# Patient Record
Sex: FEMALE | Race: WHITE | NOT HISPANIC OR LATINO | Employment: OTHER | URBAN - METROPOLITAN AREA
[De-identification: names, ages, dates, MRNs, and addresses within clinical notes are randomized per-mention and may not be internally consistent; named-entity substitution may affect disease eponyms.]

---

## 2019-04-01 ENCOUNTER — TRANSCRIBE ORDERS (OUTPATIENT)
Dept: ADMINISTRATIVE | Facility: HOSPITAL | Age: 64
End: 2019-04-01

## 2019-04-01 DIAGNOSIS — R79.89 ELEVATED LFTS: ICD-10-CM

## 2019-04-01 DIAGNOSIS — R94.4 ABNORMAL RESULTS OF KIDNEY FUNCTION STUDIES: Primary | ICD-10-CM

## 2019-04-14 ENCOUNTER — HOSPITAL ENCOUNTER (OUTPATIENT)
Dept: ULTRASOUND IMAGING | Facility: HOSPITAL | Age: 64
Discharge: HOME | End: 2019-04-14
Admitting: NURSE PRACTITIONER

## 2019-04-14 DIAGNOSIS — R79.89 ELEVATED LFTS: ICD-10-CM

## 2019-04-14 DIAGNOSIS — R94.4 ABNORMAL RESULTS OF KIDNEY FUNCTION STUDIES: ICD-10-CM

## 2019-04-14 PROCEDURE — 76700 US EXAM ABDOM COMPLETE: CPT

## 2019-05-07 ENCOUNTER — OFFICE VISIT (OUTPATIENT)
Dept: CARDIOLOGY | Facility: HOSPITAL | Age: 64
End: 2019-05-07

## 2019-05-07 ENCOUNTER — HOSPITAL ENCOUNTER (OUTPATIENT)
Dept: CARDIOLOGY | Facility: HOSPITAL | Age: 64
Discharge: HOME | End: 2019-05-07
Admitting: NURSE PRACTITIONER

## 2019-05-07 VITALS
SYSTOLIC BLOOD PRESSURE: 114 MMHG | WEIGHT: 116.44 LBS | TEMPERATURE: 97.7 F | BODY MASS INDEX: 19.4 KG/M2 | HEIGHT: 65 IN | RESPIRATION RATE: 18 BRPM | OXYGEN SATURATION: 100 % | HEART RATE: 84 BPM | DIASTOLIC BLOOD PRESSURE: 76 MMHG

## 2019-05-07 DIAGNOSIS — R06.09 DOE (DYSPNEA ON EXERTION): ICD-10-CM

## 2019-05-07 DIAGNOSIS — R07.89 CHEST TIGHTNESS OR PRESSURE: ICD-10-CM

## 2019-05-07 DIAGNOSIS — R55 NEAR SYNCOPE: ICD-10-CM

## 2019-05-07 DIAGNOSIS — I10 ESSENTIAL HYPERTENSION: ICD-10-CM

## 2019-05-07 DIAGNOSIS — R06.09 DOE (DYSPNEA ON EXERTION): Primary | ICD-10-CM

## 2019-05-07 PROCEDURE — 99214 OFFICE O/P EST MOD 30 MIN: CPT | Performed by: NURSE PRACTITIONER

## 2019-05-07 PROCEDURE — 93010 ELECTROCARDIOGRAM REPORT: CPT | Performed by: INTERNAL MEDICINE

## 2019-05-07 PROCEDURE — 93005 ELECTROCARDIOGRAM TRACING: CPT | Performed by: NURSE PRACTITIONER

## 2019-05-07 RX ORDER — METOPROLOL SUCCINATE 25 MG/1
25 TABLET, EXTENDED RELEASE ORAL NIGHTLY
COMMUNITY
End: 2022-01-27 | Stop reason: SDUPTHER

## 2019-05-07 NOTE — PROGRESS NOTES
[facility]  Heart and Valve Center      Encounter Date:2019     Alysia Sierra  [address] [place] [address]  [unfilled]    1955    Janet Leon APRN    Alysia Sierra is a 64 y.o. female.      Subjective:     Chief Complaint:  Shortness of Breath       HPI     64-year-old female referred to the heart valve center by primary care for evaluation of dyspnea on exertion.  Patient has a history of chronic fatigue, generalized weakness.  Patient history of iron deficiency anemia, unexplained weight loss, chronic kidney disease stage III, hypertension, hyperlipidemia, generalized anxiety disorder.  Greater than 6 weeks ago patient was seen at  on 3/29/2019 with complaints of dizziness, chest discomfort, dyspnea.    Pt reports s/s have worsened since the death of her mother, approx 3 months.  NIEVES has been progressive.  Worse with climbing steps and even during shopping (flat). Daily, wax and wane.  Typically no CP, except for recent  visit.  At that time chest pressure, weakness. No wheezing, but dry cough at night with allergies.  Worsening dizziness, near syncope.  No syncope.  Pt reports having a normal stress and echo 2017 at .  Typically no palpitations, but rapid with exertion improves with rest.  Increased stress.     Father had MI 50's.  Mother had CVA 50's.    Hx of HTN (BB), HTP (not treated r/t leg weakness)  Denies DM, CVA/TIA,     Past Medical History:   Diagnosis Date   • Chronic fatigue    • NIEVES (dyspnea on exertion)    • Generalized anxiety disorder    • Hyperlipidemia    • Hypertension    • Iron deficiency anemia    • Unintentional weight loss            Past Surgical History:   Procedure Laterality Date   •  SECTION     • DILATION AND CURETTAGE, DIAGNOSTIC / THERAPEUTIC     • ESSURE TUBAL LIGATION         Allergies   Allergen Reactions   • Atorvastatin Myalgia   • Lisinopril Angioedema         Current Outpatient Medications:   •  magnesium oxide  "(MAGOX) 400 (241.3 Mg) MG tablet tablet, Take 400 mg by mouth. 1 tablet oral daily, extra tablet daily 3 times weekly, Disp: , Rfl:   •  metoprolol succinate XL (TOPROL-XL) 25 MG 24 hr tablet, Take 25 mg by mouth Every Night., Disp: , Rfl:     The following portions of the patient's history were reviewed and updated today during office visit as appropriate: allergies, current medications, past family history, past medical history, past social history, past surgical history and problem list.    Review of Systems   Constitution: Positive for weakness, malaise/fatigue and weight loss.   Cardiovascular: Positive for dyspnea on exertion.   Respiratory: Positive for shortness of breath.    Neurological: Positive for dizziness.   All other systems reviewed and are negative.      Objective:     Vitals:    05/07/19 1244 05/07/19 1246 05/07/19 1247   BP: 123/72 129/86 114/76   BP Location: Right arm Left arm Left arm   Patient Position: Sitting Sitting Standing   Cuff Size: Adult Small Adult Small Adult   Pulse: 86 86 84   Resp: 18     Temp: 97.7 °F (36.5 °C)     TempSrc: Temporal     SpO2: 98% 99% 100%   Weight: 52.8 kg (116 lb 7 oz)     Height: 163.8 cm (64.5\")           Physical Exam   Constitutional: She is oriented to person, place, and time. She appears well-developed and well-nourished. No distress.   HENT:   Head: Normocephalic and atraumatic.   Mouth/Throat: Oropharynx is clear and moist.   Eyes: Conjunctivae are normal. Pupils are equal, round, and reactive to light. No scleral icterus.   Neck: No hepatojugular reflux and no JVD present. Carotid bruit is not present. No tracheal deviation present. No thyromegaly present.   Cardiovascular: Normal rate, regular rhythm, normal heart sounds and intact distal pulses. Exam reveals no friction rub.   No murmur heard.  Pulmonary/Chest: Effort normal and breath sounds normal.   Abdominal: Soft. Bowel sounds are normal. She exhibits no distension. There is no tenderness. "   Musculoskeletal: She exhibits no edema.   Lymphadenopathy:     She has no cervical adenopathy.   Neurological: She is alert and oriented to person, place, and time.   Skin: Skin is warm, dry and intact. No rash noted. No cyanosis or erythema. No pallor.   Psychiatric: She has a normal mood and affect. Her behavior is normal. Thought content normal.   Vitals reviewed.      Lab and Diagnostic Review:  5/2/2019 labs reviewed  CK 24,   CRP 0.10,   sed rate 10    Hep A IgM antibody nonreactive, hep B surface antigen nonreactive, hep B core IgM antibody nonreactive, hep C IgG G antibody nonreactive    4/24/2019:  WBC 4.4, hemoglobin 11.8, hematocrit 37.5, platelet 268    Glucose 99, sodium 138, potassium 4.4, chloride 98, carbon dioxide 25, creatinine 1.4, ALT 31, AST 65, estimated GFR 40.7    TSH 3.8, B12 459, folic acid 6.4, hemoglobin A1c 5.1    Cholesterol 237, triglycerides 159, HDL 92,     Magnesium 1.5    SEB negative    25 hydroxy vitamin D 47.43  Assessment and Plan:         1. NIEVES (dyspnea on exertion)    - ECG 12 Lead; SR 78 bpm    - Stress Test With Myocardial Perfusion (1 Day); Future  - Adult Transthoracic Echo Complete W/ Cont if Necessary Per Protocol; Future  - Ambulatory Referral to Cardiology    2. Chest tightness or pressure    - Stress Test With Myocardial Perfusion (1 Day); Future  - Ambulatory Referral to Cardiology    3. Near syncope    - Adult Transthoracic Echo Complete W/ Cont if Necessary Per Protocol; Future  - Ambulatory Referral to Cardiology    4. Essential hypertension  BP controlled on BB  Keep home BP and HR log    F/u with cardiology to be scheduled.         *Please note that portions of this note were completed with a voice recognition program. Efforts were made to edit the dictations, but occasionally words are mistranscribed.

## 2019-06-04 ENCOUNTER — HOSPITAL ENCOUNTER (OUTPATIENT)
Dept: CARDIOLOGY | Facility: HOSPITAL | Age: 64
Discharge: HOME | End: 2019-06-04

## 2019-06-04 VITALS — WEIGHT: 116 LBS | HEIGHT: 64 IN | BODY MASS INDEX: 19.81 KG/M2

## 2019-06-04 DIAGNOSIS — R06.09 DOE (DYSPNEA ON EXERTION): ICD-10-CM

## 2019-06-04 DIAGNOSIS — R55 NEAR SYNCOPE: ICD-10-CM

## 2019-06-04 DIAGNOSIS — R07.89 CHEST TIGHTNESS OR PRESSURE: ICD-10-CM

## 2019-06-04 LAB
BH CV ECHO MEAS - AO ROOT AREA (BSA CORRECTED): 2.1
BH CV ECHO MEAS - AO ROOT AREA: 8.2 CM^2
BH CV ECHO MEAS - AO ROOT DIAM: 3.2 CM
BH CV ECHO MEAS - BSA(HAYCOCK): 1.5 M^2
BH CV ECHO MEAS - BSA: 1.6 M^2
BH CV ECHO MEAS - BZI_BMI: 19.9 KILOGRAMS/M^2
BH CV ECHO MEAS - BZI_METRIC_HEIGHT: 162.6 CM
BH CV ECHO MEAS - BZI_METRIC_WEIGHT: 52.6 KG
BH CV ECHO MEAS - EDV(CUBED): 70.7 ML
BH CV ECHO MEAS - EDV(MOD-SP2): 25 ML
BH CV ECHO MEAS - EDV(MOD-SP4): 32 ML
BH CV ECHO MEAS - EDV(TEICH): 75.7 ML
BH CV ECHO MEAS - EF(CUBED): 65.1 %
BH CV ECHO MEAS - EF(MOD-BP): 55 %
BH CV ECHO MEAS - EF(MOD-SP2): 52 %
BH CV ECHO MEAS - EF(MOD-SP4): 62.5 %
BH CV ECHO MEAS - EF(TEICH): 57 %
BH CV ECHO MEAS - ESV(CUBED): 24.7 ML
BH CV ECHO MEAS - ESV(MOD-SP2): 12 ML
BH CV ECHO MEAS - ESV(MOD-SP4): 12 ML
BH CV ECHO MEAS - ESV(TEICH): 32.5 ML
BH CV ECHO MEAS - FS: 29.6 %
BH CV ECHO MEAS - IVS/LVPW: 1.2
BH CV ECHO MEAS - IVSD: 0.86 CM
BH CV ECHO MEAS - LA DIMENSION: 3 CM
BH CV ECHO MEAS - LA/AO: 0.92
BH CV ECHO MEAS - LAD MAJOR: 3.8 CM
BH CV ECHO MEAS - LAT PEAK E' VEL: 5 CM/SEC
BH CV ECHO MEAS - LATERAL E/E' RATIO: 10.6
BH CV ECHO MEAS - LV DIASTOLIC VOL/BSA (35-75): 20.6 ML/M^2
BH CV ECHO MEAS - LV MASS(C)D: 99.1 GRAMS
BH CV ECHO MEAS - LV MASS(C)DI: 63.9 GRAMS/M^2
BH CV ECHO MEAS - LV SYSTOLIC VOL/BSA (12-30): 7.7 ML/M^2
BH CV ECHO MEAS - LVIDD: 4.1 CM
BH CV ECHO MEAS - LVIDS: 2.9 CM
BH CV ECHO MEAS - LVLD AP2: 5.6 CM
BH CV ECHO MEAS - LVLD AP4: 5.1 CM
BH CV ECHO MEAS - LVLS AP2: 4.6 CM
BH CV ECHO MEAS - LVLS AP4: 4.1 CM
BH CV ECHO MEAS - LVPWD: 0.75 CM
BH CV ECHO MEAS - MED PEAK E' VEL: 7.8 CM/SEC
BH CV ECHO MEAS - MEDIAL E/E' RATIO: 6.9
BH CV ECHO MEAS - MV A MAX VEL: 73.5 CM/SEC
BH CV ECHO MEAS - MV E MAX VEL: 55.3 CM/SEC
BH CV ECHO MEAS - MV E/A: 0.75
BH CV ECHO MEAS - PA ACC SLOPE: 396.5 CM/SEC^2
BH CV ECHO MEAS - PA ACC TIME: 0.12 SEC
BH CV ECHO MEAS - PA PR(ACCEL): 23.5 MMHG
BH CV ECHO MEAS - RAP SYSTOLE: 3 MMHG
BH CV ECHO MEAS - RVSP: 22 MMHG
BH CV ECHO MEAS - SI(CUBED): 29.6 ML/M^2
BH CV ECHO MEAS - SI(MOD-SP2): 8.4 ML/M^2
BH CV ECHO MEAS - SI(MOD-SP4): 12.9 ML/M^2
BH CV ECHO MEAS - SI(TEICH): 27.8 ML/M^2
BH CV ECHO MEAS - SV(CUBED): 46 ML
BH CV ECHO MEAS - SV(MOD-SP2): 13 ML
BH CV ECHO MEAS - SV(MOD-SP4): 20 ML
BH CV ECHO MEAS - SV(TEICH): 43.2 ML
BH CV ECHO MEAS - TAPSE (>1.6): 1.1 CM2
BH CV ECHO MEAS - TR MAX PG: 19 MMHG
BH CV ECHO MEAS - TR MAX VEL: 214.6 CM/SEC
BH CV ECHO MEASUREMENTS AVERAGE E/E' RATIO: 8.64
BH CV STRESS BP STAGE 1: NORMAL
BH CV STRESS DURATION MIN STAGE 1: 4
BH CV STRESS DURATION SEC STAGE 1: 1
BH CV STRESS GRADE STAGE 1: 10
BH CV STRESS HR STAGE 1: 160
BH CV STRESS METS STAGE 1: 5
BH CV STRESS PROTOCOL 1: NORMAL
BH CV STRESS RECOVERY BP: NORMAL MMHG
BH CV STRESS RECOVERY HR: 90 BPM
BH CV STRESS SPEED STAGE 1: 1.7
BH CV STRESS STAGE 1: 1
BH CV VAS BP RIGHT ARM: NORMAL MMHG
BH CV XLRA - RV BASE: 2.9 CM
BH CV XLRA - RV LENGTH: 5.3 CM
BH CV XLRA - RV MID: 2.5 CM
BH CV XLRA - TDI S': 8.4 CM/SEC
LEFT ATRIUM VOLUME INDEX: 14.2 ML/M^2
LEFT ATRIUM VOLUME: 22 ML
LV EF NUC BP: 77 %
MAXIMAL PREDICTED HEART RATE: 157 BPM
MAXIMAL PREDICTED HEART RATE: 157 BPM
PERCENT MAX PREDICTED HR: 104.46 %
STRESS BASELINE BP: NORMAL MMHG
STRESS BASELINE HR: 88 BPM
STRESS PERCENT HR: 123 %
STRESS POST ESTIMATED WORKLOAD: 4.6 METS
STRESS POST EXERCISE DUR MIN: 4 MIN
STRESS POST EXERCISE DUR SEC: 1 SEC
STRESS POST PEAK BP: NORMAL MMHG
STRESS POST PEAK HR: 164 BPM
STRESS TARGET HR: 133 BPM
STRESS TARGET HR: 133 BPM

## 2019-06-04 PROCEDURE — A9500 TC99M SESTAMIBI: HCPCS | Performed by: NURSE PRACTITIONER

## 2019-06-04 PROCEDURE — 0 TECHNETIUM SESTAMIBI: Performed by: NURSE PRACTITIONER

## 2019-06-04 PROCEDURE — 93017 CV STRESS TEST TRACING ONLY: CPT

## 2019-06-04 PROCEDURE — 93018 CV STRESS TEST I&R ONLY: CPT | Performed by: INTERNAL MEDICINE

## 2019-06-04 PROCEDURE — 78452 HT MUSCLE IMAGE SPECT MULT: CPT

## 2019-06-04 PROCEDURE — 93306 TTE W/DOPPLER COMPLETE: CPT

## 2019-06-04 PROCEDURE — 78452 HT MUSCLE IMAGE SPECT MULT: CPT | Performed by: INTERNAL MEDICINE

## 2019-06-04 PROCEDURE — 93306 TTE W/DOPPLER COMPLETE: CPT | Performed by: INTERNAL MEDICINE

## 2019-06-04 RX ADMIN — TECHNETIUM TC 99M SESTAMIBI 1 DOSE: 1 INJECTION INTRAVENOUS at 08:55

## 2019-06-04 RX ADMIN — TECHNETIUM TC 99M SESTAMIBI 1 DOSE: 1 INJECTION INTRAVENOUS at 10:55

## 2019-06-05 ENCOUNTER — TELEPHONE (OUTPATIENT)
Dept: CARDIOLOGY | Facility: HOSPITAL | Age: 64
End: 2019-06-05

## 2019-06-05 PROBLEM — R07.89 CHEST TIGHTNESS OR PRESSURE: Status: ACTIVE | Noted: 2019-06-05

## 2019-06-05 NOTE — TELEPHONE ENCOUNTER
Attempted to call and discuss stress and echo results.  No answer.  Left message for pt to call.     F/u with [facility] is scheduled.

## 2019-06-08 PROBLEM — I10 HYPERTENSION: Status: ACTIVE | Noted: 2019-06-08

## 2019-06-08 PROBLEM — R06.09 DOE (DYSPNEA ON EXERTION): Status: ACTIVE | Noted: 2019-06-08

## 2019-06-08 NOTE — PROGRESS NOTES
OFFICE VISIT  NOTE  [facility] CARDIOLOGY      Name: Alysia Sierra    Date: 6/10/2019  MRN:  [id number]  :  1955      REFERRING/PRIMARY PROVIDER:  Eloise Jerome APRN    Chief Complaint   Patient presents with   • Chest Pain   • Dizziness   • Shortness of Breath       HPI: Alysia Sierra is a 64 y.o. female who presents today for new consultation for chest pain and dyspnea on exertion. Patient has a history of chronic fatigue, generalized weakness.  Patient history of iron deficiency anemia, unexplained weight loss, chronic kidney disease stage III, hypertension, hyperlipidemia, generalized anxiety disorder.  Greater than 6 weeks ago patient was seen at  on 3/29/2019 with complaints of dizziness, chest discomfort, dyspnea. Pt reports having a normal stress and echo 2017 at .  Echocardiogram on 2019 shows normal ejection fraction with grade 1 diastolic dysfunction, no sniffing valvular heart disease, exercise myocardial perfusion imaging study showed no evidence of infarct or ischemia, low functional capacity, exercised 4 minutes stopped due to leg fatigue.  She reports 13 to 15 pound unintentional weight loss in the past 3 to 4 months, after her mother passed away in February.  She lives with her mother, she moved out recently.  She relies she cannot walk upstairs without profound fatigue, muscle weakness.  She is found to have iron deficiency anemia, and was on iron supplementation but has not been on it recently for unknown reason.  Denies having previous colonoscopy.  Patient reports shortness of breath and weakness with any type of exertion.  She does have occasional chest pain, sharp, substernal, nonradiating.  Occasional palpitations, better on metoprolol, worse when she was on amlodipine and lisinopril.  Patient has history of angioedema with lisinopril.  She is essentially vegetarian.    Past Medical History:   Diagnosis Date   • Anemia    • Chicken pox    •  Chronic fatigue    • NIEVES (dyspnea on exertion)    • Generalized anxiety disorder    • Hyperlipidemia    • Hypertension    • Iron deficiency anemia    • Liver problem    • Measles    • Menopause    • Mumps    • Unintentional weight loss        Past Surgical History:   Procedure Laterality Date   •  SECTION     • DILATION AND CURETTAGE, DIAGNOSTIC / THERAPEUTIC     • ESSURE TUBAL LIGATION         Social History     Socioeconomic History   • Marital status:      Spouse name: Not on file   • Number of children: Not on file   • Years of education: Not on file   • Highest education level: Not on file   Tobacco Use   • Smoking status: Never Smoker   • Smokeless tobacco: Never Used   Substance and Sexual Activity   • Alcohol use: Yes     Frequency: Monthly or less     Drinks per session: 3 or 4   • Drug use: No   • Sexual activity: Defer   Social History Narrative    Caffeine: None       Family History   Problem Relation Age of Onset   • COPD Mother    • Stroke Mother    • Lung cancer Father    • Hypertension Father    • Heart attack Father    • Coronary artery disease Father    • Hyperlipidemia Sister    • Spondylolisthesis Sister    • Rheum arthritis Sister    • Malig Hypertension Sister    • Osteoarthritis Sister    • Other Sister         alcoholic   • Hypertension Brother         ROS:   Constitutional no fever,  no weight loss   Skin no rash, no subcutaneous nodules   Otolaryngeal no difficulty swallowing   Cardiovascular See HPI   Pulmonary no cough, no sputum production   Gastrointestinal no constipation, no diarrhea   Genitourinary no dysuria, no hematuria   Hematologic no easy bruisability, no abnormal bleeding   Musculoskeletal no muscle pain   Neurologic no dizziness, no falls         Allergies   Allergen Reactions   • Atorvastatin Myalgia   • Lisinopril Angioedema         Current Outpatient Medications:   •  magnesium oxide (MAGOX) 400 (241.3 Mg) MG tablet tablet, Take 400 mg by mouth. 1  "tablet oral daily, extra tablet daily 3 times weekly, Disp: , Rfl:   •  metoprolol succinate XL (TOPROL-XL) 25 MG 24 hr tablet, Take 25 mg by mouth Every Night., Disp: , Rfl:   •  tiotropium bromide monohydrate (SPIRIVA RESPIMAT) 2.5 MCG/ACT aerosol solution inhaler, Inhale 2 puffs Daily., Disp: , Rfl:     Vitals:    06/10/19 0949   BP: 124/80   BP Location: Right arm   Patient Position: Sitting   Pulse: 96   SpO2: 98%   Weight: 52.6 kg (116 lb)   Height: 162.6 cm (64\")     Body mass index is 19.91 kg/m².    PHYSICAL EXAM:    General Appearance:   · Thin, frail  HENT:   · oropharynx moist  · lips not cyanotic  Neck:  · thyroid not enlarged  · supple  Respiratory:  · no respiratory distress  · normal breath sounds  · no rales  Cardiovascular:  · no jugular venous distention  · regular rhythm  · apical impulse normal  · S1 normal, S2 normal  · no S3, no S4   · no murmur  · no rub, no thrill  · carotid pulses normal; no bruit  · pedal pulses normal  · lower extremity edema: none    Gastrointestinal:   · bowel sounds normal  · non-tender  · no hepatomegaly, no splenomegaly  Musculoskeletal:  · no clubbing of fingers.   · normocephalic, head atraumatic  Skin:   · warm, dry  Psychiatric:  · judgement and insight appropriate  · normal mood and affect    RESULTS:     ECG 12 Lead  Date/Time: 6/10/2019 11:04 AM  Performed by: Gray Bahena MD  Authorized by: Gray Bahena MD   Comparison: compared with previous ECG from 5/7/2019  Comparison to previous ECG: Similar to previous with exception of PVC and PACs.  Rhythm: sinus rhythm  Ectopy: unifocal PVCs and atrial premature contractions  Rate: normal  BPM: 91  QRS axis: normal    Clinical impression: abnormal EKG            Results for orders placed during the hospital encounter of 06/04/19   Adult Transthoracic Echo Complete W/ Cont if Necessary Per Protocol    Narrative · Calculated EF = 55.0%  · Left ventricular systolic function is normal.  · Left ventricular " diastolic dysfunction (grade I) consistent with   impaired relaxation.  · Mild tricuspid valve regurgitation is present.  · Mild pulmonic valve regurgitation is present.            Labs:  No results found for: CHOL, TRIG, HDL, LDL, AST, ALT  No results found for: HGBA1C  No components found for: CREATINININE  No results found for: EGFRIFNONA      ASSESSMENT:  Problem List Items Addressed This Visit        Cardiovascular and Mediastinum    Hypertension       Respiratory    NIEVES (dyspnea on exertion)       Nervous and Auditory    Chest tightness or pressure - Primary    Overview     · Stress test with myocardial perfusion 3/4/2019: No ischemia, EF greater than 70%.  No coronary calcium noted.  · Echocardiogram 6/4/2019: EF 55, mild TR, mild NE, diastolic dysfunction grade 1            Other    Unintentional weight loss          PLAN:    1.  Chest pain:  We reviewed her stress test in detail, no evidence of ischemia or infarct  Possibly GI related  Follow-up with PCP regarding GI evaluation    2.  Dyspnea on exertion:  May be related to anemia  Echocardiogram has ejection fraction no valvular heart disease, grade 1 diastolic dysfunction  Grade 1 diastolic dysfunction may contribute slightly to dyspnea but this alone would not cause the profound symptoms she is experiencing.    3.  Unintentional weight loss with iron deficiency anemia:  Strongly recommend colonoscopy  Patient follow-up with PCP regarding this    4.  Palpitations:  EKG in the office today shows PVC and PACs, concern for atrial fibrillation or flutter  14-day Holter monitor placed today  Further recognitions to follow  Continue current dose metoprolol    Return to clinic in 3 months    Thank you for the opportunity to share in the care of your patient; please do not hesitate to call me with any questions.     Gray Bahena MD, [facility]  Office: [phone]  [facility] [place] [address]

## 2019-06-10 ENCOUNTER — CONSULT (OUTPATIENT)
Dept: CARDIOLOGY | Facility: CLINIC | Age: 64
End: 2019-06-10

## 2019-06-10 VITALS
HEIGHT: 64 IN | SYSTOLIC BLOOD PRESSURE: 124 MMHG | BODY MASS INDEX: 19.81 KG/M2 | HEART RATE: 96 BPM | OXYGEN SATURATION: 98 % | WEIGHT: 116 LBS | DIASTOLIC BLOOD PRESSURE: 80 MMHG

## 2019-06-10 DIAGNOSIS — I10 ESSENTIAL HYPERTENSION: ICD-10-CM

## 2019-06-10 DIAGNOSIS — R06.09 DOE (DYSPNEA ON EXERTION): ICD-10-CM

## 2019-06-10 DIAGNOSIS — R07.89 CHEST TIGHTNESS OR PRESSURE: Primary | ICD-10-CM

## 2019-06-10 DIAGNOSIS — R00.2 PALPITATIONS: Primary | ICD-10-CM

## 2019-06-10 DIAGNOSIS — R63.4 UNINTENTIONAL WEIGHT LOSS: ICD-10-CM

## 2019-06-10 PROCEDURE — 93000 ELECTROCARDIOGRAM COMPLETE: CPT | Performed by: INTERNAL MEDICINE

## 2019-06-10 PROCEDURE — 99204 OFFICE O/P NEW MOD 45 MIN: CPT | Performed by: INTERNAL MEDICINE

## 2019-06-28 ENCOUNTER — TELEPHONE (OUTPATIENT)
Dept: CARDIOLOGY | Facility: CLINIC | Age: 64
End: 2019-06-28

## 2019-06-28 NOTE — TELEPHONE ENCOUNTER
----- Message from Gray Bahena MD sent at 6/28/2019  4:32 PM EDT -----  Please inform the patient of their test results.  She should continue metoprolol at current dose, if palpitation symptoms are worsening let me know we will evaluate further.  Thank you.

## 2019-06-28 NOTE — TELEPHONE ENCOUNTER
Spoke with patient about heart monitor results, and she says her palpitations are getting better. She expresses one day last week she had one of the best days she's had in months. She was very active and reports good breathing, no chest discomfort, and no palpitations with that level of activity.

## 2019-09-23 ENCOUNTER — OFFICE VISIT (OUTPATIENT)
Dept: CARDIOLOGY | Facility: CLINIC | Age: 64
End: 2019-09-23

## 2019-09-23 VITALS
WEIGHT: 120 LBS | HEIGHT: 65 IN | SYSTOLIC BLOOD PRESSURE: 138 MMHG | DIASTOLIC BLOOD PRESSURE: 82 MMHG | HEART RATE: 82 BPM | BODY MASS INDEX: 19.99 KG/M2

## 2019-09-23 DIAGNOSIS — I47.1 PAROXYSMAL SVT (SUPRAVENTRICULAR TACHYCARDIA) (HCC): Primary | ICD-10-CM

## 2019-09-23 DIAGNOSIS — R07.89 CHEST TIGHTNESS OR PRESSURE: ICD-10-CM

## 2019-09-23 DIAGNOSIS — R06.09 DOE (DYSPNEA ON EXERTION): ICD-10-CM

## 2019-09-23 DIAGNOSIS — I10 ESSENTIAL HYPERTENSION: ICD-10-CM

## 2019-09-23 PROBLEM — I47.10 PAROXYSMAL SVT (SUPRAVENTRICULAR TACHYCARDIA): Status: ACTIVE | Noted: 2019-09-23

## 2019-09-23 PROCEDURE — 99214 OFFICE O/P EST MOD 30 MIN: CPT | Performed by: INTERNAL MEDICINE

## 2019-09-23 NOTE — PROGRESS NOTES
OFFICE VISIT  NOTE  [facility] MEDICAL [facility] CARDIOLOGY      Name: Alysia Sierra    Date: 2019  MRN:  [id number]  :  1955      REFERRING/PRIMARY PROVIDER:  No ref. provider found    Chief Complaint   Patient presents with   • NIEVES (dyspnea on exertion)       HPI: Alysia Sierra is a 64 y.o. female who presents today for follow-up for chest pain, shortness of breath, paroxysmal SVT. Patient history of iron deficiency anemia, unexplained weight loss, chronic kidney disease stage III, hypertension, hyperlipidemia, generalized anxiety disorder.    Stress test and echo benign 2019 at [facility].  Symptoms all resolved in 2019.  She is not exercising, denies chest pain or shortness of breath with exertion.  Anemia is improved which may have helped.  She also denies palpitations, currently tolerating Toprol-XL 25 mg daily.  She cannot tolerate amlodipine due to lower extremity edema, she had angioedema with lisinopril, and did not tolerate HCTZ due to dizziness.    Past Medical History:   Diagnosis Date   • Anemia    • Chicken pox    • Chronic fatigue    • NIEVES (dyspnea on exertion)    • Generalized anxiety disorder    • Hyperlipidemia    • Hypertension    • Iron deficiency anemia    • Liver problem    • Measles    • Menopause    • Mumps    • Unintentional weight loss        Past Surgical History:   Procedure Laterality Date   •  SECTION     • DILATION AND CURETTAGE, DIAGNOSTIC / THERAPEUTIC     • ESSURE TUBAL LIGATION         Social History     Socioeconomic History   • Marital status:      Spouse name: Not on file   • Number of children: Not on file   • Years of education: Not on file   • Highest education level: Not on file   Tobacco Use   • Smoking status: Never Smoker   • Smokeless tobacco: Never Used   Substance and Sexual Activity   • Alcohol use: Yes     Frequency: Monthly or less     Drinks per session: 3 or 4   • Drug use: No   • Sexual activity: Defer  "  Social History Narrative    Caffeine: None       Family History   Problem Relation Age of Onset   • COPD Mother    • Stroke Mother    • Lung cancer Father    • Hypertension Father    • Heart attack Father    • Coronary artery disease Father    • Hyperlipidemia Sister    • Spondylolisthesis Sister    • Rheum arthritis Sister    • Malig Hypertension Sister    • Osteoarthritis Sister    • Other Sister         alcoholic   • Hypertension Brother         ROS:   Constitutional no fever,  no weight loss   Skin no rash, no subcutaneous nodules   Otolaryngeal no difficulty swallowing   Cardiovascular See HPI   Pulmonary no cough, no sputum production   Gastrointestinal no constipation, no diarrhea   Genitourinary no dysuria, no hematuria   Hematologic no easy bruisability, no abnormal bleeding   Musculoskeletal no muscle pain   Neurologic no dizziness, no falls         Allergies   Allergen Reactions   • Atorvastatin Myalgia   • Lisinopril Angioedema         Current Outpatient Medications:   •  magnesium oxide (MAGOX) 400 (241.3 Mg) MG tablet tablet, Take 400 mg by mouth. 1 tablet oral daily, extra tablet daily 3 times weekly, Disp: , Rfl:   •  metoprolol succinate XL (TOPROL-XL) 25 MG 24 hr tablet, Take 25 mg by mouth Every Night., Disp: , Rfl:     Vitals:    09/23/19 0921   BP: 138/82   BP Location: Left arm   Patient Position: Sitting   Pulse: 82   Weight: 54.4 kg (120 lb)   Height: 163.8 cm (64.5\")     Body mass index is 20.28 kg/m².    PHYSICAL EXAM:    General Appearance:   · Thin, frail  HENT:   · oropharynx moist  · lips not cyanotic  Neck:  · thyroid not enlarged  · supple  Respiratory:  · no respiratory distress  · normal breath sounds  · no rales  Cardiovascular:  · no jugular venous distention  · regular rhythm  · apical impulse normal  · S1 normal, S2 normal  · no S3, no S4   · no murmur  · no rub, no thrill  · carotid pulses normal; no bruit  · pedal pulses normal  · lower extremity edema: none  "   Gastrointestinal:   · bowel sounds normal  · non-tender  · no hepatomegaly, no splenomegaly  Musculoskeletal:  · no clubbing of fingers.   · normocephalic, head atraumatic  Skin:   · warm, dry  Psychiatric:  · judgement and insight appropriate  · normal mood and affect    RESULTS:   Procedures    Results for orders placed during the hospital encounter of 06/04/19   Adult Transthoracic Echo Complete W/ Cont if Necessary Per Protocol    Narrative · Calculated EF = 55.0%  · Left ventricular systolic function is normal.  · Left ventricular diastolic dysfunction (grade I) consistent with   impaired relaxation.  · Mild tricuspid valve regurgitation is present.  · Mild pulmonic valve regurgitation is present.            Labs:  No results found for: CHOL, TRIG, HDL, LDL, AST, ALT  No results found for: HGBA1C  No components found for: CREATINININE  No results found for: EGFRIFNONA      ASSESSMENT:  Problem List Items Addressed This Visit        Cardiovascular and Mediastinum    Hypertension    Paroxysmal SVT (supraventricular tachycardia) (CMS/HCC) - Primary       Respiratory    NIEVES (dyspnea on exertion)       Nervous and Auditory    Chest tightness or pressure    Overview     · Stress test with myocardial perfusion 3/4/2019: No ischemia, EF greater than 70%.  No coronary calcium noted.  · Echocardiogram 6/4/2019: EF 55, mild TR, mild IA, diastolic dysfunction grade 1               PLAN:    1.  Chest pain:  Symptoms resolved  She is doing well  Stress test and echo benign, 6/2019    2.  Dyspnea on exertion:  Symptoms much improved, she is back to exercising    3.  Hypertension:  Currently well controlled  Home blood pressure log were reviewed, range 96-1 30 systolic    4.  Paroxysmal SVT:  Continue Toprol 25 mg daily  She is currently asymptomatic  Continue decreasing caffeine and increase water    Return to clinic in 6 months    Thank you for the opportunity to share in the care of your patient; please do not hesitate to  call me with any questions.     Gray Bahena MD, FACC  Office: [phone] [address] [place] [address]

## 2020-03-05 NOTE — PROGRESS NOTES
OFFICE VISIT  NOTE  [facility] [place] [facility]ARDIOLOGY      Name: Alysia Sierra    Date: 3/9/2020  MRN:  [id number]  :  1955      REFERRING/PRIMARY PROVIDER:  No ref. provider found    Chief Complaint   Patient presents with   • Palpitations       HPI: Alysia Sierra is a 65 y.o. female who presents today for follow-up for chest pain, shortness of breath, paroxysmal SVT. Patient history of iron deficiency anemia, unexplained weight loss, chronic kidney disease stage III, hypertension, hyperlipidemia, generalized anxiety disorder. Stress test and echo benign 2019 at [facility].  Symptoms all resolved in 2019.  She is not exercising, denies chest pain or shortness of breath with exertion.  Anemia is improved which may have helped.  She also denies palpitations, currently tolerating Toprol-XL 25 mg daily.  She cannot tolerate amlodipine due to lower extremity edema, she had angioedema with lisinopril, and did not tolerate HCTZ due to dizziness.  Doing well since last visit, gets occasional palpitations but overall improved from last visit, denies shortness of breath or chest pain.  Stable CKD, most recent nephrology note reviewed.    Past Medical History:   Diagnosis Date   • Anemia    • Chicken pox    • Chronic fatigue    • NIEVES (dyspnea on exertion)    • Generalized anxiety disorder    • Hyperlipidemia    • Hypertension    • Iron deficiency anemia    • Liver problem    • Measles    • Menopause    • Mumps    • Unintentional weight loss        Past Surgical History:   Procedure Laterality Date   •  SECTION     • DILATION AND CURETTAGE, DIAGNOSTIC / THERAPEUTIC     • ESSURE TUBAL LIGATION         Social History     Socioeconomic History   • Marital status:      Spouse name: Not on file   • Number of children: Not on file   • Years of education: Not on file   • Highest education level: Not on file   Tobacco Use   • Smoking status: Never Smoker   • Smokeless  "tobacco: Never Used   Substance and Sexual Activity   • Alcohol use: Yes     Frequency: Monthly or less     Drinks per session: 3 or 4   • Drug use: No   • Sexual activity: Defer   Social History Narrative    Caffeine: None       Family History   Problem Relation Age of Onset   • COPD Mother    • Stroke Mother    • Lung cancer Father    • Hypertension Father    • Heart attack Father    • Coronary artery disease Father    • Hyperlipidemia Sister    • Spondylolisthesis Sister    • Rheum arthritis Sister    • Malig Hypertension Sister    • Osteoarthritis Sister    • Other Sister         alcoholic   • Hypertension Brother         ROS:   Constitutional no fever,  no weight loss   Skin no rash, no subcutaneous nodules   Otolaryngeal no difficulty swallowing   Cardiovascular See HPI   Pulmonary no cough, no sputum production   Gastrointestinal no constipation, no diarrhea   Genitourinary no dysuria, no hematuria   Hematologic no easy bruisability, no abnormal bleeding   Musculoskeletal no muscle pain   Neurologic no dizziness, no falls         Allergies   Allergen Reactions   • Lisinopril Angioedema   • Atorvastatin Myalgia         Current Outpatient Medications:   •  Cholecalciferol (VITAMIN D3) 25 MCG (1000 UT) capsule, 2 (Two) Times a Week., Disp: , Rfl:   •  famotidine (PEPCID) 10 MG tablet, Take 10 mg by mouth 2 (Two) Times a Day As Needed for Heartburn., Disp: , Rfl:   •  magnesium oxide (MAGOX) 400 (241.3 Mg) MG tablet tablet, Take 400 mg by mouth Daily., Disp: , Rfl:   •  metoprolol succinate XL (TOPROL-XL) 25 MG 24 hr tablet, Take 25 mg by mouth Every Night., Disp: , Rfl:   •  sodium bicarbonate 650 MG tablet, 2 (Two) Times a Week., Disp: , Rfl:     Vitals:    03/09/20 1056   BP: 140/84   BP Location: Right arm   Patient Position: Sitting   Pulse: 75   SpO2: 98%   Weight: 58.5 kg (129 lb)   Height: 162.6 cm (64\")     Body mass index is 22.14 kg/m².    PHYSICAL EXAM:    General Appearance:   · Thin, frail  HENT: "   · oropharynx moist  · lips not cyanotic  Neck:  · thyroid not enlarged  · supple  Respiratory:  · no respiratory distress  · normal breath sounds  · no rales  Cardiovascular:  · no jugular venous distention  · regular rhythm  · apical impulse normal  · S1 normal, S2 normal  · no S3, no S4   · no murmur  · no rub, no thrill  · carotid pulses normal; no bruit  · pedal pulses normal  · lower extremity edema: none    Gastrointestinal:   · bowel sounds normal  · non-tender  · no hepatomegaly, no splenomegaly  Musculoskeletal:  · no clubbing of fingers.   · normocephalic, head atraumatic  Skin:   · warm, dry  Psychiatric:  · judgement and insight appropriate  · normal mood and affect    RESULTS:     ECG 12 Lead  Date/Time: 3/9/2020 11:24 AM  Performed by: Gray Bahena MD  Authorized by: Gray Bahena MD   Comparison: compared with previous ECG from 6/10/2019  Similar to previous ECG  Rhythm: sinus rhythm  Rate: normal  QRS axis: normal    Clinical impression: non-specific ECG  Comments: Unusual P wave axis likely due to lead placement.            Results for orders placed during the hospital encounter of 06/04/19   Adult Transthoracic Echo Complete W/ Cont if Necessary Per Protocol    Narrative · Calculated EF = 55.0%  · Left ventricular systolic function is normal.  · Left ventricular diastolic dysfunction (grade I) consistent with   impaired relaxation.  · Mild tricuspid valve regurgitation is present.  · Mild pulmonic valve regurgitation is present.            Labs:  No results found for: CHOL, TRIG, HDL, LDL, AST, ALT  No results found for: HGBA1C  No components found for: CREATINININE  No results found for: EGFRIFNONA      ASSESSMENT:  Problem List Items Addressed This Visit        Cardiovascular and Mediastinum    Hypertension    Paroxysmal SVT (supraventricular tachycardia) (CMS/HCC) - Primary    Overview     06/2019 Holter  · An abnormal monitor study.  · 8 runs of SVT.         Relevant Orders    ECG 12  Lead       Respiratory    NIEVES (dyspnea on exertion)       Nervous and Auditory    Other chest pain    Overview     · Stress test with myocardial perfusion 3/4/2019: No ischemia, EF greater than 70%.  No coronary calcium noted.  · Echocardiogram 6/4/2019: EF 55, mild TR, mild OR, diastolic dysfunction grade 1               PLAN:    1.  Chest pain:  Symptoms resolved  She is doing well  Stress test and echo benign, 6/2019    2.  Dyspnea on exertion:  Symptoms much improved, she is back to exercising    3.  Hypertension:  Currently well controlled  Home blood pressure log were reviewed, 100-130 systolic range  Discussed importance of continued low-sodium diet and exercise    4.  Paroxysmal SVT:  Continue Toprol 25 mg daily  Symptoms are currently stable and do not affect daily living.  Continue decreasing caffeine and increase water    5.  CKD stage III:  Complicates all aspects of care  Recent nephrology note reviewed, stable creatinine, she follows a low potassium diet.    Return to clinic in 6 months, or sooner as needed.    Thank you for the opportunity to share in the care of your patient; please do not hesitate to call me with any questions.     Gray Bahena MD, [facility]  Office: [phone]  [facility] [place] [address]

## 2020-03-06 ENCOUNTER — HOSPITAL ENCOUNTER (OUTPATIENT)
Dept: GENERAL RADIOLOGY | Facility: HOSPITAL | Age: 65
Discharge: HOME | End: 2020-03-06
Admitting: NURSE PRACTITIONER

## 2020-03-06 ENCOUNTER — TRANSCRIBE ORDERS (OUTPATIENT)
Dept: ADMINISTRATIVE | Facility: HOSPITAL | Age: 65
End: 2020-03-06

## 2020-03-06 DIAGNOSIS — M41.9 SCOLIOSIS OF LUMBAR SPINE, UNSPECIFIED SCOLIOSIS TYPE: ICD-10-CM

## 2020-03-06 DIAGNOSIS — M54.50 LUMBAR PAIN: Primary | ICD-10-CM

## 2020-03-06 PROCEDURE — 72040 X-RAY EXAM NECK SPINE 2-3 VW: CPT

## 2020-03-06 PROCEDURE — 72100 X-RAY EXAM L-S SPINE 2/3 VWS: CPT

## 2020-03-06 PROCEDURE — 72070 X-RAY EXAM THORAC SPINE 2VWS: CPT

## 2020-03-09 ENCOUNTER — OFFICE VISIT (OUTPATIENT)
Dept: CARDIOLOGY | Facility: CLINIC | Age: 65
End: 2020-03-09

## 2020-03-09 VITALS
OXYGEN SATURATION: 98 % | SYSTOLIC BLOOD PRESSURE: 140 MMHG | HEART RATE: 75 BPM | HEIGHT: 64 IN | WEIGHT: 129 LBS | DIASTOLIC BLOOD PRESSURE: 84 MMHG | BODY MASS INDEX: 22.02 KG/M2

## 2020-03-09 DIAGNOSIS — R07.89 OTHER CHEST PAIN: ICD-10-CM

## 2020-03-09 DIAGNOSIS — R06.09 DOE (DYSPNEA ON EXERTION): ICD-10-CM

## 2020-03-09 DIAGNOSIS — I10 ESSENTIAL HYPERTENSION: ICD-10-CM

## 2020-03-09 DIAGNOSIS — I47.1 PAROXYSMAL SVT (SUPRAVENTRICULAR TACHYCARDIA) (HCC): Primary | ICD-10-CM

## 2020-03-09 PROCEDURE — 99214 OFFICE O/P EST MOD 30 MIN: CPT | Performed by: INTERNAL MEDICINE

## 2020-03-09 PROCEDURE — 93000 ELECTROCARDIOGRAM COMPLETE: CPT | Performed by: INTERNAL MEDICINE

## 2020-03-09 RX ORDER — SODIUM BICARBONATE 650 MG/1
TABLET ORAL 2 TIMES WEEKLY
COMMUNITY
Start: 2019-12-20 | End: 2020-10-08

## 2020-03-09 RX ORDER — FAMOTIDINE 10 MG
10 TABLET ORAL 2 TIMES DAILY PRN
COMMUNITY
End: 2020-10-08

## 2020-05-14 PROBLEM — R07.9 CHEST PAIN: Status: ACTIVE | Noted: 2019-06-05

## 2020-05-14 NOTE — PROGRESS NOTES
OFFICE VISIT  NOTE  [facility] [place] [facility]      Name: Alysia Sierra    Date: 2020  MRN:  [id number]  :  1955      REFERRING/PRIMARY PROVIDER:  No ref. provider found    No chief complaint on file.      HPI: Alysia Sierra is a 65 y.o. female who presents today for follow-up for chest pain, shortness of breath, paroxysmal SVT. Patient history of iron deficiency anemia, unexplained weight loss, chronic kidney disease stage III, hypertension, hyperlipidemia, generalized anxiety disorder. Stress test and echo benign 2019 at [facility].  Symptoms all resolved in 2019.    Reports labile blood pressure since last visit, increased stress, sister  2 months ago in 2020.  Blood pressure log reviewed from home, mainly 120s to 130 systolic but occasionally 140s to 150s with 90s to 100 diastolic.  She states, when diastolic is greater than 100 she has chest pressure rating to her back.  Stress test from 2019 normal.  She also reports bilateral leg weakness, difficulty rising from chair or laying down, she reports a syncopal episode 1 month ago.  Syncope occurred out of the blue, no preceding symptoms, woke up on the floor, crawled back to bed went to sleep, did not seek medical attention at that time.  Denies chest pain.    Past Medical History:   Diagnosis Date   • Anemia    • Chicken pox    • Chronic fatigue    • NIEVES (dyspnea on exertion)    • Generalized anxiety disorder    • Hyperlipidemia    • Hypertension    • Iron deficiency anemia    • Liver problem    • Measles    • Menopause    • Mumps    • Unintentional weight loss        Past Surgical History:   Procedure Laterality Date   •  SECTION     • DILATION AND CURETTAGE, DIAGNOSTIC / THERAPEUTIC     • ESSURE TUBAL LIGATION         Social History     Socioeconomic History   • Marital status:      Spouse name: Not on file   • Number of children: Not on file   • Years of education: Not on  file   • Highest education level: Not on file   Tobacco Use   • Smoking status: Never Smoker   • Smokeless tobacco: Never Used   Substance and Sexual Activity   • Alcohol use: Yes     Frequency: Monthly or less     Drinks per session: 3 or 4   • Drug use: No   • Sexual activity: Defer   Social History Narrative    Caffeine: None       Family History   Problem Relation Age of Onset   • COPD Mother    • Stroke Mother    • Lung cancer Father    • Hypertension Father    • Heart attack Father    • Coronary artery disease Father    • Hyperlipidemia Sister    • Spondylolisthesis Sister    • Rheum arthritis Sister    • Malig Hypertension Sister    • Osteoarthritis Sister    • Other Sister         alcoholic   • Hypertension Brother         ROS:   Constitutional no fever,  no weight loss   Skin no rash, no subcutaneous nodules   Otolaryngeal no difficulty swallowing   Cardiovascular See HPI   Pulmonary no cough, no sputum production   Gastrointestinal no constipation, no diarrhea   Genitourinary no dysuria, no hematuria   Hematologic no easy bruisability, no abnormal bleeding   Musculoskeletal no muscle pain   Neurologic no dizziness, no falls         Allergies   Allergen Reactions   • Lisinopril Angioedema   • Atorvastatin Myalgia         Current Outpatient Medications:   •  Cholecalciferol (VITAMIN D3) 25 MCG (1000 UT) capsule, 2 (Two) Times a Week., Disp: , Rfl:   •  famotidine (PEPCID) 10 MG tablet, Take 10 mg by mouth 2 (Two) Times a Day As Needed for Heartburn., Disp: , Rfl:   •  magnesium oxide (MAGOX) 400 (241.3 Mg) MG tablet tablet, Take 400 mg by mouth Daily., Disp: , Rfl:   •  metoprolol succinate XL (TOPROL-XL) 25 MG 24 hr tablet, Take 25 mg by mouth Every Night., Disp: , Rfl:   •  sodium bicarbonate 650 MG tablet, 2 (Two) Times a Week., Disp: , Rfl:     There were no vitals filed for this visit.  There is no height or weight on file to calculate BMI.    PHYSICAL EXAM:    General Appearance:   · Thin, frail  HENT:    · oropharynx moist  · lips not cyanotic  Neck:  · thyroid not enlarged  · supple  Respiratory:  · no respiratory distress  · normal breath sounds  · no rales  Cardiovascular:  · no jugular venous distention  · regular rhythm  · apical impulse normal  · S1 normal, S2 normal  · no S3, no S4   · no murmur  · no rub, no thrill  · carotid pulses normal; no bruit  · pedal pulses normal  · lower extremity edema: none    Gastrointestinal:   · bowel sounds normal  · non-tender  · no hepatomegaly, no splenomegaly  Musculoskeletal:  · no clubbing of fingers.   · normocephalic, head atraumatic  Skin:   · warm, dry  Psychiatric:  · judgement and insight appropriate  · normal mood and affect    RESULTS:   Procedures    Results for orders placed during the hospital encounter of 06/04/19   Adult Transthoracic Echo Complete W/ Cont if Necessary Per Protocol    Narrative · Calculated EF = 55.0%  · Left ventricular systolic function is normal.  · Left ventricular diastolic dysfunction (grade I) consistent with   impaired relaxation.  · Mild tricuspid valve regurgitation is present.  · Mild pulmonic valve regurgitation is present.            Labs:  No results found for: CHOL, TRIG, HDL, LDL, AST, ALT  No results found for: HGBA1C  No components found for: CREATINININE  No results found for: EGFRIFNONA      ASSESSMENT:  Problem List Items Addressed This Visit        Cardiovascular and Mediastinum    Hypertension - Primary    Paroxysmal SVT (supraventricular tachycardia) (CMS/HCC)    Overview     06/2019 Holter  · An abnormal monitor study.  · 8 runs of SVT.            Respiratory    NIEVES (dyspnea on exertion)    Overview     06/04/19 Echo  · LVEF = 55.0%  · Left ventricular diastolic dysfunction (grade I) consistent with impaired relaxation.            Nervous and Auditory    Chest pain    Overview     06/04/19 Stress test  · Myocardial perfusion imaging indicates a normal myocardial perfusion study with no evidence of  ischemia.  · LVEF > 70%  · No coronary calcium noted on  CT images.  · Impressions are consistent with a low risk study.               PLAN:    1.  Syncope:  Reviewed echo and Holter and stress test all within the last 12 months  All benign  No preceding symptoms.  Labile blood pressure recently with increased stress, sister  2020.  Advised increase fluid intake,, will monitor symptoms recur.    2.  Bilateral leg weakness:  Patient states she cannot rise from a chair without using her upper arms, legs are weak, does not exercise due to this.  Neurology referral    3.  Hypertension:  Labile blood pressure  Patient advised to take a half of her Toprol-XL 25 mg tablet if systolic greater than 160 or diastolic greater than 100.    4.  Paroxysmal SVT:  Continue Toprol 25 mg daily  Symptoms are currently stable and do not affect daily living.  Continue decreasing caffeine and increase water    5.  CKD stage III:  Complicates all aspects of care  Recent nephrology note reviewed, stable creatinine, she follows a low potassium diet.    Return to clinic in 6 months, or sooner as needed.    Thank you for the opportunity to share in the care of your patient; please do not hesitate to call me with any questions.     Gray Bahena MD, [facility]  Office: [phone] [address] [place] [address]

## 2020-05-18 ENCOUNTER — OFFICE VISIT (OUTPATIENT)
Dept: CARDIOLOGY | Facility: CLINIC | Age: 65
End: 2020-05-18

## 2020-05-18 VITALS
DIASTOLIC BLOOD PRESSURE: 109 MMHG | WEIGHT: 128.6 LBS | HEART RATE: 98 BPM | TEMPERATURE: 98.3 F | BODY MASS INDEX: 21.95 KG/M2 | SYSTOLIC BLOOD PRESSURE: 159 MMHG | HEIGHT: 64 IN

## 2020-05-18 DIAGNOSIS — R29.898 LEG WEAKNESS, BILATERAL: ICD-10-CM

## 2020-05-18 DIAGNOSIS — R06.09 DOE (DYSPNEA ON EXERTION): ICD-10-CM

## 2020-05-18 DIAGNOSIS — R07.9 CHEST PAIN, UNSPECIFIED TYPE: ICD-10-CM

## 2020-05-18 DIAGNOSIS — I10 ESSENTIAL HYPERTENSION: Primary | ICD-10-CM

## 2020-05-18 DIAGNOSIS — I47.1 PAROXYSMAL SVT (SUPRAVENTRICULAR TACHYCARDIA) (HCC): ICD-10-CM

## 2020-05-18 PROCEDURE — 99214 OFFICE O/P EST MOD 30 MIN: CPT | Performed by: INTERNAL MEDICINE

## 2020-05-18 RX ORDER — OMEPRAZOLE 20 MG/1
20 CAPSULE, DELAYED RELEASE ORAL AS NEEDED
COMMUNITY
End: 2020-10-08

## 2020-05-18 RX ORDER — NITROGLYCERIN 0.4 MG/1
TABLET SUBLINGUAL
Qty: 100 TABLET | Refills: 11 | Status: SHIPPED | OUTPATIENT
Start: 2020-05-18 | End: 2022-02-17 | Stop reason: SDUPTHER

## 2020-08-31 ENCOUNTER — OFFICE VISIT (OUTPATIENT)
Dept: NEUROLOGY | Facility: CLINIC | Age: 65
End: 2020-08-31

## 2020-08-31 ENCOUNTER — LAB (OUTPATIENT)
Dept: LAB | Facility: HOSPITAL | Age: 65
End: 2020-08-31

## 2020-08-31 VITALS
SYSTOLIC BLOOD PRESSURE: 138 MMHG | WEIGHT: 123.4 LBS | DIASTOLIC BLOOD PRESSURE: 86 MMHG | BODY MASS INDEX: 21.07 KG/M2 | TEMPERATURE: 97.5 F | HEIGHT: 64 IN | OXYGEN SATURATION: 98 % | HEART RATE: 113 BPM

## 2020-08-31 DIAGNOSIS — R29.898 WEAKNESS OF BOTH ARMS: Primary | ICD-10-CM

## 2020-08-31 DIAGNOSIS — R29.898 WEAKNESS OF BOTH LEGS: ICD-10-CM

## 2020-08-31 LAB — VIT B12 BLD-MCNC: 688 PG/ML (ref 211–946)

## 2020-08-31 PROCEDURE — 86255 FLUORESCENT ANTIBODY SCREEN: CPT

## 2020-08-31 PROCEDURE — 82607 VITAMIN B-12: CPT

## 2020-08-31 PROCEDURE — 36415 COLL VENOUS BLD VENIPUNCTURE: CPT

## 2020-08-31 PROCEDURE — 82525 ASSAY OF COPPER: CPT

## 2020-08-31 PROCEDURE — 84446 ASSAY OF VITAMIN E: CPT

## 2020-08-31 PROCEDURE — 83519 RIA NONANTIBODY: CPT

## 2020-08-31 PROCEDURE — 83921 ORGANIC ACID SINGLE QUANT: CPT

## 2020-08-31 PROCEDURE — 99204 OFFICE O/P NEW MOD 45 MIN: CPT | Performed by: PSYCHIATRY & NEUROLOGY

## 2020-08-31 RX ORDER — ONDANSETRON 4 MG/1
4 TABLET, FILM COATED ORAL AS NEEDED
COMMUNITY

## 2020-08-31 RX ORDER — ASPIRIN 325 MG
325 TABLET ORAL AS NEEDED
COMMUNITY
End: 2020-10-08

## 2020-08-31 RX ORDER — HYDRALAZINE HYDROCHLORIDE 25 MG/1
25 TABLET, FILM COATED ORAL 2 TIMES DAILY
COMMUNITY
Start: 2020-07-20

## 2020-08-31 NOTE — PROGRESS NOTES
Subjective:    CC: Alysia Sierra is seen today in consultation at the request of Gray Bahena MD for bilateral leg and arm weakness.    HPI:  Patient is a 65-year-old female with past medical history of hypertension, supraventricular tachycardia referred to the clinic for evaluation of bilateral leg and arm weakness.  She reports her symptoms started about 2 years ago and it has progressively become worse.  She reports that in last 6 months, she has had great difficulty getting up from chair if there is no side arms.  She reports that if she is on the floor, it is almost impossible for her to get up.  In addition she has noticed difficulty going up and down stairs.  She also reports tingling, numbness and extreme coldness in both her feet and sometimes it leads to pain.  This also started about 6 months ago and it has progressively become worse.    The following portions of the patient's history were reviewed today and updated as of 08/31/2020  : allergies, social history and problem list.  This document will be scanned to patient's chart.      Current Outpatient Medications:   •  aspirin 325 MG tablet, Take 325 mg by mouth As Needed for Mild Pain ., Disp: , Rfl:   •  Cholecalciferol (VITAMIN D3) 25 MCG (1000 UT) capsule, 2 (Two) Times a Week., Disp: , Rfl:   •  hydrALAZINE (APRESOLINE) 25 MG tablet, , Disp: , Rfl:   •  magnesium oxide (MAGOX) 400 (241.3 Mg) MG tablet tablet, Take 400 mg by mouth Daily., Disp: , Rfl:   •  metoprolol succinate XL (TOPROL-XL) 25 MG 24 hr tablet, Take 25 mg by mouth Every Night., Disp: , Rfl:   •  nitroglycerin (NITROSTAT) 0.4 MG SL tablet, 1 under the tongue as needed for angina, may repeat q5mins for up three doses, Disp: 100 tablet, Rfl: 11  •  omeprazole (priLOSEC) 20 MG capsule, Take 20 mg by mouth As Needed., Disp: , Rfl:   •  ondansetron (ZOFRAN) 4 MG tablet, Take 4 mg by mouth As Needed for Nausea or Vomiting., Disp: , Rfl:   •  sodium bicarbonate 650 MG tablet, 2 (Two)  "Times a Week., Disp: , Rfl:   •  famotidine (PEPCID) 10 MG tablet, Take 10 mg by mouth 2 (Two) Times a Day As Needed for Heartburn., Disp: , Rfl:    Past Medical History:   Diagnosis Date   • Anemia    • Chicken pox    • Chronic fatigue    • NIEVES (dyspnea on exertion)    • Generalized anxiety disorder    • Hyperlipidemia    • Hypertension    • Iron deficiency anemia    • Liver problem    • Measles    • Menopause    • Mumps    • Unintentional weight loss       Past Surgical History:   Procedure Laterality Date   •  SECTION     • DILATION AND CURETTAGE, DIAGNOSTIC / THERAPEUTIC     • ESSURE TUBAL LIGATION        Family History   Problem Relation Age of Onset   • COPD Mother    • Stroke Mother    • Lung cancer Father    • Hypertension Father    • Heart attack Father    • Coronary artery disease Father    • Hyperlipidemia Sister    • Spondylolisthesis Sister    • Rheum arthritis Sister    • Malig Hypertension Sister    • Osteoarthritis Sister    • Other Sister         alcoholic   • Hypertension Brother       Review of Systems    All other systems reviewed and are negative     Objective:    /86   Pulse 113   Temp 97.5 °F (36.4 °C)   Ht 162.6 cm (64.02\")   Wt 56 kg (123 lb 6.4 oz)   SpO2 98%   BMI 21.17 kg/m²     Neurology Exam:    General apperance: NAD.     Mental status: Alert, awake and oriented to time place and person.    Recent and Remote memory: Can recall 3/3 objects at 5 minutes. Can recall historical events.     Attention span and Concentration: Serial 7s: Normal.     Fund of knowledge:  Normal.     Language and Speech: No aphasia or dysarthria.    Naming , Repitition and Comprehension:  Can name objects, repeat a sentence and follow commands. Speech is clear and fluent with good repetition, comprehension, and naming.    Cranial Nerves:   CN II: Visual fields are full. Intact. Fundi - Normal, No papillederma, Pupils - AIDA  CN III, IV and VI: Extraocular movements are intact. Normal " saccades.   CN V: Facial sensation is intact.   CN VII: Muscles of facial expression reveal no asymmetry. Intact.   CN VIII: Hearing is intact. Whispered voice intact.   CN IX and X: Palate elevates symmetrically. Intact  CN XI: Shoulder shrug is intact.   CN XII: Tongue is midline without evidence of atrophy or fasciculation.     Motor:  Right UE muscle strength 5/5 except for the deltoid which is 4+/5.    Left UE muscle strength 5/5 except for the deltoid which is 4+/5.    Right LE muscle strength5/5 except for ILS which is 4/5.    Left LE muscle strength 5/5 except for ILS which is 4/5.    Sensory: Normal light touch, vibration and pinprick sensation bilaterally.    DTRs: 3+ bilaterally in upper and lower extremities.    Babinski: Negative bilaterally.    Co-ordination: Normal finger-to-nose, heel to shin B/L.    Rhomberg: Negative.    Gait: Wide-based gait.    Cardiovascular: Regular rate and rhythm without murmur, gallop or rub.    Ophthalmoscopic exam: Normal fundi, no papilledema.    Assessment and Plan:  1. Weakness of both arms  2. Weakness of both legs  -Patient with approximately 2-year history of slowly progressive bilateral leg weakness primarily involving proximal muscles.  In addition to this, she was noted to have a hyperreflexia on my examination making the possibility of spinal cord pathology highly likely.  I am going to order MRI of cervical spine, lumbar spine for further evaluation.  In addition considering her symptoms suggestive of neuropathy with brisk reflexes, other possibilities myeloneuropathy caused by nutritional deficiencies and hence I am going to order vitamin B12, copper, vitamin E for further evaluation.  She has not had any physical therapy for this, she will be referred for the same to improve strength while awaiting the tests,  -  - MRI Cervical Spine Without Contrast; Future  - Vitamin B12; Future  - Methylmalonic Acid, Serum; Future  - Copper, Serum; Future  - Vitamin E;  Future  - MRI Lumbar Spine Without Contrast; Future  - Myasthenia Gravis Full Panel; Future  - Musk Antibody; Future       Return in about 8 weeks (around 10/26/2020).     Caden Dietz MD

## 2020-08-31 NOTE — PROGRESS NOTES
"Subjective:    CC: Alysia Sierra is in clinic today for follow up for      HPI:  ***    The following portions of the patient's history were reviewed and updated as of 2020: {history reviewed:79337::\"allergies\",\"social history\",\"problem list\"}.       Current Outpatient Medications:   •  aspirin 325 MG tablet, Take 325 mg by mouth As Needed for Mild Pain ., Disp: , Rfl:   •  Cholecalciferol (VITAMIN D3) 25 MCG (1000 UT) capsule, 2 (Two) Times a Week., Disp: , Rfl:   •  hydrALAZINE (APRESOLINE) 25 MG tablet, , Disp: , Rfl:   •  magnesium oxide (MAGOX) 400 (241.3 Mg) MG tablet tablet, Take 400 mg by mouth Daily., Disp: , Rfl:   •  metoprolol succinate XL (TOPROL-XL) 25 MG 24 hr tablet, Take 25 mg by mouth Every Night., Disp: , Rfl:   •  nitroglycerin (NITROSTAT) 0.4 MG SL tablet, 1 under the tongue as needed for angina, may repeat q5mins for up three doses, Disp: 100 tablet, Rfl: 11  •  omeprazole (priLOSEC) 20 MG capsule, Take 20 mg by mouth As Needed., Disp: , Rfl:   •  ondansetron (ZOFRAN) 4 MG tablet, Take 4 mg by mouth As Needed for Nausea or Vomiting., Disp: , Rfl:   •  sodium bicarbonate 650 MG tablet, 2 (Two) Times a Week., Disp: , Rfl:   •  famotidine (PEPCID) 10 MG tablet, Take 10 mg by mouth 2 (Two) Times a Day As Needed for Heartburn., Disp: , Rfl:    Past Medical History:   Diagnosis Date   • Anemia    • Chicken pox    • Chronic fatigue    • NIEVES (dyspnea on exertion)    • Generalized anxiety disorder    • Hyperlipidemia    • Hypertension    • Iron deficiency anemia    • Liver problem    • Measles    • Menopause    • Mumps    • Unintentional weight loss       Past Surgical History:   Procedure Laterality Date   •  SECTION     • DILATION AND CURETTAGE, DIAGNOSTIC / THERAPEUTIC     • ESSURE TUBAL LIGATION        Family History   Problem Relation Age of Onset   • COPD Mother    • Stroke Mother    • Lung cancer Father    • Hypertension Father    • Heart attack Father    • Coronary artery " "disease Father    • Hyperlipidemia Sister    • Spondylolisthesis Sister    • Rheum arthritis Sister    • Malig Hypertension Sister    • Osteoarthritis Sister    • Other Sister         alcoholic   • Hypertension Brother         Review of Systems   Constitutional: Positive for activity change, appetite change, fatigue and unexpected weight loss.   HENT: Positive for nosebleeds.    Eyes: Positive for visual disturbance.   Respiratory: Positive for shortness of breath.    Genitourinary: Positive for vaginal discharge.   Musculoskeletal: Positive for back pain and gait problem.   Neurological: Positive for dizziness, syncope, weakness, light-headedness and numbness.   Psychiatric/Behavioral: Positive for depressed mood.     Objective:    /86   Pulse 113   Temp 97.5 °F (36.4 °C)   Ht 162.6 cm (64.02\")   Wt 56 kg (123 lb 6.4 oz)   SpO2 98%   BMI 21.17 kg/m²     Neurology Exam:  General apperance: NAD.     Mental status: Alert, awake and oriented to time place and person.    Recent and Remote memory: Can recall 3/3 objects at 5 minutes. Can recall historical events.     Attention span and Concentration: Serial 7s: Normal.     Fund of knowledge:  Normal.     Language and Speech: No aphasia or dysarthria.    Naming , Repitition and Comprehension:  Can name objects, repeat a sentence and follow commands. Speech is clear and fluent with good repetition, comprehension, and naming.    CN II to XII: Intact.    Opthalmoscopic Exam: No papilledema.    Motor:  Right UE muscle strength 5/5. Normal tone.     Left UE muscle strength 5/5. Normal tone.      Right LE muscle strength5/5. Normal tone.     Left LE muscle strength 5/5. Normal tone.      Sensory: Normal light touch, vibration and pinprick sensation bilaterally.    DTRs: 2+ bilaterally.    Babinski: Negative bilaterally.    Co-ordination: Normal finger-to-nose, heel to shin B/L.    Rhomberg: Negative.    Gait: Normal.    Cardiovascular: Regular rate and rhythm " without murmur, gallop or rub.    Assessment and Plan:  There are no diagnoses linked to this encounter.     I spent 25 minutes face to face with the patient and spent more than 50% of this time  in management, instructions and education regarding above mentioned diagnosis and also on counseling and discussing about taking medication regularly, possible side effects with medication use, importance of good sleep hygiene, good hydration and regular exercise.    No follow-ups on file.

## 2020-09-02 LAB
A-TOCOPHEROL VIT E SERPL-MCNC: 12 MG/L (ref 9–29)
COPPER SERPL-MCNC: 104 UG/DL (ref 72–166)
GAMMA TOCOPHEROL SERPL-MCNC: 1 MG/L (ref 0.5–4.9)
Lab: NORMAL
METHYLMALONATE SERPL-SCNC: 129 NMOL/L (ref 0–378)

## 2020-09-03 ENCOUNTER — TELEPHONE (OUTPATIENT)
Dept: NEUROLOGY | Facility: CLINIC | Age: 65
End: 2020-09-03

## 2020-09-03 DIAGNOSIS — R29.898 WEAKNESS OF BOTH LEGS: ICD-10-CM

## 2020-09-03 DIAGNOSIS — R29.898 WEAKNESS OF BOTH ARMS: Primary | ICD-10-CM

## 2020-09-03 NOTE — TELEPHONE ENCOUNTER
Called [facility] to verify in they received PT referral for this patient. Per Caroline there they did receive the order.      I also called patient and LVM to let her know they received the PT referral and they will be working on scheduling an appt.

## 2020-09-03 NOTE — TELEPHONE ENCOUNTER
Okay.  I have sent it through [employer].  It somehow was printed so we will have to fax the printed orders.

## 2020-09-03 NOTE — TELEPHONE ENCOUNTER
PT CALLING STATING THAT DR. DALLAS GOING TO ORDER PHYSCIAL THERAPY AND THE PT FOUND A PLACE IN [place] CALLED [facility] TO DO IT AND NEEDS THE PHYSICAL THERAPY ORDER FAXED [phone] AND NEEDS THESE THINGS ON THE ORDER  1. PT NAME  2. REFERRING MD NAME   3. DIAGNOSIS'S  4. PT'S PHONE #   5. TYPE OF INSURANCE COVERAGE    IF YOU NEED TO CALL THE PT YOU CAN REACH HER [phone]

## 2020-09-10 LAB
ACHR AB SER-SCNC: 0.07 NMOL/L (ref 0–0.24)
ACHR BLOCK AB/ACHR TOTAL SFR SER: 5 % (ref 0–25)
ACHR MOD AB/ACHR TOTAL SFR SER: <12 % (ref 0–20)
STRIA MUS AB TITR SER IF: NEGATIVE {TITER}

## 2020-09-18 ENCOUNTER — HOSPITAL ENCOUNTER (OUTPATIENT)
Dept: MRI IMAGING | Facility: HOSPITAL | Age: 65
Discharge: HOME | End: 2020-09-18

## 2020-09-18 DIAGNOSIS — R29.898 WEAKNESS OF BOTH ARMS: ICD-10-CM

## 2020-09-18 DIAGNOSIS — R29.898 WEAKNESS OF BOTH LEGS: ICD-10-CM

## 2020-09-18 PROCEDURE — 72148 MRI LUMBAR SPINE W/O DYE: CPT

## 2020-09-18 PROCEDURE — 72141 MRI NECK SPINE W/O DYE: CPT

## 2020-09-19 LAB — MUSK AB SER-SCNC: <1 U/ML

## 2020-09-21 ENCOUNTER — TELEPHONE (OUTPATIENT)
Dept: NEUROLOGY | Facility: CLINIC | Age: 65
End: 2020-09-21

## 2020-09-21 NOTE — TELEPHONE ENCOUNTER
PT CALLED RETURNING OBDULIA'S CALL. I DID NOT SEE A MESSAGE REGARDING CALL IN PT'S CHART. PER CHELSEY, OBDULIA WAS ROOMING A PT. PT NOTIFIED AND WILL AWAIT A CALL BACK      CALL BACK- [phone]

## 2020-09-23 ENCOUNTER — TELEPHONE (OUTPATIENT)
Dept: NEUROLOGY | Facility: CLINIC | Age: 65
End: 2020-09-23

## 2020-09-23 NOTE — TELEPHONE ENCOUNTER
Received call form patient who wanted to know if someone could contact her and go over her MRI results from 9/18.     Can someone please contact her and go over these results?      [phone]    Thank you

## 2020-09-28 ENCOUNTER — TELEPHONE (OUTPATIENT)
Dept: NEUROLOGY | Facility: CLINIC | Age: 65
End: 2020-09-28

## 2020-09-28 NOTE — TELEPHONE ENCOUNTER
PT CALLED IN REQUESTING A SOONER APPT THAN THE ONE SCHEDULED ON 10/26/20. SHE SAID HER SYMPTOMS AREN'T GETTING BETTER. SHE SAID SHE IS CURRENTLY SLEEPING ABOUT 16 HOURS A DAY AND THAT'S BEEN GOING ON FOR AT LEAST A COUPLE OF MONTHS NOW.  SHE SAID SHE IS LOSING WEIGHT AND SHE HAS NO ENERGY. SHE STATES THAT'S BEEN GOING ON FOR A YEAR AND A HALF AND IT'S GETTING WORSE. SHE SAID SHE CAN'T DO ANYTHING AND WHEN WASHING DISHES, SHE HAS TO SIT DOWN AFTER WASHING JUST ONE DISH. ACCORDING TO HER LAST OV NOTE, DR DALLAS WANTED HER TO BE SEEN AGAIN AROUND 10/26/20. PLEASE ADVISE IF ABLE TO GET PT IN SOONER      CALL BACK- [phone]

## 2020-10-08 ENCOUNTER — OFFICE VISIT (OUTPATIENT)
Dept: NEUROLOGY | Facility: CLINIC | Age: 65
End: 2020-10-08

## 2020-10-08 VITALS
SYSTOLIC BLOOD PRESSURE: 106 MMHG | TEMPERATURE: 97.7 F | BODY MASS INDEX: 20.32 KG/M2 | OXYGEN SATURATION: 98 % | WEIGHT: 119 LBS | DIASTOLIC BLOOD PRESSURE: 82 MMHG | HEIGHT: 64 IN | HEART RATE: 98 BPM

## 2020-10-08 DIAGNOSIS — R29.898 WEAKNESS OF BOTH ARMS: Primary | ICD-10-CM

## 2020-10-08 DIAGNOSIS — R53.83 OTHER FATIGUE: ICD-10-CM

## 2020-10-08 DIAGNOSIS — R29.898 WEAKNESS OF BOTH LEGS: ICD-10-CM

## 2020-10-08 DIAGNOSIS — F32.A DEPRESSION, UNSPECIFIED DEPRESSION TYPE: ICD-10-CM

## 2020-10-08 PROCEDURE — 99214 OFFICE O/P EST MOD 30 MIN: CPT | Performed by: PSYCHIATRY & NEUROLOGY

## 2020-10-08 NOTE — PROGRESS NOTES
Subjective:    CC: Alysia Sierra is in clinic today for follow up for complaint of bilateral arm and leg weakness.    HPI:  Initial visit: 8/31/2020: Patient is a 65-year-old female with past medical history of hypertension, supraventricular tachycardia referred to the clinic for evaluation of bilateral leg and arm weakness.  She reports her symptoms started about 2 years ago and it has progressively become worse.  She reports that in last 6 months, she has had great difficulty getting up from chair if there is no side arms.  She reports that if she is on the floor, it is almost impossible for her to get up.  In addition she has noticed difficulty going up and down stairs.  She also reports tingling, numbness and extreme coldness in both her feet and sometimes it leads to pain.  This also started about 6 months ago and it has progressively become worse.    10/8/2020: She is in clinic for regular follow-up.  Since her last visit, she reports that overall she feels worse.  She feels that she does not have any energy to do anything throughout the day.  She is involved in physical therapy but it is extremely difficult for her to go through the exercises.  She reports that overall she feels weaker than her last visit.  Since her last visit, she has now completed MRI of cervical spine, MRI lumbar spine blood work-up including myasthenia gravis antibody panel, vitamin D, copper and B12.  MRI of cervical and lumbosacral spine shows mild to moderate multilevel spondylitic changes without any evidence of myelopathy.  Vitamin B12, copper and vitamin levels for within normal limits as well.  Upon further questioning, she reports that she sleeps usually at 4 or 5 PM until 1 or 2 AM and then she is awake after that.  She has done this for quite some time now.  In addition, she also reports to heavy alcohol use from age 34 until about 2 years ago.  She still drinks alcohol but it is much less as compared to before.    The  following portions of the patient's history were reviewed and updated as of 10/08/2020: allergies, social history and problem list.       Current Outpatient Medications:   •  Cholecalciferol (VITAMIN D3) 25 MCG (1000 UT) capsule, 2 (Two) Times a Week., Disp: , Rfl:   •  esomeprazole (nexIUM) 20 MG capsule, Take 20 mg by mouth Every Morning Before Breakfast., Disp: , Rfl:   •  hydrALAZINE (APRESOLINE) 25 MG tablet, , Disp: , Rfl:   •  magnesium oxide (MAGOX) 400 (241.3 Mg) MG tablet tablet, Take 400 mg by mouth Daily., Disp: , Rfl:   •  metoprolol succinate XL (TOPROL-XL) 25 MG 24 hr tablet, Take 25 mg by mouth Every Night., Disp: , Rfl:   •  nitroglycerin (NITROSTAT) 0.4 MG SL tablet, 1 under the tongue as needed for angina, may repeat q5mins for up three doses, Disp: 100 tablet, Rfl: 11  •  ondansetron (ZOFRAN) 4 MG tablet, Take 4 mg by mouth As Needed for Nausea or Vomiting., Disp: , Rfl:    Past Medical History:   Diagnosis Date   • Anemia    • Chicken pox    • Chronic fatigue    • NIEVES (dyspnea on exertion)    • Generalized anxiety disorder    • Hyperlipidemia    • Hypertension    • Iron deficiency anemia    • Liver problem    • Measles    • Menopause    • Mumps    • Unintentional weight loss       Past Surgical History:   Procedure Laterality Date   •  SECTION     • DILATION AND CURETTAGE, DIAGNOSTIC / THERAPEUTIC     • ESSURE TUBAL LIGATION        Family History   Problem Relation Age of Onset   • COPD Mother    • Stroke Mother    • Lung cancer Father    • Hypertension Father    • Heart attack Father    • Coronary artery disease Father    • Hyperlipidemia Sister    • Spondylolisthesis Sister    • Rheum arthritis Sister    • Malig Hypertension Sister    • Osteoarthritis Sister    • Other Sister         alcoholic   • Hypertension Brother         Review of Systems   Constitutional: Positive for activity change, appetite change, fatigue and unexpected weight loss.   HENT: Negative.    Eyes: Negative.   "  Respiratory: Negative.    Cardiovascular: Negative.    Gastrointestinal: Negative.    Endocrine: Negative.    Genitourinary: Negative.    Musculoskeletal: Positive for back pain and gait problem.   Skin: Positive for rash.   Allergic/Immunologic: Negative.    Neurological: Positive for speech difficulty, weakness, light-headedness, numbness and confusion.   Psychiatric/Behavioral: Positive for depressed mood. The patient is nervous/anxious.      Objective:    /82   Pulse 98   Temp 97.7 °F (36.5 °C)   Ht 162.6 cm (64.02\")   Wt 54 kg (119 lb)   SpO2 98%   BMI 20.42 kg/m²     Neurology Exam:  General apperance: NAD.     Mental status: Alert, awake and oriented to time place and person.    Recent and Remote memory: Can recall 3/3 objects at 5 minutes. Can recall historical events.     Attention span and Concentration: Serial 7s: Normal.     Fund of knowledge:  Normal.     Language and Speech: No aphasia or dysarthria.    Naming , Repitition and Comprehension:  Can name objects, repeat a sentence and follow commands. Speech is clear and fluent with good repetition, comprehension, and naming.    CN II to XII: Intact.    Opthalmoscopic Exam: No papilledema.    Motor:  Right UE muscle strength 5/5 except deltoid 5-/5.Giveaway weakness noted.    Left UE muscle strength 5/5 except deltoid 5-/5.Giveaway weakness noted.     Right LE muscle strength5/5 except iliopsoas 5-/5.  Giveaway weakness noted.    Left LE muscle strength 5/5 iliopsoas 5-/5.  Giveaway weakness noted.    Sensory: Normal light touch, vibration and pinprick sensation bilaterally.    DTRs: 2+ bilaterally.    Babinski: Negative bilaterally.    Co-ordination: Normal finger-to-nose, heel to shin B/L.    Rhomberg: Negative.    Gait: Wide-based gait.    Cardiovascular: Regular rate and rhythm without murmur, gallop or rub.    Assessment and Plan:  1. Weakness of both arms  2. Weakness of both legs  3. Other fatigue  4. Depression, unspecified " depression type  -Based on the results of MRI cervical spine, lumbar spine and the blood work, I have reassured her that I do not have good neurological explanation of her ongoing symptoms.  I am going to obtain MRI brain for further evaluation.  She does report history of heavy alcohol use which can certainly cause cerebellar atrophy and difficulty with walking and balance.  She does report extremely irregular sleep habits, mild to moderate depression and some of her symptoms are highly suggestive of fibromyalgia.  I am going to refer her to rheumatology as well to rule out any underlying autoimmune condition causing her ongoing symptoms versus fibromyalgia as a cause.  I will plan to see her back in 6 to 8 weeks for follow-up.       I spent 25 minutes face to face with the patient and spent more than 50% of this time  in management, instructions and education regarding above mentioned diagnosis and also on counseling and discussing about taking medication regularly, possible side effects with medication use, importance of good sleep hygiene, good hydration and regular exercise.    Return in about 2 months (around 12/8/2020).

## 2020-10-20 ENCOUNTER — TELEPHONE (OUTPATIENT)
Dept: NEUROLOGY | Facility: CLINIC | Age: 65
End: 2020-10-20

## 2020-10-20 DIAGNOSIS — R29.898 WEAKNESS OF BOTH LEGS: ICD-10-CM

## 2020-10-20 DIAGNOSIS — R29.898 WEAKNESS OF BOTH ARMS: Primary | ICD-10-CM

## 2020-10-20 NOTE — TELEPHONE ENCOUNTER
Patient called and stated that in last appt on 10/8 Dr Dietz mentioned ordering a MRI of Brain but patient has not received any calls about it. I looked at her chart and I do not see where this was ordered.     Can someone please send these orders for this to begin the process?

## 2020-10-21 RX ORDER — DIAZEPAM 10 MG/1
TABLET ORAL
Qty: 1 TABLET | Refills: 0 | Status: SHIPPED | OUTPATIENT
Start: 2020-10-21 | End: 2020-11-09

## 2020-11-03 ENCOUNTER — APPOINTMENT (OUTPATIENT)
Dept: MRI IMAGING | Facility: HOSPITAL | Age: 65
End: 2020-11-03

## 2020-11-05 PROBLEM — R55 SYNCOPE: Status: ACTIVE | Noted: 2020-11-05

## 2020-11-05 PROBLEM — N18.30 CKD (CHRONIC KIDNEY DISEASE) STAGE 3, GFR 30-59 ML/MIN: Status: ACTIVE | Noted: 2020-11-05

## 2020-11-05 NOTE — PROGRESS NOTES
OFFICE VISIT  NOTE  [facility] MEDICAL [facility] CARDIOLOGY      Name: Alysia Sierra    Date: 2020  MRN:  [id number]  :  1955      REFERRING/PRIMARY PROVIDER:  Janet Leon APRN    Chief Complaint   Patient presents with   • PSVT       HPI: Alysia Sierra is a 65 y.o. female who presents today for follow-up for chest pain, shortness of breath, paroxysmal SVT. Patient history of iron deficiency anemia, unexplained weight loss, chronic kidney disease stage III, hypertension, hyperlipidemia, generalized anxiety disorder. Stress test and echo benign 2019 at [facility].    Blood pressure better controlled after starting hydralazine prescribed by nephrologist, taking it in the evening, occasional low blood pressures, today low normal in the office but asymptomatic.  Ongoing work-up for generalized weakness by neurology.  Occasional palpitations but not bothersome, rare.    Past Medical History:   Diagnosis Date   • Anemia    • Chicken pox    • Chronic fatigue    • NIEVES (dyspnea on exertion)    • Generalized anxiety disorder    • Hyperlipidemia    • Hypertension    • Iron deficiency anemia    • Liver problem    • Measles    • Menopause    • Mumps    • Unintentional weight loss    • UTI (urinary tract infection)        Past Surgical History:   Procedure Laterality Date   •  SECTION     • DILATION AND CURETTAGE, DIAGNOSTIC / THERAPEUTIC     • ESSURE TUBAL LIGATION         Social History     Socioeconomic History   • Marital status:      Spouse name: Not on file   • Number of children: Not on file   • Years of education: Not on file   • Highest education level: Not on file   Tobacco Use   • Smoking status: Never Smoker   • Smokeless tobacco: Never Used   Substance and Sexual Activity   • Alcohol use: Yes     Frequency: Monthly or less     Drinks per session: 3 or 4     Comment: social   • Drug use: No   • Sexual activity: Defer   Social History Narrative    Caffeine: None  "      Family History   Problem Relation Age of Onset   • COPD Mother    • Stroke Mother    • Lung cancer Father    • Hypertension Father    • Heart attack Father    • Coronary artery disease Father    • Hyperlipidemia Sister    • Spondylolisthesis Sister    • Rheum arthritis Sister    • Malig Hypertension Sister    • Osteoarthritis Sister    • Other Sister         alcoholic   • Hypertension Brother         ROS:   Constitutional no fever,  no weight loss   Skin no rash, no subcutaneous nodules   Otolaryngeal no difficulty swallowing   Cardiovascular See HPI   Pulmonary no cough, no sputum production   Gastrointestinal no constipation, no diarrhea   Genitourinary no dysuria, no hematuria   Hematologic no easy bruisability, no abnormal bleeding   Musculoskeletal no muscle pain   Neurologic no dizziness, no falls         Allergies   Allergen Reactions   • Lisinopril Angioedema   • Atorvastatin Myalgia         Current Outpatient Medications:   •  Cholecalciferol (VITAMIN D3) 25 MCG (1000 UT) capsule, 2 (Two) Times a Week., Disp: , Rfl:   •  esomeprazole (nexIUM) 20 MG capsule, Take 20 mg by mouth Every Morning Before Breakfast., Disp: , Rfl:   •  hydrALAZINE (APRESOLINE) 25 MG tablet, 25 mg Daily., Disp: , Rfl:   •  magnesium oxide (MAGOX) 400 (241.3 Mg) MG tablet tablet, Take 400 mg by mouth Daily., Disp: , Rfl:   •  metoprolol succinate XL (TOPROL-XL) 25 MG 24 hr tablet, Take 25 mg by mouth Every Night., Disp: , Rfl:   •  nitroglycerin (NITROSTAT) 0.4 MG SL tablet, 1 under the tongue as needed for angina, may repeat q5mins for up three doses, Disp: 100 tablet, Rfl: 11  •  ondansetron (ZOFRAN) 4 MG tablet, Take 4 mg by mouth As Needed for Nausea or Vomiting., Disp: , Rfl:     Vitals:    11/09/20 1109   BP: 98/60   BP Location: Left arm   Patient Position: Sitting   Pulse: 98   Temp: 97.8 °F (36.6 °C)   SpO2: 98%   Weight: 54 kg (119 lb)   Height: 163.8 cm (64.5\")     Body mass index is 20.11 kg/m².    PHYSICAL " EXAM:    General Appearance:   · Thin, frail  HENT:   · oropharynx moist  · lips not cyanotic  Neck:  · thyroid not enlarged  · supple  Respiratory:  · no respiratory distress  · normal breath sounds  · no rales  Cardiovascular:  · no jugular venous distention  · regular rhythm  · apical impulse normal  · S1 normal, S2 normal  · no S3, no S4   · no murmur  · no rub, no thrill  · carotid pulses normal; no bruit  · pedal pulses normal  · lower extremity edema: none    Gastrointestinal:   · bowel sounds normal  · non-tender  · no hepatomegaly, no splenomegaly  Musculoskeletal:  · no clubbing of fingers.   · normocephalic, head atraumatic  Skin:   · warm, dry  Psychiatric:  · judgement and insight appropriate  · normal mood and affect    RESULTS:   Procedures    Results for orders placed during the hospital encounter of 06/04/19   Adult Transthoracic Echo Complete W/ Cont if Necessary Per Protocol    Narrative · Calculated EF = 55.0%  · Left ventricular systolic function is normal.  · Left ventricular diastolic dysfunction (grade I) consistent with   impaired relaxation.  · Mild tricuspid valve regurgitation is present.  · Mild pulmonic valve regurgitation is present.            Labs:  No results found for: CHOL, TRIG, HDL, LDL, AST, ALT  No results found for: HGBA1C  No components found for: CREATINININE  No results found for: EGFRIFNONA      ASSESSMENT:  Problem List Items Addressed This Visit        Cardiovascular and Mediastinum    Hypertension    Paroxysmal SVT (supraventricular tachycardia) (CMS/HCC)    Overview     06/2019 Holter  · An abnormal monitor study.  · 8 runs of SVT.         Syncope       Nervous and Auditory    Chest pain - Primary    Overview     06/04/19 Stress test  · Myocardial perfusion imaging indicates a normal myocardial perfusion study with no evidence of ischemia.  · LVEF > 70%  · No coronary calcium noted on  CT images.  · Impressions are consistent with a low risk study.         Leg  weakness, bilateral          PLAN:    1.  Syncope:  Reviewed echo and Holter and stress test all within the last 12 months  All benign  No recurrence    2.  Bilateral leg weakness:  Ongoing work-up by neurology    3.  Hypertension:  Labile blood pressure  Patient advised to take a half of her Toprol-XL 25 mg tablet if systolic greater than 160 or diastolic greater than 100.  Hydralazine prescribed by nephrologist    4.  Paroxysmal SVT:  Continue Toprol 25 mg daily  Symptoms are currently stable and do not affect daily living.  Continue decreasing caffeine and increase water    5.  CKD stage III:  Complicates all aspects of care  Recent nephrology note reviewed, stable creatinine, she follows a low potassium diet.    Return to clinic in 9 months, or sooner as needed.    Thank you for the opportunity to share in the care of your patient; please do not hesitate to call me with any questions.     Gray Bahena MD, [facility]  Office: [phone] [address] [place] [address]

## 2020-11-06 ENCOUNTER — HOSPITAL ENCOUNTER (OUTPATIENT)
Dept: MRI IMAGING | Facility: HOSPITAL | Age: 65
Discharge: HOME | End: 2020-11-06
Admitting: PSYCHIATRY & NEUROLOGY

## 2020-11-06 DIAGNOSIS — R29.898 WEAKNESS OF BOTH LEGS: ICD-10-CM

## 2020-11-06 DIAGNOSIS — R29.898 WEAKNESS OF BOTH ARMS: ICD-10-CM

## 2020-11-06 PROCEDURE — 70551 MRI BRAIN STEM W/O DYE: CPT

## 2020-11-09 ENCOUNTER — OFFICE VISIT (OUTPATIENT)
Dept: CARDIOLOGY | Facility: CLINIC | Age: 65
End: 2020-11-09

## 2020-11-09 VITALS
SYSTOLIC BLOOD PRESSURE: 98 MMHG | HEART RATE: 98 BPM | DIASTOLIC BLOOD PRESSURE: 60 MMHG | BODY MASS INDEX: 19.83 KG/M2 | HEIGHT: 65 IN | WEIGHT: 119 LBS | TEMPERATURE: 97.8 F | OXYGEN SATURATION: 98 %

## 2020-11-09 DIAGNOSIS — I47.1 PAROXYSMAL SVT (SUPRAVENTRICULAR TACHYCARDIA) (HCC): ICD-10-CM

## 2020-11-09 DIAGNOSIS — R29.898 LEG WEAKNESS, BILATERAL: ICD-10-CM

## 2020-11-09 DIAGNOSIS — R55 SYNCOPE, UNSPECIFIED SYNCOPE TYPE: ICD-10-CM

## 2020-11-09 DIAGNOSIS — I10 ESSENTIAL HYPERTENSION: ICD-10-CM

## 2020-11-09 DIAGNOSIS — R07.9 CHEST PAIN, UNSPECIFIED TYPE: Primary | ICD-10-CM

## 2020-11-09 PROCEDURE — 99214 OFFICE O/P EST MOD 30 MIN: CPT | Performed by: INTERNAL MEDICINE

## 2020-11-13 ENCOUNTER — TELEPHONE (OUTPATIENT)
Dept: NEUROLOGY | Facility: CLINIC | Age: 65
End: 2020-11-13

## 2020-11-13 NOTE — TELEPHONE ENCOUNTER
PATIENT CALLED AND WANTS SOMEONE TO IF POSSIBLE GO OVER THE MRI RESULTS WITH HER THAT SHE HAD ON November 6TH, 2020.    PLEASE CONTACT PATIENT TO DISCUSS. THANK YOU.    PH: [phone]

## 2020-11-16 DIAGNOSIS — R29.898 WEAKNESS OF BOTH LEGS: Primary | ICD-10-CM

## 2020-11-16 DIAGNOSIS — R29.898 WEAKNESS OF BOTH ARMS: ICD-10-CM

## 2020-11-17 ENCOUNTER — TELEPHONE (OUTPATIENT)
Dept: NEUROLOGY | Facility: CLINIC | Age: 65
End: 2020-11-17

## 2020-11-17 NOTE — TELEPHONE ENCOUNTER
PT CALLED IN ASKING ABOUT THE LUMBAR PUNCTURE WANTING TO KNOW IF SHE NEEDS TO CALL AND SET THIS UP OR IF THEY WILL CALL HER TO SCHEDULE. I SEE IN THE CHART THE REFERRAL IS PENDING REVIEW DUE TO NOT ENOUGH INFORMATION.     PLEASE ADVISE   JAYDEN MAGANA  [phone]

## 2020-11-18 NOTE — TELEPHONE ENCOUNTER
LVM for patient letting her know someone from central scheduling should be notifying her soon about her Lumbar Puncture.

## 2020-11-30 ENCOUNTER — HOSPITAL ENCOUNTER (OUTPATIENT)
Dept: GENERAL RADIOLOGY | Facility: HOSPITAL | Age: 65
Discharge: HOME | End: 2020-11-30
Admitting: PSYCHIATRY & NEUROLOGY

## 2020-11-30 VITALS
WEIGHT: 115 LBS | BODY MASS INDEX: 19.43 KG/M2 | DIASTOLIC BLOOD PRESSURE: 77 MMHG | RESPIRATION RATE: 18 BRPM | HEART RATE: 94 BPM | SYSTOLIC BLOOD PRESSURE: 116 MMHG | TEMPERATURE: 97.7 F | OXYGEN SATURATION: 99 %

## 2020-11-30 DIAGNOSIS — R29.898 WEAKNESS OF BOTH LEGS: ICD-10-CM

## 2020-11-30 DIAGNOSIS — R29.898 WEAKNESS OF BOTH ARMS: ICD-10-CM

## 2020-11-30 LAB
APPEARANCE CSF: CLEAR
COLOR CSF: COLORLESS
COLOR SPUN CSF: COLORLESS
GLUCOSE CSF-MCNC: 57 MG/DL (ref 40–70)
PROT CSF-MCNC: 60.9 MG/DL (ref 15–45)
RBC # CSF MANUAL: 34 /MM3 (ref 0–5)
SPECIMEN VOL CSF: 7.5 ML
TUBE # CSF: 1
WBC # CSF MANUAL: 1 /MM3 (ref 0–5)

## 2020-11-30 PROCEDURE — 82784 ASSAY IGA/IGD/IGG/IGM EACH: CPT | Performed by: PSYCHIATRY & NEUROLOGY

## 2020-11-30 PROCEDURE — 82042 OTHER SOURCE ALBUMIN QUAN EA: CPT | Performed by: PSYCHIATRY & NEUROLOGY

## 2020-11-30 PROCEDURE — 83916 OLIGOCLONAL BANDS: CPT | Performed by: PSYCHIATRY & NEUROLOGY

## 2020-11-30 PROCEDURE — 89050 BODY FLUID CELL COUNT: CPT | Performed by: PSYCHIATRY & NEUROLOGY

## 2020-11-30 PROCEDURE — 84157 ASSAY OF PROTEIN OTHER: CPT | Performed by: PSYCHIATRY & NEUROLOGY

## 2020-11-30 PROCEDURE — 82945 GLUCOSE OTHER FLUID: CPT | Performed by: PSYCHIATRY & NEUROLOGY

## 2020-11-30 PROCEDURE — 82040 ASSAY OF SERUM ALBUMIN: CPT | Performed by: PSYCHIATRY & NEUROLOGY

## 2020-11-30 PROCEDURE — 83873 ASSAY OF CSF PROTEIN: CPT | Performed by: PSYCHIATRY & NEUROLOGY

## 2020-11-30 RX ORDER — LIDOCAINE HYDROCHLORIDE 10 MG/ML
5 INJECTION, SOLUTION EPIDURAL; INFILTRATION; INTRACAUDAL; PERINEURAL ONCE
Status: COMPLETED | OUTPATIENT
Start: 2020-11-30 | End: 2020-11-30

## 2020-11-30 RX ADMIN — LIDOCAINE HYDROCHLORIDE 5 ML: 10 INJECTION, SOLUTION EPIDURAL; INFILTRATION; INTRACAUDAL; PERINEURAL at 10:01

## 2020-12-01 ENCOUNTER — TELEPHONE (OUTPATIENT)
Dept: INFUSION THERAPY | Facility: HOSPITAL | Age: 65
End: 2020-12-01

## 2020-12-01 LAB
OLIGOCLONAL BANDS.IT SER+CSF QL: NORMAL
SPECIMEN STATUS: NORMAL

## 2020-12-02 LAB
ALB CSF/SERPL: 9 {RATIO} (ref 0–8)
ALBUMIN CSF-MCNC: 37 MG/DL (ref 11–48)
ALBUMIN SERPL-MCNC: 3.9 G/DL (ref 3.8–4.8)
ALBUMIN SERPL-MCNC: 3.9 G/DL (ref 3.8–4.8)
IGG CSF-MCNC: 4.4 MG/DL (ref 0–8.6)
IGG SERPL-MCNC: 1480 MG/DL (ref 586–1602)
IGG SERPL-MCNC: 1480 MG/DL (ref 586–1602)
IGG SYNTH RATE SER+CSF CALC-MRATE: -14.4 MG/DAY
IGG/ALB CLEAR SER+CSF-RTO: 0.3 (ref 0–0.7)
IGG/ALB CSF: 0.12 {RATIO} (ref 0–0.25)
MBP CSF-MCNC: 5.5 NG/ML (ref 0–4.7)

## 2020-12-11 ENCOUNTER — OFFICE VISIT (OUTPATIENT)
Dept: NEUROLOGY | Facility: CLINIC | Age: 65
End: 2020-12-11

## 2020-12-11 VITALS
OXYGEN SATURATION: 97 % | TEMPERATURE: 97.3 F | HEIGHT: 64 IN | WEIGHT: 116 LBS | BODY MASS INDEX: 19.81 KG/M2 | DIASTOLIC BLOOD PRESSURE: 68 MMHG | HEART RATE: 101 BPM | SYSTOLIC BLOOD PRESSURE: 104 MMHG

## 2020-12-11 DIAGNOSIS — R29.898 WEAKNESS OF BOTH ARMS: ICD-10-CM

## 2020-12-11 DIAGNOSIS — G47.00 INSOMNIA, UNSPECIFIED TYPE: ICD-10-CM

## 2020-12-11 DIAGNOSIS — R29.898 WEAKNESS OF BOTH LEGS: Primary | ICD-10-CM

## 2020-12-11 DIAGNOSIS — M79.7 FIBROMYALGIA: ICD-10-CM

## 2020-12-11 DIAGNOSIS — F32.A DEPRESSION, UNSPECIFIED DEPRESSION TYPE: ICD-10-CM

## 2020-12-11 DIAGNOSIS — R53.83 OTHER FATIGUE: ICD-10-CM

## 2020-12-11 PROCEDURE — 99214 OFFICE O/P EST MOD 30 MIN: CPT | Performed by: PSYCHIATRY & NEUROLOGY

## 2020-12-11 RX ORDER — PREGABALIN 25 MG/1
25 CAPSULE ORAL 3 TIMES DAILY
Qty: 90 CAPSULE | Refills: 2 | Status: SHIPPED | OUTPATIENT
Start: 2020-12-11 | End: 2021-01-11

## 2020-12-11 NOTE — PROGRESS NOTES
Subjective:    CC: Alysia Sierra is in clinic today for follow up for      HPI:  Initial visit: 8/31/2020: Patient is a 65-year-old female with past medical history of hypertension, supraventricular tachycardia referred to the clinic for evaluation of bilateral leg and arm weakness.  She reports her symptoms started about 2 years ago and it has progressively become worse.  She reports that in last 6 months, she has had great difficulty getting up from chair if there is no side arms.  She reports that if she is on the floor, it is almost impossible for her to get up.  In addition she has noticed difficulty going up and down stairs.  She also reports tingling, numbness and extreme coldness in both her feet and sometimes it leads to pain.  This also started about 6 months ago and it has progressively become worse.    10/8/2020: She is in clinic for regular follow-up.  Since her last visit, she reports that overall she feels worse.  She feels that she does not have any energy to do anything throughout the day.  She is involved in physical therapy but it is extremely difficult for her to go through the exercises.  She reports that overall she feels weaker than her last visit.  Since her last visit, she has now completed MRI of cervical spine, MRI lumbar spine blood work-up including myasthenia gravis antibody panel, vitamin D, copper and B12.  MRI of cervical and lumbosacral spine shows mild to moderate multilevel spondylitic changes without any evidence of myelopathy.  Vitamin B12, copper and vitamin levels for within normal limits as well.  Upon further questioning, she reports that she sleeps usually at 4 or 5 PM until 1 or 2 AM and then she is awake after that.  She has done this for quite some time now.  In addition, she also reports to heavy alcohol use from age 34 until about 2 years ago.  She still drinks alcohol but it is much less as compared to before.    12/11/2020: She is in clinic for regular follow-up.   Since her last visit, she underwent MRI brain without contrast which I reviewed personally.  It showed white matter changes suspicious for multiple sclerosis so following that, lumbar puncture was recommended.  Clinical picture was negative for MS profile.  She reports that since her last visit, she has now completed physical therapy and she continues to have good days and bad days.  She reports that she continues to struggle with poor sleep quality, does not have good appetite.  She is also reporting almost continuous tingling, numbness and coldness in both her feet.    The following portions of the patient's history were reviewed and updated as of 2020: allergies, social history and problem list.       Current Outpatient Medications:   •  Cholecalciferol (VITAMIN D3) 25 MCG (1000 UT) capsule, 2 (Two) Times a Week., Disp: , Rfl:   •  esomeprazole (nexIUM) 20 MG capsule, Take 20 mg by mouth Every Morning Before Breakfast., Disp: , Rfl:   •  hydrALAZINE (APRESOLINE) 25 MG tablet, 25 mg Daily., Disp: , Rfl:   •  magnesium oxide (MAGOX) 400 (241.3 Mg) MG tablet tablet, Take 400 mg by mouth Daily., Disp: , Rfl:   •  metoprolol succinate XL (TOPROL-XL) 25 MG 24 hr tablet, Take 25 mg by mouth Every Night., Disp: , Rfl:   •  nitroglycerin (NITROSTAT) 0.4 MG SL tablet, 1 under the tongue as needed for angina, may repeat q5mins for up three doses, Disp: 100 tablet, Rfl: 11  •  ondansetron (ZOFRAN) 4 MG tablet, Take 4 mg by mouth As Needed for Nausea or Vomiting., Disp: , Rfl:    Past Medical History:   Diagnosis Date   • Anemia    • Chicken pox    • Chronic fatigue    • NIEVES (dyspnea on exertion)    • Generalized anxiety disorder    • Hyperlipidemia    • Hypertension    • Iron deficiency anemia    • Liver disease     fatty   • Liver problem    • Measles    • Menopause    • Mumps    • Unintentional weight loss    • UTI (urinary tract infection)       Past Surgical History:   Procedure Laterality Date   •  SECTION  " 1981   • DILATION AND CURETTAGE, DIAGNOSTIC / THERAPEUTIC     • ESSURE TUBAL LIGATION        Family History   Problem Relation Age of Onset   • COPD Mother    • Stroke Mother    • Lung cancer Father    • Hypertension Father    • Heart attack Father    • Coronary artery disease Father    • Hyperlipidemia Sister    • Spondylolisthesis Sister    • Rheum arthritis Sister    • Malig Hypertension Sister    • Osteoarthritis Sister    • Other Sister         alcoholic   • Hypertension Brother         Review of Systems   Constitutional: Positive for appetite change, fatigue and unexpected weight loss.   HENT: Negative.    Eyes: Negative.    Respiratory: Negative.    Cardiovascular: Negative.    Gastrointestinal: Negative.    Endocrine: Negative.    Genitourinary: Positive for vaginal discharge.   Musculoskeletal: Positive for gait problem.   Skin: Positive for rash.   Neurological: Positive for dizziness, syncope, speech difficulty, weakness, light-headedness, numbness and confusion.   Psychiatric/Behavioral: Positive for depressed mood.     Objective:    /68   Pulse 101   Temp 97.3 °F (36.3 °C)   Ht 163.8 cm (64.49\")   Wt 52.6 kg (116 lb)   SpO2 97%   BMI 19.61 kg/m²     Neurology Exam:  General apperance: NAD.     Mental status: Alert, awake and oriented to time place and person.    Recent and Remote memory: Can recall 3/3 objects at 5 minutes. Can recall historical events.     Attention span and Concentration: Serial 7s: Normal.     Fund of knowledge:  Normal.     Language and Speech: No aphasia or dysarthria.    Naming , Repitition and Comprehension:  Can name objects, repeat a sentence and follow commands. Speech is clear and fluent with good repetition, comprehension, and naming.    CN II to XII: Intact.    Opthalmoscopic Exam: No papilledema.    Motor:  Right UE muscle strength 5-/5.  Giveaway weakness noted.    Left UE muscle strength 5-/5.  Giveaway weakness noted.    Right LE muscle strength 5-/5. " Normal tone.  Giveaway weakness noted.    Left LE muscle strength 5-/5. Normal tone.  Giveaway weakness noted.    Sensory: Reduced light touch, vibration and pinprick sensation bilaterally.    DTRs: 1+ bilaterally.    Babinski: Negative bilaterally.    Co-ordination: Normal finger-to-nose, heel to shin B/L.    Rhomberg: Negative.    Gait: Normal.    Cardiovascular: Regular rate and rhythm without murmur, gallop or rub.    Assessment and Plan:  1. Weakness of both legs  2. WeaKness of both arms  3. Other fatigue  4. Depression, unspecified depression type  5. Neuropathy   -She has now completed detailed neurology work-up including blood work, MRI brain, MRI cervical spine and lumbar puncture which were all essentially unremarkable.  I think most of her symptoms are caused by combination of poor nutrition, history of heavy alcohol use causing alcoholic neuropathy, poor nutrition and depression.  She has not yet heard anything from rheumatology to rule out possibility of fibromyalgia.  I will send another referral for the same.  I will also be sending referral to sleep medicine to help with her sleep.  Since she is reporting tingling, numbness in both her feet, I will start her on Lyrica 25 mg nightly slowly increasing to 25 mg 3 times daily dose for symptomatic relief.  I plan to see her back in 10 weeks for follow-up.    I spent 25 minutes face to face with the patient and spent more than 50% of this time  in management, instructions and education regarding above mentioned diagnosis and also on counseling and discussing about taking medication regularly, possible side effects with medication use, importance of good sleep hygiene, good hydration and regular exercise.    No follow-ups on file.

## 2020-12-17 ENCOUNTER — TELEPHONE (OUTPATIENT)
Dept: NEUROLOGY | Facility: CLINIC | Age: 65
End: 2020-12-17

## 2020-12-17 NOTE — TELEPHONE ENCOUNTER
Provider: DR. DALLAS  Caller: JAYDEN MAGANA  Relationship to Patient: SELF      Reason for Call: PT CALLING STATES THAT DR. DALLAS HAD SENT IN A PRESCRIPTION FOR LYRICA AND THE [facility] TOLD HER THAT THE INSURANCE COMPANY NEEDS MORE INFO BEFORE THE PHARMACY CAN FILL IT.       BEST CALL BACK NUMBER FOR HER IS: [phone]

## 2020-12-21 NOTE — TELEPHONE ENCOUNTER
Spoke to patient and informed patient that Lyrica is pending approval.  Spoke to [employer] and a determination will be made in 24 hours.

## 2020-12-22 NOTE — TELEPHONE ENCOUNTER
PT CALLED BACK NOT REMEMBERING WHETHER OR NOT SHE SPOKE WITH SOMEONE FROM THE OFFICE. INFORMED HER SHE HAD SPOKEN WITH THE MA WHO INFORMED HER SARA WAS PENDING APPROVAL. PT STATED UNDERSTANDING

## 2020-12-23 NOTE — TELEPHONE ENCOUNTER
Spoke to insurance earlier this morning and the PA needed more clinical information for approval.     Spoke to patient gave her a status update

## 2020-12-29 ENCOUNTER — TELEPHONE (OUTPATIENT)
Dept: NEUROLOGY | Facility: CLINIC | Age: 65
End: 2020-12-29

## 2020-12-29 NOTE — TELEPHONE ENCOUNTER
Caller: JAYDEN MAGANA    Relationship: SELF    Best call back number:[phone]    What medications are you currently taking:   Current Outpatient Medications on File Prior to Visit   Medication Sig Dispense Refill   • Cholecalciferol (VITAMIN D3) 25 MCG (1000 UT) capsule 2 (Two) Times a Week.     • esomeprazole (nexIUM) 20 MG capsule Take 20 mg by mouth Every Morning Before Breakfast.     • hydrALAZINE (APRESOLINE) 25 MG tablet 25 mg Daily.     • magnesium oxide (MAGOX) 400 (241.3 Mg) MG tablet tablet Take 400 mg by mouth Daily.     • metoprolol succinate XL (TOPROL-XL) 25 MG 24 hr tablet Take 25 mg by mouth Every Night.     • nitroglycerin (NITROSTAT) 0.4 MG SL tablet 1 under the tongue as needed for angina, may repeat q5mins for up three doses 100 tablet 11   • ondansetron (ZOFRAN) 4 MG tablet Take 4 mg by mouth As Needed for Nausea or Vomiting.     • pregabalin (Lyrica) 25 MG capsule Take 1 capsule by mouth 3 (Three) Times a Day. 90 capsule 2     No current facility-administered medications on file prior to visit.           Which medication are you concerned about: pregabalin (Lyrica) 25 MG capsule    Who prescribed you this medication: DR DALLAS     What are your concerns: PT STATES SHE SPOKE WITH PHARMACY CONCERNING   pregabalin (Lyrica) 25 MG capsule    SHE STATES SHE WAS TOLD THAT RX WOULD COST HER $700 OUT OF POCKET SHE STATES THAT IS MORE THAN HER RENT AND SHE CAN NOT PAY THIS FOR RX. SHE IS ASKING IF DR DALLAS CAN PRESCRIBE SOMETHING ELSE THAT IS APPROVED BY HER INSURANCE   PLEASE ADVISE

## 2020-12-29 NOTE — TELEPHONE ENCOUNTER
Provider: DR DALLAS   Caller: JAYDEN MAGANA  Relationship to Patient: SELF    Phone Number: [phone]  Reason for Call: PT CALLING STATES SHE WOKE UP LAST NIGHT WITH SEVERE PAIN IN FEET SHE STATES SHE HAS NUMBNESS IN FEET AND  CANT FEEL HEAT OR COLD IN FEET  STATES SHE WAS ABLE TO WALK TO BATHROOM WITH CANE BUT FEET WERE HURTING BAD.   When was the patient last seen: 12/11/2020  When did it start: LAST NIGHT 12/28/2020  Where is it located: BOTH FEET  HAVE PAIN NUMBNESS GOES UP INTO BOTH LEGS   Characteristics of symptom/severity: FEELS NUMB BUT ALSO LIKE BLISTERS ALL OVER FEET       PLEASE ADVISE

## 2020-12-31 RX ORDER — GABAPENTIN 300 MG/1
300 CAPSULE ORAL 2 TIMES DAILY
Qty: 60 CAPSULE | Refills: 2 | Status: SHIPPED | OUTPATIENT
Start: 2020-12-31 | End: 2021-05-10 | Stop reason: SDUPTHER

## 2020-12-31 NOTE — TELEPHONE ENCOUNTER
Spoke to patient and informed her insurance denied Lyrica, but we will start her on Gabapentin in hopes it helps.

## 2020-12-31 NOTE — TELEPHONE ENCOUNTER
LAVERNE ([employer])  [phone]    ACCORDING TO [employer] THE PA FOR THE LYRICA WAS DENIED BECAUSE THE PT'S DX IS NOT A COVERED DX. PLEASE GIVE THEM A CALL TO DISCUSS THIS.

## 2020-12-31 NOTE — TELEPHONE ENCOUNTER
Okay.  I have sent gabapentin 300 mg capsule.  She will start with 1 capsule at bedtime for 1 week then 1 capsule in the morning and 1 capsule at night after that.  Thanks

## 2021-01-11 ENCOUNTER — CONSULT (OUTPATIENT)
Dept: SLEEP MEDICINE | Facility: HOSPITAL | Age: 66
End: 2021-01-11

## 2021-01-11 VITALS
BODY MASS INDEX: 20.23 KG/M2 | WEIGHT: 121.4 LBS | HEART RATE: 84 BPM | DIASTOLIC BLOOD PRESSURE: 77 MMHG | HEIGHT: 65 IN | SYSTOLIC BLOOD PRESSURE: 134 MMHG | OXYGEN SATURATION: 98 %

## 2021-01-11 DIAGNOSIS — R29.898 LEG WEAKNESS, BILATERAL: Primary | ICD-10-CM

## 2021-01-11 DIAGNOSIS — G47.22 CIRCADIAN RHYTHM SLEEP DISORDER, ADVANCED SLEEP PHASE TYPE: ICD-10-CM

## 2021-01-11 PROCEDURE — 99243 OFF/OP CNSLTJ NEW/EST LOW 30: CPT | Performed by: INTERNAL MEDICINE

## 2021-01-11 NOTE — PROGRESS NOTES
Alysia Sierra is a 65 y.o. female.   Chief Complaint   Patient presents with   • Sleeping Problem       HPI     65 y.o. female seen in consultation at the request of Caden Dietz MD for evaluation of the above.       She was referred to him for bilateral leg and arm weakness for 2 years.  She has had problems getting up from a chair.  She also reports tingling numbness and coldness in both feet.  She had extensive work-up including MRI of the spine, myasthenia gravis panel, vitamin D, copper, and B12.  The MRI of the brain revealed some white matter changes raising suspicion of multiple sclerosis and lumbar puncture was performed and this diagnosis was not felt to be viable.  She reported to her neurologist that typically she would sleep from the early evening until the middle of the night and then get up.  She was referred for further evaluation.    She reports daytime sleepiness.  Her [place] Sleepiness Scale is 6.  She drinks small amounts of caffeine.  She was recently prescribed gabapentin by Dr. Dietz due to the numbness and tingling in her extremities.    The patient's relevant past medical, surgical, family, and social history reviewed and updated in Epic as appropriate.    I reviewed the above records and test results.    Answers for HPI/ROS submitted by the patient on 1/11/2021   Neurologic complaint  What is the primary reason for your visit?: Neurological Problem  altered mental status: No  clumsiness: Yes  focal sensory loss: Yes  focal weakness: Yes  loss of balance: Yes  memory loss: Yes  near-syncope: No  slurred speech: No  visual change: No  Chronicity: chronic  Onset: more than 1 year ago  Onset quality: gradually  Progression since onset: waxing and waning  Focality: lower extremity  auditory change: No  aura: No  bladder incontinence: No  bowel incontinence: No  vertigo: No  Treatments tried: medication  Improvement on treatment: no relief      Current medications are:   Current Outpatient  "Medications:   •  Cholecalciferol (VITAMIN D3) 25 MCG (1000 UT) capsule, 2 (Two) Times a Week., Disp: , Rfl:   •  esomeprazole (nexIUM) 20 MG capsule, Take 20 mg by mouth Every Morning Before Breakfast., Disp: , Rfl:   •  gabapentin (Neurontin) 300 MG capsule, Take 1 capsule by mouth 2 (Two) Times a Day., Disp: 60 capsule, Rfl: 2  •  hydrALAZINE (APRESOLINE) 25 MG tablet, 25 mg Daily., Disp: , Rfl:   •  magnesium oxide (MAGOX) 400 (241.3 Mg) MG tablet tablet, Take 400 mg by mouth Daily., Disp: , Rfl:   •  metoprolol succinate XL (TOPROL-XL) 25 MG 24 hr tablet, Take 25 mg by mouth Every Night., Disp: , Rfl:   •  nitroglycerin (NITROSTAT) 0.4 MG SL tablet, 1 under the tongue as needed for angina, may repeat q5mins for up three doses, Disp: 100 tablet, Rfl: 11  •  ondansetron (ZOFRAN) 4 MG tablet, Take 4 mg by mouth As Needed for Nausea or Vomiting., Disp: , Rfl: .    Review of Systems    Review of Systems  ROS documented in patient questionnaire ×14 systems.  Reviewed with patient.  Otherwise negative except as noted in HPI.    Physical Exam    Blood pressure 134/77, pulse 84, height 163.8 cm (64.5\"), weight 55.1 kg (121 lb 6.4 oz), SpO2 98 %, not currently breastfeeding. Body mass index is 20.52 kg/m².    Physical Exam  Vitals signs and nursing note reviewed.   Constitutional:       Appearance: Normal appearance. She is well-developed.   HENT:      Head: Normocephalic and atraumatic.      Comments: Class I airway     Nose: Nose normal.      Mouth/Throat:      Mouth: Mucous membranes are moist.      Pharynx: Oropharynx is clear. No oropharyngeal exudate.   Eyes:      General: No scleral icterus.     Conjunctiva/sclera: Conjunctivae normal.   Neck:      Thyroid: No thyromegaly.      Trachea: No tracheal deviation.   Cardiovascular:      Rate and Rhythm: Normal rate and regular rhythm.      Heart sounds: No murmur. No friction rub. No gallop.    Pulmonary:      Effort: Pulmonary effort is normal. No respiratory " distress.      Breath sounds: No wheezing or rales.   Musculoskeletal: Normal range of motion.         General: No deformity.   Skin:     General: Skin is warm and dry.      Findings: No rash.   Neurological:      Mental Status: She is alert and oriented to person, place, and time.   Psychiatric:         Behavior: Behavior normal.         Thought Content: Thought content normal.         ASSESSMENT:    Problem List Items Addressed This Visit     Circadian rhythm sleep disorder, advanced sleep phase type    Leg weakness, bilateral - Primary          65-year-old female with what sounds like an advanced sleep phase circadian rhythm disorder.  She typically will go to bed between 5 and 7 PM and wake up between 2 and 4 AM.  Therefore, she averages 8 hours in bed at night.  She says she awakens 3 or 4 times briefly but goes right back to sleep.  She feels refreshed with a normal [place] scale.  She does not see a problem with her sleep schedule.  She lives alone and this does not affect her lifestyle and she does not necessarily see the need to change it.    PLAN:    1. Discussed circadian rhythm issues with her.  As long as she is not sleepy during the day and her sleep schedule is not a problem for her lifestyle, there is no true benefit to changing it.  I did discussed the ways she could do this if she desired and that would be selecting a later bedtime and getting late afternoon sunlight or full-spectrum light.  2. A polysomnogram or home sleep apnea test will not help with the above issues.  She does have a low risk for obstructive sleep apnea based upon her presenting symptoms and body type.  She says she will occasionally snore on her back only but she has never been told she is a loud snorer.  3. As needed follow-up    I have reviewed the results of my evaluation and impression and discussed my recommendations in detail with the patient.    Level of Risk Low due to:  one stable chronic illnesss    Signed  by  Mumtaz Rodriguez MD    January 11, 2021      CC: Janet Leon, Caden Swartz MD

## 2021-02-03 ENCOUNTER — TELEPHONE (OUTPATIENT)
Dept: NEUROLOGY | Facility: CLINIC | Age: 66
End: 2021-02-03

## 2021-02-03 NOTE — TELEPHONE ENCOUNTER
Caller: PT    Relationship: SELF    Best call back number: [phone]    What medications are you currently taking:   Current Outpatient Medications on File Prior to Visit   Medication Sig Dispense Refill   • Cholecalciferol (VITAMIN D3) 25 MCG (1000 UT) capsule 2 (Two) Times a Week.     • esomeprazole (nexIUM) 20 MG capsule Take 20 mg by mouth Every Morning Before Breakfast.     • gabapentin (Neurontin) 300 MG capsule Take 1 capsule by mouth 2 (Two) Times a Day. 60 capsule 2   • hydrALAZINE (APRESOLINE) 25 MG tablet 25 mg Daily.     • magnesium oxide (MAGOX) 400 (241.3 Mg) MG tablet tablet Take 400 mg by mouth Daily.     • metoprolol succinate XL (TOPROL-XL) 25 MG 24 hr tablet Take 25 mg by mouth Every Night.     • nitroglycerin (NITROSTAT) 0.4 MG SL tablet 1 under the tongue as needed for angina, may repeat q5mins for up three doses 100 tablet 11   • ondansetron (ZOFRAN) 4 MG tablet Take 4 mg by mouth As Needed for Nausea or Vomiting.       No current facility-administered medications on file prior to visit.         When did you start taking these medications: ABOUT A MONTH AGO    Which medication are you concerned about: GABAPENTIN    Who prescribed you this medication: DR. DALLAS    What are your concerns: WEAKNESS IN KNEES IN LEGS. SHE IS WANTING TO KNOW IF SHE COULD DECREASE THIS MEDICATION, IF THAT WOULD HELP THOSE ISSUES TO SUBSIDE. PLEASE ADVISE.         How long have you had these concerns: LAST TWO DAYS

## 2021-02-03 NOTE — TELEPHONE ENCOUNTER
Informed patient and she stated she is going to take it at night and see how she does since it seems to help her sleep.

## 2021-03-01 ENCOUNTER — OFFICE VISIT (OUTPATIENT)
Dept: NEUROLOGY | Facility: CLINIC | Age: 66
End: 2021-03-01

## 2021-03-01 VITALS
HEIGHT: 64 IN | TEMPERATURE: 97.5 F | OXYGEN SATURATION: 99 % | HEART RATE: 86 BPM | SYSTOLIC BLOOD PRESSURE: 116 MMHG | DIASTOLIC BLOOD PRESSURE: 66 MMHG | BODY MASS INDEX: 20.52 KG/M2

## 2021-03-01 DIAGNOSIS — R29.898 WEAKNESS OF BOTH LEGS: Primary | ICD-10-CM

## 2021-03-01 DIAGNOSIS — R29.898 WEAKNESS OF BOTH ARMS: ICD-10-CM

## 2021-03-01 DIAGNOSIS — M79.7 FIBROMYALGIA: ICD-10-CM

## 2021-03-01 PROCEDURE — 99214 OFFICE O/P EST MOD 30 MIN: CPT | Performed by: PSYCHIATRY & NEUROLOGY

## 2021-03-01 RX ORDER — DOXYCYCLINE 100 MG/1
CAPSULE ORAL
COMMUNITY
Start: 2021-02-02 | End: 2021-08-09

## 2021-03-01 NOTE — PROGRESS NOTES
Subjective:    CC: Alysia Sierra is in clinic today for follow up for generalized weakness    HPI:  Initial visit: 8/31/2020: Patient is a 65-year-old female with past medical history of hypertension, supraventricular tachycardia referred to the clinic for evaluation of bilateral leg and arm weakness.  She reports her symptoms started about 2 years ago and it has progressively become worse.  She reports that in last 6 months, she has had great difficulty getting up from chair if there is no side arms.  She reports that if she is on the floor, it is almost impossible for her to get up.  In addition she has noticed difficulty going up and down stairs.  She also reports tingling, numbness and extreme coldness in both her feet and sometimes it leads to pain.  This also started about 6 months ago and it has progressively become worse.    10/8/2020: She is in clinic for regular follow-up.  Since her last visit, she reports that overall she feels worse.  She feels that she does not have any energy to do anything throughout the day.  She is involved in physical therapy but it is extremely difficult for her to go through the exercises.  She reports that overall she feels weaker than her last visit.  Since her last visit, she has now completed MRI of cervical spine, MRI lumbar spine blood work-up including myasthenia gravis antibody panel, vitamin D, copper and B12.  MRI of cervical and lumbosacral spine shows mild to moderate multilevel spondylitic changes without any evidence of myelopathy.  Vitamin B12, copper and vitamin levels for within normal limits as well.  Upon further questioning, she reports that she sleeps usually at 4 or 5 PM until 1 or 2 AM and then she is awake after that.  She has done this for quite some time now.  In addition, she also reports to heavy alcohol use from age 34 until about 2 years ago.  She still drinks alcohol but it is much less as compared to before.    12/11/2020: She is in clinic for  regular follow-up.  Since her last visit, she underwent MRI brain without contrast which I reviewed personally.  It showed white matter changes suspicious for multiple sclerosis so following that, lumbar puncture was recommended.  Lumbar puncture was negative for MS profile.  She reports that since her last visit, she has now completed physical therapy and she continues to have good days and bad days.  She reports that she continues to struggle with poor sleep quality, does not have good appetite.  She is also reporting almost continuous tingling, numbness and coldness in both her feet.    3/1/2021: She is in clinic for regular follow-up.  Since her last visit in December, she reports that there has not been much change in bilateral upper and lower extremity strength is concerned.  She continues to have episodes of difficulty with walking, subjective feeling of generalized body weakness.  She is planning to go back to the gym and start exercising and will try to pay more attention to nutrition to help with this.  Really she reports that there has been no worsening since her last visit.  She did try gabapentin 300 mg twice daily and reports that the morning dose caused her to have side effects that she is currently taking only the nighttime dose which seems to be helping significantly with sleep.    The following portions of the patient's history were reviewed and updated as of 03/01/2021: allergies, social history and problem list.       Current Outpatient Medications:   •  Cholecalciferol (VITAMIN D3) 25 MCG (1000 UT) capsule, 2 (Two) Times a Week., Disp: , Rfl:   •  doxycycline (MONODOX) 100 MG capsule, , Disp: , Rfl:   •  esomeprazole (nexIUM) 20 MG capsule, Take 20 mg by mouth Every Morning Before Breakfast., Disp: , Rfl:   •  gabapentin (Neurontin) 300 MG capsule, Take 1 capsule by mouth 2 (Two) Times a Day. (Patient taking differently: Take 300 mg by mouth 1 (One) Time.), Disp: 60 capsule, Rfl: 2  •   hydrALAZINE (APRESOLINE) 25 MG tablet, 25 mg Daily., Disp: , Rfl:   •  magnesium oxide (MAGOX) 400 (241.3 Mg) MG tablet tablet, Take 400 mg by mouth Daily., Disp: , Rfl:   •  metoprolol succinate XL (TOPROL-XL) 25 MG 24 hr tablet, Take 25 mg by mouth Every Night., Disp: , Rfl:   •  nitroglycerin (NITROSTAT) 0.4 MG SL tablet, 1 under the tongue as needed for angina, may repeat q5mins for up three doses, Disp: 100 tablet, Rfl: 11  •  ondansetron (ZOFRAN) 4 MG tablet, Take 4 mg by mouth As Needed for Nausea or Vomiting., Disp: , Rfl:    Past Medical History:   Diagnosis Date   • Anemia    • Arrhythmia    • Chicken pox    • Chronic fatigue    • NIEVES (dyspnea on exertion)    • Generalized anxiety disorder    • Hyperlipidemia    • Hypertension    • Iron deficiency anemia    • Liver disease     fatty   • Liver problem    • Measles    • Menopause    • Mumps    • Unintentional weight loss    • UTI (urinary tract infection)       Past Surgical History:   Procedure Laterality Date   •  SECTION     • DILATION AND CURETTAGE, DIAGNOSTIC / THERAPEUTIC     • ESSURE TUBAL LIGATION        Family History   Problem Relation Age of Onset   • COPD Mother    • Stroke Mother    • Hypertension Mother    • Lung cancer Father    • Hypertension Father    • Heart attack Father    • Coronary artery disease Father    • Heart disease Father    • Thyroid disease Father    • Hyperlipidemia Sister    • Spondylolisthesis Sister    • Rheum arthritis Sister    • Malig Hypertension Sister    • Osteoarthritis Sister    • Other Sister         alcoholic   • Hypertension Sister    • Hypertension Brother         Review of Systems   HENT: Negative.    Eyes: Negative.    Respiratory: Negative.    Cardiovascular: Negative.    Gastrointestinal: Negative.    Endocrine: Negative.    Genitourinary: Negative.    Musculoskeletal: Negative.    Skin: Negative.    Allergic/Immunologic: Negative.    Neurological: Positive for weakness and numbness.  "  Hematological: Negative.    Psychiatric/Behavioral: Negative.      Objective:    /66   Pulse 86   Temp 97.5 °F (36.4 °C)   Ht 163.8 cm (64.49\")   SpO2 99%   BMI 20.52 kg/m²     Neurology Exam:  General apperance: NAD.     Mental status: Alert, awake and oriented to time place and person.    Recent and Remote memory: Can recall 3/3 objects at 5 minutes. Can recall historical events.     Attention span and Concentration: Serial 7s: Normal.     Fund of knowledge:  Normal.     Language and Speech: No aphasia or dysarthria.    Naming , Repitition and Comprehension:  Can name objects, repeat a sentence and follow commands. Speech is clear and fluent with good repetition, comprehension, and naming.    CN II to XII: Intact.    Opthalmoscopic Exam: No papilledema.    Motor:  Right UE muscle strength 5/5. Normal tone.     Left UE muscle strength 5/5. Normal tone.      Right LE muscle strength5/5. Normal tone.     Left LE muscle strength 5/5. Normal tone.      Sensory: Normal light touch, vibration and pinprick sensation bilaterally.    DTRs: 2+ bilaterally.    Babinski: Negative bilaterally.    Co-ordination: Normal finger-to-nose, heel to shin B/L.    Rhomberg: Negative.    Gait: Normal.    Cardiovascular: Regular rate and rhythm without murmur, gallop or rub.    Assessment and Plan:  1. Weakness of both legs  2. Weakness of both arms  3. Fibromyalgia  -  Most likely caused by history of heavy alcohol use in the past causing alcohol induced neuropathy, poor nutrition and depression.  There has been no further deterioration in since the last visit.  She is planning to start working out at gym more regularly and will be paying more attention to her nutrition which I think will be the mainstay of her treatment moving forward.  Continue with current medications as per schedule.  Continue gabapentin 300 mg at bedtime and I will see her back in 6 months for follow-up.       I spent 25 minutes face to face with the " patient and spent more than 50% of this time  in management, instructions and education regarding above mentioned diagnosis and also on counseling and discussing about taking medication regularly, possible side effects with medication use, importance of good sleep hygiene, good hydration and regular exercise.    Return in about 6 months (around 9/1/2021).

## 2021-05-24 ENCOUNTER — TELEPHONE (OUTPATIENT)
Dept: NEUROLOGY | Facility: CLINIC | Age: 66
End: 2021-05-24

## 2021-05-24 RX ORDER — GABAPENTIN 300 MG/1
300 CAPSULE ORAL ONCE
Qty: 30 CAPSULE | Refills: 5 | Status: SHIPPED | OUTPATIENT
Start: 2021-05-24 | End: 2021-10-12 | Stop reason: SDUPTHER

## 2021-05-24 NOTE — TELEPHONE ENCOUNTER
Pharmacy Name:  [facility]    Pharmacy representative name: JOSIAS     Pharmacy representative phone number: [phone]    What medication are you calling in regards to: gabapentin (Neurontin) 300 MG capsule    What question does the pharmacy have: WAS UNSURE OF THE DOSING ON THE MED    Who is the provider that prescribed the medication: DR DALLAS     Additional notes: PLEASE ADVISE.

## 2021-08-09 ENCOUNTER — OFFICE VISIT (OUTPATIENT)
Dept: CARDIOLOGY | Facility: CLINIC | Age: 66
End: 2021-08-09

## 2021-08-09 VITALS
HEART RATE: 74 BPM | WEIGHT: 120 LBS | OXYGEN SATURATION: 98 % | DIASTOLIC BLOOD PRESSURE: 83 MMHG | SYSTOLIC BLOOD PRESSURE: 140 MMHG | HEIGHT: 65 IN | BODY MASS INDEX: 19.99 KG/M2

## 2021-08-09 DIAGNOSIS — I47.1 PAROXYSMAL SVT (SUPRAVENTRICULAR TACHYCARDIA) (HCC): ICD-10-CM

## 2021-08-09 DIAGNOSIS — R07.9 CHEST PAIN, UNSPECIFIED TYPE: Primary | ICD-10-CM

## 2021-08-09 DIAGNOSIS — R06.09 DOE (DYSPNEA ON EXERTION): ICD-10-CM

## 2021-08-09 DIAGNOSIS — I10 ESSENTIAL HYPERTENSION: ICD-10-CM

## 2021-08-09 PROCEDURE — 99214 OFFICE O/P EST MOD 30 MIN: CPT | Performed by: INTERNAL MEDICINE

## 2021-08-09 RX ORDER — SODIUM ZIRCONIUM CYCLOSILICATE 10 G/10G
10 POWDER, FOR SUSPENSION ORAL 2 TIMES WEEKLY
COMMUNITY
Start: 2021-06-16 | End: 2022-09-12

## 2021-08-09 RX ORDER — ASPIRIN 325 MG
325 TABLET ORAL AS NEEDED
COMMUNITY

## 2021-08-09 NOTE — PROGRESS NOTES
OFFICE VISIT  NOTE  [facility] MEDICAL [facility] CARDIOLOGY      Name: Alysia Sierra    Date: 2021  MRN:  [id number]  :  1955      REFERRING/PRIMARY PROVIDER:  Janet Leon APRN    Chief Complaint   Patient presents with   • Hypertension       HPI: Alysia Sierra is a 66 y.o. female who presents today for follow-up for chest pain, shortness of breath, paroxysmal SVT. Patient history of iron deficiency anemia, unexplained weight loss, chronic kidney disease stage III, hypertension, hyperlipidemia, generalized anxiety disorder. Stress test and echo benign 2019 at [facility].    Taking hydralazine once daily, twice daily lowered her blood pressure too much.  Home blood pressures running 110s to 120s systolic.  Denies palpitations chest pain dizziness syncope or shortness of breath.    Past Medical History:   Diagnosis Date   • Anemia    • Arrhythmia    • Chicken pox    • Chronic fatigue    • NIEVES (dyspnea on exertion)    • Generalized anxiety disorder    • Hyperlipidemia    • Hypertension    • Iron deficiency anemia    • Liver disease     fatty   • Liver problem    • Measles    • Menopause    • Mumps    • Unintentional weight loss    • UTI (urinary tract infection)        Past Surgical History:   Procedure Laterality Date   •  SECTION     • DILATION AND CURETTAGE, DIAGNOSTIC / THERAPEUTIC     • ESSURE TUBAL LIGATION     • ORAL LESION EXCISION/BIOPSY  2021       Social History     Socioeconomic History   • Marital status:      Spouse name: Not on file   • Number of children: Not on file   • Years of education: Not on file   • Highest education level: Not on file   Tobacco Use   • Smoking status: Never Smoker   • Smokeless tobacco: Never Used   Substance and Sexual Activity   • Alcohol use: Yes     Comment: social   • Drug use: No   • Sexual activity: Defer       Family History   Problem Relation Age of Onset   • COPD Mother    • Stroke Mother    • Hypertension Mother    •  Lung cancer Father    • Hypertension Father    • Heart attack Father    • Coronary artery disease Father    • Heart disease Father    • Thyroid disease Father    • Hyperlipidemia Sister    • Spondylolisthesis Sister    • Rheum arthritis Sister    • Malig Hypertension Sister    • Osteoarthritis Sister    • Other Sister         alcoholic   • Hypertension Sister    • Hypertension Brother         ROS:   Constitutional no fever,  no weight loss   Skin no rash, no subcutaneous nodules   Otolaryngeal no difficulty swallowing   Cardiovascular See HPI   Pulmonary no cough, no sputum production   Gastrointestinal no constipation, no diarrhea   Genitourinary no dysuria, no hematuria   Hematologic no easy bruisability, no abnormal bleeding   Musculoskeletal no muscle pain   Neurologic no dizziness, no falls         Allergies   Allergen Reactions   • Lisinopril Angioedema   • Milk-Related Compounds Other (See Comments)     LACTOSE INTOLERANT   • Atorvastatin Myalgia         Current Outpatient Medications:   •  aspirin 325 MG tablet, Take 325 mg by mouth As Needed for Mild Pain ., Disp: , Rfl:   •  Cholecalciferol (VITAMIN D3) 25 MCG (1000 UT) capsule, 2 (Two) Times a Week., Disp: , Rfl:   •  esomeprazole (nexIUM) 20 MG capsule, Take 20 mg by mouth 2 (Two) Times a Week., Disp: , Rfl:   •  gabapentin (Neurontin) 300 MG capsule, Take 1 capsule by mouth 1 (One) Time for 1 dose., Disp: 30 capsule, Rfl: 5  •  hydrALAZINE (APRESOLINE) 25 MG tablet, 25 mg Daily., Disp: , Rfl:   •  magnesium oxide (MAGOX) 400 (241.3 Mg) MG tablet tablet, Take 400 mg by mouth Daily., Disp: , Rfl:   •  metoprolol succinate XL (TOPROL-XL) 25 MG 24 hr tablet, Take 25 mg by mouth Every Night., Disp: , Rfl:   •  nitroglycerin (NITROSTAT) 0.4 MG SL tablet, 1 under the tongue as needed for angina, may repeat q5mins for up three doses, Disp: 100 tablet, Rfl: 11  •  ondansetron (ZOFRAN) 4 MG tablet, Take 4 mg by mouth As Needed for Nausea or Vomiting., Disp: ,  "Rfl:   •  sodium zirconium cyclosilicate (Lokelma) 10 g pack, Take 10 g by mouth 2 (Two) Times a Week., Disp: , Rfl:     Vitals:    08/09/21 1041   BP: 140/83   BP Location: Left arm   Patient Position: Sitting   Pulse: 74   SpO2: 98%   Weight: 54.4 kg (120 lb)   Height: 163.8 cm (64.5\")     Body mass index is 20.28 kg/m².    PHYSICAL EXAM:    General Appearance:   · Thin, frail  HENT:   · oropharynx moist  · lips not cyanotic  Neck:  · thyroid not enlarged  · supple  Respiratory:  · no respiratory distress  · normal breath sounds  · no rales  Cardiovascular:  · no jugular venous distention  · regular rhythm  · apical impulse normal  · S1 normal, S2 normal  · no S3, no S4   · no murmur  · no rub, no thrill  · carotid pulses normal; no bruit  · pedal pulses normal  · lower extremity edema: none    Gastrointestinal:   · bowel sounds normal  · non-tender  · no hepatomegaly, no splenomegaly  Musculoskeletal:  · no clubbing of fingers.   · normocephalic, head atraumatic  Skin:   · warm, dry  Psychiatric:  · judgement and insight appropriate  · normal mood and affect    RESULTS:   Procedures    Results for orders placed during the hospital encounter of 06/04/19    Adult Transthoracic Echo Complete W/ Cont if Necessary Per Protocol    Interpretation Summary  · Calculated EF = 55.0%  · Left ventricular systolic function is normal.  · Left ventricular diastolic dysfunction (grade I) consistent with impaired relaxation.  · Mild tricuspid valve regurgitation is present.  · Mild pulmonic valve regurgitation is present.        Labs:  No results found for: CHOL, TRIG, HDL, LDL, AST, ALT  No results found for: HGBA1C  No components found for: CREATINININE  No results found for: EGFRIFNONA      ASSESSMENT:  Problem List Items Addressed This Visit     None          PLAN:    1.  Syncope:  Reviewed echo and Holter and stress test all within the last 12 months  All benign  No recurrence    2.  Bilateral leg weakness:  Ongoing work-up " by neurology    3.  Hypertension:  Currently doing very well, goal blood pressure less than 130/80  Home blood pressure readings all consistent with goal  Continue hydralazine and metoprolol    4.  Paroxysmal SVT:  Continue Toprol 25 mg daily  Asymptomatic currently  Continue decreasing caffeine and increase water    5.  CKD stage III:  Complicates all aspects of care  She reports recent nephrology visit, with improving renal function and potassium    Return to clinic in 12 months, or sooner as needed.    Thank you for the opportunity to share in the care of your patient; please do not hesitate to call me with any questions.     Gray Bahena MD, [facility]  Office: [phone] [address] [place] [address]

## 2021-09-01 ENCOUNTER — OFFICE VISIT (OUTPATIENT)
Dept: NEUROLOGY | Facility: CLINIC | Age: 66
End: 2021-09-01

## 2021-09-01 VITALS
OXYGEN SATURATION: 97 % | HEART RATE: 94 BPM | SYSTOLIC BLOOD PRESSURE: 140 MMHG | HEIGHT: 64 IN | BODY MASS INDEX: 20.49 KG/M2 | WEIGHT: 120 LBS | DIASTOLIC BLOOD PRESSURE: 88 MMHG

## 2021-09-01 DIAGNOSIS — F32.A DEPRESSION, UNSPECIFIED DEPRESSION TYPE: ICD-10-CM

## 2021-09-01 DIAGNOSIS — M79.7 FIBROMYALGIA: ICD-10-CM

## 2021-09-01 DIAGNOSIS — R53.83 OTHER FATIGUE: ICD-10-CM

## 2021-09-01 DIAGNOSIS — R29.898 WEAKNESS OF BOTH LEGS: Primary | ICD-10-CM

## 2021-09-01 DIAGNOSIS — R29.898 WEAKNESS OF BOTH ARMS: ICD-10-CM

## 2021-09-01 PROCEDURE — 99214 OFFICE O/P EST MOD 30 MIN: CPT | Performed by: PSYCHIATRY & NEUROLOGY

## 2021-09-01 NOTE — PROGRESS NOTES
Subjective:    CC: Alysia Sierra is in clinic today for follow up for generalized weakness.    HPI:  Initial visit: 8/31/2020: Patient is a 65-year-old female with past medical history of hypertension, supraventricular tachycardia referred to the clinic for evaluation of bilateral leg and arm weakness.  She reports her symptoms started about 2 years ago and it has progressively become worse.  She reports that in last 6 months, she has had great difficulty getting up from chair if there is no side arms.  She reports that if she is on the floor, it is almost impossible for her to get up.  In addition she has noticed difficulty going up and down stairs.  She also reports tingling, numbness and extreme coldness in both her feet and sometimes it leads to pain.  This also started about 6 months ago and it has progressively become worse.    10/8/2020: She is in clinic for regular follow-up.  Since her last visit, she reports that overall she feels worse.  She feels that she does not have any energy to do anything throughout the day.  She is involved in physical therapy but it is extremely difficult for her to go through the exercises.  She reports that overall she feels weaker than her last visit.  Since her last visit, she has now completed MRI of cervical spine, MRI lumbar spine blood work-up including myasthenia gravis antibody panel, vitamin D, copper and B12.  MRI of cervical and lumbosacral spine shows mild to moderate multilevel spondylitic changes without any evidence of myelopathy.  Vitamin B12, copper and vitamin levels for within normal limits as well.  Upon further questioning, she reports that she sleeps usually at 4 or 5 PM until 1 or 2 AM and then she is awake after that.  She has done this for quite some time now.  In addition, she also reports to heavy alcohol use from age 34 until about 2 years ago.  She still drinks alcohol but it is much less as compared to before.    12/11/2020: She is in clinic for  regular follow-up.  Since her last visit, she underwent MRI brain without contrast which I reviewed personally.  It showed white matter changes suspicious for multiple sclerosis so following that, lumbar puncture was recommended.  Lumbar puncture was negative for MS profile.  She reports that since her last visit, she has now completed physical therapy and she continues to have good days and bad days.  She reports that she continues to struggle with poor sleep quality, does not have good appetite.  She is also reporting almost continuous tingling, numbness and coldness in both her feet.    3/1/2021: She is in clinic for regular follow-up.  Since her last visit in December, she reports that there has not been much change in bilateral upper and lower extremity strength is concerned.  She continues to have episodes of difficulty with walking, subjective feeling of generalized body weakness.  She is planning to go back to the gym and start exercising and will try to pay more attention to nutrition to help with this.  Really she reports that there has been no worsening since her last visit.  She did try gabapentin 300 mg twice daily and reports that the morning dose caused her to have side effects that she is currently taking only the nighttime dose which seems to be helping significantly with sleep.    9/1/2021: She is in clinic for regular follow-up.  Since her last visit in March, she reports that she is trying her best to exercise as much as possible at home to maintain strength in both upper and lower extremities.  She continues to use cane while walking.  She takes gabapentin 300 mg at bedtime which helps her with pain and also help her sleep better.  She continues to have some good days and bad days but overall there has been no worsening.    The following portions of the patient's history were reviewed and updated as of 09/01/2021: allergies, social history and problem list.       Current Outpatient Medications:    •  aspirin 325 MG tablet, Take 325 mg by mouth As Needed for Mild Pain ., Disp: , Rfl:   •  Cholecalciferol (VITAMIN D3) 25 MCG (1000 UT) capsule, 2 (Two) Times a Week., Disp: , Rfl:   •  esomeprazole (nexIUM) 20 MG capsule, Take 20 mg by mouth 2 (Two) Times a Week., Disp: , Rfl:   •  gabapentin (Neurontin) 300 MG capsule, Take 1 capsule by mouth 1 (One) Time for 1 dose., Disp: 30 capsule, Rfl: 5  •  hydrALAZINE (APRESOLINE) 25 MG tablet, 25 mg Daily., Disp: , Rfl:   •  magnesium oxide (MAGOX) 400 (241.3 Mg) MG tablet tablet, Take 400 mg by mouth Daily., Disp: , Rfl:   •  metoprolol succinate XL (TOPROL-XL) 25 MG 24 hr tablet, Take 25 mg by mouth Every Night., Disp: , Rfl:   •  nitroglycerin (NITROSTAT) 0.4 MG SL tablet, 1 under the tongue as needed for angina, may repeat q5mins for up three doses, Disp: 100 tablet, Rfl: 11  •  ondansetron (ZOFRAN) 4 MG tablet, Take 4 mg by mouth As Needed for Nausea or Vomiting., Disp: , Rfl:   •  sodium zirconium cyclosilicate (Lokelma) 10 g pack, Take 10 g by mouth 2 (Two) Times a Week., Disp: , Rfl:    Past Medical History:   Diagnosis Date   • Anemia    • Arrhythmia    • Chicken pox    • Chronic fatigue    • NIEVES (dyspnea on exertion)    • Generalized anxiety disorder    • Hyperlipidemia    • Hypertension    • Iron deficiency anemia    • Liver disease     fatty   • Liver problem    • Measles    • Menopause    • Mumps    • Unintentional weight loss    • UTI (urinary tract infection)       Past Surgical History:   Procedure Laterality Date   •  SECTION     • DILATION AND CURETTAGE, DIAGNOSTIC / THERAPEUTIC     • ESSURE TUBAL LIGATION     • ORAL LESION EXCISION/BIOPSY  2021      Family History   Problem Relation Age of Onset   • COPD Mother    • Stroke Mother    • Hypertension Mother    • Lung cancer Father    • Hypertension Father    • Heart attack Father    • Coronary artery disease Father    • Heart disease Father    • Thyroid disease Father    • Hyperlipidemia  "Sister    • Spondylolisthesis Sister    • Rheum arthritis Sister    • Malig Hypertension Sister    • Osteoarthritis Sister    • Other Sister         alcoholic   • Hypertension Sister    • Hypertension Brother         Review of Systems   Constitutional: Negative.    HENT: Negative.    Eyes: Negative.    Respiratory: Negative.    Cardiovascular: Negative.    Gastrointestinal: Negative.    Endocrine: Negative.    Genitourinary: Negative.    Musculoskeletal: Positive for gait problem.   Skin: Negative.    Allergic/Immunologic: Negative.    Neurological: Positive for headache.   Hematological: Negative.    Psychiatric/Behavioral: Negative.      Objective:    /88   Pulse 94   Ht 162.6 cm (64\")   Wt 54.4 kg (120 lb)   SpO2 97%   BMI 20.60 kg/m²     Neurology Exam:  General apperance: NAD.     Mental status: Alert, awake and oriented to time place and person.    Recent and Remote memory: Can recall 3/3 objects at 5 minutes. Can recall historical events.     Attention span and Concentration: Serial 7s: Normal.     Fund of knowledge:  Normal.     Language and Speech: No aphasia or dysarthria.    Naming , Repitition and Comprehension:  Can name objects, repeat a sentence and follow commands. Speech is clear and fluent with good repetition, comprehension, and naming.    CN II to XII: Intact.    Opthalmoscopic Exam: No papilledema.    Motor:  Right UE muscle strength 5/5. Normal tone.     Left UE muscle strength 5/5. Normal tone.      Right LE muscle strength5/5. Normal tone.     Left LE muscle strength 5/5. Normal tone.      Sensory: Normal light touch, vibration and pinprick sensation bilaterally.    DTRs: 2+ bilaterally.    Babinski: Negative bilaterally.    Co-ordination: Normal finger-to-nose, heel to shin B/L.    Rhomberg: Negative.    Gait: Normal.    Cardiovascular: Regular rate and rhythm without murmur, gallop or rub.    Assessment and Plan:  1. Weakness of both legs  2. Weakness of both arms  3. " Fibromyalgia  4. Other fatigue  5. Depression, unspecified depression type  -Overall with regular exercises at home, there has been no worsening in upper or lower extremity strength.  She continues to use cane for walking.  Continue gabapentin 300 mg at bedtime and I will see her back in 6 months for follow-up.       I spent 30 minutes face to face with the patient and spent more than 50% of this time  in management, instructions and education regarding above mentioned diagnosis and also on counseling and discussing about taking medication regularly, possible side effects with medication use, importance of good sleep hygiene, good hydration and regular exercise.    Return in about 6 months (around 3/1/2022).

## 2021-10-12 RX ORDER — GABAPENTIN 300 MG/1
300 CAPSULE ORAL ONCE
Qty: 30 CAPSULE | Refills: 5 | Status: SHIPPED | OUTPATIENT
Start: 2021-10-12 | End: 2022-04-22 | Stop reason: SDUPTHER

## 2021-10-12 NOTE — TELEPHONE ENCOUNTER
Caller: Alysia Sierra    Relationship: Self    Best call back number: [phone]    Medication requested (name and dosage):   gabapentin (Neurontin) 300 MG capsule  300 mg, Once     Pharmacy where request should be sent: [id number] - [place], [place] - [address] [facility] [phone] [facility] [phone] FX    Additional details provided by patient: PT CALLED STATING YESTERDAY, SHE WENT TO FILL HER NIGHTLY MEDICATION DIVIDERS WHEN SHE REALIZED SHE ONLY HAD 2 TABLETS LEFT. PT THEN CONTACTED HER PREFERRED PHARMACY TO REQUEST A REFILL. PT STATES TODAY, THE PHARMACY CALLED HER AND TOLD HER THE GABAPENTIN COULD NOT BE FILLED UNTIL 10/16/21. PT STATES SHE IS NOT SURE WHERE THE OTHER TABLETS HAVE GONE AND THINKS THAT HER CLEANING LADY MAY HAVE TAKEN THEM WITHOUT HER KNOWING. PT STATES ALL REFILLS GOING FORWARD, SHE WILL BE HIDING AWAY. PT STATES SHE WILL ALSO COUNT HER REFILLS AT THE PHARMACY GOING FORWARD AS WELL.    Does the patient have less than a 3 day supply:  [x] Yes  [] No    PLEASE REVIEW AND ADVISE.    Jessi Mckinney Rep   10/12/21 14:37 EDT

## 2021-11-15 ENCOUNTER — TELEPHONE (OUTPATIENT)
Dept: NEUROLOGY | Facility: OTHER | Age: 66
End: 2021-11-15

## 2021-11-15 RX ORDER — GABAPENTIN 300 MG/1
300 CAPSULE ORAL ONCE
Qty: 30 CAPSULE | Refills: 5 | OUTPATIENT
Start: 2021-11-15 | End: 2021-11-15

## 2021-11-15 NOTE — TELEPHONE ENCOUNTER
Caller: Alysia Sierra    Relationship: Self    Best call back number: [phone]  Requested Prescriptions:   Requested Prescriptions     Pending Prescriptions Disp Refills   • gabapentin (Neurontin) 300 MG capsule 30 capsule 5     Sig: Take 1 capsule by mouth 1 (One) Time for 1 dose.        Pharmacy where request should be sent:  [id number]    Additional details provided by patient: PT HAS ENOUGH MEDS FOR 3 DAYS    Does the patient have less than a 3 day supply:  [x] Yes  [] No    Jessi Mike Rep   11/15/21 13:43 EST

## 2021-11-15 NOTE — TELEPHONE ENCOUNTER
PT CALLING TO GET A REFILL ON HER GABAPENTIN.  ACCORDING TO HER CHART, SHE WAS SENT IN 5 REFILLS ON 10/12/21.  INFORMED PT SHE NEEDS TO CHECK WITH HER PHARMACY.

## 2021-11-29 ENCOUNTER — LAB (OUTPATIENT)
Dept: LAB | Facility: HOSPITAL | Age: 66
End: 2021-11-29

## 2021-11-29 ENCOUNTER — OFFICE VISIT (OUTPATIENT)
Dept: INTERNAL MEDICINE | Facility: CLINIC | Age: 66
End: 2021-11-29

## 2021-11-29 VITALS
HEART RATE: 88 BPM | BODY MASS INDEX: 21.31 KG/M2 | SYSTOLIC BLOOD PRESSURE: 162 MMHG | OXYGEN SATURATION: 98 % | TEMPERATURE: 98.2 F | DIASTOLIC BLOOD PRESSURE: 88 MMHG | WEIGHT: 124.8 LBS | HEIGHT: 64 IN

## 2021-11-29 DIAGNOSIS — G62.9 NEUROPATHY: ICD-10-CM

## 2021-11-29 DIAGNOSIS — I10 PRIMARY HYPERTENSION: Primary | ICD-10-CM

## 2021-11-29 DIAGNOSIS — N18.31 STAGE 3A CHRONIC KIDNEY DISEASE (HCC): ICD-10-CM

## 2021-11-29 DIAGNOSIS — I10 PRIMARY HYPERTENSION: ICD-10-CM

## 2021-11-29 DIAGNOSIS — D22.9 ATYPICAL MOLE: ICD-10-CM

## 2021-11-29 DIAGNOSIS — E78.2 MIXED HYPERLIPIDEMIA: ICD-10-CM

## 2021-11-29 DIAGNOSIS — I47.1 PAROXYSMAL SVT (SUPRAVENTRICULAR TACHYCARDIA) (HCC): ICD-10-CM

## 2021-11-29 PROBLEM — R06.09 DOE (DYSPNEA ON EXERTION): Status: RESOLVED | Noted: 2019-06-08 | Resolved: 2021-11-29

## 2021-11-29 PROBLEM — F33.40 RECURRENT MAJOR DEPRESSIVE DISORDER, IN REMISSION (HCC): Status: ACTIVE | Noted: 2021-11-29

## 2021-11-29 PROBLEM — F41.9 ANXIETY: Status: ACTIVE | Noted: 2021-11-29

## 2021-11-29 PROBLEM — F33.40 RECURRENT MAJOR DEPRESSIVE DISORDER, IN REMISSION: Status: RESOLVED | Noted: 2021-11-29 | Resolved: 2021-11-29

## 2021-11-29 PROBLEM — R55 SYNCOPE: Status: RESOLVED | Noted: 2020-11-05 | Resolved: 2021-11-29

## 2021-11-29 PROBLEM — F41.9 ANXIETY: Status: RESOLVED | Noted: 2021-11-29 | Resolved: 2021-11-29

## 2021-11-29 PROBLEM — R07.9 CHEST PAIN: Status: RESOLVED | Noted: 2019-06-05 | Resolved: 2021-11-29

## 2021-11-29 PROBLEM — R63.4 UNINTENTIONAL WEIGHT LOSS: Status: RESOLVED | Noted: 2019-06-10 | Resolved: 2021-11-29

## 2021-11-29 PROCEDURE — 36415 COLL VENOUS BLD VENIPUNCTURE: CPT

## 2021-11-29 PROCEDURE — 80061 LIPID PANEL: CPT

## 2021-11-29 PROCEDURE — 99204 OFFICE O/P NEW MOD 45 MIN: CPT | Performed by: STUDENT IN AN ORGANIZED HEALTH CARE EDUCATION/TRAINING PROGRAM

## 2021-11-29 NOTE — PROGRESS NOTES
New Patient Office Visit      Date: 2021      Patient Name: Alysia Sierra  : 1955   MRN: [id number]     Chief Complaint:    Chief Complaint   Patient presents with   • Establish Care   • Med Refill   • Med Management       History of Present Illness: Alysia Sierra is a 66 y.o. female who presents to clinic today to establish care.  She has 2 skin lesions that she would like looked at.  One is on her right upper extremity and is dark brown, well-circumscribed and has not changed in 20 years.  She also has one on the left side of her back that is difficult for her to see but she believes that the color and size may be changing.  She otherwise has no acute complaints or concerns.    She has high blood pressure treated with hydralazine 25 mg twice daily and metoprolol succinate 25 mg daily.  Medications that she has not tolerated: Calcium channel blocker caused lower extremity edema; ACE inhibitor caused angioedema; HCTZ had no effect on her blood pressure.    Subjective     Review of System: Review of Systems   Constitutional: Negative for fatigue and unexpected weight change.   HENT: Negative for hearing loss.    Eyes: Negative for visual disturbance.   Respiratory: Negative for cough and shortness of breath.    Cardiovascular: Negative for chest pain, palpitations and leg swelling.   Gastrointestinal: Negative for abdominal pain, blood in stool, constipation, diarrhea and nausea.   Endocrine: Negative for cold intolerance, heat intolerance, polydipsia and polyuria.   Genitourinary: Negative for difficulty urinating, dysuria, frequency, hematuria and urgency.   Skin: Positive for rash. Negative for wound.   Allergic/Immunologic: Negative for environmental allergies.   Neurological: Negative for weakness, numbness and headaches.   Hematological: Does not bruise/bleed easily.   Psychiatric/Behavioral: The patient is not nervous/anxious.       I have reviewed the ROS documented by my clinical staff,  updated appropriately and I agree. Marie John MD    Past Medical History:   Past Medical History:   Diagnosis Date   • Anemia    • Arrhythmia    • Chicken pox    • Chronic fatigue    • NIEVES (dyspnea on exertion)    • Generalized anxiety disorder    • Hyperlipidemia    • Hypertension    • Iron deficiency anemia    • Liver disease     fatty   • Liver problem    • Measles    • Menopause    • Mumps    • Unintentional weight loss    • UTI (urinary tract infection)        Past Surgical History:   Past Surgical History:   Procedure Laterality Date   •  SECTION     • DILATION AND CURETTAGE, DIAGNOSTIC / THERAPEUTIC     • ESSURE TUBAL LIGATION     • ORAL LESION EXCISION/BIOPSY  2021       Family History:   Family History   Problem Relation Age of Onset   • COPD Mother    • Stroke Mother    • Hypertension Mother    • Lung cancer Father    • Hypertension Father    • Heart attack Father    • Coronary artery disease Father    • Heart disease Father    • Thyroid disease Father    • Hyperlipidemia Sister    • Spondylolisthesis Sister    • Rheum arthritis Sister    • Malig Hypertension Sister    • Osteoarthritis Sister    • Other Sister         alcoholic   • Hypertension Sister    • Hypertension Brother        Social History:   Social History     Socioeconomic History   • Marital status:    Tobacco Use   • Smoking status: Never Smoker   • Smokeless tobacco: Never Used   Substance and Sexual Activity   • Alcohol use: Yes     Comment: social   • Drug use: No   • Sexual activity: Defer       Medications:     Current Outpatient Medications:   •  aspirin 325 MG tablet, Take 325 mg by mouth As Needed for Mild Pain ., Disp: , Rfl:   •  Cholecalciferol (VITAMIN D3) 25 MCG (1000 UT) capsule, 2 (Two) Times a Week., Disp: , Rfl:   •  esomeprazole (nexIUM) 20 MG capsule, Take 20 mg by mouth 2 (Two) Times a Week., Disp: , Rfl:   •  hydrALAZINE (APRESOLINE) 25 MG tablet, 25 mg Daily., Disp: , Rfl:   •  magnesium oxide  "(MAGOX) 400 (241.3 Mg) MG tablet tablet, Take 400 mg by mouth Daily., Disp: , Rfl:   •  metoprolol succinate XL (TOPROL-XL) 25 MG 24 hr tablet, Take 25 mg by mouth Every Night., Disp: , Rfl:   •  nitroglycerin (NITROSTAT) 0.4 MG SL tablet, 1 under the tongue as needed for angina, may repeat q5mins for up three doses, Disp: 100 tablet, Rfl: 11  •  ondansetron (ZOFRAN) 4 MG tablet, Take 4 mg by mouth As Needed for Nausea or Vomiting., Disp: , Rfl:   •  sodium zirconium cyclosilicate (Lokelma) 10 g pack, Take 10 g by mouth 2 (Two) Times a Week., Disp: , Rfl:   •  gabapentin (Neurontin) 300 MG capsule, Take 1 capsule by mouth 1 (One) Time for 1 dose., Disp: 30 capsule, Rfl: 5    Allergies:   Allergies   Allergen Reactions   • Lisinopril Angioedema   • Milk-Related Compounds Other (See Comments)     LACTOSE INTOLERANT   • Atorvastatin Myalgia       Objective     Physical Exam:   Vital Signs:   Vitals:    11/29/21 1007   BP: 162/88   Pulse: 88   Temp: 98.2 °F (36.8 °C)   SpO2: 98%   Weight: 56.6 kg (124 lb 12.8 oz)   Height: 162.6 cm (64\")   PainSc: 0-No pain     Body mass index is 21.42 kg/m².     Physical Exam  Vitals and nursing note reviewed.   Constitutional:       Appearance: Normal appearance.   Cardiovascular:      Rate and Rhythm: Normal rate and regular rhythm.      Heart sounds: Normal heart sounds.   Pulmonary:      Effort: Pulmonary effort is normal.      Breath sounds: Normal breath sounds. No wheezing, rhonchi or rales.   Abdominal:      General: Abdomen is flat.      Palpations: Abdomen is soft.   Musculoskeletal:      Right lower leg: No edema.      Left lower leg: No edema.   Skin:     General: Skin is warm and dry.   Neurological:      Mental Status: She is alert.   Psychiatric:         Mood and Affect: Mood normal.         Behavior: Behavior normal.             Assessment / Plan      Assessment/Plan:   Diagnoses and all orders for this visit:    1. Primary hypertension (Primary)  Not at goal in office " but blood pressure log reviewed and at goal at home with pressures usually 120s-130s/60s-80s. Has been unable to tolerate calcium channel blockers, ACE inhibitors, and thiazide diuretics in the past.  Continue hydralazine 25 mg twice daily.  Follows with nephrology and will be seen again by them in 01/2022.  Will obtain records from prior visits and have labs they perform sent to our office as well.    2. Stage 3a chronic kidney disease (HCC)  Unable to tolerate ACE inhibitor due to angioedema.  Follows with nephrology-[facility].  Will obtain records.  [employer] to be performed by nephrologist in 01/2022.    3. Paroxysmal SVT (supraventricular tachycardia) ([facility])  No recent episodes.  Follows with cardiology once yearly.  Continue metoprolol succinate 25 mg daily    4. Atypical mole  Referral placed to dermatology.  Patient notes that she needs a dermatologist that accepts [facility].     5. Neuropathy   Chronic, stable. Possibly due to prior EtOH use. Continue Gabapentin 300 mg once daily.     Follow Up:   Return in about 3 months (around 2/28/2022).    Time:   I spent approximately 40 minutes providing clinical care for this patient; including review of patient's chart and provider documentation, face to face time spent with patient in examination room (obtaining history, performing physical exam, discussing diagnosis and management options), placing orders, and completing patient documentation.    2 chronic, stable conditions assessed with prescription drug management completed consistent with moderate MDM.    Addendum (11/30/2021): Cholesterol panel reviewed.  LDL and total cholesterol elevated.  ASCVD calculated to be 11.1% based on blood pressure obtained in clinic yesterday.  When ASCVD risk is calculated with home blood pressures (typically in the 130s), her risk is ~7.3%.  She previously took a statin, possibly atorvastatin.  This medication was stopped when she was having some lower extremity pain  "and weakness though it seems that it was unrelated to the statin.  We discussed rosuvastatin/Crestor during her appointment since it has lower risk of causing side effects.  She will be called with the results of her cholesterol panel and the recommendation to consider starting low-dose rosuvastatin, 5 mg.  We can start this medication and she can follow-up in about 1 month to make sure that she is not having side effects and to possibly increase the dose or she can make a follow-up appointment within the next few weeks to discuss starting this medications if she has any further reservations.    - Starting low dose Crestor 5 mg after discussion with patient. To be sent to pharmacy.     Addendum (01/12/2022): Patient called concerned about possible side effect of Crestor 5 mg daily. She has noted \"foamy urine\" that has concerned her because she has CKD3. She believes this is due to Crestor. She has follow up with her nephrologist on 01/27/2022. I have advised her to hold this medication until this appointment to discuss with nephrologist. This is not a typical side effect of statins, especially at such a low dose several months after starting the medication.     Marie John MD  [facility] [place]   "

## 2021-11-30 LAB
CHOLEST SERPL-MCNC: 249 MG/DL (ref 0–200)
HDLC SERPL-MCNC: 83 MG/DL (ref 40–60)
LDLC SERPL CALC-MCNC: 130 MG/DL (ref 0–100)
LDLC/HDLC SERPL: 1.5 {RATIO}
TRIGL SERPL-MCNC: 209 MG/DL (ref 0–150)
VLDLC SERPL-MCNC: 36 MG/DL (ref 5–40)

## 2021-11-30 RX ORDER — ROSUVASTATIN CALCIUM 5 MG/1
5 TABLET, COATED ORAL DAILY
Qty: 30 TABLET | Refills: 2 | Status: SHIPPED | OUTPATIENT
Start: 2021-11-30 | End: 2022-03-28 | Stop reason: SDUPTHER

## 2021-12-02 ENCOUNTER — TELEPHONE (OUTPATIENT)
Dept: INTERNAL MEDICINE | Facility: CLINIC | Age: 66
End: 2021-12-02

## 2021-12-02 NOTE — TELEPHONE ENCOUNTER
Caller: Alysia Sierra    Relationship: Self    Best call back number: [phone]    What medications are you currently taking:   Current Outpatient Medications on File Prior to Visit   Medication Sig Dispense Refill   • aspirin 325 MG tablet Take 325 mg by mouth As Needed for Mild Pain .     • Cholecalciferol (VITAMIN D3) 25 MCG (1000 UT) capsule 2 (Two) Times a Week.     • esomeprazole (nexIUM) 20 MG capsule Take 20 mg by mouth 2 (Two) Times a Week.     • gabapentin (Neurontin) 300 MG capsule Take 1 capsule by mouth 1 (One) Time for 1 dose. 30 capsule 5   • hydrALAZINE (APRESOLINE) 25 MG tablet 25 mg Daily.     • magnesium oxide (MAGOX) 400 (241.3 Mg) MG tablet tablet Take 400 mg by mouth Daily.     • metoprolol succinate XL (TOPROL-XL) 25 MG 24 hr tablet Take 25 mg by mouth Every Night.     • nitroglycerin (NITROSTAT) 0.4 MG SL tablet 1 under the tongue as needed for angina, may repeat q5mins for up three doses 100 tablet 11   • ondansetron (ZOFRAN) 4 MG tablet Take 4 mg by mouth As Needed for Nausea or Vomiting.     • rosuvastatin (Crestor) 5 MG tablet Take 1 tablet by mouth Daily. 30 tablet 2   • sodium zirconium cyclosilicate (Lokelma) 10 g pack Take 10 g by mouth 2 (Two) Times a Week.       No current facility-administered medications on file prior to visit.     Which medication are you concerned about: rosuvastatin (Crestor) 5 MG tablet    Who prescribed you this medication: NICOLAS     What are your concerns: PATIENT STATED THAT DR. VALENZUELA STATED THAT SHE WANTED TO PUT HER ON CRESTOR BUT HER PHARMACY HAS NOT GOTTEN A PRESCRIPTION

## 2021-12-03 ENCOUNTER — PATIENT ROUNDING (BHMG ONLY) (OUTPATIENT)
Dept: INTERNAL MEDICINE | Facility: CLINIC | Age: 66
End: 2021-12-03

## 2021-12-03 NOTE — PROGRESS NOTES
December 3, 2021    Hello, may I speak with Alysia Sierra?    My name is Brittany the practice manger and I would like feedback from your visit with Dr. John    I am  with [facility]  [address] [place] [id number] [phone].          Tell me about your visit with us. What things went well?  Very well.        We're always looking for ways to make our patients' experiences even better. Do you have recommendations on ways we may improve?no       Overall were you satisfied with your first visit to our practice?yes               Thank you, and have a great day.

## 2022-01-11 ENCOUNTER — TELEPHONE (OUTPATIENT)
Dept: INTERNAL MEDICINE | Facility: CLINIC | Age: 67
End: 2022-01-11

## 2022-01-11 NOTE — TELEPHONE ENCOUNTER
Spoke w/ pt, states since starting rosuvastatin her urine has become foamy, has stage 3 kidney disease. Sees nephrology, next appt is 1/27/22. Last labs w/ them showed elevated calcium, nephrologist let pt know to try and cut back some on calcium containing foods/vitamins/meds that would contain. Pt is wanting to know if there is an alternative that could help that wouldn't have side effects that would affect kidneys.

## 2022-01-11 NOTE — TELEPHONE ENCOUNTER
PATIENT CALLED AND REQUESTED A CALL BACK TO DISCUSS CONCERNS SHE HAS WITH TAKING MEDICATION  rosuvastatin (Crestor) 5 MG tablet.  PATIENT STATES MEDICATION IS CAUSING HER TO HAVE FOAMY URINE AND SHE ALREADY HAS STAGE 3 KIDNEY FAILURE.    PATIENT IS REQUESTING A CALL BACK ASAP.    CALL BACK NUMBER [phone]

## 2022-01-27 ENCOUNTER — TELEPHONE (OUTPATIENT)
Dept: INTERNAL MEDICINE | Facility: CLINIC | Age: 67
End: 2022-01-27

## 2022-01-27 RX ORDER — METOPROLOL SUCCINATE 25 MG/1
25 TABLET, EXTENDED RELEASE ORAL NIGHTLY
Qty: 90 TABLET | Refills: 3 | Status: SHIPPED | OUTPATIENT
Start: 2022-01-27 | End: 2022-07-07 | Stop reason: SDUPTHER

## 2022-01-27 RX ORDER — METOPROLOL SUCCINATE 25 MG/1
25 TABLET, EXTENDED RELEASE ORAL NIGHTLY
Status: CANCELLED | OUTPATIENT
Start: 2022-01-27

## 2022-01-27 NOTE — TELEPHONE ENCOUNTER
Caller: Alysia Sierra    Relationship: Self    Best call back number: [phone]    Requested Prescriptions:   Requested Prescriptions     Pending Prescriptions Disp Refills   • metoprolol succinate XL (TOPROL-XL) 25 MG 24 hr tablet       Sig: Take 1 tablet by mouth Every Night.        Pharmacy where request should be sent: [facility] [place] [id number]    Additional details provided by patient: WILL BE OUT OVER WEEKEND     Does the patient have less than a 3 day supply:  [x] Yes  [] No    Jessi Hall Rep   01/27/22 14:12 EST

## 2022-01-27 NOTE — TELEPHONE ENCOUNTER
Last Office Visit: 11/29/21  Next Office Visit: 2/28/22    Labs completed in past 6 months? yes  Labs completed in past year? yes    Last Refill Date: [facility] Historical Provider  Quantity: [facility]  Refills: [facility]     Pharmacy: [facility] - [place], [place] - [address] [facility] [phone]  - [phone] FX

## 2022-02-17 ENCOUNTER — OFFICE VISIT (OUTPATIENT)
Dept: INTERNAL MEDICINE | Facility: CLINIC | Age: 67
End: 2022-02-17

## 2022-02-17 VITALS
OXYGEN SATURATION: 99 % | BODY MASS INDEX: 21.17 KG/M2 | HEART RATE: 88 BPM | DIASTOLIC BLOOD PRESSURE: 80 MMHG | SYSTOLIC BLOOD PRESSURE: 138 MMHG | HEIGHT: 64 IN | WEIGHT: 124 LBS

## 2022-02-17 DIAGNOSIS — I10 PRIMARY HYPERTENSION: Primary | ICD-10-CM

## 2022-02-17 DIAGNOSIS — N18.31 STAGE 3A CHRONIC KIDNEY DISEASE: ICD-10-CM

## 2022-02-17 DIAGNOSIS — I25.10 CORONARY ARTERY DISEASE INVOLVING NATIVE HEART WITHOUT ANGINA PECTORIS, UNSPECIFIED VESSEL OR LESION TYPE: ICD-10-CM

## 2022-02-17 PROBLEM — E78.2 MIXED HYPERLIPIDEMIA: Status: ACTIVE | Noted: 2022-02-17

## 2022-02-17 PROCEDURE — 99214 OFFICE O/P EST MOD 30 MIN: CPT | Performed by: STUDENT IN AN ORGANIZED HEALTH CARE EDUCATION/TRAINING PROGRAM

## 2022-02-17 RX ORDER — NITROGLYCERIN 0.4 MG/1
TABLET SUBLINGUAL
Qty: 100 TABLET | Refills: 11 | Status: SHIPPED | OUTPATIENT
Start: 2022-02-17

## 2022-02-17 NOTE — PROGRESS NOTES
Internal Medicine Established Office Visit      Date: 2022     Patient Name: Alysia Sierra  : 1955   MRN: [id number]     Chief Complaint:    Chief Complaint   Patient presents with   • Hypertension       History of Present Illness: Alysia Sierra is a 67 y.o. female who presents to clinic today for HTN. Her blood pressure has been very well controlled at home with systolics usually 110s-130s. She takes hydralazine 25 mg TID and metoprolol succinate 25 mg daily. She had angioedema with lisinopril in the past and HCTZ did not reduce her blood pressure, resulting in hospitalization for what sounds like hypertensive emergency. Neuropathy is stable. She takes gabapentin 300 mg at night and finds that she feels a little groggy in the mornings sometimes from it.     She has had her shingles vaccine, COVID vaccines, flu vaccine, and pneumonia vaccine.     Her sister passed away just before the pandemic with alcholism. She's had several neighbors pass away recently too. She will soon have some help cleaning around the house.     Subjective     I have reviewed and the following portions of the patient's history were updated as appropriate: past family history, past medical history, past social history, past surgical history and problem list.     Medications:     Current Outpatient Medications:   •  aspirin 325 MG tablet, Take 325 mg by mouth As Needed for Mild Pain ., Disp: , Rfl:   •  esomeprazole (nexIUM) 20 MG capsule, Take 20 mg by mouth 2 (Two) Times a Week., Disp: , Rfl:   •  hydrALAZINE (APRESOLINE) 25 MG tablet, 25 mg 2 (Two) Times a Day., Disp: , Rfl:   •  magnesium oxide (MAGOX) 400 (241.3 Mg) MG tablet tablet, Take 400 mg by mouth Daily., Disp: , Rfl:   •  metoprolol succinate XL (TOPROL-XL) 25 MG 24 hr tablet, Take 1 tablet by mouth Every Night., Disp: 90 tablet, Rfl: 3  •  nitroglycerin (NITROSTAT) 0.4 MG SL tablet, 1 under the tongue as needed for angina, may repeat q5mins for up three  "doses, Disp: 100 tablet, Rfl: 11  •  ondansetron (ZOFRAN) 4 MG tablet, Take 4 mg by mouth As Needed for Nausea or Vomiting., Disp: , Rfl:   •  rosuvastatin (Crestor) 5 MG tablet, Take 1 tablet by mouth Daily., Disp: 30 tablet, Rfl: 2  •  sodium zirconium cyclosilicate (Lokelma) 10 g pack, Take 10 g by mouth 2 (Two) Times a Week., Disp: , Rfl:   •  gabapentin (Neurontin) 300 MG capsule, Take 1 capsule by mouth 1 (One) Time for 1 dose., Disp: 30 capsule, Rfl: 5    Allergies:   Allergies   Allergen Reactions   • Lisinopril Angioedema   • Milk-Related Compounds Other (See Comments)     LACTOSE INTOLERANT   • Atorvastatin Myalgia       Objective     Physical Exam:   Vital Signs:   Vitals:    22 1022   BP: 138/80   Pulse: 88   SpO2: 99%   Weight: 56.2 kg (124 lb)   Height: 162.6 cm (64\")   PainSc: 0-No pain     Body mass index is 21.28 kg/m².     Physical Exam  Vitals and nursing note reviewed.   Constitutional:       Appearance: Normal appearance.   Cardiovascular:      Rate and Rhythm: Normal rate and regular rhythm.      Heart sounds: Normal heart sounds.   Pulmonary:      Effort: Pulmonary effort is normal.      Breath sounds: Normal breath sounds. No wheezing, rhonchi or rales.   Skin:     General: Skin is warm and dry.   Neurological:      Mental Status: She is alert.   Psychiatric:         Mood and Affect: Mood normal.         Behavior: Behavior normal.       Assessment / Plan      Assessment/Plan:   Diagnoses and all orders for this visit:    1. Primary hypertension (Primary)  Chronic, stable. Continue Hydralazine 25 mg TID and metoprolol succinate 25 mg daily.     2. Coronary artery disease involving native heart without angina pectoris, unspecified vessel or lesion type  Nitroglycerin refilled since the prescription she has at home is . Has not needed this medication.     3. Stage 3a chronic kidney disease (HCC)  Chronic, stable. Follows with nephrology. Intolerant to ACE-I due to angioedema.    "   Follow Up:   Return in about 6 months (around 8/17/2022) for annual, Annual.    Time:  I spent approximately 25 minutes providing clinical care for this patient; including review of patient's chart and provider documentation, face to face time spent with patient in examination room (obtaining history, performing physical exam, discussing diagnosis and management options), placing orders, and completing patient documentation.     Marie John MD  [facility] [place]

## 2022-03-08 ENCOUNTER — OFFICE VISIT (OUTPATIENT)
Dept: NEUROLOGY | Facility: CLINIC | Age: 67
End: 2022-03-08

## 2022-03-08 VITALS — OXYGEN SATURATION: 93 % | BODY MASS INDEX: 20.83 KG/M2 | HEIGHT: 64 IN | WEIGHT: 122 LBS | HEART RATE: 92 BPM

## 2022-03-08 DIAGNOSIS — R29.898 WEAKNESS OF BOTH ARMS: Primary | ICD-10-CM

## 2022-03-08 DIAGNOSIS — M79.7 FIBROMYALGIA: ICD-10-CM

## 2022-03-08 DIAGNOSIS — M54.12 CERVICAL RADICULOPATHY AT C6: ICD-10-CM

## 2022-03-08 PROCEDURE — 99214 OFFICE O/P EST MOD 30 MIN: CPT | Performed by: PSYCHIATRY & NEUROLOGY

## 2022-03-08 RX ORDER — METHYLPREDNISOLONE 4 MG/1
TABLET ORAL
Qty: 1 EACH | Refills: 0 | Status: SHIPPED | OUTPATIENT
Start: 2022-03-08 | End: 2022-07-07

## 2022-03-08 RX ORDER — GABAPENTIN 100 MG/1
100 CAPSULE ORAL EVERY MORNING
Qty: 30 CAPSULE | Refills: 5 | Status: SHIPPED | OUTPATIENT
Start: 2022-03-08 | End: 2022-09-07 | Stop reason: SDUPTHER

## 2022-03-08 NOTE — PROGRESS NOTES
Subjective:    CC: Alysia Sierra is in clinic today for follow up for history of fibromyalgia, bilateral upper and lower extremity weakness.    HPI:  Initial visit: 8/31/2020: Patient is a 65-year-old female with past medical history of hypertension, supraventricular tachycardia referred to the clinic for evaluation of bilateral leg and arm weakness.  She reports her symptoms started about 2 years ago and it has progressively become worse.  She reports that in last 6 months, she has had great difficulty getting up from chair if there is no side arms.  She reports that if she is on the floor, it is almost impossible for her to get up.  In addition she has noticed difficulty going up and down stairs.  She also reports tingling, numbness and extreme coldness in both her feet and sometimes it leads to pain.  This also started about 6 months ago and it has progressively become worse.    10/8/2020: She is in clinic for regular follow-up.  Since her last visit, she reports that overall she feels worse.  She feels that she does not have any energy to do anything throughout the day.  She is involved in physical therapy but it is extremely difficult for her to go through the exercises.  She reports that overall she feels weaker than her last visit.  Since her last visit, she has now completed MRI of cervical spine, MRI lumbar spine blood work-up including myasthenia gravis antibody panel, vitamin D, copper and B12.  MRI of cervical and lumbosacral spine shows mild to moderate multilevel spondylitic changes without any evidence of myelopathy.  Vitamin B12, copper and vitamin levels for within normal limits as well.  Upon further questioning, she reports that she sleeps usually at 4 or 5 PM until 1 or 2 AM and then she is awake after that.  She has done this for quite some time now.  In addition, she also reports to heavy alcohol use from age 34 until about 2 years ago.  She still drinks alcohol but it is much less as  compared to before.    12/11/2020: She is in clinic for regular follow-up.  Since her last visit, she underwent MRI brain without contrast which I reviewed personally.  It showed white matter changes suspicious for multiple sclerosis so following that, lumbar puncture was recommended.  Lumbar puncture was negative for MS profile.  She reports that since her last visit, she has now completed physical therapy and she continues to have good days and bad days.  She reports that she continues to struggle with poor sleep quality, does not have good appetite.  She is also reporting almost continuous tingling, numbness and coldness in both her feet.    3/1/2021: She is in clinic for regular follow-up.  Since her last visit in December, she reports that there has not been much change in bilateral upper and lower extremity strength is concerned.  She continues to have episodes of difficulty with walking, subjective feeling of generalized body weakness.  She is planning to go back to the gym and start exercising and will try to pay more attention to nutrition to help with this.  Really she reports that there has been no worsening since her last visit.  She did try gabapentin 300 mg twice daily and reports that the morning dose caused her to have side effects that she is currently taking only the nighttime dose which seems to be helping significantly with sleep.    9/1/2021: She is in clinic for regular follow-up.  Since her last visit in March, she reports that she is trying her best to exercise as much as possible at home to maintain strength in both upper and lower extremities.  She continues to use cane while walking.  She takes gabapentin 300 mg at bedtime which helps her with pain and also help her sleep better.  She continues to have some good days and bad days but overall there has been no worsening.    3/8/2022: She is in clinic for regular follow-up.  Since her last visit in September 2021, she reports that overall  both upper and lower extremity weakness remained stable without any worsening.  She continues to do exercises.  She has noticed that days that she is well hydrated, she tends to do better.  Lately in last 4 to 6 weeks, she is reporting right shoulder pain radiating down to right forearm and right hand.  She does have moderate spondylitic changes at C5-C6 and C6-C7 levels that were noted on MRI that was performed in August 2020.  She is currently taking gabapentin 300 mg at bedtime.    The following portions of the patient's history were reviewed and updated as of 03/08/2022: allergies, social history and problem list.       Current Outpatient Medications:   •  aspirin 325 MG tablet, Take 325 mg by mouth As Needed for Mild Pain ., Disp: , Rfl:   •  esomeprazole (nexIUM) 20 MG capsule, Take 20 mg by mouth 2 (Two) Times a Week., Disp: , Rfl:   •  gabapentin (Neurontin) 300 MG capsule, Take 1 capsule by mouth 1 (One) Time for 1 dose., Disp: 30 capsule, Rfl: 5  •  hydrALAZINE (APRESOLINE) 25 MG tablet, 25 mg 2 (Two) Times a Day., Disp: , Rfl:   •  magnesium oxide (MAGOX) 400 (241.3 Mg) MG tablet tablet, Take 400 mg by mouth Daily., Disp: , Rfl:   •  metoprolol succinate XL (TOPROL-XL) 25 MG 24 hr tablet, Take 1 tablet by mouth Every Night., Disp: 90 tablet, Rfl: 3  •  nitroglycerin (NITROSTAT) 0.4 MG SL tablet, 1 under the tongue as needed for angina, may repeat q5mins for up three doses, Disp: 100 tablet, Rfl: 11  •  ondansetron (ZOFRAN) 4 MG tablet, Take 4 mg by mouth As Needed for Nausea or Vomiting., Disp: , Rfl:   •  rosuvastatin (Crestor) 5 MG tablet, Take 1 tablet by mouth Daily., Disp: 30 tablet, Rfl: 2  •  sodium zirconium cyclosilicate (Lokelma) 10 g pack, Take 10 g by mouth 2 (Two) Times a Week., Disp: , Rfl:   •  gabapentin (Neurontin) 100 MG capsule, Take 1 capsule by mouth Every Morning., Disp: 30 capsule, Rfl: 5  •  methylPREDNISolone (MEDROL) 4 MG dose pack, Take as directed on package instructions.,  "Disp: 1 each, Rfl: 0   Past Medical History:   Diagnosis Date   • Anemia    • Arrhythmia    • Chicken pox    • Chronic fatigue    • NIEVES (dyspnea on exertion)    • Generalized anxiety disorder    • Hyperlipidemia    • Hypertension    • Iron deficiency anemia    • Liver disease     fatty   • Liver problem    • Measles    • Menopause    • Mumps    • Unintentional weight loss    • UTI (urinary tract infection)       Past Surgical History:   Procedure Laterality Date   •  SECTION     • DILATION AND CURETTAGE, DIAGNOSTIC / THERAPEUTIC     • ESSURE TUBAL LIGATION     • ORAL LESION EXCISION/BIOPSY  2021      Family History   Problem Relation Age of Onset   • COPD Mother    • Stroke Mother    • Hypertension Mother    • Lung cancer Father    • Hypertension Father    • Heart attack Father    • Coronary artery disease Father    • Heart disease Father    • Thyroid disease Father    • Hyperlipidemia Sister    • Spondylolisthesis Sister    • Rheum arthritis Sister    • Malig Hypertension Sister    • Osteoarthritis Sister    • Other Sister         alcoholic   • Hypertension Sister    • Hypertension Brother         Review of Systems   Constitutional: Negative.    HENT: Negative.    Eyes: Negative.    Respiratory: Negative.    Cardiovascular: Negative.    Gastrointestinal: Negative.    Endocrine: Negative.    Genitourinary: Negative.    Musculoskeletal: Positive for gait problem.   Skin: Negative.    Allergic/Immunologic: Negative.    Hematological: Negative.    Psychiatric/Behavioral: Negative.      Objective:    Pulse 92   Ht 162.6 cm (64\")   Wt 55.3 kg (122 lb)   SpO2 93%   BMI 20.94 kg/m²     Neurology Exam:  General apperance: NAD.     Mental status: Alert, awake and oriented to time place and person.    Recent and Remote memory: Can recall 3/3 objects at 5 minutes. Can recall historical events.     Attention span and Concentration: Serial 7s: Normal.     Fund of knowledge:  Normal.     Language and Speech: No " aphasia or dysarthria.    Naming , Repitition and Comprehension:  Can name objects, repeat a sentence and follow commands. Speech is clear and fluent with good repetition, comprehension, and naming.    CN II to XII: Intact.    Opthalmoscopic Exam: No papilledema.    Motor:  Right UE muscle strength 5/5. Normal tone.     Left UE muscle strength 5/5. Normal tone.      Right LE muscle strength5/5. Normal tone.     Left LE muscle strength 5/5. Normal tone.      Sensory: Normal light touch, vibration and pinprick sensation bilaterally.    DTRs: 2+ bilaterally.    Babinski: Negative bilaterally.    Co-ordination: Normal finger-to-nose, heel to shin B/L.    Rhomberg: Negative.    Gait: Normal.    Cardiovascular: Regular rate and rhythm without murmur, gallop or rub.    Assessment and Plan:  1. Weakness of both arms  2. Fibromyalgia  3. Cervical radiculopathy at C6  -Overall stable without worsening.  She is reporting right shoulder pain radiating down to right forearm and right hand-cervical radiculopathy at C5-C6.  I have will be giving her a trial of a Medrol Dosepak to help with the pain and also have told her to start gabapentin 100 mg in late morning along with gabapentin 300 mg at bedtime.  I will plan to see her back in clinic in 6 months for follow-up.  - gabapentin (Neurontin) 100 MG capsule; Take 1 capsule by mouth Every Morning.  Dispense: 30 capsule; Refill: 5       I spent 30 minutes in patient care: Reviewing records prior to the visit, entering orders and documentation and spent more than diggs 50% of this time face-to-face in management, instructions and education regarding above mentioned diagnosis and also on counseling and discussing about taking medication regularly, possible side effects with medication use, importance of good sleep hygiene, good hydration and regular exercise.    Return in about 6 months (around 9/8/2022).

## 2022-03-28 DIAGNOSIS — E78.2 MIXED HYPERLIPIDEMIA: ICD-10-CM

## 2022-03-28 RX ORDER — ROSUVASTATIN CALCIUM 5 MG/1
5 TABLET, COATED ORAL DAILY
Qty: 30 TABLET | Refills: 2 | Status: SHIPPED | OUTPATIENT
Start: 2022-03-28 | End: 2022-04-25 | Stop reason: SDUPTHER

## 2022-03-28 NOTE — TELEPHONE ENCOUNTER
Caller: Alysia Sierra    Relationship: Self    Best call back number:   [phone]    Requested Prescriptions:   Requested Prescriptions     Pending Prescriptions Disp Refills   • rosuvastatin (Crestor) 5 MG tablet 30 tablet 2     Sig: Take 1 tablet by mouth Daily.        [facility] be sent[facility] [place] [address] [facility] [phone] [facility] [phone] [facility]     Additional details provided by patient:   PATIENT HAS TOMORROW DOSE REMAINING     Does the patient have less than a 3 day supply:  [x] Yes  [] No    Jessi Graves Rep   03/28/22 10:55 EDT

## 2022-04-21 RX ORDER — GABAPENTIN 300 MG/1
CAPSULE ORAL
Qty: 30 CAPSULE | OUTPATIENT
Start: 2022-04-21

## 2022-04-21 NOTE — TELEPHONE ENCOUNTER
Caller: JAYDEN  Relationship: SELF    Best call back number: [phone]    Which medication are you concerned about: gabapentin (Neurontin) 300 MG capsule ()    What are your concerns: PT STATES PHARMACY ADVISED HER THE REFILL  ON 22, PLEASE REVIEW AS PT ONLY HAS FOUR DAYS WORTH OF MEDICATION AND WILL BE OUT ON 22

## 2022-04-22 NOTE — TELEPHONE ENCOUNTER
Caller: Alysia Sierra    Relationship: Self    Best call back number: [phone]    What was the call regarding: PT CALLED TO CHECK ON HER CONCERNS REGARDING DENIAL OF GABAPENTIN 300MG MEDICATION. PHARMACY STATES PHARMACY HAS 1 REFILL LEFT BUT EXPIRATION DATE ON REFILL WAS 4/11/22.    PT IS REQUESTING REFILLS BE SENT TO HER PREFERRED PHARMACY AS SOON AS POSSIBLE. PT STATES SHE HAS 3 TABLETS LEFT (3 DAYS).    Do you require a callback: IF NEEDED.    PLEASE REVIEW AND ADVISE.

## 2022-04-25 DIAGNOSIS — E78.2 MIXED HYPERLIPIDEMIA: ICD-10-CM

## 2022-04-25 RX ORDER — GABAPENTIN 300 MG/1
300 CAPSULE ORAL ONCE
Qty: 30 CAPSULE | Refills: 5 | Status: SHIPPED | OUTPATIENT
Start: 2022-04-25 | End: 2022-09-07 | Stop reason: SDUPTHER

## 2022-04-25 RX ORDER — ROSUVASTATIN CALCIUM 5 MG/1
5 TABLET, COATED ORAL DAILY
Qty: 90 TABLET | Refills: 0 | Status: SHIPPED | OUTPATIENT
Start: 2022-04-25 | End: 2022-07-07 | Stop reason: SDUPTHER

## 2022-04-25 NOTE — TELEPHONE ENCOUNTER
"YANA CALLING TO CHECK ON DOSING FOR GABAPENTIN.  CAME OVER AS \"TAKE 1 CAPSULE BY MOUTH 1 TIME FOR 1 DOSE.\"  INFORMED HER THAT IT IS TO BE ONCE DAILY.  "

## 2022-06-09 ENCOUNTER — TELEPHONE (OUTPATIENT)
Dept: INTERNAL MEDICINE | Facility: CLINIC | Age: 67
End: 2022-06-09

## 2022-06-09 NOTE — TELEPHONE ENCOUNTER
"\"HUB TO READ\"    LVM LETTING PT KNOW DR NICK IS CUTTING BACK ON HER PT INTAKE, AND SHE WILL NEED TO BE RESCHEDULE AT ANOTHER LOCATION OR WITH RONNI RAMACHANDRAN AT HER NEXT EST CARE  "

## 2022-07-07 ENCOUNTER — LAB (OUTPATIENT)
Dept: LAB | Facility: HOSPITAL | Age: 67
End: 2022-07-07

## 2022-07-07 ENCOUNTER — OFFICE VISIT (OUTPATIENT)
Dept: INTERNAL MEDICINE | Facility: CLINIC | Age: 67
End: 2022-07-07

## 2022-07-07 VITALS
BODY MASS INDEX: 22.02 KG/M2 | HEIGHT: 64 IN | TEMPERATURE: 97.4 F | SYSTOLIC BLOOD PRESSURE: 114 MMHG | OXYGEN SATURATION: 99 % | HEART RATE: 83 BPM | DIASTOLIC BLOOD PRESSURE: 80 MMHG | WEIGHT: 129 LBS | RESPIRATION RATE: 16 BRPM

## 2022-07-07 DIAGNOSIS — Z12.11 SCREENING FOR COLORECTAL CANCER: ICD-10-CM

## 2022-07-07 DIAGNOSIS — Z12.12 SCREENING FOR COLORECTAL CANCER: ICD-10-CM

## 2022-07-07 DIAGNOSIS — E78.2 MIXED HYPERLIPIDEMIA: ICD-10-CM

## 2022-07-07 DIAGNOSIS — D53.9 MACROCYTIC ANEMIA: ICD-10-CM

## 2022-07-07 DIAGNOSIS — F32.A DEPRESSION, UNSPECIFIED DEPRESSION TYPE: ICD-10-CM

## 2022-07-07 DIAGNOSIS — Z76.89 ENCOUNTER TO ESTABLISH CARE: Primary | ICD-10-CM

## 2022-07-07 DIAGNOSIS — I10 PRIMARY HYPERTENSION: ICD-10-CM

## 2022-07-07 LAB
BASOPHILS # BLD AUTO: 0.04 10*3/MM3 (ref 0–0.2)
BASOPHILS NFR BLD AUTO: 0.7 % (ref 0–1.5)
DEPRECATED RDW RBC AUTO: 45.3 FL (ref 37–54)
EOSINOPHIL # BLD AUTO: 0.23 10*3/MM3 (ref 0–0.4)
EOSINOPHIL NFR BLD AUTO: 3.9 % (ref 0.3–6.2)
ERYTHROCYTE [DISTWIDTH] IN BLOOD BY AUTOMATED COUNT: 12.7 % (ref 12.3–15.4)
HCT VFR BLD AUTO: 35.9 % (ref 34–46.6)
HGB BLD-MCNC: 12 G/DL (ref 12–15.9)
IMM GRANULOCYTES # BLD AUTO: 0.03 10*3/MM3 (ref 0–0.05)
IMM GRANULOCYTES NFR BLD AUTO: 0.5 % (ref 0–0.5)
LYMPHOCYTES # BLD AUTO: 1.86 10*3/MM3 (ref 0.7–3.1)
LYMPHOCYTES NFR BLD AUTO: 31.2 % (ref 19.6–45.3)
MCH RBC QN AUTO: 33.4 PG (ref 26.6–33)
MCHC RBC AUTO-ENTMCNC: 33.4 G/DL (ref 31.5–35.7)
MCV RBC AUTO: 100 FL (ref 79–97)
MONOCYTES # BLD AUTO: 0.65 10*3/MM3 (ref 0.1–0.9)
MONOCYTES NFR BLD AUTO: 10.9 % (ref 5–12)
NEUTROPHILS NFR BLD AUTO: 3.15 10*3/MM3 (ref 1.7–7)
NEUTROPHILS NFR BLD AUTO: 52.8 % (ref 42.7–76)
NRBC BLD AUTO-RTO: 0 /100 WBC (ref 0–0.2)
PLATELET # BLD AUTO: 319 10*3/MM3 (ref 140–450)
PMV BLD AUTO: 10.6 FL (ref 6–12)
RBC # BLD AUTO: 3.59 10*6/MM3 (ref 3.77–5.28)
RETICS # AUTO: 0.06 10*6/MM3 (ref 0.02–0.13)
RETICS/RBC NFR AUTO: 1.67 % (ref 0.7–1.9)
WBC NRBC COR # BLD: 5.96 10*3/MM3 (ref 3.4–10.8)

## 2022-07-07 PROCEDURE — 36415 COLL VENOUS BLD VENIPUNCTURE: CPT

## 2022-07-07 PROCEDURE — 82607 VITAMIN B-12: CPT

## 2022-07-07 PROCEDURE — 85025 COMPLETE CBC W/AUTO DIFF WBC: CPT

## 2022-07-07 PROCEDURE — 99214 OFFICE O/P EST MOD 30 MIN: CPT | Performed by: NURSE PRACTITIONER

## 2022-07-07 PROCEDURE — 82746 ASSAY OF FOLIC ACID SERUM: CPT

## 2022-07-07 PROCEDURE — 85045 AUTOMATED RETICULOCYTE COUNT: CPT

## 2022-07-07 PROCEDURE — 84443 ASSAY THYROID STIM HORMONE: CPT

## 2022-07-07 RX ORDER — ROSUVASTATIN CALCIUM 5 MG/1
5 TABLET, COATED ORAL DAILY
Qty: 90 TABLET | Refills: 1 | Status: SHIPPED | OUTPATIENT
Start: 2022-07-07

## 2022-07-07 RX ORDER — METOPROLOL SUCCINATE 25 MG/1
25 TABLET, EXTENDED RELEASE ORAL NIGHTLY
Qty: 90 TABLET | Refills: 1 | Status: SHIPPED | OUTPATIENT
Start: 2022-07-07 | End: 2022-11-22 | Stop reason: SDUPTHER

## 2022-07-07 NOTE — PROGRESS NOTES
New Patient Office Visit      Date: 2022   Patient Name: Alysia Sierra  : 1955   MRN: [id number]     Chief Complaint:    Chief Complaint   Patient presents with   • Establish Care     Off board from [facility]. Pt stated she has been experiencing some mild depression and is interested in being set up with a therapist to talk. Pt also has peripheral neuropathy.    • Hyperlipidemia   • Hypertension   • Chronic Kidney Disease       History of Present Illness: Alysia Sierra is a 67 y.o. female who is here today to establish care. She was seen in the ER at [facility] in [place] on 22 for syncopal episode. She reports that she was in her front yard on a very hot day and attributes the syncope to orthostatic hypotension because it occurred after standing up too quickly. She is feeling back to baseline today. She keeps a log of her blood pressure and pulse readings daily. For the majority SBP: 115-125 and DBP mid 70s. HR ~70s. She is followed by nephrology @[facility] for CKD and has an upcoming apt with them this month. She follows a renal diet, and consumes regular veggies. Although she is not a complete vegetarian, her diet scarcely includes meat. She avoids NSAIDs and other nephrotoxic medications, but occasionally (very infrequently) will take an aspirin for a headache. However, she has been using ASA to treat a sprained ankle she obtained during recent fall. She has a good support system with her sisters, but is also interested in establishing care with a provider who can provide counseling/ therapy for depression/anxiety symptoms. She completed a health screening earlier today which consisted of an aortic ultrasound, dexa scan, among other secondary preventative exams and plans to send us a copy of all results once available. She is a non-smoker and consumes ~6-8 glasses of wine per week. Prior alcohol abuse is noted by the pt. She does not like to partake in annual  MMGs, but is willing to do them every other year. She does perform regular breast exams. Today, she would like to discuss infrared light therapy slippers as her neuropathy symptoms have become more bothersome lately. She c/o burning pain in her feet bilaterally. 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       Subjective      Review of Systems:   Review of Systems   Constitutional: Positive for fatigue. Negative for chills and fever.   HENT: Negative for congestion, ear discharge, ear pain, postnasal drip, rhinorrhea, sinus pressure, sneezing and sore throat.    Eyes: Negative for blurred vision, double vision and visual disturbance.   Respiratory: Negative for cough, shortness of breath and wheezing.    Cardiovascular: Negative for chest pain, palpitations and leg swelling.   Gastrointestinal: Negative for abdominal distention, abdominal pain, blood in stool, constipation, diarrhea, nausea and vomiting.   Endocrine: Negative for cold intolerance and heat intolerance.   Genitourinary: Negative for difficulty urinating, dysuria and flank pain.   Musculoskeletal: Positive for arthralgias and myalgias.   Skin: Negative for rash and bruise.   Neurological: Positive for dizziness (sometimes in shower). Negative for weakness, light-headedness, headache and confusion.   Hematological: Negative for adenopathy. Does not bruise/bleed easily.   Psychiatric/Behavioral: Negative for suicidal ideas and depressed mood. The patient is not nervous/anxious.        Past Medical History:   Past Medical History:   Diagnosis Date   • Allergic lisinopril  2017   • Anemia    • Arrhythmia    • Cancer (HCC) Cervical Dysplasia   • Cataract mild 2020   • Chicken pox    • Chronic fatigue    • Depression mild 2019   • NIEVES (dyspnea on exertion)    • Generalized anxiety disorder    • GERD (gastroesophageal reflux disease) 2018   • Hyperlipidemia    • Hypertension    • Iron deficiency anemia    • Liver disease     fatty   • Liver problem    • Measles    •  Menopause    • Mumps    • Neuromuscular disorder (HCC) Peripheral Neuropathy   • Renal insufficiency    • Scoliosis    • Unintentional weight loss    • UTI (urinary tract infection)    • Visual impairment Nearsighted       Past Surgical History:   Past Surgical History:   Procedure Laterality Date   •  SECTION     • DILATION AND CURETTAGE, DIAGNOSTIC / THERAPEUTIC     • ESSURE TUBAL LIGATION     • ORAL LESION EXCISION/BIOPSY  2021   • TUBAL ABDOMINAL LIGATION         Family History:   Family History   Problem Relation Age of Onset   • COPD Mother    • Stroke Mother    • Hypertension Mother    • Lung cancer Father    • Hypertension Father    • Heart attack Father    • Coronary artery disease Father    • Heart disease Father    • Thyroid disease Father    • Cancer Father    • Hyperlipidemia Sister    • Spondylolisthesis Sister    • Rheum arthritis Sister    • Malig Hypertension Sister    • Osteoarthritis Sister    • Other Sister         alcoholic   • Hypertension Sister    • Hypertension Brother        Social History:   Social History     Socioeconomic History   • Marital status:    Tobacco Use   • Smoking status: Never Smoker   • Smokeless tobacco: Never Used   • Tobacco comment: Never   Substance and Sexual Activity   • Alcohol use: Yes     Alcohol/week: 6.0 standard drinks     Types: 6 Glasses of wine per week     Comment: social   • Drug use: No   • Sexual activity: Not Currently     Partners: Male     Birth control/protection: Post-menopausal, Surgical       Medications:     Current Outpatient Medications:   •  aspirin 325 MG tablet, Take 325 mg by mouth As Needed for Mild Pain ., Disp: , Rfl:   •  esomeprazole (nexIUM) 20 MG capsule, Take 20 mg by mouth 2 (Two) Times a Week., Disp: , Rfl:   •  gabapentin (Neurontin) 100 MG capsule, Take 1 capsule by mouth Every Morning., Disp: 30 capsule, Rfl: 5  •  hydrALAZINE (APRESOLINE) 25 MG tablet, 25 mg 2 (Two) Times a Day., Disp: , Rfl:   •  " magnesium oxide (MAGOX) 400 (241.3 Mg) MG tablet tablet, Take 400 mg by mouth Daily., Disp: , Rfl:   •  metoprolol succinate XL (TOPROL-XL) 25 MG 24 hr tablet, Take 1 tablet by mouth Every Night., Disp: 90 tablet, Rfl: 1  •  nitroglycerin (NITROSTAT) 0.4 MG SL tablet, 1 under the tongue as needed for angina, may repeat q5mins for up three doses, Disp: 100 tablet, Rfl: 11  •  ondansetron (ZOFRAN) 4 MG tablet, Take 4 mg by mouth As Needed for Nausea or Vomiting., Disp: , Rfl:   •  rosuvastatin (Crestor) 5 MG tablet, Take 1 tablet by mouth Daily., Disp: 90 tablet, Rfl: 1  •  sodium zirconium cyclosilicate (Lokelma) 10 g pack, Take 10 g by mouth 2 (Two) Times a Week., Disp: , Rfl:   •  gabapentin (Neurontin) 300 MG capsule, Take 1 capsule by mouth 1 (One) Time for 1 dose., Disp: 30 capsule, Rfl: 5    Allergies:   Allergies   Allergen Reactions   • Lisinopril Angioedema   • Milk-Related Compounds Other (See Comments)     LACTOSE INTOLERANT   • Atorvastatin Myalgia       Objective     Physical Exam:  Vital Signs:   Vitals:    07/07/22 1412   BP: 114/80   Pulse: 83   Resp: 16   Temp: 97.4 °F (36.3 °C)   SpO2: 99%   Weight: 58.5 kg (129 lb)   Height: 162.6 cm (64\")   PainSc: 0-No pain     Body mass index is 22.14 kg/m².  BMI is within normal parameters. No other follow-up for BMI required.       Physical Exam  Vitals and nursing note reviewed.   Constitutional:       General: She is awake. She is not in acute distress.     Appearance: Normal appearance. She is well-developed and normal weight. She is not ill-appearing or diaphoretic.   HENT:      Head: Normocephalic and atraumatic.      Mouth/Throat:      Mouth: Mucous membranes are moist.      Pharynx: Oropharynx is clear.   Eyes:      Extraocular Movements: Extraocular movements intact.      Conjunctiva/sclera: Conjunctivae normal.      Pupils: Pupils are equal, round, and reactive to light.   Neck:      Vascular: No JVD.   Cardiovascular:      Rate and Rhythm: Normal " rate and regular rhythm.      Pulses: Normal pulses.      Heart sounds: Normal heart sounds.     No S3 or S4 sounds.   Pulmonary:      Effort: Pulmonary effort is normal. No respiratory distress.      Breath sounds: Normal breath sounds and air entry.   Chest:   Breasts:      Right: No axillary adenopathy or supraclavicular adenopathy.      Left: No axillary adenopathy or supraclavicular adenopathy.       Abdominal:      General: Abdomen is flat. Bowel sounds are normal.      Palpations: Abdomen is soft. There is no hepatomegaly or splenomegaly.      Tenderness: There is no abdominal tenderness. There is no guarding.   Musculoskeletal:         General: No swelling.      Cervical back: Normal range of motion and neck supple.      Right ankle: Normal.      Left ankle: Swelling present. No deformity or ecchymosis.   Lymphadenopathy:      Cervical: No cervical adenopathy.      Upper Body:      Right upper body: No supraclavicular or axillary adenopathy.      Left upper body: No supraclavicular or axillary adenopathy.   Skin:     General: Skin is warm and dry.      Capillary Refill: Capillary refill takes less than 2 seconds.   Neurological:      General: No focal deficit present.      Mental Status: She is alert and oriented to person, place, and time. Mental status is at baseline.      Cranial Nerves: Cranial nerves are intact.   Psychiatric:         Attention and Perception: Attention and perception normal.         Mood and Affect: Mood and affect normal.         Speech: Speech normal.         Behavior: Behavior normal.         Thought Content: Thought content normal.         Judgment: Judgment normal.             Results:     Labs: reviewed recent labs from epic and recent ER visit    Imaging: reviewed imaging from ER visit     Assessment / Plan      Assessment/Plan:   Diagnoses and all orders for this visit:    1. Encounter to establish care (Primary)  -Discussed benefit of early identification of breast and colon  cancer by completing these screening tests at NCCN's recommended intervals.   -Cologuard ordered  -Patient will call to schedule MMG, if not complete by November physical I will discuss again  -will schedule annual physical in November with labs       2. Mixed hyperlipidemia  -     rosuvastatin (Crestor) 5 MG tablet; Take 1 tablet by mouth Daily.  Dispense: 90 tablet; Refill: 1    3. Macrocytic anemia  -   Upon review of lab results from recent ER visit at [facility] in [place], I note that she has a macrocytic anemia (hgb= 10, MCV= 105). I will perform screening evaluation including cbc, tsh, vitamin B12, folate, and retic count. Discussed that she has risk factors for vitamin B12 deficiency and this may be contributing to her worsening neuropathy. We will await lab results before discussing slippers, as prolonged b12 deficiency can potentially cause irreversible nerve damage. As she is currently not experiencing any overt bleeding- pursuing overdue colonoscopy is more pressing. She is agreeable to collecting stool specimen for cologuard testing.       4. Primary hypertension  -     metoprolol succinate XL (TOPROL-XL) 25 MG 24 hr tablet; Take 1 tablet by mouth Every Night.  Dispense: 90 tablet; Refill: 1    5. Depression, unspecified depression type  -     Ambulatory Referral to Behavioral Health for counseling/ CBT    6. Left ankle pain/ swelling   -Limit aspirin use and focus on supportive measures: rest, ice, compression, elevation. Will consider imaging if no improvement.          Follow Up:   Return in about 4 months (around 11/7/2022) for Annual, labs with next visit.    ANETTE Lamb  [facility] [place]

## 2022-07-08 LAB
FOLATE SERPL-MCNC: 6.23 NG/ML (ref 4.78–24.2)
TSH SERPL DL<=0.05 MIU/L-ACNC: 4.46 UIU/ML (ref 0.27–4.2)
VIT B12 BLD-MCNC: 200 PG/ML (ref 211–946)

## 2022-07-11 ENCOUNTER — TELEPHONE (OUTPATIENT)
Dept: INTERNAL MEDICINE | Facility: CLINIC | Age: 67
End: 2022-07-11

## 2022-07-11 DIAGNOSIS — E53.8 B12 DEFICIENCY: ICD-10-CM

## 2022-07-11 DIAGNOSIS — E53.8 DEFICIENCY OF OTHER SPECIFIED B GROUP VITAMINS: ICD-10-CM

## 2022-07-11 DIAGNOSIS — D53.9 MACROCYTIC ANEMIA: Primary | ICD-10-CM

## 2022-07-11 DIAGNOSIS — E03.9 HYPOTHYROIDISM, UNSPECIFIED TYPE: ICD-10-CM

## 2022-07-11 DIAGNOSIS — E78.5 HYPERLIPIDEMIA, UNSPECIFIED HYPERLIPIDEMIA TYPE: ICD-10-CM

## 2022-07-11 RX ORDER — LEVOTHYROXINE SODIUM 0.03 MG/1
25 TABLET ORAL DAILY
Qty: 30 TABLET | Refills: 2 | Status: SHIPPED | OUTPATIENT
Start: 2022-07-11 | End: 2022-10-19

## 2022-07-11 NOTE — TELEPHONE ENCOUNTER
Spoke with patient re: lab results from 7-7-22.    CBC demonstrated normal white blood cell count and platelet count.  Macrocytosis without anemia is noted.  MCV = 100.  However, hemoglobin is at lower end of normal range at 12.0.    Vitamin B12 level is low at 200.  Of note, her serum vitamin B12 level was normal 1 year ago at 688.  I recommend replacement via subcutaneous supplementation, as she may not adequately absorb oral B12.  Although she is not a complete vegetarian, she does consume some meat and dairy.  I counseled on the importance of replacement therapy as prolonged B12 deficiency can relate to irreversible peripheral nerve damage and other complications such as memory loss, poor balance, weakness, and glossitis- among others.     Patient is uncomfortable self-administering injections and does not have anyone reliable to give her injection. Let her know I will check with her preferred pharmacy to see if this is something they could administer.    -1,000mcg daily x1 week  -weekly x1 month  -then switch to maintenance monthly dosing    At follow-up I will check a homocystine and methylmalonic acid level to confirm dx of B12 deficiency.     Lastly, TSH was elevated at 4.460.  No prior history of hypothyroidism, but she is of the age where this usually occurs.  This may also be contributing to her macrocytosis and hyperlipidemia.  I will begin treatment with levothyroxine 25 mcg daily.  Instructed to take medication first thing in the morning on an empty stomach.  We will repeat TSH along with a full thyroid panel in 4 weeks and make medication adjustments accordingly.  Advised that she will require lifelong treatment.  May increase dietary fiber to prevent constipation.  Instructed to report signs/symptoms of toxicity, infection, or cardiac symptoms- examples given.  I will request records from [facility] for a copy of her most recent DEXA scans to serve as baseline, as treatment for hypothyroidism  increases the risk of osteopenia/ porosis.

## 2022-07-11 NOTE — TELEPHONE ENCOUNTER
If they are single dose vials with 1,000mcg/mL per individual vial- then dispense #11 vials.   Otherwise, dispense however many vials necessary for #11mL.       Also,  Please call the patient and let her know:    -I called [facility] pharmacy and unfortunately they cannot administer her B12 injections. Get her set up through [facility] apts to receive the injections as I wrote on her cyanocobalamin Rx. If she can get those here at our office that is fine with me, if not set up apts for OP infusion. Let me know if I need to place additional referral order or something else to get this started.     -I ordered cologuard testing at our last visit. Please let the patient know who is supposed to supply materials required for collection including hat, specimen cup, ect (our lab or [facility] lab).    Other items to be completed:    -please request a copy of most recent DEXA from [facility].    -Send a copy of my office note and my telephone note from today to her nephrology office, Dr. Sarabia. Per patient their office phone number is [phone] and fax no is [phone]

## 2022-07-11 NOTE — TELEPHONE ENCOUNTER
Pharmacy Name: [facility] [address] [facility] [place] [address] [facility] [phone] [facility] [phone]      Pharmacy representative name: ALICIA    Pharmacy representative phone number: [phone]    What medication are you calling in regards to:   Cyanocobalamin 1000 MCG/ML kit     What question does the pharmacy have: CLARIFY QUANTITY OF VIALS    Who is the provider that prescribed the medication: MADIE    Additional notes: NA

## 2022-07-12 NOTE — TELEPHONE ENCOUNTER
Pt would like to come to office to have B12 injections. Due to living in [place], will need to do once weekly for so many weeks then adjusted accordingly thereafter. Please advise on how many weekly injections to order

## 2022-07-14 DIAGNOSIS — E78.5 HYPERLIPIDEMIA, UNSPECIFIED HYPERLIPIDEMIA TYPE: Primary | ICD-10-CM

## 2022-07-14 DIAGNOSIS — E53.8 B12 DEFICIENCY: ICD-10-CM

## 2022-07-14 RX ORDER — CYANOCOBALAMIN 1000 UG/ML
1000 INJECTION, SOLUTION INTRAMUSCULAR; SUBCUTANEOUS WEEKLY
Status: SHIPPED | OUTPATIENT
Start: 2022-07-18 | End: 2022-08-29

## 2022-07-14 NOTE — TELEPHONE ENCOUNTER
WHERE DO WE STAND ON B12 INJECTIONS FOR JAYDEN? SHE WANTS TO HAVE THEM BUT CANNOT SELF-ADMINISTER.  SHE'S OK WITH COMING IN ONCE A WEEK FOR THEM.  PLEASE CALL HER BACK. [phone].  THANK YOU

## 2022-07-14 NOTE — TELEPHONE ENCOUNTER
Ordered b12 injections per Saray previous note, and lipid panel. Pt notified and stated will be in next week to start B12

## 2022-07-20 ENCOUNTER — TELEPHONE (OUTPATIENT)
Dept: INTERNAL MEDICINE | Facility: CLINIC | Age: 67
End: 2022-07-20

## 2022-07-20 NOTE — TELEPHONE ENCOUNTER
Hub staff attempted to follow warm transfer process and was unsuccessful     Caller: Alysia Sierra    Relationship to patient: Self    Best call back number: [phone]    Patient is needing: PATIENT WAS CALLING TO CHECK ON AN ISSUE WITH GETTING HER B12 FROM HER PHARMACY. IT APPEARS WE WERE WORKING ON THAT AS FAR BACK AS 7/11/2022 WITHOUT A RESOLUTION, BUT NOTHING HAD HUB TO READ ON IT. PLEASE CALL PATIENT BACK TO UPDATE HER ON THIS PROCESS.

## 2022-07-20 NOTE — TELEPHONE ENCOUNTER
LVM for pt to return call, office number given  Pt was informed previously will need to come into office for B12 injections.

## 2022-07-27 ENCOUNTER — TELEPHONE (OUTPATIENT)
Dept: INTERNAL MEDICINE | Facility: CLINIC | Age: 67
End: 2022-07-27

## 2022-07-27 NOTE — TELEPHONE ENCOUNTER
LVM for pt to return call, office number given    HUB to READ: please schedule w/ Liana So if pt is wanting an appt.

## 2022-07-27 NOTE — TELEPHONE ENCOUNTER
Caller: Alysia Sierra    Relationship: Self    Best call back number: [phone]    What is the best time to reach you: EARLY MORNINGS    Who are you requesting to speak with (clinical staff, provider,  specific staff member): CLINICAL STAFF    What was the call regarding: VAGINAL BLEEDING (SPOTTING), VERY LOW AMOUNT, NO SMELL, NO PAIN    Do you require a callback: YES

## 2022-07-27 NOTE — TELEPHONE ENCOUNTER
Pt states having vaginal spotting/bleeding. Pt is postmenopause, last cycle in 2009 and hasn't had intercourse since then as well. Scheduled appt for tomorrow

## 2022-07-28 ENCOUNTER — OFFICE VISIT (OUTPATIENT)
Dept: BEHAVIORAL HEALTH | Facility: CLINIC | Age: 67
End: 2022-07-28

## 2022-07-28 ENCOUNTER — OFFICE VISIT (OUTPATIENT)
Dept: INTERNAL MEDICINE | Facility: CLINIC | Age: 67
End: 2022-07-28

## 2022-07-28 ENCOUNTER — LAB (OUTPATIENT)
Dept: LAB | Facility: HOSPITAL | Age: 67
End: 2022-07-28

## 2022-07-28 VITALS
DIASTOLIC BLOOD PRESSURE: 70 MMHG | HEIGHT: 64 IN | WEIGHT: 129.4 LBS | BODY MASS INDEX: 22.09 KG/M2 | SYSTOLIC BLOOD PRESSURE: 132 MMHG

## 2022-07-28 VITALS
OXYGEN SATURATION: 97 % | WEIGHT: 128 LBS | DIASTOLIC BLOOD PRESSURE: 82 MMHG | RESPIRATION RATE: 16 BRPM | BODY MASS INDEX: 21.85 KG/M2 | TEMPERATURE: 97.4 F | SYSTOLIC BLOOD PRESSURE: 134 MMHG | HEART RATE: 86 BPM | HEIGHT: 64 IN

## 2022-07-28 DIAGNOSIS — E53.8 B12 DEFICIENCY: ICD-10-CM

## 2022-07-28 DIAGNOSIS — N95.0 POSTMENOPAUSAL VAGINAL BLEEDING: Primary | ICD-10-CM

## 2022-07-28 DIAGNOSIS — E78.5 HYPERLIPIDEMIA, UNSPECIFIED HYPERLIPIDEMIA TYPE: ICD-10-CM

## 2022-07-28 DIAGNOSIS — E53.8 DEFICIENCY OF OTHER SPECIFIED B GROUP VITAMINS: ICD-10-CM

## 2022-07-28 DIAGNOSIS — E53.8 LOW VITAMIN B12 LEVEL: ICD-10-CM

## 2022-07-28 DIAGNOSIS — F33.0 MILD EPISODE OF RECURRENT MAJOR DEPRESSIVE DISORDER: Primary | ICD-10-CM

## 2022-07-28 DIAGNOSIS — F10.20 ALCOHOL USE DISORDER, MODERATE, DEPENDENCE: ICD-10-CM

## 2022-07-28 DIAGNOSIS — D53.9 MACROCYTIC ANEMIA: ICD-10-CM

## 2022-07-28 LAB — HCYS SERPL-MCNC: 29.4 UMOL/L (ref 0–15)

## 2022-07-28 PROCEDURE — 99213 OFFICE O/P EST LOW 20 MIN: CPT | Performed by: NURSE PRACTITIONER

## 2022-07-28 PROCEDURE — 90792 PSYCH DIAG EVAL W/MED SRVCS: CPT

## 2022-07-28 PROCEDURE — 83090 ASSAY OF HOMOCYSTEINE: CPT

## 2022-07-28 PROCEDURE — 36415 COLL VENOUS BLD VENIPUNCTURE: CPT

## 2022-07-28 PROCEDURE — 83921 ORGANIC ACID SINGLE QUANT: CPT

## 2022-07-28 PROCEDURE — 96372 THER/PROPH/DIAG INJ SC/IM: CPT | Performed by: NURSE PRACTITIONER

## 2022-07-28 RX ADMIN — CYANOCOBALAMIN 1000 MCG: 1000 INJECTION, SOLUTION INTRAMUSCULAR; SUBCUTANEOUS at 12:36

## 2022-07-28 NOTE — PROGRESS NOTES
"     New Patient Office Visit      Patient Name: Alysia Sierra  : 1955   MRN: [id number]     Referring Provider: Liana So APRN    Chief Complaint:  Psychiatric evaluation related to:     ICD-10-CM ICD-9-CM   1. Mild episode of recurrent major depressive disorder (HCC)  F33.0 296.31   2. Alcohol use disorder, moderate, dependence (HCC)  F10.20 303.90        History of Present Illness:   Alysia Sierra is a 67 y.o. female who is here today for psychiatric evaluation on referral from primary care provider.  Patient presents today complaining of \"feeling mildly depressed\", she also would like to get set up with a new therapist.    Depression:  Patient reports she has felt depression \"on and off\" since about 3 years ago when her mother passed away.  She also reports around the same time she began having worsening peripheral neuropathy which has led to a decrease in mobility which is limited her ability to participate in activities she used to find enjoyable.  She reports depression is associated with low motivation, hypersomnia and a lack of interest.  She reports her mood seems happy overall but there are regular times of feeling sad/down.  She denies suicidal ideation or thoughts of self-harm.    Alcohol use disorder:  Patient reports she has been drinking alcohol heavily for 20 years.  Patient reports it has improved over the years.  Patient reports she used to drink much more heavily.  She is now drinking approximately 2 glasses of wine 4 nights per week.  She reports her drinking amount and frequency fluctuates with levels of stress.  She denies eye openers.  She denies financial or social consequences related to drinking at this time.    Current treatment(s): None current    Subjective      Review of Systems:   Review of Systems   Psychiatric/Behavioral: Positive for depressed mood. Negative for self-injury and suicidal ideas. The patient is not nervous/anxious.        PHQ-9 Depression " Screening Score: 7     Psychiatric History:     Previous Diagnoses: Major depressive disorder, alcohol use disorder  Outpatient treatment history: History of therapy approximately 2 years ago x1 visit, stopped related to COVID-19 pandemic  Previous medications: None  Inpatient admissions: None  History of suicide attempts: None      SUICIDE RISK ASSESSMENT    Does patient have thoughts of suicide?   No  Does patient have intent for suicide?  No  Does patient have a current plan for suicide?  No    Access to firearms or weapons: Unknown  History of suicide attempts: No  Family history of suicide or attempts: No  Protective factors: No suicidal ideation, endorses adequate support system    Risk Level: Low    SAFETY PLAN    Patient agrees to call 911/go to the nearest emergency department if he/she develops new or worsening SI, or develops intent or plan to act on these thoughts. Patient is agreeable to all elements of safety plan and verbalizes understanding.     Social History:    Living situation: Living by herself in for sales in an apartment.  .    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  Recreation: Cooking, patient reports she cannot do as much of that anymore related to physical health  Support system: Family, sister  Illicit substance use: Denies  Alcohol use: Yes, 2 glasses of wine 4X per week  Tobacco use: Denies    Past Medical History:   Past Medical History:   Diagnosis Date   • Allergic lisinopril  2017   • Anemia    • Arrhythmia    • Cancer (HCC) Cervical Dysplasia   • Cataract mild 2020   • Chicken pox    • Chronic fatigue    • Depression mild 2019   • NIEVES (dyspnea on exertion)    • Generalized anxiety disorder    • GERD (gastroesophageal reflux disease) 2018   • Hyperlipidemia    • Hypertension    • Iron deficiency anemia    • Liver disease     fatty   • Liver problem    • Measles    • Menopause    • Mumps    • Neuromuscular disorder (HCC) Peripheral Neuropathy    • Renal insufficiency    • Scoliosis    • Unintentional weight loss    • UTI (urinary tract infection)    • Visual impairment Nearsighted       Past Surgical History:   Past Surgical History:   Procedure Laterality Date   •  SECTION     • DILATION AND CURETTAGE, DIAGNOSTIC / THERAPEUTIC     • ESSURE TUBAL LIGATION     • ORAL LESION EXCISION/BIOPSY  2021   • TUBAL ABDOMINAL LIGATION         Family History:   Family History   Problem Relation Age of Onset   • COPD Mother    • Stroke Mother    • Hypertension Mother    • Lung cancer Father    • Hypertension Father    • Heart attack Father    • Coronary artery disease Father    • Heart disease Father    • Thyroid disease Father    • Cancer Father    • Hyperlipidemia Sister    • Spondylolisthesis Sister    • Rheum arthritis Sister    • Malig Hypertension Sister    • Osteoarthritis Sister    • Other Sister         alcoholic   • Hypertension Sister    • Hypertension Brother        Family Psychiatric History:  Sister: Alcohol use disorder ()    Medications:     Current Outpatient Medications:   •  Cyanocobalamin 1000 MCG/ML kit, Inject 1,000mcg SC daily x1 week, then weekly x4 weeks., Disp: 1 kit, Rfl: 0  •  esomeprazole (nexIUM) 20 MG capsule, Take 20 mg by mouth 2 (Two) Times a Week., Disp: , Rfl:   •  gabapentin (Neurontin) 100 MG capsule, Take 1 capsule by mouth Every Morning., Disp: 30 capsule, Rfl: 5  •  hydrALAZINE (APRESOLINE) 25 MG tablet, 25 mg 2 (Two) Times a Day., Disp: , Rfl:   •  levothyroxine (SYNTHROID, LEVOTHROID) 25 MCG tablet, Take 1 tablet by mouth Daily. Take in the morning on empty stomach., Disp: 30 tablet, Rfl: 2  •  magnesium oxide (MAGOX) 400 (241.3 Mg) MG tablet tablet, Take 400 mg by mouth Daily., Disp: , Rfl:   •  metoprolol succinate XL (TOPROL-XL) 25 MG 24 hr tablet, Take 1 tablet by mouth Every Night., Disp: 90 tablet, Rfl: 1  •  nitroglycerin (NITROSTAT) 0.4 MG SL tablet, 1 under the tongue as needed for  "angina, may repeat q5mins for up three doses, Disp: 100 tablet, Rfl: 11  •  ondansetron (ZOFRAN) 4 MG tablet, Take 4 mg by mouth As Needed for Nausea or Vomiting., Disp: , Rfl:   •  aspirin 325 MG tablet, Take 325 mg by mouth As Needed for Mild Pain ., Disp: , Rfl:   •  gabapentin (Neurontin) 300 MG capsule, Take 1 capsule by mouth 1 (One) Time for 1 dose., Disp: 30 capsule, Rfl: 5  •  rosuvastatin (Crestor) 5 MG tablet, Take 1 tablet by mouth Daily., Disp: 90 tablet, Rfl: 1  •  sodium zirconium cyclosilicate (Lokelma) 10 g pack, Take 10 g by mouth 2 (Two) Times a Week., Disp: , Rfl:     Current Facility-Administered Medications:   •  cyanocobalamin injection 1,000 mcg, 1,000 mcg, Intramuscular, Weekly, Liana So APRN, 1,000 mcg at 07/28/22 1236    Allergies:   Allergies   Allergen Reactions   • Lisinopril Angioedema   • Milk-Related Compounds Other (See Comments)     LACTOSE INTOLERANT   • Atorvastatin Myalgia         Objective     Physical Exam:  Vital Signs:   Vitals:    07/28/22 1114   BP: 132/70  Comment: 99/87   Weight: 58.7 kg (129 lb 6.4 oz)   Height: 162.6 cm (64.02\")     Body mass index is 22.2 kg/m².     Physical Exam    Mental Status Exam:   Appearance: This is an overweight older adult  female, appears stated age, dressed appropriately to situation and weather, using cane to ambulate, unsteady gait  Hygiene: Good  Cooperation: Within normal limits  Eye Contact: Within normal limits  Psychomotor Behavior: Within normal limits  Mood: Happy  Affect: Congruent, full range  Speech: Normal rate, tone and prosody  Thought Process: Linear, goal directed  Thought Content: Within normal limits  Suicidal: Denies  Homicidal: Denies  Hallucinations: Denied  Delusion: Denies  Memory: Intact  Orientation: X4  Reliability: Good  Insight: Good  Judgement: Concern for heavy alcohol consumption  Impulse Control: No current concerns    Assessment / Plan      Visit Diagnosis/Orders Placed This " Visit:  Diagnoses and all orders for this visit:    1. Mild episode of recurrent major depressive disorder (HCC) (Primary)    2. Alcohol use disorder, moderate, dependence (HCC)         Differential:   None current    Plan:   1. Referral to psychotherapy, external resources provided  2. Spoke to the patient about possibility of starting duloxetine, patient will check with nephrologist    Continue supportive psychotherapy efforts and medications as indicated. Treatment and medication options discussed during today's visit. Patient ackowledged and verbally consented to continue with current treatment plan and was educated on the importance of compliance with treatment and follow-up appointments. Patient seems reasonably able to adhere to treatment plan.      Medication Considerations:  Discussed medication options and treatment plan of prescribed medication(s) as well as the risks, benefits, and potential side effects. Patient is agreeable to call the office with any worsening of symptoms or onset of side effects. Patient is agreeable to call 911 or go to the nearest ER should he/she begin having SI/HI.    Treatment Goals:    Depression: Patient will experience improvement in symptoms of depression with psychotherapy and possibly duloxetine  Alcohol use disorder: We will continue to address at subsequent visits, patient will be able to decrease/discontinue alcohol use with psychotherapy    Quality Measures:     Tobacco Use/Cessation:   Never smoker    I advised Alysia Sierra of the risks of tobacco use.     Follow Up:   Return in about 3 months (around 10/28/2022) for Medication Mangement Follow Up.      ANETTE Sanderson, PMHNP-BC

## 2022-07-28 NOTE — PROGRESS NOTES
Immunization  Immunization performed in left deltoid IM by Grace Rosa MA. Patient tolerated the procedure well without complications.  07/28/22   Grace Rosa MA

## 2022-07-31 LAB — METHYLMALONATE SERPL-SCNC: 624 NMOL/L (ref 0–378)

## 2022-08-01 ENCOUNTER — TELEPHONE (OUTPATIENT)
Dept: INTERNAL MEDICINE | Facility: CLINIC | Age: 67
End: 2022-08-01

## 2022-08-01 NOTE — TELEPHONE ENCOUNTER
PATIENT IS CALLING IN REGARDS TO A CONVERSATION SHE HAD WITH JOHNATHON LUCAS ABOUT POSSIBLY STARTING ZYMBALTA.    SHE WANTED TO LET JOHNATHON KNOW THAT SHE TALKED IT OVER WITH HER NEPHROLOGIST.    THEY DID OK HER TO START THE LOWEST DOSAGE POSSIBLE TO SEE HOW IT GOES AT FIRST - CAN ADJUST AS NEEDED AFTER THE INITIAL TRIAL.    PLEASE ADVISE    JAYDEN MAGANA  [phone]

## 2022-08-02 DIAGNOSIS — F33.0 MILD EPISODE OF RECURRENT MAJOR DEPRESSIVE DISORDER: Primary | ICD-10-CM

## 2022-08-02 RX ORDER — DULOXETIN HYDROCHLORIDE 20 MG/1
20 CAPSULE, DELAYED RELEASE ORAL 2 TIMES DAILY
Qty: 60 CAPSULE | Refills: 2 | Status: SHIPPED | OUTPATIENT
Start: 2022-08-02 | End: 2022-10-25 | Stop reason: SDUPTHER

## 2022-08-02 NOTE — TELEPHONE ENCOUNTER
Last Office Visit: 7/28/22  Next Office Visit: 10/31/22    Pharmacy: [facility] [address] [facility] [place] [address] [facility] [phone] [facility] [phone] FX

## 2022-08-02 NOTE — TELEPHONE ENCOUNTER
Spoke to Pt and informed her of script being sent in. Pt stated that the list of providers you gave her does not accept [employer] insurance. Pt is going to call [facility] and see if they have a list of therapists that would be covered in her area.

## 2022-08-04 ENCOUNTER — CLINICAL SUPPORT (OUTPATIENT)
Dept: INTERNAL MEDICINE | Facility: CLINIC | Age: 67
End: 2022-08-04

## 2022-08-04 DIAGNOSIS — D53.9 MACROCYTIC ANEMIA: ICD-10-CM

## 2022-08-04 PROCEDURE — 96372 THER/PROPH/DIAG INJ SC/IM: CPT | Performed by: NURSE PRACTITIONER

## 2022-08-04 RX ADMIN — CYANOCOBALAMIN 1000 MCG: 1000 INJECTION, SOLUTION INTRAMUSCULAR; SUBCUTANEOUS at 14:00

## 2022-08-12 ENCOUNTER — CLINICAL SUPPORT (OUTPATIENT)
Dept: INTERNAL MEDICINE | Facility: CLINIC | Age: 67
End: 2022-08-12

## 2022-08-12 DIAGNOSIS — E78.2 MIXED HYPERLIPIDEMIA: ICD-10-CM

## 2022-08-12 PROCEDURE — 96372 THER/PROPH/DIAG INJ SC/IM: CPT | Performed by: NURSE PRACTITIONER

## 2022-08-12 RX ADMIN — CYANOCOBALAMIN 1000 MCG: 1000 INJECTION, SOLUTION INTRAMUSCULAR; SUBCUTANEOUS at 13:59

## 2022-08-12 NOTE — PROGRESS NOTES
Immunization  Immunization performed in left deltoid by Alba Lindsey MA. Patient tolerated the procedure well without complications.  08/12/22   Alba Lindsey MA

## 2022-08-19 ENCOUNTER — CLINICAL SUPPORT (OUTPATIENT)
Dept: INTERNAL MEDICINE | Facility: CLINIC | Age: 67
End: 2022-08-19

## 2022-08-19 DIAGNOSIS — R29.898 LEG WEAKNESS, BILATERAL: ICD-10-CM

## 2022-08-19 PROCEDURE — 96372 THER/PROPH/DIAG INJ SC/IM: CPT | Performed by: NURSE PRACTITIONER

## 2022-08-19 RX ADMIN — CYANOCOBALAMIN 1000 MCG: 1000 INJECTION, SOLUTION INTRAMUSCULAR; SUBCUTANEOUS at 11:32

## 2022-08-19 NOTE — PROGRESS NOTES
Immunization  Immunization performed in Right deltoid by Alba Lindsey MA. Patient tolerated the procedure well without complications.  08/19/22   Alba Lindsey MA

## 2022-08-22 ENCOUNTER — OFFICE VISIT (OUTPATIENT)
Dept: OBSTETRICS AND GYNECOLOGY | Facility: CLINIC | Age: 67
End: 2022-08-22

## 2022-08-22 VITALS
WEIGHT: 129.2 LBS | DIASTOLIC BLOOD PRESSURE: 80 MMHG | HEIGHT: 64 IN | SYSTOLIC BLOOD PRESSURE: 126 MMHG | BODY MASS INDEX: 22.06 KG/M2

## 2022-08-22 DIAGNOSIS — N95.0 PMB (POSTMENOPAUSAL BLEEDING): Primary | ICD-10-CM

## 2022-08-22 PROCEDURE — 99214 OFFICE O/P EST MOD 30 MIN: CPT | Performed by: NURSE PRACTITIONER

## 2022-08-22 NOTE — PROGRESS NOTES
"Subjective   Chief Complaint   Patient presents with   • Menopause     Pt is having post menopause bleeding that started at the first part of Aug     Alysia Sierra is a 67 y.o. year old No obstetric history on file..  No LMP recorded (lmp unknown). (Menstrual status: Tubal ligation).  She presents to be seen for evaluation of postmenopausal bleeding.  She reports the first week of August she had light pink spotting which then progressed to period type blood flow requiring the use of a pad which lasted for a week.  She stopped bleeding for a few days and then had another 2 days of spotting.  This was her first occurrence of postmenopausal bleeding.  She has been postmenopausal since 2009.  She reports her last Pap was 18 months ago.  She is unsure of pathology but thinks that her Pap was essentially normal.  She has a remote history of abnormal Paps with prior conization and LEEP.    The following portions of the patient's history were reviewed and updated as appropriate:current medications and allergies    Social History    Tobacco Use      Smoking status: Never Smoker      Smokeless tobacco: Never Used      Tobacco comment: Never         Objective   /80 (BP Location: Right arm, Patient Position: Sitting, Cuff Size: Adult)   Ht 162.6 cm (64\")   Wt 58.6 kg (129 lb 3.2 oz)   LMP  (LMP Unknown)   Breastfeeding No   BMI 22.18 kg/m²     Lab Review   No data reviewed    Imaging   Pelvic ultrasound report   Transvaginal ultrasound performed in our office today shows heterogeneous appearing myometrium endometrium poorly seen with endometrial thickness of 9.8 mm.  Ultrasound report reviewed with Dr. Farmer.     Assessment   1. Postmenopausal bleeding     Plan   1. Ultrasound report reviewed with patient.  Discussed possible endometrial biopsy versus saline infused sonogram.  She would like to proceed with saline infused sonogram.  We will schedule back next available.  2. The importance of keeping all planned " follow-up and taking all medications as prescribed was emphasized.  3. Return to care for saline infused sonogram or as needed  4. We will have patient sign records release today for her most recent Pap smear.    No orders of the defined types were placed in this encounter.         This note was electronically signed.    Rosa Maria Pathak, ANETTE  August 22, 2022

## 2022-08-25 ENCOUNTER — TELEPHONE (OUTPATIENT)
Dept: INTERNAL MEDICINE | Facility: CLINIC | Age: 67
End: 2022-08-25

## 2022-08-25 NOTE — TELEPHONE ENCOUNTER
Caller: Alysia Sierra     Best call back number: [phone]     What is your medical concern? PATIENT HAS BEEN GETTING B12 INJECTIONS FOR THE LAST 4 WEEKS. PATIENT HAS STANDING LABS ORDERED AND WANTED TO KNOW IF SHE NEEDED A FEW MORE WEEKS OF INJECTIONS BEFORE SHE GET THE LABS DONE.    How long has this issue been going on? 4 WEEKS    Is your provider already aware of this issue? YES    Have you been treated for this issue? YES

## 2022-08-25 NOTE — TELEPHONE ENCOUNTER
Let pt know to have labs done so can see how the B12 injections are doing, per Liana note. Pt verbalized understanding

## 2022-08-26 ENCOUNTER — LAB (OUTPATIENT)
Dept: LAB | Facility: HOSPITAL | Age: 67
End: 2022-08-26

## 2022-08-26 DIAGNOSIS — E03.9 HYPOTHYROIDISM, UNSPECIFIED TYPE: ICD-10-CM

## 2022-08-26 DIAGNOSIS — E53.8 B12 DEFICIENCY: ICD-10-CM

## 2022-08-26 DIAGNOSIS — E78.5 HYPERLIPIDEMIA, UNSPECIFIED HYPERLIPIDEMIA TYPE: ICD-10-CM

## 2022-08-26 DIAGNOSIS — D53.9 MACROCYTIC ANEMIA: ICD-10-CM

## 2022-08-26 LAB
BASOPHILS # BLD AUTO: 0.03 10*3/MM3 (ref 0–0.2)
BASOPHILS NFR BLD AUTO: 0.4 % (ref 0–1.5)
CHOLEST SERPL-MCNC: 221 MG/DL (ref 0–200)
DEPRECATED RDW RBC AUTO: 41.9 FL (ref 37–54)
EOSINOPHIL # BLD AUTO: 0.14 10*3/MM3 (ref 0–0.4)
EOSINOPHIL NFR BLD AUTO: 2 % (ref 0.3–6.2)
ERYTHROCYTE [DISTWIDTH] IN BLOOD BY AUTOMATED COUNT: 12.1 % (ref 12.3–15.4)
HCT VFR BLD AUTO: 37.1 % (ref 34–46.6)
HDLC SERPL-MCNC: 84 MG/DL (ref 40–60)
HGB BLD-MCNC: 12.6 G/DL (ref 12–15.9)
IMM GRANULOCYTES # BLD AUTO: 0.03 10*3/MM3 (ref 0–0.05)
IMM GRANULOCYTES NFR BLD AUTO: 0.4 % (ref 0–0.5)
LDLC SERPL CALC-MCNC: 99 MG/DL (ref 0–100)
LDLC/HDLC SERPL: 1.08 {RATIO}
LYMPHOCYTES # BLD AUTO: 1.68 10*3/MM3 (ref 0.7–3.1)
LYMPHOCYTES NFR BLD AUTO: 24.2 % (ref 19.6–45.3)
MCH RBC QN AUTO: 32.2 PG (ref 26.6–33)
MCHC RBC AUTO-ENTMCNC: 34 G/DL (ref 31.5–35.7)
MCV RBC AUTO: 94.9 FL (ref 79–97)
MONOCYTES # BLD AUTO: 0.84 10*3/MM3 (ref 0.1–0.9)
MONOCYTES NFR BLD AUTO: 12.1 % (ref 5–12)
NEUTROPHILS NFR BLD AUTO: 4.23 10*3/MM3 (ref 1.7–7)
NEUTROPHILS NFR BLD AUTO: 60.9 % (ref 42.7–76)
NRBC BLD AUTO-RTO: 0.1 /100 WBC (ref 0–0.2)
PLATELET # BLD AUTO: 229 10*3/MM3 (ref 140–450)
PMV BLD AUTO: 10.7 FL (ref 6–12)
RBC # BLD AUTO: 3.91 10*6/MM3 (ref 3.77–5.28)
T-UPTAKE NFR SERPL: 1.06 TBI (ref 0.8–1.3)
T4 SERPL-MCNC: 8.15 MCG/DL (ref 4.5–11.7)
TRIGL SERPL-MCNC: 231 MG/DL (ref 0–150)
TSH SERPL DL<=0.05 MIU/L-ACNC: 3.81 UIU/ML (ref 0.27–4.2)
VIT B12 BLD-MCNC: 1152 PG/ML (ref 211–946)
VLDLC SERPL-MCNC: 38 MG/DL (ref 5–40)
WBC NRBC COR # BLD: 6.95 10*3/MM3 (ref 3.4–10.8)

## 2022-08-26 PROCEDURE — 80061 LIPID PANEL: CPT

## 2022-08-26 PROCEDURE — 84479 ASSAY OF THYROID (T3 OR T4): CPT

## 2022-08-26 PROCEDURE — 85025 COMPLETE CBC W/AUTO DIFF WBC: CPT

## 2022-08-26 PROCEDURE — 82607 VITAMIN B-12: CPT

## 2022-08-26 PROCEDURE — 84443 ASSAY THYROID STIM HORMONE: CPT

## 2022-08-26 PROCEDURE — 36415 COLL VENOUS BLD VENIPUNCTURE: CPT

## 2022-08-26 PROCEDURE — 84436 ASSAY OF TOTAL THYROXINE: CPT

## 2022-09-07 ENCOUNTER — OFFICE VISIT (OUTPATIENT)
Dept: NEUROLOGY | Facility: CLINIC | Age: 67
End: 2022-09-07

## 2022-09-07 VITALS — OXYGEN SATURATION: 97 % | DIASTOLIC BLOOD PRESSURE: 60 MMHG | SYSTOLIC BLOOD PRESSURE: 118 MMHG | HEART RATE: 77 BPM

## 2022-09-07 DIAGNOSIS — M79.7 FIBROMYALGIA: Primary | ICD-10-CM

## 2022-09-07 DIAGNOSIS — M54.12 CERVICAL RADICULOPATHY AT C6: ICD-10-CM

## 2022-09-07 PROCEDURE — 99214 OFFICE O/P EST MOD 30 MIN: CPT | Performed by: PSYCHIATRY & NEUROLOGY

## 2022-09-07 RX ORDER — GABAPENTIN 300 MG/1
300 CAPSULE ORAL ONCE
Qty: 30 CAPSULE | Refills: 6 | Status: SHIPPED | OUTPATIENT
Start: 2022-09-07 | End: 2023-03-20 | Stop reason: SDUPTHER

## 2022-09-07 RX ORDER — GABAPENTIN 100 MG/1
100 CAPSULE ORAL EVERY MORNING
Qty: 30 CAPSULE | Refills: 6 | Status: SHIPPED | OUTPATIENT
Start: 2022-09-07 | End: 2023-03-20

## 2022-09-07 NOTE — PROGRESS NOTES
Subjective:    CC: Alysia Sierra is in clinic today for follow up for history of cervical radiculopathy and fibromyalgia.    HPI:  Initial visit: 8/31/2020: Patient is a 65-year-old female with past medical history of hypertension, supraventricular tachycardia referred to the clinic for evaluation of bilateral leg and arm weakness.  She reports her symptoms started about 2 years ago and it has progressively become worse.  She reports that in last 6 months, she has had great difficulty getting up from chair if there is no side arms.  She reports that if she is on the floor, it is almost impossible for her to get up.  In addition she has noticed difficulty going up and down stairs.  She also reports tingling, numbness and extreme coldness in both her feet and sometimes it leads to pain.  This also started about 6 months ago and it has progressively become worse.    10/8/2020: She is in clinic for regular follow-up.  Since her last visit, she reports that overall she feels worse.  She feels that she does not have any energy to do anything throughout the day.  She is involved in physical therapy but it is extremely difficult for her to go through the exercises.  She reports that overall she feels weaker than her last visit.  Since her last visit, she has now completed MRI of cervical spine, MRI lumbar spine blood work-up including myasthenia gravis antibody panel, vitamin D, copper and B12.  MRI of cervical and lumbosacral spine shows mild to moderate multilevel spondylitic changes without any evidence of myelopathy.  Vitamin B12, copper and vitamin levels for within normal limits as well.  Upon further questioning, she reports that she sleeps usually at 4 or 5 PM until 1 or 2 AM and then she is awake after that.  She has done this for quite some time now.  In addition, she also reports to heavy alcohol use from age 34 until about 2 years ago.  She still drinks alcohol but it is much less as compared to  before.    12/11/2020: She is in clinic for regular follow-up.  Since her last visit, she underwent MRI brain without contrast which I reviewed personally.  It showed white matter changes suspicious for multiple sclerosis so following that, lumbar puncture was recommended.  Lumbar puncture was negative for MS profile.  She reports that since her last visit, she has now completed physical therapy and she continues to have good days and bad days.  She reports that she continues to struggle with poor sleep quality, does not have good appetite.  She is also reporting almost continuous tingling, numbness and coldness in both her feet.    3/1/2021: She is in clinic for regular follow-up.  Since her last visit in December, she reports that there has not been much change in bilateral upper and lower extremity strength is concerned.  She continues to have episodes of difficulty with walking, subjective feeling of generalized body weakness.  She is planning to go back to the gym and start exercising and will try to pay more attention to nutrition to help with this.  Really she reports that there has been no worsening since her last visit.  She did try gabapentin 300 mg twice daily and reports that the morning dose caused her to have side effects that she is currently taking only the nighttime dose which seems to be helping significantly with sleep.    9/1/2021: She is in clinic for regular follow-up.  Since her last visit in March, she reports that she is trying her best to exercise as much as possible at home to maintain strength in both upper and lower extremities.  She continues to use cane while walking.  She takes gabapentin 300 mg at bedtime which helps her with pain and also help her sleep better.  She continues to have some good days and bad days but overall there has been no worsening.    3/8/2022: She is in clinic for regular follow-up.  Since her last visit in September 2021, she reports that overall both upper and  lower extremity weakness remained stable without any worsening.  She continues to do exercises.  She has noticed that days that she is well hydrated, she tends to do better.  Lately in last 4 to 6 weeks, she is reporting right shoulder pain radiating down to right forearm and right hand.  She does have moderate spondylitic changes at C5-C6 and C6-C7 levels that were noted on MRI that was performed in August 2020.  She is currently taking gabapentin 300 mg at bedtime.    9/7/2022: She is in clinic for regular follow-up.  Since her last visit in March 2022, she reports that overall she has done really well.  She had an episode of passing out lasting for few seconds couple of months ago.  Which she thinks could be related to dehydration leading to hypotension.  She is trying to keep herself well-hydrated.  She continues to use Campbell to help with walking and to prevent from falling.  She currently takes gabapentin 100 mg in the morning and 300 mg at night which does help with symptoms of cervical radiculopathy.  She was recently started on Cymbalta 20 mg twice daily which seems to be helping with symptoms of fibromyalgia as well.    The following portions of the patient's history were reviewed and updated as of 09/07/2022: allergies, social history and problem list.       Current Outpatient Medications:   •  aspirin 325 MG tablet, Take 325 mg by mouth As Needed for Mild Pain ., Disp: , Rfl:   •  Cyanocobalamin 1000 MCG/ML kit, Inject 1,000mcg SC daily x1 week, then weekly x4 weeks., Disp: 1 kit, Rfl: 0  •  DULoxetine (Cymbalta) 20 MG capsule, Take 1 capsule by mouth 2 (Two) Times a Day., Disp: 60 capsule, Rfl: 2  •  esomeprazole (nexIUM) 20 MG capsule, Take 20 mg by mouth 2 (Two) Times a Week., Disp: , Rfl:   •  gabapentin (Neurontin) 100 MG capsule, Take 1 capsule by mouth Every Morning., Disp: 30 capsule, Rfl: 6  •  gabapentin (Neurontin) 300 MG capsule, Take 1 capsule by mouth 1 (One) Time for 1 dose., Disp: 30  capsule, Rfl: 6  •  hydrALAZINE (APRESOLINE) 25 MG tablet, 25 mg 2 (Two) Times a Day., Disp: , Rfl:   •  levothyroxine (SYNTHROID, LEVOTHROID) 25 MCG tablet, Take 1 tablet by mouth Daily. Take in the morning on empty stomach., Disp: 30 tablet, Rfl: 2  •  magnesium oxide (MAGOX) 400 (241.3 Mg) MG tablet tablet, Take 400 mg by mouth Daily., Disp: , Rfl:   •  metoprolol succinate XL (TOPROL-XL) 25 MG 24 hr tablet, Take 1 tablet by mouth Every Night., Disp: 90 tablet, Rfl: 1  •  nitroglycerin (NITROSTAT) 0.4 MG SL tablet, 1 under the tongue as needed for angina, may repeat q5mins for up three doses, Disp: 100 tablet, Rfl: 11  •  ondansetron (ZOFRAN) 4 MG tablet, Take 4 mg by mouth As Needed for Nausea or Vomiting., Disp: , Rfl:   •  rosuvastatin (Crestor) 5 MG tablet, Take 1 tablet by mouth Daily., Disp: 90 tablet, Rfl: 1  •  sodium zirconium cyclosilicate (Lokelma) 10 g pack, Take 10 g by mouth 2 (Two) Times a Week., Disp: , Rfl:    Past Medical History:   Diagnosis Date   • Allergic lisinopril     • Anemia    • Arrhythmia    • Cancer (HCC) Cervical Dysplasia   • Cataract mild    • Chicken pox    • Chronic fatigue    • Depression mild    • Difficulty walking 2019   • NIEVES (dyspnea on exertion)    • Generalized anxiety disorder    • GERD (gastroesophageal reflux disease) 2018   • Hyperlipidemia    • Hypertension    • Iron deficiency anemia    • Liver disease     fatty   • Liver problem    • Measles    • Menopause    • Mumps    • Neuromuscular disorder (HCC) Peripheral Neuropathy   • Renal insufficiency 2018   • Scoliosis    • Unintentional weight loss    • UTI (urinary tract infection)    • Visual impairment Nearsighted      Past Surgical History:   Procedure Laterality Date   •  SECTION     • DILATION AND CURETTAGE, DIAGNOSTIC / THERAPEUTIC     • ESSURE TUBAL LIGATION     • ORAL LESION EXCISION/BIOPSY  2021   • TUBAL ABDOMINAL LIGATION        Family History   Problem Relation Age of Onset    • COPD Mother    • Stroke Mother    • Hypertension Mother    • Lung cancer Father    • Hypertension Father    • Heart attack Father    • Coronary artery disease Father    • Heart disease Father    • Thyroid disease Father    • Cancer Father    • Hyperlipidemia Sister    • Spondylolisthesis Sister    • Rheum arthritis Sister    • Malig Hypertension Sister    • Osteoarthritis Sister    • Other Sister         alcoholic   • Hypertension Sister    • Hypertension Brother         Review of Systems  Objective:    /60   Pulse 77   LMP  (LMP Unknown)   SpO2 97%     Neurology Exam:  General apperance: NAD.     Mental status: Alert, awake and oriented to time place and person.    Recent and Remote memory: Can recall 3/3 objects at 5 minutes. Can recall historical events.     Attention span and Concentration: Serial 7s: Normal.     Fund of knowledge:  Normal.     Language and Speech: No aphasia or dysarthria.    Naming , Repitition and Comprehension:  Can name objects, repeat a sentence and follow commands. Speech is clear and fluent with good repetition, comprehension, and naming.    CN II to XII: Intact.    Opthalmoscopic Exam: No papilledema.    Motor:  Right UE muscle strength 5/5. Normal tone.     Left UE muscle strength 5/5. Normal tone.      Right LE muscle strength5/5. Normal tone.     Left LE muscle strength 5/5. Normal tone.      Sensory: Normal light touch, vibration and pinprick sensation bilaterally.    DTRs: 2+ bilaterally.    Babinski: Negative bilaterally.    Co-ordination: Normal finger-to-nose, heel to shin B/L.    Rhomberg: Negative.    Gait: Normal.    Cardiovascular: Regular rate and rhythm without murmur, gallop or rub.    Assessment and Plan:  1. Cervical radiculopathy at C6  2. Fibromyalgia  -Overall, symptoms of cervical radiculopathy and fibromyalgia are under good control.  Continue with gabapentin 400 mg total daily dose and Cymbalta 20 mg twice daily.  Continue to maintain good hydration  and continue to use Campbell for balance and walking.  I will plan to see her back in clinic in 6 months for follow-up.  - gabapentin (Neurontin) 100 MG capsule; Take 1 capsule by mouth Every Morning.  Dispense: 30 capsule; Refill: 6  - gabapentin (Neurontin) 300 MG capsule; Take 1 capsule by mouth 1 (One) Time for 1 dose.  Dispense: 30 capsule; Refill: 6       I spent 30 minutes in patient care: Reviewing records prior to the visit, entering orders and documentation and spent more than diggs 50% of this time face-to-face in management, instructions and education regarding above mentioned diagnosis and also on counseling and discussing about taking medication regularly, possible side effects with medication use, importance of good sleep hygiene, good hydration and regular exercise.    Return in about 6 months (around 3/7/2023).

## 2022-09-08 PROBLEM — E78.5 HYPERLIPIDEMIA LDL GOAL <100: Status: ACTIVE | Noted: 2022-02-17

## 2022-09-08 NOTE — PROGRESS NOTES
OFFICE VISIT  NOTE  [facility] CARDIOLOGY [place]      Name: Alysia Sierra    Date: 2022  MRN:  [id number]  :  1955      REFERRING/PRIMARY PROVIDER:  Liana So APRN    Chief Complaint   Patient presents with   • PSVT       HPI: Alysia Sierra is a 67 y.o. female who presents today for follow-up for chest pain, shortness of breath, paroxysmal SVT. Patient history of iron deficiency anemia, unexplained weight loss, chronic kidney disease stage III, hypertension, hyperlipidemia, generalized anxiety disorder. Stress test and echo benign 2019 at [facility].    Still with labile blood pressure but better, not taking hydralazine some days due to dizziness lightheadedness, she has had a couple falls, mainly when she stands up from a seated position.    Past Medical History:   Diagnosis Date   • Allergic lisinopril     • Anemia    • Arrhythmia    • Cancer (HCC) Cervical Dysplasia   • Cataract mild    • Chicken pox    • Chronic fatigue    • Depression mild    • Difficulty walking    • NIEVES (dyspnea on exertion)    • Generalized anxiety disorder    • GERD (gastroesophageal reflux disease) 2018   • Hyperlipidemia    • Hypertension    • Iron deficiency anemia    • Liver disease     fatty   • Liver problem    • Measles    • Menopause    • Mumps    • Neuromuscular disorder (HCC) Peripheral Neuropathy   • Renal insufficiency    • Scoliosis    • Unintentional weight loss    • UTI (urinary tract infection)    • Visual impairment Nearsighted       Past Surgical History:   Procedure Laterality Date   •  SECTION     • DILATION AND CURETTAGE, DIAGNOSTIC / THERAPEUTIC     • ESSURE TUBAL LIGATION     • ORAL LESION EXCISION/BIOPSY  2021   • TUBAL ABDOMINAL LIGATION         Social History     Socioeconomic History   • Marital status:    • Number of children: 1   • Highest education level: Associate degree: academic program   Tobacco Use   •  Smoking status: Never Smoker   • Smokeless tobacco: Never Used   • Tobacco comment: Never   Vaping Use   • Vaping Use: Never used   Substance and Sexual Activity   • Alcohol use: Yes     Alcohol/week: 6.0 standard drinks     Types: 6 Glasses of wine per week     Comment: social   • Drug use: No   • Sexual activity: Not Currently     Partners: Male     Birth control/protection: Post-menopausal, Tubal ligation       Family History   Problem Relation Age of Onset   • COPD Mother    • Stroke Mother    • Hypertension Mother    • Lung cancer Father    • Hypertension Father    • Heart attack Father    • Coronary artery disease Father    • Heart disease Father    • Thyroid disease Father    • Cancer Father    • Hyperlipidemia Sister    • Spondylolisthesis Sister    • Rheum arthritis Sister    • Malig Hypertension Sister    • Osteoarthritis Sister    • Other Sister         alcoholic   • Hypertension Sister    • Hypertension Brother         ROS:   Constitutional no fever,  no weight loss   Skin no rash, no subcutaneous nodules   Otolaryngeal no difficulty swallowing   Cardiovascular See HPI   Pulmonary no cough, no sputum production   Gastrointestinal no constipation, no diarrhea   Genitourinary no dysuria, no hematuria   Hematologic no easy bruisability, no abnormal bleeding   Musculoskeletal no muscle pain   Neurologic no dizziness, no falls         Allergies   Allergen Reactions   • Lisinopril Angioedema   • Milk-Related Compounds Other (See Comments)     LACTOSE INTOLERANT   • Atorvastatin Myalgia         Current Outpatient Medications:   •  aspirin 325 MG tablet, Take 325 mg by mouth As Needed for Mild Pain ., Disp: , Rfl:   •  Cyanocobalamin 1000 MCG/ML kit, Inject 1,000mcg SC daily x1 week, then weekly x4 weeks. (Patient taking differently: monthly), Disp: 1 kit, Rfl: 0  •  DULoxetine (Cymbalta) 20 MG capsule, Take 1 capsule by mouth 2 (Two) Times a Day., Disp: 60 capsule, Rfl: 2  •  esomeprazole (nexIUM) 20 MG  "capsule, Take 20 mg by mouth 2 (Two) Times a Week., Disp: , Rfl:   •  gabapentin (Neurontin) 100 MG capsule, Take 1 capsule by mouth Every Morning., Disp: 30 capsule, Rfl: 6  •  gabapentin (Neurontin) 300 MG capsule, Take 1 capsule by mouth 1 (One) Time for 1 dose. (Patient taking differently: Take 300 mg by mouth Every Night.), Disp: 30 capsule, Rfl: 6  •  hydrALAZINE (APRESOLINE) 25 MG tablet, 25 mg 2 (Two) Times a Day., Disp: , Rfl:   •  levothyroxine (SYNTHROID, LEVOTHROID) 25 MCG tablet, Take 1 tablet by mouth Daily. Take in the morning on empty stomach., Disp: 30 tablet, Rfl: 2  •  magnesium oxide (MAGOX) 400 (241.3 Mg) MG tablet tablet, Take 400 mg by mouth Daily., Disp: , Rfl:   •  metoprolol succinate XL (TOPROL-XL) 25 MG 24 hr tablet, Take 1 tablet by mouth Every Night., Disp: 90 tablet, Rfl: 1  •  nitroglycerin (NITROSTAT) 0.4 MG SL tablet, 1 under the tongue as needed for angina, may repeat q5mins for up three doses, Disp: 100 tablet, Rfl: 11  •  ondansetron (ZOFRAN) 4 MG tablet, Take 4 mg by mouth As Needed for Nausea or Vomiting., Disp: , Rfl:   •  rosuvastatin (Crestor) 5 MG tablet, Take 1 tablet by mouth Daily., Disp: 90 tablet, Rfl: 1    Vitals:    09/12/22 1038   BP: 102/62   BP Location: Right arm   Patient Position: Sitting   Pulse: 99   SpO2: 99%   Weight: 59 kg (130 lb)   Height: 163.8 cm (64.5\")     Body mass index is 21.97 kg/m².    PHYSICAL EXAM:    General Appearance:   · Thin, frail  HENT:   · oropharynx moist  · lips not cyanotic  Neck:  · thyroid not enlarged  · supple  Respiratory:  · no respiratory distress  · normal breath sounds  · no rales  Cardiovascular:  · no jugular venous distention  · regular rhythm  · apical impulse normal  · S1 normal, S2 normal  · no S3, no S4   · no murmur  · no rub, no thrill  · carotid pulses normal; no bruit  · pedal pulses normal  · lower extremity edema: none    Gastrointestinal:   · bowel sounds normal  · non-tender  · no hepatomegaly, no " splenomegaly  Musculoskeletal:  · no clubbing of fingers.   · normocephalic, head atraumatic  Skin:   · warm, dry  Psychiatric:  · judgement and insight appropriate  · normal mood and affect    RESULTS:     ECG 12 Lead    Date/Time: 9/12/2022 11:07 AM  Performed by: Gray Bahena MD  Authorized by: Gray Bahena MD   Comparison: compared with previous ECG from 3/9/2020  Similar to previous ECG  Rhythm: sinus rhythm  Ectopy: unifocal PVCs  Rate: normal  QRS axis: normal    Clinical impression: non-specific ECG            Results for orders placed during the hospital encounter of 06/04/19    Adult Transthoracic Echo Complete W/ Cont if Necessary Per Protocol    Interpretation Summary  · Calculated EF = 55.0%  · Left ventricular systolic function is normal.  · Left ventricular diastolic dysfunction (grade I) consistent with impaired relaxation.  · Mild tricuspid valve regurgitation is present.  · Mild pulmonic valve regurgitation is present.        Labs:  Lab Results   Component Value Date    CHOL 221 (H) 08/26/2022    TRIG 231 (H) 08/26/2022    HDL 84 (H) 08/26/2022    LDL 99 08/26/2022    AST 40 (H) 07/13/2021    ALT 16 07/13/2021     No results found for: HGBA1C  No components found for: CREATINININE  eGFR Non  Am   Date Value Ref Range Status   07/13/2021 32 (L) >60 mL/min/1.73m*2 Final     Comment:     eGFR = estimated GFR; eGFR units = mL/min/1.73 sq meters Chronic Kidney Disease is considered if eGFR <60 mL/min/1.73 sq meters Kidney failure is considered if eGFR is <15 mL/min/1.73 sq meters. eGFR assumes steady state plasma creatinine concentration; not applicable if renal function is rapidly changing or patient is on dialysis.         ASSESSMENT:  Problem List Items Addressed This Visit        Cardiac and Vasculature    Hypertension    Paroxysmal SVT (supraventricular tachycardia) (HCC) - Primary    Overview     06/2019 Holter: 8 runs of SVT         Hyperlipidemia LDL goal <100        Genitourinary and Reproductive     CKD (chronic kidney disease) stage 3, GFR 30-59 ml/min ([facility])       Symptoms and Signs    Syncope    Overview     6/4/19 Echo: EF 55%, grade I impaired relaxation  6/4/19 Low risk stress test               PLAN:    1.  Orthostatic hypotension with syncope:  Reviewed echo and Holter and stress test all within the last 12 months  All benign  Gave handout on orthostatic hypotension and counter maneuvers  Compression stockings recommended  Increase hydration    2.  Bilateral leg weakness:  Ongoing work-up by neurology    3.  Hypertension:  Currently doing very well, goal blood pressure less than 130/80  Home blood pressure readings all consistent with goal  Continue hydralazine and metoprolol    4.  Paroxysmal SVT:  Continue Toprol 25 mg daily, unfortunately we cannot stop it as her blood pressure would likely go higher and she would have more symptomatic palpitations.      Asymptomatic currently  Continue decreasing caffeine and increase water    5.  CKD stage III:  Complicates all aspects of care  She reports recent nephrology visit, with improving renal function and potassium    Return to clinic in 12 months, or sooner as needed.    Thank you for the opportunity to share in the care of your patient; please do not hesitate to call me with any questions.     Gray Bahena MD, FACC  Office: [phone]  [facility] [place] [address]

## 2022-09-12 ENCOUNTER — OFFICE VISIT (OUTPATIENT)
Dept: CARDIOLOGY | Facility: CLINIC | Age: 67
End: 2022-09-12

## 2022-09-12 VITALS
WEIGHT: 130 LBS | OXYGEN SATURATION: 99 % | BODY MASS INDEX: 21.66 KG/M2 | SYSTOLIC BLOOD PRESSURE: 102 MMHG | HEIGHT: 65 IN | DIASTOLIC BLOOD PRESSURE: 62 MMHG | HEART RATE: 99 BPM

## 2022-09-12 DIAGNOSIS — E78.5 HYPERLIPIDEMIA LDL GOAL <100: ICD-10-CM

## 2022-09-12 DIAGNOSIS — I10 PRIMARY HYPERTENSION: ICD-10-CM

## 2022-09-12 DIAGNOSIS — I47.1 PAROXYSMAL SVT (SUPRAVENTRICULAR TACHYCARDIA): Primary | ICD-10-CM

## 2022-09-12 DIAGNOSIS — R55 SYNCOPE, UNSPECIFIED SYNCOPE TYPE: ICD-10-CM

## 2022-09-12 DIAGNOSIS — N18.31 STAGE 3A CHRONIC KIDNEY DISEASE: ICD-10-CM

## 2022-09-12 PROCEDURE — 99214 OFFICE O/P EST MOD 30 MIN: CPT | Performed by: INTERNAL MEDICINE

## 2022-09-12 PROCEDURE — 93000 ELECTROCARDIOGRAM COMPLETE: CPT | Performed by: INTERNAL MEDICINE

## 2022-09-14 ENCOUNTER — TELEPHONE (OUTPATIENT)
Dept: BEHAVIORAL HEALTH | Facility: CLINIC | Age: 67
End: 2022-09-14

## 2022-09-14 NOTE — TELEPHONE ENCOUNTER
JAYDEN CALLED 9-14-22 @12:31PM STATING THAT TWO REFERRALS SHE WAS GIVEN TO FROM [facility] WERE NOT GOING TO WORK OUT FOR HER AS ONE WAS TOO FAR AND ONE DIDN'T TAKE WELLCARE.  SHE WONDERED IF THERE WERE ANY OTHER OPTIONS OR PEOPLE SHE COULD BE REFERRED TO AND IS ALSO GOING TO CALL [facility] TO SEE WHO MIGHT BE UNDER THEIR UMBRELLA OF COVERAGE.  PLEASE CALL HER BACK WITH ANY INFO     [phone]

## 2022-09-15 NOTE — TELEPHONE ENCOUNTER
Called and informed patient to call [facility] for a list of approved providers.

## 2022-09-24 NOTE — PROGRESS NOTES
Saline Infusion Sonogram     Date of procedure:  September 29, 2022     Risks and benefits were discussed.  All questions were answered.  Consents given by the patient verbally.     Pre-op indication:  1. Post-menopausal bleeding  2. Inconclusive TV ultrasound  3. Rosa Maria Pathak patient     Procedure documentation:    After the patient was identified and informed consent given she was placed in dorsal lithotomy position in stirrups and draped. A sterile speculum was placed inside the vagina with good visualization of the cervix and the cervix was cleaned with Betadine swabs.  The cervix was grasped with a single-tooth tenaculum.  A balloon catheter was introduced through the cervix without complication and inflated. The speculum was removed. The uterine cavity was then evaluated with transvaginal ultrasonography while saline was being instilled.    The findings were as follows:  · Focal lesions were present.  There is an irregularly shaped prominent lesion from the posterior endometrial surface    The balloon was then released and the cavity was then drained of saline and the catheter was removed. Scant bleeding was noted from the cervical lip and the procedure was completed. The patient tolerated the procedure well and was given follow-up instructions.      Impression: 1. Abnormal posterior endometrial lesion highly concerning for endometrial neoplasia   Plan: 1.  Needs endometrial sampling     This note was electronically signed.    Markell Farmer M.D.  September 29, 2022

## 2022-09-29 ENCOUNTER — OFFICE VISIT (OUTPATIENT)
Dept: OBSTETRICS AND GYNECOLOGY | Facility: CLINIC | Age: 67
End: 2022-09-29

## 2022-09-29 DIAGNOSIS — N95.0 POSTMENOPAUSAL VAGINAL BLEEDING: Primary | ICD-10-CM

## 2022-09-29 PROCEDURE — 58340 CATHETER FOR HYSTEROGRAPHY: CPT | Performed by: OBSTETRICS & GYNECOLOGY

## 2022-09-29 RX ORDER — MISOPROSTOL 200 UG/1
TABLET ORAL
Qty: 6 TABLET | Refills: 0 | Status: SHIPPED | OUTPATIENT
Start: 2022-09-29 | End: 2022-10-13

## 2022-10-02 NOTE — PROGRESS NOTES
Endometrial Biopsy    Date of procedure:  10/2/2022    Procedure documentation:    The cervix was grasped anterior at the 12 o'clock position.  Dilation needed to be done to pass the pipelle.  The cavity sounded to 8 centimeters.  An endometrial biopsy specimen was obtained and multiple passes.  The tissue was sent for permanent histopathologic evaluation.  Tenaculum was removed from the cervix with scant bleeding.    Post procedure instructions: She was instructed to call us in 1 week's time if she has not heard from us otherwise.  If there is any significant fever or excessive bleeding or pain she is to call immediately.    Follow up pending biopsy.   If biopsy (-) needs HSC with Myosure to include false (-) EMBx     This note was electronically signed.    Markell Farmer M.D.  October 2, 2022

## 2022-10-06 ENCOUNTER — OFFICE VISIT (OUTPATIENT)
Dept: OBSTETRICS AND GYNECOLOGY | Facility: CLINIC | Age: 67
End: 2022-10-06

## 2022-10-06 VITALS — RESPIRATION RATE: 14 BRPM | WEIGHT: 128 LBS | BODY MASS INDEX: 21.63 KG/M2

## 2022-10-06 DIAGNOSIS — N95.0 POSTMENOPAUSAL VAGINAL BLEEDING: Primary | ICD-10-CM

## 2022-10-06 PROCEDURE — 58100 BIOPSY OF UTERUS LINING: CPT | Performed by: OBSTETRICS & GYNECOLOGY

## 2022-10-10 ENCOUNTER — TELEPHONE (OUTPATIENT)
Dept: OBSTETRICS AND GYNECOLOGY | Facility: CLINIC | Age: 67
End: 2022-10-10

## 2022-10-10 DIAGNOSIS — C54.1 ENDOMETRIAL ADENOCARCINOMA: Primary | ICD-10-CM

## 2022-10-10 LAB — REF LAB TEST METHOD: NORMAL

## 2022-10-10 NOTE — TELEPHONE ENCOUNTER
Was able to call and speak to patient and let her know about the endometrial biopsy results.  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  Explained that this grade of tumor is often prognostically a good thing.  Explained that staging would be done at the time of surgery.  Referral has been set up with Dr. Rich.  I told her she did not hear from their office within the week to give my office a call so we could confirm that the appointment has been made.

## 2022-10-10 NOTE — TELEPHONE ENCOUNTER
Call patient in an effort to try to give her pathology results.  Left message on identified voicemail that I will call her back later today.

## 2022-10-11 NOTE — PROGRESS NOTES
New Patient Office Visit      Patient Name: Alysia Sierra  : 1955   MRN: [id number]     Referring Physician: Markell Farmer MD    Chief Complaint:  Endometrial cancer    History of Present Illness: Alysia Sierra is a 67 y.o. female who is here today as a consultation with gynecologic oncology for a new diagnosis of endometrial cancer. She reported new onset postmenopausal bleeding.  She was seen by Rosa Maria Pathak and transvaginal ultrasounds performed that demonstrated thickened endometrium.  Dr. Farmer' saline infusion sonogram was then performed that demonstrated a irregular lesion suspicious for endometrial cancer.  Endometrial cancer was then confirmed on an endometrial biopsy.  The patient presents today to discuss next steps.    Oncologic History:  Oncology/Hematology History   Endometrial adenocarcinoma (HCC)   2022 Initial Diagnosis    2022: TVUS with uterus measuring 5.7 x 4.8 x 4.1 cm with an endometrial stripe thickness of 9.8 mm.  Right ovary not visualized.  Left ovary normal.  No free fluid.  2022: Saline infusion sonogram with an irregularly shaped prominent lesion on the posterior endometrial surface concerning for neoplasia.  10/6/2022: Endometrial biopsy with FIGO grade 1 endometrial cancer           Past Medical History:   Past Medical History:   Diagnosis Date   • Allergic lisinopril  2017   • Anemia    • Arrhythmia    • Cancer (HCC) Cervical Dysplasia   • Cataract mild    • Chicken pox    • Chronic fatigue    • Depression mild 2019   • Difficulty walking 2019   • NIEVES (dyspnea on exertion)    • Generalized anxiety disorder    • GERD (gastroesophageal reflux disease) 2018   • Hyperlipidemia    • Hypertension    • Iron deficiency anemia    • Liver disease     fatty   • Liver problem    • Measles    • Menopause    • Mumps    • Neuromuscular disorder (HCC) Peripheral Neuropathy   • Renal insufficiency 2018   • Scoliosis    • Unintentional weight loss    •  UTI (urinary tract infection)    • Visual impairment Nearsighted       Past Surgical History:   Past Surgical History:   Procedure Laterality Date   •  SECTION     • DILATION AND CURETTAGE, DIAGNOSTIC / THERAPEUTIC     • ESSURE TUBAL LIGATION     • ORAL LESION EXCISION/BIOPSY  2021   • TUBAL ABDOMINAL LIGATION         Family History:   Family History   Problem Relation Age of Onset   • Lung cancer Father    • Hypertension Father    • Heart attack Father    • Coronary artery disease Father    • Heart disease Father    • Cancer Father    • COPD Mother    • Stroke Mother    • Hypertension Mother    • Hypertension Brother    • Hyperlipidemia Sister    • Spondylolisthesis Sister    • Rheum arthritis Sister    • Malig Hypertension Sister    • Osteoarthritis Sister    • Other Sister         alcoholic   • Hypertension Sister    • Breast cancer Neg Hx    • Ovarian cancer Neg Hx    • Uterine cancer Neg Hx    • Colon cancer Neg Hx    • Melanoma Neg Hx    • Prostate cancer Neg Hx        Social History:   Social History     Socioeconomic History   • Marital status:    • Number of children: 1   • Highest education level: Associate degree: academic program   Tobacco Use   • Smoking status: Never   • Smokeless tobacco: Never   • Tobacco comments:     Never   Vaping Use   • Vaping Use: Never used   Substance and Sexual Activity   • Alcohol use: Yes     Alcohol/week: 6.0 standard drinks     Types: 6 Glasses of wine per week     Comment: social   • Drug use: No   • Sexual activity: Not Currently     Partners: Male     Birth control/protection: Post-menopausal, Tubal ligation       Past OB/GYN History:   OB History   No obstetric history on file.   She has a remote history of abnormal Paps with prior conization and LEEP.     Health Maintenance:   Mammogram: Date:  Results: normal per patient  Colonoscopy: never had; Cologard  - normal     Medications:     Current Outpatient Medications:   •  aspirin  325 MG tablet, Take 325 mg by mouth As Needed for Mild Pain ., Disp: , Rfl:   •  Cyanocobalamin 1000 MCG/ML kit, Inject 1,000mcg SC daily x1 week, then weekly x4 weeks. (Patient taking differently: Every 30 (Thirty) Days. monthly), Disp: 1 kit, Rfl: 0  •  DULoxetine (Cymbalta) 20 MG capsule, Take 1 capsule by mouth 2 (Two) Times a Day., Disp: 60 capsule, Rfl: 2  •  esomeprazole (nexIUM) 20 MG capsule, Take 20 mg by mouth 2 (Two) Times a Week., Disp: , Rfl:   •  gabapentin (Neurontin) 100 MG capsule, Take 1 capsule by mouth Every Morning., Disp: 30 capsule, Rfl: 6  •  gabapentin (Neurontin) 300 MG capsule, Take 1 capsule by mouth 1 (One) Time for 1 dose. (Patient taking differently: Take 1 capsule by mouth Every Night.), Disp: 30 capsule, Rfl: 6  •  hydrALAZINE (APRESOLINE) 25 MG tablet, 25 mg 2 (Two) Times a Day., Disp: , Rfl:   •  levothyroxine (SYNTHROID, LEVOTHROID) 25 MCG tablet, Take 1 tablet by mouth Daily. Take in the morning on empty stomach., Disp: 30 tablet, Rfl: 2  •  magnesium oxide (MAGOX) 400 (241.3 Mg) MG tablet tablet, Take 400 mg by mouth Daily., Disp: , Rfl:   •  metoprolol succinate XL (TOPROL-XL) 25 MG 24 hr tablet, Take 1 tablet by mouth Every Night., Disp: 90 tablet, Rfl: 1  •  nitroglycerin (NITROSTAT) 0.4 MG SL tablet, 1 under the tongue as needed for angina, may repeat q5mins for up three doses, Disp: 100 tablet, Rfl: 11  •  ondansetron (ZOFRAN) 4 MG tablet, Take 4 mg by mouth As Needed for Nausea or Vomiting., Disp: , Rfl:   •  rosuvastatin (Crestor) 5 MG tablet, Take 1 tablet by mouth Daily., Disp: 90 tablet, Rfl: 1    Allergies:   Allergies   Allergen Reactions   • Lisinopril Angioedema   • Milk-Related Compounds Other (See Comments)     LACTOSE INTOLERANT   • Atorvastatin Myalgia       Review of Systems:   As per HPI. She has weakness in her legs due to neuropathy.    Objective     Physical Exam:  Vital Signs:   Vitals:    10/13/22 0903   BP: 127/71   Pulse: 90   Resp: 15   Temp: 97.3 °F  "(36.3 °C)   SpO2: 96%   Weight: 57.6 kg (127 lb)   Height: 165.1 cm (65\")   PainSc: 0-No pain     BMI: Body mass index is 21.13 kg/m².   ECOG score: 0           PHQ-2 Depression Screening  Little interest or pleasure in doing things?     Feeling down, depressed, or hopeless?     PHQ-2 Total Score       Physical Examination:   General appearance - alert, well appearing, and in no distress and oriented to person, place, and time  Mental status - normal mood, behavior, speech, dress, motor activity, and thought processes  Eyes - sclera anicteric, left eye normal, right eye normal  Ears - right ear normal, left ear normal  Nose - mask  Mouth - mask  Lymphatics - no palpable lymphadenopathy, no hepatosplenomegaly  Lungs - normal respiratory effort, clear to auscultation  Heart - normal rate, regular rhythm, normal S1, S2, no murmurs, rubs, clicks or gallops  Abdomen - soft, nontender, nondistended, no masses or organomegaly  Pelvic - external genitalia normal, vagina without discharge, cervix normal and without lesions, uterus small and mobile, adnexa without masses  Neurological - alert, oriented, normal speech, no focal findings or movement disorder noted  Musculoskeletal - no joint tenderness, deformity or swelling  Extremities - peripheral pulses normal, no pedal edema, no clubbing or cyanosis  Skin - normal coloration and turgor, no rashes, no suspicious skin lesions noted     Labs:  Lab Results   Component Value Date    WBC 6.95 08/26/2022    HGB 12.6 08/26/2022    HCT 37.1 08/26/2022    MCV 94.9 08/26/2022     08/26/2022       Lab Results   Component Value Date    BUN 28 (H) 07/13/2021    CREATININE 1.63 (H) 07/13/2021    EGFRIFNONA 32 (L) 07/13/2021    EGFRIFAFRI 38 (L) 07/13/2021    BCR 17 07/13/2021    K 4.3 07/13/2021    CO2 25 07/13/2021    CALCIUM 10.5 (H) 07/13/2021    ALBUMIN 5.3 (H) 07/13/2021    AST 40 (H) 07/13/2021    ALT 16 07/13/2021     Pathology:  10/6/2022: EMB with FIGO grade 1 " endometrioid adenocarcinoma, MMR intact    Imaging: Images directly reviewed by me and I agree with impression  8/22/2022: TVUS with uterus measuring 5.7 x 4.8 x 4.1 cm with an endometrial stripe thickness of 9.8 mm.  Right ovary not visualized.  Left ovary normal.  No free fluid.  9/29/2022: Saline infusion sonogram with an irregularly shaped prominent lesion on the posterior endometrial surface concerning for neoplasia.    Assessment / Plan    Alysia Sierra is a 67 y.o. year old female who is recently diagnosed with FIGO grade 1 endometrial cancer. We discussed surgical management via robotic hysterectomy, bilateral salpingo-oophorectomy with sentinel lymph node evaluation +/- complete lymphadenectomy as indicated. The patient was also counseled extensively on the nature of endometrial cancer and the fact that a majority are found in the early stages. However, if more advanced disease is encountered, she may require chemotherapy, radiation or a combination of the two. We also discussed risks of the procedure including bleeding, infection, wound breakdown, blood clots, injury to surrounding organs including the bowel, bladder, blood vessels, nerves, and ureters requiring additional intervention or procedures. We also discussed the risk of development of lymphedema particularly if a complete lymphadenectomy is needed. and the need for a laparotomy if the surgery cannot be performed safely via the minimally invasive approach. We also discussed the anticipated hospital stay and recovery time. All of the patient's questions were answered satisfactorily and verbal consent was obtained.    Paroxysmal SVT, hypertension: Follows with cardiology. Okay to continue metoprolol 25 mg and hydralazine 25 mg on day of surgery.  Hypothyroidism: Continue levothyroxine 25 mcg daily.   CKD, stage 3: Plan to avoid nephrotoxic agents including NSAIDs postoperatively. Follows with nephrology.   Neuropathy: Pre-existing. Requires  rollator or cane due to balance issues. Continue home gabapentin. Plan to admit overnight after surgery due to mobility concerns.    Pain assessment was performed today as a part of patient’s care.  For patients with pain related to surgery, gynecologic malignancy or cancer treatment, the plan is as noted in the assessment/plan.  For patients with pain not related to these issues, they are to seek any further needed care from a more appropriate provider, such as PCP.      Diagnoses and all orders for this visit:    1. Paroxysmal SVT (supraventricular tachycardia) (HCC) (Primary)    2. Endometrial adenocarcinoma (HCC)    3. Primary hypertension    4. Neuropathy    5. Stage 3a chronic kidney disease (HCC)    6. Acquired hypothyroidism         Follow Up: Surgery    I spent 60 minutes caring for Alysia on this date of service. This time includes time spent by me in the following activities: preparing for the visit, reviewing tests, performing a medically appropriate examination and/or evaluation, counseling and educating the patient/family/caregiver, ordering medications, tests, or procedures, referring and communicating with other health care professionals, documenting information in the medical record and care coordination      ROXIE Rich MD  Gynecologic Oncology

## 2022-10-13 ENCOUNTER — PATIENT ROUNDING (BHMG ONLY) (OUTPATIENT)
Dept: GYNECOLOGIC ONCOLOGY | Facility: CLINIC | Age: 67
End: 2022-10-13

## 2022-10-13 ENCOUNTER — OFFICE VISIT (OUTPATIENT)
Dept: GYNECOLOGIC ONCOLOGY | Facility: CLINIC | Age: 67
End: 2022-10-13

## 2022-10-13 ENCOUNTER — TELEPHONE (OUTPATIENT)
Dept: GYNECOLOGIC ONCOLOGY | Facility: CLINIC | Age: 67
End: 2022-10-13

## 2022-10-13 VITALS
DIASTOLIC BLOOD PRESSURE: 71 MMHG | HEART RATE: 90 BPM | HEIGHT: 65 IN | SYSTOLIC BLOOD PRESSURE: 127 MMHG | OXYGEN SATURATION: 96 % | TEMPERATURE: 97.3 F | BODY MASS INDEX: 21.16 KG/M2 | RESPIRATION RATE: 15 BRPM | WEIGHT: 127 LBS

## 2022-10-13 DIAGNOSIS — G62.9 NEUROPATHY: ICD-10-CM

## 2022-10-13 DIAGNOSIS — I47.1 PAROXYSMAL SVT (SUPRAVENTRICULAR TACHYCARDIA): Primary | ICD-10-CM

## 2022-10-13 DIAGNOSIS — I10 PRIMARY HYPERTENSION: ICD-10-CM

## 2022-10-13 DIAGNOSIS — C54.1 ENDOMETRIAL ADENOCARCINOMA: ICD-10-CM

## 2022-10-13 DIAGNOSIS — N18.31 STAGE 3A CHRONIC KIDNEY DISEASE: ICD-10-CM

## 2022-10-13 DIAGNOSIS — E03.9 ACQUIRED HYPOTHYROIDISM: ICD-10-CM

## 2022-10-13 PROCEDURE — 99205 OFFICE O/P NEW HI 60 MIN: CPT | Performed by: OBSTETRICS & GYNECOLOGY

## 2022-10-13 RX ORDER — OXYCODONE HYDROCHLORIDE 5 MG/1
5 TABLET ORAL ONCE
Status: CANCELLED | OUTPATIENT
Start: 2022-10-13 | End: 2022-10-13

## 2022-10-13 RX ORDER — GABAPENTIN 100 MG/1
300 CAPSULE ORAL ONCE
Status: CANCELLED | OUTPATIENT
Start: 2022-10-13 | End: 2022-10-13

## 2022-10-13 RX ORDER — SODIUM CHLORIDE, SODIUM LACTATE, POTASSIUM CHLORIDE, CALCIUM CHLORIDE 600; 310; 30; 20 MG/100ML; MG/100ML; MG/100ML; MG/100ML
100 INJECTION, SOLUTION INTRAVENOUS CONTINUOUS
Status: CANCELLED | OUTPATIENT
Start: 2022-10-13

## 2022-10-13 RX ORDER — HEPARIN SODIUM 5000 [USP'U]/ML
5000 INJECTION, SOLUTION INTRAVENOUS; SUBCUTANEOUS ONCE
Status: CANCELLED | OUTPATIENT
Start: 2022-10-13 | End: 2022-10-13

## 2022-10-13 RX ORDER — SODIUM CHLORIDE 0.9 % (FLUSH) 0.9 %
10 SYRINGE (ML) INJECTION AS NEEDED
Status: CANCELLED | OUTPATIENT
Start: 2022-10-13

## 2022-10-13 RX ORDER — ACETAMINOPHEN 325 MG/1
1000 TABLET ORAL ONCE
Status: CANCELLED | OUTPATIENT
Start: 2022-10-13 | End: 2022-10-13

## 2022-10-13 RX ORDER — SODIUM CHLORIDE 0.9 % (FLUSH) 0.9 %
3 SYRINGE (ML) INJECTION EVERY 12 HOURS SCHEDULED
Status: CANCELLED | OUTPATIENT
Start: 2022-10-13

## 2022-10-13 NOTE — TELEPHONE ENCOUNTER
Caller: JAYDEN    Relationship to patient: SELF    Best call back number: [phone]    Patient is needing: TO SPEAK WITH SOMEONE ABOUT ORDERS ON AVS.

## 2022-10-13 NOTE — PATIENT INSTRUCTIONS
"                           Surgery Instructions            Alysia Sierra  [id number]  1955      SURGEON:  Jasmine Rich MD    Surgery Coordinator:      If you have any questions before or after surgery please call.  [phone]        Appointment      1. You have pre-admission testing (PAT) appointment on 10/24/2022  at 01:15 pm You will need to be at hospital registration 10 minutes before that time. The registration department is located in the long hallway between the [facility] and [address] [place] [address]. This is on the first floor of the [facility] hospital you can enter through the [address] [place] [address].      2.  Your surgery has been scheduled on 10/28/2022.  You will need to go to [facility] to check in at 06:00 am They will then send up to the [address] [place] [address] second floor surgery area.    The Day(s) Before Surgery      Nothing by mouth after midnight on 10/27/2022    Plan to have someone take you home from the hospital.    Do not use any products that contain nicotine or tobacco, such as cigarettes and e-cigarettes. These can delay healing after surgery. If you need help quitting, ask your health care provider.     Do not take vitamins or aspirin one week before surgery ( if applicable). Do not take Ibuprofen or NSAIDs 5 days prior to Surgery. Do not take ACE or ARB medications for blood pressure the morning of surgery. If you are taking insulin, take 1/2 dose insulin the night prior to surgery.   Bring them with you to the hospital (Diabetic patient should bring insulin if instructed by the managing physician). If you are taking a blood thinner ( Eliquis, Coumadin, Xarelto, Lovenox, Asprin, Heparin, etc.) please have the provider that manages this instruct you on when to stop taking prior to surgery.     Continue antidepressants, Beta Blockers \"olol\", anti-seizure medication, GERD medication (heartburn), Opioids and Parkinson's medication.       If you are feeling sick, have a fever or cough and have seen your PCP let " our office know 48 hours prior to surgery. It may be subject to rescheduling.         The Day of Surgery:    Do not chew gum or tobacco, smoke, or eat mints or hard candy. Shower and wash your hair. You may brush your teeth but  do not swallow water. Use any wipes that Pre-admission testing has given you.     Please arrive for surgery as instructed by the pre-op nurse, often one to two hours before your surgery.     Remove all jewelry, including rings and piercings. Do not bring valuables to the hospital.     Wear loose-fitting clothing.     Avoid wearing eye makeup or contact lenses     Please remember to bring:  ? Photo ID and medical insurance card  ? Advanced directives, living will or power of  (if applicable)  ? Current list of all medications, including over-the- counter and herbal supplements  ? List of allergies  ? CPAP device if you have sleep apnea  ? Any assistive devices or equipment needed after surgery  ? Books/magazines to pass the time     When you arrive, check in at the surgery registration desk on the second floor of the [address] [place] [address].      Once you are called to go to your pre-op room, no one will be allowed in the pre op room.     Please note no one under age 12 is permitted to stay in the waiting area without supervision.    We make every effort to begin surgery at your scheduled start time but delays do occur. We will keep you and your family  updated about any delays.    While You are In the Pre-Op Room:   The nurse will review your health history and will place an IV (into the vein) in your hand or arm for fluids and  medicines.   An anesthesia provider will talk with you about anesthesia and pain control during and after surgery.   A member of the surgical team can answer your questions.             What can I expect after the procedure?    After Surgery and/or Discharge:    After surgery you will be moved to the recovery area. Recovery and discharge times will vary depending on  the procedure.    The recovery room nurse will provide your family/visitors with updates. Visitors are not permitted in the immediate postoperative recovery area, but your visitors will be notified when they can come to see you after surgery.    If you will not be admitted to the hospital, your nurse will assess your readiness to go home. This includes your:     Ability to walk, use crutches, etc.   Ability to urinate adequate amounts   Nausea and pain control    Before discharge, you will receive written instructions about how to care for yourself at home along with any prescriptions  for medications.     For your safety, you will need a responsible adult age 18 or older to accompany you home.     Please have a ride arranged and available when you are ready to leave. You cannot leave alone and it is recommended someone stay with you for the first 24 hours after anesthesia.       After the procedure, it is common to have:    Pain.  Soreness and numbness in your incision areas.  Vaginal bleeding and discharge up to 6 weeks after surgery.  Constipation.  Temporary change in bladder function.  Feelings of sadness or other emotions.  Small amounts of clear drainage from incisions  If you are discharged with an abdominal binder, this is to be used as needed for incisional comfort. You may choose to discontinue use at any time.           Follow these instructions at home:  Medicines    Please take any medications that have been prescribed after your surgery, you may take over the counter Tylenol and Ibuprofen, unless told otherwise by your provider.   Please take your stool softener as prescribed. If you do not have a bowel movement within 24 hours following surgery try a laxative (milk of magnesia) or you may take Estephania-Lax.    Activity    Return to your normal activities as told by your health care provider.   You may be told to take short walks every day and go farther each time.  Do not lift anything that is heavier  than 20 lbs.      General instructions    Do not put anything in your vagina for 6 weeks after your surgery or as told by your health care provider. This includes tampons and douches.  Do not have sex until your health care provider says you can.  Do not take baths, swim, or use a hot tub until your health care provider approves.  Drink enough fluid to keep your urine clear or pale yellow.  Do not drive for 24 hours if you were given a sedative.  Do not drive while taking Narcotic Pain medication ( oxycodone, hydrocodone, etc.).   Keep all follow-up visits as told by your health care provider. This is important. You will have a post op appointment typically 3 weeks after surgery.  Make sure you are urinating on a scheduled basis, for example every 2 hours. This will help retrain your bladder after surgery and prevent urinary tract infections.     Contact a health care provider if:    Your pain medicine is not helping.  You have a fever over 101.0  You have redness, swelling, or pain at your incision site.  You continue to have difficulty urinating.  You have not had a bowel movement 2-3 days after surgery, experience nausea and vomiting, are unable to pass gas.   Large volumes of drainage or blood from incisions, requiring multiple guaze changes.     Get help right away if:    You have severe abdominal or back pain that is not controlled with medication.  You have heavy bleeding from your vagina that saturates a maxi pad within 1-2 hours. Note- vaginal bleeding and spotting is normal up to 6 weeks from a hysterectomy, Heavy Bleeding is not.     If you experience a medical emergency call 911 or have someone drive you to your nearest emergency department.         Parking Information:    Free parking is available for patients and guests. There is  parking at the [address] in front of the hospital. Parking is also available in the [facility] and [facility].         Financial Assistance:    If you have any  questions or need assistance, contact your [facility] financial counseling office from 8:30 a.m.-4:30  p.m. Monday through Friday. Closed weekends.     [place]: [phone] or, or visit at [address] [facility], [address], near the entrance.        Patient Payments and Correspondence    Customer service representatives are available to assist you from 8:00 a.m. to 6:00 p.m. Eastern Standard Time by calling [phone] Monday through Friday. You can also contact us through [employer].    [facility]  [address]  [place] [address] [phone]

## 2022-10-13 NOTE — PROGRESS NOTES
A My-Chart message has been sent to the patient for PATIENT ROUNDING with [facility].

## 2022-10-14 ENCOUNTER — CLINICAL SUPPORT (OUTPATIENT)
Dept: INTERNAL MEDICINE | Facility: CLINIC | Age: 67
End: 2022-10-14

## 2022-10-14 DIAGNOSIS — E53.8 LOW VITAMIN B12 LEVEL: Primary | ICD-10-CM

## 2022-10-14 PROCEDURE — 96372 THER/PROPH/DIAG INJ SC/IM: CPT | Performed by: NURSE PRACTITIONER

## 2022-10-14 RX ORDER — CYANOCOBALAMIN 1000 UG/ML
1000 INJECTION, SOLUTION INTRAMUSCULAR; SUBCUTANEOUS
Status: SHIPPED | OUTPATIENT
Start: 2022-10-14

## 2022-10-14 RX ADMIN — CYANOCOBALAMIN 1000 MCG: 1000 INJECTION, SOLUTION INTRAMUSCULAR; SUBCUTANEOUS at 13:08

## 2022-10-18 ENCOUNTER — TELEPHONE (OUTPATIENT)
Dept: GYNECOLOGIC ONCOLOGY | Facility: CLINIC | Age: 67
End: 2022-10-18

## 2022-10-18 NOTE — TELEPHONE ENCOUNTER
----- Message from Jamila Romero MA sent at 10/18/2022 10:15 AM EDT -----  Regarding: FW: Surgery    ----- Message -----  From: Gray Bahena MD  Sent: 10/14/2022   4:15 PM EDT  To: [facility] Clinical Pool  Subject: RE: Surgery                                      Low risk from cardiovascular standpoint.  ----- Message -----  From: Gina Gomes MA  Sent: 10/14/2022   2:57 PM EDT  To: Gray Bahena MD  Subject: Surgery                                          Hello Dr. Bahena.     This patient is having a robotic hysterectomy, bilateral salpingo-oophorectomy with sentinel lymph node evaluation +/- complete lymphadenectomy on 10/28/2022. Dr. Rich wanted to know since you have seen her recently what your perioperative risk would be for this procedure.       Have a great day.       Yudi

## 2022-10-19 DIAGNOSIS — E03.9 HYPOTHYROIDISM, UNSPECIFIED TYPE: ICD-10-CM

## 2022-10-19 RX ORDER — LEVOTHYROXINE SODIUM 0.03 MG/1
TABLET ORAL
Qty: 30 TABLET | Refills: 2 | Status: SHIPPED | OUTPATIENT
Start: 2022-10-19 | End: 2022-11-22 | Stop reason: SDUPTHER

## 2022-10-24 ENCOUNTER — HOSPITAL ENCOUNTER (OUTPATIENT)
Dept: GENERAL RADIOLOGY | Facility: HOSPITAL | Age: 67
Discharge: HOME | End: 2022-10-24

## 2022-10-24 ENCOUNTER — PRE-ADMISSION TESTING (OUTPATIENT)
Dept: PREADMISSION TESTING | Facility: HOSPITAL | Age: 67
End: 2022-10-24

## 2022-10-24 VITALS — WEIGHT: 128.31 LBS | BODY MASS INDEX: 21.38 KG/M2 | HEIGHT: 65 IN

## 2022-10-24 DIAGNOSIS — C54.1 ENDOMETRIAL ADENOCARCINOMA: ICD-10-CM

## 2022-10-24 LAB
ALBUMIN SERPL-MCNC: 4.3 G/DL (ref 3.5–5.2)
ALBUMIN/GLOB SERPL: 1.3 G/DL
ALP SERPL-CCNC: 92 U/L (ref 39–117)
ALT SERPL W P-5'-P-CCNC: 12 U/L (ref 1–33)
ANION GAP SERPL CALCULATED.3IONS-SCNC: 12 MMOL/L (ref 5–15)
AST SERPL-CCNC: 33 U/L (ref 1–32)
BASOPHILS # BLD AUTO: 0.03 10*3/MM3 (ref 0–0.2)
BASOPHILS NFR BLD AUTO: 0.4 % (ref 0–1.5)
BILIRUB SERPL-MCNC: 0.3 MG/DL (ref 0–1.2)
BUN SERPL-MCNC: 12 MG/DL (ref 8–23)
BUN/CREAT SERPL: 9.6 (ref 7–25)
CALCIUM SPEC-SCNC: 9.6 MG/DL (ref 8.6–10.5)
CHLORIDE SERPL-SCNC: 102 MMOL/L (ref 98–107)
CO2 SERPL-SCNC: 24 MMOL/L (ref 22–29)
CREAT SERPL-MCNC: 1.25 MG/DL (ref 0.57–1)
DEPRECATED RDW RBC AUTO: 49.1 FL (ref 37–54)
EGFRCR SERPLBLD CKD-EPI 2021: 47.6 ML/MIN/1.73
EOSINOPHIL # BLD AUTO: 0.15 10*3/MM3 (ref 0–0.4)
EOSINOPHIL NFR BLD AUTO: 2 % (ref 0.3–6.2)
ERYTHROCYTE [DISTWIDTH] IN BLOOD BY AUTOMATED COUNT: 13.2 % (ref 12.3–15.4)
GLOBULIN UR ELPH-MCNC: 3.2 GM/DL
GLUCOSE SERPL-MCNC: 89 MG/DL (ref 65–99)
HCT VFR BLD AUTO: 36.5 % (ref 34–46.6)
HGB BLD-MCNC: 11.4 G/DL (ref 12–15.9)
IMM GRANULOCYTES # BLD AUTO: 0.02 10*3/MM3 (ref 0–0.05)
IMM GRANULOCYTES NFR BLD AUTO: 0.3 % (ref 0–0.5)
LYMPHOCYTES # BLD AUTO: 1.93 10*3/MM3 (ref 0.7–3.1)
LYMPHOCYTES NFR BLD AUTO: 25.9 % (ref 19.6–45.3)
MCH RBC QN AUTO: 31.6 PG (ref 26.6–33)
MCHC RBC AUTO-ENTMCNC: 31.2 G/DL (ref 31.5–35.7)
MCV RBC AUTO: 101.1 FL (ref 79–97)
MONOCYTES # BLD AUTO: 0.85 10*3/MM3 (ref 0.1–0.9)
MONOCYTES NFR BLD AUTO: 11.4 % (ref 5–12)
NEUTROPHILS NFR BLD AUTO: 4.48 10*3/MM3 (ref 1.7–7)
NEUTROPHILS NFR BLD AUTO: 60 % (ref 42.7–76)
NRBC BLD AUTO-RTO: 0 /100 WBC (ref 0–0.2)
PLATELET # BLD AUTO: 265 10*3/MM3 (ref 140–450)
PMV BLD AUTO: 9.5 FL (ref 6–12)
POTASSIUM SERPL-SCNC: 4.5 MMOL/L (ref 3.5–5.2)
PROT SERPL-MCNC: 7.5 G/DL (ref 6–8.5)
RBC # BLD AUTO: 3.61 10*6/MM3 (ref 3.77–5.28)
SODIUM SERPL-SCNC: 138 MMOL/L (ref 136–145)
WBC NRBC COR # BLD: 7.46 10*3/MM3 (ref 3.4–10.8)

## 2022-10-24 PROCEDURE — 71045 X-RAY EXAM CHEST 1 VIEW: CPT

## 2022-10-24 PROCEDURE — 80053 COMPREHEN METABOLIC PANEL: CPT

## 2022-10-24 PROCEDURE — 85025 COMPLETE CBC W/AUTO DIFF WBC: CPT

## 2022-10-24 PROCEDURE — 36415 COLL VENOUS BLD VENIPUNCTURE: CPT

## 2022-10-25 ENCOUNTER — OFFICE VISIT (OUTPATIENT)
Dept: BEHAVIORAL HEALTH | Facility: CLINIC | Age: 67
End: 2022-10-25

## 2022-10-25 VITALS
SYSTOLIC BLOOD PRESSURE: 122 MMHG | HEART RATE: 94 BPM | WEIGHT: 127 LBS | BODY MASS INDEX: 21.68 KG/M2 | HEIGHT: 64 IN | DIASTOLIC BLOOD PRESSURE: 80 MMHG

## 2022-10-25 DIAGNOSIS — F33.0 MILD EPISODE OF RECURRENT MAJOR DEPRESSIVE DISORDER: Primary | ICD-10-CM

## 2022-10-25 DIAGNOSIS — F10.10 ALCOHOL USE DISORDER, MILD, ABUSE: ICD-10-CM

## 2022-10-25 PROCEDURE — 99214 OFFICE O/P EST MOD 30 MIN: CPT

## 2022-10-25 RX ORDER — DULOXETIN HYDROCHLORIDE 20 MG/1
20 CAPSULE, DELAYED RELEASE ORAL 2 TIMES DAILY
Qty: 60 CAPSULE | Refills: 2 | Status: SHIPPED | OUTPATIENT
Start: 2022-10-25 | End: 2023-04-03 | Stop reason: SDUPTHER

## 2022-10-25 NOTE — PROGRESS NOTES
Office  Follow Up Visit      Patient Name: Alsyia Sierra  : 1955   MRN: [id number]     Referring Provider: Liana So APRN    Chief Complaint:  Alysia Sierra is a 67 y.o. female who is here today for follow up related to:      ICD-10-CM ICD-9-CM   1. Mild episode of recurrent major depressive disorder (HCC)  F33.0 296.31   2. Alcohol use disorder, mild, abuse  F10.10 305.00         HPI/Interval History:   Ms. Sierra reports she recently found out she has endometrial cancer, has a total hysterectomy scheduled on Friday. She does report her doctors believe she has a good prognosis. Mood is euthymic and stable. No side effects. Alcohol use has decreased, now drinking 4-6 glasses of wine/week.       Subjective      Review of Systems:   Review of Systems   Psychiatric/Behavioral: Negative for self-injury, sleep disturbance, suicidal ideas and depressed mood.       PHQ-9 Depression Screening Score: 4     Patient History:   The following portions of the patient's history were reviewed and updated as appropriate: allergies, current medications, past family history, past medical history, past social history, past surgical history and problem list.     Social:  No significant updates    Medications:     Current Outpatient Medications:   •  aspirin 325 MG tablet, Take 325 mg by mouth As Needed for Mild Pain ., Disp: , Rfl:   •  Cyanocobalamin 1000 MCG/ML kit, Inject 1,000mcg SC daily x1 week, then weekly x4 weeks. (Patient taking differently: Every 30 (Thirty) Days. monthly), Disp: 1 kit, Rfl: 0  •  DULoxetine (Cymbalta) 20 MG capsule, Take 1 capsule by mouth 2 (Two) Times a Day., Disp: 60 capsule, Rfl: 2  •  esomeprazole (nexIUM) 20 MG capsule, Take 20 mg by mouth 2 (Two) Times a Week., Disp: , Rfl:   •  gabapentin (Neurontin) 100 MG capsule, Take 1 capsule by mouth Every Morning., Disp: 30 capsule, Rfl: 6  •  hydrALAZINE (APRESOLINE) 25 MG tablet, 25 mg 2 (Two) Times a Day., Disp: , Rfl:   •   "levothyroxine (SYNTHROID, LEVOTHROID) 25 MCG tablet, TAKE ONE TABLET BY MOUTH DAILY IN THE MORNING ON AN EMPTY STOMACH (Patient taking differently: 1 tablet Daily.), Disp: 30 tablet, Rfl: 2  •  magnesium oxide (MAGOX) 400 (241.3 Mg) MG tablet tablet, Take 400 mg by mouth Daily., Disp: , Rfl:   •  metoprolol succinate XL (TOPROL-XL) 25 MG 24 hr tablet, Take 1 tablet by mouth Every Night., Disp: 90 tablet, Rfl: 1  •  nitroglycerin (NITROSTAT) 0.4 MG SL tablet, 1 under the tongue as needed for angina, may repeat q5mins for up three doses, Disp: 100 tablet, Rfl: 11  •  ondansetron (ZOFRAN) 4 MG tablet, Take 4 mg by mouth As Needed for Nausea or Vomiting., Disp: , Rfl:   •  rosuvastatin (Crestor) 5 MG tablet, Take 1 tablet by mouth Daily., Disp: 90 tablet, Rfl: 1  •  gabapentin (Neurontin) 300 MG capsule, Take 1 capsule by mouth 1 (One) Time for 1 dose. (Patient taking differently: Take 1 capsule by mouth Every Night.), Disp: 30 capsule, Rfl: 6    Current Facility-Administered Medications:   •  cyanocobalamin injection 1,000 mcg, 1,000 mcg, Intramuscular, Q28 Days, Liana So APRN, 1,000 mcg at 10/14/22 1308    Objective     Physical Exam:  Vital Signs:   Vitals:    10/25/22 0912   BP: 122/80   Pulse: 94   Weight: 57.6 kg (127 lb)   Height: 163.8 cm (64.49\")     Body mass index is 21.47 kg/m².     Mental Status Exam:   Appearance: This is a pleasant, normal weight, older adult  female, appear stated age, casually dressed  Hygiene: Good, well kept  Cooperation: Calm, cooperative  Eye Contact: Within normal limits  Psychomotor Behavior: Within normal limits   Mood: \"Pretty good\"   Affect: Full, congruent  Speech: Normal rate, tone and rhythm   Thought Process: Linear, goal-directed  Thought Content: Ego syntonic, mood congruent  Suicidal: Denies   Homicidal: Denies  Hallucinations: No evidence of hallucination   Delusion: No evidence of delusion  Memory: Intact  Orientation: X4  Reliability: " Good  Insight: Good  Judgement: Good  Impulse Control: Good     Assessment / Plan      Visit Diagnosis/Orders Placed This Visit:  Diagnoses and all orders for this visit:    1. Mild episode of recurrent major depressive disorder (HCC) (Primary)    2. Alcohol use disorder, mild, abuse         Differential:   Rule out alcohol dependence    Plan:   1. Continue duloxetine (Cymbalta) 20 mg twice daily    Continue supportive psychotherapy efforts and medications as indicated. Treatment and medication options discussed during today's visit. Patient ackowledged and verbally consented to continue with current treatment plan and was educated on the importance of compliance with treatment and follow-up appointments. Patient seems reasonably able to adhere to treatment plan.      Medication Considerations:  JORGE reviewed and appropriate. Discussed medication options and treatment plan of prescribed medication(s) as well as the risks, benefits, and potential side effects. Patient is agreeable to call the office with any worsening of symptoms or onset of side effects. Patient is agreeable to call 911 or go to the nearest ER should he/she begin having SI/HI.    Quality Measures:     Tobacco Use/Cessation:  Never smoker    I advised Alysia Sierra of the risks of tobacco use.     Follow Up:   Return in about 3 months (around 1/25/2023) for Medication Mangement Follow Up.      ANETTE Sanderson, PMHNP-BC

## 2022-10-26 ENCOUNTER — TELEPHONE (OUTPATIENT)
Dept: GYNECOLOGIC ONCOLOGY | Facility: CLINIC | Age: 67
End: 2022-10-26

## 2022-10-26 NOTE — TELEPHONE ENCOUNTER
Caller: GARRISON    Relationship:     Best call back number: [phone]      Who are you requesting to speak with (clinical staff, provider,  specific staff member): CHRISTINE      What was the call regarding: GARRISON WANTED TO SEE IF CHRISTINE HAD A CHANCE TO HAVE THE CONSENT SIGNED AND SCANNED IN TO [facility], IT HAS TO BE DONE BY TOMORROW OR IT WONT BE APPROVED     Do you require a callback: YES

## 2022-10-27 ENCOUNTER — TELEPHONE (OUTPATIENT)
Dept: GYNECOLOGIC ONCOLOGY | Facility: CLINIC | Age: 67
End: 2022-10-27

## 2022-10-27 NOTE — TELEPHONE ENCOUNTER
PT CALLING BACK TO CONFIRM SURGERY FOR 10/28/22 WITH 6 AM ARRIVAL TIME. PT HAS NO FURTHER QUESTIONS.

## 2022-10-27 NOTE — TELEPHONE ENCOUNTER
GARRISON FROM  SURGERY PRE CERT CALLED NEEDS FOR CHRISTINE TO SCAN THE SIGNED PAPERWORK INTO [facility] SO SHE CAN CONTACT MEDICAID SO THIS CAN BE APPROVED FOR SURGERY ON 10/28/2022    GARRISON NUMBER [phone]    TRIED TO W/T NO ANSWER

## 2022-10-28 ENCOUNTER — ANESTHESIA (OUTPATIENT)
Dept: PERIOP | Facility: HOSPITAL | Age: 67
End: 2022-10-28

## 2022-10-28 ENCOUNTER — HOSPITAL ENCOUNTER (OUTPATIENT)
Facility: HOSPITAL | Age: 67
Discharge: HOME | End: 2022-10-29
Attending: OBSTETRICS & GYNECOLOGY | Admitting: OBSTETRICS & GYNECOLOGY

## 2022-10-28 ENCOUNTER — ANESTHESIA EVENT (OUTPATIENT)
Dept: PERIOP | Facility: HOSPITAL | Age: 67
End: 2022-10-28

## 2022-10-28 DIAGNOSIS — M54.12 CERVICAL RADICULOPATHY AT C6: ICD-10-CM

## 2022-10-28 DIAGNOSIS — C54.1 ENDOMETRIAL ADENOCARCINOMA: ICD-10-CM

## 2022-10-28 LAB
ABO GROUP BLD: NORMAL
ABO GROUP BLD: NORMAL
BLD GP AB SCN SERPL QL: NEGATIVE
POTASSIUM SERPL-SCNC: 4.6 MMOL/L (ref 3.5–5.2)
RH BLD: POSITIVE
RH BLD: POSITIVE
T&S EXPIRATION DATE: NORMAL

## 2022-10-28 PROCEDURE — 86900 BLOOD TYPING SEROLOGIC ABO: CPT

## 2022-10-28 PROCEDURE — 38900 IO MAP OF SENT LYMPH NODE: CPT | Performed by: OBSTETRICS & GYNECOLOGY

## 2022-10-28 PROCEDURE — 25010000002 DEXAMETHASONE PER 1 MG: Performed by: NURSE ANESTHETIST, CERTIFIED REGISTERED

## 2022-10-28 PROCEDURE — 25010000002 PROPOFOL 10 MG/ML EMULSION: Performed by: NURSE ANESTHETIST, CERTIFIED REGISTERED

## 2022-10-28 PROCEDURE — 88307 TISSUE EXAM BY PATHOLOGIST: CPT | Performed by: OBSTETRICS & GYNECOLOGY

## 2022-10-28 PROCEDURE — 88342 IMHCHEM/IMCYTCHM 1ST ANTB: CPT | Performed by: OBSTETRICS & GYNECOLOGY

## 2022-10-28 PROCEDURE — 88309 TISSUE EXAM BY PATHOLOGIST: CPT | Performed by: OBSTETRICS & GYNECOLOGY

## 2022-10-28 PROCEDURE — 25010000002 CEFAZOLIN PER 500 MG: Performed by: OBSTETRICS & GYNECOLOGY

## 2022-10-28 PROCEDURE — 86900 BLOOD TYPING SEROLOGIC ABO: CPT | Performed by: OBSTETRICS & GYNECOLOGY

## 2022-10-28 PROCEDURE — 25010000002 ONDANSETRON PER 1 MG: Performed by: NURSE ANESTHETIST, CERTIFIED REGISTERED

## 2022-10-28 PROCEDURE — 86901 BLOOD TYPING SEROLOGIC RH(D): CPT | Performed by: OBSTETRICS & GYNECOLOGY

## 2022-10-28 PROCEDURE — 38900 IO MAP OF SENT LYMPH NODE: CPT | Performed by: PHYSICIAN ASSISTANT

## 2022-10-28 PROCEDURE — S2900 ROBOTIC SURGICAL SYSTEM: HCPCS | Performed by: OBSTETRICS & GYNECOLOGY

## 2022-10-28 PROCEDURE — 86901 BLOOD TYPING SEROLOGIC RH(D): CPT

## 2022-10-28 PROCEDURE — 38571 LAPAROSCOPY LYMPHADENECTOMY: CPT | Performed by: PHYSICIAN ASSISTANT

## 2022-10-28 PROCEDURE — 84132 ASSAY OF SERUM POTASSIUM: CPT | Performed by: ANESTHESIOLOGY

## 2022-10-28 PROCEDURE — 38571 LAPAROSCOPY LYMPHADENECTOMY: CPT | Performed by: OBSTETRICS & GYNECOLOGY

## 2022-10-28 PROCEDURE — G0378 HOSPITAL OBSERVATION PER HR: HCPCS

## 2022-10-28 PROCEDURE — 25010000002 NEOSTIGMINE 10 MG/10ML SOLUTION: Performed by: NURSE ANESTHETIST, CERTIFIED REGISTERED

## 2022-10-28 PROCEDURE — 58571 TLH W/T/O 250 G OR LESS: CPT | Performed by: OBSTETRICS & GYNECOLOGY

## 2022-10-28 PROCEDURE — 25010000002 HEPARIN (PORCINE) PER 1000 UNITS: Performed by: OBSTETRICS & GYNECOLOGY

## 2022-10-28 PROCEDURE — 58571 TLH W/T/O 250 G OR LESS: CPT | Performed by: PHYSICIAN ASSISTANT

## 2022-10-28 PROCEDURE — 25010000002 DROPERIDOL PER 5 MG

## 2022-10-28 PROCEDURE — 86850 RBC ANTIBODY SCREEN: CPT | Performed by: OBSTETRICS & GYNECOLOGY

## 2022-10-28 PROCEDURE — 25010000002 HYDROMORPHONE 1 MG/ML SOLUTION

## 2022-10-28 RX ORDER — ONDANSETRON 4 MG/1
4 TABLET, FILM COATED ORAL EVERY 6 HOURS PRN
Status: DISCONTINUED | OUTPATIENT
Start: 2022-10-28 | End: 2022-10-29 | Stop reason: HOSPADM

## 2022-10-28 RX ORDER — FAMOTIDINE 20 MG/1
20 TABLET, FILM COATED ORAL ONCE
Status: COMPLETED | OUTPATIENT
Start: 2022-10-28 | End: 2022-10-28

## 2022-10-28 RX ORDER — GABAPENTIN 100 MG/1
100 CAPSULE ORAL EVERY MORNING
Status: DISCONTINUED | OUTPATIENT
Start: 2022-10-29 | End: 2022-10-29 | Stop reason: HOSPADM

## 2022-10-28 RX ORDER — HYDRALAZINE HYDROCHLORIDE 20 MG/ML
5 INJECTION INTRAMUSCULAR; INTRAVENOUS
Status: DISCONTINUED | OUTPATIENT
Start: 2022-10-28 | End: 2022-10-28 | Stop reason: HOSPADM

## 2022-10-28 RX ORDER — INDOCYANINE GREEN AND WATER 25 MG
KIT INJECTION AS NEEDED
Status: DISCONTINUED | OUTPATIENT
Start: 2022-10-28 | End: 2022-10-28 | Stop reason: HOSPADM

## 2022-10-28 RX ORDER — LEVOTHYROXINE SODIUM 0.03 MG/1
25 TABLET ORAL
Status: DISCONTINUED | OUTPATIENT
Start: 2022-10-29 | End: 2022-10-29 | Stop reason: HOSPADM

## 2022-10-28 RX ORDER — DROPERIDOL 2.5 MG/ML
0.62 INJECTION, SOLUTION INTRAMUSCULAR; INTRAVENOUS ONCE AS NEEDED
Status: COMPLETED | OUTPATIENT
Start: 2022-10-28 | End: 2022-10-28

## 2022-10-28 RX ORDER — GLYCOPYRROLATE 0.2 MG/ML
INJECTION INTRAMUSCULAR; INTRAVENOUS AS NEEDED
Status: DISCONTINUED | OUTPATIENT
Start: 2022-10-28 | End: 2022-10-28 | Stop reason: SURG

## 2022-10-28 RX ORDER — DEXAMETHASONE SODIUM PHOSPHATE 4 MG/ML
INJECTION, SOLUTION INTRA-ARTICULAR; INTRALESIONAL; INTRAMUSCULAR; INTRAVENOUS; SOFT TISSUE AS NEEDED
Status: DISCONTINUED | OUTPATIENT
Start: 2022-10-28 | End: 2022-10-28 | Stop reason: SURG

## 2022-10-28 RX ORDER — BUPIVACAINE HCL/0.9 % NACL/PF 0.1 %
2 PLASTIC BAG, INJECTION (ML) EPIDURAL ONCE
Status: COMPLETED | OUTPATIENT
Start: 2022-10-28 | End: 2022-10-28

## 2022-10-28 RX ORDER — PROPOFOL 10 MG/ML
VIAL (ML) INTRAVENOUS AS NEEDED
Status: DISCONTINUED | OUTPATIENT
Start: 2022-10-28 | End: 2022-10-28 | Stop reason: SURG

## 2022-10-28 RX ORDER — OXYCODONE HYDROCHLORIDE 5 MG/1
10 TABLET ORAL EVERY 4 HOURS PRN
Status: DISCONTINUED | OUTPATIENT
Start: 2022-10-28 | End: 2022-10-29 | Stop reason: HOSPADM

## 2022-10-28 RX ORDER — SODIUM CHLORIDE 0.9 % (FLUSH) 0.9 %
10 SYRINGE (ML) INJECTION AS NEEDED
Status: DISCONTINUED | OUTPATIENT
Start: 2022-10-28 | End: 2022-10-28 | Stop reason: HOSPADM

## 2022-10-28 RX ORDER — LIDOCAINE HYDROCHLORIDE 10 MG/ML
0.5 INJECTION, SOLUTION EPIDURAL; INFILTRATION; INTRACAUDAL; PERINEURAL ONCE AS NEEDED
Status: COMPLETED | OUTPATIENT
Start: 2022-10-28 | End: 2022-10-28

## 2022-10-28 RX ORDER — SODIUM CHLORIDE, SODIUM LACTATE, POTASSIUM CHLORIDE, CALCIUM CHLORIDE 600; 310; 30; 20 MG/100ML; MG/100ML; MG/100ML; MG/100ML
100 INJECTION, SOLUTION INTRAVENOUS CONTINUOUS
Status: DISCONTINUED | OUTPATIENT
Start: 2022-10-28 | End: 2022-10-29 | Stop reason: HOSPADM

## 2022-10-28 RX ORDER — HYDRALAZINE HYDROCHLORIDE 25 MG/1
25 TABLET, FILM COATED ORAL NIGHTLY
Status: DISCONTINUED | OUTPATIENT
Start: 2022-10-28 | End: 2022-10-29 | Stop reason: HOSPADM

## 2022-10-28 RX ORDER — PROMETHAZINE HYDROCHLORIDE 12.5 MG/1
12.5 SUPPOSITORY RECTAL EVERY 6 HOURS PRN
Status: DISCONTINUED | OUTPATIENT
Start: 2022-10-28 | End: 2022-10-29 | Stop reason: HOSPADM

## 2022-10-28 RX ORDER — HYDROMORPHONE HYDROCHLORIDE 1 MG/ML
0.5 INJECTION, SOLUTION INTRAMUSCULAR; INTRAVENOUS; SUBCUTANEOUS
Status: DISCONTINUED | OUTPATIENT
Start: 2022-10-28 | End: 2022-10-28 | Stop reason: HOSPADM

## 2022-10-28 RX ORDER — HYDROMORPHONE HYDROCHLORIDE 1 MG/ML
0.5 INJECTION, SOLUTION INTRAMUSCULAR; INTRAVENOUS; SUBCUTANEOUS
Status: DISCONTINUED | OUTPATIENT
Start: 2022-10-28 | End: 2022-10-29 | Stop reason: HOSPADM

## 2022-10-28 RX ORDER — SODIUM CHLORIDE 0.9 % (FLUSH) 0.9 %
3 SYRINGE (ML) INJECTION EVERY 12 HOURS SCHEDULED
Status: DISCONTINUED | OUTPATIENT
Start: 2022-10-28 | End: 2022-10-28 | Stop reason: HOSPADM

## 2022-10-28 RX ORDER — SODIUM CHLORIDE, SODIUM LACTATE, POTASSIUM CHLORIDE, CALCIUM CHLORIDE 600; 310; 30; 20 MG/100ML; MG/100ML; MG/100ML; MG/100ML
9 INJECTION, SOLUTION INTRAVENOUS CONTINUOUS
Status: DISCONTINUED | OUTPATIENT
Start: 2022-10-28 | End: 2022-10-28

## 2022-10-28 RX ORDER — LABETALOL HYDROCHLORIDE 5 MG/ML
5 INJECTION, SOLUTION INTRAVENOUS
Status: DISCONTINUED | OUTPATIENT
Start: 2022-10-28 | End: 2022-10-28 | Stop reason: HOSPADM

## 2022-10-28 RX ORDER — ALBUTEROL SULFATE 2.5 MG/3ML
2.5 SOLUTION RESPIRATORY (INHALATION) ONCE AS NEEDED
Status: DISCONTINUED | OUTPATIENT
Start: 2022-10-28 | End: 2022-10-28 | Stop reason: HOSPADM

## 2022-10-28 RX ORDER — ONDANSETRON 2 MG/ML
4 INJECTION INTRAMUSCULAR; INTRAVENOUS EVERY 6 HOURS PRN
Status: DISCONTINUED | OUTPATIENT
Start: 2022-10-28 | End: 2022-10-29 | Stop reason: HOSPADM

## 2022-10-28 RX ORDER — OXYCODONE HYDROCHLORIDE 5 MG/1
5 TABLET ORAL EVERY 4 HOURS PRN
Status: DISCONTINUED | OUTPATIENT
Start: 2022-10-28 | End: 2022-10-29 | Stop reason: HOSPADM

## 2022-10-28 RX ORDER — ROCURONIUM BROMIDE 10 MG/ML
INJECTION, SOLUTION INTRAVENOUS AS NEEDED
Status: DISCONTINUED | OUTPATIENT
Start: 2022-10-28 | End: 2022-10-28 | Stop reason: SURG

## 2022-10-28 RX ORDER — MIDAZOLAM HYDROCHLORIDE 1 MG/ML
0.5 INJECTION INTRAMUSCULAR; INTRAVENOUS
Status: DISCONTINUED | OUTPATIENT
Start: 2022-10-28 | End: 2022-10-28 | Stop reason: HOSPADM

## 2022-10-28 RX ORDER — METOPROLOL SUCCINATE 25 MG/1
25 TABLET, EXTENDED RELEASE ORAL NIGHTLY
Status: DISCONTINUED | OUTPATIENT
Start: 2022-10-28 | End: 2022-10-29 | Stop reason: HOSPADM

## 2022-10-28 RX ORDER — LIDOCAINE HYDROCHLORIDE 10 MG/ML
INJECTION, SOLUTION EPIDURAL; INFILTRATION; INTRACAUDAL; PERINEURAL AS NEEDED
Status: DISCONTINUED | OUTPATIENT
Start: 2022-10-28 | End: 2022-10-28 | Stop reason: SURG

## 2022-10-28 RX ORDER — SODIUM CHLORIDE 9 MG/ML
9 INJECTION, SOLUTION INTRAVENOUS CONTINUOUS
Status: DISCONTINUED | OUTPATIENT
Start: 2022-10-28 | End: 2022-10-28

## 2022-10-28 RX ORDER — NEOSTIGMINE METHYLSULFATE 1 MG/ML
INJECTION, SOLUTION INTRAVENOUS AS NEEDED
Status: DISCONTINUED | OUTPATIENT
Start: 2022-10-28 | End: 2022-10-28 | Stop reason: SURG

## 2022-10-28 RX ORDER — MAGNESIUM HYDROXIDE 1200 MG/15ML
LIQUID ORAL AS NEEDED
Status: DISCONTINUED | OUTPATIENT
Start: 2022-10-28 | End: 2022-10-28 | Stop reason: HOSPADM

## 2022-10-28 RX ORDER — BUPIVACAINE HYDROCHLORIDE AND EPINEPHRINE 5; 5 MG/ML; UG/ML
INJECTION, SOLUTION PERINEURAL AS NEEDED
Status: DISCONTINUED | OUTPATIENT
Start: 2022-10-28 | End: 2022-10-28 | Stop reason: HOSPADM

## 2022-10-28 RX ORDER — OXYCODONE HYDROCHLORIDE 5 MG/1
5 TABLET ORAL EVERY 4 HOURS PRN
Qty: 5 TABLET | Refills: 0 | Status: SHIPPED | OUTPATIENT
Start: 2022-10-28 | End: 2022-11-14

## 2022-10-28 RX ORDER — HEPARIN SODIUM 5000 [USP'U]/ML
5000 INJECTION, SOLUTION INTRAVENOUS; SUBCUTANEOUS ONCE
Status: COMPLETED | OUTPATIENT
Start: 2022-10-28 | End: 2022-10-28

## 2022-10-28 RX ORDER — GABAPENTIN 300 MG/1
300 CAPSULE ORAL ONCE
Status: COMPLETED | OUTPATIENT
Start: 2022-10-28 | End: 2022-10-28

## 2022-10-28 RX ORDER — SODIUM CHLORIDE 9 MG/ML
INJECTION, SOLUTION INTRAVENOUS AS NEEDED
Status: DISCONTINUED | OUTPATIENT
Start: 2022-10-28 | End: 2022-10-28 | Stop reason: HOSPADM

## 2022-10-28 RX ORDER — ONDANSETRON 2 MG/ML
4 INJECTION INTRAMUSCULAR; INTRAVENOUS ONCE AS NEEDED
Status: DISCONTINUED | OUTPATIENT
Start: 2022-10-28 | End: 2022-10-28 | Stop reason: HOSPADM

## 2022-10-28 RX ORDER — DULOXETIN HYDROCHLORIDE 20 MG/1
20 CAPSULE, DELAYED RELEASE ORAL 2 TIMES DAILY
Status: DISCONTINUED | OUTPATIENT
Start: 2022-10-28 | End: 2022-10-29 | Stop reason: HOSPADM

## 2022-10-28 RX ORDER — NALOXONE HCL 0.4 MG/ML
0.1 VIAL (ML) INJECTION
Status: DISCONTINUED | OUTPATIENT
Start: 2022-10-28 | End: 2022-10-29 | Stop reason: HOSPADM

## 2022-10-28 RX ORDER — FAMOTIDINE 10 MG/ML
20 INJECTION, SOLUTION INTRAVENOUS ONCE
Status: DISCONTINUED | OUTPATIENT
Start: 2022-10-28 | End: 2022-10-28 | Stop reason: HOSPADM

## 2022-10-28 RX ORDER — PROMETHAZINE HYDROCHLORIDE 12.5 MG/1
12.5 TABLET ORAL EVERY 6 HOURS PRN
Status: DISCONTINUED | OUTPATIENT
Start: 2022-10-28 | End: 2022-10-29 | Stop reason: HOSPADM

## 2022-10-28 RX ORDER — ONDANSETRON 2 MG/ML
INJECTION INTRAMUSCULAR; INTRAVENOUS AS NEEDED
Status: DISCONTINUED | OUTPATIENT
Start: 2022-10-28 | End: 2022-10-28 | Stop reason: SURG

## 2022-10-28 RX ORDER — GABAPENTIN 300 MG/1
300 CAPSULE ORAL NIGHTLY
Status: DISCONTINUED | OUTPATIENT
Start: 2022-10-28 | End: 2022-10-29 | Stop reason: HOSPADM

## 2022-10-28 RX ORDER — PANTOPRAZOLE SODIUM 40 MG/1
40 TABLET, DELAYED RELEASE ORAL EVERY MORNING
Status: DISCONTINUED | OUTPATIENT
Start: 2022-10-29 | End: 2022-10-29 | Stop reason: HOSPADM

## 2022-10-28 RX ORDER — FENTANYL CITRATE 50 UG/ML
50 INJECTION, SOLUTION INTRAMUSCULAR; INTRAVENOUS
Status: DISCONTINUED | OUTPATIENT
Start: 2022-10-28 | End: 2022-10-28 | Stop reason: HOSPADM

## 2022-10-28 RX ORDER — SODIUM CHLORIDE 0.9 % (FLUSH) 0.9 %
10 SYRINGE (ML) INJECTION EVERY 12 HOURS SCHEDULED
Status: DISCONTINUED | OUTPATIENT
Start: 2022-10-28 | End: 2022-10-28 | Stop reason: HOSPADM

## 2022-10-28 RX ORDER — DOCUSATE SODIUM 100 MG/1
100 CAPSULE, LIQUID FILLED ORAL 2 TIMES DAILY
Qty: 60 CAPSULE | Refills: 0 | Status: SHIPPED | OUTPATIENT
Start: 2022-10-28

## 2022-10-28 RX ORDER — OXYCODONE HYDROCHLORIDE 5 MG/1
5 TABLET ORAL ONCE
Status: COMPLETED | OUTPATIENT
Start: 2022-10-28 | End: 2022-10-28

## 2022-10-28 RX ORDER — HEPARIN SODIUM 5000 [USP'U]/ML
5000 INJECTION, SOLUTION INTRAVENOUS; SUBCUTANEOUS EVERY 8 HOURS SCHEDULED
Status: DISCONTINUED | OUTPATIENT
Start: 2022-10-29 | End: 2022-10-29 | Stop reason: HOSPADM

## 2022-10-28 RX ORDER — DROPERIDOL 2.5 MG/ML
INJECTION, SOLUTION INTRAMUSCULAR; INTRAVENOUS
Status: COMPLETED
Start: 2022-10-28 | End: 2022-10-28

## 2022-10-28 RX ADMIN — HYDROMORPHONE HYDROCHLORIDE 0.5 MG: 1 INJECTION, SOLUTION INTRAMUSCULAR; INTRAVENOUS; SUBCUTANEOUS at 11:33

## 2022-10-28 RX ADMIN — NEOSTIGMINE METHYLSULFATE 3 MG: 0.5 INJECTION INTRAVENOUS at 09:46

## 2022-10-28 RX ADMIN — GABAPENTIN 300 MG: 300 CAPSULE ORAL at 20:00

## 2022-10-28 RX ADMIN — Medication 2 G: at 08:12

## 2022-10-28 RX ADMIN — ROCURONIUM BROMIDE 50 MG: 10 INJECTION, SOLUTION INTRAVENOUS at 08:09

## 2022-10-28 RX ADMIN — LIDOCAINE HYDROCHLORIDE 50 MG: 10 INJECTION, SOLUTION EPIDURAL; INFILTRATION; INTRACAUDAL; PERINEURAL at 08:09

## 2022-10-28 RX ADMIN — GLYCOPYRROLATE 0.4 MG: 0.2 INJECTION INTRAMUSCULAR; INTRAVENOUS at 09:46

## 2022-10-28 RX ADMIN — SODIUM CHLORIDE, POTASSIUM CHLORIDE, SODIUM LACTATE AND CALCIUM CHLORIDE 100 ML/HR: 600; 310; 30; 20 INJECTION, SOLUTION INTRAVENOUS at 14:43

## 2022-10-28 RX ADMIN — DROPERIDOL 0.62 MG: 2.5 INJECTION, SOLUTION INTRAMUSCULAR; INTRAVENOUS at 11:23

## 2022-10-28 RX ADMIN — SODIUM CHLORIDE, POTASSIUM CHLORIDE, SODIUM LACTATE AND CALCIUM CHLORIDE 100 ML/HR: 600; 310; 30; 20 INJECTION, SOLUTION INTRAVENOUS at 14:42

## 2022-10-28 RX ADMIN — HEPARIN SODIUM 5000 UNITS: 5000 INJECTION, SOLUTION INTRAVENOUS; SUBCUTANEOUS at 07:15

## 2022-10-28 RX ADMIN — PROPOFOL 150 MG: 10 INJECTION, EMULSION INTRAVENOUS at 08:09

## 2022-10-28 RX ADMIN — LIDOCAINE HYDROCHLORIDE 0.5 ML: 10 INJECTION, SOLUTION EPIDURAL; INFILTRATION; INTRACAUDAL; PERINEURAL at 07:15

## 2022-10-28 RX ADMIN — GABAPENTIN 300 MG: 300 CAPSULE ORAL at 07:14

## 2022-10-28 RX ADMIN — OXYCODONE 5 MG: 5 TABLET ORAL at 19:45

## 2022-10-28 RX ADMIN — ONDANSETRON 4 MG: 2 INJECTION INTRAMUSCULAR; INTRAVENOUS at 09:42

## 2022-10-28 RX ADMIN — DEXAMETHASONE SODIUM PHOSPHATE 8 MG: 4 INJECTION, SOLUTION INTRA-ARTICULAR; INTRALESIONAL; INTRAMUSCULAR; INTRAVENOUS; SOFT TISSUE at 08:09

## 2022-10-28 RX ADMIN — DULOXETINE HYDROCHLORIDE 20 MG: 20 CAPSULE, DELAYED RELEASE ORAL at 20:00

## 2022-10-28 RX ADMIN — SUGAMMADEX 200 MG: 100 INJECTION, SOLUTION INTRAVENOUS at 10:42

## 2022-10-28 RX ADMIN — METOPROLOL SUCCINATE 25 MG: 25 TABLET, EXTENDED RELEASE ORAL at 20:00

## 2022-10-28 RX ADMIN — HYDRALAZINE HYDROCHLORIDE 25 MG: 25 TABLET, FILM COATED ORAL at 20:00

## 2022-10-28 RX ADMIN — SODIUM CHLORIDE 9 ML/HR: 9 INJECTION, SOLUTION INTRAVENOUS at 07:16

## 2022-10-28 RX ADMIN — OXYCODONE 5 MG: 5 TABLET ORAL at 07:14

## 2022-10-28 RX ADMIN — PROPOFOL 25 MCG/KG/MIN: 10 INJECTION, EMULSION INTRAVENOUS at 08:09

## 2022-10-28 RX ADMIN — SODIUM CHLORIDE: 9 INJECTION, SOLUTION INTRAVENOUS at 09:14

## 2022-10-28 RX ADMIN — FAMOTIDINE 20 MG: 20 TABLET ORAL at 07:14

## 2022-10-28 NOTE — ANESTHESIA PROCEDURE NOTES
Airway  Urgency: elective    Airway not difficult    General Information and Staff    Patient location during procedure: OR  CRNA/CAA: Baldev Bunch CRNA    Indications and Patient Condition  Indications for airway management: airway protection    Preoxygenated: yes  MILS not maintained throughout  Mask difficulty assessment: 1 - vent by mask    Final Airway Details  Final airway type: endotracheal airway      Successful airway: ETT  Cuffed: yes   Successful intubation technique: direct laryngoscopy  Facilitating devices/methods: intubating stylet  Endotracheal tube insertion site: oral  Blade: Vega  Blade size: 2  ETT size (mm): 7.0  Cormack-Lehane Classification: grade I - full view of glottis  Placement verified by: chest auscultation and capnometry   Cuff volume (mL): 5  Measured from: lips  ETT/EBT  to lips (cm): 21  Number of attempts at approach: 1  Assessment: lips, teeth, and gum same as pre-op and atraumatic intubation    Additional Comments  Negative epigastric sounds, Breath sound equal bilaterally with symmetric chest rise and fall

## 2022-10-28 NOTE — ANESTHESIA PREPROCEDURE EVALUATION
Anesthesia Evaluation     Patient summary reviewed and Nursing notes reviewed   no history of anesthetic complications:  NPO Solid Status: > 8 hours  NPO Liquid Status: > 8 hours           Airway   Mallampati: II  TM distance: >3 FB  Neck ROM: full  No difficulty expected  Dental      Pulmonary - normal exam   (+) shortness of breath,   Cardiovascular - normal exam    (+) hypertension, NEIVES, hyperlipidemia,       Neuro/Psych  (+) dizziness/light headedness, syncope, numbness, psychiatric history,    GI/Hepatic/Renal/Endo    (+)  GERD,  liver disease, renal disease,     Musculoskeletal     Abdominal    Substance History      OB/GYN          Other   arthritis,    history of cancer                    Anesthesia Plan    ASA 2     general     intravenous induction     Anesthetic plan, risks, benefits, and alternatives have been provided, discussed and informed consent has been obtained with: patient.    Plan discussed with CRNA.        CODE STATUS:

## 2022-10-28 NOTE — ANESTHESIA POSTPROCEDURE EVALUATION
Patient: Alysia Sierra    Procedure Summary     Date: 10/28/22 Room / Location:  [facility]  [facility]  [facility]    Anesthesia Start: 0800 Anesthesia Stop: 1006    Procedure: TOTAL LAPAROSCOPIC HYSTERECTOMY BILATERAL SALPINGO-OOPHORECTOMY, INJECTION FOR SENTINEL LYMPH NODE MAPPING, BILATERAL SENTINEL LYMPH NODE DISSECTION WITH DAVINCI ROBOT (Abdomen) Diagnosis:       Endometrial adenocarcinoma (HCC)      (Endometrial adenocarcinoma (HCC) [C54.1])    Surgeons: Jasmine Rich MD Provider: Austin Honeycutt MD    Anesthesia Type: general ASA Status: 2          Anesthesia Type: general    Vitals  No vitals data found for the desired time range.          Post Anesthesia Care and Evaluation    Patient location during evaluation: PACU  Patient participation: complete - patient participated  Level of consciousness: awake  Pain score: 0  Pain management: adequate    Airway patency: patent  Anesthetic complications: No anesthetic complications  PONV Status: none  Cardiovascular status: acceptable and stable  Respiratory status: nasal cannula, unassisted, acceptable and spontaneous ventilation  Hydration status: acceptable      
no

## 2022-10-29 ENCOUNTER — READMISSION MANAGEMENT (OUTPATIENT)
Dept: CALL CENTER | Facility: HOSPITAL | Age: 67
End: 2022-10-29

## 2022-10-29 VITALS
HEIGHT: 65 IN | DIASTOLIC BLOOD PRESSURE: 73 MMHG | HEART RATE: 86 BPM | SYSTOLIC BLOOD PRESSURE: 118 MMHG | RESPIRATION RATE: 18 BRPM | TEMPERATURE: 98.7 F | OXYGEN SATURATION: 94 % | WEIGHT: 128.31 LBS | BODY MASS INDEX: 21.38 KG/M2

## 2022-10-29 LAB
ANION GAP SERPL CALCULATED.3IONS-SCNC: 11 MMOL/L (ref 5–15)
BASOPHILS # BLD AUTO: 0.01 10*3/MM3 (ref 0–0.2)
BASOPHILS NFR BLD AUTO: 0.1 % (ref 0–1.5)
BUN SERPL-MCNC: 11 MG/DL (ref 8–23)
BUN/CREAT SERPL: 9.2 (ref 7–25)
CALCIUM SPEC-SCNC: 8.5 MG/DL (ref 8.6–10.5)
CHLORIDE SERPL-SCNC: 100 MMOL/L (ref 98–107)
CO2 SERPL-SCNC: 22 MMOL/L (ref 22–29)
CREAT SERPL-MCNC: 1.2 MG/DL (ref 0.57–1)
DEPRECATED RDW RBC AUTO: 50.3 FL (ref 37–54)
EGFRCR SERPLBLD CKD-EPI 2021: 50 ML/MIN/1.73
EOSINOPHIL # BLD AUTO: 0 10*3/MM3 (ref 0–0.4)
EOSINOPHIL NFR BLD AUTO: 0 % (ref 0.3–6.2)
ERYTHROCYTE [DISTWIDTH] IN BLOOD BY AUTOMATED COUNT: 13.3 % (ref 12.3–15.4)
GLUCOSE SERPL-MCNC: 107 MG/DL (ref 65–99)
HCT VFR BLD AUTO: 27.5 % (ref 34–46.6)
HGB BLD-MCNC: 8.8 G/DL (ref 12–15.9)
IMM GRANULOCYTES # BLD AUTO: 0.06 10*3/MM3 (ref 0–0.05)
IMM GRANULOCYTES NFR BLD AUTO: 0.5 % (ref 0–0.5)
LYMPHOCYTES # BLD AUTO: 1.04 10*3/MM3 (ref 0.7–3.1)
LYMPHOCYTES NFR BLD AUTO: 9.3 % (ref 19.6–45.3)
MCH RBC QN AUTO: 32.7 PG (ref 26.6–33)
MCHC RBC AUTO-ENTMCNC: 32 G/DL (ref 31.5–35.7)
MCV RBC AUTO: 102.2 FL (ref 79–97)
MONOCYTES # BLD AUTO: 1.29 10*3/MM3 (ref 0.1–0.9)
MONOCYTES NFR BLD AUTO: 11.6 % (ref 5–12)
NEUTROPHILS NFR BLD AUTO: 78.5 % (ref 42.7–76)
NEUTROPHILS NFR BLD AUTO: 8.75 10*3/MM3 (ref 1.7–7)
NRBC BLD AUTO-RTO: 0 /100 WBC (ref 0–0.2)
PLATELET # BLD AUTO: 218 10*3/MM3 (ref 140–450)
PMV BLD AUTO: 10.1 FL (ref 6–12)
POTASSIUM SERPL-SCNC: 5.1 MMOL/L (ref 3.5–5.2)
RBC # BLD AUTO: 2.69 10*6/MM3 (ref 3.77–5.28)
SODIUM SERPL-SCNC: 133 MMOL/L (ref 136–145)
WBC NRBC COR # BLD: 11.15 10*3/MM3 (ref 3.4–10.8)

## 2022-10-29 PROCEDURE — 80048 BASIC METABOLIC PNL TOTAL CA: CPT | Performed by: STUDENT IN AN ORGANIZED HEALTH CARE EDUCATION/TRAINING PROGRAM

## 2022-10-29 PROCEDURE — 85025 COMPLETE CBC W/AUTO DIFF WBC: CPT | Performed by: STUDENT IN AN ORGANIZED HEALTH CARE EDUCATION/TRAINING PROGRAM

## 2022-10-29 PROCEDURE — 25010000002 HEPARIN (PORCINE) PER 1000 UNITS: Performed by: STUDENT IN AN ORGANIZED HEALTH CARE EDUCATION/TRAINING PROGRAM

## 2022-10-29 PROCEDURE — G0378 HOSPITAL OBSERVATION PER HR: HCPCS

## 2022-10-29 RX ADMIN — OXYCODONE 10 MG: 5 TABLET ORAL at 10:30

## 2022-10-29 RX ADMIN — SODIUM CHLORIDE, POTASSIUM CHLORIDE, SODIUM LACTATE AND CALCIUM CHLORIDE 100 ML/HR: 600; 310; 30; 20 INJECTION, SOLUTION INTRAVENOUS at 00:26

## 2022-10-29 RX ADMIN — LEVOTHYROXINE SODIUM 25 MCG: 25 TABLET ORAL at 06:27

## 2022-10-29 RX ADMIN — HEPARIN SODIUM 5000 UNITS: 5000 INJECTION INTRAVENOUS; SUBCUTANEOUS at 06:27

## 2022-10-29 RX ADMIN — PANTOPRAZOLE SODIUM 40 MG: 40 TABLET, DELAYED RELEASE ORAL at 06:27

## 2022-10-29 RX ADMIN — GABAPENTIN 100 MG: 100 CAPSULE ORAL at 06:27

## 2022-10-29 NOTE — OUTREACH NOTE
Prep Survey    Flowsheet Row Responses   [facility] facility patient discharged from? [place]   Is LACE score < 7 ? Yes   Emergency Room discharge w/ pulse ox? No   Eligibility [facility]   Date of Admission 10/28/22   Date of Discharge 10/29/22   Discharge Disposition Home or Self Care   Discharge diagnosis Total lap hysterectomy, BSAO, sentinel lymph node mapping & dissection for endometrial CA   Does the patient have one of the following disease processes/diagnoses(primary or secondary)? General Surgery   Does the patient have Home health ordered? No   Is there a DME ordered? No   Prep survey completed? Yes          SUSANNE JAMIL - Registered Nurse

## 2022-10-31 ENCOUNTER — TRANSITIONAL CARE MANAGEMENT TELEPHONE ENCOUNTER (OUTPATIENT)
Dept: CALL CENTER | Facility: HOSPITAL | Age: 67
End: 2022-10-31

## 2022-10-31 ENCOUNTER — TELEPHONE (OUTPATIENT)
Dept: GYNECOLOGIC ONCOLOGY | Facility: CLINIC | Age: 67
End: 2022-10-31

## 2022-10-31 NOTE — TELEPHONE ENCOUNTER
I called the patient to check on her after surgery discharge.     She reports the following:      Pain level: 0-10: 0    Urination: Voiding: without difficulty    Bowel Movement/Passing gas: flatus/bowel movement: No bowel movement. Recommended milk of mag.     Nausea and Vomiting:Nausea/vomiting: no nausea and no vomiting    Ambulating: post-op home ambulation: Ambulating    Fever:fever and chills: no fever or chills    Eating and Drinking:Post Op eating and drinking: tolerating regular diet    Vaginal bleeding?( S/P Hysterectomy ):desc; vaginal bleeding  yes/no: Reports vaginal bleeding, describes as spotting    Incisions ( if indicated ) :     Additional Notes or Education:  NONE: Told patient if she hasn't had a bowel movement by tomorrow after getting the milk of mag to please give us a call.    Provider notified if patient had any of the preceding problems and patient was advised per provider on how to manage. Patient verbalized understanding and will call back if they have any other concerns before post operative appointment.

## 2022-10-31 NOTE — OUTREACH NOTE
Call Center TCM Note    Flowsheet Row Responses   [facility] patient discharged from? [place]   Does the patient have one of the following disease processes/diagnoses(primary or secondary)? General Surgery   TCM attempt successful? Yes   Call start time 1217   Call end time 1234   Discharge diagnosis Total lap hysterectomy, BSAO, sentinel lymph node mapping & dissection for endometrial CA   Meds reviewed with patient/caregiver? Yes   Is the patient having any side effects they believe may be caused by any medication additions or changes? No   Does the patient have all medications related to this admission filled (includes all antibiotics, pain medications, etc.) Yes   Is the patient taking all medications as directed (includes completed medication regime)? Yes   Comments Post-Op appt on 11/14/22 at 10:45 AM   Does the patient have an appointment with their PCP within 7 days of discharge? Yes  [11/8/22 at 9:30 AM]   Psychosocial issues? No   Did the patient receive a copy of their discharge instructions? Yes   Nursing interventions Reviewed instructions with patient   What is the patient's perception of their health status since discharge? Improving   Nursing interventions Nurse provided patient education   Is the patient /caregiver able to teach back basic post-op care? Take showers only when approved by MD-sponge bathe until then, No tub bath, swimming, or hot tub until instructed by MD, Keep incision areas clean,dry and protected, Lifting as instructed by MD in discharge instructions   Is the patient/caregiver able to teach back signs and symptoms of incisional infection? Increased redness, swelling or pain at the incisonal site, Increased drainage or bleeding, Incisional warmth, Pus or odor from incision, Fever   Is the patient/caregiver able to teach back steps to recovery at home? Rest and rebuild strength, gradually increase activity, Eat a well-balance diet   If the patient is a current smoker, are  they able to teach back resources for cessation? Not a smoker   Is the patient/caregiver able to teach back the hierarchy of who to call/visit for symptoms/problems? PCP, Specialist, Home health nurse, Urgent Care, ED, 911 Yes   TCM call completed? Yes   Wrap up additional comments Pt states she is doing better, and taking oxycodone as needed. Pt reports having constipation, and taking colace/milk of mag. recommended to take additionally by OBGYN. Pt advised to drink no sugar added apple juice to help with BMs. 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 Pt verified PCP hospital fu appt on 11/8/22, and post-op appt on 11/14/22. Questions/concerns addressed.   Call end time 1234   Would this patient benefit from a Referral to Amb Social Work? No   Is the patient interested in additional calls from an ambulatory ?  NOTE:  applies to high risk patients requiring additional follow-up. No          Mariana Edward RN    10/31/2022, 12:41 EDT

## 2022-11-01 ENCOUNTER — TELEPHONE (OUTPATIENT)
Dept: GYNECOLOGIC ONCOLOGY | Facility: CLINIC | Age: 67
End: 2022-11-01

## 2022-11-01 NOTE — TELEPHONE ENCOUNTER
"Patient called with complaints of feeling \"lousy\" and a lot of bruising. I ensured patient that that is all normal and the bruising is due to the blood thinners. Told patient by 6 weeks she will be 80%-90%. Patient admitted she did too much yesterday and will take it easy today.   "

## 2022-11-02 LAB
CYTO UR: NORMAL
LAB AP CASE REPORT: NORMAL
LAB AP CLINICAL INFORMATION: NORMAL
LAB AP SPECIAL STAINS: NORMAL
PATH REPORT.FINAL DX SPEC: NORMAL
PATH REPORT.GROSS SPEC: NORMAL

## 2022-11-04 ENCOUNTER — TELEPHONE (OUTPATIENT)
Dept: GYNECOLOGIC ONCOLOGY | Facility: CLINIC | Age: 67
End: 2022-11-04

## 2022-11-04 NOTE — TELEPHONE ENCOUNTER
----- Message from Jasmine Rich MD sent at 11/4/2022  7:32 AM EDT -----  Please let Alysia Sierra know that her pathology did show an endometrial cancer that had grown through the uterus and involved the surface of the lower uterine segment where we were worried about scar tissue. I tried to call her yesterday but there was no answer. This makes her a stage IIIA. This is typically treated with chemotherapy with or without radiation. I will see her at her postoperative appointment to discuss this in more detail.

## 2022-11-04 NOTE — TELEPHONE ENCOUNTER
Patient called back and we discussed the pathology. She is hesitant about chemotherapy and radiation. I told her she can think about it before her appointment with Dr. Rich.

## 2022-11-08 ENCOUNTER — OFFICE VISIT (OUTPATIENT)
Dept: INTERNAL MEDICINE | Facility: CLINIC | Age: 67
End: 2022-11-08

## 2022-11-08 ENCOUNTER — LAB (OUTPATIENT)
Dept: LAB | Facility: HOSPITAL | Age: 67
End: 2022-11-08

## 2022-11-08 VITALS
DIASTOLIC BLOOD PRESSURE: 70 MMHG | WEIGHT: 127 LBS | RESPIRATION RATE: 16 BRPM | HEART RATE: 114 BPM | BODY MASS INDEX: 21.16 KG/M2 | SYSTOLIC BLOOD PRESSURE: 114 MMHG | HEIGHT: 65 IN | TEMPERATURE: 97.4 F | OXYGEN SATURATION: 97 %

## 2022-11-08 DIAGNOSIS — Z11.59 NEED FOR HEPATITIS C SCREENING TEST: ICD-10-CM

## 2022-11-08 DIAGNOSIS — D64.9 ANEMIA, UNSPECIFIED TYPE: ICD-10-CM

## 2022-11-08 DIAGNOSIS — N18.31 STAGE 3A CHRONIC KIDNEY DISEASE: ICD-10-CM

## 2022-11-08 DIAGNOSIS — E53.8 LOW VITAMIN B12 LEVEL: ICD-10-CM

## 2022-11-08 DIAGNOSIS — Z12.31 ENCOUNTER FOR SCREENING MAMMOGRAM FOR MALIGNANT NEOPLASM OF BREAST: ICD-10-CM

## 2022-11-08 DIAGNOSIS — F32.A DEPRESSION, UNSPECIFIED DEPRESSION TYPE: ICD-10-CM

## 2022-11-08 DIAGNOSIS — Z23 NEED FOR INFLUENZA VACCINATION: Primary | ICD-10-CM

## 2022-11-08 PROCEDURE — 80053 COMPREHEN METABOLIC PANEL: CPT

## 2022-11-08 PROCEDURE — 85025 COMPLETE CBC W/AUTO DIFF WBC: CPT

## 2022-11-08 PROCEDURE — 99214 OFFICE O/P EST MOD 30 MIN: CPT | Performed by: NURSE PRACTITIONER

## 2022-11-08 PROCEDURE — 82746 ASSAY OF FOLIC ACID SERUM: CPT

## 2022-11-08 PROCEDURE — 84443 ASSAY THYROID STIM HORMONE: CPT

## 2022-11-08 PROCEDURE — 84466 ASSAY OF TRANSFERRIN: CPT

## 2022-11-08 PROCEDURE — 90662 IIV NO PRSV INCREASED AG IM: CPT | Performed by: NURSE PRACTITIONER

## 2022-11-08 PROCEDURE — 82728 ASSAY OF FERRITIN: CPT | Performed by: NURSE PRACTITIONER

## 2022-11-08 PROCEDURE — 36415 COLL VENOUS BLD VENIPUNCTURE: CPT

## 2022-11-08 PROCEDURE — G0008 ADMIN INFLUENZA VIRUS VAC: HCPCS | Performed by: NURSE PRACTITIONER

## 2022-11-08 PROCEDURE — 86803 HEPATITIS C AB TEST: CPT

## 2022-11-08 PROCEDURE — 83540 ASSAY OF IRON: CPT

## 2022-11-08 PROCEDURE — 82607 VITAMIN B-12: CPT

## 2022-11-08 NOTE — PROGRESS NOTES
Transitional Care Follow Up Visit  Subjective   Pt seen by me on 7/28/22 for postmenopausal vaginal bleeding and referred to GYN. She was seen by ANETTE Buckner and Dr. Farmer for workup. A saline infused sonogram demonstrated a concerning endometrial lesion. Proceeded with endometrial biopsy: well differentiated endometrial adenocarcinoma. Referred to gyn/onc. Consulted with Dr Rich and underwent robotic hysterectomy, BSO on 10/28. Pathology showed endometrial cancer. She has a follow up with Dr Rich on 11/4 to discuss next step in POC and whether or not additional treatment is indicated. Overall, she is doing well since surgery. Reports constipation postoperatively. Using colace. Milk of mag was effective, but she did have to digitally disimpact herself. Last BM was yesterday and stool was loose. Reports she has been moving around and using incinteve spirometer postoperatively.      HTN  -a couple occations in which she had elevated bp. At instruction of Her nephrologist- takes 1/2 hydralazine. Prev took one full hydralazine but that dropped bp too low.   -monitors bp and it is doing well      Nephrology  -Following with Dr Collins at [facility] Nephrologist         Alysiafawn Sierra is a 67 y.o. female who presents for a transitional care management visit.    Within 48 business hours after discharge our office contacted her via telephone to coordinate her care and needs.      I reviewed and discussed the details of that call along with the discharge summary, hospital problems, inpatient lab results, inpatient diagnostic studies, and consultation reports with Alysia.     Current outpatient and discharge medications have been reconciled for the patient.  Reviewed by: ANETTE Vasquez      Date of TCM Phone Call 10/29/2022   [facility]   Date of Admission 10/28/2022   Date of Discharge 10/29/2022   Discharge Disposition Home or Self Care     Risk for Readmission (LACE) Score: 5 (10/29/2022  6:00  AM)      History of Present Illness   Course During Hospital Stay:    The following portions of the patient's history were reviewed and updated as appropriate: allergies, current medications, past family history, past medical history, past social history, past surgical history and problem list.    Review of Systems   Constitutional: Negative for fatigue and unexpected weight change.   Respiratory: Negative for shortness of breath.    Cardiovascular: Negative for chest pain, palpitations and leg swelling.   Gastrointestinal: Negative for abdominal pain and nausea.   Neurological: Negative for weakness.       Objective   Physical Exam  Vitals and nursing note reviewed.   Constitutional:       General: She is not in acute distress.     Appearance: Normal appearance. She is normal weight. She is not ill-appearing.   HENT:      Head: Normocephalic and atraumatic.      Nose: Nose normal.      Mouth/Throat:      Mouth: Mucous membranes are moist.      Pharynx: Oropharynx is clear. No oropharyngeal exudate or posterior oropharyngeal erythema.   Eyes:      Conjunctiva/sclera: Conjunctivae normal.      Pupils: Pupils are equal, round, and reactive to light.   Cardiovascular:      Rate and Rhythm: Normal rate and regular rhythm.      Heart sounds: Normal heart sounds.   Pulmonary:      Effort: Pulmonary effort is normal. No respiratory distress.      Breath sounds: Normal breath sounds.   Abdominal:      General: Abdomen is flat. Bowel sounds are normal. There is no distension.      Palpations: Abdomen is soft.      Tenderness: There is no abdominal tenderness.   Skin:     General: Skin is warm and dry.      Capillary Refill: Capillary refill takes less than 2 seconds.   Neurological:      General: No focal deficit present.      Mental Status: She is alert and oriented to person, place, and time. Mental status is at baseline.   Psychiatric:         Mood and Affect: Mood normal.         Behavior: Behavior normal.          Thought Content: Thought content normal.         Judgment: Judgment normal.         Assessment & Plan   Problems Addressed this Visit        Genitourinary and Reproductive     CKD (chronic kidney disease) stage 3, GFR 30-59 ml/min (HCC)   Other Visit Diagnoses     Need for influenza vaccination    -  Primary    Need for hepatitis C screening test        Encounter for screening mammogram for malignant neoplasm of breast        Relevant Orders    Mammo Screening Digital Tomosynthesis Bilateral With CAD    Depression, unspecified depression type        Relevant Orders    Ambulatory Referral to Behavioral Health    Anemia, unspecified type        Low vitamin B12 level          Diagnoses       Codes Comments    Need for influenza vaccination    -  Primary ICD-10-CM: Z23  ICD-9-CM: V04.81     Need for hepatitis C screening test     ICD-10-CM: Z11.59  ICD-9-CM: V73.89     Encounter for screening mammogram for malignant neoplasm of breast     ICD-10-CM: Z12.31  ICD-9-CM: V76.12     Depression, unspecified depression type     ICD-10-CM: F32.A  ICD-9-CM: 311     Anemia, unspecified type     ICD-10-CM: D64.9  ICD-9-CM: 285.9     Low vitamin B12 level     ICD-10-CM: E53.8  ICD-9-CM: 266.2     Stage 3a chronic kidney disease (HCC)     ICD-10-CM: N18.31  ICD-9-CM: 585.3

## 2022-11-09 LAB
ALBUMIN SERPL-MCNC: 4.3 G/DL (ref 3.5–5.2)
ALBUMIN/GLOB SERPL: 1.7 G/DL
ALP SERPL-CCNC: 79 U/L (ref 39–117)
ALT SERPL W P-5'-P-CCNC: 7 U/L (ref 1–33)
ANION GAP SERPL CALCULATED.3IONS-SCNC: 12 MMOL/L (ref 5–15)
AST SERPL-CCNC: 29 U/L (ref 1–32)
BASOPHILS # BLD AUTO: 0.02 10*3/MM3 (ref 0–0.2)
BASOPHILS NFR BLD AUTO: 0.3 % (ref 0–1.5)
BILIRUB SERPL-MCNC: 0.4 MG/DL (ref 0–1.2)
BUN SERPL-MCNC: 8 MG/DL (ref 8–23)
BUN/CREAT SERPL: 5.4 (ref 7–25)
CALCIUM SPEC-SCNC: 9.1 MG/DL (ref 8.6–10.5)
CHLORIDE SERPL-SCNC: 100 MMOL/L (ref 98–107)
CO2 SERPL-SCNC: 26 MMOL/L (ref 22–29)
CREAT SERPL-MCNC: 1.48 MG/DL (ref 0.57–1)
DEPRECATED RDW RBC AUTO: 45.4 FL (ref 37–54)
EGFRCR SERPLBLD CKD-EPI 2021: 38.9 ML/MIN/1.73
EOSINOPHIL # BLD AUTO: 0.14 10*3/MM3 (ref 0–0.4)
EOSINOPHIL NFR BLD AUTO: 2.2 % (ref 0.3–6.2)
ERYTHROCYTE [DISTWIDTH] IN BLOOD BY AUTOMATED COUNT: 12.8 % (ref 12.3–15.4)
FERRITIN SERPL-MCNC: 209 NG/ML (ref 13–150)
FOLATE SERPL-MCNC: 6.94 NG/ML (ref 4.78–24.2)
GLOBULIN UR ELPH-MCNC: 2.6 GM/DL
GLUCOSE SERPL-MCNC: 104 MG/DL (ref 65–99)
HCT VFR BLD AUTO: 31.4 % (ref 34–46.6)
HCV AB SER DONR QL: NORMAL
HGB BLD-MCNC: 10.2 G/DL (ref 12–15.9)
IMM GRANULOCYTES # BLD AUTO: 0.04 10*3/MM3 (ref 0–0.05)
IMM GRANULOCYTES NFR BLD AUTO: 0.6 % (ref 0–0.5)
IRON 24H UR-MRATE: 39 MCG/DL (ref 37–145)
IRON SATN MFR SERPL: 13 % (ref 20–50)
LYMPHOCYTES # BLD AUTO: 1.39 10*3/MM3 (ref 0.7–3.1)
LYMPHOCYTES NFR BLD AUTO: 22.2 % (ref 19.6–45.3)
MCH RBC QN AUTO: 32 PG (ref 26.6–33)
MCHC RBC AUTO-ENTMCNC: 32.5 G/DL (ref 31.5–35.7)
MCV RBC AUTO: 98.4 FL (ref 79–97)
MONOCYTES # BLD AUTO: 0.71 10*3/MM3 (ref 0.1–0.9)
MONOCYTES NFR BLD AUTO: 11.3 % (ref 5–12)
NEUTROPHILS NFR BLD AUTO: 3.96 10*3/MM3 (ref 1.7–7)
NEUTROPHILS NFR BLD AUTO: 63.4 % (ref 42.7–76)
NRBC BLD AUTO-RTO: 0.2 /100 WBC (ref 0–0.2)
PLATELET # BLD AUTO: 293 10*3/MM3 (ref 140–450)
PMV BLD AUTO: 10.5 FL (ref 6–12)
POTASSIUM SERPL-SCNC: 4.3 MMOL/L (ref 3.5–5.2)
PROT SERPL-MCNC: 6.9 G/DL (ref 6–8.5)
RBC # BLD AUTO: 3.19 10*6/MM3 (ref 3.77–5.28)
SODIUM SERPL-SCNC: 138 MMOL/L (ref 136–145)
TIBC SERPL-MCNC: 310 MCG/DL (ref 298–536)
TRANSFERRIN SERPL-MCNC: 208 MG/DL (ref 200–360)
TSH SERPL DL<=0.05 MIU/L-ACNC: 2.99 UIU/ML (ref 0.27–4.2)
VIT B12 BLD-MCNC: >2000 PG/ML (ref 211–946)
WBC NRBC COR # BLD: 6.26 10*3/MM3 (ref 3.4–10.8)

## 2022-11-11 ENCOUNTER — LAB (OUTPATIENT)
Dept: LAB | Facility: HOSPITAL | Age: 67
End: 2022-11-11

## 2022-11-11 ENCOUNTER — TELEPHONE (OUTPATIENT)
Dept: GYNECOLOGIC ONCOLOGY | Facility: CLINIC | Age: 67
End: 2022-11-11

## 2022-11-11 DIAGNOSIS — R30.9 PAIN WITH URINATION: ICD-10-CM

## 2022-11-11 DIAGNOSIS — N18.31 STAGE 3A CHRONIC KIDNEY DISEASE: Primary | ICD-10-CM

## 2022-11-11 DIAGNOSIS — N18.31 STAGE 3A CHRONIC KIDNEY DISEASE: ICD-10-CM

## 2022-11-11 LAB
BACTERIA UR QL AUTO: ABNORMAL /HPF
BILIRUB UR QL STRIP: NEGATIVE
CLARITY UR: CLEAR
COLOR UR: YELLOW
GLUCOSE UR STRIP-MCNC: NEGATIVE MG/DL
HGB UR QL STRIP.AUTO: ABNORMAL
HYALINE CASTS UR QL AUTO: ABNORMAL /LPF
KETONES UR QL STRIP: ABNORMAL
LEUKOCYTE ESTERASE UR QL STRIP.AUTO: ABNORMAL
NITRITE UR QL STRIP: NEGATIVE
PH UR STRIP.AUTO: 6.5 [PH] (ref 5–8)
PROT UR QL STRIP: NEGATIVE
RBC # UR STRIP: ABNORMAL /HPF
REF LAB TEST METHOD: ABNORMAL
SP GR UR STRIP: 1.02 (ref 1–1.03)
SQUAMOUS #/AREA URNS HPF: ABNORMAL /HPF
UROBILINOGEN UR QL STRIP: ABNORMAL
WBC # UR STRIP: ABNORMAL /HPF

## 2022-11-11 PROCEDURE — 87077 CULTURE AEROBIC IDENTIFY: CPT

## 2022-11-11 PROCEDURE — 81001 URINALYSIS AUTO W/SCOPE: CPT

## 2022-11-11 PROCEDURE — 87086 URINE CULTURE/COLONY COUNT: CPT

## 2022-11-11 PROCEDURE — 87186 SC STD MICRODIL/AGAR DIL: CPT

## 2022-11-11 RX ORDER — PHENAZOPYRIDINE HYDROCHLORIDE 100 MG/1
100 TABLET, FILM COATED ORAL 2 TIMES DAILY PRN
Qty: 6 TABLET | Refills: 0 | Status: SHIPPED | OUTPATIENT
Start: 2022-11-11 | End: 2022-11-22

## 2022-11-11 NOTE — TELEPHONE ENCOUNTER
RN called pt and let her know that if she could come do a UA we would be able to see what she needed to treat the potential UTI.  Pt stated that she would bring a sample up to the lab as she is having trouble urinating and may not be able to give a sample when she got here.  RN stated that she could just bring it once she got it and drop it off at the cancer center lab.  Pt v/u.

## 2022-11-11 NOTE — TELEPHONE ENCOUNTER
There was no bacteria in UA, MD advised AZO, extra fluids, voiding q2-3 hours. Pt has appt to see MD Monday.

## 2022-11-11 NOTE — TELEPHONE ENCOUNTER
Patient phoned. Has an appointment on Monday but feels like she may be getting a UTI and doesn't want to wait till then. Please advise

## 2022-11-12 NOTE — PROGRESS NOTES
"     Post Operative Office Visit      Patient Name: Alysia Sierra  : 1955   MRN: [id number]     Chief Complaint:  Postoperative visit    History of Present Illness: Alysia Sierra is a 67 y.o. female who is status post Total Laparoscopic Hysterectomy Bilateral Salpingo-oophorectomy, Injection For Dazey Lymph Node Mapping, Bilateral Dazey Lymph Node Dissection With Davinci Robot on 10/28/2022 for endometrial cancer. Her post operative course has been unremarkable and continues to be recovering well. She is tolerating a normal diet. She reports normal return of bowel function. She states her pain is well controlled. Did have bladder pain on Friday. She was started on pyridium while culture was pending.    Medical, surgical, and family history updated as needed.  Subjective   ROS as per HPI    Objective     Physical Exam:  Vital Signs:   Vitals:    22 1037   BP: 152/88   Pulse: 72   Resp: 14   Temp: 98.1 °F (36.7 °C)   TempSrc: Temporal   SpO2: 99%   Weight: 57.6 kg (127 lb)   Height: 163.8 cm (64.49\")   PainSc: 0-No pain     BMI: Body mass index is 21.47 kg/m².     Physical Examination:   General appearance - alert, well appearing, and in no distress and oriented to person, place, and time  Mental status - normal mood, behavior, speech, dress, motor activity, and thought processes  Lungs - normal respiratory effort, clear to auscultation  Heart - normal rate, regular rhythm, normal S1, S2, no murmurs, rubs, clicks or gallops  Abdomen - soft, nontender, nondistended, no masses or organomegaly, incision C/D/I and without an evidence of infection  Pelvic - external genitalia normal, vagina without discharge, vaginal cuff intact and without lesions, cervix, uterus and adnexa surgically absent, no palpable masses  Neurological - alert, oriented, normal speech, no focal findings or movement disorder noted  Musculoskeletal - no joint tenderness, deformity or swelling  Extremities - peripheral pulses " normal, no pedal edema, no clubbing or cyanosis  Skin - normal coloration and turgor, no rashes, no suspicious skin lesions noted     Medications:     Current Outpatient Medications:   •  aspirin 325 MG tablet, Take 325 mg by mouth As Needed for Mild Pain ., Disp: , Rfl:   •  Cyanocobalamin 1000 MCG/ML kit, Inject 1,000mcg SC daily x1 week, then weekly x4 weeks. (Patient taking differently: Every 30 (Thirty) Days. monthly), Disp: 1 kit, Rfl: 0  •  docusate sodium (COLACE) 100 MG capsule, Take 1 capsule by mouth 2 (Two) Times a Day., Disp: 60 capsule, Rfl: 0  •  DULoxetine (Cymbalta) 20 MG capsule, Take 1 capsule by mouth 2 (Two) Times a Day., Disp: 60 capsule, Rfl: 2  •  esomeprazole (nexIUM) 20 MG capsule, Take 20 mg by mouth 2 (Two) Times a Week., Disp: , Rfl:   •  gabapentin (Neurontin) 100 MG capsule, Take 1 capsule by mouth Every Morning., Disp: 30 capsule, Rfl: 6  •  gabapentin (Neurontin) 300 MG capsule, Take 1 capsule by mouth 1 (One) Time for 1 dose. (Patient taking differently: Take 1 capsule by mouth Every Night.), Disp: 30 capsule, Rfl: 6  •  hydrALAZINE (APRESOLINE) 25 MG tablet, 25 mg 2 (Two) Times a Day., Disp: , Rfl:   •  levothyroxine (SYNTHROID, LEVOTHROID) 25 MCG tablet, TAKE ONE TABLET BY MOUTH DAILY IN THE MORNING ON AN EMPTY STOMACH (Patient taking differently: 1 tablet Daily.), Disp: 30 tablet, Rfl: 2  •  magnesium oxide (MAGOX) 400 (241.3 Mg) MG tablet tablet, Take 400 mg by mouth Daily., Disp: , Rfl:   •  metoprolol succinate XL (TOPROL-XL) 25 MG 24 hr tablet, Take 1 tablet by mouth Every Night., Disp: 90 tablet, Rfl: 1  •  nitroglycerin (NITROSTAT) 0.4 MG SL tablet, 1 under the tongue as needed for angina, may repeat q5mins for up three doses, Disp: 100 tablet, Rfl: 11  •  ondansetron (ZOFRAN) 4 MG tablet, Take 4 mg by mouth As Needed for Nausea or Vomiting., Disp: , Rfl:   •  phenazopyridine (Pyridium) 100 MG tablet, Take 1 tablet by mouth 2 (Two) Times a Day As Needed for Bladder Spasms.,  Disp: 6 tablet, Rfl: 0  •  rosuvastatin (Crestor) 5 MG tablet, Take 1 tablet by mouth Daily., Disp: 90 tablet, Rfl: 1  •  sulfamethoxazole-trimethoprim (BACTRIM DS,SEPTRA DS) 800-160 MG per tablet, Take 1 tablet by mouth 2 (Two) Times a Day for 3 days., Disp: 6 tablet, Rfl: 0    Current Facility-Administered Medications:   •  cyanocobalamin injection 1,000 mcg, 1,000 mcg, Intramuscular, Q28 Days, Liana So, APRN, 1,000 mcg at 10/14/22 1308  Current outpatient and discharge medications have been reconciled for the patient.  Reviewed by: Jasmine Rich MD      Allergies:   Allergies   Allergen Reactions   • Lisinopril Angioedema   • Metal Other (See Comments)     Possible nickel -   Gold is only metal that doesn't have issues     • Milk-Related Compounds Other (See Comments)     LACTOSE INTOLERANT   • Tylenol [Acetaminophen] Other (See Comments)     ckd   • Atorvastatin Myalgia       Pathology:   1.  RIGHT PELVIC SENTINEL LYMPH NODES:                 3 lymph nodes negative for metastatic carcinoma on routine and immunohistochemical stain    2.  LEFT PELVIC SENTINEL LYMPH NODE:                 Single lymph node negative for metastatic carcinoma on routine and immunohistochemical stain    3.  UTERUS WITH BILATERAL FALLOPIAN TUBES AND OVARIES:                 Invasive endometrioid adenocarcinoma, grade 2                Gross tumor size 5 x 3.5 x 1.6 cm                 Tumor invades 16 of 17 mm thickness (94%)                 Tumor involves endocervix                 Tumor involves uterine serosal adhesions                 Myometrium with subserosal leiomyoma                Parametrium, left adnexa, and right adnexa free of neoplasm    Operative findings again reviewed. Pathology report discussed.       Assessment / Plan    Ms Alysia Sierra underwent a robotic hysterectomy with bilateral salpingo-oophorectomy with sentinel pelvic lymph node dissection and is recovering well from surgery. We discussed  further recovery and avoiding lifting anything over 10 lbs for 6 weeks postoperatively.     Final pathology showed a grade 2 endometrioid adenocarcinoma with 94% myometrial invasion, no lymphovascular space invasion and negative lymph nodes. She did have serosal adhesions and endocervical involvement This makes her a FIGO stage IIIA grade 2 and high risk of recurrence.  We discussed the natural course of the disease, including prognosis, risks and symptoms of recurrent disease. She was counseled on the need for adjuvant chemotherapy consisting of a platinum and taxane based regimen +/- radiation with vaginal brachytherapy. She is not a candidate for any clinical trials at this time. Of note, the patient has long-standing neuropathy. I would therefore recommend proceeding with docetaxol 75 mg/m2 and carboplatin AUC 5 due to the decrease risk of neuropathy when compared to paclitaxel. I would also like her to meet with radiation oncology to discuss vaginal brachytherapy     Additionally, the patient was counseled on the potential side effects of chemotherapy including nausea/vomiting, myelosuppression, alopecia, neuropathy, fatigue, and additional toxicities. We will plan for chemotherapy education visit followed by chemotherapy within the next 2 weeks.    Urinary tract infection: Urine culture with citrobacter. Rx for Bactrim DS given.    I spent 45 minutes with the patient in face-to-face discussion and counseling regarding the significance of the pathology report, future treatment options, and surveillance for her newly diagnosed cancer. This is above and beyond her routine post-operative care and separate from the procedure performed.    Follow Up:   No follow-ups on file.    ROXIE Rich MD  Gynecologic Oncology   Answers for HPI/ROS submitted by the patient on 11/10/2022  Please describe your symptoms.: Post op exam  Have you had these symptoms before?: No  How long have you been having these symptoms?: 1-2  weeks  Please list any medications you are currently taking for this condition.: Julio Cesar  What is the primary reason for your visit?: Other

## 2022-11-13 LAB — BACTERIA SPEC AEROBE CULT: ABNORMAL

## 2022-11-14 ENCOUNTER — OFFICE VISIT (OUTPATIENT)
Dept: GYNECOLOGIC ONCOLOGY | Facility: CLINIC | Age: 67
End: 2022-11-14

## 2022-11-14 VITALS
SYSTOLIC BLOOD PRESSURE: 152 MMHG | DIASTOLIC BLOOD PRESSURE: 88 MMHG | OXYGEN SATURATION: 99 % | BODY MASS INDEX: 21.68 KG/M2 | WEIGHT: 127 LBS | HEART RATE: 72 BPM | TEMPERATURE: 98.1 F | HEIGHT: 64 IN | RESPIRATION RATE: 14 BRPM

## 2022-11-14 DIAGNOSIS — Z71.89 OTHER SPECIFIED COUNSELING: ICD-10-CM

## 2022-11-14 DIAGNOSIS — C54.1 ENDOMETRIAL ADENOCARCINOMA: ICD-10-CM

## 2022-11-14 DIAGNOSIS — Z09 POSTOP CHECK: Primary | ICD-10-CM

## 2022-11-14 PROCEDURE — 99024 POSTOP FOLLOW-UP VISIT: CPT | Performed by: OBSTETRICS & GYNECOLOGY

## 2022-11-14 RX ORDER — SULFAMETHOXAZOLE AND TRIMETHOPRIM 800; 160 MG/1; MG/1
1 TABLET ORAL 2 TIMES DAILY
Qty: 6 TABLET | Refills: 0 | Status: SHIPPED | OUTPATIENT
Start: 2022-11-14 | End: 2022-11-17

## 2022-11-17 DIAGNOSIS — C54.1 ENDOMETRIAL ADENOCARCINOMA: Primary | ICD-10-CM

## 2022-11-18 DIAGNOSIS — C54.1 ENDOMETRIAL ADENOCARCINOMA: ICD-10-CM

## 2022-11-18 DIAGNOSIS — L65.8 ALOPECIA DUE TO CYTOTOXIC DRUG: Primary | ICD-10-CM

## 2022-11-18 DIAGNOSIS — T45.1X5A ALOPECIA DUE TO CYTOTOXIC DRUG: Primary | ICD-10-CM

## 2022-11-21 ENCOUNTER — OFFICE VISIT (OUTPATIENT)
Dept: RADIATION ONCOLOGY | Facility: HOSPITAL | Age: 67
End: 2022-11-21

## 2022-11-21 ENCOUNTER — HOSPITAL ENCOUNTER (OUTPATIENT)
Dept: RADIATION ONCOLOGY | Facility: HOSPITAL | Age: 67
Setting detail: RADIATION/ONCOLOGY SERIES
Discharge: HOME | End: 2022-11-21

## 2022-11-21 VITALS
BODY MASS INDEX: 20.73 KG/M2 | SYSTOLIC BLOOD PRESSURE: 140 MMHG | HEART RATE: 94 BPM | DIASTOLIC BLOOD PRESSURE: 82 MMHG | OXYGEN SATURATION: 96 % | TEMPERATURE: 97.6 F | RESPIRATION RATE: 16 BRPM | WEIGHT: 124.4 LBS | HEIGHT: 65 IN

## 2022-11-21 DIAGNOSIS — C54.1 ENDOMETRIAL ADENOCARCINOMA: ICD-10-CM

## 2022-11-21 PROCEDURE — G0463 HOSPITAL OUTPT CLINIC VISIT: HCPCS | Performed by: RADIOLOGY

## 2022-11-21 NOTE — PROGRESS NOTES
CONSULTATION NOTE    NAME:      Alysia Sierra  :                                                          1955  DATE OF CONSULTATION:                       22  REQUESTING PHYSICIAN:                   Jasmine Rich MD  REASON FOR CONSULTATION:            Cancer Staging   Endometrial adenocarcinoma (HCC)  Staging form: Corpus Uteri - Carcinoma And Carcinosarcoma, AJCC 8th Edition  - Pathologic stage from 10/28/2022: FIGO Stage IIIA (pT3a, pN0(sn), cM0) - Signed by Jasmine Rich MD on 2022           BRIEF HISTORY:  Alysia Sierra  is a very pleasant 67 y.o. female  who underwent robotic hysterectomy with bilateral salpingo-oophorectomy with sentinel pelvic node dissection for grade 2 endometrioid adenocarcinoma with 94% myometrial invasion of 16 of 17 mm in thickness..  There was no lymphovascular space invasion and nodes were negative.  She did have tumor involvement of serosal adhesions and endocervical involvement.  Tumor size was 5.5 cm.  She is here to discuss postoperative radiation.  She is undergoing chemotherapy per Dr. Rich.    BMI:  Body mass index is 21.02 kg/m².      Social History     Substance and Sexual Activity   Alcohol Use Yes   • Alcohol/week: 6.0 standard drinks   • Types: 6 Glasses of wine per week    Comment: social       Allergies   Allergen Reactions   • Lisinopril Angioedema   • Metal Rash     Possible nickel -   Gold is only metal that doesn't have issues     • Milk-Related Compounds Other (See Comments)     LACTOSE INTOLERANT   • Tylenol [Acetaminophen] Other (See Comments)     ckd   • Atorvastatin Myalgia       Social History     Tobacco Use   • Smoking status: Never   • Smokeless tobacco: Never   • Tobacco comments:     Never   Vaping Use   • Vaping Use: Never used   Substance Use Topics   • Alcohol use: Yes     Alcohol/week: 6.0 standard drinks     Types: 6 Glasses of wine per week     Comment: social   • Drug use: No         Past Medical History:    Diagnosis Date   • Allergic lisinopril  2017   • Anemia    • Arrhythmia    • Arthritis    • Cataract mild     stil lpresent   • Chicken pox    • Chronic fatigue    • CKD (chronic kidney disease), stage III (HCC)     sees nephro   • Dental root implant present     lower left  x1 - possible dental implant   • Depression mild 2019   • Difficulty walking 2019   • Disease of thyroid gland    • Dizzy    • NIEVES (dyspnea on exertion)     2017   • Generalized anxiety disorder    • GERD (gastroesophageal reflux disease) 2018   • Hyperlipidemia    • Hypertension    • Iron deficiency anemia    • Liver disease     fatty   • Liver problem    • Measles    • Menopause    • Mumps    • Neuromuscular disorder (HCC) Peripheral Neuropathy   • Orthostatic hypotension    • 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   • Renal insufficiency    • Scoliosis    • Unintentional weight loss    • Uses contact lenses     bilat   • Uterine cancer (HCC)    • UTI (urinary tract infection)    • Visual impairment Nearsighted   • Wears glasses        family history includes COPD in her mother; Cancer in her father; Coronary artery disease in her father; Heart attack in her father; Heart disease in her father; Hyperlipidemia in her sister; Hypertension in her brother, father, mother, and sister; Lung cancer in her father; Lung disease in her father; Malig Hypertension in her sister; Osteoarthritis in her sister; Other in her sister; Rheum arthritis in her sister; Spondylolisthesis in her mother; Stroke in her mother.     Past Surgical History:   Procedure Laterality Date   •  SECTION     • DILATION AND CURETTAGE, DIAGNOSTIC / THERAPEUTIC     • OOPHORECTOMY     • ORAL LESION EXCISION/BIOPSY  2021   • TOTAL LAPAROSCOPIC HYSTERECTOMY SALPINGO OOPHORECTOMY N/A 10/28/2022    Procedure: TOTAL LAPAROSCOPIC HYSTERECTOMY BILATERAL SALPINGO-OOPHORECTOMY, INJECTION FOR SENTINEL LYMPH NODE MAPPING, BILATERAL SENTINEL LYMPH  "NODE DISSECTION WITH DAVINCI ROBOT;  Surgeon: Jasmine Rich MD;  Location: [facility];  Service: Robotics - DaVinci;  Laterality: N/A;   • TUBAL ABDOMINAL LIGATION  1983        Review of Systems   Constitutional: Positive for fatigue.   Musculoskeletal: Positive for gait problem (chronic neuropathy and uses cane when ambulating).   Neurological: Positive for gait problem (chronic neuropathy and uses cane when ambulating).   Psychiatric/Behavioral: Positive for sleep disturbance.   All other systems reviewed and are negative.          Objective   VITAL SIGNS:   Vitals:    11/21/22 1310   BP: 140/82   Pulse: 94   Resp: 16   Temp: 97.6 °F (36.4 °C)   TempSrc: Temporal   SpO2: 96%  Comment: RA   Weight: 56.4 kg (124 lb 6.4 oz)   Height: 163.8 cm (64.5\")   PainSc: 0-No pain        KPS       90%    Physical Exam  Vitals reviewed.   Constitutional:       Appearance: Normal appearance.   Cardiovascular:      Rate and Rhythm: Normal rate and regular rhythm.      Heart sounds: Normal heart sounds.   Pulmonary:      Effort: Pulmonary effort is normal.      Breath sounds: Normal breath sounds.   Neurological:      Mental Status: She is alert.     The pelvic exam reveals normal external genitalia.  The vaginal apex is healing well.  There are no suspicious masses or lesions.         The following portions of the patient's history were reviewed and updated as appropriate: allergies, current medications, past family history, past medical history, past social history, past surgical history and problem list.    Assessment      IMPRESSION:    Alysia Sierra  is a  67 y.o. female  who underwent robotic hysterectomy with bilateral salpingo-oophorectomy with sentinel pelvic node dissection for grade 2 endometrioid adenocarcinoma with 94% myometrial invasion of 16 of 17 mm in thickness..  There was no lymphovascular space invasion and nodes were negative.  She did have tumor involvement of serosal adhesions and endocervical " involvement.  Tumor size was 5.5 cm.  She is undergoing chemotherapy per Dr. Rich.        RECOMMENDATIONS: I discussed the pros and cons, risks and benefits of both external beam radiation and vaginal brachytherapy with Mrs. Sierra.  She understands that there is benefit for local control but no survival advantage.  I think she would benefit from external beam radiation due to the involvement of the serosal adhesions.  We will discuss this again when she returns for treatment planning.  At this time she is going to continue chemotherapy and we will see her for treatment between the third and fourth cycles.  We will see how she tolerates chemotherapy.  It was a pleasure to meet Ms. Sierra.               Roberta Aldana MD    Total time of patient care on day of service including time prior to, face to face with patient, and following visit spent in reviewing records, lab results, imaging studies, discussion with patient, and documentation/charting was > 45 minutes.    Errors in dictation may reflect use of voice recognition software and not all errors in transcription may have been detected prior to signing.

## 2022-11-22 ENCOUNTER — OFFICE VISIT (OUTPATIENT)
Dept: INTERNAL MEDICINE | Facility: CLINIC | Age: 67
End: 2022-11-22

## 2022-11-22 VITALS
HEART RATE: 104 BPM | TEMPERATURE: 97.2 F | BODY MASS INDEX: 20.83 KG/M2 | SYSTOLIC BLOOD PRESSURE: 104 MMHG | WEIGHT: 125 LBS | DIASTOLIC BLOOD PRESSURE: 72 MMHG | OXYGEN SATURATION: 99 % | RESPIRATION RATE: 16 BRPM | HEIGHT: 65 IN

## 2022-11-22 DIAGNOSIS — E53.8 B12 DEFICIENCY: ICD-10-CM

## 2022-11-22 DIAGNOSIS — I47.1 PAROXYSMAL SVT (SUPRAVENTRICULAR TACHYCARDIA): ICD-10-CM

## 2022-11-22 DIAGNOSIS — N18.31 STAGE 3A CHRONIC KIDNEY DISEASE: ICD-10-CM

## 2022-11-22 DIAGNOSIS — E78.2 MIXED HYPERLIPIDEMIA: ICD-10-CM

## 2022-11-22 DIAGNOSIS — I25.10 CORONARY ARTERY DISEASE INVOLVING NATIVE HEART WITHOUT ANGINA PECTORIS, UNSPECIFIED VESSEL OR LESION TYPE: ICD-10-CM

## 2022-11-22 DIAGNOSIS — Z00.00 HEALTH CARE MAINTENANCE: Primary | ICD-10-CM

## 2022-11-22 DIAGNOSIS — I10 PRIMARY HYPERTENSION: ICD-10-CM

## 2022-11-22 DIAGNOSIS — C54.1 ENDOMETRIAL ADENOCARCINOMA: ICD-10-CM

## 2022-11-22 DIAGNOSIS — E03.9 HYPOTHYROIDISM, UNSPECIFIED TYPE: ICD-10-CM

## 2022-11-22 DIAGNOSIS — D64.9 ANEMIA, UNSPECIFIED TYPE: ICD-10-CM

## 2022-11-22 PROCEDURE — G0439 PPPS, SUBSEQ VISIT: HCPCS | Performed by: NURSE PRACTITIONER

## 2022-11-22 PROCEDURE — 1159F MED LIST DOCD IN RCRD: CPT | Performed by: NURSE PRACTITIONER

## 2022-11-22 PROCEDURE — 1170F FXNL STATUS ASSESSED: CPT | Performed by: NURSE PRACTITIONER

## 2022-11-22 PROCEDURE — 1126F AMNT PAIN NOTED NONE PRSNT: CPT | Performed by: NURSE PRACTITIONER

## 2022-11-22 RX ORDER — LEVOTHYROXINE SODIUM 0.03 MG/1
25 TABLET ORAL
Qty: 90 TABLET | Refills: 1 | Status: SHIPPED | OUTPATIENT
Start: 2022-11-22

## 2022-11-22 RX ORDER — METOPROLOL SUCCINATE 25 MG/1
25 TABLET, EXTENDED RELEASE ORAL NIGHTLY
Qty: 90 TABLET | Refills: 1 | Status: SHIPPED | OUTPATIENT
Start: 2022-11-22

## 2022-11-22 NOTE — PROGRESS NOTES
The ABCs of the Annual Wellness Visit  Subsequent Medicare Wellness Visit    Chief Complaint   Patient presents with   • Medicare Wellness-subsequent      Subjective    History of Present Illness:  Alysia Sierra is a 67 y.o. female who presents for a Subsequent Medicare Wellness Visit.    -GYN ONC tomorrow, with plans to begin treatment soon  -Upcoming apt with nephrologist on 2-1-22, Dr Sarabia, [facility]  -Upcoming apt with neurology, Dr Dietz on 3-7-22  -Next apt with cardiology, Dr Bahena on 10-9-22  -Apt with DENZEL Villafuerte on 1-25-22     Recently started eating red meat again     The following portions of the patient's history were reviewed and   updated as appropriate: allergies, current medications, past family history, past medical history, past social history, past surgical history and problem list.    Compared to one year ago, the patient feels her physical   health is worse.    Compared to one year ago, the patient feels her mental   health is the same.    Recent Hospitalizations:  She was admitted within the past 365 days at [facility].   10/28-10/29 2022 total laparoscopic hysterectomy, BSO for endometrial cancer       Current Medical Providers:  Patient Care Team:  Liana So APRN as PCP - General (Family Medicine)  Jasmine Rich MD as Referring Physician (Gynecologic Oncology)  Russ Carrington MD as Consulting Physician (Nephrology)  Roberta Aldana MD as Consulting Physician (Radiation Oncology)  Gray Bahena MD as Consulting Physician (Cardiology)  Caden Dietz MD as Consulting Physician (Neurology)    Outpatient Medications Prior to Visit   Medication Sig Dispense Refill   • aspirin 325 MG tablet Take 325 mg by mouth As Needed for Mild Pain .     • Cyanocobalamin 1000 MCG/ML kit Inject 1,000mcg SC daily x1 week, then weekly x4 weeks. (Patient taking differently: Every 30 (Thirty) Days. monthly) 1 kit 0   • docusate sodium (COLACE) 100 MG capsule Take 1  capsule by mouth 2 (Two) Times a Day. 60 capsule 0   • DULoxetine (Cymbalta) 20 MG capsule Take 1 capsule by mouth 2 (Two) Times a Day. 60 capsule 2   • esomeprazole (nexIUM) 20 MG capsule Take 20 mg by mouth 2 (Two) Times a Week.     • gabapentin (Neurontin) 100 MG capsule Take 1 capsule by mouth Every Morning. 30 capsule 6   • hydrALAZINE (APRESOLINE) 25 MG tablet 25 mg 2 (Two) Times a Day.     • magnesium oxide (MAGOX) 400 (241.3 Mg) MG tablet tablet Take 400 mg by mouth Daily.     • nitroglycerin (NITROSTAT) 0.4 MG SL tablet 1 under the tongue as needed for angina, may repeat q5mins for up three doses 100 tablet 11   • ondansetron (ZOFRAN) 4 MG tablet Take 4 mg by mouth As Needed for Nausea or Vomiting.     • rosuvastatin (Crestor) 5 MG tablet Take 1 tablet by mouth Daily. 90 tablet 1   • levothyroxine (SYNTHROID, LEVOTHROID) 25 MCG tablet TAKE ONE TABLET BY MOUTH DAILY IN THE MORNING ON AN EMPTY STOMACH (Patient taking differently: 25 mcg Daily.) 30 tablet 2   • metoprolol succinate XL (TOPROL-XL) 25 MG 24 hr tablet Take 1 tablet by mouth Every Night. 90 tablet 1   • gabapentin (Neurontin) 300 MG capsule Take 1 capsule by mouth 1 (One) Time for 1 dose. (Patient taking differently: Take 1 capsule by mouth Every Night.) 30 capsule 6   • phenazopyridine (Pyridium) 100 MG tablet Take 1 tablet by mouth 2 (Two) Times a Day As Needed for Bladder Spasms. 6 tablet 0     Facility-Administered Medications Prior to Visit   Medication Dose Route Frequency Provider Last Rate Last Admin   • cyanocobalamin injection 1,000 mcg  1,000 mcg Intramuscular Q28 Days Liana So APRN   1,000 mcg at 10/14/22 1308       No opioid medication identified on active medication list. I have reviewed chart for other potential  high risk medication/s and harmful drug interactions in the elderly.          Aspirin is on active medication list. Aspirin use is not indicated based on review of current medical condition/s. Risk of harm  outweighs potential benefits. Patient instructed to discontinue this medication.  .      Patient Active Problem List   Diagnosis   • Hypertension   • Paroxysmal SVT (supraventricular tachycardia) (HCC)   • Leg weakness, bilateral   • Syncope   • CKD (chronic kidney disease) stage 3, GFR 30-59 ml/min (HCC)   • Circadian rhythm sleep disorder, advanced sleep phase type   • Neuropathy   • Hyperlipidemia LDL goal <100   • Endometrial adenocarcinoma (HCC)   • Mild episode of recurrent major depressive disorder (HCC)     Advance Care Planning  Advance Directive is not on file.  ACP discussion was held with the patient during this visit. Patient has an advance directive (not in EMR), copy requested.   She has a medical POA, no living will. She plans to bring a copy to next apt to make sure we have updated copy on file.    Review of Systems   Constitutional: Positive for fatigue. Negative for appetite change and chills.   HENT: Negative for congestion, postnasal drip, rhinorrhea and sore throat.    Eyes: Negative for visual disturbance.        Has scheduled annual eye exam before end of the year    Respiratory: Negative for cough and shortness of breath.    Cardiovascular: Negative for chest pain, palpitations and leg swelling.   Gastrointestinal: Negative for abdominal pain, blood in stool, diarrhea and nausea.   Endocrine: Negative for cold intolerance and heat intolerance.   Genitourinary: Negative for dysuria, flank pain, frequency, hematuria and urgency.   Musculoskeletal: Negative for arthralgias, back pain, myalgias and neck pain.   Skin: Negative.  Negative for rash.        Has apt in March for skin check    Neurological: Positive for light-headedness (with position change) and numbness (bilateral LE neuropathy ). Negative for dizziness and weakness.   Psychiatric/Behavioral: Positive for sleep disturbance (worried to try OTC agents due to kidney ds ). Negative for dysphoric mood. The patient is not nervous/anxious.  "        +depression         Objective    Vitals:    11/22/22 1405   BP: 104/72   Pulse: 104   Resp: 16   Temp: 97.2 °F (36.2 °C)   SpO2: 99%   Weight: 56.7 kg (125 lb)   Height: 163.8 cm (64.5\")   PainSc: 0-No pain     Estimated body mass index is 21.12 kg/m² as calculated from the following:    Height as of this encounter: 163.8 cm (64.5\").    Weight as of this encounter: 56.7 kg (125 lb).    BMI is within normal parameters. No other follow-up for BMI required.      Does the patient have evidence of cognitive impairment? No    Physical Exam  Vitals and nursing note reviewed.   Constitutional:       General: She is not in acute distress.     Appearance: Normal appearance. She is normal weight. She is not ill-appearing.   HENT:      Head: Normocephalic and atraumatic.      Right Ear: Tympanic membrane, ear canal and external ear normal.      Left Ear: Tympanic membrane, ear canal and external ear normal.      Nose: Nose normal.      Mouth/Throat:      Mouth: Mucous membranes are moist.      Pharynx: Oropharynx is clear. No oropharyngeal exudate or posterior oropharyngeal erythema.   Eyes:      Extraocular Movements: Extraocular movements intact.      Conjunctiva/sclera: Conjunctivae normal.      Pupils: Pupils are equal, round, and reactive to light.   Cardiovascular:      Rate and Rhythm: Normal rate and regular rhythm.      Heart sounds: Normal heart sounds.   Pulmonary:      Effort: Pulmonary effort is normal. No respiratory distress.      Breath sounds: Normal breath sounds. No wheezing, rhonchi or rales.   Abdominal:      General: Abdomen is flat. A surgical scar is present. Bowel sounds are normal. There is no distension.      Palpations: Abdomen is soft. There is no mass.      Tenderness: There is no abdominal tenderness. There is no guarding or rebound.      Comments: 5 well healing laparoscopic incisions   Musculoskeletal:      Right lower leg: No edema.      Left lower leg: No edema.   Skin:     General: " Skin is warm and dry.      Capillary Refill: Capillary refill takes less than 2 seconds.   Neurological:      General: No focal deficit present.      Mental Status: She is alert and oriented to person, place, and time. Mental status is at baseline.      Motor: Weakness present.      Comments: Ambulating with cane    Psychiatric:         Mood and Affect: Mood normal.         Behavior: Behavior normal.         Thought Content: Thought content normal.         Judgment: Judgment normal.       Lab Results   Component Value Date    TRIG 231 (H) 08/26/2022    HDL 84 (H) 08/26/2022    LDL 99 08/26/2022    VLDL 38 08/26/2022            HEALTH RISK ASSESSMENT    Smoking Status:  Social History     Tobacco Use   Smoking Status Never   Smokeless Tobacco Never   Tobacco Comments    Never     Alcohol Consumption:  Social History     Substance and Sexual Activity   Alcohol Use Yes   • Alcohol/week: 6.0 standard drinks   • Types: 6 Glasses of wine per week    Comment: social     Fall Risk Screen:    HERNANDEZADI Fall Risk Assessment was completed, and patient is at HIGH risk for falls. Assessment completed on:11/22/2022    Depression Screening:  PHQ-2/PHQ-9 Depression Screening 11/22/2022   Little Interest or Pleasure in Doing Things 3-->nearly every day   Feeling Down, Depressed or Hopeless 1-->several days   Trouble Falling or Staying Asleep, or Sleeping Too Much 1-->several days   Feeling Tired or Having Little Energy 3-->nearly every day   Poor Appetite or Overeating 2-->more than half the days   Feeling Bad about Yourself - or that You are a Failure or Have Let Yourself or Your Family Down 0-->not at all   Trouble Concentrating on Things, Such as Reading the Newspaper or Watching Television 0-->not at all   Moving or Speaking So Slowly that Other People Could Have Noticed? Or the Opposite - Being So Fidgety 0-->not at all   Thoughts that You Would be Better Off Dead or of Hurting Yourself in Some Way 0-->not at all   PHQ-9: Brief  Depression Severity Measure Score 10   If You Checked Off Any Problems, How Difficult Have These Problems Made It For You to Do Your Work, Take Care of Things at Home, or Get Along with Other People? somewhat difficult       Health Habits and Functional and Cognitive Screening:  Functional & Cognitive Status 11/22/2022   Do you have difficulty preparing food and eating? Yes   Do you have difficulty bathing yourself, getting dressed or grooming yourself? No   Do you have difficulty using the toilet? No   Do you have difficulty moving around from place to place? No   Do you have trouble with steps or getting out of a bed or a chair? Yes   Current Diet Limited Junk Food   Dental Exam Up to date   Eye Exam Up to date   Exercise (times per week) 0 times per week   Current Exercises Include No Regular Exercise   Do you need help using the phone?  No   Are you deaf or do you have serious difficulty hearing?  No   Do you need help with transportation? No   Do you need help shopping? No   Do you need help preparing meals?  No   Do you need help with housework?  Yes   Do you need help with laundry? No   Do you need help taking your medications? No   Do you need help managing money? No   Do you ever drive or ride in a car without wearing a seat belt? No   Have you felt unusual stress, anger or loneliness in the last month? Yes   Who do you live with? Alone   If you need help, do you have trouble finding someone available to you? Yes   Have you been bothered in the last four weeks by sexual problems? No   Do you have difficulty concentrating, remembering or making decisions? No       Age-appropriate Screening Schedule:  Refer to the list below for future screening recommendations based on patient's age, sex and/or medical conditions. Orders for these recommended tests are listed in the plan section. The patient has been provided with a written plan.    Health Maintenance   Topic Date Due   • MAMMOGRAM  Never done   • DXA SCAN   Never done   • PAP SMEAR  12/07/2023 (Originally 5/7/2019)   • LIPID PANEL  08/26/2023   • TDAP/TD VACCINES (2 - Td or Tdap) 07/24/2024   • INFLUENZA VACCINE  Completed   • ZOSTER VACCINE  Completed              Assessment & Plan   CMS Preventative Services Quick Reference  Risk Factors Identified During Encounter  Alcohol Misuse  Fall Risk-High or Moderate  Immunizations Discussed/Encouraged (specific Immunizations; Td, Tdap, Hepatitis A Vaccine/Series, Hepatitis B Vaccine/Series, Influenza, Pneumococcal 23, Prevnar 20 (Pneumococcal 20-valent conjugate), Vaxneuvance (Pneumococcal 15-valent conjugate), Shingrix and COVID19  The above risks/problems have been discussed with the patient.  Follow up actions/plans if indicated are seen below in the Assessment/Plan Section.  Pertinent information has been shared with the patient in the After Visit Summary.    Diagnoses and all orders for this visit:    1. Health care maintenance (Primary)    2. B12 deficiency  -     Vitamin B12 level in 3 months  -stop injections for now, will follow closely      3. Endometrial adenocarcinoma (HCC)    4. Stage 3a chronic kidney disease (HCC)    5. Hypothyroidism, unspecified type  -     levothyroxine (SYNTHROID, LEVOTHROID) 25 MCG tablet; Take 1 tablet by mouth Every Morning.  Dispense: 90 tablet; Refill: 1    6. Anemia, unspecified type    7. Mixed hyperlipidemia    8. Primary hypertension  -     metoprolol succinate XL (TOPROL-XL) 25 MG 24 hr tablet; Take 1 tablet by mouth Every Night.  Dispense: 90 tablet; Refill: 1    9. Coronary artery disease involving native heart without angina pectoris, unspecified vessel or lesion type    10. Paroxysmal SVT (supraventricular tachycardia) (HCC)        Follow Up:   Return in about 6 months (around 5/22/2023) for Recheck.     An After Visit Summary and PPPS were made available to the patient.          I spent 30 minutes caring for Alysia on this date of service. This time includes time spent by  me in the following activities:preparing for the visit, reviewing tests, obtaining and/or reviewing a separately obtained history, performing a medically appropriate examination and/or evaluation , counseling and educating the patient/family/caregiver, ordering medications, tests, or procedures, referring and communicating with other health care professionals , documenting information in the medical record, independently interpreting results and communicating that information with the patient/family/caregiver and care coordination

## 2022-11-22 NOTE — PATIENT INSTRUCTIONS
Fall Prevention in the Home, Adult  Falls can cause injuries and affect people of all ages. There are many simple things that you can do to make your home safe and to help prevent falls. Ask for help when making these changes, if needed.  What actions can I take to prevent falls?  General instructions  • Use good lighting in all rooms. Replace any light bulbs that burn out, turn on lights if it is dark, and use night-lights.  • Place frequently used items in easy-to-reach places. Lower the shelves around your home if necessary.  • Set up furniture so that there are clear paths around it. Avoid moving your furniture around.  • Remove throw rugs and other tripping hazards from the floor.  • Avoid walking on wet floors.  • Fix any uneven floor surfaces.  • Add color or contrast paint or tape to grab bars and handrails in your home. Place contrasting color strips on the first and last steps of staircases.  • When you use a stepladder, make sure that it is completely opened and that the sides and supports are firmly locked. Have someone hold the ladder while you are using it. Do not climb a closed stepladder.  • Know where your pets are when moving through your home.  What can I do in the bathroom?     • Keep the floor dry. Immediately clean up any water that is on the floor.  • Remove soap buildup in the tub or shower regularly.  • Use nonskid mats or decals on the floor of the tub or shower.  • Attach bath mats securely with double-sided, nonslip rug tape.  • If you need to sit down while you are in the shower, use a plastic, nonslip stool.  • Install grab bars by the toilet and in the tub and shower. Do not use towel bars as grab bars.  What can I do in the bedroom?  • Make sure that a bedside light is easy to reach.  • Do not use oversized bedding that reaches the floor.  • Have a firm chair that has side arms to use for getting dressed.  What can I do in the kitchen?  • Clean up any spills right away.  • If you  need to reach for something above you, use a sturdy step stool that has a grab bar.  • Keep electrical cables out of the way.  • Do not use floor polish or wax that makes floors slippery. If you must use wax, make sure that it is non-skid floor wax.  What can I do with my stairs?  • Do not leave any items on the stairs.  • Make sure that you have a light switch at the top and the bottom of the stairs. Have them installed if you do not have them.  • Make sure that there are handrails on both sides of the stairs. Fix handrails that are broken or loose. Make sure that handrails are as long as the staircases.  • Install non-slip stair treads on all stairs in your home.  • Avoid having throw rugs at the top or bottom of stairs, or secure the rugs with carpet tape to prevent them from moving.  • Choose a carpet design that does not hide the edge of steps on the stairs.  • Check any carpeting to make sure that it is firmly attached to the stairs. Fix any carpet that is loose or worn.  What can I do on the outside of my home?  • Use bright outdoor lighting.  • Regularly repair the edges of walkways and driveways and fix any cracks.  • Remove high doorway thresholds.  • Trim any shrubbery on the main path into your home.  • Regularly check that handrails are securely fastened and in good repair. Both sides of all steps should have handrails.  • Install guardrails along the edges of any raised decks or porches.  • Clear walkways of debris and clutter, including tools and rocks.  • Have leaves, snow, and ice cleared regularly.  • Use sand or salt on walkways during winter months.  • In the garage, clean up any spills right away, including grease or oil spills.  What other actions can I take?  • Wear closed-toe shoes that fit well and support your feet. Wear shoes that have rubber soles or low heels.  • Use mobility aids as needed, such as canes, walkers, scooters, and crutches.  • Review your medicines with your health care  provider. Some medicines can cause dizziness or changes in blood pressure, which increase your risk of falling.  Talk with your health care provider about other ways that you can decrease your risk of falls. This may include working with a physical therapist or  to improve your strength, balance, and endurance.  Where to find more information  • Centers for Disease Control and Prevention, [place]: www.cdc.gov  • National [place] on Aging: www.michael.nih.gov  Contact a health care provider if:  • You are afraid of falling at home.  • You feel weak, drowsy, or dizzy at home.  • You fall at home.  Summary  • There are many simple things that you can do to make your home safe and to help prevent falls.  • Ways to make your home safe include removing tripping hazards and installing grab bars in the bathroom.  • Ask for help when making these changes in your home.  This information is not intended to replace advice given to you by your health care provider. Make sure you discuss any questions you have with your health care provider.  Document Revised: 07/21/2021 Document Reviewed: 07/21/2021  Elsevier Patient Education © 2022 [employer]

## 2022-11-23 ENCOUNTER — TELEPHONE (OUTPATIENT)
Dept: GYNECOLOGIC ONCOLOGY | Facility: CLINIC | Age: 67
End: 2022-11-23

## 2022-11-23 ENCOUNTER — TELEPHONE (OUTPATIENT)
Dept: INTERNAL MEDICINE | Facility: CLINIC | Age: 67
End: 2022-11-23

## 2022-11-23 ENCOUNTER — HOSPITAL ENCOUNTER (OUTPATIENT)
Dept: ONCOLOGY | Facility: HOSPITAL | Age: 67
Setting detail: INFUSION SERIES
Discharge: HOME | End: 2022-11-23

## 2022-11-23 ENCOUNTER — APPOINTMENT (OUTPATIENT)
Dept: ONCOLOGY | Facility: HOSPITAL | Age: 67
End: 2022-11-23

## 2022-11-23 NOTE — TELEPHONE ENCOUNTER
Caller: Alysia Sierra    Relationship: Self    Best call back number: [phone]    Additional notes: PATIENT STATES SHE HAS DECIDED TO NOT UNDERGO RADIATION OR CHEMO FOR HER ENDOMETRIAL CANCER AND WILL MEET WITH HER DR. BATISTA IN 3 MONTHS TO BE REEVALUATED.

## 2022-11-23 NOTE — TELEPHONE ENCOUNTER
Called patient. She states that she has thought about things and realized that at this point in her life she doesn't want to go thru chemo and radiation. Call transferred to Dr Rich.

## 2022-11-23 NOTE — TELEPHONE ENCOUNTER
Caller: Jayden Sierra    Relationship: Self    Best call back number: [phone]    What is the best time to reach you: ASAP    Who are you requesting to speak with (clinical staff, provider,  specific staff member): CLINICAL    What was the call regarding: JAYDEN IS WANTING TO CANCEL HER APPTS FOR TODAY AND TALK TO VERONICA REGARDING TREATMENTS AND RADIATION    Do you require a callback: YES

## 2022-11-28 ENCOUNTER — HOSPITAL ENCOUNTER (OUTPATIENT)
Dept: ONCOLOGY | Facility: HOSPITAL | Age: 67
Setting detail: INFUSION SERIES
Discharge: HOME | End: 2022-11-28

## 2022-11-30 ENCOUNTER — PATIENT OUTREACH (OUTPATIENT)
Dept: CASE MANAGEMENT | Facility: OTHER | Age: 67
End: 2022-11-30

## 2022-11-30 NOTE — OUTREACH NOTE
AMBULATORY CASE MANAGEMENT NOTE    Name and Relationship of Patient/Support Person: Alysia Sierra P - Self    Patient Outreach    Role of ACM explained to patient, agreeable to service.  Patient requests to see /therapist. Currently participates in family counseling but feels she has things to discuss without family present.  Referral request sent to PCP. Patient lives alone, has support from several sisters, one son who lives in [place].  Patient has walker, Rolator, and shower chair.  States she has only fell once in 3 years.  Patient drives herself to appointments. Reports ongoing constipation,takes docusate sodium, one tablet BID.  Education provided on constipation management and increasing fluids and adding laxative PRN..  States she has trouble sleeping but denies feelings of anxiety are keeping her awake. Discussed what to expect on next clinic visits.  Reassurance provided.  Denies SDOH at this time     Education Documentation  Medication Management, taught by Amparo Cornejo, RN at 11/30/2022  4:28 PM.  Learner: Patient  Readiness: Acceptance  Method: Explanation  Response: Verbalizes Understanding    Coping Strategies, taught by Amparo Cornejo, CESAR at 11/30/2022  4:28 PM.  Learner: Patient  Readiness: Acceptance  Method: Explanation  Response: Verbalizes Understanding          AMPARO CROSS  Ambulatory Case Management    11/30/2022, 16:29 EST

## 2022-12-06 ENCOUNTER — HOSPITAL ENCOUNTER (OUTPATIENT)
Dept: ONCOLOGY | Facility: HOSPITAL | Age: 67
Setting detail: INFUSION SERIES
Discharge: HOME | End: 2022-12-06
Payer: MEDICARE

## 2022-12-06 ENCOUNTER — EDUCATION (OUTPATIENT)
Dept: ONCOLOGY | Facility: HOSPITAL | Age: 67
End: 2022-12-06

## 2022-12-06 ENCOUNTER — OFFICE VISIT (OUTPATIENT)
Dept: GYNECOLOGIC ONCOLOGY | Facility: CLINIC | Age: 67
End: 2022-12-06

## 2022-12-06 VITALS
HEART RATE: 88 BPM | SYSTOLIC BLOOD PRESSURE: 131 MMHG | TEMPERATURE: 98.3 F | RESPIRATION RATE: 16 BRPM | OXYGEN SATURATION: 100 % | DIASTOLIC BLOOD PRESSURE: 75 MMHG | BODY MASS INDEX: 20.66 KG/M2 | WEIGHT: 124 LBS | HEIGHT: 65 IN

## 2022-12-06 VITALS
SYSTOLIC BLOOD PRESSURE: 131 MMHG | BODY MASS INDEX: 20.66 KG/M2 | HEART RATE: 88 BPM | TEMPERATURE: 98.3 F | HEIGHT: 65 IN | RESPIRATION RATE: 16 BRPM | WEIGHT: 124 LBS | DIASTOLIC BLOOD PRESSURE: 75 MMHG

## 2022-12-06 DIAGNOSIS — T45.1X5A ALOPECIA DUE TO CYTOTOXIC DRUG: ICD-10-CM

## 2022-12-06 DIAGNOSIS — L65.8 ALOPECIA DUE TO CYTOTOXIC DRUG: ICD-10-CM

## 2022-12-06 DIAGNOSIS — C54.1 ENDOMETRIAL ADENOCARCINOMA: ICD-10-CM

## 2022-12-06 DIAGNOSIS — C54.1 ENDOMETRIAL ADENOCARCINOMA: Primary | ICD-10-CM

## 2022-12-06 LAB
ALBUMIN SERPL-MCNC: 4.6 G/DL (ref 3.5–5.2)
ALBUMIN/GLOB SERPL: 1.3 G/DL
ALP SERPL-CCNC: 106 U/L (ref 39–117)
ALT SERPL W P-5'-P-CCNC: 10 U/L (ref 1–33)
ANION GAP SERPL CALCULATED.3IONS-SCNC: 14 MMOL/L (ref 5–15)
AST SERPL-CCNC: 26 U/L (ref 1–32)
BASOPHILS # BLD AUTO: 0.02 10*3/MM3 (ref 0–0.2)
BASOPHILS NFR BLD AUTO: 0.5 % (ref 0–1.5)
BILIRUB SERPL-MCNC: 0.4 MG/DL (ref 0–1.2)
BUN SERPL-MCNC: 12 MG/DL (ref 8–23)
BUN/CREAT SERPL: 9 (ref 7–25)
CALCIUM SPEC-SCNC: 9.5 MG/DL (ref 8.6–10.5)
CANCER AG125 SERPL QL: 20.9 U/ML (ref 0–38.1)
CHLORIDE SERPL-SCNC: 101 MMOL/L (ref 98–107)
CO2 SERPL-SCNC: 24 MMOL/L (ref 22–29)
CREAT SERPL-MCNC: 1.33 MG/DL (ref 0.57–1)
DEPRECATED RDW RBC AUTO: 52.8 FL (ref 37–54)
EGFRCR SERPLBLD CKD-EPI 2021: 44.2 ML/MIN/1.73
EOSINOPHIL # BLD AUTO: 0.13 10*3/MM3 (ref 0–0.4)
EOSINOPHIL NFR BLD AUTO: 3 % (ref 0.3–6.2)
ERYTHROCYTE [DISTWIDTH] IN BLOOD BY AUTOMATED COUNT: 13.5 % (ref 12.3–15.4)
GLOBULIN UR ELPH-MCNC: 3.5 GM/DL
GLUCOSE SERPL-MCNC: 102 MG/DL (ref 65–99)
HCT VFR BLD AUTO: 37.5 % (ref 34–46.6)
HGB BLD-MCNC: 11.6 G/DL (ref 12–15.9)
IMM GRANULOCYTES # BLD AUTO: 0.01 10*3/MM3 (ref 0–0.05)
IMM GRANULOCYTES NFR BLD AUTO: 0.2 % (ref 0–0.5)
LYMPHOCYTES # BLD AUTO: 1.37 10*3/MM3 (ref 0.7–3.1)
LYMPHOCYTES NFR BLD AUTO: 31.6 % (ref 19.6–45.3)
MCH RBC QN AUTO: 32.7 PG (ref 26.6–33)
MCHC RBC AUTO-ENTMCNC: 30.9 G/DL (ref 31.5–35.7)
MCV RBC AUTO: 105.6 FL (ref 79–97)
MONOCYTES # BLD AUTO: 0.55 10*3/MM3 (ref 0.1–0.9)
MONOCYTES NFR BLD AUTO: 12.7 % (ref 5–12)
NEUTROPHILS NFR BLD AUTO: 2.25 10*3/MM3 (ref 1.7–7)
NEUTROPHILS NFR BLD AUTO: 52 % (ref 42.7–76)
PLATELET # BLD AUTO: 228 10*3/MM3 (ref 140–450)
PMV BLD AUTO: 10.2 FL (ref 6–12)
POTASSIUM SERPL-SCNC: 4.3 MMOL/L (ref 3.5–5.2)
PROT SERPL-MCNC: 8.1 G/DL (ref 6–8.5)
RBC # BLD AUTO: 3.55 10*6/MM3 (ref 3.77–5.28)
SODIUM SERPL-SCNC: 139 MMOL/L (ref 136–145)
WBC NRBC COR # BLD: 4.33 10*3/MM3 (ref 3.4–10.8)

## 2022-12-06 PROCEDURE — 99215 OFFICE O/P EST HI 40 MIN: CPT | Performed by: NURSE PRACTITIONER

## 2022-12-06 PROCEDURE — G0463 HOSPITAL OUTPT CLINIC VISIT: HCPCS

## 2022-12-06 PROCEDURE — 85025 COMPLETE CBC W/AUTO DIFF WBC: CPT | Performed by: OBSTETRICS & GYNECOLOGY

## 2022-12-06 PROCEDURE — 80053 COMPREHEN METABOLIC PANEL: CPT | Performed by: OBSTETRICS & GYNECOLOGY

## 2022-12-06 PROCEDURE — 36415 COLL VENOUS BLD VENIPUNCTURE: CPT

## 2022-12-06 PROCEDURE — 86304 IMMUNOASSAY TUMOR CA 125: CPT | Performed by: OBSTETRICS & GYNECOLOGY

## 2022-12-06 RX ORDER — ONDANSETRON HYDROCHLORIDE 8 MG/1
8 TABLET, FILM COATED ORAL 3 TIMES DAILY PRN
Qty: 30 TABLET | Refills: 5 | Status: SHIPPED | OUTPATIENT
Start: 2022-12-06 | End: 2022-12-06 | Stop reason: SDUPTHER

## 2022-12-06 RX ORDER — OLANZAPINE 5 MG/1
5 TABLET ORAL NIGHTLY
Qty: 3 TABLET | Refills: 5 | Status: SHIPPED | OUTPATIENT
Start: 2022-12-06 | End: 2022-12-06 | Stop reason: SDUPTHER

## 2022-12-06 RX ORDER — DEXAMETHASONE 4 MG/1
TABLET ORAL
Qty: 12 TABLET | Refills: 5 | Status: CANCELLED | OUTPATIENT
Start: 2022-12-06

## 2022-12-06 RX ORDER — ONDANSETRON HYDROCHLORIDE 8 MG/1
8 TABLET, FILM COATED ORAL 3 TIMES DAILY PRN
Qty: 30 TABLET | Refills: 5 | Status: CANCELLED | OUTPATIENT
Start: 2022-12-06

## 2022-12-06 RX ORDER — PROCHLORPERAZINE MALEATE 10 MG
10 TABLET ORAL EVERY 4 HOURS PRN
Qty: 30 TABLET | Refills: 5 | Status: SHIPPED | OUTPATIENT
Start: 2022-12-06

## 2022-12-06 RX ORDER — ONDANSETRON HYDROCHLORIDE 8 MG/1
8 TABLET, FILM COATED ORAL 3 TIMES DAILY PRN
Qty: 30 TABLET | Refills: 5 | Status: SHIPPED | OUTPATIENT
Start: 2022-12-06

## 2022-12-06 RX ORDER — OLANZAPINE 5 MG/1
5 TABLET ORAL NIGHTLY
Qty: 3 TABLET | Refills: 5 | Status: CANCELLED | OUTPATIENT
Start: 2022-12-06

## 2022-12-06 RX ORDER — PROCHLORPERAZINE MALEATE 10 MG
10 TABLET ORAL EVERY 4 HOURS PRN
Qty: 30 TABLET | Refills: 5 | Status: CANCELLED | OUTPATIENT
Start: 2022-12-06

## 2022-12-06 RX ORDER — DEXAMETHASONE 4 MG/1
TABLET ORAL
Qty: 12 TABLET | Refills: 5 | Status: SHIPPED | OUTPATIENT
Start: 2022-12-06 | End: 2022-12-06 | Stop reason: SDUPTHER

## 2022-12-06 RX ORDER — DEXAMETHASONE 4 MG/1
TABLET ORAL
Qty: 12 TABLET | Refills: 5 | Status: SHIPPED | OUTPATIENT
Start: 2022-12-06

## 2022-12-06 RX ORDER — PROCHLORPERAZINE MALEATE 10 MG
10 TABLET ORAL EVERY 4 HOURS PRN
Qty: 30 TABLET | Refills: 5 | Status: SHIPPED | OUTPATIENT
Start: 2022-12-06 | End: 2022-12-06 | Stop reason: SDUPTHER

## 2022-12-06 RX ORDER — OLANZAPINE 5 MG/1
5 TABLET ORAL NIGHTLY
Qty: 3 TABLET | Refills: 5 | Status: SHIPPED | OUTPATIENT
Start: 2022-12-06

## 2022-12-06 NOTE — PROGRESS NOTES
CHEMOTHERAPY PREPARATION    Alysia Sierra  [id number]  1955    Subjective   Chief Complaint: Treatment Preparation and Needs Assessment    History of present illness:  Alysia Sierra is a 67 y.o. year old female who is here today for chemotherapy preparation and needs assessment. The patient has been diagnosed with Stage IIIA endometrial cancer and is scheduled to begin treatment with carboplatin + docetaxel. She is accompanied today by her sister, Akila, who is also her POA. Patient has been seen by Dr. Aldana of Radiation Oncology for consultation on 11/21/2022. Recommendation made for external beam radiation due to serosal adhesions between cycles 3 and 4 of chemotherapy. Patient undecided on radiation at this time.       Oncology History:    Oncology/Hematology History   Endometrial adenocarcinoma (HCC)   7/28/2022 Initial Diagnosis    8/22/2022: TVUS with uterus measuring 5.7 x 4.8 x 4.1 cm with an endometrial stripe thickness of 9.8 mm.  Right ovary not visualized.  Left ovary normal.  No free fluid.  9/29/2022: Saline infusion sonogram with an irregularly shaped prominent lesion on the posterior endometrial surface concerning for neoplasia.  10/6/2022: Endometrial biopsy with FIGO grade 1 endometrial cancer      10/28/2022 Surgery    Robotic-assisted total laparoscopic hysterectomy with bilateral salpingo-oophorectomy with bilateral SLND    Pathology with FIGO grade 2 endometrioid adenocarcinoma with 94% MI. Involvement of endocervix and uterine serosal adhesions. Negative parametrium and bilateral sentinel lymph nodes. MMR intact.     [facility] by Jasmine Rich MD      Cancer Staged    Cancer Staging   Endometrial adenocarcinoma (HCC)  Staging form: Corpus Uteri - Carcinoma And Carcinosarcoma, AJCC 8th Edition  - Pathologic stage from 10/28/2022: FIGO Stage IIIA (pT3a, pN0(sn), cM0) - Signed by Jasmine Rich MD on 11/12/2022       10/28/2022 Cancer Staged    Staging form: Corpus  "Uteri - Carcinoma And Carcinosarcoma, AJCC 8th Edition  - Pathologic stage from 10/28/2022: FIGO Stage IIIA (pT3a, pN0(sn), cM0) - Signed by Jasmine Rich MD on 11/12/2022 11/28/2022 -  Chemotherapy    OP OVARIAN DOCEtaxel / CARBOplatin         The current medication list and allergy list were reviewed and reconciled.     Past Medical History, Past Surgical History, Social History, Family History have been reviewed and are without significant changes except as mentioned.    Review of Systems   Gastrointestinal: Negative.    Genitourinary: Negative.    Musculoskeletal: Positive for gait problem (uses cane/wheelchair ).   Neurological: Positive for weakness and numbness (baseline peripheral neuropathy, known).       Objective   Physical Exam  Vital Signs: /75   Pulse 88   Temp 98.3 °F (36.8 °C)   Resp 16   Ht 163.8 cm (64.5\")   Wt 56.2 kg (124 lb)   LMP  (LMP Unknown)   SpO2 100%   BMI 20.96 kg/m²   Vitals:    12/06/22 1111   PainSc: 0-No pain           General Appearance:  alert, cooperative, no apparent distress, appears stated age and normal weight   Neurologic/Psychiatric: A&O x 3, gait steady, appropriate affect   HEENT:  Normocephalic, without obvious abnormality, mucous membranes moist   Lungs:   Clear to auscultation bilaterally; respirations regular, even, and unlabored bilaterally   Heart:  Regular rate and rhythm, no murmurs appreciated   Extremities: Normal, atraumatic; no clubbing, cyanosis, or edema    Skin: No rashes, lesions, or abnormal coloration noted     ECOG Performance Status: 1 - Symptomatic but completely ambulatory            NEEDS ASSESSMENTS    Genetics  The patient's new diagnosis and family history have been reviewed for genetic counseling needs. A genetic referral is not recommended.     Molecular Testing  Further molecular testing on tumor is not indicated at this time, but may be considered later if recurrence.     Psychosocial  The patient has completed a PHQ-9 " Depression Screening and the Distress Thermometer (DT) today.   PHQ-9 Total Score: 11. PHQ-9 results show 10-14 (Moderate Depression). The patient scored their distress today as 2 on a scale of 0-10 with 0 being no distress and 10 being extreme distress.   Problems marked by the patient as being an issue for them within the last week include practical problems, family problems, emotional problems, spiritual/Scientology concerns and physical problems.   Results were reviewed along with psychosocial resources offered by our cancer center. Our oncology social worker will be flagged for a DT score of 4 or above, and a same day call will be made for a score of 9 or 10. A mental health referral is recommended at this time. The patient is accepting of a referral to [facility].   Copies of patient's questionnaires will be scanned into EMR for details and further reference.    Barriers to care  A barriers form was also completed by the patient today. We discussed services offered by our facility to help her have adequate access to care. The patient was given the name and card for our Oncology Social Worker. Based upon barriers assessment today, the patient will not require a follow-up call from the  to further discuss needs.   A copy of the barriers form will also be scanned into EMR for details and further reference.     VAD Assessment  The patient and I discussed planned intervenous chemotherapy as well as other IV treatments that are often needed throughout the course of treatment. These may include, but are not limited to blood transfusions, antibiotics, and IV hydration. The vasculature does appear to be adequate for multiple peripheral IVs throughout their treatment course. Discussed risks and benefits of VADs. The patient would not like to pursue Port-A-Cath insertion prior to initiation of treatment.     Advance Care Planning   ACP discussion was held with the patient during this visit. Patient does  "not have an advance directive, information provided. She does have Power of  documents which she has brought with her today.  The patient and I discussed advanced care planning, \"Conversations that Matter\".   This service was offered, free of charge, for development of advance directives with a certified ACP facilitator.  The patient does not have an up-to-date advanced directive. This document is not on file with our office. The patient is not interested in an appointment with one of our facilitators to create or update their advanced directives at this time.         Palliative Care  The patient and I discussed palliative care services. Palliative care is not the same as Hospice care. This is specialized medical care for people living with serious illness with the goal of improving quality of life for the patient and their family. Elena has partnered with [facility] to offer our patients outpatient palliative care early along with their treatment to assist in coordination of care, symptom management, pain management, and medical decision making.  Oncology criteria for palliative care referral is met at this time. The patient is not interested in a palliative care consultation.     Additional Referral needs  none      CHEMOTHERAPY EDUCATION    Booklets Given: Chemotherapy and You [x]  Eating Hints [x]    Sexuality/Fertility Books [x]      Chemotherapy/Biotherapy Education Sheets: (list all that apply)  nausea management, acid reflux management, diarrhea management, Cancer resourse contacts information, skin and mouth care, vaccination information, wig information and Treatment Calendar                                                                                                                                                                 Chemotherapy Regimen:   Treatment Plans     Name Type Plan Dates Plan Provider         Active    OP OVARIAN DOCEtaxel / CARBOplatin ONCOLOGY TREATMENT  " 11/27/2022 - Present Jasmine Rich MD                    DETAILED CHEMOTHERAPY TEACHING COMPLETED BY PHARMACY. CHEMOTHERAPY CONSENT COMPLETED BY PHARMACY. SEE PHARMACY EDUCATION FOR DOCUMENTATION.     Chemotherapy education comprehension reviewed. Questions answered and additional information discussed on topics including:  Nutrition and appetite changes, Nausea & vomiting, Alopecia, Nervous system changes, Pain, Organ toxicities, Survivorship and Home care        Assessment and Plan:    Diagnoses and all orders for this visit:    1. Endometrial adenocarcinoma (HCC) (Primary)  -     prochlorperazine (COMPAZINE) 10 MG tablet; Take 1 tablet by mouth Every 4 (Four) Hours As Needed for Nausea or Vomiting.  Dispense: 30 tablet; Refill: 5  -     dexamethasone (DECADRON) 4 MG tablet; Take 2 tablets by mouth twice a day for 3 consecutive days beginning the day before chemotherapy and continue for 6 doses.  Dispense: 12 tablet; Refill: 5  -     ondansetron (ZOFRAN) 8 MG tablet; Take 1 tablet by mouth 3 (Three) Times a Day As Needed for Nausea or Vomiting.  Dispense: 30 tablet; Refill: 5  -     OLANZapine (ZyPREXA) 5 MG tablet; Take 1 tablet by mouth Every Night. Take on days 2, 3 and 4 after chemotherapy.  Dispense: 3 tablet; Refill: 5  -     Ambulatory Referral to Psychotherapy    2. Alopecia due to cytotoxic drug  -     Prosthetic Cranial          I spent 45 minutes caring for Alysia on this date of service. This time includes time spent by me in the following activities: preparing for the visit, counseling and educating the patient/family/caregiver, ordering medications, tests, or procedures, documenting information in the medical record and care coordination.     The patient and I have reviewed their new cancer diagnosis and scheduled treatment plan. Needs assessment was completed including genetics, psychosocial needs, barriers to care, VAD evaluation, advanced care planning, and palliative care services. Referrals  have been ordered as appropriate based upon our evaluation and patient desires.     Chemotherapy teaching was also completed today as documented above. Adequate time was given to answer all questions to her satisfaction. Patient and family are aware of their care team members and contact information if they have questions or problems throughout the treatment course. Needs assessments and education has been completed. The patient is adequately prepared to begin treatment as scheduled.     Pain assessment was performed today as a part of patient’s care. For patients with pain related to surgery, gynecologic malignancy or cancer treatment, the plan is as noted in the assessment/plan.  For patients with pain not related to these issues, they are to seek any further needed care from a more appropriate provider, such as PCP.      Electronically signed by ANETTE Castellanos on 12/06/22 at 11:19 EST.

## 2022-12-06 NOTE — PLAN OF CARE
Outpatient Infusion • [address] • [address] • [place] [id number] • [phone]      CHEMOTHERAPY EDUCATION    NAME:  Alysia Sierra      : 1955           DATE: 22    Medication Education Sheets: (select all that apply)  Carboplatin and Docetaxel    Other Education Sheets: (select all that apply)  CINV, Diarrhea and Symptom Tracker Sheet and RONNIE Information    Chemotherapy Regimen:   OP OVARIAN DOCEtaxel / CARBOplatin  every 21 days      TOPICS EDUCATION PROVIDED COMMENTS   ANEMIA:  role of RBC, cause, s/s, ways to manage, role of transfusion [x] Reviewed the role of RBC and the use of transfusions if hemoglobin decreases too much.  Patient to notify us if they experience shortness of breath, dizziness, or palpitations.  Also let patient know they could feel more tired than usual and to try to stay active, but rest if they need to.    THROMBOCYTOPENIA:  role of platelet, cause, s/s, ways to prevent bleeding, things to avoid, when to seek help [x] Reviewed the role of platelets in blood clotting and when to call clinic (bloody nose that bleeds for 5 mins despite pressure, a cut that won't stop bleeding despite pressure, gums that bleed excessively with brushing or flossing). Recommended using an electric razor, soft bristle toothbrush, and blowing your nose gently.    NEUTROPENIA:  role of WBC, cause, infection precautions, s/s of infection, when to call MD [x] Reviewed the role of WBC, good infection prevention practices, and when to call the clinic (temperature 100.4F, sore throat, burning urination, etc)  COVID Vaccines: 2 doses plus 2 boosters  Flu Vaccine:  flu vaccine received   NUTRITION & APPETITE CHANGES:  importance of maintaining healthy diet & weight, ways to manage to improve intake, dietary consult, exercise regimen, electrolyte and/or blood glucose abnormalities [x] Informed patient of potential metallic taste and smell. Recommended flavoring water if it tastes  metallic, using plastic utensils instead of metal utensils, and avoiding drinking right out of a metal can or cup to help mitigate this adverse effect. and Electrolyte Abnormalities:  Explained the oncology therapy may lead to abnormalities in electrolytes, specifically: low sodium, magnesium, clacium and potassium   DIARRHEA:  causes, s/s of dehydration, ways to manage, dietary changes, when to call MD [x] Chemotherapy - Discussed risk of diarrhea. Instructed patient that they can use OTC loperamide at first presentation of diarrhea, but call MD if 4-6 episodes in 24 hours not relieved by OTC loperamide.   NAUSEA & VOMITING:  cause, use of antiemetics, dietary changes, when to call MD [x] • Emetic risk: High  • Premeds: Dexamethasone, Fosaprepitant, Olanzapine and Palonosetron  • Scheduled home meds: Olanzapine  • PRN home meds: Ondansetron Prochlorperazine  • Pharmacy home meds sent to: [facility]     Instructed the patient to take the scheduled anti-nausea medications at night on days 2-4 even if they do not feel nauseous.  I explained this is to prevent delayed nausea.    Instructed the patient to take a dose of the PRN medication at the first onset of nausea and if it's not working to call us for additional medications.  Also provided non-drug measures to mitigate nausea.   MOUTH SORES:  causes, oral care, ways to manage [x] Mouth sores can be prevented by making a mouth wash mixture of salt, baking soda, and water. The patient was instructed to swish and spit four times daily after meals and before bedtime.  Use of a soft bristle toothbrush was recommended.  The patient was instructed to avoid alcohol-containing OTC mouthwashes.    ALOPECIA:  cause, ways to manage, resources [x] Discussed the possibility of hair loss with the patient. Informed patient that they could request a prescription for a wig if desired and most of the cost is usually covered by insurance. Recommended covering the head with a hat  and/or protecting the skin on the head with SPF 30 or higher    NERVOUS SYSTEM CHANGES:  causes, s/s, neuropathies, cognitive changes, ways to manage [x] discussed the adverse effect of peripheral neuropathy and signs/symptoms associated with this adverse effect. Instructed patient to call if symptoms become more frequent or worsen. Patient has neuropathy in feet at baseline, on gabapentin. Patient educated to notify team if frequency or intensity increase.    PAIN:  causes, ways to manage [x] Chemo - Discussed muscle and joint aches/pains with chemotherapy, and recommended the use of OTC pain relief with ibuprofen or acetaminophen if needed. and Vesicant Pain: Instructed the patient to notify their nurse if they have any pain at the IV site during the infusion.   SKIN & NAIL CHANGES:  cause, s/s, ways to manage [x] Chemotherapy - Informed patient that they may see dark or white lines on finger and toenails during treatment, and that their nails may become brittle and even fall off in extreme cases. But that this would likely reverse after treatment concludes.    ORGAN TOXICITIES:  cause, s/s, need for diagnostic tests, labs, when to notify MD [x] Discussed potential effects on organ systems, monitoring, diagnostic tests, labs, and when to notify their MD. Discussed the signs/symptoms of the following: eye changes (  blurry vision, watery eyes, photophobia), hepatotoxicity, ototoxicity (loss of ability to hear high-pitched sounds) and skin changes   INFUSION RELATED REACTIONS or INJECTION-SITE REACTION:  Cause, s/s, anaphylaxis, monitoring, etc. [x] Premeds: Dexamethasone    Discussed the risk of an infusion reaction and symptoms such as: fever, chills, dizziness, itchiness or rash, flushing, trouble breathing, wheezing, sudden back pain, or feeling faint.  Instructed the patient to notify their nurse if they start feeling weird at any point during their infusion.    Reviewed how infusion reactions are managed.    MISCELLANEOUS:  drug interactions, administration, labs, etc. [x] Discussed chemotherapy schedule, lab draws, infusion times, and total expected visit time.   • DDIs: No significant DDIs  • Drug-food interactions: None  • Lab draws: On or before day 1 of each cycle, no sooner than 3 days early.  • Miscellaneous: Fluid Retention: Explained the signs/symptoms of fluid retention around ankles, feet, and possibly in the hands.  Reviewed strategies to minimize this and recommended to call the clinic if they gain 5 or more pounds in 1 week or have shortness of breath without exertion. Patient also given dexamethasone 8mg to be taken day before, day of and day after treatment.    INFERTILITY & SEXUALITY:    causes, fertility preservation options, sexuality changes, ways to manage, importance of birth control [x] IV Oncology Therapy: Reviewed safe sex practices and minimizing exposure to body fluids for 48 hours after each dose of IV oncology therapy. and The patient is not of childbearing potential.       HOME CARE:  storing of oral chemo, how to manage bodily fluids [x] IV - Counseled on management of soiled linens and proper flush technique.  Discussed how to manage all the side effects at home and advised when to contact the MD office     Medications:  Prior to Admission medications    Medication Sig Start Date End Date Taking? Authorizing Provider   aspirin 325 MG tablet Take 325 mg by mouth As Needed for Mild Pain .    Provider, MD Augustina   Cyanocobalamin 1000 MCG/ML kit Inject 1,000mcg SC daily x1 week, then weekly x4 weeks.  Patient taking differently: Every 30 (Thirty) Days. monthly 7/11/22   Liana So APRN   dexamethasone (DECADRON) 4 MG tablet Take 2 tablets by mouth twice a day for 3 consecutive days beginning the day before chemotherapy and continue for 6 doses. 12/6/22   Anjelica Dyer APRN   docusate sodium (COLACE) 100 MG capsule Take 1 capsule by mouth 2 (Two) Times a Day.  10/28/22   Jasmine Rich MD   DULoxetine (Cymbalta) 20 MG capsule Take 1 capsule by mouth 2 (Two) Times a Day. 10/25/22   Christo Andersen APRN   esomeprazole (nexIUM) 20 MG capsule Take 20 mg by mouth 2 (Two) Times a Week.    Augustina Fritz MD   gabapentin (Neurontin) 100 MG capsule Take 1 capsule by mouth Every Morning. 9/7/22 9/7/23  Caden Dietz MD   gabapentin (Neurontin) 300 MG capsule Take 1 capsule by mouth 1 (One) Time for 1 dose.  Patient taking differently: Take 1 capsule by mouth Every Night. 9/7/22 10/28/22  Caden Dietz MD   hydrALAZINE (APRESOLINE) 25 MG tablet 25 mg 2 (Two) Times a Day. 7/20/20   Augustina Fritz MD   levothyroxine (SYNTHROID, LEVOTHROID) 25 MCG tablet Take 1 tablet by mouth Every Morning. 11/22/22   Liana So APRN   magnesium oxide (MAGOX) 400 (241.3 Mg) MG tablet tablet Take 400 mg by mouth Daily.    Augustina Fritz MD   metoprolol succinate XL (TOPROL-XL) 25 MG 24 hr tablet Take 1 tablet by mouth Every Night. 11/22/22   Liana So APRN   nitroglycerin (NITROSTAT) 0.4 MG SL tablet 1 under the tongue as needed for angina, may repeat q5mins for up three doses 2/17/22   Marie John MD   OLANZapine (ZyPREXA) 5 MG tablet Take 1 tablet by mouth Every Night. Take on days 2, 3 and 4 after chemotherapy. 12/6/22   Anjelica Dyer APRN   ondansetron (ZOFRAN) 4 MG tablet Take 4 mg by mouth As Needed for Nausea or Vomiting.    Augustina Fritz MD   ondansetron (ZOFRAN) 8 MG tablet Take 1 tablet by mouth 3 (Three) Times a Day As Needed for Nausea or Vomiting. 12/6/22   Anjelica Dyer APRN   prochlorperazine (COMPAZINE) 10 MG tablet Take 1 tablet by mouth Every 4 (Four) Hours As Needed for Nausea or Vomiting. 12/6/22   Anjelica Dyer APRN   rosuvastatin (Crestor) 5 MG tablet Take 1 tablet by mouth Daily. 7/7/22   Liana So APRN   dexamethasone (DECADRON) 4 MG tablet Take 2 tablets oral twice  a day for 3 consecutive days beginning the day before chemotherapy and continue for 6 doses. 12/6/22 12/6/22  Jasmine Rich MD   OLANZapine (ZyPREXA) 5 MG tablet Take 1 tablet by mouth Every Night. Take on days 2, 3 and 4 after chemotherapy. 12/6/22 12/6/22  Jasmine Rich MD   ondansetron (ZOFRAN) 8 MG tablet Take 1 tablet by mouth 3 (Three) Times a Day As Needed for Nausea or Vomiting. 12/6/22 12/6/22  Jasmine Rich MD   prochlorperazine (COMPAZINE) 10 MG tablet Take 1 tablet by mouth Every 4 (Four) Hours As Needed for Nausea or Vomiting. 12/6/22 12/6/22  Anjelica Dyer, APRN         Notes: All questions and concerns were addressed. Provided a personalized treatment calendar to patient (includes treatment and lab schedule). Provided patient with contact information for the pharmacist and clinic while instructing them to call if any questions or concerns arise. Informed consent for treatment was obtained. Patient was receptive to information and expressed understanding.     Charlotte Capellan, PharmD Candidate 2022 12/6/2022  12:40 EST

## 2022-12-08 ENCOUNTER — HOSPITAL ENCOUNTER (OUTPATIENT)
Dept: ONCOLOGY | Facility: HOSPITAL | Age: 67
Setting detail: INFUSION SERIES
Discharge: HOME | End: 2022-12-08
Payer: MEDICARE

## 2022-12-08 ENCOUNTER — DOCUMENTATION (OUTPATIENT)
Dept: NUTRITION | Facility: HOSPITAL | Age: 67
End: 2022-12-08

## 2022-12-08 VITALS
HEART RATE: 96 BPM | SYSTOLIC BLOOD PRESSURE: 135 MMHG | BODY MASS INDEX: 20.66 KG/M2 | DIASTOLIC BLOOD PRESSURE: 75 MMHG | WEIGHT: 124 LBS | TEMPERATURE: 97.7 F | HEIGHT: 65 IN | RESPIRATION RATE: 16 BRPM

## 2022-12-08 DIAGNOSIS — C54.1 ENDOMETRIAL ADENOCARCINOMA: Primary | ICD-10-CM

## 2022-12-08 PROCEDURE — 25010000002 CARBOPLATIN PER 50 MG: Performed by: OBSTETRICS & GYNECOLOGY

## 2022-12-08 PROCEDURE — 96367 TX/PROPH/DG ADDL SEQ IV INF: CPT

## 2022-12-08 PROCEDURE — 25010000002 FOSAPREPITANT PER 1 MG: Performed by: OBSTETRICS & GYNECOLOGY

## 2022-12-08 PROCEDURE — 25010000002 PALONOSETRON 0.25 MG/5ML SOLUTION PREFILLED SYRINGE: Performed by: OBSTETRICS & GYNECOLOGY

## 2022-12-08 PROCEDURE — 96375 TX/PRO/DX INJ NEW DRUG ADDON: CPT

## 2022-12-08 PROCEDURE — 96413 CHEMO IV INFUSION 1 HR: CPT

## 2022-12-08 PROCEDURE — 96417 CHEMO IV INFUS EACH ADDL SEQ: CPT

## 2022-12-08 PROCEDURE — 25010000002 DOCETAXEL 20 MG/ML SOLUTION 8 ML VIAL: Performed by: OBSTETRICS & GYNECOLOGY

## 2022-12-08 RX ORDER — PALONOSETRON 0.05 MG/ML
0.25 INJECTION, SOLUTION INTRAVENOUS ONCE
Status: COMPLETED | OUTPATIENT
Start: 2022-12-08 | End: 2022-12-08

## 2022-12-08 RX ORDER — OLANZAPINE 5 MG/1
5 TABLET ORAL ONCE
Status: COMPLETED | OUTPATIENT
Start: 2022-12-08 | End: 2022-12-08

## 2022-12-08 RX ORDER — DIPHENHYDRAMINE HYDROCHLORIDE 50 MG/ML
50 INJECTION INTRAMUSCULAR; INTRAVENOUS AS NEEDED
Status: DISCONTINUED | OUTPATIENT
Start: 2022-12-08 | End: 2022-12-09 | Stop reason: HOSPADM

## 2022-12-08 RX ORDER — SODIUM CHLORIDE 9 MG/ML
250 INJECTION, SOLUTION INTRAVENOUS ONCE
Status: COMPLETED | OUTPATIENT
Start: 2022-12-08 | End: 2022-12-08

## 2022-12-08 RX ORDER — FAMOTIDINE 10 MG/ML
20 INJECTION, SOLUTION INTRAVENOUS AS NEEDED
Status: DISCONTINUED | OUTPATIENT
Start: 2022-12-08 | End: 2022-12-09 | Stop reason: HOSPADM

## 2022-12-08 RX ADMIN — CARBOPLATIN 310 MG: 10 INJECTION, SOLUTION INTRAVENOUS at 11:47

## 2022-12-08 RX ADMIN — PALONOSETRON 0.25 MG: 0.25 INJECTION, SOLUTION INTRAVENOUS at 09:30

## 2022-12-08 RX ADMIN — SODIUM CHLORIDE 250 ML: 9 INJECTION, SOLUTION INTRAVENOUS at 09:29

## 2022-12-08 RX ADMIN — DOCETAXEL 120 MG: 20 INJECTION, SOLUTION, CONCENTRATE INTRAVENOUS at 10:25

## 2022-12-08 RX ADMIN — OLANZAPINE 5 MG: 5 TABLET, FILM COATED ORAL at 09:28

## 2022-12-08 RX ADMIN — SODIUM CHLORIDE 150 MG: 9 INJECTION, SOLUTION INTRAVENOUS at 09:34

## 2022-12-08 NOTE — PROGRESS NOTES
"Outpatient Oncology Nutrition     Reason for Visit:Oncology Nutrition Screening and Patient Education / Met with patient during her initial chemotherapy infusion appointment.     Patient Name:  Alysia Sierra    :  1955    MRN:  [id number]    Date of Encounter: 2022    Nutrition Assessment     Diagnosis:  Stage IIIA endometrial cancer     Surgery: Robotic-assisted total laparoscopic hysterectomy with bilateral salpingo-oophorectomy with bilateral SLND (10/28/22)    Chemotherapy: OP OVARIAN DOCEtaxel / CARBOplatin - every 21 days     Radiation: Recommendation made for external beam radiation due to serosal adhesions between cycles 3 and 4 of chemotherapy    Patient Active Problem List:    Patient Active Problem List   Diagnosis   • Hypertension   • Paroxysmal SVT (supraventricular tachycardia) (HCC)   • Leg weakness, bilateral   • Syncope   • CKD (chronic kidney disease) stage 3, GFR 30-59 ml/min (HCC)   • Circadian rhythm sleep disorder, advanced sleep phase type   • Neuropathy   • Hyperlipidemia LDL goal <100   • Endometrial adenocarcinoma (HCC)   • Mild episode of recurrent major depressive disorder (HCC)       Food / Nutrition Related History   Patient reports her oral intake varies day to day which is normal for her.  She states she will drink Boost some days to aid with her oral intake.     Hydration Status   Discussed the importance of hydration, reviewed hydrating fluids, and recommended she continue sipping on fluids throughout the day.    Goal: 64 ounces     How many 8 ounces glasses of water do you consume per day? Patient reports drinking water, juice, Gatorade, and lemon lime soda.    Enteral Feeding       Anthropometric Measurements     Height:    Ht Readings from Last 1 Encounters:   22 163.8 cm (64.5\")       Weight:    Wt Readings from Last 1 Encounters:   22 56.2 kg (124 lb)       BMI:  21 - Normal   Usual Body Weight: 127-130#  Weight Change: no significant changes "     Review of Lab Data (Time Frame - 1 month / 2 month)   Labs reviewed - 12/6/22     Medication Review   MAR reviewed     Nutrition Focused Physical Findings       Nutrition Impact Symptoms   Constipation     Physical Activity   Not my normal self, but able to be up and about with fairly normal activities    Current Nutritional Intake     Oral diet:  Regular     Oral nutritional supplements: Boost High Protein     Intake: oral intake has been normal     Malnutrition Risk Assessment     Recent weight loss over the past 6 months:  Yes    How much weight loss:  1 = 2-13 lbs    Eating poorly because of a decreased appetite:  0 = No    Malnutrition Screening Score:     MST = 0 or 1 Patient not at risk for malnutrition    Nutrition Diagnosis     Problem    Etiology    Signs / Symptoms      Nutrition Intervention   Discussed the importance of good nutrition during her treatment course focusing on adequate calorie, protein, nutrient and fluid intake.  Advised her to be consuming smaller more frequent meals/snacks throughout the day to aid with potential nausea management.  Emphasized the importance of protein and its role in the diet; reviewed high protein foods; and recommended he have a protein source at each meal/snack.  Offered several high protein snack ideas she may find more appealing at this time.  Discussed ONS and their role in the diet.  Encouraged her to continue drinking Boost as needed to aid with calorie and protein intake.  Also discussed tips to aid with constipation management and encouraged her to continue with her bowel regimen.    Goal   To achieve adequate nutritional and hydration intake.  To aid with nutrition impact symptoms as needed.     Monitoring / Evaluation   Answered her questions and she voiced understanding of information discussed.  RD's contact information provided and encouraged to call with questions.  Will monitor as needed during her treatment course.    Monica Ochoa MS, RD, LD

## 2022-12-12 ENCOUNTER — TELEPHONE (OUTPATIENT)
Dept: GYNECOLOGIC ONCOLOGY | Facility: CLINIC | Age: 67
End: 2022-12-12

## 2022-12-12 NOTE — TELEPHONE ENCOUNTER
PT CALLED she is not feeling well from last week chemo .12-08. Pt had some question.   Could you call pt back. Thanks

## 2022-12-12 NOTE — TELEPHONE ENCOUNTER
Spoke with patient.  She states she has several questions.      Patient c/o muscle and joint aches.  Denies fever, cough, shortness of breath or any other s/s of infection.  She asked if it is ok for her to take aspirin for aches.  She states her nephrologist said it is ok for her to take occasionally.  She usually takes 325mg x3 tabs when needed.  She states she is unable to take tylenol due to history of ETOH use.      She also asks if it is ok for her to have her teeth cleaned next month.      She feels like the olanzapine is making her dizzy and wants to know if she will continue to take that with each cycle.    Patient currently having issues with constipation.  She reports she normally has a BM about 3-4x per day.  She hadn't had a BM for 2 days. She manually disimpacted herself this morning and wants to know what she should take to prevent this from happening again.  She is taking a stool softener and has milk of magnesia on hand.    Discussed with Dr. Rich.  Ok for patient to take ASA sparingly.  Can take 2 tabs instead of 3.  Ok for dental cleaning and to hold olanzapine.  Patient to continue stool softener twice daily and add miralax daily.  Patient notified and verbalized understanding.

## 2022-12-14 ENCOUNTER — OFFICE VISIT (OUTPATIENT)
Dept: PSYCHIATRY | Facility: CLINIC | Age: 67
End: 2022-12-14

## 2022-12-14 DIAGNOSIS — F33.0 MDD (MAJOR DEPRESSIVE DISORDER), RECURRENT EPISODE, MILD: Primary | ICD-10-CM

## 2022-12-14 PROCEDURE — 90792 PSYCH DIAG EVAL W/MED SRVCS: CPT | Performed by: NURSE PRACTITIONER

## 2022-12-14 NOTE — PROGRESS NOTES
"    Subjective   Alysia Sierra is a  67 y.o. female who is here today for initial appointment. Patient was referred by: Dr. Rich GYN/ONC for patient's depressive symptoms, anxiety over diagnosis of endometrial cancer. She is undergoing chemotherapy and may have irradiation treatments as well to pelvis. She lives alone in [place], [place] with support from her sisters.       Chief Complaint:  Depression and stressors    History of Present Illness The patient reports depressive symptoms including depressed mood, decreased appetite, anhedonia, feelings of helplessness, feelings of worthlessness, low energy and difficulty concentrating, sleep disturbance present on most days for the past 4 month(s)  and have caused impairment in important areas of functioning. Depression rated 6/10 with 10 being the worst. She denies SI/HI or AVH. She has some stressors she reports with diagnosis and concerns about radiation. She reports she has peripheral neuropathy since about 2019  And it has disabled her from standing and long walking. She has been treated by neurologist Dr. Dietz who placed her on gabapentin which she states has helped. She saw an APRN Christo Andersen for mental health she states diagnosed with mild depression and placed on duloxetine 20 mg Bid. She reports has helped even more for her neuropathy in feet but hasn't notice much for mood. She endorses boredom, not having work or much to do. When her mother  in 2019 she had to move out of her mom's 4 bedroom home into an income subsidized apartment. She used to be active but with neuropathy that has really \"slowed me down and having lack of purpose\". She reports her son lives in [facility] and they can only email. She has her sisters but \"we get along ok\". Patient admits to history of significant alcohol abuse/ dependence and went to AA but didn't like it \"felt like I needed a drink after I got out of there\". She states she has cut way down only drink 2-6 " "glasses of wine a week. She is concerned about her fatty liver and sees a nephrologist for her chronic kidney disease. She denies PTSD, OCD or tom ever. She denies Panic or worrying too much. Sleep is episodically difficult \"but then I take naps\".     The following portions of the patient's history were reviewed and updated as appropriate: allergies, current medications, past family history, past medical history, past social history, past surgical history and problem list.      Past Psych History: reports history of mild depression, denies in patient psychiatric admissions or detox unit admissions. Denies suicidal attempts. Has seen a therapist in past. Sees currently psychiatric nurse practitioner through her PCP. Christo Ganesh CHEEMA has placed her on duloxetine 20 mg po bid for depression     Substance Abuse: alcohol, history of significant abuse, has reduced to 2-6 glasses of wine a week, none she reports since beginning chemotherapy, denies withdrawal symptoms  Nicotine denies ever  Illicit drugs denies     ABUSE HX: denies   LEGAL HX: denies     JORGE REVIEWED: no red flags       Family Psychiatric History: alcohol dependence in father and sister  family history includes COPD in her mother; Cancer in her father; Coronary artery disease in her father; Heart attack in her father; Heart disease in her father; Hyperlipidemia in her sister; Hypertension in her brother, father, mother, and sister; Lung cancer in her father; Lung disease in her father; Malig Hypertension in her sister; Osteoarthritis in her sister; Other in her sister; Rheum arthritis in her sister; Spondylolisthesis in her mother; Stroke in her mother.      Social History: born and raised by her biological parents in [place] and [place]. She has three sisters living in [facility] and one in [place], one . She has one living brother either in [place] or [place], [place], she is not sure. One brother is . She reports being  " twice both ended in divorce. She has one  son who is 40yo and lives in [facility]. 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 She then worked various other jobs. 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 She loved cooking. However with neuropathy she hasn't been able to work and lives on social security. She believes in God and believes she has good outcome from cancer treatment.       Medical/Surgical History:  Past Medical History:   Diagnosis Date   • Allergic lisinopril     • Anemia    • Arrhythmia    • Arthritis    • Cataract mild     stil lpresent   • Chicken pox    • Chronic fatigue    • CKD (chronic kidney disease), stage III (HCC)     sees nephro   • Dental root implant present     lower left  x1 - possible dental implant   • Depression mild    • Difficulty walking    • Disease of thyroid gland    • Dizzy    • NIEVES (dyspnea on exertion)     2017   • Generalized anxiety disorder    • GERD (gastroesophageal reflux disease) 2018   • Hyperlipidemia    • Hypertension    • Iron deficiency anemia    • Liver disease     fatty   • Liver problem    • Measles    • Menopause    • Mumps    • Neuromuscular disorder (HCC) Peripheral Neuropathy   • Orthostatic hypotension    • 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   • Renal insufficiency 2018   • Scoliosis    • Unintentional weight loss    • Uses contact lenses     bilat   • Uterine cancer (HCC)    • UTI (urinary tract infection)    • Visual impairment Nearsighted   • Wears glasses      Past Surgical History:   Procedure Laterality Date   •  SECTION     • DILATION AND CURETTAGE, DIAGNOSTIC / THERAPEUTIC     • OOPHORECTOMY     • ORAL LESION EXCISION/BIOPSY  2021   • TOTAL LAPAROSCOPIC HYSTERECTOMY SALPINGO OOPHORECTOMY N/A 10/28/2022    Procedure: TOTAL LAPAROSCOPIC HYSTERECTOMY BILATERAL SALPINGO-OOPHORECTOMY, INJECTION FOR  SENTINEL LYMPH NODE MAPPING, BILATERAL SENTINEL LYMPH NODE DISSECTION WITH DAVINCI ROBOT;  Surgeon: Jasmine Rich MD;  Location: [facility];  Service: Robotics - DaVinci;  Laterality: N/A;   • TUBAL ABDOMINAL LIGATION  1983       Allergies   Allergen Reactions   • Lisinopril Angioedema   • Metal Rash     Possible nickel -   Gold is only metal that doesn't have issues     • Milk-Related Compounds Other (See Comments)     LACTOSE INTOLERANT   • Tylenol [Acetaminophen] Other (See Comments)     ckd   • Atorvastatin Myalgia       Current Medications:   Current Outpatient Medications   Medication Sig Dispense Refill   • aspirin 325 MG tablet Take 325 mg by mouth As Needed for Mild Pain .     • Cyanocobalamin 1000 MCG/ML kit Inject 1,000mcg SC daily x1 week, then weekly x4 weeks. (Patient taking differently: Every 30 (Thirty) Days. monthly) 1 kit 0   • dexamethasone (DECADRON) 4 MG tablet Take 2 tablets by mouth twice a day for 3 consecutive days beginning the day before chemotherapy and continue for 6 doses. 12 tablet 5   • docusate sodium (COLACE) 100 MG capsule Take 1 capsule by mouth 2 (Two) Times a Day. 60 capsule 0   • DULoxetine (Cymbalta) 20 MG capsule Take 1 capsule by mouth 2 (Two) Times a Day. 60 capsule 2   • esomeprazole (nexIUM) 20 MG capsule Take 20 mg by mouth 2 (Two) Times a Week.     • gabapentin (Neurontin) 100 MG capsule Take 1 capsule by mouth Every Morning. 30 capsule 6   • gabapentin (Neurontin) 300 MG capsule Take 1 capsule by mouth 1 (One) Time for 1 dose. (Patient taking differently: Take 1 capsule by mouth Every Night.) 30 capsule 6   • hydrALAZINE (APRESOLINE) 25 MG tablet 25 mg 2 (Two) Times a Day.     • levothyroxine (SYNTHROID, LEVOTHROID) 25 MCG tablet Take 1 tablet by mouth Every Morning. 90 tablet 1   • magnesium oxide (MAGOX) 400 (241.3 Mg) MG tablet tablet Take 400 mg by mouth Daily.     • metoprolol succinate XL (TOPROL-XL) 25 MG 24 hr tablet Take 1 tablet by mouth Every Night. 90  tablet 1   • nitroglycerin (NITROSTAT) 0.4 MG SL tablet 1 under the tongue as needed for angina, may repeat q5mins for up three doses 100 tablet 11   • OLANZapine (ZyPREXA) 5 MG tablet Take 1 tablet by mouth Every Night. Take on days 2, 3 and 4 after chemotherapy. 3 tablet 5   • ondansetron (ZOFRAN) 4 MG tablet Take 4 mg by mouth As Needed for Nausea or Vomiting.     • ondansetron (ZOFRAN) 8 MG tablet Take 1 tablet by mouth 3 (Three) Times a Day As Needed for Nausea or Vomiting. 30 tablet 5   • prochlorperazine (COMPAZINE) 10 MG tablet Take 1 tablet by mouth Every 4 (Four) Hours As Needed for Nausea or Vomiting. 30 tablet 5   • rosuvastatin (Crestor) 5 MG tablet Take 1 tablet by mouth Daily. 90 tablet 1     Current Facility-Administered Medications   Medication Dose Route Frequency Provider Last Rate Last Admin   • cyanocobalamin injection 1,000 mcg  1,000 mcg Intramuscular Q28 Days Liana So APRN   1,000 mcg at 10/14/22 1308       Lab Results:  Reviewed in EPIC      Review of Systems Constitutional: Negative for appetite change, chills, diaphoresis, fatigue, fever and unexpected weight change.   HENT: Negative for hearing loss, sore throat, trouble swallowing and voice change.    Eyes: Negative for photophobia and visual disturbance.   Respiratory: Negative for cough, chest tightness and shortness of breath.    Cardiovascular: Negative for chest pain and palpitations.   Gastrointestinal: Negative for abdominal pain, constipation, nausea and vomiting.   Endocrine: Negative for cold intolerance and heat intolerance.   Genitourinary: Negative for dysuria and frequency.   Musculoskeletal: Negative for arthralgia, back pain, joint swelling and neck stiffness.   Skin: Negative for color change and wound.   Allergic/Immunologic: Negative for environmental allergies and immunocompromised state.   Neurological: neuropathy in bilateral feet  Hematological: Negative for adenopathy. Does not bruise/bleed  easily.    Objective   Physical Exam  not currently breastfeeding.    ROYAL-7:    Over the last two weeks, how often have you been bothered by the following problems?  Feeling nervous, anxious or on edge: Several days  Not being able to stop or control worrying: Several days  Worrying too much about different things: Not at all  Trouble Relaxing: Not at all  Being so restless that it is hard to sit still: Not at all  Becoming easily annoyed or irritable: Not at all  Feeling afraid as if something awful might happen: Not at all  ROYAL 7 Total Score: 2  If you checked any problems, how difficult have these problems made it for you to do your work, take care of things at home, or get along with other people: Not difficult at all  0-4: Minimal anxiety  5-9: Mild anxiety  10-14: Moderate anxiety  15-21: Severe anxiety    PHQ-9:  PHQ-2/PHQ-9 Depression Screening 12/6/2022   Little Interest or Pleasure in Doing Things 2-->more than half the days   Feeling Down, Depressed or Hopeless 1-->several days   Trouble Falling or Staying Asleep, or Sleeping Too Much 2-->more than half the days   Feeling Tired or Having Little Energy 3-->nearly every day   Poor Appetite or Overeating 2-->more than half the days   Feeling Bad about Yourself - or that You are a Failure or Have Let Yourself or Your Family Down 0-->not at all   Trouble Concentrating on Things, Such as Reading the Newspaper or Watching Television 1-->several days   Moving or Speaking So Slowly that Other People Could Have Noticed? Or the Opposite - Being So Fidgety 0-->not at all   Thoughts that You Would be Better Off Dead or of Hurting Yourself in Some Way 0-->not at all   PHQ-9: Brief Depression Severity Measure Score 11   If You Checked Off Any Problems, How Difficult Have These Problems Made It For You to Do Your Work, Take Care of Things at Home, or Get Along with Other People? somewhat difficult      5-9: Minimal symptoms  10-14: Major depression mild  15-19: Major  depression moderate  Greater then 20: Major depression severe    Mental Status Exam:   Appearance: appropriate  Hygiene:   good  Cooperation:  Cooperative  Eye Contact:  Good  Psychomotor Behavior:  Appropriate  Mood:  Euthymic   Affect:  Appropriate  Hopelessness: Denies  Speech:  Normal  Thought Process:  Linear  Thought Content:  Normal  Suicidal:  None  Homicidal:  None  Hallucinations:  None  Delusion:  None  Memory:  Intact  Orientation:  Person, Place, Time and Situation  Reliability:  fair  Insight:  Fair  Judgement:  Good  Impulse Control:  Good  Physical/Medical Issues:  Yes endometrial cancer treatment      Short-term goals: Patient will be compliant with clinic appointments.  Patient will be engaged in therapy, medication compliant with minimal side effects. Patient  will report decrease of symptoms and frequency.    Long-term goals: Patient will have minimal symptoms of mental health disorder with continued treatment. Patient will be compliant with treatment and appointments.       Problem list: mild depressive disorder   Alcohol abuse history   Strengths: patient appears motivated for treatment          Assessment & Plan   Diagnoses and all orders for this visit:    1. MDD (major depressive disorder), recurrent episode, mild (HCC) (Primary)        A psychological evaluation was conducted in order to assess past and current level of functioning. Areas assessed included, but were not limited to: perception of social support, perception of ability to face and deal with challenges in life (positive functioning), anxiety symptoms, depressive symptoms, perspective on beliefs/belief system, coping skills for stress, intelligence level,  Therapeutic rapport was established. Interventions conducted today were geared towards incorporating medication management along with support for continued therapy. Education was also provided as to the med management with this provider and what to expect in subsequent  sessions.    Assisted patient in processing above session content; acknowledged and normalized patient’s thoughts, feelings, and concerns.  Applied  positive coping skills and behavior management in session.  Allowed patient to freely discuss issues without interruption or judgment. Provided safe, confidential environment to facilitate the development of positive therapeutic relationship and encourage open, honest communication. Assisted patient in identifying risk factors which would indicate the need for higher level of care including thoughts to harm self or others and/or self-harming behavior and encouraged patient to contact this office, call 911, or present to the nearest emergency room should any of these events occur. Discussed crisis intervention services and means to access.  Patient adamantly and convincingly denies current suicidal or homicidal ideation or perceptual disturbance.    Discussed diagnosis and recommendations for treatment:    PROVIDE: Cognitive Behavioral Therapy and Solution Focused Therapy to improve functioning, maintain stability, and avoid decompensation and the need for higher level of care. Will cont to process cancer diagnosis, treatment and working on designing her life in group home    MEDICATION MANAGEMENT RECOMMENDATIONS: cont duloxetine 20 mg bid prescribed by Sal CHEEMA, psychiatric nurse practitioner through her PCP office. Josemanuel.     To call for questions or concerns and return early if necessary. Crisis plan reviewed including going to the Emergency department.       Treatment Plan: stabilize mood,  patient will stay out of the hospital and be at optimal level of functioning, take all medication as prescribed. Patient verbalized  understanding and agreement to plan.      Return in about 27 days (around 1/10/2023).

## 2022-12-15 DIAGNOSIS — M54.12 CERVICAL RADICULOPATHY AT C6: ICD-10-CM

## 2022-12-15 RX ORDER — GABAPENTIN 300 MG/1
CAPSULE ORAL
Qty: 30 CAPSULE | Refills: 2 | OUTPATIENT
Start: 2022-12-15

## 2022-12-15 NOTE — TELEPHONE ENCOUNTER
Caller: JAYDEN  Relationship: SELF  Best call back number: [phone]    Which medication are you concerned about: GABAPENTIN 300MG    Who prescribed you this medication:     What are your concerns: SHE STATES THE PHARMACY ADVISED HER NO REFILLS ARE ON FILE AND SHE ONLY HAS 6 DAYS WORTH OF MEDICATION.

## 2022-12-15 NOTE — TELEPHONE ENCOUNTER
Rx Refill Note  Requested Prescriptions     Pending Prescriptions Disp Refills   • gabapentin (NEURONTIN) 300 MG capsule [Pharmacy Med Name: GABAPENTIN 300 MG CAPSULE] 30 capsule      Sig: TAKE ONE CAPSULE BY MOUTH DAILY      Last filled: 9/7/22 with 6 refills should still have additional refills on file until end of February/March.   Last office visit with prescribing clinician: 9/7/2022      Next office visit with prescribing clinician: 3/7/2023     Rosi Mercedes MA  12/15/22, 08:15 EST

## 2022-12-17 ENCOUNTER — TELEPHONE (OUTPATIENT)
Dept: GYNECOLOGIC ONCOLOGY | Facility: CLINIC | Age: 67
End: 2022-12-17

## 2022-12-17 ENCOUNTER — HOSPITAL ENCOUNTER (EMERGENCY)
Facility: HOSPITAL | Age: 67
Discharge: HOME | End: 2022-12-17
Attending: EMERGENCY MEDICINE | Admitting: EMERGENCY MEDICINE

## 2022-12-17 ENCOUNTER — APPOINTMENT (OUTPATIENT)
Dept: GENERAL RADIOLOGY | Facility: HOSPITAL | Age: 67
End: 2022-12-17

## 2022-12-17 ENCOUNTER — APPOINTMENT (OUTPATIENT)
Dept: CT IMAGING | Facility: HOSPITAL | Age: 67
End: 2022-12-17

## 2022-12-17 VITALS
OXYGEN SATURATION: 98 % | RESPIRATION RATE: 16 BRPM | DIASTOLIC BLOOD PRESSURE: 80 MMHG | HEART RATE: 78 BPM | WEIGHT: 126 LBS | TEMPERATURE: 98.1 F | BODY MASS INDEX: 21.51 KG/M2 | HEIGHT: 64 IN | SYSTOLIC BLOOD PRESSURE: 123 MMHG

## 2022-12-17 DIAGNOSIS — C54.1 ENDOMETRIAL ADENOCARCINOMA: Primary | ICD-10-CM

## 2022-12-17 DIAGNOSIS — R07.9 CHEST PAIN, UNSPECIFIED TYPE: Primary | ICD-10-CM

## 2022-12-17 LAB
ALBUMIN SERPL-MCNC: 3.9 G/DL (ref 3.5–5.2)
ALBUMIN/GLOB SERPL: 1.1 G/DL
ALP SERPL-CCNC: 75 U/L (ref 39–117)
ALT SERPL W P-5'-P-CCNC: 21 U/L (ref 1–33)
ANION GAP SERPL CALCULATED.3IONS-SCNC: 11 MMOL/L (ref 5–15)
AST SERPL-CCNC: 35 U/L (ref 1–32)
BASOPHILS # BLD MANUAL: 0 10*3/MM3 (ref 0–0.2)
BASOPHILS NFR BLD MANUAL: 0 % (ref 0–1.5)
BILIRUB SERPL-MCNC: 0.2 MG/DL (ref 0–1.2)
BUN SERPL-MCNC: 9 MG/DL (ref 8–23)
BUN/CREAT SERPL: 9.7 (ref 7–25)
CALCIUM SPEC-SCNC: 8.8 MG/DL (ref 8.6–10.5)
CHLORIDE SERPL-SCNC: 97 MMOL/L (ref 98–107)
CO2 SERPL-SCNC: 25 MMOL/L (ref 22–29)
CREAT SERPL-MCNC: 0.93 MG/DL (ref 0.57–1)
DEPRECATED RDW RBC AUTO: 46.6 FL (ref 37–54)
EGFRCR SERPLBLD CKD-EPI 2021: 67.9 ML/MIN/1.73
EOSINOPHIL # BLD MANUAL: 0.06 10*3/MM3 (ref 0–0.4)
EOSINOPHIL NFR BLD MANUAL: 3 % (ref 0.3–6.2)
ERYTHROCYTE [DISTWIDTH] IN BLOOD BY AUTOMATED COUNT: 12.9 % (ref 12.3–15.4)
GLOBULIN UR ELPH-MCNC: 3.4 GM/DL
GLUCOSE SERPL-MCNC: 112 MG/DL (ref 65–99)
HCT VFR BLD AUTO: 31.3 % (ref 34–46.6)
HGB BLD-MCNC: 10.2 G/DL (ref 12–15.9)
HOLD SPECIMEN: NORMAL
LIPASE SERPL-CCNC: 59 U/L (ref 13–60)
LYMPHOCYTES # BLD MANUAL: 1.29 10*3/MM3 (ref 0.7–3.1)
LYMPHOCYTES NFR BLD MANUAL: 24 % (ref 5–12)
MCH RBC QN AUTO: 32.3 PG (ref 26.6–33)
MCHC RBC AUTO-ENTMCNC: 32.6 G/DL (ref 31.5–35.7)
MCV RBC AUTO: 99.1 FL (ref 79–97)
MONOCYTES # BLD: 0.48 10*3/MM3 (ref 0.1–0.9)
NEUTROPHILS # BLD AUTO: 0.18 10*3/MM3 (ref 1.7–7)
NEUTROPHILS NFR BLD MANUAL: 9 % (ref 42.7–76)
NT-PROBNP SERPL-MCNC: 580.3 PG/ML (ref 0–900)
PLAT MORPH BLD: NORMAL
PLATELET # BLD AUTO: 227 10*3/MM3 (ref 140–450)
PMV BLD AUTO: 10.4 FL (ref 6–12)
POTASSIUM SERPL-SCNC: 3.7 MMOL/L (ref 3.5–5.2)
PROT SERPL-MCNC: 7.3 G/DL (ref 6–8.5)
RBC # BLD AUTO: 3.16 10*6/MM3 (ref 3.77–5.28)
RBC MORPH BLD: NORMAL
SODIUM SERPL-SCNC: 133 MMOL/L (ref 136–145)
TROPONIN T SERPL-MCNC: <0.01 NG/ML (ref 0–0.03)
TROPONIN T SERPL-MCNC: <0.01 NG/ML (ref 0–0.03)
VARIANT LYMPHS NFR BLD MANUAL: 58 % (ref 19.6–45.3)
VARIANT LYMPHS NFR BLD MANUAL: 6 % (ref 0–5)
WBC MORPH BLD: NORMAL
WBC NRBC COR # BLD: 2.01 10*3/MM3 (ref 3.4–10.8)
WHOLE BLOOD HOLD COAG: NORMAL
WHOLE BLOOD HOLD SPECIMEN: NORMAL

## 2022-12-17 PROCEDURE — 71275 CT ANGIOGRAPHY CHEST: CPT

## 2022-12-17 PROCEDURE — 93005 ELECTROCARDIOGRAM TRACING: CPT

## 2022-12-17 PROCEDURE — 36415 COLL VENOUS BLD VENIPUNCTURE: CPT

## 2022-12-17 PROCEDURE — 85025 COMPLETE CBC W/AUTO DIFF WBC: CPT

## 2022-12-17 PROCEDURE — 71045 X-RAY EXAM CHEST 1 VIEW: CPT

## 2022-12-17 PROCEDURE — 0 IOPAMIDOL PER 1 ML: Performed by: EMERGENCY MEDICINE

## 2022-12-17 PROCEDURE — 83690 ASSAY OF LIPASE: CPT

## 2022-12-17 PROCEDURE — 84484 ASSAY OF TROPONIN QUANT: CPT

## 2022-12-17 PROCEDURE — 99283 EMERGENCY DEPT VISIT LOW MDM: CPT

## 2022-12-17 PROCEDURE — 85007 BL SMEAR W/DIFF WBC COUNT: CPT

## 2022-12-17 PROCEDURE — 83880 ASSAY OF NATRIURETIC PEPTIDE: CPT

## 2022-12-17 PROCEDURE — 80053 COMPREHEN METABOLIC PANEL: CPT

## 2022-12-17 RX ORDER — NITROGLYCERIN 0.4 MG/1
0.4 TABLET SUBLINGUAL
Status: DISCONTINUED | OUTPATIENT
Start: 2022-12-17 | End: 2022-12-17 | Stop reason: HOSPADM

## 2022-12-17 RX ORDER — ASPIRIN 81 MG/1
324 TABLET, CHEWABLE ORAL ONCE
Status: COMPLETED | OUTPATIENT
Start: 2022-12-17 | End: 2022-12-17

## 2022-12-17 RX ORDER — DIPHENHYDRAMINE HYDROCHLORIDE 50 MG/ML
50 INJECTION INTRAMUSCULAR; INTRAVENOUS AS NEEDED
Status: CANCELLED | OUTPATIENT
Start: 2022-12-29

## 2022-12-17 RX ORDER — SODIUM CHLORIDE 0.9 % (FLUSH) 0.9 %
10 SYRINGE (ML) INJECTION AS NEEDED
Status: DISCONTINUED | OUTPATIENT
Start: 2022-12-17 | End: 2022-12-17 | Stop reason: HOSPADM

## 2022-12-17 RX ORDER — FAMOTIDINE 10 MG/ML
20 INJECTION, SOLUTION INTRAVENOUS AS NEEDED
Status: CANCELLED | OUTPATIENT
Start: 2022-12-29

## 2022-12-17 RX ORDER — SODIUM CHLORIDE 9 MG/ML
250 INJECTION, SOLUTION INTRAVENOUS ONCE
Status: CANCELLED | OUTPATIENT
Start: 2022-12-29

## 2022-12-17 RX ORDER — PALONOSETRON 0.05 MG/ML
0.25 INJECTION, SOLUTION INTRAVENOUS ONCE
Status: CANCELLED | OUTPATIENT
Start: 2022-12-29

## 2022-12-17 RX ORDER — OLANZAPINE 5 MG/1
5 TABLET ORAL ONCE
Status: CANCELLED | OUTPATIENT
Start: 2022-12-29 | End: 2022-12-29

## 2022-12-17 RX ADMIN — IOPAMIDOL 48 ML: 755 INJECTION, SOLUTION INTRAVENOUS at 11:52

## 2022-12-17 RX ADMIN — ASPIRIN 81 MG CHEWABLE TABLET 324 MG: 81 TABLET CHEWABLE at 11:37

## 2022-12-17 RX ADMIN — NITROGLYCERIN 0.4 MG: 0.4 TABLET SUBLINGUAL at 11:36

## 2022-12-17 NOTE — ED PROVIDER NOTES
Subjective   History of Present Illness    Pt presents with chest pain.  Onset this morning midsternal chest tightness.  With deep breath the pain worsens at the right sternal border but not on the left.  The pain is not exertional.  She denies dyspnea, dizziness, nausea.  She has had elevated HR for a few days.  She has endometrial cancer, recently diagnosed, and started chemo last week. She took Decadron prior to the chemo.  She feels she has tolerated it pretty well, hasn't been vomiting or otherwise feeling poorly until now.    Prior episode of similar pain back in 2017, had stress test and other workup that was all reportedly negative.  She was started on a beta blocker for uncontrolled HTN at that time and had no further pains.    PMH HTN, HL, CKD, endometrial cancer    History provided by:  Patient      Review of Systems   Constitutional: Negative for fever.   Respiratory: Negative for cough and shortness of breath.    Cardiovascular: Positive for chest pain and palpitations.   Gastrointestinal: Negative for abdominal pain, nausea and vomiting.   Neurological: Negative for dizziness and light-headedness.   All other systems reviewed and are negative.      Past Medical History:   Diagnosis Date   • Allergic lisinopril  2017   • Anemia    • Arrhythmia    • Arthritis    • Cataract mild 2020    stil lpresent   • Chicken pox    • Chronic fatigue    • CKD (chronic kidney disease), stage III (HCC)     sees nephro   • Dental root implant present     lower left  x1 - possible dental implant   • Depression mild 2019   • Difficulty walking 2019   • Disease of thyroid gland    • Dizzy    • NIEVES (dyspnea on exertion)     2017   • Generalized anxiety disorder    • GERD (gastroesophageal reflux disease) 2018   • Hyperlipidemia    • Hypertension    • Iron deficiency anemia    • Liver disease     fatty   • Liver problem    • Measles    • Menopause    • Mumps    • Neuromuscular disorder (HCC) Peripheral Neuropathy   •  Orthostatic hypotension    • 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   • Renal insufficiency 2018   • Scoliosis    • Unintentional weight loss    • Uses contact lenses     bilat   • Uterine cancer (HCC)    • UTI (urinary tract infection)    • Visual impairment Nearsighted   • Wears glasses        Allergies   Allergen Reactions   • Lisinopril Angioedema   • Metal Rash     Possible nickel -   Gold is only metal that doesn't have issues     • Milk-Related Compounds Other (See Comments)     LACTOSE INTOLERANT   • Tylenol [Acetaminophen] Other (See Comments)     ckd   • Atorvastatin Myalgia       Past Surgical History:   Procedure Laterality Date   •  SECTION     • DILATION AND CURETTAGE, DIAGNOSTIC / THERAPEUTIC     • OOPHORECTOMY     • ORAL LESION EXCISION/BIOPSY  2021   • TOTAL LAPAROSCOPIC HYSTERECTOMY SALPINGO OOPHORECTOMY N/A 10/28/2022    Procedure: TOTAL LAPAROSCOPIC HYSTERECTOMY BILATERAL SALPINGO-OOPHORECTOMY, INJECTION FOR SENTINEL LYMPH NODE MAPPING, BILATERAL SENTINEL LYMPH NODE DISSECTION WITH appMobi[employer] ROBOT;  Surgeon: Jasmine Rich MD;  Location: [facility];  Service: Robotics - DaVinci;  Laterality: N/A;   • TUBAL ABDOMINAL LIGATION         Family History   Problem Relation Age of Onset   • Spondylolisthesis Mother    • COPD Mother    • Stroke Mother    • Hypertension Mother    • Lung cancer Father    • Hypertension Father    • Heart attack Father    • Coronary artery disease Father    • Heart disease Father    • Cancer Father    • Lung disease Father    • Hyperlipidemia Sister    • Rheum arthritis Sister    • Malig Hypertension Sister    • Osteoarthritis Sister    • Other Sister         alcoholic   • Hypertension Sister    • Hypertension Brother    • Breast cancer Neg Hx    • Ovarian cancer Neg Hx    • Uterine cancer Neg Hx    • Colon cancer Neg Hx    • Melanoma Neg Hx    • Prostate cancer Neg Hx        Social History     Socioeconomic History   • Marital  status:    • Number of children: 1   • Highest education level: Associate degree: academic program   Tobacco Use   • Smoking status: Never   • Smokeless tobacco: Never   • Tobacco comments:     Never   Vaping Use   • Vaping Use: Never used   Substance and Sexual Activity   • Alcohol use: Yes     Alcohol/week: 6.0 standard drinks     Types: 6 Glasses of wine per week     Comment: social   • Drug use: No   • Sexual activity: Not Currently     Partners: Male     Birth control/protection: Surgical, Post-menopausal           Objective   Physical Exam  Vitals and nursing note reviewed.   Constitutional:       General: She is not in acute distress.     Appearance: Normal appearance. She is not ill-appearing.   HENT:      Head: Normocephalic and atraumatic.      Mouth/Throat:      Mouth: Mucous membranes are moist.   Eyes:      General: No scleral icterus.        Right eye: No discharge.         Left eye: No discharge.      Conjunctiva/sclera: Conjunctivae normal.   Cardiovascular:      Rate and Rhythm: Normal rate and regular rhythm.      Heart sounds: No murmur heard.  Pulmonary:      Effort: Pulmonary effort is normal. No respiratory distress.      Breath sounds: Normal breath sounds. No wheezing.   Abdominal:      General: Bowel sounds are normal. There is no distension.      Palpations: Abdomen is soft.      Tenderness: There is no abdominal tenderness. There is no guarding or rebound.   Musculoskeletal:         General: No swelling. Normal range of motion.      Cervical back: Normal range of motion and neck supple.   Skin:     General: Skin is warm and dry.      Findings: No rash.   Neurological:      General: No focal deficit present.      Mental Status: She is alert. Mental status is at baseline.   Psychiatric:         Mood and Affect: Mood normal.         Behavior: Behavior normal.         Thought Content: Thought content normal.         Procedures           ED Course         EKG NSR, nonspecific changes.   CXR NAD.  CTA negative.  NTG given without relief but later in visit her pain resolved.  Two negative cardiac sets.  Labs c/w recent chemo.  Suspect chemo is the cause but can't exclude other causes completely.    HEART = 4.  It's a weekend, no availability of stress testing.    Chest pain free on rechecks.  Workup including two cardiac sets is negative for acute serious pathology.  I discussed findings and concerns with patient in detail.  We discussed in particular that while acute MI has been excluded today, it is not possible to exclude underlying CAD based on ED workup alone.  We discussed the importance of immediate-term followup for provocative testing such as stress testing as well as methods of arranging that.  We discussed the importance of office followup subsequent to testing to discuss results and any further testing or treatment that may be indicated.  Patient is advised to return to the ED for any concerning symptoms, especially prior to the completion of further outpatient testing.                                    MDM  Number of Diagnoses or Management Options     Amount and/or Complexity of Data Reviewed  Clinical lab tests: reviewed and ordered  Tests in the radiology section of CPT®: reviewed and ordered  Independent visualization of images, tracings, or specimens: yes        Final diagnoses:   Chest pain, unspecified type       ED Disposition  ED Disposition     ED Disposition   Discharge    Condition   Stable    Comment   --             [facility]  [address]  [facility] [id number] [phone]  In 2 days      Liana So, APRN  [address] [facility] [address]  [phone]    Call in 2 days      [facility] CARDIOLOGY  [address]  [address]  [facility] [id number] [phone]             Medication List      Changed    Cyanocobalamin 1000 MCG/ML kit  Inject 1,000mcg SC daily x1 week, then weekly x4  weeks.  What changed:   · when to take this  · additional instructions     * gabapentin 100 MG capsule  Commonly known as: Neurontin  Take 1 capsule by mouth Every Morning.  What changed: Another medication with the same name was changed. Make sure you understand how and when to take each.     * gabapentin 300 MG capsule  Commonly known as: Neurontin  Take 1 capsule by mouth 1 (One) Time for 1 dose.  What changed: when to take this         * This list has 2 medication(s) that are the same as other medications prescribed for you. Read the directions carefully, and ask your doctor or other care provider to review them with you.                 Joe Bird MD  12/17/22 2042

## 2022-12-17 NOTE — TELEPHONE ENCOUNTER
Complains of increased HR up to 130.  Complains of chest discomfort. This has been going on 3-4 days.    Advised go to ER now.  patinet agreeable.    Electronically signed by Sarah Stern MD, 12/17/22, 9:10 AM EST.

## 2022-12-19 ENCOUNTER — TELEPHONE (OUTPATIENT)
Dept: CARDIOLOGY | Facility: CLINIC | Age: 67
End: 2022-12-19

## 2022-12-19 ENCOUNTER — APPOINTMENT (OUTPATIENT)
Dept: ONCOLOGY | Facility: HOSPITAL | Age: 67
End: 2022-12-19
Payer: MEDICARE

## 2022-12-19 DIAGNOSIS — R07.9 CHEST PAIN, UNSPECIFIED TYPE: ICD-10-CM

## 2022-12-19 DIAGNOSIS — I47.1 PAROXYSMAL SVT (SUPRAVENTRICULAR TACHYCARDIA): Primary | ICD-10-CM

## 2022-12-19 NOTE — TELEPHONE ENCOUNTER
Pt went to [facility] on 12/17 for new chest pressure starting around xyphoid process moves across chest. She denies additional cardiac associated symptoms. Chemo started 12/8/22 for endometrial cancer, took Decadron prior to the chemo. On 12/12 resting HR started elevating around 117-130, this morning HR was 98. ER note mentions possibly completing stress test outpatient. She says she feels well today, able to complete daily tasks around the house without chest pressure. Okay to order stress test?

## 2022-12-20 NOTE — TELEPHONE ENCOUNTER
Spoke with pt regarding testing. She is amendable to proceed but it a little apprehensive about having an IV. She said she is unable to run on the treadmill due to neuropathy in her legs and she has to use a cane. She said she has been chest pain free yesterday and today. She is going to ask Moris to schedule stress and echo towards the end of January.

## 2022-12-21 LAB
QT INTERVAL: 362 MS
QT INTERVAL: 406 MS
QTC INTERVAL: 447 MS
QTC INTERVAL: 483 MS

## 2022-12-22 ENCOUNTER — TELEPHONE (OUTPATIENT)
Dept: GYNECOLOGIC ONCOLOGY | Facility: CLINIC | Age: 67
End: 2022-12-22

## 2022-12-22 ENCOUNTER — PATIENT OUTREACH (OUTPATIENT)
Dept: CASE MANAGEMENT | Facility: OTHER | Age: 67
End: 2022-12-22

## 2022-12-22 NOTE — TELEPHONE ENCOUNTER
Caller: Alysia Sierra    Relationship: Self    Best call back number: [phone]    What is the best time to reach you: ANYTIME    Who are you requesting to speak with (clinical staff, provider,  specific staff member): DR BATISTA OR NURSE    What was the call regarding: PT WOULD LIKE TO GET HER LABS DRAWN 1 OR 2 DAYS PRIOR TO HER 12/29 APPT - PLEASE CALL TO ADVISE IF THIS IS OK. SHE WOULD ALSO LIKE TO GET THEM DONE AT THE [place] LOCATION.    Do you require a callback: YES

## 2022-12-22 NOTE — OUTREACH NOTE
"AMBULATORY CASE MANAGEMENT NOTE    Name and Relationship of Patient/Support Person: Alysia Sierra P - Self    Patient Outreach    [facility] ED visit 12/17/2022:Chest pain.  Negative workup. Patient reports she has felt really well the past 3 days, denies recurrence of chest pin.  States she is eating and hydrating well.  Denies problems with bowel or bladder.  States decreased pain with walking. Reports productive visit with Dr. Morin.  Patient plans to develop own \"care plan\" to increase daily activities, agreeable to work with ACM with setting goals.    AMPARO CROSS  Ambulatory Case Management    12/22/2022, 12:18 EST  "

## 2022-12-28 ENCOUNTER — LAB (OUTPATIENT)
Dept: LAB | Facility: HOSPITAL | Age: 67
End: 2022-12-28
Payer: MEDICARE

## 2022-12-28 DIAGNOSIS — C54.1 ENDOMETRIAL ADENOCARCINOMA: ICD-10-CM

## 2022-12-28 LAB
ALBUMIN SERPL-MCNC: 4.4 G/DL (ref 3.5–5.2)
ALBUMIN/GLOB SERPL: 1.5 G/DL
ALP SERPL-CCNC: 85 U/L (ref 39–117)
ALT SERPL W P-5'-P-CCNC: 16 U/L (ref 1–33)
ANION GAP SERPL CALCULATED.3IONS-SCNC: 13 MMOL/L (ref 5–15)
AST SERPL-CCNC: 28 U/L (ref 1–32)
BASOPHILS # BLD AUTO: 0.04 10*3/MM3 (ref 0–0.2)
BASOPHILS NFR BLD AUTO: 0.6 % (ref 0–1.5)
BILIRUB SERPL-MCNC: 0.2 MG/DL (ref 0–1.2)
BUN SERPL-MCNC: 11 MG/DL (ref 8–23)
BUN/CREAT SERPL: 8.9 (ref 7–25)
CALCIUM SPEC-SCNC: 9.6 MG/DL (ref 8.6–10.5)
CANCER AG125 SERPL QL: 21.1 U/ML (ref 0–38.1)
CHLORIDE SERPL-SCNC: 104 MMOL/L (ref 98–107)
CO2 SERPL-SCNC: 24 MMOL/L (ref 22–29)
CREAT SERPL-MCNC: 1.24 MG/DL (ref 0.57–1)
DEPRECATED RDW RBC AUTO: 50.7 FL (ref 37–54)
EGFRCR SERPLBLD CKD-EPI 2021: 48.1 ML/MIN/1.73
EOSINOPHIL # BLD AUTO: 0.18 10*3/MM3 (ref 0–0.4)
EOSINOPHIL NFR BLD AUTO: 2.7 % (ref 0.3–6.2)
ERYTHROCYTE [DISTWIDTH] IN BLOOD BY AUTOMATED COUNT: 13.9 % (ref 12.3–15.4)
GLOBULIN UR ELPH-MCNC: 2.9 GM/DL
GLUCOSE SERPL-MCNC: 101 MG/DL (ref 65–99)
HCT VFR BLD AUTO: 33.2 % (ref 34–46.6)
HGB BLD-MCNC: 10.4 G/DL (ref 12–15.9)
IMM GRANULOCYTES # BLD AUTO: 0.05 10*3/MM3 (ref 0–0.05)
IMM GRANULOCYTES NFR BLD AUTO: 0.8 % (ref 0–0.5)
LYMPHOCYTES # BLD AUTO: 2.34 10*3/MM3 (ref 0.7–3.1)
LYMPHOCYTES NFR BLD AUTO: 35.6 % (ref 19.6–45.3)
MCH RBC QN AUTO: 32 PG (ref 26.6–33)
MCHC RBC AUTO-ENTMCNC: 31.3 G/DL (ref 31.5–35.7)
MCV RBC AUTO: 102.2 FL (ref 79–97)
MONOCYTES # BLD AUTO: 1.08 10*3/MM3 (ref 0.1–0.9)
MONOCYTES NFR BLD AUTO: 16.4 % (ref 5–12)
NEUTROPHILS NFR BLD AUTO: 2.88 10*3/MM3 (ref 1.7–7)
NEUTROPHILS NFR BLD AUTO: 43.9 % (ref 42.7–76)
NRBC BLD AUTO-RTO: 0 /100 WBC (ref 0–0.2)
PLATELET # BLD AUTO: 388 10*3/MM3 (ref 140–450)
PMV BLD AUTO: 9.5 FL (ref 6–12)
POTASSIUM SERPL-SCNC: 4.6 MMOL/L (ref 3.5–5.2)
PROT SERPL-MCNC: 7.3 G/DL (ref 6–8.5)
RBC # BLD AUTO: 3.25 10*6/MM3 (ref 3.77–5.28)
SODIUM SERPL-SCNC: 141 MMOL/L (ref 136–145)
WBC NRBC COR # BLD: 6.57 10*3/MM3 (ref 3.4–10.8)

## 2022-12-28 PROCEDURE — 86304 IMMUNOASSAY TUMOR CA 125: CPT

## 2022-12-28 PROCEDURE — 85025 COMPLETE CBC W/AUTO DIFF WBC: CPT

## 2022-12-28 PROCEDURE — 80053 COMPREHEN METABOLIC PANEL: CPT

## 2022-12-28 PROCEDURE — 36415 COLL VENOUS BLD VENIPUNCTURE: CPT

## 2022-12-28 NOTE — PROGRESS NOTES
Chemotherapy Treatment Office Visit      Patient Name: Alysia Sierra  : 1955   MRN: [id number]     Chief Complaint:  Endometrial cancer, chemotherapy    History of Present Illness: Alysia Sierra is a 67 y.o. female who presents today for cycle 2 of carboplatin/docetaxel. Today, she is overall doing well. She denies vaginal bleeding and discharge. She does not have abdominal or pelvic pain. She is tolerating a regular diet and endorses a normal appetite. She denies nausea and vomiting. She denies early satiety and bloating. Denies any CP, SOB, lightheadedness or dizziness. She denies changes in her bowel/bladder. No dysuria, frequency, urgency, hematuria or flank pain. Reports normal energy levels. Her pre-existing peripheral neuropathy is stable. She is using her cane. Denies any falls.    Oncologic History:  Oncology/Hematology History   Endometrial adenocarcinoma (HCC)   2022 Initial Diagnosis    2022: TVUS with uterus measuring 5.7 x 4.8 x 4.1 cm with an endometrial stripe thickness of 9.8 mm.  Right ovary not visualized.  Left ovary normal.  No free fluid.  2022: Saline infusion sonogram with an irregularly shaped prominent lesion on the posterior endometrial surface concerning for neoplasia.  10/6/2022: Endometrial biopsy with FIGO grade 1 endometrial cancer      10/28/2022 Surgery    Robotic-assisted total laparoscopic hysterectomy with bilateral salpingo-oophorectomy with bilateral SLND    Pathology with FIGO grade 2 endometrioid adenocarcinoma with 94% MI. Involvement of endocervix and uterine serosal adhesions. Negative parametrium and bilateral sentinel lymph nodes. MMR intact.     Surgery at [facility] by Jasmine Rich MD      Cancer Staged    Cancer Staging   Endometrial adenocarcinoma (HCC)  Staging form: Corpus Uteri - Carcinoma And Carcinosarcoma, AJCC 8th Edition  - Pathologic stage from 10/28/2022: FIGO Stage IIIA (pT3a, pN0(sn), cM0) - Signed by Jasmine Rich  "MD on 11/12/2022       10/28/2022 Cancer Staged    Staging form: Corpus Uteri - Carcinoma And Carcinosarcoma, AJCC 8th Edition  - Pathologic stage from 10/28/2022: FIGO Stage IIIA (pT3a, pN0(sn), cM0) - Signed by Jasmine Rich MD on 11/12/2022 12/8/2022 -  Chemotherapy    OP OVARIAN DOCEtaxel / CARBOplatin          I have reviewed and the following portions of the patient's history were updated as appropriate: past family history, past medical history, past social history, past surgical history, past obstetrics/gynecologic history and problem list.    Medications: The current medication list was reviewed with the patient and updated in the EMR this date per the Medical Assistant. Medication dosages and frequencies were confirmed to be accurate.      Allergies:   Allergies   Allergen Reactions   • Lisinopril Angioedema   • Metal Rash     Possible nickel -   Gold is only metal that doesn't have issues     • Milk-Related Compounds Other (See Comments)     LACTOSE INTOLERANT   • Tylenol [Acetaminophen] Other (See Comments)     ckd   • Atorvastatin Myalgia       Subjective    Review of Systems:   As per HPI.     Objective     Physical Exam:  Vital Signs:   Vitals:    12/29/22 0902   BP: 149/81   Pulse: 105   Resp: 18   Temp: 98.8 °F (37.1 °C)   TempSrc: Temporal   SpO2: 100%   Weight: 57.6 kg (126 lb 14.4 oz)   Height: 162.6 cm (64.02\")   PainSc: 0-No pain     BMI: Body mass index is 21.77 kg/m².   ECOG PS: 1  ECOG score: 1          PHQ-2 Depression Screening  Little interest or pleasure in doing things?     Feeling down, depressed, or hopeless?     PHQ-2 Total Score       Physical Examination:   General appearance - alert, well appearing, and in no distress and oriented to person, place, and time  Mental status - normal mood, behavior, speech, dress, motor activity, and thought processes  Heart - normal rate, regular rhythm, normal S1, S2, no murmurs, rubs, clicks or gallops  Lungs - normal respiratory effort, " clear to auscultation bilaterally  Abdomen - soft, nontender, nondistended, no masses or organomegaly  Pelvic - 3 mm bluish compressible nodule at the superior portion of the right labium majorum consistent with blood vessel, external genitalia otherwise normal, vagina without discharge, vaginal cuff intact and without lesions, cervix, uterus and adnexa surgically absent, no palpable masses  Neurological - alert, oriented, normal speech, no focal findings or movement disorder noted.   Musculoskeletal - no joint tenderness, deformity or swelling  Extremities - peripheral pulses normal, no pedal edema, no clubbing or cyanosis  Skin - normal coloration and turgor, no rashes, no suspicious skin lesions noted     Labs:  Lab Results   Component Value Date    WBC 6.57 12/28/2022    HGB 10.4 (L) 12/28/2022    HCT 33.2 (L) 12/28/2022    .2 (H) 12/28/2022     12/28/2022       Lab Results   Component Value Date    GLUCOSE 101 (H) 12/28/2022    BUN 11 12/28/2022    CREATININE 1.24 (H) 12/28/2022    EGFRIFNONA 32 (L) 07/13/2021    EGFRIFAFRI 38 (L) 07/13/2021    BCR 8.9 12/28/2022    K 4.6 12/28/2022    CO2 24.0 12/28/2022    CALCIUM 9.6 12/28/2022    ALBUMIN 4.4 12/28/2022    AST 28 12/28/2022    ALT 16 12/28/2022         Assessment / Plan    Alysia Sierra is a 67 y.o. year old female with Stage IIIA endometrial cancer who presents for cycle 2 of carboplatin (AUC 5)/ docetaxel (75 mg/m2).  She is tolerating therapy well with manageable side effects. I have reviewed her labs and she is cleared for today's treatment.    CKD Stage III - elevated Cr 1.24 is in line with patient's baseline around 1.3. Continue nephrology follow up    Peripheral neuropathy - Grade 2 and stable. Baseline neuropathy controlled with cymbalta and gabapentin. Managed by neurology.    Major depressive disorder - Sees Cassie Morin. Continues duloxetine BID.    Diagnoses and all orders for this visit:    1. Examination prior to chemotherapy  (Primary)    2. Endometrial adenocarcinoma (HCC)    3. Stage 3a chronic kidney disease (HCC)    4. Neuropathy       Follow Up: 3 weeks for cycle 3    Jasmine Rich MD  Gynecologic Oncology     follows up PCP regularly

## 2022-12-29 ENCOUNTER — OFFICE VISIT (OUTPATIENT)
Dept: GYNECOLOGIC ONCOLOGY | Facility: CLINIC | Age: 67
End: 2022-12-29

## 2022-12-29 ENCOUNTER — HOSPITAL ENCOUNTER (OUTPATIENT)
Dept: ONCOLOGY | Facility: HOSPITAL | Age: 67
Setting detail: INFUSION SERIES
Discharge: HOME | End: 2022-12-29
Payer: MEDICARE

## 2022-12-29 VITALS
HEIGHT: 64 IN | HEART RATE: 105 BPM | WEIGHT: 126.9 LBS | TEMPERATURE: 98.8 F | DIASTOLIC BLOOD PRESSURE: 81 MMHG | OXYGEN SATURATION: 100 % | RESPIRATION RATE: 18 BRPM | BODY MASS INDEX: 21.66 KG/M2 | SYSTOLIC BLOOD PRESSURE: 149 MMHG

## 2022-12-29 DIAGNOSIS — C54.1 ENDOMETRIAL ADENOCARCINOMA: Primary | ICD-10-CM

## 2022-12-29 DIAGNOSIS — C54.1 ENDOMETRIAL ADENOCARCINOMA: ICD-10-CM

## 2022-12-29 DIAGNOSIS — N18.31 STAGE 3A CHRONIC KIDNEY DISEASE: ICD-10-CM

## 2022-12-29 DIAGNOSIS — G62.9 NEUROPATHY: ICD-10-CM

## 2022-12-29 DIAGNOSIS — Z01.818 EXAMINATION PRIOR TO CHEMOTHERAPY: Primary | ICD-10-CM

## 2022-12-29 PROCEDURE — 96375 TX/PRO/DX INJ NEW DRUG ADDON: CPT

## 2022-12-29 PROCEDURE — 25010000002 PALONOSETRON 0.25 MG/5ML SOLUTION PREFILLED SYRINGE: Performed by: OBSTETRICS & GYNECOLOGY

## 2022-12-29 PROCEDURE — 96417 CHEMO IV INFUS EACH ADDL SEQ: CPT

## 2022-12-29 PROCEDURE — 25010000002 DOCETAXEL 20 MG/ML SOLUTION 8 ML VIAL: Performed by: OBSTETRICS & GYNECOLOGY

## 2022-12-29 PROCEDURE — 96413 CHEMO IV INFUSION 1 HR: CPT

## 2022-12-29 PROCEDURE — 25010000002 CARBOPLATIN PER 50 MG: Performed by: OBSTETRICS & GYNECOLOGY

## 2022-12-29 PROCEDURE — 99214 OFFICE O/P EST MOD 30 MIN: CPT | Performed by: OBSTETRICS & GYNECOLOGY

## 2022-12-29 PROCEDURE — 96367 TX/PROPH/DG ADDL SEQ IV INF: CPT

## 2022-12-29 PROCEDURE — 25010000002 FOSAPREPITANT PER 1 MG: Performed by: OBSTETRICS & GYNECOLOGY

## 2022-12-29 RX ORDER — SODIUM CHLORIDE 9 MG/ML
250 INJECTION, SOLUTION INTRAVENOUS ONCE
Status: COMPLETED | OUTPATIENT
Start: 2022-12-29 | End: 2022-12-29

## 2022-12-29 RX ORDER — PALONOSETRON 0.05 MG/ML
0.25 INJECTION, SOLUTION INTRAVENOUS ONCE
Status: COMPLETED | OUTPATIENT
Start: 2022-12-29 | End: 2022-12-29

## 2022-12-29 RX ORDER — OLANZAPINE 5 MG/1
5 TABLET ORAL ONCE
Status: COMPLETED | OUTPATIENT
Start: 2022-12-29 | End: 2022-12-29

## 2022-12-29 RX ADMIN — PALONOSETRON 0.25 MG: 0.25 INJECTION, SOLUTION INTRAVENOUS at 10:26

## 2022-12-29 RX ADMIN — CARBOPLATIN 330 MG: 10 INJECTION, SOLUTION INTRAVENOUS at 12:15

## 2022-12-29 RX ADMIN — OLANZAPINE 5 MG: 5 TABLET, FILM COATED ORAL at 10:25

## 2022-12-29 RX ADMIN — SODIUM CHLORIDE 250 ML: 9 INJECTION, SOLUTION INTRAVENOUS at 10:23

## 2022-12-29 RX ADMIN — SODIUM CHLORIDE 150 MG: 9 INJECTION, SOLUTION INTRAVENOUS at 10:29

## 2022-12-29 RX ADMIN — DOCETAXEL 120 MG: 20 INJECTION, SOLUTION, CONCENTRATE INTRAVENOUS at 11:08

## 2022-12-29 NOTE — PROGRESS NOTES
Chemotherapy Treatment Office Visit      Patient Name: Alysia Sierra  : 1955   MRN: [id number]     Chief Complaint:  Endometrial cancer, chemotherapy    History of Present Illness: Alysia Sierra is a 67 y.o. female who presents today for cycle 2 of carboplatin/docetaxel. Today, she is overall doing well. She denies vaginal bleeding and discharge. She does not have abdominal or pelvic pain. She is tolerating a regular diet and endorses a normal appetite. She denies nausea and vomiting. She denies early satiety and bloating. Denies any CP, SOB, lightheadedness or dizziness. She denies changes in her bowel/bladder. No dysuria, frequency, urgency, hematuria or flank pain. Reports normal energy levels. Her pre-existing peripheral neuropathy is stable. She is using her cane. Denies any falls.    Oncologic History:  Oncology/Hematology History   Endometrial adenocarcinoma (HCC)   2022 Initial Diagnosis    2022: TVUS with uterus measuring 5.7 x 4.8 x 4.1 cm with an endometrial stripe thickness of 9.8 mm.  Right ovary not visualized.  Left ovary normal.  No free fluid.  2022: Saline infusion sonogram with an irregularly shaped prominent lesion on the posterior endometrial surface concerning for neoplasia.  10/6/2022: Endometrial biopsy with FIGO grade 1 endometrial cancer      10/28/2022 Surgery    Robotic-assisted total laparoscopic hysterectomy with bilateral salpingo-oophorectomy with bilateral SLND    Pathology with FIGO grade 2 endometrioid adenocarcinoma with 94% MI. Involvement of endocervix and uterine serosal adhesions. Negative parametrium and bilateral sentinel lymph nodes. MMR intact.     Surgery at [facility] by Jasmine Rich MD      Cancer Staged    Cancer Staging   Endometrial adenocarcinoma (HCC)  Staging form: Corpus Uteri - Carcinoma And Carcinosarcoma, AJCC 8th Edition  - Pathologic stage from 10/28/2022: FIGO Stage IIIA (pT3a, pN0(sn), cM0) - Signed by Jasmine Rich  "MD on 11/12/2022       10/28/2022 Cancer Staged    Staging form: Corpus Uteri - Carcinoma And Carcinosarcoma, AJCC 8th Edition  - Pathologic stage from 10/28/2022: FIGO Stage IIIA (pT3a, pN0(sn), cM0) - Signed by Jasmine Rich MD on 11/12/2022 12/8/2022 -  Chemotherapy    OP OVARIAN DOCEtaxel / CARBOplatin          I have reviewed and the following portions of the patient's history were updated as appropriate: past family history, past medical history, past social history, past surgical history, past obstetrics/gynecologic history and problem list.    Medications: The current medication list was reviewed with the patient and updated in the EMR this date per the Medical Assistant. Medication dosages and frequencies were confirmed to be accurate.      Allergies:   Allergies   Allergen Reactions   • Lisinopril Angioedema   • Metal Rash     Possible nickel -   Gold is only metal that doesn't have issues     • Milk-Related Compounds Other (See Comments)     LACTOSE INTOLERANT   • Tylenol [Acetaminophen] Other (See Comments)     ckd   • Atorvastatin Myalgia       Subjective    Review of Systems:   As per HPI.     Objective     Physical Exam:  Vital Signs:   Vitals:    12/29/22 0902   BP: 149/81   Pulse: 105   Resp: 18   Temp: 98.8 °F (37.1 °C)   TempSrc: Temporal   SpO2: 100%   Weight: 57.6 kg (126 lb 14.4 oz)   Height: 162.6 cm (64.02\")   PainSc: 0-No pain     BMI: Body mass index is 21.77 kg/m².   ECOG PS: 1  ECOG score: 1          PHQ-2 Depression Screening  Little interest or pleasure in doing things?     Feeling down, depressed, or hopeless?     PHQ-2 Total Score       Physical Examination:   General appearance - alert, well appearing, and in no distress and oriented to person, place, and time  Mental status - normal mood, behavior, speech, dress, motor activity, and thought processes  Heart - normal rate, regular rhythm, normal S1, S2, no murmurs, rubs, clicks or gallops  Lungs - normal respiratory effort, " clear to auscultation bilaterally  Abdomen - soft, nontender, nondistended, no masses or organomegaly  Pelvic - deferred  Neurological - alert, oriented, normal speech, no focal findings or movement disorder noted.   Musculoskeletal - no joint tenderness, deformity or swelling  Extremities - peripheral pulses normal, no pedal edema, no clubbing or cyanosis  Skin - normal coloration and turgor, no rashes, no suspicious skin lesions noted     Labs:  Lab Results   Component Value Date    WBC 6.57 12/28/2022    HGB 10.4 (L) 12/28/2022    HCT 33.2 (L) 12/28/2022    .2 (H) 12/28/2022     12/28/2022       Lab Results   Component Value Date    GLUCOSE 101 (H) 12/28/2022    BUN 11 12/28/2022    CREATININE 1.24 (H) 12/28/2022    EGFRIFNONA 32 (L) 07/13/2021    EGFRIFAFRI 38 (L) 07/13/2021    BCR 8.9 12/28/2022    K 4.6 12/28/2022    CO2 24.0 12/28/2022    CALCIUM 9.6 12/28/2022    ALBUMIN 4.4 12/28/2022    AST 28 12/28/2022    ALT 16 12/28/2022         Assessment / Plan    Alysia Sierra is a 67 y.o. year old female with Stage IIIA endometrial cancer who presents for cycle 2 of carboplatin (AUC 5)/ docetaxel (75 mg/m2).  She is tolerating therapy well with manageable side effects. I have reviewed her labs and she is cleared for today's treatment.    CKD Stage III - elevated Cr 1.24 is in line with patient's baseline around 1.3. Continue nephrology follow up    Peripheral neuropathy - Grade 2 and stable. Baseline neuropathy controlled with cymbalta and gabapentin. Managed by neurology.    Major depressive disorder - Sees Cassie Morin. Continues duloxetine BID.    Diagnoses and all orders for this visit:    1. Examination prior to chemotherapy (Primary)    2. Endometrial adenocarcinoma (HCC)    3. Stage 3a chronic kidney disease (HCC)    4. Neuropathy       Follow Up: 3 weeks for cycle 3    Jasmine Rich MD  Gynecologic Oncology

## 2022-12-30 ENCOUNTER — HOSPITAL ENCOUNTER (OUTPATIENT)
Dept: MAMMOGRAPHY | Facility: HOSPITAL | Age: 67
Discharge: HOME | End: 2022-12-30
Admitting: NURSE PRACTITIONER

## 2022-12-30 ENCOUNTER — APPOINTMENT (OUTPATIENT)
Dept: OTHER | Facility: HOSPITAL | Age: 67
End: 2022-12-30

## 2022-12-30 DIAGNOSIS — Z12.31 ENCOUNTER FOR SCREENING MAMMOGRAM FOR MALIGNANT NEOPLASM OF BREAST: ICD-10-CM

## 2022-12-30 PROCEDURE — 77063 BREAST TOMOSYNTHESIS BI: CPT | Performed by: RADIOLOGY

## 2022-12-30 PROCEDURE — 77067 SCR MAMMO BI INCL CAD: CPT | Performed by: RADIOLOGY

## 2022-12-30 PROCEDURE — 77063 BREAST TOMOSYNTHESIS BI: CPT

## 2022-12-30 PROCEDURE — 77067 SCR MAMMO BI INCL CAD: CPT

## 2023-01-05 ENCOUNTER — HOSPITAL ENCOUNTER (OUTPATIENT)
Dept: RADIATION ONCOLOGY | Facility: HOSPITAL | Age: 68
Setting detail: RADIATION/ONCOLOGY SERIES
Discharge: HOME | End: 2023-01-05
Payer: MEDICARE

## 2023-01-06 ENCOUNTER — APPOINTMENT (OUTPATIENT)
Dept: RADIATION ONCOLOGY | Facility: HOSPITAL | Age: 68
End: 2023-01-06
Payer: MEDICARE

## 2023-01-09 ENCOUNTER — HOSPITAL ENCOUNTER (OUTPATIENT)
Dept: RADIATION ONCOLOGY | Facility: HOSPITAL | Age: 68
Discharge: HOME | End: 2023-01-09

## 2023-01-09 ENCOUNTER — OFFICE VISIT (OUTPATIENT)
Dept: RADIATION ONCOLOGY | Facility: HOSPITAL | Age: 68
End: 2023-01-09
Payer: MEDICARE

## 2023-01-09 ENCOUNTER — TELEPHONE (OUTPATIENT)
Dept: INTERNAL MEDICINE | Facility: CLINIC | Age: 68
End: 2023-01-09
Payer: MEDICARE

## 2023-01-09 VITALS
RESPIRATION RATE: 16 BRPM | SYSTOLIC BLOOD PRESSURE: 167 MMHG | HEART RATE: 101 BPM | TEMPERATURE: 97.8 F | BODY MASS INDEX: 21.02 KG/M2 | HEIGHT: 65 IN | WEIGHT: 126.2 LBS | OXYGEN SATURATION: 93 % | DIASTOLIC BLOOD PRESSURE: 91 MMHG

## 2023-01-09 DIAGNOSIS — C54.1 ENDOMETRIAL ADENOCARCINOMA: Primary | ICD-10-CM

## 2023-01-09 PROCEDURE — 77770 HDR RDNCL NTRSTL/ICAV BRCHTX: CPT | Performed by: RADIOLOGY

## 2023-01-09 PROCEDURE — G0463 HOSPITAL OUTPT CLINIC VISIT: HCPCS | Performed by: RADIOLOGY

## 2023-01-09 PROCEDURE — 77295 3-D RADIOTHERAPY PLAN: CPT | Performed by: RADIOLOGY

## 2023-01-09 PROCEDURE — 57156 INS VAG BRACHYTX DEVICE: CPT | Performed by: RADIOLOGY

## 2023-01-09 PROCEDURE — 77290 THER RAD SIMULAJ FIELD CPLX: CPT | Performed by: RADIOLOGY

## 2023-01-09 PROCEDURE — C1717 BRACHYTX, NON-STR,HDR IR-192: HCPCS | Performed by: RADIOLOGY

## 2023-01-09 NOTE — PROGRESS NOTES
RE-EVALUATION    PATIENT:                                                      Alysia Sierra  :                                                          1955  DATE:                          2023   DIAGNOSIS:     Endometrial adenocarcinoma (HCC)  - FIGO Stage IIIA (pT3a, pN0(sn), cM0)         BRIEF HISTORY:   Alysia Sierra  is a 67 y.o. female  who underwent robotic hysterectomy with bilateral salpingo-oophorectomy with sentinel pelvic node dissection for grade 2 endometrioid adenocarcinoma with 94% myometrial invasion of 16 of 17 mm in thickness..  There was no lymphovascular space invasion and nodes were negative.  She did have tumor involvement of serosal adhesions and endocervical involvement.  Tumor size was 5.5 cm.  We have discussed external beam radiation but Ms. Sierra wants to undergo only brachytherapy.  She has also discussed this with Dr. Rich at length.  She is here to proceed with postoperative radiation planning.  She is undergoing chemotherapy per Dr. Rich.    Allergies   Allergen Reactions   • Lisinopril Angioedema   • Metal Rash     Possible nickel -   Gold is only metal that doesn't have issues     • Milk-Related Compounds Other (See Comments)     LACTOSE INTOLERANT   • Tylenol [Acetaminophen] Other (See Comments)     ckd   • Atorvastatin Myalgia       Review of Systems   Constitutional: Positive for fatigue.   HENT:   Positive for tinnitus.    Respiratory: Positive for chest tightness (In ED 2 weeks ago, has f/u with Dr. Bahena (card)).    Gastrointestinal: Positive for diarrhea (recent virus).   Musculoskeletal: Positive for gait problem (neuropathy ).   Neurological: Positive for gait problem (neuropathy ).   Psychiatric/Behavioral: The patient is nervous/anxious.    All other systems reviewed and are negative.          Objective   VITAL SIGNS:   Vitals:    23 0829   BP: 167/91   Pulse: 101   Resp: 16   Temp: 97.8 °F (36.6 °C)   TempSrc: Temporal   SpO2: 93%  Comment:  RA   Weight: 57.2 kg (126 lb 3.2 oz)   Height: 163.8 cm (64.5\")   PainSc: 0-No pain        Karnofsky score: 90   {KPS:  **    Physical Exam  Vitals reviewed.   Cardiovascular:      Rate and Rhythm: Tachycardia present.   Pulmonary:      Effort: Pulmonary effort is normal.   Neurological:      Mental Status: She is alert.              The following portions of the patient's history were reviewed and updated as appropriate: allergies, current medications, past family history, past medical history, past social history, past surgical history and problem list.    Diagnoses and all orders for this visit:    Endometrial adenocarcinoma (HCC)      IMPRESSION:  Alysia Sierra  is a  67 y.o. female  who underwent robotic hysterectomy with bilateral salpingo-oophorectomy with sentinel pelvic node dissection for grade 2 endometrioid adenocarcinoma with 94% myometrial invasion of 16 of 17 mm in thickness..  There was no lymphovascular space invasion and nodes were negative.  She did have tumor involvement of serosal adhesions and endocervical involvement.  Tumor size was 5.5 cm.  She is undergoing chemotherapy per Dr. Rich.     RECOMMENDATIONS: Ms. Sierra does not want to undergo external beam radiation of whole pelvis.  She will consent to vaginal brachytherapy.  The pros and cons, risks and benefits of treatment were discussed and informed consent obtained.  We will proceed with treatment planning today and due to her tight work schedule again for treatment today.  It is a pleasure to see Ms. Sierra.       Roberta Aldana MD    Total time of patient care on day of service including time prior to, face to face with patient, and following visit spent in reviewing records, lab results, imaging studies, discussion with patient, and documentation/charting was > 30 minutes.

## 2023-01-09 NOTE — PROGRESS NOTES
Procedures     01/09/2023  Alysia Sierra    HDR#   1/5    Patient presents for cylinder brachytherapy.  She has no complaints of urinary discomfort, diarrhea or vaginal irritation.  The patient was positioned in the supine position on the treatment table.  A simple simulation was performed today confirming the correct size, position, and insertion of the vaginal cylinder.  A 2 cm cylinder was then inserted to the pre-specified depth.  Our Medical Physicist performed pretreatment surveys, calibrated the treatment plan based on today's dose rate, and confirmed correct placement and connection of the applicator to the HDR afterloader.   Treatment of 6 Gy to the surface of the upper vaginal mucosa was delivered. The Medical Physicist and the Radiation Oncologist remained present at the console for the entire duration of brachytherapy administration.  There were no immediate events, and the patient tolerated the procedure well with no complications.  Physics survey was negative with no residual radioactivity.  The patient was then discharged to home in good condition.  She will return for next treatment.

## 2023-01-09 NOTE — TELEPHONE ENCOUNTER
Caller: Alysia Sierra    Relationship: Self    Best call back number: [phone]    Caller requesting test results: PATIENT CALL    What test was performed: MAMMOGRAM    When was the test performed: 12/30/22    Where was the test performed: [facility]     Additional notes:

## 2023-01-11 ENCOUNTER — OFFICE VISIT (OUTPATIENT)
Dept: PSYCHIATRY | Facility: CLINIC | Age: 68
End: 2023-01-11
Payer: MEDICARE

## 2023-01-11 ENCOUNTER — HOSPITAL ENCOUNTER (OUTPATIENT)
Dept: RADIATION ONCOLOGY | Facility: HOSPITAL | Age: 68
Discharge: HOME | End: 2023-01-11

## 2023-01-11 DIAGNOSIS — F33.0 MDD (MAJOR DEPRESSIVE DISORDER), RECURRENT EPISODE, MILD: Primary | ICD-10-CM

## 2023-01-11 PROCEDURE — C1717 BRACHYTX, NON-STR,HDR IR-192: HCPCS | Performed by: RADIOLOGY

## 2023-01-11 PROCEDURE — 57156 INS VAG BRACHYTX DEVICE: CPT | Performed by: RADIOLOGY

## 2023-01-11 PROCEDURE — 77770 HDR RDNCL NTRSTL/ICAV BRCHTX: CPT | Performed by: RADIOLOGY

## 2023-01-11 PROCEDURE — 90837 PSYTX W PT 60 MINUTES: CPT | Performed by: NURSE PRACTITIONER

## 2023-01-11 NOTE — PROGRESS NOTES
Subjective   Alysia Sierra is a 67 y.o. female who is here in person for therapy session with Covid precautions taken. Patient is in active treatment for endometrial cancer is s/p surgery, actively receiving chemotherapy and vaginal brachytherapy.     Chief Complaint: MDD recurrent mild       Therapy:  Start Time: 1300   Stop Time: 1355    (60 ) minutes was spent for psychotherapy. Assisted patient in processing patient's depression. Acknowledged and normalized patient's thoughts, feelings, and concerns. Utilized cognitive behavioral therapy to assist the patient in recognizing more appropriate coping mechanisms when she becomes agitated/sad which are proven effective in reducing the severity of frequency of symptoms.     CLINICAL MANUEVERING/INTERVENTION:   Patient talked about current stressors, primarily being in treatment for cancer. Venting of frustrations was conducted. Feelings were processed and validated, both negative and positive. Flushing out worries and concerns was conducted in order to diminish emotional tension. Processing treatment was conducted. Ways in which patient may take stress off herself in a purposeful manner was discussed. Patient was assisted in 'talking out' what she may do if health continues to be a challenge, keeping in mind the notion that there is typically a solution to any given problem. Utilized motivational interviewing techniques including complex reflections to attempt to assist the patient and focusing on the positive and to maintain and encourage calm outlook.  The patient expressed gratitude for today's session and said that counseling helps her feel better.      The following portions of the patient's history were reviewed and updated as appropriate: allergies, current medications, past family history, past medical history, past social history, past surgical history and problem list.    Review of Systems  A 14 point review of systems was performed and is negative  except as noted above.    Objective   Physical Exam  not currently breastfeeding.    Allergies   Allergen Reactions   • Lisinopril Angioedema   • Metal Rash     Possible nickel -   Gold is only metal that doesn't have issues     • Milk-Related Compounds Other (See Comments)     LACTOSE INTOLERANT   • Tylenol [Acetaminophen] Other (See Comments)     ckd   • Atorvastatin Myalgia       Current Medications:   Current Outpatient Medications   Medication Sig Dispense Refill   • aspirin 325 MG tablet Take 325 mg by mouth As Needed for Mild Pain .     • Cyanocobalamin 1000 MCG/ML kit Inject 1,000mcg SC daily x1 week, then weekly x4 weeks. (Patient taking differently: Every 30 (Thirty) Days. monthly) 1 kit 0   • dexamethasone (DECADRON) 4 MG tablet Take 2 tablets by mouth twice a day for 3 consecutive days beginning the day before chemotherapy and continue for 6 doses. 12 tablet 5   • docusate sodium (COLACE) 100 MG capsule Take 1 capsule by mouth 2 (Two) Times a Day. 60 capsule 0   • DULoxetine (Cymbalta) 20 MG capsule Take 1 capsule by mouth 2 (Two) Times a Day. 60 capsule 2   • esomeprazole (nexIUM) 20 MG capsule Take 20 mg by mouth 2 (Two) Times a Week.     • gabapentin (Neurontin) 100 MG capsule Take 1 capsule by mouth Every Morning. 30 capsule 6   • gabapentin (Neurontin) 300 MG capsule Take 1 capsule by mouth 1 (One) Time for 1 dose. (Patient taking differently: Take 1 capsule by mouth Every Night.) 30 capsule 6   • hydrALAZINE (APRESOLINE) 25 MG tablet 25 mg 2 (Two) Times a Day.     • levothyroxine (SYNTHROID, LEVOTHROID) 25 MCG tablet Take 1 tablet by mouth Every Morning. 90 tablet 1   • magnesium oxide (MAGOX) 400 (241.3 Mg) MG tablet tablet Take 400 mg by mouth Daily.     • metoprolol succinate XL (TOPROL-XL) 25 MG 24 hr tablet Take 1 tablet by mouth Every Night. 90 tablet 1   • nitroglycerin (NITROSTAT) 0.4 MG SL tablet 1 under the tongue as needed for angina, may repeat q5mins for up three doses 100 tablet 11    • OLANZapine (ZyPREXA) 5 MG tablet Take 1 tablet by mouth Every Night. Take on days 2, 3 and 4 after chemotherapy. 3 tablet 5   • ondansetron (ZOFRAN) 4 MG tablet Take 4 mg by mouth As Needed for Nausea or Vomiting.     • ondansetron (ZOFRAN) 8 MG tablet Take 1 tablet by mouth 3 (Three) Times a Day As Needed for Nausea or Vomiting. 30 tablet 5   • prochlorperazine (COMPAZINE) 10 MG tablet Take 1 tablet by mouth Every 4 (Four) Hours As Needed for Nausea or Vomiting. 30 tablet 5   • rosuvastatin (Crestor) 5 MG tablet Take 1 tablet by mouth Daily. 90 tablet 1     Current Facility-Administered Medications   Medication Dose Route Frequency Provider Last Rate Last Admin   • cyanocobalamin injection 1,000 mcg  1,000 mcg Intramuscular Q28 Days Liana So APRN   1,000 mcg at 10/14/22 1308       Lab Results:      ROYAL-7:       0-4: Minimal anxiety  5-9: Mild anxiety  10-14: Moderate anxiety  15-21: Severe anxiety  PHQ-9:  PHQ-2/PHQ-9 Depression Screening 1/9/2023   Little Interest or Pleasure in Doing Things 1-->several days   Feeling Down, Depressed or Hopeless 0-->not at all   Trouble Falling or Staying Asleep, or Sleeping Too Much -   Feeling Tired or Having Little Energy -   Poor Appetite or Overeating -   Feeling Bad about Yourself - or that You are a Failure or Have Let Yourself or Your Family Down -   Trouble Concentrating on Things, Such as Reading the Newspaper or Watching Television -   Moving or Speaking So Slowly that Other People Could Have Noticed? Or the Opposite - Being So Fidgety -   Thoughts that You Would be Better Off Dead or of Hurting Yourself in Some Way -   PHQ-9: Brief Depression Severity Measure Score 1   If You Checked Off Any Problems, How Difficult Have These Problems Made It For You to Do Your Work, Take Care of Things at Home, or Get Along with Other People? -      5-9: Minimal symptoms  10-14: Major depression mild  15-19: Major depression moderate  Greater then 20: Major  depression severe    Appearance: WNL, alopecia from chemotherapy  Hygiene:  good  Cooperation:  Cooperative  Eye Contact:  direct  Psychomotor Behavior:  denies psychomotor agitation/retardation, No EPS, No motor tics  Mood: more anxious than depressed today   Affect:  congruent  Hopelessness: Denies  Speech:  Normal  Thought Process:  Linear  Thought Content:  Normal  Concentration: Normal   Suicidal: denies  Homicidal:  None  Hallucinations:  None  Delusion:  None  Memory:  Intact  Orientation:  Person, Place, Time and Situation  Reliability:  good  Insight:  Fair  Judgement: good  Impulse Control: good  Estimated Intelligence: average range    JORGE REVIEWED NO RED FLAGS    Assessment & Plan   Diagnoses and all orders for this visit:    1. MDD (major depressive disorder), recurrent episode, mild (HCC) (Primary)        PLAN:   Provide Cognitive Behavioral Therapy and Solution Focused Therapy to improve functioning, maintain stability, and avoid decompensation and the need for higher level of care.    Counseled patient regarding multimodal approach with encouragement of healthy nutrition, healthy sleep, regular physical mobility, social involvement, counseling, and medication compliance.     Assisted patient in identifying risk factors which would indicate the need for higher level of care including thoughts to harm self or others and/or self-harming behavior and encouraged patient to contact this office, call 911, or present to the nearest emergency room should any of these events occur. Discussed crisis intervention services and means to access.  Patient adamantly and convincingly denies current suicidal or homicidal ideation or perceptual disturbance.    Treatment Plan: stabilize mood, patient will stay out of psychiatric hospital and be at optimal level of functioning with therapy and take all medication as prescribed. Patient verbalized  understanding and agreement to plan.    Instructed to call for questions  or concerns and return early if necessary.       Return in about 27 days (around 2/7/2023).

## 2023-01-11 NOTE — PROGRESS NOTES
01/11/2023  Alysia Sierra    HDR#   2/5    Patient presents for cylinder brachytherapy.  She has no complaints of urinary discomfort, diarrhea or vaginal irritation. She does have mild lower abdomen discomfort but no diarrhea. The skin of the lower abdomen and thighs are mottled after her heating pad use.  She said she uses the heating pad to stay warm.  I cautioned her about damage to her skin.  She should have layer of material between pad and skin.  The patient was positioned in the supine position on the treatment table.  A simple simulation was performed today confirming the correct size, position, and insertion of the vaginal cylinder.  A 2 cm cylinder was then inserted to the pre-specified depth.  Our Medical Physicist performed pretreatment surveys, calibrated the treatment plan based on today's dose rate, and confirmed correct placement and connection of the applicator to the HDR afterloader.   Treatment of 6 Gy to the surface of the upper vaginal mucosa was delivered. The Medical Physicist and the Radiation Oncologist remained present at the console for the entire duration of brachytherapy administration.  There were no immediate events, and the patient tolerated the procedure well with no complications.  Physics survey was negative with no residual radioactivity.  The patient was then discharged to home in good condition.  She will return for next treatment.

## 2023-01-12 NOTE — TELEPHONE ENCOUNTER
Called and left message for patient of negative results.  Advised to call back with any questions.

## 2023-01-13 ENCOUNTER — PROCEDURE VISIT (OUTPATIENT)
Dept: RADIATION ONCOLOGY | Facility: HOSPITAL | Age: 68
End: 2023-01-13
Payer: MEDICARE

## 2023-01-13 ENCOUNTER — HOSPITAL ENCOUNTER (OUTPATIENT)
Dept: RADIATION ONCOLOGY | Facility: HOSPITAL | Age: 68
Discharge: HOME | End: 2023-01-13

## 2023-01-13 DIAGNOSIS — C54.1 ENDOMETRIAL ADENOCARCINOMA: Primary | ICD-10-CM

## 2023-01-13 PROCEDURE — 77770 HDR RDNCL NTRSTL/ICAV BRCHTX: CPT | Performed by: RADIOLOGY

## 2023-01-13 PROCEDURE — 57156 INS VAG BRACHYTX DEVICE: CPT | Performed by: RADIOLOGY

## 2023-01-13 PROCEDURE — C1717 BRACHYTX, NON-STR,HDR IR-192: HCPCS | Performed by: RADIOLOGY

## 2023-01-13 NOTE — PROGRESS NOTES
01/13/2023  Alysia Sierra    HDR#   3 out of a planned total of 5 insertions.    Patient presents for cylinder brachytherapy.  She has no complaints of urinary discomfort, diarrhea or vaginal irritation.  The patient was positioned in the supine position on the treatment table.  A simple simulation was performed today confirming the correct size, position, and insertion of the vaginal cylinder.  A 2 cm cylinder was then inserted to the pre-specified depth.  Our Medical Physicist performed pretreatment surveys, calibrated the treatment plan based on today's dose rate, and confirmed correct placement and connection of the applicator to the HDR afterloader.   Treatment of 6 Gy to the surface of the upper vaginal mucosa was delivered. The Medical Physicist and the Radiation Oncologist remained present at the console for the entire duration of brachytherapy administration.  There were no immediate events, and the patient tolerated the procedure well with no complications.  Physics survey was negative with no residual radioactivity.  The patient was then discharged to home in good condition.  She will return for next treatment.

## 2023-01-16 ENCOUNTER — HOSPITAL ENCOUNTER (OUTPATIENT)
Dept: RADIATION ONCOLOGY | Facility: HOSPITAL | Age: 68
Discharge: HOME | End: 2023-01-16

## 2023-01-16 ENCOUNTER — PROCEDURE VISIT (OUTPATIENT)
Dept: RADIATION ONCOLOGY | Facility: HOSPITAL | Age: 68
End: 2023-01-16
Payer: MEDICARE

## 2023-01-16 DIAGNOSIS — C54.1 ENDOMETRIAL ADENOCARCINOMA: Primary | ICD-10-CM

## 2023-01-16 PROCEDURE — 57156 INS VAG BRACHYTX DEVICE: CPT | Performed by: RADIOLOGY

## 2023-01-16 PROCEDURE — C1717 BRACHYTX, NON-STR,HDR IR-192: HCPCS | Performed by: RADIOLOGY

## 2023-01-16 PROCEDURE — 77770 HDR RDNCL NTRSTL/ICAV BRCHTX: CPT | Performed by: RADIOLOGY

## 2023-01-16 NOTE — PROGRESS NOTES
01/16/2023  Alysia Sierra    HDR#   4/5    Patient presents for cylinder brachytherapy.  She has no complaints of urinary discomfort, diarrhea or vaginal irritation. She does complaint of gas pains. The patient was positioned in the supine position on the treatment table.  A simple simulation was performed today confirming the correct size, position, and insertion of the vaginal cylinder.  A 2 cm cylinder was then inserted to the pre-specified depth.  Our Medical Physicist performed pretreatment surveys, calibrated the treatment plan based on today's dose rate, and confirmed correct placement and connection of the applicator to the HDR afterloader.   Treatment of 6 Gy to the surface of the upper vaginal mucosa was delivered. The Medical Physicist and the Radiation Oncologist remained present at the console for the entire duration of brachytherapy administration.  There were no immediate events, and the patient tolerated the procedure well with no complications.  Physics survey was negative with no residual radioactivity.  The patient was then discharged to home in good condition.  She will return for next treatment.

## 2023-01-18 ENCOUNTER — HOSPITAL ENCOUNTER (OUTPATIENT)
Dept: RADIATION ONCOLOGY | Facility: HOSPITAL | Age: 68
Discharge: HOME | End: 2023-01-18

## 2023-01-18 ENCOUNTER — PROCEDURE VISIT (OUTPATIENT)
Dept: RADIATION ONCOLOGY | Facility: HOSPITAL | Age: 68
End: 2023-01-18
Payer: MEDICARE

## 2023-01-18 ENCOUNTER — HOSPITAL ENCOUNTER (OUTPATIENT)
Dept: ONCOLOGY | Facility: HOSPITAL | Age: 68
Discharge: HOME | End: 2023-01-18
Payer: MEDICARE

## 2023-01-18 VITALS
TEMPERATURE: 98 F | SYSTOLIC BLOOD PRESSURE: 180 MMHG | WEIGHT: 125.5 LBS | HEART RATE: 104 BPM | HEIGHT: 65 IN | BODY MASS INDEX: 20.91 KG/M2 | RESPIRATION RATE: 18 BRPM | DIASTOLIC BLOOD PRESSURE: 85 MMHG

## 2023-01-18 DIAGNOSIS — C54.1 ENDOMETRIAL ADENOCARCINOMA: Primary | ICD-10-CM

## 2023-01-18 DIAGNOSIS — C54.1 ENDOMETRIAL ADENOCARCINOMA: ICD-10-CM

## 2023-01-18 LAB
ALBUMIN SERPL-MCNC: 4.5 G/DL (ref 3.5–5.2)
ALBUMIN/GLOB SERPL: 1.4 G/DL
ALP SERPL-CCNC: 100 U/L (ref 39–117)
ALT SERPL W P-5'-P-CCNC: 9 U/L (ref 1–33)
ANION GAP SERPL CALCULATED.3IONS-SCNC: 17 MMOL/L (ref 5–15)
AST SERPL-CCNC: 27 U/L (ref 1–32)
BASOPHILS # BLD AUTO: 0 10*3/MM3 (ref 0–0.2)
BASOPHILS NFR BLD AUTO: 0 % (ref 0–1.5)
BILIRUB SERPL-MCNC: 0.2 MG/DL (ref 0–1.2)
BUN SERPL-MCNC: 11 MG/DL (ref 8–23)
BUN/CREAT SERPL: 8.9 (ref 7–25)
CALCIUM SPEC-SCNC: 9.5 MG/DL (ref 8.6–10.5)
CANCER AG125 SERPL QL: 27.5 U/ML (ref 0–38.1)
CHLORIDE SERPL-SCNC: 98 MMOL/L (ref 98–107)
CO2 SERPL-SCNC: 22 MMOL/L (ref 22–29)
CREAT SERPL-MCNC: 1.23 MG/DL (ref 0.57–1)
DEPRECATED RDW RBC AUTO: 53.6 FL (ref 37–54)
EGFRCR SERPLBLD CKD-EPI 2021: 48.6 ML/MIN/1.73
EOSINOPHIL # BLD AUTO: 0 10*3/MM3 (ref 0–0.4)
EOSINOPHIL NFR BLD AUTO: 0 % (ref 0.3–6.2)
ERYTHROCYTE [DISTWIDTH] IN BLOOD BY AUTOMATED COUNT: 14.8 % (ref 12.3–15.4)
GLOBULIN UR ELPH-MCNC: 3.2 GM/DL
GLUCOSE SERPL-MCNC: 145 MG/DL (ref 65–99)
HCT VFR BLD AUTO: 32 % (ref 34–46.6)
HGB BLD-MCNC: 10.3 G/DL (ref 12–15.9)
IMM GRANULOCYTES # BLD AUTO: 0.11 10*3/MM3 (ref 0–0.05)
IMM GRANULOCYTES NFR BLD AUTO: 2.7 % (ref 0–0.5)
LYMPHOCYTES # BLD AUTO: 0.62 10*3/MM3 (ref 0.7–3.1)
LYMPHOCYTES NFR BLD AUTO: 15.2 % (ref 19.6–45.3)
MCH RBC QN AUTO: 32.2 PG (ref 26.6–33)
MCHC RBC AUTO-ENTMCNC: 32.2 G/DL (ref 31.5–35.7)
MCV RBC AUTO: 100 FL (ref 79–97)
MONOCYTES # BLD AUTO: 0.11 10*3/MM3 (ref 0.1–0.9)
MONOCYTES NFR BLD AUTO: 2.7 % (ref 5–12)
NEUTROPHILS NFR BLD AUTO: 3.24 10*3/MM3 (ref 1.7–7)
NEUTROPHILS NFR BLD AUTO: 79.4 % (ref 42.7–76)
PLATELET # BLD AUTO: 315 10*3/MM3 (ref 140–450)
PMV BLD AUTO: 9.1 FL (ref 6–12)
POTASSIUM SERPL-SCNC: 4.2 MMOL/L (ref 3.5–5.2)
PROT SERPL-MCNC: 7.7 G/DL (ref 6–8.5)
RBC # BLD AUTO: 3.2 10*6/MM3 (ref 3.77–5.28)
SODIUM SERPL-SCNC: 137 MMOL/L (ref 136–145)
WBC NRBC COR # BLD: 4.08 10*3/MM3 (ref 3.4–10.8)

## 2023-01-18 PROCEDURE — 86304 IMMUNOASSAY TUMOR CA 125: CPT | Performed by: OBSTETRICS & GYNECOLOGY

## 2023-01-18 PROCEDURE — 57156 INS VAG BRACHYTX DEVICE: CPT | Performed by: RADIOLOGY

## 2023-01-18 PROCEDURE — 77770 HDR RDNCL NTRSTL/ICAV BRCHTX: CPT | Performed by: RADIOLOGY

## 2023-01-18 PROCEDURE — 77336 RADIATION PHYSICS CONSULT: CPT | Performed by: RADIOLOGY

## 2023-01-18 PROCEDURE — 36415 COLL VENOUS BLD VENIPUNCTURE: CPT

## 2023-01-18 PROCEDURE — 80053 COMPREHEN METABOLIC PANEL: CPT | Performed by: OBSTETRICS & GYNECOLOGY

## 2023-01-18 PROCEDURE — 85025 COMPLETE CBC W/AUTO DIFF WBC: CPT | Performed by: OBSTETRICS & GYNECOLOGY

## 2023-01-18 PROCEDURE — C1717 BRACHYTX, NON-STR,HDR IR-192: HCPCS | Performed by: RADIOLOGY

## 2023-01-18 NOTE — PROGRESS NOTES
01/18/2023  Alysia Sierra    HDR#   5/5    Patient presents for cylinder brachytherapy.  She has no complaints of urinary discomfort, diarrhea or vaginal irritation.  The patient was positioned in the supine position on the treatment table.  A simple simulation was performed today confirming the correct size, position, and insertion of the vaginal cylinder.  A 2 cm cylinder was then inserted to the pre-specified depth.  Our Medical Physicist performed pretreatment surveys, calibrated the treatment plan based on today's dose rate, and confirmed correct placement and connection of the applicator to the HDR afterloader.   Treatment of 6 Gy to the surface of the upper vaginal mucosa was delivered. The Medical Physicist and the Radiation Oncologist remained present at the console for the entire duration of brachytherapy administration.  There were no immediate events, and the patient tolerated the procedure well with no complications.  Physics survey was negative with no residual radioactivity.  The patient was then discharged to home in good condition.  She will return to Radiation Oncology in the next few weeks for follow up.  She was given a vaginal dilator and instructions.

## 2023-01-19 ENCOUNTER — OFFICE VISIT (OUTPATIENT)
Dept: GYNECOLOGIC ONCOLOGY | Facility: CLINIC | Age: 68
End: 2023-01-19
Payer: MEDICARE

## 2023-01-19 ENCOUNTER — HOSPITAL ENCOUNTER (OUTPATIENT)
Dept: ONCOLOGY | Facility: HOSPITAL | Age: 68
Setting detail: INFUSION SERIES
Discharge: HOME | End: 2023-01-19
Payer: MEDICARE

## 2023-01-19 VITALS
TEMPERATURE: 97.7 F | WEIGHT: 124.1 LBS | BODY MASS INDEX: 21.19 KG/M2 | HEART RATE: 91 BPM | RESPIRATION RATE: 18 BRPM | HEIGHT: 64 IN | OXYGEN SATURATION: 100 % | DIASTOLIC BLOOD PRESSURE: 78 MMHG | SYSTOLIC BLOOD PRESSURE: 161 MMHG

## 2023-01-19 DIAGNOSIS — G62.9 NEUROPATHY: ICD-10-CM

## 2023-01-19 DIAGNOSIS — C54.1 ENDOMETRIAL ADENOCARCINOMA: Primary | ICD-10-CM

## 2023-01-19 DIAGNOSIS — N18.31 STAGE 3A CHRONIC KIDNEY DISEASE: ICD-10-CM

## 2023-01-19 DIAGNOSIS — Z01.818 EXAMINATION PRIOR TO CHEMOTHERAPY: ICD-10-CM

## 2023-01-19 PROCEDURE — 96367 TX/PROPH/DG ADDL SEQ IV INF: CPT

## 2023-01-19 PROCEDURE — 25010000002 PALONOSETRON 0.25 MG/5ML SOLUTION PREFILLED SYRINGE: Performed by: NURSE PRACTITIONER

## 2023-01-19 PROCEDURE — 25010000002 DOCETAXEL 20 MG/ML SOLUTION 8 ML VIAL: Performed by: NURSE PRACTITIONER

## 2023-01-19 PROCEDURE — 25010000002 CARBOPLATIN PER 50 MG: Performed by: NURSE PRACTITIONER

## 2023-01-19 PROCEDURE — 96413 CHEMO IV INFUSION 1 HR: CPT

## 2023-01-19 PROCEDURE — 25010000002 FOSAPREPITANT PER 1 MG: Performed by: NURSE PRACTITIONER

## 2023-01-19 PROCEDURE — 99214 OFFICE O/P EST MOD 30 MIN: CPT | Performed by: NURSE PRACTITIONER

## 2023-01-19 PROCEDURE — 96417 CHEMO IV INFUS EACH ADDL SEQ: CPT

## 2023-01-19 PROCEDURE — 96375 TX/PRO/DX INJ NEW DRUG ADDON: CPT

## 2023-01-19 RX ORDER — DIPHENHYDRAMINE HYDROCHLORIDE 50 MG/ML
50 INJECTION INTRAMUSCULAR; INTRAVENOUS AS NEEDED
Status: CANCELLED | OUTPATIENT
Start: 2023-01-19

## 2023-01-19 RX ORDER — OLANZAPINE 5 MG/1
5 TABLET ORAL ONCE
Status: CANCELLED | OUTPATIENT
Start: 2023-01-19 | End: 2023-01-19

## 2023-01-19 RX ORDER — SODIUM CHLORIDE 9 MG/ML
250 INJECTION, SOLUTION INTRAVENOUS ONCE
Status: COMPLETED | OUTPATIENT
Start: 2023-01-19 | End: 2023-01-19

## 2023-01-19 RX ORDER — SODIUM CHLORIDE 9 MG/ML
250 INJECTION, SOLUTION INTRAVENOUS ONCE
Status: CANCELLED | OUTPATIENT
Start: 2023-01-19

## 2023-01-19 RX ORDER — OLANZAPINE 5 MG/1
5 TABLET ORAL ONCE
Status: COMPLETED | OUTPATIENT
Start: 2023-01-19 | End: 2023-01-19

## 2023-01-19 RX ORDER — FAMOTIDINE 10 MG/ML
20 INJECTION, SOLUTION INTRAVENOUS AS NEEDED
Status: CANCELLED | OUTPATIENT
Start: 2023-01-19

## 2023-01-19 RX ORDER — PALONOSETRON 0.05 MG/ML
0.25 INJECTION, SOLUTION INTRAVENOUS ONCE
Status: CANCELLED | OUTPATIENT
Start: 2023-01-19

## 2023-01-19 RX ORDER — PALONOSETRON 0.05 MG/ML
0.25 INJECTION, SOLUTION INTRAVENOUS ONCE
Status: COMPLETED | OUTPATIENT
Start: 2023-01-19 | End: 2023-01-19

## 2023-01-19 RX ADMIN — FOSAPREPITANT 150 MG: 150 INJECTION, POWDER, LYOPHILIZED, FOR SOLUTION INTRAVENOUS at 10:35

## 2023-01-19 RX ADMIN — SODIUM CHLORIDE 250 ML: 9 INJECTION, SOLUTION INTRAVENOUS at 10:27

## 2023-01-19 RX ADMIN — OLANZAPINE 5 MG: 5 TABLET, FILM COATED ORAL at 10:31

## 2023-01-19 RX ADMIN — CARBOPLATIN 330 MG: 10 INJECTION, SOLUTION INTRAVENOUS at 12:21

## 2023-01-19 RX ADMIN — PALONOSETRON 0.25 MG: 0.25 INJECTION, SOLUTION INTRAVENOUS at 10:32

## 2023-01-19 RX ADMIN — DOCETAXEL 120 MG: 20 INJECTION, SOLUTION, CONCENTRATE INTRAVENOUS at 11:15

## 2023-01-19 NOTE — PROGRESS NOTES
GYN ONCOLOGY FOLLOW-UP    Alysia Sierra  [id number]    1955    Subjective   Chief Complaint: Uterine Cancer and Chemotherapy        History of present illness:       Alysia Sierra is a 67 y.o. year old female who is here today for endometrial cancer, chemotherapy toxicity assessment, and consideration of ongoing treatment. She is scheduled for cycle #3 of carboplatin + docetaxel today, pending evaluation and labs. Since her last visit she has completed radiation therapy on 1/18/2023. She is scheduled for post-treatment follow-up with ANETTE Roberson on 2/22/2023. She feels she tolerated radiation well. She is also tolerating chemotherapy well. She denies vaginal bleeding, pain, discharge. Denies abdominal or pelvic pain. Appetite and energy level are good. She denies nausea, vomiting, or changes in bowel or bladder function. She notes her pre-existing peripheral neuropathy is stable, some days even feels improved from pre-treatment symptoms.       Oncology History:    Oncology/Hematology History   Endometrial adenocarcinoma (HCC)   7/28/2022 Initial Diagnosis    8/22/2022: TVUS with uterus measuring 5.7 x 4.8 x 4.1 cm with an endometrial stripe thickness of 9.8 mm.  Right ovary not visualized.  Left ovary normal.  No free fluid.  9/29/2022: Saline infusion sonogram with an irregularly shaped prominent lesion on the posterior endometrial surface concerning for neoplasia.  10/6/2022: Endometrial biopsy with FIGO grade 1 endometrial cancer      10/28/2022 Surgery    Robotic-assisted total laparoscopic hysterectomy with bilateral salpingo-oophorectomy with bilateral SLND    Pathology with FIGO grade 2 endometrioid adenocarcinoma with 94% MI. Involvement of endocervix and uterine serosal adhesions. Negative parametrium and bilateral sentinel lymph nodes. MMR intact.     Surgery at [facility] by Jasmine Rich MD      Cancer Staged    Cancer Staging   Endometrial adenocarcinoma (HCC)  Staging form: Corpus  "Uteri - Carcinoma And Carcinosarcoma, AJCC 8th Edition  - Pathologic stage from 10/28/2022: FIGO Stage IIIA (pT3a, pN0(sn), cM0) - Signed by Jasmine Rich MD on 11/12/2022       10/28/2022 Cancer Staged    Staging form: Corpus Uteri - Carcinoma And Carcinosarcoma, AJCC 8th Edition  - Pathologic stage from 10/28/2022: FIGO Stage IIIA (pT3a, pN0(sn), cM0) - Signed by Jasmine Rich MD on 11/12/2022 12/8/2022 -  Chemotherapy    OP OVARIAN DOCEtaxel / CARBOplatin         The current medication list and allergy list were reviewed and reconciled.     Past Medical History, Past Surgical History, Social History, Family History have been reviewed and are without significant changes except as mentioned.    Review of Systems   Constitutional: Negative.    Gastrointestinal: Negative.    Genitourinary: Negative.    Musculoskeletal: Positive for gait problem (cane for ambulatory aid).   Neurological: Positive for numbness (baseline peripheral neuropathy, stable).       Objective   Physical Exam  Vital Signs: /78   Pulse 91   Temp 97.7 °F (36.5 °C) (Temporal)   Resp 18   Ht 163.8 cm (64.49\")   Wt 56.3 kg (124 lb 1.6 oz)   LMP  (LMP Unknown)   SpO2 100%   BMI 20.98 kg/m²    Wt Readings from Last 3 Encounters:   01/19/23 56.3 kg (124 lb 1.6 oz)   01/18/23 56.9 kg (125 lb 8 oz)   01/09/23 57.2 kg (126 lb 3.2 oz)     Vitals:    01/19/23 0856   PainSc: 0-No pain           General Appearance:  alert, cooperative, no apparent distress, appears stated age and normal weight   Neurologic/Psychiatric: A&O x 3, gait steady with cane assistance, appropriate affect   Lungs:   Clear to auscultation bilaterally; respirations regular, even, and unlabored bilaterally   Heart:  Regular rate and rhythm, no murmurs appreciated   Skin: No lesions, rashes, or discoloration   Extremities: Normal, atraumatic; no clubbing, cyanosis, or edema    Pelvic: deferred     ECOG score: 1             Result Review :   The following data was " reviewed by: ANETTE Castellanos on 01/19/2023:  CMP    CMP 12/17/22 12/28/22 1/18/23   Glucose 112 (A) 101 (A) 145 (A)   BUN 9 11 11   Creatinine 0.93 1.24 (A) 1.23 (A)   eGFR 67.9 48.1 (A) 48.6 (A)   Sodium 133 (A) 141 137   Potassium 3.7 4.6 4.2   Chloride 97 (A) 104 98   Calcium 8.8 9.6 9.5   Total Protein 7.3 7.3 7.7   Albumin 3.90 4.4 4.5   Globulin 3.4 2.9 3.2   Total Bilirubin 0.2 0.2 0.2   Alkaline Phosphatase 75 85 100   AST (SGOT) 35 (A) 28 27   ALT (SGPT) 21 16 9   Albumin/Globulin Ratio 1.1 1.5 1.4   BUN/Creatinine Ratio 9.7 8.9 8.9   Anion Gap 11.0 13.0 17.0 (A)   (A) Abnormal value       Comments are available for some flowsheets but are not being displayed.           CBC w/diff    CBC w/Diff 12/17/22 12/28/22 1/18/23   WBC 2.01 (A) 6.57 4.08   RBC 3.16 (A) 3.25 (A) 3.20 (A)   Hemoglobin 10.2 (A) 10.4 (A) 10.3 (A)   Hematocrit 31.3 (A) 33.2 (A) 32.0 (A)   MCV 99.1 (A) 102.2 (A) 100.0 (A)   MCH 32.3 32.0 32.2   MCHC 32.6 31.3 (A) 32.2   RDW 12.9 13.9 14.8   Platelets 227 388 315   Neutrophil Rel %  43.9 79.4 (A)   Immature Granulocyte Rel %  0.8 (A) 2.7 (A)   Lymphocyte Rel %  35.6 15.2 (A)   Monocyte Rel %  16.4 (A) 2.7 (A)   Eosinophil Rel %  2.7 0.0 (A)   Basophil Rel %  0.6 0.0   (A) Abnormal value            Tumor marker:     Date Value Ref Range Status   01/18/2023 27.5 0.0 - 38.1 U/mL Final   12/28/2022 21.1 0.0 - 38.1 U/mL Final   12/06/2022 20.9 0.0 - 38.1 U/mL Final         Procedure Notes:              Assessment and Plan:    Diagnoses and all orders for this visit:    1. Endometrial adenocarcinoma (HCC) (Primary)    2. Examination prior to chemotherapy    3. Neuropathy    4. Stage 3a chronic kidney disease (HCC)        This is a 67 y.o. woman with Stage IIIA endometrial cancer, s/p initial surgery and now s/p vaginal brachytherapy, currently undergoing chemotherapy with carboplatin at AUC 5 + docetaxel 75 mg/m2. She presents for consideration of cycle #3 today. She is  tolerating therapy well with manageable side effects. Current labs reviewed and she is cleared for today's treatment. No dose reductions needed.     CKD Stage III - elevated Cr 1.23 is in line with patient's baseline around 1.3 and stable from last cycle. Continue nephrology follow up.     Peripheral neuropathy - Grade 2 and stable. Baseline neuropathy controlled with cymbalta and gabapentin. Managed by neurology.     Major depressive disorder - Sees Cassie Morin. Continues duloxetine BID.    Pain assessment was performed today as a part of patient’s care. For patients with pain related to surgery, gynecologic malignancy or cancer treatment, the plan is as noted in the assessment/plan.  For patients with pain not related to these issues, they are to seek any further needed care from a more appropriate provider, such as PCP.      Follow-up:    Return to clinic in 3 weeks for chemotherapy toxicity assessment and consideration of cycle #4.      Electronically signed by ANETTE Castellanos on 01/21/23 at 19:00 EST

## 2023-01-23 ENCOUNTER — PATIENT OUTREACH (OUTPATIENT)
Dept: CASE MANAGEMENT | Facility: OTHER | Age: 68
End: 2023-01-23
Payer: MEDICARE

## 2023-01-23 NOTE — OUTREACH NOTE
AMBULATORY CASE MANAGEMENT NOTE    Name and Relationship of Patient/Support Person: Alysia Sierra P - Self    Patient Outreach    Patient reports she is not feeling well today, experiencing joint and muscle pain and having difficulty ambulating.  Is taking aspirin sparingly with some relief as well as using heating pad.  Was experiencing constipation, clearing up today after taking  colace and milk of magnesium.  Reports she is eating and hydrating adequately.  Denies SDOH a this time.         AMPARO CROSS  Ambulatory Case Management    1/23/2023, 14:50 EST

## 2023-01-24 ENCOUNTER — HOSPITAL ENCOUNTER (OUTPATIENT)
Dept: CARDIOLOGY | Facility: HOSPITAL | Age: 68
Discharge: HOME | End: 2023-01-24
Payer: MEDICARE

## 2023-01-24 VITALS — WEIGHT: 124 LBS | HEIGHT: 64 IN | BODY MASS INDEX: 21.17 KG/M2

## 2023-01-24 DIAGNOSIS — I47.1 PAROXYSMAL SVT (SUPRAVENTRICULAR TACHYCARDIA): ICD-10-CM

## 2023-01-24 DIAGNOSIS — R07.9 CHEST PAIN, UNSPECIFIED TYPE: ICD-10-CM

## 2023-01-24 LAB
BH CV ECHO MEAS - AO MAX PG: 4.2 MMHG
BH CV ECHO MEAS - AO MEAN PG: 2 MMHG
BH CV ECHO MEAS - AO ROOT DIAM: 3 CM
BH CV ECHO MEAS - AO V2 MAX: 102 CM/SEC
BH CV ECHO MEAS - AO V2 VTI: 17.1 CM
BH CV ECHO MEAS - AVA(I,D): 2.13 CM2
BH CV ECHO MEAS - EDV(CUBED): 74.1 ML
BH CV ECHO MEAS - EDV(MOD-SP2): 46.1 ML
BH CV ECHO MEAS - EDV(MOD-SP4): 70.5 ML
BH CV ECHO MEAS - EF(MOD-BP): 45.3 %
BH CV ECHO MEAS - EF(MOD-SP2): 44.9 %
BH CV ECHO MEAS - EF(MOD-SP4): 45.7 %
BH CV ECHO MEAS - ESV(CUBED): 13.8 ML
BH CV ECHO MEAS - ESV(MOD-SP2): 25.4 ML
BH CV ECHO MEAS - ESV(MOD-SP4): 38.3 ML
BH CV ECHO MEAS - FS: 42.9 %
BH CV ECHO MEAS - IVS/LVPW: 1.25 CM
BH CV ECHO MEAS - IVSD: 1 CM
BH CV ECHO MEAS - LA DIMENSION: 2.4 CM
BH CV ECHO MEAS - LAT PEAK E' VEL: 7.1 CM/SEC
BH CV ECHO MEAS - LV MASS(C)D: 118.7 GRAMS
BH CV ECHO MEAS - LV MAX PG: 1.89 MMHG
BH CV ECHO MEAS - LV MEAN PG: 1 MMHG
BH CV ECHO MEAS - LV V1 MAX: 68.7 CM/SEC
BH CV ECHO MEAS - LV V1 VTI: 11.6 CM
BH CV ECHO MEAS - LVIDD: 4.2 CM
BH CV ECHO MEAS - LVIDS: 2.4 CM
BH CV ECHO MEAS - LVOT AREA: 3.1 CM2
BH CV ECHO MEAS - LVOT DIAM: 2 CM
BH CV ECHO MEAS - LVPWD: 0.8 CM
BH CV ECHO MEAS - MED PEAK E' VEL: 6.1 CM/SEC
BH CV ECHO MEAS - MV A MAX VEL: 101 CM/SEC
BH CV ECHO MEAS - MV DEC SLOPE: 424 CM/SEC2
BH CV ECHO MEAS - MV DEC TIME: 0.13 MSEC
BH CV ECHO MEAS - MV E MAX VEL: 45.6 CM/SEC
BH CV ECHO MEAS - MV E/A: 0.45
BH CV ECHO MEAS - MV MAX PG: 4.4 MMHG
BH CV ECHO MEAS - MV MEAN PG: 2.5 MMHG
BH CV ECHO MEAS - MV P1/2T: 39.8 MSEC
BH CV ECHO MEAS - MV V2 VTI: 12.7 CM
BH CV ECHO MEAS - MVA(P1/2T): 5.5 CM2
BH CV ECHO MEAS - MVA(VTI): 2.9 CM2
BH CV ECHO MEAS - PA ACC TIME: 0.11 SEC
BH CV ECHO MEAS - PA PR(ACCEL): 28.2 MMHG
BH CV ECHO MEAS - RAP SYSTOLE: 3 MMHG
BH CV ECHO MEAS - RVSP: 26 MMHG
BH CV ECHO MEAS - SV(LVOT): 36.4 ML
BH CV ECHO MEAS - SV(MOD-SP2): 20.7 ML
BH CV ECHO MEAS - SV(MOD-SP4): 32.2 ML
BH CV ECHO MEAS - TAPSE (>1.6): 1 CM
BH CV ECHO MEAS - TR MAX PG: 23.4 MMHG
BH CV ECHO MEAS - TR MAX VEL: 241.7 CM/SEC
BH CV ECHO MEASUREMENTS AVERAGE E/E' RATIO: 6.91
BH CV REST NUCLEAR ISOTOPE DOSE: 9.6 MCI
BH CV STRESS BP STAGE 2: NORMAL
BH CV STRESS BP STAGE 4: NORMAL
BH CV STRESS COMMENTS STAGE 1: NORMAL
BH CV STRESS DOSE REGADENOSON STAGE 1: 0.4
BH CV STRESS DURATION MIN STAGE 1: 1
BH CV STRESS DURATION MIN STAGE 2: 1
BH CV STRESS DURATION MIN STAGE 3: 1
BH CV STRESS DURATION MIN STAGE 4: 1
BH CV STRESS DURATION SEC STAGE 1: 0
BH CV STRESS DURATION SEC STAGE 2: 0
BH CV STRESS DURATION SEC STAGE 3: 0
BH CV STRESS DURATION SEC STAGE 4: 0
BH CV STRESS HR STAGE 1: 126
BH CV STRESS HR STAGE 2: 142
BH CV STRESS HR STAGE 3: 141
BH CV STRESS HR STAGE 4: 137
BH CV STRESS NUCLEAR ISOTOPE DOSE: 31.5 MCI
BH CV STRESS O2 STAGE 1: 100
BH CV STRESS O2 STAGE 2: 99
BH CV STRESS O2 STAGE 3: 100
BH CV STRESS O2 STAGE 4: 100
BH CV STRESS PROTOCOL 1: NORMAL
BH CV STRESS RECOVERY BP: NORMAL MMHG
BH CV STRESS RECOVERY HR: 123 BPM
BH CV STRESS RECOVERY O2: 100 %
BH CV STRESS STAGE 1: 1
BH CV STRESS STAGE 2: 2
BH CV STRESS STAGE 3: 3
BH CV STRESS STAGE 4: 4
BH CV VAS BP RIGHT ARM: NORMAL MMHG
BH CV XLRA - RV BASE: 3.6 CM
BH CV XLRA - RV LENGTH: 6.3 CM
BH CV XLRA - RV MID: 3.3 CM
BH CV XLRA - TDI S': 28.7 CM/SEC
LEFT ATRIUM VOLUME INDEX: 13.1 ML/M2
LV EF NUC BP: 74 %
MAXIMAL PREDICTED HEART RATE: 154 BPM
MAXIMAL PREDICTED HEART RATE: 154 BPM
PERCENT MAX PREDICTED HR: 93.51 %
STRESS BASELINE BP: NORMAL MMHG
STRESS BASELINE HR: 101 BPM
STRESS O2 SAT REST: 100 %
STRESS PERCENT HR: 110 %
STRESS POST ESTIMATED WORKLOAD: 1 METS
STRESS POST EXERCISE DUR MIN: 4 MIN
STRESS POST EXERCISE DUR SEC: 0 SEC
STRESS POST O2 SAT PEAK: 100 %
STRESS POST PEAK BP: NORMAL MMHG
STRESS POST PEAK HR: 144 BPM
STRESS TARGET HR: 131 BPM
STRESS TARGET HR: 131 BPM

## 2023-01-24 PROCEDURE — A9500 TC99M SESTAMIBI: HCPCS | Performed by: INTERNAL MEDICINE

## 2023-01-24 PROCEDURE — 93306 TTE W/DOPPLER COMPLETE: CPT

## 2023-01-24 PROCEDURE — 0 TECHNETIUM SESTAMIBI: Performed by: INTERNAL MEDICINE

## 2023-01-24 PROCEDURE — 78452 HT MUSCLE IMAGE SPECT MULT: CPT

## 2023-01-24 PROCEDURE — 25010000002 REGADENOSON 0.4 MG/5ML SOLUTION: Performed by: INTERNAL MEDICINE

## 2023-01-24 PROCEDURE — 93306 TTE W/DOPPLER COMPLETE: CPT | Performed by: INTERNAL MEDICINE

## 2023-01-24 PROCEDURE — 78452 HT MUSCLE IMAGE SPECT MULT: CPT | Performed by: INTERNAL MEDICINE

## 2023-01-24 PROCEDURE — 93017 CV STRESS TEST TRACING ONLY: CPT

## 2023-01-24 PROCEDURE — 93018 CV STRESS TEST I&R ONLY: CPT | Performed by: INTERNAL MEDICINE

## 2023-01-24 RX ADMIN — TECHNETIUM TC 99M SESTAMIBI 1 DOSE: 1 INJECTION INTRAVENOUS at 10:15

## 2023-01-24 RX ADMIN — REGADENOSON 0.4 MG: 0.08 INJECTION, SOLUTION INTRAVENOUS at 10:14

## 2023-01-24 RX ADMIN — TECHNETIUM TC 99M SESTAMIBI 1 DOSE: 1 INJECTION INTRAVENOUS at 08:20

## 2023-01-25 ENCOUNTER — TELEPHONE (OUTPATIENT)
Dept: CARDIOLOGY | Facility: CLINIC | Age: 68
End: 2023-01-25
Payer: MEDICARE

## 2023-01-25 DIAGNOSIS — R00.0 TACHYCARDIA: ICD-10-CM

## 2023-01-25 DIAGNOSIS — I47.1 PAROXYSMAL SVT (SUPRAVENTRICULAR TACHYCARDIA): Primary | ICD-10-CM

## 2023-01-25 NOTE — TELEPHONE ENCOUNTER
"Spoke with pt regarding echo and stress results. Pt is still inquiring what could be making her HR elevated. She reports she doesn't check HR all the time because she said she knows she \"would go crazy if she did\". She only checks her HR maybe twice a day so she isn't sure how long HR stays above 100. She has the chest pressure only when HR gets above 100 Yesterday BP was 108/75 HR 94 but today HR has been between 107-111 but then later today HR around 81-90. She is feeling well overall. Advised we will order a heart monitor. She is amendable and will call me when she is coming into the office so I can let Holter know. No further questions at this time.   "

## 2023-01-25 NOTE — TELEPHONE ENCOUNTER
----- Message from Gray Bahena MD sent at 1/24/2023  3:49 PM EST -----  Please inform the patient of their test results. Thank you.

## 2023-01-30 ENCOUNTER — TELEPHONE (OUTPATIENT)
Dept: GYNECOLOGIC ONCOLOGY | Facility: CLINIC | Age: 68
End: 2023-01-30
Payer: MEDICARE

## 2023-01-30 NOTE — RADIATION COMPLETION NOTES
COMPLETION NOTE    PATIENT:   Alysia Sierra  :    1955  COMPLETION DATE:   2023   DIAGNOSIS:      Endometrial adenocarcinoma (HCC)  - FIGO Stage IIIA (pT3a, pN0(sn), cM0)           Subjective      BRIEF HISTORY:   Alysia Sierra  is a 67 y.o. female  who underwent robotic hysterectomy with bilateral salpingo-oophorectomy with sentinel pelvic node dissection for grade 2 endometrioid adenocarcinoma with 94% myometrial invasion of 16 of 17 mm in thickness..  There was no lymphovascular space invasion and nodes were negative.  She did have tumor involvement of serosal adhesions and endocervical involvement.  Tumor size was 5.5 cm.  We have discussed external beam radiation but Ms. Sierra wants to undergo only brachytherapy.  She has also discussed this with Dr. Rich at length.She received radiotherapy in our department as follows:    TREATMENT COURSE:   -2023 the upper vagina received 30 Gy in 5 fractions with Ir-192 utilizing a vaginal cylinder      TOLERANCE: Ms.Ms. Sierra tolerated treatment well.  She had complaint of abdominal gas that was unrelated.  She had no vaginal irritation or discomfort.          DISPOSITION:  At the completion of therapy an appointment was made for her to return on 2023 at 1:00 pm.  She knows to call if she has any problems sooner.        Roberta Aldana MD    Dictated using dragon dictation

## 2023-01-30 NOTE — TELEPHONE ENCOUNTER
Spoke with patient.  C/o redness in left arm starting just above IV site form 1/19/23.  It extends from her forearm to midway up her left upper arm.  Denies burning, itching, pain, swelling, added warmth, or blistering.  No fever or other symptoms.  Patient states this also happened with cycle 1 and resolved without intervention.      Discussed with Dr. Rich.  Patient advised to use warm compresses and notify us if symptoms do not improve or worsen.   Patient notified and verbalized understanding.

## 2023-01-30 NOTE — TELEPHONE ENCOUNTER
Caller: Alysia Sierra    Relationship to patient: SELF    Best call back number: [phone]    Patient is needing: TO REQUEST CALL FROM NURSE. PT IS HAVING BIG RED SPLOTCH FROM LAST CHEMO TX AND IT GOES UP THE VEIN ON THE LEFT ARM AND STARTS AT ANTICUBITCAL SPACE AND CONTINUES UP.

## 2023-02-01 ENCOUNTER — TELEPHONE (OUTPATIENT)
Dept: GYNECOLOGIC ONCOLOGY | Facility: CLINIC | Age: 68
End: 2023-02-01
Payer: MEDICARE

## 2023-02-01 NOTE — TELEPHONE ENCOUNTER
Voicemail received on nurse line with c/o pain. Attempted to call back, no answer. Left voicemail for her to call us back at her convenience to discuss symptoms.

## 2023-02-09 ENCOUNTER — HOSPITAL ENCOUNTER (OUTPATIENT)
Dept: ONCOLOGY | Facility: HOSPITAL | Age: 68
Discharge: HOME | End: 2023-02-09
Payer: MEDICARE

## 2023-02-09 ENCOUNTER — OFFICE VISIT (OUTPATIENT)
Dept: GYNECOLOGIC ONCOLOGY | Facility: CLINIC | Age: 68
End: 2023-02-09
Payer: MEDICARE

## 2023-02-09 VITALS
RESPIRATION RATE: 18 BRPM | TEMPERATURE: 97.2 F | OXYGEN SATURATION: 97 % | WEIGHT: 122.8 LBS | HEART RATE: 97 BPM | BODY MASS INDEX: 20.96 KG/M2 | DIASTOLIC BLOOD PRESSURE: 84 MMHG | SYSTOLIC BLOOD PRESSURE: 131 MMHG | HEIGHT: 64 IN

## 2023-02-09 DIAGNOSIS — N18.31 STAGE 3A CHRONIC KIDNEY DISEASE: ICD-10-CM

## 2023-02-09 DIAGNOSIS — C54.1 ENDOMETRIAL ADENOCARCINOMA: Primary | ICD-10-CM

## 2023-02-09 DIAGNOSIS — G62.9 NEUROPATHY: ICD-10-CM

## 2023-02-09 DIAGNOSIS — D64.81 ANTINEOPLASTIC CHEMOTHERAPY INDUCED ANEMIA: ICD-10-CM

## 2023-02-09 DIAGNOSIS — T45.1X5A ANTINEOPLASTIC CHEMOTHERAPY INDUCED ANEMIA: ICD-10-CM

## 2023-02-09 DIAGNOSIS — Z01.818 EXAMINATION PRIOR TO CHEMOTHERAPY: Primary | ICD-10-CM

## 2023-02-09 DIAGNOSIS — C54.1 ENDOMETRIAL ADENOCARCINOMA: ICD-10-CM

## 2023-02-09 LAB
ALBUMIN SERPL-MCNC: 4.5 G/DL (ref 3.5–5.2)
ALBUMIN/GLOB SERPL: 1.5 G/DL
ALP SERPL-CCNC: 113 U/L (ref 39–117)
ALT SERPL W P-5'-P-CCNC: 10 U/L (ref 1–33)
ANION GAP SERPL CALCULATED.3IONS-SCNC: 15 MMOL/L (ref 5–15)
AST SERPL-CCNC: 25 U/L (ref 1–32)
BASOPHILS # BLD AUTO: 0.01 10*3/MM3 (ref 0–0.2)
BASOPHILS NFR BLD AUTO: 0.1 % (ref 0–1.5)
BILIRUB SERPL-MCNC: 0.3 MG/DL (ref 0–1.2)
BUN SERPL-MCNC: 19 MG/DL (ref 8–23)
BUN/CREAT SERPL: 12.8 (ref 7–25)
CALCIUM SPEC-SCNC: 9.6 MG/DL (ref 8.6–10.5)
CANCER AG125 SERPL QL: 27 U/ML (ref 0–38.1)
CHLORIDE SERPL-SCNC: 96 MMOL/L (ref 98–107)
CO2 SERPL-SCNC: 20 MMOL/L (ref 22–29)
CREAT SERPL-MCNC: 1.49 MG/DL (ref 0.57–1)
DEPRECATED RDW RBC AUTO: 58.9 FL (ref 37–54)
EGFRCR SERPLBLD CKD-EPI 2021: 38.3 ML/MIN/1.73
EOSINOPHIL # BLD AUTO: 0 10*3/MM3 (ref 0–0.4)
EOSINOPHIL NFR BLD AUTO: 0 % (ref 0.3–6.2)
ERYTHROCYTE [DISTWIDTH] IN BLOOD BY AUTOMATED COUNT: 16.5 % (ref 12.3–15.4)
GLOBULIN UR ELPH-MCNC: 3 GM/DL
GLUCOSE SERPL-MCNC: 154 MG/DL (ref 65–99)
HCT VFR BLD AUTO: 29.7 % (ref 34–46.6)
HGB BLD-MCNC: 9.4 G/DL (ref 12–15.9)
IMM GRANULOCYTES # BLD AUTO: 0.13 10*3/MM3 (ref 0–0.05)
IMM GRANULOCYTES NFR BLD AUTO: 1.8 % (ref 0–0.5)
LYMPHOCYTES # BLD AUTO: 0.76 10*3/MM3 (ref 0.7–3.1)
LYMPHOCYTES NFR BLD AUTO: 10.3 % (ref 19.6–45.3)
MCH RBC QN AUTO: 32.6 PG (ref 26.6–33)
MCHC RBC AUTO-ENTMCNC: 31.6 G/DL (ref 31.5–35.7)
MCV RBC AUTO: 103.1 FL (ref 79–97)
MONOCYTES # BLD AUTO: 0.19 10*3/MM3 (ref 0.1–0.9)
MONOCYTES NFR BLD AUTO: 2.6 % (ref 5–12)
NEUTROPHILS NFR BLD AUTO: 6.29 10*3/MM3 (ref 1.7–7)
NEUTROPHILS NFR BLD AUTO: 85.2 % (ref 42.7–76)
PLATELET # BLD AUTO: 385 10*3/MM3 (ref 140–450)
PMV BLD AUTO: 9.9 FL (ref 6–12)
POTASSIUM SERPL-SCNC: 4.4 MMOL/L (ref 3.5–5.2)
PROT SERPL-MCNC: 7.5 G/DL (ref 6–8.5)
RBC # BLD AUTO: 2.88 10*6/MM3 (ref 3.77–5.28)
SODIUM SERPL-SCNC: 131 MMOL/L (ref 136–145)
WBC NRBC COR # BLD: 7.38 10*3/MM3 (ref 3.4–10.8)

## 2023-02-09 PROCEDURE — 25010000002 PALONOSETRON 0.25 MG/5ML SOLUTION PREFILLED SYRINGE: Performed by: OBSTETRICS & GYNECOLOGY

## 2023-02-09 PROCEDURE — 25010000002 DOCETAXEL 20 MG/ML SOLUTION 8 ML VIAL: Performed by: OBSTETRICS & GYNECOLOGY

## 2023-02-09 PROCEDURE — 86304 IMMUNOASSAY TUMOR CA 125: CPT | Performed by: OBSTETRICS & GYNECOLOGY

## 2023-02-09 PROCEDURE — 96375 TX/PRO/DX INJ NEW DRUG ADDON: CPT

## 2023-02-09 PROCEDURE — 96417 CHEMO IV INFUS EACH ADDL SEQ: CPT

## 2023-02-09 PROCEDURE — 25010000002 CARBOPLATIN PER 50 MG: Performed by: OBSTETRICS & GYNECOLOGY

## 2023-02-09 PROCEDURE — 96367 TX/PROPH/DG ADDL SEQ IV INF: CPT

## 2023-02-09 PROCEDURE — 25010000002 FOSAPREPITANT PER 1 MG: Performed by: OBSTETRICS & GYNECOLOGY

## 2023-02-09 PROCEDURE — 96413 CHEMO IV INFUSION 1 HR: CPT

## 2023-02-09 PROCEDURE — 85025 COMPLETE CBC W/AUTO DIFF WBC: CPT | Performed by: OBSTETRICS & GYNECOLOGY

## 2023-02-09 PROCEDURE — 80053 COMPREHEN METABOLIC PANEL: CPT | Performed by: OBSTETRICS & GYNECOLOGY

## 2023-02-09 PROCEDURE — 99215 OFFICE O/P EST HI 40 MIN: CPT | Performed by: OBSTETRICS & GYNECOLOGY

## 2023-02-09 RX ORDER — PALONOSETRON 0.05 MG/ML
0.25 INJECTION, SOLUTION INTRAVENOUS ONCE
Status: COMPLETED | OUTPATIENT
Start: 2023-02-09 | End: 2023-02-09

## 2023-02-09 RX ORDER — SODIUM CHLORIDE 9 MG/ML
250 INJECTION, SOLUTION INTRAVENOUS ONCE
Status: COMPLETED | OUTPATIENT
Start: 2023-02-09 | End: 2023-02-09

## 2023-02-09 RX ORDER — OLANZAPINE 5 MG/1
5 TABLET ORAL ONCE
Status: CANCELLED | OUTPATIENT
Start: 2023-02-09 | End: 2023-02-09

## 2023-02-09 RX ORDER — SODIUM CHLORIDE 9 MG/ML
250 INJECTION, SOLUTION INTRAVENOUS ONCE
Status: CANCELLED | OUTPATIENT
Start: 2023-02-09

## 2023-02-09 RX ORDER — OLANZAPINE 5 MG/1
5 TABLET ORAL ONCE
Status: COMPLETED | OUTPATIENT
Start: 2023-02-09 | End: 2023-02-09

## 2023-02-09 RX ORDER — DIPHENHYDRAMINE HYDROCHLORIDE 50 MG/ML
50 INJECTION INTRAMUSCULAR; INTRAVENOUS AS NEEDED
Status: CANCELLED | OUTPATIENT
Start: 2023-02-09

## 2023-02-09 RX ORDER — FAMOTIDINE 10 MG/ML
20 INJECTION, SOLUTION INTRAVENOUS AS NEEDED
Status: CANCELLED | OUTPATIENT
Start: 2023-02-09

## 2023-02-09 RX ORDER — PALONOSETRON 0.05 MG/ML
0.25 INJECTION, SOLUTION INTRAVENOUS ONCE
Status: CANCELLED | OUTPATIENT
Start: 2023-02-09

## 2023-02-09 RX ADMIN — SODIUM CHLORIDE 150 MG: 9 INJECTION, SOLUTION INTRAVENOUS at 09:25

## 2023-02-09 RX ADMIN — DOCETAXEL 110 MG: 20 INJECTION, SOLUTION, CONCENTRATE INTRAVENOUS at 10:14

## 2023-02-09 RX ADMIN — OLANZAPINE 5 MG: 5 TABLET, FILM COATED ORAL at 09:22

## 2023-02-09 RX ADMIN — CARBOPLATIN 290 MG: 10 INJECTION, SOLUTION INTRAVENOUS at 11:19

## 2023-02-09 RX ADMIN — SODIUM CHLORIDE 250 ML: 9 INJECTION, SOLUTION INTRAVENOUS at 09:23

## 2023-02-09 RX ADMIN — PALONOSETRON 0.25 MG: 0.25 INJECTION, SOLUTION INTRAVENOUS at 09:23

## 2023-02-09 NOTE — PROGRESS NOTES
Chemotherapy Treatment Office Visit      Patient Name: Alysia Sierra  : 1955   MRN: [id number]     Chief Complaint:  Endometrial cancer, chemotherapy    History of Present Illness: Alysia Sierra is a 68 y.o. female who presents today for cycle 4 of carboplatin/docetaxel. Today, she is overall doing well. She denies vaginal bleeding and discharge. She does not have abdominal or pelvic pain. She is tolerating a regular diet and endorses a normal appetite. She denies nausea and vomiting. She denies early satiety and bloating. Denies any CP, SOB, lightheadedness or dizziness. She denies changes in her bowel/bladder. She does occasionally have constipation. She is taking colace. No dysuria, frequency, urgency, hematuria or flank pain. Her pre-existing peripheral neuropathy is overall stable but she does have periods of leg pain that are so significant that she cries. She took 2.5 mg of oxycodone with some relief. She also notes some fatigue.    Oncologic History:  Oncology/Hematology History   Endometrial adenocarcinoma (HCC)   2022 Initial Diagnosis    2022: TVUS with uterus measuring 5.7 x 4.8 x 4.1 cm with an endometrial stripe thickness of 9.8 mm.  Right ovary not visualized.  Left ovary normal.  No free fluid.  2022: Saline infusion sonogram with an irregularly shaped prominent lesion on the posterior endometrial surface concerning for neoplasia.  10/6/2022: Endometrial biopsy with FIGO grade 1 endometrial cancer      10/28/2022 Surgery    Robotic-assisted total laparoscopic hysterectomy with bilateral salpingo-oophorectomy with bilateral SLND    Pathology with FIGO grade 2 endometrioid adenocarcinoma with 94% MI. Involvement of endocervix and uterine serosal adhesions. Negative parametrium and bilateral sentinel lymph nodes. MMR intact.     Surgery at [facility] by Jasmine Rich MD      Cancer Staged    Cancer Staging   Endometrial adenocarcinoma (HCC)  Staging form: Corpus Uteri  "- Carcinoma And Carcinosarcoma, AJCC 8th Edition  - Pathologic stage from 10/28/2022: FIGO Stage IIIA (pT3a, pN0(sn), cM0) - Signed by Jasmine Rich MD on 11/12/2022       10/28/2022 Cancer Staged    Staging form: Corpus Uteri - Carcinoma And Carcinosarcoma, AJCC 8th Edition  - Pathologic stage from 10/28/2022: FIGO Stage IIIA (pT3a, pN0(sn), cM0) - Signed by Jasmine Rich MD on 11/12/2022 12/8/2022 -  Chemotherapy    OP OVARIAN DOCEtaxel / CARBOplatin          I have reviewed and the following portions of the patient's history were updated as appropriate: past family history, past medical history, past social history, past surgical history, past obstetrics/gynecologic history and problem list.    Medications: The current medication list was reviewed with the patient and updated in the EMR this date per the Medical Assistant. Medication dosages and frequencies were confirmed to be accurate.      Allergies:   Allergies   Allergen Reactions   • Lisinopril Angioedema   • Metal Rash     Possible nickel -   Gold is only metal that doesn't have issues     • Milk-Related Compounds Other (See Comments)     LACTOSE INTOLERANT   • Tylenol [Acetaminophen] Other (See Comments)     ckd   • Atorvastatin Myalgia     Objective     Physical Exam:  Vital Signs:   Vitals:    02/09/23 0832   BP: 131/84   Pulse: 97   Resp: 18   Temp: 97.2 °F (36.2 °C)   TempSrc: Temporal   SpO2: 97%   Weight: 55.7 kg (122 lb 12.8 oz)   Height: 162.6 cm (64\")   PainSc: 0-No pain     BMI: Body mass index is 21.08 kg/m².   ECOG PS: 1             PHQ-2 Depression Screening  Little interest or pleasure in doing things?     Feeling down, depressed, or hopeless?     PHQ-2 Total Score       Physical Examination:   General appearance - alert, well appearing, and in no distress and oriented to person, place, and time  Mental status - normal mood, behavior, speech, dress, motor activity, and thought processes  Heart - normal rate, regular rhythm, " normal S1, S2, no murmurs, rubs, clicks or gallops  Lungs - normal respiratory effort, clear to auscultation bilaterally  Abdomen - soft, nontender, nondistended, no masses or organomegaly  Pelvic - external genitalia normal, vagina without discharge, vaginal cuff intact and without lesions, cervix, uterus and adnexa surgically absent, no palpable masses  Neurological - alert, oriented, normal speech, no focal findings or movement disorder noted.   Musculoskeletal - no joint tenderness, deformity or swelling  Extremities - peripheral pulses normal, no pedal edema, no clubbing or cyanosis  Skin - normal coloration and turgor, no rashes, no suspicious skin lesions noted     Labs:  Lab Results   Component Value Date    WBC 7.38 02/09/2023    HGB 9.4 (L) 02/09/2023    HCT 29.7 (L) 02/09/2023    .1 (H) 02/09/2023     02/09/2023       Lab Results   Component Value Date    GLUCOSE 154 (H) 02/09/2023    BUN 19 02/09/2023    CREATININE 1.49 (H) 02/09/2023    EGFRIFNONA 32 (L) 07/13/2021    EGFRIFAFRI 38 (L) 07/13/2021    BCR 12.8 02/09/2023    K 4.4 02/09/2023    CO2 20.0 (L) 02/09/2023    CALCIUM 9.6 02/09/2023    ALBUMIN 4.5 02/09/2023    AST 25 02/09/2023    ALT 10 02/09/2023         Assessment / Plan    Alysia Sierra is a 68 y.o. year old female with Stage IIIA endometrial cancer who presents for cycle 4 of carboplatin (AUC 5)/ docetaxel (75 mg/m2).  She does report worsening leg pain affecting ADLs intermittently. We will dose-reduce her docetaxol by 10% today. I have reviewed her labs and she is cleared for today's treatment. She has completed 5/5 VBT treatments.    CKD Stage III - Elevated Cr 1.23 is in line with patient's baseline around 1.3. Continue nephrology follow up  Peripheral neuropathy - Grade 3 due to intermittent leg pain. Baseline neuropathy controlled with cymbalta and gabapentin. Managed by neurology. Okay to use oxycodone 2.5 mg as needed. Dose-reduction in docetaxol with cycle 4 to  address.  Anemia due to chemotherapy - Hgb 9.4 today. Asymptomatic. We will continue to monitor. Dose-reduction with docetaxol should help.  Major depressive disorder - Sees Cassie Morin. Continues duloxetine BID.    Diagnoses and all orders for this visit:    1. Examination prior to chemotherapy (Primary)    2. Endometrial adenocarcinoma (HCC)    3. Neuropathy    4. Stage 3a chronic kidney disease (HCC)    5. Antineoplastic chemotherapy induced anemia    Other orders  -     sodium chloride 0.9 % infusion 250 mL  -     OLANZapine (zyPREXA) tablet 5 mg  -     palonosetron (ALOXI) injection 0.25 mg  -     fosaprepitant (EMEND) 150 mg in sodium chloride 0.9 % 100 mL IVPB  -     DOCEtaxel 110 mg in sodium chloride 0.9 % 255.5 mL chemo IVPB  -     CARBOplatin (PARAPLATIN) 320 mg in sodium chloride 0.9 % 282 mL chemo IVPB  -     Hydrocortisone Sod Suc (PF) (Solu-CORTEF) injection 100 mg  -     diphenhydrAMINE (BENADRYL) injection 50 mg  -     famotidine (PEPCID) injection 20 mg         Follow Up: 3 weeks for infusion    Jasmine Rich MD  Gynecologic Oncology

## 2023-02-13 ENCOUNTER — TELEPHONE (OUTPATIENT)
Dept: GYNECOLOGIC ONCOLOGY | Facility: CLINIC | Age: 68
End: 2023-02-13
Payer: MEDICARE

## 2023-02-13 DIAGNOSIS — G62.9 NEUROPATHY: Primary | ICD-10-CM

## 2023-02-13 RX ORDER — OXYCODONE HYDROCHLORIDE 5 MG/1
2.5 TABLET ORAL EVERY 4 HOURS PRN
Qty: 15 TABLET | Refills: 0 | Status: SHIPPED | OUTPATIENT
Start: 2023-02-13

## 2023-02-13 NOTE — TELEPHONE ENCOUNTER
Caller: Jayden Sierra    Relationship: Self    Best call back number: [phone]    What is the best time to reach you: ANY    Who are you requesting to speak with (clinical staff, provider,  specific staff member): CLINICAL    What was the call regarding: JAYDEN STATES AT LAST APPT SHE DISCUSSED WITH DR BATISTA  CALLING IN oxycodone 2.5 mg.     JAYDEN  STATES THAT THE MEDICATION HAS NOT BEEN CALLED TO HER PHARMACY    INDIGO MCKENNA@ [phone]    Do you require a callback: YES

## 2023-02-17 DIAGNOSIS — I10 PRIMARY HYPERTENSION: ICD-10-CM

## 2023-02-17 RX ORDER — METOPROLOL SUCCINATE 25 MG/1
25 TABLET, EXTENDED RELEASE ORAL NIGHTLY
Qty: 90 TABLET | Refills: 1 | Status: CANCELLED | OUTPATIENT
Start: 2023-02-17

## 2023-02-17 NOTE — TELEPHONE ENCOUNTER
Caller: Alysia Sierra    Relationship: Self    Best call back number: [phone]    Requested Prescriptions:   Requested Prescriptions     Pending Prescriptions Disp Refills   • metoprolol succinate XL (TOPROL-XL) 25 MG 24 hr tablet 90 tablet 1     Sig: Take 1 tablet by mouth Every Night.        Pharmacy where request should be sent: [facility] [id number] [address] [phone] [facility] [phone] [facility]     Additional details provided by patient: PATIENT HAS 3 MORE DAYS, SHE NEEDS A NEW PRESCRIPTION SENT ASAP AS SHE WILL NEE BY 02/19/2023    Does the patient have less than a 3 day supply:  [x] Yes  [] No    Would you like a call back once the refill request has been completed: [x] Yes [] No    If the office needs to give you a call back, can they leave a voicemail: [x] Yes [] No    Jessi Blanco Rep   02/17/23 12:30 EST

## 2023-02-17 NOTE — TELEPHONE ENCOUNTER
HUB TO READ     Left message for Nika letting her know [facility] has refill on file for her they are getting it ready.

## 2023-02-22 ENCOUNTER — OFFICE VISIT (OUTPATIENT)
Dept: RADIATION ONCOLOGY | Facility: HOSPITAL | Age: 68
End: 2023-02-22
Payer: MEDICARE

## 2023-02-22 ENCOUNTER — HOSPITAL ENCOUNTER (OUTPATIENT)
Dept: RADIATION ONCOLOGY | Facility: HOSPITAL | Age: 68
Setting detail: RADIATION/ONCOLOGY SERIES
Discharge: HOME | End: 2023-02-22
Payer: MEDICARE

## 2023-02-22 VITALS
BODY MASS INDEX: 21.91 KG/M2 | HEART RATE: 110 BPM | SYSTOLIC BLOOD PRESSURE: 155 MMHG | HEIGHT: 64 IN | DIASTOLIC BLOOD PRESSURE: 83 MMHG | RESPIRATION RATE: 18 BRPM | WEIGHT: 128.3 LBS | TEMPERATURE: 97.3 F | OXYGEN SATURATION: 96 %

## 2023-02-22 DIAGNOSIS — C54.1 ENDOMETRIAL ADENOCARCINOMA: Primary | ICD-10-CM

## 2023-02-22 PROCEDURE — G0463 HOSPITAL OUTPT CLINIC VISIT: HCPCS

## 2023-02-22 NOTE — PROGRESS NOTES
FOLLOW UP NOTE    PATIENT:                                                      Alysia Sierra  MEDICAL RECORD #:                        [id number]  :                                                          1955  COMPLETION DATE:   2023  DIAGNOSIS:     Endometrial adenocarcinoma (HCC)  - FIGO Stage IIIA (pT3a, pN0(sn), cM0)      BRIEF HISTORY:  Alysia Sierra is a 68 y.o. female returning for initial follow-up visit.  She initially presented with postmenopausal bleeding and underwent endometrial biopsy on 10/6/2022.  Pathology revealed well-differentiated endometrioid adenocarcinoma.  She underwent robotic hysterectomy with bilateral salpingo-oophorectomy with sentinel pelvic node dissection on 10/28/2022 per Dr. Rich.  Final pathology showed a grade 2 endometrioid adenocarcinoma with 94% myometrial invasion of 16 of 17 mm in thickness.  There was no lymphovascular space invasion and lymph nodes were negative.  She did have tumor involvement of serosal adhesions and endocervical involvement.  Tumor size was 5.5 cm.  The patient was started on adjuvant carboplatin/docetaxel.  The patient declined recommendations for adjuvant external beam radiation therapy to the pelvis.  The patient did undergo adjuvant vaginal brachytherapy to a dose of 30 Gray in 5 fractions, which was delivered between the third and fourth cycles of chemotherapy.  She had her final cylinder brachytherapy treatment 2023.  She tolerated this well.  She continues chemotherapy.  From a symptom standpoint, she did develop some dysuria following brachytherapy which was alleviated with occasional Pyridium as needed.  She otherwise denies lower urinary tract symptoms or change in bladder habits.  No fever, chills, or flank pains.  She continues to endorse some vacillation between diarrhea and constipation, which she manages with diet and OTC medications.  She reports high-fiber diet.  She takes Colace daily except days when her  stool is loose.  She otherwise denies GI complaints.  No nausea or vomiting.  She denies vaginal bleeding, pain, or discharge.  She denies abdominopelvic pains.  She continues to report fatigue.  She remains independent with ADLs but does note fatigue and chronic peripheral neuropathy impact her functional status.  She feels well otherwise and denies additional acute concerns.            MEDICATIONS: Medication reconciliation for the patient was reviewed and confirmed in the electronic medical record.    Review of Systems   Constitutional: Positive for fatigue.   Gastrointestinal: Positive for constipation and diarrhea.   Musculoskeletal: Positive for gait problem (uses cane or rollator walker).   Neurological: Positive for gait problem (uses cane or rollator walker) and numbness (pre-existing peripheral neuropathy, stable).   Psychiatric/Behavioral: Positive for depression. The patient is nervous/anxious.            KPS 90%      Physical Exam  Vitals and nursing note reviewed.   Constitutional:       General: She is not in acute distress.     Appearance: She is well-developed.   HENT:      Head: Normocephalic and atraumatic.   Eyes:      Conjunctiva/sclera: Conjunctivae normal.      Pupils: Pupils are equal, round, and reactive to light.   Cardiovascular:      Rate and Rhythm: Regular rhythm. Tachycardia present.      Heart sounds: No murmur heard.  Pulmonary:      Effort: Pulmonary effort is normal.      Breath sounds: Normal breath sounds. No wheezing.   Genitourinary:     Comments: Pelvic exam deferred  Musculoskeletal:         General: Normal range of motion.      Comments: Ambulates with cane   Skin:     General: Skin is warm and dry.   Neurological:      Mental Status: She is alert and oriented to person, place, and time.   Psychiatric:         Behavior: Behavior normal.         Thought Content: Thought content normal.         Judgment: Judgment normal.         VITAL SIGNS:   Vitals:    02/22/23 1304   BP:  "155/83   Pulse: 110   Resp: 18   Temp: 97.3 °F (36.3 °C)   SpO2: 96%  Comment: RA   Weight: 58.2 kg (128 lb 4.8 oz)   Height: 162.6 cm (64\")   PainSc:   2   PainLoc: Comment: right leg                 The following portions of the patient's history were reviewed and updated as appropriate: allergies, current medications, past family history, past medical history, past social history, past surgical history and problem list.         Diagnoses and all orders for this visit:    1. Endometrial adenocarcinoma (HCC) (Primary)         IMPRESSION:  Alysia Sierra is a 68 y.o. female with recent diagnosis of a FIGO stage IIIA (pT3a, pN0(sn), cM0) endometrioid adenocarcinoma, grade 2.  Following hysterectomy with staging, the patient was started on adjuvant chemotherapy which she continues.  She previously refused external beam radiotherapy to the pelvis which was recommended due to the involvement of serosal adhesions and high risk of recurrence.  She was amenable to adjuvant vaginal cuff brachytherapy, which she completed 1 month ago.  She tolerated treatment well.  Pelvic exam deferred today.  Clinical exam performed 2/9/2023 in the GYN oncology clinic was reportedly without evidence of disease.  The patient and I reviewed follow-up intervals, which will alternate every 3 months between the GYN oncology clinic and our clinic for the first 2 years.  We also reviewed the role of serial clinical exams.  The patient and I have discussed potential sexual side effects following radiotherapy.  We discussed the use of a vaginal dilator, which was provided to the patient along with printed instructions for use.  The patient continues routine surveillance under the care of Dr. Rich in the survivorship clinic.  Signs and symptoms of recurrent disease, such as vaginal bleeding, persistent abdominopelvic pain, urinary or bowel changes, and shortness of breath were reviewed.  She was advised to follow up immediately if she develops any " of the above.  She is scheduled for cycle 5 of chemotherapy on 3/2/2023 with Dr. Rich.      RECOMMENDATIONS:  Alysia Sirera continues chemotherapy and routine GYN oncology surveillance under the care of Dr. Rich.  We will schedule the patient to return to our clinic in 6 months or sooner as needed.        Return in about 6 months (around 8/22/2023) for Office Visit.    ANETTE Samuel spent a total of 50 minutes on today's visit, with more than 25 minutes in direct face to face communication, and the remainder of the time spent in reviewing the relevant history, records, available imaging, and for documentation.

## 2023-03-01 NOTE — PROGRESS NOTES
Chemotherapy Treatment Office Visit      Patient Name: Alysia Sierra  : 1955   MRN: [id number]     Chief Complaint:  Endometrial cancer, chemotherapy    History of Present Illness: Alysia Sierra is a 68 y.o. female who presents today for cycle 5 of carboplatin/docetaxel. Today, she is overall doing okay.  She did report some dizziness today.  She reports that her neuropathy is relatively stable.  She did have a couple days where she had pretty significant bone pain but this was improved with the oxycodone 2.5 mg.  She would like to start back on her vitamin B12 if possible.  She otherwise denies any vaginal bleeding or discharge.  No changes in her bowels or bladder.  She is tolerating regular diet and denies any nausea or vomiting.  Does report taste changes.  Reports decreased but stable energy levels. She is still completing her ADLs.  She is using aids for mobility.    Oncologic History:  Oncology/Hematology History   Endometrial adenocarcinoma (HCC)   2022 Initial Diagnosis    2022: TVUS with uterus measuring 5.7 x 4.8 x 4.1 cm with an endometrial stripe thickness of 9.8 mm.  Right ovary not visualized.  Left ovary normal.  No free fluid.  2022: Saline infusion sonogram with an irregularly shaped prominent lesion on the posterior endometrial surface concerning for neoplasia.  10/6/2022: Endometrial biopsy with FIGO grade 1 endometrial cancer      10/28/2022 Surgery    Robotic-assisted total laparoscopic hysterectomy with bilateral salpingo-oophorectomy with bilateral SLND    Pathology with FIGO grade 2 endometrioid adenocarcinoma with 94% MI. Involvement of endocervix and uterine serosal adhesions. Negative parametrium and bilateral sentinel lymph nodes. MMR intact.     Surgery at [facility] by Jasmine Rich MD      Cancer Staged    Cancer Staging   Endometrial adenocarcinoma (HCC)  Staging form: Corpus Uteri - Carcinoma And Carcinosarcoma, AJCC 8th Edition  - Pathologic stage  "from 10/28/2022: FIGO Stage IIIA (pT3a, pN0(sn), cM0) - Signed by Jasmine Rich MD on 11/12/2022       10/28/2022 Cancer Staged    Staging form: Corpus Uteri - Carcinoma And Carcinosarcoma, AJCC 8th Edition  - Pathologic stage from 10/28/2022: FIGO Stage IIIA (pT3a, pN0(sn), cM0) - Signed by Jasmine Rich MD on 11/12/2022 12/8/2022 -  Chemotherapy    OP OVARIAN DOCEtaxel / CARBOplatin     1/9/2023 - 1/18/2023 Radiation    Radiation OncologyTreatment Course:  Alysia Sierra received 3000 cGy in 5 fractions to upper vagina via High Dose Radiation - HDR.          I have reviewed and the following portions of the patient's history were updated as appropriate: past family history, past medical history, past social history, past surgical history, past obstetrics/gynecologic history and problem list.    Medications: The current medication list was reviewed with the patient and updated in the EMR this date per the Medical Assistant. Medication dosages and frequencies were confirmed to be accurate.      Allergies:   Allergies   Allergen Reactions   • Lisinopril Angioedema   • Metal Rash     Possible nickel -   Gold is only metal that doesn't have issues     • Milk-Related Compounds Other (See Comments)     LACTOSE INTOLERANT   • Tylenol [Acetaminophen] Other (See Comments)     ckd   • Atorvastatin Myalgia     Objective     Physical Exam:  Vital Signs:   Vitals:    03/02/23 0842   BP: 171/87   Pulse: 101   Resp: 18   Temp: 97.1 °F (36.2 °C)   TempSrc: Temporal   SpO2: 99%   Weight: 57.3 kg (126 lb 6.4 oz)   Height: 163.8 cm (64.5\")   PainSc:   1   PainLoc: Foot  Comment: bilat-neuropathy     BMI: Body mass index is 21.36 kg/m².   ECOG PS: 1  ECOG score: 1          PHQ-2 Depression Screening  Little interest or pleasure in doing things?     Feeling down, depressed, or hopeless?     PHQ-2 Total Score       Physical Examination:   General appearance - alert, well appearing, and in no distress and oriented to " person, place, and time  Mental status - normal mood, behavior, speech, dress, motor activity, and thought processes  Heart - normal rate, regular rhythm, normal S1, S2, no murmurs, rubs, clicks or gallops  Lungs - normal respiratory effort, clear to auscultation bilaterally  Abdomen - soft, nontender, nondistended, no masses or organomegaly  Pelvic - deferred  Neurological - alert, oriented, normal speech, no focal findings or movement disorder noted, using cane due to prior neuropathy  Musculoskeletal - no joint tenderness, deformity or swelling  Extremities - peripheral pulses normal, no pedal edema, no clubbing or cyanosis  Skin - normal coloration and turgor, no rashes, no suspicious skin lesions noted     Labs:  Lab Results   Component Value Date    WBC 5.46 03/02/2023    HGB 8.5 (L) 03/02/2023    HCT 26.2 (L) 03/02/2023    .5 (H) 03/02/2023     03/02/2023       Lab Results   Component Value Date    GLUCOSE 123 (H) 03/02/2023    BUN 10 03/02/2023    CREATININE 1.13 (H) 03/02/2023    EGFRIFNONA 32 (L) 07/13/2021    EGFRIFAFRI 38 (L) 07/13/2021    BCR 8.8 03/02/2023    K 4.5 03/02/2023    CO2 23.0 03/02/2023    CALCIUM 8.7 03/02/2023    ALBUMIN 4.3 03/02/2023    AST 26 03/02/2023    ALT 9 03/02/2023     Lab Results   Component Value Date     27.0 02/09/2023       Assessment / Plan    Alysia Sierra is a 68 y.o. year old female with Stage IIIA endometrial cancer who presents for cycle 5 of carboplatin (AUC 5)/ docetaxel (67.5 mg/m2). Her docetaxol was reduced by 10% with cycle 5. She does report dizziness today. 500 cc bolus added to plan. Labs today significant for a grade 2 anemia. Carboplatin dose-reduced to 90% today. Docetaxel also dropped to 85% of original dose. She is otherwise cleared for treatment. Of note, she has completed 5/5 VBT treatments.    Grade 2 anemia - Carbo and docetaxol decreased today. Symptomatic with mild dizziness. We will continue to monitor.  CKD Stage III -  Elevated Cr 1.23 is in line with patient's baseline around 1.3. Continue nephrology follow up  Peripheral neuropathy - Improved with dose reduction of docetaxol with cycle 4. Baseline neuropathy controlled with cymbalta and gabapentin. Managed by neurology. Oxycodone 2.5 mg as needed.   Anemia due to chemotherapy - Hgb 9.4 today. Asymptomatic. We will continue to monitor.  Major depressive disorder - Sees Cassie Morin. Continues duloxetine BID.    Diagnoses and all orders for this visit:    1. Examination prior to chemotherapy (Primary)    2. Endometrial adenocarcinoma (HCC)    3. Antineoplastic chemotherapy induced anemia    4. Stage 3a chronic kidney disease (HCC)    5. Neuropathy    Other orders  -     sodium chloride 0.9 % infusion 250 mL  -     OLANZapine (zyPREXA) tablet 5 mg  -     palonosetron (ALOXI) injection 0.25 mg  -     fosaprepitant (EMEND) 150 mg in sodium chloride 0.9 % 100 mL IVPB  -     Cancel: DOCEtaxel 110 mg in sodium chloride 0.9 % 255.5 mL chemo IVPB  -     CARBOplatin (PARAPLATIN) 280 mg in sodium chloride 0.9 % 278 mL chemo IVPB  -     Hydrocortisone Sod Suc (PF) (Solu-CORTEF) injection 100 mg  -     diphenhydrAMINE (BENADRYL) injection 50 mg  -     famotidine (PEPCID) injection 20 mg  -     DOCEtaxel 110 mg in sodium chloride 0.9 % 255.5 mL chemo IVPB  -     lactated ringers bolus 500 mL         Follow Up: 3 weeks for infusion    Jasmine Rich MD  Gynecologic Oncology

## 2023-03-02 ENCOUNTER — HOSPITAL ENCOUNTER (OUTPATIENT)
Dept: ONCOLOGY | Facility: HOSPITAL | Age: 68
End: 2023-03-02
Payer: MEDICARE

## 2023-03-02 ENCOUNTER — OFFICE VISIT (OUTPATIENT)
Dept: GYNECOLOGIC ONCOLOGY | Facility: CLINIC | Age: 68
End: 2023-03-02
Payer: MEDICARE

## 2023-03-02 ENCOUNTER — HOSPITAL ENCOUNTER (OUTPATIENT)
Dept: ONCOLOGY | Facility: HOSPITAL | Age: 68
Discharge: HOME | End: 2023-03-02
Admitting: NURSE PRACTITIONER
Payer: MEDICARE

## 2023-03-02 VITALS
HEIGHT: 65 IN | OXYGEN SATURATION: 99 % | TEMPERATURE: 97.1 F | BODY MASS INDEX: 21.06 KG/M2 | RESPIRATION RATE: 18 BRPM | DIASTOLIC BLOOD PRESSURE: 87 MMHG | WEIGHT: 126.4 LBS | SYSTOLIC BLOOD PRESSURE: 171 MMHG | HEART RATE: 101 BPM

## 2023-03-02 DIAGNOSIS — N18.31 STAGE 3A CHRONIC KIDNEY DISEASE: ICD-10-CM

## 2023-03-02 DIAGNOSIS — D64.81 ANTINEOPLASTIC CHEMOTHERAPY INDUCED ANEMIA: ICD-10-CM

## 2023-03-02 DIAGNOSIS — G62.9 NEUROPATHY: ICD-10-CM

## 2023-03-02 DIAGNOSIS — E53.8 B12 DEFICIENCY: Primary | ICD-10-CM

## 2023-03-02 DIAGNOSIS — C54.1 ENDOMETRIAL ADENOCARCINOMA: ICD-10-CM

## 2023-03-02 DIAGNOSIS — Z01.818 EXAMINATION PRIOR TO CHEMOTHERAPY: Primary | ICD-10-CM

## 2023-03-02 DIAGNOSIS — T45.1X5A ANTINEOPLASTIC CHEMOTHERAPY INDUCED ANEMIA: ICD-10-CM

## 2023-03-02 DIAGNOSIS — C54.1 ENDOMETRIAL ADENOCARCINOMA: Primary | ICD-10-CM

## 2023-03-02 LAB
ALBUMIN SERPL-MCNC: 4.3 G/DL (ref 3.5–5.2)
ALBUMIN/GLOB SERPL: 1.5 G/DL
ALP SERPL-CCNC: 112 U/L (ref 39–117)
ALT SERPL W P-5'-P-CCNC: 9 U/L (ref 1–33)
ANION GAP SERPL CALCULATED.3IONS-SCNC: 14 MMOL/L (ref 5–15)
AST SERPL-CCNC: 26 U/L (ref 1–32)
BASOPHILS # BLD AUTO: 0 10*3/MM3 (ref 0–0.2)
BASOPHILS NFR BLD AUTO: 0 % (ref 0–1.5)
BILIRUB SERPL-MCNC: 0.4 MG/DL (ref 0–1.2)
BUN SERPL-MCNC: 10 MG/DL (ref 8–23)
BUN/CREAT SERPL: 8.8 (ref 7–25)
CALCIUM SPEC-SCNC: 8.7 MG/DL (ref 8.6–10.5)
CANCER AG125 SERPL QL: 21.3 U/ML (ref 0–38.1)
CHLORIDE SERPL-SCNC: 97 MMOL/L (ref 98–107)
CO2 SERPL-SCNC: 23 MMOL/L (ref 22–29)
CREAT SERPL-MCNC: 1.13 MG/DL (ref 0.57–1)
DEPRECATED RDW RBC AUTO: 73.4 FL (ref 37–54)
EGFRCR SERPLBLD CKD-EPI 2021: 53.4 ML/MIN/1.73
EOSINOPHIL # BLD AUTO: 0 10*3/MM3 (ref 0–0.4)
EOSINOPHIL NFR BLD AUTO: 0 % (ref 0.3–6.2)
ERYTHROCYTE [DISTWIDTH] IN BLOOD BY AUTOMATED COUNT: 19.1 % (ref 12.3–15.4)
GLOBULIN UR ELPH-MCNC: 2.8 GM/DL
GLUCOSE SERPL-MCNC: 123 MG/DL (ref 65–99)
HCT VFR BLD AUTO: 26.2 % (ref 34–46.6)
HGB BLD-MCNC: 8.5 G/DL (ref 12–15.9)
IMM GRANULOCYTES # BLD AUTO: 0.05 10*3/MM3 (ref 0–0.05)
IMM GRANULOCYTES NFR BLD AUTO: 0.9 % (ref 0–0.5)
LYMPHOCYTES # BLD AUTO: 0.65 10*3/MM3 (ref 0.7–3.1)
LYMPHOCYTES NFR BLD AUTO: 11.9 % (ref 19.6–45.3)
MCH RBC QN AUTO: 34.6 PG (ref 26.6–33)
MCHC RBC AUTO-ENTMCNC: 32.4 G/DL (ref 31.5–35.7)
MCV RBC AUTO: 106.5 FL (ref 79–97)
MONOCYTES # BLD AUTO: 0.47 10*3/MM3 (ref 0.1–0.9)
MONOCYTES NFR BLD AUTO: 8.6 % (ref 5–12)
NEUTROPHILS NFR BLD AUTO: 4.29 10*3/MM3 (ref 1.7–7)
NEUTROPHILS NFR BLD AUTO: 78.6 % (ref 42.7–76)
PLATELET # BLD AUTO: 226 10*3/MM3 (ref 140–450)
PMV BLD AUTO: 9.3 FL (ref 6–12)
POTASSIUM SERPL-SCNC: 4.5 MMOL/L (ref 3.5–5.2)
PROT SERPL-MCNC: 7.1 G/DL (ref 6–8.5)
RBC # BLD AUTO: 2.46 10*6/MM3 (ref 3.77–5.28)
SODIUM SERPL-SCNC: 134 MMOL/L (ref 136–145)
VIT B12 BLD-MCNC: 675 PG/ML (ref 211–946)
WBC NRBC COR # BLD: 5.46 10*3/MM3 (ref 3.4–10.8)

## 2023-03-02 PROCEDURE — 96366 THER/PROPH/DIAG IV INF ADDON: CPT

## 2023-03-02 PROCEDURE — 96360 HYDRATION IV INFUSION INIT: CPT

## 2023-03-02 PROCEDURE — 25010000002 CARBOPLATIN PER 50 MG: Performed by: OBSTETRICS & GYNECOLOGY

## 2023-03-02 PROCEDURE — 85025 COMPLETE CBC W/AUTO DIFF WBC: CPT | Performed by: OBSTETRICS & GYNECOLOGY

## 2023-03-02 PROCEDURE — 99215 OFFICE O/P EST HI 40 MIN: CPT | Performed by: OBSTETRICS & GYNECOLOGY

## 2023-03-02 PROCEDURE — 96413 CHEMO IV INFUSION 1 HR: CPT

## 2023-03-02 PROCEDURE — 96367 TX/PROPH/DG ADDL SEQ IV INF: CPT

## 2023-03-02 PROCEDURE — 96417 CHEMO IV INFUS EACH ADDL SEQ: CPT

## 2023-03-02 PROCEDURE — 86304 IMMUNOASSAY TUMOR CA 125: CPT | Performed by: OBSTETRICS & GYNECOLOGY

## 2023-03-02 PROCEDURE — 25010000002 PALONOSETRON 0.25 MG/5ML SOLUTION PREFILLED SYRINGE: Performed by: OBSTETRICS & GYNECOLOGY

## 2023-03-02 PROCEDURE — 96375 TX/PRO/DX INJ NEW DRUG ADDON: CPT

## 2023-03-02 PROCEDURE — 80053 COMPREHEN METABOLIC PANEL: CPT | Performed by: OBSTETRICS & GYNECOLOGY

## 2023-03-02 PROCEDURE — 25010000002 DOCETAXEL 20 MG/ML SOLUTION 8 ML VIAL: Performed by: OBSTETRICS & GYNECOLOGY

## 2023-03-02 PROCEDURE — 25010000002 FOSAPREPITANT PER 1 MG: Performed by: OBSTETRICS & GYNECOLOGY

## 2023-03-02 PROCEDURE — 82607 VITAMIN B-12: CPT | Performed by: NURSE PRACTITIONER

## 2023-03-02 RX ORDER — DIPHENHYDRAMINE HYDROCHLORIDE 50 MG/ML
50 INJECTION INTRAMUSCULAR; INTRAVENOUS AS NEEDED
Status: CANCELLED | OUTPATIENT
Start: 2023-03-02

## 2023-03-02 RX ORDER — SODIUM CHLORIDE 9 MG/ML
250 INJECTION, SOLUTION INTRAVENOUS ONCE
Status: COMPLETED | OUTPATIENT
Start: 2023-03-02 | End: 2023-03-02

## 2023-03-02 RX ORDER — PALONOSETRON 0.05 MG/ML
0.25 INJECTION, SOLUTION INTRAVENOUS ONCE
Status: CANCELLED | OUTPATIENT
Start: 2023-03-02

## 2023-03-02 RX ORDER — OLANZAPINE 5 MG/1
5 TABLET ORAL ONCE
Status: COMPLETED | OUTPATIENT
Start: 2023-03-02 | End: 2023-03-02

## 2023-03-02 RX ORDER — OLANZAPINE 5 MG/1
5 TABLET ORAL ONCE
Status: CANCELLED | OUTPATIENT
Start: 2023-03-02 | End: 2023-03-02

## 2023-03-02 RX ORDER — SODIUM CHLORIDE 9 MG/ML
250 INJECTION, SOLUTION INTRAVENOUS ONCE
Status: CANCELLED | OUTPATIENT
Start: 2023-03-02

## 2023-03-02 RX ORDER — FAMOTIDINE 10 MG/ML
20 INJECTION, SOLUTION INTRAVENOUS AS NEEDED
Status: CANCELLED | OUTPATIENT
Start: 2023-03-02

## 2023-03-02 RX ORDER — PALONOSETRON 0.05 MG/ML
0.25 INJECTION, SOLUTION INTRAVENOUS ONCE
Status: COMPLETED | OUTPATIENT
Start: 2023-03-02 | End: 2023-03-02

## 2023-03-02 RX ADMIN — DOCETAXEL 110 MG: 20 INJECTION, SOLUTION, CONCENTRATE INTRAVENOUS at 10:34

## 2023-03-02 RX ADMIN — OLANZAPINE 5 MG: 5 TABLET, FILM COATED ORAL at 09:48

## 2023-03-02 RX ADMIN — SODIUM CHLORIDE 150 MG: 9 INJECTION, SOLUTION INTRAVENOUS at 09:48

## 2023-03-02 RX ADMIN — CARBOPLATIN 280 MG: 10 INJECTION, SOLUTION INTRAVENOUS at 11:40

## 2023-03-02 RX ADMIN — SODIUM CHLORIDE 250 ML: 9 INJECTION, SOLUTION INTRAVENOUS at 10:34

## 2023-03-02 RX ADMIN — SODIUM CHLORIDE, POTASSIUM CHLORIDE, SODIUM LACTATE AND CALCIUM CHLORIDE 500 ML: 600; 310; 30; 20 INJECTION, SOLUTION INTRAVENOUS at 09:43

## 2023-03-02 RX ADMIN — PALONOSETRON 0.25 MG: 0.25 INJECTION, SOLUTION INTRAVENOUS at 09:50

## 2023-03-06 ENCOUNTER — TELEPHONE (OUTPATIENT)
Dept: GYNECOLOGIC ONCOLOGY | Facility: CLINIC | Age: 68
End: 2023-03-06
Payer: MEDICARE

## 2023-03-06 DIAGNOSIS — R42 DIZZINESS: Primary | ICD-10-CM

## 2023-03-06 NOTE — TELEPHONE ENCOUNTER
Called patient back and let her know that Dr. Rich also thinks her blood counts have probably dropped which is causing her dizziness, let her know that I put in some lab orders if she has somebody to drive her to get labs, but she should not drive if she is feeling that dizzy. Patient said her sister could probably drive her. Also told patient I would put in some fluids for her, and depending on her lab results, she might need a blood transfusion tomorrow. Patient verbalized understanding.

## 2023-03-06 NOTE — TELEPHONE ENCOUNTER
Patient called with complaints of dizziness to the point where she is too dizzy to stand up, states that she just got chemo on 3/2 and her counts were low. Told her that based on her labs on 3/2, her hemoglobin was low at 8.5 and a low hemoglobin could cause her to feel very dizzy, and there is a potential that it has dropped lower based on her symptoms. Told patient I would get with Dr. Rich and see what she wanted to do. Patient verbalized understanding.

## 2023-03-10 ENCOUNTER — TELEPHONE (OUTPATIENT)
Dept: GYNECOLOGIC ONCOLOGY | Facility: CLINIC | Age: 68
End: 2023-03-10
Payer: MEDICARE

## 2023-03-10 NOTE — TELEPHONE ENCOUNTER
"Patient called with complaints that \"everything hurts\" and feels like \"pins and needles\" after her chemo treatment on 3/2. Patient wanting some reassurance that this is a normal side effect after chemotherapy. Patient states she is taking gabapentin, cymbalta, and oxycodone for pain relief. Communicated to patient that unfortunately a common side effect of chemo is nerve pain, and to continue to take the oxycodone as needed and the gabapentin and cymbalta as scheduled. Told her to call us back if she needs a refill of oxycodone or her pain becomes unbearable. Patient verbalized understanding.   "

## 2023-03-13 ENCOUNTER — HOSPITAL ENCOUNTER (OUTPATIENT)
Dept: ONCOLOGY | Facility: HOSPITAL | Age: 68
Discharge: HOME | End: 2023-03-13
Payer: MEDICARE

## 2023-03-13 ENCOUNTER — LAB (OUTPATIENT)
Dept: LAB | Facility: HOSPITAL | Age: 68
End: 2023-03-13
Payer: MEDICARE

## 2023-03-13 ENCOUNTER — TELEPHONE (OUTPATIENT)
Dept: GYNECOLOGIC ONCOLOGY | Facility: CLINIC | Age: 68
End: 2023-03-13
Payer: MEDICARE

## 2023-03-13 VITALS
SYSTOLIC BLOOD PRESSURE: 133 MMHG | HEART RATE: 105 BPM | RESPIRATION RATE: 17 BRPM | WEIGHT: 126 LBS | DIASTOLIC BLOOD PRESSURE: 76 MMHG | TEMPERATURE: 97.9 F | BODY MASS INDEX: 21.51 KG/M2 | HEIGHT: 64 IN

## 2023-03-13 DIAGNOSIS — R42 DIZZINESS: Primary | ICD-10-CM

## 2023-03-13 DIAGNOSIS — R42 DIZZINESS: ICD-10-CM

## 2023-03-13 DIAGNOSIS — C54.1 ENDOMETRIAL ADENOCARCINOMA: ICD-10-CM

## 2023-03-13 LAB
ABO GROUP BLD: NORMAL
ALBUMIN SERPL-MCNC: 3.7 G/DL (ref 3.5–5.2)
ALBUMIN/GLOB SERPL: 1.4 G/DL
ALP SERPL-CCNC: 69 U/L (ref 39–117)
ALT SERPL W P-5'-P-CCNC: 9 U/L (ref 1–33)
ANION GAP SERPL CALCULATED.3IONS-SCNC: 16 MMOL/L (ref 5–15)
AST SERPL-CCNC: 19 U/L (ref 1–32)
BASOPHILS # BLD AUTO: 0.01 10*3/MM3 (ref 0–0.2)
BASOPHILS NFR BLD AUTO: 0.3 % (ref 0–1.5)
BILIRUB SERPL-MCNC: 0.5 MG/DL (ref 0–1.2)
BLD GP AB SCN SERPL QL: NEGATIVE
BUN SERPL-MCNC: 9 MG/DL (ref 8–23)
BUN/CREAT SERPL: 9.1 (ref 7–25)
CALCIUM SPEC-SCNC: 8.2 MG/DL (ref 8.6–10.5)
CHLORIDE SERPL-SCNC: 94 MMOL/L (ref 98–107)
CO2 SERPL-SCNC: 20 MMOL/L (ref 22–29)
CREAT SERPL-MCNC: 0.99 MG/DL (ref 0.57–1)
DEPRECATED RDW RBC AUTO: 70.4 FL (ref 37–54)
EGFRCR SERPLBLD CKD-EPI 2021: 62.6 ML/MIN/1.73
EOSINOPHIL # BLD AUTO: 0.02 10*3/MM3 (ref 0–0.4)
EOSINOPHIL NFR BLD AUTO: 0.6 % (ref 0.3–6.2)
ERYTHROCYTE [DISTWIDTH] IN BLOOD BY AUTOMATED COUNT: 17.5 % (ref 12.3–15.4)
GLOBULIN UR ELPH-MCNC: 2.7 GM/DL
GLUCOSE SERPL-MCNC: 129 MG/DL (ref 65–99)
HCT VFR BLD AUTO: 23.7 % (ref 34–46.6)
HGB BLD-MCNC: 7.5 G/DL (ref 12–15.9)
IMM GRANULOCYTES # BLD AUTO: 0.03 10*3/MM3 (ref 0–0.05)
IMM GRANULOCYTES NFR BLD AUTO: 0.8 % (ref 0–0.5)
LYMPHOCYTES # BLD AUTO: 1.4 10*3/MM3 (ref 0.7–3.1)
LYMPHOCYTES NFR BLD AUTO: 38.6 % (ref 19.6–45.3)
MCH RBC QN AUTO: 34.1 PG (ref 26.6–33)
MCHC RBC AUTO-ENTMCNC: 31.6 G/DL (ref 31.5–35.7)
MCV RBC AUTO: 107.7 FL (ref 79–97)
MONOCYTES # BLD AUTO: 1.26 10*3/MM3 (ref 0.1–0.9)
MONOCYTES NFR BLD AUTO: 34.7 % (ref 5–12)
NEUTROPHILS NFR BLD AUTO: 0.91 10*3/MM3 (ref 1.7–7)
NEUTROPHILS NFR BLD AUTO: 25 % (ref 42.7–76)
PLATELET # BLD AUTO: 187 10*3/MM3 (ref 140–450)
PMV BLD AUTO: 9.4 FL (ref 6–12)
POTASSIUM SERPL-SCNC: 3.5 MMOL/L (ref 3.5–5.2)
PROT SERPL-MCNC: 6.4 G/DL (ref 6–8.5)
RBC # BLD AUTO: 2.2 10*6/MM3 (ref 3.77–5.28)
RH BLD: POSITIVE
SODIUM SERPL-SCNC: 130 MMOL/L (ref 136–145)
T&S EXPIRATION DATE: NORMAL
WBC NRBC COR # BLD: 3.63 10*3/MM3 (ref 3.4–10.8)

## 2023-03-13 PROCEDURE — 86900 BLOOD TYPING SEROLOGIC ABO: CPT

## 2023-03-13 PROCEDURE — P9016 RBC LEUKOCYTES REDUCED: HCPCS

## 2023-03-13 PROCEDURE — 85025 COMPLETE CBC W/AUTO DIFF WBC: CPT

## 2023-03-13 PROCEDURE — 86900 BLOOD TYPING SEROLOGIC ABO: CPT | Performed by: OBSTETRICS & GYNECOLOGY

## 2023-03-13 PROCEDURE — 86850 RBC ANTIBODY SCREEN: CPT | Performed by: OBSTETRICS & GYNECOLOGY

## 2023-03-13 PROCEDURE — G0463 HOSPITAL OUTPT CLINIC VISIT: HCPCS

## 2023-03-13 PROCEDURE — 86901 BLOOD TYPING SEROLOGIC RH(D): CPT | Performed by: OBSTETRICS & GYNECOLOGY

## 2023-03-13 PROCEDURE — 86923 COMPATIBILITY TEST ELECTRIC: CPT

## 2023-03-13 PROCEDURE — 80053 COMPREHEN METABOLIC PANEL: CPT

## 2023-03-13 PROCEDURE — 36415 COLL VENOUS BLD VENIPUNCTURE: CPT

## 2023-03-13 PROCEDURE — 36430 TRANSFUSION BLD/BLD COMPNT: CPT

## 2023-03-13 RX ORDER — SODIUM CHLORIDE 9 MG/ML
250 INJECTION, SOLUTION INTRAVENOUS AS NEEDED
Status: CANCELLED | OUTPATIENT
Start: 2023-03-13

## 2023-03-13 RX ORDER — SODIUM CHLORIDE 9 MG/ML
250 INJECTION, SOLUTION INTRAVENOUS AS NEEDED
Status: DISCONTINUED | OUTPATIENT
Start: 2023-03-13 | End: 2023-03-14 | Stop reason: HOSPADM

## 2023-03-13 RX ADMIN — SODIUM CHLORIDE 250 ML: 9 INJECTION, SOLUTION INTRAVENOUS at 12:59

## 2023-03-13 NOTE — TELEPHONE ENCOUNTER
Patient called this morning with concerns that her BP was 80/60, , and she was dizzy to the point where she couldn't stand up. Advised patient that she needed to go to the emergency room. Patient stated that she did not like the ER. Told patient that her blood counts were probably low, and if she did not want to go to the ER, then she needed to come in and get her labs drawn because she most likely needs fluids or blood. Patient verbalized understanding and said she would come in for labs.

## 2023-03-13 NOTE — TELEPHONE ENCOUNTER
RN called patient after she got her labs drawn, let her know that her hemoglobin was 7.5 and Dr. Rich wanted her to get 1 unit of blood. Told patient that infusion couldn't get her in until 12:30-1:00. Advised patient to get type and screen drawn in lab beforehand to speed up the transfusion process. Patient said that she preferred to wait until they started the IV in infusion.

## 2023-03-14 LAB
BH BB BLOOD EXPIRATION DATE: NORMAL
BH BB BLOOD TYPE BARCODE: 9500
BH BB DISPENSE STATUS: NORMAL
BH BB PRODUCT CODE: NORMAL
BH BB UNIT NUMBER: NORMAL
CROSSMATCH INTERPRETATION: NORMAL
UNIT  ABO: NORMAL
UNIT  RH: NORMAL

## 2023-03-15 ENCOUNTER — OFFICE VISIT (OUTPATIENT)
Dept: PSYCHIATRY | Facility: CLINIC | Age: 68
End: 2023-03-15
Payer: MEDICARE

## 2023-03-15 DIAGNOSIS — F33.0 MDD (MAJOR DEPRESSIVE DISORDER), RECURRENT EPISODE, MILD: Primary | ICD-10-CM

## 2023-03-15 PROCEDURE — 99443 PR PHYS/QHP TELEPHONE EVALUATION 21-30 MIN: CPT | Performed by: NURSE PRACTITIONER

## 2023-03-15 PROCEDURE — 1160F RVW MEDS BY RX/DR IN RCRD: CPT | Performed by: NURSE PRACTITIONER

## 2023-03-15 PROCEDURE — 1159F MED LIST DOCD IN RCRD: CPT | Performed by: NURSE PRACTITIONER

## 2023-03-15 NOTE — PROGRESS NOTES
THERAPY PROGRESS NOTE  03/15/23    Subjective   Alysia Sierra is a 68 y.o. female who met with the undersigned for a scheduled individual outpatient therapy session You have chosen to receive care through a telephone visit. Do you consent to use a telephone visit for your medical care today? Yes        Endometrial adenocarcinoma (HCC) Staging form: Corpus Uteri - Carcinoma And Carcinosarcoma, AJCC 8th Edition  - Pathologic stage from 10/28/2022: FIGO Stage IIIA (pT3a, pN0(sn), cM0) - Signed by Jasmine Rich MD on 11/12/2022      Antineoplastic chemotherapy induced anemia      Stage 3a chronic kidney disease (HCC)      Neuropathy      Chief Complaint: MDD and ROYAL    Therapy:  Start Time: 1100a   Stop Time:  1130    (30 ) minutes was spent for psychotherapy. Assisted patient in processing patient's MDD /anxiety. Acknowledged and normalized patient's thoughts, feelings, and concerns. Utilized cognitive behavioral therapy to assist the patient in recognizing more appropriate coping mechanisms when she becomes agitated/anxious/depressed which are proven effective in reducing the severity of frequency of symptoms.     CLINICAL MANEUVERING/INTERVENTION:   Patient talked about current stressors, primarily having a difficult time dealing with health challenges . Venting of concerns was conducted. Feelings were processed and validated, both negative and positive. Flushing out worries and concerns was conducted related to cancer treatment, independence challenges in order to diminish emotional tension. Processing current treatment side effects was conducted. Ways in which patient may take stress off herself in a purposeful manner was discussed. Patient was assisted in 'talking out' what she may do if health continues to be a challenge and resources for her, keeping in mind the notion that there is typically a solution to any given problem. Utilized motivational interviewing techniques including complex reflections to  attempt to assist the patient and focusing on the positive and to maintain and encourage calming outlook. Venting of concerns continued regarding work. The patient expressed gratitude for today's session and said that counseling helps feel better.        Appearance: na  Hygiene: reports  good  Cooperation:  Cooperative  Eye Contact:  na  Psychomotor Behavior:  No psychomotor agitation/retardation, No EPS, No motor tics  Mood:  within normal limits  Affect:  na  Hopelessness: Denies  Speech:  Normal  Thought Process:  Linear  Thought Content:  Normal  Concentration: Normal   Suicidal:  None  Homicidal:  None  Hallucinations:  None  Delusion:  None  Memory:  Intact  Orientation:  Person, Place, Time and Situation  Reliability:  good  Insight:  Fair  Judgement: good  Impulse Control: good  Estimated Intelligence: average range    ROYAL-7:  3.15.2023  Over the last two weeks, how often have you been bothered by the following problems?  Feeling nervous, anxious or on edge: Several days  Not being able to stop or control worrying: Several days  Worrying too much about different things: Several days  Trouble Relaxing: Not at all  Being so restless that it is hard to sit still: Not at all  Becoming easily annoyed or irritable: More than half the days  Feeling afraid as if something awful might happen: Several days (related to chemo side effects)  ROYAL 7 Total Score: 6  If you checked any problems, how difficult have these problems made it for you to do your work, take care of things at home, or get along with other people: Not difficult at all  0-4: Minimal anxiety  5-9: Mild anxiety  10-14: Moderate anxiety  15-21: Severe anxiety    PHQ-9:  PHQ-2/PHQ-9 Depression Screening 3/15/2023   Little Interest or Pleasure in Doing Things 0-->not at all   Feeling Down, Depressed or Hopeless 1-->several days   Trouble Falling or Staying Asleep, or Sleeping Too Much -   Feeling Tired or Having Little Energy -   Poor Appetite or Overeating  -   Feeling Bad about Yourself - or that You are a Failure or Have Let Yourself or Your Family Down -   Trouble Concentrating on Things, Such as Reading the Newspaper or Watching Television -   Moving or Speaking So Slowly that Other People Could Have Noticed? Or the Opposite - Being So Fidgety -   Thoughts that You Would be Better Off Dead or of Hurting Yourself in Some Way -   PHQ-9: Brief Depression Severity Measure Score 1   If You Checked Off Any Problems, How Difficult Have These Problems Made It For You to Do Your Work, Take Care of Things at Home, or Get Along with Other People? -      5-9: Minimal symptoms  10-14: Mild depression  15-19: Moderate depression  Greater than 20: Major depression sever    ASSESSMENT: MDD, ROAYL    PATIENTS SUPPORT NETWORK INCLUDES:  FUNCTIONAL STATUS: NO IMPAIRMENT  PROGNOSIS: FAIR WITH ONGOING TREATMENT    PLAN: cont med management through her PMHNP at [facility] in [place]     Patient will continue therapy with this provider . Provide Cognitive Behavioral Therapy and Solution Focused Therapy to improve functioning, maintain stability, and avoid decompensation and the need for higher level of care. Instructed to call for questions or concerns and return early if necessary.      Return in about 4 weeks (around 4/12/2023). telephone therapy

## 2023-03-19 DIAGNOSIS — M54.12 CERVICAL RADICULOPATHY AT C6: ICD-10-CM

## 2023-03-20 ENCOUNTER — CLINICAL SUPPORT (OUTPATIENT)
Dept: INTERNAL MEDICINE | Facility: CLINIC | Age: 68
End: 2023-03-20
Payer: MEDICARE

## 2023-03-20 DIAGNOSIS — M54.12 CERVICAL RADICULOPATHY AT C6: ICD-10-CM

## 2023-03-20 DIAGNOSIS — E53.8 B12 DEFICIENCY: ICD-10-CM

## 2023-03-20 PROCEDURE — 96372 THER/PROPH/DIAG INJ SC/IM: CPT | Performed by: NURSE PRACTITIONER

## 2023-03-20 RX ORDER — GABAPENTIN 300 MG/1
300 CAPSULE ORAL NIGHTLY
Qty: 30 CAPSULE | Refills: 6 | Status: SHIPPED | OUTPATIENT
Start: 2023-03-20

## 2023-03-20 RX ORDER — GABAPENTIN 100 MG/1
CAPSULE ORAL
Qty: 30 CAPSULE | Refills: 6 | Status: SHIPPED | OUTPATIENT
Start: 2023-03-20

## 2023-03-20 RX ORDER — GABAPENTIN 100 MG/1
100 CAPSULE ORAL EVERY MORNING
Qty: 30 CAPSULE | Refills: 6 | Status: CANCELLED | OUTPATIENT
Start: 2023-03-20 | End: 2024-03-19

## 2023-03-20 RX ADMIN — CYANOCOBALAMIN 1000 MCG: 1000 INJECTION, SOLUTION INTRAMUSCULAR; SUBCUTANEOUS at 12:13

## 2023-03-20 NOTE — TELEPHONE ENCOUNTER
Rx Refill Note  Requested Prescriptions     Pending Prescriptions Disp Refills   • gabapentin (Neurontin) 300 MG capsule 30 capsule 6     Sig: Take 1 capsule by mouth 1 (One) Time for 1 dose.      Last filled:09/07/2022 with 6 refills. 100mg refill sent in separate encounter. Pending to provider.  Last office visit with prescribing clinician: 9/7/2022      Next office visit with prescribing clinician: 6/26/2023     Karlos Luna MA  03/20/23, 08:47 EDT

## 2023-03-20 NOTE — TELEPHONE ENCOUNTER
Caller: Alysia Sierra    Relationship: Self    Best call back number: [phone]    Requested Prescriptions:   Requested Prescriptions     Pending Prescriptions Disp Refills   • gabapentin (Neurontin) 100 MG capsule 30 capsule 6     Sig: Take 1 capsule by mouth Every Morning.   • gabapentin (Neurontin) 300 MG capsule 30 capsule 6     Sig: Take 1 capsule by mouth 1 (One) Time for 1 dose.        Pharmacy where request should be sent: [facility] [id number] [address] [phone] [facility] [phone] FX     Additional details provided by patient: PATIENT SAID ON  MG SHE HAS 3 PILLS REMAINING. ON  MG SHE HAS ABOUT 7 DAYS REMAINING.  Does the patient have less than a 3 day supply:  [x] Yes  [x] No    Would you like a call back once the refill request has been completed: [] Yes [x] No    If the office needs to give you a call back, can they leave a voicemail: [x] Yes [] No    Jessi Myers Rep   03/20/23 08:39 EDT

## 2023-03-20 NOTE — TELEPHONE ENCOUNTER
Rx Refill Note  Requested Prescriptions     Pending Prescriptions Disp Refills   • gabapentin (NEURONTIN) 100 MG capsule [Pharmacy Med Name: GABAPENTIN 100 MG CAPSULE] 30 capsule      Sig: TAKE ONE CAPSULE BY MOUTH EVERY MORNING      Last filled:09/07/22 with 6 refills pending to provider.  Last office visit with prescribing clinician: 9/7/2022      Next office visit with prescribing clinician: 6/26/2023     Karlos Luna MA  03/20/23, 08:33 EDT

## 2023-03-23 ENCOUNTER — OFFICE VISIT (OUTPATIENT)
Dept: GYNECOLOGIC ONCOLOGY | Facility: CLINIC | Age: 68
End: 2023-03-23
Payer: MEDICARE

## 2023-03-23 ENCOUNTER — HOSPITAL ENCOUNTER (OUTPATIENT)
Dept: ONCOLOGY | Facility: HOSPITAL | Age: 68
Discharge: HOME | End: 2023-03-23
Admitting: OBSTETRICS & GYNECOLOGY
Payer: MEDICARE

## 2023-03-23 ENCOUNTER — APPOINTMENT (OUTPATIENT)
Dept: ONCOLOGY | Facility: HOSPITAL | Age: 68
End: 2023-03-23
Payer: MEDICARE

## 2023-03-23 VITALS
OXYGEN SATURATION: 99 % | BODY MASS INDEX: 21.92 KG/M2 | RESPIRATION RATE: 17 BRPM | SYSTOLIC BLOOD PRESSURE: 162 MMHG | TEMPERATURE: 97.3 F | DIASTOLIC BLOOD PRESSURE: 88 MMHG | HEIGHT: 64 IN | HEART RATE: 99 BPM | WEIGHT: 128.4 LBS

## 2023-03-23 DIAGNOSIS — G62.9 NEUROPATHY: ICD-10-CM

## 2023-03-23 DIAGNOSIS — C54.1 ENDOMETRIAL ADENOCARCINOMA: Primary | ICD-10-CM

## 2023-03-23 DIAGNOSIS — T45.1X5A ANTINEOPLASTIC CHEMOTHERAPY INDUCED ANEMIA: ICD-10-CM

## 2023-03-23 DIAGNOSIS — C54.1 ENDOMETRIAL ADENOCARCINOMA: ICD-10-CM

## 2023-03-23 DIAGNOSIS — N18.31 STAGE 3A CHRONIC KIDNEY DISEASE: ICD-10-CM

## 2023-03-23 DIAGNOSIS — Z01.818 EXAMINATION PRIOR TO CHEMOTHERAPY: Primary | ICD-10-CM

## 2023-03-23 DIAGNOSIS — D64.81 ANTINEOPLASTIC CHEMOTHERAPY INDUCED ANEMIA: ICD-10-CM

## 2023-03-23 LAB
ALBUMIN SERPL-MCNC: 4.3 G/DL (ref 3.5–5.2)
ALBUMIN/GLOB SERPL: 1.6 G/DL
ALP SERPL-CCNC: 75 U/L (ref 39–117)
ALT SERPL W P-5'-P-CCNC: 7 U/L (ref 1–33)
ANION GAP SERPL CALCULATED.3IONS-SCNC: 12 MMOL/L (ref 5–15)
AST SERPL-CCNC: 23 U/L (ref 1–32)
BASOPHILS # BLD AUTO: 0.01 10*3/MM3 (ref 0–0.2)
BASOPHILS NFR BLD AUTO: 0.2 % (ref 0–1.5)
BILIRUB SERPL-MCNC: 0.2 MG/DL (ref 0–1.2)
BUN SERPL-MCNC: 12 MG/DL (ref 8–23)
BUN/CREAT SERPL: 13.2 (ref 7–25)
CALCIUM SPEC-SCNC: 10 MG/DL (ref 8.6–10.5)
CANCER AG125 SERPL QL: 27.9 U/ML (ref 0–38.1)
CHLORIDE SERPL-SCNC: 102 MMOL/L (ref 98–107)
CO2 SERPL-SCNC: 26 MMOL/L (ref 22–29)
CREAT SERPL-MCNC: 0.91 MG/DL (ref 0.57–1)
DEPRECATED RDW RBC AUTO: 82.8 FL (ref 37–54)
EGFRCR SERPLBLD CKD-EPI 2021: 69.3 ML/MIN/1.73
EOSINOPHIL # BLD AUTO: 0 10*3/MM3 (ref 0–0.4)
EOSINOPHIL NFR BLD AUTO: 0 % (ref 0.3–6.2)
ERYTHROCYTE [DISTWIDTH] IN BLOOD BY AUTOMATED COUNT: 20.6 % (ref 12.3–15.4)
GLOBULIN UR ELPH-MCNC: 2.7 GM/DL
GLUCOSE SERPL-MCNC: 123 MG/DL (ref 65–99)
HCT VFR BLD AUTO: 27.9 % (ref 34–46.6)
HGB BLD-MCNC: 8.5 G/DL (ref 12–15.9)
IMM GRANULOCYTES # BLD AUTO: 0.03 10*3/MM3 (ref 0–0.05)
IMM GRANULOCYTES NFR BLD AUTO: 0.5 % (ref 0–0.5)
LYMPHOCYTES # BLD AUTO: 0.72 10*3/MM3 (ref 0.7–3.1)
LYMPHOCYTES NFR BLD AUTO: 13 % (ref 19.6–45.3)
MCH RBC QN AUTO: 33.5 PG (ref 26.6–33)
MCHC RBC AUTO-ENTMCNC: 30.5 G/DL (ref 31.5–35.7)
MCV RBC AUTO: 109.8 FL (ref 79–97)
MONOCYTES # BLD AUTO: 0.2 10*3/MM3 (ref 0.1–0.9)
MONOCYTES NFR BLD AUTO: 3.6 % (ref 5–12)
NEUTROPHILS NFR BLD AUTO: 4.56 10*3/MM3 (ref 1.7–7)
NEUTROPHILS NFR BLD AUTO: 82.7 % (ref 42.7–76)
PLATELET # BLD AUTO: 216 10*3/MM3 (ref 140–450)
PMV BLD AUTO: 10.4 FL (ref 6–12)
POTASSIUM SERPL-SCNC: 4.6 MMOL/L (ref 3.5–5.2)
PROT SERPL-MCNC: 7 G/DL (ref 6–8.5)
RBC # BLD AUTO: 2.54 10*6/MM3 (ref 3.77–5.28)
SODIUM SERPL-SCNC: 140 MMOL/L (ref 136–145)
WBC NRBC COR # BLD: 5.52 10*3/MM3 (ref 3.4–10.8)

## 2023-03-23 PROCEDURE — 25010000002 CARBOPLATIN PER 50 MG: Performed by: OBSTETRICS & GYNECOLOGY

## 2023-03-23 PROCEDURE — 1160F RVW MEDS BY RX/DR IN RCRD: CPT | Performed by: OBSTETRICS & GYNECOLOGY

## 2023-03-23 PROCEDURE — 85025 COMPLETE CBC W/AUTO DIFF WBC: CPT | Performed by: OBSTETRICS & GYNECOLOGY

## 2023-03-23 PROCEDURE — 96413 CHEMO IV INFUSION 1 HR: CPT

## 2023-03-23 PROCEDURE — 3077F SYST BP >= 140 MM HG: CPT | Performed by: OBSTETRICS & GYNECOLOGY

## 2023-03-23 PROCEDURE — 25010000002 PALONOSETRON PER 25 MCG: Performed by: OBSTETRICS & GYNECOLOGY

## 2023-03-23 PROCEDURE — 96367 TX/PROPH/DG ADDL SEQ IV INF: CPT

## 2023-03-23 PROCEDURE — 1159F MED LIST DOCD IN RCRD: CPT | Performed by: OBSTETRICS & GYNECOLOGY

## 2023-03-23 PROCEDURE — 86304 IMMUNOASSAY TUMOR CA 125: CPT | Performed by: OBSTETRICS & GYNECOLOGY

## 2023-03-23 PROCEDURE — 25010000002 FOSAPREPITANT PER 1 MG: Performed by: OBSTETRICS & GYNECOLOGY

## 2023-03-23 PROCEDURE — 80053 COMPREHEN METABOLIC PANEL: CPT | Performed by: OBSTETRICS & GYNECOLOGY

## 2023-03-23 PROCEDURE — 96417 CHEMO IV INFUS EACH ADDL SEQ: CPT

## 2023-03-23 PROCEDURE — 96375 TX/PRO/DX INJ NEW DRUG ADDON: CPT

## 2023-03-23 PROCEDURE — 3079F DIAST BP 80-89 MM HG: CPT | Performed by: OBSTETRICS & GYNECOLOGY

## 2023-03-23 PROCEDURE — 99215 OFFICE O/P EST HI 40 MIN: CPT | Performed by: OBSTETRICS & GYNECOLOGY

## 2023-03-23 PROCEDURE — 1126F AMNT PAIN NOTED NONE PRSNT: CPT | Performed by: OBSTETRICS & GYNECOLOGY

## 2023-03-23 PROCEDURE — 25010000002 DOCETAXEL 20 MG/ML SOLUTION 8 ML VIAL: Performed by: OBSTETRICS & GYNECOLOGY

## 2023-03-23 RX ORDER — SODIUM CHLORIDE 9 MG/ML
250 INJECTION, SOLUTION INTRAVENOUS ONCE
Status: CANCELLED | OUTPATIENT
Start: 2023-03-23

## 2023-03-23 RX ORDER — OLANZAPINE 5 MG/1
5 TABLET ORAL ONCE
Status: COMPLETED | OUTPATIENT
Start: 2023-03-23 | End: 2023-03-23

## 2023-03-23 RX ORDER — PALONOSETRON 0.05 MG/ML
0.25 INJECTION, SOLUTION INTRAVENOUS ONCE
Status: COMPLETED | OUTPATIENT
Start: 2023-03-23 | End: 2023-03-23

## 2023-03-23 RX ORDER — PALONOSETRON 0.05 MG/ML
0.25 INJECTION, SOLUTION INTRAVENOUS ONCE
Status: CANCELLED | OUTPATIENT
Start: 2023-03-23

## 2023-03-23 RX ORDER — DIPHENHYDRAMINE HYDROCHLORIDE 50 MG/ML
50 INJECTION INTRAMUSCULAR; INTRAVENOUS AS NEEDED
Status: CANCELLED | OUTPATIENT
Start: 2023-03-23

## 2023-03-23 RX ORDER — SODIUM CHLORIDE 9 MG/ML
250 INJECTION, SOLUTION INTRAVENOUS ONCE
Status: COMPLETED | OUTPATIENT
Start: 2023-03-23 | End: 2023-03-23

## 2023-03-23 RX ORDER — FAMOTIDINE 10 MG/ML
20 INJECTION, SOLUTION INTRAVENOUS AS NEEDED
Status: CANCELLED | OUTPATIENT
Start: 2023-03-23

## 2023-03-23 RX ORDER — OLANZAPINE 5 MG/1
5 TABLET ORAL ONCE
Status: CANCELLED | OUTPATIENT
Start: 2023-03-23 | End: 2023-03-23

## 2023-03-23 RX ADMIN — SODIUM CHLORIDE 250 ML: 9 INJECTION, SOLUTION INTRAVENOUS at 11:01

## 2023-03-23 RX ADMIN — OLANZAPINE 5 MG: 5 TABLET, FILM COATED ORAL at 10:57

## 2023-03-23 RX ADMIN — SODIUM CHLORIDE 150 MG: 9 INJECTION, SOLUTION INTRAVENOUS at 11:11

## 2023-03-23 RX ADMIN — CARBOPLATIN 250 MG: 10 INJECTION, SOLUTION INTRAVENOUS at 12:54

## 2023-03-23 RX ADMIN — PALONOSETRON HYDROCHLORIDE 0.25 MG: 0.25 INJECTION, SOLUTION INTRAVENOUS at 11:04

## 2023-03-23 RX ADMIN — DOCETAXEL 95 MG: 20 INJECTION, SOLUTION, CONCENTRATE INTRAVENOUS at 11:47

## 2023-03-23 NOTE — PROGRESS NOTES
Chemotherapy Treatment Office Visit      Patient Name: Alysia Sierra  : 1955   MRN: [id number]     Chief Complaint:  Endometrial cancer, chemotherapy    History of Present Illness: Alysia Sierra is a 68 y.o. female who presents today for cycle 6 of carboplatin/docetaxel. Today, she is overall doing okay.  She is worried about her labs and whether she will require another transfusion. She reports tremendous improvement after receiving her last transfusion of 1 unit pRBC when her Hgb dropped to 7.5. Dizziness and neuropathy have been stable.  Occasional bone pain is manageable with oxycodone 2.5 mg, which she uses sparingly. She otherwise denies any vaginal bleeding or discharge.  No changes in her bowels or bladder.  She is tolerating regular diet and denies any nausea or vomiting.  Reports decreased but stable energy levels. She is still completing her ADLs.  She is using aids for mobility.    Oncologic History:  Oncology/Hematology History   Endometrial adenocarcinoma (HCC)   2022 Initial Diagnosis    2022: TVUS with uterus measuring 5.7 x 4.8 x 4.1 cm with an endometrial stripe thickness of 9.8 mm.  Right ovary not visualized.  Left ovary normal.  No free fluid.  2022: Saline infusion sonogram with an irregularly shaped prominent lesion on the posterior endometrial surface concerning for neoplasia.  10/6/2022: Endometrial biopsy with FIGO grade 1 endometrial cancer      10/28/2022 Surgery    Robotic-assisted total laparoscopic hysterectomy with bilateral salpingo-oophorectomy with bilateral SLND    Pathology with FIGO grade 2 endometrioid adenocarcinoma with 94% MI. Involvement of endocervix and uterine serosal adhesions. Negative parametrium and bilateral sentinel lymph nodes. MMR intact.     Surgery at [facility] by Jasmine Rich MD      Cancer Staged    Cancer Staging   Endometrial adenocarcinoma (HCC)  Staging form: Corpus Uteri - Carcinoma And Carcinosarcoma, AJCC 8th  "Edition  - Pathologic stage from 10/28/2022: FIGO Stage IIIA (pT3a, pN0(sn), cM0) - Signed by Jasmine Rich MD on 11/12/2022       10/28/2022 Cancer Staged    Staging form: Corpus Uteri - Carcinoma And Carcinosarcoma, AJCC 8th Edition  - Pathologic stage from 10/28/2022: FIGO Stage IIIA (pT3a, pN0(sn), cM0) - Signed by Jasmine Rich MD on 11/12/2022 12/8/2022 -  Chemotherapy    OP OVARIAN DOCEtaxel / CARBOplatin     1/9/2023 - 1/18/2023 Radiation    Radiation OncologyTreatment Course:  Alysia Sierra received 3000 cGy in 5 fractions to upper vagina via High Dose Radiation - HDR.     3/6/2023 -  Chemotherapy    OP SUPPORTIVE HYDRATION + ANTIEMETICS          I have reviewed and the following portions of the patient's history were updated as appropriate: past family history, past medical history, past social history, past surgical history, past obstetrics/gynecologic history and problem list.    Medications: The current medication list was reviewed with the patient and updated in the EMR this date per the Medical Assistant. Medication dosages and frequencies were confirmed to be accurate.      Allergies:   Allergies   Allergen Reactions   • Lisinopril Angioedema   • Metal Rash     Possible nickel -   Gold is only metal that doesn't have issues     • Milk-Related Compounds Other (See Comments)     LACTOSE INTOLERANT   • Tylenol [Acetaminophen] Other (See Comments)     ckd   • Atorvastatin Myalgia     Objective     Physical Exam:  Vital Signs:   Vitals:    03/23/23 0937   BP: 162/88   Pulse: 99   Resp: 17   Temp: 97.3 °F (36.3 °C)   TempSrc: Temporal   SpO2: 99%   Weight: 58.2 kg (128 lb 6.4 oz)   Height: 162.6 cm (64.02\")   PainSc: 0-No pain     BMI: Body mass index is 22.03 kg/m².   ECOG score: 0          PHQ-2 Depression Screening  Little interest or pleasure in doing things?     Feeling down, depressed, or hopeless?     PHQ-2 Total Score       Physical Examination:   General appearance - alert, well " appearing, and in no distress and oriented to person, place, and time  Mental status - normal mood, behavior, speech, dress, motor activity, and thought processes  Heart - normal rate, regular rhythm, normal S1, S2, no murmurs  Lungs - normal respiratory effort, clear to auscultation bilaterally  Abdomen - soft, nontender, nondistended, no masses or organomegaly  Pelvic - deferred  Neurological - alert, oriented, normal speech, no focal findings or movement disorder noted, using cane PRN given neuropathy  Musculoskeletal - no joint tenderness, deformity or swelling  Extremities - no pedal edema, no clubbing or cyanosis  Skin - normal coloration and turgor, no rashes, no suspicious skin lesions noted     Labs:  Lab Results   Component Value Date    WBC 5.52 03/23/2023    HGB 8.5 (L) 03/23/2023    HCT 27.9 (L) 03/23/2023    .8 (H) 03/23/2023     03/23/2023       Lab Results   Component Value Date    GLUCOSE 123 (H) 03/23/2023    BUN 12 03/23/2023    CREATININE 0.91 03/23/2023    EGFRIFNONA 32 (L) 07/13/2021    EGFRIFAFRI 38 (L) 07/13/2021    BCR 13.2 03/23/2023    K 4.6 03/23/2023    CO2 26.0 03/23/2023    CALCIUM 10.0 03/23/2023    ALBUMIN 4.3 03/23/2023    AST 23 03/23/2023    ALT 7 03/23/2023     Lab Results   Component Value Date     27.9 03/23/2023       Assessment / Plan    Alysia Sierra is a 68 y.o. year old female with Stage IIIA endometrial cancer who presents for cycle 6 of carboplatin (AUC 5)/ docetaxel (67.5 mg/m2). She has also completed VBT. Her last cycle was complicated by grade 3 symptomatic anemia requiring transfusion. We will therefore dose-reduce both the docetaxel and carboplatin for this cycle. We will also plan for labs on C6D7 to assess for anemia and plan for transfusion of 1 unit if needed. She is cleared for treatment today. CT C/A/P ordered. I will see her back in 3-4 weeks to discuss results.    Chemotherapy-induced anemia: Grade 3. See above for plan.    CKD Stage  III - Creatinine normal today. Continue nephrology follow up    Peripheral neuropathy - Improved with dose reduction of docetaxol with cycle 4. Baseline neuropathy controlled with cymbalta and gabapentin. Managed by neurology. Oxycodone 2.5 mg as needed.     Major depressive disorder - Sees Cassie Morin. Continues duloxetine BID.    Diagnoses and all orders for this visit:    1. Examination prior to chemotherapy (Primary)    2. Endometrial adenocarcinoma (HCC)  -     CBC and Differential; Future  -     Comprehensive metabolic panel; Future  -     ; Future  -     Lab Appointment Request; Future  -     Clinic Appointment Request; Future  -     Infusion Appointment Request 3; Future  -     CT Abdomen Pelvis With Contrast; Future  -     CT Chest With Contrast; Future  -     CBC & Differential; Future  -     Type & Screen; Future  -     Cancel: sodium chloride 0.9 % infusion 250 mL  -     Cancel: OLANZapine (zyPREXA) tablet 5 mg  -     Cancel: palonosetron (ALOXI) injection 0.25 mg  -     Cancel: fosaprepitant (EMEND) 150 mg in sodium chloride 0.9 % 100 mL IVPB  -     Cancel: DOCEtaxel 95 mg in sodium chloride 0.9 % 254.8 mL chemo IVPB  -     Cancel: CARBOplatin (PARAPLATIN) 250 mg in sodium chloride 0.9 % 275 mL chemo IVPB    3. Antineoplastic chemotherapy induced anemia    4. Stage 3a chronic kidney disease (HCC)    5. Neuropathy    Other orders  -     Hydrocortisone Sod Suc (PF) (Solu-CORTEF) injection 100 mg  -     diphenhydrAMINE (BENADRYL) injection 50 mg  -     famotidine (PEPCID) injection 20 mg       Follow Up: 3 weeks. Plan for CT scans given completion of 6 cycles chemotherapy.     Sam Gayle MD   Resident Physician, PGY 3  Gynecologic Oncology     Patient was seen and examined with Dr. Gayle,  resident, who performed portions of the examination and documentation for this patient's care under my direct supervision.  I agree with the above documentation and plan.    Jasmine Rich,  MD  Gynecologic Oncology

## 2023-03-24 ENCOUNTER — PATIENT OUTREACH (OUTPATIENT)
Dept: CASE MANAGEMENT | Facility: OTHER | Age: 68
End: 2023-03-24
Payer: MEDICARE

## 2023-03-24 ENCOUNTER — TELEPHONE (OUTPATIENT)
Dept: GYNECOLOGIC ONCOLOGY | Facility: CLINIC | Age: 68
End: 2023-03-24
Payer: MEDICARE

## 2023-03-24 NOTE — TELEPHONE ENCOUNTER
----- Message from Jasmine Rich MD sent at 3/23/2023  4:48 PM EDT -----  Please let Alysia ALLIE Sierra know that her CA-125 is low and normal.

## 2023-03-24 NOTE — TELEPHONE ENCOUNTER
Called to give PT  results. Left a VM asking PT to call office to get results. Will try calling again later today

## 2023-03-24 NOTE — OUTREACH NOTE
AMBULATORY CASE MANAGEMENT NOTE    Name and Relationship of Patient/Support Person: Alysia Sierra P - Self    Patient Outreach    Patient reports she is feeling well after blood transfusion.  Completed final chemotherapy treatment yesterday.  States she is eating and hydrating well, denies problems with bowel or bladder.  Pain controlled.  Excited to start exercise program, discussed starting with mild to moderate exercise to work on increasing strength and tolerance.  Reports she went shopping this week, was able to visit 3 stores before tiring out. Discussed moving to Case management monitoring phase, patient agreeable. Denies SDGABBY CROSS  Ambulatory Case Management    3/24/2023, 11:44 EDT

## 2023-03-30 ENCOUNTER — HOSPITAL ENCOUNTER (OUTPATIENT)
Dept: ONCOLOGY | Facility: HOSPITAL | Age: 68
Discharge: HOME | End: 2023-03-30
Payer: MEDICARE

## 2023-03-30 VITALS
SYSTOLIC BLOOD PRESSURE: 110 MMHG | WEIGHT: 129 LBS | BODY MASS INDEX: 22.02 KG/M2 | TEMPERATURE: 97 F | HEART RATE: 102 BPM | RESPIRATION RATE: 18 BRPM | DIASTOLIC BLOOD PRESSURE: 56 MMHG | HEIGHT: 64 IN

## 2023-03-30 DIAGNOSIS — C54.1 ENDOMETRIAL ADENOCARCINOMA: ICD-10-CM

## 2023-03-30 LAB
BASOPHILS # BLD AUTO: 0 10*3/MM3 (ref 0–0.2)
BASOPHILS NFR BLD AUTO: 0 % (ref 0–1.5)
DEPRECATED RDW RBC AUTO: 72.6 FL (ref 37–54)
EOSINOPHIL # BLD AUTO: 0.03 10*3/MM3 (ref 0–0.4)
EOSINOPHIL NFR BLD AUTO: 1.5 % (ref 0.3–6.2)
ERYTHROCYTE [DISTWIDTH] IN BLOOD BY AUTOMATED COUNT: 18.7 % (ref 12.3–15.4)
HCT VFR BLD AUTO: 27.1 % (ref 34–46.6)
HGB BLD-MCNC: 8.7 G/DL (ref 12–15.9)
IMM GRANULOCYTES # BLD AUTO: 0.02 10*3/MM3 (ref 0–0.05)
IMM GRANULOCYTES NFR BLD AUTO: 1 % (ref 0–0.5)
LYMPHOCYTES # BLD AUTO: 1.69 10*3/MM3 (ref 0.7–3.1)
LYMPHOCYTES NFR BLD AUTO: 84.9 % (ref 19.6–45.3)
MCH RBC QN AUTO: 33.6 PG (ref 26.6–33)
MCHC RBC AUTO-ENTMCNC: 32.1 G/DL (ref 31.5–35.7)
MCV RBC AUTO: 104.6 FL (ref 79–97)
MONOCYTES # BLD AUTO: 0.07 10*3/MM3 (ref 0.1–0.9)
MONOCYTES NFR BLD AUTO: 3.5 % (ref 5–12)
NEUTROPHILS NFR BLD AUTO: 0.18 10*3/MM3 (ref 1.7–7)
NEUTROPHILS NFR BLD AUTO: 9.1 % (ref 42.7–76)
PLATELET # BLD AUTO: 238 10*3/MM3 (ref 140–450)
PMV BLD AUTO: 9.7 FL (ref 6–12)
RBC # BLD AUTO: 2.59 10*6/MM3 (ref 3.77–5.28)
WBC NRBC COR # BLD: 1.99 10*3/MM3 (ref 3.4–10.8)

## 2023-03-30 PROCEDURE — 85025 COMPLETE CBC W/AUTO DIFF WBC: CPT | Performed by: OBSTETRICS & GYNECOLOGY

## 2023-03-30 PROCEDURE — 96360 HYDRATION IV INFUSION INIT: CPT

## 2023-03-30 PROCEDURE — 96361 HYDRATE IV INFUSION ADD-ON: CPT

## 2023-03-30 RX ORDER — SODIUM CHLORIDE 9 MG/ML
1000 INJECTION, SOLUTION INTRAVENOUS CONTINUOUS
Status: DISCONTINUED | OUTPATIENT
Start: 2023-03-30 | End: 2023-03-31 | Stop reason: HOSPADM

## 2023-03-30 RX ADMIN — SODIUM CHLORIDE 1000 ML: 9 INJECTION, SOLUTION INTRAVENOUS at 09:10

## 2023-04-03 ENCOUNTER — TELEPHONE (OUTPATIENT)
Dept: INTERNAL MEDICINE | Facility: CLINIC | Age: 68
End: 2023-04-03
Payer: MEDICARE

## 2023-04-03 DIAGNOSIS — F33.0 MILD EPISODE OF RECURRENT MAJOR DEPRESSIVE DISORDER: ICD-10-CM

## 2023-04-03 RX ORDER — DULOXETIN HYDROCHLORIDE 20 MG/1
20 CAPSULE, DELAYED RELEASE ORAL 2 TIMES DAILY
Qty: 60 CAPSULE | Refills: 0 | Status: SHIPPED | OUTPATIENT
Start: 2023-04-03 | End: 2023-04-18 | Stop reason: SDUPTHER

## 2023-04-03 NOTE — TELEPHONE ENCOUNTER
Called and informed pt that because she is established with Cassie Morin for Psychiatry, she will need to get this medication from her. Pt verbalized her understanding.

## 2023-04-03 NOTE — TELEPHONE ENCOUNTER
PATIENT CALLED AND STATED THAT SHE WAS SEEING A THERAPIST AT [facility] BUT THEY HAVE SINCE QUIT AND SHE'S NOW WITHOUT A REFILL ON THE CYMBALTA SCRIPT. PATIENT STATED THAT SHE DISCUSSED HAVING THIS FILLED WITH [facility] BEFORE BUT DID NOT BECAUSE IT WAS ALREADY BEING FILLED BY ANOTHER PROVIDER. PATIENT IS NOW WITHOUT A PROVIDER TO REFILL THIS AND WANTED TO KNOW IF ALEX BETANCOURT WOULD REFILL. PLEASE ADVISE.

## 2023-04-03 NOTE — TELEPHONE ENCOUNTER
Caller: Alysia Sierra    Relationship: Self    Best call back number: [phone]    What was the call regarding: DULoxetine (Cymbalta) 20 MG capsule    Do you require a callback: PATIENT NEEDS A TEMPORARY REFILL ON THIS UNTIL APPOINTMENT 4/11.       [facility] [id number] [address] - [phone] [facility] [phone]   [phone]

## 2023-04-04 ENCOUNTER — HOSPITAL ENCOUNTER (OUTPATIENT)
Dept: ONCOLOGY | Facility: HOSPITAL | Age: 68
Discharge: HOME | End: 2023-04-04
Admitting: OBSTETRICS & GYNECOLOGY
Payer: MEDICARE

## 2023-04-04 ENCOUNTER — TELEPHONE (OUTPATIENT)
Dept: GYNECOLOGIC ONCOLOGY | Facility: CLINIC | Age: 68
End: 2023-04-04
Payer: MEDICARE

## 2023-04-04 VITALS
HEART RATE: 99 BPM | DIASTOLIC BLOOD PRESSURE: 59 MMHG | HEIGHT: 64 IN | BODY MASS INDEX: 22.02 KG/M2 | SYSTOLIC BLOOD PRESSURE: 109 MMHG | TEMPERATURE: 96.8 F | WEIGHT: 129 LBS | RESPIRATION RATE: 16 BRPM

## 2023-04-04 DIAGNOSIS — R42 DIZZINESS: Primary | ICD-10-CM

## 2023-04-04 DIAGNOSIS — C54.1 ENDOMETRIAL ADENOCARCINOMA: ICD-10-CM

## 2023-04-04 DIAGNOSIS — C54.1 ENDOMETRIAL ADENOCARCINOMA: Primary | ICD-10-CM

## 2023-04-04 LAB
ALBUMIN SERPL-MCNC: 3.8 G/DL (ref 3.5–5.2)
ALBUMIN/GLOB SERPL: 1.7 G/DL
ALP SERPL-CCNC: 93 U/L (ref 39–117)
ALT SERPL W P-5'-P-CCNC: 10 U/L (ref 1–33)
ANION GAP SERPL CALCULATED.3IONS-SCNC: 13 MMOL/L (ref 5–15)
AST SERPL-CCNC: 24 U/L (ref 1–32)
BASOPHILS # BLD AUTO: 0.01 10*3/MM3 (ref 0–0.2)
BASOPHILS NFR BLD AUTO: 0.4 % (ref 0–1.5)
BILIRUB SERPL-MCNC: 0.3 MG/DL (ref 0–1.2)
BUN SERPL-MCNC: 13 MG/DL (ref 8–23)
BUN/CREAT SERPL: 11.8 (ref 7–25)
CALCIUM SPEC-SCNC: 8.3 MG/DL (ref 8.6–10.5)
CHLORIDE SERPL-SCNC: 100 MMOL/L (ref 98–107)
CO2 SERPL-SCNC: 21 MMOL/L (ref 22–29)
CREAT SERPL-MCNC: 1.1 MG/DL (ref 0.57–1)
DEPRECATED RDW RBC AUTO: 73.1 FL (ref 37–54)
EGFRCR SERPLBLD CKD-EPI 2021: 55.2 ML/MIN/1.73
EOSINOPHIL # BLD AUTO: 0.01 10*3/MM3 (ref 0–0.4)
EOSINOPHIL NFR BLD AUTO: 0.4 % (ref 0.3–6.2)
ERYTHROCYTE [DISTWIDTH] IN BLOOD BY AUTOMATED COUNT: 19.6 % (ref 12.3–15.4)
GLOBULIN UR ELPH-MCNC: 2.3 GM/DL
GLUCOSE SERPL-MCNC: 112 MG/DL (ref 65–99)
HCT VFR BLD AUTO: 25.4 % (ref 34–46.6)
HGB BLD-MCNC: 8.3 G/DL (ref 12–15.9)
IMM GRANULOCYTES # BLD AUTO: 0.02 10*3/MM3 (ref 0–0.05)
IMM GRANULOCYTES NFR BLD AUTO: 0.7 % (ref 0–0.5)
LYMPHOCYTES # BLD AUTO: 1.51 10*3/MM3 (ref 0.7–3.1)
LYMPHOCYTES NFR BLD AUTO: 55.1 % (ref 19.6–45.3)
MCH RBC QN AUTO: 34.3 PG (ref 26.6–33)
MCHC RBC AUTO-ENTMCNC: 32.7 G/DL (ref 31.5–35.7)
MCV RBC AUTO: 105 FL (ref 79–97)
MONOCYTES # BLD AUTO: 0.9 10*3/MM3 (ref 0.1–0.9)
MONOCYTES NFR BLD AUTO: 32.8 % (ref 5–12)
NEUTROPHILS NFR BLD AUTO: 0.29 10*3/MM3 (ref 1.7–7)
NEUTROPHILS NFR BLD AUTO: 10.6 % (ref 42.7–76)
PLATELET # BLD AUTO: 157 10*3/MM3 (ref 140–450)
PMV BLD AUTO: 9.4 FL (ref 6–12)
POTASSIUM SERPL-SCNC: 4.1 MMOL/L (ref 3.5–5.2)
PROT SERPL-MCNC: 6.1 G/DL (ref 6–8.5)
RBC # BLD AUTO: 2.42 10*6/MM3 (ref 3.77–5.28)
SODIUM SERPL-SCNC: 134 MMOL/L (ref 136–145)
WBC NRBC COR # BLD: 2.74 10*3/MM3 (ref 3.4–10.8)

## 2023-04-04 PROCEDURE — 80053 COMPREHEN METABOLIC PANEL: CPT | Performed by: OBSTETRICS & GYNECOLOGY

## 2023-04-04 PROCEDURE — 85025 COMPLETE CBC W/AUTO DIFF WBC: CPT | Performed by: OBSTETRICS & GYNECOLOGY

## 2023-04-04 PROCEDURE — 96360 HYDRATION IV INFUSION INIT: CPT

## 2023-04-04 RX ADMIN — SODIUM CHLORIDE 1000 ML: 9 INJECTION, SOLUTION INTRAVENOUS at 15:35

## 2023-04-04 NOTE — TELEPHONE ENCOUNTER
Patient phoned. She had her last chemo about a week ago. She is feeling very dizzy, nauseated and shaky. She said she had her blood drawn last week and she was a little anemic but not enough for transfusion. She looked back to her blood after her 5th chemo and this is about the time that she got really sick and she wants to try to be proactive so she doesn't fel like that again. Please advise.

## 2023-04-04 NOTE — TELEPHONE ENCOUNTER
RN called patient and reviewed her concerns. RN offered fluid treatment and labs per MD suggestion. Pt agreed to this plan of care. Will schedule for fluids today if possible.

## 2023-04-12 ENCOUNTER — OFFICE VISIT (OUTPATIENT)
Dept: PSYCHIATRY | Facility: CLINIC | Age: 68
End: 2023-04-12
Payer: MEDICARE

## 2023-04-12 DIAGNOSIS — F33.0 MDD (MAJOR DEPRESSIVE DISORDER), RECURRENT EPISODE, MILD: Primary | ICD-10-CM

## 2023-04-12 PROCEDURE — 1160F RVW MEDS BY RX/DR IN RCRD: CPT | Performed by: NURSE PRACTITIONER

## 2023-04-12 PROCEDURE — 1159F MED LIST DOCD IN RCRD: CPT | Performed by: NURSE PRACTITIONER

## 2023-04-12 PROCEDURE — 90834 PSYTX W PT 45 MINUTES: CPT | Performed by: NURSE PRACTITIONER

## 2023-04-12 NOTE — PROGRESS NOTES
THERAPY PROGRESS NOTE  04/12/23    Subjective   Alysia Sierra is a single retired 68 y.o. female who met with the undersigned for a scheduled individual outpatient therapy session,You have chosen to receive care through a telephone visit. Do you consent to use a telephone visit for your medical care today? Yes   Pt is s/p surgery, radiation and chemotherapy.      Endometrial adenocarcinoma (HCC) Staging form: Corpus Uteri - Carcinoma And Carcinosarcoma, AJCC 8th Edition  - Pathologic stage from 10/28/2022: FIGO Stage IIIA (pT3a, pN0(sn), cM0) - Signed by Jasmine Rich MD on 11/12/2022      Antineoplastic chemotherapy induced anemia      Stage 3a chronic kidney disease (HCC)      Neuropathy    Chief Complaint: MDD    Therapy:  Start Time: 1110   Stop Time: 1155  ( 45) minutes was spent for psychotherapy. Assisted patient in processing patient's . Acknowledged and normalized patient's thoughts, feelings, and concerns. Utilized cognitive behavioral therapy to assist the patient in recognizing more appropriate coping mechanisms when she becomes depressed which are proven effective in reducing the severity of frequency of symptoms.     CLINICAL MANEUVERING/INTERVENTION:   Patient talked about current stressors, primarily recovering from chemotherapy and relationships, financial strain. Venting of frustrations was conducted. Feelings were processed and validated, both negative and positive. Flushing out worries and concerns was conducted in order to diminish emotional tension. Processing current treatment was conducted. Ways in which patient may take stress off  herself in a purposeful manner was discussed. Utilized motivational interviewing techniques including complex reflections to attempt to assist the patient and focusing on the positive and to maintain and encourage calming outlook.The patient expressed gratitude for today's session and said that counseling helps feel better.        Appearance: na  Hygiene:  reports  good  Cooperation:  Cooperative  Eye Contact:  na  Psychomotor Behavior:  No psychomotor agitation/retardation, No EPS, No motor tics  Mood:  Somewhat dysphoric  Affect:  Appropriate  Hopelessness: Denies  Speech:  Normal  Thought Process:  Linear  Thought Content:  Normal  Concentration: Normal   Suicidal:  None  Homicidal:  None  Hallucinations:  None  Delusion:  None  Memory:  Intact  Orientation:  Person, Place, Time and Situation  Reliability:  good  Insight:  Fair  Judgement: good  Impulse Control: good  Estimated Intelligence: average range        ASSESSMENT: MDD    PATIENTS SUPPORT NETWORK INCLUDES: sister  FUNCTIONAL STATUS: NO IMPAIRMENT  PROGNOSIS: FAIR WITH ONGOING TREATMENT    PLAN:    Patient will continue therapy and  adhere to medication regimen as prescribed. Provide Cognitive Behavioral Therapy and Solution Focused Therapy to improve functioning, maintain stability, and avoid decompensation and the need for higher level of care. Instructed to call for questions or concerns and return early if necessary.      Return in about 4 weeks (around 5/10/2023).

## 2023-04-13 ENCOUNTER — TELEPHONE (OUTPATIENT)
Dept: GYNECOLOGIC ONCOLOGY | Facility: CLINIC | Age: 68
End: 2023-04-13
Payer: MEDICARE

## 2023-04-13 ENCOUNTER — HOSPITAL ENCOUNTER (OUTPATIENT)
Dept: CT IMAGING | Facility: HOSPITAL | Age: 68
Discharge: HOME | End: 2023-04-13
Admitting: OBSTETRICS & GYNECOLOGY
Payer: MEDICARE

## 2023-04-13 DIAGNOSIS — C54.1 ENDOMETRIAL ADENOCARCINOMA: ICD-10-CM

## 2023-04-13 PROCEDURE — 25510000001 IOPAMIDOL 61 % SOLUTION: Performed by: OBSTETRICS & GYNECOLOGY

## 2023-04-13 PROCEDURE — 71260 CT THORAX DX C+: CPT

## 2023-04-13 PROCEDURE — 74177 CT ABD & PELVIS W/CONTRAST: CPT

## 2023-04-13 RX ADMIN — IOPAMIDOL 75 ML: 612 INJECTION, SOLUTION INTRAVENOUS at 10:25

## 2023-04-13 NOTE — TELEPHONE ENCOUNTER
"Patient called with concerns of neuropathy. States that she had a CT scan today and has an appointment with Dr. Rich next week on 4/19 and her neurologist on 4/18.Her chief complaint today was that she is having a \"really bad time\" with her neuropathy, and felt very shaky/weak today and was wondering if this was normal, since she hasn't gotten chemo in about 3 weeks. States that she has had to get fluid infusions over the past month, but they do not seem like they help.     RN assured patient that neuropathy is very normal after chemotherapy, and it can take a while for her body to adjust to feeling \"normal\" again after receiving 6 cycles of chemotherapy. RN advised patient to let her neurologist know that she feels like her neuropathy is getting worse, so that he can adjust her gabapentin prescription to help treat this. Patient verbalized understanding and said she would talk to her neurologist on Tuesday. Patient will see Dr. Rich on Wednesday and address these concerns with her at that time as well.   "

## 2023-04-13 NOTE — TELEPHONE ENCOUNTER
Caller: Alysia Sierra    Relationship: Self    Best call back number: [phone]    What was the call regarding: PATIENT CALLED WANTED TO SPEAK TO THE NURSE REGARDING HER NEUROPATHY       Do you require a callback: YES

## 2023-04-17 ENCOUNTER — TELEPHONE (OUTPATIENT)
Dept: GYNECOLOGIC ONCOLOGY | Facility: CLINIC | Age: 68
End: 2023-04-17
Payer: MEDICARE

## 2023-04-17 NOTE — TELEPHONE ENCOUNTER
RN called patient and communicated results. Patient verbalized understanding, said she would have a couple questions for Dr. Rich at her appointment on Wednesday.

## 2023-04-17 NOTE — TELEPHONE ENCOUNTER
----- Message from Jasmine Rihc MD sent at 4/15/2023  7:30 AM EDT -----  Regarding: FW:   Please let Alysia know that her CT scans do not show any evidence of disease! I'll see her at her visit as scheduled.  ----- Message -----  From: Kenia, Rad Results [place] In  Sent: 4/14/2023   9:41 PM EDT  To: Jasmine Rich MD

## 2023-04-18 ENCOUNTER — OFFICE VISIT (OUTPATIENT)
Dept: NEUROLOGY | Facility: CLINIC | Age: 68
End: 2023-04-18
Payer: MEDICARE

## 2023-04-18 VITALS
DIASTOLIC BLOOD PRESSURE: 62 MMHG | BODY MASS INDEX: 22.02 KG/M2 | HEART RATE: 101 BPM | WEIGHT: 129 LBS | HEIGHT: 64 IN | OXYGEN SATURATION: 99 % | SYSTOLIC BLOOD PRESSURE: 118 MMHG

## 2023-04-18 DIAGNOSIS — G62.9 NEUROPATHY: ICD-10-CM

## 2023-04-18 DIAGNOSIS — F33.0 MILD EPISODE OF RECURRENT MAJOR DEPRESSIVE DISORDER: ICD-10-CM

## 2023-04-18 DIAGNOSIS — M54.12 CERVICAL RADICULOPATHY AT C6: Primary | ICD-10-CM

## 2023-04-18 DIAGNOSIS — M79.7 FIBROMYALGIA: ICD-10-CM

## 2023-04-18 PROCEDURE — 1160F RVW MEDS BY RX/DR IN RCRD: CPT | Performed by: PSYCHIATRY & NEUROLOGY

## 2023-04-18 PROCEDURE — 3074F SYST BP LT 130 MM HG: CPT | Performed by: PSYCHIATRY & NEUROLOGY

## 2023-04-18 PROCEDURE — 99214 OFFICE O/P EST MOD 30 MIN: CPT | Performed by: PSYCHIATRY & NEUROLOGY

## 2023-04-18 PROCEDURE — 3078F DIAST BP <80 MM HG: CPT | Performed by: PSYCHIATRY & NEUROLOGY

## 2023-04-18 PROCEDURE — 1159F MED LIST DOCD IN RCRD: CPT | Performed by: PSYCHIATRY & NEUROLOGY

## 2023-04-18 RX ORDER — DULOXETIN HYDROCHLORIDE 20 MG/1
20 CAPSULE, DELAYED RELEASE ORAL 2 TIMES DAILY
Qty: 60 CAPSULE | Refills: 6 | Status: SHIPPED | OUTPATIENT
Start: 2023-04-18

## 2023-04-18 RX ORDER — GABAPENTIN 300 MG/1
300 CAPSULE ORAL 2 TIMES DAILY
Qty: 60 CAPSULE | Refills: 6 | Status: SHIPPED | OUTPATIENT
Start: 2023-04-18 | End: 2023-05-18

## 2023-04-18 NOTE — PROGRESS NOTES
Subjective:    CC: Alysia Sierra is in clinic today for follow up for history of cervical radiculopathy and neuropathy.    HPI:  Initial visit: 8/31/2020: Patient is a 65-year-old female with past medical history of hypertension, supraventricular tachycardia referred to the clinic for evaluation of bilateral leg and arm weakness.  She reports her symptoms started about 2 years ago and it has progressively become worse.  She reports that in last 6 months, she has had great difficulty getting up from chair if there is no side arms.  She reports that if she is on the floor, it is almost impossible for her to get up.  In addition she has noticed difficulty going up and down stairs.  She also reports tingling, numbness and extreme coldness in both her feet and sometimes it leads to pain.  This also started about 6 months ago and it has progressively become worse.    10/8/2020: She is in clinic for regular follow-up.  Since her last visit, she reports that overall she feels worse.  She feels that she does not have any energy to do anything throughout the day.  She is involved in physical therapy but it is extremely difficult for her to go through the exercises.  She reports that overall she feels weaker than her last visit.  Since her last visit, she has now completed MRI of cervical spine, MRI lumbar spine blood work-up including myasthenia gravis antibody panel, vitamin D, copper and B12.  MRI of cervical and lumbosacral spine shows mild to moderate multilevel spondylitic changes without any evidence of myelopathy.  Vitamin B12, copper and vitamin levels for within normal limits as well.  Upon further questioning, she reports that she sleeps usually at 4 or 5 PM until 1 or 2 AM and then she is awake after that.  She has done this for quite some time now.  In addition, she also reports to heavy alcohol use from age 34 until about 2 years ago.  She still drinks alcohol but it is much less as compared to  before.    12/11/2020: She is in clinic for regular follow-up.  Since her last visit, she underwent MRI brain without contrast which I reviewed personally.  It showed white matter changes suspicious for multiple sclerosis so following that, lumbar puncture was recommended.  Lumbar puncture was negative for MS profile.  She reports that since her last visit, she has now completed physical therapy and she continues to have good days and bad days.  She reports that she continues to struggle with poor sleep quality, does not have good appetite.  She is also reporting almost continuous tingling, numbness and coldness in both her feet.    3/1/2021: She is in clinic for regular follow-up.  Since her last visit in December, she reports that there has not been much change in bilateral upper and lower extremity strength is concerned.  She continues to have episodes of difficulty with walking, subjective feeling of generalized body weakness.  She is planning to go back to the gym and start exercising and will try to pay more attention to nutrition to help with this.  Really she reports that there has been no worsening since her last visit.  She did try gabapentin 300 mg twice daily and reports that the morning dose caused her to have side effects that she is currently taking only the nighttime dose which seems to be helping significantly with sleep.    9/1/2021: She is in clinic for regular follow-up.  Since her last visit in March, she reports that she is trying her best to exercise as much as possible at home to maintain strength in both upper and lower extremities.  She continues to use cane while walking.  She takes gabapentin 300 mg at bedtime which helps her with pain and also help her sleep better.  She continues to have some good days and bad days but overall there has been no worsening.    3/8/2022: She is in clinic for regular follow-up.  Since her last visit in September 2021, she reports that overall both upper and  lower extremity weakness remained stable without any worsening.  She continues to do exercises.  She has noticed that days that she is well hydrated, she tends to do better.  Lately in last 4 to 6 weeks, she is reporting right shoulder pain radiating down to right forearm and right hand.  She does have moderate spondylitic changes at C5-C6 and C6-C7 levels that were noted on MRI that was performed in August 2020.  She is currently taking gabapentin 300 mg at bedtime.    9/7/2022: She is in clinic for regular follow-up.  Since her last visit in March 2022, she reports that overall she has done really well.  She had an episode of passing out lasting for few seconds couple of months ago.  Which she thinks could be related to dehydration leading to hypotension.  She is trying to keep herself well-hydrated.  She continues to use [place] to help with walking and to prevent from falling.  She currently takes gabapentin 100 mg in the morning and 300 mg at night which does help with symptoms of cervical radiculopathy.  She was recently started on Cymbalta 20 mg twice daily which seems to be helping with symptoms of fibromyalgia as well.    4/18/2023: She is in clinic for regular follow-up.  Since her last visit in September 2022, she was diagnosed to have endometrial uterine cancer and has completed chemotherapy and radiation therapy.  She is currently in remission now however reports worsening in neuropathy symptoms.  She reports that she is currently taking gabapentin 100 mg in the morning and 300 mg at night which does help some with neuropathy symptoms.  She denies any side effects with gabapentin use.  She continues to take Cymbalta 20 mg twice a day which helps with symptoms of neuropathy and fibromyalgia.    The following portions of the patient's history were reviewed and updated as of 04/18/2023: allergies, social history and problem list.       Current Outpatient Medications:   •  aspirin 325 MG tablet, Take 1 tablet  by mouth As Needed for Mild Pain., Disp: , Rfl:   •  Cyanocobalamin 1000 MCG/ML kit, Inject 1,000mcg SC daily x1 week, then weekly x4 weeks. (Patient taking differently: Every 30 (Thirty) Days. monthly), Disp: 1 kit, Rfl: 0  •  dexamethasone (DECADRON) 4 MG tablet, Take 2 tablets by mouth twice a day for 3 consecutive days beginning the day before chemotherapy and continue for 6 doses., Disp: 12 tablet, Rfl: 5  •  docusate sodium (COLACE) 100 MG capsule, Take 1 capsule by mouth 2 (Two) Times a Day., Disp: 60 capsule, Rfl: 0  •  DULoxetine (Cymbalta) 20 MG capsule, Take 1 capsule by mouth 2 (Two) Times a Day., Disp: 60 capsule, Rfl: 6  •  esomeprazole (nexIUM) 20 MG capsule, Take 1 capsule by mouth 2 (Two) Times a Week., Disp: , Rfl:   •  gabapentin (Neurontin) 300 MG capsule, Take 1 capsule by mouth 2 (Two) Times a Day for 30 days., Disp: 60 capsule, Rfl: 6  •  hydrALAZINE (APRESOLINE) 25 MG tablet, 1 tablet 2 (Two) Times a Day., Disp: , Rfl:   •  levothyroxine (SYNTHROID, LEVOTHROID) 25 MCG tablet, Take 1 tablet by mouth Every Morning., Disp: 90 tablet, Rfl: 1  •  magnesium oxide (MAGOX) 400 (241.3 Mg) MG tablet tablet, Take 1 tablet by mouth Daily., Disp: , Rfl:   •  metoprolol succinate XL (TOPROL-XL) 25 MG 24 hr tablet, Take 1 tablet by mouth Every Night., Disp: 90 tablet, Rfl: 1  •  OLANZapine (ZyPREXA) 5 MG tablet, Take 1 tablet by mouth Every Night. Take on days 2, 3 and 4 after chemotherapy., Disp: 3 tablet, Rfl: 5  •  ondansetron (ZOFRAN) 4 MG tablet, Take 1 tablet by mouth As Needed for Nausea or Vomiting., Disp: , Rfl:   •  ondansetron (ZOFRAN) 8 MG tablet, Take 1 tablet by mouth 3 (Three) Times a Day As Needed for Nausea or Vomiting., Disp: 30 tablet, Rfl: 5  •  oxyCODONE (Roxicodone) 5 MG immediate release tablet, Take 0.5 tablets by mouth Every 4 (Four) Hours As Needed for Moderate Pain., Disp: 15 tablet, Rfl: 0  •  prochlorperazine (COMPAZINE) 10 MG tablet, Take 1 tablet by mouth Every 4 (Four) Hours  As Needed for Nausea or Vomiting., Disp: 30 tablet, Rfl: 5  •  rosuvastatin (Crestor) 5 MG tablet, Take 1 tablet by mouth Daily. (Patient taking differently: Take 1 tablet by mouth Daily. Has been having similar symptoms as when she was taking lipitor. Will check lab work with her PCP and re-address the situation. Patient had not taken medication since 1/9/23.), Disp: 90 tablet, Rfl: 1  •  nitroglycerin (NITROSTAT) 0.4 MG SL tablet, 1 under the tongue as needed for angina, may repeat q5mins for up three doses (Patient not taking: Reported on 4/18/2023), Disp: 100 tablet, Rfl: 11    Current Facility-Administered Medications:   •  cyanocobalamin injection 1,000 mcg, 1,000 mcg, Intramuscular, Q28 Days, Liana So APRN, 1,000 mcg at 03/20/23 1213   Past Medical History:   Diagnosis Date   • Allergic lisinopril  2017   • Anemia    • Arrhythmia    • Arthritis    • Cataract mild 2020    stil lpresent   • Chicken pox    • Chronic fatigue    • CKD (chronic kidney disease), stage III     sees nephro   • Dental root implant present     lower left  x1 - possible dental implant   • Depression mild 2019   • Difficulty walking 2019   • Disease of thyroid gland    • Dizzy    • NIEVES (dyspnea on exertion)     2017   • Generalized anxiety disorder    • GERD (gastroesophageal reflux disease) 2018   • History of brachytherapy 01/18/2023    vaginal brachytherapy   • Hyperlipidemia    • Hypertension    • Iron deficiency anemia    • Liver disease     fatty   • Liver problem    • Measles    • Menopause    • Mumps    • Neuromuscular disorder Peripheral Neuropathy   • Orthostatic hypotension    • 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   • Renal insufficiency 2018   • Scoliosis    • Unintentional weight loss    • Uses contact lenses     bilat   • Uterine cancer    • Uterine cancer 08/01/2022   • UTI (urinary tract infection)    • Visual impairment Nearsighted   • Wears glasses       Past Surgical History:  "  Procedure Laterality Date   •  SECTION     • DILATION AND CURETTAGE, DIAGNOSTIC / THERAPEUTIC     • OOPHORECTOMY     • ORAL LESION EXCISION/BIOPSY  2021   • TOTAL LAPAROSCOPIC HYSTERECTOMY SALPINGO OOPHORECTOMY N/A 10/28/2022    Procedure: TOTAL LAPAROSCOPIC HYSTERECTOMY BILATERAL SALPINGO-OOPHORECTOMY, INJECTION FOR SENTINEL LYMPH NODE MAPPING, BILATERAL SENTINEL LYMPH NODE DISSECTION WITH DAVINCI ROBOT;  Surgeon: Jasmine Rich MD;  Location: [facility];  Service: Robotics - DaVinci;  Laterality: N/A;   • TUBAL ABDOMINAL LIGATION        Family History   Problem Relation Age of Onset   • Spondylolisthesis Mother    • COPD Mother    • Stroke Mother    • Hypertension Mother    • Lung cancer Father    • Hypertension Father    • Heart attack Father    • Coronary artery disease Father    • Heart disease Father    • Cancer Father    • Lung disease Father    • Hyperlipidemia Sister    • Rheum arthritis Sister    • Malig Hypertension Sister    • Osteoarthritis Sister    • Other Sister         alcoholic   • Hypertension Sister    • Hypertension Brother    • Breast cancer Neg Hx    • Ovarian cancer Neg Hx    • Uterine cancer Neg Hx    • Colon cancer Neg Hx    • Melanoma Neg Hx    • Prostate cancer Neg Hx         Review of Systems  Objective:    /62   Pulse 101   Ht 162.6 cm (64\")   Wt 58.5 kg (129 lb)   LMP  (LMP Unknown)   SpO2 99%   BMI 22.14 kg/m²     Neurology Exam:  General apperance: NAD.     Mental status: Alert, awake and oriented to time place and person.    Recent and Remote memory: Can recall 3/3 objects at 5 minutes. Can recall historical events.     Attention span and Concentration: Serial 7s: Normal.     Fund of knowledge:  Normal.     Language and Speech: No aphasia or dysarthria.    Naming , Repitition and Comprehension:  Can name objects, repeat a sentence and follow commands. Speech is clear and fluent with good repetition, comprehension, and naming.    CN II to XII: " Intact.    Opthalmoscopic Exam: No papilledema.    Motor:  Right UE muscle strength 5/5. Normal tone.     Left UE muscle strength 5/5. Normal tone.      Right LE muscle strength5/5. Normal tone.     Left LE muscle strength 5/5. Normal tone.      Sensory: Normal light touch, vibration and pinprick sensation bilaterally.    DTRs: 2+ bilaterally.    Babinski: Negative bilaterally.    Co-ordination: Normal finger-to-nose, heel to shin B/L.    Rhomberg: Negative.    Gait: Normal.    Cardiovascular: Regular rate and rhythm without murmur, gallop or rub.    Assessment and Plan:  1. Cervical radiculopathy at C6  2. Fibromyalgia  3.  Neuropathy  She is reporting worsening in neuropathy symptoms since completing chemotherapy 3 weeks ago.  She was diagnosed to have endometrial cancer and has completed both chemotherapy and radiotherapy.  Since he denies any side effects with gabapentin use, I will be increasing the dose of gabapentin to 300 mg twice daily.  Continue with the Cymbalta 20 mg twice daily and I will see her back in clinic in 6 months for follow-up.    I spent 30 minutes in patient care: Reviewing records prior to the visit, entering orders and documentation and spent more than diggs 50% of this time face-to-face in management, instructions and education regarding above mentioned diagnosis and also on counseling and discussing about taking medication regularly, possible side effects with medication use, importance of good sleep hygiene, good hydration and regular exercise.    Return in about 3 months (around 7/18/2023).       Note to patient: The 21st Century Cures Act makes medical notes like these available to patients in the interest of transparency. However, be advised this is a medical document. It is intended as peer to peer communication. It is written in medical language and may contain abbreviations or verbiage that are unfamiliar. It may appear blunt or direct. Medical documents are intended to carry  relevant information, facts as evident, and the clinical opinion of the physician.

## 2023-04-19 ENCOUNTER — OFFICE VISIT (OUTPATIENT)
Dept: GYNECOLOGIC ONCOLOGY | Facility: CLINIC | Age: 68
End: 2023-04-19
Payer: MEDICARE

## 2023-04-19 VITALS
HEIGHT: 65 IN | WEIGHT: 125.7 LBS | RESPIRATION RATE: 18 BRPM | SYSTOLIC BLOOD PRESSURE: 116 MMHG | TEMPERATURE: 98.4 F | BODY MASS INDEX: 20.94 KG/M2 | DIASTOLIC BLOOD PRESSURE: 83 MMHG | HEART RATE: 75 BPM | OXYGEN SATURATION: 97 %

## 2023-04-19 DIAGNOSIS — N18.31 STAGE 3A CHRONIC KIDNEY DISEASE: ICD-10-CM

## 2023-04-19 DIAGNOSIS — T45.1X5A ANTINEOPLASTIC CHEMOTHERAPY INDUCED ANEMIA: ICD-10-CM

## 2023-04-19 DIAGNOSIS — D64.81 ANTINEOPLASTIC CHEMOTHERAPY INDUCED ANEMIA: ICD-10-CM

## 2023-04-19 DIAGNOSIS — G62.9 NEUROPATHY: ICD-10-CM

## 2023-04-19 DIAGNOSIS — C54.1 ENDOMETRIAL ADENOCARCINOMA: Primary | ICD-10-CM

## 2023-04-20 NOTE — PROGRESS NOTES
Chemotherapy Treatment Office Visit      Patient Name: Alysia Sierra  : 1955   MRN: [id number]     Chief Complaint:  Endometrial cancer    History of Present Illness: Alysia Sierra is a 68 y.o. female who presents today for follow-up after completion of her adjuvant chemotherapy. Today, she is overall doing okay.  Her neuropathy has somewhat worsened. She has followed up with neurology who increased her gabapentin. She continues her Cymbalta as well. CT CAP performed after completion of cycle 6 showed no evidence of disease. Dizziness and neuropathy have been stable.  Occasional bone pain is manageable with oxycodone 2.5 mg, which she uses sparingly. She otherwise denies any vaginal bleeding or discharge.  No changes in her bowels or bladder.  She is tolerating regular diet and denies any nausea or vomiting.  Reports decreased but stable energy levels. She is still completing her ADLs.  She is using aids for mobility.    Oncologic History:  Oncology/Hematology History   Endometrial adenocarcinoma   2022 Initial Diagnosis    2022: TVUS with uterus measuring 5.7 x 4.8 x 4.1 cm with an endometrial stripe thickness of 9.8 mm.  Right ovary not visualized.  Left ovary normal.  No free fluid.  2022: Saline infusion sonogram with an irregularly shaped prominent lesion on the posterior endometrial surface concerning for neoplasia.  10/6/2022: Endometrial biopsy with FIGO grade 1 endometrial cancer      10/28/2022 Surgery    Robotic-assisted total laparoscopic hysterectomy with bilateral salpingo-oophorectomy with bilateral SLND    Pathology with FIGO grade 2 endometrioid adenocarcinoma with 94% MI. Involvement of endocervix and uterine serosal adhesions. Negative parametrium and bilateral sentinel lymph nodes. MMR intact.     Surgery at [facility] by Jasmine Rich MD      Cancer Staged    Cancer Staging   Endometrial adenocarcinoma (HCC)  Staging form: Corpus Uteri - Carcinoma And  "Carcinosarcoma, AJCC 8th Edition  - Pathologic stage from 10/28/2022: FIGO Stage IIIA (pT3a, pN0(sn), cM0) - Signed by Jasmine Rich MD on 11/12/2022       10/28/2022 Cancer Staged    Staging form: Corpus Uteri - Carcinoma And Carcinosarcoma, AJCC 8th Edition  - Pathologic stage from 10/28/2022: FIGO Stage IIIA (pT3a, pN0(sn), cM0) - Signed by Jasmine Rich MD on 11/12/2022 12/8/2022 -  Chemotherapy    OP OVARIAN DOCEtaxel / CARBOplatin     1/9/2023 - 1/18/2023 Radiation    Radiation OncologyTreatment Course:  Alysia Sierra received 3000 cGy in 5 fractions to upper vagina via High Dose Radiation - HDR.     4/4/2023 -  Chemotherapy    OP SUPPORTIVE HYDRATION + ANTIEMETICS          I have reviewed and the following portions of the patient's history were updated as appropriate: past family history, past medical history, past social history, past surgical history, past obstetrics/gynecologic history and problem list.    Medications: The current medication list was reviewed with the patient and updated in the EMR this date per the Medical Assistant. Medication dosages and frequencies were confirmed to be accurate.      Allergies:   Allergies   Allergen Reactions   • Lisinopril Angioedema   • Metal Rash     Possible nickel -   Gold is only metal that doesn't have issues     • Milk-Related Compounds Other (See Comments)     LACTOSE INTOLERANT   • Tylenol [Acetaminophen] Other (See Comments)     ckd   • Atorvastatin Myalgia     Objective     Physical Exam:  Vital Signs:   Vitals:    04/19/23 1132   BP: 116/83   Pulse: 75   Resp: 18   Temp: 98.4 °F (36.9 °C)   TempSrc: Infrared   SpO2: 97%   Weight: 57 kg (125 lb 11.2 oz)   Height: 163.8 cm (64.5\")   PainSc:   5   PainLoc: Foot  Comment: bilateral     BMI: Body mass index is 21.24 kg/m².   ECOG score: 1          PHQ-2 Depression Screening  Little interest or pleasure in doing things?     Feeling down, depressed, or hopeless?     PHQ-2 Total Score   "     Physical Examination:   General appearance - alert, well appearing, and in no distress and oriented to person, place, and time  Mental status - normal mood, behavior, speech, dress, motor activity, and thought processes  Heart - normal rate, regular rhythm, normal S1, S2, no murmurs  Lungs - normal respiratory effort, clear to auscultation bilaterally  Abdomen - soft, nontender, nondistended, no masses or organomegaly  Pelvic - external genitalia normal, vagina without discharge, vaginal cuff intact and without lesions, cervix, uterus and adnexa surgically absent, no palpable masses  Neurological - alert, oriented, normal speech, no focal findings or movement disorder noted, using cane PRN given neuropathy  Musculoskeletal - no joint tenderness, deformity or swelling  Extremities - no pedal edema, no clubbing or cyanosis  Skin - normal coloration and turgor, no rashes, no suspicious skin lesions noted     Labs:  Lab Results   Component Value Date    WBC 2.74 (L) 04/04/2023    HGB 8.3 (L) 04/04/2023    HCT 25.4 (L) 04/04/2023    .0 (H) 04/04/2023     04/04/2023       Lab Results   Component Value Date    GLUCOSE 112 (H) 04/04/2023    BUN 13 04/04/2023    CREATININE 1.10 (H) 04/04/2023    EGFRIFNONA 32 (L) 07/13/2021    EGFRIFAFRI 38 (L) 07/13/2021    BCR 11.8 04/04/2023    K 4.1 04/04/2023    CO2 21.0 (L) 04/04/2023    CALCIUM 8.3 (L) 04/04/2023    ALBUMIN 3.8 04/04/2023    AST 24 04/04/2023    ALT 10 04/04/2023     Lab Results   Component Value Date     27.9 03/23/2023     Imaging: Images reviewed directly by me and I agree with the impression.  CT Chest With Contrast Diagnostic    Result Date: 4/14/2023  1. No evidence of metastatic disease in the chest, abdomen, or pelvis. 2. Hysterectomy. 3. Additional findings include: Healing left rib fractures, small esophageal hiatal hernia, thoracolumbar levoscoliosis, bilateral renal cortical scarring and small bilateral renal cysts, sparse sigmoid  colonic diverticulosis. Electronically Signed: Cristal Pineda  4/14/2023 9:38 PM EDT  Workstation ID: [id number]    CT Abdomen Pelvis With Contrast    Result Date: 4/14/2023  1. No evidence of metastatic disease in the chest, abdomen, or pelvis. 2. Hysterectomy. 3. Additional findings include: Healing left rib fractures, small esophageal hiatal hernia, thoracolumbar levoscoliosis, bilateral renal cortical scarring and small bilateral renal cysts, sparse sigmoid colonic diverticulosis. Electronically Signed: Cristal Pineda  4/14/2023 9:38 PM EDT  Workstation ID: [id number]    Mammo Screening Digital Tomosynthesis Bilateral With CAD    Result Date: 1/9/2023  No mammographic findings suspicious for malignancy.  RECOMMENDATION:  Continue annual screening mammography.   BI-RADS CATEGORY 1, NEGATIVE.   CAD was utilized.  The standard false-negative rate of mammography is between 10% and 25%. Complex patterns or increased breast density will markedly elevate the false-negative rate of mammography.    A letter, in lay terminology, with the results of this exam will be mailed to the patient.   This report was finalized on 1/9/2023 5:16 PM by Dr. Saloni Lott MD.        Assessment / Plan    Alysia Sierra is a 68 y.o. year old female with Stage IIIA endometrial cancer who completed 6 cycles of carboplatin/docetaxol and VBT (treatment completed 4/2023). Cycle 5 was complicated by grade 3 symptomatic anemia requiring transfusion. CT C/A/P completed on 4/13/23 showing IBIS. A referral was placed for survivorship visit in 3 months. I will see her for surveillance.    Chemotherapy-induced anemia: Grade 3. Asymptomatic. I expect her counts will recover now that chemotherapy is completed.    CKD Stage III: Creatinine normal last visit. Continue nephrology follow up. Avoid nephrotoxic agents.    Peripheral neuropathy: Baseline neuropathy controlled with cymbalta and gabapentin. Managed by neurology. Gabepentin recently increased due to  worsened neuropathy. Oxycodone 2.5 mg as needed.     Major depressive disorder: Sees Cassie Morin. Continues duloxetine BID.    Diagnoses and all orders for this visit:    1. Endometrial adenocarcinoma (Primary)  -     Ambulatory Referral to Survivorship    2. Antineoplastic chemotherapy induced anemia    3. Stage 3a chronic kidney disease    4. Neuropathy       Follow Up: In 3 months for Survivorship and in 6 months with me    Jasmine Rich MD  Gynecologic Oncology

## 2023-04-28 DIAGNOSIS — G62.9 NEUROPATHY: ICD-10-CM

## 2023-04-28 RX ORDER — OXYCODONE HYDROCHLORIDE 5 MG/1
2.5 TABLET ORAL EVERY 4 HOURS PRN
Qty: 15 TABLET | Refills: 0 | Status: SHIPPED | OUTPATIENT
Start: 2023-04-28

## 2023-04-28 NOTE — TELEPHONE ENCOUNTER
Caller: Alysia Sierra    Relationship: Self    Best call back number: [phone]    Requested Prescriptions:   Requested Prescriptions     Pending Prescriptions Disp Refills   • oxyCODONE (Roxicodone) 5 MG immediate release tablet 15 tablet 0     Sig: Take 0.5 tablets by mouth Every 4 (Four) Hours As Needed for Moderate Pain.        Pharmacy where request should be sent: [facility] [id number] [address] [phone] [facility] [phone] FX     Last office visit with prescribing clinician: 4/19/2023   Last telemedicine visit with prescribing clinician: 6/19/2023   Next office visit with prescribing clinician: Visit date not found     Additional details provided by patient: PT CHECKED [facility], NO REFILL WAS SENT     Does the patient have less than a 3 day supply:  [x] Yes  [] No    Would you like a call back once the refill request has been completed:  [x] Yes [] No    If the office needs to give you a call back, can they leave a voicemail:  [x] Yes [] No

## 2023-05-02 ENCOUNTER — APPOINTMENT (OUTPATIENT)
Dept: GENERAL RADIOLOGY | Facility: HOSPITAL | Age: 68
DRG: 481 | End: 2023-05-02
Payer: MEDICARE

## 2023-05-02 ENCOUNTER — ANESTHESIA EVENT CONVERTED (OUTPATIENT)
Dept: ANESTHESIOLOGY | Facility: HOSPITAL | Age: 68
DRG: 481 | End: 2023-05-02
Payer: MEDICARE

## 2023-05-02 ENCOUNTER — APPOINTMENT (OUTPATIENT)
Dept: CT IMAGING | Facility: HOSPITAL | Age: 68
DRG: 481 | End: 2023-05-02
Payer: MEDICARE

## 2023-05-02 ENCOUNTER — ANESTHESIA EVENT (OUTPATIENT)
Dept: PERIOP | Facility: HOSPITAL | Age: 68
DRG: 481 | End: 2023-05-02
Payer: MEDICARE

## 2023-05-02 ENCOUNTER — ANESTHESIA (OUTPATIENT)
Dept: PERIOP | Facility: HOSPITAL | Age: 68
DRG: 481 | End: 2023-05-02
Payer: MEDICARE

## 2023-05-02 ENCOUNTER — HOSPITAL ENCOUNTER (INPATIENT)
Facility: HOSPITAL | Age: 68
LOS: 4 days | Discharge: NURSING, REHAB OR LONG-TERM CARE | DRG: 481 | End: 2023-05-06
Attending: EMERGENCY MEDICINE | Admitting: INTERNAL MEDICINE
Payer: MEDICARE

## 2023-05-02 DIAGNOSIS — S72.002A CLOSED FRACTURE OF LEFT HIP, INITIAL ENCOUNTER: Primary | ICD-10-CM

## 2023-05-02 DIAGNOSIS — G62.9 NEUROPATHY: ICD-10-CM

## 2023-05-02 DIAGNOSIS — D64.9 ANEMIA, UNSPECIFIED TYPE: ICD-10-CM

## 2023-05-02 DIAGNOSIS — C54.1 ENDOMETRIAL ADENOCARCINOMA: ICD-10-CM

## 2023-05-02 DIAGNOSIS — Z85.42 HISTORY OF ENDOMETRIAL CANCER: ICD-10-CM

## 2023-05-02 DIAGNOSIS — M54.12 CERVICAL RADICULOPATHY AT C6: ICD-10-CM

## 2023-05-02 PROBLEM — E83.42 HYPOMAGNESEMIA: Status: ACTIVE | Noted: 2023-05-02

## 2023-05-02 PROBLEM — S72.009A HIP FRACTURE: Status: ACTIVE | Noted: 2023-05-02

## 2023-05-02 LAB
ABO GROUP BLD: NORMAL
ALBUMIN SERPL-MCNC: 3.9 G/DL (ref 3.5–5.2)
ALBUMIN/GLOB SERPL: 2.3 G/DL
ALP SERPL-CCNC: 88 U/L (ref 39–117)
ALT SERPL W P-5'-P-CCNC: 11 U/L (ref 1–33)
ANION GAP SERPL CALCULATED.3IONS-SCNC: 11 MMOL/L (ref 5–15)
ANISOCYTOSIS BLD QL: NORMAL
AST SERPL-CCNC: 22 U/L (ref 1–32)
BASOPHILS # BLD AUTO: 0.02 10*3/MM3 (ref 0–0.2)
BASOPHILS NFR BLD AUTO: 0.2 % (ref 0–1.5)
BILIRUB SERPL-MCNC: 0.2 MG/DL (ref 0–1.2)
BLD GP AB SCN SERPL QL: NEGATIVE
BUN SERPL-MCNC: 10 MG/DL (ref 8–23)
BUN/CREAT SERPL: 11 (ref 7–25)
CALCIUM SPEC-SCNC: 8.1 MG/DL (ref 8.6–10.5)
CHLORIDE SERPL-SCNC: 104 MMOL/L (ref 98–107)
CK SERPL-CCNC: 53 U/L (ref 20–180)
CO2 SERPL-SCNC: 23 MMOL/L (ref 22–29)
CREAT SERPL-MCNC: 0.91 MG/DL (ref 0.57–1)
DEPRECATED RDW RBC AUTO: 73.3 FL (ref 37–54)
EGFRCR SERPLBLD CKD-EPI 2021: 69.3 ML/MIN/1.73
EOSINOPHIL # BLD AUTO: 0.09 10*3/MM3 (ref 0–0.4)
EOSINOPHIL NFR BLD AUTO: 1.1 % (ref 0.3–6.2)
ERYTHROCYTE [DISTWIDTH] IN BLOOD BY AUTOMATED COUNT: 17.7 % (ref 12.3–15.4)
GLOBULIN UR ELPH-MCNC: 1.7 GM/DL
GLUCOSE SERPL-MCNC: 93 MG/DL (ref 65–99)
HCT VFR BLD AUTO: 28.6 % (ref 34–46.6)
HGB BLD-MCNC: 9.1 G/DL (ref 12–15.9)
IMM GRANULOCYTES # BLD AUTO: 0.08 10*3/MM3 (ref 0–0.05)
IMM GRANULOCYTES NFR BLD AUTO: 1 % (ref 0–0.5)
INR PPP: 1 (ref 0.89–1.12)
LYMPHOCYTES # BLD AUTO: 1.48 10*3/MM3 (ref 0.7–3.1)
LYMPHOCYTES NFR BLD AUTO: 18.1 % (ref 19.6–45.3)
MACROCYTES BLD QL SMEAR: NORMAL
MAGNESIUM SERPL-MCNC: 1.5 MG/DL (ref 1.6–2.4)
MCH RBC QN AUTO: 35.8 PG (ref 26.6–33)
MCHC RBC AUTO-ENTMCNC: 31.8 G/DL (ref 31.5–35.7)
MCV RBC AUTO: 112.6 FL (ref 79–97)
MONOCYTES # BLD AUTO: 0.73 10*3/MM3 (ref 0.1–0.9)
MONOCYTES NFR BLD AUTO: 8.9 % (ref 5–12)
NEUTROPHILS NFR BLD AUTO: 5.78 10*3/MM3 (ref 1.7–7)
NEUTROPHILS NFR BLD AUTO: 70.7 % (ref 42.7–76)
NRBC BLD AUTO-RTO: 0 /100 WBC (ref 0–0.2)
PLAT MORPH BLD: NORMAL
PLATELET # BLD AUTO: 162 10*3/MM3 (ref 140–450)
PMV BLD AUTO: 9.5 FL (ref 6–12)
POTASSIUM SERPL-SCNC: 4.7 MMOL/L (ref 3.5–5.2)
PROT SERPL-MCNC: 5.6 G/DL (ref 6–8.5)
PROTHROMBIN TIME: 13.3 SECONDS (ref 12.2–14.5)
QT INTERVAL: 424 MS
QTC INTERVAL: 483 MS
RBC # BLD AUTO: 2.54 10*6/MM3 (ref 3.77–5.28)
RH BLD: POSITIVE
SODIUM SERPL-SCNC: 138 MMOL/L (ref 136–145)
T&S EXPIRATION DATE: NORMAL
T4 FREE SERPL-MCNC: 1.14 NG/DL (ref 0.93–1.7)
TSH SERPL DL<=0.05 MIU/L-ACNC: 2.35 UIU/ML (ref 0.27–4.2)
WBC MORPH BLD: NORMAL
WBC NRBC COR # BLD: 8.18 10*3/MM3 (ref 3.4–10.8)

## 2023-05-02 PROCEDURE — 27236 TREAT THIGH FRACTURE: CPT | Performed by: ORTHOPAEDIC SURGERY

## 2023-05-02 PROCEDURE — 25010000002 PROPOFOL 10 MG/ML EMULSION: Performed by: ANESTHESIOLOGY

## 2023-05-02 PROCEDURE — C1713 ANCHOR/SCREW BN/BN,TIS/BN: HCPCS | Performed by: ORTHOPAEDIC SURGERY

## 2023-05-02 PROCEDURE — 76000 FLUOROSCOPY <1 HR PHYS/QHP: CPT

## 2023-05-02 PROCEDURE — 84439 ASSAY OF FREE THYROXINE: CPT | Performed by: EMERGENCY MEDICINE

## 2023-05-02 PROCEDURE — 25010000002 ONDANSETRON PER 1 MG: Performed by: EMERGENCY MEDICINE

## 2023-05-02 PROCEDURE — 70450 CT HEAD/BRAIN W/O DYE: CPT

## 2023-05-02 PROCEDURE — C1769 GUIDE WIRE: HCPCS | Performed by: ORTHOPAEDIC SURGERY

## 2023-05-02 PROCEDURE — 0QS734Z REPOSITION LEFT UPPER FEMUR WITH INTERNAL FIXATION DEVICE, PERCUTANEOUS APPROACH: ICD-10-PCS | Performed by: ORTHOPAEDIC SURGERY

## 2023-05-02 PROCEDURE — 85025 COMPLETE CBC W/AUTO DIFF WBC: CPT | Performed by: EMERGENCY MEDICINE

## 2023-05-02 PROCEDURE — 25010000002 ROPIVACAINE PER 1 MG: Performed by: ANESTHESIOLOGY

## 2023-05-02 PROCEDURE — 86850 RBC ANTIBODY SCREEN: CPT | Performed by: ORTHOPAEDIC SURGERY

## 2023-05-02 PROCEDURE — 82550 ASSAY OF CK (CPK): CPT | Performed by: EMERGENCY MEDICINE

## 2023-05-02 PROCEDURE — 99285 EMERGENCY DEPT VISIT HI MDM: CPT

## 2023-05-02 PROCEDURE — 85610 PROTHROMBIN TIME: CPT | Performed by: EMERGENCY MEDICINE

## 2023-05-02 PROCEDURE — 83735 ASSAY OF MAGNESIUM: CPT | Performed by: EMERGENCY MEDICINE

## 2023-05-02 PROCEDURE — 80053 COMPREHEN METABOLIC PANEL: CPT | Performed by: EMERGENCY MEDICINE

## 2023-05-02 PROCEDURE — 73552 X-RAY EXAM OF FEMUR 2/>: CPT

## 2023-05-02 PROCEDURE — 25010000002 HYDROMORPHONE PER 4 MG: Performed by: EMERGENCY MEDICINE

## 2023-05-02 PROCEDURE — 86923 COMPATIBILITY TEST ELECTRIC: CPT

## 2023-05-02 PROCEDURE — 86901 BLOOD TYPING SEROLOGIC RH(D): CPT | Performed by: ORTHOPAEDIC SURGERY

## 2023-05-02 PROCEDURE — 86900 BLOOD TYPING SEROLOGIC ABO: CPT | Performed by: ORTHOPAEDIC SURGERY

## 2023-05-02 PROCEDURE — 25010000002 DEXAMETHASONE PER 1 MG: Performed by: ANESTHESIOLOGY

## 2023-05-02 PROCEDURE — 25010000002 ONDANSETRON PER 1 MG: Performed by: ANESTHESIOLOGY

## 2023-05-02 PROCEDURE — 73502 X-RAY EXAM HIP UNI 2-3 VIEWS: CPT

## 2023-05-02 PROCEDURE — 72170 X-RAY EXAM OF PELVIS: CPT

## 2023-05-02 PROCEDURE — 84443 ASSAY THYROID STIM HORMONE: CPT | Performed by: EMERGENCY MEDICINE

## 2023-05-02 PROCEDURE — 71045 X-RAY EXAM CHEST 1 VIEW: CPT

## 2023-05-02 PROCEDURE — 25010000002 FENTANYL CITRATE (PF) 50 MCG/ML SOLUTION: Performed by: ANESTHESIOLOGY

## 2023-05-02 PROCEDURE — 25010000002 CEFAZOLIN IN DEXTROSE 2-4 GM/100ML-% SOLUTION: Performed by: ANESTHESIOLOGY

## 2023-05-02 PROCEDURE — 99232 SBSQ HOSP IP/OBS MODERATE 35: CPT | Performed by: ORTHOPAEDIC SURGERY

## 2023-05-02 PROCEDURE — 85007 BL SMEAR W/DIFF WBC COUNT: CPT | Performed by: EMERGENCY MEDICINE

## 2023-05-02 PROCEDURE — 93005 ELECTROCARDIOGRAM TRACING: CPT | Performed by: EMERGENCY MEDICINE

## 2023-05-02 DEVICE — BOLT FEM NK SYS TI ALLOY 80MM STRL: Type: IMPLANTABLE DEVICE | Site: HIP | Status: FUNCTIONAL

## 2023-05-02 DEVICE — DEV CONTRL TISS STRATAFIX SPIRAL MNCRYL UD 3/0 PLS 60CM: Type: IMPLANTABLE DEVICE | Site: HIP | Status: FUNCTIONAL

## 2023-05-02 DEVICE — SCRW FEM NK SYS TI ALLOY 80MM STRL: Type: IMPLANTABLE DEVICE | Site: HIP | Status: FUNCTIONAL

## 2023-05-02 DEVICE — SCRW LK S/TAP T25 STRDRV TI 5X38MM STRL: Type: IMPLANTABLE DEVICE | Site: HIP | Status: FUNCTIONAL

## 2023-05-02 DEVICE — PLT FEM NK SYS TI ALLOY 1H STRL: Type: IMPLANTABLE DEVICE | Site: HIP | Status: FUNCTIONAL

## 2023-05-02 RX ORDER — SODIUM CHLORIDE 9 MG/ML
40 INJECTION, SOLUTION INTRAVENOUS AS NEEDED
Status: DISCONTINUED | OUTPATIENT
Start: 2023-05-02 | End: 2023-05-06 | Stop reason: HOSPADM

## 2023-05-02 RX ORDER — ONDANSETRON 2 MG/ML
4 INJECTION INTRAMUSCULAR; INTRAVENOUS ONCE AS NEEDED
Status: DISCONTINUED | OUTPATIENT
Start: 2023-05-02 | End: 2023-05-02 | Stop reason: HOSPADM

## 2023-05-02 RX ORDER — MAGNESIUM HYDROXIDE 1200 MG/15ML
LIQUID ORAL AS NEEDED
Status: DISCONTINUED | OUTPATIENT
Start: 2023-05-02 | End: 2023-05-02 | Stop reason: HOSPADM

## 2023-05-02 RX ORDER — SODIUM CHLORIDE, SODIUM LACTATE, POTASSIUM CHLORIDE, CALCIUM CHLORIDE 600; 310; 30; 20 MG/100ML; MG/100ML; MG/100ML; MG/100ML
9 INJECTION, SOLUTION INTRAVENOUS CONTINUOUS PRN
Status: DISCONTINUED | OUTPATIENT
Start: 2023-05-02 | End: 2023-05-02

## 2023-05-02 RX ORDER — SODIUM CHLORIDE 0.9 % (FLUSH) 0.9 %
10 SYRINGE (ML) INJECTION AS NEEDED
Status: DISCONTINUED | OUTPATIENT
Start: 2023-05-02 | End: 2023-05-02 | Stop reason: HOSPADM

## 2023-05-02 RX ORDER — LIDOCAINE HYDROCHLORIDE 10 MG/ML
INJECTION, SOLUTION EPIDURAL; INFILTRATION; INTRACAUDAL; PERINEURAL AS NEEDED
Status: DISCONTINUED | OUTPATIENT
Start: 2023-05-02 | End: 2023-05-02 | Stop reason: SURG

## 2023-05-02 RX ORDER — FAMOTIDINE 10 MG/ML
20 INJECTION, SOLUTION INTRAVENOUS
Status: DISCONTINUED | OUTPATIENT
Start: 2023-05-02 | End: 2023-05-02 | Stop reason: HOSPADM

## 2023-05-02 RX ORDER — LIDOCAINE HYDROCHLORIDE 10 MG/ML
10 INJECTION, SOLUTION EPIDURAL; INFILTRATION; INTRACAUDAL; PERINEURAL ONCE
Status: DISCONTINUED | OUTPATIENT
Start: 2023-05-02 | End: 2023-05-02

## 2023-05-02 RX ORDER — SODIUM CHLORIDE 0.9 % (FLUSH) 0.9 %
10 SYRINGE (ML) INJECTION EVERY 12 HOURS SCHEDULED
Status: DISCONTINUED | OUTPATIENT
Start: 2023-05-02 | End: 2023-05-03 | Stop reason: SDUPTHER

## 2023-05-02 RX ORDER — BISACODYL 10 MG
10 SUPPOSITORY, RECTAL RECTAL DAILY PRN
Status: DISCONTINUED | OUTPATIENT
Start: 2023-05-02 | End: 2023-05-06 | Stop reason: HOSPADM

## 2023-05-02 RX ORDER — SODIUM CHLORIDE 0.9 % (FLUSH) 0.9 %
10 SYRINGE (ML) INJECTION EVERY 12 HOURS SCHEDULED
Status: DISCONTINUED | OUTPATIENT
Start: 2023-05-02 | End: 2023-05-02 | Stop reason: HOSPADM

## 2023-05-02 RX ORDER — HYDROMORPHONE HYDROCHLORIDE 1 MG/ML
0.5 INJECTION, SOLUTION INTRAMUSCULAR; INTRAVENOUS; SUBCUTANEOUS ONCE
Status: COMPLETED | OUTPATIENT
Start: 2023-05-02 | End: 2023-05-02

## 2023-05-02 RX ORDER — SODIUM CHLORIDE 9 MG/ML
40 INJECTION, SOLUTION INTRAVENOUS AS NEEDED
Status: DISCONTINUED | OUTPATIENT
Start: 2023-05-02 | End: 2023-05-02 | Stop reason: HOSPADM

## 2023-05-02 RX ORDER — PANTOPRAZOLE SODIUM 40 MG/1
40 TABLET, DELAYED RELEASE ORAL
Status: DISCONTINUED | OUTPATIENT
Start: 2023-05-03 | End: 2023-05-06 | Stop reason: HOSPADM

## 2023-05-02 RX ORDER — MIDAZOLAM HYDROCHLORIDE 1 MG/ML
0.5 INJECTION INTRAMUSCULAR; INTRAVENOUS
Status: DISCONTINUED | OUTPATIENT
Start: 2023-05-02 | End: 2023-05-02 | Stop reason: HOSPADM

## 2023-05-02 RX ORDER — GABAPENTIN 300 MG/1
300 CAPSULE ORAL 2 TIMES DAILY
Status: DISCONTINUED | OUTPATIENT
Start: 2023-05-02 | End: 2023-05-06 | Stop reason: HOSPADM

## 2023-05-02 RX ORDER — OXYCODONE HYDROCHLORIDE 15 MG/1
7.5 TABLET ORAL EVERY 4 HOURS PRN
Status: DISCONTINUED | OUTPATIENT
Start: 2023-05-02 | End: 2023-05-03

## 2023-05-02 RX ORDER — MAGNESIUM SULFATE 1 G/100ML
1 INJECTION INTRAVENOUS ONCE
Status: DISCONTINUED | OUTPATIENT
Start: 2023-05-02 | End: 2023-05-03

## 2023-05-02 RX ORDER — LEVOTHYROXINE SODIUM 0.03 MG/1
25 TABLET ORAL
Status: DISCONTINUED | OUTPATIENT
Start: 2023-05-03 | End: 2023-05-06 | Stop reason: HOSPADM

## 2023-05-02 RX ORDER — SODIUM CHLORIDE 0.9 % (FLUSH) 0.9 %
10 SYRINGE (ML) INJECTION EVERY 12 HOURS SCHEDULED
Status: DISCONTINUED | OUTPATIENT
Start: 2023-05-02 | End: 2023-05-06 | Stop reason: HOSPADM

## 2023-05-02 RX ORDER — ONDANSETRON 4 MG/1
4 TABLET, ORALLY DISINTEGRATING ORAL AS NEEDED
COMMUNITY
End: 2023-05-06 | Stop reason: HOSPADM

## 2023-05-02 RX ORDER — NITROGLYCERIN 0.4 MG/1
0.4 TABLET SUBLINGUAL
COMMUNITY

## 2023-05-02 RX ORDER — DULOXETIN HYDROCHLORIDE 20 MG/1
20 CAPSULE, DELAYED RELEASE ORAL 2 TIMES DAILY
Status: DISCONTINUED | OUTPATIENT
Start: 2023-05-02 | End: 2023-05-06 | Stop reason: HOSPADM

## 2023-05-02 RX ORDER — FAMOTIDINE 20 MG/1
20 TABLET, FILM COATED ORAL
Status: DISCONTINUED | OUTPATIENT
Start: 2023-05-02 | End: 2023-05-02 | Stop reason: HOSPADM

## 2023-05-02 RX ORDER — TRANEXAMIC ACID 10 MG/ML
1000 INJECTION, SOLUTION INTRAVENOUS ONCE
Status: COMPLETED | OUTPATIENT
Start: 2023-05-02 | End: 2023-05-02

## 2023-05-02 RX ORDER — SODIUM CHLORIDE 9 MG/ML
40 INJECTION, SOLUTION INTRAVENOUS AS NEEDED
Status: DISCONTINUED | OUTPATIENT
Start: 2023-05-02 | End: 2023-05-02 | Stop reason: SDUPTHER

## 2023-05-02 RX ORDER — BUPIVACAINE HYDROCHLORIDE 2.5 MG/ML
INJECTION, SOLUTION EPIDURAL; INFILTRATION; INTRACAUDAL
Status: COMPLETED | OUTPATIENT
Start: 2023-05-02 | End: 2023-05-02

## 2023-05-02 RX ORDER — POLYETHYLENE GLYCOL 3350 17 G/17G
17 POWDER, FOR SOLUTION ORAL DAILY PRN
Status: DISCONTINUED | OUTPATIENT
Start: 2023-05-02 | End: 2023-05-06 | Stop reason: HOSPADM

## 2023-05-02 RX ORDER — SODIUM CHLORIDE 0.9 % (FLUSH) 0.9 %
10 SYRINGE (ML) INJECTION AS NEEDED
Status: DISCONTINUED | OUTPATIENT
Start: 2023-05-02 | End: 2023-05-06 | Stop reason: HOSPADM

## 2023-05-02 RX ORDER — OLANZAPINE 5 MG/1
5 TABLET ORAL NIGHTLY PRN
Status: DISCONTINUED | OUTPATIENT
Start: 2023-05-02 | End: 2023-05-06 | Stop reason: HOSPADM

## 2023-05-02 RX ORDER — AMOXICILLIN 250 MG
2 CAPSULE ORAL 2 TIMES DAILY
Status: DISCONTINUED | OUTPATIENT
Start: 2023-05-02 | End: 2023-05-06 | Stop reason: HOSPADM

## 2023-05-02 RX ORDER — SODIUM CHLORIDE 9 MG/ML
120 INJECTION, SOLUTION INTRAVENOUS CONTINUOUS
Status: DISCONTINUED | OUTPATIENT
Start: 2023-05-02 | End: 2023-05-02 | Stop reason: SDUPTHER

## 2023-05-02 RX ORDER — BUPIVACAINE HYDROCHLORIDE 2.5 MG/ML
30 INJECTION, SOLUTION EPIDURAL; INFILTRATION; INTRACAUDAL ONCE
Status: DISCONTINUED | OUTPATIENT
Start: 2023-05-02 | End: 2023-05-02

## 2023-05-02 RX ORDER — CEFAZOLIN SODIUM 2 G/100ML
2 INJECTION, SOLUTION INTRAVENOUS EVERY 8 HOURS
Status: COMPLETED | OUTPATIENT
Start: 2023-05-03 | End: 2023-05-03

## 2023-05-02 RX ORDER — TRANEXAMIC ACID 10 MG/ML
1000 INJECTION, SOLUTION INTRAVENOUS ONCE
Status: DISCONTINUED | OUTPATIENT
Start: 2023-05-02 | End: 2023-05-02 | Stop reason: HOSPADM

## 2023-05-02 RX ORDER — LIDOCAINE HYDROCHLORIDE 10 MG/ML
0.5 INJECTION, SOLUTION EPIDURAL; INFILTRATION; INTRACAUDAL; PERINEURAL ONCE AS NEEDED
Status: DISCONTINUED | OUTPATIENT
Start: 2023-05-02 | End: 2023-05-02 | Stop reason: HOSPADM

## 2023-05-02 RX ORDER — ASPIRIN 81 MG/1
81 TABLET ORAL EVERY 12 HOURS SCHEDULED
Status: DISCONTINUED | OUTPATIENT
Start: 2023-05-03 | End: 2023-05-06 | Stop reason: HOSPADM

## 2023-05-02 RX ORDER — SODIUM CHLORIDE 9 MG/ML
75 INJECTION, SOLUTION INTRAVENOUS CONTINUOUS
Status: DISCONTINUED | OUTPATIENT
Start: 2023-05-02 | End: 2023-05-06 | Stop reason: HOSPADM

## 2023-05-02 RX ORDER — ONDANSETRON 2 MG/ML
4 INJECTION INTRAMUSCULAR; INTRAVENOUS ONCE
Status: COMPLETED | OUTPATIENT
Start: 2023-05-02 | End: 2023-05-02

## 2023-05-02 RX ORDER — DEXAMETHASONE SODIUM PHOSPHATE 4 MG/ML
INJECTION, SOLUTION INTRA-ARTICULAR; INTRALESIONAL; INTRAMUSCULAR; INTRAVENOUS; SOFT TISSUE AS NEEDED
Status: DISCONTINUED | OUTPATIENT
Start: 2023-05-02 | End: 2023-05-02 | Stop reason: SURG

## 2023-05-02 RX ORDER — FENTANYL CITRATE 50 UG/ML
50 INJECTION, SOLUTION INTRAMUSCULAR; INTRAVENOUS ONCE
Status: COMPLETED | OUTPATIENT
Start: 2023-05-02 | End: 2023-05-02

## 2023-05-02 RX ORDER — METOPROLOL SUCCINATE 25 MG/1
25 TABLET, EXTENDED RELEASE ORAL NIGHTLY
Status: DISCONTINUED | OUTPATIENT
Start: 2023-05-02 | End: 2023-05-06 | Stop reason: HOSPADM

## 2023-05-02 RX ORDER — FENTANYL CITRATE 50 UG/ML
50 INJECTION, SOLUTION INTRAMUSCULAR; INTRAVENOUS
Status: DISCONTINUED | OUTPATIENT
Start: 2023-05-02 | End: 2023-05-02 | Stop reason: HOSPADM

## 2023-05-02 RX ORDER — ONDANSETRON 2 MG/ML
INJECTION INTRAMUSCULAR; INTRAVENOUS AS NEEDED
Status: DISCONTINUED | OUTPATIENT
Start: 2023-05-02 | End: 2023-05-02 | Stop reason: SURG

## 2023-05-02 RX ORDER — ROPIVACAINE HYDROCHLORIDE 2 MG/ML
INJECTION, SOLUTION EPIDURAL; INFILTRATION; PERINEURAL CONTINUOUS
Status: DISCONTINUED | OUTPATIENT
Start: 2023-05-02 | End: 2023-05-06 | Stop reason: HOSPADM

## 2023-05-02 RX ORDER — DROPERIDOL 2.5 MG/ML
0.62 INJECTION, SOLUTION INTRAMUSCULAR; INTRAVENOUS
Status: DISCONTINUED | OUTPATIENT
Start: 2023-05-02 | End: 2023-05-02 | Stop reason: HOSPADM

## 2023-05-02 RX ORDER — PROPOFOL 10 MG/ML
VIAL (ML) INTRAVENOUS AS NEEDED
Status: DISCONTINUED | OUTPATIENT
Start: 2023-05-02 | End: 2023-05-02 | Stop reason: SURG

## 2023-05-02 RX ORDER — ONDANSETRON 2 MG/ML
4 INJECTION INTRAMUSCULAR; INTRAVENOUS EVERY 6 HOURS PRN
Status: DISCONTINUED | OUTPATIENT
Start: 2023-05-02 | End: 2023-05-06 | Stop reason: HOSPADM

## 2023-05-02 RX ORDER — ONDANSETRON 4 MG/1
4 TABLET, FILM COATED ORAL EVERY 6 HOURS PRN
Status: DISCONTINUED | OUTPATIENT
Start: 2023-05-02 | End: 2023-05-06 | Stop reason: HOSPADM

## 2023-05-02 RX ORDER — BISACODYL 5 MG/1
5 TABLET, DELAYED RELEASE ORAL DAILY PRN
Status: DISCONTINUED | OUTPATIENT
Start: 2023-05-02 | End: 2023-05-06 | Stop reason: HOSPADM

## 2023-05-02 RX ORDER — SODIUM CHLORIDE, SODIUM LACTATE, POTASSIUM CHLORIDE, CALCIUM CHLORIDE 600; 310; 30; 20 MG/100ML; MG/100ML; MG/100ML; MG/100ML
INJECTION, SOLUTION INTRAVENOUS CONTINUOUS PRN
Status: DISCONTINUED | OUTPATIENT
Start: 2023-05-02 | End: 2023-05-02 | Stop reason: SURG

## 2023-05-02 RX ORDER — SODIUM CHLORIDE, SODIUM LACTATE, POTASSIUM CHLORIDE, CALCIUM CHLORIDE 600; 310; 30; 20 MG/100ML; MG/100ML; MG/100ML; MG/100ML
9 INJECTION, SOLUTION INTRAVENOUS CONTINUOUS PRN
Status: DISCONTINUED | OUTPATIENT
Start: 2023-05-02 | End: 2023-05-02 | Stop reason: SDUPTHER

## 2023-05-02 RX ORDER — SODIUM CHLORIDE 0.9 % (FLUSH) 0.9 %
1-10 SYRINGE (ML) INJECTION AS NEEDED
Status: DISCONTINUED | OUTPATIENT
Start: 2023-05-02 | End: 2023-05-06 | Stop reason: HOSPADM

## 2023-05-02 RX ORDER — CEFAZOLIN SODIUM 2 G/100ML
2 INJECTION, SOLUTION INTRAVENOUS ONCE
Status: COMPLETED | OUTPATIENT
Start: 2023-05-02 | End: 2023-05-02

## 2023-05-02 RX ORDER — CHOLECALCIFEROL (VITAMIN D3) 125 MCG
5 CAPSULE ORAL NIGHTLY PRN
Status: DISCONTINUED | OUTPATIENT
Start: 2023-05-02 | End: 2023-05-06 | Stop reason: HOSPADM

## 2023-05-02 RX ORDER — HYDROMORPHONE HYDROCHLORIDE 1 MG/ML
0.5 INJECTION, SOLUTION INTRAMUSCULAR; INTRAVENOUS; SUBCUTANEOUS
Status: DISCONTINUED | OUTPATIENT
Start: 2023-05-02 | End: 2023-05-06 | Stop reason: HOSPADM

## 2023-05-02 RX ADMIN — ONDANSETRON 4 MG: 2 INJECTION INTRAMUSCULAR; INTRAVENOUS at 17:12

## 2023-05-02 RX ADMIN — Medication 10 ML: at 20:23

## 2023-05-02 RX ADMIN — CEFAZOLIN SODIUM 2 G: 2 INJECTION, SOLUTION INTRAVENOUS at 17:20

## 2023-05-02 RX ADMIN — SODIUM CHLORIDE, POTASSIUM CHLORIDE, SODIUM LACTATE AND CALCIUM CHLORIDE: 600; 310; 30; 20 INJECTION, SOLUTION INTRAVENOUS at 16:20

## 2023-05-02 RX ADMIN — HYDROMORPHONE HYDROCHLORIDE 0.5 MG: 1 INJECTION, SOLUTION INTRAMUSCULAR; INTRAVENOUS; SUBCUTANEOUS at 12:13

## 2023-05-02 RX ADMIN — DULOXETINE HYDROCHLORIDE 20 MG: 20 CAPSULE, DELAYED RELEASE ORAL at 20:23

## 2023-05-02 RX ADMIN — BUPIVACAINE HYDROCHLORIDE 50 ML: 2.5 INJECTION, SOLUTION EPIDURAL; INFILTRATION; INTRACAUDAL at 15:54

## 2023-05-02 RX ADMIN — BUPIVACAINE HYDROCHLORIDE 25 ML: 2.5 INJECTION, SOLUTION EPIDURAL; INFILTRATION; INTRACAUDAL; PERINEURAL at 17:18

## 2023-05-02 RX ADMIN — ONDANSETRON 4 MG: 2 INJECTION INTRAMUSCULAR; INTRAVENOUS at 12:12

## 2023-05-02 RX ADMIN — Medication 1000 MG: at 18:49

## 2023-05-02 RX ADMIN — Medication 10 ML: at 20:24

## 2023-05-02 RX ADMIN — SODIUM CHLORIDE 75 ML/HR: 9 INJECTION, SOLUTION INTRAVENOUS at 20:22

## 2023-05-02 RX ADMIN — PROPOFOL 150 MG: 10 INJECTION, EMULSION INTRAVENOUS at 17:12

## 2023-05-02 RX ADMIN — TRANEXAMIC ACID 1000 MG: 10 INJECTION, SOLUTION INTRAVENOUS at 18:06

## 2023-05-02 RX ADMIN — LIDOCAINE HYDROCHLORIDE 50 MG: 10 INJECTION, SOLUTION EPIDURAL; INFILTRATION; INTRACAUDAL; PERINEURAL at 17:12

## 2023-05-02 RX ADMIN — FENTANYL CITRATE 50 MCG: 50 INJECTION, SOLUTION INTRAMUSCULAR; INTRAVENOUS at 17:03

## 2023-05-02 RX ADMIN — DEXAMETHASONE SODIUM PHOSPHATE 4 MG: 4 INJECTION, SOLUTION INTRAMUSCULAR; INTRAVENOUS at 17:12

## 2023-05-02 RX ADMIN — TRANEXAMIC ACID 1000 MG: 10 INJECTION, SOLUTION INTRAVENOUS at 17:36

## 2023-05-02 RX ADMIN — GABAPENTIN 300 MG: 300 CAPSULE ORAL at 20:23

## 2023-05-02 RX ADMIN — OXYCODONE HYDROCHLORIDE 7.5 MG: 15 TABLET ORAL at 23:44

## 2023-05-02 RX ADMIN — METOPROLOL SUCCINATE 25 MG: 25 TABLET, EXTENDED RELEASE ORAL at 20:23

## 2023-05-02 NOTE — H&P
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 Patient ambulated to restroom with walker and upon returning experienced severe hip pain. Took oxycodone at that time but did not improve pain. She thinks she fell overnight but is not totally sure which apparently not unusual for her. She takes sedating meds at night and says she still has brain fog from her chemo.     Review of Systems   Gen- No fevers, chills  CV- No chest pain, palpitations  Resp- No cough, dyspnea  GI- No N/V/D, abd pain    Personal History     Past Medical History:   Diagnosis Date   • Allergic lisinopril     • Anemia    • Arrhythmia    • Arthritis    • Cataract mild     stil lpresent   • Chicken pox    • Chronic fatigue    • CKD (chronic kidney disease), stage III     sees nephro   • Dental root implant present     lower left  x1 - possible dental implant   • Depression mild    • Difficulty walking    • Disease of thyroid gland    • Dizzy    • NIEVES (dyspnea on exertion)        • Generalized anxiety disorder    • GERD (gastroesophageal reflux disease) 2018   • History of brachytherapy 2023    vaginal brachytherapy   • Hyperlipidemia    • Hypertension    • Iron deficiency anemia    • Liver disease     fatty   • Liver problem    • Measles    • Menopause    • Mumps    • Neuromuscular disorder Peripheral Neuropathy   • Orthostatic hypotension    • 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   • Renal insufficiency 2018   • Scoliosis    • Unintentional weight loss    • Uses contact lenses     bilat   • Uterine cancer    • Uterine cancer 2022   •  UTI (urinary tract infection)    • Visual impairment Nearsighted   • Wears glasses          Oncology Problem List:  Endometrial adenocarcinoma (2022; Status: Active)    Oncology/Hematology History   Endometrial adenocarcinoma   2022 Initial Diagnosis    2022: TVUS with uterus measuring 5.7 x 4.8 x 4.1 cm with an endometrial stripe thickness of 9.8 mm.  Right ovary not visualized.  Left ovary normal.  No free fluid.  2022: Saline infusion sonogram with an irregularly shaped prominent lesion on the posterior endometrial surface concerning for neoplasia.  10/6/2022: Endometrial biopsy with FIGO grade 1 endometrial cancer      10/28/2022 Surgery    Robotic-assisted total laparoscopic hysterectomy with bilateral salpingo-oophorectomy with bilateral SLND    Pathology with FIGO grade 2 endometrioid adenocarcinoma with 94% MI. Involvement of endocervix and uterine serosal adhesions. Negative parametrium and bilateral sentinel lymph nodes. MMR intact.     [facility]      Cancer Staged    Cancer Staging   Endometrial adenocarcinoma (HCC)  Staging form: Corpus Uteri - Carcinoma And Carcinosarcoma, AJCC 8th Edition  - Pathologic stage from 10/28/2022: FIGO Stage IIIA (pT3a, pN0(sn), cM0) - Signed by Jamsine Rich MD on 2022       10/28/2022 Cancer Staged    Staging form: Corpus Uteri - Carcinoma And Carcinosarcoma, AJCC 8th Edition  - Pathologic stage from 10/28/2022: FIGO Stage IIIA (pT3a, pN0(sn), cM0) - Signed by Jasmine Rich MD on 2022 -  Chemotherapy    OP OVARIAN DOCEtaxel / CARBOplatin     2023 - 2023 Radiation    Radiation OncologyTreatment Course:  Alysia Sierra received 3000 cGy in 5 fractions to upper vagina via High Dose Radiation - HDR.     2023 -  Chemotherapy    OP SUPPORTIVE HYDRATION + ANTIEMETICS         Past Surgical History:   Procedure Laterality Date   •  SECTION     • DILATION AND  CURETTAGE, DIAGNOSTIC / THERAPEUTIC     • OOPHORECTOMY     • ORAL LESION EXCISION/BIOPSY  08/2021   • TOTAL LAPAROSCOPIC HYSTERECTOMY SALPINGO OOPHORECTOMY N/A 10/28/2022    Procedure: TOTAL LAPAROSCOPIC HYSTERECTOMY BILATERAL SALPINGO-OOPHORECTOMY, INJECTION FOR SENTINEL LYMPH NODE MAPPING, BILATERAL SENTINEL LYMPH NODE DISSECTION WITH DAVINCI ROBOT;  Surgeon: Jasmine Rich MD;  Location: [facility];  Service: Robotics - DaVinci;  Laterality: N/A;   • TUBAL ABDOMINAL LIGATION  1983       Family History: family history includes COPD in her mother; Cancer in her father; Coronary artery disease in her father; Heart attack in her father; Heart disease in her father; Hyperlipidemia in her sister; Hypertension in her brother, father, mother, and sister; Lung cancer in her father; Lung disease in her father; Malig Hypertension in her sister; Osteoarthritis in her sister; Other in her sister; Rheum arthritis in her sister; Spondylolisthesis in her mother; Stroke in her mother.     Social History:  reports that she has never smoked. She has never used smokeless tobacco. She reports current alcohol use of about 6.0 standard drinks per week. She reports that she does not use drugs.  Social History     Social History Narrative    Caffeine: None       Medications:  Available home medication information reviewed.  (Not in a hospital admission)      Allergies   Allergen Reactions   • Lisinopril Angioedema   • Metal Rash     Possible nickel -   Gold is only metal that doesn't have issues     • Milk-Related Compounds Other (See Comments)     LACTOSE INTOLERANT   • Tylenol [Acetaminophen] Other (See Comments)     ckd   • Atorvastatin Myalgia       Objective   Objective     Vital Signs:   Temp:  [98 °F (36.7 °C)] 98 °F (36.7 °C)  Heart Rate:  [84] 84  Resp:  [16] 16  BP: (117)/(76) 117/76       Physical Exam   Constitutional: Awake, alert, appears uncomfortable  Eyes: PERRLA, sclerae anicteric, no conjunctival injection   HENT:  NCAT, mucous membranes moist  Neck: Supple, no thyromegaly, no lymphadenopathy, trachea midline  Respiratory: Clear to auscultation bilaterally, nonlabored respirations   Cardiovascular: RRR, no murmurs, rubs, or gallops, palpable pedal pulses bilaterally  Gastrointestinal: Positive bowel sounds, soft, nontender, nondistended  Musculoskeletal: No bilateral ankle edema, no clubbing or cyanosis to extremities  Psychiatric: Appropriate affect, cooperative  Neurologic: Oriented x 3, strength symmetric in all extremities, Cranial Nerves grossly intact to confrontation, speech clear  Skin: No rashes    Result Review:  I have personally reviewed the results from the time of this admission to 5/2/2023 14:17 EDT and agree with these findings:  []  Laboratory list / accordion  []  Microbiology  []  Radiology  []  EKG/Telemetry   []  Cardiology/Vascular   []  Pathology  []  Old records  []  Other:  Most notable findings include:         LAB RESULTS:      Lab 05/02/23  1232   WBC 8.18   HEMOGLOBIN 9.1*   HEMATOCRIT 28.6*   PLATELETS 162   NEUTROS ABS 5.78   IMMATURE GRANS (ABS) 0.08*   LYMPHS ABS 1.48   MONOS ABS 0.73   EOS ABS 0.09   .6*   PROTIME 13.3   INR 1.00         Lab 05/02/23  1232   SODIUM 138   POTASSIUM 4.7   CHLORIDE 104   CO2 23.0   ANION GAP 11.0   BUN 10   CREATININE 0.91   EGFR 69.3   GLUCOSE 93   CALCIUM 8.1*   MAGNESIUM 1.5*   TSH 2.350         Lab 05/02/23  1232   TOTAL PROTEIN 5.6*   ALBUMIN 3.9   GLOBULIN 1.7   ALT (SGPT) 11   AST (SGOT) 22   BILIRUBIN 0.2   ALK PHOS 88                     UA        11/11/2022    13:56   Urinalysis   Squamous Epithelial Cells, UA 0-2     Specific Gravity, UA 1.017     Ketones, UA Trace     Blood, UA Trace     Leukocytes, UA Moderate (2+)     Nitrite, UA Negative     RBC, UA 7-12     WBC, UA Too Numerous to Count     Bacteria, UA None Seen         Microbiology Results (last 10 days)     ** No results found for the last 240 hours. **          XR Femur 2 View  Left    Result Date: 5/2/2023  XR FEMUR 2 VW LEFT Date of Exam: 5/2/2023 12:08 PM EDT Indication: fall/pain Comparison: CT abdomen and pelvis with contrast 4/13/2023 Findings: There is a transverse fracture of the left femoral neck with impaction and valgus deformity. The left femoral head is maintained in alignment. No distal femoral fracture. The knee alignment is maintained. Complete assessment of the distal femur and knee limited due to technique. Included portions of the pelvis are intact.     Impression: Impression: Transverse left femoral neck fracture with impaction. Electronically Signed: [employer]  5/2/2023 12:54 PM EDT  Workstation ID: [id number]    XR Chest 1 View    Result Date: 5/2/2023  XR CHEST 1 VW Date of Exam: 5/2/2023 12:08 PM EDT Indication: fall Comparison: None available. Findings: Heart size top normal, stable. The lungs are without consolidation. No pneumothorax or pleural effusion. The osseous structures are grossly intact.     Impression: Impression: No acute process. Electronically Signed: [employer]  5/2/2023 12:44 PM EDT  Workstation ID: [id number]      Results for orders placed during the hospital encounter of 01/24/23    Adult Transthoracic Echo Complete W/ Cont if Necessary Per Protocol    Interpretation Summary  •  Left ventricular systolic function is normal. Left ventricular ejection fraction appears to be 51 - 55%.  •  Left ventricular diastolic function is consistent with (grade I) impaired relaxation.  •  Estimated right ventricular systolic pressure from tricuspid regurgitation is normal (<35 mmHg).      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  --ED physician to perform block. Will consult acute pain service as well.  --IV/PO pain control.  --Post op PT/OT.     Stage IIIa endometrial cancer  Chemotherapy induced anemia  --Completed chemotherapy 4/2023. Recently seen by Dr. Rich.     Hypomagnesemia  --Replace.     CKD IIIa  --At baseline. Monitor.    Peripheral neuropathy  --Followed by neurology. Continue her cymbalta, gabapentin. Also takes monthly IM B12.    DVT prophylaxis:  SCDs    CODE STATUS:    Code Status and Medical Interventions:   Ordered at: 05/02/23 1416     Code Status (Patient has no pulse and is not breathing):    CPR (Attempt to Resuscitate)     Medical Interventions (Patient has pulse or is breathing):    Full Support       Expected Discharge 5/6  Expected discharge date/ time has not been documented.     Berkley Ortez II, DO  05/02/23

## 2023-05-02 NOTE — ANESTHESIA PROCEDURE NOTES
Peripheral Block      Patient reassessed immediately prior to procedure    Start time: 5/2/2023 3:54 PM  Stop time: 5/2/2023 4:00 PM  Reason for block: at surgeon's request and post-op pain management  Performed by  Anesthesiologist: Eduardo Ryan MD  CRNA/CAA: Maco Stephenson CRNA  Preanesthetic Checklist  Completed: patient identified, IV checked, site marked, risks and benefits discussed, surgical consent, monitors and equipment checked, pre-op evaluation and timeout performed  Prep:  Pt Position: supine  Sterile barriers:cap, gloves, mask and washed/disinfected hands  Prep: ChloraPrep  Patient monitoring: blood pressure monitoring, continuous pulse oximetry and EKG  Procedure  Performed under: local infiltration  Guidance:ultrasound guided    ULTRASOUND INTERPRETATION.  Using ultrasound guidance a 20 G gauge needle was placed in close proximity to the nerve, at which point, under ultrasound guidance anesthetic was injected in the area of the nerve and spread of the anesthesia was seen on ultrasound in close proximity thereto.  There were no abnormalities seen on ultrasound; a digital image was taken; and the patient tolerated the procedure with no complications. Images:still images obtained, printed/placed on chart    Block Type:fascia iliaca compartment  Injection Technique:catheter  Needle Type:echogenic and Tuohy  Needle Gauge:18 G  Resistance on Injection: none  Catheter Size:20 G (20g)    Medications Used: bupivacaine PF (MARCAINE) 0.25 % injection - Injection   50 mL - 5/2/2023 3:54:00 PM      Post Assessment  Injection Assessment: negative aspiration for heme, no paresthesia on injection and incremental injection  Patient Tolerance:comfortable throughout block  Complications:no  Additional Notes  CKAFASCIAILIACA: CATHETER  A high-frequency linear transducer, with sterile cover, was placed in parasagittal plane on top of the Anterior Superior Iliac Spine (ASIS) and moved medially to identify the  "Internal Oblique muscle, Sartorius muscle, Iliacus Muscle, Fascia Iliaca (FI) and Fascia Latae. The insertion site was prepped and draped in sterile fashion. Skin and cutaneous tissue was infiltrated with 2-5 ml of 1% Lidocaine. Using ultrasound-guidance, an 18-gauge Contiplex Ultra 360 Touhy needle was advanced in plane from caudad to cephalad. Preservative-free normal saline was utilized for hydro-dissection of tissue, advancement of Touhy, and to confirm final needle placement below FI. Local anesthetic in incremental 3-5 ml injections. Aspiration every 5 ml to prevent intravascular injection. Injection was completed with negative aspiration of blood and negative intravascular injection. Injection pressures were normal with minimal resistance. A 20-gauge Contiplex Echo catheter was placed through the needle and advance out the tip of the Touhy 3-5 cm. The Touhy needle was then removed, and final catheter position verified below the FI. The catheter was secured in the usual fashion with skin glue, benzoin, steri-strips, CHG tegaderm and Label noting \"Nerve Block Catheter\". Jerk tape applied at yellow connector and catheter connection.           "

## 2023-05-02 NOTE — CONSULTS
[facility] [place]    Subjective     Patient Care Team:  Patient Care Team:  Liana So APRN as PCP - General (Family Medicine)  Jasmine Rich MD as Referring Physician (Gynecologic Oncology)  Russ Carrington MD as Consulting Physician (Nephrology)  Roberta Aldana MD as Consulting Physician (Radiation Oncology)  Gray Bahena MD as Consulting Physician (Cardiology)  Caden Dietz MD as Consulting Physician (Neurology)  Charity Cornejo RN as Ambulatory  (Oncology) (Population Health)    Referring Provider: Dr. Juanpablo Oliver  Reason for Consultation: Left hip pain    Chief Complaint   Patient presents with   • Hip Pain        HPI    Alysia Sierra is a 68 y.o. female who fell last night at home, and noticed pain this morning when she was walking.  She presented to the emergency room today, where radiographs were obtained which showed an impacted femoral neck fracture.  She denies any antecedent hip pain.  She walks with a rollator and cane at baseline.  Recently completed chemotherapy for endometrial adenocarcinoma.      Patient Active Problem List   Diagnosis   • Hypertension   • Paroxysmal SVT (supraventricular tachycardia) (HCC)   • Leg weakness, bilateral   • Syncope   • CKD (chronic kidney disease) stage 3, GFR 30-59 ml/min   • Circadian rhythm sleep disorder, advanced sleep phase type   • Neuropathy   • Hyperlipidemia LDL goal <100   • Endometrial adenocarcinoma   • Mild episode of recurrent major depressive disorder   • Dizziness   • Hip fracture   • Anemia   • Hypomagnesemia     Past Medical History:   Diagnosis Date   • Allergic lisinopril  2017   • Anemia    • Arrhythmia    • Arthritis    • Cataract mild 2020    stil lpresent   • Chicken pox    • Chronic fatigue    • CKD (chronic kidney disease), stage III     sees nephro   • Dental root implant present     lower left  x1 - possible dental implant   • Depression mild 2019   • Difficulty walking 2019    • Disease of thyroid gland    • Dizzy    • NIEVES (dyspnea on exertion)     2017   • Generalized anxiety disorder    • GERD (gastroesophageal reflux disease) 2018   • History of brachytherapy 2023    vaginal brachytherapy   • Hyperlipidemia    • Hypertension    • Iron deficiency anemia    • Liver disease     fatty   • Liver problem    • Measles    • Menopause    • Mumps    • Neuromuscular disorder Peripheral Neuropathy   • Orthostatic hypotension    • 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   • Renal insufficiency 2018   • Scoliosis    • Unintentional weight loss    • Uses contact lenses     bilat   • Uterine cancer    • Uterine cancer 2022   • UTI (urinary tract infection)    • Visual impairment Nearsighted   • Wears glasses       Past Surgical History:   Procedure Laterality Date   •  SECTION     • DILATION AND CURETTAGE, DIAGNOSTIC / THERAPEUTIC     • OOPHORECTOMY     • ORAL LESION EXCISION/BIOPSY  2021   • TOTAL LAPAROSCOPIC HYSTERECTOMY SALPINGO OOPHORECTOMY N/A 10/28/2022    Procedure: TOTAL LAPAROSCOPIC HYSTERECTOMY BILATERAL SALPINGO-OOPHORECTOMY, INJECTION FOR SENTINEL LYMPH NODE MAPPING, BILATERAL SENTINEL LYMPH NODE DISSECTION WITH Matthew Kenney [employer] ROBOT;  Surgeon: Jasmine Rich MD;  Location: [facility];  Service: Robotics - DaVinci;  Laterality: N/A;   • TUBAL ABDOMINAL LIGATION        Family History   Problem Relation Age of Onset   • Spondylolisthesis Mother    • COPD Mother    • Stroke Mother    • Hypertension Mother    • Lung cancer Father    • Hypertension Father    • Heart attack Father    • Coronary artery disease Father    • Heart disease Father    • Cancer Father    • Lung disease Father    • Hyperlipidemia Sister    • Rheum arthritis Sister    • Malig Hypertension Sister    • Osteoarthritis Sister    • Other Sister         alcoholic   • Hypertension Sister    • Hypertension Brother    • Breast cancer Neg Hx    • Ovarian cancer Neg Hx    • Uterine  "cancer Neg Hx    • Colon cancer Neg Hx    • Melanoma Neg Hx    • Prostate cancer Neg Hx      Social History     Socioeconomic History   • Marital status:    • Number of children: 1   • Highest education level: Associate degree: academic program   Tobacco Use   • Smoking status: Never   • Smokeless tobacco: Never   • Tobacco comments:     Never   Vaping Use   • Vaping Use: Never used   Substance and Sexual Activity   • Alcohol use: Yes     Alcohol/week: 6.0 standard drinks     Types: 6 Glasses of wine per week     Comment: social   • Drug use: No   • Sexual activity: Not Currently     Partners: Male     Birth control/protection: Surgical, Post-menopausal      (Not in a hospital admission)    Allergies   Allergen Reactions   • Lisinopril Angioedema   • Metal Rash     Possible nickel -   Gold is only metal that doesn't have issues     • Milk-Related Compounds Other (See Comments)     LACTOSE INTOLERANT   • Tylenol [Acetaminophen] Other (See Comments)     ckd   • Atorvastatin Myalgia        Review of Systems   Constitutional: Negative.    HENT: Negative.    Eyes: Negative.    Respiratory: Negative.    Cardiovascular: Negative.    Gastrointestinal: Negative.    Endocrine: Negative.    Genitourinary: Negative.    Musculoskeletal: Positive for arthralgias and gait problem.   Skin: Negative.    Allergic/Immunologic: Negative.    Hematological: Negative.    Psychiatric/Behavioral: Negative.         Objective      Physical Exam  /76   Pulse 84   Temp 98 °F (36.7 °C) (Oral)   Resp 16   Ht 162.6 cm (64\")   Wt 57.2 kg (126 lb)   LMP  (LMP Unknown)   SpO2 97%   BMI 21.63 kg/m²     Body mass index is 21.63 kg/m².    General:   Mental Status:  Alert   Appearance: Cooperative, in no acute distress   Build and Nutrition: Deconditioned female   Orientation: Alert and oriented to person, place and time   Posture: Laying in a hospital bed    Integument:   Left hip: No skin lesions, no rash, no " ecchymosis    Neurologic:   Sensation:    Left foot: Decreased to light touch on the dorsal and plantar aspect, secondary to neuropathy   Motor:  Left lower extremity: Intact ankle dorsiflexors and ankle plantar flexors  Vascular:   Left lower extremity: 2+ dorsalis pedis pulse, prompt capillary refill    Lower Extremity:   Left Hip:    Tenderness:  Left hip laterally    Swelling:  None    Crepitus:  None    Atrophy:  None    Range of motion:  External Rotation: 30°       Internal Rotation: 30°, with pain       Flexion:  70°, with pain       Extension:  0°   Instability:  None  Deformities:  None    No leg length discrepancy      Imaging/Studies  Imaging Results (Last 24 Hours)     Procedure Component Value Units Date/Time    CT Head Without Contrast [[id number]] Resulted: 05/02/23 1456     Updated: 05/02/23 1456    XR Pelvis 1 or 2 View [[id number]] Collected: 05/02/23 1446     Updated: 05/02/23 1450    Narrative:      XR PELVIS 1 OR 2 VW    Date of Exam: 5/2/2023 2:29 PM EDT    Indication: pre-op left hip fracture    Comparison: None available.    Findings:  CT abdomen and pelvis 4/13/2023.      Impression:      Impression:  Impacted left femoral neck fracture is seen, without hip dislocation. The pelvic ring appears intact. Osteopenic changes are present. Mild lower lumbar curvature toward the left with small marginal lumbar osteophytes. No sacroiliac joint or pubic   symphysis diastases.    IMPRESSION:  1. Impacted nondisplaced left femoral neck fracture. No hip dislocation.      Electronically Signed: Cristal Pineda    5/2/2023 2:47 PM EDT    Workstation ID: [id number]    XR Femur 2 View Left [[id number]] Collected: 05/02/23 1244     Updated: 05/02/23 1257    Narrative:      XR FEMUR 2 VW LEFT    Date of Exam: 5/2/2023 12:08 PM EDT    Indication: fall/pain     Comparison: CT abdomen and pelvis with contrast 4/13/2023    Findings:  There is a transverse fracture of the left femoral neck with impaction and valgus  deformity. The left femoral head is maintained in alignment. No distal femoral fracture. The knee alignment is maintained. Complete assessment of the distal femur and knee   limited due to technique. Included portions of the pelvis are intact.      Impression:      Impression:  Transverse left femoral neck fracture with impaction.      Electronically Signed: Bill Rader    5/2/2023 12:54 PM EDT    Workstation ID: [id number]    XR Chest 1 View [[id number]] Collected: 05/02/23 1243     Updated: 05/02/23 1247    Narrative:      XR CHEST 1 VW    Date of Exam: 5/2/2023 12:08 PM EDT    Indication: fall    Comparison: None available.    Findings:  Heart size top normal, stable. The lungs are without consolidation. No pneumothorax or pleural effusion. The osseous structures are grossly intact.      Impression:      Impression:  No acute process.      Electronically Signed: Bill Rader    5/2/2023 12:44 PM EDT    Workstation ID: [id number]          Results from last 7 days   Lab Units 05/02/23  1232   WBC 10*3/mm3 8.18   HEMOGLOBIN g/dL 9.1*   HEMATOCRIT % 28.6*   MCV fL 112.6*   PLATELETS 10*3/mm3 162         Results Review:   I reviewed the patient's new clinical lab test results., I reviewed the patient's new imaging test results. and I reviewed the patient's other test results.    Assessment and Plan     Assessment:    Valgus impacted left femoral neck fracture    Hip fracture    Hip fracture    Hypertension    CKD (chronic kidney disease) stage 3, GFR 30-59 ml/min    Neuropathy    Endometrial adenocarcinoma    Anemia    Hypomagnesemia      Plan:    I reviewed my findings with the patient.  She has a valgus impacted left femoral neck fracture, we discussed options.  She would prefer a more conservative treatment approach, with bone preserving if possible.  Therefore I think she is a good candidate for a femoral neck system device for stabilization.  She understands that if this does not heal, this can be converted to a total  hip arthroplasty.  Risks, benefits, and alternatives to the procedure been discussed.  Please see my counseling note for details.  Typical rehabilitative course was also discussed.  The need for protected ambulation was also discussed.    Surgical Counseling     After examining the patient and reviewing the diagnostic studies, I discussed the operative and non-operative approaches regarding the hip fracture with the patient. The informed decision has been made to proceed with operative intervention knowing the risks, benefits, and alternatives to the surgical procedure. Risks discussed included, but are not limited to: bleeding, infection, damage to blood vessels and nerves, need for further surgery, malunion, nonunion, avascular necrosis, hip arthritis, deep venous thrombosis, pulmonary embolus, death, and anesthetic complications. The patient understands, consents, and questions were answered. Plans have been made for femoral neck system device as the primary option for surgical stabilization of the fracture    I discussed the patients findings and my recommendations with patient, nursing staff and consulting provider    Medical Decision Making  Management Options : major surgery with risk factors  Data/Risk: independent visualization of imaging, lab tests, or EMG/NCV      Juanpablo Lee MD  05/02/23  15:00 EDT

## 2023-05-02 NOTE — CASE MANAGEMENT/SOCIAL WORK
Discharge Planning Assessment  [facility]     Patient Name: Alysia Sierra  MRN: [id number]  Today's Date: 5/2/2023    Admit Date: 5/2/2023    Plan: IDP   Discharge Needs Assessment     Row Name 05/02/23 1338       Living Environment    People in Home alone    Current Living Arrangements apartment    Primary Care Provided by self    Provides Primary Care For no one    Family Caregiver if Needed sibling(s)    Family Caregiver Names Sister Akila is POA    Quality of Family Relationships helpful;involved    Able to Return to Prior Arrangements yes       Transition Planning    Transportation Anticipated family or friend will provide       Discharge Needs Assessment    Readmission Within the Last 30 Days no previous admission in last 30 days    Equipment Currently Used at Home rollator;cane, straight    Concerns to be Addressed denies needs/concerns at this time               Discharge Plan     Row Name 05/02/23 1338       Plan    Plan IDP    Plan Comments MSW met with Pt at bedside to complete IDP. Pt lives alone in [facility]. Pt confirmed demographics and reports PCP is Liana So. [email or web address] [employer] [place] Medicare insurance coverage. Pt independent with ADL’s; Pt has rollator and cane for mobility. Pt has no HH or home O2. Pt’s preferred pharmacy is [facility] in [place]. Pt’s Sister Akila is POA and can transport when medically ready. Pt reports she has been in process of finding cleaning service for apartment; MSW provided list of cleaning agencies in [facility], along with local companies partnered with [employer]. MSW also emailed [employer] to make contact and inquire about referral process. Pt denied needs or concerns. CM will continue to follow and assist with discharge planning.    Final Discharge Disposition Code 30 - still a patient              Continued Care and Services - Admitted Since 5/2/2023    Coordination has not been started for this encounter.     Selected Continued  Care - Episodes Includes continued care and service providers with selected services from the active episodes listed below    High Risk Care Management Episode start date: 11/15/2022   There are no active outsourced providers for this episode.                  Demographic Summary     Row Name 05/02/23 1336       General Information    Arrived From home    Referral Source admission list;emergency department    Reason for Consult discharge planning    Preferred Language English               Functional Status     Row Name 05/02/23 1330       Functional Status    Usual Activity Tolerance good    Current Activity Tolerance good       Functional Status, IADL    Medications independent    Meal Preparation independent    Housekeeping independent    Laundry independent    Shopping independent                         FAN Bryan

## 2023-05-02 NOTE — OP NOTE
Orthopaedics Operative Report    PREOPERATIVE DIAGNOSIS: Valgus impacted left femoral neck fracture    POSTOPERATIVE DIAGNOSIS: Valgus impacted left femoral neck fracture    PROCEDURE PERFORMED: Closed reduction and internal fixation with femoral neck system device, Synthes    IMPLANTS:   Implant Name Type Inv. Item Serial No.  Lot No. LRB No. Used Action   DEV CONTRL TISS STRATAFIX SPIRAL MNCRYL UD 3/0 PLS 60CM - [id number] Implant DEV CONTRL TISS STRATAFIX SPIRAL MNCRYL UD 3/0 PLS 60CM  [employer]  DIV [employer] J  Left 1 Implanted   SCRW FEM NK SYS TI ALLOY 80MM STRL - [id number] Implant SCRW FEM NK SYS TI ALLOY 80MM STRL  DEPUY SYNTHES [id number] Left 1 Implanted   BOLT FEM NK SYS TI ALLOY 80MM STRL - [id number] Implant BOLT FEM NK SYS TI ALLOY 80MM STRL  DEPUY SYNTHES [id number] Left 1 Implanted   PLT FEM NK SYS TI ALLOY 1H STRL - [id number] Implant PLT FEM NK SYS TI ALLOY 1H STRL  DEPUY SYNTHES [id number] Left 1 Implanted   SCRW LK S/TAP T25 STRDRV TI 5X38MM STRL - [id number] Implant SCRW LK S/TAP T25 STRDRV TI 5X38MM STRL  DEPUY SYNTHES  Left 1 Implanted        SURGEON: Juanpablo Lee MD    ASSISTANT: None    ANESTHESIA:  General with Block    STAFF:  Circulator: Nick Bhatia RN  Radiology Technologist: Carey Younger R.T.(R)  Scrub Person: Chase Ozuna  Vendor Representative: Crow Mckee  Nursing Assistant: Jeffrey South PCT    ESTIMATED BLOOD LOSS: 100ml     COMPLICATIONS: None    PREOPERATIVE ANTIBIOTICS: Ancef 2 g    REFERRING PHYSICIAN: Dr. Juanpablo Oliver    INDICATIONS: The patient is a 68 y.o. female who sustained a left hip valgus impacted femoral neck fracture secondary to a fall.  Risks, benefits, and alternatives to the procedure were discussed at length with the patient. She understood the option of excepting the valgus impacted alignment, which appears to be stable in her situation as she was able to walk on it initially, versus proceeding with either a  hemiarthroplasty or total hip arthroplasty.  She preferred the option of internal fixation with a backup option of arthroplasty surgery if required in the future.  Risks discussed included, but are not limited to: Bleeding, infection, damage to blood vessels and nerves, need for further surgery, deep venous thrombosis, pulmonary embolus, death, malunion, nonunion, and anesthetic complications.  Consent was obtained.  Plan for femoral neck system fixation as the primary option.     DESCRIPTION OF PROCEDURE: Patient was positively identified in the preoperative holding area, brought to the operative suite, and placed in supine position.  After adequate general anesthetic had been achieved, the patient was placed on the fracture table with all the bony prominences being well-padded.  Traction was applied through the lower extremity with the foot holding device, and intraoperative fluoroscopy demonstrated appropriate alignment of the fracture, which remained valgus impacted, with no displacement along the medial calcar region, nor angulated on the lateral view.  Sterile prep and drape of the hip and lower extremity was then performed, and guidepin was inserted percutaneously, and the incision based off of this.  Guidepin was carefully placed into the femoral head neck region in a center center location, then this was then over-reamed to accommodate the femoral neck system device bolt and single hole plate laterally.  Plate came to rest naturally on the lateral cortex of the femur, and a locking screw placed without difficulty.  The derotation screw was then also placed following this without difficulty, with no loss of fracture alignment compared to the starting point with appropriate hardware placement.   The device was compressed.   The wound was copiously irrigated, and final fluoroscopic imaging obtained.  Deep layers were reapproximated with 0 Vicryl followed by closure of the subcutaneous layer with 2-0 Vicryl,  followed by closure of the subcuticular layer with 3-0 STRATAFIX in a running fashion.  Glue tape wound closure dressing was then applied, followed by sterile dressing with 4 x 4's secured with Micropore tape.  The patient tolerated the procedure well, and was brought to the recovery room in good condition.    POSTOPERATIVE PLAN:  1. Weightbearing status: Weightbearing as tolerated with a walker  2.  Peripheral nerve block for pain control  3. 24-hours of IV Ancef for antibiotic prophylaxis.  4.  Aspirin for DVT prophylaxis (81 mg aspirin twice a day for 1 month)   5. The patient will be under the medical care of hospitalist team  6.  PT/OT in the am.  Patient will likely need a rehab facility at the time of discharge.  Follow-up with me in 1 month in the office.    Juanpablo Lee MD  05/02/23  18:35 EDT

## 2023-05-02 NOTE — ANESTHESIA PREPROCEDURE EVALUATION
Anesthesia Evaluation     Patient summary reviewed and Nursing notes reviewed   no history of anesthetic complications:  NPO Solid Status: > 8 hours  NPO Liquid Status: > 2 hours           Airway   Mallampati: II  TM distance: >3 FB  Neck ROM: full  Possible difficult intubation  Dental          Pulmonary    (+) shortness of breath, decreased breath sounds,   Cardiovascular   Exercise tolerance: good (4-7 METS)    ECG reviewed  Rhythm: regular  Rate: normal    (+) hypertension well controlled less than 2 medications, dysrhythmias Tachycardia, NIEVES, hyperlipidemia,     ROS comment: Interpretation Summary       •  Left ventricular systolic function is normal. Left ventricular ejection fraction appears to be 51 - 55%.  •  Left ventricular diastolic function is consistent with (grade I) impaired relaxation.  •  Estimated right ventricular systolic pressure from tricuspid regurgitation is normal (<35 mmHg).         Neuro/Psych  (+) dizziness/light headedness, syncope, numbness, psychiatric history Depression,    GI/Hepatic/Renal/Endo    (+)  GERD well controlled,  liver disease fatty liver disease, renal disease CRI, thyroid problem hypothyroidism    Musculoskeletal     Abdominal   (+) obese,     Abdomen: soft.   Substance History      OB/GYN          Other   arthritis, blood dyscrasia anemia,   history of cancer remission                    Anesthesia Plan    ASA 3     general with block     intravenous induction     Anesthetic plan, risks, benefits, and alternatives have been provided, discussed and informed consent has been obtained with: patient.    Plan discussed with CRNA.        CODE STATUS:    Code Status (Patient has no pulse and is not breathing): CPR (Attempt to Resuscitate)  Medical Interventions (Patient has pulse or is breathing): Full Support

## 2023-05-02 NOTE — ANESTHESIA POSTPROCEDURE EVALUATION
Patient: Alysia Sierra    Procedure Summary     Date: 05/02/23 Room / Location:  [facility]  [facility]    Anesthesia Start: 1710 Anesthesia Stop:     Procedure: FEMORAL NECK OPEN REDUCTION INTERNAL FIXATION (Left: Hip) Diagnosis:     Surgeons: Juanpablo Lee MD Provider: Eduardo Ryan MD    Anesthesia Type: general with block ASA Status: 3          Anesthesia Type: general with block    Vitals  Vitals Value Taken Time   BP     Temp     Pulse 82 05/02/23 1838   Resp     SpO2 91 % 05/02/23 1838   Vitals shown include unvalidated device data.        Post Anesthesia Care and Evaluation    Patient location during evaluation: PACU  Patient participation: complete - patient participated  Level of consciousness: awake and alert  Pain management: adequate    Airway patency: patent  Anesthetic complications: No anesthetic complications  PONV Status: none  Cardiovascular status: hemodynamically stable and acceptable  Respiratory status: nonlabored ventilation, acceptable and nasal cannula  Hydration status: acceptable

## 2023-05-02 NOTE — ED PROVIDER NOTES
Subjective   History of Present Illness  68-year-old female presents for evaluation of left hip pain.  Of note, the patient has an extensive past medical history and multiple comorbidities, most notably endometrial cancer.  She recently finished chemotherapy treatment and is followed by Dr. Rich.  Late last night, the patient states that she had an unwitnessed fall versus syncopal episode.  She is unsure as to how she ended up on the floor.  However, this morning she noted significant pain in her left hip that made weightbearing quite difficult.  As a result, she called EMS and was subsequently brought to our facility to be evaluated.  She is complaining of isolated pain to her left hip that is worse with attempted range of motion.  She currently rates the pain at 8 out of 10 in severity.  She denies any paresthesias.  She does not recall the events of her fall.  She is not anticoagulated.  She denies any chest pain, shortness of breath, or palpitations.  No neck pain.        Review of Systems   Musculoskeletal:        Left hip pain   Psychiatric/Behavioral: Positive for confusion.   All other systems reviewed and are negative.      Past Medical History:   Diagnosis Date   • Allergic lisinopril  2017   • Anemia    • Arrhythmia    • Arthritis    • Cataract mild 2020    stil lpresent   • Chicken pox    • Chronic fatigue    • CKD (chronic kidney disease), stage III     sees nephro   • Dental root implant present     lower left  x1 - possible dental implant   • Depression mild 2019   • Difficulty walking 2019   • Disease of thyroid gland    • Dizzy    • NIEVES (dyspnea on exertion)     2017   • Generalized anxiety disorder    • GERD (gastroesophageal reflux disease) 2018   • History of brachytherapy 01/18/2023    vaginal brachytherapy   • Hyperlipidemia    • Hypertension    • Iron deficiency anemia    • Liver disease     fatty   • Liver problem    • Measles    • Menopause    • Mumps    • Neuromuscular disorder Peripheral  Neuropathy   • Orthostatic hypotension    • 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   • Renal insufficiency    • Scoliosis    • Unintentional weight loss    • Uses contact lenses     bilat   • Uterine cancer    • Uterine cancer 2022   • UTI (urinary tract infection)    • Visual impairment Nearsighted   • Wears glasses        Allergies   Allergen Reactions   • Lisinopril Angioedema   • Metal Rash     Possible nickel -   Gold is only metal that doesn't have issues     • Milk-Related Compounds Other (See Comments)     LACTOSE INTOLERANT   • Tylenol [Acetaminophen] Other (See Comments)     ckd   • Atorvastatin Myalgia       Past Surgical History:   Procedure Laterality Date   •  SECTION     • DILATION AND CURETTAGE, DIAGNOSTIC / THERAPEUTIC     • OOPHORECTOMY     • ORAL LESION EXCISION/BIOPSY  2021   • TOTAL LAPAROSCOPIC HYSTERECTOMY SALPINGO OOPHORECTOMY N/A 10/28/2022    Procedure: TOTAL LAPAROSCOPIC HYSTERECTOMY BILATERAL SALPINGO-OOPHORECTOMY, INJECTION FOR SENTINEL LYMPH NODE MAPPING, BILATERAL SENTINEL LYMPH NODE DISSECTION WITH [employer] ROBOT;  Surgeon: Jasmine Rich MD;  Location: [facility];  Service: Robotics - DaVinci;  Laterality: N/A;   • TUBAL ABDOMINAL LIGATION         Family History   Problem Relation Age of Onset   • Spondylolisthesis Mother    • COPD Mother    • Stroke Mother    • Hypertension Mother    • Lung cancer Father    • Hypertension Father    • Heart attack Father    • Coronary artery disease Father    • Heart disease Father    • Cancer Father    • Lung disease Father    • Hyperlipidemia Sister    • Rheum arthritis Sister    • Malig Hypertension Sister    • Osteoarthritis Sister    • Other Sister         alcoholic   • Hypertension Sister    • Hypertension Brother    • Breast cancer Neg Hx    • Ovarian cancer Neg Hx    • Uterine cancer Neg Hx    • Colon cancer Neg Hx    • Melanoma Neg Hx    • Prostate cancer Neg Hx        Social History      Socioeconomic History   • Marital status:    • Number of children: 1   • Highest education level: Associate degree: academic program   Tobacco Use   • Smoking status: Never   • Smokeless tobacco: Never   • Tobacco comments:     Never   Vaping Use   • Vaping Use: Never used   Substance and Sexual Activity   • Alcohol use: Yes     Alcohol/week: 6.0 standard drinks     Types: 6 Glasses of wine per week     Comment: social   • Drug use: No   • Sexual activity: Not Currently     Partners: Male     Birth control/protection: Surgical, Post-menopausal           Objective   Physical Exam  Vitals and nursing note reviewed.   Constitutional:       General: She is not in acute distress.     Appearance: She is well-developed. She is not diaphoretic.      Comments: Nontoxic-appearing female   HENT:      Head: Normocephalic and atraumatic.   Eyes:      Pupils: Pupils are equal, round, and reactive to light.   Neck:      Comments: No midline cervical spine tenderness noted, no step-off or deformity present  Cardiovascular:      Rate and Rhythm: Normal rate and regular rhythm.      Heart sounds: Normal heart sounds. No murmur heard.    No friction rub. No gallop.   Pulmonary:      Effort: Pulmonary effort is normal. No respiratory distress.      Breath sounds: Normal breath sounds. No wheezing or rales.   Abdominal:      General: Bowel sounds are normal. There is no distension.      Palpations: Abdomen is soft. There is no mass.      Tenderness: There is no abdominal tenderness. There is no guarding or rebound.      Comments: No focal abdominal tenderness, no peritoneal signs, no pain out of proportion to exam   Musculoskeletal:      Comments: Range of motion of left hip is limited secondary to pain, no pelvic instability noted, no shortening or external rotation of left lower leg when compared to the right on visual inspection   Skin:     General: Skin is warm and dry.      Findings: No erythema or rash.   Neurological:       Mental Status: She is alert and oriented to person, place, and time.      Comments: Awake, alert, and oriented x3, clear and fluent speech, no dysmetria, neurovascularly intact distally in all fours with bounding distal pulses and normal sensation, no cranial nerve deficits noted with cranial nerves II through XII grossly intact   Psychiatric:         Mood and Affect: Mood normal.         Thought Content: Thought content normal.         Judgment: Judgment normal.         Nerve Block    Date/Time: 5/2/2023 8:21 PM  Performed by: Eduardo Oliver MD  Authorized by: Berkley Ortez II,      Consent:     Consent obtained:  Verbal and written    Consent given by:  Patient    Risks, benefits, and alternatives were discussed: yes      Risks discussed:  Allergic reaction, bleeding, intravenous injection, infection, pain, nerve damage, swelling and unsuccessful block    Alternatives discussed:  Alternative treatment  Universal protocol:     Procedure explained and questions answered to patient or proxy's satisfaction: yes      Relevant documents present and verified: yes      Test results available: yes      Imaging studies available: yes      Required blood products, implants, devices, and special equipment available: yes      Site/side marked: yes      Immediately prior to procedure, a time out was called: yes      Patient identity confirmed:  Verbally with patient and arm band  Indications:     Indications:  Pain relief  Location:     Body area:  Lower extremity    Lower extremity nerve:  Femoral    Laterality:  Left  Pre-procedure details:     Skin preparation:  Chlorhexidine    Preparation: Patient was prepped and draped in usual sterile fashion    Skin anesthesia:     Skin anesthesia method:  Local infiltration    Local anesthetic:  Lidocaine 1% w/o epi  Procedure details:     Guidance: ultrasound      Anesthetic injected:  Bupivacaine 0.25% w/o epi    Steroid injected:  None    Additive injected:  None     Injection procedure:  Anatomic landmarks identified, anatomic landmarks palpated, introduced needle, incremental injection and negative aspiration for blood    Paresthesia:  None  Post-procedure details:     Dressing:  Sterile dressing    Outcome:  Pain improved    Procedure completion:  Tolerated well, no immediate complications                   ED Course  ED Course as of 05/02/23 2018   Tue May 02, 2023   1429 68-year-old female presents for evaluation of left hip pain.  Of note, the patient has an extensive past medical history and multiple comorbidities, most notably endometrial cancer.  She recently finished chemotherapy treatment.  Last night, she had an unwitnessed fall versus syncopal episode.  This morning, she noted significant pain in her left hip that made weightbearing quite difficult.  As a result, she called EMS and was subsequently brought to our facility to be evaluated.  On arrival, the patient is nontoxic-appearing.  She has limited and painful range of motion of her left hip.  Left lower extremity is neurovascularly intact distally with bounding distal pulses and normal sensation.  No shortening or rotation noted on visual inspection. [DD]   1430 Labs remarkable for anemia and hypomagnesemia.  Magnesium replacement initiated. [DD]   1431 EKG is unrevealing. [DD]   1431 I personally and independently viewed the patient's x-ray images myself, and I am in agreement with the radiologist's reading for final interpretation.   [DD]   1431 After reviewing the patient's x-ray findings, I discussed the patient's case with Dr. Lee of orthopedics.  He will see the patient in consult and take her to the OR for definitive surgical management later this afternoon.  I discussed the patient's case with Dr. Jackman, and the patient will be admitted under her care following her trip to the OR. [DD]   1431 We will obtain informed consent and proceed with hip block prior to the patient going upstairs. [DD]   1455  After obtaining informed consent, a hip block was done under ultrasound guidance under sterile conditions without complications.  The patient tolerated the procedure well.  Dr. Lee will take her to the OR later this afternoon.  We will keep her n.p.o. in the interim. [DD]      ED Course User Index  [DD] Eduardo Oliver MD                                          Recent Results (from the past 24 hour(s))   ECG 12 Lead Other; fall    Collection Time: 05/02/23 12:11 PM   Result Value Ref Range    QT Interval 424 ms    QTC Interval 483 ms   CK    Collection Time: 05/02/23 12:32 PM    Specimen: Blood   Result Value Ref Range    Creatine Kinase 53 20 - 180 U/L   Magnesium    Collection Time: 05/02/23 12:32 PM    Specimen: Blood   Result Value Ref Range    Magnesium 1.5 (L) 1.6 - 2.4 mg/dL   TSH    Collection Time: 05/02/23 12:32 PM    Specimen: Blood   Result Value Ref Range    TSH 2.350 0.270 - 4.200 uIU/mL   T4, Free    Collection Time: 05/02/23 12:32 PM    Specimen: Blood   Result Value Ref Range    Free T4 1.14 0.93 - 1.70 ng/dL   Comprehensive Metabolic Panel    Collection Time: 05/02/23 12:32 PM    Specimen: Blood   Result Value Ref Range    Glucose 93 65 - 99 mg/dL    BUN 10 8 - 23 mg/dL    Creatinine 0.91 0.57 - 1.00 mg/dL    Sodium 138 136 - 145 mmol/L    Potassium 4.7 3.5 - 5.2 mmol/L    Chloride 104 98 - 107 mmol/L    CO2 23.0 22.0 - 29.0 mmol/L    Calcium 8.1 (L) 8.6 - 10.5 mg/dL    Total Protein 5.6 (L) 6.0 - 8.5 g/dL    Albumin 3.9 3.5 - 5.2 g/dL    ALT (SGPT) 11 1 - 33 U/L    AST (SGOT) 22 1 - 32 U/L    Alkaline Phosphatase 88 39 - 117 U/L    Total Bilirubin 0.2 0.0 - 1.2 mg/dL    Globulin 1.7 gm/dL    A/G Ratio 2.3 g/dL    BUN/Creatinine Ratio 11.0 7.0 - 25.0    Anion Gap 11.0 5.0 - 15.0 mmol/L    eGFR 69.3 >60.0 mL/min/1.73   Protime-INR    Collection Time: 05/02/23 12:32 PM    Specimen: Blood   Result Value Ref Range    Protime 13.3 12.2 - 14.5 Seconds    INR 1.00 0.89 - 1.12   CBC Auto  Differential    Collection Time: 05/02/23 12:32 PM    Specimen: Blood   Result Value Ref Range    WBC 8.18 3.40 - 10.80 10*3/mm3    RBC 2.54 (L) 3.77 - 5.28 10*6/mm3    Hemoglobin 9.1 (L) 12.0 - 15.9 g/dL    Hematocrit 28.6 (L) 34.0 - 46.6 %    .6 (H) 79.0 - 97.0 fL    MCH 35.8 (H) 26.6 - 33.0 pg    MCHC 31.8 31.5 - 35.7 g/dL    RDW 17.7 (H) 12.3 - 15.4 %    RDW-SD 73.3 (H) 37.0 - 54.0 fl    MPV 9.5 6.0 - 12.0 fL    Platelets 162 140 - 450 10*3/mm3    Neutrophil % 70.7 42.7 - 76.0 %    Lymphocyte % 18.1 (L) 19.6 - 45.3 %    Monocyte % 8.9 5.0 - 12.0 %    Eosinophil % 1.1 0.3 - 6.2 %    Basophil % 0.2 0.0 - 1.5 %    Immature Grans % 1.0 (H) 0.0 - 0.5 %    Neutrophils, Absolute 5.78 1.70 - 7.00 10*3/mm3    Lymphocytes, Absolute 1.48 0.70 - 3.10 10*3/mm3    Monocytes, Absolute 0.73 0.10 - 0.90 10*3/mm3    Eosinophils, Absolute 0.09 0.00 - 0.40 10*3/mm3    Basophils, Absolute 0.02 0.00 - 0.20 10*3/mm3    Immature Grans, Absolute 0.08 (H) 0.00 - 0.05 10*3/mm3    nRBC 0.0 0.0 - 0.2 /100 WBC   Scan Slide    Collection Time: 05/02/23 12:32 PM    Specimen: Blood   Result Value Ref Range    Anisocytosis Slight/1+ None Seen    Macrocytes Slight/1+ None Seen    WBC Morphology Normal Normal    Platelet Morphology Normal Normal   Type & Screen    Collection Time: 05/02/23  4:14 PM    Specimen: Blood   Result Value Ref Range    ABO Type A     RH type Positive     Antibody Screen Negative     T&S Expiration Date 5/5/2023 11:59:59 PM      Note: In addition to lab results from this visit, the labs listed above may include labs taken at another facility or during a different encounter within the last 24 hours. Please correlate lab times with ED admission and discharge times for further clarification of the services performed during this visit.    XR Hip With or Without Pelvis 2 - 3 View Left   Final Result   Impression:   Status post open reduction and internal fixation of a subcapital fracture of the left femoral neck as  described.         Electronically Signed: Gilbert Abdi     5/2/2023 7:24 PM EDT     Workstation ID: [id number]      FL C Arm During Surgery   Final Result   Impression:   Fluoroscopy in OR without radiologist present.         Electronically Signed: Elizabeth Gallagher     5/2/2023 7:18 PM EDT     Workstation ID: [id number]      CT Head Without Contrast   Final Result   Impression:      1. Volume loss secondary to cerebral atrophy.   2. Chronic white matter microvascular ischemia.   3. No acute findings.            Electronically Signed: Gilbert Abid     5/2/2023 3:08 PM EDT     Workstation ID: [id number]      XR Pelvis 1 or 2 View   Final Result   Impression:   Impacted left femoral neck fracture is seen, without hip dislocation. The pelvic ring appears intact. Osteopenic changes are present. Mild lower lumbar curvature toward the left with small marginal lumbar osteophytes. No sacroiliac joint or pubic    symphysis diastases.      IMPRESSION:   1. Impacted nondisplaced left femoral neck fracture. No hip dislocation.         Electronically Signed: Cristal Pineda     5/2/2023 2:47 PM EDT     Workstation ID: [id number]      XR Chest 1 View   Final Result   Impression:   No acute process.         Electronically Signed: Bill Rader     5/2/2023 12:44 PM EDT     Workstation ID: [id number]      XR Femur 2 View Left   Final Result   Impression:   Transverse left femoral neck fracture with impaction.         Electronically Signed: Bill 2Peer (Qlipso)     5/2/2023 12:54 PM EDT     Workstation ID: [id number]        Vitals:    05/02/23 1915 05/02/23 1930 05/02/23 1944 05/02/23 2008   BP: 138/83 129/84  154/92   BP Location:    Left arm   Patient Position:    Lying   Pulse: 82 81  96   Resp: 16 16  16   Temp: 98 °F (36.7 °C) 98.8 °F (37.1 °C)  98.4 °F (36.9 °C)   TempSrc:    Oral   SpO2: 97% 99%  99%   Weight:   61.3 kg (135 lb 1.6 oz)    Height:         Medications   sodium chloride 0.9 % bolus 500 mL ( Intravenous MAR Unhold 5/2/23 1944)   magnesium sulfate  in D5W 1g/100mL (PREMIX) ( Intravenous MAR Unhold 5/2/23 1944)   oxyCODONE (ROXICODONE) immediate release tablet 7.5 mg ( Oral MAR Unhold 5/2/23 1944)   HYDROmorphone (DILAUDID) injection 0.5 mg ( Intravenous MAR Unhold 5/2/23 1944)   fat emulsion (INTRALIPID,LIPOSYN) 20 % infusion  - ADS Override Pull (has no administration in time range)   DULoxetine (CYMBALTA) DR capsule 20 mg (has no administration in time range)   pantoprazole (PROTONIX) EC tablet 40 mg (has no administration in time range)   gabapentin (NEURONTIN) capsule 300 mg (has no administration in time range)   levothyroxine (SYNTHROID, LEVOTHROID) tablet 25 mcg (has no administration in time range)   metoprolol succinate XL (TOPROL-XL) 24 hr tablet 25 mg (has no administration in time range)   OLANZapine (zyPREXA) tablet 5 mg (has no administration in time range)   sodium chloride 0.9 % flush 10 mL (has no administration in time range)   sodium chloride 0.9 % flush 10 mL (has no administration in time range)   sodium chloride 0.9 % infusion 40 mL (has no administration in time range)   sodium chloride 0.9 % infusion (75 mL/hr Intravenous New Bag 5/2/23 2022)   Potassium Replacement - Follow Nurse / BPA Driven Protocol (has no administration in time range)   Magnesium Standard Dose Replacement - Follow Nurse / BPA Driven Protocol (has no administration in time range)   Phosphorus Replacement - Follow Nurse / BPA Driven Protocol (has no administration in time range)   Calcium Replacement - Follow Nurse / BPA Driven Protocol (has no administration in time range)   melatonin tablet 5 mg (has no administration in time range)   sennosides-docusate (PERICOLACE) 8.6-50 MG per tablet 2 tablet (has no administration in time range)     And   polyethylene glycol (MIRALAX) packet 17 g (has no administration in time range)     And   bisacodyl (DULCOLAX) EC tablet 5 mg (has no administration in time range)     And   bisacodyl (DULCOLAX) suppository 10 mg (has no  administration in time range)   ondansetron (ZOFRAN) tablet 4 mg (has no administration in time range)     Or   ondansetron (ZOFRAN) injection 4 mg (has no administration in time range)   ropivacaine (NAROPIN) 0.2 % infusion (INFUSYSTEM) (1,000 mg Peripheral Nerve New Bag 5/2/23 1849)   sodium chloride 0.9 % flush 10 mL (has no administration in time range)   sodium chloride 0.9 % flush 1-10 mL (has no administration in time range)   ceFAZolin in dextrose (ANCEF) IVPB solution 2 g (has no administration in time range)   aspirin EC tablet 81 mg (has no administration in time range)   ondansetron (ZOFRAN) injection 4 mg (4 mg Intravenous Given 5/2/23 1212)   HYDROmorphone (DILAUDID) injection 0.5 mg (0.5 mg Intravenous Given 5/2/23 1213)   ceFAZolin in dextrose (ANCEF) IVPB solution 2 g (2 g Intravenous Given 5/2/23 1720)   tranexamic acid 1000 mg in 100 mL 0.7% NaCl infusion (premix) (1,000 mg Intravenous Given 5/2/23 1806)   fentaNYL citrate (PF) (SUBLIMAZE) injection 50 mcg (50 mcg Intravenous Given 5/2/23 1703)     ECG/EMG Results (last 24 hours)     Procedure Component Value Units Date/Time    ECG 12 Lead Other; fall [[id number]] Collected: 05/02/23 1211     Updated: 05/02/23 2004     QT Interval 424 ms      QTC Interval 483 ms     Narrative:      Test Reason : Other~  Blood Pressure :   */*   mmHG  Vent. Rate :  78 BPM     Atrial Rate :  77 BPM     P-R Int :   * ms          QRS Dur :  70 ms      QT Int : 424 ms       P-R-T Axes :   *  14  36 degrees     QTc Int : 483 ms    poor data quality  Normal sinus rhythm  Low voltage QRS  Nonspecific T wave abnormality  Abnormal ECG  Confirmed by MD Benito, Juanpablo ([id number]) on 5/2/2023 8:03:32 PM    Referred By: EDMD           Confirmed By: Juanpablo Oliver MD        ECG 12 Lead Other; fall   Final Result   Test Reason : Other~   Blood Pressure :   */*   mmHG   Vent. Rate :  78 BPM     Atrial Rate :  77 BPM      P-R Int :   * ms          QRS Dur :  70 ms       QT Int : 424 ms        P-R-T Axes :   *  14  36 degrees      QTc Int : 483 ms      poor data quality   Normal sinus rhythm   Low voltage QRS   Nonspecific T wave abnormality   Abnormal ECG   Confirmed by MD Oliver Michael ([id number]) on 5/2/2023 8:03:32 PM      Referred By: JOCELYN           Confirmed By: Juanpablo Oliver MD              [facility]    Final diagnoses:   Closed fracture of left hip, initial encounter   Anemia, unspecified type   History of endometrial cancer       ED Disposition  ED Disposition     ED Disposition   Decision to Admit    Condition   --    Comment   Level of Care: Telemetry [5]   Diagnosis: Hip fracture [348779]   Admitting Physician: KIESHA RICE II [[id number]]   Certification: I Certify That Inpatient Hospital Services Are Medically Necessary For Greater Than 2 Midnights               No follow-up provider specified.       Medication List      No changes were made to your prescriptions during this visit.          Eduardo Oliver MD  05/02/23 2023

## 2023-05-02 NOTE — ANESTHESIA PROCEDURE NOTES
SS Fascia Iliaca      Patient reassessed immediately prior to procedure    Reason for block: at surgeon's request and post-op pain management  Performed by  CRNA/CAA: Maco Stephenson CRNA  Assisted by: Wilbert Haynes CRNA  Preanesthetic Checklist  Completed: patient identified, IV checked, site marked, risks and benefits discussed, surgical consent, monitors and equipment checked, pre-op evaluation and timeout performed  Prep:  Pt Position: supine  Sterile barriers:cap, gloves, mask and washed/disinfected hands  Prep: ChloraPrep  Patient monitoring: blood pressure monitoring, continuous pulse oximetry and EKG  Procedure  Performed under: general  Guidance:ultrasound guided    ULTRASOUND INTERPRETATION. Using ultrasound guidance a 20 G gauge needle was placed in close proximity to the nerve, at which point, under ultrasound guidance anesthetic was injected in the area of the nerve and spread of the anesthesia was seen on ultrasound in close proximity thereto.  There were no abnormalities seen on ultrasound; a digital image was taken; and the patient tolerated the procedure with no complications. Images:still images obtained, printed/placed on chart    Laterality:left  Block Type:fascia iliaca compartment  Injection Technique:single-shot  Needle Type:echogenic and short-bevel  Needle Gauge:18 G  Resistance on Injection: none  Catheter Size:20 G (20g)    Medications Used: bupivacaine PF (MARCAINE) 0.25 % injection - Injection 25 mL - 5/2/2023 5:18:00 PM      Post Assessment  Injection Assessment: negative aspiration for heme, no paresthesia on injection and incremental injection  Patient Tolerance:comfortable throughout block  Complications:no  Additional Notes  CKAFASCIAILIACA: SINGLE shot   A high-frequency linear transducer, with sterile cover, was placed in parasagittal plane on top of the Anterior Superior Iliac Spine (ASIS) and moved medially to identify the Internal Oblique muscle, Sartorius muscle, Iliacus  "Muscle, Fascia Iliaca (FI) and Fascia Latae. The insertion site was prepped and draped in sterile fashion. Skin and cutaneous tissue was infiltrated with 2-5 ml of 1% Lidocaine. Using ultrasound-guidance, a 20-gauge B-Beach 4\" Ultraplex 360 non-stimulating echogenic needle was advanced in plane from caudad to cephalad. Preservative-free normal saline was utilized for hydro-dissection of tissue, advancement of needle, and to confirm final needle placement below FI. Local anesthetic in incremental 3-5 ml injections. Aspiration every 5 ml to prevent intravascular injection. Injection was completed with negative aspiration of blood and negative intravascular injection. Injection pressures were normal with minimal resistance.             "

## 2023-05-03 LAB
ANION GAP SERPL CALCULATED.3IONS-SCNC: 22 MMOL/L (ref 5–15)
BUN SERPL-MCNC: 12 MG/DL (ref 8–23)
BUN/CREAT SERPL: 11.9 (ref 7–25)
CALCIUM SPEC-SCNC: 8.6 MG/DL (ref 8.6–10.5)
CHLORIDE SERPL-SCNC: 100 MMOL/L (ref 98–107)
CO2 SERPL-SCNC: 14 MMOL/L (ref 22–29)
CREAT SERPL-MCNC: 1.01 MG/DL (ref 0.57–1)
DEPRECATED RDW RBC AUTO: 76.5 FL (ref 37–54)
EGFRCR SERPLBLD CKD-EPI 2021: 61.1 ML/MIN/1.73
ERYTHROCYTE [DISTWIDTH] IN BLOOD BY AUTOMATED COUNT: 17.2 % (ref 12.3–15.4)
GLUCOSE SERPL-MCNC: 104 MG/DL (ref 65–99)
HCT VFR BLD AUTO: 29.8 % (ref 34–46.6)
HGB BLD-MCNC: 9 G/DL (ref 12–15.9)
MAGNESIUM SERPL-MCNC: 1.4 MG/DL (ref 1.6–2.4)
MCH RBC QN AUTO: 36.1 PG (ref 26.6–33)
MCHC RBC AUTO-ENTMCNC: 30.2 G/DL (ref 31.5–35.7)
MCV RBC AUTO: 119.7 FL (ref 79–97)
PLATELET # BLD AUTO: 132 10*3/MM3 (ref 140–450)
PMV BLD AUTO: 9.4 FL (ref 6–12)
POTASSIUM SERPL-SCNC: 5.1 MMOL/L (ref 3.5–5.2)
RBC # BLD AUTO: 2.49 10*6/MM3 (ref 3.77–5.28)
SODIUM SERPL-SCNC: 136 MMOL/L (ref 136–145)
WBC NRBC COR # BLD: 6.25 10*3/MM3 (ref 3.4–10.8)

## 2023-05-03 PROCEDURE — 97162 PT EVAL MOD COMPLEX 30 MIN: CPT

## 2023-05-03 PROCEDURE — 25010000002 CEFAZOLIN IN DEXTROSE 2-4 GM/100ML-% SOLUTION: Performed by: ORTHOPAEDIC SURGERY

## 2023-05-03 PROCEDURE — 25010000002 MAGNESIUM SULFATE 2 GM/50ML SOLUTION: Performed by: HOSPITALIST

## 2023-05-03 PROCEDURE — 97535 SELF CARE MNGMENT TRAINING: CPT | Performed by: OCCUPATIONAL THERAPIST

## 2023-05-03 PROCEDURE — 85027 COMPLETE CBC AUTOMATED: CPT | Performed by: ORTHOPAEDIC SURGERY

## 2023-05-03 PROCEDURE — 80048 BASIC METABOLIC PNL TOTAL CA: CPT | Performed by: INTERNAL MEDICINE

## 2023-05-03 PROCEDURE — 25010000002 ONDANSETRON PER 1 MG: Performed by: INTERNAL MEDICINE

## 2023-05-03 PROCEDURE — 97110 THERAPEUTIC EXERCISES: CPT

## 2023-05-03 PROCEDURE — 99024 POSTOP FOLLOW-UP VISIT: CPT | Performed by: ORTHOPAEDIC SURGERY

## 2023-05-03 PROCEDURE — 97165 OT EVAL LOW COMPLEX 30 MIN: CPT | Performed by: OCCUPATIONAL THERAPIST

## 2023-05-03 PROCEDURE — 83735 ASSAY OF MAGNESIUM: CPT

## 2023-05-03 RX ORDER — MAGNESIUM SULFATE HEPTAHYDRATE 40 MG/ML
2 INJECTION, SOLUTION INTRAVENOUS
Status: COMPLETED | OUTPATIENT
Start: 2023-05-03 | End: 2023-05-03

## 2023-05-03 RX ORDER — OXYCODONE HYDROCHLORIDE 5 MG/1
5 TABLET ORAL EVERY 4 HOURS PRN
Status: DISCONTINUED | OUTPATIENT
Start: 2023-05-03 | End: 2023-05-06 | Stop reason: HOSPADM

## 2023-05-03 RX ORDER — HYDRALAZINE HYDROCHLORIDE 25 MG/1
25 TABLET, FILM COATED ORAL 2 TIMES DAILY
Status: DISCONTINUED | OUTPATIENT
Start: 2023-05-03 | End: 2023-05-06 | Stop reason: HOSPADM

## 2023-05-03 RX ORDER — ROSUVASTATIN CALCIUM 10 MG/1
5 TABLET, COATED ORAL NIGHTLY
Status: DISCONTINUED | OUTPATIENT
Start: 2023-05-03 | End: 2023-05-06 | Stop reason: HOSPADM

## 2023-05-03 RX ADMIN — METOPROLOL SUCCINATE 25 MG: 25 TABLET, EXTENDED RELEASE ORAL at 20:23

## 2023-05-03 RX ADMIN — HYDRALAZINE HYDROCHLORIDE 25 MG: 25 TABLET, FILM COATED ORAL at 20:23

## 2023-05-03 RX ADMIN — SENNOSIDES AND DOCUSATE SODIUM 2 TABLET: 8.6; 5 TABLET ORAL at 08:33

## 2023-05-03 RX ADMIN — MAGNESIUM SULFATE HEPTAHYDRATE 2 G: 2 INJECTION, SOLUTION INTRAVENOUS at 15:23

## 2023-05-03 RX ADMIN — Medication 10 ML: at 20:26

## 2023-05-03 RX ADMIN — SENNOSIDES AND DOCUSATE SODIUM 2 TABLET: 8.6; 5 TABLET ORAL at 20:24

## 2023-05-03 RX ADMIN — OXYCODONE HYDROCHLORIDE 7.5 MG: 15 TABLET ORAL at 08:33

## 2023-05-03 RX ADMIN — PANTOPRAZOLE SODIUM 40 MG: 40 TABLET, DELAYED RELEASE ORAL at 05:00

## 2023-05-03 RX ADMIN — GABAPENTIN 300 MG: 300 CAPSULE ORAL at 08:33

## 2023-05-03 RX ADMIN — DULOXETINE HYDROCHLORIDE 20 MG: 20 CAPSULE, DELAYED RELEASE ORAL at 20:23

## 2023-05-03 RX ADMIN — LEVOTHYROXINE SODIUM 25 MCG: 25 TABLET ORAL at 05:00

## 2023-05-03 RX ADMIN — Medication 2 G: at 08:34

## 2023-05-03 RX ADMIN — DULOXETINE HYDROCHLORIDE 20 MG: 20 CAPSULE, DELAYED RELEASE ORAL at 08:33

## 2023-05-03 RX ADMIN — ASPIRIN 81 MG: 81 TABLET, COATED ORAL at 08:33

## 2023-05-03 RX ADMIN — ASPIRIN 81 MG: 81 TABLET, COATED ORAL at 20:24

## 2023-05-03 RX ADMIN — MAGNESIUM SULFATE HEPTAHYDRATE 2 G: 2 INJECTION, SOLUTION INTRAVENOUS at 17:42

## 2023-05-03 RX ADMIN — GABAPENTIN 300 MG: 300 CAPSULE ORAL at 20:23

## 2023-05-03 RX ADMIN — Medication 2 G: at 00:00

## 2023-05-03 RX ADMIN — MAGNESIUM SULFATE HEPTAHYDRATE 2 G: 2 INJECTION, SOLUTION INTRAVENOUS at 13:26

## 2023-05-03 RX ADMIN — ONDANSETRON 4 MG: 2 INJECTION INTRAMUSCULAR; INTRAVENOUS at 10:16

## 2023-05-03 RX ADMIN — ROSUVASTATIN 5 MG: 10 TABLET, FILM COATED ORAL at 20:23

## 2023-05-03 NOTE — PLAN OF CARE
Goal Outcome Evaluation:  Plan of Care Reviewed With: patient           Outcome Evaluation: Patient presents s/p ORIF of L hip with increased pain/nausea and deficits in strength, balance, endurance that are currently limiting her mobility below her baseline. She performed STS and weightshifting activity today with Roxana and FWW with further activity limited by nausea. IPPT is indicated to address current deficits. Recommend she D/C to IPR.

## 2023-05-03 NOTE — PLAN OF CARE
Goal Outcome Evaluation:  Plan of Care Reviewed With: patient           Outcome Evaluation: Pt completed bed mobility with mod assist x2, transfer to BSC and chair with mod assist x2, UB dressing with min assist and LB dressing with mod assist using AE. Pt limited with pain, decreased balance, decreased AROM and preforms below baseline status. Recommend IPR.

## 2023-05-03 NOTE — PLAN OF CARE
Goal Outcome Evaluation:  Plan of Care Reviewed With: patient        Progress: improving  Outcome Evaluation: admit from PACU; a/o x 4; VSS, 2L n.c prn; waffle applied to bed; SCD on; Ice to L hip; ropivicaine pump line intact; PRN given once for breakthrough pain; able to stand with gait belt and walker once to bedside commode; no BM overnight; purewick utilized; NS @ 75; pleasant; no changes to report; will continue POC      Problem: Adult Inpatient Plan of Care  Goal: Absence of Hospital-Acquired Illness or Injury  Intervention: Identify and Manage Fall Risk  Recent Flowsheet Documentation  Taken 5/3/2023 0400 by Yair Meyer, RN  Safety Promotion/Fall Prevention:   assistive device/personal items within reach   clutter free environment maintained   activity supervised   fall prevention program maintained   nonskid shoes/slippers when out of bed   room organization consistent   safety round/check completed  Taken 5/3/2023 0200 by Yair Meyer, RN  Safety Promotion/Fall Prevention:   assistive device/personal items within reach   activity supervised   clutter free environment maintained   fall prevention program maintained   nonskid shoes/slippers when out of bed   room organization consistent   safety round/check completed  Taken 5/3/2023 0000 by Yair Meyer, RN  Safety Promotion/Fall Prevention:   assistive device/personal items within reach   clutter free environment maintained   activity supervised   fall prevention program maintained   nonskid shoes/slippers when out of bed   room organization consistent   safety round/check completed  Taken 5/2/2023 2200 by Yair Meyer, RN  Safety Promotion/Fall Prevention:   assistive device/personal items within reach   clutter free environment maintained   activity supervised   fall prevention program maintained   nonskid shoes/slippers when out of bed   room organization consistent   safety round/check completed  Taken 5/2/2023 2100 by  Reinscheld, Yair, RN  Safety Promotion/Fall Prevention:   clutter free environment maintained   assistive device/personal items within reach   activity supervised   fall prevention program maintained   nonskid shoes/slippers when out of bed   room organization consistent   safety round/check completed  Taken 5/2/2023 2033 by Yair Meyer RN  Safety Promotion/Fall Prevention:   activity supervised   assistive device/personal items within reach   clutter free environment maintained   fall prevention program maintained   nonskid shoes/slippers when out of bed   room organization consistent   safety round/check completed     Problem: Adult Inpatient Plan of Care  Goal: Absence of Hospital-Acquired Illness or Injury  Intervention: Prevent Skin Injury  Recent Flowsheet Documentation  Taken 5/3/2023 0400 by Yair Meyer RN  Body Position: position changed independently  Skin Protection: (L elbow dressing applied)   incontinence pads utilized   adhesive use limited   tubing/devices free from skin contact   transparent dressing maintained   skin-to-device areas padded   silicone foam dressing in place  Taken 5/3/2023 0200 by Yair Meyer RN  Body Position: position changed independently  Skin Protection:   incontinence pads utilized   adhesive use limited   transparent dressing maintained   tubing/devices free from skin contact   skin-to-device areas padded  Taken 5/3/2023 0000 by Yair Meyer RN  Body Position: position changed independently  Skin Protection:   adhesive use limited   incontinence pads utilized   tubing/devices free from skin contact   transparent dressing maintained   skin-to-device areas padded  Taken 5/2/2023 2200 by Yair Meyer RN  Body Position: position changed independently  Skin Protection:   adhesive use limited   incontinence pads utilized   transparent dressing maintained   tubing/devices free from skin contact   skin-to-device areas padded  Taken 5/2/2023  2033 by Yair Meyer, RN  Body Position: position changed independently  Skin Protection:   adhesive use limited   incontinence pads utilized   transparent dressing maintained   tubing/devices free from skin contact   skin-to-device areas padded   pulse oximeter probe site changed

## 2023-05-03 NOTE — PROGRESS NOTES
[facility]    Acute pain service Inpatient Progress Note    Patient Name: Alysia Sierra  :  1955  MRN:  [id number]        Acute Pain  Service Inpatient Progress Note:    Analgesia:Good  Pain Score:4/10  LOC: alert and awake  Resp Status: room air  Cardiac: VS stable  Side Effects:None  Catheter Site:clean, dressing intact and dry  Cath type: peripheral nerve cath with ON Q  Volume: 1mL,8ml, 8ml InfuSystem Pump.  Dosing/Volume: ropivacaine 0.2%  Catheter Plan:Catheter to remain Insitu and Continue catheter infusion rate unchanged  Comments:

## 2023-05-03 NOTE — PLAN OF CARE
Problem: Adult Inpatient Plan of Care  Goal: Plan of Care Review  Outcome: Ongoing, Progressing  Flowsheets (Taken 5/3/2023 1853)  Progress: improving  Plan of Care Reviewed With: patient  Outcome Evaluation: Patient VSS, NSR on tele, RA, A&Ox4. Minimal c/o pain, PO pain meds given once. Infublock in place. Up w/ 2 assist. Purewick in place. Dressing to hip CDI. Scuds on. Up in chair for a few hours. Zofran given once for n/v. Continue to monitor.  Goal: Patient-Specific Goal (Individualized)  Outcome: Ongoing, Progressing  Goal: Absence of Hospital-Acquired Illness or Injury  Outcome: Ongoing, Progressing  Intervention: Identify and Manage Fall Risk  Recent Flowsheet Documentation  Taken 5/3/2023 1800 by Marilou Kothari, RN  Safety Promotion/Fall Prevention:   activity supervised   clutter free environment maintained   fall prevention program maintained   assistive device/personal items within reach   gait belt   toileting scheduled   safety round/check completed   room organization consistent   nonskid shoes/slippers when out of bed  Taken 5/3/2023 1600 by Marilou Kothari, RN  Safety Promotion/Fall Prevention:   assistive device/personal items within reach   activity supervised   clutter free environment maintained   fall prevention program maintained   gait belt   toileting scheduled   safety round/check completed   room organization consistent   nonskid shoes/slippers when out of bed  Taken 5/3/2023 1430 by Marilou Kothari, RN  Safety Promotion/Fall Prevention:   activity supervised   assistive device/personal items within reach   clutter free environment maintained   fall prevention program maintained   gait belt   toileting scheduled   safety round/check completed   room organization consistent   nonskid shoes/slippers when out of bed  Taken 5/3/2023 1200 by Marilou Kothari, RN  Safety Promotion/Fall Prevention:   activity supervised   assistive device/personal items within reach   clutter free  environment maintained   fall prevention program maintained   gait belt   toileting scheduled   safety round/check completed   room organization consistent   nonskid shoes/slippers when out of bed  Taken 5/3/2023 1015 by Marilou Kothari RN  Safety Promotion/Fall Prevention:   clutter free environment maintained   fall prevention program maintained   assistive device/personal items within reach   activity supervised   toileting scheduled   safety round/check completed   room organization consistent   nonskid shoes/slippers when out of bed   gait belt  Taken 5/3/2023 0800 by Marilou Kothari RN  Safety Promotion/Fall Prevention:   activity supervised   assistive device/personal items within reach   clutter free environment maintained   fall prevention program maintained   gait belt   toileting scheduled   safety round/check completed   room organization consistent   nonskid shoes/slippers when out of bed  Intervention: Prevent Skin Injury  Recent Flowsheet Documentation  Taken 5/3/2023 1800 by Marilou Kothari RN  Body Position: sitting up in bed  Skin Protection: adhesive use limited  Taken 5/3/2023 1600 by Marilou Kothari RN  Body Position: sitting up in bed  Skin Protection: adhesive use limited  Taken 5/3/2023 1430 by Marilou Kothari RN  Body Position: supine  Skin Protection: adhesive use limited  Taken 5/3/2023 1200 by Marilou Kothari RN  Body Position: (up in chair) other (see comments)  Skin Protection: adhesive use limited  Taken 5/3/2023 1015 by Marilou Kothari RN  Body Position: (up in chair) other (see comments)  Skin Protection: adhesive use limited  Taken 5/3/2023 0800 by Marilou Kothari RN  Body Position: sitting up in bed  Skin Protection:   adhesive use limited   transparent dressing maintained   tubing/devices free from skin contact  Intervention: Prevent and Manage VTE (Venous Thromboembolism) Risk  Recent Flowsheet Documentation  Taken 5/3/2023 1800 by Marilou Kothari  RN  VTE Prevention/Management:   bilateral   sequential compression devices on  Taken 5/3/2023 1600 by Marilou Kothari RN  VTE Prevention/Management:   bilateral   sequential compression devices on  Taken 5/3/2023 1430 by Marilou Kothari RN  VTE Prevention/Management:   bilateral   sequential compression devices on  Taken 5/3/2023 1200 by Marilou Kothari RN  VTE Prevention/Management:   bilateral   sequential compression devices on  Taken 5/3/2023 1015 by Marilou Kothari RN  VTE Prevention/Management:   bilateral   sequential compression devices on  Taken 5/3/2023 0800 by Marilou Kothari RN  VTE Prevention/Management:   bilateral   sequential compression devices on  Intervention: Prevent Infection  Recent Flowsheet Documentation  Taken 5/3/2023 1800 by Marilou Kothari RN  Infection Prevention: environmental surveillance performed  Taken 5/3/2023 1600 by Marilou Kothari RN  Infection Prevention: environmental surveillance performed  Taken 5/3/2023 1430 by Marilou Kothari RN  Infection Prevention: environmental surveillance performed  Taken 5/3/2023 1200 by Marilou Kothari RN  Infection Prevention: environmental surveillance performed  Taken 5/3/2023 1015 by Marilou Kothari RN  Infection Prevention: environmental surveillance performed  Taken 5/3/2023 0800 by Marilou Kothari RN  Infection Prevention: environmental surveillance performed  Goal: Optimal Comfort and Wellbeing  Outcome: Ongoing, Progressing  Intervention: Provide Person-Centered Care  Recent Flowsheet Documentation  Taken 5/3/2023 1800 by Marilou Kothari RN  Trust Relationship/Rapport: care explained  Taken 5/3/2023 1600 by Marilou Kothari RN  Trust Relationship/Rapport: care explained  Taken 5/3/2023 1430 by Marilou Kothari RN  Trust Relationship/Rapport: care explained  Taken 5/3/2023 1200 by Marilou Kothari RN  Trust Relationship/Rapport: care explained  Taken 5/3/2023 1015 by Marilou Kothari, CESAR  Trust  Relationship/Rapport:   care explained   choices provided  Taken 5/3/2023 0800 by Marilou Kothari, RN  Trust Relationship/Rapport:   choices provided   care explained   questions encouraged   questions answered   reassurance provided   thoughts/feelings acknowledged  Goal: Readiness for Transition of Care  Outcome: Ongoing, Progressing     Problem: Fall Injury Risk  Goal: Absence of Fall and Fall-Related Injury  Outcome: Ongoing, Progressing  Intervention: Identify and Manage Contributors  Recent Flowsheet Documentation  Taken 5/3/2023 0800 by Marilou Kothari, RN  Medication Review/Management: medications reviewed  Intervention: Promote Injury-Free Environment  Recent Flowsheet Documentation  Taken 5/3/2023 1800 by Marilou Kothari, RN  Safety Promotion/Fall Prevention:   activity supervised   clutter free environment maintained   fall prevention program maintained   assistive device/personal items within reach   gait belt   toileting scheduled   safety round/check completed   room organization consistent   nonskid shoes/slippers when out of bed  Taken 5/3/2023 1600 by Marilou Kothari, RN  Safety Promotion/Fall Prevention:   assistive device/personal items within reach   activity supervised   clutter free environment maintained   fall prevention program maintained   gait belt   toileting scheduled   safety round/check completed   room organization consistent   nonskid shoes/slippers when out of bed  Taken 5/3/2023 1430 by Marilou Kothari, RN  Safety Promotion/Fall Prevention:   activity supervised   assistive device/personal items within reach   clutter free environment maintained   fall prevention program maintained   gait belt   toileting scheduled   safety round/check completed   room organization consistent   nonskid shoes/slippers when out of bed  Taken 5/3/2023 1200 by Marilou Kothari, RN  Safety Promotion/Fall Prevention:   activity supervised   assistive device/personal items within reach    clutter free environment maintained   fall prevention program maintained   gait belt   toileting scheduled   safety round/check completed   room organization consistent   nonskid shoes/slippers when out of bed  Taken 5/3/2023 1015 by Marilou Kothari RN  Safety Promotion/Fall Prevention:   clutter free environment maintained   fall prevention program maintained   assistive device/personal items within reach   activity supervised   toileting scheduled   safety round/check completed   room organization consistent   nonskid shoes/slippers when out of bed   gait belt  Taken 5/3/2023 0800 by Marilou Kothari RN  Safety Promotion/Fall Prevention:   activity supervised   assistive device/personal items within reach   clutter free environment maintained   fall prevention program maintained   gait belt   toileting scheduled   safety round/check completed   room organization consistent   nonskid shoes/slippers when out of bed     Problem: Skin Injury Risk Increased  Goal: Skin Health and Integrity  Outcome: Ongoing, Progressing  Intervention: Optimize Skin Protection  Recent Flowsheet Documentation  Taken 5/3/2023 1800 by Marilou Kothari RN  Pressure Reduction Techniques: frequent weight shift encouraged  Head of Bed (HOB) Positioning: HOB at 60 degrees  Pressure Reduction Devices: pressure-redistributing mattress utilized  Skin Protection: adhesive use limited  Taken 5/3/2023 1600 by Marilou Kothari RN  Pressure Reduction Techniques: frequent weight shift encouraged  Head of Bed (HOB) Positioning: HOB at 60 degrees  Pressure Reduction Devices: pressure-redistributing mattress utilized  Skin Protection: adhesive use limited  Taken 5/3/2023 1430 by Marilou Kothari RN  Pressure Reduction Techniques: frequent weight shift encouraged  Head of Bed (HOB) Positioning: HOB at 60 degrees  Pressure Reduction Devices: chair cushion utilized  Skin Protection: adhesive use limited  Taken 5/3/2023 1200 by Marilou Kothari  RN  Pressure Reduction Techniques: frequent weight shift encouraged  Head of Bed (HOB) Positioning: HOB at 60 degrees  Pressure Reduction Devices: chair cushion utilized  Skin Protection: adhesive use limited  Taken 5/3/2023 1015 by Marilou Kothari RN  Pressure Reduction Techniques: frequent weight shift encouraged  Head of Bed (HOB) Positioning: HOB at 60 degrees  Pressure Reduction Devices: pressure-redistributing mattress utilized  Skin Protection: adhesive use limited  Taken 5/3/2023 0800 by Marilou Kothari RN  Head of Bed (HOB) Positioning: HOB at 60 degrees  Skin Protection:   adhesive use limited   transparent dressing maintained   tubing/devices free from skin contact     Problem: Adjustment to Injury (Hip Fracture Medical Management)  Goal: Optimal Coping with Change in Health Status  Outcome: Ongoing, Progressing  Intervention: Support Psychosocial Response to Injury  Recent Flowsheet Documentation  Taken 5/3/2023 1800 by Marilou Kothari, RN  Family/Support System Care: support provided  Taken 5/3/2023 1600 by Marilou Kothari, RN  Family/Support System Care: support provided  Taken 5/3/2023 1430 by Marilou Kothari, RN  Family/Support System Care: support provided  Taken 5/3/2023 1200 by Marilou Kothari, RN  Family/Support System Care: support provided  Taken 5/3/2023 1015 by Marilou Kothari, RN  Family/Support System Care: support provided  Taken 5/3/2023 0800 by Marilou Kothari, RN  Family/Support System Care:   self-care encouraged   support provided     Problem: Bleeding (Hip Fracture Medical Management)  Goal: Absence of Bleeding  Outcome: Ongoing, Progressing     Problem: Bowel Elimination Impaired (Hip Fracture Medical Management)  Goal: Effective Bowel Elimination  Outcome: Ongoing, Progressing     Problem: Cognitive Decline Risk (Hip Fracture Medical Management)  Goal: Baseline Cognitive Function Maintained  Outcome: Ongoing, Progressing     Problem: Embolism (Hip Fracture Medical  Management)  Goal: Absence of Embolism  Outcome: Ongoing, Progressing  Intervention: Prevent or Manage Embolism Risk  Recent Flowsheet Documentation  Taken 5/3/2023 1800 by Marilou Kothari RN  VTE Prevention/Management:   bilateral   sequential compression devices on  Taken 5/3/2023 1600 by Marilou Kothari RN  VTE Prevention/Management:   bilateral   sequential compression devices on  Taken 5/3/2023 1430 by Marilou Kothari RN  VTE Prevention/Management:   bilateral   sequential compression devices on  Taken 5/3/2023 1200 by Marilou Kothari RN  VTE Prevention/Management:   bilateral   sequential compression devices on  Taken 5/3/2023 1015 by Marilou Kothari RN  VTE Prevention/Management:   bilateral   sequential compression devices on  Taken 5/3/2023 0800 by Marilou Kothari RN  VTE Prevention/Management:   bilateral   sequential compression devices on     Problem: Fracture Stabilization and Management (Hip Fracture Medical Management)  Goal: Fracture Stability  Outcome: Ongoing, Progressing  Intervention: Promote Fracture Stability and Healing  Recent Flowsheet Documentation  Taken 5/3/2023 0800 by Marilou Kothari RN  Equipment:   On:   ice   leg, left     Problem: Functional Ability Impaired (Hip Fracture Medical Management)  Goal: Optimal Functional Performance  Outcome: Ongoing, Progressing     Problem: Pain (Hip Fracture Medical Management)  Goal: Acceptable Pain Level  Outcome: Ongoing, Progressing     Problem: Urinary Elimination Impaired (Hip Fracture Medical Management)  Goal: Effective Urinary Elimination  Outcome: Ongoing, Progressing   Goal Outcome Evaluation:  Plan of Care Reviewed With: patient        Progress: improving  Outcome Evaluation: Patient VSS, NSR on tele, RA, A&Ox4. Minimal c/o pain, PO pain meds given once. Infublock in place. Up w/ 2 assist. Purewick in place. Dressing to hip CDI. Scuds on. Up in chair for a few hours. Zofran given once for n/v. Continue to monitor.

## 2023-05-03 NOTE — CASE MANAGEMENT/SOCIAL WORK
Continued Stay Note   [facility]     Patient Name: Alysia Sierra  MRN: [id number]  Today's Date: 5/3/2023    Admit Date: 5/2/2023    Plan: Rehab   Discharge Plan     Row Name 05/03/23 1522       Plan    Plan Rehab    Patient/Family in Agreement with Plan yes    Plan Comments Therapy is recommending rehab for Ms. Sierra and she is agreeable. She has no preference for rehab facilities other than they are in network with her insurance. I will begin the referrals with [facility]. I have given this referral to April, admissions liaison with [facility], and will await her decision. Case management will continue to follow.    Final Discharge Disposition Code 03 - skilled nursing facility (SNF)               Discharge Codes    No documentation.               Expected Discharge Date and Time     Expected Discharge Date Expected Discharge Time    May 10, 2023             Tod Vargas RN

## 2023-05-03 NOTE — PROGRESS NOTES
"      [facility] [place]    Subjective      LOS: 1 day   Patient Care Team:  Liana So APRN as PCP - General (Family Medicine)  Jasmine Rich MD as Referring Physician (Gynecologic Oncology)  Russ Carrington MD as Consulting Physician (Nephrology)  Roberta Aldana MD as Consulting Physician (Radiation Oncology)  Gray Bahena MD as Consulting Physician (Cardiology)  Caden Dietz MD as Consulting Physician (Neurology)  Charity Cornejo RN as Ambulatory  (Oncology) ([facility])    CC: Left hip pain    Interval History:   Patient resting comfortably in bed.  Pain controlled.    Objective      Vital Signs  Temp (24hrs), Av.1 °F (36.7 °C), Min:97.4 °F (36.3 °C), Max:98.8 °F (37.1 °C)      /91 (BP Location: Left arm, Patient Position: Lying)   Pulse 83   Temp 98.1 °F (36.7 °C) (Oral)   Resp 20   Ht 162.6 cm (64.02\")   Wt 61.3 kg (135 lb 1.6 oz)   LMP  (LMP Unknown)   SpO2 98%   BMI 23.18 kg/m²     Examination:   Examination of the left hip: The dressing is clean, dry, and intact.  Knee flexion, knee extension, ankle dorsiflexion, ankle plantar flexion, and EHL are intact.  Sensation intact in the foot to light touch.   Thigh is soft and nontender.      Labs:  Results from last 7 days   Lab Units 23  1232   WBC 10*3/mm3 8.18   HEMOGLOBIN g/dL 9.1*   HEMATOCRIT % 28.6*   MCV fL 112.6*   PLATELETS 10*3/mm3 162     CBC pending this morning    Radiology:  Imaging Results (Last 24 Hours)     Procedure Component Value Units Date/Time    XR Hip With or Without Pelvis 2 - 3 View Left [[id number]] Collected: 23     Updated: 23    Narrative:      XR HIP W OR WO PELVIS 2-3 VIEW LEFT    Date of Exam: 2023 6:50 PM EDT    Indication: Left hip fracture, status post open reduction and internal fixation    Comparison: Pelvis x-ray performed on 2023 at 1432 hours.    Findings:  There has been interval surgical fixation of the " subcapital fracture of the left femoral neck. The hardware is intact. There is soft tissue gas and swelling consistent with expected postsurgical changes. There are mild arthritic changes involving both   hip joints, the pubic symphysis, and the sacroiliac joints. There are degenerative changes in the lower lumbar spine. The pelvic bony structures are demineralized.        Impression:      Impression:  Status post open reduction and internal fixation of a subcapital fracture of the left femoral neck as described.      Electronically Signed: Gilbert Abdi    5/2/2023 7:24 PM EDT    Workstation ID: [id number]    FL C Arm During Surgery [[id number]] Collected: 05/02/23 1918     Updated: 05/02/23 1922    Narrative:      FL C ARM DURING SURGERY    Date of Exam: 5/2/2023 5:09 PM EDT    Indication: FEMORAL NECK OPEN REDUCTION INTERNAL FIXATION.    Comparison: None available.    Technique: Fluoroscopy in OR without radiologist present.    Fluoroscopic Time: 1 minute 36 seconds    Findings:  2 images are submitted. Imaging demonstrates dynamic screw in the proximal femur. Please see the operating physician report.      Impression:      Impression:  Fluoroscopy in OR without radiologist present.      Electronically Signed: Elizabeth Gallagher    5/2/2023 7:18 PM EDT    Workstation ID: [id number]    CT Head Without Contrast [[id number]] Collected: 05/02/23 1505     Updated: 05/02/23 1511    Narrative:      CT HEAD WO CONTRAST    Date of Exam: 5/2/2023 2:56 PM EDT    Indication: Fall, head contusion.    Comparison: None available.    Technique: Axial CT images were obtained of the head without contrast administration.  Reconstructed coronal and sagittal images were also obtained. Automated exposure control and iterative construction methods were used.      Findings:  There is mild volume loss as there is prominence of the sulci, fissures, ventricles, and basal cisterns. There are areas of decreased density in the periventricular and subcortical  white matter felt to be due to chronic microvascular ischemia. There is   no acute hemorrhage, midline shift, or suspicious extra-axial fluid collections. The orbital contents are normal. The visualized paranasal and mastoid sinuses are clear. The calvarium is unremarkable.      Impression:      Impression:    1. Volume loss secondary to cerebral atrophy.  2. Chronic white matter microvascular ischemia.  3. No acute findings.        Electronically Signed: Gilbert Abdi    5/2/2023 3:08 PM EDT    Workstation ID: [id number]    XR Pelvis 1 or 2 View [[id number]] Collected: 05/02/23 1446     Updated: 05/02/23 1450    Narrative:      XR PELVIS 1 OR 2 VW    Date of Exam: 5/2/2023 2:29 PM EDT    Indication: pre-op left hip fracture    Comparison: None available.    Findings:  CT abdomen and pelvis 4/13/2023.      Impression:      Impression:  Impacted left femoral neck fracture is seen, without hip dislocation. The pelvic ring appears intact. Osteopenic changes are present. Mild lower lumbar curvature toward the left with small marginal lumbar osteophytes. No sacroiliac joint or pubic   symphysis diastases.    IMPRESSION:  1. Impacted nondisplaced left femoral neck fracture. No hip dislocation.      Electronically Signed: Cristal Pineda    5/2/2023 2:47 PM EDT    Workstation ID: [id number]    XR Femur 2 View Left [[id number]] Collected: 05/02/23 1244     Updated: 05/02/23 1257    Narrative:      XR FEMUR 2 VW LEFT    Date of Exam: 5/2/2023 12:08 PM EDT    Indication: fall/pain     Comparison: CT abdomen and pelvis with contrast 4/13/2023    Findings:  There is a transverse fracture of the left femoral neck with impaction and valgus deformity. The left femoral head is maintained in alignment. No distal femoral fracture. The knee alignment is maintained. Complete assessment of the distal femur and knee   limited due to technique. Included portions of the pelvis are intact.      Impression:      Impression:  Transverse left femoral  neck fracture with impaction.      Electronically Signed: Bill Rader    5/2/2023 12:54 PM EDT    Workstation ID: [id number]    XR Chest 1 View [[id number]] Collected: 05/02/23 1243     Updated: 05/02/23 1247    Narrative:      XR CHEST 1 VW    Date of Exam: 5/2/2023 12:08 PM EDT    Indication: fall    Comparison: None available.    Findings:  Heart size top normal, stable. The lungs are without consolidation. No pneumothorax or pleural effusion. The osseous structures are grossly intact.      Impression:      Impression:  No acute process.      Electronically Signed: Bill Rader    5/2/2023 12:44 PM EDT    Workstation ID: [id number]          PT:  Physical Therapy - Plan of Care Review - Outcome Summary:   ]       Results Review:     I reviewed the patient's new clinical results.    Assessment and Plan     Assessment:   Status post closed reduction and internal fixation, valgus impacted left femoral neck fracture (femoral neck system)    Patient may be weightbearing as tolerated with a walker      Hip fracture    Hypertension    CKD (chronic kidney disease) stage 3, GFR 30-59 ml/min    Neuropathy    Endometrial adenocarcinoma    Anemia    Hypomagnesemia      Plan for disposition: Plan for rehab facility at the time of discharge most likely.  Follow-up with me in 1 month in the office.      Future Appointments   Date Time Provider Department Center   5/10/2023 11:00 AM Cassie Morin APRN [facility]   5/23/2023 10:45 AM Liana So APRN [facility]   6/19/2023  1:00 PM Anjelica Dyer APRN [facility]   8/10/2023  2:15 PM Caden Dietz MD [facility]   8/24/2023 10:30 AM Franklin Hamm APRN [facility] None   10/9/2023 10:15 AM Gray Bahena MD [facility]           Juanpablo Lee MD  05/03/23  07:42 EDT

## 2023-05-03 NOTE — PAYOR COMM NOTE
"Utilization review [phone]  Jayden Sierra (68 y.o. Female)     Date of Birth   1955    Social Security Number       Address   [address] [place] [address]    Home Phone   [phone]    MRN   [id number]       Hindu   Alevism    Marital Status                               Admission Date   23    Admission Type   Emergency    Admitting Provider   Domitila Leon DO    Attending Provider   Domitila Leon DO    Department, Room/Bed   [facility] 3G, S356/1       Discharge Date       Discharge Disposition       Discharge Destination                               Attending Provider: Domitila Leon DO    Allergies: Lisinopril, Metal, Milk-related Compounds, Tylenol [Acetaminophen], Atorvastatin    Isolation: None   Infection: None   Code Status: CPR    Ht: 162.6 cm (64.02\")   Wt: 61.3 kg (135 lb 1.6 oz)    Admission Cmt: None   Principal Problem: Hip fracture [S72.009A]                 Active Insurance as of 2023     Primary Coverage     Payor Plan Insurance Group Employer/Plan Group    [facility] MEDICARE REPLACEMENT [employer]LL[employer] MEDICARE REPLACEMENT      Payor Plan Address Payor Plan Phone Number Payor Plan Fax Number Effective Dates    [address] [address] [phone]  2021 - None Entered    [facility] [id number]       Subscriber Name Subscriber Birth Date Member ID       JAYDEN SIERRA 1955 [id number]                 Emergency Contacts      (Rel.) Home Phone Work Phone Mobile Phone    Akila Guidry (Sister) [phone] -- [phone]               History & Physical      Berkley Ortez II, DO at 23 [address] [place] [address] Medicine Services  HISTORY AND PHYSICAL    Patient Name: Jayden Sierra  : 1955  MRN: [id number]  Primary Care Physician: Liana So, APRN  Date of admission: 2023      Subjective    Subjective     Chief Complaint: left hip " pain    HPI:  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 Patient ambulated to restroom with walker and upon returning experienced severe hip pain. Took oxycodone at that time but did not improve pain. She thinks she fell overnight but is not totally sure which apparently not unusual for her. She takes sedating meds at night and says she still has brain fog from her chemo.     Review of Systems   Gen- No fevers, chills  CV- No chest pain, palpitations  Resp- No cough, dyspnea  GI- No N/V/D, abd pain    Personal History     Past Medical History:   Diagnosis Date   • Allergic lisinopril  2017   • Anemia    • Arrhythmia    • Arthritis    • Cataract mild 2020    stil lpresent   • Chicken pox    • Chronic fatigue    • CKD (chronic kidney disease), stage III     sees nephro   • Dental root implant present     lower left  x1 - possible dental implant   • Depression mild 2019   • Difficulty walking 2019   • Disease of thyroid gland    • Dizzy    • NIEVES (dyspnea on exertion)     2017   • Generalized anxiety disorder    • GERD (gastroesophageal reflux disease) 2018   • History of brachytherapy 01/18/2023    vaginal brachytherapy   • Hyperlipidemia    • Hypertension    • Iron deficiency anemia    • Liver disease     fatty   • Liver problem    • Measles    • Menopause    • Mumps    • Neuromuscular disorder Peripheral Neuropathy   • Orthostatic hypotension    • 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   • Renal insufficiency 2018   • Scoliosis    • Unintentional weight loss    • Uses contact lenses     bilat   • Uterine cancer    • Uterine cancer 08/01/2022   • UTI (urinary tract infection)    • Visual impairment Nearsighted   • Wears glasses          Oncology Problem List:  Endometrial adenocarcinoma (07/28/2022; Status: Active)    Oncology/Hematology History   Endometrial adenocarcinoma   7/28/2022 Initial Diagnosis    8/22/2022: TVUS with  uterus measuring 5.7 x 4.8 x 4.1 cm with an endometrial stripe thickness of 9.8 mm.  Right ovary not visualized.  Left ovary normal.  No free fluid.  2022: Saline infusion sonogram with an irregularly shaped prominent lesion on the posterior endometrial surface concerning for neoplasia.  10/6/2022: Endometrial biopsy with FIGO grade 1 endometrial cancer      10/28/2022 Surgery    Robotic-assisted total laparoscopic hysterectomy with bilateral salpingo-oophorectomy with bilateral SLND    Pathology with FIGO grade 2 endometrioid adenocarcinoma with 94% MI. Involvement of endocervix and uterine serosal adhesions. Negative parametrium and bilateral sentinel lymph nodes. MMR intact.     [facility]      Cancer Staged    Cancer Staging   Endometrial adenocarcinoma (HCC)  Staging form: Corpus Uteri - Carcinoma And Carcinosarcoma, AJCC 8th Edition  - Pathologic stage from 10/28/2022: FIGO Stage IIIA (pT3a, pN0(sn), cM0) - Signed by Jasmine Rich MD on 2022       10/28/2022 Cancer Staged    Staging form: Corpus Uteri - Carcinoma And Carcinosarcoma, AJCC 8th Edition  - Pathologic stage from 10/28/2022: FIGO Stage IIIA (pT3a, pN0(sn), cM0) - Signed by Jasmine Rich MD on 2022 -  Chemotherapy    OP OVARIAN DOCEtaxel / CARBOplatin     2023 - 2023 Radiation    Radiation OncologyTreatment Course:  Alysia Sierra received 3000 cGy in 5 fractions to upper vagina via High Dose Radiation - HDR.     2023 -  Chemotherapy    OP SUPPORTIVE HYDRATION + ANTIEMETICS         Past Surgical History:   Procedure Laterality Date   •  SECTION     • DILATION AND CURETTAGE, DIAGNOSTIC / THERAPEUTIC     • OOPHORECTOMY     • ORAL LESION EXCISION/BIOPSY  2021   • TOTAL LAPAROSCOPIC HYSTERECTOMY SALPINGO OOPHORECTOMY N/A 10/28/2022    Procedure: TOTAL LAPAROSCOPIC HYSTERECTOMY BILATERAL SALPINGO-OOPHORECTOMY, INJECTION FOR SENTINEL LYMPH NODE MAPPING,  BILATERAL SENTINEL LYMPH NODE DISSECTION WITH DAVINCI ROBOT;  Surgeon: Jasmine Rich MD;  Location: [facility];  Service: Robotics - DaVinci;  Laterality: N/A;   • TUBAL ABDOMINAL LIGATION  1983       Family History: family history includes COPD in her mother; Cancer in her father; Coronary artery disease in her father; Heart attack in her father; Heart disease in her father; Hyperlipidemia in her sister; Hypertension in her brother, father, mother, and sister; Lung cancer in her father; Lung disease in her father; Malig Hypertension in her sister; Osteoarthritis in her sister; Other in her sister; Rheum arthritis in her sister; Spondylolisthesis in her mother; Stroke in her mother.     Social History:  reports that she has never smoked. She has never used smokeless tobacco. She reports current alcohol use of about 6.0 standard drinks per week. She reports that she does not use drugs.  Social History     Social History Narrative    Caffeine: None       Medications:  Available home medication information reviewed.  (Not in a hospital admission)      Allergies   Allergen Reactions   • Lisinopril Angioedema   • Metal Rash     Possible nickel -   Gold is only metal that doesn't have issues     • Milk-Related Compounds Other (See Comments)     LACTOSE INTOLERANT   • Tylenol [Acetaminophen] Other (See Comments)     ckd   • Atorvastatin Myalgia       Objective    Objective     Vital Signs:   Temp:  [98 °F (36.7 °C)] 98 °F (36.7 °C)  Heart Rate:  [84] 84  Resp:  [16] 16  BP: (117)/(76) 117/76       Physical Exam   Constitutional: Awake, alert, appears uncomfortable  Eyes: PERRLA, sclerae anicteric, no conjunctival injection   HENT: NCAT, mucous membranes moist  Neck: Supple, no thyromegaly, no lymphadenopathy, trachea midline  Respiratory: Clear to auscultation bilaterally, nonlabored respirations   Cardiovascular: RRR, no murmurs, rubs, or gallops, palpable pedal pulses bilaterally  Gastrointestinal: Positive bowel  sounds, soft, nontender, nondistended  Musculoskeletal: No bilateral ankle edema, no clubbing or cyanosis to extremities  Psychiatric: Appropriate affect, cooperative  Neurologic: Oriented x 3, strength symmetric in all extremities, Cranial Nerves grossly intact to confrontation, speech clear  Skin: No rashes    Result Review:  I have personally reviewed the results from the time of this admission to 5/2/2023 14:17 EDT and agree with these findings:  []  Laboratory list / accordion  []  Microbiology  []  Radiology  []  EKG/Telemetry   []  Cardiology/Vascular   []  Pathology  []  Old records  []  Other:  Most notable findings include:         LAB RESULTS:      Lab 05/02/23  1232   WBC 8.18   HEMOGLOBIN 9.1*   HEMATOCRIT 28.6*   PLATELETS 162   NEUTROS ABS 5.78   IMMATURE GRANS (ABS) 0.08*   LYMPHS ABS 1.48   MONOS ABS 0.73   EOS ABS 0.09   .6*   PROTIME 13.3   INR 1.00         Lab 05/02/23  1232   SODIUM 138   POTASSIUM 4.7   CHLORIDE 104   CO2 23.0   ANION GAP 11.0   BUN 10   CREATININE 0.91   EGFR 69.3   GLUCOSE 93   CALCIUM 8.1*   MAGNESIUM 1.5*   TSH 2.350         Lab 05/02/23  1232   TOTAL PROTEIN 5.6*   ALBUMIN 3.9   GLOBULIN 1.7   ALT (SGPT) 11   AST (SGOT) 22   BILIRUBIN 0.2   ALK PHOS 88                     UA        11/11/2022    13:56   Urinalysis   Squamous Epithelial Cells, UA 0-2     Specific Gravity, UA 1.017     Ketones, UA Trace     Blood, UA Trace     Leukocytes, UA Moderate (2+)     Nitrite, UA Negative     RBC, UA 7-12     WBC, UA Too Numerous to Count     Bacteria, UA None Seen         Microbiology Results (last 10 days)     ** No results found for the last 240 hours. **          XR Femur 2 View Left    Result Date: 5/2/2023  XR FEMUR 2 VW LEFT Date of Exam: 5/2/2023 12:08 PM EDT Indication: fall/pain Comparison: CT abdomen and pelvis with contrast 4/13/2023 Findings: There is a transverse fracture of the left femoral neck with impaction and valgus deformity. The left femoral head is  maintained in alignment. No distal femoral fracture. The knee alignment is maintained. Complete assessment of the distal femur and knee limited due to technique. Included portions of the pelvis are intact.     Impression: Impression: Transverse left femoral neck fracture with impaction. Electronically Signed: Bill Rader  5/2/2023 12:54 PM EDT  Workstation ID: [id number]    XR Chest 1 View    Result Date: 5/2/2023  XR CHEST 1 VW Date of Exam: 5/2/2023 12:08 PM EDT Indication: fall Comparison: None available. Findings: Heart size top normal, stable. The lungs are without consolidation. No pneumothorax or pleural effusion. The osseous structures are grossly intact.     Impression: Impression: No acute process. Electronically Signed: Bill Rader  5/2/2023 12:44 PM EDT  Workstation ID: [id number]      Results for orders placed during the hospital encounter of 01/24/23    Adult Transthoracic Echo Complete W/ Cont if Necessary Per Protocol    Interpretation Summary  •  Left ventricular systolic function is normal. Left ventricular ejection fraction appears to be 51 - 55%.  •  Left ventricular diastolic function is consistent with (grade I) impaired relaxation.  •  Estimated right ventricular systolic pressure from tricuspid regurgitation is normal (<35 mmHg).      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  --ED physician to perform block. Will consult acute pain service as well.  --IV/PO pain control.  --Post op PT/OT.     Stage IIIa  endometrial cancer  Chemotherapy induced anemia  --Completed chemotherapy 4/2023. Recently seen by Dr. Rich.     Hypomagnesemia  --Replace.     CKD IIIa  --At baseline. Monitor.    Peripheral neuropathy  --Followed by neurology. Continue her cymbalta, gabapentin. Also takes monthly IM B12.    DVT prophylaxis:  SCDs    CODE STATUS:    Code Status and Medical Interventions:   Ordered at: 05/02/23 1416     Code Status (Patient has no pulse and is not breathing):    CPR (Attempt to Resuscitate)     Medical Interventions (Patient has pulse or is breathing):    Full Support       Expected Discharge 5/6  Expected discharge date/ time has not been documented.     Berkley Ortez II, DO  05/02/23    Electronically signed by Berkley Ortez II, DO at 05/02/23 1426          Operative/Procedure Notes (all)      Juanpablo Lee MD at 05/02/23 7942  Version 1 of 1         Orthopaedics Operative Report    PREOPERATIVE DIAGNOSIS: Valgus impacted left femoral neck fracture    POSTOPERATIVE DIAGNOSIS: Valgus impacted left femoral neck fracture    PROCEDURE PERFORMED: Closed reduction and internal fixation with femoral neck system device, Synthes    IMPLANTS:   Implant Name Type Inv. Item Serial No.  Lot No. LRB No. Used Action   DEV CONTRL TISS STRATAFIX SPIRAL MNCRYL UD 3/0 PLS 60CM - [id number] Implant DEV CONTRL TISS STRATAFIX SPIRAL MNCRYL UD 3/0 PLS 60CM  [employer]  DIV [employer] J [employer] J  Left 1 Implanted   SCRW FEM NK SYS TI ALLOY 80MM STRL - [id number] Implant SCRW FEM NK SYS TI ALLOY 80MM STRL  [employer] [id number] Left 1 Implanted   BOLT FEM NK SYS TI ALLOY 80MM STRL - [id number] Implant BOLT FEM NK SYS TI ALLOY 80MM STRL  [employer] SYN[employer]ES [id number] Left 1 Implanted   PLT FEM NK SYS TI ALLOY 1H STRL - [id number] Implant PLT FEM NK SYS TI ALLOY 1H STRL  [employer] SYNTHES [id number] Left 1 Implanted   SCRW LK S/TAP T25 STRDRV TI 5X38MM STRL - [id number] Implant SCRW LK S/TAP T25 STRDRV TI 5X38MM STRL  [employer]   Left 1 Implanted        SURGEON: Juanpablo Lee MD    ASSISTANT: None    ANESTHESIA:  General with Block    STAFF:  Circulator: Nick Bhatia RN  Radiology Technologist: Carey Younger R.T.(SHIRA)  Scrub Person: Chase Ozuna  Vendor Representative: Crow Mckee  Nursing Assistant: Jeffrey South PCT    ESTIMATED BLOOD LOSS: 100ml     COMPLICATIONS: None    PREOPERATIVE ANTIBIOTICS: Ancef 2 g    REFERRING PHYSICIAN: Dr. Juanpablo Oliver    INDICATIONS: The patient is a 68 y.o. female who sustained a left hip valgus impacted femoral neck fracture secondary to a fall.  Risks, benefits, and alternatives to the procedure were discussed at length with the patient. She understood the option of excepting the valgus impacted alignment, which appears to be stable in her situation as she was able to walk on it initially, versus proceeding with either a hemiarthroplasty or total hip arthroplasty.  She preferred the option of internal fixation with a backup option of arthroplasty surgery if required in the future.  Risks discussed included, but are not limited to: Bleeding, infection, damage to blood vessels and nerves, need for further surgery, deep venous thrombosis, pulmonary embolus, death, malunion, nonunion, and anesthetic complications.  Consent was obtained.  Plan for femoral neck system fixation as the primary option.     DESCRIPTION OF PROCEDURE: Patient was positively identified in the preoperative holding area, brought to the operative suite, and placed in supine position.  After adequate general anesthetic had been achieved, the patient was placed on the fracture table with all the bony prominences being well-padded.  Traction was applied through the lower extremity with the foot holding device, and intraoperative fluoroscopy demonstrated appropriate alignment of the fracture, which remained valgus impacted, with no displacement along the medial calcar region, nor angulated on the lateral view.  Sterile  prep and drape of the hip and lower extremity was then performed, and guidepin was inserted percutaneously, and the incision based off of this.  Guidepin was carefully placed into the femoral head neck region in a center center location, then this was then over-reamed to accommodate the femoral neck system device bolt and single hole plate laterally.  Plate came to rest naturally on the lateral cortex of the femur, and a locking screw placed without difficulty.  The derotation screw was then also placed following this without difficulty, with no loss of fracture alignment compared to the starting point with appropriate hardware placement.   The device was compressed.   The wound was copiously irrigated, and final fluoroscopic imaging obtained.  Deep layers were reapproximated with 0 Vicryl followed by closure of the subcutaneous layer with 2-0 Vicryl, followed by closure of the subcuticular layer with 3-0 STRATAFIX in a running fashion.  Glue tape wound closure dressing was then applied, followed by sterile dressing with 4 x 4's secured with Micropore tape.  The patient tolerated the procedure well, and was brought to the recovery room in good condition.    POSTOPERATIVE PLAN:  1. Weightbearing status: Weightbearing as tolerated with a walker  2.  Peripheral nerve block for pain control  3. 24-hours of IV Ancef for antibiotic prophylaxis.  4.  Aspirin for DVT prophylaxis (81 mg aspirin twice a day for 1 month)   5. The patient will be under the medical care of hospitalist team  6.  PT/OT in the am.  Patient will likely need a rehab facility at the time of discharge.  Follow-up with me in 1 month in the office.    Juanpablo Lee MD  23  18:35 EDT    Electronically signed by Juanpablo Lee MD at 23 1841          Physician Progress Notes (all)      Domitila Leon DO at 23 1042              [facility] Services  PROGRESS NOTE    Patient Name: Alysia Sierra  :  1955  MRN: [id number]    Date of Admission: 5/2/2023  Primary Care Physician: Liana So APRN    Subjective   Subjective     CC:  left hip pain    HPI:  Patient resting in bed, states she is feeling ok and pain is relatively controlled. She states pain increases with movement. Patient requests her home meds be restarted.    ROS:  Gen- No fevers, chills  CV- No chest pain, palpitations  Resp- No cough, dyspnea  GI- No N/V/D, abd pain  +left hip pain with movement    Objective   Objective     Vital Signs:   Temp:  [97.4 °F (36.3 °C)-98.8 °F (37.1 °C)] 98.1 °F (36.7 °C)  Heart Rate:  [] 83  Resp:  [16-20] 20  BP: (117-157)/() 138/91  Flow (L/min):  [2-4] 2     Physical Exam:  Constitutional: No acute distress, awake, alert  HENT: NCAT, mucous membranes moist  Respiratory: Clear to auscultation bilaterally, respiratory effort normal   Cardiovascular: RRR, no murmurs, rubs, or gallops  Gastrointestinal: Positive bowel sounds, soft, nontender, nondistended  Musculoskeletal: No bilateral ankle edema, left hip with dressing in place  Psychiatric: Appropriate affect, cooperative  Neurologic: Oriented x 3, strength symmetric in all extremities, Cranial Nerves grossly intact to confrontation, speech clear  Skin: No rashes    Results Reviewed:  LAB RESULTS:      Lab 05/03/23  0817 05/02/23  1232   WBC 6.25 8.18   HEMOGLOBIN 9.0* 9.1*   HEMATOCRIT 29.8* 28.6*   PLATELETS 132* 162   NEUTROS ABS  --  5.78   IMMATURE GRANS (ABS)  --  0.08*   LYMPHS ABS  --  1.48   MONOS ABS  --  0.73   EOS ABS  --  0.09   .7* 112.6*   PROTIME  --  13.3         Lab 05/02/23  1232   SODIUM 138   POTASSIUM 4.7   CHLORIDE 104   CO2 23.0   ANION GAP 11.0   BUN 10   CREATININE 0.91   EGFR 69.3   GLUCOSE 93   CALCIUM 8.1*   MAGNESIUM 1.5*   TSH 2.350         Lab 05/02/23  1232   TOTAL PROTEIN 5.6*   ALBUMIN 3.9   GLOBULIN 1.7   ALT (SGPT) 11   AST (SGOT) 22   BILIRUBIN 0.2   ALK PHOS 88         Lab 05/02/23  1235    PROTIME 13.3   INR 1.00             Lab 05/02/23  1614   ABO TYPING A   RH TYPING Positive   ANTIBODY SCREEN Negative         Brief Urine Lab Results  (Last result in the past 365 days)      Color   Clarity   Blood   Leuk Est   Nitrite   Protein   CREAT   Urine HCG        11/11/22 1356 Yellow   Clear   Trace   Moderate (2+)   Negative   Negative                 Microbiology Results Abnormal     None          XR Femur 2 View Left    Result Date: 5/2/2023  XR FEMUR 2 VW LEFT Date of Exam: 5/2/2023 12:08 PM EDT Indication: fall/pain Comparison: CT abdomen and pelvis with contrast 4/13/2023 Findings: There is a transverse fracture of the left femoral neck with impaction and valgus deformity. The left femoral head is maintained in alignment. No distal femoral fracture. The knee alignment is maintained. Complete assessment of the distal femur and knee limited due to technique. Included portions of the pelvis are intact.     Impression: Impression: Transverse left femoral neck fracture with impaction. Electronically Signed: Bill Hallr  5/2/2023 12:54 PM EDT  Workstation ID: [id number]    CT Head Without Contrast    Result Date: 5/2/2023  CT HEAD WO CONTRAST Date of Exam: 5/2/2023 2:56 PM EDT Indication: Fall, head contusion. Comparison: None available. Technique: Axial CT images were obtained of the head without contrast administration.  Reconstructed coronal and sagittal images were also obtained. Automated exposure control and iterative construction methods were used. Findings: There is mild volume loss as there is prominence of the sulci, fissures, ventricles, and basal cisterns. There are areas of decreased density in the periventricular and subcortical white matter felt to be due to chronic microvascular ischemia. There is no acute hemorrhage, midline shift, or suspicious extra-axial fluid collections. The orbital contents are normal. The visualized paranasal and mastoid sinuses are clear. The calvarium is  unremarkable.     Impression: Impression: 1. Volume loss secondary to cerebral atrophy. 2. Chronic white matter microvascular ischemia. 3. No acute findings. Electronically Signed: Gilbert Abdi  5/2/2023 3:08 PM EDT  Workstation ID: [id number]    XR Chest 1 View    Result Date: 5/2/2023  XR CHEST 1 VW Date of Exam: 5/2/2023 12:08 PM EDT Indication: fall Comparison: None available. Findings: Heart size top normal, stable. The lungs are without consolidation. No pneumothorax or pleural effusion. The osseous structures are grossly intact.     Impression: Impression: No acute process. Electronically Signed: Bill Rader  5/2/2023 12:44 PM EDT  Workstation ID: [id number]    XR Pelvis 1 or 2 View    Result Date: 5/2/2023  XR PELVIS 1 OR 2 VW Date of Exam: 5/2/2023 2:29 PM EDT Indication: pre-op left hip fracture Comparison: None available. Findings: CT abdomen and pelvis 4/13/2023.     Impression: Impression: Impacted left femoral neck fracture is seen, without hip dislocation. The pelvic ring appears intact. Osteopenic changes are present. Mild lower lumbar curvature toward the left with small marginal lumbar osteophytes. No sacroiliac joint or pubic symphysis diastases. IMPRESSION: 1. Impacted nondisplaced left femoral neck fracture. No hip dislocation. Electronically Signed: Cristal Pineda  5/2/2023 2:47 PM EDT  Workstation ID: [id number]    SS Fascia Iliaca    Result Date: 5/2/2023  Wilbert Haynes CRNA     5/3/2023  9:59 AM SS Fascia Iliaca Patient reassessed immediately prior to procedure Reason for block: at surgeon's request and post-op pain management Performed by CRNA/CAA: Maco Stephenson CRNA Assisted by: Wilbert Haynes CRNA Preanesthetic Checklist Completed: patient identified, IV checked, site marked, risks and benefits discussed, surgical consent, monitors and equipment checked, pre-op evaluation and timeout performed Prep: Pt Position: supine Sterile barriers:cap, gloves, mask and washed/disinfected hands Prep:  "ChloraPrep Patient monitoring: blood pressure monitoring, continuous pulse oximetry and EKG Procedure Performed under: general Guidance:ultrasound guided ULTRASOUND INTERPRETATION. Using ultrasound guidance a 20 G gauge needle was placed in close proximity to the nerve, at which point, under ultrasound guidance anesthetic was injected in the area of the nerve and spread of the anesthesia was seen on ultrasound in close proximity thereto.  There were no abnormalities seen on ultrasound; a digital image was taken; and the patient tolerated the procedure with no complications. Images:still images obtained, printed/placed on chart Laterality:left Block Type:fascia iliaca compartment Injection Technique:single-shot Needle Type:echogenic and short-bevel Needle Gauge:18 G Resistance on Injection: none Catheter Size:20 G (20g) Medications Used: bupivacaine PF (MARCAINE) 0.25 % injection - Injection 25 mL - 5/2/2023 5:18:00 PM Post Assessment Injection Assessment: negative aspiration for heme, no paresthesia on injection and incremental injection Patient Tolerance:comfortable throughout block Complications:no Additional Notes CKAFASCIAILIACA: SINGLE shot A high-frequency linear transducer, with sterile cover, was placed in parasagittal plane on top of the Anterior Superior Iliac Spine (ASIS) and moved medially to identify the Internal Oblique muscle, Sartorius muscle, Iliacus Muscle, Fascia Iliaca (FI) and Fascia Latae. The insertion site was prepped and draped in sterile fashion. Skin and cutaneous tissue was infiltrated with 2-5 ml of 1% Lidocaine. Using ultrasound-guidance, a 20-gauge B-Beach 4\" Ultraplex 360 non-stimulating echogenic needle was advanced in plane from caudad to cephalad. Preservative-free normal saline was utilized for hydro-dissection of tissue, advancement of needle, and to confirm final needle placement below FI. Local anesthetic in incremental 3-5 ml injections. Aspiration every 5 ml to prevent " intravascular injection. Injection was completed with negative aspiration of blood and negative intravascular injection. Injection pressures were normal with minimal resistance.     Peripheral Block    Result Date: 5/2/2023  Eduardo Ryan MD     5/2/2023  5:39 PM Peripheral Block Patient reassessed immediately prior to procedure Start time: 5/2/2023 3:54 PM Stop time: 5/2/2023 4:00 PM Reason for block: at surgeon's request and post-op pain management Performed by Anesthesiologist: Eduardo Ryan MD CRNA/CAA: Maco Stephenson CRNA Preanesthetic Checklist Completed: patient identified, IV checked, site marked, risks and benefits discussed, surgical consent, monitors and equipment checked, pre-op evaluation and timeout performed Prep: Pt Position: supine Sterile barriers:cap, gloves, mask and washed/disinfected hands Prep: ChloraPrep Patient monitoring: blood pressure monitoring, continuous pulse oximetry and EKG Procedure Performed under: local infiltration Guidance:ultrasound guided ULTRASOUND INTERPRETATION.  Using ultrasound guidance a 20 G gauge needle was placed in close proximity to the nerve, at which point, under ultrasound guidance anesthetic was injected in the area of the nerve and spread of the anesthesia was seen on ultrasound in close proximity thereto.  There were no abnormalities seen on ultrasound; a digital image was taken; and the patient tolerated the procedure with no complications. Images:still images obtained, printed/placed on chart Block Type:fascia iliaca compartment Injection Technique:catheter Needle Type:echogenic and Tuohy Needle Gauge:18 G Resistance on Injection: none Catheter Size:20 G (20g) Medications Used: bupivacaine PF (MARCAINE) 0.25 % injection - Injection  50 mL - 5/2/2023 3:54:00 PM Post Assessment Injection Assessment: negative aspiration for heme, no paresthesia on injection and incremental injection Patient Tolerance:comfortable throughout block Complications:no  "Additional Notes CKAFASCIAILIACA: CATHETER A high-frequency linear transducer, with sterile cover, was placed in parasagittal plane on top of the Anterior Superior Iliac Spine (ASIS) and moved medially to identify the Internal Oblique muscle, Sartorius muscle, Iliacus Muscle, Fascia Iliaca (FI) and Fascia Latae. The insertion site was prepped and draped in sterile fashion. Skin and cutaneous tissue was infiltrated with 2-5 ml of 1% Lidocaine. Using ultrasound-guidance, an 18-gauge Contiplex Ultra 360 Touhy needle was advanced in plane from caudad to cephalad. Preservative-free normal saline was utilized for hydro-dissection of tissue, advancement of Touhy, and to confirm final needle placement below FI. Local anesthetic in incremental 3-5 ml injections. Aspiration every 5 ml to prevent intravascular injection. Injection was completed with negative aspiration of blood and negative intravascular injection. Injection pressures were normal with minimal resistance. A 20-gauge Contiplex Echo catheter was placed through the needle and advance out the tip of the Touhy 3-5 cm. The Touhy needle was then removed, and final catheter position verified below the FI. The catheter was secured in the usual fashion with skin glue, benzoin, steri-strips, CHG tegaderm and Label noting \"Nerve Block Catheter\". Jerk tape applied at yellow connector and catheter connection.     FL C Arm During Surgery    Result Date: 5/2/2023  FL C ARM DURING SURGERY Date of Exam: 5/2/2023 5:09 PM EDT Indication: FEMORAL NECK OPEN REDUCTION INTERNAL FIXATION. Comparison: None available. Technique: Fluoroscopy in OR without radiologist present. Fluoroscopic Time: 1 minute 36 seconds Findings: 2 images are submitted. Imaging demonstrates dynamic screw in the proximal femur. Please see the operating physician report.     Impression: Impression: Fluoroscopy in OR without radiologist present. Electronically Signed: Elizabeth Gallagher  5/2/2023 7:18 PM EDT  Workstation " ID: [id number]    XR Hip With or Without Pelvis 2 - 3 View Left    Result Date: 5/2/2023  XR HIP W OR WO PELVIS 2-3 VIEW LEFT Date of Exam: 5/2/2023 6:50 PM EDT Indication: Left hip fracture, status post open reduction and internal fixation Comparison: Pelvis x-ray performed on 5/2/2023 at 1432 hours. Findings: There has been interval surgical fixation of the subcapital fracture of the left femoral neck. The hardware is intact. There is soft tissue gas and swelling consistent with expected postsurgical changes. There are mild arthritic changes involving both hip joints, the pubic symphysis, and the sacroiliac joints. There are degenerative changes in the lower lumbar spine. The pelvic bony structures are demineralized.     Impression: Impression: Status post open reduction and internal fixation of a subcapital fracture of the left femoral neck as described. Electronically Signed: Gilbert Abdi  5/2/2023 7:24 PM EDT  Workstation ID: [id number]      Results for orders placed during the hospital encounter of 01/24/23    Adult Transthoracic Echo Complete W/ Cont if Necessary Per Protocol    Interpretation Summary  •  Left ventricular systolic function is normal. Left ventricular ejection fraction appears to be 51 - 55%.  •  Left ventricular diastolic function is consistent with (grade I) impaired relaxation.  •  Estimated right ventricular systolic pressure from tricuspid regurgitation is normal (<35 mmHg).      Current medications:  Scheduled Meds:aspirin, 81 mg, Oral, Q12H  DULoxetine, 20 mg, Oral, BID  gabapentin, 300 mg, Oral, BID  levothyroxine, 25 mcg, Oral, Q AM  magnesium sulfate, 1 g, Intravenous, Once  metoprolol succinate XL, 25 mg, Oral, Nightly  pantoprazole, 40 mg, Oral, Q AM  senna-docusate sodium, 2 tablet, Oral, BID  sodium chloride, 500 mL, Intravenous, Once  sodium chloride, 10 mL, Intravenous, Q12H      Continuous Infusions:ropivacaine,   sodium chloride, 75 mL/hr, Last Rate: 75 mL/hr (05/02/23  2022)      PRN Meds:.•  senna-docusate sodium **AND** polyethylene glycol **AND** bisacodyl **AND** bisacodyl  •  Calcium Replacement - Follow Nurse / BPA Driven Protocol  •  HYDROmorphone  •  Magnesium Standard Dose Replacement - Follow Nurse / BPA Driven Protocol  •  melatonin  •  OLANZapine  •  ondansetron **OR** ondansetron  •  oxyCODONE  •  Phosphorus Replacement - Follow Nurse / BPA Driven Protocol  •  Potassium Replacement - Follow Nurse / BPA Driven Protocol  •  sodium chloride  •  sodium chloride  •  sodium chloride    Assessment & Plan   Assessment & Plan     Active Hospital Problems    Diagnosis  POA   • **Hip fracture [S72.009A]  Yes   • Anemia [D64.9]  Yes   • Hypomagnesemia [E83.42]  Yes   • Endometrial adenocarcinoma [C54.1]  Yes   • Neuropathy [G62.9]  Yes   • CKD (chronic kidney disease) stage 3, GFR 30-59 ml/min [N18.30]  Yes   • Hypertension [I10]  Yes      Resolved Hospital Problems   No resolved problems to display.        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    This patient's problems and plans were partially entered by my partner and updated as appropriate by me 05/03/23.    All problems are new to me today.     Left femoral neck fracture  Fall  --s/p closed reduction and internal fixation, valgus impacted left femoral neck fracture on 5/2/23 by Dr. Lee  --IV/PO pain control.  --PT/OT     Stage IIIa endometrial cancer  Chemotherapy induced anemia  --Completed chemotherapy 4/2023. Recently seen by Dr. Rich.      Hypomagnesemia  --Replace.      CKD IIIa  --At baseline. Monitor.    HLD  - restart home Rosuvastatin    HTN  -restart home Hydralazine     Peripheral neuropathy  --Followed by neurology. Continue her cymbalta, gabapentin. Also takes monthly IM B12.    Expected Discharge Location and Transportation: rehab  Expected Discharge   Expected Discharge Date: 5/10/2023; Expected  "Discharge Time:      DVT prophylaxis:  Mechanical DVT prophylaxis orders are present.     AM-PAC 6 Clicks Score (PT): 11 (23 0800)    CODE STATUS:   Code Status and Medical Interventions:   Ordered at: 23 194     Code Status (Patient has no pulse and is not breathing):    CPR (Attempt to Resuscitate)     Medical Interventions (Patient has pulse or is breathing):    Full Support     Release to patient:    Routine Release       Domitila Leon DO  23        Electronically signed by Domitila Leon DO at 23 1055     Juanpablo Lee MD at 23 0779                [facility] [facility] [place]    Subjective      LOS: 1 day   Patient Care Team:  Liana So APRN as PCP - General (Family Medicine)  Jasmine Rich MD as Referring Physician (Gynecologic Oncology)  Russ Carrington MD as Consulting Physician (Nephrology)  Roberta Aldana MD as Consulting Physician (Radiation Oncology)  Gray Bahena MD as Consulting Physician (Cardiology)  Caden Dietz MD as Consulting Physician (Neurology)  Charity Cornejo RN as Ambulatory  (Oncology) ([facility])    CC: Left hip pain    Interval History:   Patient resting comfortably in bed.  Pain controlled.    Objective      Vital Signs  Temp (24hrs), Av.1 °F (36.7 °C), Min:97.4 °F (36.3 °C), Max:98.8 °F (37.1 °C)      /91 (BP Location: Left arm, Patient Position: Lying)   Pulse 83   Temp 98.1 °F (36.7 °C) (Oral)   Resp 20   Ht 162.6 cm (64.02\")   Wt 61.3 kg (135 lb 1.6 oz)   LMP  (LMP Unknown)   SpO2 98%   BMI 23.18 kg/m²     Examination:   Examination of the left hip: The dressing is clean, dry, and intact.  Knee flexion, knee extension, ankle dorsiflexion, ankle plantar flexion, and EHL are intact.  Sensation intact in the foot to light touch.   Thigh is soft and nontender.      Labs:  Results from last 7 days   Lab Units 23  1232   WBC 10*3/mm3 8.18   HEMOGLOBIN g/dL 9.1*   HEMATOCRIT % " 28.6*   MCV fL 112.6*   PLATELETS 10*3/mm3 162     CBC pending this morning    Radiology:  Imaging Results (Last 24 Hours)     Procedure Component Value Units Date/Time    XR Hip With or Without Pelvis 2 - 3 View Left [[id number]] Collected: 05/02/23 1922     Updated: 05/02/23 1927    Narrative:      XR HIP W OR WO PELVIS 2-3 VIEW LEFT    Date of Exam: 5/2/2023 6:50 PM EDT    Indication: Left hip fracture, status post open reduction and internal fixation    Comparison: Pelvis x-ray performed on 5/2/2023 at 1432 hours.    Findings:  There has been interval surgical fixation of the subcapital fracture of the left femoral neck. The hardware is intact. There is soft tissue gas and swelling consistent with expected postsurgical changes. There are mild arthritic changes involving both   hip joints, the pubic symphysis, and the sacroiliac joints. There are degenerative changes in the lower lumbar spine. The pelvic bony structures are demineralized.        Impression:      Impression:  Status post open reduction and internal fixation of a subcapital fracture of the left femoral neck as described.      Electronically Signed: Gilbert Abdi    5/2/2023 7:24 PM EDT    Workstation ID: [id number]    FL C Arm During Surgery [[id number]] Collected: 05/02/23 1918     Updated: 05/02/23 1922    Narrative:      FL C ARM DURING SURGERY    Date of Exam: 5/2/2023 5:09 PM EDT    Indication: FEMORAL NECK OPEN REDUCTION INTERNAL FIXATION.    Comparison: None available.    Technique: Fluoroscopy in OR without radiologist present.    Fluoroscopic Time: 1 minute 36 seconds    Findings:  2 images are submitted. Imaging demonstrates dynamic screw in the proximal femur. Please see the operating physician report.      Impression:      Impression:  Fluoroscopy in OR without radiologist present.      Electronically Signed: Elizabeth Gallagher    5/2/2023 7:18 PM EDT    Workstation ID: [id number]    CT Head Without Contrast [[id number]] Collected: 05/02/23 150      Updated: 05/02/23 1511    Narrative:      CT HEAD WO CONTRAST    Date of Exam: 5/2/2023 2:56 PM EDT    Indication: Fall, head contusion.    Comparison: None available.    Technique: Axial CT images were obtained of the head without contrast administration.  Reconstructed coronal and sagittal images were also obtained. Automated exposure control and iterative construction methods were used.      Findings:  There is mild volume loss as there is prominence of the sulci, fissures, ventricles, and basal cisterns. There are areas of decreased density in the periventricular and subcortical white matter felt to be due to chronic microvascular ischemia. There is   no acute hemorrhage, midline shift, or suspicious extra-axial fluid collections. The orbital contents are normal. The visualized paranasal and mastoid sinuses are clear. The calvarium is unremarkable.      Impression:      Impression:    1. Volume loss secondary to cerebral atrophy.  2. Chronic white matter microvascular ischemia.  3. No acute findings.        Electronically Signed: Gilbert Abdi    5/2/2023 3:08 PM EDT    Workstation ID: [id number]    XR Pelvis 1 or 2 View [[id number]] Collected: 05/02/23 1446     Updated: 05/02/23 1450    Narrative:      XR PELVIS 1 OR 2 VW    Date of Exam: 5/2/2023 2:29 PM EDT    Indication: pre-op left hip fracture    Comparison: None available.    Findings:  CT abdomen and pelvis 4/13/2023.      Impression:      Impression:  Impacted left femoral neck fracture is seen, without hip dislocation. The pelvic ring appears intact. Osteopenic changes are present. Mild lower lumbar curvature toward the left with small marginal lumbar osteophytes. No sacroiliac joint or pubic   symphysis diastases.    IMPRESSION:  1. Impacted nondisplaced left femoral neck fracture. No hip dislocation.      Electronically Signed: Cristal Pineda    5/2/2023 2:47 PM EDT    Workstation ID: [id number]    XR Femur 2 View Left [[id number]] Collected: 05/02/23 1244      Updated: 05/02/23 1257    Narrative:      XR FEMUR 2 VW LEFT    Date of Exam: 5/2/2023 12:08 PM EDT    Indication: fall/pain     Comparison: CT abdomen and pelvis with contrast 4/13/2023    Findings:  There is a transverse fracture of the left femoral neck with impaction and valgus deformity. The left femoral head is maintained in alignment. No distal femoral fracture. The knee alignment is maintained. Complete assessment of the distal femur and knee   limited due to technique. Included portions of the pelvis are intact.      Impression:      Impression:  Transverse left femoral neck fracture with impaction.      Electronically Signed: [employer]    5/2/2023 12:54 PM EDT    Workstation ID: [id number]    XR Chest 1 View [[id number]] Collected: 05/02/23 1243     Updated: 05/02/23 1247    Narrative:      XR CHEST 1 VW    Date of Exam: 5/2/2023 12:08 PM EDT    Indication: fall    Comparison: None available.    Findings:  Heart size top normal, stable. The lungs are without consolidation. No pneumothorax or pleural effusion. The osseous structures are grossly intact.      Impression:      Impression:  No acute process.      Electronically Signed: [employer]    5/2/2023 12:44 PM EDT    Workstation ID: [id number]          PT:  Physical Therapy - Plan of Care Review - Outcome Summary:   ]       Results Review:     I reviewed the patient's new clinical results.    Assessment and Plan     Assessment:   Status post closed reduction and internal fixation, valgus impacted left femoral neck fracture (femoral neck system)    Patient may be weightbearing as tolerated with a walker      Hip fracture    Hypertension    CKD (chronic kidney disease) stage 3, GFR 30-59 ml/min    Neuropathy    Endometrial adenocarcinoma    Anemia    Hypomagnesemia      Plan for disposition: Plan for rehab facility at the time of discharge most likely.  Follow-up with me in 1 month in the office.      Future Appointments   Date Time Provider Department  Center   5/10/2023 11:00 AM Cassie Morinadriane, APRN [facility]   5/23/2023 10:45 AM Liana So APRN [facility]   6/19/2023  1:00 PM Severoordequan Anjelica CalderonANETTE nina [facility]   8/10/2023  2:15 PM Caden Dietz MD [facility]   8/24/2023 10:30 AM Franklin Hamm APRN [facility] None   10/9/2023 10:15 AM Gray Bahena MD [facility]           Juanpablo Lee MD  05/03/23  07:42 EDT      Electronically signed by Juanpablo Lee MD at 05/03/23 0742          Consult Notes (all)      Juanpablo Lee MD at 05/02/23 1500              [facility] [place] [facility] Consultation Note    Subjective     Patient Care Team:  Patient Care Team:  Liana So APRN as PCP - General (Family Medicine)  Jasmine Rich MD as Referring Physician (Gynecologic Oncology)  Russ Carrington MD as Consulting Physician (Nephrology)  Roberta Aldana MD as Consulting Physician (Radiation Oncology)  Gray Bahena MD as Consulting Physician (Cardiology)  Caden Ditez MD as Consulting Physician (Neurology)  Charity Cornejo, CESAR as Ambulatory  (Oncology) (Population [facility])    Referring Provider: Dr. Juanpablo Oliver  Reason for Consultation: Left hip pain    Chief Complaint   Patient presents with   • Hip Pain        HPI    Alysia Sierra is a 68 y.o. female who fell last night at home, and noticed pain this morning when she was walking.  She presented to the emergency room today, where radiographs were obtained which showed an impacted femoral neck fracture.  She denies any antecedent hip pain.  She walks with a rollator and cane at baseline.  Recently completed chemotherapy for endometrial adenocarcinoma.      Patient Active Problem List   Diagnosis   • Hypertension   • Paroxysmal SVT (supraventricular tachycardia) (HCC)   • Leg weakness, bilateral   • Syncope   • CKD (chronic kidney disease) stage 3, GFR 30-59 ml/min   • Circadian rhythm sleep disorder, advanced sleep phase  type   • Neuropathy   • Hyperlipidemia LDL goal <100   • Endometrial adenocarcinoma   • Mild episode of recurrent major depressive disorder   • Dizziness   • Hip fracture   • Anemia   • Hypomagnesemia     Past Medical History:   Diagnosis Date   • Allergic lisinopril  2017   • Anemia    • Arrhythmia    • Arthritis    • Cataract mild     stil lpresent   • Chicken pox    • Chronic fatigue    • CKD (chronic kidney disease), stage III     sees nephro   • Dental root implant present     lower left  x1 - possible dental implant   • Depression mild    • Difficulty walking    • Disease of thyroid gland    • Dizzy    • NIEVES (dyspnea on exertion)        • Generalized anxiety disorder    • GERD (gastroesophageal reflux disease) 2018   • History of brachytherapy 2023    vaginal brachytherapy   • Hyperlipidemia    • Hypertension    • Iron deficiency anemia    • Liver disease     fatty   • Liver problem    • Measles    • Menopause    • Mumps    • Neuromuscular disorder Peripheral Neuropathy   • Orthostatic hypotension    • 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   • Renal insufficiency    • Scoliosis    • Unintentional weight loss    • Uses contact lenses     bilat   • Uterine cancer    • Uterine cancer 2022   • UTI (urinary tract infection)    • Visual impairment Nearsighted   • Wears glasses       Past Surgical History:   Procedure Laterality Date   •  SECTION     • DILATION AND CURETTAGE, DIAGNOSTIC / THERAPEUTIC     • OOPHORECTOMY     • ORAL LESION EXCISION/BIOPSY  2021   • TOTAL LAPAROSCOPIC HYSTERECTOMY SALPINGO OOPHORECTOMY N/A 10/28/2022    Procedure: TOTAL LAPAROSCOPIC HYSTERECTOMY BILATERAL SALPINGO-OOPHORECTOMY, INJECTION FOR SENTINEL LYMPH NODE MAPPING, BILATERAL SENTINEL LYMPH NODE DISSECTION WITH [employer] ROB[employer];  Surgeon: Jasmine Rich MD;  [facility];  Service: Robotics - DaVinci;  Laterality: N/A;   • TUBAL ABDOMINAL LIGATION         Family History   Problem Relation Age of Onset   • Spondylolisthesis Mother    • COPD Mother    • Stroke Mother    • Hypertension Mother    • Lung cancer Father    • Hypertension Father    • Heart attack Father    • Coronary artery disease Father    • Heart disease Father    • Cancer Father    • Lung disease Father    • Hyperlipidemia Sister    • Rheum arthritis Sister    • Malig Hypertension Sister    • Osteoarthritis Sister    • Other Sister         alcoholic   • Hypertension Sister    • Hypertension Brother    • Breast cancer Neg Hx    • Ovarian cancer Neg Hx    • Uterine cancer Neg Hx    • Colon cancer Neg Hx    • Melanoma Neg Hx    • Prostate cancer Neg Hx      Social History     Socioeconomic History   • Marital status:    • Number of children: 1   • Highest education level: Associate degree: academic program   Tobacco Use   • Smoking status: Never   • Smokeless tobacco: Never   • Tobacco comments:     Never   Vaping Use   • Vaping Use: Never used   Substance and Sexual Activity   • Alcohol use: Yes     Alcohol/week: 6.0 standard drinks     Types: 6 Glasses of wine per week     Comment: social   • Drug use: No   • Sexual activity: Not Currently     Partners: Male     Birth control/protection: Surgical, Post-menopausal      (Not in a hospital admission)    Allergies   Allergen Reactions   • Lisinopril Angioedema   • Metal Rash     Possible nickel -   Gold is only metal that doesn't have issues     • Milk-Related Compounds Other (See Comments)     LACTOSE INTOLERANT   • Tylenol [Acetaminophen] Other (See Comments)     ckd   • Atorvastatin Myalgia        Review of Systems   Constitutional: Negative.    HENT: Negative.    Eyes: Negative.    Respiratory: Negative.    Cardiovascular: Negative.    Gastrointestinal: Negative.    Endocrine: Negative.    Genitourinary: Negative.    Musculoskeletal: Positive for arthralgias and gait problem.   Skin: Negative.    Allergic/Immunologic: Negative.    Hematological:  "Negative.    Psychiatric/Behavioral: Negative.         Objective      Physical Exam  /76   Pulse 84   Temp 98 °F (36.7 °C) (Oral)   Resp 16   Ht 162.6 cm (64\")   Wt 57.2 kg (126 lb)   LMP  (LMP Unknown)   SpO2 97%   BMI 21.63 kg/m²     Body mass index is 21.63 kg/m².    General:   Mental Status:  Alert   Appearance: Cooperative, in no acute distress   Build and Nutrition: Deconditioned female   Orientation: Alert and oriented to person, place and time   Posture: Laying in a hospital bed    Integument:   Left hip: No skin lesions, no rash, no ecchymosis    Neurologic:   Sensation:    Left foot: Decreased to light touch on the dorsal and plantar aspect, secondary to neuropathy   Motor:  Left lower extremity: Intact ankle dorsiflexors and ankle plantar flexors  Vascular:   Left lower extremity: 2+ dorsalis pedis pulse, prompt capillary refill    Lower Extremity:   Left Hip:    Tenderness:  Left hip laterally    Swelling:  None    Crepitus:  None    Atrophy:  None    Range of motion:  External Rotation: 30°       Internal Rotation: 30°, with pain       Flexion:  70°, with pain       Extension:  0°   Instability:  None  Deformities:  None    No leg length discrepancy      Imaging/Studies  Imaging Results (Last 24 Hours)     Procedure Component Value Units Date/Time    CT Head Without Contrast [[id number]] Resulted: 05/02/23 1456     Updated: 05/02/23 1456    XR Pelvis 1 or 2 View [[id number]] Collected: 05/02/23 1446     Updated: 05/02/23 1450    Narrative:      XR PELVIS 1 OR 2 VW    Date of Exam: 5/2/2023 2:29 PM EDT    Indication: pre-op left hip fracture    Comparison: None available.    Findings:  CT abdomen and pelvis 4/13/2023.      Impression:      Impression:  Impacted left femoral neck fracture is seen, without hip dislocation. The pelvic ring appears intact. Osteopenic changes are present. Mild lower lumbar curvature toward the left with small marginal lumbar osteophytes. No sacroiliac joint or " pubic   symphysis diastases.    IMPRESSION:  1. Impacted nondisplaced left femoral neck fracture. No hip dislocation.      Electronically Signed: Cristal Pineda    5/2/2023 2:47 PM EDT    Workstation ID: [id number]    XR Femur 2 View Left [[id number]] Collected: 05/02/23 1244     Updated: 05/02/23 1257    Narrative:      XR FEMUR 2 VW LEFT    Date of Exam: 5/2/2023 12:08 PM EDT    Indication: fall/pain     Comparison: CT abdomen and pelvis with contrast 4/13/2023    Findings:  There is a transverse fracture of the left femoral neck with impaction and valgus deformity. The left femoral head is maintained in alignment. No distal femoral fracture. The knee alignment is maintained. Complete assessment of the distal femur and knee   limited due to technique. Included portions of the pelvis are intact.      Impression:      Impression:  Transverse left femoral neck fracture with impaction.      Electronically Signed: Bill Rader    5/2/2023 12:54 PM EDT    Workstation ID: [id number]    XR Chest 1 View [[id number]] Collected: 05/02/23 1243     Updated: 05/02/23 1247    Narrative:      XR CHEST 1 VW    Date of Exam: 5/2/2023 12:08 PM EDT    Indication: fall    Comparison: None available.    Findings:  Heart size top normal, stable. The lungs are without consolidation. No pneumothorax or pleural effusion. The osseous structures are grossly intact.      Impression:      Impression:  No acute process.      Electronically Signed: Bill Rader    5/2/2023 12:44 PM EDT    Workstation ID: [id number]          Results from last 7 days   Lab Units 05/02/23  1232   WBC 10*3/mm3 8.18   HEMOGLOBIN g/dL 9.1*   HEMATOCRIT % 28.6*   MCV fL 112.6*   PLATELETS 10*3/mm3 162         Results Review:   I reviewed the patient's new clinical lab test results., I reviewed the patient's new imaging test results. and I reviewed the patient's other test results.    Assessment and Plan     Assessment:    Valgus impacted left femoral neck fracture    Hip  fracture    Hip fracture    Hypertension    CKD (chronic kidney disease) stage 3, GFR 30-59 ml/min    Neuropathy    Endometrial adenocarcinoma    Anemia    Hypomagnesemia      Plan:    I reviewed my findings with the patient.  She has a valgus impacted left femoral neck fracture, we discussed options.  She would prefer a more conservative treatment approach, with bone preserving if possible.  Therefore I think she is a good candidate for a femoral neck system device for stabilization.  She understands that if this does not heal, this can be converted to a total hip arthroplasty.  Risks, benefits, and alternatives to the procedure been discussed.  Please see my counseling note for details.  Typical rehabilitative course was also discussed.  The need for protected ambulation was also discussed.    Surgical Counseling     After examining the patient and reviewing the diagnostic studies, I discussed the operative and non-operative approaches regarding the hip fracture with the patient. The informed decision has been made to proceed with operative intervention knowing the risks, benefits, and alternatives to the surgical procedure. Risks discussed included, but are not limited to: bleeding, infection, damage to blood vessels and nerves, need for further surgery, malunion, nonunion, avascular necrosis, hip arthritis, deep venous thrombosis, pulmonary embolus, death, and anesthetic complications. The patient understands, consents, and questions were answered. Plans have been made for femoral neck system device as the primary option for surgical stabilization of the fracture    I discussed the patients findings and my recommendations with patient, nursing staff and consulting provider    Medical Decision Making  Management Options : major surgery with risk factors  Data/Risk: independent visualization of imaging, lab tests, or EMG/NCV      Juanpablo Lee MD  05/02/23  15:00 EDT      Electronically signed by Juanpablo Lee  MD AMANDA at 05/02/23 1509

## 2023-05-03 NOTE — PROGRESS NOTES
[facility] Medicine Services  PROGRESS NOTE    Patient Name: Alysia Sierra  : 1955  MRN: [id number]    Date of Admission: 2023  Primary Care Physician: Liana So APRN    Subjective   Subjective     CC:  left hip pain    HPI:  Patient resting in bed, states she is feeling ok and pain is relatively controlled. She states pain increases with movement. Patient requests her home meds be restarted.    ROS:  Gen- No fevers, chills  CV- No chest pain, palpitations  Resp- No cough, dyspnea  GI- No N/V/D, abd pain  +left hip pain with movement    Objective   Objective     Vital Signs:   Temp:  [97.4 °F (36.3 °C)-98.8 °F (37.1 °C)] 98.1 °F (36.7 °C)  Heart Rate:  [] 83  Resp:  [16-20] 20  BP: (117-157)/() 138/91  Flow (L/min):  [2-4] 2     Physical Exam:  Constitutional: No acute distress, awake, alert  HENT: NCAT, mucous membranes moist  Respiratory: Clear to auscultation bilaterally, respiratory effort normal   Cardiovascular: RRR, no murmurs, rubs, or gallops  Gastrointestinal: Positive bowel sounds, soft, nontender, nondistended  Musculoskeletal: No bilateral ankle edema, left hip with dressing in place  Psychiatric: Appropriate affect, cooperative  Neurologic: Oriented x 3, strength symmetric in all extremities, Cranial Nerves grossly intact to confrontation, speech clear  Skin: No rashes    Results Reviewed:  LAB RESULTS:      Lab 23  0817 23  1232   WBC 6.25 8.18   HEMOGLOBIN 9.0* 9.1*   HEMATOCRIT 29.8* 28.6*   PLATELETS 132* 162   NEUTROS ABS  --  5.78   IMMATURE GRANS (ABS)  --  0.08*   LYMPHS ABS  --  1.48   MONOS ABS  --  0.73   EOS ABS  --  0.09   .7* 112.6*   PROTIME  --  13.3         Lab 23  1232   SODIUM 138   POTASSIUM 4.7   CHLORIDE 104   CO2 23.0   ANION GAP 11.0   BUN 10   CREATININE 0.91   EGFR 69.3   GLUCOSE 93   CALCIUM 8.1*   MAGNESIUM 1.5*   TSH 2.350         Lab 23  1232   TOTAL PROTEIN 5.6*   ALBUMIN 3.9    GLOBULIN 1.7   ALT (SGPT) 11   AST (SGOT) 22   BILIRUBIN 0.2   ALK PHOS 88         Lab 05/02/23  1232   PROTIME 13.3   INR 1.00             Lab 05/02/23  1614   ABO TYPING A   RH TYPING Positive   ANTIBODY SCREEN Negative         Brief Urine Lab Results  (Last result in the past 365 days)      Color   Clarity   Blood   Leuk Est   Nitrite   Protein   CREAT   Urine HCG        11/11/22 1356 Yellow   Clear   Trace   Moderate (2+)   Negative   Negative                 Microbiology Results Abnormal     None          XR Femur 2 View Left    Result Date: 5/2/2023  XR FEMUR 2 VW LEFT Date of Exam: 5/2/2023 12:08 PM EDT Indication: fall/pain Comparison: CT abdomen and pelvis with contrast 4/13/2023 Findings: There is a transverse fracture of the left femoral neck with impaction and valgus deformity. The left femoral head is maintained in alignment. No distal femoral fracture. The knee alignment is maintained. Complete assessment of the distal femur and knee limited due to technique. Included portions of the pelvis are intact.     Impression: Impression: Transverse left femoral neck fracture with impaction. Electronically Signed: Bill Rader  5/2/2023 12:54 PM EDT  Workstation ID: [id number]    CT Head Without Contrast    Result Date: 5/2/2023  CT HEAD WO CONTRAST Date of Exam: 5/2/2023 2:56 PM EDT Indication: Fall, head contusion. Comparison: None available. Technique: Axial CT images were obtained of the head without contrast administration.  Reconstructed coronal and sagittal images were also obtained. Automated exposure control and iterative construction methods were used. Findings: There is mild volume loss as there is prominence of the sulci, fissures, ventricles, and basal cisterns. There are areas of decreased density in the periventricular and subcortical white matter felt to be due to chronic microvascular ischemia. There is no acute hemorrhage, midline shift, or suspicious extra-axial fluid collections. The orbital  contents are normal. The visualized paranasal and mastoid sinuses are clear. The calvarium is unremarkable.     Impression: Impression: 1. Volume loss secondary to cerebral atrophy. 2. Chronic white matter microvascular ischemia. 3. No acute findings. Electronically Signed: Gilbert Abdi  5/2/2023 3:08 PM EDT  Workstation ID: [id number]    XR Chest 1 View    Result Date: 5/2/2023  XR CHEST 1 VW Date of Exam: 5/2/2023 12:08 PM EDT Indication: fall Comparison: None available. Findings: Heart size top normal, stable. The lungs are without consolidation. No pneumothorax or pleural effusion. The osseous structures are grossly intact.     Impression: Impression: No acute process. Electronically Signed: Bill Rader  5/2/2023 12:44 PM EDT  Workstation ID: [id number]    XR Pelvis 1 or 2 View    Result Date: 5/2/2023  XR PELVIS 1 OR 2 VW Date of Exam: 5/2/2023 2:29 PM EDT Indication: pre-op left hip fracture Comparison: None available. Findings: CT abdomen and pelvis 4/13/2023.     Impression: Impression: Impacted left femoral neck fracture is seen, without hip dislocation. The pelvic ring appears intact. Osteopenic changes are present. Mild lower lumbar curvature toward the left with small marginal lumbar osteophytes. No sacroiliac joint or pubic symphysis diastases. IMPRESSION: 1. Impacted nondisplaced left femoral neck fracture. No hip dislocation. Electronically Signed: Cristal Pineda  5/2/2023 2:47 PM EDT  Workstation ID: [id number]    SS Fascia Iliaca    Result Date: 5/2/2023  Wilbert Haynes CRNA     5/3/2023  9:59 AM SS Fascia Iliaca Patient reassessed immediately prior to procedure Reason for block: at surgeon's request and post-op pain management Performed by CRNA/CAA: Maco Stephenson CRNA Assisted by: Wilbert Haynes CRNA Preanesthetic Checklist Completed: patient identified, IV checked, site marked, risks and benefits discussed, surgical consent, monitors and equipment checked, pre-op evaluation and timeout performed  "Prep: Pt Position: supine Sterile barriers:cap, gloves, mask and washed/disinfected hands Prep: ChloraPrep Patient monitoring: blood pressure monitoring, continuous pulse oximetry and EKG Procedure Performed under: general Guidance:ultrasound guided ULTRASOUND INTERPRETATION. Using ultrasound guidance a 20 G gauge needle was placed in close proximity to the nerve, at which point, under ultrasound guidance anesthetic was injected in the area of the nerve and spread of the anesthesia was seen on ultrasound in close proximity thereto.  There were no abnormalities seen on ultrasound; a digital image was taken; and the patient tolerated the procedure with no complications. Images:still images obtained, printed/placed on chart Laterality:left Block Type:fascia iliaca compartment Injection Technique:single-shot Needle Type:echogenic and short-bevel Needle Gauge:18 G Resistance on Injection: none Catheter Size:20 G (20g) Medications Used: bupivacaine PF (MARCAINE) 0.25 % injection - Injection 25 mL - 5/2/2023 5:18:00 PM Post Assessment Injection Assessment: negative aspiration for heme, no paresthesia on injection and incremental injection Patient Tolerance:comfortable throughout block Complications:no Additional Notes CKAFASCIAILIACA: SINGLE shot A high-frequency linear transducer, with sterile cover, was placed in parasagittal plane on top of the Anterior Superior Iliac Spine (ASIS) and moved medially to identify the Internal Oblique muscle, Sartorius muscle, Iliacus Muscle, Fascia Iliaca (FI) and Fascia Latae. The insertion site was prepped and draped in sterile fashion. Skin and cutaneous tissue was infiltrated with 2-5 ml of 1% Lidocaine. Using ultrasound-guidance, a 20-gauge B-Beach 4\" Ultraplex 360 non-stimulating echogenic needle was advanced in plane from caudad to cephalad. Preservative-free normal saline was utilized for hydro-dissection of tissue, advancement of needle, and to confirm final needle placement " below FI. Local anesthetic in incremental 3-5 ml injections. Aspiration every 5 ml to prevent intravascular injection. Injection was completed with negative aspiration of blood and negative intravascular injection. Injection pressures were normal with minimal resistance.     Peripheral Block    Result Date: 5/2/2023  Eduardo Ryan MD     5/2/2023  5:39 PM Peripheral Block Patient reassessed immediately prior to procedure Start time: 5/2/2023 3:54 PM Stop time: 5/2/2023 4:00 PM Reason for block: at surgeon's request and post-op pain management Performed by Anesthesiologist: Eduardo Ryan MD CRNA/CAA: Maco Stephenson CRNA Preanesthetic Checklist Completed: patient identified, IV checked, site marked, risks and benefits discussed, surgical consent, monitors and equipment checked, pre-op evaluation and timeout performed Prep: Pt Position: supine Sterile barriers:cap, gloves, mask and washed/disinfected hands Prep: ChloraPrep Patient monitoring: blood pressure monitoring, continuous pulse oximetry and EKG Procedure Performed under: local infiltration Guidance:ultrasound guided ULTRASOUND INTERPRETATION.  Using ultrasound guidance a 20 G gauge needle was placed in close proximity to the nerve, at which point, under ultrasound guidance anesthetic was injected in the area of the nerve and spread of the anesthesia was seen on ultrasound in close proximity thereto.  There were no abnormalities seen on ultrasound; a digital image was taken; and the patient tolerated the procedure with no complications. Images:still images obtained, printed/placed on chart Block Type:fascia iliaca compartment Injection Technique:catheter Needle Type:echogenic and Tuohy Needle Gauge:18 G Resistance on Injection: none Catheter Size:20 G (20g) Medications Used: bupivacaine PF (MARCAINE) 0.25 % injection - Injection  50 mL - 5/2/2023 3:54:00 PM Post Assessment Injection Assessment: negative aspiration for heme, no paresthesia on injection  "and incremental injection Patient Tolerance:comfortable throughout block Complications:no Additional Notes CKAFASCIAILIACA: CATHETER A high-frequency linear transducer, with sterile cover, was placed in parasagittal plane on top of the Anterior Superior Iliac Spine (ASIS) and moved medially to identify the Internal Oblique muscle, Sartorius muscle, Iliacus Muscle, Fascia Iliaca (FI) and Fascia Latae. The insertion site was prepped and draped in sterile fashion. Skin and cutaneous tissue was infiltrated with 2-5 ml of 1% Lidocaine. Using ultrasound-guidance, an 18-gauge Contiplex Ultra 360 Touhy needle was advanced in plane from caudad to cephalad. Preservative-free normal saline was utilized for hydro-dissection of tissue, advancement of Touhy, and to confirm final needle placement below FI. Local anesthetic in incremental 3-5 ml injections. Aspiration every 5 ml to prevent intravascular injection. Injection was completed with negative aspiration of blood and negative intravascular injection. Injection pressures were normal with minimal resistance. A 20-gauge Contiplex Echo catheter was placed through the needle and advance out the tip of the Touhy 3-5 cm. The Touhy needle was then removed, and final catheter position verified below the FI. The catheter was secured in the usual fashion with skin glue, benzoin, steri-strips, CHG tegaderm and Label noting \"Nerve Block Catheter\". Jerk tape applied at yellow connector and catheter connection.     FL C Arm During Surgery    Result Date: 5/2/2023  FL C ARM DURING SURGERY Date of Exam: 5/2/2023 5:09 PM EDT Indication: FEMORAL NECK OPEN REDUCTION INTERNAL FIXATION. Comparison: None available. Technique: Fluoroscopy in OR without radiologist present. Fluoroscopic Time: 1 minute 36 seconds Findings: 2 images are submitted. Imaging demonstrates dynamic screw in the proximal femur. Please see the operating physician report.     Impression: Impression: Fluoroscopy in OR without " radiologist present. Electronically Signed: Elizabeth Gallagher  5/2/2023 7:18 PM EDT  Workstation ID: [id number]    XR Hip With or Without Pelvis 2 - 3 View Left    Result Date: 5/2/2023  XR HIP W OR WO PELVIS 2-3 VIEW LEFT Date of Exam: 5/2/2023 6:50 PM EDT Indication: Left hip fracture, status post open reduction and internal fixation Comparison: Pelvis x-ray performed on 5/2/2023 at 1432 hours. Findings: There has been interval surgical fixation of the subcapital fracture of the left femoral neck. The hardware is intact. There is soft tissue gas and swelling consistent with expected postsurgical changes. There are mild arthritic changes involving both hip joints, the pubic symphysis, and the sacroiliac joints. There are degenerative changes in the lower lumbar spine. The pelvic bony structures are demineralized.     Impression: Impression: Status post open reduction and internal fixation of a subcapital fracture of the left femoral neck as described. Electronically Signed: Gilbert Trinidad  5/2/2023 7:24 PM EDT  Workstation ID: [id number]      Results for orders placed during the hospital encounter of 01/24/23    Adult Transthoracic Echo Complete W/ Cont if Necessary Per Protocol    Interpretation Summary  •  Left ventricular systolic function is normal. Left ventricular ejection fraction appears to be 51 - 55%.  •  Left ventricular diastolic function is consistent with (grade I) impaired relaxation.  •  Estimated right ventricular systolic pressure from tricuspid regurgitation is normal (<35 mmHg).      Current medications:  Scheduled Meds:aspirin, 81 mg, Oral, Q12H  DULoxetine, 20 mg, Oral, BID  gabapentin, 300 mg, Oral, BID  levothyroxine, 25 mcg, Oral, Q AM  magnesium sulfate, 1 g, Intravenous, Once  metoprolol succinate XL, 25 mg, Oral, Nightly  pantoprazole, 40 mg, Oral, Q AM  senna-docusate sodium, 2 tablet, Oral, BID  sodium chloride, 500 mL, Intravenous, Once  sodium chloride, 10 mL, Intravenous, Q12H      Continuous  Infusions:ropivacaine,   sodium chloride, 75 mL/hr, Last Rate: 75 mL/hr (05/02/23 2022)      PRN Meds:.•  senna-docusate sodium **AND** polyethylene glycol **AND** bisacodyl **AND** bisacodyl  •  Calcium Replacement - Follow Nurse / BPA Driven Protocol  •  HYDROmorphone  •  Magnesium Standard Dose Replacement - Follow Nurse / BPA Driven Protocol  •  melatonin  •  OLANZapine  •  ondansetron **OR** ondansetron  •  oxyCODONE  •  Phosphorus Replacement - Follow Nurse / BPA Driven Protocol  •  Potassium Replacement - Follow Nurse / BPA Driven Protocol  •  sodium chloride  •  sodium chloride  •  sodium chloride    Assessment & Plan   Assessment & Plan     Active Hospital Problems    Diagnosis  POA   • **Hip fracture [S72.009A]  Yes   • Anemia [D64.9]  Yes   • Hypomagnesemia [E83.42]  Yes   • Endometrial adenocarcinoma [C54.1]  Yes   • Neuropathy [G62.9]  Yes   • CKD (chronic kidney disease) stage 3, GFR 30-59 ml/min [N18.30]  Yes   • Hypertension [I10]  Yes      Resolved Hospital Problems   No resolved problems to display.        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    This patient's problems and plans were partially entered by my partner and updated as appropriate by me 05/03/23.    All problems are new to me today.     Left femoral neck fracture  Fall  --s/p closed reduction and internal fixation, valgus impacted left femoral neck fracture on 5/2/23 by Dr. Lee  --IV/PO pain control.  --PT/OT     Stage IIIa endometrial cancer  Chemotherapy induced anemia  --Completed chemotherapy 4/2023. Recently seen by Dr. Rich.      Hypomagnesemia  --Replace.      CKD IIIa  --At baseline. Monitor.    HLD  - restart home Rosuvastatin    HTN  -restart home Hydralazine     Peripheral neuropathy  --Followed by neurology. Continue her cymbalta, gabapentin. Also takes monthly IM B12.    Expected Discharge Location and  Transportation: rehab  Expected Discharge   Expected Discharge Date: 5/10/2023; Expected Discharge Time:      DVT prophylaxis:  Mechanical DVT prophylaxis orders are present.     AM-PAC 6 Clicks Score (PT): 11 (05/03/23 0800)    CODE STATUS:   Code Status and Medical Interventions:   Ordered at: 05/02/23 1944     Code Status (Patient has no pulse and is not breathing):    CPR (Attempt to Resuscitate)     Medical Interventions (Patient has pulse or is breathing):    Full Support     Release to patient:    Routine Release       Domitila Leon DO  05/03/23

## 2023-05-03 NOTE — THERAPY EVALUATION
Patient Name: Alysia Sierra  : 1955    MRN: [id number]                              Today's Date: 5/3/2023       Admit Date: 2023    Visit Dx:     ICD-10-CM ICD-9-CM   1. Closed fracture of left hip, initial encounter  S72.002A 820.8   2. Anemia, unspecified type  D64.9 285.9   3. History of endometrial cancer  Z85.42 V10.42     Patient Active Problem List   Diagnosis   • Hypertension   • Paroxysmal SVT (supraventricular tachycardia) (HCC)   • Leg weakness, bilateral   • Syncope   • CKD (chronic kidney disease) stage 3, GFR 30-59 ml/min   • Circadian rhythm sleep disorder, advanced sleep phase type   • Neuropathy   • Hyperlipidemia LDL goal <100   • Endometrial adenocarcinoma   • Mild episode of recurrent major depressive disorder   • Dizziness   • Hip fracture   • Anemia   • Hypomagnesemia     Past Medical History:   Diagnosis Date   • Allergic lisinopril  2017   • Anemia    • Arrhythmia    • Arthritis    • Cataract mild     stil lpresent   • Chicken pox    • Chronic fatigue    • CKD (chronic kidney disease), stage III     sees nephro   • Dental root implant present     lower left  x1 - possible dental implant   • Depression mild    • Difficulty walking 2019   • Disease of thyroid gland    • Dizzy    • NIEVES (dyspnea on exertion)     2017   • Generalized anxiety disorder    • GERD (gastroesophageal reflux disease) 2018   • History of brachytherapy 2023    vaginal brachytherapy   • Hyperlipidemia    • Hypertension    • Iron deficiency anemia    • Liver disease     fatty   • Liver problem    • Measles    • Menopause    • Mumps    • Neuromuscular disorder Peripheral Neuropathy   • Orthostatic hypotension    • 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   • Renal insufficiency    • Scoliosis    • Unintentional weight loss    • Uses contact lenses     bilat   • Uterine cancer    • Uterine cancer 2022   • UTI (urinary tract infection)    • Visual impairment  Nearsighted   • Wears glasses      Past Surgical History:   Procedure Laterality Date   •  SECTION     • DILATION AND CURETTAGE, DIAGNOSTIC / THERAPEUTIC     • HIP OPEN REDUCTION Left 2023    Procedure: FEMORAL NECK OPEN REDUCTION INTERNAL FIXATION LEFT;  Surgeon: Juanpablo Lee MD;  Location: [facility] OR;  Service: Orthopedics;  Laterality: Left;   • OOPHORECTOMY     • ORAL LESION EXCISION/BIOPSY  2021   • TOTAL LAPAROSCOPIC HYSTERECTOMY SALPINGO OOPHORECTOMY N/A 10/28/2022    Procedure: TOTAL LAPAROSCOPIC HYSTERECTOMY BILATERAL SALPINGO-OOPHORECTOMY, INJECTION FOR SENTINEL LYMPH NODE MAPPING, BILATERAL SENTINEL LYMPH NODE DISSECTION WITH DAVINCI ROBOT;  Surgeon: Jasmine Rich MD;  Location:  [facility] OR;  Service: Robotics - DaVinci;  Laterality: N/A;   • TUBAL ABDOMINAL LIGATION        General Information     Row Name 23          OT Time and Intention    Document Type evaluation  -AR     Mode of Treatment individual therapy;occupational therapy  -AR     Row Name 23          General Information    Patient Profile Reviewed yes  -AR     Prior Level of Function independent:;all household mobility;community mobility;gait;transfer;ADL's  PRN use of cane/rollator  -AR     Existing Precautions/Restrictions fall  left fascia iliaca, WBAT LLE, LLE weakness at baseline, h/o orthostatic hypotension  -AR     Barriers to Rehab medically complex;previous functional deficit  -AR     Row Name 23          Living Environment    People in Home alone  -AR     Row Name 23 131          Home Main Entrance    Number of Stairs, Main Entrance one;other (see comments)  ramp access  -AR     Stair Railings, Main Entrance none  -AR     Row Name 23          Stairs Within Home, Primary    Number of Stairs, Within Home, Primary none  -AR     Row Name 23          Cognition    Orientation Status (Cognition) oriented x 4  -AR     Row Name 23           Safety Issues, Functional Mobility    Safety Issues Affecting Function (Mobility) safety precaution awareness;safety precautions follow-through/compliance;sequencing abilities  -AR     Impairments Affecting Function (Mobility) balance;endurance/activity tolerance;strength;sensation/sensory awareness;pain;range of motion (ROM)  -AR           User Key  (r) = Recorded By, (t) = Taken By, (c) = Cosigned By    Initials Name Provider Type    AR Bernie Henriquez OT Occupational Therapist                 Mobility/ADL's     Row Name 05/03/23 1320          Bed Mobility    Bed Mobility scooting/bridging;supine-sit  -AR     Scooting/Bridging [place] (Bed Mobility) contact guard;verbal cues  -AR     Supine-Sit [place] (Bed Mobility) verbal cues;moderate assist (50% patient effort)  -AR     Bed Mobility, Safety Issues decreased use of legs for bridging/pushing  -AR     Assistive Device (Bed Mobility) bed rails;draw sheet;head of bed elevated;leg   -AR     Comment, (Bed Mobility) Issued leg  and educated on use  -AR     Row Name 05/03/23 1320          Transfers    Transfers sit-stand transfer;stand-sit transfer;bed-chair transfer  -AR     Comment, (Transfers) Pt assisted to BSC, then to chair. No buckling noted. Deferred KI d/t abiilty to perform SLR and LAQ. Pt needed cues for hand placement and to advance LLE during stand-to-sit transition.  -AR     Row Name 05/03/23 1320          Bed-Chair Transfer    Bed-Chair [place] (Transfers) moderate assist (50% patient effort);2 person assist;verbal cues  -AR     Assistive Device (Bed-Chair Transfers) other (see comments)  BUE HHA  -AR     Row Name 05/03/23 1320          Sit-Stand Transfer    Sit-Stand [place] (Transfers) moderate assist (50% patient effort);2 person assist;verbal cues  -AR     Assistive Device (Sit-Stand Transfers) walker, front-wheeled  -AR     Row Name 05/03/23 1320          Stand-Sit Transfer    Stand-Sit [place] (Transfers)  moderate assist (50% patient effort);2 person assist;verbal cues  -AR     Assistive Device (Stand-Sit Transfers) walker, front-wheeled  -AR     Row Name 05/03/23 1320          Activities of Daily Living    BADL Assessment/Intervention bathing;upper body dressing;lower body dressing;toileting  -AR     Row Name 05/03/23 1320          Mobility    Extremity Weight-bearing Status left lower extremity  -AR     Left Lower Extremity (Weight-bearing Status) weight-bearing as tolerated (WBAT);other (see comments)  -AR     Row Name 05/03/23 1320          Bathing Assessment/Intervention    Comment, (Bathing) OT issued LH sponge to assist with LBB  -AR     Row Name 05/03/23 1320          Upper Body Dressing Assessment/Training    [place] Level (Upper Body Dressing) don;pajama/robe;minimum assist (75% patient effort)  -AR     Position (Upper Body Dressing) edge of bed sitting  -AR     Row Name 05/03/23 1320          Lower Body Dressing Assessment/Training    [place] Level (Lower Body Dressing) doff;socks;moderate assist (50% patient effort);don  -AR     Assistive Devices (Lower Body Dressing) reacher;sock-aid  -AR     Position (Lower Body Dressing) supported sitting  -AR     Comment, (Lower Body Dressing) Educated pt on ADL retraining including AE use and incorporation of rigo-dressing. Issued self-care kit and educated on use.  -AR     Row Name 05/03/23 1320          Toileting Assessment/Training    [place] Level (Toileting) toileting skills;dependent (less than 25% patient effort)  -AR     Assistive Devices (Toileting) commode, bedside without drop arms  -AR     Position (Toileting) supported sitting;supported standing  -AR           User Key  (r) = Recorded By, (t) = Taken By, (c) = Cosigned By    Initials Name Provider Type    Bernie Jean Baptiste OT Occupational Therapist               Obj/Interventions     Row Name 05/03/23 1325          Sensory Assessment (Somatosensory)    Sensory Assessment  (Somatosensory) UE sensation intact  -AR     Row Name 05/03/23 1325          Vision Assessment/Intervention    Visual Impairment/Limitations WNL  -AR     Row Name 05/03/23 1325          Range of Motion Comprehensive    General Range of Motion bilateral upper extremity ROM WNL  -AR     [facility] Name 05/03/23 1325          Strength Comprehensive (MMT)    General Manual Muscle Testing (MMT) Assessment upper extremity strength deficits identified  -AR     Comment, General Manual Muscle Testing (MMT) Assessment BUE 4/5  -AR     [facility] Name 05/03/23 1325          Balance    Balance Assessment sitting static balance;sitting dynamic balance;standing static balance;standing dynamic balance  -AR     Static Sitting Balance supervision  -AR     Dynamic Sitting Balance contact guard  -AR     Position, Sitting Balance unsupported;sitting edge of bed  -AR     Static Standing Balance minimal assist;2-person assist  -AR     Dynamic Standing Balance moderate assist;2-person assist  -AR     Position/Device Used, Standing Balance supported;walker, rolling  -AR           User Key  (r) = Recorded By, (t) = Taken By, (c) = Cosigned By    Initials Name Provider Type    Bernie Jean Baptiste, CALVIN Occupational Therapist               Goals/Plan     Row Name 05/03/23 1334          Transfer Goal 1 (OT)    Activity/Assistive Device (Transfer Goal 1, OT) sit-to-stand/stand-to-sit;commode, bedside with drop arms;walker, rolling  -AR     [place] Level/Cues Needed (Transfer Goal 1, OT) minimum assist (75% or more patient effort);verbal cues required  -AR     Time Frame (Transfer Goal 1, OT) long term goal (LTG);by discharge  -AR     Progress/Outcome (Transfer Goal 1, OT) goal ongoing  -AR     Row Name 05/03/23 1334          Dressing Goal 1 (OT)    Activity/Device (Dressing Goal 1, OT) lower body dressing;reacher;sock-aid  -AR     [place]/Cues Needed (Dressing Goal 1, OT) minimum assist (75% or more patient effort);verbal cues required  -AR      Time Frame (Dressing Goal 1, OT) long term goal (LTG);by discharge  -AR     Progress/Outcome (Dressing Goal 1, OT) goal ongoing  -AR     Row Name 05/03/23 1859          Toileting Goal 1 (OT)    Activity/Device (Toileting Goal 1, OT) toileting skills, all;commode, bedside without drop arms  -AR     [place] Level/Cues Needed (Toileting Goal 1, OT) verbal cues required;moderate assist (50-74% patient effort)  -AR     Time Frame (Toileting Goal 1, OT) long term goal (LTG);by discharge  -AR     Progress/Outcome (Toileting Goal 1, OT) goal ongoing  -AR     Row Name 05/03/23 9546          Therapy Assessment/Plan (OT)    Planned Therapy Interventions (OT) activity tolerance training;adaptive equipment training;BADL retraining;IADL retraining;functional balance retraining;occupation/activity based interventions;patient/caregiver education/training;transfer/mobility retraining;ROM/therapeutic exercise;strengthening exercise  -AR           User Key  (r) = Recorded By, (t) = Taken By, (c) = Cosigned By    Initials Name Provider Type    AR Bernie Henriquez, OT Occupational Therapist               Clinical Impression     Row Name 05/03/23 1932          Pain Assessment    Pretreatment Pain Rating 5/10  -AR     Posttreatment Pain Rating 6/10  -AR     Pain Location - Side/Orientation Left  -AR     Pain Location generalized  -AR     Pain Location - hip  -AR     Pain Intervention(s) Medication (See MAR);Cold applied;Repositioned;Ambulation/increased activity  -AR     Row Name 05/03/23 2148          Plan of Care Review    Plan of Care Reviewed With patient  -AR     Outcome Evaluation Pt completed bed mobility with mod assist x2, transfer to BSC and chair with mod assist x2, UB dressing with min assist and LB dressing with mod assist using AE. Pt limited with pain, decreased balance, decreased AROM and preforms below baseline status. Recommend IPR.  -AR     Row Name 05/03/23 6942          Therapy Assessment/Plan (OT)    Rehab  Potential (OT) good, to achieve stated therapy goals  -AR     Criteria for Skilled Therapeutic Interventions Met (OT) yes  -AR     Therapy Frequency (OT) daily  -AR     Row Name 05/03/23 1326          Therapy Plan Review/Discharge Plan (OT)    Anticipated Discharge Disposition (OT) inpatient rehabilitation facility  -AR     Row Name 05/03/23 1326          Vital Signs    Pre Systolic BP Rehab 138  -AR     Pre Treatment Diastolic BP 91  -AR     Intra Systolic BP Rehab 160  -AR     Intra Treatment Diastolic BP 94  RN aware and cleared pt for OOB activity  -AR     Post Systolic BP Rehab 174  -AR     Post Treatment Diastolic   -AR     Pre SpO2 (%) 94  -AR     O2 Delivery Pre Treatment room air  -AR     Post SpO2 (%) 96  -AR     O2 Delivery Post Treatment room air  -AR     Pre Patient Position Supine  -AR     Intra Patient Position Sitting  -AR     Post Patient Position Sitting  -AR     Row Name 05/03/23 1326          Positioning and Restraints    Pre-Treatment Position in bed  -AR     Post Treatment Position chair  -AR     In Chair notified nsg;reclined;call light within reach;encouraged to call for assist;exit alarm on;compression device;waffle cushion;on mechanical lift sling;legs elevated;with nsg  cold pack applied  -AR           User Key  (r) = Recorded By, (t) = Taken By, (c) = Cosigned By    Initials Name Provider Type    AR Bernie Henriquez, OT Occupational Therapist               Outcome Measures     Row Name 05/03/23 1335          How much help from another is currently needed...    Putting on and taking off regular lower body clothing? 2  -AR     Bathing (including washing, rinsing, and drying) 2  -AR     Toileting (which includes using toilet bed pan or urinal) 1  -AR     Putting on and taking off regular upper body clothing 3  -AR     Taking care of personal grooming (such as brushing teeth) 3  -AR     Eating meals 3  -AR     AM-PAC 6 Clicks Score (OT) 14  -AR     Row Name 05/03/23 1131 05/03/23  0800       How much help from another person do you currently need...    Turning from your back to your side while in flat bed without using bedrails? 3  -CM 2  -TS    Moving from lying on back to sitting on the side of a flat bed without bedrails? 2  -CM 2  -TS    Moving to and from a bed to a chair (including a wheelchair)? 3  -CM 2  -TS    Standing up from a chair using your arms (e.g., wheelchair, bedside chair)? 3  -CM 2  -TS    Climbing 3-5 steps with a railing? 1  -CM 1  -TS    To walk in hospital room? 3  -CM 2  -TS    AM-PAC 6 Clicks Score (PT) 15  -CM 11  -TS    Highest level of mobility 4 --> Transferred to chair/commode  -CM 4 --> Transferred to chair/commode  -TS    Row Name 05/03/23 1335 05/03/23 1131       Functional Assessment    Outcome Measure Options AM-PAC 6 Clicks Daily Activity (OT)  -AR AM-PAC 6 Clicks Basic Mobility (PT)  -CM          User Key  (r) = Recorded By, (t) = Taken By, (c) = Cosigned By    Initials Name Provider Type    Bernie Jean Baptiste OT Occupational Therapist    TS Marilou Kothari, RN Registered Nurse    CM Karen Mcgovern, PT Physical Therapist                Occupational Therapy Education     Title: PT OT SLP Therapies (Done)     Topic: Occupational Therapy (Done)     Point: ADL training (Done)     Description:   Instruct learner(s) on proper safety adaptation and remediation techniques during self care or transfers.   Instruct in proper use of assistive devices.              Learning Progress Summary           Patient AMANDA Castorena,CAROLINE,LUDIN, VU,NR by AR at 5/3/2023 1335                   Point: Home exercise program (Done)     Description:   Instruct learner(s) on appropriate technique for monitoring, assisting and/or progressing therapeutic exercises/activities.              Learning Progress Summary           Patient AMANDA Castorena,CAROLINE,D, VU,NR by AR at 5/3/2023 1335                   Point: Precautions (Done)     Description:   Instruct learner(s) on prescribed precautions  during self-care and functional transfers.              Learning Progress Summary           Patient Kell, E,TB,D, VU,NR by AR at 5/3/2023 1335                   Point: Body mechanics (Done)     Description:   Instruct learner(s) on proper positioning and spine alignment during self-care, functional mobility activities and/or exercises.              Learning Progress Summary           Patient Kell, E,TB,D, VU,NR by AR at 5/3/2023 1335                               User Key     Initials Effective Dates Name Provider Type [facility] 02/03/23 -  Bernie Henriquez OT Occupational Therapist OT              OT Recommendation and Plan  Planned Therapy Interventions (OT): activity tolerance training, adaptive equipment training, BADL retraining, IADL retraining, functional balance retraining, occupation/activity based interventions, patient/caregiver education/training, transfer/mobility retraining, ROM/therapeutic exercise, strengthening exercise  Therapy Frequency (OT): daily  Plan of Care Review  Plan of Care Reviewed With: patient  Outcome Evaluation: Pt completed bed mobility with mod assist x2, transfer to BSC and chair with mod assist x2, UB dressing with min assist and LB dressing with mod assist using AE. Pt limited with pain, decreased balance, decreased AROM and preforms below baseline status. Recommend IPR.     Time Calculation:    Time Calculation- OT     Row Name 05/03/23 1336             Time Calculation- OT    OT Start Time 0815  -AR      OT Received On 05/03/23  -AR      OT Goal Re-Cert Due Date 05/13/23  -AR         Timed Charges    16117 - OT Self Care/Mgmt Minutes 12  -AR         Untimed Charges    OT Eval/Re-eval Minutes 59  -AR         Total Minutes    Timed Charges Total Minutes 12  -AR      Untimed Charges Total Minutes 59  -AR       Total Minutes 71  -AR            User Key  (r) = Recorded By, (t) = Taken By, (c) = Cosigned By    Initials Name Provider Type    AR Bernie Henriquez OT  Occupational Therapist              Therapy Charges for Today     Code Description Service Date Service Provider Modifiers Qty    44327032031 HC OT SELF CARE/MGMT/TRAIN EA 15 MIN 5/3/2023 Bernie Henriquez, OT GO 1    10638997085 HC OT EVAL LOW COMPLEXITY 4 5/3/2023 Bernie Henriquez OT GO 1    29178475717 HC OT THER SUPP EA 15 MIN 5/3/2023 Bernie Henriquez OT GO 4               Bernie Henriquez OT  5/3/2023

## 2023-05-03 NOTE — THERAPY EVALUATION
Patient Name: Alysia Sierra  : 1955    MRN: [id number]                              Today's Date: 5/3/2023       Admit Date: 2023    Visit Dx:     ICD-10-CM ICD-9-CM   1. Closed fracture of left hip, initial encounter  S72.002A 820.8   2. Anemia, unspecified type  D64.9 285.9   3. History of endometrial cancer  Z85.42 V10.42     Patient Active Problem List   Diagnosis   • Hypertension   • Paroxysmal SVT (supraventricular tachycardia) (HCC)   • Leg weakness, bilateral   • Syncope   • CKD (chronic kidney disease) stage 3, GFR 30-59 ml/min   • Circadian rhythm sleep disorder, advanced sleep phase type   • Neuropathy   • Hyperlipidemia LDL goal <100   • Endometrial adenocarcinoma   • Mild episode of recurrent major depressive disorder   • Dizziness   • Hip fracture   • Anemia   • Hypomagnesemia     Past Medical History:   Diagnosis Date   • Allergic lisinopril  2017   • Anemia    • Arrhythmia    • Arthritis    • Cataract mild     stil lpresent   • Chicken pox    • Chronic fatigue    • CKD (chronic kidney disease), stage III     sees nephro   • Dental root implant present     lower left  x1 - possible dental implant   • Depression mild    • Difficulty walking 2019   • Disease of thyroid gland    • Dizzy    • NIEVES (dyspnea on exertion)     2017   • Generalized anxiety disorder    • GERD (gastroesophageal reflux disease) 2018   • History of brachytherapy 2023    vaginal brachytherapy   • Hyperlipidemia    • Hypertension    • Iron deficiency anemia    • Liver disease     fatty   • Liver problem    • Measles    • Menopause    • Mumps    • Neuromuscular disorder Peripheral Neuropathy   • Orthostatic hypotension    • 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   • Renal insufficiency    • Scoliosis    • Unintentional weight loss    • Uses contact lenses     bilat   • Uterine cancer    • Uterine cancer 2022   • UTI (urinary tract infection)    • Visual impairment  Nearsighted   • Wears glasses      Past Surgical History:   Procedure Laterality Date   •  SECTION     • DILATION AND CURETTAGE, DIAGNOSTIC / THERAPEUTIC     • HIP OPEN REDUCTION Left 2023    Procedure: FEMORAL NECK OPEN REDUCTION INTERNAL FIXATION LEFT;  Surgeon: Juanpablo Lee MD;  Location: [facility] OR;  Service: Orthopedics;  Laterality: Left;   • OOPHORECTOMY     • ORAL LESION EXCISION/BIOPSY  2021   • TOTAL LAPAROSCOPIC HYSTERECTOMY SALPINGO OOPHORECTOMY N/A 10/28/2022    Procedure: TOTAL LAPAROSCOPIC HYSTERECTOMY BILATERAL SALPINGO-OOPHORECTOMY, INJECTION FOR SENTINEL LYMPH NODE MAPPING, BILATERAL SENTINEL LYMPH NODE DISSECTION WITH DAVINCI ROBOT;  Surgeon: Jasmine Rich MD;  Location:  [facility];  Service: Robotics - DaVinci;  Laterality: N/A;   • TUBAL ABDOMINAL LIGATION        General Information     Row Name 23 111          Physical Therapy Time and Intention    Document Type evaluation  -CM     Mode of Treatment physical therapy;individual therapy  -CM     Row Name 23 111          General Information    Patient Profile Reviewed yes  -CM     Prior Level of Function independent:;all household mobility;ADL's;driving  ind with SPC/rollator  -CM     Existing Precautions/Restrictions fall;other (see comments)  L fascia iliaca nerve cath, LLE WBAT with KI  -CM     Barriers to Rehab medically complex;previous functional deficit  -CM     Row Name 23 111          Living Environment    People in Home alone  -CM     Row Name 23 111          Home Main Entrance    Number of Stairs, Main Entrance one  -CM     Stair Railings, Main Entrance other (see comments)  1 step no HR and then a ramp to enter  -CM     Row Name 23 1116          Stairs Within Home, Primary    Stairs, Within Home, Primary multistory apartment, stays on main floor  -CM     Number of Stairs, Within Home, Primary twelve  -CM     Row Name 23 111          Cognition    Orientation Status  (Cognition) oriented x 4  -CM     Row Name 05/03/23 1116          Safety Issues, Functional Mobility    Safety Issues Affecting Function (Mobility) awareness of need for assistance;insight into deficits/self-awareness;safety precaution awareness;safety precautions follow-through/compliance;sequencing abilities  -CM     Impairments Affecting Function (Mobility) balance;endurance/activity tolerance;strength;sensation/sensory awareness;pain  -CM           User Key  (r) = Recorded By, (t) = Taken By, (c) = Cosigned By    Initials Name Provider Type    Karen Armstrong PT Physical Therapist               Mobility     Row Name 05/03/23 1119          Bed Mobility    Comment, (Bed Mobility) [facility] pre/post eval  -CM     Row Name 05/03/23 1119          Sit-Stand Transfer    Sit-Stand [place] (Transfers) moderate assist (50% patient effort);1 person assist;verbal cues  -CM     Assistive Device (Sit-Stand Transfers) walker, front-wheeled  -CM     Comment, (Sit-Stand Transfer) Left KI donned in chair, cues for safe hand and foot placement provided, assist provided to clear hips from chair and shift weight onto RLE, patient is able to pull LLE under hips and stand on it upon achieving upright posture. Increased nausea upon standing requiring patient to sit back down  -CM     Row Name 05/03/23 1119          Gait/Stairs (Locomotion)    [place] Level (Gait) unable to assess  -CM     Comment, (Gait/Stairs) deferred today due to increased nausea upon standing  -CM     Row Name 05/03/23 1119          Mobility    Extremity Weight-bearing Status left lower extremity  -CM     Left Lower Extremity (Weight-bearing Status) weight-bearing as tolerated (WBAT);other (see comments)  with KI  -CM           User Key  (r) = Recorded By, (t) = Taken By, (c) = Cosigned By    Initials Name Provider Type    Karen Armstrong PT Physical Therapist               Obj/Interventions     Row Name 05/03/23 1121          Range of Motion  Comprehensive    General Range of Motion bilateral lower extremity ROM WFL  -CM     Row Name 05/03/23 1121          Strength Comprehensive (MMT)    General Manual Muscle Testing (MMT) Assessment lower extremity strength deficits identified  -     Comment, General Manual Muscle Testing (MMT) Assessment RLE grossly 4-/5, LLE grossly 2-/5 she is able to perform weak quad set, unable to perform SLR without assist  -CM     Row Name 05/03/23 1121          Motor Skills    Therapeutic Exercise hip;knee;ankle;other (see comments)  encouaraged these exercises throughout the day  -     Row Name 05/03/23 1121          Hip (Therapeutic Exercise)    Hip (Therapeutic Exercise) isometric exercises  -CM     Hip Isometrics (Therapeutic Exercise) bilateral;gluteal sets;10 repetitions;3 second hold  -CM     Row Name 05/03/23 1121          Knee (Therapeutic Exercise)    Knee (Therapeutic Exercise) isometric exercises  -CM     Knee Isometrics (Therapeutic Exercise) bilateral;quad sets;10 repetitions;3 second hold  -CM     Row Name 05/03/23 1121          Ankle (Therapeutic Exercise)    Ankle (Therapeutic Exercise) AROM (active range of motion)  -CM     Ankle AROM (Therapeutic Exercise) bilateral;dorsiflexion;plantarflexion;10 repetitions  -CM     Row Name 05/03/23 1121          Balance    Balance Assessment sitting static balance;standing static balance;standing dynamic balance  -CM     Static Sitting Balance standby assist  -CM     Position, Sitting Balance unsupported;sitting in chair  -CM     Static Standing Balance minimal assist  -CM     Dynamic Standing Balance minimal assist  -CM     Position/Device Used, Standing Balance supported;walker, front-wheeled  -     Comment, Balance patient performed weightshifting onto LLE while standing in walker with L KI in place. She is able to accept weight on LLE and clear her RLE from the ground, but only with increased weight through BUEs on walker. Increased nausea limits further activity   -CM     Row Name 05/03/23 1121          Sensory Assessment (Somatosensory)    Sensory Assessment (Somatosensory) bilateral LE;left LE  -CM     Left LE Sensory Assessment impaired;other (see comments)  absent at thigh due to nerve cath  -CM     Bilateral LE Sensory Assessment impaired;other (see comments)  neuropathy at baseline in BLE from knees distally, she does locate light touch inconsistently  -CM           User Key  (r) = Recorded By, (t) = Taken By, (c) = Cosigned By    Initials Name Provider Type    CM Karen Mcgovern, PT Physical Therapist               Goals/Plan     Row Name 05/03/23 1130          Bed Mobility Goal 1 (PT)    Activity/Assistive Device (Bed Mobility Goal 1, PT) sit to supine/supine to sit  -CM     Lares Level/Cues Needed (Bed Mobility Goal 1, PT) supervision required  -CM     Time Frame (Bed Mobility Goal 1, PT) long term goal (LTG);10 days  -CM     Progress/Outcomes (Bed Mobility Goal 1, PT) new goal  -CM     Row Name 05/03/23 1130          Transfer Goal 1 (PT)    Activity/Assistive Device (Transfer Goal 1, PT) sit-to-stand/stand-to-sit;bed-to-chair/chair-to-bed  -CM     Lares Level/Cues Needed (Transfer Goal 1, PT) contact guard required  -CM     Time Frame (Transfer Goal 1, PT) long term goal (LTG);10 days  -CM     Progress/Outcome (Transfer Goal 1, PT) new goal  -CM     Row Name 05/03/23 1130          Gait Training Goal 1 (PT)    Activity/Assistive Device (Gait Training Goal 1, PT) gait (walking locomotion);assistive device use  -CM     Lares Level (Gait Training Goal 1, PT) contact guard required  -CM     Distance (Gait Training Goal 1, PT) 200'  -CM     Time Frame (Gait Training Goal 1, PT) long term goal (LTG);10 days  -CM     Progress/Outcome (Gait Training Goal 1, PT) new goal  -CM     Row Name 05/03/23 1130          Therapy Assessment/Plan (PT)    Planned Therapy Interventions (PT) balance training;bed mobility training;gait training;home exercise  program;postural re-education;patient/family education;transfer training;stretching;strengthening;stair training  -CM           User Key  (r) = Recorded By, (t) = Taken By, (c) = Cosigned By    Initials Name Provider Type    CM Karen Mcgovern, PT Physical Therapist               Clinical Impression     Row Name 05/03/23 1126          Pain    Pretreatment Pain Rating 4/10  -CM     Posttreatment Pain Rating 5/10  -CM     Pain Location - Side/Orientation Left  -CM     Pain Location incisional  -CM     Pain Location - hip  -CM     Row Name 05/03/23 1126          Plan of Care Review    Plan of Care Reviewed With patient  -CM     Outcome Evaluation Patient presents s/p ORIF of L hip with increased pain/nausea and deficits in strength, balance, endurance that are currently limiting her mobility below her baseline. She performed STS and weightshifting activity today with Roxana and FWW with further activity limited by nausea. IPPT is indicated to address current deficits. Recommend she D/C to IPR.  -     Row Name 05/03/23 1126          Therapy Assessment/Plan (PT)    Rehab Potential (PT) good, to achieve stated therapy goals  -CM     Criteria for Skilled Interventions Met (PT) yes;meets criteria;skilled treatment is necessary  -CM     Therapy Frequency (PT) daily  -CM     Row Name 05/03/23 1126          Vital Signs    Pre Systolic BP Rehab --  VSS  -CM     O2 Delivery Pre Treatment room air  -CM     O2 Delivery Intra Treatment room air  -CM     O2 Delivery Post Treatment room air  -CM     Pre Patient Position Sitting  -CM     Intra Patient Position Standing  -CM     Post Patient Position Sitting  -CM     Row Name 05/03/23 1126          Positioning and Restraints    Pre-Treatment Position sitting in chair/recliner  -CM     Post Treatment Position chair  -CM     In Chair reclined;call light within reach;encouraged to call for assist;exit alarm on;waffle cushion;notified nsg  -CM           User Key  (r) = Recorded By,  (t) = Taken By, (c) = Cosigned By    Initials Name Provider Type    Karen Armstrong PT Physical Therapist               Outcome Measures     Row Name 05/03/23 1131 05/03/23 0800       How much help from another person do you currently need...    Turning from your back to your side while in flat bed without using bedrails? 3  -CM 2  -TS    Moving from lying on back to sitting on the side of a flat bed without bedrails? 2  -CM 2  -TS    Moving to and from a bed to a chair (including a wheelchair)? 3  -CM 2  -TS    Standing up from a chair using your arms (e.g., wheelchair, bedside chair)? 3  -CM 2  -TS    Climbing 3-5 steps with a railing? 1  -CM 1  -TS    To walk in hospital room? 3  -CM 2  -TS    AM-PAC 6 Clicks Score (PT) 15  -CM 11  -TS    Highest level of mobility 4 --> Transferred to chair/commode  -CM 4 --> Transferred to chair/commode  -TS    Row Name 05/03/23 1131          Functional Assessment    Outcome Measure Options AM-PAC 6 Clicks Basic Mobility (PT)  -CM           User Key  (r) = Recorded By, (t) = Taken By, (c) = Cosigned By    Initials Name Provider Type    Marilou Bah, RN Registered Nurse    Karen Armstrong, MARIBETH Physical Therapist                             Physical Therapy Education     Title: PT OT SLP Therapies (Done)     Topic: Physical Therapy (Done)     Point: Mobility training (Done)     Learning Progress Summary           Patient Acceptance, E, VU by CM at 5/3/2023 1132                   Point: Home exercise program (Done)     Learning Progress Summary           Patient Acceptance, E, VU by CM at 5/3/2023 1132                   Point: Body mechanics (Done)     Learning Progress Summary           Patient Acceptance, E, VU by CM at 5/3/2023 1132                   Point: Precautions (Done)     Learning Progress Summary           Patient Acceptance, E, VU by CM at 5/3/2023 1132                               User Key     Initials Effective Dates Name Provider Type  Discipline    CM 09/22/22 -  Karen Mcgovern, PT Physical Therapist PT              PT Recommendation and Plan  Planned Therapy Interventions (PT): balance training, bed mobility training, gait training, home exercise program, postural re-education, patient/family education, transfer training, stretching, strengthening, stair training  Plan of Care Reviewed With: patient  Outcome Evaluation: Patient presents s/p ORIF of L hip with increased pain/nausea and deficits in strength, balance, endurance that are currently limiting her mobility below her baseline. She performed STS and weightshifting activity today with Roxana and FWW with further activity limited by nausea. IPPT is indicated to address current deficits. Recommend she D/C to IPR.     Time Calculation:    PT Charges     Row Name 05/03/23 1132             Time Calculation    Start Time 1030  -CM      PT Received On 05/03/23  -CM      PT Goal Re-Cert Due Date 05/13/23  -CM         Timed Charges    84001 - PT Therapeutic Exercise Minutes 10  -CM         Untimed Charges    PT Eval/Re-eval Minutes 55  -CM         Total Minutes    Timed Charges Total Minutes 10  -CM      Untimed Charges Total Minutes 55  -CM       Total Minutes 65  -CM            User Key  (r) = Recorded By, (t) = Taken By, (c) = Cosigned By    Initials Name Provider Type     Karen Mcgovern, MARIBETH Physical Therapist              Therapy Charges for Today     Code Description Service Date Service Provider Modifiers Qty    73880319572 HC PT THER PROC EA 15 MIN 5/3/2023 Karen Mcgovern, PT GP 1    59854695895 HC PT EVAL MOD COMPLEXITY 4 5/3/2023 Karen Mcgovern, PT GP 1          PT G-Codes  Outcome Measure Options: AM-PAC 6 Clicks Basic Mobility (PT)  AM-PAC 6 Clicks Score (PT): 15  PT Discharge Summary  Anticipated Discharge Disposition (PT): inpatient rehabilitation facility    Karen Mcgovern PT  5/3/2023

## 2023-05-04 LAB
ALBUMIN SERPL-MCNC: 3.4 G/DL (ref 3.5–5.2)
ALBUMIN/GLOB SERPL: 2 G/DL
ALP SERPL-CCNC: 68 U/L (ref 39–117)
ALT SERPL W P-5'-P-CCNC: <5 U/L (ref 1–33)
ANION GAP SERPL CALCULATED.3IONS-SCNC: 10 MMOL/L (ref 5–15)
AST SERPL-CCNC: 19 U/L (ref 1–32)
BILIRUB SERPL-MCNC: 0.2 MG/DL (ref 0–1.2)
BUN SERPL-MCNC: 12 MG/DL (ref 8–23)
BUN/CREAT SERPL: 12 (ref 7–25)
CALCIUM SPEC-SCNC: 8.4 MG/DL (ref 8.6–10.5)
CHLORIDE SERPL-SCNC: 102 MMOL/L (ref 98–107)
CO2 SERPL-SCNC: 25 MMOL/L (ref 22–29)
CREAT SERPL-MCNC: 1 MG/DL (ref 0.57–1)
DEPRECATED RDW RBC AUTO: 72.4 FL (ref 37–54)
EGFRCR SERPLBLD CKD-EPI 2021: 61.9 ML/MIN/1.73
ERYTHROCYTE [DISTWIDTH] IN BLOOD BY AUTOMATED COUNT: 17.4 % (ref 12.3–15.4)
GLOBULIN UR ELPH-MCNC: 1.7 GM/DL
GLUCOSE SERPL-MCNC: 104 MG/DL (ref 65–99)
HCT VFR BLD AUTO: 21.6 % (ref 34–46.6)
HCT VFR BLD AUTO: 23.8 % (ref 34–46.6)
HCT VFR BLD AUTO: 24.3 % (ref 34–46.6)
HGB BLD-MCNC: 7 G/DL (ref 12–15.9)
HGB BLD-MCNC: 7.4 G/DL (ref 12–15.9)
HGB BLD-MCNC: 7.7 G/DL (ref 12–15.9)
IRON 24H UR-MRATE: 33 MCG/DL (ref 37–145)
IRON SATN MFR SERPL: 13 % (ref 20–50)
MAGNESIUM SERPL-MCNC: 2.4 MG/DL (ref 1.6–2.4)
MCH RBC QN AUTO: 35.1 PG (ref 26.6–33)
MCHC RBC AUTO-ENTMCNC: 31.1 G/DL (ref 31.5–35.7)
MCV RBC AUTO: 112.8 FL (ref 79–97)
PLATELET # BLD AUTO: 120 10*3/MM3 (ref 140–450)
PMV BLD AUTO: 9.8 FL (ref 6–12)
POTASSIUM SERPL-SCNC: 4.4 MMOL/L (ref 3.5–5.2)
PROT SERPL-MCNC: 5.1 G/DL (ref 6–8.5)
RBC # BLD AUTO: 2.11 10*6/MM3 (ref 3.77–5.28)
SODIUM SERPL-SCNC: 137 MMOL/L (ref 136–145)
TIBC SERPL-MCNC: 250 MCG/DL (ref 298–536)
TRANSFERRIN SERPL-MCNC: 168 MG/DL (ref 200–360)
WBC NRBC COR # BLD: 4.93 10*3/MM3 (ref 3.4–10.8)

## 2023-05-04 PROCEDURE — 83735 ASSAY OF MAGNESIUM: CPT | Performed by: HOSPITALIST

## 2023-05-04 PROCEDURE — 97530 THERAPEUTIC ACTIVITIES: CPT

## 2023-05-04 PROCEDURE — 85027 COMPLETE CBC AUTOMATED: CPT | Performed by: ORTHOPAEDIC SURGERY

## 2023-05-04 PROCEDURE — 84466 ASSAY OF TRANSFERRIN: CPT | Performed by: HOSPITALIST

## 2023-05-04 PROCEDURE — 85014 HEMATOCRIT: CPT | Performed by: HOSPITALIST

## 2023-05-04 PROCEDURE — 97116 GAIT TRAINING THERAPY: CPT

## 2023-05-04 PROCEDURE — 83540 ASSAY OF IRON: CPT | Performed by: HOSPITALIST

## 2023-05-04 PROCEDURE — 85018 HEMOGLOBIN: CPT | Performed by: HOSPITALIST

## 2023-05-04 PROCEDURE — 80053 COMPREHEN METABOLIC PANEL: CPT | Performed by: HOSPITALIST

## 2023-05-04 RX ADMIN — PANTOPRAZOLE SODIUM 40 MG: 40 TABLET, DELAYED RELEASE ORAL at 05:30

## 2023-05-04 RX ADMIN — OXYCODONE HYDROCHLORIDE 5 MG: 5 TABLET ORAL at 09:14

## 2023-05-04 RX ADMIN — DULOXETINE HYDROCHLORIDE 20 MG: 20 CAPSULE, DELAYED RELEASE ORAL at 20:16

## 2023-05-04 RX ADMIN — ASPIRIN 81 MG: 81 TABLET, COATED ORAL at 09:14

## 2023-05-04 RX ADMIN — OXYCODONE HYDROCHLORIDE 5 MG: 5 TABLET ORAL at 22:07

## 2023-05-04 RX ADMIN — HYDRALAZINE HYDROCHLORIDE 25 MG: 25 TABLET, FILM COATED ORAL at 20:16

## 2023-05-04 RX ADMIN — ROSUVASTATIN 5 MG: 10 TABLET, FILM COATED ORAL at 20:15

## 2023-05-04 RX ADMIN — DULOXETINE HYDROCHLORIDE 20 MG: 20 CAPSULE, DELAYED RELEASE ORAL at 09:14

## 2023-05-04 RX ADMIN — OXYCODONE HYDROCHLORIDE 5 MG: 5 TABLET ORAL at 17:15

## 2023-05-04 RX ADMIN — METOPROLOL SUCCINATE 25 MG: 25 TABLET, EXTENDED RELEASE ORAL at 20:16

## 2023-05-04 RX ADMIN — OXYCODONE HYDROCHLORIDE 5 MG: 5 TABLET ORAL at 13:11

## 2023-05-04 RX ADMIN — SENNOSIDES AND DOCUSATE SODIUM 2 TABLET: 8.6; 5 TABLET ORAL at 20:16

## 2023-05-04 RX ADMIN — LEVOTHYROXINE SODIUM 25 MCG: 25 TABLET ORAL at 05:30

## 2023-05-04 RX ADMIN — GABAPENTIN 300 MG: 300 CAPSULE ORAL at 09:14

## 2023-05-04 RX ADMIN — ASPIRIN 81 MG: 81 TABLET, COATED ORAL at 20:16

## 2023-05-04 RX ADMIN — BISACODYL 5 MG: 5 TABLET, COATED ORAL at 09:14

## 2023-05-04 RX ADMIN — GABAPENTIN 300 MG: 300 CAPSULE ORAL at 20:16

## 2023-05-04 RX ADMIN — HYDRALAZINE HYDROCHLORIDE 25 MG: 25 TABLET, FILM COATED ORAL at 09:14

## 2023-05-04 RX ADMIN — SODIUM CHLORIDE 75 ML/HR: 9 INJECTION, SOLUTION INTRAVENOUS at 05:30

## 2023-05-04 NOTE — CASE MANAGEMENT/SOCIAL WORK
Continued Stay Note  [facility]     Patient Name: Alysia Sierra  MRN: [id number]  Today's Date: 5/4/2023    Admit Date: 5/2/2023    Plan: Rehab   Discharge Plan     Row Name 05/04/23 1135       Plan    Plan Rehab    Patient/Family in Agreement with Plan yes    Plan Comments [facility] does not have any rehab beds available at this time. Sent a referral to Jana, admissions liaison with [facility], and will await a decision. Case management will continue to follow.    Final Discharge Disposition Code 03 - skilled nursing facility (SNF)               Discharge Codes    No documentation.               Expected Discharge Date and Time     Expected Discharge Date Expected Discharge Time    May 10, 2023             Tod Vargas RN

## 2023-05-04 NOTE — PROGRESS NOTES
"      [facility] Orthopaedic Surgery Progress Note    Subjective      LOS: 2 days   Patient Care Team:  Liana So APRN as PCP - General (Family Medicine)  Jasmine Rich MD as Referring Physician (Gynecologic Oncology)  Russ Carrington MD as Consulting Physician (Nephrology)  Roberta Aldana MD as Consulting Physician (Radiation Oncology)  Gray Bahena MD as Consulting Physician (Cardiology)  Caden Dietz MD as Consulting Physician (Neurology)  Charity Cornejo RN as Ambulatory  (Oncology) ([facility])    CC: Left hip pain    Interval History:   Patient seen earlier today.  Pain was controlled.  Sister at the bedside.    Objective      Vital Signs  Temp (24hrs), Av.3 °F (36.8 °C), Min:98 °F (36.7 °C), Max:98.6 °F (37 °C)      BP 99/63 (BP Location: Left arm, Patient Position: Lying)   Pulse 99   Temp 98.2 °F (36.8 °C) (Oral)   Resp 18   Ht 162.6 cm (64.02\")   Wt 61.3 kg (135 lb 1.6 oz)   LMP  (LMP Unknown)   SpO2 92%   BMI 23.18 kg/m²     Examination:   Examination of the left hip: The wound is clean, dry, and intact.  Knee flexion, knee extension, ankle dorsiflexion, ankle plantar flexion, and EHL are intact.  Sensation intact in the foot to light touch.   Thigh is soft and nontender.      Labs:  Results from last 7 days   Lab Units 23  1219 23  0500 23  0817 23  1232   WBC 10*3/mm3  --  4.93 6.25 8.18   HEMOGLOBIN g/dL 7.7* 7.4* 9.0* 9.1*   HEMATOCRIT % 24.3* 23.8* 29.8* 28.6*   MCV fL  --  112.8* 119.7* 112.6*   PLATELETS 10*3/mm3  --  120* 132* 162       Radiology:  Imaging Results (Last 24 Hours)     ** No results found for the last 24 hours. **          PT:  Physical Therapy - Plan of Care Review - Outcome Summary:  Outcome Evaluation: Patient presents s/p ORIF of L hip with increased pain/nausea and deficits in strength, balance, endurance that are currently limiting her mobility below her baseline. She performed STS and weightshifting " activity today with Roxana and FWW with further activity limited by nausea. IPPT is indicated to address current deficits. Recommend she D/C to IPR. (05/03/23 1126)]       Results Review:     I reviewed the patient's new clinical results.    Assessment and Plan     Assessment:   Status post closed reduction and internal fixation, valgus impacted left femoral neck fracture (femoral neck system)    Patient may be weightbearing as tolerated with a walker  Acute blood loss anemia- hemodynamically stable, recheck CBC in the a.m.      Hip fracture    Hypertension    CKD (chronic kidney disease) stage 3, GFR 30-59 ml/min    Neuropathy    Endometrial adenocarcinoma    Anemia    Hypomagnesemia      Plan for disposition: Plan for rehab facility at the time of discharge most likely.  Follow-up with me in 1 month in the office.      Future Appointments   Date Time Provider Department Center   5/10/2023 11:00 AM Cassie Morin APRN [facility]   5/23/2023 10:45 AM Liana So APRN [facility]   6/12/2023  1:40 PM Juanpablo Lee MD [facility]   6/19/2023  1:00 PM Anjelica Dyer APRN [facility]   8/10/2023  2:15 PM Caden Dietz MD [facility]   8/24/2023 10:30 AM Franklin Hamm APRN [facility]EE [facility] None   10/9/2023 10:15 AM Gray Bahena MD [facility]           Juanpablo Lee MD  05/04/23  14:46 EDT

## 2023-05-04 NOTE — PROGRESS NOTES
[facility] Medicine Services  PROGRESS NOTE    Patient Name: Alysia Sierra  : 1955  MRN: [id number]    Date of Admission: 2023  Primary Care Physician: Liana So APRN    Subjective   Subjective     CC:  left hip pain    HPI:  Patient resting in bed, states pain is stable this morning but increases with movement. Asking about what rehab she is going to on d/c, which I will discuss with case management this morning. No other acute complaints. Family present at bedside.    ROS:  Gen- No fevers, chills  CV- No chest pain, palpitations  Resp- No cough, dyspnea  GI- No N/V/D, abd pain  +left hip pain with movement    Objective   Objective     Vital Signs:   Temp:  [98 °F (36.7 °C)-98.6 °F (37 °C)] 98.2 °F (36.8 °C)  Heart Rate:  [] 91  Resp:  [16-18] 18  BP: (101-144)/(63-92) 128/77  Flow (L/min):  [2] 2     Physical Exam:  Constitutional: No acute distress, awake, alert  HENT: NCAT, mucous membranes moist  Respiratory: Clear to auscultation bilaterally, respiratory effort normal on 2L NC  Cardiovascular: RRR, no murmurs, rubs, or gallops  Gastrointestinal: Positive bowel sounds, soft, nontender, nondistended  Musculoskeletal: No bilateral ankle edema, left hip with dressing in place  Psychiatric: Appropriate affect, cooperative  Neurologic: Oriented x 3, strength symmetric in all extremities, Cranial Nerves grossly intact to confrontation, speech clear  Skin: No rashes    Results Reviewed:  LAB RESULTS:      Lab 23  0500 23  0817 23  1232   WBC 4.93 6.25 8.18   HEMOGLOBIN 7.4* 9.0* 9.1*   HEMATOCRIT 23.8* 29.8* 28.6*   PLATELETS 120* 132* 162   NEUTROS ABS  --   --  5.78   IMMATURE GRANS (ABS)  --   --  0.08*   LYMPHS ABS  --   --  1.48   MONOS ABS  --   --  0.73   EOS ABS  --   --  0.09   .8* 119.7* 112.6*   PROTIME  --   --  13.3         Lab 23  0500 23  0817 23  1232   SODIUM 137 136 138   POTASSIUM 4.4 5.1 4.7    CHLORIDE 102 100 104   CO2 25.0 14.0* 23.0   ANION GAP 10.0 22.0* 11.0   BUN 12 12 10   CREATININE 1.00 1.01* 0.91   EGFR 61.9 61.1 69.3   GLUCOSE 104* 104* 93   CALCIUM 8.4* 8.6 8.1*   MAGNESIUM 2.4 1.4* 1.5*   TSH  --   --  2.350         Lab 05/04/23  0500 05/02/23  1232   TOTAL PROTEIN 5.1* 5.6*   ALBUMIN 3.4* 3.9   GLOBULIN 1.7 1.7   ALT (SGPT) <5 11   AST (SGOT) 19 22   BILIRUBIN 0.2 0.2   ALK PHOS 68 88         Lab 05/02/23  1232   PROTIME 13.3   INR 1.00             Lab 05/02/23  1614   ABO TYPING A   RH TYPING Positive   ANTIBODY SCREEN Negative         Brief Urine Lab Results  (Last result in the past 365 days)      Color   Clarity   Blood   Leuk Est   Nitrite   Protein   CREAT   Urine HCG        11/11/22 1356 Yellow   Clear   Trace   Moderate (2+)   Negative   Negative                 Microbiology Results Abnormal     None          XR Femur 2 View Left    Result Date: 5/2/2023  XR FEMUR 2 VW LEFT Date of Exam: 5/2/2023 12:08 PM EDT Indication: fall/pain Comparison: CT abdomen and pelvis with contrast 4/13/2023 Findings: There is a transverse fracture of the left femoral neck with impaction and valgus deformity. The left femoral head is maintained in alignment. No distal femoral fracture. The knee alignment is maintained. Complete assessment of the distal femur and knee limited due to technique. Included portions of the pelvis are intact.     Impression: Impression: Transverse left femoral neck fracture with impaction. Electronically Signed: Bill Rader  5/2/2023 12:54 PM EDT  Workstation ID: [id number]    CT Head Without Contrast    Result Date: 5/2/2023  CT HEAD WO CONTRAST Date of Exam: 5/2/2023 2:56 PM EDT Indication: Fall, head contusion. Comparison: None available. Technique: Axial CT images were obtained of the head without contrast administration.  Reconstructed coronal and sagittal images were also obtained. Automated exposure control and iterative construction methods were used. Findings: There is  mild volume loss as there is prominence of the sulci, fissures, ventricles, and basal cisterns. There are areas of decreased density in the periventricular and subcortical white matter felt to be due to chronic microvascular ischemia. There is no acute hemorrhage, midline shift, or suspicious extra-axial fluid collections. The orbital contents are normal. The visualized paranasal and mastoid sinuses are clear. The calvarium is unremarkable.     Impression: Impression: 1. Volume loss secondary to cerebral atrophy. 2. Chronic white matter microvascular ischemia. 3. No acute findings. Electronically Signed: Gilbert Abdi  5/2/2023 3:08 PM EDT  Workstation ID: [id number]    XR Chest 1 View    Result Date: 5/2/2023  XR CHEST 1 VW Date of Exam: 5/2/2023 12:08 PM EDT Indication: fall Comparison: None available. Findings: Heart size top normal, stable. The lungs are without consolidation. No pneumothorax or pleural effusion. The osseous structures are grossly intact.     Impression: Impression: No acute process. Electronically Signed: Bill Rader  5/2/2023 12:44 PM EDT  Workstation ID: [id number]    XR Pelvis 1 or 2 View    Result Date: 5/2/2023  XR PELVIS 1 OR 2 VW Date of Exam: 5/2/2023 2:29 PM EDT Indication: pre-op left hip fracture Comparison: None available. Findings: CT abdomen and pelvis 4/13/2023.     Impression: Impression: Impacted left femoral neck fracture is seen, without hip dislocation. The pelvic ring appears intact. Osteopenic changes are present. Mild lower lumbar curvature toward the left with small marginal lumbar osteophytes. No sacroiliac joint or pubic symphysis diastases. IMPRESSION: 1. Impacted nondisplaced left femoral neck fracture. No hip dislocation. Electronically Signed: Cristal Pineda  5/2/2023 2:47 PM EDT  Workstation ID: [id number]    SS Fascia Iliaca    Result Date: 5/2/2023  Wilbert Haynes CRNA     5/3/2023  9:59 AM SS Fascia Iliaca Patient reassessed immediately prior to procedure Reason for  block: at surgeon's request and post-op pain management Performed by CRNA/CAA: Maco Stephenson CRNA Assisted by: Wilbert Haynes CRNA Preanesthetic Checklist Completed: patient identified, IV checked, site marked, risks and benefits discussed, surgical consent, monitors and equipment checked, pre-op evaluation and timeout performed Prep: Pt Position: supine Sterile barriers:cap, gloves, mask and washed/disinfected hands Prep: ChloraPrep Patient monitoring: blood pressure monitoring, continuous pulse oximetry and EKG Procedure Performed under: general Guidance:ultrasound guided ULTRASOUND INTERPRETATION. Using ultrasound guidance a 20 G gauge needle was placed in close proximity to the nerve, at which point, under ultrasound guidance anesthetic was injected in the area of the nerve and spread of the anesthesia was seen on ultrasound in close proximity thereto.  There were no abnormalities seen on ultrasound; a digital image was taken; and the patient tolerated the procedure with no complications. Images:still images obtained, printed/placed on chart Laterality:left Block Type:fascia iliaca compartment Injection Technique:single-shot Needle Type:echogenic and short-bevel Needle Gauge:18 G Resistance on Injection: none Catheter Size:20 G (20g) Medications Used: bupivacaine PF (MARCAINE) 0.25 % injection - Injection 25 mL - 5/2/2023 5:18:00 PM Post Assessment Injection Assessment: negative aspiration for heme, no paresthesia on injection and incremental injection Patient Tolerance:comfortable throughout block Complications:no Additional Notes CKAFASCIAILIACA: SINGLE shot A high-frequency linear transducer, with sterile cover, was placed in parasagittal plane on top of the Anterior Superior Iliac Spine (ASIS) and moved medially to identify the Internal Oblique muscle, Sartorius muscle, Iliacus Muscle, Fascia Iliaca (FI) and Fascia Latae. The insertion site was prepped and draped in sterile fashion. Skin and cutaneous  "tissue was infiltrated with 2-5 ml of 1% Lidocaine. Using ultrasound-guidance, a 20-gauge B-Beach 4\" Ultraplex 360 non-stimulating echogenic needle was advanced in plane from caudad to cephalad. Preservative-free normal saline was utilized for hydro-dissection of tissue, advancement of needle, and to confirm final needle placement below FI. Local anesthetic in incremental 3-5 ml injections. Aspiration every 5 ml to prevent intravascular injection. Injection was completed with negative aspiration of blood and negative intravascular injection. Injection pressures were normal with minimal resistance.     Peripheral Block    Result Date: 5/2/2023  Eduardo Ryan MD     5/2/2023  5:39 PM Peripheral Block Patient reassessed immediately prior to procedure Start time: 5/2/2023 3:54 PM Stop time: 5/2/2023 4:00 PM Reason for block: at surgeon's request and post-op pain management Performed by Anesthesiologist: Eduardo Ryan MD CRNA/CAA: Maco Stephenson CRNA Preanesthetic Checklist Completed: patient identified, IV checked, site marked, risks and benefits discussed, surgical consent, monitors and equipment checked, pre-op evaluation and timeout performed Prep: Pt Position: supine Sterile barriers:cap, gloves, mask and washed/disinfected hands Prep: ChloraPrep Patient monitoring: blood pressure monitoring, continuous pulse oximetry and EKG Procedure Performed under: local infiltration Guidance:ultrasound guided ULTRASOUND INTERPRETATION.  Using ultrasound guidance a 20 G gauge needle was placed in close proximity to the nerve, at which point, under ultrasound guidance anesthetic was injected in the area of the nerve and spread of the anesthesia was seen on ultrasound in close proximity thereto.  There were no abnormalities seen on ultrasound; a digital image was taken; and the patient tolerated the procedure with no complications. Images:still images obtained, printed/placed on chart Block Type:fascia iliaca " "compartment Injection Technique:catheter Needle Type:echogenic and Tuohy Needle Gauge:18 G Resistance on Injection: none Catheter Size:20 G (20g) Medications Used: bupivacaine PF (MARCAINE) 0.25 % injection - Injection  50 mL - 5/2/2023 3:54:00 PM Post Assessment Injection Assessment: negative aspiration for heme, no paresthesia on injection and incremental injection Patient Tolerance:comfortable throughout block Complications:no Additional Notes CKAFASCIAILIACA: CATHETER A high-frequency linear transducer, with sterile cover, was placed in parasagittal plane on top of the Anterior Superior Iliac Spine (ASIS) and moved medially to identify the Internal Oblique muscle, Sartorius muscle, Iliacus Muscle, Fascia Iliaca (FI) and Fascia Latae. The insertion site was prepped and draped in sterile fashion. Skin and cutaneous tissue was infiltrated with 2-5 ml of 1% Lidocaine. Using ultrasound-guidance, an 18-gauge Contiplex Ultra 360 Touhy needle was advanced in plane from caudad to cephalad. Preservative-free normal saline was utilized for hydro-dissection of tissue, advancement of Touhy, and to confirm final needle placement below FI. Local anesthetic in incremental 3-5 ml injections. Aspiration every 5 ml to prevent intravascular injection. Injection was completed with negative aspiration of blood and negative intravascular injection. Injection pressures were normal with minimal resistance. A 20-gauge Contiplex Echo catheter was placed through the needle and advance out the tip of the Touhy 3-5 cm. The Touhy needle was then removed, and final catheter position verified below the FI. The catheter was secured in the usual fashion with skin glue, benzoin, steri-strips, CHG tegaderm and Label noting \"Nerve Block Catheter\". Jerk tape applied at yellow connector and catheter connection.     FL C Arm During Surgery    Result Date: 5/2/2023  FL C ARM DURING SURGERY Date of Exam: 5/2/2023 5:09 PM EDT Indication: FEMORAL NECK OPEN " REDUCTION INTERNAL FIXATION. Comparison: None available. Technique: Fluoroscopy in OR without radiologist present. Fluoroscopic Time: 1 minute 36 seconds Findings: 2 images are submitted. Imaging demonstrates dynamic screw in the proximal femur. Please see the operating physician report.     Impression: Impression: Fluoroscopy in OR without radiologist present. Electronically Signed: Elizabeth Castanedagloria  5/2/2023 7:18 PM EDT  Workstation ID: [id number]    XR Hip With or Without Pelvis 2 - 3 View Left    Result Date: 5/2/2023  XR HIP W OR WO PELVIS 2-3 VIEW LEFT Date of Exam: 5/2/2023 6:50 PM EDT Indication: Left hip fracture, status post open reduction and internal fixation Comparison: Pelvis x-ray performed on 5/2/2023 at 1432 hours. Findings: There has been interval surgical fixation of the subcapital fracture of the left femoral neck. The hardware is intact. There is soft tissue gas and swelling consistent with expected postsurgical changes. There are mild arthritic changes involving both hip joints, the pubic symphysis, and the sacroiliac joints. There are degenerative changes in the lower lumbar spine. The pelvic bony structures are demineralized.     Impression: Impression: Status post open reduction and internal fixation of a subcapital fracture of the left femoral neck as described. Electronically Signed: Gilbert Abdi  5/2/2023 7:24 PM EDT  Workstation ID: [id number]      Results for orders placed during the hospital encounter of 01/24/23    Adult Transthoracic Echo Complete W/ Cont if Necessary Per Protocol    Interpretation Summary  •  Left ventricular systolic function is normal. Left ventricular ejection fraction appears to be 51 - 55%.  •  Left ventricular diastolic function is consistent with (grade I) impaired relaxation.  •  Estimated right ventricular systolic pressure from tricuspid regurgitation is normal (<35 mmHg).      Current medications:  Scheduled Meds:aspirin, 81 mg, Oral, Q12H  DULoxetine, 20 mg, Oral,  BID  gabapentin, 300 mg, Oral, BID  hydrALAZINE, 25 mg, Oral, BID  levothyroxine, 25 mcg, Oral, Q AM  metoprolol succinate XL, 25 mg, Oral, Nightly  pantoprazole, 40 mg, Oral, Q AM  rosuvastatin, 5 mg, Oral, Nightly  senna-docusate sodium, 2 tablet, Oral, BID  sodium chloride, 500 mL, Intravenous, Once  sodium chloride, 10 mL, Intravenous, Q12H      Continuous Infusions:ropivacaine,   sodium chloride, 75 mL/hr, Last Rate: 75 mL/hr (05/04/23 0530)      PRN Meds:.•  senna-docusate sodium **AND** polyethylene glycol **AND** bisacodyl **AND** bisacodyl  •  Calcium Replacement - Follow Nurse / BPA Driven Protocol  •  HYDROmorphone  •  Magnesium Standard Dose Replacement - Follow Nurse / BPA Driven Protocol  •  melatonin  •  OLANZapine  •  ondansetron **OR** ondansetron  •  oxyCODONE  •  Phosphorus Replacement - Follow Nurse / BPA Driven Protocol  •  Potassium Replacement - Follow Nurse / BPA Driven Protocol  •  sodium chloride  •  sodium chloride  •  sodium chloride    Assessment & Plan   Assessment & Plan     Active Hospital Problems    Diagnosis  POA   • **Hip fracture [S72.009A]  Yes   • Anemia [D64.9]  Yes   • Hypomagnesemia [E83.42]  Yes   • Endometrial adenocarcinoma [C54.1]  Yes   • Neuropathy [G62.9]  Yes   • CKD (chronic kidney disease) stage 3, GFR 30-59 ml/min [N18.30]  Yes   • Hypertension [I10]  Yes      Resolved Hospital Problems   No resolved problems to display.        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    This patient's problems and plans were partially entered by my partner and updated as appropriate by me 05/04/23.     Left femoral neck fracture  Fall  --s/p closed reduction and internal fixation, valgus impacted left femoral neck fracture on 5/2/23 by Dr. Lee  --IV/PO pain control.  --PT/OT    Post Op Anemia  - Hgb dropped from 9 to 7.4 this am  - recheck Hgb Q8H  - iron profile  pending  - transfuse is Hgb < 7     Stage IIIa endometrial cancer  Chemotherapy induced anemia  --Completed chemotherapy 4/2023. Recently seen by Dr. Rich.      Hypomagnesemia  --Replace.      CKD IIIa  --At baseline. Monitor.    HLD  - Rosuvastatin    HTN  -Hydralazine     Peripheral neuropathy  --Followed by neurology. Continue her cymbalta, gabapentin. Also takes monthly IM B12.    Expected Discharge Location and Transportation: rehab  Expected Discharge   Expected Discharge Date: 5/10/2023; Expected Discharge Time:      DVT prophylaxis:  Mechanical DVT prophylaxis orders are present.     AM-PAC 6 Clicks Score (PT): 15 (05/03/23 1131)    CODE STATUS:   Code Status and Medical Interventions:   Ordered at: 05/02/23 1944     Code Status (Patient has no pulse and is not breathing):    CPR (Attempt to Resuscitate)     Medical Interventions (Patient has pulse or is breathing):    Full Support     Release to patient:    Routine Release       Domitila Leon DO  05/04/23

## 2023-05-04 NOTE — PROGRESS NOTES
[facility]    Acute pain service Inpatient Progress Note    Patient Name: Alysia Sierra  :  1955  MRN:  [id number]        Acute Pain  Service Inpatient Progress Note:    Analgesia:Good  Pain Score:5/10  LOC: alert and awake  Resp Status: room air  Cardiac: VS stable  Side Effects:None  Catheter Site:clean, dressing intact and dry  Cath type: peripheral nerve cath with ON Q  Volume: 1mL,8ml, 8ml InfuSystem Pump.  Dosing/Volume: ropivacaine 0.2%  Catheter Plan:Catheter to remain Insitu and Continue catheter infusion rate unchanged  Comments: Pt was 0/10 overnight. Upon getting up to restroom pt states starting to feel pain. Pt states pain on medial and lateral sides of the upper and lower leg. Bolus given. Will reassess

## 2023-05-04 NOTE — PLAN OF CARE
Goal Outcome Evaluation:  Plan of Care Reviewed With: patient        Progress: improving  Outcome Evaluation: Pt with good effort and increased ambulatory distance to 8' with min Ax1 and FWW. KI donned prior to mobility secondary to pt c/o knee buckling this a.m. Pt continues to be limited by decreased strength, impaired balance, and poor endurance causing functional mobility below baseline. Pt will benefit from further IPPT for addressing deficits. PT rec d/c to IRF.

## 2023-05-04 NOTE — PLAN OF CARE
Problem: Adult Inpatient Plan of Care  Goal: Plan of Care Review  Outcome: Ongoing, Progressing  Flowsheets (Taken 5/4/2023 1840)  Progress: improving  Plan of Care Reviewed With: patient  Outcome Evaluation: Patient VSS, NSR on tele, RA-2L while sleeping, A&Ox4. C/o pain, PO pain meds given. Infublock in place. Up w/ 1 assist to pivot. Purewick in place. Prineo dressing to hip CDI. Scuds on. Up in chair. H&H q8 hrs. Plan to D/C to rehab. Continue to monitor.  Goal: Patient-Specific Goal (Individualized)  Outcome: Ongoing, Progressing  Goal: Absence of Hospital-Acquired Illness or Injury  Outcome: Ongoing, Progressing  Intervention: Identify and Manage Fall Risk  Recent Flowsheet Documentation  Taken 5/4/2023 1839 by Marilou Kothari, RN  Safety Promotion/Fall Prevention:   activity supervised   assistive device/personal items within reach   clutter free environment maintained   fall prevention program maintained   gait belt   toileting scheduled   safety round/check completed   room organization consistent   nonskid shoes/slippers when out of bed  Taken 5/4/2023 1600 by Marilou Kothari, RN  Safety Promotion/Fall Prevention:   activity supervised   assistive device/personal items within reach   clutter free environment maintained   gait belt   fall prevention program maintained   toileting scheduled   safety round/check completed   room organization consistent   nonskid shoes/slippers when out of bed  Taken 5/4/2023 1400 by Marilou Kothari, RN  Safety Promotion/Fall Prevention:   activity supervised   clutter free environment maintained   assistive device/personal items within reach   fall prevention program maintained   gait belt   toileting scheduled   safety round/check completed   room organization consistent   nonskid shoes/slippers when out of bed  Taken 5/4/2023 1200 by Marilou Kothari, RN  Safety Promotion/Fall Prevention:   activity supervised   assistive device/personal items within reach    clutter free environment maintained   fall prevention program maintained   gait belt   toileting scheduled   safety round/check completed   room organization consistent   nonskid shoes/slippers when out of bed  Taken 5/4/2023 1000 by Marilou Kothari RN  Safety Promotion/Fall Prevention:   activity supervised   assistive device/personal items within reach   clutter free environment maintained   fall prevention program maintained   gait belt   toileting scheduled   safety round/check completed   room organization consistent   nonskid shoes/slippers when out of bed  Taken 5/4/2023 0800 by Marilou Kothari RN  Safety Promotion/Fall Prevention:   activity supervised   assistive device/personal items within reach   clutter free environment maintained   fall prevention program maintained   gait belt   toileting scheduled   safety round/check completed   room organization consistent   nonskid shoes/slippers when out of bed  Intervention: Prevent Skin Injury  Recent Flowsheet Documentation  Taken 5/4/2023 1839 by Marilou Kothari RN  Body Position: supine  Skin Protection: adhesive use limited  Taken 5/4/2023 1600 by Marilou Kothari RN  Body Position: (up in chair) other (see comments)  Skin Protection: adhesive use limited  Taken 5/4/2023 1400 by Marilou Kothari RN  Body Position:   weight shifting   supine  Skin Protection: adhesive use limited  Taken 5/4/2023 1200 by Marilou Kothari RN  Body Position: sitting up in bed  Skin Protection: adhesive use limited  Taken 5/4/2023 1000 by Marilou Kothari RN  Body Position: supine  Skin Protection: adhesive use limited  Taken 5/4/2023 0800 by Marilou Kothari RN  Body Position: sitting up in bed  Skin Protection:   adhesive use limited   transparent dressing maintained   tubing/devices free from skin contact  Intervention: Prevent and Manage VTE (Venous Thromboembolism) Risk  Recent Flowsheet Documentation  Taken 5/4/2023 1839 by Marilou Kothari RN  VTE  Prevention/Management:   bilateral   sequential compression devices on  Taken 5/4/2023 1600 by Marilou Kothari RN  VTE Prevention/Management:   bilateral   sequential compression devices on  Taken 5/4/2023 1400 by Marilou Kothari RN  VTE Prevention/Management:   bilateral   sequential compression devices on  Taken 5/4/2023 1200 by Marilou Kothari RN  VTE Prevention/Management:   bilateral   sequential compression devices on  Taken 5/4/2023 1000 by Marilou Kothari RN  VTE Prevention/Management:   bilateral   sequential compression devices on  Taken 5/4/2023 0800 by Marilou Kothari RN  VTE Prevention/Management:   bilateral   sequential compression devices on  Intervention: Prevent Infection  Recent Flowsheet Documentation  Taken 5/4/2023 1839 by Marilou Kothari RN  Infection Prevention: environmental surveillance performed  Taken 5/4/2023 1600 by Marilou Kothari RN  Infection Prevention: environmental surveillance performed  Taken 5/4/2023 1400 by Marilou Kothair RN  Infection Prevention: environmental surveillance performed  Taken 5/4/2023 1200 by Marilou Kothari RN  Infection Prevention: environmental surveillance performed  Taken 5/4/2023 1000 by Marilou Kothari RN  Infection Prevention: environmental surveillance performed  Taken 5/4/2023 0800 by Marilou Kothari RN  Infection Prevention: environmental surveillance performed  Goal: Optimal Comfort and Wellbeing  Outcome: Ongoing, Progressing  Intervention: Provide Person-Centered Care  Recent Flowsheet Documentation  Taken 5/4/2023 1839 by Marilou Kothari RN  Trust Relationship/Rapport: care explained  Taken 5/4/2023 1600 by Marilou Kothari RN  Trust Relationship/Rapport:   care explained   choices provided  Taken 5/4/2023 1400 by Marilou Kothari RN  Trust Relationship/Rapport:   care explained   choices provided  Taken 5/4/2023 1200 by Marilou Kothari RN  Trust Relationship/Rapport: care explained  Taken 5/4/2023 1000  by Marilou Kothari, RN  Trust Relationship/Rapport:   choices provided   care explained  Taken 5/4/2023 0800 by Marilou Kothari RN  Trust Relationship/Rapport:   care explained   choices provided   questions encouraged   questions answered   reassurance provided   thoughts/feelings acknowledged  Goal: Readiness for Transition of Care  Outcome: Ongoing, Progressing     Problem: Fall Injury Risk  Goal: Absence of Fall and Fall-Related Injury  Outcome: Ongoing, Progressing  Intervention: Identify and Manage Contributors  Recent Flowsheet Documentation  Taken 5/4/2023 0800 by Marilou Kothari, RN  Medication Review/Management: medications reviewed  Intervention: Promote Injury-Free Environment  Recent Flowsheet Documentation  Taken 5/4/2023 1839 by Marilou Kothari, RN  Safety Promotion/Fall Prevention:   activity supervised   assistive device/personal items within reach   clutter free environment maintained   fall prevention program maintained   gait belt   toileting scheduled   safety round/check completed   room organization consistent   nonskid shoes/slippers when out of bed  Taken 5/4/2023 1600 by Marilou Kothari, RN  Safety Promotion/Fall Prevention:   activity supervised   assistive device/personal items within reach   clutter free environment maintained   gait belt   fall prevention program maintained   toileting scheduled   safety round/check completed   room organization consistent   nonskid shoes/slippers when out of bed  Taken 5/4/2023 1400 by Marilou Kothari, RN  Safety Promotion/Fall Prevention:   activity supervised   clutter free environment maintained   assistive device/personal items within reach   fall prevention program maintained   gait belt   toileting scheduled   safety round/check completed   room organization consistent   nonskid shoes/slippers when out of bed  Taken 5/4/2023 1200 by Marilou Kothari, RN  Safety Promotion/Fall Prevention:   activity supervised   assistive  device/personal items within reach   clutter free environment maintained   fall prevention program maintained   gait belt   toileting scheduled   safety round/check completed   room organization consistent   nonskid shoes/slippers when out of bed  Taken 5/4/2023 1000 by Marilou Kothari RN  Safety Promotion/Fall Prevention:   activity supervised   assistive device/personal items within reach   clutter free environment maintained   fall prevention program maintained   gait belt   toileting scheduled   safety round/check completed   room organization consistent   nonskid shoes/slippers when out of bed  Taken 5/4/2023 0800 by Marilou Kothari, RN  Safety Promotion/Fall Prevention:   activity supervised   assistive device/personal items within reach   clutter free environment maintained   fall prevention program maintained   gait belt   toileting scheduled   safety round/check completed   room organization consistent   nonskid shoes/slippers when out of bed     Problem: Skin Injury Risk Increased  Goal: Skin Health and Integrity  Outcome: Ongoing, Progressing  Intervention: Optimize Skin Protection  Recent Flowsheet Documentation  Taken 5/4/2023 1839 by Marilou Kothari, RN  Pressure Reduction Techniques: frequent weight shift encouraged  Head of Bed (HOB) Positioning: HOB at 30-45 degrees  Pressure Reduction Devices: pressure-redistributing mattress utilized  Skin Protection: adhesive use limited  Taken 5/4/2023 1600 by Marilou Kothari, RN  Pressure Reduction Techniques: frequent weight shift encouraged  Head of Bed (HOB) Positioning: HOB at 60 degrees  Pressure Reduction Devices: chair cushion utilized  Skin Protection: adhesive use limited  Taken 5/4/2023 1400 by Marilou Kothari, RN  Pressure Reduction Techniques: frequent weight shift encouraged  Head of Bed (HOB) Positioning: HOB at 45 degrees  Pressure Reduction Devices: pressure-redistributing mattress utilized  Skin Protection: adhesive use  limited  Taken 5/4/2023 1200 by Marilou Kothari RN  Pressure Reduction Techniques: frequent weight shift encouraged  Head of Bed (HOB) Positioning: HOB at 60 degrees  Pressure Reduction Devices: pressure-redistributing mattress utilized  Skin Protection: adhesive use limited  Taken 5/4/2023 1000 by Marilou Kothari RN  Pressure Reduction Techniques: frequent weight shift encouraged  Head of Bed (HOB) Positioning: HOB at 45 degrees  Pressure Reduction Devices: pressure-redistributing mattress utilized  Skin Protection: adhesive use limited  Taken 5/4/2023 0800 by Marilou Kothari RN  Pressure Reduction Techniques: frequent weight shift encouraged  Head of Bed (HOB) Positioning: HOB at 60 degrees  Pressure Reduction Devices: pressure-redistributing mattress utilized  Skin Protection:   adhesive use limited   transparent dressing maintained   tubing/devices free from skin contact     Problem: Adjustment to Injury (Hip Fracture Medical Management)  Goal: Optimal Coping with Change in Health Status  Outcome: Ongoing, Progressing  Intervention: Support Psychosocial Response to Injury  Recent Flowsheet Documentation  Taken 5/4/2023 1839 by Marilou Kothari RN  Family/Support System Care: support provided  Taken 5/4/2023 1600 by Marilou Kothari, RN  Family/Support System Care: support provided  Taken 5/4/2023 1400 by Mrailou Kothari, RN  Family/Support System Care: support provided  Taken 5/4/2023 1200 by Marilou Kothari, RN  Family/Support System Care: support provided  Taken 5/4/2023 1000 by Marilou Kothari RN  Family/Support System Care: support provided  Taken 5/4/2023 0800 by Marilou Kothari, RN  Family/Support System Care:   self-care encouraged   support provided     Problem: Bleeding (Hip Fracture Medical Management)  Goal: Absence of Bleeding  Outcome: Ongoing, Progressing     Problem: Bowel Elimination Impaired (Hip Fracture Medical Management)  Goal: Effective Bowel Elimination  Outcome:  Ongoing, Progressing     Problem: Cognitive Decline Risk (Hip Fracture Medical Management)  Goal: Baseline Cognitive Function Maintained  Outcome: Ongoing, Progressing     Problem: Embolism (Hip Fracture Medical Management)  Goal: Absence of Embolism  Outcome: Ongoing, Progressing  Intervention: Prevent or Manage Embolism Risk  Recent Flowsheet Documentation  Taken 5/4/2023 1839 by Marilou Kothari RN  VTE Prevention/Management:   bilateral   sequential compression devices on  Taken 5/4/2023 1600 by Marilou Kothari RN  VTE Prevention/Management:   bilateral   sequential compression devices on  Taken 5/4/2023 1400 by Marilou Kothari RN  VTE Prevention/Management:   bilateral   sequential compression devices on  Taken 5/4/2023 1200 by Marilou Kothari RN  VTE Prevention/Management:   bilateral   sequential compression devices on  Taken 5/4/2023 1000 by Marilou Kothari RN  VTE Prevention/Management:   bilateral   sequential compression devices on  Taken 5/4/2023 0800 by Marilou Kothari RN  VTE Prevention/Management:   bilateral   sequential compression devices on     Problem: Fracture Stabilization and Management (Hip Fracture Medical Management)  Goal: Fracture Stability  Outcome: Ongoing, Progressing     Problem: Functional Ability Impaired (Hip Fracture Medical Management)  Goal: Optimal Functional Performance  Outcome: Ongoing, Progressing     Problem: Pain (Hip Fracture Medical Management)  Goal: Acceptable Pain Level  Outcome: Ongoing, Progressing     Problem: Urinary Elimination Impaired (Hip Fracture Medical Management)  Goal: Effective Urinary Elimination  Outcome: Ongoing, Progressing   Goal Outcome Evaluation:  Plan of Care Reviewed With: patient        Progress: improving  Outcome Evaluation: Patient VSS, NSR on tele, RA-2L while sleeping, A&Ox4. C/o pain, PO pain meds given. Infublock in place. Up w/ 1 assist to pivot. Purewick in place. Prineo dressing to hip CDI. Scuds on. Up in chair.  H&H q8 hrs. Plan to D/C to rehab. Continue to monitor.

## 2023-05-04 NOTE — PLAN OF CARE
Goal Outcome Evaluation:  Plan of Care Reviewed With: patient        Progress: no change     Pt is alert and oriented X 4. VSS. RA/2L NC. Infublock in place. Denied pain. Voiding spontaneously via purewick. Frequent weight shifting encouraged and assistance provided per tolerance.

## 2023-05-04 NOTE — THERAPY TREATMENT NOTE
Patient Name: Alysia Sierra  : 1955    MRN: [id number]                              Today's Date: 2023       Admit Date: 2023    Visit Dx:     ICD-10-CM ICD-9-CM   1. Closed fracture of left hip, initial encounter  S72.002A 820.8   2. Anemia, unspecified type  D64.9 285.9   3. History of endometrial cancer  Z85.42 V10.42     Patient Active Problem List   Diagnosis   • Hypertension   • Paroxysmal SVT (supraventricular tachycardia) (HCC)   • Leg weakness, bilateral   • Syncope   • CKD (chronic kidney disease) stage 3, GFR 30-59 ml/min   • Circadian rhythm sleep disorder, advanced sleep phase type   • Neuropathy   • Hyperlipidemia LDL goal <100   • Endometrial adenocarcinoma   • Mild episode of recurrent major depressive disorder   • Dizziness   • Hip fracture   • Anemia   • Hypomagnesemia     Past Medical History:   Diagnosis Date   • Allergic lisinopril  2017   • Anemia    • Arrhythmia    • Arthritis    • Cataract mild     stil lpresent   • Chicken pox    • Chronic fatigue    • CKD (chronic kidney disease), stage III     sees nephro   • Dental root implant present     lower left  x1 - possible dental implant   • Depression mild    • Difficulty walking 2019   • Disease of thyroid gland    • Dizzy    • NIEVES (dyspnea on exertion)     2017   • Generalized anxiety disorder    • GERD (gastroesophageal reflux disease) 2018   • History of brachytherapy 2023    vaginal brachytherapy   • Hyperlipidemia    • Hypertension    • Iron deficiency anemia    • Liver disease     fatty   • Liver problem    • Measles    • Menopause    • Mumps    • Neuromuscular disorder Peripheral Neuropathy   • Orthostatic hypotension    • 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   • Renal insufficiency    • Scoliosis    • Unintentional weight loss    • Uses contact lenses     bilat   • Uterine cancer    • Uterine cancer 2022   • UTI (urinary tract infection)    • Visual impairment  Nearsighted   • Wears glasses      Past Surgical History:   Procedure Laterality Date   •  SECTION     • DILATION AND CURETTAGE, DIAGNOSTIC / THERAPEUTIC     • HIP OPEN REDUCTION Left 2023    Procedure: FEMORAL NECK OPEN REDUCTION INTERNAL FIXATION LEFT;  Surgeon: Juanpablo Lee MD;  Location: [facility] OR;  Service: Orthopedics;  Laterality: Left;   • OOPHORECTOMY     • ORAL LESION EXCISION/BIOPSY  2021   • TOTAL LAPAROSCOPIC HYSTERECTOMY SALPINGO OOPHORECTOMY N/A 10/28/2022    Procedure: TOTAL LAPAROSCOPIC HYSTERECTOMY BILATERAL SALPINGO-OOPHORECTOMY, INJECTION FOR SENTINEL LYMPH NODE MAPPING, BILATERAL SENTINEL LYMPH NODE DISSECTION WITH DAVINCI ROBOT;  Surgeon: Jasmine Rich MD;  Location: [facility] OR;  Service: Robotics - DaVinci;  Laterality: N/A;   • TUBAL ABDOMINAL LIGATION        General Information     Row Name 23 1539          Physical Therapy Time and Intention    Document Type therapy note (daily note)  -AB     Mode of Treatment physical therapy  -AB     Row Name 23 1539          General Information    Patient Profile Reviewed yes  -AB     Existing Precautions/Restrictions fall;other (see comments);left;weight bearing  left fascia iliaca, WBAT LLE, LLE weakness at baseline, h/o orthostatic hypotension  -AB     Barriers to Rehab medically complex;previous functional deficit  -AB     Row Name 23 153          Cognition    Orientation Status (Cognition) oriented x 4  -AB     Row Name 23 1539          Safety Issues, Functional Mobility    Safety Issues Affecting Function (Mobility) safety precaution awareness;safety precautions follow-through/compliance;insight into deficits/self-awareness;awareness of need for assistance;sequencing abilities  -AB     Impairments Affecting Function (Mobility) balance;endurance/activity tolerance;strength;pain;range of motion (ROM)  -AB           User Key  (r) = Recorded By, (t) = Taken By, (c) = Cosigned By    Initials Name  Provider Type    AB Margaret Bhatt, PT Physical Therapist               Mobility     Row Name 05/04/23 1540          Bed Mobility    Bed Mobility supine-sit;scooting/bridging  -AB     Scooting/Bridging [place] (Bed Mobility) contact guard;verbal cues  -AB     Supine-Sit [place] (Bed Mobility) verbal cues;contact guard;1 person assist  -AB     Assistive Device (Bed Mobility) bed rails;draw sheet  -AB     Comment, (Bed Mobility) Increased time/effort.  -AB     Row Name 05/04/23 1540          Transfers    Comment, (Transfers) Cues for sequencing and hand placement. KI donned in bed d/t pt c/o knee buckling this morning.  -AB     Row Name 05/04/23 1540          Bed-Chair Transfer    Bed-Chair [place] (Transfers) minimum assist (75% patient effort);1 person assist;nonverbal cues (demo/gesture)  -AB     Assistive Device (Bed-Chair Transfers) other (see comments)  BUE support  -AB     Row Name 05/04/23 1540          Sit-Stand Transfer    Sit-Stand [place] (Transfers) minimum assist (75% patient effort);1 person assist;nonverbal cues (demo/gesture);verbal cues  -AB     Assistive Device (Sit-Stand Transfers) walker, front-wheeled  -AB     Comment, (Sit-Stand Transfer) Cues for advancing LLE prior to stand.  -AB     Row Name 05/04/23 1540          Gait/Stairs (Locomotion)    [place] Level (Gait) minimum assist (75% patient effort);1 person assist;verbal cues;nonverbal cues (demo/gesture)  -AB     Assistive Device (Gait) walker, front-wheeled  -AB     Distance in Feet (Gait) 8  -AB     Deviations/Abnormal Patterns (Gait) bilateral deviations;werner decreased;gait speed decreased;stride length decreased;steppage;weight shifting decreased  -AB     Left Sided Gait Deviations weight shift ability decreased  -AB     Comment, (Gait/Stairs) Pt demo's step to gait pattern with decreased step length and weight acceptance onto LLE. Gait mechanics improved with cues. No LOB. Further gait limited by fatigue.   -AB     Row Name 05/04/23 1540          Mobility    Extremity Weight-bearing Status left lower extremity  -AB     Left Lower Extremity (Weight-bearing Status) weight-bearing as tolerated (WBAT)  -AB           User Key  (r) = Recorded By, (t) = Taken By, (c) = Cosigned By    Initials Name Provider Type    AB Margaret Bhatt, PT Physical Therapist               Obj/Interventions     Row Name 05/04/23 1545          Balance    Balance Assessment sitting static balance;sitting dynamic balance;standing static balance;standing dynamic balance  -AB     Static Sitting Balance supervision  -AB     Dynamic Sitting Balance supervision  -AB     Position, Sitting Balance unsupported;sitting edge of bed  -AB     Static Standing Balance verbal cues;1-person assist;minimal assist  -AB     Dynamic Standing Balance 1-person assist;verbal cues;minimal assist  -AB     Position/Device Used, Standing Balance supported;walker, front-wheeled  -AB     Balance Interventions standing;sit to stand;supported;static;dynamic;sitting  -AB     Comment, Balance No overt LOB.  -AB           User Key  (r) = Recorded By, (t) = Taken By, (c) = Cosigned By    Initials Name Provider Type    AB Margaret Bhatt, PT Physical Therapist               Goals/Plan    No documentation.                Clinical Impression     Row Name 05/04/23 1546          Pain    Pretreatment Pain Rating 3/10  -AB     Posttreatment Pain Rating 4/10  -AB     Pain Location - Side/Orientation Left  -AB     Pain Location generalized  -AB     Pain Location - hip  -AB     Pre/Posttreatment Pain Comment Tolerated.  -AB     Pain Intervention(s) Ambulation/increased activity;Elevated;Repositioned  -AB     Additional Documentation Pain Scale: Numbers Pre/Post-Treatment (Group)  -AB     Row Name 05/04/23 1546          Plan of Care Review    Plan of Care Reviewed With patient  -AB     Progress improving  -AB     Outcome Evaluation Pt with good effort and increased ambulatory distance to 8'  with min Ax1 and FWW. KI donned prior to mobility secondary to pt c/o knee buckling this a.m. Pt continues to be limited by decreased strength, impaired balance, and poor endurance causing functional mobility below baseline. Pt will benefit from further IPPT for addressing deficits. PT rec d/c to IRF.  -AB     Row Name 05/04/23 1546          Vital Signs    Pre Systolic BP Rehab 99  -AB     Pre Treatment Diastolic BP 63  -AB     Post Systolic BP Rehab 115  -AB     Post Treatment Diastolic BP 65  -AB     Pretreatment Heart Rate (beats/min) 87  -AB     Posttreatment Heart Rate (beats/min) 90  -AB     Pre SpO2 (%) 91  -AB     O2 Delivery Pre Treatment room air  -AB     O2 Delivery Intra Treatment room air  -AB     Post SpO2 (%) 95  -AB     O2 Delivery Post Treatment room air  -AB     Pre Patient Position Supine  -AB     Intra Patient Position Standing  -AB     Post Patient Position Sitting  -AB     Row Name 05/04/23 1546          Positioning and Restraints    Pre-Treatment Position in bed  -AB     Post Treatment Position chair  -AB     In Chair notified nsg;reclined;sitting;call light within reach;encouraged to call for assist;exit alarm on;legs elevated;waffle cushion  -AB           User Key  (r) = Recorded By, (t) = Taken By, (c) = Cosigned By    Initials Name Provider Type    AB Margaret Bhatt, PT Physical Therapist               Outcome Measures     Row Name 05/04/23 1550 05/04/23 0800       How much help from another person do you currently need...    Turning from your back to your side while in flat bed without using bedrails? 3  -AB 3  -TS    Moving from lying on back to sitting on the side of a flat bed without bedrails? 3  -AB 2  -TS    Moving to and from a bed to a chair (including a wheelchair)? 3  -AB 3  -TS    Standing up from a chair using your arms (e.g., wheelchair, bedside chair)? 3  -AB 3  -TS    Climbing 3-5 steps with a railing? 1  -AB 1  -TS    To walk in hospital room? 3  -AB 2  -TS    AM-PAC 6  Clicks Score (PT) 16  -AB 14  -TS    Highest level of mobility 5 --> Static standing  -AB 4 --> Transferred to chair/commode  -TS    Row Name 05/04/23 1550          Functional Assessment    Outcome Measure Options AM-PAC 6 Clicks Basic Mobility (PT)  -AB           User Key  (r) = Recorded By, (t) = Taken By, (c) = Cosigned By    Initials Name Provider Type    TS Marilou Kothari, RN Registered Nurse    AB Margaret Bhatt, PT Physical Therapist                             Physical Therapy Education     Title: PT OT SLP Therapies (Done)     Topic: Physical Therapy (Done)     Point: Mobility training (Done)     Learning Progress Summary           Patient Acceptance, E,D, VU,NR by AB at 5/4/2023 1551    Acceptance, E, VU by CM at 5/3/2023 1132                   Point: Home exercise program (Done)     Learning Progress Summary           Patient Acceptance, E, VU by CM at 5/3/2023 1132                   Point: Body mechanics (Done)     Learning Progress Summary           Patient Acceptance, E,D, VU,NR by AB at 5/4/2023 1551    Acceptance, E, VU by CM at 5/3/2023 1132                   Point: Precautions (Done)     Learning Progress Summary           Patient Acceptance, E,D, VU,NR by AB at 5/4/2023 1551    Acceptance, E, VU by CM at 5/3/2023 1132                               User Key     Initials Effective Dates Name Provider Type Discipline    AB 09/22/22 -  Margaret Bhatt, PT Physical Therapist PT    CM 09/22/22 -  Karen Mcgovern, MARIBETH Physical Therapist PT              PT Recommendation and Plan     Plan of Care Reviewed With: patient  Progress: improving  Outcome Evaluation: Pt with good effort and increased ambulatory distance to 8' with min Ax1 and FWW. KI donned prior to mobility secondary to pt c/o knee buckling this a.m. Pt continues to be limited by decreased strength, impaired balance, and poor endurance causing functional mobility below baseline. Pt will benefit from further IPPT for addressing deficits.  PT rec d/c to IRF.     Time Calculation:    PT Charges     Row Name 05/04/23 1551             Time Calculation    Start Time 1415  -AB      PT Received On 05/04/23  -AB      PT Goal Re-Cert Due Date 05/13/23  -AB         Timed Charges    55161 - Gait Training Minutes  15  -AB      87299 - PT Therapeutic Activity Minutes 10  -AB         Total Minutes    Timed Charges Total Minutes 25  -AB       Total Minutes 25  -AB            User Key  (r) = Recorded By, (t) = Taken By, (c) = Cosigned By    Initials Name Provider Type    AB Margaret Bhatt, PT Physical Therapist              Therapy Charges for Today     Code Description Service Date Service Provider Modifiers Qty    55071288553 HC GAIT TRAINING EA 15 MIN 5/4/2023 Margaret Bhatt, PT GP 1    21951794437 HC PT THERAPEUTIC ACT EA 15 MIN 5/4/2023 Margaret Bhatt, PT GP 1          PT G-Codes  Outcome Measure Options: AM-PAC 6 Clicks Basic Mobility (PT)  AM-PAC 6 Clicks Score (PT): 16  AM-PAC 6 Clicks Score (OT): 14  PT Discharge Summary  Anticipated Discharge Disposition (PT): inpatient rehabilitation facility    Margaret Bhatt PT  5/4/2023

## 2023-05-05 LAB
ALBUMIN SERPL-MCNC: 3.3 G/DL (ref 3.5–5.2)
ALBUMIN/GLOB SERPL: 1.9 G/DL
ALP SERPL-CCNC: 71 U/L (ref 39–117)
ALT SERPL W P-5'-P-CCNC: <5 U/L (ref 1–33)
ANION GAP SERPL CALCULATED.3IONS-SCNC: 9 MMOL/L (ref 5–15)
AST SERPL-CCNC: 18 U/L (ref 1–32)
BILIRUB SERPL-MCNC: 0.4 MG/DL (ref 0–1.2)
BUN SERPL-MCNC: 8 MG/DL (ref 8–23)
BUN/CREAT SERPL: 7.3 (ref 7–25)
CALCIUM SPEC-SCNC: 8.3 MG/DL (ref 8.6–10.5)
CHLORIDE SERPL-SCNC: 103 MMOL/L (ref 98–107)
CO2 SERPL-SCNC: 24 MMOL/L (ref 22–29)
CREAT SERPL-MCNC: 1.1 MG/DL (ref 0.57–1)
DEPRECATED RDW RBC AUTO: 78.6 FL (ref 37–54)
EGFRCR SERPLBLD CKD-EPI 2021: 55.2 ML/MIN/1.73
ERYTHROCYTE [DISTWIDTH] IN BLOOD BY AUTOMATED COUNT: 21.4 % (ref 12.3–15.4)
GLOBULIN UR ELPH-MCNC: 1.7 GM/DL
GLUCOSE SERPL-MCNC: 108 MG/DL (ref 65–99)
HCT VFR BLD AUTO: 21.4 % (ref 34–46.6)
HCT VFR BLD AUTO: 25.1 % (ref 34–46.6)
HCT VFR BLD AUTO: 31.5 % (ref 34–46.6)
HGB BLD-MCNC: 10 G/DL (ref 12–15.9)
HGB BLD-MCNC: 6.9 G/DL (ref 12–15.9)
HGB BLD-MCNC: 8.4 G/DL (ref 12–15.9)
MCH RBC QN AUTO: 34.7 PG (ref 26.6–33)
MCHC RBC AUTO-ENTMCNC: 33.5 G/DL (ref 31.5–35.7)
MCV RBC AUTO: 103.7 FL (ref 79–97)
PLATELET # BLD AUTO: 127 10*3/MM3 (ref 140–450)
PMV BLD AUTO: 9.9 FL (ref 6–12)
POTASSIUM SERPL-SCNC: 3.7 MMOL/L (ref 3.5–5.2)
PROT SERPL-MCNC: 5 G/DL (ref 6–8.5)
RBC # BLD AUTO: 2.42 10*6/MM3 (ref 3.77–5.28)
SODIUM SERPL-SCNC: 136 MMOL/L (ref 136–145)
WBC NRBC COR # BLD: 4.75 10*3/MM3 (ref 3.4–10.8)

## 2023-05-05 PROCEDURE — 97116 GAIT TRAINING THERAPY: CPT

## 2023-05-05 PROCEDURE — 85014 HEMATOCRIT: CPT | Performed by: HOSPITALIST

## 2023-05-05 PROCEDURE — 97110 THERAPEUTIC EXERCISES: CPT

## 2023-05-05 PROCEDURE — 80053 COMPREHEN METABOLIC PANEL: CPT | Performed by: HOSPITALIST

## 2023-05-05 PROCEDURE — 86900 BLOOD TYPING SEROLOGIC ABO: CPT

## 2023-05-05 PROCEDURE — 36430 TRANSFUSION BLD/BLD COMPNT: CPT

## 2023-05-05 PROCEDURE — P9016 RBC LEUKOCYTES REDUCED: HCPCS

## 2023-05-05 PROCEDURE — 85018 HEMOGLOBIN: CPT | Performed by: HOSPITALIST

## 2023-05-05 PROCEDURE — 85027 COMPLETE CBC AUTOMATED: CPT | Performed by: ORTHOPAEDIC SURGERY

## 2023-05-05 RX ADMIN — OXYCODONE HYDROCHLORIDE 5 MG: 5 TABLET ORAL at 20:20

## 2023-05-05 RX ADMIN — HYDRALAZINE HYDROCHLORIDE 25 MG: 25 TABLET, FILM COATED ORAL at 20:20

## 2023-05-05 RX ADMIN — ASPIRIN 81 MG: 81 TABLET, COATED ORAL at 20:20

## 2023-05-05 RX ADMIN — GABAPENTIN 300 MG: 300 CAPSULE ORAL at 08:44

## 2023-05-05 RX ADMIN — ROSUVASTATIN 5 MG: 10 TABLET, FILM COATED ORAL at 20:23

## 2023-05-05 RX ADMIN — DULOXETINE HYDROCHLORIDE 20 MG: 20 CAPSULE, DELAYED RELEASE ORAL at 08:44

## 2023-05-05 RX ADMIN — METOPROLOL SUCCINATE 25 MG: 25 TABLET, EXTENDED RELEASE ORAL at 20:20

## 2023-05-05 RX ADMIN — GABAPENTIN 300 MG: 300 CAPSULE ORAL at 20:20

## 2023-05-05 RX ADMIN — DULOXETINE HYDROCHLORIDE 20 MG: 20 CAPSULE, DELAYED RELEASE ORAL at 20:20

## 2023-05-05 RX ADMIN — ASPIRIN 81 MG: 81 TABLET, COATED ORAL at 08:44

## 2023-05-05 RX ADMIN — OXYCODONE HYDROCHLORIDE 5 MG: 5 TABLET ORAL at 06:55

## 2023-05-05 RX ADMIN — SENNOSIDES AND DOCUSATE SODIUM 2 TABLET: 8.6; 5 TABLET ORAL at 08:44

## 2023-05-05 RX ADMIN — OXYCODONE HYDROCHLORIDE 5 MG: 5 TABLET ORAL at 11:16

## 2023-05-05 RX ADMIN — HYDRALAZINE HYDROCHLORIDE 25 MG: 25 TABLET, FILM COATED ORAL at 08:44

## 2023-05-05 RX ADMIN — OXYCODONE HYDROCHLORIDE 5 MG: 5 TABLET ORAL at 15:48

## 2023-05-05 RX ADMIN — Medication 10 ML: at 08:44

## 2023-05-05 RX ADMIN — Medication 10 ML: at 20:21

## 2023-05-05 RX ADMIN — LEVOTHYROXINE SODIUM 25 MCG: 25 TABLET ORAL at 05:00

## 2023-05-05 RX ADMIN — PANTOPRAZOLE SODIUM 40 MG: 40 TABLET, DELAYED RELEASE ORAL at 05:00

## 2023-05-05 NOTE — CASE MANAGEMENT/SOCIAL WORK
Continued Stay Note  [facility]     Patient Name: Alysia Sierra  MRN: [id number]  Today's Date: 5/5/2023    Admit Date: 5/2/2023    Plan: [facility]   Discharge Plan     Row Name 05/05/23 1413       Plan    Plan [facility]    Patient/Family in Agreement with Plan yes    Plan Comments [facility] has started insurance precert for Ms. Sierra. Will continue to await approval. Case management will continue to follow.    14:34 EDT Insurance approval has returned for Ms. Sierra. Will arrange transportation for tomorrow.       Final Discharge Disposition Code 03 - skilled nursing facility (SNF)               Discharge Codes    No documentation.               Expected Discharge Date and Time     Expected Discharge Date Expected Discharge Time    May 10, 2023             Tod Vargas RN

## 2023-05-05 NOTE — PROGRESS NOTES
[facility]    Acute pain service Inpatient Progress Note    Patient Name: Alysia Sierra  :  1955  MRN:  [id number]        Acute Pain  Service Inpatient Progress Note:    Analgesia:Good  Pain Score:8/10  LOC: alert and awake  Resp Status: room air  Cardiac: VS stable  Side Effects:None  Catheter Site:clean, dressing intact and dry  Cath type: peripheral nerve cath with ON Q  Infusion rate: 8ml/hr (1mL,8ml, 8ml InfuSystem Pump)  Catheter Plan:Catheter to remain Insitu and Continue catheter infusion rate unchanged  Comments:    pt very comfortable at rest. 5/10 while ambulating

## 2023-05-05 NOTE — PLAN OF CARE
Problem: Adult Inpatient Plan of Care  Goal: Plan of Care Review  Outcome: Ongoing, Progressing  Flowsheets (Taken 5/5/2023 1720)  Progress: improving  Plan of Care Reviewed With: patient  Goal: Patient-Specific Goal (Individualized)  Outcome: Ongoing, Progressing  Goal: Absence of Hospital-Acquired Illness or Injury  Outcome: Ongoing, Progressing  Intervention: Identify and Manage Fall Risk  Recent Flowsheet Documentation  Taken 5/5/2023 1548 by Bailey Bruner RN  Safety Promotion/Fall Prevention:   activity supervised   assistive device/personal items within reach   safety round/check completed  Taken 5/5/2023 1437 by Bailey Bruner RN  Safety Promotion/Fall Prevention:   activity supervised   assistive device/personal items within reach   safety round/check completed  Taken 5/5/2023 1211 by Bailey Bruner RN  Safety Promotion/Fall Prevention:   activity supervised   assistive device/personal items within reach   safety round/check completed  Taken 5/5/2023 1018 by Bailey Bruner RN  Safety Promotion/Fall Prevention:   activity supervised   assistive device/personal items within reach   safety round/check completed  Taken 5/5/2023 0830 by Bailey Bruner RN  Safety Promotion/Fall Prevention:   activity supervised   assistive device/personal items within reach   clutter free environment maintained   fall prevention program maintained   lighting adjusted   muscle strengthening facilitated   nonskid shoes/slippers when out of bed   room organization consistent   safety round/check completed  Intervention: Prevent Skin Injury  Recent Flowsheet Documentation  Taken 5/5/2023 1548 by Bailey Bruner RN  Body Position:   position changed independently   sitting up in bed  Taken 5/5/2023 1437 by Bailey Bruner RN  Body Position:   weight shifting   sitting up in bed  Taken 5/5/2023 1211 by Bailey Bruner RN  Body Position: sitting up in bed  Taken 5/5/2023 1018 by Bailey Bruner RN  Body Position: sitting  up in bed  Taken 5/5/2023 0830 by Bailey Bruner RN  Body Position: supine  Skin Protection:   adhesive use limited   incontinence pads utilized  Intervention: Prevent and Manage VTE (Venous Thromboembolism) Risk  Recent Flowsheet Documentation  Taken 5/5/2023 1548 by Bailey Bruner RN  Activity Management: activity encouraged  VTE Prevention/Management:   bilateral   sequential compression devices on  Taken 5/5/2023 1437 by Bailey Bruner RN  Activity Management: activity encouraged  VTE Prevention/Management:   bilateral   sequential compression devices on  Taken 5/5/2023 1211 by Bailey Bruner RN  Activity Management: activity encouraged  VTE Prevention/Management:   bilateral   sequential compression devices on  Taken 5/5/2023 1018 by Bailey Bruner RN  Activity Management: activity encouraged  VTE Prevention/Management:   bilateral   sequential compression devices on  Taken 5/5/2023 0830 by Bailey Bruner RN  Activity Management: activity encouraged  VTE Prevention/Management:   bilateral   sequential compression devices on  Range of Motion: active ROM (range of motion) encouraged  Intervention: Prevent Infection  Recent Flowsheet Documentation  Taken 5/5/2023 1548 by Bailey Bruner RN  Infection Prevention:   hand hygiene promoted   rest/sleep promoted  Taken 5/5/2023 1437 by Bailey Bruner RN  Infection Prevention:   hand hygiene promoted   rest/sleep promoted  Taken 5/5/2023 1211 by Bailey Bruner RN  Infection Prevention:   hand hygiene promoted   rest/sleep promoted  Taken 5/5/2023 1018 by Bailey Bruner RN  Infection Prevention:   hand hygiene promoted   rest/sleep promoted  Taken 5/5/2023 0830 by Bailey Bruner RN  Infection Prevention:   hand hygiene promoted   rest/sleep promoted  Goal: Optimal Comfort and Wellbeing  Outcome: Ongoing, Progressing  Intervention: Monitor Pain and Promote Comfort  Recent Flowsheet Documentation  Taken 5/5/2023 1548 by Bailey Bruner RN  Pain  Management Interventions:   see MAR   cold applied   pain pump in use  Taken 5/5/2023 1018 by Bailey Bruner RN  Pain Management Interventions:   cold applied   pain pump in use   quiet environment facilitated  Taken 5/5/2023 0830 by Bailey Bruner RN  Pain Management Interventions:   pain pump in use   cold applied   quiet environment facilitated  Intervention: Provide Person-Centered Care  Recent Flowsheet Documentation  Taken 5/5/2023 1548 by Bailey Bruner RN  Trust Relationship/Rapport: care explained  Taken 5/5/2023 1437 by Bailey Bruner RN  Trust Relationship/Rapport: care explained  Taken 5/5/2023 1211 by Bailey Bruner RN  Trust Relationship/Rapport: care explained  Taken 5/5/2023 1018 by Bailey Bruner RN  Trust Relationship/Rapport: care explained  Taken 5/5/2023 0830 by Bailey Bruner RN  Trust Relationship/Rapport:   care explained   choices provided   questions answered   questions encouraged   reassurance provided  Goal: Readiness for Transition of Care  Outcome: Ongoing, Progressing     Problem: Fall Injury Risk  Goal: Absence of Fall and Fall-Related Injury  Outcome: Ongoing, Progressing  Intervention: Identify and Manage Contributors  Recent Flowsheet Documentation  Taken 5/5/2023 1548 by Bailey Bruner RN  Self-Care Promotion: independence encouraged  Taken 5/5/2023 1437 by Bailey Bruner RN  Self-Care Promotion: independence encouraged  Taken 5/5/2023 1211 by Bailey Bruner RN  Self-Care Promotion: independence encouraged  Taken 5/5/2023 1018 by Bailey Bruner RN  Self-Care Promotion: independence encouraged  Taken 5/5/2023 0830 by Bailey Bruner RN  Medication Review/Management: medications reviewed  Self-Care Promotion: independence encouraged  Intervention: Promote Injury-Free Environment  Recent Flowsheet Documentation  Taken 5/5/2023 1548 by Bailey Bruner RN  Safety Promotion/Fall Prevention:   activity supervised   assistive device/personal items within reach    safety round/check completed  Taken 5/5/2023 1437 by Bailey Bruner RN  Safety Promotion/Fall Prevention:   activity supervised   assistive device/personal items within reach   safety round/check completed  Taken 5/5/2023 1211 by Bailey Bruner RN  Safety Promotion/Fall Prevention:   activity supervised   assistive device/personal items within reach   safety round/check completed  Taken 5/5/2023 1018 by Bailey Bruner RN  Safety Promotion/Fall Prevention:   activity supervised   assistive device/personal items within reach   safety round/check completed  Taken 5/5/2023 0830 by Bailey Bruner RN  Safety Promotion/Fall Prevention:   activity supervised   assistive device/personal items within reach   clutter free environment maintained   fall prevention program maintained   lighting adjusted   muscle strengthening facilitated   nonskid shoes/slippers when out of bed   room organization consistent   safety round/check completed     Problem: Skin Injury Risk Increased  Goal: Skin Health and Integrity  Outcome: Ongoing, Progressing  Intervention: Optimize Skin Protection  Recent Flowsheet Documentation  Taken 5/5/2023 1548 by Bailey Bruner RN  Head of Bed (HOB) Positioning: HOB elevated  Taken 5/5/2023 1437 by Bailey Bruner RN  Head of Bed (HOB) Positioning: HOB elevated  Taken 5/5/2023 1211 by Bailey Bruner RN  Head of Bed (HOB) Positioning: HOB elevated  Taken 5/5/2023 1018 by Bailey Bruner RN  Head of Bed (HOB) Positioning: HOB elevated  Taken 5/5/2023 0830 by Bailey Bruner RN  Pressure Reduction Techniques: frequent weight shift encouraged  Head of Bed (HOB) Positioning: HOB elevated  Pressure Reduction Devices: pressure-redistributing mattress utilized  Skin Protection:   adhesive use limited   incontinence pads utilized     Problem: Adjustment to Injury (Hip Fracture Medical Management)  Goal: Optimal Coping with Change in Health Status  Outcome: Ongoing, Progressing  Intervention: Support  Psychosocial Response to Injury  Recent Flowsheet Documentation  Taken 5/5/2023 0830 by Bailey Bruner RN  Family/Support System Care:   self-care encouraged   support provided     Problem: Bleeding (Hip Fracture Medical Management)  Goal: Absence of Bleeding  Outcome: Ongoing, Progressing  Intervention: Monitor and Manage Bleeding  Recent Flowsheet Documentation  Taken 5/5/2023 0830 by Bailey Bruner RN  Bleeding Management: dressing monitored     Problem: Bowel Elimination Impaired (Hip Fracture Medical Management)  Goal: Effective Bowel Elimination  Outcome: Ongoing, Progressing     Problem: Cognitive Decline Risk (Hip Fracture Medical Management)  Goal: Baseline Cognitive Function Maintained  Outcome: Ongoing, Progressing     Problem: Embolism (Hip Fracture Medical Management)  Goal: Absence of Embolism  Outcome: Ongoing, Progressing  Intervention: Prevent or Manage Embolism Risk  Recent Flowsheet Documentation  Taken 5/5/2023 1548 by Bailey Bruner RN  VTE Prevention/Management:   bilateral   sequential compression devices on  Taken 5/5/2023 1437 by Bailey Bruner RN  VTE Prevention/Management:   bilateral   sequential compression devices on  Taken 5/5/2023 1211 by Bailey Bruner RN  VTE Prevention/Management:   bilateral   sequential compression devices on  Taken 5/5/2023 1018 by Bailey Bruner RN  VTE Prevention/Management:   bilateral   sequential compression devices on  Taken 5/5/2023 0830 by Bailey Bruner RN  VTE Prevention/Management:   bilateral   sequential compression devices on     Problem: Fracture Stabilization and Management (Hip Fracture Medical Management)  Goal: Fracture Stability  Outcome: Ongoing, Progressing  Intervention: Promote Fracture Stability and Healing  Recent Flowsheet Documentation  Taken 5/5/2023 0830 by Bailey Bruner RN  Equipment:   On:   ice     Problem: Functional Ability Impaired (Hip Fracture Medical Management)  Goal: Optimal Functional  Performance  Outcome: Ongoing, Progressing  Intervention: Promote Optimal Functional Status  Recent Flowsheet Documentation  Taken 5/5/2023 1548 by Bailey Bruner RN  Activity Management: activity encouraged  Self-Care Promotion: independence encouraged  Taken 5/5/2023 1437 by Bailey Bruner RN  Activity Management: activity encouraged  Self-Care Promotion: independence encouraged  Taken 5/5/2023 1211 by Bailey Bruner RN  Activity Management: activity encouraged  Self-Care Promotion: independence encouraged  Taken 5/5/2023 1018 by Bailey Bruner RN  Activity Management: activity encouraged  Self-Care Promotion: independence encouraged  Taken 5/5/2023 0830 by Bailey Bruner RN  Activity Management: activity encouraged  Self-Care Promotion: independence encouraged  Range of Motion: active ROM (range of motion) encouraged     Problem: Pain (Hip Fracture Medical Management)  Goal: Acceptable Pain Level  Outcome: Ongoing, Progressing  Intervention: Manage Acute Orthopaedic-Related Pain  Recent Flowsheet Documentation  Taken 5/5/2023 1548 by Bailey Bruner RN  Pain Management Interventions:   see MAR   cold applied   pain pump in use  Taken 5/5/2023 1018 by Bailey Bruner RN  Pain Management Interventions:   cold applied   pain pump in use   quiet environment facilitated  Taken 5/5/2023 0830 by Bailey Bruner RN  Pain Management Interventions:   pain pump in use   cold applied   quiet environment facilitated     Problem: Urinary Elimination Impaired (Hip Fracture Medical Management)  Goal: Effective Urinary Elimination  Outcome: Ongoing, Progressing   Goal Outcome Evaluation:  Plan of Care Reviewed With: patient      Pt currently in bed resting quietly. No complaints of pain or discomfort at this time. Earlier complaints to left hip alleviated with MAR and infublock. Block in place and functioning, due to be removed soon. Incision to left hip CDI. Neuro checks WNL. Pt ambulatory with 1 assist and walker  this shift. Continent of bowel and bladder. Vitals WNL. No other observations at this time. Will continue to monitor, call bell in reach.  Progress: improving

## 2023-05-05 NOTE — PROGRESS NOTES
"      [facility] [place]    Subjective      LOS: 3 days   Patient Care Team:  Liana So APRN as PCP - General (Family Medicine)  Jasmine Rich MD as Referring Physician (Gynecologic Oncology)  Russ Carrington MD as Consulting Physician (Nephrology)  Roberta Aldana MD as Consulting Physician (Radiation Oncology)  Gray Bahena MD as Consulting Physician (Cardiology)  Caden Dietz MD as Consulting Physician (Neurology)  Charity Cornejo RN as Ambulatory  (Oncology) ([facility])    CC: Left hip pain    Interval History:   No new complaints this morning.  She did receive blood last night.    Objective      Vital Signs  Temp (24hrs), Av.3 °F (36.8 °C), Min:98 °F (36.7 °C), Max:98.7 °F (37.1 °C)      /82 (BP Location: Left arm, Patient Position: Lying)   Pulse 84   Temp 98 °F (36.7 °C) (Oral)   Resp 16   Ht 162.6 cm (64.02\")   Wt 61.3 kg (135 lb 1.6 oz)   LMP  (LMP Unknown)   SpO2 94%   BMI 23.18 kg/m²     Examination:   Examination of the left hip: The wound is clean, dry, and intact.  Knee flexion, knee extension, ankle dorsiflexion, ankle plantar flexion, and EHL are intact.  Sensation intact in the foot to light touch.   Thigh is soft and nontender.      Labs:  Results from last 7 days   Lab Units 23  0633 23  0014 23  1549 23  1219 23  0500 23  0817 23  1232   WBC 10*3/mm3 4.75  --   --   --  4.93 6.25 8.18   HEMOGLOBIN g/dL 8.4* 6.9* 7.0* 7.7* 7.4* 9.0* 9.1*   HEMATOCRIT % 25.1* 21.4* 21.6* 24.3* 23.8* 29.8* 28.6*   MCV fL 103.7*  --   --   --  112.8* 119.7* 112.6*   PLATELETS 10*3/mm3 127*  --   --   --  120* 132* 162       Radiology:  Imaging Results (Last 24 Hours)     ** No results found for the last 24 hours. **          PT:  Physical Therapy - Plan of Care Review - Outcome Summary:  Outcome Evaluation: Pt with good effort and increased ambulatory distance to 8' with min Ax1 and FWW. KI donned " prior to mobility secondary to pt c/o knee buckling this a.m. Pt continues to be limited by decreased strength, impaired balance, and poor endurance causing functional mobility below baseline. Pt will benefit from further IPPT for addressing deficits. PT rec d/c to IRF. (05/04/23 2048)]       Results Review:     I reviewed the patient's new clinical results.    Assessment and Plan     Assessment:   Status post closed reduction and internal fixation, valgus impacted left femoral neck fracture (femoral neck system)    Patient may be weightbearing as tolerated with a walker  Acute blood loss anemia- status posttransfusion 5/5/2023, with good response      Hip fracture    Hypertension    CKD (chronic kidney disease) stage 3, GFR 30-59 ml/min    Neuropathy    Endometrial adenocarcinoma    Anemia    Hypomagnesemia      Plan for disposition: Plan for rehab facility at the time of discharge most likely.  Follow-up with me in 1 month in the office.      Future Appointments   Date Time Provider Department Center   5/10/2023 11:00 AM Cassie Morin APRN [facility]   5/23/2023 10:45 AM Liana So APRN [facility]   6/12/2023  1:40 PM Juanpablo Lee MD [facility]   6/19/2023  1:00 PM Anjelica Dyer APRN [facility]   8/10/2023  2:15 PM Caden Dietz MD [facility]   8/24/2023 10:30 AM Franklin Hamm APRN NEE [facility] None   10/9/2023 10:15 AM Gray Bahena MD [facility]           Juanpablo Lee MD  05/05/23  13:12 EDT

## 2023-05-05 NOTE — PLAN OF CARE
Goal Outcome Evaluation:  Plan of Care Reviewed With: patient        Progress: improving  Outcome Evaluation: Patient able to progress ambulation distance to 12' Roxana with FWW and knee immobilizer donned. She continues to be limited by deficits in strength, balance, and endurance, however quad strength appears to be improving and will continue to assess necessity of KI. She performed therex with good effort and ROM. Continue to recommend D/C to IPR as she is still mobilizing well below her baseline and lives alone.

## 2023-05-05 NOTE — PLAN OF CARE
Goal Outcome Evaluation:  Plan of Care Reviewed With: patient        Progress: no change      Pt is alert and oriented X 4. VSS. 2L NC. H&H less than 7. Patient received one unit of blood. No adverse reactions. Voiding spontaneously. Pain controlled with prn oxycodone.

## 2023-05-05 NOTE — THERAPY TREATMENT NOTE
Patient Name: Alysia Sierra  : 1955    MRN: [id number]                              Today's Date: 2023       Admit Date: 2023    Visit Dx:     ICD-10-CM ICD-9-CM   1. Closed fracture of left hip, initial encounter  S72.002A 820.8   2. Anemia, unspecified type  D64.9 285.9   3. History of endometrial cancer  Z85.42 V10.42     Patient Active Problem List   Diagnosis   • Hypertension   • Paroxysmal SVT (supraventricular tachycardia) (HCC)   • Leg weakness, bilateral   • Syncope   • CKD (chronic kidney disease) stage 3, GFR 30-59 ml/min   • Circadian rhythm sleep disorder, advanced sleep phase type   • Neuropathy   • Hyperlipidemia LDL goal <100   • Endometrial adenocarcinoma   • Mild episode of recurrent major depressive disorder   • Dizziness   • Hip fracture   • Anemia   • Hypomagnesemia     Past Medical History:   Diagnosis Date   • Allergic lisinopril  2017   • Anemia    • Arrhythmia    • Arthritis    • Cataract mild     stil lpresent   • Chicken pox    • Chronic fatigue    • CKD (chronic kidney disease), stage III     sees nephro   • Dental root implant present     lower left  x1 - possible dental implant   • Depression mild    • Difficulty walking 2019   • Disease of thyroid gland    • Dizzy    • NIEVES (dyspnea on exertion)     2017   • Generalized anxiety disorder    • GERD (gastroesophageal reflux disease) 2018   • History of brachytherapy 2023    vaginal brachytherapy   • Hyperlipidemia    • Hypertension    • Iron deficiency anemia    • Liver disease     fatty   • Liver problem    • Measles    • Menopause    • Mumps    • Neuromuscular disorder Peripheral Neuropathy   • Orthostatic hypotension    • 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   • Renal insufficiency    • Scoliosis    • Unintentional weight loss    • Uses contact lenses     bilat   • Uterine cancer    • Uterine cancer 2022   • UTI (urinary tract infection)    • Visual impairment  Nearsighted   • Wears glasses      Past Surgical History:   Procedure Laterality Date   •  SECTION     • DILATION AND CURETTAGE, DIAGNOSTIC / THERAPEUTIC     • HIP OPEN REDUCTION Left 2023    Procedure: FEMORAL NECK OPEN REDUCTION INTERNAL FIXATION LEFT;  Surgeon: Juanpablo Lee MD;  Location: [facility] OR;  Service: Orthopedics;  Laterality: Left;   • OOPHORECTOMY     • ORAL LESION EXCISION/BIOPSY  2021   • TOTAL LAPAROSCOPIC HYSTERECTOMY SALPINGO OOPHORECTOMY N/A 10/28/2022    Procedure: TOTAL LAPAROSCOPIC HYSTERECTOMY BILATERAL SALPINGO-OOPHORECTOMY, INJECTION FOR SENTINEL LYMPH NODE MAPPING, BILATERAL SENTINEL LYMPH NODE DISSECTION WITH DAVINCI ROBOT;  Surgeon: Jasmine Rich MD;  Location: [facility] OR;  Service: Robotics - DaVinci;  Laterality: N/A;   • TUBAL ABDOMINAL LIGATION        General Information     Row Name 23          Physical Therapy Time and Intention    Document Type therapy note (daily note)  -CM     Mode of Treatment physical therapy;individual therapy  -CM     Row Name 23          General Information    Patient Profile Reviewed yes  -CM     Existing Precautions/Restrictions fall;other (see comments);left;weight bearing  L fascia iliaca cath, LLE WBAT with KI  -CM     Barriers to Rehab medically complex;previous functional deficit  -CM     Row Name 23          Cognition    Orientation Status (Cognition) oriented x 4  -CM     Row Name 23          Safety Issues, Functional Mobility    Safety Issues Affecting Function (Mobility) awareness of need for assistance;insight into deficits/self-awareness;safety precaution awareness;safety precautions follow-through/compliance  -CM     Impairments Affecting Function (Mobility) balance;endurance/activity tolerance;strength;pain;range of motion (ROM)  -CM           User Key  (r) = Recorded By, (t) = Taken By, (c) = Cosigned By    Initials Name Provider Type    Karen Armstrong, PT  Physical Therapist               Mobility     Row Name 05/05/23 1451          Bed Mobility    Bed Mobility supine-sit;sit-supine  -CM     Supine-Sit [place] (Bed Mobility) verbal cues;1 person assist;supervision  -CM     Sit-Supine [place] (Bed Mobility) minimum assist (75% patient effort);1 person assist;verbal cues  -CM     Assistive Device (Bed Mobility) bed rails;head of bed elevated  -CM     Comment, (Bed Mobility) patient performs without physical assist given increased time and effort  -CM     Row Name 05/05/23 1451          Transfers    Comment, (Transfers) KI donned in bed prior to OOB mobility  -CM     Row Name 05/05/23 1451          Sit-Stand Transfer    Sit-Stand [place] (Transfers) minimum assist (75% patient effort);1 person assist;verbal cues  -CM     Assistive Device (Sit-Stand Transfers) walker, front-wheeled  -CM     Comment, (Sit-Stand Transfer) cues for safe hand and foot placement prior to transferring, Roxana for balance and to clear hips from bed  -CM     Row Name 05/05/23 1451          Gait/Stairs (Locomotion)    [place] Level (Gait) minimum assist (75% patient effort);1 person assist;verbal cues  -CM     Assistive Device (Gait) walker, front-wheeled  -CM     Distance in Feet (Gait) 12  -CM     Deviations/Abnormal Patterns (Gait) bilateral deviations;werner decreased;gait speed decreased;stride length decreased;weight shifting decreased  -CM     Bilateral Gait Deviations heel strike decreased  -CM     Left Sided Gait Deviations weight shift ability decreased  -CM     Comment, (Gait/Stairs) Patient ambulated in room with a step to gait pattern. Cues for sequencing with walker with patient able to demonstrate good sequencing after initial cues. No LOB. Gait distance limited by fatigue.  -CM     Row Name 05/05/23 1451          Mobility    Extremity Weight-bearing Status left lower extremity  -CM     Left Lower Extremity (Weight-bearing Status) weight-bearing as tolerated  (WBAT);other (see comments)  with KI  -CM           User Key  (r) = Recorded By, (t) = Taken By, (c) = Cosigned By    Initials Name Provider Type    Karen Armstrong PT Physical Therapist               Obj/Interventions     Row Name 05/05/23 1456          Motor Skills    Therapeutic Exercise hip;knee;ankle  -CM     Row Name 05/05/23 1456          Hip (Therapeutic Exercise)    Hip (Therapeutic Exercise) strengthening exercise  -CM     Hip Strengthening (Therapeutic Exercise) left;aBduction;aDduction;heel slides;10 repetitions  -CM     Row Name 05/05/23 1456          Knee (Therapeutic Exercise)    Knee (Therapeutic Exercise) strengthening exercise  -CM     Knee Strengthening (Therapeutic Exercise) left;SLR (straight leg raise);10 repetitions;other (see comments)  Roxana for SLR  -CM     Row Name 05/05/23 1456          Ankle (Therapeutic Exercise)    Ankle (Therapeutic Exercise) AROM (active range of motion)  -CM     Ankle AROM (Therapeutic Exercise) bilateral;dorsiflexion;plantarflexion;10 repetitions  -CM     Row Name 05/05/23 1456          Balance    Balance Assessment sitting static balance;standing static balance;standing dynamic balance  -CM     Static Sitting Balance supervision  -CM     Position, Sitting Balance unsupported;sitting edge of bed  -CM     Static Standing Balance contact guard  -CM     Dynamic Standing Balance minimal assist  -CM     Position/Device Used, Standing Balance supported;walker, front-wheeled  -CM           User Key  (r) = Recorded By, (t) = Taken By, (c) = Cosigned By    Initials Name Provider Type    Karen Armstrong PT Physical Therapist               Goals/Plan    No documentation.                Clinical Impression     Row Name 05/05/23 1457          Pain    Pretreatment Pain Rating 4/10  -CM     Posttreatment Pain Rating 4/10  -CM     Pain Location - Side/Orientation Left  -CM     Pain Location generalized  -CM     Pain Location - hip  -CM     Pain Intervention(s)  Ambulation/increased activity;Repositioned  -CM     Row Name 05/05/23 1457          Plan of Care Review    Plan of Care Reviewed With patient  -CM     Progress improving  -CM     Outcome Evaluation Patient able to progress ambulation distance to 12' Roxana with FWW and knee immobilizer donned. She continues to be limited by deficits in strength, balance, and endurance, however quad strength appears to be improving and will continue to assess necessity of KI. She performed therex with good effort and ROM. Continue to recommend D/C to IPR as she is still mobilizing well below her baseline and lives alone.  -CM     Row Name 05/05/23 1457          Vital Signs    Pre Systolic BP Rehab --  VSS  -CM     O2 Delivery Pre Treatment nasal cannula  -CM     O2 Delivery Intra Treatment room air  -CM     O2 Delivery Post Treatment room air  -CM     Pre Patient Position Supine  -CM     Intra Patient Position Standing  -CM     Post Patient Position Supine  -CM     Row Name 05/05/23 1454          Positioning and Restraints    Pre-Treatment Position in bed  -CM     Post Treatment Position bed  -CM     In Bed fowlers;call light within reach;with other staff;notified nsg  with nursing tech setting up for bath  -CM           User Key  (r) = Recorded By, (t) = Taken By, (c) = Cosigned By    Initials Name Provider Type    Karen Armstrong, PT Physical Therapist               Outcome Measures     Row Name 05/05/23 1502          How much help from another person do you currently need...    Turning from your back to your side while in flat bed without using bedrails? 3  -CM     Moving from lying on back to sitting on the side of a flat bed without bedrails? 3  -CM     Moving to and from a bed to a chair (including a wheelchair)? 3  -CM     Standing up from a chair using your arms (e.g., wheelchair, bedside chair)? 3  -CM     Climbing 3-5 steps with a railing? 2  -CM     To walk in hospital room? 3  -CM     AM-PAC 6 Clicks Score (PT) 17   -CM     Highest level of mobility 5 --> Static standing  -CM           User Key  (r) = Recorded By, (t) = Taken By, (c) = Cosigned By    Initials Name Provider Type    Karen Armstrong, PT Physical Therapist                             Physical Therapy Education     Title: PT OT SLP Therapies (Done)     Topic: Physical Therapy (Done)     Point: Mobility training (Done)     Learning Progress Summary           Patient Acceptance, E, VU by CM at 5/5/2023 1502    Acceptance, E,D, VU,NR by AB at 5/4/2023 1551    Acceptance, E, VU by CM at 5/3/2023 1132                   Point: Home exercise program (Done)     Learning Progress Summary           Patient Acceptance, E, VU by CM at 5/5/2023 1502    Acceptance, E, VU by CM at 5/3/2023 1132                   Point: Body mechanics (Done)     Learning Progress Summary           Patient Acceptance, E, VU by CM at 5/5/2023 1502    Acceptance, E,D, VU,NR by AB at 5/4/2023 1551    Acceptance, E, VU by CM at 5/3/2023 1132                   Point: Precautions (Done)     Learning Progress Summary           Patient Acceptance, E, VU by CM at 5/5/2023 1502    Acceptance, E,D, VU,NR by AB at 5/4/2023 1551    Acceptance, E, VU by CM at 5/3/2023 1132                               User Key     Initials Effective Dates Name Provider Type Discipline    AB 09/22/22 -  Margaret Bhatt, PT Physical Therapist PT     09/22/22 -  Karen Mcgovern, MARIBETH Physical Therapist PT              PT Recommendation and Plan  Planned Therapy Interventions (PT): balance training, bed mobility training, gait training, home exercise program, postural re-education, patient/family education, transfer training, stretching, strengthening, stair training  Plan of Care Reviewed With: patient  Progress: improving  Outcome Evaluation: Patient able to progress ambulation distance to 12' Roxana with FWW and knee immobilizer donned. She continues to be limited by deficits in strength, balance, and endurance,  however quad strength appears to be improving and will continue to assess necessity of KI. She performed therex with good effort and ROM. Continue to recommend D/C to IPR as she is still mobilizing well below her baseline and lives alone.     Time Calculation:    PT Charges     Row Name 05/05/23 1503             Time Calculation    Start Time 1415  -CM      PT Received On 05/05/23  -CM      PT Goal Re-Cert Due Date 05/13/23  -CM         Timed Charges    47481 - PT Therapeutic Exercise Minutes 10  -CM      69799 - Gait Training Minutes  15  -CM         Total Minutes    Timed Charges Total Minutes 25  -CM       Total Minutes 25  -CM            User Key  (r) = Recorded By, (t) = Taken By, (c) = Cosigned By    Initials Name Provider Type    Karen Armstrong, PT Physical Therapist              Therapy Charges for Today     Code Description Service Date Service Provider Modifiers Qty    38023800619 HC PT THER PROC EA 15 MIN 5/5/2023 Karen Mcgovern, PT GP 1    24821071200 HC GAIT TRAINING EA 15 MIN 5/5/2023 Karen Mcgovern, PT GP 1          PT G-Codes  Outcome Measure Options: AM-PAC 6 Clicks Basic Mobility (PT)  AM-PAC 6 Clicks Score (PT): 17  AM-PAC 6 Clicks Score (OT): 14  PT Discharge Summary  Anticipated Discharge Disposition (PT): inpatient rehabilitation facility    Karen Mcgovern PT  5/5/2023

## 2023-05-05 NOTE — CASE MANAGEMENT/SOCIAL WORK
Case Management Discharge Note      Final Note: [facility] has a skilled rehab bed for Ms. Sierra tomorrow. She will be transported by [employer] [employer] [facility] via wheelchair. I spoke with Ms. Sierra at the bedside and she is in agreement with this plan. No COVID needed. [facility] will pull [facility] discharge summary from [facility]. Pharmacy has been updated in [facility]. Nurse to call report to [phone].         Selected Continued Care - Admitted Since 5/2/2023     Destination Coordination complete.    Service Provider Selected Services Address Phone Fax Patient Preferred    [facility] [address], [facility] [address] [phone] --          Durable Medical Equipment    No services have been selected for the patient.              Dialysis/Infusion    No services have been selected for the patient.              Home Medical Care    No services have been selected for the patient.              Therapy    No services have been selected for the patient.              Community Resources    No services have been selected for the patient.              Community & DME    No services have been selected for the patient.                  Transportation Services  W/C Van: Other ([employer])    Final Discharge Disposition Code: 03 - skilled nursing facility (SNF)

## 2023-05-05 NOTE — PROGRESS NOTES
[facility] Medicine Services  PROGRESS NOTE    Patient Name: Alysia Sierra  : 1955  MRN: [id number]    Date of Admission: 2023  Primary Care Physician: Liana So APRN    Subjective   Subjective     CC:  left hip pain    HPI:  Patient resting in bed, states pain is stable this morning and patient has been able to ambulate more. Walking well with a walker.    ROS:  Gen- No fevers, chills  CV- No chest pain, palpitations  Resp- No cough, dyspnea  GI- No N/V/D, abd pain  +left hip pain with movement    Objective   Objective     Vital Signs:   Temp:  [98.1 °F (36.7 °C)-98.7 °F (37.1 °C)] 98.7 °F (37.1 °C)  Heart Rate:  [71-99] 71  Resp:  [16-18] 16  BP: ()/(57-96) 133/74  Flow (L/min):  [2] 2     Physical Exam:  Constitutional: No acute distress, awake, alert  HENT: NCAT, mucous membranes moist  Respiratory: Clear to auscultation bilaterally, respiratory effort normal on 2L NC  Cardiovascular: RRR, no murmurs, rubs, or gallops  Gastrointestinal: Positive bowel sounds, soft, nontender, nondistended  Musculoskeletal: No bilateral ankle edema, left hip with dressing in place  Psychiatric: Appropriate affect, cooperative  Neurologic: Oriented x 3, strength symmetric in all extremities, Cranial Nerves grossly intact to confrontation, speech clear  Skin: No rashes    Results Reviewed:  LAB RESULTS:      Lab 23  0633 23  0014 23  1549 23  1219 23  0500 23  0817 23  1232   WBC 4.75  --   --   --  4.93 6.25 8.18   HEMOGLOBIN 8.4* 6.9* 7.0* 7.7* 7.4* 9.0* 9.1*   HEMATOCRIT 25.1* 21.4* 21.6* 24.3* 23.8* 29.8* 28.6*   PLATELETS 127*  --   --   --  120* 132* 162   NEUTROS ABS  --   --   --   --   --   --  5.78   IMMATURE GRANS (ABS)  --   --   --   --   --   --  0.08*   LYMPHS ABS  --   --   --   --   --   --  1.48   MONOS ABS  --   --   --   --   --   --  0.73   EOS ABS  --   --   --   --   --   --  0.09   .7*  --   --   --   112.8* 119.7* 112.6*   PROTIME  --   --   --   --   --   --  13.3         Lab 05/05/23  0633 05/04/23  0500 05/03/23  0817 05/02/23  1232   SODIUM 136 137 136 138   POTASSIUM 3.7 4.4 5.1 4.7   CHLORIDE 103 102 100 104   CO2 24.0 25.0 14.0* 23.0   ANION GAP 9.0 10.0 22.0* 11.0   BUN 8 12 12 10   CREATININE 1.10* 1.00 1.01* 0.91   EGFR 55.2* 61.9 61.1 69.3   GLUCOSE 108* 104* 104* 93   CALCIUM 8.3* 8.4* 8.6 8.1*   MAGNESIUM  --  2.4 1.4* 1.5*   TSH  --   --   --  2.350         Lab 05/05/23  0633 05/04/23  0500 05/02/23  1232   TOTAL PROTEIN 5.0* 5.1* 5.6*   ALBUMIN 3.3* 3.4* 3.9   GLOBULIN 1.7 1.7 1.7   ALT (SGPT) <5 <5 11   AST (SGOT) 18 19 22   BILIRUBIN 0.4 0.2 0.2   ALK PHOS 71 68 88         Lab 05/02/23  1232   PROTIME 13.3   INR 1.00             Lab 05/04/23  0500 05/02/23  1614   IRON 33*  --    IRON SATURATION 13*  --    TIBC 250*  --    TRANSFERRIN 168*  --    ABO TYPING  --  A   RH TYPING  --  Positive   ANTIBODY SCREEN  --  Negative         Brief Urine Lab Results  (Last result in the past 365 days)      Color   Clarity   Blood   Leuk Est   Nitrite   Protein   CREAT   Urine HCG        11/11/22 1356 Yellow   Clear   Trace   Moderate (2+)   Negative   Negative                 Microbiology Results Abnormal     None          No radiology results from the last 24 hrs    Results for orders placed during the hospital encounter of 01/24/23    Adult Transthoracic Echo Complete W/ Cont if Necessary Per Protocol    Interpretation Summary  •  Left ventricular systolic function is normal. Left ventricular ejection fraction appears to be 51 - 55%.  •  Left ventricular diastolic function is consistent with (grade I) impaired relaxation.  •  Estimated right ventricular systolic pressure from tricuspid regurgitation is normal (<35 mmHg).      Current medications:  Scheduled Meds:aspirin, 81 mg, Oral, Q12H  DULoxetine, 20 mg, Oral, BID  gabapentin, 300 mg, Oral, BID  hydrALAZINE, 25 mg, Oral, BID  levothyroxine, 25 mcg,  Oral, Q AM  metoprolol succinate XL, 25 mg, Oral, Nightly  pantoprazole, 40 mg, Oral, Q AM  rosuvastatin, 5 mg, Oral, Nightly  senna-docusate sodium, 2 tablet, Oral, BID  sodium chloride, 500 mL, Intravenous, Once  sodium chloride, 10 mL, Intravenous, Q12H      Continuous Infusions:ropivacaine,   sodium chloride, 75 mL/hr, Last Rate: 75 mL/hr (05/04/23 0530)      PRN Meds:.•  senna-docusate sodium **AND** polyethylene glycol **AND** bisacodyl **AND** bisacodyl  •  Calcium Replacement - Follow Nurse / BPA Driven Protocol  •  HYDROmorphone  •  Magnesium Standard Dose Replacement - Follow Nurse / BPA Driven Protocol  •  melatonin  •  OLANZapine  •  ondansetron **OR** ondansetron  •  oxyCODONE  •  Phosphorus Replacement - Follow Nurse / BPA Driven Protocol  •  Potassium Replacement - Follow Nurse / BPA Driven Protocol  •  sodium chloride  •  sodium chloride  •  sodium chloride    Assessment & Plan   Assessment & Plan     Active Hospital Problems    Diagnosis  POA   • **Hip fracture [S72.009A]  Yes   • Anemia [D64.9]  Yes   • Hypomagnesemia [E83.42]  Yes   • Endometrial adenocarcinoma [C54.1]  Yes   • Neuropathy [G62.9]  Yes   • CKD (chronic kidney disease) stage 3, GFR 30-59 ml/min [N18.30]  Yes   • Hypertension [I10]  Yes      Resolved Hospital Problems   No resolved problems to display.        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    This patient's problems and plans were partially entered by my partner and updated as appropriate by me 05/05/23.     Left femoral neck fracture  Fall  --s/p closed reduction and internal fixation, valgus impacted left femoral neck fracture on 5/2/23 by Dr. Lee  --IV/PO pain control.  --PT/OT    Post Op Anemia  - Hgb dropped to 6.9 overnight and patient was transfused with 1 unit of PRBC  - recheck this am is improved  - iron level low at 33- transfuse IV     Stage IIIa  endometrial cancer  Chemotherapy induced anemia  --Completed chemotherapy 4/2023. Recently seen by Dr. Rich.      Hypomagnesemia  --Replace.      CKD IIIa  --At baseline. Monitor.    HLD  - Rosuvastatin    HTN  -Hydralazine     Peripheral neuropathy  --Followed by neurology. Continue her cymbalta, gabapentin. Also takes monthly IM B12.    Expected Discharge Location and Transportation: rehab  Expected Discharge   Expected Discharge Date: 5/10/2023; Expected Discharge Time:      DVT prophylaxis:  Mechanical DVT prophylaxis orders are present.     AM-PAC 6 Clicks Score (PT): 16 (05/04/23 7070)    CODE STATUS:   Code Status and Medical Interventions:   Ordered at: 05/02/23 1944     Code Status (Patient has no pulse and is not breathing):    CPR (Attempt to Resuscitate)     Medical Interventions (Patient has pulse or is breathing):    Full Support     Release to patient:    Routine Release       Domitila Leon DO  05/05/23

## 2023-05-06 VITALS
WEIGHT: 135.1 LBS | OXYGEN SATURATION: 95 % | TEMPERATURE: 98.5 F | RESPIRATION RATE: 16 BRPM | BODY MASS INDEX: 23.06 KG/M2 | HEIGHT: 64 IN | SYSTOLIC BLOOD PRESSURE: 136 MMHG | HEART RATE: 86 BPM | DIASTOLIC BLOOD PRESSURE: 77 MMHG

## 2023-05-06 LAB
ALBUMIN SERPL-MCNC: 3.4 G/DL (ref 3.5–5.2)
ALBUMIN/GLOB SERPL: 2.1 G/DL
ALP SERPL-CCNC: 70 U/L (ref 39–117)
ALT SERPL W P-5'-P-CCNC: <5 U/L (ref 1–33)
ANION GAP SERPL CALCULATED.3IONS-SCNC: 11 MMOL/L (ref 5–15)
AST SERPL-CCNC: 20 U/L (ref 1–32)
BH BB BLOOD EXPIRATION DATE: NORMAL
BH BB BLOOD TYPE BARCODE: 6200
BH BB DISPENSE STATUS: NORMAL
BH BB PRODUCT CODE: NORMAL
BH BB UNIT NUMBER: NORMAL
BILIRUB SERPL-MCNC: 0.4 MG/DL (ref 0–1.2)
BUN SERPL-MCNC: 7 MG/DL (ref 8–23)
BUN/CREAT SERPL: 6.7 (ref 7–25)
CALCIUM SPEC-SCNC: 8.4 MG/DL (ref 8.6–10.5)
CHLORIDE SERPL-SCNC: 102 MMOL/L (ref 98–107)
CO2 SERPL-SCNC: 24 MMOL/L (ref 22–29)
CREAT SERPL-MCNC: 1.04 MG/DL (ref 0.57–1)
CROSSMATCH INTERPRETATION: NORMAL
DEPRECATED RDW RBC AUTO: 86 FL (ref 37–54)
EGFRCR SERPLBLD CKD-EPI 2021: 59 ML/MIN/1.73
ERYTHROCYTE [DISTWIDTH] IN BLOOD BY AUTOMATED COUNT: 21.5 % (ref 12.3–15.4)
GLOBULIN UR ELPH-MCNC: 1.6 GM/DL
GLUCOSE SERPL-MCNC: 92 MG/DL (ref 65–99)
HCT VFR BLD AUTO: 28.7 % (ref 34–46.6)
HGB BLD-MCNC: 8.9 G/DL (ref 12–15.9)
MCH RBC QN AUTO: 34.4 PG (ref 26.6–33)
MCHC RBC AUTO-ENTMCNC: 31 G/DL (ref 31.5–35.7)
MCV RBC AUTO: 110.8 FL (ref 79–97)
PLATELET # BLD AUTO: 150 10*3/MM3 (ref 140–450)
PMV BLD AUTO: 9.6 FL (ref 6–12)
POTASSIUM SERPL-SCNC: 3.9 MMOL/L (ref 3.5–5.2)
PROT SERPL-MCNC: 5 G/DL (ref 6–8.5)
RBC # BLD AUTO: 2.59 10*6/MM3 (ref 3.77–5.28)
SODIUM SERPL-SCNC: 137 MMOL/L (ref 136–145)
UNIT  ABO: NORMAL
UNIT  RH: NORMAL
WBC NRBC COR # BLD: 4.14 10*3/MM3 (ref 3.4–10.8)

## 2023-05-06 PROCEDURE — 80053 COMPREHEN METABOLIC PANEL: CPT | Performed by: HOSPITALIST

## 2023-05-06 PROCEDURE — 85027 COMPLETE CBC AUTOMATED: CPT | Performed by: HOSPITALIST

## 2023-05-06 RX ORDER — ASPIRIN 81 MG/1
81 TABLET ORAL EVERY 12 HOURS SCHEDULED
Start: 2023-05-06

## 2023-05-06 RX ORDER — OLANZAPINE 5 MG/1
5 TABLET ORAL NIGHTLY
Qty: 3 TABLET | Refills: 5 | Status: SHIPPED | OUTPATIENT
Start: 2023-05-06

## 2023-05-06 RX ORDER — NALOXONE HYDROCHLORIDE 4 MG/.1ML
SPRAY NASAL
Qty: 2 EACH | Refills: 0 | Status: SHIPPED | OUTPATIENT
Start: 2023-05-06

## 2023-05-06 RX ORDER — GABAPENTIN 300 MG/1
300 CAPSULE ORAL 2 TIMES DAILY
Qty: 6 CAPSULE | Refills: 0 | Status: SHIPPED | OUTPATIENT
Start: 2023-05-06 | End: 2023-06-05

## 2023-05-06 RX ORDER — OXYCODONE HYDROCHLORIDE 5 MG/1
5 TABLET ORAL EVERY 4 HOURS PRN
Qty: 12 TABLET | Refills: 0 | Status: SHIPPED | OUTPATIENT
Start: 2023-05-06 | End: 2023-05-09

## 2023-05-06 RX ADMIN — LEVOTHYROXINE SODIUM 25 MCG: 25 TABLET ORAL at 04:39

## 2023-05-06 RX ADMIN — HYDRALAZINE HYDROCHLORIDE 25 MG: 25 TABLET, FILM COATED ORAL at 08:00

## 2023-05-06 RX ADMIN — GABAPENTIN 300 MG: 300 CAPSULE ORAL at 08:00

## 2023-05-06 RX ADMIN — Medication 10 ML: at 08:01

## 2023-05-06 RX ADMIN — DULOXETINE HYDROCHLORIDE 20 MG: 20 CAPSULE, DELAYED RELEASE ORAL at 08:00

## 2023-05-06 RX ADMIN — ASPIRIN 81 MG: 81 TABLET, COATED ORAL at 08:00

## 2023-05-06 RX ADMIN — OXYCODONE HYDROCHLORIDE 5 MG: 5 TABLET ORAL at 11:38

## 2023-05-06 RX ADMIN — OXYCODONE HYDROCHLORIDE 5 MG: 5 TABLET ORAL at 08:01

## 2023-05-06 RX ADMIN — PANTOPRAZOLE SODIUM 40 MG: 40 TABLET, DELAYED RELEASE ORAL at 04:39

## 2023-05-06 RX ADMIN — SENNOSIDES AND DOCUSATE SODIUM 2 TABLET: 8.6; 5 TABLET ORAL at 08:00

## 2023-05-06 RX ADMIN — OXYCODONE HYDROCHLORIDE 5 MG: 5 TABLET ORAL at 03:06

## 2023-05-06 NOTE — PLAN OF CARE
Problem: Adult Inpatient Plan of Care  Goal: Plan of Care Review  Outcome: Ongoing, Progressing  Flowsheets (Taken 5/6/2023 1212)  Progress: improving  Plan of Care Reviewed With: patient  Goal: Patient-Specific Goal (Individualized)  Outcome: Ongoing, Progressing  Goal: Absence of Hospital-Acquired Illness or Injury  Outcome: Ongoing, Progressing  Intervention: Identify and Manage Fall Risk  Recent Flowsheet Documentation  Taken 5/6/2023 1025 by Bailey Bruner RN  Safety Promotion/Fall Prevention:   activity supervised   assistive device/personal items within reach   safety round/check completed  Taken 5/6/2023 0800 by Bailey Bruner RN  Safety Promotion/Fall Prevention:   activity supervised   assistive device/personal items within reach   clutter free environment maintained   fall prevention program maintained   lighting adjusted   muscle strengthening facilitated   nonskid shoes/slippers when out of bed   room organization consistent   safety round/check completed  Intervention: Prevent Skin Injury  Recent Flowsheet Documentation  Taken 5/6/2023 1025 by Bailey Bruner RN  Body Position: sitting up in bed  Taken 5/6/2023 0800 by Bailey Bruner RN  Body Position: sitting up in bed  Skin Protection:   adhesive use limited   incontinence pads utilized  Intervention: Prevent and Manage VTE (Venous Thromboembolism) Risk  Recent Flowsheet Documentation  Taken 5/6/2023 1025 by Bailey Bruner RN  Activity Management: activity encouraged  VTE Prevention/Management:   bilateral   sequential compression devices off  Taken 5/6/2023 0800 by Bailey Bruner RN  Activity Management: activity encouraged  VTE Prevention/Management:   bilateral   sequential compression devices on  Range of Motion: active ROM (range of motion) encouraged  Intervention: Prevent Infection  Recent Flowsheet Documentation  Taken 5/6/2023 1025 by Bailey Bruner RN  Infection Prevention:   hand hygiene promoted   rest/sleep promoted  Taken  5/6/2023 0800 by Bailey Bruner RN  Infection Prevention:   hand hygiene promoted   rest/sleep promoted  Goal: Optimal Comfort and Wellbeing  Outcome: Ongoing, Progressing  Intervention: Monitor Pain and Promote Comfort  Recent Flowsheet Documentation  Taken 5/6/2023 0800 by Bailey Bruner RN  Pain Management Interventions:   cold applied   see MAR  Intervention: Provide Person-Centered Care  Recent Flowsheet Documentation  Taken 5/6/2023 1025 by Bailey Bruner RN  Trust Relationship/Rapport: care explained  Taken 5/6/2023 0800 by Bailey Bruner RN  Trust Relationship/Rapport:   care explained   choices provided   questions answered   questions encouraged   reassurance provided  Goal: Readiness for Transition of Care  Outcome: Ongoing, Progressing     Problem: Fall Injury Risk  Goal: Absence of Fall and Fall-Related Injury  Outcome: Ongoing, Progressing  Intervention: Identify and Manage Contributors  Recent Flowsheet Documentation  Taken 5/6/2023 1025 by Bailey Bruner RN  Self-Care Promotion: independence encouraged  Taken 5/6/2023 0800 by Bailey Bruner RN  Medication Review/Management: medications reviewed  Self-Care Promotion: independence encouraged  Intervention: Promote Injury-Free Environment  Recent Flowsheet Documentation  Taken 5/6/2023 1025 by Bailey Bruner RN  Safety Promotion/Fall Prevention:   activity supervised   assistive device/personal items within reach   safety round/check completed  Taken 5/6/2023 0800 by Bailey Bruner RN  Safety Promotion/Fall Prevention:   activity supervised   assistive device/personal items within reach   clutter free environment maintained   fall prevention program maintained   lighting adjusted   muscle strengthening facilitated   nonskid shoes/slippers when out of bed   room organization consistent   safety round/check completed     Problem: Skin Injury Risk Increased  Goal: Skin Health and Integrity  Outcome: Ongoing, Progressing  Intervention:  Optimize Skin Protection  Recent Flowsheet Documentation  Taken 5/6/2023 1025 by Bailey Bruner RN  Head of Bed (HOB) Positioning: HOB elevated  Taken 5/6/2023 0800 by Bailey Bruner RN  Pressure Reduction Techniques: frequent weight shift encouraged  Head of Bed (HOB) Positioning: HOB elevated  Pressure Reduction Devices: pressure-redistributing mattress utilized  Skin Protection:   adhesive use limited   incontinence pads utilized     Problem: Adjustment to Injury (Hip Fracture Medical Management)  Goal: Optimal Coping with Change in Health Status  Outcome: Ongoing, Progressing  Intervention: Support Psychosocial Response to Injury  Recent Flowsheet Documentation  Taken 5/6/2023 0800 by Bailey Bruner RN  Family/Support System Care:   self-care encouraged   support provided     Problem: Bleeding (Hip Fracture Medical Management)  Goal: Absence of Bleeding  Outcome: Ongoing, Progressing  Intervention: Monitor and Manage Bleeding  Recent Flowsheet Documentation  Taken 5/6/2023 0800 by Bailey Bruner RN  Bleeding Management: dressing monitored     Problem: Bowel Elimination Impaired (Hip Fracture Medical Management)  Goal: Effective Bowel Elimination  Outcome: Ongoing, Progressing     Problem: Cognitive Decline Risk (Hip Fracture Medical Management)  Goal: Baseline Cognitive Function Maintained  Outcome: Ongoing, Progressing     Problem: Embolism (Hip Fracture Medical Management)  Goal: Absence of Embolism  Outcome: Ongoing, Progressing  Intervention: Prevent or Manage Embolism Risk  Recent Flowsheet Documentation  Taken 5/6/2023 1025 by Bailey Bruner RN  VTE Prevention/Management:   bilateral   sequential compression devices off  Taken 5/6/2023 0800 by Bailey Bruner RN  VTE Prevention/Management:   bilateral   sequential compression devices on     Problem: Fracture Stabilization and Management (Hip Fracture Medical Management)  Goal: Fracture Stability  Outcome: Ongoing, Progressing  Intervention:  Promote Fracture Stability and Healing  Recent Flowsheet Documentation  Taken 5/6/2023 0800 by Bailey Bruner RN  Fracture Immobilization: immobilization device maintained     Problem: Functional Ability Impaired (Hip Fracture Medical Management)  Goal: Optimal Functional Performance  Outcome: Ongoing, Progressing  Intervention: Promote Optimal Functional Status  Recent Flowsheet Documentation  Taken 5/6/2023 1025 by Bailey Bruner RN  Activity Management: activity encouraged  Self-Care Promotion: independence encouraged  Taken 5/6/2023 0800 by Bailey Bruner RN  Activity Management: activity encouraged  Self-Care Promotion: independence encouraged  Range of Motion: active ROM (range of motion) encouraged     Problem: Pain (Hip Fracture Medical Management)  Goal: Acceptable Pain Level  Outcome: Ongoing, Progressing  Intervention: Manage Acute Orthopaedic-Related Pain  Recent Flowsheet Documentation  Taken 5/6/2023 0800 by Bailey Bruner RN  Pain Management Interventions:   cold applied   see MAR     Problem: Urinary Elimination Impaired (Hip Fracture Medical Management)  Goal: Effective Urinary Elimination  Outcome: Ongoing, Progressing   Goal Outcome Evaluation:  Plan of Care Reviewed With: patient      Pt currently in chair resting quietly. No complaints of pain or discomfort at this time. Pain medication administered for transport. Pt transportation via [facility] to [facility]. Incision to left hip CDI. Neuro checks WNL. Vitals WNL. Pt ambulating with 1 assist and walker. No other observations at this time. Will continue to monitor, call bell in reach.  Progress: improving

## 2023-05-06 NOTE — PLAN OF CARE
Transparent dressing to the left hip CDI. Incision free of redness/drainage or swelling. VSS on RA. SR on cardiac mx. UOP ADQ via external catheter. Pain adequately controlled with PO medications. Will cont to mx. Call light in reach.

## 2023-05-06 NOTE — DISCHARGE SUMMARY
[facility] Medicine Services  DISCHARGE SUMMARY    Patient Name: Alysia Sierra  : 1955  MRN: [id number]    Date of Admission: 2023 12:01 PM  Date of Discharge:  2023  Primary Care Physician: Liana So APRN    Consults     Date and Time Order Name Status Description    2023  7:44 PM Inpatient Orthopedic Surgery Consult            Hospital Course     Presenting Problem:   Hip fracture [S72.009A]    Active Hospital Problems    Diagnosis  POA   • **Hip fracture [S72.009A]  Yes   • Anemia [D64.9]  Yes   • Hypomagnesemia [E83.42]  Yes   • Endometrial adenocarcinoma [C54.1]  Yes   • Neuropathy [G62.9]  Yes   • CKD (chronic kidney disease) stage 3, GFR 30-59 ml/min [N18.30]  Yes   • Hypertension [I10]  Yes      Resolved Hospital Problems   No resolved problems to display.          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     This patient's problems and plans were partially entered by my partner and updated as appropriate by me 23.     Left femoral neck fracture  Fall  --s/p closed reduction and internal fixation, valgus impacted left femoral neck fracture on 23 by Dr. Lee  --PT/OT  --weightbearing as tolerated with a walker per Dr. Lee     Post Op Anemia  - Hgb dropped to 6.9 and patient was transfused with 1 unit of PRBC  - Hb stable at 8.9 at discharge  - iron level low at 33- s/p  IV iron     Stage IIIa endometrial cancer  Chemotherapy induced anemia  --Completed chemotherapy 2023. Recently seen by Dr. Rich.      Hypomagnesemia  --Replaced     CKD IIIa  --At baseline. Monitor.     HLD  - Rosuvastatin     HTN  -Hydralazine     Peripheral neuropathy  --Followed by neurology. Continue her cymbalta, gabapentin. Also takes monthly IM B12.      Discharge Follow Up Recommendations for outpatient labs/diagnostics:   f/u with Dr. Lee in 1 mos  F/u with PCP 1  week after discharged from rehab    Day of Discharge     HPI:   Feels ok.  Ready to go to rehab.     Review of Systems  Gen- No fevers, chills  CV- No chest pain, palpitations  Resp- No cough, dyspnea  GI- No N/V/D, abd pain        Vital Signs:   Temp:  [98 °F (36.7 °C)-98.8 °F (37.1 °C)] 98.8 °F (37.1 °C)  Heart Rate:  [78-94] 78  Resp:  [16] 16  BP: (126-144)/(69-88) 144/88  Flow (L/min):  [2] 2      Physical Exam:  Constitutional: No acute distress, awake, alert, chronically ill appearing  HENT: NCAT, mucous membranes moist  Respiratory: Clear to auscultation bilaterally, respiratory effort normal   Cardiovascular: RRR, no murmurs, rubs, or gallops  Gastrointestinal: Positive bowel sounds, soft, nontender, nondistended  Musculoskeletal: No bilateral ankle edema  Psychiatric: Appropriate affect, cooperative  Neurologic: Oriented x 3, strength symmetric in all extremities, Cranial Nerves grossly intact to confrontation, speech clear  Skin: No rashes      Pertinent  and/or Most Recent Results     LAB RESULTS:      Lab 05/06/23  0614 05/05/23  1548 05/05/23  0633 05/05/23  0014 05/04/23  1549 05/04/23  1219 05/04/23  0500 05/03/23  0817 05/02/23  1232   WBC 4.14  --  4.75  --   --   --  4.93 6.25 8.18   HEMOGLOBIN 8.9* 10.0* 8.4* 6.9* 7.0*   < > 7.4* 9.0* 9.1*   HEMATOCRIT 28.7* 31.5* 25.1* 21.4* 21.6*   < > 23.8* 29.8* 28.6*   PLATELETS 150  --  127*  --   --   --  120* 132* 162   NEUTROS ABS  --   --   --   --   --   --   --   --  5.78   IMMATURE GRANS (ABS)  --   --   --   --   --   --   --   --  0.08*   LYMPHS ABS  --   --   --   --   --   --   --   --  1.48   MONOS ABS  --   --   --   --   --   --   --   --  0.73   EOS ABS  --   --   --   --   --   --   --   --  0.09   .8*  --  103.7*  --   --   --  112.8* 119.7* 112.6*   PROTIME  --   --   --   --   --   --   --   --  13.3    < > = values in this interval not displayed.         Lab 05/05/23  0633 05/04/23  0500 05/03/23  0817 05/02/23  [facility]   SODIUM [facility]  137 136 138   POTASSIUM 3.7 4.4 5.1 4.7   CHLORIDE 103 102 100 104   CO2 24.0 25.0 14.0* 23.0   ANION GAP 9.0 10.0 22.0* 11.0   BUN 8 12 12 10   CREATININE 1.10* 1.00 1.01* 0.91   EGFR 55.2* 61.9 61.1 69.3   GLUCOSE 108* 104* 104* 93   CALCIUM 8.3* 8.4* 8.6 8.1*   MAGNESIUM  --  2.4 1.4* 1.5*   TSH  --   --   --  2.350         Lab 05/05/23  0633 05/04/23  0500 05/02/23  1232   TOTAL PROTEIN 5.0* 5.1* 5.6*   ALBUMIN 3.3* 3.4* 3.9   GLOBULIN 1.7 1.7 1.7   ALT (SGPT) <5 <5 11   AST (SGOT) 18 19 22   BILIRUBIN 0.4 0.2 0.2   ALK PHOS 71 68 88         Lab 05/02/23  1232   PROTIME 13.3   INR 1.00             Lab 05/04/23  0500 05/02/23  1614   IRON 33*  --    IRON SATURATION 13*  --    TIBC 250*  --    TRANSFERRIN 168*  --    ABO TYPING  --  A   RH TYPING  --  Positive   ANTIBODY SCREEN  --  Negative         Brief Urine Lab Results  (Last result in the past 365 days)      Color   Clarity   Blood   Leuk Est   Nitrite   Protein   CREAT   Urine HCG        11/11/22 1356 Yellow   Clear   Trace   Moderate (2+)   Negative   Negative               Microbiology Results (last 10 days)     ** No results found for the last 240 hours. **          XR Femur 2 View Left    Result Date: 5/2/2023  XR FEMUR 2 VW LEFT Date of Exam: 5/2/2023 12:08 PM EDT Indication: fall/pain Comparison: CT abdomen and pelvis with contrast 4/13/2023 Findings: There is a transverse fracture of the left femoral neck with impaction and valgus deformity. The left femoral head is maintained in alignment. No distal femoral fracture. The knee alignment is maintained. Complete assessment of the distal femur and knee limited due to technique. Included portions of the pelvis are intact.     Impression: Transverse left femoral neck fracture with impaction. Electronically Signed: Bill Rader  5/2/2023 12:54 PM EDT  Workstation ID: [id number]    CT Head Without Contrast    Result Date: 5/2/2023  CT HEAD WO CONTRAST Date of Exam: 5/2/2023 2:56 PM EDT Indication: Fall, head  contusion. Comparison: None available. Technique: Axial CT images were obtained of the head without contrast administration.  Reconstructed coronal and sagittal images were also obtained. Automated exposure control and iterative construction methods were used. Findings: There is mild volume loss as there is prominence of the sulci, fissures, ventricles, and basal cisterns. There are areas of decreased density in the periventricular and subcortical white matter felt to be due to chronic microvascular ischemia. There is no acute hemorrhage, midline shift, or suspicious extra-axial fluid collections. The orbital contents are normal. The visualized paranasal and mastoid sinuses are clear. The calvarium is unremarkable.     Impression: 1. Volume loss secondary to cerebral atrophy. 2. Chronic white matter microvascular ischemia. 3. No acute findings. Electronically Signed: Gilbert Abdi  5/2/2023 3:08 PM EDT  Workstation ID: [id number]    XR Chest 1 View    Result Date: 5/2/2023  XR CHEST 1 VW Date of Exam: 5/2/2023 12:08 PM EDT Indication: fall Comparison: None available. Findings: Heart size top normal, stable. The lungs are without consolidation. No pneumothorax or pleural effusion. The osseous structures are grossly intact.     Impression: No acute process. Electronically Signed: Bill Prasanth  5/2/2023 12:44 PM EDT  Workstation ID: [id number]    XR Pelvis 1 or 2 View    Result Date: 5/2/2023  XR PELVIS 1 OR 2 VW Date of Exam: 5/2/2023 2:29 PM EDT Indication: pre-op left hip fracture Comparison: None available. Findings: CT abdomen and pelvis 4/13/2023.     Impression: Impacted left femoral neck fracture is seen, without hip dislocation. The pelvic ring appears intact. Osteopenic changes are present. Mild lower lumbar curvature toward the left with small marginal lumbar osteophytes. No sacroiliac joint or pubic symphysis diastases. IMPRESSION: 1. Impacted nondisplaced left femoral neck fracture. No hip dislocation.  Electronically Signed: Cristal Ruiziris  5/2/2023 2:47 PM EDT  Workstation ID: [id number]    SS Fascia Iliaca    Result Date: 5/2/2023  Wilbert Haynes CRNA     5/3/2023  9:59 AM SS Fascia Iliaca Patient reassessed immediately prior to procedure Reason for block: at surgeon's request and post-op pain management Performed by CRNA/CAA: Maco Stephenson CRNA Assisted by: Wilbert Haynes CRNA Preanesthetic Checklist Completed: patient identified, IV checked, site marked, risks and benefits discussed, surgical consent, monitors and equipment checked, pre-op evaluation and timeout performed Prep: Pt Position: supine Sterile barriers:cap, gloves, mask and washed/disinfected hands Prep: ChloraPrep Patient monitoring: blood pressure monitoring, continuous pulse oximetry and EKG Procedure Performed under: general Guidance:ultrasound guided ULTRASOUND INTERPRETATION. Using ultrasound guidance a 20 G gauge needle was placed in close proximity to the nerve, at which point, under ultrasound guidance anesthetic was injected in the area of the nerve and spread of the anesthesia was seen on ultrasound in close proximity thereto.  There were no abnormalities seen on ultrasound; a digital image was taken; and the patient tolerated the procedure with no complications. Images:still images obtained, printed/placed on chart Laterality:left Block Type:fascia iliaca compartment Injection Technique:single-shot Needle Type:echogenic and short-bevel Needle Gauge:18 G Resistance on Injection: none Catheter Size:20 G (20g) Medications Used: bupivacaine PF (MARCAINE) 0.25 % injection - Injection 25 mL - 5/2/2023 5:18:00 PM Post Assessment Injection Assessment: negative aspiration for heme, no paresthesia on injection and incremental injection Patient Tolerance:comfortable throughout block Complications:no Additional Notes CKAFASCIAILIACA: SINGLE shot A high-frequency linear transducer, with sterile cover, was placed in parasagittal plane on top of  "the Anterior Superior Iliac Spine (ASIS) and moved medially to identify the Internal Oblique muscle, Sartorius muscle, Iliacus Muscle, Fascia Iliaca (FI) and Fascia Latae. The insertion site was prepped and draped in sterile fashion. Skin and cutaneous tissue was infiltrated with 2-5 ml of 1% Lidocaine. Using ultrasound-guidance, a 20-gauge B-[place]ach 4\" Ultraplex 360 non-stimulating echogenic needle was advanced in plane from caudad to cephalad. Preservative-free normal saline was utilized for hydro-dissection of tissue, advancement of needle, and to confirm final needle placement below FI. Local anesthetic in incremental 3-5 ml injections. Aspiration every 5 ml to prevent intravascular injection. Injection was completed with negative aspiration of blood and negative intravascular injection. Injection pressures were normal with minimal resistance.     Peripheral Block    Result Date: 5/2/2023  Eduardo Ryan MD     5/2/2023  5:39 PM Peripheral Block Patient reassessed immediately prior to procedure Start time: 5/2/2023 3:54 PM Stop time: 5/2/2023 4:00 PM Reason for block: at surgeon's request and post-op pain management Performed by Anesthesiologist: Eduardo Ryan MD CRNA/CAA: Maco Stephenson CRNA Preanesthetic Checklist Completed: patient identified, IV checked, site marked, risks and benefits discussed, surgical consent, monitors and equipment checked, pre-op evaluation and timeout performed Prep: Pt Position: supine Sterile barriers:cap, gloves, mask and washed/disinfected hands Prep: ChloraPrep Patient monitoring: blood pressure monitoring, continuous pulse oximetry and EKG Procedure Performed under: local infiltration Guidance:ultrasound guided ULTRASOUND INTERPRETATION.  Using ultrasound guidance a 20 G gauge needle was placed in close proximity to the nerve, at which point, under ultrasound guidance anesthetic was injected in the area of the nerve and spread of the anesthesia was seen on ultrasound in " close proximity thereto.  There were no abnormalities seen on ultrasound; a digital image was taken; and the patient tolerated the procedure with no complications. Images:still images obtained, printed/placed on chart Block Type:fascia iliaca compartment Injection Technique:catheter Needle Type:echogenic and Tuohy Needle Gauge:18 G Resistance on Injection: none Catheter Size:20 G (20g) Medications Used: bupivacaine PF (MARCAINE) 0.25 % injection - Injection  50 mL - 5/2/2023 3:54:00 PM Post Assessment Injection Assessment: negative aspiration for heme, no paresthesia on injection and incremental injection Patient Tolerance:comfortable throughout block Complications:no Additional Notes CKAFASCIAILIACA: CATHETER A high-frequency linear transducer, with sterile cover, was placed in parasagittal plane on top of the Anterior Superior Iliac Spine (ASIS) and moved medially to identify the Internal Oblique muscle, Sartorius muscle, Iliacus Muscle, Fascia Iliaca (FI) and Fascia Latae. The insertion site was prepped and draped in sterile fashion. Skin and cutaneous tissue was infiltrated with 2-5 ml of 1% Lidocaine. Using ultrasound-guidance, an 18-gauge Contiplex Ultra 360 Touhy needle was advanced in plane from caudad to cephalad. Preservative-free normal saline was utilized for hydro-dissection of tissue, advancement of Touhy, and to confirm final needle placement below FI. Local anesthetic in incremental 3-5 ml injections. Aspiration every 5 ml to prevent intravascular injection. Injection was completed with negative aspiration of blood and negative intravascular injection. Injection pressures were normal with minimal resistance. A 20-gauge Contiplex Echo catheter was placed through the needle and advance out the tip of the Touhy 3-5 cm. The Touhy needle was then removed, and final catheter position verified below the FI. The catheter was secured in the usual fashion with skin glue, benzoin, steri-strips, CHG tegaderm and  "Label noting \"Nerve Block Catheter\". Jerk tape applied at yellow connector and catheter connection.     FL C Arm During Surgery    Result Date: 5/2/2023  FL C ARM DURING SURGERY Date of Exam: 5/2/2023 5:09 PM EDT Indication: FEMORAL NECK OPEN REDUCTION INTERNAL FIXATION. Comparison: None available. Technique: Fluoroscopy in OR without radiologist present. Fluoroscopic Time: 1 minute 36 seconds Findings: 2 images are submitted. Imaging demonstrates dynamic screw in the proximal femur. Please see the operating physician report.     Impression: Fluoroscopy in OR without radiologist present. Electronically Signed: Elizabeth Gallagher  5/2/2023 7:18 PM EDT  Workstation ID: [id number]    XR Hip With or Without Pelvis 2 - 3 View Left    Result Date: 5/2/2023  XR HIP W OR WO PELVIS 2-3 VIEW LEFT Date of Exam: 5/2/2023 6:50 PM EDT Indication: Left hip fracture, status post open reduction and internal fixation Comparison: Pelvis x-ray performed on 5/2/2023 at 1432 hours. Findings: There has been interval surgical fixation of the subcapital fracture of the left femoral neck. The hardware is intact. There is soft tissue gas and swelling consistent with expected postsurgical changes. There are mild arthritic changes involving both hip joints, the pubic symphysis, and the sacroiliac joints. There are degenerative changes in the lower lumbar spine. The pelvic bony structures are demineralized.     Impression: Status post open reduction and internal fixation of a subcapital fracture of the left femoral neck as described. Electronically Signed: Gilbert Abdi  5/2/2023 7:24 PM EDT  Workstation ID: [id number]              Results for orders placed during the hospital encounter of 01/24/23    Adult Transthoracic Echo Complete W/ Cont if Necessary Per Protocol    Interpretation Summary  •  Left ventricular systolic function is normal. Left ventricular ejection fraction appears to be 51 - 55%.  •  Left ventricular diastolic function is consistent with " (grade I) impaired relaxation.  •  Estimated right ventricular systolic pressure from tricuspid regurgitation is normal (<35 mmHg).      Plan for Follow-up of Pending Labs/Results:     Discharge Details        Discharge Medications      New Medications      Instructions Start Date   aspirin 81 MG EC tablet  Replaces: aspirin 325 MG tablet   81 mg, Oral, Every 12 Hours Scheduled      naloxone 4 MG/0.1ML nasal spray  Commonly known as: NARCAN   Call 911. Don't prime. [place] in 1 nostril for overdose. Repeat in 2-3 minutes in other nostril if no or minimal breathing/responsiveness.         Changes to Medications      Instructions Start Date   Cyanocobalamin 1000 MCG/ML kit  What changed:   · how much to take  · how to take this  · when to take this  · additional instructions   Inject 1,000mcg SC daily x1 week, then weekly x4 weeks.      docusate sodium 100 MG capsule  Commonly known as: COLACE  What changed:   · when to take this  · additional instructions   100 mg, Oral, 2 Times Daily      oxyCODONE 5 MG immediate release tablet  Commonly known as: ROXICODONE  What changed: how much to take   5 mg, Oral, Every 4 Hours PRN         Continue These Medications      Instructions Start Date   DULoxetine 20 MG capsule  Commonly known as: Cymbalta   20 mg, Oral, 2 Times Daily      esomeprazole 20 MG capsule  Commonly known as: nexIUM   20 mg, Oral, 2 Times Weekly, OTC      gabapentin 300 MG capsule  Commonly known as: Neurontin   300 mg, Oral, 2 Times Daily      hydrALAZINE 25 MG tablet  Commonly known as: APRESOLINE   25 mg, 2 Times Daily      levothyroxine 25 MCG tablet  Commonly known as: SYNTHROID, LEVOTHROID   25 mcg, Oral, Every Early Morning      magnesium oxide 400 (241.3 Mg) MG tablet tablet  Commonly known as: MAGOX   400 mg, Oral, Daily, OTC      metoprolol succinate XL 25 MG 24 hr tablet  Commonly known as: TOPROL-XL   25 mg, Oral, Nightly      nitroglycerin 0.4 MG SL tablet  Commonly known as: NITROSTAT   0.4 mg,  Sublingual, Every 5 Minutes PRN, Take no more than 3 doses in 15 minutes.      OLANZapine 5 MG tablet  Commonly known as: ZyPREXA   5 mg, Oral, Nightly, Take on days 2, 3 and 4 after chemotherapy.      ondansetron 8 MG tablet  Commonly known as: ZOFRAN   8 mg, Oral, 3 Times Daily PRN      prochlorperazine 10 MG tablet  Commonly known as: COMPAZINE   10 mg, Oral, Every 4 Hours PRN      rosuvastatin 5 MG tablet  Commonly known as: Crestor   5 mg, Oral, Daily         Stop These Medications    aspirin 325 MG tablet  Replaced by: aspirin 81 MG EC tablet     ondansetron ODT 4 MG disintegrating tablet  Commonly known as: ZOFRAN-ODT            Allergies   Allergen Reactions   • Lisinopril Angioedema   • Metal Rash     Possible nickel -   Gold is only metal that doesn't have issues     • Milk-Related Compounds Other (See Comments)     LACTOSE INTOLERANT   • Tylenol [Acetaminophen] Other (See Comments)     ckd   • Atorvastatin Myalgia         Discharge Disposition:  Skilled Nursing Facility (DC - External)    Diet:  Hospital:  Diet Order   Procedures   • Diet: Regular/House Diet; Texture: Regular Texture (IDDSI 7); Fluid Consistency: Thin (IDDSI 0)       Activity:      Restrictions or Other Recommendations:         CODE STATUS:    Code Status and Medical Interventions:   Ordered at: 05/02/23 1944     Code Status (Patient has no pulse and is not breathing):    CPR (Attempt to Resuscitate)     Medical Interventions (Patient has pulse or is breathing):    Full Support     Release to patient:    Routine Release       Future Appointments   Date Time Provider Department Center   5/10/2023 11:00 AM Cassie Morin APRN [facility]   5/23/2023 10:45 AM Liana So APRN [facility]   6/12/2023  1:40 PM Juanpablo Lee MD [facility]   6/19/2023  1:00 PM Anjelica Dyer APRN [facility]   8/10/2023  2:15 PM Caden Dietz MD [facility] N CT [facility]   8/24/2023 10:30 AM Franklin Hamm APRN N[facility]  [facility] None   10/9/2023 10:15 AM Gray Bahena MD [facility]       Additional Instructions for the Follow-ups that You Need to Schedule     Discharge Follow-up with PCP   As directed       Currently Documented PCP:    Liana So APRN    PCP Phone Number:    [phone]     Follow Up Details: with PCP 1 week after discharged from rehab         Discharge Follow-up with Specified Provider: 1 Month   As directed      Follow Up: 1 Month    Follow Up Details: Call [phone] for appointment.                     Juan Manuel Vanegas MD  05/06/23      Time Spent on Discharge:  I spent  39 minutes on this discharge activity which included: face-to-face encounter with the patient, reviewing the data in the system, coordination of the care with the nursing staff as well as consultants, documentation, and entering orders.

## 2023-05-06 NOTE — PROGRESS NOTES
"      [facility] Orthopaedic Surgery Progress Note    Subjective      LOS: 4 days   Patient Care Team:  Liana So APRN as PCP - General (Family Medicine)  Jasmine Rich MD as Referring Physician (Gynecologic Oncology)  Russ Carrington MD as Consulting Physician (Nephrology)  Roberta Aldana MD as Consulting Physician (Radiation Oncology)  Gray Bahena MD as Consulting Physician (Cardiology)  Caden Dietz MD as Consulting Physician (Neurology)  Charity Cornejo RN as Ambulatory  (Oncology) ([facility])    CC: Left hip pain    Interval History:   Resting comfortably in bed.  No new complaints.  Going to [facility] today.    Objective      Vital Signs  Temp (24hrs), Av.3 °F (36.8 °C), Min:98 °F (36.7 °C), Max:98.8 °F (37.1 °C)      /88 (BP Location: Left arm, Patient Position: Lying)   Pulse 78   Temp 98.8 °F (37.1 °C) (Oral)   Resp 16   Ht 162.6 cm (64.02\")   Wt 61.3 kg (135 lb 1.6 oz)   LMP  (LMP Unknown)   SpO2 94%   BMI 23.18 kg/m²     Examination:   Examination of the left hip: The wound is clean, dry, and intact.  Knee flexion, knee extension, ankle dorsiflexion, ankle plantar flexion, and EHL are intact.  Sensation intact in the foot to light touch.   Thigh is soft and nontender.      Labs:  Results from last 7 days   Lab Units 23  0614 23  1548 23  0633 23  0014 23  1549 23  1219 23  0500 23  0817 23  1232   WBC 10*3/mm3 4.14  --  4.75  --   --   --  4.93 6.25 8.18   HEMOGLOBIN g/dL 8.9* 10.0* 8.4* 6.9* 7.0*   < > 7.4* 9.0* 9.1*   HEMATOCRIT % 28.7* 31.5* 25.1* 21.4* 21.6*   < > 23.8* 29.8* 28.6*   MCV fL 110.8*  --  103.7*  --   --   --  112.8* 119.7* 112.6*   PLATELETS 10*3/mm3 150  --  127*  --   --   --  120* 132* 162    < > = values in this interval not displayed.       Radiology:  Imaging Results (Last 24 Hours)     ** No results found for the last 24 hours. **          PT:  Physical Therapy - Plan " of Care Review - Outcome Summary:  Outcome Evaluation: Patient able to progress ambulation distance to 12' Roxana with FWW and knee immobilizer donned. She continues to be limited by deficits in strength, balance, and endurance, however quad strength appears to be improving and will continue to assess necessity of KI. She performed therex with good effort and ROM. Continue to recommend D/C to IPR as she is still mobilizing well below her baseline and lives alone. (05/05/23 0882)]       Results Review:     I reviewed the patient's new clinical results.    Assessment and Plan     Assessment:   Status post closed reduction and internal fixation, valgus impacted left femoral neck fracture (femoral neck system)    Patient may be weightbearing as tolerated with a walker  Acute blood loss anemia- status posttransfusion 5/5/2023, with good response      Hip fracture    Hypertension    CKD (chronic kidney disease) stage 3, GFR 30-59 ml/min    Neuropathy    Endometrial adenocarcinoma    Anemia    Hypomagnesemia      [facility].  Follow-up with me in 1 month in the office.      Future Appointments   Date Time Provider Department Center   5/10/2023 11:00 AM Cassie Morin APRN [facility]   5/23/2023 10:45 AM Liana So APRN [facility]   6/12/2023  1:40 PM Juanpablo Lee MD [facility]   6/19/2023  1:00 PM Anjelica Dyer APRN [facility]   8/10/2023  2:15 PM Caden Dietz MD [facility]AM[facility]DA N CT [facility]   8/24/2023 10:30 AM Franklin Hamm APRN NEE RAON [facility] None   10/9/2023 10:15 AM Gray Bahena MD [facility]           Juanpablo Lee MD  05/06/23  10:23 EDT

## 2023-05-06 NOTE — DISCHARGE PLACEMENT REQUEST
"Jayden Sierra (68 y.o. Female)     Date of Birth   1955    Social Security Number       Address   [address] [place] [address] [address] [place] [address]    Home Phone   [phone]    MRN   [id number]       Holiness   Oriental orthodox    Marital Status                               Admission Date   23    Admission Type   Emergency    Admitting Provider   Juan Manuel Vanegas MD    Attending Provider   Juan Manuel Vanegas MD    Department, Room/Bed   [facility] 3G, S356/1       Discharge Date       Discharge Disposition   Skilled Nursing Facility (DC - External)    Discharge Destination                               Attending Provider: Juan Manuel Vanegas MD    Allergies: Lisinopril, Metal, Milk-related Compounds, Tylenol [Acetaminophen], Atorvastatin    Isolation: None   Infection: None   Code Status: CPR    Ht: 162.6 cm (64.02\")   Wt: 61.3 kg (135 lb 1.6 oz)    Admission Cmt: None   Principal Problem: Hip fracture [S72.009A]                 Active Insurance as of 2023     Primary Coverage     Payor Plan Insurance Group Employer/Plan Group    [facility]      Payor Plan Address Payor Plan Phone Number Payor Plan Fax Number Effective Dates    [address] [address] [phone]  2021 - None Entered    [facility] [address]       Subscriber Name Subscriber Birth Date Member ID       JAYDEN SIERRA 1955 [id number]                 Emergency Contacts      (Rel.) Home Phone Work Phone Mobile Phone    Akila Guidry (Sister) [phone] -- [phone]               Discharge Summary      Juan Manuel Vanegas MD at 23 0813              [facility] Medicine Services  DISCHARGE SUMMARY    Patient Name: Jayden Sierra  : 1955  MRN: [id number]    Date of Admission: 2023 12:01 PM  Date of Discharge:  2023  Primary Care Physician: Liana So APRN    Consults     Date and Time Order " Name Status Description    5/2/2023  7:44 PM Inpatient Orthopedic Surgery Consult            Hospital Course     Presenting Problem:   Hip fracture [S72.009A]    Active Hospital Problems    Diagnosis  POA   • **Hip fracture [S72.009A]  Yes   • Anemia [D64.9]  Yes   • Hypomagnesemia [E83.42]  Yes   • Endometrial adenocarcinoma [C54.1]  Yes   • Neuropathy [G62.9]  Yes   • CKD (chronic kidney disease) stage 3, GFR 30-59 ml/min [N18.30]  Yes   • Hypertension [I10]  Yes      Resolved Hospital Problems   No resolved problems to display.          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     This patient's problems and plans were partially entered by my partner and updated as appropriate by me 05/05/23.     Left femoral neck fracture  Fall  --s/p closed reduction and internal fixation, valgus impacted left femoral neck fracture on 5/2/23 by Dr. Lee  --PT/OT  --weightbearing as tolerated with a walker per Dr. Lee     Post Op Anemia  - Hgb dropped to 6.9 and patient was transfused with 1 unit of PRBC  - Hb stable at 8.9 at discharge  - iron level low at 33- s/p  IV iron     Stage IIIa endometrial cancer  Chemotherapy induced anemia  --Completed chemotherapy 4/2023. Recently seen by Dr. Rich.      Hypomagnesemia  --Replaced     CKD IIIa  --At baseline. Monitor.     HLD  - Rosuvastatin     HTN  -Hydralazine     Peripheral neuropathy  --Followed by neurology. Continue her cymbalta, gabapentin. Also takes monthly IM B12.      Discharge Follow Up Recommendations for outpatient labs/diagnostics:   f/u with Dr. Lee in 1 mos  F/u with PCP 1 week after discharged from rehab    Day of Discharge     HPI:   Feels ok.  Ready to go to rehab.     Review of Systems  Gen- No fevers, chills  CV- No chest pain, palpitations  Resp- No cough, dyspnea  GI- No N/V/D, abd pain        Vital Signs:   Temp:  [98 °F (36.7 °C)-98.8 °F (37.1 °C)] 98.8 °F  (37.1 °C)  Heart Rate:  [78-94] 78  Resp:  [16] 16  BP: (126-144)/(69-88) 144/88  Flow (L/min):  [2] 2      Physical Exam:  Constitutional: No acute distress, awake, alert, chronically ill appearing  HENT: NCAT, mucous membranes moist  Respiratory: Clear to auscultation bilaterally, respiratory effort normal   Cardiovascular: RRR, no murmurs, rubs, or gallops  Gastrointestinal: Positive bowel sounds, soft, nontender, nondistended  Musculoskeletal: No bilateral ankle edema  Psychiatric: Appropriate affect, cooperative  Neurologic: Oriented x 3, strength symmetric in all extremities, Cranial Nerves grossly intact to confrontation, speech clear  Skin: No rashes      Pertinent  and/or Most Recent Results     LAB RESULTS:      Lab 05/06/23  0614 05/05/23  1548 05/05/23  0633 05/05/23  0014 05/04/23  1549 05/04/23  1219 05/04/23  0500 05/03/23  0817 05/02/23  1232   WBC 4.14  --  4.75  --   --   --  4.93 6.25 8.18   HEMOGLOBIN 8.9* 10.0* 8.4* 6.9* 7.0*   < > 7.4* 9.0* 9.1*   HEMATOCRIT 28.7* 31.5* 25.1* 21.4* 21.6*   < > 23.8* 29.8* 28.6*   PLATELETS 150  --  127*  --   --   --  120* 132* 162   NEUTROS ABS  --   --   --   --   --   --   --   --  5.78   IMMATURE GRANS (ABS)  --   --   --   --   --   --   --   --  0.08*   LYMPHS ABS  --   --   --   --   --   --   --   --  1.48   MONOS ABS  --   --   --   --   --   --   --   --  0.73   EOS ABS  --   --   --   --   --   --   --   --  0.09   .8*  --  103.7*  --   --   --  112.8* 119.7* 112.6*   PROTIME  --   --   --   --   --   --   --   --  13.3    < > = values in this interval not displayed.         Lab 05/05/23  0633 05/04/23  0500 05/03/23  0817 05/02/23  1232   SODIUM 136 137 136 138   POTASSIUM 3.7 4.4 5.1 4.7   CHLORIDE 103 102 100 104   CO2 24.0 25.0 14.0* 23.0   ANION GAP 9.0 10.0 22.0* 11.0   BUN 8 12 12 10   CREATININE 1.10* 1.00 1.01* 0.91   EGFR 55.2* 61.9 61.1 69.3   GLUCOSE 108* 104* 104* 93   CALCIUM 8.3* 8.4* 8.6 8.1*   MAGNESIUM  --  2.4 1.4* 1.5*    TSH  --   --   --  2.350         Lab 05/05/23  0633 05/04/23  0500 05/02/23  1232   TOTAL PROTEIN 5.0* 5.1* 5.6*   ALBUMIN 3.3* 3.4* 3.9   GLOBULIN 1.7 1.7 1.7   ALT (SGPT) <5 <5 11   AST (SGOT) 18 19 22   BILIRUBIN 0.4 0.2 0.2   ALK PHOS 71 68 88         Lab 05/02/23  1232   PROTIME 13.3   INR 1.00             Lab 05/04/23  0500 05/02/23  1614   IRON 33*  --    IRON SATURATION 13*  --    TIBC 250*  --    TRANSFERRIN 168*  --    ABO TYPING  --  A   RH TYPING  --  Positive   ANTIBODY SCREEN  --  Negative         Brief Urine Lab Results  (Last result in the past 365 days)      Color   Clarity   Blood   Leuk Est   Nitrite   Protein   CREAT   Urine HCG        11/11/22 1356 Yellow   Clear   Trace   Moderate (2+)   Negative   Negative               Microbiology Results (last 10 days)     ** No results found for the last 240 hours. **          XR Femur 2 View Left    Result Date: 5/2/2023  XR FEMUR 2 VW LEFT Date of Exam: 5/2/2023 12:08 PM EDT Indication: fall/pain Comparison: CT abdomen and pelvis with contrast 4/13/2023 Findings: There is a transverse fracture of the left femoral neck with impaction and valgus deformity. The left femoral head is maintained in alignment. No distal femoral fracture. The knee alignment is maintained. Complete assessment of the distal femur and knee limited due to technique. Included portions of the pelvis are intact.     Impression: Transverse left femoral neck fracture with impaction. Electronically Signed: Bill Rader  5/2/2023 12:54 PM EDT  Workstation ID: [id number]    CT Head Without Contrast    Result Date: 5/2/2023  CT HEAD WO CONTRAST Date of Exam: 5/2/2023 2:56 PM EDT Indication: Fall, head contusion. Comparison: None available. Technique: Axial CT images were obtained of the head without contrast administration.  Reconstructed coronal and sagittal images were also obtained. Automated exposure control and iterative construction methods were used. Findings: There is mild volume loss  as there is prominence of the sulci, fissures, ventricles, and basal cisterns. There are areas of decreased density in the periventricular and subcortical white matter felt to be due to chronic microvascular ischemia. There is no acute hemorrhage, midline shift, or suspicious extra-axial fluid collections. The orbital contents are normal. The visualized paranasal and mastoid sinuses are clear. The calvarium is unremarkable.     Impression: 1. Volume loss secondary to cerebral atrophy. 2. Chronic white matter microvascular ischemia. 3. No acute findings. Electronically Signed: Gilbert Abdi  5/2/2023 3:08 PM EDT  Workstation ID: [id number]    XR Chest 1 View    Result Date: 5/2/2023  XR CHEST 1 VW Date of Exam: 5/2/2023 12:08 PM EDT Indication: fall Comparison: None available. Findings: Heart size top normal, stable. The lungs are without consolidation. No pneumothorax or pleural effusion. The osseous structures are grossly intact.     Impression: No acute process. Electronically Signed: Bill Hallr  5/2/2023 12:44 PM EDT  Workstation ID: [id number]    XR Pelvis 1 or 2 View    Result Date: 5/2/2023  XR PELVIS 1 OR 2 VW Date of Exam: 5/2/2023 2:29 PM EDT Indication: pre-op left hip fracture Comparison: None available. Findings: CT abdomen and pelvis 4/13/2023.     Impression: Impacted left femoral neck fracture is seen, without hip dislocation. The pelvic ring appears intact. Osteopenic changes are present. Mild lower lumbar curvature toward the left with small marginal lumbar osteophytes. No sacroiliac joint or pubic symphysis diastases. IMPRESSION: 1. Impacted nondisplaced left femoral neck fracture. No hip dislocation. Electronically Signed: Cristal Pineda  5/2/2023 2:47 PM EDT  Workstation ID: [id number]    SS Fascia Iliaca    Result Date: 5/2/2023  Wilbert Haynes CRNA     5/3/2023  9:59 AM SS Fascia Iliaca Patient reassessed immediately prior to procedure Reason for block: at surgeon's request and post-op pain management  Performed by CRNA/CAA: Maco Stephenson CRNA Assisted by: Wilbert Haynes CRNA Preanesthetic Checklist Completed: patient identified, IV checked, site marked, risks and benefits discussed, surgical consent, monitors and equipment checked, pre-op evaluation and timeout performed Prep: Pt Position: supine Sterile barriers:cap, gloves, mask and washed/disinfected hands Prep: ChloraPrep Patient monitoring: blood pressure monitoring, continuous pulse oximetry and EKG Procedure Performed under: general Guidance:ultrasound guided ULTRASOUND INTERPRETATION. Using ultrasound guidance a 20 G gauge needle was placed in close proximity to the nerve, at which point, under ultrasound guidance anesthetic was injected in the area of the nerve and spread of the anesthesia was seen on ultrasound in close proximity thereto.  There were no abnormalities seen on ultrasound; a digital image was taken; and the patient tolerated the procedure with no complications. Images:still images obtained, printed/placed on chart Laterality:left Block Type:fascia iliaca compartment Injection Technique:single-shot Needle Type:echogenic and short-bevel Needle Gauge:18 G Resistance on Injection: none Catheter Size:20 G (20g) Medications Used: bupivacaine PF (MARCAINE) 0.25 % injection - Injection 25 mL - 5/2/2023 5:18:00 PM Post Assessment Injection Assessment: negative aspiration for heme, no paresthesia on injection and incremental injection Patient Tolerance:comfortable throughout block Complications:no Additional Notes CKAFASCIAILIACA: SINGLE shot A high-frequency linear transducer, with sterile cover, was placed in parasagittal plane on top of the Anterior Superior Iliac Spine (ASIS) and moved medially to identify the Internal Oblique muscle, Sartorius muscle, Iliacus Muscle, Fascia Iliaca (FI) and Fascia Latae. The insertion site was prepped and draped in sterile fashion. Skin and cutaneous tissue was infiltrated with 2-5 ml of 1% Lidocaine.  "Using ultrasound-guidance, a 20-gauge B-Beach 4\" Ultraplex 360 non-stimulating echogenic needle was advanced in plane from caudad to cephalad. Preservative-free normal saline was utilized for hydro-dissection of tissue, advancement of needle, and to confirm final needle placement below FI. Local anesthetic in incremental 3-5 ml injections. Aspiration every 5 ml to prevent intravascular injection. Injection was completed with negative aspiration of blood and negative intravascular injection. Injection pressures were normal with minimal resistance.     Peripheral Block    Result Date: 5/2/2023  Eduardo Ryan MD     5/2/2023  5:39 PM Peripheral Block Patient reassessed immediately prior to procedure Start time: 5/2/2023 3:54 PM Stop time: 5/2/2023 4:00 PM Reason for block: at surgeon's request and post-op pain management Performed by Anesthesiologist: Eduardo Ryan MD CRNA/CAA: Maco Stephenson CRNA Preanesthetic Checklist Completed: patient identified, IV checked, site marked, risks and benefits discussed, surgical consent, monitors and equipment checked, pre-op evaluation and timeout performed Prep: Pt Position: supine Sterile barriers:cap, gloves, mask and washed/disinfected hands Prep: ChloraPrep Patient monitoring: blood pressure monitoring, continuous pulse oximetry and EKG Procedure Performed under: local infiltration Guidance:ultrasound guided ULTRASOUND INTERPRETATION.  Using ultrasound guidance a 20 G gauge needle was placed in close proximity to the nerve, at which point, under ultrasound guidance anesthetic was injected in the area of the nerve and spread of the anesthesia was seen on ultrasound in close proximity thereto.  There were no abnormalities seen on ultrasound; a digital image was taken; and the patient tolerated the procedure with no complications. Images:still images obtained, printed/placed on chart Block Type:fascia iliaca compartment Injection Technique:catheter Needle Type:echogenic " "and Tuohy Needle Gauge:18 G Resistance on Injection: none Catheter Size:20 G (20g) Medications Used: bupivacaine PF (MARCAINE) 0.25 % injection - Injection  50 mL - 5/2/2023 3:54:00 PM Post Assessment Injection Assessment: negative aspiration for heme, no paresthesia on injection and incremental injection Patient Tolerance:comfortable throughout block Complications:no Additional Notes CKAFASCIAILIACA: CATHETER A high-frequency linear transducer, with sterile cover, was placed in parasagittal plane on top of the Anterior Superior Iliac Spine (ASIS) and moved medially to identify the Internal Oblique muscle, Sartorius muscle, Iliacus Muscle, Fascia Iliaca (FI) and Fascia Latae. The insertion site was prepped and draped in sterile fashion. Skin and cutaneous tissue was infiltrated with 2-5 ml of 1% Lidocaine. Using ultrasound-guidance, an 18-gauge Contiplex Ultra 360 Touhy needle was advanced in plane from caudad to cephalad. Preservative-free normal saline was utilized for hydro-dissection of tissue, advancement of Touhy, and to confirm final needle placement below FI. Local anesthetic in incremental 3-5 ml injections. Aspiration every 5 ml to prevent intravascular injection. Injection was completed with negative aspiration of blood and negative intravascular injection. Injection pressures were normal with minimal resistance. A 20-gauge Contiplex Echo catheter was placed through the needle and advance out the tip of the Touhy 3-5 cm. The Touhy needle was then removed, and final catheter position verified below the FI. The catheter was secured in the usual fashion with skin glue, benzoin, steri-strips, CHG tegaderm and Label noting \"Nerve Block Catheter\". Jerk tape applied at yellow connector and catheter connection.     FL C Arm During Surgery    Result Date: 5/2/2023  FL C ARM DURING SURGERY Date of Exam: 5/2/2023 5:09 PM EDT Indication: FEMORAL NECK OPEN REDUCTION INTERNAL FIXATION. Comparison: None available. " Technique: Fluoroscopy in OR without radiologist present. Fluoroscopic Time: 1 minute 36 seconds Findings: 2 images are submitted. Imaging demonstrates dynamic screw in the proximal femur. Please see the operating physician report.     Impression: Fluoroscopy in OR without radiologist present. Electronically Signed: Elizabeth Gallagher  5/2/2023 7:18 PM EDT  Workstation ID: [id number]    XR Hip With or Without Pelvis 2 - 3 View Left    Result Date: 5/2/2023  XR HIP W OR WO PELVIS 2-3 VIEW LEFT Date of Exam: 5/2/2023 6:50 PM EDT Indication: Left hip fracture, status post open reduction and internal fixation Comparison: Pelvis x-ray performed on 5/2/2023 at 1432 hours. Findings: There has been interval surgical fixation of the subcapital fracture of the left femoral neck. The hardware is intact. There is soft tissue gas and swelling consistent with expected postsurgical changes. There are mild arthritic changes involving both hip joints, the pubic symphysis, and the sacroiliac joints. There are degenerative changes in the lower lumbar spine. The pelvic bony structures are demineralized.     Impression: Status post open reduction and internal fixation of a subcapital fracture of the left femoral neck as described. Electronically Signed: Gilbertdeb Abdi  5/2/2023 7:24 PM EDT  Workstation ID: [id number]              Results for orders placed during the hospital encounter of 01/24/23    Adult Transthoracic Echo Complete W/ Cont if Necessary Per Protocol    Interpretation Summary  •  Left ventricular systolic function is normal. Left ventricular ejection fraction appears to be 51 - 55%.  •  Left ventricular diastolic function is consistent with (grade I) impaired relaxation.  •  Estimated right ventricular systolic pressure from tricuspid regurgitation is normal (<35 mmHg).      Plan for Follow-up of Pending Labs/Results:     Discharge Details        Discharge Medications      New Medications      Instructions Start Date   aspirin 81 MG EC  tablet  Replaces: aspirin 325 MG tablet   81 mg, Oral, Every 12 Hours Scheduled      naloxone 4 MG/0.1ML nasal spray  Commonly known as: NARCAN   Call 911. Don't prime. [place] in 1 nostril for overdose. Repeat in 2-3 minutes in other nostril if no or minimal breathing/responsiveness.         Changes to Medications      Instructions Start Date   Cyanocobalamin 1000 MCG/ML kit  What changed:   · how much to take  · how to take this  · when to take this  · additional instructions   Inject 1,000mcg SC daily x1 week, then weekly x4 weeks.      docusate sodium 100 MG capsule  Commonly known as: COLACE  What changed:   · when to take this  · additional instructions   100 mg, Oral, 2 Times Daily      oxyCODONE 5 MG immediate release tablet  Commonly known as: ROXICODONE  What changed: how much to take   5 mg, Oral, Every 4 Hours PRN         Continue These Medications      Instructions Start Date   DULoxetine 20 MG capsule  Commonly known as: Cymbalta   20 mg, Oral, 2 Times Daily      esomeprazole 20 MG capsule  Commonly known as: nexIUM   20 mg, Oral, 2 Times Weekly, OTC      gabapentin 300 MG capsule  Commonly known as: Neurontin   300 mg, Oral, 2 Times Daily      hydrALAZINE 25 MG tablet  Commonly known as: APRESOLINE   25 mg, 2 Times Daily      levothyroxine 25 MCG tablet  Commonly known as: SYNTHROID, LEVOTHROID   25 mcg, Oral, Every Early Morning      magnesium oxide 400 (241.3 Mg) MG tablet tablet  Commonly known as: MAGOX   400 mg, Oral, Daily, OTC      metoprolol succinate XL 25 MG 24 hr tablet  Commonly known as: TOPROL-XL   25 mg, Oral, Nightly      nitroglycerin 0.4 MG SL tablet  Commonly known as: NITROSTAT   0.4 mg, Sublingual, Every 5 Minutes PRN, Take no more than 3 doses in 15 minutes.      OLANZapine 5 MG tablet  Commonly known as: ZyPREXA   5 mg, Oral, Nightly, Take on days 2, 3 and 4 after chemotherapy.      ondansetron 8 MG tablet  Commonly known as: ZOFRAN   8 mg, Oral, 3 Times Daily PRN       prochlorperazine 10 MG tablet  Commonly known as: COMPAZINE   10 mg, Oral, Every 4 Hours PRN      rosuvastatin 5 MG tablet  Commonly known as: Crestor   5 mg, Oral, Daily         Stop These Medications    aspirin 325 MG tablet  Replaced by: aspirin 81 MG EC tablet     ondansetron ODT 4 MG disintegrating tablet  Commonly known as: ZOFRAN-ODT            Allergies   Allergen Reactions   • Lisinopril Angioedema   • Metal Rash     Possible nickel -   Gold is only metal that doesn't have issues     • Milk-Related Compounds Other (See Comments)     LACTOSE INTOLERANT   • Tylenol [Acetaminophen] Other (See Comments)     ckd   • Atorvastatin Myalgia         Discharge Disposition:  Skilled Nursing Facility (DC - External)    Diet:  Hospital:  Diet Order   Procedures   • Diet: Regular/House Diet; Texture: Regular Texture (IDDSI 7); Fluid Consistency: Thin (IDDSI 0)       Activity:      Restrictions or Other Recommendations:         CODE STATUS:    Code Status and Medical Interventions:   Ordered at: 05/02/23 1944     Code Status (Patient has no pulse and is not breathing):    CPR (Attempt to Resuscitate)     Medical Interventions (Patient has pulse or is breathing):    Full Support     Release to patient:    Routine Release       Future Appointments   Date Time Provider Department Center   5/10/2023 11:00 AM Cassie Morin APRN [facility]   5/23/2023 10:45 AM Liana So APRN [facility]   6/12/2023  1:40 PM Juanpablo Lee MD [facility]   6/19/2023  1:00 PM Anjelica Dyer APRN [facility]   8/10/2023  2:15 PM Caden Dietz MD [facility] N [facility]   8/24/2023 10:30 AM Franklin Hamm APRN [facility] None   10/9/2023 10:15 AM Gray Bahena MD [facility]       Additional Instructions for the Follow-ups that You Need to Schedule     Discharge Follow-up with PCP   As directed       Currently Documented PCP:    Liana So APRN    PCP Phone Number:     [phone]     Follow Up Details: with PCP 1 week after discharged from rehab         Discharge Follow-up with Specified Provider: 1 Month   As directed      Follow Up: 1 Month    Follow Up Details: Call [phone] for appointment.                     Juan Manuel Vanegas MD  05/06/23      Time Spent on Discharge:  I spent  39 minutes on this discharge activity which included: face-to-face encounter with the patient, reviewing the data in the system, coordination of the care with the nursing staff as well as consultants, documentation, and entering orders.            Electronically signed by Juan Manuel Vanegas MD at 05/06/23 1080

## 2023-05-10 ENCOUNTER — OFFICE VISIT (OUTPATIENT)
Dept: PSYCHIATRY | Facility: CLINIC | Age: 68
End: 2023-05-10
Payer: MEDICARE

## 2023-05-10 DIAGNOSIS — F33.0 MILD EPISODE OF RECURRENT MAJOR DEPRESSIVE DISORDER: Primary | ICD-10-CM

## 2023-05-10 PROCEDURE — 99443 PR PHYS/QHP TELEPHONE EVALUATION 21-30 MIN: CPT | Performed by: NURSE PRACTITIONER

## 2023-05-10 PROCEDURE — 1159F MED LIST DOCD IN RCRD: CPT | Performed by: NURSE PRACTITIONER

## 2023-05-10 PROCEDURE — 1160F RVW MEDS BY RX/DR IN RCRD: CPT | Performed by: NURSE PRACTITIONER

## 2023-05-10 NOTE — PROGRESS NOTES
THERAPY PROGRESS NOTE  05/10/23    Laura Sierra is a single retired 68 y.o. female who met with the undersigned for a scheduled individual outpatient therapy session,You have chosen to receive care through a telephone visit. Do you consent to use a telephone visit for your medical care today? Yes  She is in [facility] for rehab s/p hip fracture and surgical repair 5/2/2023.          Endometrial adenocarcinoma (HCC) Staging form: Corpus Uteri - Carcinoma And Carcinosarcoma, AJCC 8th Edition  - Pathologic stage from 10/28/2022: FIGO Stage IIIA (pT3a, pN0(sn), cM0) - Signed by Jasmine Rich MD on 11/12/2022      Antineoplastic chemotherapy induced anemia      Stage 3a chronic kidney disease (HCC)      Neuropathy    Left femoral neck fracture Fall  --s/p closed reduction and internal fixation, valgus impacted left femoral neck fracture on 5/2/23 by Dr. Lee  --PT/OT in rehab center at [facility] CURRENTLY  --weightbearing as tolerated with a walker per Dr. Lee       Chief Complaint: MDD, stress    Therapy:  Start Time:1058    Stop Time:1127     (30 ) minutes was spent for psychotherapy. Assisted patient in processing patient's MDD. Acknowledged and normalized patient's thoughts, feelings, and concerns. Utilized cognitive behavioral therapy to assist the patient in recognizing more appropriate coping mechanisms when she becomes depressed, sad, anxious which are proven effective in reducing the severity of frequency of symptoms.     CLINICAL MANEUVERING/INTERVENTION:   Patient talked about current stressors, primarily having a difficult time dealing with fractured hip and internal fixation, now in rehab. Venting of frustrations was conducted. Feelings were processed and validated, both negative and positive. Flushing out worries and concerns was conducted in order to diminish emotional tension. Processing current treatment , physical discomfort was conducted. Ways in which patient may take  stress off  herself in a purposeful manner was discussed. Patient was assisted in 'talking out' what she may do if health continues to be a challenge, keeping in mind the notion that there is typically a solution to any given problem. Utilized motivational interviewing techniques including complex reflections to attempt to assist the patient and focusing on the positive and to maintain and encourage calming outlook. Sisters supportive.  The patient expressed gratitude for today's session and said that counseling helps feel better.        Appearance: appropriate  Hygiene:   good  Cooperation:  Cooperative  Eye Contact:  Good  Psychomotor Behavior:  No psychomotor agitation/retardation, No EPS, No motor tics  Mood:  within normal limits  Affect:  Appropriate  Hopelessness: Denies  Speech:  Normal  Thought Process:  Linear  Thought Content:  Normal  Concentration: Normal   Suicidal:  None  Homicidal:  None  Hallucinations:  None  Delusion:  None  Memory:  Intact  Orientation:  Person, Place, Time and Situation  Reliability:  good  Insight:  Fair  Judgement: good  Impulse Control: good  Estimated Intelligence: average range    ROYAL-7:    Over the last two weeks, how often have you been bothered by the following problems?  Feeling nervous, anxious or on edge: Several days  Not being able to stop or control worrying: Not at all  Worrying too much about different things: Not at all  Trouble Relaxing: Not at all  Being so restless that it is hard to sit still: Not at all  Becoming easily annoyed or irritable: Several days  Feeling afraid as if something awful might happen: Not at all  ROYAL 7 Total Score: 2  If you checked any problems, how difficult have these problems made it for you to do your work, take care of things at home, or get along with other people: Not difficult at all  0-4: Minimal anxiety  5-9: Mild anxiety  10-14: Moderate anxiety  15-21: Severe anxiety  PHQ-9:      5/10/2023    11:00 AM   PHQ-2/PHQ-9  Depression Screening   Little Interest or Pleasure in Doing Things 0-->not at all   Feeling Down, Depressed or Hopeless 1-->several days   PHQ-9: Brief Depression Severity Measure Score 1      5-9: Minimal symptoms  10-14: Mild depression  15-19: Moderate depression  Greater than 20: Major depression sever    ASSESSMENT: MDD, stressed    PATIENTS SUPPORT NETWORK INCLUDES: sisters  FUNCTIONAL STATUS: recovering from left hip fracture repair, has walker  PROGNOSIS: FAIR WITH ONGOING TREATMENT    PLAN:    Patient will continue therapy and  adhere to medication regimen as prescribed. Provide Cognitive Behavioral Therapy and Solution Focused Therapy to improve functioning, maintain stability, and avoid decompensation and the need for higher level of care. Instructed to call for questions or concerns and return early if necessary.      Return in about 4 weeks (around 6/7/2023).

## 2023-05-23 ENCOUNTER — TELEPHONE (OUTPATIENT)
Dept: INTERNAL MEDICINE | Facility: CLINIC | Age: 68
End: 2023-05-23

## 2023-05-23 NOTE — TELEPHONE ENCOUNTER
VERONICA FROM [facility] VERBAL ORDERS FOR     PT 1 WEEK 7   OT 1 WEEK 3   MEDICAL SOCIAL WORKER EVALUATION.    PHONE: [phone]

## 2023-05-23 NOTE — TELEPHONE ENCOUNTER
Caller: FÉLIX MORTON    Relationship: Home Health    Best call back number: [phone]    What orders are you requesting (i.e. lab or imaging): PHYSICAL THERAPY AND OCCUPATIONAL THERAPY    In what timeframe would the patient need to come in: ASAP    Where will you receive your lab/imaging services: AT HOME    Additional notes: FÉLIX FROM [facility] CALLED NEEDING VERBAL ORDERS FOR PHYSICAL AND OCCUPATIONAL THERAPY. PATIENT DOES NOT NEED NURSING ORDERS.

## 2023-05-31 ENCOUNTER — OFFICE VISIT (OUTPATIENT)
Dept: INTERNAL MEDICINE | Facility: CLINIC | Age: 68
End: 2023-05-31

## 2023-05-31 VITALS
DIASTOLIC BLOOD PRESSURE: 68 MMHG | HEART RATE: 100 BPM | BODY MASS INDEX: 21.45 KG/M2 | WEIGHT: 125 LBS | RESPIRATION RATE: 16 BRPM | SYSTOLIC BLOOD PRESSURE: 118 MMHG | TEMPERATURE: 97.5 F | OXYGEN SATURATION: 98 %

## 2023-05-31 DIAGNOSIS — I10 PRIMARY HYPERTENSION: ICD-10-CM

## 2023-05-31 DIAGNOSIS — Z78.0 POST-MENOPAUSAL: ICD-10-CM

## 2023-05-31 DIAGNOSIS — R80.9 PROTEINURIA, UNSPECIFIED TYPE: Primary | ICD-10-CM

## 2023-05-31 DIAGNOSIS — E03.9 HYPOTHYROIDISM, UNSPECIFIED TYPE: ICD-10-CM

## 2023-05-31 DIAGNOSIS — M85.80 OSTEOPENIA, UNSPECIFIED LOCATION: ICD-10-CM

## 2023-05-31 PROCEDURE — 3078F DIAST BP <80 MM HG: CPT | Performed by: NURSE PRACTITIONER

## 2023-05-31 PROCEDURE — 1160F RVW MEDS BY RX/DR IN RCRD: CPT | Performed by: NURSE PRACTITIONER

## 2023-05-31 PROCEDURE — 3074F SYST BP LT 130 MM HG: CPT | Performed by: NURSE PRACTITIONER

## 2023-05-31 PROCEDURE — 1159F MED LIST DOCD IN RCRD: CPT | Performed by: NURSE PRACTITIONER

## 2023-05-31 NOTE — ASSESSMENT & PLAN NOTE
- order placed for DEXA bone density scan  - recommended vitamin D 800 units daily, calcium 1200mg/ day, patient is going to discuss with nephrologist about these

## 2023-05-31 NOTE — PROGRESS NOTES
Answers for HPI/ROS submitted by the patient on 2023  Please describe your symptoms.: Wellness check 6 months  Have you had these symptoms before?: No  How long have you been having these symptoms?: 1-4 days  What is the primary reason for your visit?: Other         Follow Up Office Visit      Date: 2023   Patient Name: Alysia Sierra  : 1955   MRN: [id number]     Chief Complaint:    Chief Complaint   Patient presents with   • Hypothyroidism, HTN, recent fall with femoral neck fracture, stage III endometrial cancer      Follow up           History of Present Illness: Alysia Sierra is a 68 y.o. female who is here today to follow up with hypertension.     Proteinuria  Patient is followed by nephrology at [place] and would like to transfer to [facility]  She was originally referred to nephrology for proteinuria    Endometrial adenocarcinoma  Patient had total hysterectomy is 10/2022  patient did 6 rounds of chemotherapy  blood transfusion required  cancer in remission    Broken hip  patient fell and had a closed fracture of the left hip on 2023  Dr. Lee performed surgery on 2023  patient was discharged from [facility] on 2023  patient received inpatient rehabilitation at [facility]  patient is now receiving physical therapy at home with [facility]     Rash  patient has 4 pinpoint red spots on top of her head  patient has a dried, partially healed rash on her left flank area  rash resembles Shingles  patient has had Zoster vaccination  patient has been treating pinpoint spots with Neosporin  Subjective      Review of Systems:   Review of Systems   Constitutional: Negative.    Respiratory: Negative.    Cardiovascular: Negative.    Neurological: Negative for dizziness, weakness and light-headedness.       I have reviewed the patients family history, social history, past medical history, past surgical history and have updated it as appropriate.     Medications:      Current Outpatient Medications:   •  aspirin 81 MG EC tablet, Take 1 tablet by mouth Every 12 (Twelve) Hours. Indications: VTE Prophylaxis, Disp: , Rfl:   •  Cyanocobalamin 1000 MCG/ML kit, Inject 1,000mcg SC daily x1 week, then weekly x4 weeks. (Patient taking differently: Inject 1,000 mcg under the skin into the appropriate area as directed Every 30 (Thirty) Days.), Disp: 1 kit, Rfl: 0  •  docusate sodium (COLACE) 100 MG capsule, Take 1 capsule by mouth 2 (Two) Times a Day., Disp: 60 capsule, Rfl: 0  •  DULoxetine (Cymbalta) 20 MG capsule, Take 1 capsule by mouth 2 (Two) Times a Day., Disp: 60 capsule, Rfl: 6  •  esomeprazole (nexIUM) 20 MG capsule, Take 1 capsule by mouth 2 (Two) Times a Week. OTC, Disp: , Rfl:   •  gabapentin (Neurontin) 300 MG capsule, Take 1 capsule by mouth 2 (Two) Times a Day for 30 days., Disp: 6 capsule, Rfl: 0  •  hydrALAZINE (APRESOLINE) 25 MG tablet, 1 tablet 2 (Two) Times a Day., Disp: , Rfl:   •  levothyroxine (SYNTHROID, LEVOTHROID) 25 MCG tablet, Take 1 tablet by mouth Every Morning., Disp: 90 tablet, Rfl: 1  •  magnesium oxide (MAGOX) 400 (241.3 Mg) MG tablet tablet, Take 1 tablet by mouth Daily. OTC, Disp: , Rfl:   •  metoprolol succinate XL (TOPROL-XL) 25 MG 24 hr tablet, Take 1 tablet by mouth Every Night., Disp: 90 tablet, Rfl: 1  •  naloxone (NARCAN) 4 MG/0.1ML nasal spray, Call 911. Don't prime. [place] in 1 nostril for overdose. Repeat in 2-3 minutes in other nostril if no or minimal breathing/responsiveness., Disp: 2 each, Rfl: 0  •  nitroglycerin (NITROSTAT) 0.4 MG SL tablet, Place 1 tablet under the tongue Every 5 (Five) Minutes As Needed for Chest Pain. Take no more than 3 doses in 15 minutes., Disp: , Rfl:   •  ondansetron (ZOFRAN) 8 MG tablet, Take 1 tablet by mouth 3 (Three) Times a Day As Needed for Nausea or Vomiting., Disp: 30 tablet, Rfl: 5  •  prochlorperazine (COMPAZINE) 10 MG tablet, Take 1 tablet by mouth Every 4 (Four) Hours As Needed for Nausea or  Vomiting., Disp: 30 tablet, Rfl: 5  •  rosuvastatin (Crestor) 5 MG tablet, Take 1 tablet by mouth Daily., Disp: 90 tablet, Rfl: 1    Allergies:   Allergies   Allergen Reactions   • Lisinopril Angioedema   • Metal Rash     Possible nickel -   Gold is only metal that doesn't have issues     • Milk-Related Compounds Other (See Comments)     LACTOSE INTOLERANT   • Tylenol [Acetaminophen] Other (See Comments)     ckd   • Atorvastatin Myalgia       Objective     Physical Exam: Please see above  Vital Signs:   Vitals:    05/31/23 1339   BP: 118/68   Pulse: 100   Resp: 16   Temp: 97.5 °F (36.4 °C)   SpO2: 98%   Weight: 56.7 kg (125 lb)   PainSc: 0-No pain     Body mass index is 21.45 kg/m².    Physical Exam  Vitals and nursing note reviewed.   Constitutional:       General: She is not in acute distress.     Appearance: Normal appearance. She is normal weight. She is not ill-appearing.   HENT:      Mouth/Throat:      Mouth: Mucous membranes are moist.      Pharynx: Oropharynx is clear.   Neck:      Thyroid: No thyromegaly.   Cardiovascular:      Rate and Rhythm: Normal rate and regular rhythm.      Heart sounds: Normal heart sounds.   Pulmonary:      Effort: Pulmonary effort is normal. No respiratory distress.      Breath sounds: Normal breath sounds.   Musculoskeletal:      Cervical back: Neck supple.      Right lower leg: No edema.      Left lower leg: No edema.   Skin:     Findings: Rash (left flank area-- consistent with crusted over shingles ) present.          Neurological:      Mental Status: She is alert and oriented to person, place, and time.      Motor: No weakness.           Results:   Imaging:     Labs:       Assessment / Plan      Assessment/Plan:   Diagnoses and all orders for this visit:    1. Proteinuria, unspecified type   -     Ambulatory Referral to Nephrology, pt wants to transfer care to [facility]     2. Primary hypertension  -controlled, cont current regimen      3. Hypothyroidism, unspecified  type  -continue synthroid 25mcg  -TSH WNL earlier this month    4. Post-menopausal  5. Osteopenia     -     DEXA Bone Density Axial  -recommended vitamin D 800 units daily, calcium 1200mg/ day-- she is going to touch base with nephrologist about these          Follow Up:   Return in about 5 months (around 10/31/2023).    ANETTE Lamb  [facility] [place]   Transcribed from ambient dictation for ANETTE Cline by Nicki Arriaga.  05/31/23   15:56 EDT    Patient or patient representative verbalized consent to the visit recording.  I have personally performed the services described in this document as transcribed by the above individual, and it is both accurate and complete.

## 2023-06-06 ENCOUNTER — TELEPHONE (OUTPATIENT)
Dept: INTERNAL MEDICINE | Facility: CLINIC | Age: 68
End: 2023-06-06
Payer: MEDICARE

## 2023-06-06 NOTE — TELEPHONE ENCOUNTER
[facility]AND[facility] CALLING FOR VERBAL ORDERS TO DISCONTINUE NURSING HOME HEALTH.    THEY WILL CONTINUE TO SEE THE PATIENT FOR THERAPY.     PLEASE CALL HER BACK -[phone]

## 2023-06-12 ENCOUNTER — OFFICE VISIT (OUTPATIENT)
Dept: ORTHOPEDIC SURGERY | Facility: CLINIC | Age: 68
End: 2023-06-12
Payer: MEDICARE

## 2023-06-12 VITALS — TEMPERATURE: 97.8 F

## 2023-06-12 DIAGNOSIS — S72.042D CLOSED FRACTURE OF BASE OF FEMORAL NECK WITH ROUTINE HEALING, LEFT: ICD-10-CM

## 2023-06-12 DIAGNOSIS — Z09 SURGERY FOLLOW-UP: Primary | ICD-10-CM

## 2023-06-12 PROCEDURE — 99024 POSTOP FOLLOW-UP VISIT: CPT | Performed by: ORTHOPAEDIC SURGERY

## 2023-06-12 NOTE — PROGRESS NOTES
[facility] Note    Subjective     Chief Complaint   Patient presents with   • Post-op Follow-up     1 month S/P Closed reduction and internal fixation with femoral neck (5/3/23)        HPI    It has been 6  week(s) since Ms. Sierra's last visit. She returns to clinic today for postoperative follow-up of left hip fracture fixation. The issue has been ongoing for 6 week(s). She rates her pain a 2/10 on the pain scale. Previous/current treatments: cane/walker and physical therapy. Current symptoms: pain. The pain is worse with lying on affected side; heat and elevating the extremity improve the pain. Overall, she is doing better.  Ambulating with the aid of a walker.    I have reviewed the following portions of the patient's history and agree with: History of Present Illness and Review of Systems    Patient Active Problem List   Diagnosis   • Hypertension   • Paroxysmal SVT (supraventricular tachycardia)   • Leg weakness, bilateral   • Syncope   • CKD (chronic kidney disease) stage 3, GFR 30-59 ml/min   • Circadian rhythm sleep disorder, advanced sleep phase type   • Neuropathy   • Hyperlipidemia LDL goal <100   • Endometrial adenocarcinoma   • Mild episode of recurrent major depressive disorder   • Dizziness   • Hip fracture   • Anemia   • Hypomagnesemia   • Proteinuria   • Hypothyroidism   • Post-menopausal     Past Medical History:   Diagnosis Date   • Allergic lisinopril  2017   • Anemia    • Arrhythmia    • Arthritis    • Cataract mild 2020    stil lpresent   • Chicken pox    • Chronic fatigue    • CKD (chronic kidney disease), stage III     sees nephro   • Dental root implant present     lower left  x1 - possible dental implant   • Depression mild 2019   • Difficulty walking 2019   • Disease of thyroid gland    • Dizzy    • NIEVES (dyspnea on exertion)     2017   • Fracture of hip 2023   • Generalized anxiety disorder    • GERD (gastroesophageal reflux disease) 2018   • History of  brachytherapy 2023    vaginal brachytherapy   • Hyperlipidemia    • Hypertension    • Iron deficiency anemia    • Liver disease     fatty   • Liver problem    • Measles    • Menopause    • Mumps    • Neuromuscular disorder Peripheral Neuropathy   • Orthostatic hypotension    • 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   • Renal insufficiency    • Scoliosis    • Unintentional weight loss    • Uses contact lenses     bilat   • Uterine cancer    • Uterine cancer 2022   • UTI (urinary tract infection)    • Visual impairment Nearsighted   • Wears glasses       Past Surgical History:   Procedure Laterality Date   •  SECTION     • DILATION AND CURETTAGE, DIAGNOSTIC / THERAPEUTIC     • HIP OPEN REDUCTION Left 2023    Procedure: FEMORAL NECK OPEN REDUCTION INTERNAL FIXATION LEFT;  Surgeon: Juanpablo Lee MD;  Location:  [facility];  Service: Orthopedics;  Laterality: Left;   • HIP SURGERY     • OOPHORECTOMY     • ORAL LESION EXCISION/BIOPSY  2021   • TOTAL LAPAROSCOPIC HYSTERECTOMY SALPINGO OOPHORECTOMY N/A 10/28/2022    Procedure: TOTAL LAPAROSCOPIC HYSTERECTOMY BILATERAL SALPINGO-OOPHORECTOMY, INJECTION FOR SENTINEL LYMPH NODE MAPPING, BILATERAL SENTINEL LYMPH NODE DISSECTION WITH DAVINCI ROBOT;  Surgeon: Jasmine Rich MD;  Location:  [facility];  Service: Robotics - DaVinci;  Laterality: N/A;   • TUBAL ABDOMINAL LIGATION        Family History   Problem Relation Age of Onset   • Spondylolisthesis Mother    • COPD Mother    • Stroke Mother    • Hypertension Mother    • Lung cancer Father    • Hypertension Father    • Heart attack Father    • Coronary artery disease Father    • Heart disease Father    • Cancer Father    • Lung disease Father    • Hyperlipidemia Sister    • Rheum arthritis Sister    • Malig Hypertension Sister    • Osteoarthritis Sister    • Other Sister         alcoholic   • Hypertension Sister    • Scoliosis Sister    • Hypertension Brother     • Depression Sister    • Early death Sister    • Breast cancer Neg Hx    • Ovarian cancer Neg Hx    • Uterine cancer Neg Hx    • Colon cancer Neg Hx    • Melanoma Neg Hx    • Prostate cancer Neg Hx      Social History     Socioeconomic History   • Marital status:    • Number of children: 1   • Highest education level: Associate degree: academic program   Tobacco Use   • Smoking status: Never   • Smokeless tobacco: Never   • Tobacco comments:     Never   Vaping Use   • Vaping Use: Never used   Substance and Sexual Activity   • Alcohol use: Yes     Alcohol/week: 6.0 standard drinks     Types: 6 Glasses of wine per week     Comment: social   • Drug use: No   • Sexual activity: Not Currently     Partners: Male     Birth control/protection: Surgical, Post-menopausal      Current Outpatient Medications on File Prior to Visit   Medication Sig Dispense Refill   • aspirin 81 MG EC tablet Take 1 tablet by mouth Every 12 (Twelve) Hours. Indications: VTE Prophylaxis     • docusate sodium (COLACE) 100 MG capsule Take 1 capsule by mouth 2 (Two) Times a Day. 60 capsule 0   • DULoxetine (Cymbalta) 20 MG capsule Take 1 capsule by mouth 2 (Two) Times a Day. 60 capsule 6   • esomeprazole (nexIUM) 20 MG capsule Take 1 capsule by mouth 2 (Two) Times a Week. OTC     • hydrALAZINE (APRESOLINE) 25 MG tablet 1 tablet 2 (Two) Times a Day.     • levothyroxine (SYNTHROID, LEVOTHROID) 25 MCG tablet Take 1 tablet by mouth Every Morning. 90 tablet 1   • magnesium oxide (MAGOX) 400 (241.3 Mg) MG tablet tablet Take 1 tablet by mouth Daily. OTC     • metoprolol succinate XL (TOPROL-XL) 25 MG 24 hr tablet Take 1 tablet by mouth Every Night. 90 tablet 1   • naloxone (NARCAN) 4 MG/0.1ML nasal spray Call 911. Don't prime. [place] in 1 nostril for overdose. Repeat in 2-3 minutes in other nostril if no or minimal breathing/responsiveness. 2 each 0   • nitroglycerin (NITROSTAT) 0.4 MG SL tablet Place 1 tablet under the tongue Every 5 (Five) Minutes  As Needed for Chest Pain. Take no more than 3 doses in 15 minutes.     • ondansetron (ZOFRAN) 8 MG tablet Take 1 tablet by mouth 3 (Three) Times a Day As Needed for Nausea or Vomiting. 30 tablet 5   • prochlorperazine (COMPAZINE) 10 MG tablet Take 1 tablet by mouth Every 4 (Four) Hours As Needed for Nausea or Vomiting. 30 tablet 5   • rosuvastatin (Crestor) 5 MG tablet Take 1 tablet by mouth Daily. 90 tablet 1   • gabapentin (Neurontin) 300 MG capsule Take 1 capsule by mouth 2 (Two) Times a Day for 30 days. 6 capsule 0   • [DISCONTINUED] Cyanocobalamin 1000 MCG/ML kit Inject 1,000mcg SC daily x1 week, then weekly x4 weeks. (Patient taking differently: Inject 1,000 mcg under the skin into the appropriate area as directed Every 30 (Thirty) Days.) 1 kit 0     No current facility-administered medications on file prior to visit.      Allergies   Allergen Reactions   • Lisinopril Angioedema   • Metal Rash     Possible nickel -   Gold is only metal that doesn't have issues     • Milk-Related Compounds Other (See Comments)     LACTOSE INTOLERANT   • Tylenol [Acetaminophen] Other (See Comments)     ckd   • Atorvastatin Myalgia        Review of Systems   Constitutional:  Negative for activity change, appetite change, chills, diaphoresis, fatigue, fever and unexpected weight change.   HENT:  Negative for congestion, dental problem, drooling, ear discharge, ear pain, facial swelling, hearing loss, mouth sores, nosebleeds, postnasal drip, rhinorrhea, sinus pressure, sneezing, sore throat, tinnitus, trouble swallowing and voice change.    Eyes:  Negative for photophobia, pain, discharge, redness, itching and visual disturbance.   Respiratory:  Negative for apnea, cough, choking, chest tightness, shortness of breath, wheezing and stridor.    Cardiovascular:  Negative for chest pain, palpitations and leg swelling.   Gastrointestinal:  Negative for abdominal distention, abdominal pain, anal bleeding, blood in stool, constipation,  diarrhea, nausea, rectal pain and vomiting.   Endocrine: Negative for cold intolerance, heat intolerance, polydipsia, polyphagia and polyuria.   Genitourinary:  Negative for decreased urine volume, difficulty urinating, dysuria, enuresis, flank pain, frequency, genital sores, hematuria and urgency.   Musculoskeletal:  Positive for arthralgias. Negative for back pain, gait problem, joint swelling, myalgias, neck pain and neck stiffness.   Skin:  Negative for color change, pallor, rash and wound.   Allergic/Immunologic: Negative for environmental allergies, food allergies and immunocompromised state.   Neurological:  Negative for dizziness, tremors, seizures, syncope, facial asymmetry, speech difficulty, weakness, light-headedness, numbness and headaches.   Hematological:  Negative for adenopathy. Does not bruise/bleed easily.   Psychiatric/Behavioral:  Negative for agitation, behavioral problems, confusion, decreased concentration, dysphoric mood, hallucinations, self-injury, sleep disturbance and suicidal ideas. The patient is not nervous/anxious and is not hyperactive.       Objective      Physical Exam  Temp 97.8 °F (36.6 °C)   LMP  (LMP Unknown)     There is no height or weight on file to calculate BMI.    General:   Mental Status:  Alert   Appearance: Cooperative, in no acute distress   Build and Nutrition: Thin female   Orientation: Alert and oriented to person, place and time   Posture: Normal   Gait: Nonantalgic a walker    Integument:   Left hip: Wound is well-healed with no signs of infection    Lower Extremity:   Left Hip:    Tenderness:  None    Swelling:  None    Crepitus:  None    Range of motion:  External Rotation: 30°       Internal Rotation: 30°       Flexion:  100°       Extension:  0°    Deformities:  None  Functional testing: Negative [facility]    Slightly short on the left compared to the right        Imaging/Studies  Imaging Results (Last 24 Hours)     Procedure Component Value Units  Date/Time    XR Hip With or Without Pelvis 2 - 3 View Left [[id number]] Resulted: 06/12/23 1418     Updated: 06/12/23 1419    Narrative:      Left Hip Radiographs  Indication: Follow-up closed reduction and internal fixation left impacted   femoral neck fracture  Views: low AP pelvis and lateral of the left hip    Comparison: 5/2/2023    Findings:   Hardware intact, with mild collapse/impaction of the fracture compared to   the previous imaging, with no other unusual bony features.            Assessment and Plan     Diagnoses and all orders for this visit:    1. Surgery follow-up (Primary)  -     XR Hip With or Without Pelvis 2 - 3 View Left    2. Closed fracture of base of femoral neck with routine healing, left        1. Surgery follow-up    2. Closed fracture of base of femoral neck with routine healing, left        I reviewed my findings with the patient.  She has had slight collapse at the fracture site, which is not unusual.  I would like to see her back in 6 weeks with a repeat x-ray, but I will be happy to see her back sooner for any problems.    Return in about 6 weeks (around 7/24/2023) for Recheck with X-Rays.      Juanpablo Lee MD  06/12/23  14:38 EDT

## 2023-07-21 ENCOUNTER — OFFICE VISIT (OUTPATIENT)
Dept: INTERNAL MEDICINE | Facility: CLINIC | Age: 68
End: 2023-07-21
Payer: MEDICARE

## 2023-07-21 VITALS
TEMPERATURE: 97.5 F | DIASTOLIC BLOOD PRESSURE: 60 MMHG | RESPIRATION RATE: 16 BRPM | HEART RATE: 98 BPM | HEIGHT: 64 IN | BODY MASS INDEX: 21.17 KG/M2 | SYSTOLIC BLOOD PRESSURE: 94 MMHG | WEIGHT: 124 LBS | OXYGEN SATURATION: 97 %

## 2023-07-21 DIAGNOSIS — K62.5 RECTAL BLEEDING: ICD-10-CM

## 2023-07-21 DIAGNOSIS — K64.9 HEMORRHOIDS, UNSPECIFIED HEMORRHOID TYPE: Primary | ICD-10-CM

## 2023-07-21 NOTE — PROGRESS NOTES
Answers submitted by the patient for this visit:  Other (Submitted on 2023)  Please describe your symptoms.: Rectal bleeding follow up  Have you had these symptoms before?: Yes  How long have you been having these symptoms?: 1-4 days  Primary Reason for Visit (Submitted on 2023)  What is the primary reason for your visit?: Other       Follow Up Office Visit      Date: 2023   Patient Name: Alysia Sierra  : 1955   MRN: [id number]     Chief Complaint:    Chief Complaint   Patient presents with    Hemorrhoids     Follow up       History of Present Illness: Alysia Sierra is a 68 y.o. female who is here today to follow up.    The patient reports she was able to obtain the hemorrhoid medication. She states the bleeding has ceased. She notes her bowel movements are regular. She reports having varicose veins, as did her mother.    The patient reports she almost passed out yesterday when attempting to stand up. She states she was sitting out in the sun and the neighbor helped her to stand. She notes she thinks it was due to low blood pressure. She reports several days ago not feeling well with a headache and her blood pressure was 154/114. She states she took her hydralazine and 2 aspirin, and her blood pressure slowly decreased to 136/90.      Subjective          I have reviewed the patients family history, social history, past medical history, past surgical history and have updated it as appropriate.     Medications:     Current Outpatient Medications:     aspirin 81 MG EC tablet, Take 1 tablet by mouth Every 12 (Twelve) Hours. Indications: VTE Prophylaxis, Disp: , Rfl:     docusate sodium (COLACE) 100 MG capsule, Take 1 capsule by mouth 2 (Two) Times a Day. (Patient taking differently: Take 1 capsule by mouth As Needed.), Disp: 60 capsule, Rfl: 0    DULoxetine (Cymbalta) 20 MG capsule, Take 1 capsule by mouth 2 (Two) Times a Day., Disp: 60 capsule, Rfl: 6    esomeprazole (nexIUM) 20 MG capsule,  "Take 1 capsule by mouth 2 (Two) Times a Week. OTC, Disp: , Rfl:     hydrALAZINE (APRESOLINE) 25 MG tablet, 1 tablet 2 (Two) Times a Day., Disp: , Rfl:     Hydrocortisone Ace-Pramoxine 1-1 % rectal cream, Insert  into the rectum 2 (Two) Times a Day., Disp: 30 g, Rfl: 1    levothyroxine (SYNTHROID, LEVOTHROID) 25 MCG tablet, Take 1 tablet by mouth Every Morning., Disp: 90 tablet, Rfl: 1    magnesium oxide (MAGOX) 400 (241.3 Mg) MG tablet tablet, Take 1 tablet by mouth Daily. OTC, Disp: , Rfl:     metoprolol succinate XL (TOPROL-XL) 25 MG 24 hr tablet, Take 1 tablet by mouth Every Night., Disp: 90 tablet, Rfl: 1    naloxone (NARCAN) 4 MG/0.1ML nasal spray, Call 911. Don't prime. [place] in 1 nostril for overdose. Repeat in 2-3 minutes in other nostril if no or minimal breathing/responsiveness., Disp: 2 each, Rfl: 0    nitroglycerin (NITROSTAT) 0.4 MG SL tablet, Place 1 tablet under the tongue Every 5 (Five) Minutes As Needed for Chest Pain. Take no more than 3 doses in 15 minutes., Disp: , Rfl:     rosuvastatin (Crestor) 5 MG tablet, Take 1 tablet by mouth Daily., Disp: 90 tablet, Rfl: 1    Alpha-Lipoic Acid 600 MG capsule, Take  by mouth., Disp: , Rfl:     gabapentin (Neurontin) 300 MG capsule, Take 1 capsule in the morning and 2 capsules at night., Disp: 90 capsule, Rfl: 6    Allergies:   Allergies   Allergen Reactions    Lisinopril Angioedema    Metal Rash     Possible nickel -   Gold is only metal that doesn't have issues      Milk-Related Compounds Other (See Comments)     LACTOSE INTOLERANT    Tylenol [Acetaminophen] Other (See Comments)     ckd    Atorvastatin Myalgia       Objective     Physical Exam: Please see above  Vital Signs:   Vitals:    07/21/23 1046   BP: 94/60   Pulse: 98   Resp: 16   Temp: 97.5 øF (36.4 øC)   SpO2: 97%   Weight: 56.2 kg (124 lb)   Height: 162.6 cm (64.02\")   PainSc: 0-No pain     Body mass index is 21.27 kg/mý.    Physical Exam  Vitals and nursing note reviewed.   Constitutional:       " General: She is not in acute distress.     Appearance: Normal appearance. She is normal weight. She is not ill-appearing.   HENT:      Head: Normocephalic.      Mouth/Throat:      Mouth: Mucous membranes are moist.      Pharynx: Oropharynx is clear.   Cardiovascular:      Rate and Rhythm: Normal rate and regular rhythm.      Heart sounds: Normal heart sounds.   Pulmonary:      Effort: Pulmonary effort is normal. No respiratory distress.      Breath sounds: Normal breath sounds.   Abdominal:      General: Abdomen is flat. Bowel sounds are normal. There is no distension.      Palpations: Abdomen is soft.      Tenderness: There is no abdominal tenderness. There is no guarding or rebound.   Skin:     General: Skin is warm and dry.      Capillary Refill: Capillary refill takes less than 2 seconds.   Neurological:      General: No focal deficit present.      Mental Status: She is alert and oriented to person, place, and time. Mental status is at baseline.      Motor: No weakness.   Psychiatric:         Mood and Affect: Mood normal.         Behavior: Behavior normal.         Thought Content: Thought content normal.         Judgment: Judgment normal.           Results:   Imaging:     Labs:       Latest Reference Range & Units 07/12/23 10:53   WBC 3.40 - 10.80 10*3/mm3 7.15   RBC 3.77 - 5.28 10*6/mm3 3.76 (L)   Hemoglobin 12.0 - 15.9 g/dL 12.4   Hematocrit 34.0 - 46.6 % 36.3   Platelets 140 - 450 10*3/mm3 197   RDW 12.3 - 15.4 % 15.0   MCV 79.0 - 97.0 fL 96.5   MCH 26.6 - 33.0 pg 33.0   MCHC 31.5 - 35.7 g/dL 34.2   MPV 6.0 - 12.0 fL 9.7   RDW-SD 37.0 - 54.0 fl 52.0   (L): Data is abnormally low    Assessment / Plan      Assessment/Plan:   Diagnoses and all orders for this visit:    1. Hemorrhoids, unspecified hemorrhoid type (Primary)  2. Rectal bleeding  -symptoms notably improved. Continue interventions and recommendations discussed on 7/12.   -continue hydrocortisone ace- pramoxine 1-1% rectal cream prn        Follow Up:    Return if symptoms worsen or fail to improve, for Next scheduled follow up.    ANETTE Lamb  [facility] [place]     Transcribed from ambient dictation for ANETTE Cline by Radha Celaya.  07/21/23   12:06 EDT    Patient or patient representative verbalized consent to the visit recording.  I have personally performed the services described in this document as transcribed by the above individual, and it is both accurate and complete.

## 2023-07-31 ENCOUNTER — OFFICE VISIT (OUTPATIENT)
Dept: ORTHOPEDIC SURGERY | Facility: CLINIC | Age: 68
End: 2023-07-31
Payer: MEDICARE

## 2023-07-31 VITALS — BODY MASS INDEX: 21.17 KG/M2 | WEIGHT: 124 LBS | HEIGHT: 64 IN

## 2023-07-31 DIAGNOSIS — S72.042D CLOSED FRACTURE OF BASE OF FEMORAL NECK WITH ROUTINE HEALING, LEFT: ICD-10-CM

## 2023-07-31 DIAGNOSIS — Z09 SURGERY FOLLOW-UP: Primary | ICD-10-CM

## 2023-07-31 PROCEDURE — 99024 POSTOP FOLLOW-UP VISIT: CPT | Performed by: ORTHOPAEDIC SURGERY

## 2023-08-10 ENCOUNTER — OFFICE VISIT (OUTPATIENT)
Dept: NEUROLOGY | Facility: CLINIC | Age: 68
End: 2023-08-10
Payer: MEDICARE

## 2023-08-10 VITALS
OXYGEN SATURATION: 98 % | BODY MASS INDEX: 21.17 KG/M2 | WEIGHT: 124 LBS | HEIGHT: 64 IN | DIASTOLIC BLOOD PRESSURE: 60 MMHG | HEART RATE: 76 BPM | SYSTOLIC BLOOD PRESSURE: 112 MMHG

## 2023-08-10 DIAGNOSIS — M79.7 FIBROMYALGIA: Primary | ICD-10-CM

## 2023-08-10 DIAGNOSIS — G62.9 NEUROPATHY: ICD-10-CM

## 2023-08-10 DIAGNOSIS — M54.12 CERVICAL RADICULOPATHY AT C6: ICD-10-CM

## 2023-08-10 PROCEDURE — 1160F RVW MEDS BY RX/DR IN RCRD: CPT | Performed by: PSYCHIATRY & NEUROLOGY

## 2023-08-10 PROCEDURE — 3074F SYST BP LT 130 MM HG: CPT | Performed by: PSYCHIATRY & NEUROLOGY

## 2023-08-10 PROCEDURE — 3078F DIAST BP <80 MM HG: CPT | Performed by: PSYCHIATRY & NEUROLOGY

## 2023-08-10 PROCEDURE — 1159F MED LIST DOCD IN RCRD: CPT | Performed by: PSYCHIATRY & NEUROLOGY

## 2023-08-10 PROCEDURE — 99214 OFFICE O/P EST MOD 30 MIN: CPT | Performed by: PSYCHIATRY & NEUROLOGY

## 2023-08-10 RX ORDER — GABAPENTIN 300 MG/1
CAPSULE ORAL
Qty: 90 CAPSULE | Refills: 6 | Status: SHIPPED | OUTPATIENT
Start: 2023-08-10

## 2023-08-10 RX ORDER — PERPHENAZINE 16 MG
TABLET ORAL
COMMUNITY

## 2023-08-10 NOTE — PROGRESS NOTES
Subjective:    CC: Alysia Sierra is in clinic today for follow up for history of neuropathy.    HPI:  Initial visit: 8/31/2020: Patient is a 65-year-old female with past medical history of hypertension, supraventricular tachycardia referred to the clinic for evaluation of bilateral leg and arm weakness.  She reports her symptoms started about 2 years ago and it has progressively become worse.  She reports that in last 6 months, she has had great difficulty getting up from chair if there is no side arms.  She reports that if she is on the floor, it is almost impossible for her to get up.  In addition she has noticed difficulty going up and down stairs.  She also reports tingling, numbness and extreme coldness in both her feet and sometimes it leads to pain.  This also started about 6 months ago and it has progressively become worse.    10/8/2020: She is in clinic for regular follow-up.  Since her last visit, she reports that overall she feels worse.  She feels that she does not have any energy to do anything throughout the day.  She is involved in physical therapy but it is extremely difficult for her to go through the exercises.  She reports that overall she feels weaker than her last visit.  Since her last visit, she has now completed MRI of cervical spine, MRI lumbar spine blood work-up including myasthenia gravis antibody panel, vitamin D, copper and B12.  MRI of cervical and lumbosacral spine shows mild to moderate multilevel spondylitic changes without any evidence of myelopathy.  Vitamin B12, copper and vitamin levels for within normal limits as well.  Upon further questioning, she reports that she sleeps usually at 4 or 5 PM until 1 or 2 AM and then she is awake after that.  She has done this for quite some time now.  In addition, she also reports to heavy alcohol use from age 34 until about 2 years ago.  She still drinks alcohol but it is much less as compared to before.    12/11/2020: She is in clinic  for regular follow-up.  Since her last visit, she underwent MRI brain without contrast which I reviewed personally.  It showed white matter changes suspicious for multiple sclerosis so following that, lumbar puncture was recommended.  Lumbar puncture was negative for MS profile.  She reports that since her last visit, she has now completed physical therapy and she continues to have good days and bad days.  She reports that she continues to struggle with poor sleep quality, does not have good appetite.  She is also reporting almost continuous tingling, numbness and coldness in both her feet.    3/1/2021: She is in clinic for regular follow-up.  Since her last visit in December, she reports that there has not been much change in bilateral upper and lower extremity strength is concerned.  She continues to have episodes of difficulty with walking, subjective feeling of generalized body weakness.  She is planning to go back to the gym and start exercising and will try to pay more attention to nutrition to help with this.  Really she reports that there has been no worsening since her last visit.  She did try gabapentin 300 mg twice daily and reports that the morning dose caused her to have side effects that she is currently taking only the nighttime dose which seems to be helping significantly with sleep.    9/1/2021: She is in clinic for regular follow-up.  Since her last visit in March, she reports that she is trying her best to exercise as much as possible at home to maintain strength in both upper and lower extremities.  She continues to use cane while walking.  She takes gabapentin 300 mg at bedtime which helps her with pain and also help her sleep better.  She continues to have some good days and bad days but overall there has been no worsening.    3/8/2022: She is in clinic for regular follow-up.  Since her last visit in September 2021, she reports that overall both upper and lower extremity weakness remained  stable without any worsening.  She continues to do exercises.  She has noticed that days that she is well hydrated, she tends to do better.  Lately in last 4 to 6 weeks, she is reporting right shoulder pain radiating down to right forearm and right hand.  She does have moderate spondylitic changes at C5-C6 and C6-C7 levels that were noted on MRI that was performed in August 2020.  She is currently taking gabapentin 300 mg at bedtime.    9/7/2022: She is in clinic for regular follow-up.  Since her last visit in March 2022, she reports that overall she has done really well.  She had an episode of passing out lasting for few seconds couple of months ago.  Which she thinks could be related to dehydration leading to hypotension.  She is trying to keep herself well-hydrated.  She continues to use [place] to help with walking and to prevent from falling.  She currently takes gabapentin 100 mg in the morning and 300 mg at night which does help with symptoms of cervical radiculopathy.  She was recently started on Cymbalta 20 mg twice daily which seems to be helping with symptoms of fibromyalgia as well.    4/18/2023: She is in clinic for regular follow-up.  Since her last visit in September 2022, she was diagnosed to have endometrial uterine cancer and has completed chemotherapy and radiation therapy.  She is currently in remission now however reports worsening in neuropathy symptoms.  She reports that she is currently taking gabapentin 100 mg in the morning and 300 mg at night which does help some with neuropathy symptoms.  She denies any side effects with gabapentin use.  She continues to take Cymbalta 20 mg twice a day which helps with symptoms of neuropathy and fibromyalgia.    8/10/2023: She is in clinic for regular follow-up.  Since her last visit in April 2023, she has now completed chemotherapy and radiation treatment and she tells me that she is in remission and doing well.  Unfortunately she fell and broke her left  hip in May 2023 requiring surgery but she has recovered well from surgery as well.  She is currently taking gabapentin 300 mg twice daily but does report that nighttime symptoms are still worse.  She is not able to sleep well at night.    The following portions of the patient's history were reviewed and updated as of 08/10/2023: allergies, social history, and problem list.       Current Outpatient Medications:     Alpha-Lipoic Acid 600 MG capsule, Take  by mouth., Disp: , Rfl:     aspirin 81 MG EC tablet, Take 1 tablet by mouth Every 12 (Twelve) Hours. Indications: VTE Prophylaxis, Disp: , Rfl:     docusate sodium (COLACE) 100 MG capsule, Take 1 capsule by mouth 2 (Two) Times a Day. (Patient taking differently: Take 1 capsule by mouth As Needed.), Disp: 60 capsule, Rfl: 0    DULoxetine (Cymbalta) 20 MG capsule, Take 1 capsule by mouth 2 (Two) Times a Day., Disp: 60 capsule, Rfl: 6    esomeprazole (nexIUM) 20 MG capsule, Take 1 capsule by mouth 2 (Two) Times a Week. OTC, Disp: , Rfl:     hydrALAZINE (APRESOLINE) 25 MG tablet, 1 tablet 2 (Two) Times a Day., Disp: , Rfl:     Hydrocortisone Ace-Pramoxine 1-1 % rectal cream, Insert  into the rectum 2 (Two) Times a Day., Disp: 30 g, Rfl: 1    levothyroxine (SYNTHROID, LEVOTHROID) 25 MCG tablet, Take 1 tablet by mouth Every Morning., Disp: 90 tablet, Rfl: 1    magnesium oxide (MAGOX) 400 (241.3 Mg) MG tablet tablet, Take 1 tablet by mouth Daily. OTC, Disp: , Rfl:     metoprolol succinate XL (TOPROL-XL) 25 MG 24 hr tablet, Take 1 tablet by mouth Every Night., Disp: 90 tablet, Rfl: 1    naloxone (NARCAN) 4 MG/0.1ML nasal spray, Call 911. Don't prime. [place] in 1 nostril for overdose. Repeat in 2-3 minutes in other nostril if no or minimal breathing/responsiveness., Disp: 2 each, Rfl: 0    nitroglycerin (NITROSTAT) 0.4 MG SL tablet, Place 1 tablet under the tongue Every 5 (Five) Minutes As Needed for Chest Pain. Take no more than 3 doses in 15 minutes., Disp: , Rfl:      rosuvastatin (Crestor) 5 MG tablet, Take 1 tablet by mouth Daily., Disp: 90 tablet, Rfl: 1    gabapentin (Neurontin) 300 MG capsule, Take 1 capsule by mouth 2 (Two) Times a Day for 30 days., Disp: 6 capsule, Rfl: 0   Past Medical History:   Diagnosis Date    Allergic lisinopril  2017    Anemia     Arrhythmia     Arthritis     Cataract mild     stil lpresent    Chicken pox     Chronic fatigue     CKD (chronic kidney disease), stage III     sees nephro    Clotting disorder     Dental root implant present     lower left  x1 - possible dental implant    Depression mild 2019    Difficulty walking 2019    Disease of thyroid gland     Dizzy     NIEVES (dyspnea on exertion)     2017    Fracture of hip     Generalized anxiety disorder     GERD (gastroesophageal reflux disease) 2018    History of brachytherapy 2023    vaginal brachytherapy    Hyperlipidemia     Hypertension     Iron deficiency anemia     Liver disease     fatty    Liver problem     Measles     Menopause     Mumps     Neuromuscular disorder Peripheral Neuropathy    Orthostatic hypotension     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    Renal insufficiency 2018    Scoliosis     Unintentional weight loss     Uses contact lenses     bilat    Uterine cancer     Uterine cancer 2022    UTI (urinary tract infection)     Visual impairment Nearsighted    Wears glasses       Past Surgical History:   Procedure Laterality Date     SECTION      DILATION AND CURETTAGE, DIAGNOSTIC / THERAPEUTIC      HIP OPEN REDUCTION Left 2023    Procedure: FEMORAL NECK OPEN REDUCTION INTERNAL FIXATION LEFT;  Surgeon: Juanpablo Lee MD;  Location: [facility];  Service: Orthopedics;  Laterality: Left;    HIP SURGERY      OOPHORECTOMY      ORAL LESION EXCISION/BIOPSY  2021    TOTAL LAPAROSCOPIC HYSTERECTOMY SALPINGO OOPHORECTOMY N/A 10/28/2022    Procedure: TOTAL LAPAROSCOPIC HYSTERECTOMY BILATERAL SALPINGO-OOPHORECTOMY,  "INJECTION FOR SENTINEL LYMPH NODE MAPPING, BILATERAL SENTINEL LYMPH NODE DISSECTION WITH DAVINCI ROBOT;  Surgeon: Jasmine Rich MD;  Location: [facility];  Service: Robotics - DaVinci;  Laterality: N/A;    TUBAL ABDOMINAL LIGATION  1983      Family History   Problem Relation Age of Onset    Spondylolisthesis Mother     COPD Mother     Stroke Mother     Hypertension Mother     Lung cancer Father     Hypertension Father     Heart attack Father     Coronary artery disease Father     Heart disease Father     Cancer Father     Lung disease Father     Hyperlipidemia Sister     Rheum arthritis Sister     Malig Hypertension Sister     Osteoarthritis Sister     Other Sister         alcoholic    Hypertension Sister     Scoliosis Sister     Hypertension Brother     Depression Sister     Early death Sister     Breast cancer Neg Hx     Ovarian cancer Neg Hx     Uterine cancer Neg Hx     Colon cancer Neg Hx     Melanoma Neg Hx     Prostate cancer Neg Hx         Review of Systems  Objective:    /60   Pulse 76   Ht 162.6 cm (64.02\")   Wt 56.2 kg (124 lb)   LMP  (LMP Unknown)   SpO2 98%   BMI 21.27 kg/mý     Neurology Exam:  General apperance: NAD.     Mental status: Alert, awake and oriented to time place and person.    Language and Speech: No aphasia or dysarthria.    CN II to XII: Intact.    Opthalmoscopic Exam: No papilledema.    Motor:  Right UE muscle strength 5/5. Normal tone.     Left UE muscle strength 5/5. Normal tone.      Right LE muscle strength 5/5. Normal tone.     Left LE muscle strength 5/5. Normal tone.      Sensory: Normal light touch, vibration and pinprick sensation bilaterally.    DTRs: 1+ bilaterally.    Babinski: Negative bilaterally.    Co-ordination: Normal finger-to-nose, heel to shin B/L.    Rhomberg: Negative.    Gait: Walks with the help of a walker.    Cardiovascular: Regular rate and rhythm without murmur, gallop or rub.    Assessment and Plan:  1. Cervical radiculopathy at C6  2. " Fibromyalgia  3. Neuropathy  -She is currently in remission and has completed chemotherapy and and radiation treatment for her endometrial cancer.  She is in good spirits.  She has been taking gabapentin 300 mg twice daily but reports that some nights, symptoms of neuropathy are worse than the others and is not able to sleep well at night.  I will be increasing the nighttime dose of gabapentin to 600 and will continue 300 in the morning and I will see her back in clinic in 6 months for follow-up.       I spent 30 minutes in patient care: Reviewing records prior to the visit, entering orders and documentation and spent more than diggs 50% of this time face-to-face in management, instructions and education regarding above mentioned diagnosis and also on counseling and discussing about taking medication regularly, possible side effects with medication use, importance of good sleep hygiene, good hydration and regular exercise.    Return in about 6 months (around 2/10/2024).       Note to patient: The 21st Century Cures Act makes medical notes like these available to patients in the interest of transparency. However, be advised this is a medical document. It is intended as peer to peer communication. It is written in medical language and may contain abbreviations or verbiage that are unfamiliar. It may appear blunt or direct. Medical documents are intended to carry relevant information, facts as evident, and the clinical opinion of the physician.

## 2023-08-22 ENCOUNTER — APPOINTMENT (OUTPATIENT)
Dept: CT IMAGING | Facility: HOSPITAL | Age: 68
End: 2023-08-22
Payer: MEDICARE

## 2023-08-22 ENCOUNTER — HOSPITAL ENCOUNTER (EMERGENCY)
Facility: HOSPITAL | Age: 68
Discharge: HOME | End: 2023-08-23
Attending: EMERGENCY MEDICINE
Payer: MEDICARE

## 2023-08-22 DIAGNOSIS — S09.90XA CLOSED HEAD INJURY, INITIAL ENCOUNTER: Primary | ICD-10-CM

## 2023-08-22 DIAGNOSIS — S00.03XA HEMATOMA OF SCALP, INITIAL ENCOUNTER: ICD-10-CM

## 2023-08-22 PROCEDURE — 70450 CT HEAD/BRAIN W/O DYE: CPT

## 2023-08-22 PROCEDURE — 72125 CT NECK SPINE W/O DYE: CPT

## 2023-08-22 PROCEDURE — 99284 EMERGENCY DEPT VISIT MOD MDM: CPT

## 2023-08-23 VITALS
BODY MASS INDEX: 21.34 KG/M2 | TEMPERATURE: 98.2 F | WEIGHT: 125 LBS | OXYGEN SATURATION: 100 % | RESPIRATION RATE: 17 BRPM | HEART RATE: 80 BPM | HEIGHT: 64 IN | SYSTOLIC BLOOD PRESSURE: 120 MMHG | DIASTOLIC BLOOD PRESSURE: 88 MMHG

## 2023-08-23 NOTE — ED PROVIDER NOTES
Subjective   History of Present Illness  This is a 68-year-old female past medical history of anemia and CKD presented to the emergency department after accidental fall.  Patient states that she tripped and fell forward.  She hit her head on the floor.  She is small laceration to the right frontal area.  Some actively bleeding.  Patient is complaining of some headache in the area at this time.  Is dull in nature.  Nonradiating.  She not have any associated change in vision or focal weakness.  Denies any neck pain.  She does not have any chest pain or shortness of breath.  No abdominal pain or vomiting.  No other musculoskeletal injuries.  Patient is up-to-date on immunizations for tetanus.    History provided by:  Patient   used: No      Review of Systems   Constitutional:  Negative for chills and fever.   HENT:  Negative for congestion, ear pain and sore throat.    Eyes:  Negative for visual disturbance.   Respiratory:  Negative for shortness of breath.    Cardiovascular:  Negative for chest pain.   Gastrointestinal:  Negative for abdominal pain.   Genitourinary:  Negative for difficulty urinating.   Musculoskeletal:  Negative for arthralgias.   Skin:  Negative for rash.   Neurological:  Positive for headaches. Negative for dizziness, weakness and numbness.   Psychiatric/Behavioral:  Negative for agitation.      Past Medical History:   Diagnosis Date    Allergic lisinopril  2017    Anemia     Arrhythmia     Arthritis     Cataract mild 2020    stil lpresent    Chicken pox     Chronic fatigue     CKD (chronic kidney disease), stage III     sees nephro    Clotting disorder     Dental root implant present     lower left  x1 - possible dental implant    Depression mild 2019    Difficulty walking 2019    Disease of thyroid gland     Dizzy     NIEVES (dyspnea on exertion)     2017    Fracture of hip 2023    Generalized anxiety disorder     GERD (gastroesophageal reflux disease) 2018    History of  brachytherapy 2023    vaginal brachytherapy    Hyperlipidemia     Hypertension     Iron deficiency anemia     Liver disease     fatty    Liver problem     Measles     Menopause     Mumps     Neuromuscular disorder Peripheral Neuropathy    Orthostatic hypotension     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    Renal insufficiency     Scoliosis     Unintentional weight loss     Uses contact lenses     bilat    Uterine cancer     Uterine cancer 2022    UTI (urinary tract infection)     Visual impairment Nearsighted    Wears glasses        Allergies   Allergen Reactions    Lisinopril Angioedema    Metal Rash     Possible nickel -   Gold is only metal that doesn't have issues      Milk-Related Compounds Other (See Comments)     LACTOSE INTOLERANT    Tylenol [Acetaminophen] Other (See Comments)     ckd    Atorvastatin Myalgia       Past Surgical History:   Procedure Laterality Date     SECTION      DILATION AND CURETTAGE, DIAGNOSTIC / THERAPEUTIC      HIP OPEN REDUCTION Left 2023    Procedure: FEMORAL NECK OPEN REDUCTION INTERNAL FIXATION LEFT;  Surgeon: Juanpablo Lee MD;  Location:  [facility];  Service: Orthopedics;  Laterality: Left;    HIP SURGERY      OOPHORECTOMY      ORAL LESION EXCISION/BIOPSY  2021    TOTAL LAPAROSCOPIC HYSTERECTOMY SALPINGO OOPHORECTOMY N/A 10/28/2022    Procedure: TOTAL LAPAROSCOPIC HYSTERECTOMY BILATERAL SALPINGO-OOPHORECTOMY, INJECTION FOR SENTINEL LYMPH NODE MAPPING, BILATERAL SENTINEL LYMPH NODE DISSECTION WITH DAVINCI ROBOT;  Surgeon: Jasmine Rich MD;  Location:  [facility];  Service: Robotics - DaVinci;  Laterality: N/A;    TUBAL ABDOMINAL LIGATION         Family History   Problem Relation Age of Onset    Spondylolisthesis Mother     COPD Mother     Stroke Mother     Hypertension Mother     Lung cancer Father     Hypertension Father     Heart attack Father     Coronary artery disease Father     Heart disease Father      Cancer Father     Lung disease Father     Hyperlipidemia Sister     Rheum arthritis Sister     Malig Hypertension Sister     Osteoarthritis Sister     Other Sister         alcoholic    Hypertension Sister     Scoliosis Sister     Hypertension Brother     Depression Sister     Early death Sister     Breast cancer Neg Hx     Ovarian cancer Neg Hx     Uterine cancer Neg Hx     Colon cancer Neg Hx     Melanoma Neg Hx     Prostate cancer Neg Hx        Social History     Socioeconomic History    Marital status:     Number of children: 1    Highest education level: Associate degree: academic program   Tobacco Use    Smoking status: Never     Passive exposure: Never    Smokeless tobacco: Never    Tobacco comments:     Never   Vaping Use    Vaping Use: Never used   Substance and Sexual Activity    Alcohol use: Yes     Alcohol/week: 6.0 standard drinks     Types: 6 Glasses of wine per week     Comment: 6-8 glasses a week    Drug use: No    Sexual activity: Not Currently     Partners: Male     Birth control/protection: Surgical, Post-menopausal           Objective   Physical Exam  Vitals and nursing note reviewed.   Constitutional:       General: She is not in acute distress.     Appearance: She is not ill-appearing or toxic-appearing.   HENT:      Head:      Comments: Patient has small abrasion to the right frontal area.  There is no palpable crepitus.  No evidence of baird sign or skull fracture.     Mouth/Throat:      Pharynx: No posterior oropharyngeal erythema.   Eyes:      Conjunctiva/sclera: Conjunctivae normal.      Pupils: Pupils are equal, round, and reactive to light.   Cardiovascular:      Rate and Rhythm: Normal rate and regular rhythm.   Pulmonary:      Effort: Pulmonary effort is normal. No respiratory distress.   Abdominal:      General: Abdomen is flat. There is no distension.      Palpations: There is no mass.      Tenderness: There is no abdominal tenderness. There is no guarding or rebound.    Musculoskeletal:         General: No deformity. Normal range of motion.   Skin:     General: Skin is warm.      Findings: No rash.   Neurological:      General: No focal deficit present.      Mental Status: She is alert and oriented to person, place, and time.      Motor: No weakness.       Laceration Repair    Date/Time: 8/22/2023 11:12 PM  Performed by: Collin Serna MD  Authorized by: Collin Serna MD     Consent:     Consent obtained:  Verbal    Consent given by:  Patient    Risks, benefits, and alternatives were discussed: yes      Risks discussed:  Pain, infection and poor cosmetic result    Alternatives discussed:  Delayed treatment and no treatment  Universal protocol:     Procedure explained and questions answered to patient or proxy's satisfaction: yes      Imaging studies available: yes      Immediately prior to procedure, a time out was called: yes      Patient identity confirmed:  Verbally with patient and arm band  Anesthesia:     Anesthesia method:  None  Laceration details:     Location:  Scalp    Scalp location:  Frontal    Length (cm):  1    Depth (mm):  1  Pre-procedure details:     Preparation:  Patient was prepped and draped in usual sterile fashion and imaging obtained to evaluate for foreign bodies  Exploration:     Limited defect created (wound extended): no      Hemostasis achieved with:  Direct pressure    Imaging obtained comment:  CT    Imaging outcome: foreign body not noted      Wound exploration: entire depth of wound visualized      Contaminated: no    Treatment:     Area cleansed with:  Chlorhexidine and soap and water    Amount of cleaning:  Standard    Irrigation solution:  Tap water    Irrigation method:  Syringe    Visualized foreign bodies/material removed: no      Debridement:  None    Undermining:  None    Scar revision: no    Skin repair:     Repair method:  Tissue adhesive  Approximation:     Approximation:  Close  Repair type:     Repair type:   Simple  Post-procedure details:     Dressing:  Open (no dressing)    Procedure completion:  Tolerated well, no immediate complications           ED Course  ED Course as of 08/22/23 2314   Tue Aug 22, 2023   2116 BP: 104/76 [JK]   2116 Temp: 98.2 øF (36.8 øC) [JK]   2116 Temp src: Oral [JK]   2116 Heart Rate: 86 [JK]   2116 Resp: 17 [JK]   2116 SpO2: 95 %  Patient's repeat vitals, telemetry tracing, and pulse oximetry tracing were directly viewed and interpreted by myself.  Patient hemodynamically stable [JK]   2311 CT Cervical Spine Without Contrast [JK]   2311 CT Head Without Contrast  Imaging was directly visualized by myself, per my interpretations, CT of the cervical spine was unremarkable, CT of the head shows a frontal scalp hematoma, however chronic changes.. [JK]   2313 On reevaluation, patient is feeling better.  Pain is improved.  Repeat neuro exam is normal.  Patient tolerating tissue adhesive closure of the laceration of the forehead.  Patient will follow-up with PCP in 48 hours.  We did have discussion regarding close head injury.  Verbalized understanding. [JK]   2313 I had a discussion with the patient/family regarding diagnosis, diagnostic results, treatment plan, and medications. The patient/family indicated understanding of these instructions. I spent adequate time at the bedside prior to discharge necessary to discuss the aftercare instructions, giving patient education, providing explanations of the results of our evaluations/findings, and my decision making to assure that the patient/family understand the plan of care. Time was allotted to answer questions at that time and throughout the ED course. Patient is required to maintain timely follow up, as discussed. I also discussed the potential for the development of an acute emergent condition requiring further evaluation, return to the ER, admission, or even surgical intervention.  I encouraged the patient to return to the emergency department  immediately for any concerns, worsening symptoms, new complaints, or if symptoms persist and they are unable to seek follow-up in a timely fashion. The patient/family expressed understanding and agreement with this plan    Shared decision making:   After full review of the patient's clinical presentation, review of any work-up including but not limited to laboratory studies and radiology obtained, I had a discussion with the patient.  Treatment options were discussed as well as the risks, benefits and consequences.  I discussed all findings with the patient and family members if available.  During the discussion, treatment goals were understood by all as well as any misconceptions which were addressed with the patient.  Ample time was given for any questions they may have had.  They are in agreement with the treatment plan as well as final disposition. [JK]      ED Course User Index  [JK] Collin Serna MD                                           Medical Decision Making  This is a 68-year-old female presented to the emergency department after an accidental fall.  Patient had a trip and fall and she hit her head.  She states that she did not lose consciousness.  However she does have small abrasion to the right frontal area.  Findings are consistent with closed head injury, however she does meet imaging criteria for the head and cervical spine based on Nexus guidelines.  Patient's neurologic exam appears normal at this time.  Tetanus is up-to-date.  Imaging will be obtained.      Differential diagnosis: Closed head injury, contusion, skull fracture, intracranial abnormality      Amount and/or Complexity of Data Reviewed  External Data Reviewed: labs, radiology and notes.     Details: External laboratories, imaging as well as notes were reviewed personally by myself.  All relevant studies were used to guide decision making.     Date of previous record: 8/10/2023    Source of note: Neurology    Summary: Patient  was seen by her primary neurologist for fibromyalgia as well as cervical radiculopathy.  I did review her routine laboratory studies as well as imaging studies of the cervical spine.  Records reviewed.  Radiology: ordered and independent interpretation performed. Decision-making details documented in ED Course.        Final diagnoses:   Closed head injury, initial encounter   Hematoma of scalp, initial encounter       ED Disposition  ED Disposition       ED Disposition   Discharge    Condition   Stable    Comment   --               Liana So, APRN  [address] [address]  [phone]    Call in 1 day           Medication List        Changed      docusate sodium 100 MG capsule  Commonly known as: COLACE  Take 1 capsule by mouth 2 (Two) Times a Day.  What changed:   when to take this  reasons to take this                 Collin Serna MD  08/22/23 1571

## 2023-08-24 ENCOUNTER — HOSPITAL ENCOUNTER (OUTPATIENT)
Dept: RADIATION ONCOLOGY | Facility: HOSPITAL | Age: 68
Setting detail: RADIATION/ONCOLOGY SERIES
Discharge: HOME | End: 2023-08-24
Payer: MEDICARE

## 2023-09-05 ENCOUNTER — HOSPITAL ENCOUNTER (OUTPATIENT)
Dept: RADIATION ONCOLOGY | Facility: HOSPITAL | Age: 68
Setting detail: RADIATION/ONCOLOGY SERIES
Discharge: HOME | End: 2023-09-05
Payer: MEDICARE

## 2023-09-14 ENCOUNTER — APPOINTMENT (OUTPATIENT)
Dept: RADIATION ONCOLOGY | Facility: HOSPITAL | Age: 68
End: 2023-09-14
Payer: MEDICARE

## 2023-09-18 ENCOUNTER — HOSPITAL ENCOUNTER (OUTPATIENT)
Dept: BONE DENSITY | Facility: HOSPITAL | Age: 68
Discharge: HOME | End: 2023-09-18
Admitting: NURSE PRACTITIONER
Payer: MEDICARE

## 2023-09-18 PROCEDURE — 77080 DXA BONE DENSITY AXIAL: CPT

## 2023-09-22 ENCOUNTER — OFFICE VISIT (OUTPATIENT)
Dept: RADIATION ONCOLOGY | Facility: HOSPITAL | Age: 68
End: 2023-09-22
Payer: MEDICARE

## 2023-09-22 VITALS
WEIGHT: 126.9 LBS | DIASTOLIC BLOOD PRESSURE: 68 MMHG | HEART RATE: 72 BPM | RESPIRATION RATE: 16 BRPM | TEMPERATURE: 97.4 F | BODY MASS INDEX: 21.78 KG/M2 | OXYGEN SATURATION: 97 % | SYSTOLIC BLOOD PRESSURE: 99 MMHG

## 2023-09-22 DIAGNOSIS — C54.1 ENDOMETRIAL ADENOCARCINOMA: Primary | ICD-10-CM

## 2023-09-22 PROCEDURE — G0463 HOSPITAL OUTPT CLINIC VISIT: HCPCS

## 2023-09-22 NOTE — PROGRESS NOTES
FOLLOW UP NOTE    PATIENT:                                                      Alysia Sierra  MEDICAL RECORD #:                        [id number]  :                                                          1955  COMPLETION DATE:   2023  DIAGNOSIS:     Endometrial adenocarcinoma  - FIGO Stage IIIA (pT3a, pN0(sn), cM0)      BRIEF HISTORY:  Alysia Sierra is a 68 y.o. female returning for routine follow-up visit.  She initially presented with postmenopausal bleeding and underwent endometrial biopsy on 10/6/2022.  Pathology revealed well-differentiated endometrioid adenocarcinoma.  She underwent robotic hysterectomy with bilateral salpingo-oophorectomy with sentinel pelvic node dissection on 10/28/2022 per Dr. Rich.  Final pathology showed a grade 2 endometrioid adenocarcinoma with 94% myometrial invasion of 16 of 17 mm in thickness.  There was no lymphovascular space invasion and lymph nodes were negative.  She did have tumor involvement of serosal adhesions and endocervical involvement.  Tumor size was 5.5 cm.  The patient was started on adjuvant carboplatin/docetaxel.  The patient declined recommendations for external beam radiation therapy to the pelvis.  The patient did undergo adjuvant vaginal brachytherapy to a dose of 30 Gray in 5 fractions, which was delivered between the third and fourth cycles of chemotherapy.  She had her final cylinder brachytherapy treatment 2023.  She tolerated this well and has since completed her sixth and final cycle of chemotherapy.  Repeat CT scans performed 2023 showed no evidence of disease.    Since we last saw the patient, she suffered a left hip fracture due to a fall, requiring surgery on 5/3/2023.  She remains independent with ADLs but continues to use a rollator walker or cane for assistance.  She reports peripheral neuropathy is improving on increased dose of gabapentin 300 mg q am and 600 mg q pm.  She reports she has good days and bad days but  energy level continues to improve.  She denies vaginal bleeding, pain, or discharge.  She admits to infrequent use of her vaginal dilator.  She describes some increased urinary urgency without incontinence or additional genitourinary complaints.  She denies change in bowel function and occasionally uses Colace to combat constipation.  She denies abdominopelvic pain, weight loss, bloating, nausea, vomiting.  The patient denies additional acute concerns today.        MEDICATIONS: Medication reconciliation for the patient was reviewed and confirmed in the electronic medical record.    Review of Systems   Gastrointestinal:  Positive for constipation (occasional).   Musculoskeletal:  Positive for gait problem (uses walker/cane).   Neurological:  Positive for gait problem (uses walker/cane) and numbness (bilateral feet 2/2 chemotherapy).   All other systems reviewed and are negative.        KPS 80%      Physical Exam  Vitals and nursing note reviewed.   Constitutional:       General: She is not in acute distress.     Appearance: She is well-developed.   HENT:      Head: Normocephalic and atraumatic.   Eyes:      Conjunctiva/sclera: Conjunctivae normal.      Pupils: Pupils are equal, round, and reactive to light.   Cardiovascular:      Rate and Rhythm: Normal rate and regular rhythm.   Pulmonary:      Effort: Pulmonary effort is normal.      Breath sounds: Normal breath sounds.   Abdominal:      General: Bowel sounds are normal. There is no distension.      Palpations: Abdomen is soft. There is no mass.      Tenderness: There is no abdominal tenderness.   Genitourinary:     Rectum: External hemorrhoid (nonthrombosed) present.      Comments: External genitalia appear free from lesion.  Speculum exam reveals no masses or suspicious lesions noted in the vagina.  No adhesions or stricture present.  Vaginal cuff appears smooth, intact, without visible lesions, with changes consistent with previous radiation.  Bimanual  examination reveals the uterus and bilateral ovaries are surgically absent.  Exam was well-tolerated.  Musculoskeletal:         General: Normal range of motion.   Lymphadenopathy:      Lower Body: No right inguinal adenopathy. No left inguinal adenopathy.   Skin:     General: Skin is warm and dry.   Neurological:      Mental Status: She is alert and oriented to person, place, and time.   Psychiatric:         Behavior: Behavior normal.         Thought Content: Thought content normal.         Judgment: Judgment normal.       VITAL SIGNS:   Vitals:    09/22/23 1145   BP: 99/68   Pulse: 72   Resp: 16   Temp: 97.4 °F (36.3 °C)   TempSrc: Temporal   SpO2: 97%   Weight: 57.6 kg (126 lb 14.4 oz)   PainSc: 0-No pain               The following portions of the patient's history were reviewed and updated as appropriate: allergies, current medications, past family history, past medical history, past social history, past surgical history and problem list.         Diagnoses and all orders for this visit:    1. Endometrial adenocarcinoma (Primary)         IMPRESSION:  Alysia Sierra is a 68 y.o. female with known history of a FIGO stage IIIA (pT3a, pN0(sn), cM0) grade 2 endometrioid adenocarcinoma.  Following hysterectomy with staging, the patient was started on adjuvant chemotherapy but declined external beam radiotherapy to the pelvis which was recommended due to the involvement of serosal adhesions and high risk of recurrence.  She was amenable to adjuvant vaginal cuff brachytherapy, which she completed 8 months ago interdigitated between cycles of chemotherapy.  The patient has since completed chemotherapy, with repeat imaging showing no evidence of recurrent or metastatic disease.  Repeat clinical exam today is negative for evidence of recurrent disease.  We again discussed recommendations for use of a vaginal dilator which was previously provided.  She does have some mildly increased urinary urgency, for which we discussed  Kegel exercises and timed voiding.  She is not interested in pelvic floor rehab at this point.  Clinically, the patient appears to be recovering well from treatment, though she reportedly admits her left hip fracture set her back quite a bit.   The patient and I reviewed follow-up intervals and the role of serial clinical exams.  Signs and symptoms of recurrent disease, such as vaginal bleeding, persistent abdominopelvic pain, urinary or bowel changes, and shortness of breath were reviewed.  She was advised to follow up immediately if she develops any of the above.        RECOMMENDATIONS:  Alysia Sierra continues routine surveillance under the care of Dr. Rich in the survivorship clinic, with follow up 11/30/2023.  We will continue to alternate visits every 3 months between the GYN oncology clinic and our clinic for the first 2 years.        Return in about 5 months (around 2/29/2024) for Office Visit.    ANETTE Samuel      I spent a total of 50 minutes on todays visit, with more than 25 minutes in direct face to face communication, and the remainder of the time spent in reviewing the relevant history, records, available imaging, and for documentation.

## 2023-09-26 ENCOUNTER — TELEPHONE (OUTPATIENT)
Dept: INTERNAL MEDICINE | Facility: CLINIC | Age: 68
End: 2023-09-26
Payer: MEDICARE

## 2023-09-26 NOTE — TELEPHONE ENCOUNTER
----- Message from ANETTE Vasquez sent at 9/25/2023  9:18 PM EDT -----  DEXA shows osteoporosis of upper right thigh. Osteopenia (precursor to osteoporosis) was also noted in total right hip. Needs apt to discuss treatment. Tell Alysia if she prefers, we can plan to discuss this at her currently scheduled apt on 11/1 if unable to come in before that time. Also very important that she begin supplementation with calcium & vitamin D3 if not already doing so-- vitamin D: ~1,000 IU daily, calcium: ~1200mg daily. We will plan to repeat your DEXA scan in 2 yrs (9/2025).

## 2023-09-27 DIAGNOSIS — E78.2 MIXED HYPERLIPIDEMIA: ICD-10-CM

## 2023-09-27 RX ORDER — ROSUVASTATIN CALCIUM 5 MG/1
5 TABLET, COATED ORAL DAILY
Qty: 90 TABLET | Refills: 1 | Status: SHIPPED | OUTPATIENT
Start: 2023-09-27

## 2023-09-27 NOTE — TELEPHONE ENCOUNTER
PT CALLED BACK AND I TOLD HER THAT HER RESULTS ARE IN HER MYCHART AND SHE CONFIRMED SHE SAW THEM BUT WANTS TO TALK TO MARTHA MORE IN DEPTH ABOUT THEM AND ALSO HAS SOME OTHER QUESTIONS. PLEASE CALL HER BACK WHEN AVAILABLE.

## 2023-10-07 DIAGNOSIS — E03.9 HYPOTHYROIDISM, UNSPECIFIED TYPE: ICD-10-CM

## 2023-10-09 RX ORDER — LEVOTHYROXINE SODIUM 0.03 MG/1
TABLET ORAL
Qty: 30 TABLET | Refills: 0 | Status: SHIPPED | OUTPATIENT
Start: 2023-10-09

## 2023-10-16 DIAGNOSIS — M54.12 CERVICAL RADICULOPATHY AT C6: ICD-10-CM

## 2023-10-16 DIAGNOSIS — I10 PRIMARY HYPERTENSION: ICD-10-CM

## 2023-10-16 RX ORDER — GABAPENTIN 300 MG/1
CAPSULE ORAL
Qty: 90 CAPSULE | OUTPATIENT
Start: 2023-10-16

## 2023-10-16 RX ORDER — METOPROLOL SUCCINATE 25 MG/1
25 TABLET, EXTENDED RELEASE ORAL NIGHTLY
Qty: 90 TABLET | Refills: 1 | Status: SHIPPED | OUTPATIENT
Start: 2023-10-16

## 2023-10-16 NOTE — TELEPHONE ENCOUNTER
Outgoing call to Alysia    Advised we sent in 7 months of Gabapentin to [facility] in August. 30 days with 6 refills. Her bottle is showing no refills remain. Let her know we have electronic confirmation [facility] received and to give them a call.     Otherwise I asked she call us back if there are any issues.      Demarcus ARCOS

## 2023-10-30 ENCOUNTER — OFFICE VISIT (OUTPATIENT)
Dept: CARDIOLOGY | Facility: CLINIC | Age: 68
End: 2023-10-30
Payer: MEDICARE

## 2023-10-30 VITALS
HEART RATE: 70 BPM | WEIGHT: 130.4 LBS | HEIGHT: 64 IN | SYSTOLIC BLOOD PRESSURE: 104 MMHG | BODY MASS INDEX: 22.26 KG/M2 | OXYGEN SATURATION: 98 % | DIASTOLIC BLOOD PRESSURE: 68 MMHG

## 2023-10-30 DIAGNOSIS — I47.10 PAROXYSMAL SVT (SUPRAVENTRICULAR TACHYCARDIA): Primary | ICD-10-CM

## 2023-10-30 DIAGNOSIS — I10 PRIMARY HYPERTENSION: ICD-10-CM

## 2023-10-30 DIAGNOSIS — E78.5 HYPERLIPIDEMIA LDL GOAL <100: ICD-10-CM

## 2023-10-30 DIAGNOSIS — C54.1 ENDOMETRIAL ADENOCARCINOMA: ICD-10-CM

## 2023-10-30 DIAGNOSIS — N18.31 STAGE 3A CHRONIC KIDNEY DISEASE: ICD-10-CM

## 2023-10-30 PROCEDURE — 3078F DIAST BP <80 MM HG: CPT | Performed by: INTERNAL MEDICINE

## 2023-10-30 PROCEDURE — 3074F SYST BP LT 130 MM HG: CPT | Performed by: INTERNAL MEDICINE

## 2023-10-30 PROCEDURE — 99214 OFFICE O/P EST MOD 30 MIN: CPT | Performed by: INTERNAL MEDICINE

## 2023-10-30 RX ORDER — LANOLIN ALCOHOL/MO/W.PET/CERES
1000 CREAM (GRAM) TOPICAL DAILY
COMMUNITY

## 2023-10-30 RX ORDER — MELATONIN
1000 DAILY
COMMUNITY

## 2023-10-30 NOTE — PROGRESS NOTES
OFFICE VISIT  NOTE  [facility]      Name: Alysia Sierra    Date: 10/30/2023  MRN:  [id number]  :  1955      REFERRING/PRIMARY PROVIDER:  Liana So APRN    Chief Complaint   Patient presents with    Paroxysmal SVT (supraventricular tachycardia       HPI: Alsyia Sierra is a 68 y.o. female who presents today for follow-up for chest pain, shortness of breath, paroxysmal SVT. Patient history of iron deficiency anemia, unexplained weight loss, chronic kidney disease stage III, hypertension, hyperlipidemia, generalized anxiety disorder. Stress test and echo benign 23 at [facility].  Echo showed grade I diastolic dysfunction, ef 50-55%.  Diagnosis stage III adenocarcinoma of the uterus 10/20/2022 status post hysterectomy chemotherapy and radiation, doing remarkably well at this time, getting her strength back.  Unfortunately she also fractured her hip, recuperating well from that.  Denies significant palpitations, gets occasional chest pain but had low risk stress test and echo 2023      Past Medical History:   Diagnosis Date    Allergic lisinopril  2017    Anemia     Arrhythmia     Arthritis     Cancer     uterine    Cataract mild 2020    stil lpresent    Chicken pox     Chronic fatigue     CKD (chronic kidney disease), stage III     sees nephro    Clotting disorder     Dental root implant present     lower left  x1 - possible dental implant    Depression mild 2019    Difficulty walking 2019    Disease of thyroid gland     Dizzy     NIEVES (dyspnea on exertion)     2017    Fracture of hip     Generalized anxiety disorder     GERD (gastroesophageal reflux disease) 2018    History of brachytherapy 2023    vaginal brachytherapy    Hyperlipidemia     Hypertension     Iron deficiency anemia     Liver disease     fatty    Liver problem     Measles     Menopause     Mumps     Neuromuscular disorder Peripheral Neuropathy    Orthostatic hypotension      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    Renal insufficiency 2018    Scoliosis     Unintentional weight loss     Uses contact lenses     bilat    Uterine cancer     Uterine cancer 2022    UTI (urinary tract infection)     Visual impairment Nearsighted    Wears glasses        Past Surgical History:   Procedure Laterality Date     SECTION      DILATION AND CURETTAGE, DIAGNOSTIC / THERAPEUTIC      HIP OPEN REDUCTION Left 2023    Procedure: FEMORAL NECK OPEN REDUCTION INTERNAL FIXATION LEFT;  Surgeon: Juanpablo Lee MD;  Location:  [facility];  Service: Orthopedics;  Laterality: Left;    HIP SURGERY      OOPHORECTOMY      ORAL LESION EXCISION/BIOPSY  2021    TOTAL LAPAROSCOPIC HYSTERECTOMY SALPINGO OOPHORECTOMY N/A 10/28/2022    Procedure: TOTAL LAPAROSCOPIC HYSTERECTOMY BILATERAL SALPINGO-OOPHORECTOMY, INJECTION FOR SENTINEL LYMPH NODE MAPPING, BILATERAL SENTINEL LYMPH NODE DISSECTION WITH DAVINCI ROBOT;  Surgeon: Jasmine Rich MD;  Location:  [facility];  Service: Robotics - DaVinci;  Laterality: N/A;    TUBAL ABDOMINAL LIGATION         Social History     Socioeconomic History    Marital status:     Number of children: 1    Highest education level: Associate degree: academic program   Tobacco Use    Smoking status: Never     Passive exposure: Never    Smokeless tobacco: Never    Tobacco comments:     Never   Vaping Use    Vaping Use: Never used   Substance and Sexual Activity    Alcohol use: Yes     Alcohol/week: 6.0 standard drinks of alcohol     Types: 6 Glasses of wine per week     Comment: 6-8 glasses a week    Drug use: No    Sexual activity: Not Currently     Partners: Male     Birth control/protection: Surgical, Post-menopausal       Family History   Problem Relation Age of Onset    Spondylolisthesis Mother     COPD Mother     Stroke Mother     Hypertension Mother     Lung cancer Father     Hypertension Father     Heart attack Father      Coronary artery disease Father     Heart disease Father     Cancer Father     Lung disease Father     Hyperlipidemia Sister     Rheum arthritis Sister     Malig Hypertension Sister     Osteoarthritis Sister     Other Sister         alcoholic    Hypertension Sister     Scoliosis Sister     Hypertension Brother     Depression Sister     Early death Sister     Breast cancer Neg Hx     Ovarian cancer Neg Hx     Uterine cancer Neg Hx     Colon cancer Neg Hx     Melanoma Neg Hx     Prostate cancer Neg Hx         ROS:   Constitutional no fever,  no weight loss   Skin no rash, no subcutaneous nodules   Otolaryngeal no difficulty swallowing   Cardiovascular See HPI   Pulmonary no cough, no sputum production   Gastrointestinal no constipation, no diarrhea   Genitourinary no dysuria, no hematuria   Hematologic no easy bruisability, no abnormal bleeding   Musculoskeletal no muscle pain   Neurologic no dizziness, no falls         Allergies   Allergen Reactions    Lisinopril Angioedema    Metal Rash     Possible nickel -   Gold is only metal that doesn't have issues      Milk-Related Compounds Other (See Comments)     LACTOSE INTOLERANT    Tylenol [Acetaminophen] Other (See Comments)     ckd    Atorvastatin Myalgia         Current Outpatient Medications:     Alpha-Lipoic Acid 600 MG capsule, Take  by mouth., Disp: , Rfl:     aspirin 81 MG EC tablet, Take 1 tablet by mouth Every 12 (Twelve) Hours. Indications: VTE Prophylaxis (Patient taking differently: Take 1 tablet by mouth As Needed. Indications: VTE Prophylaxis), Disp: , Rfl:     Cholecalciferol 25 MCG (1000 UT) tablet, Take 1 tablet by mouth Daily., Disp: , Rfl:     docusate sodium (COLACE) 100 MG capsule, Take 1 capsule by mouth 2 (Two) Times a Day. (Patient taking differently: Take 1 capsule by mouth As Needed.), Disp: 60 capsule, Rfl: 0    DULoxetine (Cymbalta) 20 MG capsule, Take 1 capsule by mouth 2 (Two) Times a Day., Disp: 60 capsule, Rfl: 6    esomeprazole (nexIUM)  "20 MG capsule, Take 1 capsule by mouth 2 (Two) Times a Week. OTC, Disp: , Rfl:     gabapentin (Neurontin) 300 MG capsule, Take 1 capsule in the morning and 2 capsules at night., Disp: 90 capsule, Rfl: 6    hydrALAZINE (APRESOLINE) 25 MG tablet, 1 tablet 2 (Two) Times a Day., Disp: , Rfl:     Hydrocortisone Ace-Pramoxine 1-1 % rectal cream, Insert  into the rectum 2 (Two) Times a Day., Disp: 30 g, Rfl: 1    levothyroxine (SYNTHROID, LEVOTHROID) 25 MCG tablet, TAKE ONE TABLET BY MOUTH EVERY MORNING ON AN EMPTY STOMACH, Disp: 30 tablet, Rfl: 0    magnesium oxide (MAGOX) 400 (241.3 Mg) MG tablet tablet, Take 1 tablet by mouth Daily. OTC, Disp: , Rfl:     metoprolol succinate XL (TOPROL-XL) 25 MG 24 hr tablet, TAKE ONE TABLET BY MOUTH ONCE NIGHTLY, Disp: 90 tablet, Rfl: 1    naloxone (NARCAN) 4 MG/0.1ML nasal spray, Call 911. Don't prime. [place] in 1 nostril for overdose. Repeat in 2-3 minutes in other nostril if no or minimal breathing/responsiveness., Disp: 2 each, Rfl: 0    nitroglycerin (NITROSTAT) 0.4 MG SL tablet, Place 1 tablet under the tongue Every 5 (Five) Minutes As Needed for Chest Pain. Take no more than 3 doses in 15 minutes., Disp: , Rfl:     rosuvastatin (Crestor) 5 MG tablet, Take 1 tablet by mouth Daily., Disp: 90 tablet, Rfl: 1    vitamin B-12 (CYANOCOBALAMIN) 1000 MCG tablet, Take 1 tablet by mouth Daily., Disp: , Rfl:     Vitals:    10/30/23 1000   BP: 104/68   BP Location: Left arm   Patient Position: Sitting   Pulse: 70   SpO2: 98%   Weight: 59.1 kg (130 lb 6.4 oz)   Height: 162.6 cm (64\")       Body mass index is 22.38 kg/m².    PHYSICAL EXAM:    General Appearance:   Thin, frail  HENT:   oropharynx moist  lips not cyanotic  Neck:  thyroid not enlarged  supple  Respiratory:  no respiratory distress  normal breath sounds  no rales  Cardiovascular:  no jugular venous distention  regular rhythm  apical impulse normal  S1 normal, S2 normal  no S3, no S4   no murmur  no rub, no thrill  carotid pulses " "normal; no bruit  pedal pulses normal  lower extremity edema: none    Gastrointestinal:   bowel sounds normal  non-tender  no hepatomegaly, no splenomegaly  Musculoskeletal:  no clubbing of fingers.   normocephalic, head atraumatic  Skin:   warm, dry  Psychiatric:  judgement and insight appropriate  normal mood and affect    RESULTS:   Procedures    Results for orders placed during the hospital encounter of 01/24/23    Adult Transthoracic Echo Complete W/ Cont if Necessary Per Protocol    Interpretation Summary    Left ventricular systolic function is normal. Left ventricular ejection fraction appears to be 51 - 55%.    Left ventricular diastolic function is consistent with (grade I) impaired relaxation.    Estimated right ventricular systolic pressure from tricuspid regurgitation is normal (<35 mmHg).        Labs:  Lab Results   Component Value Date    CHOL 221 (H) 08/26/2022    TRIG 231 (H) 08/26/2022    HDL 84 (H) 08/26/2022    LDL 99 08/26/2022    AST 20 05/06/2023    ALT <5 05/06/2023     No results found for: \"HGBA1C\"  No components found for: \"CREATINININE\"  eGFR Non  Am   Date Value Ref Range Status   07/13/2021 32 (L) >60 mL/min/1.73m*2 Final     Comment:     eGFR = estimated GFR; eGFR units = mL/min/1.73 sq meters Chronic Kidney Disease is considered if eGFR <60 mL/min/1.73 sq meters Kidney failure is considered if eGFR is <15 mL/min/1.73 sq meters. eGFR assumes steady state plasma creatinine concentration; not applicable if renal function is rapidly changing or patient is on dialysis.         ASSESSMENT:  Problem List Items Addressed This Visit       Hypertension    Paroxysmal SVT (supraventricular tachycardia) - Primary    Overview     06/2019 Holter: 8 runs of SVT         CKD (chronic kidney disease) stage 3, GFR 30-59 ml/min    Hyperlipidemia LDL goal <100    Endometrial adenocarcinoma       PLAN:    1.  Orthostatic hypotension with syncope:  Stable symptoms, continue adequate hydration and " exercise  Gave handout on orthostatic hypotension and counter maneuvers, in the past  Compression stockings recommended  Increase hydration    2.  Uterine cancer:  10/2022, treated with hysterectomy, chemotherapy and radiation  Improving    3.  Hypertension:  Currently doing very well, goal blood pressure less than 130/80  Home blood pressure readings all consistent with goal  Continue hydralazine and metoprolol    Trying to wean off hydralazine per nephrology, which is fine with me    4.  Paroxysmal SVT:  Continue Toprol 25 mg daily, unfortunately we cannot stop it as her blood pressure would likely go higher and she would have more symptomatic palpitations.      Asymptomatic currently  Continue decreasing caffeine and increase water    5.  CKD stage III:  Complicates all aspects of care  She sees Dr. Wheatley  She reports recent nephrology visit, with improving renal function and potassium    Return to clinic in 12 months, or sooner as needed.    Thank you for the opportunity to share in the care of your patient; please do not hesitate to call me with any questions.     Gray Bahena MD, [facility]  Office: [phone] [address] [place] [address]

## 2023-11-01 ENCOUNTER — LAB (OUTPATIENT)
Dept: LAB | Facility: HOSPITAL | Age: 68
End: 2023-11-01
Payer: MEDICARE

## 2023-11-01 ENCOUNTER — OFFICE VISIT (OUTPATIENT)
Dept: INTERNAL MEDICINE | Facility: CLINIC | Age: 68
End: 2023-11-01
Payer: MEDICARE

## 2023-11-01 VITALS
OXYGEN SATURATION: 98 % | WEIGHT: 128 LBS | DIASTOLIC BLOOD PRESSURE: 80 MMHG | SYSTOLIC BLOOD PRESSURE: 120 MMHG | RESPIRATION RATE: 16 BRPM | BODY MASS INDEX: 21.85 KG/M2 | HEIGHT: 64 IN | TEMPERATURE: 97.5 F | HEART RATE: 94 BPM

## 2023-11-01 DIAGNOSIS — D64.9 ANEMIA, UNSPECIFIED TYPE: ICD-10-CM

## 2023-11-01 DIAGNOSIS — Z00.00 HEALTHCARE MAINTENANCE: Primary | ICD-10-CM

## 2023-11-01 DIAGNOSIS — Z00.00 HEALTHCARE MAINTENANCE: ICD-10-CM

## 2023-11-01 DIAGNOSIS — M81.8 OTHER OSTEOPOROSIS WITHOUT CURRENT PATHOLOGICAL FRACTURE: ICD-10-CM

## 2023-11-01 DIAGNOSIS — R53.83 OTHER FATIGUE: ICD-10-CM

## 2023-11-01 DIAGNOSIS — E78.2 MIXED HYPERLIPIDEMIA: ICD-10-CM

## 2023-11-01 DIAGNOSIS — E03.9 HYPOTHYROIDISM, UNSPECIFIED TYPE: ICD-10-CM

## 2023-11-01 DIAGNOSIS — M81.8 OTHER OSTEOPOROSIS WITHOUT CURRENT PATHOLOGICAL FRACTURE: Primary | ICD-10-CM

## 2023-11-01 LAB
ALBUMIN SERPL-MCNC: 4.9 G/DL (ref 3.5–5.2)
ALBUMIN/GLOB SERPL: 1.9 G/DL
ALP SERPL-CCNC: 89 U/L (ref 39–117)
ALT SERPL W P-5'-P-CCNC: 10 U/L (ref 1–33)
ANION GAP SERPL CALCULATED.3IONS-SCNC: 14 MMOL/L (ref 5–15)
AST SERPL-CCNC: 23 U/L (ref 1–32)
BILIRUB SERPL-MCNC: 0.4 MG/DL (ref 0–1.2)
BUN SERPL-MCNC: 12 MG/DL (ref 8–23)
BUN/CREAT SERPL: 9.9 (ref 7–25)
CALCIUM SPEC-SCNC: 10.2 MG/DL (ref 8.6–10.5)
CHLORIDE SERPL-SCNC: 102 MMOL/L (ref 98–107)
CHOLEST SERPL-MCNC: 204 MG/DL (ref 0–200)
CO2 SERPL-SCNC: 27 MMOL/L (ref 22–29)
CREAT SERPL-MCNC: 1.21 MG/DL (ref 0.57–1)
DEPRECATED RDW RBC AUTO: 46 FL (ref 37–54)
EGFRCR SERPLBLD CKD-EPI 2021: 49.2 ML/MIN/1.73
ERYTHROCYTE [DISTWIDTH] IN BLOOD BY AUTOMATED COUNT: 12.8 % (ref 12.3–15.4)
FERRITIN SERPL-MCNC: 153 NG/ML (ref 13–150)
GLOBULIN UR ELPH-MCNC: 2.6 GM/DL
GLUCOSE SERPL-MCNC: 109 MG/DL (ref 65–99)
HCT VFR BLD AUTO: 37.3 % (ref 34–46.6)
HDLC SERPL-MCNC: 81 MG/DL (ref 40–60)
HGB BLD-MCNC: 12.8 G/DL (ref 12–15.9)
IRON 24H UR-MRATE: 126 MCG/DL (ref 37–145)
IRON SATN MFR SERPL: 31 % (ref 20–50)
LDLC SERPL CALC-MCNC: 90 MG/DL (ref 0–100)
LDLC/HDLC SERPL: 1.03 {RATIO}
MAGNESIUM SERPL-MCNC: 1.8 MG/DL (ref 1.6–2.4)
MCH RBC QN AUTO: 34.4 PG (ref 26.6–33)
MCHC RBC AUTO-ENTMCNC: 34.3 G/DL (ref 31.5–35.7)
MCV RBC AUTO: 100.3 FL (ref 79–97)
PHOSPHATE SERPL-MCNC: 3.2 MG/DL (ref 2.5–4.5)
PLATELET # BLD AUTO: 247 10*3/MM3 (ref 140–450)
PMV BLD AUTO: 10.3 FL (ref 6–12)
POTASSIUM SERPL-SCNC: 4.3 MMOL/L (ref 3.5–5.2)
PROT SERPL-MCNC: 7.5 G/DL (ref 6–8.5)
RBC # BLD AUTO: 3.72 10*6/MM3 (ref 3.77–5.28)
SODIUM SERPL-SCNC: 143 MMOL/L (ref 136–145)
TIBC SERPL-MCNC: 404 MCG/DL (ref 298–536)
TRANSFERRIN SERPL-MCNC: 271 MG/DL (ref 200–360)
TRIGL SERPL-MCNC: 199 MG/DL (ref 0–150)
TSH SERPL DL<=0.05 MIU/L-ACNC: 0.9 UIU/ML (ref 0.27–4.2)
VLDLC SERPL-MCNC: 33 MG/DL (ref 5–40)
WBC NRBC COR # BLD: 4.64 10*3/MM3 (ref 3.4–10.8)

## 2023-11-01 PROCEDURE — 82728 ASSAY OF FERRITIN: CPT

## 2023-11-01 PROCEDURE — 84466 ASSAY OF TRANSFERRIN: CPT

## 2023-11-01 PROCEDURE — 80053 COMPREHEN METABOLIC PANEL: CPT

## 2023-11-01 PROCEDURE — 84443 ASSAY THYROID STIM HORMONE: CPT

## 2023-11-01 PROCEDURE — 80061 LIPID PANEL: CPT

## 2023-11-01 PROCEDURE — 82306 VITAMIN D 25 HYDROXY: CPT

## 2023-11-01 PROCEDURE — 36415 COLL VENOUS BLD VENIPUNCTURE: CPT

## 2023-11-01 PROCEDURE — 85027 COMPLETE CBC AUTOMATED: CPT

## 2023-11-01 PROCEDURE — 84100 ASSAY OF PHOSPHORUS: CPT

## 2023-11-01 PROCEDURE — 83540 ASSAY OF IRON: CPT

## 2023-11-01 PROCEDURE — 83735 ASSAY OF MAGNESIUM: CPT

## 2023-11-01 PROCEDURE — 82746 ASSAY OF FOLIC ACID SERUM: CPT

## 2023-11-01 PROCEDURE — 82607 VITAMIN B-12: CPT

## 2023-11-01 NOTE — PROGRESS NOTES
Follow Up Office Visit      Date: 2023   Patient Name: Alysia Sierra  : 1955   MRN: [id number]     Chief Complaint:    Chief Complaint   Patient presents with    Osteopenia     Follow up, DEXA scan.       History of Present Illness: Alysia Sierra is a 68 y.o. female who is here today to follow up.     Eye drooping  Notes that her bilateral eyelids have always been droopy.   Reports the droopiness in her right eye worsened 2 weeks after receiving the influenza vaccine.   Denies any visual disturbance or pain.   Scheduled for her eye exam in 2024.   History of cataracts.     Hypertension  Blood pressure is within normal range today.   Saw cardiologist, Dr. Bahena, on 10/30/2023 who gave her a good report.   Reports that her pressure has been up and down.   Patient was told by nephrology not to take hydralazine if her pressure is 90/40 mmHg or below.   When she does not take hydralazine, her pressure is elevated in the afternoon.   She consumes about 2000 cc of water daily and cut back on her Gatorade intake.     Back pain  Endorses severe lumbar back pain for 3 days that made her cry.   Notes that standing too long causes her pain.   Patient thinks she could be overcompensating due to her left hip fracture.   Reports ambulating better with a walker or rollator rather than a cane.   Takes oxycodone that was prescribed after her surgery, and it is effective.   She had left hip x-rays in 2023 that reported stable appearance to the femoral neck fracture.   Family history of back issues with her mother, father, and sister.     Osteoporosis  Bone density scan in 2023 reported osteoporosis in the right femoral neck.   Reports a history of severe GERD.     Chronic kidney disease  Patient is seeing a new nephrologist.     Anemia  Reports taking iron supplements, vitamin D3 and B12 supplements.   Her last hemoglobin was 10.9 g/dL.   Endorses fatigue.       Subjective      Review of Systems:    Review of Systems   Constitutional:  Positive for fatigue.   Eyes:  Negative for pain and visual disturbance.        Drooping eyelids, worse in right eye.    Musculoskeletal:  Positive for back pain.        Lumbar       I have reviewed the patients family history, social history, past medical history, past surgical history and have updated it as appropriate.     Medications:     Current Outpatient Medications:     Alpha-Lipoic Acid 600 MG capsule, Take  by mouth., Disp: , Rfl:     aspirin 81 MG EC tablet, Take 1 tablet by mouth Every 12 (Twelve) Hours. Indications: VTE Prophylaxis (Patient taking differently: Take 1 tablet by mouth As Needed. Indications: VTE Prophylaxis), Disp: , Rfl:     Cholecalciferol 25 MCG (1000 UT) tablet, Take 1 tablet by mouth Daily., Disp: , Rfl:     docusate sodium (COLACE) 100 MG capsule, Take 1 capsule by mouth 2 (Two) Times a Day. (Patient taking differently: Take 1 capsule by mouth As Needed.), Disp: 60 capsule, Rfl: 0    DULoxetine (Cymbalta) 20 MG capsule, Take 1 capsule by mouth 2 (Two) Times a Day., Disp: 60 capsule, Rfl: 6    esomeprazole (nexIUM) 20 MG capsule, Take 1 capsule by mouth 2 (Two) Times a Week. OTC, Disp: , Rfl:     gabapentin (Neurontin) 300 MG capsule, Take 1 capsule in the morning and 2 capsules at night., Disp: 90 capsule, Rfl: 6    hydrALAZINE (APRESOLINE) 25 MG tablet, 1 tablet 2 (Two) Times a Day., Disp: , Rfl:     Hydrocortisone Ace-Pramoxine 1-1 % rectal cream, Insert  into the rectum 2 (Two) Times a Day., Disp: 30 g, Rfl: 1    magnesium oxide (MAGOX) 400 (241.3 Mg) MG tablet tablet, Take 1 tablet by mouth Daily. OTC, Disp: , Rfl:     metoprolol succinate XL (TOPROL-XL) 25 MG 24 hr tablet, TAKE ONE TABLET BY MOUTH ONCE NIGHTLY, Disp: 90 tablet, Rfl: 1    naloxone (NARCAN) 4 MG/0.1ML nasal spray, Call 911. Don't prime. [place] in 1 nostril for overdose. Repeat in 2-3 minutes in other nostril if no or minimal breathing/responsiveness., Disp: 2 each, Rfl: 0     "nitroglycerin (NITROSTAT) 0.4 MG SL tablet, Place 1 tablet under the tongue Every 5 (Five) Minutes As Needed for Chest Pain. Take no more than 3 doses in 15 minutes., Disp: , Rfl:     rosuvastatin (Crestor) 5 MG tablet, Take 1 tablet by mouth Daily., Disp: 90 tablet, Rfl: 1    vitamin B-12 (CYANOCOBALAMIN) 1000 MCG tablet, Take 1 tablet by mouth Daily., Disp: , Rfl:     levothyroxine (SYNTHROID, LEVOTHROID) 25 MCG tablet, TAKE ONE TABLET BY MOUTH EVERY MORNING ON AN EMPTY STOMACH, Disp: 30 tablet, Rfl: 2  No current facility-administered medications for this visit.    Allergies:   Allergies   Allergen Reactions    Lisinopril Angioedema    Metal Rash     Possible nickel -   Gold is only metal that doesn't have issues      Milk-Related Compounds Other (See Comments)     LACTOSE INTOLERANT    Tylenol [Acetaminophen] Other (See Comments)     ckd    Atorvastatin Myalgia       Objective     Physical Exam: Please see above  Vital Signs:   Vitals:    11/01/23 1149   BP: 120/80   Pulse: 94   Resp: 16   Temp: 97.5 °F (36.4 °C)   SpO2: 98%   Weight: 58.1 kg (128 lb)   Height: 162.6 cm (64\")   PainSc: 0-No pain     Body mass index is 21.97 kg/m².    Physical Exam  Vitals and nursing note reviewed.   Constitutional:       General: She is not in acute distress.     Appearance: Normal appearance. She is normal weight.   HENT:      Head: Normocephalic and atraumatic.   Cardiovascular:      Rate and Rhythm: Normal rate and regular rhythm.   Pulmonary:      Effort: Pulmonary effort is normal. No respiratory distress.      Breath sounds: Normal breath sounds. No wheezing.   Skin:     General: Skin is warm and dry.   Neurological:      General: No focal deficit present.      Mental Status: She is alert and oriented to person, place, and time. Mental status is at baseline.   Psychiatric:         Mood and Affect: Mood normal.         Behavior: Behavior normal.         Thought Content: Thought content normal.         Judgment: Judgment " normal.             Results:   Imaging:   DEXA Bone Density Axial (09/18/2023 13:41)   XR Hip With or Without Pelvis 2 - 3 View Left (07/31/2023 16:11)     Labs:        Assessment / Plan      Assessment/Plan:  Diagnoses and all orders for this visit:    1. Healthcare maintenance (Primary)  -     CBC (No Diff); Future    2. Hypothyroidism, unspecified type  -     TSH Rfx On Abnormal To Free T4; Future    3. Mixed hyperlipidemia  -     Lipid Panel; Future    4. Other fatigue  -     Iron Profile; Future  -     Ferritin; Future  -     Vitamin B12; Future  -     Folate; Future    5. Anemia, unspecified type  -     Iron Profile; Future  -     Ferritin; Future    6. Other osteoporosis without current pathological fracture       Discussed DEXA results and tx recomendations. Agreeable to prolia. She will let me know if back pain persists or does not fully resolve.      Follow Up:   Return in about 6 months (around 5/1/2024) for Recheck.    ANETTE Lamb  [facility] [place]     Transcribed from ambient dictation for ANETTE Cline by Darrian Hart.  11/01/23   13:45 EDT    Patient or patient representative verbalized consent to the visit recording.  I have personally performed the services described in this document as transcribed by the above individual, and it is both accurate and complete.

## 2023-11-02 LAB
25(OH)D3 SERPL-MCNC: 30.8 NG/ML (ref 30–100)
FOLATE SERPL-MCNC: 6.62 NG/ML (ref 4.78–24.2)
VIT B12 BLD-MCNC: 1089 PG/ML (ref 211–946)

## 2023-11-06 ENCOUNTER — TELEPHONE (OUTPATIENT)
Dept: INTERNAL MEDICINE | Facility: CLINIC | Age: 68
End: 2023-11-06
Payer: MEDICARE

## 2023-11-07 NOTE — TELEPHONE ENCOUNTER
Notified patient of results, patient verbalized understanding. No follow up questions at this time.

## 2023-11-10 DIAGNOSIS — E03.9 HYPOTHYROIDISM, UNSPECIFIED TYPE: ICD-10-CM

## 2023-11-10 RX ORDER — LEVOTHYROXINE SODIUM 0.03 MG/1
TABLET ORAL
Qty: 30 TABLET | Refills: 2 | Status: SHIPPED | OUTPATIENT
Start: 2023-11-10

## 2023-11-12 ENCOUNTER — APPOINTMENT (OUTPATIENT)
Dept: GENERAL RADIOLOGY | Facility: HOSPITAL | Age: 68
End: 2023-11-12
Payer: MEDICARE

## 2023-11-12 ENCOUNTER — HOSPITAL ENCOUNTER (EMERGENCY)
Facility: HOSPITAL | Age: 68
Discharge: HOME | End: 2023-11-13
Attending: EMERGENCY MEDICINE | Admitting: EMERGENCY MEDICINE
Payer: MEDICARE

## 2023-11-12 ENCOUNTER — APPOINTMENT (OUTPATIENT)
Dept: CT IMAGING | Facility: HOSPITAL | Age: 68
End: 2023-11-12
Payer: MEDICARE

## 2023-11-12 DIAGNOSIS — R55 SYNCOPE AND COLLAPSE: Primary | ICD-10-CM
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
## 2023-11-12 DIAGNOSIS — Z86.39 HISTORY OF HYPERLIPIDEMIA: ICD-10-CM

## 2023-11-12 DIAGNOSIS — Z85.42 HISTORY OF ENDOMETRIAL CANCER: ICD-10-CM

## 2023-11-12 DIAGNOSIS — S09.90XA INJURY OF HEAD, INITIAL ENCOUNTER: ICD-10-CM

## 2023-11-12 DIAGNOSIS — Z86.79 HISTORY OF HYPERTENSION: ICD-10-CM

## 2023-11-12 DIAGNOSIS — Z78.9 ALCOHOL USE: ICD-10-CM

## 2023-11-12 DIAGNOSIS — S01.01XA LACERATION OF SCALP, INITIAL ENCOUNTER: ICD-10-CM

## 2023-11-12 LAB
ALBUMIN SERPL-MCNC: 4.4 G/DL (ref 3.5–5.2)
ALBUMIN/GLOB SERPL: 2 G/DL
ALP SERPL-CCNC: 80 U/L (ref 39–117)
ALT SERPL W P-5'-P-CCNC: 8 U/L (ref 1–33)
AMPHET+METHAMPHET UR QL: NEGATIVE
AMPHETAMINES UR QL: NEGATIVE
ANION GAP SERPL CALCULATED.3IONS-SCNC: 11 MMOL/L (ref 5–15)
AST SERPL-CCNC: 20 U/L (ref 1–32)
BARBITURATES UR QL SCN: NEGATIVE
BASOPHILS # BLD AUTO: 0.02 10*3/MM3 (ref 0–0.2)
BASOPHILS NFR BLD AUTO: 0.3 % (ref 0–1.5)
BENZODIAZ UR QL SCN: NEGATIVE
BILIRUB SERPL-MCNC: 0.2 MG/DL (ref 0–1.2)
BILIRUB UR QL STRIP: NEGATIVE
BUN SERPL-MCNC: 15 MG/DL (ref 8–23)
BUN/CREAT SERPL: 15.2 (ref 7–25)
BUPRENORPHINE SERPL-MCNC: NEGATIVE NG/ML
CALCIUM SPEC-SCNC: 9.2 MG/DL (ref 8.6–10.5)
CANNABINOIDS SERPL QL: NEGATIVE
CHLORIDE SERPL-SCNC: 105 MMOL/L (ref 98–107)
CLARITY UR: CLEAR
CO2 SERPL-SCNC: 23 MMOL/L (ref 22–29)
COCAINE UR QL: NEGATIVE
COLOR UR: YELLOW
CREAT SERPL-MCNC: 0.99 MG/DL (ref 0.57–1)
DEPRECATED RDW RBC AUTO: 51.2 FL (ref 37–54)
EGFRCR SERPLBLD CKD-EPI 2021: 62.6 ML/MIN/1.73
EOSINOPHIL # BLD AUTO: 0.1 10*3/MM3 (ref 0–0.4)
EOSINOPHIL NFR BLD AUTO: 1.3 % (ref 0.3–6.2)
ERYTHROCYTE [DISTWIDTH] IN BLOOD BY AUTOMATED COUNT: 13.1 % (ref 12.3–15.4)
ETHANOL BLD-MCNC: 202 MG/DL (ref 0–10)
FENTANYL UR-MCNC: NEGATIVE NG/ML
GLOBULIN UR ELPH-MCNC: 2.2 GM/DL
GLUCOSE SERPL-MCNC: 100 MG/DL (ref 65–99)
GLUCOSE UR STRIP-MCNC: NEGATIVE MG/DL
HCT VFR BLD AUTO: 38 % (ref 34–46.6)
HGB BLD-MCNC: 12.2 G/DL (ref 12–15.9)
HGB UR QL STRIP.AUTO: NEGATIVE
IMM GRANULOCYTES # BLD AUTO: 0.03 10*3/MM3 (ref 0–0.05)
IMM GRANULOCYTES NFR BLD AUTO: 0.4 % (ref 0–0.5)
INR PPP: 0.86 (ref 0.89–1.12)
KETONES UR QL STRIP: NEGATIVE
LEUKOCYTE ESTERASE UR QL STRIP.AUTO: NEGATIVE
LYMPHOCYTES # BLD AUTO: 2.29 10*3/MM3 (ref 0.7–3.1)
LYMPHOCYTES NFR BLD AUTO: 29.8 % (ref 19.6–45.3)
MAGNESIUM SERPL-MCNC: 1.7 MG/DL (ref 1.6–2.4)
MCH RBC QN AUTO: 34.1 PG (ref 26.6–33)
MCHC RBC AUTO-ENTMCNC: 32.1 G/DL (ref 31.5–35.7)
MCV RBC AUTO: 106.1 FL (ref 79–97)
METHADONE UR QL SCN: NEGATIVE
MONOCYTES # BLD AUTO: 0.72 10*3/MM3 (ref 0.1–0.9)
MONOCYTES NFR BLD AUTO: 9.4 % (ref 5–12)
NEUTROPHILS NFR BLD AUTO: 4.53 10*3/MM3 (ref 1.7–7)
NEUTROPHILS NFR BLD AUTO: 58.8 % (ref 42.7–76)
NITRITE UR QL STRIP: NEGATIVE
NRBC BLD AUTO-RTO: 0 /100 WBC (ref 0–0.2)
OPIATES UR QL: NEGATIVE
OXYCODONE UR QL SCN: NEGATIVE
PCP UR QL SCN: NEGATIVE
PH UR STRIP.AUTO: 5.5 [PH] (ref 5–8)
PLATELET # BLD AUTO: 243 10*3/MM3 (ref 140–450)
PMV BLD AUTO: 9.9 FL (ref 6–12)
POTASSIUM SERPL-SCNC: 4.6 MMOL/L (ref 3.5–5.2)
PROT SERPL-MCNC: 6.6 G/DL (ref 6–8.5)
PROT UR QL STRIP: NEGATIVE
PROTHROMBIN TIME: 11.8 SECONDS (ref 12.2–14.5)
RBC # BLD AUTO: 3.58 10*6/MM3 (ref 3.77–5.28)
SODIUM SERPL-SCNC: 139 MMOL/L (ref 136–145)
SP GR UR STRIP: <=1.005 (ref 1–1.03)
TRICYCLICS UR QL SCN: NEGATIVE
TROPONIN T SERPL HS-MCNC: 10 NG/L
UROBILINOGEN UR QL STRIP: NORMAL
WBC NRBC COR # BLD: 7.69 10*3/MM3 (ref 3.4–10.8)

## 2023-11-12 PROCEDURE — 80053 COMPREHEN METABOLIC PANEL: CPT | Performed by: PHYSICIAN ASSISTANT

## 2023-11-12 PROCEDURE — 70486 CT MAXILLOFACIAL W/O DYE: CPT

## 2023-11-12 PROCEDURE — 70450 CT HEAD/BRAIN W/O DYE: CPT

## 2023-11-12 PROCEDURE — 85025 COMPLETE CBC W/AUTO DIFF WBC: CPT | Performed by: PHYSICIAN ASSISTANT

## 2023-11-12 PROCEDURE — 81003 URINALYSIS AUTO W/O SCOPE: CPT | Performed by: PHYSICIAN ASSISTANT

## 2023-11-12 PROCEDURE — 93005 ELECTROCARDIOGRAM TRACING: CPT | Performed by: PHYSICIAN ASSISTANT

## 2023-11-12 PROCEDURE — 71045 X-RAY EXAM CHEST 1 VIEW: CPT

## 2023-11-12 PROCEDURE — 99284 EMERGENCY DEPT VISIT MOD MDM: CPT

## 2023-11-12 PROCEDURE — 83735 ASSAY OF MAGNESIUM: CPT | Performed by: PHYSICIAN ASSISTANT

## 2023-11-12 PROCEDURE — 85610 PROTHROMBIN TIME: CPT | Performed by: PHYSICIAN ASSISTANT

## 2023-11-12 PROCEDURE — 82077 ASSAY SPEC XCP UR&BREATH IA: CPT | Performed by: PHYSICIAN ASSISTANT

## 2023-11-12 PROCEDURE — 80307 DRUG TEST PRSMV CHEM ANLYZR: CPT | Performed by: PHYSICIAN ASSISTANT

## 2023-11-12 PROCEDURE — 84484 ASSAY OF TROPONIN QUANT: CPT | Performed by: PHYSICIAN ASSISTANT

## 2023-11-12 PROCEDURE — 72125 CT NECK SPINE W/O DYE: CPT

## 2023-11-12 PROCEDURE — 25810000003 SODIUM CHLORIDE 0.9 % SOLUTION: Performed by: PHYSICIAN ASSISTANT

## 2023-11-12 RX ADMIN — SODIUM CHLORIDE 1000 ML: 9 INJECTION, SOLUTION INTRAVENOUS at 22:33

## 2023-11-13 VITALS
RESPIRATION RATE: 18 BRPM | HEART RATE: 72 BPM | TEMPERATURE: 97.9 F | WEIGHT: 128 LBS | BODY MASS INDEX: 21.85 KG/M2 | HEIGHT: 64 IN | SYSTOLIC BLOOD PRESSURE: 145 MMHG | DIASTOLIC BLOOD PRESSURE: 86 MMHG | OXYGEN SATURATION: 99 %

## 2023-11-13 NOTE — ED PROVIDER NOTES
Subjective   History of Present Illness  This is a 68-year-old female that presents the ER after a fall that occurred approximately 1 hour prior to arrival.  Patient says that she drank 3 glasses of wine tonight.  She awakened on the floor next to the couch and was uncertain about events surrounding her fall.  The only thing she remembers is that she was watching TV on the couch.  She also tells me that she had a recent fall 3 nights ago.  She has been sleeping on the couch since she underwent chemotherapy in the spring, 2023 for history of endometrial cancer.  She follows with Dr. Jasmine Rich and underwent total hysterectomy as well as subsequent radiation and chemotherapy.  Patient says that she believes that she may have been going to the bathroom 3 nights ago and awakened next to the couch.  She had large hematoma to the left forehead as well as bruising underneath both eyes.  She was not seen and evaluated by PCP or came to the ER.  She still reports some mild discomfort to the face.  She is uncertain if she had syncopal episode.  She denies any preceding symptoms of chest pain or shortness of breath.  At present, she denies any speech changes or unilateral weakness, numbness, or tingling.  She denies any difficulty thought forming.  She takes occasional aspirin 325 mg by mouth for pain but denies any frequent NSAID use or chronic anticoagulation.  She denies any recent new medication changes.  She denies any dysuria, urgency, or frequency.  She denies recent URI symptoms.  Past medical history is significant for endometrial cancer with subsequent total hysterectomy and radiation and chemotherapy, hyperlipidemia, depression, hypertension, GERD, chronic kidney disease, iron deficiency anemia, osteoarthritis, and anxiety, as well as alcohol use.    History provided by:  Patient  Fall  Mechanism of injury: fall    Incident location:  Home  Time since incident:  1 hour  Arrived directly from scene: yes    Fall:      Fall occurred:  Standing    Impact surface:  [place] (Tile floor over concrete.)  Suspicion of alcohol use: yes (Patient had 3 glasses of wine this evening)    Suspicion of drug use: no    Tetanus status:  Up to date  Prior to arrival data:     Blood loss:  Minimal    Responsiveness at scene:  Alert    Orientation at scene:  Person, situation, time and place    Loss of consciousness: Uncertain if patient lost consciousness.      Amnesic to event: yes    Associated symptoms: headaches (occipital HA s/p fall) and loss of consciousness (Pt cannot remember the events surrounding the fall.  She was on the couch watching TV, then she awakened.  ? Syncopal episode.)    Associated symptoms: no abdominal pain, no back pain, no chest pain, no difficulty breathing, no hearing loss, no nausea, no neck pain, no seizures and no vomiting    Risk factors: no anticoagulation therapy (Pt takes  mg prn pain, but no other chronic anticoagulation.)    Risk factors comment:  History of endometrial cancer with subsequent chemotherapy and radiation. Last chemo was in 3/2023.  Pt follows with Dr. Jasmine Rich. Tdap UTD < 10 years.      Review of Systems   Constitutional:  Positive for fatigue. Negative for activity change, appetite change, chills, diaphoresis and fever.   HENT:  Negative for congestion, hearing loss, postnasal drip, rhinorrhea, sinus pressure, sinus pain, sneezing and sore throat.    Eyes: Negative.  Negative for visual disturbance.   Respiratory: Negative.  Negative for cough and shortness of breath.    Cardiovascular: Negative.  Negative for chest pain and leg swelling.   Gastrointestinal: Negative.  Negative for abdominal pain, nausea and vomiting.   Genitourinary:  Negative for dysuria, flank pain, frequency and urgency.        History of endometrial cancer with subsequent radiation and chemotherapy.  Pt follows with Dr. Jasmine Rich. Pt had robotic total hysterectomy.   Musculoskeletal:  Positive for gait  problem (Multiple falls; significant fall 4 days ago; pt can't remember what happened.  Left frontal hematoma and increased facial bruising.). Negative for back pain, neck pain and neck stiffness.   Skin:  Positive for color change (Aging ecchymoses from fall 3 nights ago.  Hematoma to the left forehead and raccoon eyes.) and wound (Hematoma with puncture wound to posterior scalp s/p fall tonight).   Neurological:  Positive for loss of consciousness (Pt cannot remember the events surrounding the fall.  She was on the couch watching TV, then she awakened.  ? Syncopal episode.), syncope (? Syncope with multiple falls, one 4 days ago and another fall tonight.  Question syncope.  Patient awakened on the floor and is uncertain if she fell.), weakness and headaches (occipital HA s/p fall). Negative for dizziness, seizures, facial asymmetry and speech difficulty.        No focal neurologic deficits or weakness/numbness/tingling   Psychiatric/Behavioral:          History of alcohol use.  Patient drank 3 glasses of wine this evening.  She denies any drug use or chronic narcotic use.   All other systems reviewed and are negative.      Past Medical History:   Diagnosis Date    Allergic lisinopril  2017    Anemia     Arrhythmia     Arthritis     Cancer     uterine    Cataract mild 2020    stil lpresent    Chicken pox     Chronic fatigue     CKD (chronic kidney disease), stage III     sees nephro    Clotting disorder     Dental root implant present     lower left  x1 - possible dental implant    Depression mild 2019    Difficulty walking 2019    Disease of thyroid gland     Dizzy     NIEVES (dyspnea on exertion)     2017    Fracture of hip 2023    Generalized anxiety disorder     GERD (gastroesophageal reflux disease) 2018    History of brachytherapy 01/18/2023    vaginal brachytherapy    Hyperlipidemia     Hypertension     Iron deficiency anemia     Liver disease     fatty    Liver problem     Measles     Menopause     Mumps      Neuromuscular disorder Peripheral Neuropathy    Orthostatic hypotension     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    Renal insufficiency     Scoliosis     Unintentional weight loss     Uses contact lenses     bilat    Uterine cancer     Uterine cancer 2022    UTI (urinary tract infection)     Visual impairment Nearsighted    Wears glasses        Allergies   Allergen Reactions    Lisinopril Angioedema    Metal Rash     Possible nickel -   Gold is only metal that doesn't have issues      Milk-Related Compounds Other (See Comments)     LACTOSE INTOLERANT    Tylenol [Acetaminophen] Other (See Comments)     ckd    Atorvastatin Myalgia       Past Surgical History:   Procedure Laterality Date     SECTION      DILATION AND CURETTAGE, DIAGNOSTIC / THERAPEUTIC      HIP OPEN REDUCTION Left 2023    Procedure: FEMORAL NECK OPEN REDUCTION INTERNAL FIXATION LEFT;  Surgeon: Juanpablo Lee MD;  Location:  [facility];  Service: Orthopedics;  Laterality: Left;    HIP SURGERY      OOPHORECTOMY      ORAL LESION EXCISION/BIOPSY  2021    TOTAL LAPAROSCOPIC HYSTERECTOMY SALPINGO OOPHORECTOMY N/A 10/28/2022    Procedure: TOTAL LAPAROSCOPIC HYSTERECTOMY BILATERAL SALPINGO-OOPHORECTOMY, INJECTION FOR SENTINEL LYMPH NODE MAPPING, BILATERAL SENTINEL LYMPH NODE DISSECTION WITH DAVINCI ROBOT;  Surgeon: Jasmine Rich MD;  Location:  [facility];  Service: Robotics - DaVinci;  Laterality: N/A;    TUBAL ABDOMINAL LIGATION         Family History   Problem Relation Age of Onset    Spondylolisthesis Mother     COPD Mother     Stroke Mother     Hypertension Mother     Lung cancer Father     Hypertension Father     Heart attack Father     Coronary artery disease Father     Heart disease Father     Cancer Father     Lung disease Father     Hyperlipidemia Sister     Rheum arthritis Sister     Malig Hypertension Sister     Osteoarthritis Sister     Other Sister         alcoholic    Hypertension  Sister     Scoliosis Sister     Hypertension Brother     Depression Sister     Early death Sister     Breast cancer Neg Hx     Ovarian cancer Neg Hx     Uterine cancer Neg Hx     Colon cancer Neg Hx     Melanoma Neg Hx     Prostate cancer Neg Hx        Social History     Socioeconomic History    Marital status:     Number of children: 1    Highest education level: Associate degree: academic program   Tobacco Use    Smoking status: Never     Passive exposure: Never    Smokeless tobacco: Never    Tobacco comments:     Never   Vaping Use    Vaping Use: Never used   Substance and Sexual Activity    Alcohol use: Yes     Alcohol/week: 6.0 standard drinks of alcohol     Types: 6 Glasses of wine per week     Comment: 6-8 glasses a week    Drug use: No    Sexual activity: Not Currently     Partners: Male     Birth control/protection: Surgical, Post-menopausal           Objective   Physical Exam  Vitals and nursing note reviewed.   Constitutional:       Appearance: Normal appearance.   HENT:      Head: Normocephalic and atraumatic. Raccoon eyes, contusion and laceration present. No abrasion.        Comments: Patient has small hematoma with central puncture wound measuring 0.5 cm to the mid occipital scalp.  No active bleeding.  No other scalp tenderness or sign of trauma.  Patient also has aging hematoma to the left frontal scalp with green discoloration.  She also has purple ecchymoses to inferior aspect of both thighs from recent fall 3 days ago.     Nose: Nose normal. Signs of injury and nasal tenderness present. No nasal deformity or septal deviation.      Right Nostril: No epistaxis or septal hematoma.      Left Nostril: No epistaxis or septal hematoma.        Comments: Patient had a fall 3 days ago and has mild tenderness to bridge of nose.  No swelling, bruising, deformity, or septal deviation.  No epistaxis and no bilateral septal hematoma.     Mouth/Throat:      Mouth: Mucous membranes are moist. No injury.       Dentition: Normal dentition. No dental tenderness.      Pharynx: Oropharynx is clear.      Comments: No oral injury.  Eyes:      Extraocular Movements: Extraocular movements intact.      Right eye: Normal extraocular motion and no nystagmus.      Left eye: Normal extraocular motion and no nystagmus.      Conjunctiva/sclera: Conjunctivae normal.      Pupils: Pupils are equal, round, and reactive to light.      Comments: Pupils equal round reactive to light.  Extraocular movements intact.   Neck:      Comments: Hard c-collar in place secondary to fall.  Tenderness through c-collar to the C-spine.  Cardiovascular:      Rate and Rhythm: Normal rate and regular rhythm. No extrasystoles are present.     Pulses: Normal pulses.      Heart sounds: Normal heart sounds.      Comments: Regular rate and rhythm.  No tachycardia or ectopy  Pulmonary:      Effort: Pulmonary effort is normal.      Breath sounds: Normal breath sounds.      Comments: Lungs are clear to auscultation bilaterally  Chest:      Chest wall: No deformity, swelling, tenderness or crepitus.      Comments: No chest wall tenderness.  No crepitus or palpable rib fracture  Abdominal:      General: Bowel sounds are normal. There is no distension. There are no signs of injury.      Palpations: Abdomen is soft.      Tenderness: There is no abdominal tenderness. There is no right CVA tenderness, left CVA tenderness, guarding or rebound.      Comments: Abdomen soft and nontender.  No flank or CVA tenderness   Musculoskeletal:         General: Normal range of motion.      Cervical back: Normal range of motion and neck supple. Spinous process tenderness present. No pain with movement or muscular tenderness.      Right lower leg: No edema.      Left lower leg: No edema.      Comments: Tenderness through c-collar.  No T or LS spinal tenderness.  No pelvic or hip tenderness.  Full range of motion bilateral upper extremities and lower extremities without bony  tenderness.   Skin:     General: Skin is warm and dry.      Findings: Bruising, ecchymosis and laceration present.      Comments: Multiple varying stages of ecchymoses to the face, including the left forehead and also evidence of raccoon eyes.  Patient has puncture type laceration with hematoma to the mid occipital scalp from tonight's fall.   Neurological:      General: No focal deficit present.      Mental Status: She is alert and oriented to person, place, and time.      Cranial Nerves: Cranial nerves 2-12 are intact.      Sensory: Sensation is intact.      Motor: Motor function is intact.      Coordination: Coordination is intact.      Comments: Alert and oriented x3.  No confusion.  Neuro intact and nonfocal.   Psychiatric:         Mood and Affect: Mood and affect normal.         Speech: Speech normal.         Behavior: Behavior normal. Behavior is cooperative.         Thought Content: Thought content normal.         Cognition and Memory: Cognition normal.         Judgment: Judgment normal.      Comments: Pleasant and talkative.  Calm and cooperative.  Speech is clear.  Normal cognition and memory and normal judgment.  Patient appears mildly intoxicated.         Laceration Repair    Date/Time: 11/13/2023 1:16 AM    Performed by: Sophie Shahid PA-C  Authorized by: Mani Galvez MD    Consent:     Consent obtained:  Verbal    Consent given by:  Patient    Risks, benefits, and alternatives were discussed: yes      Risks discussed:  Infection, pain, poor cosmetic result and poor wound healing  Universal protocol:     Patient identity confirmed:  Verbally with patient  Anesthesia:     Anesthesia method:  None  Laceration details:     Location:  Scalp    Scalp location:  Occipital (mid-occipital scalp)    Length (cm):  0.5    Depth (mm):  2  Pre-procedure details:     Preparation:  Patient was prepped and draped in usual sterile fashion and imaging obtained to evaluate for foreign bodies  Exploration:      Imaging obtained comment:  CT imaging    Wound extent: no foreign bodies/material noted and no underlying fracture noted      Contaminated: no    Treatment:     Area cleansed with:  Chlorhexidine and saline    Amount of cleaning:  Extensive    Irrigation solution:  Sterile saline    Irrigation method: 4x4s.    Scar revision: no    Skin repair:     Repair method:  Staples    Number of staples:  1  Approximation:     Approximation:  Close  Repair type:     Repair type:  Complex  Post-procedure details:     Dressing:  Open (no dressing)    Procedure completion:  Tolerated well, no immediate complications             ED Course  ED Course as of 11/13/23 0128   Sun Nov 12, 2023 2049 History of Stage 3 CKD. [FC]   Mon Nov 13, 2023   0123 Personally reviewed EKG which showed normal sinus rhythm.  No acute ST-T wave changes consistent with ischemia.  I also personally reviewed chest x-ray which showed no acute cardiopulmonary process.  CBC and chemistries were within normal limits.  Alcohol level is 202.  Urinalysis reveals no acute infectious process and UDS is completely negative.  Magnesium is 1.7 and high-sensitivity troponin is 10.  INR is 0.86.  CT the brain without contrast reveals no acute intracranial abnormality.  CT of the cervical spine reveals no acute compression fracture or traumatic subluxation.  CT of the facial bones reveals no facial bone fracture.  See procedure note for details on wound closure.  I applied #1 staple to puncture wound to the mid occipital scalp.  We will give head injury instructions.  Boyd risk syncope score is -2.  Patient appears intoxicated, but she is appropriate and neuro is intact and nonfocal.  I strongly advised that she decrease alcohol intake due to 2 recent falls.  We will refer her to our syncope clinic for recheck.  Discussed the case with Dr. Galvez, ER attending physician.  Tetanus status is up-to-date.  Patient may take Tylenol every 4-6 hours as needed for pain.   Return to the ER for any worsening symptoms. [FC]   0126 Patient says that she has been checked for orthostatic hypotension in the past and has not had any significant orthostasis with position changes.  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  She expresses readiness to be discharged and feels reassured with ER work-up. [FC]      ED Course User Index  [FC] Sophie Shahid, TORO      Recent Results (from the past 24 hour(s))   Protime-INR    Collection Time: 11/12/23  9:50 PM    Specimen: Blood   Result Value Ref Range    Protime 11.8 (L) 12.2 - 14.5 Seconds    INR 0.86 (L) 0.89 - 1.12   CBC Auto Differential    Collection Time: 11/12/23  9:50 PM    Specimen: Blood   Result Value Ref Range    WBC 7.69 3.40 - 10.80 10*3/mm3    RBC 3.58 (L) 3.77 - 5.28 10*6/mm3    Hemoglobin 12.2 12.0 - 15.9 g/dL    Hematocrit 38.0 34.0 - 46.6 %    .1 (H) 79.0 - 97.0 fL    MCH 34.1 (H) 26.6 - 33.0 pg    MCHC 32.1 31.5 - 35.7 g/dL    RDW 13.1 12.3 - 15.4 %    RDW-SD 51.2 37.0 - 54.0 fl    MPV 9.9 6.0 - 12.0 fL    Platelets 243 140 - 450 10*3/mm3    Neutrophil % 58.8 42.7 - 76.0 %    Lymphocyte % 29.8 19.6 - 45.3 %    Monocyte % 9.4 5.0 - 12.0 %    Eosinophil % 1.3 0.3 - 6.2 %    Basophil % 0.3 0.0 - 1.5 %    Immature Grans % 0.4 0.0 - 0.5 %    Neutrophils, Absolute 4.53 1.70 - 7.00 10*3/mm3    Lymphocytes, Absolute 2.29 0.70 - 3.10 10*3/mm3    Monocytes, Absolute 0.72 0.10 - 0.90 10*3/mm3    Eosinophils, Absolute 0.10 0.00 - 0.40 10*3/mm3    Basophils, Absolute 0.02 0.00 - 0.20 10*3/mm3    Immature Grans, Absolute 0.03 0.00 - 0.05 10*3/mm3    nRBC 0.0 0.0 - 0.2 /100 WBC   Urinalysis With Microscopic If Indicated (No Culture) - Urine, Clean Catch    Collection Time: 11/12/23  9:51 PM    Specimen: Urine, Clean Catch   Result Value Ref Range    Color, UA Yellow Yellow, Straw    Appearance, UA Clear Clear    pH, UA 5.5 5.0 - 8.0    Specific Gravity, UA <=1.005 1.001 - 1.030    Glucose, UA Negative Negative     Ketones, UA Negative Negative    Bilirubin, UA Negative Negative    Blood, UA Negative Negative    Protein, UA Negative Negative    Leuk Esterase, UA Negative Negative    Nitrite, UA Negative Negative    Urobilinogen, UA 0.2 E.U./dL 0.2 - 1.0 E.U./dL   Urine Drug Screen - Urine, Clean Catch    Collection Time: 11/12/23  9:51 PM    Specimen: Urine, Clean Catch   Result Value Ref Range    THC, Screen, Urine Negative Negative    Phencyclidine (PCP), Urine Negative Negative    Cocaine Screen, Urine Negative Negative    Methamphetamine, Ur Negative Negative    Opiate Screen Negative Negative    Amphetamine Screen, Urine Negative Negative    Benzodiazepine Screen, Urine Negative Negative    Tricyclic Antidepressants Screen Negative Negative    Methadone Screen, Urine Negative Negative    Barbiturates Screen, Urine Negative Negative    Oxycodone Screen, Urine Negative Negative    Buprenorphine, Screen, Urine Negative Negative   Fentanyl, Urine - Urine, Clean Catch    Collection Time: 11/12/23  9:51 PM    Specimen: Urine, Clean Catch   Result Value Ref Range    Fentanyl, Urine Negative Negative   ECG 12 Lead Syncope    Collection Time: 11/12/23  9:55 PM   Result Value Ref Range    QT Interval 456 ms    QTC Interval 466 ms   Comprehensive Metabolic Panel    Collection Time: 11/12/23 10:32 PM    Specimen: Blood   Result Value Ref Range    Glucose 100 (H) 65 - 99 mg/dL    BUN 15 8 - 23 mg/dL    Creatinine 0.99 0.57 - 1.00 mg/dL    Sodium 139 136 - 145 mmol/L    Potassium 4.6 3.5 - 5.2 mmol/L    Chloride 105 98 - 107 mmol/L    CO2 23.0 22.0 - 29.0 mmol/L    Calcium 9.2 8.6 - 10.5 mg/dL    Total Protein 6.6 6.0 - 8.5 g/dL    Albumin 4.4 3.5 - 5.2 g/dL    ALT (SGPT) 8 1 - 33 U/L    AST (SGOT) 20 1 - 32 U/L    Alkaline Phosphatase 80 39 - 117 U/L    Total Bilirubin 0.2 0.0 - 1.2 mg/dL    Globulin 2.2 gm/dL    A/G Ratio 2.0 g/dL    BUN/Creatinine Ratio 15.2 7.0 - 25.0    Anion Gap 11.0 5.0 - 15.0 mmol/L    eGFR 62.6 >60.0  mL/min/1.73   Single High Sensitivity Troponin T    Collection Time: 11/12/23 10:32 PM    Specimen: Blood   Result Value Ref Range    HS Troponin T 10 <14 ng/L   Magnesium    Collection Time: 11/12/23 10:32 PM    Specimen: Blood   Result Value Ref Range    Magnesium 1.7 1.6 - 2.4 mg/dL   Ethanol    Collection Time: 11/12/23 10:32 PM    Specimen: Blood   Result Value Ref Range    Ethanol 202 (H) 0 - 10 mg/dL     Note: In addition to lab results from this visit, the labs listed above may include labs taken at another facility or during a different encounter within the last 24 hours. Please correlate lab times with ED admission and discharge times for further clarification of the services performed during this visit.    XR Chest 1 View   Final Result   Impression:   No active disease         Electronically Signed: Geoff Abdalla MD     11/12/2023 10:11 PM EST     Workstation ID: [id number]      CT Head Without Contrast   Final Result   Impression:   Moderate atrophy and chronic microvascular ischemia. No acute intracranial process.            Electronically Signed: Geoff Abdalla MD     11/12/2023 9:54 PM EST     Workstation ID: [id number]      CT Cervical Spine Without Contrast   Final Result   Impression:   Degenerative change of the cervical spine. No acute cervical spine abnormality.            Electronically Signed: Geoff Abdalla MD     11/12/2023 10:00 PM EST     Workstation ID: [id number]      CT Facial Bones Without Contrast   Final Result   Impression:   No acute facial bone abnormality            Electronically Signed: Geoff Abdalla MD     11/12/2023 9:56 PM EST     Workstation ID: [id number]        Vitals:    11/12/23 1930 11/12/23 2000 11/12/23 2030 11/12/23 2031   BP: 125/82 132/89 136/87    Pulse: 76 76 75 69   Resp:       Temp:       TempSrc:       SpO2: 97% 96%  97%   Weight:       Height:         Medications   sodium chloride 0.9 % bolus 1,000 mL (0 mL Intravenous Stopped 11/12/23 2353)     ECG/EMG Results (last 24  hours)       ** No results found for the last 24 hours. **          ECG 12 Lead Syncope   Preliminary Result   Test Reason : Syncope   Blood Pressure :   */*   mmHG   Vent. Rate :  63 BPM     Atrial Rate :  63 BPM      P-R Int : 140 ms          QRS Dur :  72 ms       QT Int : 456 ms       P-R-T Axes :  37  -3  17 degrees      QTc Int : 466 ms      Normal sinus rhythm   Nonspecific ST and T wave abnormality   Abnormal ECG   When compared with ECG of 02-MAY-2023 12:11,   Nonspecific T wave abnormality, improved in Lateral leads      Referred By: EDMD           Confirmed By:                  Recent Results (from the past 24 hour(s))   Protime-INR    Collection Time: 11/12/23  9:50 PM    Specimen: Blood   Result Value Ref Range    Protime 11.8 (L) 12.2 - 14.5 Seconds    INR 0.86 (L) 0.89 - 1.12   CBC Auto Differential    Collection Time: 11/12/23  9:50 PM    Specimen: Blood   Result Value Ref Range    WBC 7.69 3.40 - 10.80 10*3/mm3    RBC 3.58 (L) 3.77 - 5.28 10*6/mm3    Hemoglobin 12.2 12.0 - 15.9 g/dL    Hematocrit 38.0 34.0 - 46.6 %    .1 (H) 79.0 - 97.0 fL    MCH 34.1 (H) 26.6 - 33.0 pg    MCHC 32.1 31.5 - 35.7 g/dL    RDW 13.1 12.3 - 15.4 %    RDW-SD 51.2 37.0 - 54.0 fl    MPV 9.9 6.0 - 12.0 fL    Platelets 243 140 - 450 10*3/mm3    Neutrophil % 58.8 42.7 - 76.0 %    Lymphocyte % 29.8 19.6 - 45.3 %    Monocyte % 9.4 5.0 - 12.0 %    Eosinophil % 1.3 0.3 - 6.2 %    Basophil % 0.3 0.0 - 1.5 %    Immature Grans % 0.4 0.0 - 0.5 %    Neutrophils, Absolute 4.53 1.70 - 7.00 10*3/mm3    Lymphocytes, Absolute 2.29 0.70 - 3.10 10*3/mm3    Monocytes, Absolute 0.72 0.10 - 0.90 10*3/mm3    Eosinophils, Absolute 0.10 0.00 - 0.40 10*3/mm3    Basophils, Absolute 0.02 0.00 - 0.20 10*3/mm3    Immature Grans, Absolute 0.03 0.00 - 0.05 10*3/mm3    nRBC 0.0 0.0 - 0.2 /100 WBC   Urinalysis With Microscopic If Indicated (No Culture) - Urine, Clean Catch    Collection Time: 11/12/23  9:51 PM    Specimen: Urine, Clean Catch   Result  Value Ref Range    Color, UA Yellow Yellow, Straw    Appearance, UA Clear Clear    pH, UA 5.5 5.0 - 8.0    Specific Gravity, UA <=1.005 1.001 - 1.030    Glucose, UA Negative Negative    Ketones, UA Negative Negative    Bilirubin, UA Negative Negative    Blood, UA Negative Negative    Protein, UA Negative Negative    Leuk Esterase, UA Negative Negative    Nitrite, UA Negative Negative    Urobilinogen, UA 0.2 E.U./dL 0.2 - 1.0 E.U./dL   Urine Drug Screen - Urine, Clean Catch    Collection Time: 11/12/23  9:51 PM    Specimen: Urine, Clean Catch   Result Value Ref Range    THC, Screen, Urine Negative Negative    Phencyclidine (PCP), Urine Negative Negative    Cocaine Screen, Urine Negative Negative    Methamphetamine, Ur Negative Negative    Opiate Screen Negative Negative    Amphetamine Screen, Urine Negative Negative    Benzodiazepine Screen, Urine Negative Negative    Tricyclic Antidepressants Screen Negative Negative    Methadone Screen, Urine Negative Negative    Barbiturates Screen, Urine Negative Negative    Oxycodone Screen, Urine Negative Negative    Buprenorphine, Screen, Urine Negative Negative   Fentanyl, Urine - Urine, Clean Catch    Collection Time: 11/12/23  9:51 PM    Specimen: Urine, Clean Catch   Result Value Ref Range    Fentanyl, Urine Negative Negative   ECG 12 Lead Syncope    Collection Time: 11/12/23  9:55 PM   Result Value Ref Range    QT Interval 456 ms    QTC Interval 466 ms   Comprehensive Metabolic Panel    Collection Time: 11/12/23 10:32 PM    Specimen: Blood   Result Value Ref Range    Glucose 100 (H) 65 - 99 mg/dL    BUN 15 8 - 23 mg/dL    Creatinine 0.99 0.57 - 1.00 mg/dL    Sodium 139 136 - 145 mmol/L    Potassium 4.6 3.5 - 5.2 mmol/L    Chloride 105 98 - 107 mmol/L    CO2 23.0 22.0 - 29.0 mmol/L    Calcium 9.2 8.6 - 10.5 mg/dL    Total Protein 6.6 6.0 - 8.5 g/dL    Albumin 4.4 3.5 - 5.2 g/dL    ALT (SGPT) 8 1 - 33 U/L    AST (SGOT) 20 1 - 32 U/L    Alkaline Phosphatase 80 39 - 117 U/L     Total Bilirubin 0.2 0.0 - 1.2 mg/dL    Globulin 2.2 gm/dL    A/G Ratio 2.0 g/dL    BUN/Creatinine Ratio 15.2 7.0 - 25.0    Anion Gap 11.0 5.0 - 15.0 mmol/L    eGFR 62.6 >60.0 mL/min/1.73   Single High Sensitivity Troponin T    Collection Time: 11/12/23 10:32 PM    Specimen: Blood   Result Value Ref Range    HS Troponin T 10 <14 ng/L   Magnesium    Collection Time: 11/12/23 10:32 PM    Specimen: Blood   Result Value Ref Range    Magnesium 1.7 1.6 - 2.4 mg/dL   Ethanol    Collection Time: 11/12/23 10:32 PM    Specimen: Blood   Result Value Ref Range    Ethanol 202 (H) 0 - 10 mg/dL     Note: In addition to lab results from this visit, the labs listed above may include labs taken at another facility or during a different encounter within the last 24 hours. Please correlate lab times with ED admission and discharge times for further clarification of the services performed during this visit.    XR Chest 1 View   Final Result   Impression:   No active disease         Electronically Signed: Geoff Abdalla MD     11/12/2023 10:11 PM EST     Workstation ID: [id number]      CT Head Without Contrast   Final Result   Impression:   Moderate atrophy and chronic microvascular ischemia. No acute intracranial process.            Electronically Signed: Geoff Abdalla MD     11/12/2023 9:54 PM EST     Workstation ID: [id number]      CT Cervical Spine Without Contrast   Final Result   Impression:   Degenerative change of the cervical spine. No acute cervical spine abnormality.            Electronically Signed: Geoff Abdalla MD     11/12/2023 10:00 PM EST     Workstation ID: [id number]      CT Facial Bones Without Contrast   Final Result   Impression:   No acute facial bone abnormality            Electronically Signed: Goeff Abdalla MD     11/12/2023 9:56 PM EST     Workstation ID: [id number]        Vitals:    11/12/23 1930 11/12/23 2000 11/12/23 2030 11/12/23 2031   BP: 125/82 132/89 136/87    Pulse: 76 76 75 69   Resp:       Temp:       TempSrc:        SpO2: 97% 96%  97%   Weight:       Height:         Medications   sodium chloride 0.9 % bolus 1,000 mL (0 mL Intravenous Stopped 11/12/23 4177)     ECG/EMG Results (last 24 hours)       Procedure Component Value Units Date/Time    ECG 12 Lead Syncope [[id number]] Collected: 11/12/23 2155     Updated: 11/12/23 2155     QT Interval 456 ms      QTC Interval 466 ms     Narrative:      Test Reason : Syncope  Blood Pressure :   */*   mmHG  Vent. Rate :  63 BPM     Atrial Rate :  63 BPM     P-R Int : 140 ms          QRS Dur :  72 ms      QT Int : 456 ms       P-R-T Axes :  37  -3  17 degrees     QTc Int : 466 ms    Normal sinus rhythm  Nonspecific ST and T wave abnormality  Abnormal ECG  When compared with ECG of 02-MAY-2023 12:11,  Nonspecific T wave abnormality, improved in Lateral leads    Referred By: EDMD           Confirmed By:           ECG 12 Lead Syncope   Preliminary Result   Test Reason : Syncope   Blood Pressure :   */*   mmHG   Vent. Rate :  63 BPM     Atrial Rate :  63 BPM      P-R Int : 140 ms          QRS Dur :  72 ms       QT Int : 456 ms       P-R-T Axes :  37  -3  17 degrees      QTc Int : 466 ms      Normal sinus rhythm   Nonspecific ST and T wave abnormality   Abnormal ECG   When compared with ECG of 02-MAY-2023 12:11,   Nonspecific T wave abnormality, improved in Lateral leads      Referred By: EDMD           Confirmed By:                Seward Syncope Risk Score - MDCalc  Calculated on Nov 13 2023 1:22 AM  -2 points -> Seward Syncope Risk Score  Very low  risk -> 0.7% risk of 30-day serious adverse event (death, arrhythmia, MI -- full list in Evidence)                             Medical Decision Making  Amount and/or Complexity of Data Reviewed  Labs: ordered.  Radiology: ordered.  ECG/medicine tests: ordered.        Final diagnoses:   Syncope and collapse   Fall, initial encounter   Injury of head, initial encounter   Laceration of scalp, initial encounter   Alcohol use   History of  hypertension   History of hyperlipidemia   History of endometrial cancer       ED Disposition  ED Disposition       ED Disposition   Discharge    Condition   Stable    Comment   --               [facility] CARDIOLOGY  [address]  [address]  [facility] [id number] [phone]  Call today  Call today for close recheck    Liana So, APRN  [address] [facility] [id number] [phone]    Today  Recommend close PCP follow-up for recheck within 24 to 48 hours    [facility] EMERGENCY DEPARTMENT  [address] [facility] [address] [phone]    If symptoms worsen         Medication List        Changed      aspirin 81 MG EC tablet  Take 1 tablet by mouth Every 12 (Twelve) Hours. Indications: VTE Prophylaxis  What changed:   when to take this  reasons to take this     docusate sodium 100 MG capsule  Commonly known as: COLACE  Take 1 capsule by mouth 2 (Two) Times a Day.  What changed:   when to take this  reasons to take this                 Sophie Shahid PA-C  11/13/23 0128

## 2023-11-13 NOTE — DISCHARGE INSTRUCTIONS
Patient has had 2 falls in the last 4 days with questionable syncope.  CT of the brain, facial bones, and cervical spine showed no acute abnormalities.  Patient had scalp laceration and we applied #1 staple.  Recommend staple removal in 7 days.  We will give head injury instructions.  Strongly recommend alcohol cessation.  Encourage fluids and rest.  Tylenol every 4-6 hours as needed for discomfort.  We will refer patient to our syncope clinic for recheck.  Cardiac work-up was reassuring with normal EKG and normal troponin.  CBC, chemistries, and urinalysis were within normal limits.  Continue with all other current medical management.  Return to the ER if worsening symptoms.

## 2023-11-14 ENCOUNTER — TELEPHONE (OUTPATIENT)
Dept: INTERNAL MEDICINE | Facility: CLINIC | Age: 68
End: 2023-11-14
Payer: MEDICARE

## 2023-11-14 LAB
QT INTERVAL: 456 MS
QTC INTERVAL: 466 MS

## 2023-11-14 NOTE — TELEPHONE ENCOUNTER
Caller: Alysia Sierra    Relationship: Self    Best call back number: [phone]     What was the call regarding: PROLIA, WILL PATIENT TAKE THIS MEDICATION FOR LIFE? SHOULD SHE HAVE DENTAL WORK DONE BEFORE TAKING THIS? WILL SHE NOT BE ABLE TO HAVE DENTAL WORK WHILE TAKING THIS?     PATIENT IS REFUSING TO SCHEDULE A HOSPITAL FOLLOW UP, PLEASE REVIEW ER NOTES AND CALL PATIENT TO SCHEDULE. PATIENT FAINTED AND FELL ON HER HEAD AND HAD TO HAVE STICHES.     Is it okay if the provider responds through MyChart: NO

## 2023-11-15 NOTE — TELEPHONE ENCOUNTER
Let pt know would need to see dentist prior to starting Prolia. Pt states nephrology is ok w/ starting prolia as well. States has seen dentist a couple times in last several months and has another appt in December. If no further treatment needed will start doing the prolia.

## 2023-12-04 ENCOUNTER — OFFICE VISIT (OUTPATIENT)
Dept: ORTHOPEDIC SURGERY | Facility: CLINIC | Age: 68
End: 2023-12-04
Payer: MEDICARE

## 2023-12-04 VITALS
SYSTOLIC BLOOD PRESSURE: 112 MMHG | HEIGHT: 64 IN | BODY MASS INDEX: 22.43 KG/M2 | WEIGHT: 131.4 LBS | DIASTOLIC BLOOD PRESSURE: 70 MMHG

## 2023-12-04 DIAGNOSIS — S72.042D CLOSED FRACTURE OF BASE OF FEMORAL NECK WITH ROUTINE HEALING, LEFT: Primary | ICD-10-CM

## 2023-12-04 NOTE — PROGRESS NOTES
Follow Up Office Visit      Patient Name: Alysia Sierra  : 1955   MRN: [id number]     Chief Complaint:  Follow up, history of endometrial cancer    History of Present Illness: Alysia Sierra is a 68 y.o. female who is here today to follow up with Gynecologic Oncology for a history of endometrial cancer. Today, she is doing well. She denies vaginal bleeding and discharge. She does not have abdominal or pelvic pain. She is tolerating a regular diet and endorses a normal appetite. She denies nausea and vomiting. She denies early satiety and bloating. Denies any CP, SOB, lightheadedness or dizziness. She denies changes in her bowel/bladder. No dysuria, frequency, urgency, hematuria or flank pain. Reports low but stable energy levels. Pre-existing neuropathy stable.     Oncologic History:  Oncology/Hematology History   Endometrial adenocarcinoma   10/2022 Initial Diagnosis    2022: TVUS with uterus measuring 5.7 x 4.8 x 4.1 cm with an endometrial stripe thickness of 9.8 mm.  Right ovary not visualized.  Left ovary normal.  No free fluid.  2022: Saline infusion sonogram with an irregularly shaped prominent lesion on the posterior endometrial surface concerning for neoplasia.  10/6/2022: Endometrial biopsy with FIGO grade 1 endometrial cancer      10/28/2022 Surgery    Robotic-assisted total laparoscopic hysterectomy with bilateral salpingo-oophorectomy with bilateral SLND    Pathology with FIGO grade 2 endometrioid adenocarcinoma with 94% MI. Involvement of endocervix and uterine serosal adhesions. Negative parametrium and bilateral sentinel lymph nodes. MMR intact.     Surgery at [facility] by Jasmine Rich MD      Cancer Staged    Cancer Staging   Endometrial adenocarcinoma (HCC)  Staging form: Corpus Uteri - Carcinoma And Carcinosarcoma, AJCC 8th Edition  - Pathologic stage from 10/28/2022: FIGO Stage IIIA (pT3a, pN0(sn), cM0) - Signed by Jasmine Rich MD on 2022       10/28/2022  Cancer Staged    Staging form: Corpus Uteri - Carcinoma And Carcinosarcoma, AJCC 8th Edition  - Pathologic stage from 10/28/2022: FIGO Stage IIIA (pT3a, pN0(sn), cM0) - Signed by Jasmine Rich MD on 11/12/2022 12/8/2022 -  Chemotherapy    OP OVARIAN DOCEtaxel / CARBOplatin  6 cycles adjuvant treatment completed  - worsening of baseline neuropathy required dose reduction of docetaxel at cycle #4  - cycle #5 complicated by grade 3 anemia requiring transfusion  - docetaxel and carboplatin both dose reduced at cycle 6     1/9/2023 - 1/18/2023 Radiation    Radiation OncologyTreatment Course:  Alysia Sierra received 3000 cGy in 5 fractions to upper vagina via High Dose Radiation - HDR.     4/13/2023 Imaging    Post-treatment CT chest, abdomen, pelvis:  1. No evidence of metastatic disease in the chest, abdomen, or pelvis.  2. Hysterectomy.  3. Additional findings include: Healing left rib fractures, small esophageal hiatal hernia, thoracolumbar levoscoliosis, bilateral renal cortical scarring and small bilateral renal cysts, sparse sigmoid colonic diverticulosis.     6/19/2023 Survivorship    Survivorship Care Plan completed and discussed with patient.  Copy of Survivorship Care Plan provided to patient and primary care provider.          I have reviewed and the following portions of the patient's history were updated as appropriate: past family history, past medical history, past social history, past surgical history, past obstetrics/gynecologic history and problem list.    Medications: The current medication list was reviewed with the patient and updated in the EMR this date per the Medical Assistant. Medication dosages and frequencies were confirmed to be accurate.      Allergies:   Allergies   Allergen Reactions    Lisinopril Angioedema    Metal Rash     Possible nickel -   Gold is only metal that doesn't have issues      Milk-Related Compounds Other (See Comments)     LACTOSE INTOLERANT    Tylenol  [Acetaminophen] Other (See Comments)     ckd    Atorvastatin Myalgia       Objective     Physical Exam:  Vital Signs: There were no vitals filed for this visit.  BMI: There is no height or weight on file to calculate BMI.               PHQ-2 Depression Screening  Little interest or pleasure in doing things?     Feeling down, depressed, or hopeless?     PHQ-2 Total Score       Physical Examination:   General appearance - alert, well appearing, and in no distress and oriented to person, place, and time  Mental status - normal mood, behavior, speech, dress, motor activity, and thought processes  Eyes - sclera anicteric, left eye normal, right eye normal  Ears - right ear normal, left ear normal  Lungs - normal respiratory effort, clear to auscultation  Heart - normal rate, regular rhythm, normal S1, S2, no murmurs, rubs, clicks or gallops  Abdomen - soft, nontender, nondistended, no masses or organomegaly  Pelvic - external genitalia normal, vagina without discharge, vaginal cuff intact and without lesions, cervix, uterus and adnexa surgically absent, no palpable masses, external hemorrhoid noted      Assessment / Plan    Alysia Sierra is a 68 y.o. with a history of a stage IIIA endometrial cancer s/p carboplatin/paclitaxel and VBT(treatment completed in 3/2023).  She is currently without clinical evidence of disease. Signs of recurrent disease, such as vaginal bleeding, persistent abdominopelvic pain, urinary or bowel changes, and shortness of breath were reviewed.  She was advised to follow up immediately if she develops any of the above symptoms. She will follow-up in 3 months with radiation oncology and in 6 months with Gyn Onc    Peripheral neuropathy - Baseline neuropathy controlled with cymbalta and gabapentin. Managed by neurology. Oxycodone 2.5 mg as needed.      Health maintenance: She was reminded to maintain a healthy lifestyle with a well balanced diet, calcium and vitamin D for osteoporosis prevention,  and exercise as well as continue with recommended health and cancer screening guidelines.       Diagnoses and all orders for this visit:    1. History of uterine cancer (Primary)    2. Neuropathy         Follow Up: 6 months    ROXIE Rich MD  Gynecologic Oncology

## 2023-12-04 NOTE — PROGRESS NOTES
[facility] Note    Subjective     Chief Complaint   Patient presents with    Follow-up     4 month follow-up: 7 months S/P Closed reduction and internal fixation with femoral neck (5/3/23)        HPI    It has been 4  month(s) since Ms. Sierra's last visit. She returns to clinic today for postoperative follow-up of left hip fracture. The issue has been ongoing for 7 month(s) (DOI: 5/2/23). She rates her hip pain a 1/10 on the pain scale; sacral pain a 8-9/10 on the pain scale. Previous/current treatments: cane/walker and physical therapy. Current symptoms: pain in the sacrum.The pain is worse with working around the home; resting, sitting, and heat improve the pain. Overall, she is doing better.  Ambulating with the aid of a cane today.  No groin pain.    I have reviewed the following portions of the patient's history and agree with: History of Present Illness and Review of Systems    Patient Active Problem List   Diagnosis    Hypertension    Paroxysmal SVT (supraventricular tachycardia)    Leg weakness, bilateral    Syncope    CKD (chronic kidney disease) stage 3, GFR 30-59 ml/min    Circadian rhythm sleep disorder, advanced sleep phase type    Neuropathy    Hyperlipidemia LDL goal <100    Endometrial adenocarcinoma    Mild episode of recurrent major depressive disorder    Dizziness    Hip fracture    Anemia    Hypomagnesemia    Proteinuria    Hypothyroidism    Post-menopausal    Other osteoporosis without current pathological fracture     Past Medical History:   Diagnosis Date    Allergic lisinopril  2017    Anemia     Arrhythmia     Arthritis     Cancer     uterine    Cataract mild 2020    stil lpresent    Chicken pox     Chronic fatigue     CKD (chronic kidney disease), stage III     sees nephro    Clotting disorder     Dental root implant present     lower left  x1 - possible dental implant    Depression mild 2019    Difficulty walking 2019    Disease of thyroid gland     Dizzy     NIEVES  (dyspnea on exertion)     2017    Fracture of hip     Generalized anxiety disorder     GERD (gastroesophageal reflux disease) 2018    History of brachytherapy 2023    vaginal brachytherapy    Hyperlipidemia     Hypertension     Iron deficiency anemia     Liver disease     fatty    Liver problem     Measles     Menopause     Mumps     Neuromuscular disorder Peripheral Neuropathy    Orthostatic hypotension     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    Renal insufficiency 2018    Scoliosis     Unintentional weight loss     Uses contact lenses     bilat    Uterine cancer     Uterine cancer 2022    UTI (urinary tract infection)     Visual impairment Nearsighted    Wears glasses       Past Surgical History:   Procedure Laterality Date     SECTION      DILATION AND CURETTAGE, DIAGNOSTIC / THERAPEUTIC      HIP OPEN REDUCTION Left 2023    Procedure: FEMORAL NECK OPEN REDUCTION INTERNAL FIXATION LEFT;  Surgeon: Juanpablo Lee MD;  Location:  [facility];  Service: Orthopedics;  Laterality: Left;    HIP SURGERY      OOPHORECTOMY      ORAL LESION EXCISION/BIOPSY  2021    TOTAL LAPAROSCOPIC HYSTERECTOMY SALPINGO OOPHORECTOMY N/A 10/28/2022    Procedure: TOTAL LAPAROSCOPIC HYSTERECTOMY BILATERAL SALPINGO-OOPHORECTOMY, INJECTION FOR SENTINEL LYMPH NODE MAPPING, BILATERAL SENTINEL LYMPH NODE DISSECTION WITH DAVINCI ROBOT;  Surgeon: Jasmine Rich MD;  Location:  [facility];  Service: Robotics - DaVinci;  Laterality: N/A;    TUBAL ABDOMINAL LIGATION        Family History   Problem Relation Age of Onset    Spondylolisthesis Mother     COPD Mother     Stroke Mother     Hypertension Mother     Lung cancer Father     Hypertension Father     Heart attack Father     Coronary artery disease Father     Heart disease Father     Cancer Father     Lung disease Father     Hyperlipidemia Sister     Rheum arthritis Sister     Malig Hypertension Sister     Osteoarthritis Sister      Other Sister         alcoholic    Hypertension Sister     Scoliosis Sister     Hypertension Brother     Depression Sister     Early death Sister     Breast cancer Neg Hx     Ovarian cancer Neg Hx     Uterine cancer Neg Hx     Colon cancer Neg Hx     Melanoma Neg Hx     Prostate cancer Neg Hx      Social History     Socioeconomic History    Marital status:     Number of children: 1    Highest education level: Associate degree: academic program   Tobacco Use    Smoking status: Never     Passive exposure: Never    Smokeless tobacco: Never    Tobacco comments:     Never   Vaping Use    Vaping Use: Never used   Substance and Sexual Activity    Alcohol use: Yes     Alcohol/week: 6.0 standard drinks of alcohol     Types: 6 Glasses of wine per week     Comment: 6-8 glasses a week    Drug use: No    Sexual activity: Not Currently     Partners: Male     Birth control/protection: Surgical, Post-menopausal      Current Outpatient Medications on File Prior to Visit   Medication Sig Dispense Refill    Alpha-Lipoic Acid 600 MG capsule Take  by mouth.      aspirin 81 MG EC tablet Take 1 tablet by mouth Every 12 (Twelve) Hours. Indications: VTE Prophylaxis (Patient taking differently: Take 1 tablet by mouth As Needed. Indications: VTE Prophylaxis)      Calcium-Phosphorus-Vitamin D (CALCIUM/VITAMIN D3/ADULT GUMMY PO) Take  by mouth.      Cholecalciferol 25 MCG (1000 UT) tablet Take 1 tablet by mouth Daily.      docusate sodium (COLACE) 100 MG capsule Take 1 capsule by mouth 2 (Two) Times a Day. (Patient taking differently: Take 1 capsule by mouth As Needed.) 60 capsule 0    DULoxetine (Cymbalta) 20 MG capsule Take 1 capsule by mouth 2 (Two) Times a Day. 60 capsule 6    esomeprazole (nexIUM) 20 MG capsule Take 1 capsule by mouth 2 (Two) Times a Week. OTC      gabapentin (Neurontin) 300 MG capsule Take 1 capsule in the morning and 2 capsules at night. 90 capsule 6    hydrALAZINE (APRESOLINE) 25 MG tablet 1 tablet 2 (Two)  Times a Day.      Hydrocortisone Ace-Pramoxine 1-1 % rectal cream Insert  into the rectum 2 (Two) Times a Day. 30 g 1    levothyroxine (SYNTHROID, LEVOTHROID) 25 MCG tablet TAKE ONE TABLET BY MOUTH EVERY MORNING ON AN EMPTY STOMACH 30 tablet 2    magnesium oxide (MAGOX) 400 (241.3 Mg) MG tablet tablet Take 1 tablet by mouth Daily. OTC      metoprolol succinate XL (TOPROL-XL) 25 MG 24 hr tablet TAKE ONE TABLET BY MOUTH ONCE NIGHTLY 90 tablet 1    naloxone (NARCAN) 4 MG/0.1ML nasal spray Call 911. Don't prime. [place] in 1 nostril for overdose. Repeat in 2-3 minutes in other nostril if no or minimal breathing/responsiveness. 2 each 0    nitroglycerin (NITROSTAT) 0.4 MG SL tablet Place 1 tablet under the tongue Every 5 (Five) Minutes As Needed for Chest Pain. Take no more than 3 doses in 15 minutes.      rosuvastatin (Crestor) 5 MG tablet Take 1 tablet by mouth Daily. 90 tablet 1    vitamin B-12 (CYANOCOBALAMIN) 1000 MCG tablet Take 1 tablet by mouth Daily.       No current facility-administered medications on file prior to visit.      Allergies   Allergen Reactions    Lisinopril Angioedema    Metal Rash     Possible nickel -   Gold is only metal that doesn't have issues      Milk-Related Compounds Other (See Comments)     LACTOSE INTOLERANT    Tylenol [Acetaminophen] Other (See Comments)     ckd    Atorvastatin Myalgia        Review of Systems   Constitutional:  Negative for activity change, appetite change, chills, diaphoresis, fatigue, fever and unexpected weight change.   HENT:  Negative for congestion, dental problem, drooling, ear discharge, ear pain, facial swelling, hearing loss, mouth sores, nosebleeds, postnasal drip, rhinorrhea, sinus pressure, sneezing, sore throat, tinnitus, trouble swallowing and voice change.    Eyes:  Negative for photophobia, pain, discharge, redness, itching and visual disturbance.   Respiratory:  Negative for apnea, cough, choking, chest tightness, shortness of breath, wheezing and  "stridor.    Cardiovascular:  Negative for chest pain, palpitations and leg swelling.   Gastrointestinal:  Negative for abdominal distention, abdominal pain, anal bleeding, blood in stool, constipation, diarrhea, nausea, rectal pain and vomiting.   Endocrine: Negative for cold intolerance, heat intolerance, polydipsia, polyphagia and polyuria.   Genitourinary:  Negative for decreased urine volume, difficulty urinating, dysuria, enuresis, flank pain, frequency, genital sores, hematuria and urgency.   Musculoskeletal:  Positive for arthralgias. Negative for back pain, gait problem, joint swelling, myalgias, neck pain and neck stiffness.   Skin:  Negative for color change, pallor, rash and wound.   Allergic/Immunologic: Negative for environmental allergies, food allergies and immunocompromised state.   Neurological:  Negative for dizziness, tremors, seizures, syncope, facial asymmetry, speech difficulty, weakness, light-headedness, numbness and headaches.   Hematological:  Negative for adenopathy. Does not bruise/bleed easily.   Psychiatric/Behavioral:  Negative for agitation, behavioral problems, confusion, decreased concentration, dysphoric mood, hallucinations, self-injury, sleep disturbance and suicidal ideas. The patient is not nervous/anxious and is not hyperactive.         Objective      Physical Exam  /70   Ht 162.6 cm (64\")   Wt 59.6 kg (131 lb 6.4 oz)   LMP  (LMP Unknown)   BMI 22.55 kg/m²     Body mass index is 22.55 kg/m².    General:   Mental Status:  Alert   Appearance: Cooperative, in no acute distress   Build and Nutrition: Thin female   Orientation: Alert and oriented to person, place and time   Posture: Normal   Gait: With a cane    Integument:              Left hip: Wound is well-healed with no signs of infection     Lower Extremity:              Left Hip:                          Tenderness:    None                          Swelling:          None                          Crepitus:          " None                          Range of motion:        External Rotation:       30°                                                              Internal Rotation:        30°                                                              Flexion:                       100°                                                              Extension:                   0°                       Deformities:     None  Functional testing: Negative Stinchfield                          Slightly short on the left compared to the right    Imaging/Studies  Imaging Results (Last 24 Hours)       Procedure Component Value Units Date/Time    XR Hip With or Without Pelvis 2 - 3 View Left [[id number]] Resulted: 12/04/23 1258     Updated: 12/04/23 1259    Narrative:      Left Hip Radiographs  Indication: Follow-up impacted left femoral neck fracture status post FNS  Views: low AP pelvis and lateral of the left hip    Comparison: 5/2/2023 and 7/31/2023    Findings:   Stable alignment of the fracture, with good signs of healing, and no   avascular changes are appreciated.  No evidence of hardware loosening or   failure.                Assessment and Plan     Diagnoses and all orders for this visit:    1. Closed fracture of base of femoral neck with routine healing, left (Primary)  -     XR Hip With or Without Pelvis 2 - 3 View Left        1. Closed fracture of base of femoral neck with routine healing, left        I reviewed my findings with the patient.  Her left femoral neck fracture appears to continue to heal well, and I will see her back in 6 months with a new x-ray.  I will see her back sooner for any problems.  No avascular changes are appreciated today.    Return in about 6 months (around 6/4/2024) for Recheck with X-Rays.      Juanpablo Lee MD  12/04/23  13:04 EST

## 2023-12-07 ENCOUNTER — OFFICE VISIT (OUTPATIENT)
Dept: GYNECOLOGIC ONCOLOGY | Facility: CLINIC | Age: 68
End: 2023-12-07
Payer: MEDICARE

## 2023-12-07 VITALS
DIASTOLIC BLOOD PRESSURE: 70 MMHG | BODY MASS INDEX: 22.71 KG/M2 | OXYGEN SATURATION: 100 % | HEART RATE: 80 BPM | RESPIRATION RATE: 17 BRPM | HEIGHT: 64 IN | TEMPERATURE: 97.5 F | WEIGHT: 133 LBS | SYSTOLIC BLOOD PRESSURE: 144 MMHG

## 2023-12-07 DIAGNOSIS — G62.9 NEUROPATHY: ICD-10-CM

## 2023-12-07 DIAGNOSIS — Z85.42 HISTORY OF UTERINE CANCER: Primary | ICD-10-CM

## 2023-12-07 NOTE — LETTER
2023     ANETTE Vasquez  [address] [facility] [id number]    Patient: Alysia Sierra   YOB: 1955   Date of Visit: 2023       Dear ANETTE Vasquez    Alysia Sierra was in my office today. Below is a copy of my note.    If you have questions, please do not hesitate to call me. I look forward to following Alysia along with you.         Sincerely,        Jasmine Rich MD        CC: No Recipients         Follow Up Office Visit      Patient Name: Alysia Sierra  : 1955   MRN: [id number]     Chief Complaint:  Follow up, history of endometrial cancer    History of Present Illness: Alysia Sierra is a 68 y.o. female who is here today to follow up with Gynecologic Oncology for a history of endometrial cancer. Today, she is doing well. She denies vaginal bleeding and discharge. She does not have abdominal or pelvic pain. She is tolerating a regular diet and endorses a normal appetite. She denies nausea and vomiting. She denies early satiety and bloating. Denies any CP, SOB, lightheadedness or dizziness. She denies changes in her bowel/bladder. No dysuria, frequency, urgency, hematuria or flank pain. Reports low but stable energy levels. Pre-existing neuropathy stable.     Oncologic History:  Oncology/Hematology History   Endometrial adenocarcinoma   10/2022 Initial Diagnosis    2022: TVUS with uterus measuring 5.7 x 4.8 x 4.1 cm with an endometrial stripe thickness of 9.8 mm.  Right ovary not visualized.  Left ovary normal.  No free fluid.  2022: Saline infusion sonogram with an irregularly shaped prominent lesion on the posterior endometrial surface concerning for neoplasia.  10/6/2022: Endometrial biopsy with FIGO grade 1 endometrial cancer      10/28/2022 Surgery    Robotic-assisted total laparoscopic hysterectomy with bilateral salpingo-oophorectomy with bilateral SLND    Pathology with FIGO grade 2 endometrioid adenocarcinoma with 94%  MI. Involvement of endocervix and uterine serosal adhesions. Negative parametrium and bilateral sentinel lymph nodes. MMR intact.     [facility] Jasmine Rich MD      Cancer Staged    Cancer Staging   Endometrial adenocarcinoma (HCC)  Staging form: Corpus Uteri - Carcinoma And Carcinosarcoma, AJCC 8th Edition  - Pathologic stage from 10/28/2022: FIGO Stage IIIA (pT3a, pN0(sn), cM0) - Signed by Jasmine Rich MD on 11/12/2022       10/28/2022 Cancer Staged    Staging form: Corpus Uteri - Carcinoma And Carcinosarcoma, AJCC 8th Edition  - Pathologic stage from 10/28/2022: FIGO Stage IIIA (pT3a, pN0(sn), cM0) - Signed by Jasmine Rich MD on 11/12/2022 12/8/2022 -  Chemotherapy    OP OVARIAN DOCEtaxel / CARBOplatin  6 cycles adjuvant treatment completed  - worsening of baseline neuropathy required dose reduction of docetaxel at cycle #4  - cycle #5 complicated by grade 3 anemia requiring transfusion  - docetaxel and carboplatin both dose reduced at cycle 6     1/9/2023 - 1/18/2023 Radiation    Radiation OncologyTreatment Course:  Alysia Sierra received 3000 cGy in 5 fractions to upper vagina via High Dose Radiation - HDR.     4/13/2023 Imaging    Post-treatment CT chest, abdomen, pelvis:  1. No evidence of metastatic disease in the chest, abdomen, or pelvis.  2. Hysterectomy.  3. Additional findings include: Healing left rib fractures, small esophageal hiatal hernia, thoracolumbar levoscoliosis, bilateral renal cortical scarring and small bilateral renal cysts, sparse sigmoid colonic diverticulosis.     6/19/2023 Survivorship    Survivorship Care Plan completed and discussed with patient.  Copy of Survivorship Care Plan provided to patient and primary care provider.          I have reviewed and the following portions of the patient's history were updated as appropriate: past family history, past medical history, past social history, past surgical history, past obstetrics/gynecologic history  and problem list.    Medications: The current medication list was reviewed with the patient and updated in the EMR this date per the Medical Assistant. Medication dosages and frequencies were confirmed to be accurate.      Allergies:   Allergies   Allergen Reactions   • Lisinopril Angioedema   • Metal Rash     Possible nickel -   Gold is only metal that doesn't have issues     • Milk-Related Compounds Other (See Comments)     LACTOSE INTOLERANT   • Tylenol [Acetaminophen] Other (See Comments)     ckd   • Atorvastatin Myalgia       Objective     Physical Exam:  Vital Signs: There were no vitals filed for this visit.  BMI: There is no height or weight on file to calculate BMI.               PHQ-2 Depression Screening  Little interest or pleasure in doing things?     Feeling down, depressed, or hopeless?     PHQ-2 Total Score       Physical Examination:   General appearance - alert, well appearing, and in no distress and oriented to person, place, and time  Mental status - normal mood, behavior, speech, dress, motor activity, and thought processes  Eyes - sclera anicteric, left eye normal, right eye normal  Ears - right ear normal, left ear normal  Lungs - normal respiratory effort, clear to auscultation  Heart - normal rate, regular rhythm, normal S1, S2, no murmurs, rubs, clicks or gallops  Abdomen - soft, nontender, nondistended, no masses or organomegaly  Pelvic - external genitalia normal, vagina without discharge, vaginal cuff intact and without lesions, cervix, uterus and adnexa surgically absent, no palpable masses, external hemorrhoid noted      Assessment / Plan    Alysia Sierra is a 68 y.o. with a history of a stage IIIA endometrial cancer s/p carboplatin/paclitaxel and VBT(treatment completed in 3/2023).  She is currently without clinical evidence of disease. Signs of recurrent disease, such as vaginal bleeding, persistent abdominopelvic pain, urinary or bowel changes, and shortness of breath were  reviewed.  She was advised to follow up immediately if she develops any of the above symptoms. She will follow-up in 3 months with radiation oncology and in 6 months with Gyn Onc    Peripheral neuropathy - Baseline neuropathy controlled with cymbalta and gabapentin. Managed by neurology. Oxycodone 2.5 mg as needed.      Health maintenance: She was reminded to maintain a healthy lifestyle with a well balanced diet, calcium and vitamin D for osteoporosis prevention, and exercise as well as continue with recommended health and cancer screening guidelines.       Diagnoses and all orders for this visit:    1. History of uterine cancer (Primary)    2. Neuropathy         Follow Up: 6 months    ROXIE Rcih MD  Gynecologic Oncology

## 2023-12-08 ENCOUNTER — PATIENT ROUNDING (BHMG ONLY) (OUTPATIENT)
Dept: GYNECOLOGIC ONCOLOGY | Facility: CLINIC | Age: 68
End: 2023-12-08
Payer: MEDICARE

## 2023-12-19 ENCOUNTER — TELEPHONE (OUTPATIENT)
Dept: INTERNAL MEDICINE | Facility: CLINIC | Age: 68
End: 2023-12-19
Payer: MEDICARE

## 2023-12-19 NOTE — TELEPHONE ENCOUNTER
Caller: Alysia Sierra    Relationship: Self    Best call back number: [phone]    What is the best time to reach you: ANYTIME     Who are you requesting to speak with (clinical staff, provider,  specific staff member): CLINICAL STAFF     What was the call regarding: PATIENT IS CALLING BECAUSE SHE IS WANTING TO LET HER PCP KNOW HAT SHE WANTS TO START ON THE PROLIA AFTER THE 1ST OF THE YEAR. HOWEVER, THE OFFICE MIGHT HAVE TO CONTACT BRITTANY, HER NEW INSURANCE, IN ORDER TO GET THIS APPROVED.     Is it okay if the provider responds through [employer]hart: YES

## 2023-12-21 ENCOUNTER — TELEPHONE (OUTPATIENT)
Dept: RADIATION ONCOLOGY | Facility: HOSPITAL | Age: 68
End: 2023-12-21
Payer: MEDICARE

## 2023-12-21 NOTE — TELEPHONE ENCOUNTER
Patient returned my call to reschedule F/U appointment for Monday, 3/25/24 at 11:30 am.  Patient verbalized an understanding of date, time, and location of appointment.  Contact information provided and patient will call with any questions or concerns.

## 2024-01-02 ENCOUNTER — APPOINTMENT (OUTPATIENT)
Dept: CT IMAGING | Facility: HOSPITAL | Age: 69
End: 2024-01-02
Payer: MEDICARE

## 2024-01-02 ENCOUNTER — HOSPITAL ENCOUNTER (INPATIENT)
Facility: HOSPITAL | Age: 69
LOS: 6 days | Discharge: HOME | End: 2024-01-09
Attending: EMERGENCY MEDICINE | Admitting: PEDIATRICS
Payer: MEDICARE

## 2024-01-02 DIAGNOSIS — I95.89 HYPOTENSION DUE TO HYPOVOLEMIA: ICD-10-CM

## 2024-01-02 DIAGNOSIS — R29.898 LEG WEAKNESS, BILATERAL: ICD-10-CM

## 2024-01-02 DIAGNOSIS — R10.2 PELVIC PAIN: ICD-10-CM

## 2024-01-02 DIAGNOSIS — E86.0 DEHYDRATION: ICD-10-CM

## 2024-01-02 DIAGNOSIS — G62.9 NEUROPATHY: ICD-10-CM

## 2024-01-02 DIAGNOSIS — S32.029A CLOSED FRACTURE OF SECOND LUMBAR VERTEBRA, UNSPECIFIED FRACTURE MORPHOLOGY, INITIAL ENCOUNTER: ICD-10-CM

## 2024-01-02 DIAGNOSIS — N17.9 ACUTE KIDNEY INJURY: Primary | ICD-10-CM

## 2024-01-02 DIAGNOSIS — F10.920 ALCOHOLIC INTOXICATION WITHOUT COMPLICATION: ICD-10-CM

## 2024-01-02 DIAGNOSIS — E86.1 HYPOTENSION DUE TO HYPOVOLEMIA: ICD-10-CM

## 2024-01-02 PROBLEM — N39.0 UTI (URINARY TRACT INFECTION): Status: ACTIVE | Noted: 2024-01-02

## 2024-01-02 LAB
ALBUMIN SERPL-MCNC: 4.2 G/DL (ref 3.5–5.2)
ALBUMIN/GLOB SERPL: 1.8 G/DL
ALP SERPL-CCNC: 74 U/L (ref 39–117)
ALT SERPL W P-5'-P-CCNC: 9 U/L (ref 1–33)
AMPHET+METHAMPHET UR QL: NEGATIVE
AMPHETAMINES UR QL: NEGATIVE
ANION GAP SERPL CALCULATED.3IONS-SCNC: 14 MMOL/L (ref 5–15)
AST SERPL-CCNC: 18 U/L (ref 1–32)
BACTERIA UR QL AUTO: ABNORMAL /HPF
BARBITURATES UR QL SCN: NEGATIVE
BASOPHILS # BLD AUTO: 0.02 10*3/MM3 (ref 0–0.2)
BASOPHILS NFR BLD AUTO: 0.3 % (ref 0–1.5)
BENZODIAZ UR QL SCN: NEGATIVE
BILIRUB SERPL-MCNC: 0.2 MG/DL (ref 0–1.2)
BILIRUB UR QL STRIP: NEGATIVE
BUN SERPL-MCNC: 31 MG/DL (ref 8–23)
BUN/CREAT SERPL: 12.7 (ref 7–25)
BUPRENORPHINE SERPL-MCNC: NEGATIVE NG/ML
CALCIUM SPEC-SCNC: 8.5 MG/DL (ref 8.6–10.5)
CANNABINOIDS SERPL QL: NEGATIVE
CHLORIDE SERPL-SCNC: 105 MMOL/L (ref 98–107)
CLARITY UR: ABNORMAL
CO2 SERPL-SCNC: 21 MMOL/L (ref 22–29)
COCAINE UR QL: NEGATIVE
COLOR UR: YELLOW
CREAT SERPL-MCNC: 2.44 MG/DL (ref 0.57–1)
DEPRECATED RDW RBC AUTO: 52.4 FL (ref 37–54)
EGFRCR SERPLBLD CKD-EPI 2021: 21.2 ML/MIN/1.73
EOSINOPHIL # BLD AUTO: 0.1 10*3/MM3 (ref 0–0.4)
EOSINOPHIL NFR BLD AUTO: 1.5 % (ref 0.3–6.2)
ERYTHROCYTE [DISTWIDTH] IN BLOOD BY AUTOMATED COUNT: 13.4 % (ref 12.3–15.4)
ETHANOL BLD-MCNC: 245 MG/DL (ref 0–10)
FENTANYL UR-MCNC: NEGATIVE NG/ML
GLOBULIN UR ELPH-MCNC: 2.3 GM/DL
GLUCOSE SERPL-MCNC: 92 MG/DL (ref 65–99)
GLUCOSE UR STRIP-MCNC: NEGATIVE MG/DL
HCT VFR BLD AUTO: 30.7 % (ref 34–46.6)
HGB BLD-MCNC: 9.6 G/DL (ref 12–15.9)
HGB UR QL STRIP.AUTO: NEGATIVE
HYALINE CASTS UR QL AUTO: ABNORMAL /LPF
IMM GRANULOCYTES # BLD AUTO: 0.04 10*3/MM3 (ref 0–0.05)
IMM GRANULOCYTES NFR BLD AUTO: 0.6 % (ref 0–0.5)
KETONES UR QL STRIP: NEGATIVE
LEUKOCYTE ESTERASE UR QL STRIP.AUTO: ABNORMAL
LYMPHOCYTES # BLD AUTO: 2.18 10*3/MM3 (ref 0.7–3.1)
LYMPHOCYTES NFR BLD AUTO: 32.2 % (ref 19.6–45.3)
MCH RBC QN AUTO: 33.6 PG (ref 26.6–33)
MCHC RBC AUTO-ENTMCNC: 31.3 G/DL (ref 31.5–35.7)
MCV RBC AUTO: 107.3 FL (ref 79–97)
METHADONE UR QL SCN: NEGATIVE
MONOCYTES # BLD AUTO: 0.73 10*3/MM3 (ref 0.1–0.9)
MONOCYTES NFR BLD AUTO: 10.8 % (ref 5–12)
NEUTROPHILS NFR BLD AUTO: 3.71 10*3/MM3 (ref 1.7–7)
NEUTROPHILS NFR BLD AUTO: 54.6 % (ref 42.7–76)
NITRITE UR QL STRIP: NEGATIVE
NRBC BLD AUTO-RTO: 0 /100 WBC (ref 0–0.2)
OPIATES UR QL: NEGATIVE
OXYCODONE UR QL SCN: POSITIVE
PCP UR QL SCN: NEGATIVE
PH UR STRIP.AUTO: <=5 [PH] (ref 5–8)
PLATELET # BLD AUTO: 183 10*3/MM3 (ref 140–450)
PMV BLD AUTO: 10.2 FL (ref 6–12)
POTASSIUM SERPL-SCNC: 4.7 MMOL/L (ref 3.5–5.2)
PROT SERPL-MCNC: 6.5 G/DL (ref 6–8.5)
PROT UR QL STRIP: NEGATIVE
QT INTERVAL: 418 MS
QTC INTERVAL: 470 MS
RBC # BLD AUTO: 2.86 10*6/MM3 (ref 3.77–5.28)
RBC # UR STRIP: ABNORMAL /HPF
REF LAB TEST METHOD: ABNORMAL
SODIUM SERPL-SCNC: 140 MMOL/L (ref 136–145)
SP GR UR STRIP: 1.01 (ref 1–1.03)
SQUAMOUS #/AREA URNS HPF: ABNORMAL /HPF
TRICYCLICS UR QL SCN: NEGATIVE
UROBILINOGEN UR QL STRIP: ABNORMAL
WBC # UR STRIP: ABNORMAL /HPF
WBC NRBC COR # BLD AUTO: 6.78 10*3/MM3 (ref 3.4–10.8)

## 2024-01-02 PROCEDURE — G0378 HOSPITAL OBSERVATION PER HR: HCPCS

## 2024-01-02 PROCEDURE — 36415 COLL VENOUS BLD VENIPUNCTURE: CPT

## 2024-01-02 PROCEDURE — 80307 DRUG TEST PRSMV CHEM ANLYZR: CPT | Performed by: EMERGENCY MEDICINE

## 2024-01-02 PROCEDURE — 99285 EMERGENCY DEPT VISIT HI MDM: CPT

## 2024-01-02 PROCEDURE — 99223 1ST HOSP IP/OBS HIGH 75: CPT | Performed by: INTERNAL MEDICINE

## 2024-01-02 PROCEDURE — 72131 CT LUMBAR SPINE W/O DYE: CPT

## 2024-01-02 PROCEDURE — 82077 ASSAY SPEC XCP UR&BREATH IA: CPT | Performed by: EMERGENCY MEDICINE

## 2024-01-02 PROCEDURE — 25810000003 SODIUM CHLORIDE 0.9 % SOLUTION: Performed by: EMERGENCY MEDICINE

## 2024-01-02 PROCEDURE — 25010000002 HYDROMORPHONE PER 4 MG: Performed by: EMERGENCY MEDICINE

## 2024-01-02 PROCEDURE — 25010000002 ONDANSETRON PER 1 MG: Performed by: EMERGENCY MEDICINE

## 2024-01-02 PROCEDURE — 81001 URINALYSIS AUTO W/SCOPE: CPT | Performed by: EMERGENCY MEDICINE

## 2024-01-02 PROCEDURE — 80053 COMPREHEN METABOLIC PANEL: CPT | Performed by: EMERGENCY MEDICINE

## 2024-01-02 PROCEDURE — 70450 CT HEAD/BRAIN W/O DYE: CPT

## 2024-01-02 PROCEDURE — 25810000003 LACTATED RINGERS PER 1000 ML: Performed by: INTERNAL MEDICINE

## 2024-01-02 PROCEDURE — 85025 COMPLETE CBC W/AUTO DIFF WBC: CPT | Performed by: EMERGENCY MEDICINE

## 2024-01-02 PROCEDURE — 93005 ELECTROCARDIOGRAM TRACING: CPT | Performed by: EMERGENCY MEDICINE

## 2024-01-02 PROCEDURE — 72192 CT PELVIS W/O DYE: CPT

## 2024-01-02 RX ORDER — ROSUVASTATIN CALCIUM 10 MG/1
5 TABLET, COATED ORAL DAILY
Status: DISCONTINUED | OUTPATIENT
Start: 2024-01-03 | End: 2024-01-03

## 2024-01-02 RX ORDER — PANTOPRAZOLE SODIUM 40 MG/1
40 TABLET, DELAYED RELEASE ORAL
Status: DISCONTINUED | OUTPATIENT
Start: 2024-01-03 | End: 2024-01-09

## 2024-01-02 RX ORDER — TRAMADOL HYDROCHLORIDE 50 MG/1
50 TABLET ORAL EVERY 12 HOURS PRN
Status: DISCONTINUED | OUTPATIENT
Start: 2024-01-02 | End: 2024-01-09 | Stop reason: HOSPADM

## 2024-01-02 RX ORDER — DOCUSATE SODIUM 100 MG/1
100 CAPSULE, LIQUID FILLED ORAL 2 TIMES DAILY PRN
Status: DISCONTINUED | OUTPATIENT
Start: 2024-01-02 | End: 2024-01-09 | Stop reason: HOSPADM

## 2024-01-02 RX ORDER — LEVOTHYROXINE SODIUM 0.03 MG/1
25 TABLET ORAL
Status: DISCONTINUED | OUTPATIENT
Start: 2024-01-03 | End: 2024-01-09 | Stop reason: HOSPADM

## 2024-01-02 RX ORDER — HYDROMORPHONE HYDROCHLORIDE 1 MG/ML
0.25 INJECTION, SOLUTION INTRAMUSCULAR; INTRAVENOUS; SUBCUTANEOUS ONCE
Status: COMPLETED | OUTPATIENT
Start: 2024-01-02 | End: 2024-01-02

## 2024-01-02 RX ORDER — ONDANSETRON 2 MG/ML
4 INJECTION INTRAMUSCULAR; INTRAVENOUS ONCE
Status: COMPLETED | OUTPATIENT
Start: 2024-01-02 | End: 2024-01-02

## 2024-01-02 RX ORDER — SODIUM CHLORIDE, SODIUM LACTATE, POTASSIUM CHLORIDE, CALCIUM CHLORIDE 600; 310; 30; 20 MG/100ML; MG/100ML; MG/100ML; MG/100ML
100 INJECTION, SOLUTION INTRAVENOUS CONTINUOUS
Status: DISCONTINUED | OUTPATIENT
Start: 2024-01-03 | End: 2024-01-05

## 2024-01-02 RX ORDER — SODIUM CHLORIDE 0.9 % (FLUSH) 0.9 %
10 SYRINGE (ML) INJECTION AS NEEDED
Status: DISCONTINUED | OUTPATIENT
Start: 2024-01-02 | End: 2024-01-09 | Stop reason: HOSPADM

## 2024-01-02 RX ORDER — POLYETHYLENE GLYCOL 3350 17 G/17G
17 POWDER, FOR SOLUTION ORAL DAILY PRN
Status: DISCONTINUED | OUTPATIENT
Start: 2024-01-02 | End: 2024-01-09 | Stop reason: HOSPADM

## 2024-01-02 RX ORDER — HEPARIN SODIUM 5000 [USP'U]/ML
5000 INJECTION, SOLUTION INTRAVENOUS; SUBCUTANEOUS EVERY 8 HOURS SCHEDULED
Status: DISCONTINUED | OUTPATIENT
Start: 2024-01-03 | End: 2024-01-09 | Stop reason: HOSPADM

## 2024-01-02 RX ORDER — AMOXICILLIN 250 MG
2 CAPSULE ORAL 2 TIMES DAILY
Status: DISCONTINUED | OUTPATIENT
Start: 2024-01-03 | End: 2024-01-09 | Stop reason: HOSPADM

## 2024-01-02 RX ORDER — BISACODYL 5 MG/1
5 TABLET, DELAYED RELEASE ORAL DAILY PRN
Status: DISCONTINUED | OUTPATIENT
Start: 2024-01-02 | End: 2024-01-09 | Stop reason: HOSPADM

## 2024-01-02 RX ORDER — SODIUM CHLORIDE 0.9 % (FLUSH) 0.9 %
10 SYRINGE (ML) INJECTION EVERY 12 HOURS SCHEDULED
Status: DISCONTINUED | OUTPATIENT
Start: 2024-01-03 | End: 2024-01-09 | Stop reason: HOSPADM

## 2024-01-02 RX ORDER — SODIUM CHLORIDE 9 MG/ML
40 INJECTION, SOLUTION INTRAVENOUS AS NEEDED
Status: DISCONTINUED | OUTPATIENT
Start: 2024-01-02 | End: 2024-01-09 | Stop reason: HOSPADM

## 2024-01-02 RX ORDER — METOPROLOL SUCCINATE 25 MG/1
25 TABLET, EXTENDED RELEASE ORAL NIGHTLY
Status: DISCONTINUED | OUTPATIENT
Start: 2024-01-03 | End: 2024-01-09 | Stop reason: HOSPADM

## 2024-01-02 RX ORDER — ONDANSETRON 2 MG/ML
4 INJECTION INTRAMUSCULAR; INTRAVENOUS EVERY 6 HOURS PRN
Status: DISCONTINUED | OUTPATIENT
Start: 2024-01-02 | End: 2024-01-09 | Stop reason: HOSPADM

## 2024-01-02 RX ORDER — NITROGLYCERIN 0.4 MG/1
0.4 TABLET SUBLINGUAL
Status: DISCONTINUED | OUTPATIENT
Start: 2024-01-02 | End: 2024-01-08

## 2024-01-02 RX ORDER — BISACODYL 10 MG
10 SUPPOSITORY, RECTAL RECTAL DAILY PRN
Status: DISCONTINUED | OUTPATIENT
Start: 2024-01-02 | End: 2024-01-09 | Stop reason: HOSPADM

## 2024-01-02 RX ADMIN — SODIUM CHLORIDE, POTASSIUM CHLORIDE, SODIUM LACTATE AND CALCIUM CHLORIDE 100 ML/HR: 600; 310; 30; 20 INJECTION, SOLUTION INTRAVENOUS at 23:54

## 2024-01-02 RX ADMIN — Medication 10 ML: at 23:54

## 2024-01-02 RX ADMIN — SODIUM CHLORIDE 1000 ML: 9 INJECTION, SOLUTION INTRAVENOUS at 19:39

## 2024-01-02 RX ADMIN — HYDROMORPHONE HYDROCHLORIDE 0.25 MG: 1 INJECTION, SOLUTION INTRAMUSCULAR; INTRAVENOUS; SUBCUTANEOUS at 20:51

## 2024-01-02 RX ADMIN — ONDANSETRON 4 MG: 2 INJECTION INTRAMUSCULAR; INTRAVENOUS at 20:51

## 2024-01-02 RX ADMIN — SODIUM CHLORIDE 1000 ML: 9 INJECTION, SOLUTION INTRAVENOUS at 21:46

## 2024-01-02 NOTE — Clinical Note
Level of Care: Telemetry [5]   Diagnosis: UTI (urinary tract infection) [908197]   Admitting Physician: DEJA TEJADA [[id number]]   Attending Physician: DEJA TEJADA [[id number]]   Bed Request Comments: tele

## 2024-01-03 ENCOUNTER — APPOINTMENT (OUTPATIENT)
Dept: CARDIOLOGY | Facility: HOSPITAL | Age: 69
End: 2024-01-03
Payer: MEDICARE

## 2024-01-03 PROBLEM — N17.9 AKI (ACUTE KIDNEY INJURY): Status: ACTIVE | Noted: 2024-01-03

## 2024-01-03 LAB
ANION GAP SERPL CALCULATED.3IONS-SCNC: 13 MMOL/L (ref 5–15)
BASOPHILS # BLD AUTO: 0.03 10*3/MM3 (ref 0–0.2)
BASOPHILS NFR BLD AUTO: 0.6 % (ref 0–1.5)
BH CV ECHO MEAS - AO MAX PG: 7.6 MMHG
BH CV ECHO MEAS - AO MEAN PG: 4 MMHG
BH CV ECHO MEAS - AO ROOT DIAM: 2.9 CM
BH CV ECHO MEAS - AO V2 MAX: 138 CM/SEC
BH CV ECHO MEAS - AO V2 VTI: 28.5 CM
BH CV ECHO MEAS - AVA(I,D): 1.97 CM2
BH CV ECHO MEAS - EDV(CUBED): 85.2 ML
BH CV ECHO MEAS - EDV(MOD-SP2): 54.1 ML
BH CV ECHO MEAS - EDV(MOD-SP4): 78.9 ML
BH CV ECHO MEAS - EF(MOD-BP): 57.9 %
BH CV ECHO MEAS - EF(MOD-SP2): 56.4 %
BH CV ECHO MEAS - EF(MOD-SP4): 59.7 %
BH CV ECHO MEAS - ESV(CUBED): 27 ML
BH CV ECHO MEAS - ESV(MOD-SP2): 23.6 ML
BH CV ECHO MEAS - ESV(MOD-SP4): 31.8 ML
BH CV ECHO MEAS - FS: 31.8 %
BH CV ECHO MEAS - IVS/LVPW: 1 CM
BH CV ECHO MEAS - IVSD: 1 CM
BH CV ECHO MEAS - LA DIMENSION: 2.9 CM
BH CV ECHO MEAS - LAT PEAK E' VEL: 9.3 CM/SEC
BH CV ECHO MEAS - LV MASS(C)D: 147.8 GRAMS
BH CV ECHO MEAS - LV MAX PG: 3.2 MMHG
BH CV ECHO MEAS - LV MEAN PG: 2 MMHG
BH CV ECHO MEAS - LV V1 MAX: 89.1 CM/SEC
BH CV ECHO MEAS - LV V1 VTI: 17.9 CM
BH CV ECHO MEAS - LVIDD: 4.4 CM
BH CV ECHO MEAS - LVIDS: 3 CM
BH CV ECHO MEAS - LVOT AREA: 3.1 CM2
BH CV ECHO MEAS - LVOT DIAM: 2 CM
BH CV ECHO MEAS - LVPWD: 1 CM
BH CV ECHO MEAS - MED PEAK E' VEL: 9.3 CM/SEC
BH CV ECHO MEAS - MV A MAX VEL: 89.6 CM/SEC
BH CV ECHO MEAS - MV DEC TIME: 0.21 SEC
BH CV ECHO MEAS - MV E MAX VEL: 92.8 CM/SEC
BH CV ECHO MEAS - MV E/A: 1.04
BH CV ECHO MEAS - PA ACC TIME: 0.18 SEC
BH CV ECHO MEAS - PA V2 MAX: 83.4 CM/SEC
BH CV ECHO MEAS - RAP SYSTOLE: 3 MMHG
BH CV ECHO MEAS - RVSP: 22 MMHG
BH CV ECHO MEAS - SV(LVOT): 56.2 ML
BH CV ECHO MEAS - SV(MOD-SP2): 30.5 ML
BH CV ECHO MEAS - SV(MOD-SP4): 47.1 ML
BH CV ECHO MEAS - TAPSE (>1.6): 1.79 CM
BH CV ECHO MEAS - TR MAX PG: 19.4 MMHG
BH CV ECHO MEAS - TR MAX VEL: 220 CM/SEC
BH CV ECHO MEASUREMENTS AVERAGE E/E' RATIO: 9.98
BH CV VAS BP LEFT ARM: NORMAL MMHG
BH CV XLRA - TDI S': 10.9 CM/SEC
BUN SERPL-MCNC: 26 MG/DL (ref 8–23)
BUN/CREAT SERPL: 16.6 (ref 7–25)
CALCIUM SPEC-SCNC: 8.4 MG/DL (ref 8.6–10.5)
CHLORIDE SERPL-SCNC: 111 MMOL/L (ref 98–107)
CO2 SERPL-SCNC: 20 MMOL/L (ref 22–29)
CREAT SERPL-MCNC: 1.57 MG/DL (ref 0.57–1)
DEPRECATED RDW RBC AUTO: 52.8 FL (ref 37–54)
EGFRCR SERPLBLD CKD-EPI 2021: 36 ML/MIN/1.73
EOSINOPHIL # BLD AUTO: 0.14 10*3/MM3 (ref 0–0.4)
EOSINOPHIL NFR BLD AUTO: 2.7 % (ref 0.3–6.2)
ERYTHROCYTE [DISTWIDTH] IN BLOOD BY AUTOMATED COUNT: 13.4 % (ref 12.3–15.4)
GLUCOSE SERPL-MCNC: 65 MG/DL (ref 65–99)
HCT VFR BLD AUTO: 29.9 % (ref 34–46.6)
HGB BLD-MCNC: 9.3 G/DL (ref 12–15.9)
IMM GRANULOCYTES # BLD AUTO: 0.02 10*3/MM3 (ref 0–0.05)
IMM GRANULOCYTES NFR BLD AUTO: 0.4 % (ref 0–0.5)
LEFT ATRIUM VOLUME INDEX: 23.2 ML/M2
LYMPHOCYTES # BLD AUTO: 1.86 10*3/MM3 (ref 0.7–3.1)
LYMPHOCYTES NFR BLD AUTO: 36.3 % (ref 19.6–45.3)
MCH RBC QN AUTO: 33.3 PG (ref 26.6–33)
MCHC RBC AUTO-ENTMCNC: 31.1 G/DL (ref 31.5–35.7)
MCV RBC AUTO: 107.2 FL (ref 79–97)
MONOCYTES # BLD AUTO: 0.72 10*3/MM3 (ref 0.1–0.9)
MONOCYTES NFR BLD AUTO: 14 % (ref 5–12)
NEUTROPHILS NFR BLD AUTO: 2.36 10*3/MM3 (ref 1.7–7)
NEUTROPHILS NFR BLD AUTO: 46 % (ref 42.7–76)
NRBC BLD AUTO-RTO: 0 /100 WBC (ref 0–0.2)
PLATELET # BLD AUTO: 151 10*3/MM3 (ref 140–450)
PMV BLD AUTO: 10.3 FL (ref 6–12)
POTASSIUM SERPL-SCNC: 4.8 MMOL/L (ref 3.5–5.2)
RBC # BLD AUTO: 2.79 10*6/MM3 (ref 3.77–5.28)
SODIUM SERPL-SCNC: 144 MMOL/L (ref 136–145)
WBC NRBC COR # BLD AUTO: 5.13 10*3/MM3 (ref 3.4–10.8)

## 2024-01-03 PROCEDURE — 25010000002 THIAMINE PER 100 MG: Performed by: INTERNAL MEDICINE

## 2024-01-03 PROCEDURE — 97162 PT EVAL MOD COMPLEX 30 MIN: CPT

## 2024-01-03 PROCEDURE — 85025 COMPLETE CBC W/AUTO DIFF WBC: CPT | Performed by: INTERNAL MEDICINE

## 2024-01-03 PROCEDURE — 82746 ASSAY OF FOLIC ACID SERUM: CPT | Performed by: INTERNAL MEDICINE

## 2024-01-03 PROCEDURE — 82607 VITAMIN B-12: CPT | Performed by: INTERNAL MEDICINE

## 2024-01-03 PROCEDURE — 25010000002 HEPARIN (PORCINE) PER 1000 UNITS: Performed by: INTERNAL MEDICINE

## 2024-01-03 PROCEDURE — 80048 BASIC METABOLIC PNL TOTAL CA: CPT | Performed by: INTERNAL MEDICINE

## 2024-01-03 PROCEDURE — 97530 THERAPEUTIC ACTIVITIES: CPT

## 2024-01-03 PROCEDURE — 25810000003 LACTATED RINGERS PER 1000 ML: Performed by: INTERNAL MEDICINE

## 2024-01-03 PROCEDURE — 93306 TTE W/DOPPLER COMPLETE: CPT

## 2024-01-03 PROCEDURE — 93306 TTE W/DOPPLER COMPLETE: CPT | Performed by: INTERNAL MEDICINE

## 2024-01-03 RX ORDER — ROSUVASTATIN CALCIUM 10 MG/1
5 TABLET, COATED ORAL NIGHTLY
Status: DISCONTINUED | OUTPATIENT
Start: 2024-01-03 | End: 2024-01-09 | Stop reason: HOSPADM

## 2024-01-03 RX ORDER — HYDROCODONE BITARTRATE AND ACETAMINOPHEN 5; 325 MG/1; MG/1
1 TABLET ORAL EVERY 6 HOURS PRN
Status: DISCONTINUED | OUTPATIENT
Start: 2024-01-03 | End: 2024-01-05

## 2024-01-03 RX ORDER — LORAZEPAM 1 MG/1
2 TABLET ORAL EVERY 6 HOURS
Qty: 8 TABLET | Refills: 0 | Status: COMPLETED | OUTPATIENT
Start: 2024-01-03 | End: 2024-01-04

## 2024-01-03 RX ORDER — FOLIC ACID 1 MG/1
1 TABLET ORAL DAILY
Status: DISCONTINUED | OUTPATIENT
Start: 2024-01-03 | End: 2024-01-09 | Stop reason: HOSPADM

## 2024-01-03 RX ORDER — LORAZEPAM 1 MG/1
1 TABLET ORAL EVERY 6 HOURS
Qty: 4 TABLET | Refills: 0 | Status: COMPLETED | OUTPATIENT
Start: 2024-01-04 | End: 2024-01-05

## 2024-01-03 RX ORDER — THIAMINE HYDROCHLORIDE 100 MG/ML
200 INJECTION, SOLUTION INTRAMUSCULAR; INTRAVENOUS EVERY 8 HOURS SCHEDULED
Qty: 30 ML | Refills: 0 | Status: DISPENSED | OUTPATIENT
Start: 2024-01-03 | End: 2024-01-08

## 2024-01-03 RX ADMIN — LEVOTHYROXINE SODIUM 25 MCG: 25 TABLET ORAL at 05:40

## 2024-01-03 RX ADMIN — SENNOSIDES AND DOCUSATE SODIUM 2 TABLET: 8.6; 5 TABLET ORAL at 20:23

## 2024-01-03 RX ADMIN — Medication 10 ML: at 20:25

## 2024-01-03 RX ADMIN — HYDROCODONE BITARTRATE AND ACETAMINOPHEN 1 TABLET: 5; 325 TABLET ORAL at 10:45

## 2024-01-03 RX ADMIN — HEPARIN SODIUM 5000 UNITS: 5000 INJECTION INTRAVENOUS; SUBCUTANEOUS at 21:12

## 2024-01-03 RX ADMIN — METOPROLOL SUCCINATE 25 MG: 25 TABLET, EXTENDED RELEASE ORAL at 21:11

## 2024-01-03 RX ADMIN — SODIUM CHLORIDE, POTASSIUM CHLORIDE, SODIUM LACTATE AND CALCIUM CHLORIDE 100 ML/HR: 600; 310; 30; 20 INJECTION, SOLUTION INTRAVENOUS at 15:56

## 2024-01-03 RX ADMIN — SENNOSIDES AND DOCUSATE SODIUM 2 TABLET: 8.6; 5 TABLET ORAL at 08:29

## 2024-01-03 RX ADMIN — FOLIC ACID 1 MG: 1 TABLET ORAL at 10:37

## 2024-01-03 RX ADMIN — SODIUM CHLORIDE, POTASSIUM CHLORIDE, SODIUM LACTATE AND CALCIUM CHLORIDE 100 ML/HR: 600; 310; 30; 20 INJECTION, SOLUTION INTRAVENOUS at 08:28

## 2024-01-03 RX ADMIN — Medication 10 ML: at 08:29

## 2024-01-03 RX ADMIN — HYDROCODONE BITARTRATE AND ACETAMINOPHEN 1 TABLET: 5; 325 TABLET ORAL at 20:20

## 2024-01-03 RX ADMIN — PANTOPRAZOLE SODIUM 40 MG: 40 TABLET, DELAYED RELEASE ORAL at 05:40

## 2024-01-03 RX ADMIN — LORAZEPAM 2 MG: 1 TABLET ORAL at 10:36

## 2024-01-03 RX ADMIN — THIAMINE HYDROCHLORIDE 200 MG: 100 INJECTION, SOLUTION INTRAMUSCULAR; INTRAVENOUS at 21:12

## 2024-01-03 RX ADMIN — THIAMINE HYDROCHLORIDE 200 MG: 100 INJECTION, SOLUTION INTRAMUSCULAR; INTRAVENOUS at 10:37

## 2024-01-03 RX ADMIN — LORAZEPAM 2 MG: 1 TABLET ORAL at 15:56

## 2024-01-03 RX ADMIN — HEPARIN SODIUM 5000 UNITS: 5000 INJECTION INTRAVENOUS; SUBCUTANEOUS at 13:24

## 2024-01-03 RX ADMIN — LORAZEPAM 2 MG: 1 TABLET ORAL at 21:12

## 2024-01-03 RX ADMIN — ROSUVASTATIN 5 MG: 10 TABLET, FILM COATED ORAL at 20:24

## 2024-01-03 RX ADMIN — HEPARIN SODIUM 5000 UNITS: 5000 INJECTION INTRAVENOUS; SUBCUTANEOUS at 05:40

## 2024-01-03 RX ADMIN — TRAMADOL HYDROCHLORIDE 50 MG: 50 TABLET, COATED ORAL at 05:43

## 2024-01-03 NOTE — THERAPY EVALUATION
Patient Name: Alysia Sierra  : 1955    MRN: [id number]                              Today's Date: 1/3/2024       Admit Date: 2024    Visit Dx:     ICD-10-CM ICD-9-CM   1. Acute kidney injury  N17.9 584.9   2. Dehydration  E86.0 276.51   3. Hypotension due to hypovolemia  I95.89 458.8    E86.1 276.52   4. Alcoholic intoxication without complication  F10.920 305.00   5. Pelvic pain  R10.2 [id number]     Patient Active Problem List   Diagnosis    Hypertension    Paroxysmal SVT (supraventricular tachycardia)    Leg weakness, bilateral    Syncope    CKD (chronic kidney disease) stage 3, GFR 30-59 ml/min    Circadian rhythm sleep disorder, advanced sleep phase type    Neuropathy    Hyperlipidemia LDL goal <100    Endometrial adenocarcinoma    Mild episode of recurrent major depressive disorder    Dizziness    Hip fracture    Anemia    Hypomagnesemia    Proteinuria    Hypothyroidism    Post-menopausal    Other osteoporosis without current pathological fracture    UTI (urinary tract infection)    MARY (acute kidney injury)     Past Medical History:   Diagnosis Date    Allergic lisinopril  2017    Anemia     Arrhythmia     Arthritis     Cancer     uterine    Cataract mild 2020    stil lpresent    Chicken pox     Chronic fatigue     CKD (chronic kidney disease), stage III     sees nephro    Clotting disorder     Dental root implant present     lower left  x1 - possible dental implant    Depression mild 2019    Difficulty walking 2019    Disease of thyroid gland     Dizzy     NIEVES (dyspnea on exertion)     2017    Fracture of hip     Generalized anxiety disorder     GERD (gastroesophageal reflux disease) 2018    History of brachytherapy 2023    vaginal brachytherapy    Hyperlipidemia     Hypertension     Iron deficiency anemia     Liver disease     fatty    Liver problem     Measles     Menopause     Mumps     Neuromuscular disorder Peripheral Neuropathy    Orthostatic hypotension     Pupil diameter unequal      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    Renal insufficiency 2018    Scoliosis     Unintentional weight loss     Uses contact lenses     bilat    Uterine cancer     Uterine cancer 2022    UTI (urinary tract infection)     Visual impairment Nearsighted    Wears glasses      Past Surgical History:   Procedure Laterality Date     SECTION      DILATION AND CURETTAGE, DIAGNOSTIC / THERAPEUTIC      HIP OPEN REDUCTION Left 2023    Procedure: FEMORAL NECK OPEN REDUCTION INTERNAL FIXATION LEFT;  Surgeon: Juanpablo Lee MD;  Location: [facility] OR;  Service: Orthopedics;  Laterality: Left;    HIP SURGERY      OOPHORECTOMY      ORAL LESION EXCISION/BIOPSY  2021    TOTAL LAPAROSCOPIC HYSTERECTOMY SALPINGO OOPHORECTOMY N/A 10/28/2022    Procedure: TOTAL LAPAROSCOPIC HYSTERECTOMY BILATERAL SALPINGO-OOPHORECTOMY, INJECTION FOR SENTINEL LYMPH NODE MAPPING, BILATERAL SENTINEL LYMPH NODE DISSECTION WITH DAVINCI ROBOT;  Surgeon: aJsmine Rich MD;  Location: [facility] OR;  Service: Robotics - DaVinci;  Laterality: N/A;    TUBAL ABDOMINAL LIGATION        General Information       Row Name 24 1634          Physical Therapy Time and Intention    Document Type evaluation  -BA     Mode of Treatment physical therapy  -BA       Row Name 24 1634          General Information    Patient Profile Reviewed yes  -BA     Prior Level of Function independent:;all household mobility;community mobility;gait;transfer;bed mobility;ADL's;using stairs;driving  Reported she uses a cane for short distance ambulation out to her car and a FWW for longer, community distance mobility.  Hx multiple recent falls; reported unable to recall some of falls.  -BA     Existing Precautions/Restrictions fall;seizures;spinal;other (see comments)  severe back and L hip pain s/p fall; mild displaced left L1, L2, and L3 transverse process fx's; spinal precautions for comfort  -BA     Barriers to Rehab medically complex;previous  functional deficit  -       Row Name 01/03/24 1634          Living Environment    People in Home alone  -       Row Name 01/03/24 1634          Home Main Entrance    Number of Stairs, Main Entrance one;other (see comments)  1 from parking lot and then has ramp  -BA     Stair Railings, Main Entrance none  -       Row Name 01/03/24 1634          Stairs Within Home, Primary    Number of Stairs, Within Home, Primary none  -       Row Name 01/03/24 1634          Cognition    Orientation Status (Cognition) oriented x 3;other (see comments)  Appeared to exhibit some slight situational confusion at times.  -       Row Name 01/03/24 1634          Safety Issues, Functional Mobility    Safety Issues Affecting Function (Mobility) awareness of need for assistance;insight into deficits/self-awareness;judgment;safety precaution awareness;safety precautions follow-through/compliance;sequencing abilities  -     Impairments Affecting Function (Mobility) balance;coordination;endurance/activity tolerance;pain;postural/trunk control;strength;cognition  -     Cognitive Impairments, Mobility Safety/Performance awareness, need for assistance;insight into deficits/self-awareness;judgment;safety precaution awareness;safety precaution follow-through  -               User Key  (r) = Recorded By, (t) = Taken By, (c) = Cosigned By      Initials Name Provider Type     Chiquis Rowland, PT Physical Therapist                   Mobility       Row Name 01/03/24 1646          Bed Mobility    Comment, (Bed Mobility) Received [facility] upon arrival and returned to chair.  [facility] pt on log rolling for bed mobility.  -       Row Name 01/03/24 1646          Sit-Stand Transfer    Sit-Stand [place] (Transfers) minimum assist (75% patient effort);verbal cues;nonverbal cues (demo/gesture)  -     Assistive Device (Sit-Stand Transfers) walker, front-wheeled  -     Comment, (Sit-Stand Transfer) STS x 2 attempts from chair.  On 1st attempt,  "had to sit back down d/t increased low back pain and L hip/\"iliac crest\" pain.  Able to achieve full upright position on 2nd attempt.  VCs/TCs for optimal pre-positioning, sequencing, hand placement, and upright posture.  -       Row Name 01/03/24 1646          Gait/Stairs (Locomotion)    Slope Level (Gait) minimum assist (75% patient effort);verbal cues  -     Assistive Device (Gait) walker, front-wheeled  -     Distance in Feet (Gait) 40  -     Deviations/Abnormal Patterns (Gait) bilateral deviations;base of support, narrow;festinating/shuffling;werner decreased;gait speed decreased;stride length decreased  -     Bilateral Gait Deviations heel strike decreased;forward flexed posture  -     Comment, (Gait/Stairs) Demo'd fluctuating step-to to step through gait pattern with decreased werner and significantly short, shuffled steps.  VCs/TCs for walker management, improved stride length, improved step height with focus on heel strike upon IC, and upright posture.  Gait distance limited by fatigue.  -               User Key  (r) = Recorded By, (t) = Taken By, (c) = Cosigned By      Initials Name Provider Type     Chiquis Rowland, PT Physical Therapist                   Obj/Interventions       Row Name 01/03/24 1656          Range of Motion Comprehensive    General Range of Motion bilateral lower extremity ROM WFL  -       Row Name 01/03/24 1656          Strength Comprehensive (MMT)    General Manual Muscle Testing (MMT) Assessment lower extremity strength deficits identified  -     Comment, General Manual Muscle Testing (MMT) Assessment B hip grossly 3+/5 with noted pain, B knee and ankle grossly 4+/5.  -       Row Name 01/03/24 1656          Motor Skills    Motor Skills coordination;functional endurance  -     Coordination gross motor deficit;bilateral;lower extremity;minimal impairment  -     Functional Endurance Decreased functional endurance.  Easily fatigues with activity.  " -       Row Name 01/03/24 1656          Balance    Balance Assessment sitting static balance;sitting dynamic balance;sit to stand dynamic balance;standing static balance;standing dynamic balance  -     Static Sitting Balance standby assist  -     Dynamic Sitting Balance contact guard  -     Position, Sitting Balance unsupported;sitting in chair  -     Sit to Stand Dynamic Balance minimal assist;verbal cues;non-verbal cues (demo/gesture)  -     Static Standing Balance contact guard;verbal cues  -     Dynamic Standing Balance minimal assist;verbal cues  -     Position/Device Used, Standing Balance supported;walker, front-wheeled  -     Comment, Balance Mild instability with standing and ambulation activity with FWW; no overt LOB noted.  -       Row Name 01/03/24 1656          Sensory Assessment (Somatosensory)    Sensory Assessment (Somatosensory) LE sensation intact  -               User Key  (r) = Recorded By, (t) = Taken By, (c) = Cosigned By      Initials Name Provider Type     Chiquis Rowland, PT Physical Therapist                   Goals/Plan       Row Name 01/03/24 1702          Bed Mobility Goal 1 (PT)    Activity/Assistive Device (Bed Mobility Goal 1, PT) rolling to left;rolling to right;sidelying to sit/sit to sidelying  -     [place] Level/Cues Needed (Bed Mobility Goal 1, PT) standby assist  -BA     Time Frame (Bed Mobility Goal 1, PT) long term goal (LTG);10 days  -       Row Name 01/03/24 1702          Transfer Goal 1 (PT)    Activity/Assistive Device (Transfer Goal 1, PT) sit-to-stand/stand-to-sit;bed-to-chair/chair-to-bed;walker, rolling  -     [place] Level/Cues Needed (Transfer Goal 1, PT) standby assist  -BA     Time Frame (Transfer Goal 1, PT) long term goal (LTG);10 days  -       Row Name 01/03/24 1702          Gait Training Goal 1 (PT)    Activity/Assistive Device (Gait Training Goal 1, PT) gait (walking locomotion);assistive device use;walker, rolling  " -BA     [place] Level (Gait Training Goal 1, PT) contact guard required  -BA     Distance (Gait Training Goal 1, PT) 200  -BA     Time Frame (Gait Training Goal 1, PT) long term goal (LTG);10 days  -BA       Row Name 01/03/24 1702          Stairs Goal 1 (PT)    Activity/Assistive Device (Stairs Goal 1, PT) ascending stairs;descending stairs;walker, rolling  -BA     [place] Level/Cues Needed (Stairs Goal 1, PT) minimum assist (75% or more patient effort)  -BA     Number of Stairs (Stairs Goal 1, PT) 1  -BA     Time Frame (Stairs Goal 1, PT) long term goal (LTG);10 days  -BA       Row Name 01/03/24 1702          Therapy Assessment/Plan (PT)    Planned Therapy Interventions (PT) balance training;bed mobility training;gait training;home exercise program;motor coordination training;neuromuscular re-education;patient/family education;postural re-education;stair training;strengthening;transfer training  -               User Key  (r) = Recorded By, (t) = Taken By, (c) = Cosigned By      Initials Name Provider Type    Chiquis Alas, PT Physical Therapist                   Clinical Impression       Row Name 01/03/24 5907          Pain    Pretreatment Pain Rating 8/10  -BA     Posttreatment Pain Rating 8/10  -BA     Pain Location - Side/Orientation Left  -BA     Pain Location lower  -BA     Pain Location - back;hip  -BA     Pre/Posttreatment Pain Comment Reported c/o back pain and L hip pain at \"iliac crest\".  Fair toleration to activity; RN aware and managing.  -BA     Pain Intervention(s) Ambulation/increased activity;Repositioned  -       Row Name 01/03/24 2352          Plan of Care Review    Plan of Care Reviewed With patient  -BA     Outcome Evaluation PT initial Eval completed.  Pt presents with generalized weakness, L lower back and hip pain, balance deficits, decreased functional endurance, and decreased indep and safety with functional mobility compared to baseline level of function.  Ambulated " 40ft with Roxana and FWW.  Pt will benefit from skilled IP PT to improve indep and safety with mobility and promote return to PLOF.  Rec IPR upon d/c.  -       Row Name 01/03/24 1657          Therapy Assessment/Plan (PT)    Rehab Potential (PT) good, to achieve stated therapy goals  -BA     Criteria for Skilled Interventions Met (PT) yes;meets criteria;skilled treatment is necessary  -BA     Therapy Frequency (PT) daily  -BA       Row Name 01/03/24 1657          Vital Signs    Pre Systolic BP Rehab 137  -BA     Pre Treatment Diastolic BP 78  -BA     Post Systolic BP Rehab 126  -BA     Post Treatment Diastolic BP 73  -BA     Pretreatment Heart Rate (beats/min) 83  -BA     Posttreatment Heart Rate (beats/min) 85  -BA     Pre SpO2 (%) 98  -BA     O2 Delivery Pre Treatment room air  -BA     O2 Delivery Intra Treatment room air  -BA     Post SpO2 (%) 96  -BA     O2 Delivery Post Treatment room air  -BA     Pre Patient Position Sitting  -BA     Intra Patient Position Standing  -BA     Post Patient Position Sitting  -BA       Row Name 01/03/24 1657          Positioning and Restraints    Pre-Treatment Position sitting in chair/recliner  -BA     Post Treatment Position chair  -BA     In Chair notified nsg;reclined;sitting;call light within reach;encouraged to call for assist;exit alarm on;waffle cushion;legs elevated;heels elevated  -               User Key  (r) = Recorded By, (t) = Taken By, (c) = Cosigned By      Initials Name Provider Type    Chiquis Alas, PT Physical Therapist                   Outcome Measures       Row Name 01/03/24 1703 01/03/24 0830       How much help from another person do you currently need...    Turning from your back to your side while in flat bed without using bedrails? 3  -BA 4  -DF    Moving from lying on back to sitting on the side of a flat bed without bedrails? 3  -BA 3  -DF    Moving to and from a bed to a chair (including a wheelchair)? 3  -BA 3  -DF    Standing up from a  chair using your arms (e.g., wheelchair, bedside chair)? 3  -BA 3  -DF    Climbing 3-5 steps with a railing? 2  -BA 3  -DF    To walk in hospital room? 3  -BA 3  -DF    AM-PAC 6 Clicks Score (PT) 17  -BA 19  -DF    Highest Level of Mobility Goal 5 --> Static standing  -BA 6 --> Walk 10 steps or more  -DF      Row Name 01/03/24 1703          Functional Assessment    Outcome Measure Options AM-PAC 6 Clicks Basic Mobility (PT)  -               User Key  (r) = Recorded By, (t) = Taken By, (c) = Cosigned By      Initials Name Provider Type    DF Adilia Roca RN Registered Nurse    BA Chiquis Rowland, MARIBETH Physical Therapist                                 Physical Therapy Education       Title: PT OT SLP Therapies (In Progress)       Topic: Physical Therapy (In Progress)       Point: Mobility training (Done)       Learning Progress Summary             Patient Acceptance, E, VU,NR by BA at 1/3/2024 1704                         Point: Home exercise program (Not Started)       Learner Progress:  Not documented in this visit.              Point: Body mechanics (Done)       Learning Progress Summary             Patient Acceptance, E, VU,NR by BA at 1/3/2024 1704                         Point: Precautions (Done)       Learning Progress Summary             Patient Acceptance, E, VU,NR by BA at 1/3/2024 1704                                         User Key       Initials Effective Dates Name Provider Type Discipline     09/21/21 -  Chiquis Rowland PT Physical Therapist PT                  PT Recommendation and Plan  Planned Therapy Interventions (PT): balance training, bed mobility training, gait training, home exercise program, motor coordination training, neuromuscular re-education, patient/family education, postural re-education, stair training, strengthening, transfer training  Plan of Care Reviewed With: patient  Outcome Evaluation: PT initial Eval completed.  Pt presents with generalized weakness, L lower back  and hip pain, balance deficits, decreased functional endurance, and decreased indep and safety with functional mobility compared to baseline level of function.  Ambulated 40ft with Roxana and FWW.  Pt will benefit from skilled IP PT to improve indep and safety with mobility and promote return to PLOF.  Rec IPR upon d/c.     Time Calculation:   PT Evaluation Complexity  History, PT Evaluation Complexity: 3 or more personal factors and/or comorbidities  Examination of Body Systems (PT Eval Complexity): total of 3 or more elements  Clinical Presentation (PT Evaluation Complexity): evolving  Clinical Decision Making (PT Evaluation Complexity): moderate complexity  Overall Complexity (PT Evaluation Complexity): moderate complexity     PT Charges       Row Name 01/03/24 1705             Time Calculation    Start Time 1555  -BA      PT Received On 01/03/24  -BA      PT Goal Re-Cert Due Date 01/13/24  -BA         Time Calculation- PT    Total Timed Code Minutes- PT 10 minute(s)  -BA         Timed Charges    52433 - PT Therapeutic Activity Minutes 10  -BA         Untimed Charges    PT Eval/Re-eval Minutes 56  -BA         Total Minutes    Timed Charges Total Minutes 10  -BA      Untimed Charges Total Minutes 56  -BA       Total Minutes 66  -BA                User Key  (r) = Recorded By, (t) = Taken By, (c) = Cosigned By      Initials Name Provider Type    BA Chiquis Rowland, PT Physical Therapist                  Therapy Charges for Today       Code Description Service Date Service Provider Modifiers Qty    14540187625 HC PT THERAPEUTIC ACT EA 15 MIN 1/3/2024 Chiquis Rowland, PT GP 1    89027540675 HC PT EVAL MOD COMPLEXITY 4 1/3/2024 Chiquis Rowland, PT GP 1            PT G-Codes  Outcome Measure Options: AM-PAC 6 Clicks Basic Mobility (PT)  AM-PAC 6 Clicks Score (PT): 17  PT Discharge Summary  Anticipated Discharge Disposition (PT): inpatient rehabilitation facility    Chiquis Rowland PT  1/3/2024

## 2024-01-03 NOTE — PLAN OF CARE
Goal Outcome Evaluation:  Plan of Care Reviewed With: patient           Outcome Evaluation: PT initial Eval completed.  Pt presents with generalized weakness, L lower back and hip pain, balance deficits, decreased functional endurance, and decreased indep and safety with functional mobility compared to baseline level of function.  Ambulated 40ft with Roxana and FWW.  Pt will benefit from skilled IP PT to improve indep and safety with mobility and promote return to PLOF.  Rec IPR upon d/c.      Anticipated Discharge Disposition (PT): inpatient rehabilitation facility

## 2024-01-03 NOTE — CASE MANAGEMENT/SOCIAL WORK
Discharge Planning Assessment  [facility]     Patient Name: Alysia Sierra  MRN: [id number]  Today's Date: 1/3/2024    Admit Date: 1/2/2024    Plan: HOME +/- HH   Discharge Needs Assessment       Row Name 01/03/24 1432       Living Environment    People in Home alone    Current Living Arrangements home    Primary Care Provided by self    Provides Primary Care For no one    Quality of Family Relationships supportive    Able to Return to Prior Arrangements yes       Transition Planning    Patient/Family Anticipates Transition to home    Patient/Family Anticipated Services at Transition     Transportation Anticipated family or friend will provide       Discharge Needs Assessment    Readmission Within the Last 30 Days no previous admission in last 30 days    Equipment Currently Used at Home rollator;cane, straight;shower chair;grab bar  ETS    Concerns to be Addressed discharge planning    Anticipated Changes Related to Illness none    Equipment Needed After Discharge none    Outpatient/Agency/Support Group Needs homecare agency    Discharge Facility/Level of Care Needs home with home health    Current Discharge Risk chronically ill;lives alone                   Discharge Plan       Row Name 01/03/24 1432       Plan    Plan HOME +/- HH    Patient/Family in Agreement with Plan yes    Plan Comments Met with pt at bedside for DCP.  She resides alone in [facility].  Pt is independent with ADLs.  She uses a cane indoors and a rollator for outdoors/long-distances.  No current HH services.  Confirmed she has [place] Medicare and [place] Medicaid with Rx coverage.  Goal is to return home (+/- HH) upon DC. CM will cont to follow.    Final Discharge Disposition Code 30 - still a patient                  Continued Care and Services - Admitted Since 1/2/2024    Coordination has not been started for this encounter.          Demographic Summary       Row Name 01/03/24 1432       General Information    Admission Type  inpatient    Referral Source admission list    Reason for Consult discharge planning    Preferred Language English       Contact Information    Permission Granted to Share Info With     Contact Information Obtained for                    Functional Status       Row Name 01/03/24 1432       Functional Status    Usual Activity Tolerance moderate    Current Activity Tolerance moderate       Functional Status, IADL    Medications independent    Meal Preparation independent    Housekeeping independent    Laundry independent    Shopping independent                   Psychosocial    No documentation.                  Abuse/Neglect    No documentation.                  Legal    No documentation.                  Substance Abuse    No documentation.                  Patient Forms    No documentation.                     Rosi Hoffmann RN

## 2024-01-03 NOTE — ED PROVIDER NOTES
EMERGENCY DEPARTMENT ENCOUNTER    Pt Name: Alysia Sierra  MRN: [id number]  Pt :   1955  Room Number:  POOJA/POOJA  Date of encounter:  2024  PCP: Liana So APRN  ED Provider: Greg Rangel MD    Historian: Patient and paramedics      HPI:  Chief Complaint: Left hip/pelvis pain status post fall        Context: Alysia Sierra is a 68 y.o. female who presents to the ED c/o fall leading to pain in her left hip/pelvis mostly posteriorly.  The patient has undergone left hip replacement in the past.  She notes that the pain seems somewhat higher than where her prosthesis is located.  The patient notes that she fell but is uncertain as to the underlying reason.  She was drinking tequila before the fall.  She does not believe she had palpitations or chest pain.  She does not believe she struck her head or had loss of consciousness.  She denies head neck and back pain.  The paramedics who transported the patient note that her blood pressure has been low.  Initially 85/45 and then with repeat 89/53.  The patient feels a little lightheaded but otherwise well.    PAST MEDICAL HISTORY  Past Medical History:   Diagnosis Date    Allergic lisinopril  2017    Anemia     Arrhythmia     Arthritis     Cancer     uterine    Cataract mild 2020    stil lpresent    Chicken pox     Chronic fatigue     CKD (chronic kidney disease), stage III     sees nephro    Clotting disorder     Dental root implant present     lower left  x1 - possible dental implant    Depression mild 2019    Difficulty walking 2019    Disease of thyroid gland     Dizzy     NIEVES (dyspnea on exertion)     2017    Fracture of hip     Generalized anxiety disorder     GERD (gastroesophageal reflux disease) 2018    History of brachytherapy 2023    vaginal brachytherapy    Hyperlipidemia     Hypertension     Iron deficiency anemia     Liver disease     fatty    Liver problem     Measles     Menopause     Mumps     Neuromuscular disorder  Peripheral Neuropathy    Orthostatic hypotension     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    Renal insufficiency     Scoliosis     Unintentional weight loss     Uses contact lenses     bilat    Uterine cancer     Uterine cancer 2022    UTI (urinary tract infection)     Visual impairment Nearsighted    Wears glasses          PAST SURGICAL HISTORY  Past Surgical History:   Procedure Laterality Date     SECTION      DILATION AND CURETTAGE, DIAGNOSTIC / THERAPEUTIC      HIP OPEN REDUCTION Left 2023    Procedure: FEMORAL NECK OPEN REDUCTION INTERNAL FIXATION LEFT;  Surgeon: Juanpablo Lee MD;  Location:  [facility];  Service: Orthopedics;  Laterality: Left;    HIP SURGERY      OOPHORECTOMY      ORAL LESION EXCISION/BIOPSY  2021    TOTAL LAPAROSCOPIC HYSTERECTOMY SALPINGO OOPHORECTOMY N/A 10/28/2022    Procedure: TOTAL LAPAROSCOPIC HYSTERECTOMY BILATERAL SALPINGO-OOPHORECTOMY, INJECTION FOR SENTINEL LYMPH NODE MAPPING, BILATERAL SENTINEL LYMPH NODE DISSECTION WITH DAVINCI ROBOT;  Surgeon: Jasmine Rich MD;  Location:  [facility];  Service: Robotics - DaVinci;  Laterality: N/A;    TUBAL ABDOMINAL LIGATION           FAMILY HISTORY  Family History   Problem Relation Age of Onset    Spondylolisthesis Mother     COPD Mother     Stroke Mother     Hypertension Mother     Lung cancer Father     Hypertension Father     Heart attack Father     Coronary artery disease Father     Heart disease Father     Cancer Father     Lung disease Father     Hyperlipidemia Sister     Rheum arthritis Sister     Malig Hypertension Sister     Osteoarthritis Sister     Other Sister         alcoholic    Hypertension Sister     Scoliosis Sister     Hypertension Brother     Depression Sister     Early death Sister     Breast cancer Neg Hx     Ovarian cancer Neg Hx     Uterine cancer Neg Hx     Colon cancer Neg Hx     Melanoma Neg Hx     Prostate cancer Neg Hx          SOCIAL  HISTORY  Social History     Socioeconomic History    Marital status:     Number of children: 1    Highest education level: Associate degree: academic program   Tobacco Use    Smoking status: Never     Passive exposure: Never    Smokeless tobacco: Never    Tobacco comments:     Never   Vaping Use    Vaping Use: Never used   Substance and Sexual Activity    Alcohol use: Yes     Alcohol/week: 6.0 standard drinks of alcohol     Types: 6 Glasses of wine per week     Comment: 6-8 glasses a week    Drug use: No    Sexual activity: Not Currently     Partners: Male     Birth control/protection: Surgical, Post-menopausal         ALLERGIES  Lisinopril, Metal, Milk-related compounds, Tylenol [acetaminophen], and Atorvastatin        REVIEW OF SYSTEMS  Review of Systems       All systems reviewed and negative except for those discussed in HPI.       PHYSICAL EXAM    I have reviewed the triage vital signs and nursing notes.    ED Triage Vitals   Temp Pulse Resp BP SpO2   -- -- -- -- --      Temp src Heart Rate Source Patient Position BP Location FiO2 (%)   -- -- -- -- --       Physical Exam  GENERAL:   Appears in no acute distress.  She is extremely pleasant and interactive.  She is an OB/GYN nurse here at our facility.  HENT: Nares patent.  EYES: No scleral icterus.  CV: Regular rhythm, regular rate.  2+ dorsalis pedis pulses  RESPIRATORY: Normal effort.  No audible wheezes, rales or rhonchi.  ABDOMEN: Soft, nontender to deep palpation  MUSCULOSKELETAL: No deformities.  No shortening or malrotation of her lower extremities.  Tenderness to palpation left posterior pelvis with overlying contusion, small.  No skin breaks.  Contusions as noted above.   NEURO: Alert, moves all extremities, follows commands.  Fine touch and motor intact in bilateral lower extremities distally.  SKIN: Warm, dry, no rash visualized.  Alert and oriented x 3.      LAB RESULTS  Recent Results (from the past 24 hour(s))   ECG 12 Lead Syncope     Collection Time: 01/02/24  7:39 PM   Result Value Ref Range    QT Interval 418 ms    QTC Interval 470 ms   Comprehensive Metabolic Panel    Collection Time: 01/02/24  7:58 PM    Specimen: Blood   Result Value Ref Range    Glucose 92 65 - 99 mg/dL    BUN 31 (H) 8 - 23 mg/dL    Creatinine 2.44 (H) 0.57 - 1.00 mg/dL    Sodium 140 136 - 145 mmol/L    Potassium 4.7 3.5 - 5.2 mmol/L    Chloride 105 98 - 107 mmol/L    CO2 21.0 (L) 22.0 - 29.0 mmol/L    Calcium 8.5 (L) 8.6 - 10.5 mg/dL    Total Protein 6.5 6.0 - 8.5 g/dL    Albumin 4.2 3.5 - 5.2 g/dL    ALT (SGPT) 9 1 - 33 U/L    AST (SGOT) 18 1 - 32 U/L    Alkaline Phosphatase 74 39 - 117 U/L    Total Bilirubin 0.2 0.0 - 1.2 mg/dL    Globulin 2.3 gm/dL    A/G Ratio 1.8 g/dL    BUN/Creatinine Ratio 12.7 7.0 - 25.0    Anion Gap 14.0 5.0 - 15.0 mmol/L    eGFR 21.2 (L) >60.0 mL/min/1.73   CBC Auto Differential    Collection Time: 01/02/24  7:58 PM    Specimen: Blood   Result Value Ref Range    WBC 6.78 3.40 - 10.80 10*3/mm3    RBC 2.86 (L) 3.77 - 5.28 10*6/mm3    Hemoglobin 9.6 (L) 12.0 - 15.9 g/dL    Hematocrit 30.7 (L) 34.0 - 46.6 %    .3 (H) 79.0 - 97.0 fL    MCH 33.6 (H) 26.6 - 33.0 pg    MCHC 31.3 (L) 31.5 - 35.7 g/dL    RDW 13.4 12.3 - 15.4 %    RDW-SD 52.4 37.0 - 54.0 fl    MPV 10.2 6.0 - 12.0 fL    Platelets 183 140 - 450 10*3/mm3    Neutrophil % 54.6 42.7 - 76.0 %    Lymphocyte % 32.2 19.6 - 45.3 %    Monocyte % 10.8 5.0 - 12.0 %    Eosinophil % 1.5 0.3 - 6.2 %    Basophil % 0.3 0.0 - 1.5 %    Immature Grans % 0.6 (H) 0.0 - 0.5 %    Neutrophils, Absolute 3.71 1.70 - 7.00 10*3/mm3    Lymphocytes, Absolute 2.18 0.70 - 3.10 10*3/mm3    Monocytes, Absolute 0.73 0.10 - 0.90 10*3/mm3    Eosinophils, Absolute 0.10 0.00 - 0.40 10*3/mm3    Basophils, Absolute 0.02 0.00 - 0.20 10*3/mm3    Immature Grans, Absolute 0.04 0.00 - 0.05 10*3/mm3    nRBC 0.0 0.0 - 0.2 /100 WBC   Ethanol    Collection Time: 01/02/24  7:58 PM    Specimen: Blood   Result Value Ref Range    Ethanol  245 (H) 0 - 10 mg/dL   Urinalysis With Microscopic If Indicated (No Culture) - Urine, Clean Catch    Collection Time: 01/02/24  9:15 PM    Specimen: Urine, Clean Catch   Result Value Ref Range    Color, UA Yellow Yellow, Straw    Appearance, UA Cloudy (A) Clear    pH, UA <=5.0 5.0 - 8.0    Specific Gravity, UA 1.012 1.001 - 1.030    Glucose, UA Negative Negative    Ketones, UA Negative Negative    Bilirubin, UA Negative Negative    Blood, UA Negative Negative    Protein, UA Negative Negative    Leuk Esterase, UA Small (1+) (A) Negative    Nitrite, UA Negative Negative    Urobilinogen, UA 0.2 E.U./dL 0.2 - 1.0 E.U./dL   Urinalysis, Microscopic Only - Urine, Clean Catch    Collection Time: 01/02/24  9:15 PM    Specimen: Urine, Clean Catch   Result Value Ref Range    RBC, UA 0-2 None Seen, 0-2 /HPF    WBC, UA 6-10 (A) None Seen, 0-2 /HPF    Bacteria, UA 4+ (A) None Seen, Trace /HPF    Squamous Epithelial Cells, UA 7-12 (A) None Seen, 0-2 /HPF    Hyaline Casts, UA 0-6 0 - 6 /LPF    Methodology Automated Microscopy    Urine Drug Screen - Urine, Clean Catch    Collection Time: 01/02/24  9:15 PM    Specimen: Urine, Clean Catch   Result Value Ref Range    THC, Screen, Urine Negative Negative    Phencyclidine (PCP), Urine Negative Negative    Cocaine Screen, Urine Negative Negative    Methamphetamine, Ur Negative Negative    Opiate Screen Negative Negative    Amphetamine Screen, Urine Negative Negative    Benzodiazepine Screen, Urine Negative Negative    Tricyclic Antidepressants Screen Negative Negative    Methadone Screen, Urine Negative Negative    Barbiturates Screen, Urine Negative Negative    Oxycodone Screen, Urine Positive (A) Negative    Buprenorphine, Screen, Urine Negative Negative   Fentanyl, Urine - Urine, Clean Catch    Collection Time: 01/02/24  9:15 PM    Specimen: Urine, Clean Catch   Result Value Ref Range    Fentanyl, Urine Negative Negative       If labs were ordered, I independently reviewed the results  and considered them in treating the patient.        RADIOLOGY  CT Pelvis Without Contrast    Result Date: 1/2/2024  CT PELVIS WO CONTRAST Date of Exam: 1/2/2024 9:28 PM EST Indication: trauma with left-sided pelvis pain. Comparison: CT abdomen and pelvis 4/13/2023 Technique: Axial CT images were obtained of the pelvis without contrast administration.  Reconstructed coronal and sagittal images were also obtained. Automated exposure control and iterative construction methods were used. Findings: Visualized lower abdomen: Unremarkable. Gastrointestinal: Diverticulosis of the sigmoid colon without evidence of acute diverticulitis. Otherwise the visualized GI tract is unremarkable. Bladder: The bladder is normal. Pelvis: Patient is status post hysterectomy. No suspicious mass. Peritoneum/Mesentery: No fluid collection, ascities, or free air.   Lymph Nodes: No lymphadenopathy. Vasculature: Unremarkable Pelvic Wall: Unremarkable. No suspicious hematoma. Bony Structures: Acute appearing mildly displaced fracture through the left L3 transverse process. No other definite acute osseous abnormalities identified. Patient is status post internal fixation of the left femoral neck. No evidence of hardware failure or loosening.     Impression: Acute appearing mildly displaced fracture through the left transverse process of L3. Please consider dedicated lumbar spine MRI to rule out additional fractures of the lumbar spine. No other acute traumatic injury to the pelvic structures. Patient is status post internal fixation of the left femoral neck. The hardware appears intact without evidence of loosening. Electronically Signed: Tim Castro DO  1/2/2024 10:10 PM EST  Workstation ID: [id number]    CT Head Without Contrast    Result Date: 1/2/2024  CT HEAD WO CONTRAST Date of Exam: 1/2/2024 9:28 PM EST Indication: Syncope.  Possible head injury.  Alcohol intoxication.. Comparison: 11/12/2023 Technique: Axial CT images were obtained  of the head without contrast administration.  Automated exposure control and iterative construction methods were used. Findings: No large territory infarct. There is no evidence of hemorrhage. No mass effect, edema or midline shift Mild periventricular and subcortical white matter hypodensities, nonspecific but most likely represents chronic small vessel ischemic changes. No extra-axial fluid collection. Prominent ventricular system secondary to chronic parenchymal volume loss. The visualized orbits are unremarkable. The visualized paranasal sinuses and mastoid air cells are clear. Subtle left frontal scalp hematoma/contusion. Otherwise unremarkable. No acute osseous abnormality.     Impression: No acute intracranial abnormality. Subtle left frontal scalp hematoma/soft tissue contusion. Electronically Signed: Tim Castro DO  1/2/2024 10:04 PM EST  Workstation ID: [id number]     I ordered and independently reviewed the above noted radiographic studies.      I viewed images of CT scan of the head as well as CT scan of the pelvis which showed no intracranial hemorrhage or skull fracture and no evidence of pelvic fractures per my independent interpretation.    See radiologist's dictation for official interpretation.        PROCEDURES    Procedures    ECG 12 Lead Syncope   Final Result   Test Reason : Syncope   Blood Pressure :   */*   mmHG   Vent. Rate :  76 BPM     Atrial Rate :  76 BPM      P-R Int : 162 ms          QRS Dur :  68 ms       QT Int : 418 ms       P-R-T Axes :  79  12  43 degrees      QTc Int : 470 ms      Normal sinus rhythm   Low voltage QRS   Borderline ECG   Confirmed by KAMRON PINEDA MD ([id number]) on 1/2/2024 8:08:29 PM      Referred By: EDMD           Confirmed By: KAMRON PINEDA MD          MEDICATIONS GIVEN IN ER    Medications   sodium chloride 0.9 % bolus 1,000 mL (0 mL Intravenous Stopped 1/2/24 2009)   HYDROmorphone (DILAUDID) injection 0.25 mg (0.25 mg Intravenous Given 1/2/24 2051)    ondansetron (ZOFRAN) injection 4 mg (4 mg Intravenous Given 1/2/24 2051)   sodium chloride 0.9 % bolus 1,000 mL (0 mL Intravenous Stopped 1/2/24 2246)         MEDICAL DECISION MAKING, PROGRESS, and CONSULTS    All labs, if obtained, have been independently reviewed by me.  All radiology studies, if obtained, have been reviewed by me and the radiologist dictating the report.  All EKG's, if obtained, have been independently viewed and interpreted by me/my attending physician.      Discussion below represents my analysis of pertinent findings related to patient's condition, differential diagnosis, treatment plan and final disposition.                         Differential diagnosis:    Fall with left hip and pelvis pain.  Consider fractures versus soft tissue injuries.  As for the reason for the fall this may be secondary to alcohol ingestion, cardiac syncope, etc.      Additional sources:    - Discussed/ obtained information from independent historians: Spoke with paramedics at bedside.  They gave me report about findings to include hypotension.    - External (non-ED) record review: Reviewed multiple records on this patient to include her discharge summary dated 5/6/2023 when she had been hospitalized status post hip fracture.    I reviewed facial bone CTs which showed no acute facial bone abnormalities dated 11/12/2023 when the patient was seen after a fall probably secondary to intoxication per records.    Reviewed prior lab studies to include blood alcohol level on 11/12/2023 when the patient's blood alcohol was 202.    - Chronic or social conditions impacting care: Alcohol abuse history    - Shared decision making: Patient is in full agreement with current plans for evaluation treatment and admission to the hospitalist group.      Orders placed during this visit:  Orders Placed This Encounter   Procedures    CT Pelvis Without Contrast    CT Head Without Contrast    CT Lumbar Spine Without Contrast     Comprehensive Metabolic Panel    CBC Auto Differential    Ethanol    Urinalysis With Microscopic If Indicated (No Culture) - Urine, Clean Catch    Urinalysis, Microscopic Only - Urine, Clean Catch    Urine Drug Screen - Urine, Clean Catch    Fentanyl, Urine - Urine, Clean Catch    Orthostatic Vitals    Orthostatic Vitals    Code Status and Medical Interventions:    ECG 12 Lead Syncope    Initiate Observation Status    ED Bed Request    CBC & Differential         Additional orders considered but not ordered:  CT cervical and thoracic spine.    ED Course:    Consultants:      ED Course as of 01/02/24 2331 Tue Jan 02, 2024 1945 Given prehospital hypotension I have ordered a liter of normal saline.  Given her hip pain I will give her a very small dose of Dilaudid.  She reports she cannot take Tylenol because of liver damage from alcohol use. [MS]   2236 Per radiologist,Acute appearing mildly displaced fracture through the left transverse process of L3. Please consider dedicated lumbar spine MRI to rule out additional fractures of the lumbar spine.  I have now ordered a CT scan dedicated to the lumbar spine and I have notified the hospitalists caring for this patient.  [MS]      ED Course User Index  [MS] Greg Rangel MD              Shared Decision Making:  After my consideration of clinical presentation and any laboratory/radiology studies obtained, I discussed the findings with the patient/patient representative who is in agreement with the treatment plan and the final disposition.   Risks and benefits of discharge and/or observation/admission were discussed.       AS OF 23:31 EST VITALS:    BP - 100/79  HR - 83  TEMP - 97.5 °F (36.4 °C) (Oral)  O2 SATS - 97%                  DIAGNOSIS  Final diagnoses:   Acute kidney injury   Dehydration   Hypotension due to hypovolemia   Alcoholic intoxication without complication   Pelvic pain         DISPOSITION  Admission      Please note that portions of this document  were completed with voice recognition software.        Greg Rangel MD  01/02/24 4637

## 2024-01-03 NOTE — PAYOR COMM NOTE
"Jayden Sierra (68 y.o. Female)     [id number]     Beverly Arriaga RN  Utilization Review  [phone]  [phone]        Date of Birth   1955    Social Security Number       Address   [address] [facility] [address]    Home Phone   [phone]    MRN   [id number]       Zoroastrian   Pentecostalism    Marital Status                               Admission Date   1/2/24    Admission Type   Emergency    Admitting Provider   Alexander Yu MD    Attending Provider   Alexander Yu MD    Department, Room/Bed   [address], S[address]/1       Discharge Date       Discharge Disposition       Discharge Destination                                 Attending Provider: Alexander Yu MD    Allergies: Lisinopril, Metal, Milk-related Compounds, Tylenol [Acetaminophen], Atorvastatin    Isolation: None   Infection: None   Code Status: CPR    Ht: 163.8 cm (64.5\")   Wt: 58.1 kg (128 lb)    Admission Cmt: None   Principal Problem: UTI (urinary tract infection) [N39.0]                   Active Insurance as of 1/2/2024       Primary Coverage       Payor Plan Insurance Group Employer/Plan Group    ANTHEM MEDICARE REPLACEMENT ANTHEM MEDICARE ADVANTAGE [id number]       Payor Plan Address Payor Plan Phone Number Payor Plan Fax Number Effective Dates    [address] [phone]  1/1/2024 - None Entered    [facility] [id number]         Subscriber Name Subscriber Birth Date Member ID       JAYDEN SIERRA P 1955 [id number]               Secondary Coverage       Payor Plan Insurance Group Employer/Plan Group    [place]        Payor Plan Address Payor Plan Phone Number Payor Plan Fax Number Effective Dates    [address] [phone]  1/2/2024 - None Entered    [place] [address]         Subscriber Name Subscriber Birth Date Member ID       JAYDEN SIERRA P 1955 [id number]                     Emergency Contacts        (Rel.) Home " Phone Work Phone Mobile Phone    ALYSHA HOLLY (Sister) [phone] -- [phone]                 History & Physical        ToureCollin cox Jr., MD at 248           [facility]  [facility]   Patient Name: Alysia Sierra  : 1955  MRN: [id number]  Primary Care Physician:  Liana So, ANETTE  Date of admission: 2024    Subjective  Subjective     Chief Complaint: Fall    HPI:    Alysia Sierra is a 68 y.o. female past medical history of uterine cancer, hypertension, CKD that presents to the emergency department after a fall.  Patient states she does not remember falling and woke up on the floor of her bathroom.  States this is happening previously.  She denies any known preceding symptoms of any shortness of breath, dizziness or chest pain.  Currently states she feels well other than left hip pain.  This is above her left iliac crest, sharp, worse with movement, intermittent.  She denies any other fevers, chills, sweats, nausea, vomiting, chest pain shortness of breath, palpitations, abdominal pain diarrhea constipation dysuria, weakness, rash.  Workup in the emergency department clued CT head with no acute pathology and CT pelvis which showed L3 transverse fracture.  CT L-spine has been ordered and is pending at this time.  She was also noted to be in MARY with creatinine 2.4.  Baseline within normal limits.  Patient will be admitted for ongoing monitoring and management.      Personal History     Past Medical History:  Past Medical History:   Diagnosis Date    Allergic lisinopril  2017    Anemia     Arrhythmia     Arthritis     Cancer     uterine    Cataract mild 2020    stil lpresent    Chicken pox     Chronic fatigue     CKD (chronic kidney disease), stage III     sees nephro    Clotting disorder     Dental root implant present     lower left  x1 - possible dental implant    Depression mild 2019    Difficulty walking 2019    Disease of  thyroid gland     Dizzy     NIEVES (dyspnea on exertion)     2017    Fracture of hip     Generalized anxiety disorder     GERD (gastroesophageal reflux disease) 2018    History of brachytherapy 2023    vaginal brachytherapy    Hyperlipidemia     Hypertension     Iron deficiency anemia     Liver disease     fatty    Liver problem     Measles     Menopause     Mumps     Neuromuscular disorder Peripheral Neuropathy    Orthostatic hypotension     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    Renal insufficiency 2018    Scoliosis     Unintentional weight loss     Uses contact lenses     bilat    Uterine cancer     Uterine cancer 2022    UTI (urinary tract infection)     Visual impairment Nearsighted    Wears glasses          Past Surgical History:  Past Surgical History:   Procedure Laterality Date     SECTION      DILATION AND CURETTAGE, DIAGNOSTIC / THERAPEUTIC      HIP OPEN REDUCTION Left 2023    Procedure: FEMORAL NECK OPEN REDUCTION INTERNAL FIXATION LEFT;  Surgeon: Juanpablo Lee MD;  Location:  [facility];  Service: Orthopedics;  Laterality: Left;    HIP SURGERY      OOPHORECTOMY      ORAL LESION EXCISION/BIOPSY  2021    TOTAL LAPAROSCOPIC HYSTERECTOMY SALPINGO OOPHORECTOMY N/A 10/28/2022    Procedure: TOTAL LAPAROSCOPIC HYSTERECTOMY BILATERAL SALPINGO-OOPHORECTOMY, INJECTION FOR SENTINEL LYMPH NODE MAPPING, BILATERAL SENTINEL LYMPH NODE DISSECTION WITH DAVINCI ROBOT;  Surgeon: Jasmine Rich MD;  Location:  [facility];  Service: Robotics - DaVinci;  Laterality: N/A;    TUBAL ABDOMINAL LIGATION           Family History:   Family History   Problem Relation Age of Onset    Spondylolisthesis Mother     COPD Mother     Stroke Mother     Hypertension Mother     Lung cancer Father     Hypertension Father     Heart attack Father     Coronary artery disease Father     Heart disease Father     Cancer Father     Lung disease Father     Hyperlipidemia Sister      Rheum arthritis Sister     Malig Hypertension Sister     Osteoarthritis Sister     Other Sister         alcoholic    Hypertension Sister     Scoliosis Sister     Hypertension Brother     Depression Sister     Early death Sister     Breast cancer Neg Hx     Ovarian cancer Neg Hx     Uterine cancer Neg Hx     Colon cancer Neg Hx     Melanoma Neg Hx     Prostate cancer Neg Hx          Social History:   Social History     Tobacco Use    Smoking status: Never     Passive exposure: Never    Smokeless tobacco: Never    Tobacco comments:     Never   Vaping Use    Vaping Use: Never used   Substance Use Topics    Alcohol use: Yes     Alcohol/week: 6.0 standard drinks of alcohol     Types: 6 Glasses of wine per week     Comment: 6-8 glasses a week    Drug use: No         Home Medications:  Alpha-Lipoic Acid, Calcium-Phosphorus-Vitamin D, DULoxetine, Hydrocortisone Ace-Pramoxine, aspirin, cholecalciferol, docusate sodium, esomeprazole, gabapentin, hydrALAZINE, levothyroxine, magnesium oxide, metoprolol succinate XL, naloxone, nitroglycerin, rosuvastatin, and vitamin B-12    Allergies:  Allergies   Allergen Reactions    Lisinopril Angioedema    Metal Rash     Possible nickel -   Gold is only metal that doesn't have issues      Milk-Related Compounds Other (See Comments)     LACTOSE INTOLERANT    Tylenol [Acetaminophen] Other (See Comments)     ckd    Atorvastatin Myalgia       Review of Systems   All systems were reviewed and negative except for: syncope, L hip pain    Objective  Objective     Vitals:   Temp:  [97.5 °F (36.4 °C)] 97.5 °F (36.4 °C)  Heart Rate:  [80-90] 84  Resp:  [17] 17  BP: ()/(59-83) 107/59    Physical Exam    Constitutional: Awake, alert, no acute distress   Eyes: Pupils equal, sclerae anicteric, no conjunctival injection   HENT: NCAT, mucous membranes moist   Neck: Supple, no thyromegaly, no lymphadenopathy, trachea midline   Respiratory: Clear to auscultation bilaterally, nonlabored respirations     Cardiovascular: RRR, no murmurs, rubs, or gallops, palpable pedal pulses bilaterally   Gastrointestinal: Positive bowel sounds, soft, nontender, nondistended   Musculoskeletal: No bilateral ankle edema, no clubbing or cyanosis to extremities   Psychiatric: Appropriate affect, cooperative   Neurologic: Oriented x 3, strength symmetric in all extremities, Cranial Nerves grossly intact to confrontation, speech clear   Skin: No rashes     Result Review   Result Review:  I have personally reviewed the results from the time of this admission to 1/2/2024 22:08 EST and agree with these findings:  [x]  Laboratory  []  Microbiology  [x]  Radiology  []  EKG/Telemetry   []  Cardiology/Vascular   []  Pathology  []  Old records  []  Other:      Assessment & Plan  Assessment / Plan     Assessment/Plan:   Acute kidney injury: Continue IV hydration.  Monitor intake and output and serial labs.  Avoid nephrotoxins and renally dose medications.  Will likely expand workup if not improved with IV hydration.  Questionable syncope: Will continue to monitor on telemetry.  Check orthostatic vital signs although patient has already received IV fluid.  Macrocytic anemia: Decreased from November but no active signs of bleeding.  Will continue to monitor.  Hypertension: Add back home medications  History of uterine cancer: Not currently on chemotherapy.  Outpatient follow-up.  L3 transverse fracture: Dedicated CT L-spine ordered, will follow.  Supportive care and pain control.      DVT prophylaxis:  Heparin subq    CODE STATUS:    Code Status (Patient has no pulse and is not breathing): CPR (Attempt to Resuscitate)  Medical Interventions (Patient has pulse or is breathing): Full Support      Admission Status:  I believe this patient meets observation status.    Electronically signed by Collin Toure Jr, MD, 01/02/24, 10:08 PM EST.             Electronically signed by Collin Toure Jr., MD at 01/02/24 5306          Emergency Department  Notes        Kya Wray, RN at 24 2220           Alysia Sierra    Nursing Report ED to Floor:  Mental status: GCS 15; ETOH  Ambulatory status: normally independent, fall risk due to etoh  Oxygen Therapy:  2L  Cardiac Rhythm: sinus   Admitted from: ed  Safety Concerns:  fall risk  Social Issues: none  ED Room #:  6    ED Nurse Phone Extension - [phone] or may call [phone].      HPI:   Chief Complaint   Patient presents with    Fall       Past Medical History:  Past Medical History:   Diagnosis Date    Allergic lisinopril  2017    Anemia     Arrhythmia     Arthritis     Cancer     uterine    Cataract mild 2020    stil lpresent    Chicken pox     Chronic fatigue     CKD (chronic kidney disease), stage III     sees nephro    Clotting disorder     Dental root implant present     lower left  x1 - possible dental implant    Depression mild 2019    Difficulty walking 2019    Disease of thyroid gland     Dizzy     NIEVES (dyspnea on exertion)     2017    Fracture of hip     Generalized anxiety disorder     GERD (gastroesophageal reflux disease) 2018    History of brachytherapy 2023    vaginal brachytherapy    Hyperlipidemia     Hypertension     Iron deficiency anemia     Liver disease     fatty    Liver problem     Measles     Menopause     Mumps     Neuromuscular disorder Peripheral Neuropathy    Orthostatic hypotension     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    Renal insufficiency 2018    Scoliosis     Unintentional weight loss     Uses contact lenses     bilat    Uterine cancer     Uterine cancer 2022    UTI (urinary tract infection)     Visual impairment Nearsighted    Wears glasses         Past Surgical History:  Past Surgical History:   Procedure Laterality Date     SECTION  1981    DILATION AND CURETTAGE, DIAGNOSTIC / THERAPEUTIC      HIP OPEN REDUCTION Left 2023    Procedure: FEMORAL NECK OPEN REDUCTION INTERNAL FIXATION LEFT;  Surgeon: Juanpablo Lee MD;   Location:  [facility];  Service: Orthopedics;  Laterality: Left;    HIP SURGERY  2023    OOPHORECTOMY      ORAL LESION EXCISION/BIOPSY  08/2021    TOTAL LAPAROSCOPIC HYSTERECTOMY SALPINGO OOPHORECTOMY N/A 10/28/2022    Procedure: TOTAL LAPAROSCOPIC HYSTERECTOMY BILATERAL SALPINGO-OOPHORECTOMY, INJECTION FOR SENTINEL LYMPH NODE MAPPING, BILATERAL SENTINEL LYMPH NODE DISSECTION WITH DAVINCI ROBOT;  Surgeon: Jasmine Rich MD;  Location:  [facility];  Service: Robotics - DaVinci;  Laterality: N/A;    TUBAL ABDOMINAL LIGATION  1983        Admitting Doctor:   Collin Toure Jr., MD    Consulting Provider(s):  Consults       No orders found from 12/4/2023 to 1/3/2024.             Admitting Diagnosis:   The primary encounter diagnosis was Acute kidney injury. Diagnoses of Dehydration, Hypotension due to hypovolemia, Alcoholic intoxication without complication, and Pelvic pain were also pertinent to this visit.    Most Recent Vitals:   Vitals:    01/02/24 2116 01/02/24 2147 01/02/24 2149 01/02/24 2216   BP: 90/67 107/59  104/69   BP Location:       Patient Position:       Pulse: 87  84 84   Resp:       Temp:       TempSrc:       SpO2: 91%  97%    Weight:       Height:           Active LDAs/IV Access:   Lines, Drains & Airways       Active LDAs       Name Placement date Placement time Site Days    Peripheral IV 01/02/24 1912 Distal;Posterior;Right Forearm 01/02/24 1912  Forearm  less than 1                    Labs (abnormal labs have a star):   Labs Reviewed   COMPREHENSIVE METABOLIC PANEL - Abnormal; Notable for the following components:       Result Value    BUN 31 (*)     Creatinine 2.44 (*)     CO2 21.0 (*)     Calcium 8.5 (*)     eGFR 21.2 (*)     All other components within normal limits    Narrative:     GFR Normal >60  Chronic Kidney Disease <60  Kidney Failure <15     CBC WITH AUTO DIFFERENTIAL - Abnormal; Notable for the following components:    RBC 2.86 (*)     Hemoglobin 9.6 (*)     Hematocrit 30.7 (*)     MCV  107.3 (*)     MCH 33.6 (*)     MCHC 31.3 (*)     Immature Grans % 0.6 (*)     All other components within normal limits   ETHANOL - Abnormal; Notable for the following components:    Ethanol 245 (*)     All other components within normal limits    Narrative:     Elevated lactic acid concentration and lactate dehydrogenase(LD) activity may falsely elevate enzymatically determined ethanol levels. Not for legal purposes.    URINALYSIS W/ MICROSCOPIC IF INDICATED (NO CULTURE) - Abnormal; Notable for the following components:    Appearance, UA Cloudy (*)     Leuk Esterase, UA Small (1+) (*)     All other components within normal limits   URINALYSIS, MICROSCOPIC ONLY - Abnormal; Notable for the following components:    WBC, UA 6-10 (*)     Bacteria, UA 4+ (*)     Squamous Epithelial Cells, UA 7-12 (*)     All other components within normal limits   CBC AND DIFFERENTIAL    Narrative:     The following orders were created for panel order CBC & Differential.  Procedure                               Abnormality         Status                     ---------                               -----------         ------                     CBC Auto Differential[[id number]]        Abnormal            Final result               Scan Slide[[id number]]                                                                    Please view results for these tests on the individual orders.       Meds Given in ED:   Medications   sodium chloride 0.9 % bolus 1,000 mL (1,000 mL Intravenous New Bag 24)   sodium chloride 0.9 % bolus 1,000 mL (0 mL Intravenous Stopped 24)   HYDROmorphone (DILAUDID) injection 0.25 mg (0.25 mg Intravenous Given 24)   ondansetron (ZOFRAN) injection 4 mg (4 mg Intravenous Given 24)              Electronically signed by Kya Wray RN at 24       Greg Rangel MD at 24 1914           EMERGENCY DEPARTMENT ENCOUNTER    Pt Name: Alysia Sierra  MRN: [id number]  Pt :    1955  Room Number:  POOJA/POOJA  Date of encounter:  1/2/2024  PCP: Liana So APRN  ED Provider: Greg Rangel MD    Historian: Patient and paramedics      HPI:  Chief Complaint: Left hip/pelvis pain status post fall        Context: Alysia Sierra is a 68 y.o. female who presents to the ED c/o fall leading to pain in her left hip/pelvis mostly posteriorly.  The patient has undergone left hip replacement in the past.  She notes that the pain seems somewhat higher than where her prosthesis is located.  The patient notes that she fell but is uncertain as to the underlying reason.  She was drinking tequila before the fall.  She does not believe she had palpitations or chest pain.  She does not believe she struck her head or had loss of consciousness.  She denies head neck and back pain.  The paramedics who transported the patient note that her blood pressure has been low.  Initially 85/45 and then with repeat 89/53.  The patient feels a little lightheaded but otherwise well.    PAST MEDICAL HISTORY  Past Medical History:   Diagnosis Date    Allergic lisinopril  2017    Anemia     Arrhythmia     Arthritis     Cancer     uterine    Cataract mild 2020    stil lpresent    Chicken pox     Chronic fatigue     CKD (chronic kidney disease), stage III     sees nephro    Clotting disorder     Dental root implant present     lower left  x1 - possible dental implant    Depression mild 2019    Difficulty walking 2019    Disease of thyroid gland     Dizzy     NIEVES (dyspnea on exertion)     2017    Fracture of hip 2023    Generalized anxiety disorder     GERD (gastroesophageal reflux disease) 2018    History of brachytherapy 01/18/2023    vaginal brachytherapy    Hyperlipidemia     Hypertension     Iron deficiency anemia     Liver disease     fatty    Liver problem     Measles     Menopause     Mumps     Neuromuscular disorder Peripheral Neuropathy    Orthostatic hypotension     Pupil diameter unequal     anesthesia  be aware- genetic issue    Renal insufficiency 2018    Scoliosis     Unintentional weight loss     Uses contact lenses     bilat    Uterine cancer     Uterine cancer 2022    UTI (urinary tract infection)     Visual impairment Nearsighted    Wears glasses          PAST SURGICAL HISTORY  Past Surgical History:   Procedure Laterality Date     SECTION      DILATION AND CURETTAGE, DIAGNOSTIC / THERAPEUTIC      HIP OPEN REDUCTION Left 2023    Procedure: FEMORAL NECK OPEN REDUCTION INTERNAL FIXATION LEFT;  Surgeon: Juanpablo Lee MD;  Location:  [facility];  Service: Orthopedics;  Laterality: Left;    HIP SURGERY      OOPHORECTOMY      ORAL LESION EXCISION/BIOPSY  2021    TOTAL LAPAROSCOPIC HYSTERECTOMY SALPINGO OOPHORECTOMY N/A 10/28/2022    Procedure: TOTAL LAPAROSCOPIC HYSTERECTOMY BILATERAL SALPINGO-OOPHORECTOMY, INJECTION FOR SENTINEL LYMPH NODE MAPPING, BILATERAL SENTINEL LYMPH NODE DISSECTION WITH DAVINCI ROBOT;  Surgeon: Jasmine Rich MD;  Location:  [facility];  Service: Robotics - DaVinci;  Laterality: N/A;    TUBAL ABDOMINAL LIGATION           FAMILY HISTORY  Family History   Problem Relation Age of Onset    Spondylolisthesis Mother     COPD Mother     Stroke Mother     Hypertension Mother     Lung cancer Father     Hypertension Father     Heart attack Father     Coronary artery disease Father     Heart disease Father     Cancer Father     Lung disease Father     Hyperlipidemia Sister     Rheum arthritis Sister     Malig Hypertension Sister     Osteoarthritis Sister     Other Sister         alcoholic    Hypertension Sister     Scoliosis Sister     Hypertension Brother     Depression Sister     Early death Sister     Breast cancer Neg Hx     Ovarian cancer Neg Hx     Uterine cancer Neg Hx     Colon cancer Neg Hx     Melanoma Neg Hx     Prostate cancer Neg Hx          SOCIAL HISTORY  Social History     Socioeconomic History    Marital status:     Number of children: 1     Highest education level: Associate degree: academic program   Tobacco Use    Smoking status: Never     Passive exposure: Never    Smokeless tobacco: Never    Tobacco comments:     Never   Vaping Use    Vaping Use: Never used   Substance and Sexual Activity    Alcohol use: Yes     Alcohol/week: 6.0 standard drinks of alcohol     Types: 6 Glasses of wine per week     Comment: 6-8 glasses a week    Drug use: No    Sexual activity: Not Currently     Partners: Male     Birth control/protection: Surgical, Post-menopausal         ALLERGIES  Lisinopril, Metal, Milk-related compounds, Tylenol [acetaminophen], and Atorvastatin        REVIEW OF SYSTEMS  Review of Systems       All systems reviewed and negative except for those discussed in HPI.       PHYSICAL EXAM    I have reviewed the triage vital signs and nursing notes.    ED Triage Vitals   Temp Pulse Resp BP SpO2   -- -- -- -- --      Temp src Heart Rate Source Patient Position BP Location FiO2 (%)   -- -- -- -- --       Physical Exam  GENERAL:   Appears in no acute distress.  She is extremely pleasant and interactive.  She is an OB/GYN nurse here at our facility.  HENT: Nares patent.  EYES: No scleral icterus.  CV: Regular rhythm, regular rate.  2+ dorsalis pedis pulses  RESPIRATORY: Normal effort.  No audible wheezes, rales or rhonchi.  ABDOMEN: Soft, nontender to deep palpation  MUSCULOSKELETAL: No deformities.  No shortening or malrotation of her lower extremities.  Tenderness to palpation left posterior pelvis with overlying contusion, small.  No skin breaks.  Contusions as noted above.   NEURO: Alert, moves all extremities, follows commands.  Fine touch and motor intact in bilateral lower extremities distally.  SKIN: Warm, dry, no rash visualized.  Alert and oriented x 3.      LAB RESULTS  Recent Results (from the past 24 hour(s))   ECG 12 Lead Syncope    Collection Time: 01/02/24  7:39 PM   Result Value Ref Range    QT Interval 418 ms    QTC Interval 470 ms    Comprehensive Metabolic Panel    Collection Time: 01/02/24  7:58 PM    Specimen: Blood   Result Value Ref Range    Glucose 92 65 - 99 mg/dL    BUN 31 (H) 8 - 23 mg/dL    Creatinine 2.44 (H) 0.57 - 1.00 mg/dL    Sodium 140 136 - 145 mmol/L    Potassium 4.7 3.5 - 5.2 mmol/L    Chloride 105 98 - 107 mmol/L    CO2 21.0 (L) 22.0 - 29.0 mmol/L    Calcium 8.5 (L) 8.6 - 10.5 mg/dL    Total Protein 6.5 6.0 - 8.5 g/dL    Albumin 4.2 3.5 - 5.2 g/dL    ALT (SGPT) 9 1 - 33 U/L    AST (SGOT) 18 1 - 32 U/L    Alkaline Phosphatase 74 39 - 117 U/L    Total Bilirubin 0.2 0.0 - 1.2 mg/dL    Globulin 2.3 gm/dL    A/G Ratio 1.8 g/dL    BUN/Creatinine Ratio 12.7 7.0 - 25.0    Anion Gap 14.0 5.0 - 15.0 mmol/L    eGFR 21.2 (L) >60.0 mL/min/1.73   CBC Auto Differential    Collection Time: 01/02/24  7:58 PM    Specimen: Blood   Result Value Ref Range    WBC 6.78 3.40 - 10.80 10*3/mm3    RBC 2.86 (L) 3.77 - 5.28 10*6/mm3    Hemoglobin 9.6 (L) 12.0 - 15.9 g/dL    Hematocrit 30.7 (L) 34.0 - 46.6 %    .3 (H) 79.0 - 97.0 fL    MCH 33.6 (H) 26.6 - 33.0 pg    MCHC 31.3 (L) 31.5 - 35.7 g/dL    RDW 13.4 12.3 - 15.4 %    RDW-SD 52.4 37.0 - 54.0 fl    MPV 10.2 6.0 - 12.0 fL    Platelets 183 140 - 450 10*3/mm3    Neutrophil % 54.6 42.7 - 76.0 %    Lymphocyte % 32.2 19.6 - 45.3 %    Monocyte % 10.8 5.0 - 12.0 %    Eosinophil % 1.5 0.3 - 6.2 %    Basophil % 0.3 0.0 - 1.5 %    Immature Grans % 0.6 (H) 0.0 - 0.5 %    Neutrophils, Absolute 3.71 1.70 - 7.00 10*3/mm3    Lymphocytes, Absolute 2.18 0.70 - 3.10 10*3/mm3    Monocytes, Absolute 0.73 0.10 - 0.90 10*3/mm3    Eosinophils, Absolute 0.10 0.00 - 0.40 10*3/mm3    Basophils, Absolute 0.02 0.00 - 0.20 10*3/mm3    Immature Grans, Absolute 0.04 0.00 - 0.05 10*3/mm3    nRBC 0.0 0.0 - 0.2 /100 WBC   Ethanol    Collection Time: 01/02/24  7:58 PM    Specimen: Blood   Result Value Ref Range    Ethanol 245 (H) 0 - 10 mg/dL   Urinalysis With Microscopic If Indicated (No Culture) - Urine, Clean Catch     Collection Time: 01/02/24  9:15 PM    Specimen: Urine, Clean Catch   Result Value Ref Range    Color, UA Yellow Yellow, Straw    Appearance, UA Cloudy (A) Clear    pH, UA <=5.0 5.0 - 8.0    Specific Gravity, UA 1.012 1.001 - 1.030    Glucose, UA Negative Negative    Ketones, UA Negative Negative    Bilirubin, UA Negative Negative    Blood, UA Negative Negative    Protein, UA Negative Negative    Leuk Esterase, UA Small (1+) (A) Negative    Nitrite, UA Negative Negative    Urobilinogen, UA 0.2 E.U./dL 0.2 - 1.0 E.U./dL   Urinalysis, Microscopic Only - Urine, Clean Catch    Collection Time: 01/02/24  9:15 PM    Specimen: Urine, Clean Catch   Result Value Ref Range    RBC, UA 0-2 None Seen, 0-2 /HPF    WBC, UA 6-10 (A) None Seen, 0-2 /HPF    Bacteria, UA 4+ (A) None Seen, Trace /HPF    Squamous Epithelial Cells, UA 7-12 (A) None Seen, 0-2 /HPF    Hyaline Casts, UA 0-6 0 - 6 /LPF    Methodology Automated Microscopy    Urine Drug Screen - Urine, Clean Catch    Collection Time: 01/02/24  9:15 PM    Specimen: Urine, Clean Catch   Result Value Ref Range    THC, Screen, Urine Negative Negative    Phencyclidine (PCP), Urine Negative Negative    Cocaine Screen, Urine Negative Negative    Methamphetamine, Ur Negative Negative    Opiate Screen Negative Negative    Amphetamine Screen, Urine Negative Negative    Benzodiazepine Screen, Urine Negative Negative    Tricyclic Antidepressants Screen Negative Negative    Methadone Screen, Urine Negative Negative    Barbiturates Screen, Urine Negative Negative    Oxycodone Screen, Urine Positive (A) Negative    Buprenorphine, Screen, Urine Negative Negative   Fentanyl, Urine - Urine, Clean Catch    Collection Time: 01/02/24  9:15 PM    Specimen: Urine, Clean Catch   Result Value Ref Range    Fentanyl, Urine Negative Negative       If labs were ordered, I independently reviewed the results and considered them in treating the patient.        RADIOLOGY  CT Pelvis Without Contrast    Result  Date: 1/2/2024  CT PELVIS WO CONTRAST Date of Exam: 1/2/2024 9:28 PM EST Indication: trauma with left-sided pelvis pain. Comparison: CT abdomen and pelvis 4/13/2023 Technique: Axial CT images were obtained of the pelvis without contrast administration.  Reconstructed coronal and sagittal images were also obtained. Automated exposure control and iterative construction methods were used. Findings: Visualized lower abdomen: Unremarkable. Gastrointestinal: Diverticulosis of the sigmoid colon without evidence of acute diverticulitis. Otherwise the visualized GI tract is unremarkable. Bladder: The bladder is normal. Pelvis: Patient is status post hysterectomy. No suspicious mass. Peritoneum/Mesentery: No fluid collection, ascities, or free air.   Lymph Nodes: No lymphadenopathy. Vasculature: Unremarkable Pelvic Wall: Unremarkable. No suspicious hematoma. Bony Structures: Acute appearing mildly displaced fracture through the left L3 transverse process. No other definite acute osseous abnormalities identified. Patient is status post internal fixation of the left femoral neck. No evidence of hardware failure or loosening.     Impression: Acute appearing mildly displaced fracture through the left transverse process of L3. Please consider dedicated lumbar spine MRI to rule out additional fractures of the lumbar spine. No other acute traumatic injury to the pelvic structures. Patient is status post internal fixation of the left femoral neck. The hardware appears intact without evidence of loosening. Electronically Signed: Tim Castro DO  1/2/2024 10:10 PM EST  Workstation ID: [id number]    CT Head Without Contrast    Result Date: 1/2/2024  CT HEAD WO CONTRAST Date of Exam: 1/2/2024 9:28 PM EST Indication: Syncope.  Possible head injury.  Alcohol intoxication.. Comparison: 11/12/2023 Technique: Axial CT images were obtained of the head without contrast administration.  Automated exposure control and iterative construction  methods were used. Findings: No large territory infarct. There is no evidence of hemorrhage. No mass effect, edema or midline shift Mild periventricular and subcortical white matter hypodensities, nonspecific but most likely represents chronic small vessel ischemic changes. No extra-axial fluid collection. Prominent ventricular system secondary to chronic parenchymal volume loss. The visualized orbits are unremarkable. The visualized paranasal sinuses and mastoid air cells are clear. Subtle left frontal scalp hematoma/contusion. Otherwise unremarkable. No acute osseous abnormality.     Impression: No acute intracranial abnormality. Subtle left frontal scalp hematoma/soft tissue contusion. Electronically Signed: Tim Castro DO  1/2/2024 10:04 PM EST  Workstation ID: [id number]     I ordered and independently reviewed the above noted radiographic studies.      I viewed images of CT scan of the head as well as CT scan of the pelvis which showed no intracranial hemorrhage or skull fracture and no evidence of pelvic fractures per my independent interpretation.    See radiologist's dictation for official interpretation.        PROCEDURES    Procedures    ECG 12 Lead Syncope   Final Result   Test Reason : Syncope   Blood Pressure :   */*   mmHG   Vent. Rate :  76 BPM     Atrial Rate :  76 BPM      P-R Int : 162 ms          QRS Dur :  68 ms       QT Int : 418 ms       P-R-T Axes :  79  12  43 degrees      QTc Int : 470 ms      Normal sinus rhythm   Low voltage QRS   Borderline ECG   Confirmed by KAMRON PINEDA MD ([id number]) on 1/2/2024 8:08:29 PM      Referred By: EDMD           Confirmed By: KAMRON PINEDA MD          MEDICATIONS GIVEN IN ER    Medications   sodium chloride 0.9 % bolus 1,000 mL (0 mL Intravenous Stopped 1/2/24 2009)   HYDROmorphone (DILAUDID) injection 0.25 mg (0.25 mg Intravenous Given 1/2/24 2051)   ondansetron (ZOFRAN) injection 4 mg (4 mg Intravenous Given 1/2/24 2051)   sodium chloride 0.9 % bolus 1,000  mL (0 mL Intravenous Stopped 1/2/24 4688)         MEDICAL DECISION MAKING, PROGRESS, and CONSULTS    All labs, if obtained, have been independently reviewed by me.  All radiology studies, if obtained, have been reviewed by me and the radiologist dictating the report.  All EKG's, if obtained, have been independently viewed and interpreted by me/my attending physician.      Discussion below represents my analysis of pertinent findings related to patient's condition, differential diagnosis, treatment plan and final disposition.                         Differential diagnosis:    Fall with left hip and pelvis pain.  Consider fractures versus soft tissue injuries.  As for the reason for the fall this may be secondary to alcohol ingestion, cardiac syncope, etc.      Additional sources:    - Discussed/ obtained information from independent historians: Spoke with paramedics at bedside.  They gave me report about findings to include hypotension.    - External (non-ED) record review: Reviewed multiple records on this patient to include her discharge summary dated 5/6/2023 when she had been hospitalized status post hip fracture.    I reviewed facial bone CTs which showed no acute facial bone abnormalities dated 11/12/2023 when the patient was seen after a fall probably secondary to intoxication per records.    Reviewed prior lab studies to include blood alcohol level on 11/12/2023 when the patient's blood alcohol was 202.    - Chronic or social conditions impacting care: Alcohol abuse history    - Shared decision making: Patient is in full agreement with current plans for evaluation treatment and admission to the hospitalist group.      Orders placed during this visit:  Orders Placed This Encounter   Procedures    CT Pelvis Without Contrast    CT Head Without Contrast    CT Lumbar Spine Without Contrast    Comprehensive Metabolic Panel    CBC Auto Differential    Ethanol    Urinalysis With Microscopic If Indicated (No Culture)  - Urine, Clean Catch    Urinalysis, Microscopic Only - Urine, Clean Catch    Urine Drug Screen - Urine, Clean Catch    Fentanyl, Urine - Urine, Clean Catch    Orthostatic Vitals    Orthostatic Vitals    Code Status and Medical Interventions:    ECG 12 Lead Syncope    Initiate Observation Status    ED Bed Request    CBC & Differential         Additional orders considered but not ordered:  CT cervical and thoracic spine.    ED Course:    Consultants:      ED Course as of 01/02/24 2331   e Jan 02, 2024 1945 Given prehospital hypotension I have ordered a liter of normal saline.  Given her hip pain I will give her a very small dose of Dilaudid.  She reports she cannot take Tylenol because of liver damage from alcohol use. [MS]   2236 Per radiologist,Acute appearing mildly displaced fracture through the left transverse process of L3. Please consider dedicated lumbar spine MRI to rule out additional fractures of the lumbar spine.  I have now ordered a CT scan dedicated to the lumbar spine and I have notified the hospitalists caring for this patient.  [MS]      ED Course User Index  [MS] Greg Rangel MD              Shared Decision Making:  After my consideration of clinical presentation and any laboratory/radiology studies obtained, I discussed the findings with the patient/patient representative who is in agreement with the treatment plan and the final disposition.   Risks and benefits of discharge and/or observation/admission were discussed.       AS OF 23:31 EST VITALS:    BP - 100/79  HR - 83  TEMP - 97.5 °F (36.4 °C) (Oral)  O2 SATS - 97%                  DIAGNOSIS  Final diagnoses:   Acute kidney injury   Dehydration   Hypotension due to hypovolemia   Alcoholic intoxication without complication   Pelvic pain         DISPOSITION  Admission      Please note that portions of this document were completed with voice recognition software.        Greg Rangel MD  01/02/24 2331      Electronically signed by  Greg Rangel MD at 01/02/24 2331       Vital Signs (last day)       Date/Time Temp Temp src Pulse Resp BP Patient Position SpO2    01/03/24 0722 98.2 (36.8) Oral 96 18 130/75 Lying 94    01/03/24 0538 98.9 (37.2) Oral 95 18 137/80 Lying 95    01/03/24 0500 -- -- 87 -- -- -- 93    01/02/24 2342 97.8 (36.6) Oral 84 18 127/86 Lying 95    01/02/24 2300 -- -- 83 -- 100/79 -- 97    01/02/24 2231 -- -- 84 -- 111/74 -- 98    01/02/24 2216 -- -- 84 -- 104/69 -- --    01/02/24 2200 -- -- 84 -- 115/77 -- 96    01/02/24 2149 -- -- 84 -- -- -- 97    01/02/24 2147 -- -- -- -- 107/59 -- --    01/02/24 2116 -- -- 87 -- 90/67 -- 91    01/02/24 2037 -- -- 90 -- 99/66 Standing --    01/02/24 2033 -- -- 86 -- 102/80 -- 100    01/02/24 2032 -- -- 80 -- 102/80 Sitting --    01/02/24 2029 -- -- 86 -- 101/68 Lying --    01/02/24 2028 -- -- 85 -- 101/68 -- 90    01/02/24 2018 -- -- 82 -- 107/76 -- 93    01/02/24 2003 -- -- 85 -- 120/83 -- 94    01/02/24 1948 -- -- 80 -- 109/77 -- 95    01/02/24 1918 -- -- -- -- 101/67 -- 98    01/02/24 1917 97.5 (36.4) Oral 81 17 101/67 Lying 94          Oxygen Therapy (last day)       Date/Time SpO2 Device (Oxygen Therapy) Flow (L/min) Oxygen Concentration (%) ETCO2 (mmHg)    01/03/24 0830 -- room air -- -- --    01/03/24 0722 94 -- -- -- --    01/03/24 0605 -- nasal cannula 1 -- --    01/03/24 0538 95 -- -- -- --    01/03/24 0500 93 -- -- -- --    01/03/24 0405 -- nasal cannula 1 -- --    01/03/24 0205 -- nasal cannula 1 -- --    01/03/24 0022 -- nasal cannula 1 -- --    01/02/24 2342 95 -- -- -- --    01/02/24 2300 97 -- -- -- --    01/02/24 2231 98 -- -- -- --    01/02/24 2200 96 -- -- -- --    01/02/24 2149 97 -- -- -- --    01/02/24 2116 91 -- -- -- --    01/02/24 2033 100 -- -- -- --    01/02/24 2028 90 -- -- -- --    01/02/24 2018 93 -- -- -- --    01/02/24 2003 94 -- -- -- --    01/02/24 1948 95 -- -- -- --    01/02/24 1918 98 -- -- -- --    01/02/24 1917 94 nasal cannula -- -- --           Lines, Drains & Airways       Active LDAs       Name Placement date Placement time Site Days    Peripheral IV 01/02/24 1912 Distal;Posterior;Right Forearm 01/02/24 1912  Forearm  less than 1                  Current Facility-Administered Medications   Medication Dose Route Frequency Provider Last Rate Last Admin    sennosides-docusate (PERICOLACE) 8.6-50 MG per tablet 2 tablet  2 tablet Oral BID Collin Toure Jr., MD   2 tablet at 01/03/24 0829    And    polyethylene glycol (MIRALAX) packet 17 g  17 g Oral Daily PRN Collin Toure Jr., MD        And    bisacodyl (DULCOLAX) EC tablet 5 mg  5 mg Oral Daily PRN Collin Toure Jr., MD        And    bisacodyl (DULCOLAX) suppository 10 mg  10 mg Rectal Daily PRN Collin Toure Jr., MD        docusate sodium (COLACE) capsule 100 mg  100 mg Oral BID PRN Collin Toure Jr., MD        folic acid (FOLVITE) tablet 1 mg  1 mg Oral Daily Alexander Yu MD        heparin (porcine) 5000 UNIT/ML injection 5,000 Units  5,000 Units Subcutaneous Q8H Collin Toure Jr., MD   5,000 Units at 01/03/24 0540    HYDROcodone-acetaminophen (NORCO) 5-325 MG per tablet 1 tablet  1 tablet Oral Q6H PRN Alexander Yu MD        lactated ringers infusion  100 mL/hr Intravenous Continuous Collin Toure Jr.,  mL/hr at 01/03/24 0828 100 mL/hr at 01/03/24 0828    levothyroxine (SYNTHROID, LEVOTHROID) tablet 25 mcg  25 mcg Oral Q AM Collin Toure Jr., MD   25 mcg at 01/03/24 0540    LORazepam (ATIVAN) tablet 2 mg  2 mg Oral Q6H Alexander Yu MD        Followed by    [START ON 1/4/2024] LORazepam (ATIVAN) tablet 1 mg  1 mg Oral Q6H Alexander Yu MD        Magnesium Standard Dose Replacement - Follow Nurse / BPA Driven Protocol   Does not apply PRN Alexander Yu MD        metoprolol succinate XL (TOPROL-XL) 24 hr tablet 25 mg  25 mg Oral Nightly Collin Toure Jr., MD        nitroglycerin (NITROSTAT) SL tablet 0.4 mg  0.4 mg Sublingual Q5 Min PRN Collin Toure Jr.,  MD        ondansetron (ZOFRAN) injection 4 mg  4 mg Intravenous Q6H PRN Collin Toure Jr., MD        pantoprazole (PROTONIX) EC tablet 40 mg  40 mg Oral Q AM Collin Toure Jr., MD   40 mg at 01/03/24 0540    rosuvastatin (CRESTOR) tablet 5 mg  5 mg Oral Nightly Alexander Yu MD        sodium chloride 0.9 % flush 10 mL  10 mL Intravenous Q12H Collin Toure Jr., MD   10 mL at 01/03/24 0829    sodium chloride 0.9 % flush 10 mL  10 mL Intravenous PRN Collin Toure Jr., MD        sodium chloride 0.9 % infusion 40 mL  40 mL Intravenous PRN Collin Toure Jr., MD        thiamine (B-1) injection 200 mg  200 mg Intravenous Q8H Alexander Yu MD        Followed by    [START ON 1/8/2024] thiamine (VITAMIN B-1) tablet 100 mg  100 mg Oral Daily Alexander Yu MD        traMADol (ULTRAM) tablet 50 mg  50 mg Oral Q12H PRN Collin Toure Jr., MD   50 mg at 01/03/24 0543     Lab Results (last 24 hours)       Procedure Component Value Units Date/Time    CBC Auto Differential [[id number]]  (Abnormal) Collected: 01/03/24 0522    Specimen: Blood Updated: 01/03/24 0810     WBC 5.13 10*3/mm3      RBC 2.79 10*6/mm3      Hemoglobin 9.3 g/dL      Hematocrit 29.9 %      .2 fL      MCH 33.3 pg      MCHC 31.1 g/dL      RDW 13.4 %      RDW-SD 52.8 fl      MPV 10.3 fL      Platelets 151 10*3/mm3      Neutrophil % 46.0 %      Lymphocyte % 36.3 %      Monocyte % 14.0 %      Eosinophil % 2.7 %      Basophil % 0.6 %      Immature Grans % 0.4 %      Neutrophils, Absolute 2.36 10*3/mm3      Lymphocytes, Absolute 1.86 10*3/mm3      Monocytes, Absolute 0.72 10*3/mm3      Eosinophils, Absolute 0.14 10*3/mm3      Basophils, Absolute 0.03 10*3/mm3      Immature Grans, Absolute 0.02 10*3/mm3      nRBC 0.0 /100 WBC     Basic Metabolic Panel [[id number]]  (Abnormal) Collected: 01/03/24 0522    Specimen: Blood Updated: 01/03/24 0618     Glucose 65 mg/dL      BUN 26 mg/dL      Creatinine 1.57 mg/dL      Sodium 144 mmol/L      Potassium  4.8 mmol/L      Chloride 111 mmol/L      CO2 20.0 mmol/L      Calcium 8.4 mg/dL      BUN/Creatinine Ratio 16.6     Anion Gap 13.0 mmol/L      eGFR 36.0 mL/min/1.73     Narrative:      GFR Normal >60  Chronic Kidney Disease <60  Kidney Failure <15      Fentanyl, Urine - Urine, Clean Catch [[id number]]  (Normal) Collected: 01/02/24 2115    Specimen: Urine, Clean Catch Updated: 01/02/24 2312     Fentanyl, Urine Negative    Narrative:      Negative Threshold:      Fentanyl 5 ng/mL     The normal value for the drug tested is negative. This report includes final unconfirmed screening results to be used for medical treatment purposes only. Unconfirmed results must not be used for non-medical purposes such as employment or legal testing. Clinical consideration should be applied to any drug of abuse test, particularly when unconfirmed results are used.           Urine Drug Screen - Urine, Clean Catch [[id number]]  (Abnormal) Collected: 01/02/24 2115    Specimen: Urine, Clean Catch Updated: 01/02/24 2258     THC, Screen, Urine Negative     Phencyclidine (PCP), Urine Negative     Cocaine Screen, Urine Negative     Methamphetamine, Ur Negative     Opiate Screen Negative     Amphetamine Screen, Urine Negative     Benzodiazepine Screen, Urine Negative     Tricyclic Antidepressants Screen Negative     Methadone Screen, Urine Negative     Barbiturates Screen, Urine Negative     Oxycodone Screen, Urine Positive     Buprenorphine, Screen, Urine Negative    Narrative:      Cutoff For Drugs Screened:    Amphetamines               500 ng/ml  Barbiturates               200 ng/ml  Benzodiazepines            150 ng/ml  Cocaine                    150 ng/ml  Methadone                  200 ng/ml  Opiates                    100 ng/ml  Phencyclidine               25 ng/ml  THC                         50 ng/ml  Methamphetamine            500 ng/ml  Tricyclic Antidepressants  300 ng/ml  Oxycodone                  100 ng/ml  Buprenorphine                10 ng/ml    The normal value for all drugs tested is negative. This report includes unconfirmed screening results, with the cutoff values listed, to be used for medical treatment purposes only.  Unconfirmed results must not be used for non-medical purposes such as employment or legal testing.  Clinical consideration should be applied to any drug of abuse test, particularly when unconfirmed results are used.      Urinalysis With Microscopic If Indicated (No Culture) - Urine, Clean Catch [[id number]]  (Abnormal) Collected: 01/02/24 2115    Specimen: Urine, Clean Catch Updated: 01/02/24 2138     Color, UA Yellow     Appearance, UA Cloudy     pH, UA <=5.0     Specific Gravity, UA 1.012     Glucose, UA Negative     Ketones, UA Negative     Bilirubin, UA Negative     Blood, UA Negative     Protein, UA Negative     Leuk Esterase, UA Small (1+)     Nitrite, UA Negative     Urobilinogen, UA 0.2 E.U./dL    Urinalysis, Microscopic Only - Urine, Clean Catch [[id number]]  (Abnormal) Collected: 01/02/24 2115    Specimen: Urine, Clean Catch Updated: 01/02/24 2138     RBC, UA 0-2 /HPF      WBC, UA 6-10 /HPF      Bacteria, UA 4+ /HPF      Squamous Epithelial Cells, UA 7-12 /HPF      Hyaline Casts, UA 0-6 /LPF      Methodology Automated Microscopy    Comprehensive Metabolic Panel [[id number]]  (Abnormal) Collected: 01/02/24 1958    Specimen: Blood Updated: 01/02/24 2040     Glucose 92 mg/dL      BUN 31 mg/dL      Creatinine 2.44 mg/dL      Sodium 140 mmol/L      Potassium 4.7 mmol/L      Comment: Slight hemolysis detected by analyzer. Result may be falsely elevated.        Chloride 105 mmol/L      CO2 21.0 mmol/L      Calcium 8.5 mg/dL      Total Protein 6.5 g/dL      Albumin 4.2 g/dL      ALT (SGPT) 9 U/L      AST (SGOT) 18 U/L      Alkaline Phosphatase 74 U/L      Total Bilirubin 0.2 mg/dL      Globulin 2.3 gm/dL      Comment: Calculated Result        A/G Ratio 1.8 g/dL      BUN/Creatinine Ratio 12.7     Anion Gap 14.0 mmol/L       eGFR 21.2 mL/min/1.73     Narrative:      GFR Normal >60  Chronic Kidney Disease <60  Kidney Failure <15      Ethanol [[id number]]  (Abnormal) Collected: 01/02/24 1958    Specimen: Blood Updated: 01/02/24 2040     Ethanol 245 mg/dL     Narrative:      Elevated lactic acid concentration and lactate dehydrogenase(LD) activity may falsely elevate enzymatically determined ethanol levels. Not for legal purposes.     CBC & Differential [[id number]]  (Abnormal) Collected: 01/02/24 1958    Specimen: Blood Updated: 01/02/24 2019    Narrative:      The following orders were created for panel order CBC & Differential.  Procedure                               Abnormality         Status                     ---------                               -----------         ------                     CBC Auto Differential[[id number]]        Abnormal            Final result               Scan Slide[[id number]]                                                                    Please view results for these tests on the individual orders.    CBC Auto Differential [[id number]]  (Abnormal) Collected: 01/02/24 1958    Specimen: Blood Updated: 01/02/24 2019     WBC 6.78 10*3/mm3      RBC 2.86 10*6/mm3      Hemoglobin 9.6 g/dL      Hematocrit 30.7 %      .3 fL      MCH 33.6 pg      MCHC 31.3 g/dL      RDW 13.4 %      RDW-SD 52.4 fl      MPV 10.2 fL      Platelets 183 10*3/mm3      Neutrophil % 54.6 %      Lymphocyte % 32.2 %      Monocyte % 10.8 %      Eosinophil % 1.5 %      Basophil % 0.3 %      Immature Grans % 0.6 %      Neutrophils, Absolute 3.71 10*3/mm3      Lymphocytes, Absolute 2.18 10*3/mm3      Monocytes, Absolute 0.73 10*3/mm3      Eosinophils, Absolute 0.10 10*3/mm3      Basophils, Absolute 0.02 10*3/mm3      Immature Grans, Absolute 0.04 10*3/mm3      nRBC 0.0 /100 WBC           Imaging Results (Last 24 Hours)       Procedure Component Value Units Date/Time    CT Lumbar Spine Without Contrast [[id number]] Collected: 01/02/24 1692      Updated: 01/02/24 2340    Narrative:      CT LUMBAR SPINE WO CONTRAST    Date of Exam: 1/2/2024 11:17 PM EST    Indication: Low back pain status post fall with L3 fracture apparent on pelvis CT.    Comparison: MR lumbar spine 9/18/2020.    Technique: Axial CT images were obtained of the lumbar spine without contrast administration.  Reconstructed coronal and sagittal images were also obtained. Automated exposure control and iterative construction methods were used.      Findings:  5 lumbar vertebral bodies are identified. There is persistent mild levoscoliosis of the mid lumbar spine. No spondylolisthesis. Disc spaces are normal. There is mild multilevel marginal osteophyte formation. There is moderate multilevel mid and lower   lumbar facet arthritis on the left and there is severe mid and moderate lower lumbar facet arthritis on the right, most pronounced at the L2-L3 level. Vertebral bodies maintain normal height. There are mildly displaced fractures of the left L1, L2 and L3   transverse processes. The transverse processes on the right appear intact. Spinous processes also appear intact although with hypertrophic changes. No erosions or destructive osseous lesions. There is mild atrophy of the posterior paraspinal   musculature. No abnormal fluid collections.  Limited view of the retroperitoneal structures is unremarkable.     There is no significant spinal canal stenosis. There is no obvious large disc protrusion or extrusion. There are concentric disc bulges at the L3-L4, L4-L5 levels and there is a stable left foraminal disc protrusion at the L5-S1 level. There is   high-grade stenosis at the right L1-L2 neural foramen due to bony overgrowth and a large bridging osteophyte. There is moderate right neuroforaminal narrowing at L2-L3. There is mild bilateral neuroforaminal narrowing at the L4-L5 level and there is mild   left neuroforaminal narrowing at the L5-S1 level.      Impression:       Impression:  1.Mildly displaced fractures of the left L1, L2 and L3 transverse processes.  2.Stable mild levoscoliosis of the mid lumbar spine and stable multilevel disc and facet joint degeneration resulting in varying degrees of neuroforaminal narrowing. No significant spinal canal stenosis.        Electronically Signed: Bishnu Sepulveda MD    1/2/2024 11:37 PM EST    Workstation ID: [id number]    CT Pelvis Without Contrast [[id number]] Collected: 01/02/24 2205     Updated: 01/02/24 2213    Narrative:      CT PELVIS WO CONTRAST    Date of Exam: 1/2/2024 9:28 PM EST    Indication: trauma with left-sided pelvis pain.    Comparison: CT abdomen and pelvis 4/13/2023    Technique: Axial CT images were obtained of the pelvis without contrast administration.  Reconstructed coronal and sagittal images were also obtained. Automated exposure control and iterative construction methods were used.      Findings:    Visualized lower abdomen: Unremarkable.    Gastrointestinal: Diverticulosis of the sigmoid colon without evidence of acute diverticulitis. Otherwise the visualized GI tract is unremarkable.    Bladder: The bladder is normal.    Pelvis: Patient is status post hysterectomy. No suspicious mass.    Peritoneum/Mesentery: No fluid collection, ascities, or free air.      Lymph Nodes: No lymphadenopathy.    Vasculature: Unremarkable    Pelvic Wall: Unremarkable. No suspicious hematoma.    Bony Structures: Acute appearing mildly displaced fracture through the left L3 transverse process.  No other definite acute osseous abnormalities identified.  Patient is status post internal fixation of the left femoral neck. No evidence of hardware failure or loosening.      Impression:      Impression:  Acute appearing mildly displaced fracture through the left transverse process of L3. Please consider dedicated lumbar spine MRI to rule out additional fractures of the lumbar spine.    No other acute traumatic injury to the pelvic  structures.    Patient is status post internal fixation of the left femoral neck. The hardware appears intact without evidence of loosening.        Electronically Signed: Tim Castro DO    1/2/2024 10:10 PM EST    Workstation ID: [id number]    CT Head Without Contrast [[id number]] Collected: 01/02/24 2202     Updated: 01/02/24 2207    Narrative:      CT HEAD WO CONTRAST    Date of Exam: 1/2/2024 9:28 PM EST    Indication: Syncope.  Possible head injury.  Alcohol intoxication..    Comparison: 11/12/2023    Technique: Axial CT images were obtained of the head without contrast administration.  Automated exposure control and iterative construction methods were used.      Findings:  No large territory infarct.    There is no evidence of hemorrhage.  No mass effect, edema or midline shift    Mild periventricular and subcortical white matter hypodensities, nonspecific but most likely represents chronic small vessel ischemic changes.    No extra-axial fluid collection.      Prominent ventricular system secondary to chronic parenchymal volume loss.      The visualized orbits are unremarkable.  The visualized paranasal sinuses and mastoid air cells are clear.    Subtle left frontal scalp hematoma/contusion. Otherwise unremarkable.  No acute osseous abnormality.      Impression:      Impression:  No acute intracranial abnormality.    Subtle left frontal scalp hematoma/soft tissue contusion.      Electronically Signed: Tim Castro DO    1/2/2024 10:04 PM EST    Workstation ID: [id number]          ECG/EMG Results (last 24 hours)       Procedure Component Value Units Date/Time    ECG 12 Lead Syncope [[id number]] Collected: 01/02/24 1939     Updated: 01/02/24 2008     QT Interval 418 ms      QTC Interval 470 ms     Narrative:      Test Reason : Syncope  Blood Pressure :   */*   mmHG  Vent. Rate :  76 BPM     Atrial Rate :  76 BPM     P-R Int : 162 ms          QRS Dur :  68 ms      QT Int : 418 ms       P-R-T Axes :  79  12   43 degrees     QTc Int : 470 ms    Normal sinus rhythm  Low voltage QRS  Borderline ECG  Confirmed by KAMRON PINEDA MD ([id number]) on 1/2/2024 8:08:29 PM    Referred By: EDMD           Confirmed By: KAMRON PINEDA MD

## 2024-01-03 NOTE — H&P
[facility]  [facility]: [facility]   Patient Name: Alysia Sierra  : 1955  MRN: [id number]  Primary Care Physician:  Liana So APRN  Date of admission: 2024    Subjective   Subjective     Chief Complaint: Fall    HPI:    Alysia Sierra is a 68 y.o. female past medical history of uterine cancer, hypertension, CKD that presents to the emergency department after a fall.  Patient states she does not remember falling and woke up on the floor of her bathroom.  States this is happening previously.  She denies any known preceding symptoms of any shortness of breath, dizziness or chest pain.  Currently states she feels well other than left hip pain.  This is above her left iliac crest, sharp, worse with movement, intermittent.  She denies any other fevers, chills, sweats, nausea, vomiting, chest pain shortness of breath, palpitations, abdominal pain diarrhea constipation dysuria, weakness, rash.  Workup in the emergency department clued CT head with no acute pathology and CT pelvis which showed L3 transverse fracture.  CT L-spine has been ordered and is pending at this time.  She was also noted to be in MARY with creatinine 2.4.  Baseline within normal limits.  Patient will be admitted for ongoing monitoring and management.      Personal History     Past Medical History:  Past Medical History:   Diagnosis Date    Allergic lisinopril  2017    Anemia     Arrhythmia     Arthritis     Cancer     uterine    Cataract mild 2020    stil lpresent    Chicken pox     Chronic fatigue     CKD (chronic kidney disease), stage III     sees nephro    Clotting disorder     Dental root implant present     lower left  x1 - possible dental implant    Depression mild 2019    Difficulty walking 2019    Disease of thyroid gland     Dizzy     NIEVES (dyspnea on exertion)     2017    Fracture of hip 2023    Generalized anxiety disorder     GERD (gastroesophageal reflux disease) 2018    History  of brachytherapy 2023    vaginal brachytherapy    Hyperlipidemia     Hypertension     Iron deficiency anemia     Liver disease     fatty    Liver problem     Measles     Menopause     Mumps     Neuromuscular disorder Peripheral Neuropathy    Orthostatic hypotension     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    Renal insufficiency 2018    Scoliosis     Unintentional weight loss     Uses contact lenses     bilat    Uterine cancer     Uterine cancer 2022    UTI (urinary tract infection)     Visual impairment Nearsighted    Wears glasses          Past Surgical History:  Past Surgical History:   Procedure Laterality Date     SECTION  1981    DILATION AND CURETTAGE, DIAGNOSTIC / THERAPEUTIC      HIP OPEN REDUCTION Left 2023    Procedure: FEMORAL NECK OPEN REDUCTION INTERNAL FIXATION LEFT;  Surgeon: Juanpablo Lee MD;  Location:  [facility];  Service: Orthopedics;  Laterality: Left;    HIP SURGERY      OOPHORECTOMY      ORAL LESION EXCISION/BIOPSY  2021    TOTAL LAPAROSCOPIC HYSTERECTOMY SALPINGO OOPHORECTOMY N/A 10/28/2022    Procedure: TOTAL LAPAROSCOPIC HYSTERECTOMY BILATERAL SALPINGO-OOPHORECTOMY, INJECTION FOR SENTINEL LYMPH NODE MAPPING, BILATERAL SENTINEL LYMPH NODE DISSECTION WITH DAVINCI ROBOT;  Surgeon: Jasmine Rich MD;  Location:  [facility];  Service: Robotics - DaVinci;  Laterality: N/A;    TUBAL ABDOMINAL LIGATION           Family History:   Family History   Problem Relation Age of Onset    Spondylolisthesis Mother     COPD Mother     Stroke Mother     Hypertension Mother     Lung cancer Father     Hypertension Father     Heart attack Father     Coronary artery disease Father     Heart disease Father     Cancer Father     Lung disease Father     Hyperlipidemia Sister     Rheum arthritis Sister     Malig Hypertension Sister     Osteoarthritis Sister     Other Sister         alcoholic    Hypertension Sister     Scoliosis Sister     Hypertension  Brother     Depression Sister     Early death Sister     Breast cancer Neg Hx     Ovarian cancer Neg Hx     Uterine cancer Neg Hx     Colon cancer Neg Hx     Melanoma Neg Hx     Prostate cancer Neg Hx          Social History:   Social History     Tobacco Use    Smoking status: Never     Passive exposure: Never    Smokeless tobacco: Never    Tobacco comments:     Never   Vaping Use    Vaping Use: Never used   Substance Use Topics    Alcohol use: Yes     Alcohol/week: 6.0 standard drinks of alcohol     Types: 6 Glasses of wine per week     Comment: 6-8 glasses a week    Drug use: No         Home Medications:  Alpha-Lipoic Acid, Calcium-Phosphorus-Vitamin D, DULoxetine, Hydrocortisone Ace-Pramoxine, aspirin, cholecalciferol, docusate sodium, esomeprazole, gabapentin, hydrALAZINE, levothyroxine, magnesium oxide, metoprolol succinate XL, naloxone, nitroglycerin, rosuvastatin, and vitamin B-12    Allergies:  Allergies   Allergen Reactions    Lisinopril Angioedema    Metal Rash     Possible nickel -   Gold is only metal that doesn't have issues      Milk-Related Compounds Other (See Comments)     LACTOSE INTOLERANT    Tylenol [Acetaminophen] Other (See Comments)     ckd    Atorvastatin Myalgia       Review of Systems   All systems were reviewed and negative except for: syncope, L hip pain    Objective   Objective     Vitals:   Temp:  [97.5 °F (36.4 °C)] 97.5 °F (36.4 °C)  Heart Rate:  [80-90] 84  Resp:  [17] 17  BP: ()/(59-83) 107/59    Physical Exam    Constitutional: Awake, alert, no acute distress   Eyes: Pupils equal, sclerae anicteric, no conjunctival injection   HENT: NCAT, mucous membranes moist   Neck: Supple, no thyromegaly, no lymphadenopathy, trachea midline   Respiratory: Clear to auscultation bilaterally, nonlabored respirations    Cardiovascular: RRR, no murmurs, rubs, or gallops, palpable pedal pulses bilaterally   Gastrointestinal: Positive bowel sounds, soft, nontender,  nondistended   Musculoskeletal: No bilateral ankle edema, no clubbing or cyanosis to extremities   Psychiatric: Appropriate affect, cooperative   Neurologic: Oriented x 3, strength symmetric in all extremities, Cranial Nerves grossly intact to confrontation, speech clear   Skin: No rashes     Result Review    Result Review:  I have personally reviewed the results from the time of this admission to 1/2/2024 22:08 EST and agree with these findings:  [x]  Laboratory  []  Microbiology  [x]  Radiology  []  EKG/Telemetry   []  Cardiology/Vascular   []  Pathology  []  Old records  []  Other:      Assessment & Plan   Assessment / Plan     Assessment/Plan:   Acute kidney injury: Continue IV hydration.  Monitor intake and output and serial labs.  Avoid nephrotoxins and renally dose medications.  Will likely expand workup if not improved with IV hydration.  Questionable syncope: Will continue to monitor on telemetry.  Check orthostatic vital signs although patient has already received IV fluid.  Macrocytic anemia: Decreased from November but no active signs of bleeding.  Will continue to monitor.  Hypertension: Add back home medications  History of uterine cancer: Not currently on chemotherapy.  Outpatient follow-up.  L3 transverse fracture: Dedicated CT L-spine ordered, will follow.  Supportive care and pain control.      DVT prophylaxis:  Heparin subq    CODE STATUS:    Code Status (Patient has no pulse and is not breathing): CPR (Attempt to Resuscitate)  Medical Interventions (Patient has pulse or is breathing): Full Support      Admission Status:  I believe this patient meets observation status.    Electronically signed by Collin Toure Jr, MD, 01/02/24, 10:08 PM EST.

## 2024-01-03 NOTE — ED NOTES
Alysia Sierra    Nursing Report ED to Floor:  Mental status: GCS 15; ETOH  Ambulatory status: normally independent, fall risk due to etoh  Oxygen Therapy:  2L  Cardiac Rhythm: sinus   Admitted from: ed  Safety Concerns:  fall risk  Social Issues: none  ED Room #:  6    ED Nurse Phone Extension - [phone] or may call [phone].      HPI:   Chief Complaint   Patient presents with    Fall       Past Medical History:  Past Medical History:   Diagnosis Date    Allergic lisinopril  2017    Anemia     Arrhythmia     Arthritis     Cancer     uterine    Cataract mild 2020    stil lpresent    Chicken pox     Chronic fatigue     CKD (chronic kidney disease), stage III     sees nephro    Clotting disorder     Dental root implant present     lower left  x1 - possible dental implant    Depression mild 2019    Difficulty walking 2019    Disease of thyroid gland     Dizzy     NIEVES (dyspnea on exertion)     2017    Fracture of hip     Generalized anxiety disorder     GERD (gastroesophageal reflux disease) 2018    History of brachytherapy 2023    vaginal brachytherapy    Hyperlipidemia     Hypertension     Iron deficiency anemia     Liver disease     fatty    Liver problem     Measles     Menopause     Mumps     Neuromuscular disorder Peripheral Neuropathy    Orthostatic hypotension     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    Renal insufficiency 2018    Scoliosis     Unintentional weight loss     Uses contact lenses     bilat    Uterine cancer     Uterine cancer 2022    UTI (urinary tract infection)     Visual impairment Nearsighted    Wears glasses         Past Surgical History:  Past Surgical History:   Procedure Laterality Date     SECTION      DILATION AND CURETTAGE, DIAGNOSTIC / THERAPEUTIC      HIP OPEN REDUCTION Left 2023    Procedure: FEMORAL NECK OPEN REDUCTION INTERNAL FIXATION LEFT;  Surgeon: Juanpablo Lee MD;  Location: [facility];  Service: Orthopedics;  Laterality:  Left;    HIP SURGERY  2023    OOPHORECTOMY      ORAL LESION EXCISION/BIOPSY  08/2021    TOTAL LAPAROSCOPIC HYSTERECTOMY SALPINGO OOPHORECTOMY N/A 10/28/2022    Procedure: TOTAL LAPAROSCOPIC HYSTERECTOMY BILATERAL SALPINGO-OOPHORECTOMY, INJECTION FOR SENTINEL LYMPH NODE MAPPING, BILATERAL SENTINEL LYMPH NODE DISSECTION WITH DAVINCI ROBOT;  Surgeon: Jasmine Rich MD;  Location: [facility];  Service: Robotics - DaVinci;  Laterality: N/A;    TUBAL ABDOMINAL LIGATION  1983        Admitting Doctor:   Collin Toure Jr., MD    Consulting Provider(s):  Consults       No orders found from 12/4/2023 to 1/3/2024.             Admitting Diagnosis:   The primary encounter diagnosis was Acute kidney injury. Diagnoses of Dehydration, Hypotension due to hypovolemia, Alcoholic intoxication without complication, and Pelvic pain were also pertinent to this visit.    Most Recent Vitals:   Vitals:    01/02/24 2116 01/02/24 2147 01/02/24 2149 01/02/24 2216   BP: 90/67 107/59  104/69   BP Location:       Patient Position:       Pulse: 87  84 84   Resp:       Temp:       TempSrc:       SpO2: 91%  97%    Weight:       Height:           Active LDAs/IV Access:   Lines, Drains & Airways       Active LDAs       Name Placement date Placement time Site Days    Peripheral IV 01/02/24 1912 Distal;Posterior;Right Forearm 01/02/24 1912  Forearm  less than 1                    Labs (abnormal labs have a star):   Labs Reviewed   COMPREHENSIVE METABOLIC PANEL - Abnormal; Notable for the following components:       Result Value    BUN 31 (*)     Creatinine 2.44 (*)     CO2 21.0 (*)     Calcium 8.5 (*)     eGFR 21.2 (*)     All other components within normal limits    Narrative:     GFR Normal >60  Chronic Kidney Disease <60  Kidney Failure <15     CBC WITH AUTO DIFFERENTIAL - Abnormal; Notable for the following components:    RBC 2.86 (*)     Hemoglobin 9.6 (*)     Hematocrit 30.7 (*)     .3 (*)     MCH 33.6 (*)     MCHC 31.3 (*)     Immature  Grans % 0.6 (*)     All other components within normal limits   ETHANOL - Abnormal; Notable for the following components:    Ethanol 245 (*)     All other components within normal limits    Narrative:     Elevated lactic acid concentration and lactate dehydrogenase(LD) activity may falsely elevate enzymatically determined ethanol levels. Not for legal purposes.    URINALYSIS W/ MICROSCOPIC IF INDICATED (NO CULTURE) - Abnormal; Notable for the following components:    Appearance, UA Cloudy (*)     Leuk Esterase, UA Small (1+) (*)     All other components within normal limits   URINALYSIS, MICROSCOPIC ONLY - Abnormal; Notable for the following components:    WBC, UA 6-10 (*)     Bacteria, UA 4+ (*)     Squamous Epithelial Cells, UA 7-12 (*)     All other components within normal limits   CBC AND DIFFERENTIAL    Narrative:     The following orders were created for panel order CBC & Differential.  Procedure                               Abnormality         Status                     ---------                               -----------         ------                     CBC Auto Differential[[id number]]        Abnormal            Final result               Scan Slide[[id number]]                                                                    Please view results for these tests on the individual orders.       Meds Given in ED:   Medications   sodium chloride 0.9 % bolus 1,000 mL (1,000 mL Intravenous New Bag 1/2/24 2146)   sodium chloride 0.9 % bolus 1,000 mL (0 mL Intravenous Stopped 1/2/24 2009)   HYDROmorphone (DILAUDID) injection 0.25 mg (0.25 mg Intravenous Given 1/2/24 2051)   ondansetron (ZOFRAN) injection 4 mg (4 mg Intravenous Given 1/2/24 2051)

## 2024-01-03 NOTE — PROGRESS NOTES
[facility] Medicine Services  PROGRESS NOTE    Patient Name: Alysia Sierra  : 1955  MRN: [id number]    Date of Admission: 2024  Primary Care Physician: Liana So APRN    Subjective   Subjective     CC:  S/p fall    HPI:  Resting in bed in no acute distress but complains of back pain and also headache.  No fever or chills.  No chest pain palpitation or shortness of breath.  No nausea vomiting or diarrhea or abdominal pain.      Objective   Objective     Vital Signs:   Temp:  [97.5 °F (36.4 °C)-98.9 °F (37.2 °C)] 98.1 °F (36.7 °C)  Heart Rate:  [] 109  Resp:  [17-18] 18  BP: ()/(59-86) 137/78  Flow (L/min):  [1] 1     Physical Exam:  Constitutional: No acute distress, awake, alert  HENT: NCAT, mucous membranes moist  Respiratory: Clear to auscultation bilaterally, respiratory effort normal   Cardiovascular: RRR, no murmurs, rubs, or gallops  Gastrointestinal: Positive bowel sounds, soft, nontender, nondistended  Musculoskeletal: No bilateral ankle edema  Psychiatric: Appropriate affect, cooperative  Neurologic: Awake, alert, oriented x 3, no focality appreciated, has back pain when she moves, speech clear  Skin: No rashes     Results Reviewed:  LAB RESULTS:      Lab 24  0524   WBC 5.13 6.78   HEMOGLOBIN 9.3* 9.6*   HEMATOCRIT 29.9* 30.7*   PLATELETS 151 183   NEUTROS ABS 2.36 3.71   IMMATURE GRANS (ABS) 0.02 0.04   LYMPHS ABS 1.86 2.18   MONOS ABS 0.72 0.73   EOS ABS 0.14 0.10   .2* 107.3*         Lab 24  0522 24   SODIUM 144 140   POTASSIUM 4.8 4.7   CHLORIDE 111* 105   CO2 20.0* 21.0*   ANION GAP 13.0 14.0   BUN 26* 31*   CREATININE 1.57* 2.44*   EGFR 36.0* 21.2*   GLUCOSE 65 92   CALCIUM 8.4* 8.5*         Lab 24   TOTAL PROTEIN 6.5   ALBUMIN 4.2   GLOBULIN 2.3   ALT (SGPT) 9   AST (SGOT) 18   BILIRUBIN 0.2   ALK PHOS 74                     Brief Urine Lab Results  (Last result in the past 365  days)        Color   Clarity   Blood   Leuk Est   Nitrite   Protein   CREAT   Urine HCG        01/02/24 2115 Yellow   Cloudy   Negative   Small (1+)   Negative   Negative                   Microbiology Results Abnormal       None            CT Lumbar Spine Without Contrast    Result Date: 1/2/2024  CT LUMBAR SPINE WO CONTRAST Date of Exam: 1/2/2024 11:17 PM EST Indication: Low back pain status post fall with L3 fracture apparent on pelvis CT. Comparison: MR lumbar spine 9/18/2020. Technique: Axial CT images were obtained of the lumbar spine without contrast administration.  Reconstructed coronal and sagittal images were also obtained. Automated exposure control and iterative construction methods were used. Findings: 5 lumbar vertebral bodies are identified. There is persistent mild levoscoliosis of the mid lumbar spine. No spondylolisthesis. Disc spaces are normal. There is mild multilevel marginal osteophyte formation. There is moderate multilevel mid and lower lumbar facet arthritis on the left and there is severe mid and moderate lower lumbar facet arthritis on the right, most pronounced at the L2-L3 level. Vertebral bodies maintain normal height. There are mildly displaced fractures of the left L1, L2 and L3  transverse processes. The transverse processes on the right appear intact. Spinous processes also appear intact although with hypertrophic changes. No erosions or destructive osseous lesions. There is mild atrophy of the posterior paraspinal musculature. No abnormal fluid collections.  Limited view of the retroperitoneal structures is unremarkable. There is no significant spinal canal stenosis. There is no obvious large disc protrusion or extrusion. There are concentric disc bulges at the L3-L4, L4-L5 levels and there is a stable left foraminal disc protrusion at the L5-S1 level. There is high-grade stenosis at the right L1-L2 neural foramen due to bony overgrowth and a large bridging osteophyte. There is  moderate right neuroforaminal narrowing at L2-L3. There is mild bilateral neuroforaminal narrowing at the L4-L5 level and there is mild  left neuroforaminal narrowing at the L5-S1 level.     Impression: Impression: 1.Mildly displaced fractures of the left L1, L2 and L3 transverse processes. 2.Stable mild levoscoliosis of the mid lumbar spine and stable multilevel disc and facet joint degeneration resulting in varying degrees of neuroforaminal narrowing. No significant spinal canal stenosis. Electronically Signed: Bishnu Sepulveda MD  1/2/2024 11:37 PM EST  Workstation ID: [id number]    CT Pelvis Without Contrast    Result Date: 1/2/2024  CT PELVIS WO CONTRAST Date of Exam: 1/2/2024 9:28 PM EST Indication: trauma with left-sided pelvis pain. Comparison: CT abdomen and pelvis 4/13/2023 Technique: Axial CT images were obtained of the pelvis without contrast administration.  Reconstructed coronal and sagittal images were also obtained. Automated exposure control and iterative construction methods were used. Findings: Visualized lower abdomen: Unremarkable. Gastrointestinal: Diverticulosis of the sigmoid colon without evidence of acute diverticulitis. Otherwise the visualized GI tract is unremarkable. Bladder: The bladder is normal. Pelvis: Patient is status post hysterectomy. No suspicious mass. Peritoneum/Mesentery: No fluid collection, ascities, or free air.   Lymph Nodes: No lymphadenopathy. Vasculature: Unremarkable Pelvic Wall: Unremarkable. No suspicious hematoma. Bony Structures: Acute appearing mildly displaced fracture through the left L3 transverse process. No other definite acute osseous abnormalities identified. Patient is status post internal fixation of the left femoral neck. No evidence of hardware failure or loosening.     Impression: Impression: Acute appearing mildly displaced fracture through the left transverse process of L3. Please consider dedicated lumbar spine MRI to rule out additional fractures of  the lumbar spine. No other acute traumatic injury to the pelvic structures. Patient is status post internal fixation of the left femoral neck. The hardware appears intact without evidence of loosening. Electronically Signed: Tim Catsro DO  1/2/2024 10:10 PM EST  Workstation ID: [id number]    CT Head Without Contrast    Result Date: 1/2/2024  CT HEAD WO CONTRAST Date of Exam: 1/2/2024 9:28 PM EST Indication: Syncope.  Possible head injury.  Alcohol intoxication.. Comparison: 11/12/2023 Technique: Axial CT images were obtained of the head without contrast administration.  Automated exposure control and iterative construction methods were used. Findings: No large territory infarct. There is no evidence of hemorrhage. No mass effect, edema or midline shift Mild periventricular and subcortical white matter hypodensities, nonspecific but most likely represents chronic small vessel ischemic changes. No extra-axial fluid collection. Prominent ventricular system secondary to chronic parenchymal volume loss. The visualized orbits are unremarkable. The visualized paranasal sinuses and mastoid air cells are clear. Subtle left frontal scalp hematoma/contusion. Otherwise unremarkable. No acute osseous abnormality.     Impression: Impression: No acute intracranial abnormality. Subtle left frontal scalp hematoma/soft tissue contusion. Electronically Signed: Tim Castro DO  1/2/2024 10:04 PM EST  Workstation ID: [id number]     Results for orders placed during the hospital encounter of 01/24/23    Adult Transthoracic Echo Complete W/ Cont if Necessary Per Protocol    Interpretation Summary    Left ventricular systolic function is normal. Left ventricular ejection fraction appears to be 51 - 55%.    Left ventricular diastolic function is consistent with (grade I) impaired relaxation.    Estimated right ventricular systolic pressure from tricuspid regurgitation is normal (<35 mmHg).      Current medications:  Scheduled  Meds:folic acid, 1 mg, Oral, Daily  heparin (porcine), 5,000 Units, Subcutaneous, Q8H  levothyroxine, 25 mcg, Oral, Q AM  LORazepam, 2 mg, Oral, Q6H   Followed by  [START ON 1/4/2024] LORazepam, 1 mg, Oral, Q6H  metoprolol succinate XL, 25 mg, Oral, Nightly  pantoprazole, 40 mg, Oral, Q AM  rosuvastatin, 5 mg, Oral, Nightly  senna-docusate sodium, 2 tablet, Oral, BID  sodium chloride, 10 mL, Intravenous, Q12H  thiamine (B-1) IV, 200 mg, Intravenous, Q8H   Followed by  [START ON 1/8/2024] thiamine, 100 mg, Oral, Daily      Continuous Infusions:lactated ringers, 100 mL/hr, Last Rate: 100 mL/hr (01/03/24 0828)      PRN Meds:.  senna-docusate sodium **AND** polyethylene glycol **AND** bisacodyl **AND** bisacodyl    docusate sodium    HYDROcodone-acetaminophen    Magnesium Standard Dose Replacement - Follow Nurse / BPA Driven Protocol    nitroglycerin    ondansetron    sodium chloride    sodium chloride    traMADol    Assessment & Plan   Assessment & Plan     Active Hospital Problems    Diagnosis  POA    **UTI (urinary tract infection) [N39.0]  Yes    MARY (acute kidney injury) [N17.9]  Yes      Resolved Hospital Problems   No resolved problems to display.        Brief Hospital Course to date:  Alysia Sierra is a 68 y.o. female with past medical history significant for hypertension, chronic kidney disease, history of uterine cancer, alcohol abuse.  Patient was admitted for severe back pain after fall.    **S/p fall, possibly secondary to alcohol abuse as patient had significantly elevated alcohol level on admission.    **Back pain secondary to mildly displaced fracture of the left L1-L2 and L3 transverse processes.  This is based on the CT scan which was done on admission.    **Alcohol abuse.  We will start the patient on alcohol withdrawal protocol.    **Acute kidney injury with creatinine of 2.44.  Patient's baseline creatinine seems to be around 1.  Creatinine is improving with hydration.    **Hypertension,  reasonably controlled on on home medication    **Uterine cancer.    **Urine drug screening is positive for oxycodone.  However, patient is not on any narcotic pain medication at home.        Expected Discharge Location and Transportation: Most likely home  Expected Discharge to be determined  Expected discharge date/ time has not been documented.     DVT prophylaxis:  Medical DVT prophylaxis orders are present.     AM-PAC 6 Clicks Score (PT): 19 (01/03/24 0830)    CODE STATUS:   Code Status and Medical Interventions:   Ordered at: 01/02/24 8826     Code Status (Patient has no pulse and is not breathing):    CPR (Attempt to Resuscitate)     Medical Interventions (Patient has pulse or is breathing):    Full Support       Alexander Yu MD  01/03/24

## 2024-01-04 LAB
ANION GAP SERPL CALCULATED.3IONS-SCNC: 9 MMOL/L (ref 5–15)
BUN SERPL-MCNC: 20 MG/DL (ref 8–23)
BUN/CREAT SERPL: 15.7 (ref 7–25)
CALCIUM SPEC-SCNC: 8.6 MG/DL (ref 8.6–10.5)
CHLORIDE SERPL-SCNC: 102 MMOL/L (ref 98–107)
CO2 SERPL-SCNC: 24 MMOL/L (ref 22–29)
CREAT SERPL-MCNC: 1.27 MG/DL (ref 0.57–1)
EGFRCR SERPLBLD CKD-EPI 2021: 46.4 ML/MIN/1.73
FOLATE SERPL-MCNC: 2.93 NG/ML (ref 4.78–24.2)
GLUCOSE SERPL-MCNC: 96 MG/DL (ref 65–99)
MAGNESIUM SERPL-MCNC: 1.4 MG/DL (ref 1.6–2.4)
PHOSPHATE SERPL-MCNC: 2.9 MG/DL (ref 2.5–4.5)
POTASSIUM SERPL-SCNC: 4 MMOL/L (ref 3.5–5.2)
SODIUM SERPL-SCNC: 135 MMOL/L (ref 136–145)
VIT B12 BLD-MCNC: 691 PG/ML (ref 211–946)

## 2024-01-04 PROCEDURE — 25010000002 MIDAZOLAM PER 1 MG: Performed by: INTERNAL MEDICINE

## 2024-01-04 PROCEDURE — 25810000003 LACTATED RINGERS PER 1000 ML: Performed by: INTERNAL MEDICINE

## 2024-01-04 PROCEDURE — 25010000002 THIAMINE PER 100 MG: Performed by: INTERNAL MEDICINE

## 2024-01-04 PROCEDURE — 25010000002 MAGNESIUM SULFATE 2 GM/50ML SOLUTION: Performed by: INTERNAL MEDICINE

## 2024-01-04 PROCEDURE — 80048 BASIC METABOLIC PNL TOTAL CA: CPT | Performed by: INTERNAL MEDICINE

## 2024-01-04 PROCEDURE — 25010000002 HEPARIN (PORCINE) PER 1000 UNITS: Performed by: INTERNAL MEDICINE

## 2024-01-04 PROCEDURE — 97166 OT EVAL MOD COMPLEX 45 MIN: CPT

## 2024-01-04 PROCEDURE — 83735 ASSAY OF MAGNESIUM: CPT | Performed by: INTERNAL MEDICINE

## 2024-01-04 PROCEDURE — 84100 ASSAY OF PHOSPHORUS: CPT | Performed by: INTERNAL MEDICINE

## 2024-01-04 PROCEDURE — 97535 SELF CARE MNGMENT TRAINING: CPT

## 2024-01-04 RX ORDER — LORAZEPAM 1 MG/1
1 TABLET ORAL EVERY 6 HOURS
Status: DISCONTINUED | OUTPATIENT
Start: 2024-01-05 | End: 2024-01-04

## 2024-01-04 RX ORDER — LORAZEPAM 1 MG/1
1 TABLET ORAL
Status: DISCONTINUED | OUTPATIENT
Start: 2024-01-04 | End: 2024-01-09 | Stop reason: HOSPADM

## 2024-01-04 RX ORDER — MIDAZOLAM HYDROCHLORIDE 1 MG/ML
4 INJECTION INTRAMUSCULAR; INTRAVENOUS
Status: DISCONTINUED | OUTPATIENT
Start: 2024-01-04 | End: 2024-01-09 | Stop reason: HOSPADM

## 2024-01-04 RX ORDER — LORAZEPAM 1 MG/1
2 TABLET ORAL EVERY 6 HOURS
Status: DISCONTINUED | OUTPATIENT
Start: 2024-01-04 | End: 2024-01-04

## 2024-01-04 RX ORDER — MAGNESIUM SULFATE HEPTAHYDRATE 40 MG/ML
2 INJECTION, SOLUTION INTRAVENOUS
Status: COMPLETED | OUTPATIENT
Start: 2024-01-04 | End: 2024-01-04

## 2024-01-04 RX ORDER — MIDAZOLAM HYDROCHLORIDE 1 MG/ML
2 INJECTION INTRAMUSCULAR; INTRAVENOUS
Status: DISCONTINUED | OUTPATIENT
Start: 2024-01-04 | End: 2024-01-09 | Stop reason: HOSPADM

## 2024-01-04 RX ORDER — LORAZEPAM 1 MG/1
2 TABLET ORAL
Status: DISCONTINUED | OUTPATIENT
Start: 2024-01-04 | End: 2024-01-09 | Stop reason: HOSPADM

## 2024-01-04 RX ADMIN — HYDROCODONE BITARTRATE AND ACETAMINOPHEN 1 TABLET: 5; 325 TABLET ORAL at 01:50

## 2024-01-04 RX ADMIN — Medication 10 ML: at 21:40

## 2024-01-04 RX ADMIN — MAGNESIUM SULFATE HEPTAHYDRATE 2 G: 2 INJECTION, SOLUTION INTRAVENOUS at 11:45

## 2024-01-04 RX ADMIN — HYDROCODONE BITARTRATE AND ACETAMINOPHEN 1 TABLET: 5; 325 TABLET ORAL at 09:01

## 2024-01-04 RX ADMIN — SENNOSIDES AND DOCUSATE SODIUM 2 TABLET: 8.6; 5 TABLET ORAL at 09:01

## 2024-01-04 RX ADMIN — LORAZEPAM 1 MG: 1 TABLET ORAL at 22:44

## 2024-01-04 RX ADMIN — MIDAZOLAM HYDROCHLORIDE 2 MG: 1 INJECTION, SOLUTION INTRAMUSCULAR; INTRAVENOUS at 21:42

## 2024-01-04 RX ADMIN — METOPROLOL SUCCINATE 25 MG: 25 TABLET, EXTENDED RELEASE ORAL at 20:13

## 2024-01-04 RX ADMIN — HEPARIN SODIUM 5000 UNITS: 5000 INJECTION INTRAVENOUS; SUBCUTANEOUS at 05:24

## 2024-01-04 RX ADMIN — LORAZEPAM 1 MG: 1 TABLET ORAL at 09:01

## 2024-01-04 RX ADMIN — MIDAZOLAM HYDROCHLORIDE 2 MG: 1 INJECTION, SOLUTION INTRAMUSCULAR; INTRAVENOUS at 10:49

## 2024-01-04 RX ADMIN — THIAMINE HYDROCHLORIDE 200 MG: 100 INJECTION, SOLUTION INTRAMUSCULAR; INTRAVENOUS at 05:24

## 2024-01-04 RX ADMIN — LORAZEPAM 2 MG: 1 TABLET ORAL at 03:00

## 2024-01-04 RX ADMIN — MAGNESIUM SULFATE HEPTAHYDRATE 2 G: 2 INJECTION, SOLUTION INTRAVENOUS at 10:23

## 2024-01-04 RX ADMIN — LEVOTHYROXINE SODIUM 25 MCG: 25 TABLET ORAL at 05:24

## 2024-01-04 RX ADMIN — FOLIC ACID 1 MG: 1 TABLET ORAL at 09:01

## 2024-01-04 RX ADMIN — THIAMINE HYDROCHLORIDE 200 MG: 100 INJECTION, SOLUTION INTRAMUSCULAR; INTRAVENOUS at 13:25

## 2024-01-04 RX ADMIN — MIDAZOLAM HYDROCHLORIDE 2 MG: 1 INJECTION, SOLUTION INTRAMUSCULAR; INTRAVENOUS at 17:45

## 2024-01-04 RX ADMIN — HEPARIN SODIUM 5000 UNITS: 5000 INJECTION INTRAVENOUS; SUBCUTANEOUS at 13:25

## 2024-01-04 RX ADMIN — HEPARIN SODIUM 5000 UNITS: 5000 INJECTION INTRAVENOUS; SUBCUTANEOUS at 21:39

## 2024-01-04 RX ADMIN — SODIUM CHLORIDE, POTASSIUM CHLORIDE, SODIUM LACTATE AND CALCIUM CHLORIDE 100 ML/HR: 600; 310; 30; 20 INJECTION, SOLUTION INTRAVENOUS at 07:13

## 2024-01-04 RX ADMIN — ROSUVASTATIN 5 MG: 10 TABLET, FILM COATED ORAL at 20:13

## 2024-01-04 RX ADMIN — Medication 10 ML: at 09:02

## 2024-01-04 RX ADMIN — THIAMINE HYDROCHLORIDE 200 MG: 100 INJECTION, SOLUTION INTRAMUSCULAR; INTRAVENOUS at 21:39

## 2024-01-04 RX ADMIN — MAGNESIUM SULFATE HEPTAHYDRATE 2 G: 2 INJECTION, SOLUTION INTRAVENOUS at 09:02

## 2024-01-04 RX ADMIN — LORAZEPAM 1 MG: 1 TABLET ORAL at 15:41

## 2024-01-04 RX ADMIN — SODIUM CHLORIDE, POTASSIUM CHLORIDE, SODIUM LACTATE AND CALCIUM CHLORIDE 100 ML/HR: 600; 310; 30; 20 INJECTION, SOLUTION INTRAVENOUS at 17:36

## 2024-01-04 RX ADMIN — LORAZEPAM 1 MG: 1 TABLET ORAL at 20:42

## 2024-01-04 RX ADMIN — MIDAZOLAM HYDROCHLORIDE 2 MG: 1 INJECTION, SOLUTION INTRAMUSCULAR; INTRAVENOUS at 23:21

## 2024-01-04 RX ADMIN — PANTOPRAZOLE SODIUM 40 MG: 40 TABLET, DELAYED RELEASE ORAL at 05:24

## 2024-01-04 RX ADMIN — SENNOSIDES AND DOCUSATE SODIUM 2 TABLET: 8.6; 5 TABLET ORAL at 20:13

## 2024-01-04 NOTE — PLAN OF CARE
Problem: Adult Inpatient Plan of Care  Goal: Plan of Care Review  Outcome: Ongoing, Progressing  Flowsheets  Taken 1/4/2024 0313 by Lazarus Brenner RN  Progress: improving  Taken 1/3/2024 1657 by Chiquis Rowland PT  Plan of Care Reviewed With: patient  Goal: Patient-Specific Goal (Individualized)  Outcome: Ongoing, Progressing  Goal: Absence of Hospital-Acquired Illness or Injury  Outcome: Ongoing, Progressing  Intervention: Identify and Manage Fall Risk  Recent Flowsheet Documentation  Taken 1/4/2024 0200 by Lazarus Brenner RN  Safety Promotion/Fall Prevention:   safety round/check completed   nonskid shoes/slippers when out of bed   clutter free environment maintained   assistive device/personal items within reach  Taken 1/4/2024 0000 by Lazarus Brenner RN  Safety Promotion/Fall Prevention:   safety round/check completed   nonskid shoes/slippers when out of bed   clutter free environment maintained   assistive device/personal items within reach  Taken 1/3/2024 2201 by Lazarus Brenner RN  Safety Promotion/Fall Prevention:   safety round/check completed   nonskid shoes/slippers when out of bed   clutter free environment maintained   assistive device/personal items within reach  Taken 1/3/2024 2000 by Lazarus Brenner RN  Safety Promotion/Fall Prevention:   safety round/check completed   nonskid shoes/slippers when out of bed   clutter free environment maintained   assistive device/personal items within reach  Intervention: Prevent Skin Injury  Recent Flowsheet Documentation  Taken 1/3/2024 2000 by Lazarus Brenner RN  Body Position: (In chair at bedside) other (see comments)  Intervention: Prevent Infection  Recent Flowsheet Documentation  Taken 1/4/2024 0200 by Lazarus Brenner RN  Infection Prevention: environmental surveillance performed  Taken 1/4/2024 0000 by Lazarus Brenner RN  Infection Prevention: environmental surveillance performed  Taken 1/3/2024 2201 by Lazarus Brenner RN  Infection Prevention: environmental  surveillance performed  Taken 1/3/2024 2000 by Lazarus Brenner, RN  Infection Prevention: environmental surveillance performed  Goal: Optimal Comfort and Wellbeing  Outcome: Ongoing, Progressing  Goal: Readiness for Transition of Care  Outcome: Ongoing, Progressing   Goal Outcome Evaluation:           Progress: improving

## 2024-01-04 NOTE — PROGRESS NOTES
[facility] Medicine Services  PROGRESS NOTE    Patient Name: Alysia Sierra  : 1955  MRN: [id number]    Date of Admission: 2024  Primary Care Physician: Liana So APRN    Subjective   Subjective     CC:  S/p fall    HPI:  Resting in bed in no acute distress but complains of back pain.  She is also drowsy today.  No fever or chills.  No chest pain palpitation or shortness of breath.  No nausea vomiting or diarrhea or abdominal pain.      Objective   Objective     Vital Signs:   Temp:  [97.6 °F (36.4 °C)-98.5 °F (36.9 °C)] 97.7 °F (36.5 °C)  Heart Rate:  [72-90] 84  Resp:  [18] 18  BP: (126-153)/(73-99) 153/93  Flow (L/min):  [2] 2     Physical Exam:  Constitutional: No acute distress  HENT: NCAT, mucous membranes moist  Respiratory: Clear to auscultation bilaterally, respiratory effort normal   Cardiovascular: RRR, no murmurs, rubs, or gallops  Gastrointestinal: Positive bowel sounds, soft, nontender, nondistended  Musculoskeletal: No bilateral ankle edema  Psychiatric: Appropriate affect, cooperative  Neurologic: Awake, alert, oriented x 3, no focality appreciated, has back pain when she moves, speech clear  Skin: No rashes     Results Reviewed:  LAB RESULTS:      Lab 24  0522 24   WBC 5.13 6.78   HEMOGLOBIN 9.3* 9.6*   HEMATOCRIT 29.9* 30.7*   PLATELETS 151 183   NEUTROS ABS 2.36 3.71   IMMATURE GRANS (ABS) 0.02 0.04   LYMPHS ABS 1.86 2.18   MONOS ABS 0.72 0.73   EOS ABS 0.14 0.10   .2* 107.3*         Lab 24  0501 24  0522 24   SODIUM 135* 144 140   POTASSIUM 4.0 4.8 4.7   CHLORIDE 102 111* 105   CO2 24.0 20.0* 21.0*   ANION GAP 9.0 13.0 14.0   BUN 20 26* 31*   CREATININE 1.27* 1.57* 2.44*   EGFR 46.4* 36.0* 21.2*   GLUCOSE 96 65 92   CALCIUM 8.6 8.4* 8.5*   MAGNESIUM 1.4*  --   --    PHOSPHORUS 2.9  --   --          Lab 24   TOTAL PROTEIN 6.5   ALBUMIN 4.2   GLOBULIN 2.3   ALT (SGPT) 9   AST (SGOT) 18    BILIRUBIN 0.2   ALK PHOS 74                 Lab 01/03/24  0522   FOLATE 2.93*   VITAMIN B 12 691         Brief Urine Lab Results  (Last result in the past 365 days)        Color   Clarity   Blood   Leuk Est   Nitrite   Protein   CREAT   Urine HCG        01/02/24 2115 Yellow   Cloudy   Negative   Small (1+)   Negative   Negative                   Microbiology Results Abnormal       None            Adult Transthoracic Echo Complete W/ Cont if Necessary Per Protocol    Result Date: 1/3/2024    Left ventricular systolic function is normal. Calculated left ventricular EF = 57.9% Left ventricular ejection fraction appears to be 56 - 60%.   Left ventricular diastolic function was normal.   Estimated right ventricular systolic pressure from tricuspid regurgitation is normal (<35 mmHg). No significant change from previous study     CT Lumbar Spine Without Contrast    Result Date: 1/2/2024  CT LUMBAR SPINE WO CONTRAST Date of Exam: 1/2/2024 11:17 PM EST Indication: Low back pain status post fall with L3 fracture apparent on pelvis CT. Comparison: MR lumbar spine 9/18/2020. Technique: Axial CT images were obtained of the lumbar spine without contrast administration.  Reconstructed coronal and sagittal images were also obtained. Automated exposure control and iterative construction methods were used. Findings: 5 lumbar vertebral bodies are identified. There is persistent mild levoscoliosis of the mid lumbar spine. No spondylolisthesis. Disc spaces are normal. There is mild multilevel marginal osteophyte formation. There is moderate multilevel mid and lower lumbar facet arthritis on the left and there is severe mid and moderate lower lumbar facet arthritis on the right, most pronounced at the L2-L3 level. Vertebral bodies maintain normal height. There are mildly displaced fractures of the left L1, L2 and L3  transverse processes. The transverse processes on the right appear intact. Spinous processes also appear intact although  with hypertrophic changes. No erosions or destructive osseous lesions. There is mild atrophy of the posterior paraspinal musculature. No abnormal fluid collections.  Limited view of the retroperitoneal structures is unremarkable. There is no significant spinal canal stenosis. There is no obvious large disc protrusion or extrusion. There are concentric disc bulges at the L3-L4, L4-L5 levels and there is a stable left foraminal disc protrusion at the L5-S1 level. There is high-grade stenosis at the right L1-L2 neural foramen due to bony overgrowth and a large bridging osteophyte. There is moderate right neuroforaminal narrowing at L2-L3. There is mild bilateral neuroforaminal narrowing at the L4-L5 level and there is mild  left neuroforaminal narrowing at the L5-S1 level.     Impression: Impression: 1.Mildly displaced fractures of the left L1, L2 and L3 transverse processes. 2.Stable mild levoscoliosis of the mid lumbar spine and stable multilevel disc and facet joint degeneration resulting in varying degrees of neuroforaminal narrowing. No significant spinal canal stenosis. Electronically Signed: Bishnu Sepulveda MD  1/2/2024 11:37 PM EST  Workstation ID: [id number]    CT Pelvis Without Contrast    Result Date: 1/2/2024  CT PELVIS WO CONTRAST Date of Exam: 1/2/2024 9:28 PM EST Indication: trauma with left-sided pelvis pain. Comparison: CT abdomen and pelvis 4/13/2023 Technique: Axial CT images were obtained of the pelvis without contrast administration.  Reconstructed coronal and sagittal images were also obtained. Automated exposure control and iterative construction methods were used. Findings: Visualized lower abdomen: Unremarkable. Gastrointestinal: Diverticulosis of the sigmoid colon without evidence of acute diverticulitis. Otherwise the visualized GI tract is unremarkable. Bladder: The bladder is normal. Pelvis: Patient is status post hysterectomy. No suspicious mass. Peritoneum/Mesentery: No fluid collection,  ascities, or free air.   Lymph Nodes: No lymphadenopathy. Vasculature: Unremarkable Pelvic Wall: Unremarkable. No suspicious hematoma. Bony Structures: Acute appearing mildly displaced fracture through the left L3 transverse process. No other definite acute osseous abnormalities identified. Patient is status post internal fixation of the left femoral neck. No evidence of hardware failure or loosening.     Impression: Impression: Acute appearing mildly displaced fracture through the left transverse process of L3. Please consider dedicated lumbar spine MRI to rule out additional fractures of the lumbar spine. No other acute traumatic injury to the pelvic structures. Patient is status post internal fixation of the left femoral neck. The hardware appears intact without evidence of loosening. Electronically Signed: Tim Castro DO  1/2/2024 10:10 PM EST  Workstation ID: [id number]    CT Head Without Contrast    Result Date: 1/2/2024  CT HEAD WO CONTRAST Date of Exam: 1/2/2024 9:28 PM EST Indication: Syncope.  Possible head injury.  Alcohol intoxication.. Comparison: 11/12/2023 Technique: Axial CT images were obtained of the head without contrast administration.  Automated exposure control and iterative construction methods were used. Findings: No large territory infarct. There is no evidence of hemorrhage. No mass effect, edema or midline shift Mild periventricular and subcortical white matter hypodensities, nonspecific but most likely represents chronic small vessel ischemic changes. No extra-axial fluid collection. Prominent ventricular system secondary to chronic parenchymal volume loss. The visualized orbits are unremarkable. The visualized paranasal sinuses and mastoid air cells are clear. Subtle left frontal scalp hematoma/contusion. Otherwise unremarkable. No acute osseous abnormality.     Impression: Impression: No acute intracranial abnormality. Subtle left frontal scalp hematoma/soft tissue contusion.  Electronically Signed: Tim Castro,   1/2/2024 10:04 PM EST  Workstation ID: [id number]     Results for orders placed during the hospital encounter of 01/02/24    Adult Transthoracic Echo Complete W/ Cont if Necessary Per Protocol    Interpretation Summary    Left ventricular systolic function is normal. Calculated left ventricular EF = 57.9% Left ventricular ejection fraction appears to be 56 - 60%.    Left ventricular diastolic function was normal.    Estimated right ventricular systolic pressure from tricuspid regurgitation is normal (<35 mmHg).    No significant change from previous study      Current medications:  Scheduled Meds:folic acid, 1 mg, Oral, Daily  heparin (porcine), 5,000 Units, Subcutaneous, Q8H  levothyroxine, 25 mcg, Oral, Q AM  LORazepam, 1 mg, Oral, Q6H  metoprolol succinate XL, 25 mg, Oral, Nightly  pantoprazole, 40 mg, Oral, Q AM  rosuvastatin, 5 mg, Oral, Nightly  senna-docusate sodium, 2 tablet, Oral, BID  sodium chloride, 10 mL, Intravenous, Q12H  thiamine (B-1) IV, 200 mg, Intravenous, Q8H   Followed by  [START ON 1/8/2024] thiamine, 100 mg, Oral, Daily      Continuous Infusions:lactated ringers, 100 mL/hr, Last Rate: 100 mL/hr (01/04/24 0713)      PRN Meds:.  senna-docusate sodium **AND** polyethylene glycol **AND** bisacodyl **AND** bisacodyl    docusate sodium    HYDROcodone-acetaminophen    LORazepam **OR** midazolam **OR** LORazepam **OR** midazolam **OR** midazolam **OR** midazolam    Magnesium Standard Dose Replacement - Follow Nurse / BPA Driven Protocol    nitroglycerin    ondansetron    sodium chloride    sodium chloride    traMADol    Assessment & Plan   Assessment & Plan     Active Hospital Problems    Diagnosis  POA    **UTI (urinary tract infection) [N39.0]  Yes    MARY (acute kidney injury) [N17.9]  Yes      Resolved Hospital Problems   No resolved problems to display.        Brief Hospital Course to date:  Alysia Sierra is a 68 y.o. female with past medical history  significant for hypertension, chronic kidney disease, history of uterine cancer, alcohol abuse.  Patient was admitted for severe back pain after fall.    **S/p fall, possibly secondary to alcohol abuse as patient had significantly elevated alcohol level on admission.    **Back pain secondary to mildly displaced fracture of the left L1-L2 and L3 transverse processes.  This is based on the CT scan which was done on admission.  There is no place for any surgical intervention.    **Alcohol abuse.  We will start the patient on alcohol withdrawal protocol.    **Acute kidney injury with creatinine of 2.44.  Patient's baseline creatinine seems to be around 1.  Creatinine is improving with hydration.    **Hypertension, reasonably controlled on on home medication    **Uterine cancer.    **Urine drug screening is positive for oxycodone.  However, patient is not on any narcotic pain medication at home.        Expected Discharge Location and Transportation: Most likely home  Expected Discharge to be determined  Expected Discharge Date: 1/6/2024; Expected Discharge Time:      DVT prophylaxis:  Medical DVT prophylaxis orders are present.     AM-PAC 6 Clicks Score (PT): 19 (01/04/24 0300)    CODE STATUS:   Code Status and Medical Interventions:   Ordered at: 01/02/24 4021     Code Status (Patient has no pulse and is not breathing):    CPR (Attempt to Resuscitate)     Medical Interventions (Patient has pulse or is breathing):    Full Support       Alexander Yu MD  01/04/24

## 2024-01-04 NOTE — PLAN OF CARE
Goal Outcome Evaluation:  Plan of Care Reviewed With: patient           Outcome Evaluation: OT initial eval and expanded chart review completed. Pt presents with multiple comorbidities and decreased strength, balance, cognition and activity tolerance limiting independence with ADL's and mobility from baseline. Recommend continued skilled OT services and transfer to IRF at d/c if deemend medically appropriate.      Anticipated Discharge Disposition (OT): inpatient rehabilitation facility

## 2024-01-04 NOTE — THERAPY EVALUATION
Patient Name: Alysia Sierra  : 1955    MRN: [id number]                              Today's Date: 2024       Admit Date: 2024    Visit Dx:     ICD-10-CM ICD-9-CM   1. Acute kidney injury  N17.9 584.9   2. Dehydration  E86.0 276.51   3. Hypotension due to hypovolemia  I95.89 458.8    E86.1 276.52   4. Alcoholic intoxication without complication  F10.920 305.00   5. Pelvic pain  R10.2 [id number]     Patient Active Problem List   Diagnosis    Hypertension    Paroxysmal SVT (supraventricular tachycardia)    Leg weakness, bilateral    Syncope    CKD (chronic kidney disease) stage 3, GFR 30-59 ml/min    Circadian rhythm sleep disorder, advanced sleep phase type    Neuropathy    Hyperlipidemia LDL goal <100    Endometrial adenocarcinoma    Mild episode of recurrent major depressive disorder    Dizziness    Hip fracture    Anemia    Hypomagnesemia    Proteinuria    Hypothyroidism    Post-menopausal    Other osteoporosis without current pathological fracture    UTI (urinary tract infection)    MARY (acute kidney injury)     Past Medical History:   Diagnosis Date    Allergic lisinopril  2017    Anemia     Arrhythmia     Arthritis     Cancer     uterine    Cataract mild 2020    stil lpresent    Chicken pox     Chronic fatigue     CKD (chronic kidney disease), stage III     sees nephro    Clotting disorder     Dental root implant present     lower left  x1 - possible dental implant    Depression mild 2019    Difficulty walking 2019    Disease of thyroid gland     Dizzy     NIEVES (dyspnea on exertion)     2017    Fracture of hip     Generalized anxiety disorder     GERD (gastroesophageal reflux disease) 2018    History of brachytherapy 2023    vaginal brachytherapy    Hyperlipidemia     Hypertension     Iron deficiency anemia     Liver disease     fatty    Liver problem     Measles     Menopause     Mumps     Neuromuscular disorder Peripheral Neuropathy    Orthostatic hypotension     Pupil diameter unequal      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    Renal insufficiency 2018    Scoliosis     Unintentional weight loss     Uses contact lenses     bilat    Uterine cancer     Uterine cancer 2022    UTI (urinary tract infection)     Visual impairment Nearsighted    Wears glasses      Past Surgical History:   Procedure Laterality Date     SECTION      DILATION AND CURETTAGE, DIAGNOSTIC / THERAPEUTIC      HIP OPEN REDUCTION Left 2023    Procedure: FEMORAL NECK OPEN REDUCTION INTERNAL FIXATION LEFT;  Surgeon: Juanpablo Lee MD;  Location: [facility] OR;  Service: Orthopedics;  Laterality: Left;    HIP SURGERY      OOPHORECTOMY      ORAL LESION EXCISION/BIOPSY  2021    TOTAL LAPAROSCOPIC HYSTERECTOMY SALPINGO OOPHORECTOMY N/A 10/28/2022    Procedure: TOTAL LAPAROSCOPIC HYSTERECTOMY BILATERAL SALPINGO-OOPHORECTOMY, INJECTION FOR SENTINEL LYMPH NODE MAPPING, BILATERAL SENTINEL LYMPH NODE DISSECTION WITH DAVINCI ROBOT;  Surgeon: Jasmine Rich MD;  Location:  [facility];  Service: Robotics - DaVinci;  Laterality: N/A;    TUBAL ABDOMINAL LIGATION        General Information       Row Name 24 0948          OT Time and Intention    Document Type evaluation  -JR     Mode of Treatment occupational therapy  -JR       Row Name 24 0948          General Information    Patient Profile Reviewed yes  -JR     Prior Level of Function independent:;gait;transfer;bed mobility;ADL's;home management  Pt reports she walks with s cane inside the home, rollator outside the home. Pt reports she has AE for LBD/B, has a tub shower with tub seat and ETS for commode.  -JR     Existing Precautions/Restrictions fall;seizures;spinal  severe back and L hip pain, mild displaced L1L2L3 transverse process fracture, spinal precautions for comfort  -JR     Barriers to Rehab medically complex;previous functional deficit;cognitive status  -JR       Row Name 24 0986          Living Environment    People in Home alone  -JR        Row Name 01/04/24 0948          Home Main Entrance    Number of Stairs, Main Entrance one  one from parking lot and then has ramp  -       Row Name 01/04/24 0948          Stairs Within Home, Primary    Number of Stairs, Within Home, Primary none  -       Row Name 01/04/24 0948          Cognition    Orientation Status (Cognition) oriented x 3  Confused with conversation this date  -       Row Name 01/04/24 0948          Safety Issues, Functional Mobility    Safety Issues Affecting Function (Mobility) ability to follow commands;awareness of need for assistance;insight into deficits/self-awareness;judgment;positioning of assistive device;safety precaution awareness;safety precautions follow-through/compliance  -     Impairments Affecting Function (Mobility) balance;cognition;coordination;endurance/activity tolerance;pain;strength;range of motion (ROM);postural/trunk control  -     Cognitive Impairments, Mobility Safety/Performance awareness, need for assistance;insight into deficits/self-awareness;judgment;problem-solving/reasoning;safety precaution awareness;safety precaution follow-through  -               User Key  (r) = Recorded By, (t) = Taken By, (c) = Cosigned By      Initials Name Provider Type    JR Beverly Hinojosa OT Occupational Therapist                     Mobility/ADL's       Row Name 01/04/24 0955          Bed Mobility    Bed Mobility supine-sit  -     Supine-Sit [place] (Bed Mobility) standby assist;verbal cues  -     Bed Mobility, Safety Issues cognitive deficits limit understanding;decreased use of arms for pushing/pulling;decreased use of legs for bridging/pushing  -     Assistive Device (Bed Mobility) head of bed elevated  -     Comment, (Bed Mobility) Pt required verbal cues for log roll technique with HOB elevated.  -       Row Name 01/04/24 0955          Transfers    Transfers sit-stand transfer  -       Row Name 01/04/24 0955          Sit-Stand Transfer     Sit-Stand [place] (Transfers) minimum assist (75% patient effort);verbal cues  -     Assistive Device (Sit-Stand Transfers) walker, front-wheeled  -     Comment, (Sit-Stand Transfer) Verbal cues for hand placement with transfers this date.  -       Row Name 01/04/24 0955          Functional Mobility    Functional Mobility- Ind. Level minimum assist (75% patient effort)  -     Functional Mobility- Device walker, front-wheeled  -     Functional Mobility-Distance (Feet) --  household distance within the room.  -     Functional Mobility- Safety Issues balance decreased during turns;sequencing ability decreased;step length decreased;weight-shifting ability decreased  -     Functional Mobility- Comment Short shuffling steps with mobility this date. Required assist to guide walker as pt running into the wall, and unable to correct without assist. Pt required seated rest period following standing sinkside. Pt reported slight increase in pain with mobility.  -       Row Name 01/04/24 0955          Activities of Daily Living    BADL Assessment/Intervention lower body dressing;grooming  -       Row Name 01/04/24 0955          Lower Body Dressing Assessment/Training    [place] Level (Lower Body Dressing) don;shoes/slippers;moderate assist (50% patient effort);verbal cues  -     Position (Lower Body Dressing) edge of bed sitting  -     Comment, (Lower Body Dressing) Pt attempted cross leg technique, but unable to complete due to pain. Pt reported she has AE for LBD/B at home.  -       Row Name 01/04/24 0955          Grooming Assessment/Training    [place] Level (Grooming) wash face, hands;contact guard assist  -     Position (Grooming) sink side  -     Comment, (Grooming) CGA for balance. Required seated rest period  -               User Key  (r) = Recorded By, (t) = Taken By, (c) = Cosigned By      Initials Name Provider Type    Beverly Vergara, OT Occupational Therapist                    Obj/Interventions       [facility] Name 01/04/24 1005          Sensory Assessment (Somatosensory)    Sensory Assessment (Somatosensory) sensation intact  -[facility] Name 01/04/24 1005          Vision Assessment/Intervention    Visual Impairment/Limitations corrective lenses for reading;corrective lenses full-time  -[facility] Name 01/04/24 1005          Range of Motion Comprehensive    General Range of Motion no range of motion deficits identified  -[facility] Name 01/04/24 1005          Strength Comprehensive (MMT)    Comment, General Manual Muscle Testing (MMT) Assessment Deferred MMT due to back pain, B  equal  -[facility] Name 01/04/24 1005          Motor Skills    Motor Skills neuro-muscular function  -     Neuromuscular Function bilateral;upper extremity;tremor, intention;tremor, resting  B UE tremors noted this date  -[facility] Name 01/04/24 1005          Balance    Balance Assessment sitting static balance  -     Dynamic Sitting Balance contact guard  -     Dynamic Standing Balance minimal assist  -     Position/Device Used, Standing Balance supported;walker, rolling  -               User Key  (r) = Recorded By, (t) = Taken By, (c) = Cosigned By      Initials Name Provider Type    JR Beverly Hinojosa, OT Occupational Therapist                   Goals/Plan       [facility] Name 01/04/24 1203          Bed Mobility Goal 1 (OT)    Activity/Assistive Device (Bed Mobility Goal 1, OT) bed mobility activities, all  -JR     [place] Level/Cues Needed (Bed Mobility Goal 1, OT) modified independence;verbal cues required  -     Time Frame (Bed Mobility Goal 1, OT) long term goal (LTG);by discharge  -     Progress/Outcomes (Bed Mobility Goal 1, OT) new goal  -JR       Row Name 01/04/24 1203          Transfer Goal 1 (OT)    Activity/Assistive Device (Transfer Goal 1, OT) transfers, all  -JR     [place] Level/Cues Needed (Transfer Goal 1, OT) modified independence;verbal cues required   -JR     Time Frame (Transfer Goal 1, OT) long term goal (LTG);by discharge  -JR     Progress/Outcome (Transfer Goal 1, OT) new goal  -       Row Name 01/04/24 1203          Dressing Goal 1 (OT)    Activity/Device (Dressing Goal 1, OT) lower body dressing;other (see comments)  with AE PRN  -JR     [place]/Cues Needed (Dressing Goal 1, OT) minimum assist (75% or more patient effort);verbal cues required  -JR     Time Frame (Dressing Goal 1, OT) long term goal (LTG);by discharge  -JR     Progress/Outcome (Dressing Goal 1, OT) new goal  -       Row Name 01/04/24 1203          Toileting Goal 1 (OT)    Activity/Device (Toileting Goal 1, OT) toileting skills, all  -JR     [place] Level/Cues Needed (Toileting Goal 1, OT) standby assist;verbal cues required  -JR     Time Frame (Toileting Goal 1, OT) long term goal (LTG);by discharge  -JR     Progress/Outcome (Toileting Goal 1, OT) new [facility]  -       Row Name 01/04/24 1203          Therapy Assessment/Plan (OT)    Planned Therapy Interventions (OT) activity tolerance training;adaptive equipment training;BADL retraining;cognitive/visual perception retraining;functional balance retraining;occupation/activity based interventions;ROM/therapeutic exercise;strengthening exercise;transfer/mobility retraining;patient/caregiver education/training  -               User Key  (r) = Recorded By, (t) = Taken By, (c) = Cosigned By      Initials Name Provider Type    JR Beverly Hinojosa, OT Occupational Therapist                   Clinical Impression       Row Name 01/04/24 1200          Pain Assessment    Pretreatment Pain Rating 5/10  -JR     Posttreatment Pain Rating 6/10  -JR     Pain Location - back  -JR     Pain Intervention(s) Repositioned;Medication (See MAR);Nursing Notified  -JR     Additional Documentation Pain Scale: Word Pre/Post-Treatment (Group)  -       Row Name 01/04/24 1200          Plan of Care Review    Plan of Care Reviewed With patient  -JR      Outcome Evaluation OT initial eval and expanded chart review completed. Pt presents with multiple comorbidities and decreased strength, balance, cognition and activity tolerance limiting independence with ADL's and mobility from baseline. Recommend continued skilled OT services and transfer to IRF at d/c if deemend medically appropriate.  -       Row Name 01/04/24 1200          Therapy Assessment/Plan (OT)    Patient/Family Therapy Goal Statement (OT) go home  -     Rehab Potential (OT) good, to achieve stated therapy goals  -     Criteria for Skilled Therapeutic Interventions Met (OT) yes;meets criteria;skilled treatment is necessary  -     Therapy Frequency (OT) daily  -JR       Row Name 01/04/24 1200          Therapy Plan Review/Discharge Plan (OT)    Anticipated Discharge Disposition (OT) inpatient rehabilitation facility  -       Row Name 01/04/24 1200          Vital Signs    Pre Systolic BP Rehab 144  -JR     Pre Treatment Diastolic BP 96  -JR     Post Systolic BP Rehab 166  -JR     Post Treatment Diastolic   -JR     Pretreatment Heart Rate (beats/min) 80  -JR     Posttreatment Heart Rate (beats/min) 74  -JR     Pre SpO2 (%) 92  -JR     O2 Delivery Pre Treatment room air  -JR     Post SpO2 (%) 96  -JR     O2 Delivery Post Treatment room air  -JR     Pre Patient Position Supine  -JR     Intra Patient Position Standing  -JR     Post Patient Position Sitting  -JR       Row Name 01/04/24 1200          Positioning and Restraints    Pre-Treatment Position in bed  -JR     Post Treatment Position chair  -JR     In Chair notified nsg;reclined;call light within reach;encouraged to call for assist;exit alarm on;waffle cushion  -JR               User Key  (r) = Recorded By, (t) = Taken By, (c) = Cosigned By      Initials Name Provider Type    JR Beverly Hinojosa, OT Occupational Therapist                   Outcome Measures       Row Name 01/04/24 1204          How much help from another is currently  needed...    Putting on and taking off regular lower body clothing? 2  -JR     Bathing (including washing, rinsing, and drying) 2  -JR     Toileting (which includes using toilet bed pan or urinal) 2  -JR     Putting on and taking off regular upper body clothing 2  -JR     Taking care of personal grooming (such as brushing teeth) 3  -JR     Eating meals 3  -JR     AM-PAC 6 Clicks Score (OT) 14  -JR       Row Name 01/04/24 0300          How much help from another person do you currently need...    Turning from your back to your side while in flat bed without using bedrails? 4  -AS     Moving from lying on back to sitting on the side of a flat bed without bedrails? 4  -AS     Moving to and from a bed to a chair (including a wheelchair)? 3  -AS     Standing up from a chair using your arms (e.g., wheelchair, bedside chair)? 3  -AS     Climbing 3-5 steps with a railing? 2  -AS     To walk in hospital room? 3  -AS     AM-PAC 6 Clicks Score (PT) 19  -AS     Highest Level of Mobility Goal 6 --> Walk 10 steps or more  -AS       Row Name 01/04/24 1204          Functional Assessment    Outcome Measure Options AM-PAC 6 Clicks Daily Activity (OT)  -               User Key  (r) = Recorded By, (t) = Taken By, (c) = Cosigned By      Initials Name Provider Type    Beverly Vergara OT Occupational Therapist    AS Lazarus Brenner, RN Registered Nurse                    Occupational Therapy Education       Title: PT OT SLP Therapies (In Progress)       Topic: Occupational Therapy (In Progress)       Point: ADL training (In Progress)       Description:   Instruct learner(s) on proper safety adaptation and remediation techniques during self care or transfers.   Instruct in proper use of assistive devices.                  Learning Progress Summary             Patient Acceptance, E, NR by  at 1/4/2024 0886    Comment: Educated pt regarding role of therapy and log roll                         Point: Home exercise program (Not Started)        Description:   Instruct learner(s) on appropriate technique for monitoring, assisting and/or progressing therapeutic exercises/activities.                  Learner Progress:  Not documented in this visit.              Point: Precautions (Not Started)       Description:   Instruct learner(s) on prescribed precautions during self-care and functional transfers.                  Learner Progress:  Not documented in this visit.              Point: Body mechanics (Not Started)       Description:   Instruct learner(s) on proper positioning and spine alignment during self-care, functional mobility activities and/or exercises.                  Learner Progress:  Not documented in this visit.                              User Key       Initials Effective Dates Name Provider Type Discipline     02/03/23 -  Ashish, Beverly SILVA, OT Occupational Therapist OT                  OT Recommendation and Plan  Planned Therapy Interventions (OT): activity tolerance training, adaptive equipment training, BADL retraining, cognitive/visual perception retraining, functional balance retraining, occupation/activity based interventions, ROM/therapeutic exercise, strengthening exercise, transfer/mobility retraining, patient/caregiver education/training  Therapy Frequency (OT): daily  Plan of Care Review  Plan of Care Reviewed With: patient  Outcome Evaluation: OT initial eval and expanded chart review completed. Pt presents with multiple comorbidities and decreased strength, balance, cognition and activity tolerance limiting independence with ADL's and mobility from baseline. Recommend continued skilled OT services and transfer to IRF at d/c if deemend medically appropriate.     Time Calculation:   Evaluation Complexity (OT)  Review Occupational Profile/Medical/Therapy History Complexity: expanded/moderate complexity  Assessment, Occupational Performance/Identification of Deficit Complexity: 3-5 performance deficits  Clinical Decision  Making Complexity (OT): detailed assessment/moderate complexity  Overall Complexity of Evaluation (OT): moderate complexity     Time Calculation- OT       Row Name 01/04/24 1206             Time Calculation- OT    OT Start Time 0850  -JR      OT Received On 01/04/24  -JR      OT Goal Re-Cert Due Date 01/14/24  -JR         Timed Charges    18814 - OT Self Care/Mgmt Minutes 8  -JR         Untimed Charges    OT Eval/Re-eval Minutes 46  -JR         Total Minutes    Timed Charges Total Minutes 8  -JR      Untimed Charges Total Minutes 46  -JR       Total Minutes 54  -JR                User Key  (r) = Recorded By, (t) = Taken By, (c) = Cosigned By      Initials Name Provider Type     Beverly Hinojosa, OT Occupational Therapist                  Therapy Charges for Today       Code Description Service Date Service Provider Modifiers Qty    49436861347 HC OT SELF CARE/MGMT/TRAIN EA 15 MIN 1/4/2024 Beverly Hinojosa OT GO 1    89462867757 HC OT EVAL MOD COMPLEXITY 4 1/4/2024 Beverly Hinojosa OT GO 1                 Beverly Hinojosa, OT  1/4/2024

## 2024-01-04 NOTE — CASE MANAGEMENT/SOCIAL WORK
Discharge Planning Assessment  [facility]     Patient Name: Alysia Sierra  MRN: [id number]  Today's Date: 1/4/2024    Admit Date: 1/2/2024    Plan: SNF   Discharge Needs Assessment    No documentation.                  Discharge Plan       Row Name 01/04/24 1209       Plan    Plan SNF    Patient/Family in Agreement with Plan yes    Plan Comments Met with pt at bedside to f/u DCP.  Discussed therapy recs for rehab.  She is agreeable.  Per request, CM called referrals to [facility] and [facility].  Will need insurance precert.  CM will cont to follow.    Final Discharge Disposition Code 03 - skilled nursing facility (SNF)                  Continued Care and Services - Admitted Since 1/2/2024       Destination       Service Provider Request Status Selected Services Address Phone Fax Patient Preferred    [facility] Pending - No Request Sent N/A [address] [address] [phone] --    [facility] Pending - No Request Sent N/A [address] [phone] --                  Expected Discharge Date and Time       Expected Discharge Date Expected Discharge Time    Jan 6, 2024            Demographic Summary    No documentation.                  Functional Status    No documentation.                  Psychosocial    No documentation.                  Abuse/Neglect    No documentation.                  Legal    No documentation.                  Substance Abuse    No documentation.                  Patient Forms    No documentation.                     Rosi Hoffmann RN

## 2024-01-05 ENCOUNTER — TELEPHONE (OUTPATIENT)
Dept: ORTHOPEDIC SURGERY | Facility: CLINIC | Age: 69
End: 2024-01-05
Payer: MEDICARE

## 2024-01-05 LAB — MAGNESIUM SERPL-MCNC: 2 MG/DL (ref 1.6–2.4)

## 2024-01-05 PROCEDURE — 25010000002 HEPARIN (PORCINE) PER 1000 UNITS: Performed by: INTERNAL MEDICINE

## 2024-01-05 PROCEDURE — 25010000002 THIAMINE PER 100 MG: Performed by: INTERNAL MEDICINE

## 2024-01-05 PROCEDURE — 97110 THERAPEUTIC EXERCISES: CPT

## 2024-01-05 PROCEDURE — 25010000002 MIDAZOLAM PER 1 MG: Performed by: INTERNAL MEDICINE

## 2024-01-05 PROCEDURE — 83735 ASSAY OF MAGNESIUM: CPT | Performed by: INTERNAL MEDICINE

## 2024-01-05 PROCEDURE — 97116 GAIT TRAINING THERAPY: CPT

## 2024-01-05 RX ORDER — HYDROCODONE BITARTRATE AND ACETAMINOPHEN 5; 325 MG/1; MG/1
1 TABLET ORAL EVERY 6 HOURS PRN
Status: DISCONTINUED | OUTPATIENT
Start: 2024-01-05 | End: 2024-01-09 | Stop reason: HOSPADM

## 2024-01-05 RX ADMIN — THIAMINE HYDROCHLORIDE 200 MG: 100 INJECTION, SOLUTION INTRAMUSCULAR; INTRAVENOUS at 05:08

## 2024-01-05 RX ADMIN — HYDROCODONE BITARTRATE AND ACETAMINOPHEN 1 TABLET: 5; 325 TABLET ORAL at 13:10

## 2024-01-05 RX ADMIN — PANTOPRAZOLE SODIUM 40 MG: 40 TABLET, DELAYED RELEASE ORAL at 05:09

## 2024-01-05 RX ADMIN — Medication 10 ML: at 08:33

## 2024-01-05 RX ADMIN — LEVOTHYROXINE SODIUM 25 MCG: 25 TABLET ORAL at 05:08

## 2024-01-05 RX ADMIN — LORAZEPAM 1 MG: 1 TABLET ORAL at 05:08

## 2024-01-05 RX ADMIN — HEPARIN SODIUM 5000 UNITS: 5000 INJECTION INTRAVENOUS; SUBCUTANEOUS at 21:57

## 2024-01-05 RX ADMIN — MIDAZOLAM HYDROCHLORIDE 4 MG: 1 INJECTION, SOLUTION INTRAMUSCULAR; INTRAVENOUS at 08:33

## 2024-01-05 RX ADMIN — HEPARIN SODIUM 5000 UNITS: 5000 INJECTION INTRAVENOUS; SUBCUTANEOUS at 05:09

## 2024-01-05 RX ADMIN — MIDAZOLAM HYDROCHLORIDE 4 MG: 1 INJECTION, SOLUTION INTRAMUSCULAR; INTRAVENOUS at 12:06

## 2024-01-05 RX ADMIN — FOLIC ACID 1 MG: 1 TABLET ORAL at 08:33

## 2024-01-05 RX ADMIN — SENNOSIDES AND DOCUSATE SODIUM 2 TABLET: 8.6; 5 TABLET ORAL at 08:33

## 2024-01-05 RX ADMIN — HYDROCODONE BITARTRATE AND ACETAMINOPHEN 1 TABLET: 5; 325 TABLET ORAL at 21:56

## 2024-01-05 RX ADMIN — THIAMINE HYDROCHLORIDE 200 MG: 100 INJECTION, SOLUTION INTRAMUSCULAR; INTRAVENOUS at 13:09

## 2024-01-05 RX ADMIN — MIDAZOLAM HYDROCHLORIDE 2 MG: 1 INJECTION, SOLUTION INTRAMUSCULAR; INTRAVENOUS at 05:58

## 2024-01-05 RX ADMIN — ROSUVASTATIN 5 MG: 10 TABLET, FILM COATED ORAL at 21:56

## 2024-01-05 RX ADMIN — MIDAZOLAM HYDROCHLORIDE 2 MG: 1 INJECTION, SOLUTION INTRAMUSCULAR; INTRAVENOUS at 03:19

## 2024-01-05 RX ADMIN — SENNOSIDES AND DOCUSATE SODIUM 2 TABLET: 8.6; 5 TABLET ORAL at 21:56

## 2024-01-05 RX ADMIN — METOPROLOL SUCCINATE 25 MG: 25 TABLET, EXTENDED RELEASE ORAL at 21:56

## 2024-01-05 RX ADMIN — HEPARIN SODIUM 5000 UNITS: 5000 INJECTION INTRAVENOUS; SUBCUTANEOUS at 13:09

## 2024-01-05 RX ADMIN — HYDROCODONE BITARTRATE AND ACETAMINOPHEN 1 TABLET: 5; 325 TABLET ORAL at 00:21

## 2024-01-05 RX ADMIN — LORAZEPAM 2 MG: 1 TABLET ORAL at 13:10

## 2024-01-05 RX ADMIN — THIAMINE HYDROCHLORIDE 200 MG: 100 INJECTION, SOLUTION INTRAMUSCULAR; INTRAVENOUS at 21:57

## 2024-01-05 NOTE — PLAN OF CARE
Goal Outcome Evaluation:  Plan of Care Reviewed With: patient        Progress: no change  Outcome Evaluation: Pt ambulated 20 feet Min A x1 with FWx with cues for upright posture, increased stride length, and forward gaze. Pt required manual assist for walker management at this date for obstacle navigation and turns in environment. Pt demonstrated mobility below baseline function warranting IPPT POC. d/c rec IRF.      Anticipated Discharge Disposition (PT): inpatient rehabilitation facility

## 2024-01-05 NOTE — TELEPHONE ENCOUNTER
Caller: ALYSHA HOLLY   Relationship to Patient: SISTER   Phone Number: [phone]  Reason for Call: PATIENT IN HOSPITAL THE SISTER CALLED TO SEE IF THEIR WAS ANY WAY THAT DR WEATHERS COULD COME OVER AND DO A CONSULT BECAUSE THE PATIENT IS STATING THAT THE HIP DID [facility] DID SX ON IS HURTING AFTER HER FALL   NEW IMAGING IN HER CHART

## 2024-01-05 NOTE — PLAN OF CARE
Called regarding Alysia Sierra, 1955.  This is a 68-year-old female who presented to the [facility] emergency room on 1/2/2024 after a fall, she had been drinking.  Her workup in the hospital has included a CT scan of the lumbar spine which I have reviewed which shows mildly displaced fractures of the left L1, L2 and L3 transverse processes.  She also has a stable mild levoscoliosis of the mid lumbar spine and stable multilevel degenerative disc and facet joint disease.  CT scan of the pelvis report was reviewed which shows an acute appearing displaced fracture through the left transverse process of L3.  The patient is neurologically intact.  I have talked with hospital medicine regarding treatment with lumbar bracing, there is no role for surgical intervention with these transverse process fractures.I     have ordered the patient a custom-fit prefabricated rigid LSO for spine stabilization, comfort and pain control. The patient is allowed to be up to the bathroom and the bedside chair and mobilize with the therapy team before the brace is delivered. The brace is for use during activities at home for the next 8 to 12 weeks. The patient can don/doff the LSO in the standing position.     Please contact [employer] ([phone]) and have the patient fitted for a Prefabricated Rigid LSO.Please contact [employer] ([phone]) and have the patient fitted for a Prefabricated Rigid LSO.Please contact [employer] ([phone]) and have the patient fitted for a Prefabricated Rigid LSO.    Jackie Wells PA-C

## 2024-01-05 NOTE — CASE MANAGEMENT/SOCIAL WORK
Discharge Planning Assessment  [facility]     Patient Name: Alysia Sierra  MRN: [id number]  Today's Date: 1/5/2024    Admit Date: 1/2/2024    Plan: SNF   Discharge Needs Assessment    No documentation.                  Discharge Plan       Row Name 01/05/24 1210       Plan    Plan SNF    Plan Comments CIWA-8.  Per MD, not medically ready for DC.  [facility] and [facility] are following for possible STR.  CM will cont to follow hospital course.    Final Discharge Disposition Code 03 - skilled nursing facility (SNF)                  Continued Care and Services - Admitted Since 1/2/2024       Destination       Service Provider Request Status Selected Services Address Phone Fax Patient Preferred    [facility] Pending - No Request Sent N/A [address] [id number] [phone] --    [facility] Pending - No Request Sent N/A [address] [phone] --                  Expected Discharge Date and Time       Expected Discharge Date Expected Discharge Time    Jan 6, 2024            Demographic Summary    No documentation.                  Functional Status    No documentation.                  Psychosocial    No documentation.                  Abuse/Neglect    No documentation.                  Legal    No documentation.                  Substance Abuse    No documentation.                  Patient Forms    No documentation.                     Rosi Hoffmann RN

## 2024-01-05 NOTE — TELEPHONE ENCOUNTER
Spoke to pt sister, let her know that  was not in office today that they would need to go through the Dr's at the hospital if she needs a consult with him. She voiced understanding.

## 2024-01-05 NOTE — THERAPY TREATMENT NOTE
Patient Name: Alysia Sierra  : 1955    MRN: [id number]                              Today's Date: 2024       Admit Date: 2024    Visit Dx:     ICD-10-CM ICD-9-CM   1. Acute kidney injury  N17.9 584.9   2. Dehydration  E86.0 276.51   3. Hypotension due to hypovolemia  I95.89 458.8    E86.1 276.52   4. Alcoholic intoxication without complication  F10.920 305.00   5. Pelvic pain  R10.2 [id number]     Patient Active Problem List   Diagnosis    Hypertension    Paroxysmal SVT (supraventricular tachycardia)    Leg weakness, bilateral    Syncope    CKD (chronic kidney disease) stage 3, GFR 30-59 ml/min    Circadian rhythm sleep disorder, advanced sleep phase type    Neuropathy    Hyperlipidemia LDL goal <100    Endometrial adenocarcinoma    Mild episode of recurrent major depressive disorder    Dizziness    Hip fracture    Anemia    Hypomagnesemia    Proteinuria    Hypothyroidism    Post-menopausal    Other osteoporosis without current pathological fracture    UTI (urinary tract infection)    MARY (acute kidney injury)     Past Medical History:   Diagnosis Date    Allergic lisinopril  2017    Anemia     Arrhythmia     Arthritis     Cancer     uterine    Cataract mild 2020    stil lpresent    Chicken pox     Chronic fatigue     CKD (chronic kidney disease), stage III     sees nephro    Clotting disorder     Dental root implant present     lower left  x1 - possible dental implant    Depression mild 2019    Difficulty walking 2019    Disease of thyroid gland     Dizzy     NIEVES (dyspnea on exertion)     2017    Fracture of hip     Generalized anxiety disorder     GERD (gastroesophageal reflux disease) 2018    History of brachytherapy 2023    vaginal brachytherapy    Hyperlipidemia     Hypertension     Iron deficiency anemia     Liver disease     fatty    Liver problem     Measles     Menopause     Mumps     Neuromuscular disorder Peripheral Neuropathy    Orthostatic hypotension     Pupil diameter unequal      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    Renal insufficiency 2018    Scoliosis     Unintentional weight loss     Uses contact lenses     bilat    Uterine cancer     Uterine cancer 2022    UTI (urinary tract infection)     Visual impairment Nearsighted    Wears glasses      Past Surgical History:   Procedure Laterality Date     SECTION      DILATION AND CURETTAGE, DIAGNOSTIC / THERAPEUTIC      HIP OPEN REDUCTION Left 2023    Procedure: FEMORAL NECK OPEN REDUCTION INTERNAL FIXATION LEFT;  Surgeon: Juanpablo Lee MD;  Location: [facility] OR;  Service: Orthopedics;  Laterality: Left;    HIP SURGERY      OOPHORECTOMY      ORAL LESION EXCISION/BIOPSY  2021    TOTAL LAPAROSCOPIC HYSTERECTOMY SALPINGO OOPHORECTOMY N/A 10/28/2022    Procedure: TOTAL LAPAROSCOPIC HYSTERECTOMY BILATERAL SALPINGO-OOPHORECTOMY, INJECTION FOR SENTINEL LYMPH NODE MAPPING, BILATERAL SENTINEL LYMPH NODE DISSECTION WITH DAVINCI ROBOT;  Surgeon: Jasmine Rich MD;  Location: [facility] OR;  Service: Robotics - DaVinci;  Laterality: N/A;    TUBAL ABDOMINAL LIGATION        General Information       Row Name 24 1108          Physical Therapy Time and Intention    Document Type therapy note (daily note)  -     Mode of Treatment physical therapy  -       Row Name 24 1108          General Information    Patient Profile Reviewed yes  -     Existing Precautions/Restrictions fall;seizures;spinal  severe back and L hip pain, mild displaced L1L2L3 transverse process fracture, spinal precautions for comfort  -     Barriers to Rehab medically complex;previous functional deficit;cognitive status  -       Row Name 24 1108          Cognition    Orientation Status (Cognition) oriented x 3  -       Row Name 24 1108          Safety Issues, Functional Mobility    Safety Issues Affecting Function (Mobility) ability to follow commands;awareness of need for assistance;insight into  deficits/self-awareness;positioning of assistive device;judgment;problem-solving;safety precaution awareness;safety precautions follow-through/compliance;sequencing abilities  -     Impairments Affecting Function (Mobility) balance;cognition;coordination;endurance/activity tolerance;pain;strength;range of motion (ROM);postural/trunk control  -     Cognitive Impairments, Mobility Safety/Performance awareness, need for assistance;insight into deficits/self-awareness;judgment;problem-solving/reasoning;safety precaution awareness;safety precaution follow-through  -               User Key  (r) = Recorded By, (t) = Taken By, (c) = Cosigned By      Initials Name Provider Type     Marilee Hayes, PT Physical Therapist                   Mobility       Row Name 01/05/24 1109          Bed Mobility    Bed Mobility supine-sit;rolling left  -     Rolling Left Grimes (Bed Mobility) minimum assist (75% patient effort);1 person assist;verbal cues  -     Supine-Sit Grimes (Bed Mobility) 1 person assist;moderate assist (50% patient effort)  -     Assistive Device (Bed Mobility) head of bed elevated  -     Comment, (Bed Mobility) cues for log roll technique for improved comfort, assist at trunk and BLE. increased assist at this time d/t slightly lethargic state and pain. rolling towards L for pericare  -       Row Name 01/05/24 1109          Sit-Stand Transfer    Sit-Stand Grimes (Transfers) minimum assist (75% patient effort);verbal cues  -     Assistive Device (Sit-Stand Transfers) walker, front-wheeled  -HM     Comment, (Sit-Stand Transfer) cues for hand placement and upright posture. Pt required two attempts to stand d/t pain  -       Row Name 01/05/24 1109          Gait/Stairs (Locomotion)    Grimes Level (Gait) minimum assist (75% patient effort);verbal cues  -     Assistive Device (Gait) walker, front-wheeled  -HM     Distance in Feet (Gait) 20  -HM     Deviations/Abnormal  Patterns (Gait) bilateral deviations;base of support, narrow;festinating/shuffling;werner decreased;gait speed decreased;stride length decreased  -     Bilateral Gait Deviations heel strike decreased;forward flexed posture  -     Comment, (Gait/Stairs) Pt ambulated with decreased gait speed, decreased stride length with short shuffled steps, and forward flexed posture. Pt required manual assist at walker for turns and cues for increased safety awareness as pt was steering walker into objects in environment. Mobility limited d/t pain and fatigue.  -               User Key  (r) = Recorded By, (t) = Taken By, (c) = Cosigned By      Initials Name Provider Type     Marilee Hayes PT Physical Therapist                   Obj/Interventions       Row Name 01/05/24 1116          Motor Skills    Therapeutic Exercise other (see comments)  BLE heel slides, APs, hip IR/ER, and hip ab/ad x 10 reps  -       Row Name 01/05/24 1116          Balance    Balance Assessment sitting static balance;sitting dynamic balance;sit to stand dynamic balance;standing static balance;standing dynamic balance  -     Static Sitting Balance standby assist  -     Dynamic Sitting Balance contact guard  -     Position, Sitting Balance unsupported;sitting edge of bed  -     Sit to Stand Dynamic Balance minimal assist  -     Static Standing Balance minimal assist  -HM     Dynamic Standing Balance minimal assist  -     Position/Device Used, Standing Balance supported;walker, front-wheeled  -     Balance Interventions standing;dynamic;occupation based/functional task  -     Comment, Balance unsteadiness noted with mobility, cues for walker navigation in environment and upright posture  -               User Key  (r) = Recorded By, (t) = Taken By, (c) = Cosigned By      Initials Name Provider Type     Marilee Hayes PT Physical Therapist                   Goals/Plan    No documentation.                  Clinical Impression        Row Name 01/05/24 1118          Pain    Pain Intervention(s) Repositioned;Ambulation/increased activity  -     Additional Documentation Pain Scale: FACES Pre/Post-Treatment (Group)  -       Row Name 01/05/24 1118          Pain Scale: FACES Pre/Post-Treatment    Pain: FACES Scale, Pretreatment 2-->hurts little bit  -     Pain Location generalized  -     Pain Location - back  -     Pre/Posttreatment Pain Comment tolerated, RN aware  -       Row Name 01/05/24 1118          Plan of Care Review    Plan of Care Reviewed With patient  -     Progress no change  -     Outcome Evaluation Pt ambulated 20 feet Min A x1 with FWx with cues for upright posture, increased stride length, and forward gaze. Pt required manual assist for walker management at this date for obstacle navigation and turns in environment. Pt demonstrated mobility below baseline function warranting IPPT POC. d/c rec IRF.  -       Row Name 01/05/24 1118          Therapy Assessment/Plan (PT)    Rehab Potential (PT) good, to achieve stated therapy goals  -     Criteria for Skilled Interventions Met (PT) yes;meets criteria;skilled treatment is necessary  -     Therapy Frequency (PT) daily  -       Row Name 01/05/24 1118          Vital Signs    Pre Systolic BP Rehab 111  -     Pre Treatment Diastolic BP 61  -     Pretreatment Heart Rate (beats/min) 83  -     Pre SpO2 (%) 99  -     O2 Delivery Pre Treatment nasal cannula  -     O2 Delivery Intra Treatment nasal cannula  -     O2 Delivery Post Treatment nasal cannula  -     Pre Patient Position Supine  -     Intra Patient Position Standing  -     Post Patient Position Sitting  -[facility] Name 01/05/24 1118          Positioning and Restraints    Pre-Treatment Position in bed  -     Post Treatment Position chair  -     In Chair notified nsg;reclined;sitting;encouraged to call for assist;exit alarm on;legs elevated;heels elevated;waffle cushion;with  family/caregiver;call light within reach  -               User Key  (r) = Recorded By, (t) = Taken By, (c) = Cosigned By      Initials Name Provider Type     Marilee Hayse PT Physical Therapist                   Outcome Measures       Row Name 01/05/24 1121          How much help from another person do you currently need...    Turning from your back to your side while in flat bed without using bedrails? 3  -HM     Moving from lying on back to sitting on the side of a flat bed without bedrails? 2  -HM     Moving to and from a bed to a chair (including a wheelchair)? 3  -HM     Standing up from a chair using your arms (e.g., wheelchair, bedside chair)? 3  -HM     Climbing 3-5 steps with a railing? 2  -HM     To walk in hospital room? 3  -HM     AM-PAC 6 Clicks Score (PT) 16  -HM     Highest Level of Mobility Goal 5 --> Static standing  -       Row Name 01/05/24 1121          Functional Assessment    Outcome Measure Options AM-PAC 6 Clicks Basic Mobility (PT)  -               User Key  (r) = Recorded By, (t) = Taken By, (c) = Cosigned By      Initials Name Provider Type     Marilee Hayes PT Physical Therapist                                 Physical Therapy Education       Title: PT OT SLP Therapies (In Progress)       Topic: Physical Therapy (In Progress)       Point: Mobility training (In Progress)       Learning Progress Summary             Patient Acceptance, E,TB, NR by  at 1/5/2024 1122    Acceptance, E, VU,NR by BA at 1/3/2024 1704                         Point: Home exercise program (In Progress)       Learning Progress Summary             Patient Acceptance, E,TB, NR by  at 1/5/2024 1122                         Point: Body mechanics (In Progress)       Learning Progress Summary             Patient Acceptance, E,TB, NR by  at 1/5/2024 1122    Acceptance, E, VU,NR by BA at 1/3/2024 1704                         Point: Precautions (In Progress)       Learning Progress Summary              Patient Acceptance, E,TB, NR by  at 1/5/2024 1122    Acceptance, E, VU,NR by  at 1/3/2024 1704                                         User Key       Initials Effective Dates Name Provider Type [facility] 09/21/21 -  Chiquis Rowland, PT Physical Therapist PT     09/22/22 -  Marilee Hayes PT Physical Therapist PT                  PT Recommendation and Plan     Plan of Care Reviewed With: patient  Progress: no change  Outcome Evaluation: Pt ambulated 20 feet Min A x1 with FWx with cues for upright posture, increased stride length, and forward gaze. Pt required manual assist for walker management at this date for obstacle navigation and turns in environment. Pt demonstrated mobility below baseline function warranting IPPT POC. d/c rec IRF.     Time Calculation:         PT Charges       Row Name 01/05/24 1122             Time Calculation    Start Time 1030  -HM      PT Received On 01/05/24  -         Timed Charges    46021 - PT Therapeutic Exercise Minutes 8  -      61704 - Gait Training Minutes  10  -HM      53969 - PT Therapeutic Activity Minutes 6  -HM         Total Minutes    Timed Charges Total Minutes 24  -HM       Total Minutes 24  -HM                User Key  (r) = Recorded By, (t) = Taken By, (c) = Cosigned By      Initials Name Provider Type     Marilee Hayes, MARIBETH Physical Therapist                  Therapy Charges for Today       Code Description Service Date Service Provider Modifiers Qty    13887479225 HC PT THER PROC EA 15 MIN 1/5/2024 Marilee Hayes, PT GP 1    48954153277 HC GAIT TRAINING EA 15 MIN 1/5/2024 Marilee Hayes, PT GP 1            PT G-Codes  Outcome Measure Options: AM-PAC 6 Clicks Basic Mobility (PT)  AM-PAC 6 Clicks Score (PT): 16  AM-PAC 6 Clicks Score (OT): 14  PT Discharge Summary  Anticipated Discharge Disposition (PT): inpatient rehabilitation facility    Marilee Hayes PT  1/5/2024

## 2024-01-05 NOTE — PROGRESS NOTES
[facility] Medicine Services  PROGRESS NOTE    Patient Name: Alysia Sierra  : 1955  MRN: [id number]    Date of Admission: 2024  Primary Care Physician: Liana So APRN    Subjective   Subjective     CC:  S/p fall    HPI:  Resting in bed in no acute distress but complains of back pain.  She is drowsy and confused today.  Patient's sister is also present at the bedside.  No fever or chills.  No chest pain palpitation or shortness of breath.  No nausea vomiting or diarrhea or abdominal pain.      Objective   Objective     Vital Signs:   Temp:  [97.7 °F (36.5 °C)-99.3 °F (37.4 °C)] 98 °F (36.7 °C)  Heart Rate:  [71-94] 71  Resp:  [16-20] 16  BP: (111-160)/() 160/92  Flow (L/min):  [2] 2     Physical Exam:  Constitutional: No acute distress  HENT: NCAT, mucous membranes moist  Respiratory: Clear to auscultation bilaterally, respiratory effort normal   Cardiovascular: RRR, no murmurs, rubs, or gallops  Gastrointestinal: Positive bowel sounds, soft, nontender, nondistended  Musculoskeletal: No bilateral ankle edema  Psychiatric: Unable to assess  Neurologic: Awake, alert, not fully oriented but follows commands, has back pain when she moves, speech clear  Skin: No rashes     Results Reviewed:  LAB RESULTS:      Lab 24  0522 24   WBC 5.13 6.78   HEMOGLOBIN 9.3* 9.6*   HEMATOCRIT 29.9* 30.7*   PLATELETS 151 183   NEUTROS ABS 2.36 3.71   IMMATURE GRANS (ABS) 0.02 0.04   LYMPHS ABS 1.86 2.18   MONOS ABS 0.72 0.73   EOS ABS 0.14 0.10   .2* 107.3*         Lab 24  0011 24  0501 24  0522 24   SODIUM  --  135* 144 140   POTASSIUM  --  4.0 4.8 4.7   CHLORIDE  --  102 111* 105   CO2  --  24.0 20.0* 21.0*   ANION GAP  --  9.0 13.0 14.0   BUN  --  20 26* 31*   CREATININE  --  1.27* 1.57* 2.44*   EGFR  --  46.4* 36.0* 21.2*   GLUCOSE  --  96 65 92   CALCIUM  --  8.6 8.4* 8.5*   MAGNESIUM 2.0 1.4*  --   --    PHOSPHORUS  --   2.9  --   --          Lab 01/02/24 1958   TOTAL PROTEIN 6.5   ALBUMIN 4.2   GLOBULIN 2.3   ALT (SGPT) 9   AST (SGOT) 18   BILIRUBIN 0.2   ALK PHOS 74                 Lab 01/03/24  0522   FOLATE 2.93*   VITAMIN B 12 691         Brief Urine Lab Results  (Last result in the past 365 days)        Color   Clarity   Blood   Leuk Est   Nitrite   Protein   CREAT   Urine HCG        01/02/24 2115 Yellow   Cloudy   Negative   Small (1+)   Negative   Negative                   Microbiology Results Abnormal       None            No radiology results from the last 24 hrs    Results for orders placed during the hospital encounter of 01/02/24    Adult Transthoracic Echo Complete W/ Cont if Necessary Per Protocol    Interpretation Summary    Left ventricular systolic function is normal. Calculated left ventricular EF = 57.9% Left ventricular ejection fraction appears to be 56 - 60%.    Left ventricular diastolic function was normal.    Estimated right ventricular systolic pressure from tricuspid regurgitation is normal (<35 mmHg).    No significant change from previous study      Current medications:  Scheduled Meds:folic acid, 1 mg, Oral, Daily  heparin (porcine), 5,000 Units, Subcutaneous, Q8H  levothyroxine, 25 mcg, Oral, Q AM  metoprolol succinate XL, 25 mg, Oral, Nightly  pantoprazole, 40 mg, Oral, Q AM  rosuvastatin, 5 mg, Oral, Nightly  senna-docusate sodium, 2 tablet, Oral, BID  sodium chloride, 10 mL, Intravenous, Q12H  thiamine (B-1) IV, 200 mg, Intravenous, Q8H   Followed by  [START ON 1/8/2024] thiamine, 100 mg, Oral, Daily      Continuous Infusions:     PRN Meds:.  senna-docusate sodium **AND** polyethylene glycol **AND** bisacodyl **AND** bisacodyl    docusate sodium    HYDROcodone-acetaminophen    LORazepam **OR** midazolam **OR** LORazepam **OR** midazolam **OR** midazolam **OR** midazolam    Magnesium Standard Dose Replacement - Follow Nurse / BPA Driven Protocol    nitroglycerin    ondansetron    sodium chloride     sodium chloride    traMADol    Assessment & Plan   Assessment & Plan     Active Hospital Problems    Diagnosis  POA    **UTI (urinary tract infection) [N39.0]  Yes    MARY (acute kidney injury) [N17.9]  Yes      Resolved Hospital Problems   No resolved problems to display.        Brief Hospital Course to date:  Alysia Sierra is a 68 y.o. female with past medical history significant for hypertension, chronic kidney disease, history of uterine cancer, alcohol abuse.  Patient was admitted for severe back pain after fall.    **S/p fall, possibly secondary to alcohol abuse as patient had significantly elevated alcohol level on admission.    **Back pain secondary to mildly displaced fracture of the left L1, L2 and L3 transverse processes.  This is based on the CT scan which was done on admission.  There is no role for any surgical intervention.    **Alcohol abuse.  We will start the patient on alcohol withdrawal protocol.    **Acute kidney injury with creatinine of 2.44.  Patient's baseline creatinine seems to be around 1.  Creatinine is improving with hydration.    **Hypertension, reasonably controlled on on home medication    **Uterine cancer.    **Urine drug screening is positive for oxycodone.  However, patient is not on any narcotic pain medication at home.        Expected Discharge Location and Transportation: Most likely home  Expected Discharge to be determined  Expected Discharge Date: 1/6/2024; Expected Discharge Time:      DVT prophylaxis:  Medical DVT prophylaxis orders are present.     AM-PAC 6 Clicks Score (PT): 16 (01/05/24 1121)    CODE STATUS:   Code Status and Medical Interventions:   Ordered at: 01/02/24 9310     Code Status (Patient has no pulse and is not breathing):    CPR (Attempt to Resuscitate)     Medical Interventions (Patient has pulse or is breathing):    Full Support       Alexander Yu MD  01/05/24

## 2024-01-06 LAB
ANION GAP SERPL CALCULATED.3IONS-SCNC: 13 MMOL/L (ref 5–15)
BUN SERPL-MCNC: 9 MG/DL (ref 8–23)
BUN/CREAT SERPL: 7.8 (ref 7–25)
CALCIUM SPEC-SCNC: 8.9 MG/DL (ref 8.6–10.5)
CHLORIDE SERPL-SCNC: 103 MMOL/L (ref 98–107)
CO2 SERPL-SCNC: 27 MMOL/L (ref 22–29)
CREAT SERPL-MCNC: 1.15 MG/DL (ref 0.57–1)
EGFRCR SERPLBLD CKD-EPI 2021: 52.3 ML/MIN/1.73
GLUCOSE SERPL-MCNC: 81 MG/DL (ref 65–99)
MAGNESIUM SERPL-MCNC: 1.5 MG/DL (ref 1.6–2.4)
PHOSPHATE SERPL-MCNC: 3.9 MG/DL (ref 2.5–4.5)
POTASSIUM SERPL-SCNC: 3.6 MMOL/L (ref 3.5–5.2)
SODIUM SERPL-SCNC: 143 MMOL/L (ref 136–145)

## 2024-01-06 PROCEDURE — 80048 BASIC METABOLIC PNL TOTAL CA: CPT | Performed by: INTERNAL MEDICINE

## 2024-01-06 PROCEDURE — 83735 ASSAY OF MAGNESIUM: CPT | Performed by: INTERNAL MEDICINE

## 2024-01-06 PROCEDURE — 25010000002 MAGNESIUM SULFATE 2 GM/50ML SOLUTION: Performed by: INTERNAL MEDICINE

## 2024-01-06 PROCEDURE — 25010000002 THIAMINE PER 100 MG: Performed by: INTERNAL MEDICINE

## 2024-01-06 PROCEDURE — 84100 ASSAY OF PHOSPHORUS: CPT | Performed by: INTERNAL MEDICINE

## 2024-01-06 PROCEDURE — 25010000002 HEPARIN (PORCINE) PER 1000 UNITS: Performed by: INTERNAL MEDICINE

## 2024-01-06 RX ORDER — MAGNESIUM SULFATE HEPTAHYDRATE 40 MG/ML
2 INJECTION, SOLUTION INTRAVENOUS
Status: COMPLETED | OUTPATIENT
Start: 2024-01-06 | End: 2024-01-06

## 2024-01-06 RX ADMIN — HYDROCODONE BITARTRATE AND ACETAMINOPHEN 1 TABLET: 5; 325 TABLET ORAL at 08:33

## 2024-01-06 RX ADMIN — SENNOSIDES AND DOCUSATE SODIUM 2 TABLET: 8.6; 5 TABLET ORAL at 08:00

## 2024-01-06 RX ADMIN — Medication 10 ML: at 08:00

## 2024-01-06 RX ADMIN — SENNOSIDES AND DOCUSATE SODIUM 2 TABLET: 8.6; 5 TABLET ORAL at 21:08

## 2024-01-06 RX ADMIN — FOLIC ACID 1 MG: 1 TABLET ORAL at 08:00

## 2024-01-06 RX ADMIN — THIAMINE HYDROCHLORIDE 200 MG: 100 INJECTION, SOLUTION INTRAMUSCULAR; INTRAVENOUS at 05:00

## 2024-01-06 RX ADMIN — HEPARIN SODIUM 5000 UNITS: 5000 INJECTION INTRAVENOUS; SUBCUTANEOUS at 21:08

## 2024-01-06 RX ADMIN — HEPARIN SODIUM 5000 UNITS: 5000 INJECTION INTRAVENOUS; SUBCUTANEOUS at 14:44

## 2024-01-06 RX ADMIN — LEVOTHYROXINE SODIUM 25 MCG: 25 TABLET ORAL at 05:00

## 2024-01-06 RX ADMIN — Medication 10 ML: at 21:09

## 2024-01-06 RX ADMIN — HEPARIN SODIUM 5000 UNITS: 5000 INJECTION INTRAVENOUS; SUBCUTANEOUS at 05:00

## 2024-01-06 RX ADMIN — ROSUVASTATIN 5 MG: 10 TABLET, FILM COATED ORAL at 21:09

## 2024-01-06 RX ADMIN — TRAMADOL HYDROCHLORIDE 50 MG: 50 TABLET, COATED ORAL at 21:12

## 2024-01-06 RX ADMIN — THIAMINE HYDROCHLORIDE 200 MG: 100 INJECTION, SOLUTION INTRAMUSCULAR; INTRAVENOUS at 14:44

## 2024-01-06 RX ADMIN — THIAMINE HYDROCHLORIDE 200 MG: 100 INJECTION, SOLUTION INTRAMUSCULAR; INTRAVENOUS at 21:09

## 2024-01-06 RX ADMIN — MAGNESIUM SULFATE HEPTAHYDRATE 2 G: 2 INJECTION, SOLUTION INTRAVENOUS at 11:32

## 2024-01-06 RX ADMIN — MAGNESIUM SULFATE HEPTAHYDRATE 2 G: 2 INJECTION, SOLUTION INTRAVENOUS at 07:57

## 2024-01-06 RX ADMIN — MAGNESIUM SULFATE HEPTAHYDRATE 2 G: 2 INJECTION, SOLUTION INTRAVENOUS at 09:23

## 2024-01-06 RX ADMIN — METOPROLOL SUCCINATE 25 MG: 25 TABLET, EXTENDED RELEASE ORAL at 21:09

## 2024-01-06 RX ADMIN — HYDROCODONE BITARTRATE AND ACETAMINOPHEN 1 TABLET: 5; 325 TABLET ORAL at 18:38

## 2024-01-06 RX ADMIN — PANTOPRAZOLE SODIUM 40 MG: 40 TABLET, DELAYED RELEASE ORAL at 05:00

## 2024-01-06 NOTE — PROGRESS NOTES
[facility] Medicine Services  PROGRESS NOTE    Patient Name: Alysia Sierra  : 1955  MRN: [id number]    Date of Admission: 2024  Primary Care Physician: Liana So APRN    Subjective   Subjective     CC:  S/p fall    HPI:  Resting in bed in no acute distress but complains of back pain.  She is still a little drowsy but not confused.  Patient's sisters are also present at the bedside.  No fever or chills.  No chest pain palpitation or shortness of breath.  Has some nausea but no vomiting.      Objective   Objective     Vital Signs:   Temp:  [97.2 °F (36.2 °C)-98.9 °F (37.2 °C)] 97.6 °F (36.4 °C)  Heart Rate:  [61-87] 87  Resp:  [16-18] 16  BP: (131-155)/(79-96) 131/85  Flow (L/min):  [2] 2     Physical Exam:  Constitutional: No acute distress  HENT: NCAT, mucous membranes moist  Respiratory: Clear to auscultation bilaterally, respiratory effort normal   Cardiovascular: RRR, no murmurs, rubs, or gallops  Gastrointestinal: Positive bowel sounds, soft, nontender, nondistended  Musculoskeletal: No bilateral ankle edema  Psychiatric: Unable to assess  Neurologic: Awake, alert, oriented x 3, no focality appreciated.    Skin: No rashes     Results Reviewed:  LAB RESULTS:      Lab 24   WBC 5.13 6.78   HEMOGLOBIN 9.3* 9.6*   HEMATOCRIT 29.9* 30.7*   PLATELETS 151 183   NEUTROS ABS 2.36 3.71   IMMATURE GRANS (ABS) 0.02 0.04   LYMPHS ABS 1.86 2.18   MONOS ABS 0.72 0.73   EOS ABS 0.14 0.10   .2* 107.3*         Lab 24  0352 24  0011 24  0501 24  0522 24   SODIUM 143  --  135* 144 140   POTASSIUM 3.6  --  4.0 4.8 4.7   CHLORIDE 103  --  102 111* 105   CO2 27.0  --  24.0 20.0* 21.0*   ANION GAP 13.0  --  9.0 13.0 14.0   BUN 9  --  20 26* 31*   CREATININE 1.15*  --  1.27* 1.57* 2.44*   EGFR 52.3*  --  46.4* 36.0* 21.2*   GLUCOSE 81  --  96 65 92   CALCIUM 8.9  --  8.6 8.4* 8.5*   MAGNESIUM 1.5* 2.0 1.4*  --   --     PHOSPHORUS 3.9  --  2.9  --   --          Lab 01/02/24 1958   TOTAL PROTEIN 6.5   ALBUMIN 4.2   GLOBULIN 2.3   ALT (SGPT) 9   AST (SGOT) 18   BILIRUBIN 0.2   ALK PHOS 74                 Lab 01/03/24  0522   FOLATE 2.93*   VITAMIN B 12 691         Brief Urine Lab Results  (Last result in the past 365 days)        Color   Clarity   Blood   Leuk Est   Nitrite   Protein   CREAT   Urine HCG        01/02/24 2115 Yellow   Cloudy   Negative   Small (1+)   Negative   Negative                   Microbiology Results Abnormal       None            No radiology results from the last 24 hrs    Results for orders placed during the hospital encounter of 01/02/24    Adult Transthoracic Echo Complete W/ Cont if Necessary Per Protocol    Interpretation Summary    Left ventricular systolic function is normal. Calculated left ventricular EF = 57.9% Left ventricular ejection fraction appears to be 56 - 60%.    Left ventricular diastolic function was normal.    Estimated right ventricular systolic pressure from tricuspid regurgitation is normal (<35 mmHg).    No significant change from previous study      Current medications:  Scheduled Meds:folic acid, 1 mg, Oral, Daily  heparin (porcine), 5,000 Units, Subcutaneous, Q8H  levothyroxine, 25 mcg, Oral, Q AM  metoprolol succinate XL, 25 mg, Oral, Nightly  pantoprazole, 40 mg, Oral, Q AM  rosuvastatin, 5 mg, Oral, Nightly  senna-docusate sodium, 2 tablet, Oral, BID  sodium chloride, 10 mL, Intravenous, Q12H  thiamine (B-1) IV, 200 mg, Intravenous, Q8H   Followed by  [START ON 1/8/2024] thiamine, 100 mg, Oral, Daily      Continuous Infusions:     PRN Meds:.  senna-docusate sodium **AND** polyethylene glycol **AND** bisacodyl **AND** bisacodyl    docusate sodium    HYDROcodone-acetaminophen    LORazepam **OR** midazolam **OR** LORazepam **OR** midazolam **OR** midazolam **OR** midazolam    Magnesium Standard Dose Replacement - Follow Nurse / BPA Driven Protocol    nitroglycerin     ondansetron    sodium chloride    sodium chloride    traMADol    Assessment & Plan   Assessment & Plan     Active Hospital Problems    Diagnosis  POA    **UTI (urinary tract infection) [N39.0]  Yes    MARY (acute kidney injury) [N17.9]  Yes      Resolved Hospital Problems   No resolved problems to display.        Brief Hospital Course to date:  Alysia Sierra is a 68 y.o. female with past medical history significant for hypertension, chronic kidney disease, history of uterine cancer, alcohol abuse.  Patient was admitted for severe back pain after fall.    **S/p fall, possibly secondary to alcohol abuse as patient had significantly elevated alcohol level on admission.    **Back pain secondary to mildly displaced fracture of the left L1, L2 and L3 transverse processes.  This is based on the CT scan which was done on admission.  There is no role for any surgical intervention.  -Neurosurgery ordered rigid LSO for spine stabilization.  Much appreciate their help.    **Alcohol abuse, on alcohol withdrawal protocol.    **Acute kidney injury with creatinine of 2.44.  Patient's baseline creatinine seems to be around 1.  Creatinine is improving with hydration.    **Hypertension, reasonably controlled on on home medication    **Uterine cancer.    **Urine drug screening is positive for oxycodone.  However, patient is not on any narcotic pain medication at home.        Expected Discharge Location and Transportation: Most likely home  Expected Discharge to be determined  Expected Discharge Date: 1/6/2024; Expected Discharge Time:      DVT prophylaxis:  Medical DVT prophylaxis orders are present.     AM-PAC 6 Clicks Score (PT): 18 (01/06/24 0800)    CODE STATUS:   Code Status and Medical Interventions:   Ordered at: 01/02/24 3394     Code Status (Patient has no pulse and is not breathing):    CPR (Attempt to Resuscitate)     Medical Interventions (Patient has pulse or is breathing):    Full Support       Alexander Yu  MD  01/06/24

## 2024-01-07 LAB — MAGNESIUM SERPL-MCNC: 1.8 MG/DL (ref 1.6–2.4)

## 2024-01-07 PROCEDURE — 25010000002 HEPARIN (PORCINE) PER 1000 UNITS: Performed by: INTERNAL MEDICINE

## 2024-01-07 PROCEDURE — 83735 ASSAY OF MAGNESIUM: CPT | Performed by: INTERNAL MEDICINE

## 2024-01-07 PROCEDURE — 25010000002 ONDANSETRON PER 1 MG: Performed by: INTERNAL MEDICINE

## 2024-01-07 PROCEDURE — 25010000002 THIAMINE PER 100 MG: Performed by: INTERNAL MEDICINE

## 2024-01-07 RX ORDER — HYDRALAZINE HYDROCHLORIDE 50 MG/1
25 TABLET, FILM COATED ORAL EVERY 12 HOURS SCHEDULED
Status: DISCONTINUED | OUTPATIENT
Start: 2024-01-07 | End: 2024-01-08

## 2024-01-07 RX ORDER — LANOLIN ALCOHOL/MO/W.PET/CERES
1000 CREAM (GRAM) TOPICAL DAILY
Status: DISCONTINUED | OUTPATIENT
Start: 2024-01-07 | End: 2024-01-09 | Stop reason: HOSPADM

## 2024-01-07 RX ORDER — DULOXETIN HYDROCHLORIDE 20 MG/1
20 CAPSULE, DELAYED RELEASE ORAL 2 TIMES DAILY
Status: DISCONTINUED | OUTPATIENT
Start: 2024-01-07 | End: 2024-01-09 | Stop reason: HOSPADM

## 2024-01-07 RX ORDER — GABAPENTIN 300 MG/1
300 CAPSULE ORAL EVERY 8 HOURS SCHEDULED
Status: DISCONTINUED | OUTPATIENT
Start: 2024-01-07 | End: 2024-01-09 | Stop reason: HOSPADM

## 2024-01-07 RX ORDER — HYDROMORPHONE HYDROCHLORIDE 2 MG/1
1 TABLET ORAL ONCE AS NEEDED
Status: COMPLETED | OUTPATIENT
Start: 2024-01-07 | End: 2024-01-07

## 2024-01-07 RX ADMIN — LEVOTHYROXINE SODIUM 25 MCG: 25 TABLET ORAL at 05:41

## 2024-01-07 RX ADMIN — METOPROLOL SUCCINATE 25 MG: 25 TABLET, EXTENDED RELEASE ORAL at 21:41

## 2024-01-07 RX ADMIN — CYANOCOBALAMIN TAB 1000 MCG 1000 MCG: 1000 TAB at 09:28

## 2024-01-07 RX ADMIN — DULOXETINE HYDROCHLORIDE 20 MG: 20 CAPSULE, DELAYED RELEASE ORAL at 09:28

## 2024-01-07 RX ADMIN — THIAMINE HYDROCHLORIDE 200 MG: 100 INJECTION, SOLUTION INTRAMUSCULAR; INTRAVENOUS at 05:41

## 2024-01-07 RX ADMIN — HYDROCODONE BITARTRATE AND ACETAMINOPHEN 1 TABLET: 5; 325 TABLET ORAL at 12:52

## 2024-01-07 RX ADMIN — Medication 10 ML: at 22:14

## 2024-01-07 RX ADMIN — HYDROCODONE BITARTRATE AND ACETAMINOPHEN 1 TABLET: 5; 325 TABLET ORAL at 00:37

## 2024-01-07 RX ADMIN — TRAMADOL HYDROCHLORIDE 50 MG: 50 TABLET, COATED ORAL at 10:54

## 2024-01-07 RX ADMIN — HYDRALAZINE HYDROCHLORIDE 25 MG: 25 TABLET ORAL at 09:28

## 2024-01-07 RX ADMIN — GABAPENTIN 300 MG: 300 CAPSULE ORAL at 21:40

## 2024-01-07 RX ADMIN — ROSUVASTATIN 5 MG: 10 TABLET, FILM COATED ORAL at 21:40

## 2024-01-07 RX ADMIN — FOLIC ACID 1 MG: 1 TABLET ORAL at 09:28

## 2024-01-07 RX ADMIN — ONDANSETRON 4 MG: 2 INJECTION INTRAMUSCULAR; INTRAVENOUS at 07:08

## 2024-01-07 RX ADMIN — HEPARIN SODIUM 5000 UNITS: 5000 INJECTION INTRAVENOUS; SUBCUTANEOUS at 12:58

## 2024-01-07 RX ADMIN — SENNOSIDES AND DOCUSATE SODIUM 2 TABLET: 8.6; 5 TABLET ORAL at 21:43

## 2024-01-07 RX ADMIN — HEPARIN SODIUM 5000 UNITS: 5000 INJECTION INTRAVENOUS; SUBCUTANEOUS at 21:43

## 2024-01-07 RX ADMIN — TRAMADOL HYDROCHLORIDE 50 MG: 50 TABLET, COATED ORAL at 22:59

## 2024-01-07 RX ADMIN — GABAPENTIN 300 MG: 300 CAPSULE ORAL at 12:52

## 2024-01-07 RX ADMIN — THIAMINE HYDROCHLORIDE 200 MG: 100 INJECTION, SOLUTION INTRAMUSCULAR; INTRAVENOUS at 13:31

## 2024-01-07 RX ADMIN — DULOXETINE HYDROCHLORIDE 20 MG: 20 CAPSULE, DELAYED RELEASE ORAL at 21:43

## 2024-01-07 RX ADMIN — SENNOSIDES AND DOCUSATE SODIUM 2 TABLET: 8.6; 5 TABLET ORAL at 09:28

## 2024-01-07 RX ADMIN — HEPARIN SODIUM 5000 UNITS: 5000 INJECTION INTRAVENOUS; SUBCUTANEOUS at 05:41

## 2024-01-07 RX ADMIN — HYDROMORPHONE HYDROCHLORIDE 1 MG: 2 TABLET ORAL at 18:57

## 2024-01-07 RX ADMIN — HYDRALAZINE HYDROCHLORIDE 25 MG: 25 TABLET ORAL at 22:12

## 2024-01-07 RX ADMIN — ONDANSETRON 4 MG: 2 INJECTION INTRAMUSCULAR; INTRAVENOUS at 12:57

## 2024-01-07 RX ADMIN — PANTOPRAZOLE SODIUM 40 MG: 40 TABLET, DELAYED RELEASE ORAL at 05:41

## 2024-01-07 RX ADMIN — HYDROCODONE BITARTRATE AND ACETAMINOPHEN 1 TABLET: 5; 325 TABLET ORAL at 06:30

## 2024-01-07 RX ADMIN — Medication 10 ML: at 09:28

## 2024-01-07 NOTE — PROGRESS NOTES
[facility] Medicine Services  PROGRESS NOTE    Patient Name: Alysia Sierra  : 1955  MRN: [id number]    Date of Admission: 2024  Primary Care Physician: Liana So APRN    Subjective   Subjective     CC:  S/p fall    HPI:  Resting in bed in no acute distress but still has back pain.  No fever or chills.  No chest pain palpitation or shortness of breath.  No vomiting or diarrhea or abdominal pain but has some nausea.      Objective   Objective     Vital Signs:   Temp:  [98 °F (36.7 °C)-98.5 °F (36.9 °C)] 98.1 °F (36.7 °C)  Heart Rate:  [63-89] 89  Resp:  [16-18] 16  BP: (118-159)/(81-95) 141/95     Physical Exam:  Constitutional: No acute distress  HENT: NCAT, mucous membranes moist  Respiratory: Clear to auscultation bilaterally, respiratory effort normal   Cardiovascular: RRR, no murmurs, rubs, or gallops  Gastrointestinal: Positive bowel sounds, soft, nontender, nondistended  Musculoskeletal: No bilateral ankle edema  Psychiatric: Normal affect, cooperative  Neurologic: Awake, alert, oriented x 3, no focality appreciated   skin: No rashes     Results Reviewed:  LAB RESULTS:      Lab 24   WBC 5.13 6.78   HEMOGLOBIN 9.3* 9.6*   HEMATOCRIT 29.9* 30.7*   PLATELETS 151 183   NEUTROS ABS 2.36 3.71   IMMATURE GRANS (ABS) 0.02 0.04   LYMPHS ABS 1.86 2.18   MONOS ABS 0.72 0.73   EOS ABS 0.14 0.10   .2* 107.3*         Lab 24  0617 24  0352 24  0011 24  0501 24  0522 24   SODIUM  --  143  --  135* 144 140   POTASSIUM  --  3.6  --  4.0 4.8 4.7   CHLORIDE  --  103  --  102 111* 105   CO2  --  27.0  --  24.0 20.0* 21.0*   ANION GAP  --  13.0  --  9.0 13.0 14.0   BUN  --  9  --  20 26* 31*   CREATININE  --  1.15*  --  1.27* 1.57* 2.44*   EGFR  --  52.3*  --  46.4* 36.0* 21.2*   GLUCOSE  --  81  --  96 65 92   CALCIUM  --  8.9  --  8.6 8.4* 8.5*   MAGNESIUM 1.8 1.5* 2.0 1.4*  --   --    PHOSPHORUS  --   3.9  --  2.9  --   --          Lab 01/02/24  1958   TOTAL PROTEIN 6.5   ALBUMIN 4.2   GLOBULIN 2.3   ALT (SGPT) 9   AST (SGOT) 18   BILIRUBIN 0.2   ALK PHOS 74                 Lab 01/03/24  0522   FOLATE 2.93*   VITAMIN B 12 691         Brief Urine Lab Results  (Last result in the past 365 days)        Color   Clarity   Blood   Leuk Est   Nitrite   Protein   CREAT   Urine HCG        01/02/24 2115 Yellow   Cloudy   Negative   Small (1+)   Negative   Negative                   Microbiology Results Abnormal       None            No radiology results from the last 24 hrs    Results for orders placed during the hospital encounter of 01/02/24    Adult Transthoracic Echo Complete W/ Cont if Necessary Per Protocol    Interpretation Summary    Left ventricular systolic function is normal. Calculated left ventricular EF = 57.9% Left ventricular ejection fraction appears to be 56 - 60%.    Left ventricular diastolic function was normal.    Estimated right ventricular systolic pressure from tricuspid regurgitation is normal (<35 mmHg).    No significant change from previous study      Current medications:  Scheduled Meds:DULoxetine, 20 mg, Oral, BID  folic acid, 1 mg, Oral, Daily  gabapentin, 300 mg, Oral, Q8H  heparin (porcine), 5,000 Units, Subcutaneous, Q8H  hydrALAZINE, 25 mg, Oral, Q12H  levothyroxine, 25 mcg, Oral, Q AM  metoprolol succinate XL, 25 mg, Oral, Nightly  pantoprazole, 40 mg, Oral, Q AM  rosuvastatin, 5 mg, Oral, Nightly  senna-docusate sodium, 2 tablet, Oral, BID  sodium chloride, 10 mL, Intravenous, Q12H  thiamine (B-1) IV, 200 mg, Intravenous, Q8H   Followed by  [START ON 1/8/2024] thiamine, 100 mg, Oral, Daily  vitamin B-12, 1,000 mcg, Oral, Daily      Continuous Infusions:     PRN Meds:.  senna-docusate sodium **AND** polyethylene glycol **AND** bisacodyl **AND** bisacodyl    docusate sodium    HYDROcodone-acetaminophen    LORazepam **OR** midazolam **OR** LORazepam **OR** midazolam **OR** midazolam  **OR** midazolam    Magnesium Standard Dose Replacement - Follow Nurse / BPA Driven Protocol    nitroglycerin    ondansetron    sodium chloride    sodium chloride    traMADol    Assessment & Plan   Assessment & Plan     Active Hospital Problems    Diagnosis  POA    **UTI (urinary tract infection) [N39.0]  Yes    MARY (acute kidney injury) [N17.9]  Yes      Resolved Hospital Problems   No resolved problems to display.        Brief Hospital Course to date:  Alysia Sierra is a 68 y.o. female with past medical history significant for hypertension, chronic kidney disease, history of uterine cancer, alcohol abuse.  Patient was admitted for severe back pain after fall.    **S/p fall, possibly secondary to alcohol abuse as patient had significantly elevated alcohol level on admission.    **Back pain secondary to mildly displaced fracture of the left L1, L2 and L3 transverse processes.  This is based on the CT scan which was done on admission.  There is no role for any surgical intervention.  -Neurosurgery ordered rigid LSO for spine stabilization.  Much appreciate their help.    **Alcohol abuse, on alcohol withdrawal protocol.    **Acute kidney injury with creatinine of 2.44.  Patient's baseline creatinine seems to be around 1.  Creatinine is improving with hydration.    **Hypertension, reasonably controlled on on home medication    **Uterine cancer.    **Urine drug screening is positive for oxycodone.  However, patient is not on any narcotic pain medication at home.      PLAN:  - continue current care  - PT/OT  -rehab placement when bed available      Expected Discharge Location and Transportation: Most likely home  Expected Discharge to be determined  Expected Discharge Date: 1/6/2024; Expected Discharge Time:      DVT prophylaxis:  Medical DVT prophylaxis orders are present.     AM-PAC 6 Clicks Score (PT): 18 (01/07/24 0800)    CODE STATUS:   Code Status and Medical Interventions:   Ordered at: 01/02/24 3227     Code  Status (Patient has no pulse and is not breathing):    CPR (Attempt to Resuscitate)     Medical Interventions (Patient has pulse or is breathing):    Full Support       Alexander Yu MD  01/07/24

## 2024-01-07 NOTE — PLAN OF CARE
Problem: Adult Inpatient Plan of Care  Goal: Plan of Care Review  Outcome: Ongoing, Progressing     Problem: Pain Acute  Goal: Acceptable Pain Control and Functional Ability  Outcome: Ongoing, Progressing  Intervention: Develop Pain Management Plan  Recent Flowsheet Documentation  Taken 1/6/2024 2112 by Bola Givens, RN  Pain Management Interventions: see MAR  Intervention: Optimize Psychosocial Wellbeing  Recent Flowsheet Documentation  Taken 1/6/2024 2112 by Bola Givens, RN  Diversional Activities: television     Problem: Fall Injury Risk  Goal: Absence of Fall and Fall-Related Injury  Outcome: Ongoing, Progressing  Intervention: Promote Injury-Free Environment  Recent Flowsheet Documentation  Taken 1/7/2024 0400 by Bola Givens RN  Safety Promotion/Fall Prevention: safety round/check completed  Taken 1/7/2024 0200 by Bola Givens RN  Safety Promotion/Fall Prevention: safety round/check completed  Taken 1/7/2024 0000 by Bola Givens RN  Safety Promotion/Fall Prevention: safety round/check completed  Taken 1/6/2024 2200 by Bola Givens RN  Safety Promotion/Fall Prevention: safety round/check completed  Taken 1/6/2024 2112 by Bola Givens RN  Safety Promotion/Fall Prevention: safety round/check completed     Problem: Skin Injury Risk Increased  Goal: Skin Health and Integrity  Outcome: Ongoing, Progressing  Intervention: Optimize Skin Protection  Recent Flowsheet Documentation  Taken 1/7/2024 0400 by Bola Givens RN  Head of Bed (HOB) Positioning: HOB elevated  Taken 1/7/2024 0200 by Bola Givens RN  Head of Bed (HOB) Positioning: HOB elevated  Taken 1/7/2024 0000 by Bola Givens RN  Head of Bed (HOB) Positioning: HOB elevated  Taken 1/6/2024 2200 by Bola Givens RN  Head of Bed (HOB) Positioning: HOB elevated  Taken 1/6/2024 2112 by Bola Givens, RN  Pressure Reduction Techniques:   frequent weight shift encouraged    weight shift assistance provided  Head of Bed (HOB) Positioning: HOB elevated  Pressure Reduction Devices: pressure-redistributing mattress utilized  Skin Protection: adhesive use limited   Goal Outcome Evaluation: Progressing

## 2024-01-08 PROCEDURE — 25010000002 HEPARIN (PORCINE) PER 1000 UNITS: Performed by: INTERNAL MEDICINE

## 2024-01-08 PROCEDURE — 97530 THERAPEUTIC ACTIVITIES: CPT

## 2024-01-08 RX ORDER — LIDOCAINE 4 G/G
1 PATCH TOPICAL
Status: DISCONTINUED | OUTPATIENT
Start: 2024-01-08 | End: 2024-01-09 | Stop reason: HOSPADM

## 2024-01-08 RX ORDER — HYDRALAZINE HYDROCHLORIDE 50 MG/1
25 TABLET, FILM COATED ORAL EVERY 12 HOURS SCHEDULED
Status: DISCONTINUED | OUTPATIENT
Start: 2024-01-08 | End: 2024-01-09 | Stop reason: HOSPADM

## 2024-01-08 RX ADMIN — Medication 10 ML: at 21:09

## 2024-01-08 RX ADMIN — HEPARIN SODIUM 5000 UNITS: 5000 INJECTION INTRAVENOUS; SUBCUTANEOUS at 21:07

## 2024-01-08 RX ADMIN — METOPROLOL SUCCINATE 25 MG: 25 TABLET, EXTENDED RELEASE ORAL at 21:08

## 2024-01-08 RX ADMIN — LEVOTHYROXINE SODIUM 25 MCG: 25 TABLET ORAL at 05:44

## 2024-01-08 RX ADMIN — TRAMADOL HYDROCHLORIDE 50 MG: 50 TABLET, COATED ORAL at 23:33

## 2024-01-08 RX ADMIN — FOLIC ACID 1 MG: 1 TABLET ORAL at 09:38

## 2024-01-08 RX ADMIN — HEPARIN SODIUM 5000 UNITS: 5000 INJECTION INTRAVENOUS; SUBCUTANEOUS at 05:44

## 2024-01-08 RX ADMIN — HYDRALAZINE HYDROCHLORIDE 25 MG: 25 TABLET ORAL at 09:42

## 2024-01-08 RX ADMIN — DULOXETINE HYDROCHLORIDE 20 MG: 20 CAPSULE, DELAYED RELEASE ORAL at 21:08

## 2024-01-08 RX ADMIN — SENNOSIDES AND DOCUSATE SODIUM 2 TABLET: 8.6; 5 TABLET ORAL at 09:38

## 2024-01-08 RX ADMIN — SENNOSIDES AND DOCUSATE SODIUM 1 TABLET: 8.6; 5 TABLET ORAL at 21:07

## 2024-01-08 RX ADMIN — GABAPENTIN 300 MG: 300 CAPSULE ORAL at 05:44

## 2024-01-08 RX ADMIN — CYANOCOBALAMIN TAB 1000 MCG 1000 MCG: 1000 TAB at 09:38

## 2024-01-08 RX ADMIN — Medication 10 ML: at 09:38

## 2024-01-08 RX ADMIN — DULOXETINE HYDROCHLORIDE 20 MG: 20 CAPSULE, DELAYED RELEASE ORAL at 09:38

## 2024-01-08 RX ADMIN — THIAMINE HCL TAB 100 MG 100 MG: 100 TAB at 14:50

## 2024-01-08 RX ADMIN — LIDOCAINE 1 PATCH: 4 PATCH TOPICAL at 11:01

## 2024-01-08 RX ADMIN — PANTOPRAZOLE SODIUM 40 MG: 40 TABLET, DELAYED RELEASE ORAL at 05:44

## 2024-01-08 RX ADMIN — GABAPENTIN 300 MG: 300 CAPSULE ORAL at 14:49

## 2024-01-08 RX ADMIN — ROSUVASTATIN 5 MG: 10 TABLET, FILM COATED ORAL at 21:09

## 2024-01-08 RX ADMIN — HYDRALAZINE HYDROCHLORIDE 25 MG: 50 TABLET, FILM COATED ORAL at 23:32

## 2024-01-08 RX ADMIN — HEPARIN SODIUM 5000 UNITS: 5000 INJECTION INTRAVENOUS; SUBCUTANEOUS at 14:48

## 2024-01-08 RX ADMIN — TRAMADOL HYDROCHLORIDE 50 MG: 50 TABLET, COATED ORAL at 11:01

## 2024-01-08 RX ADMIN — GABAPENTIN 300 MG: 300 CAPSULE ORAL at 21:08

## 2024-01-08 NOTE — PLAN OF CARE
Goal Outcome Evaluation:  Plan of Care Reviewed With: patient, family        Progress: improving  Outcome Evaluation: Patient showing significant improvement with mobility with ability to ambulate 700' CGA with FWW and no LOB. She is still mobilizing below her baseline indicating IPPT intervention, although given improvement will update D/C rec to home with OPPT for balance retraining. Educated patient on donning/doffing LSO and also use of FWW at all times at D/C.      Anticipated Discharge Disposition (PT): home, home with outpatient therapy services

## 2024-01-08 NOTE — PLAN OF CARE
Goal Outcome Evaluation:  Plan of Care Reviewed With: patient        Progress: improving  Outcome Evaluation: Patient up to the chair and amb in bass with standby assist/ back brace/ walker. C/O lower back pain controlled by the use of the PRN Ultram and the Lidocaine patch. VSS. Appetite good. NAD noted.

## 2024-01-08 NOTE — CASE MANAGEMENT/SOCIAL WORK
Discharge Planning Assessment  [facility]     Patient Name: Alysia Sierra  MRN: [id number]  Today's Date: 1/8/2024    Admit Date: 1/2/2024    Plan: HOME   Discharge Needs Assessment    No documentation.                  Discharge Plan       Row Name 01/08/24 1351       Plan    Plan HOME    Patient/Family in Agreement with Plan yes    Plan Comments Met with pt and PT at bedside.  Pt has progressed with PT and they now rec OP services upon DC.  Pt is agreeable.  CM will provide written orders prior to DC.    Final Discharge Disposition Code 01 - home or self-care                  Continued Care and Services - Admitted Since 1/2/2024       Destination       Service Provider Request Status Selected Services Address Phone Fax Patient Preferred    [facility] SUBACUTE Pending - No Request Sent N/A [address] [facility] [id number] [phone] --    [facility] PL[address] Pending - No Request Sent N/A [address] [id number] [phone] --                  Expected Discharge Date and Time       Expected Discharge Date Expected Discharge Time    Claudy 10, 2024            Demographic Summary    No documentation.                  Functional Status    No documentation.                  Psychosocial    No documentation.                  Abuse/Neglect    No documentation.                  Legal    No documentation.                  Substance Abuse    No documentation.                  Patient Forms    No documentation.                     Rosi Hoffmann RN

## 2024-01-08 NOTE — THERAPY TREATMENT NOTE
Patient Name: Alysia Sierra  : 1955    MRN: [id number]                              Today's Date: 2024       Admit Date: 2024    Visit Dx:     ICD-10-CM ICD-9-CM   1. Acute kidney injury  N17.9 584.9   2. Dehydration  E86.0 276.51   3. Hypotension due to hypovolemia  I95.89 458.8    E86.1 276.52   4. Alcoholic intoxication without complication  F10.920 305.00   5. Pelvic pain  R10.2 [id number]   6. Neuropathy  G62.9 355.9   7. Leg weakness, bilateral  R29.898 729.89     Patient Active Problem List   Diagnosis    Hypertension    Paroxysmal SVT (supraventricular tachycardia)    Leg weakness, bilateral    Syncope    CKD (chronic kidney disease) stage 3, GFR 30-59 ml/min    Circadian rhythm sleep disorder, advanced sleep phase type    Neuropathy    Hyperlipidemia LDL goal <100    Endometrial adenocarcinoma    Mild episode of recurrent major depressive disorder    Dizziness    Hip fracture    Anemia    Hypomagnesemia    Proteinuria    Hypothyroidism    Post-menopausal    Other osteoporosis without current pathological fracture    UTI (urinary tract infection)    MARY (acute kidney injury)     Past Medical History:   Diagnosis Date    Allergic lisinopril  2017    Anemia     Arrhythmia     Arthritis     Cancer     uterine    Cataract mild 2020    stil lpresent    Chicken pox     Chronic fatigue     CKD (chronic kidney disease), stage III     sees nephro    Clotting disorder     Dental root implant present     lower left  x1 - possible dental implant    Depression mild 2019    Difficulty walking 2019    Disease of thyroid gland     Dizzy     NIEVES (dyspnea on exertion)     2017    Fracture of hip     Generalized anxiety disorder     GERD (gastroesophageal reflux disease) 2018    History of brachytherapy 2023    vaginal brachytherapy    Hyperlipidemia     Hypertension     Iron deficiency anemia     Liver disease     fatty    Liver problem     Measles     Menopause     Mumps     Neuromuscular disorder  Peripheral Neuropathy    Orthostatic hypotension     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    Renal insufficiency     Scoliosis     Unintentional weight loss     Uses contact lenses     bilat    Uterine cancer     Uterine cancer 2022    UTI (urinary tract infection)     Visual impairment Nearsighted    Wears glasses      Past Surgical History:   Procedure Laterality Date     SECTION      DILATION AND CURETTAGE, DIAGNOSTIC / THERAPEUTIC      HIP OPEN REDUCTION Left 2023    Procedure: FEMORAL NECK OPEN REDUCTION INTERNAL FIXATION LEFT;  Surgeon: Juanpablo Lee MD;  Location: [facility];  Service: Orthopedics;  Laterality: Left;    HIP SURGERY      OOPHORECTOMY      ORAL LESION EXCISION/BIOPSY  2021    TOTAL LAPAROSCOPIC HYSTERECTOMY SALPINGO OOPHORECTOMY N/A 10/28/2022    Procedure: TOTAL LAPAROSCOPIC HYSTERECTOMY BILATERAL SALPINGO-OOPHORECTOMY, INJECTION FOR SENTINEL LYMPH NODE MAPPING, BILATERAL SENTINEL LYMPH NODE DISSECTION WITH DAVINCI ROBOT;  Surgeon: Jasmine Rich MD;  Location: [facility];  Service: Robotics - DaVinci;  Laterality: N/A;    TUBAL ABDOMINAL LIGATION        General Information       Row Name 24 1407          Physical Therapy Time and Intention    Document Type therapy note (daily note)  -CM     Mode of Treatment physical therapy;individual therapy  -CM       Row Name 24 1407          General Information    Patient Profile Reviewed yes  -CM     Existing Precautions/Restrictions fall;seizures;spinal;LSO  spinal for comfort, LSO for OOB/chair activity  -CM     Barriers to Rehab medically complex;previous functional deficit  -CM       Row Name 24 1407          Cognition    Orientation Status (Cognition) oriented x 3  -CM       Row Name 24 1407          Safety Issues, Functional Mobility    Safety Issues Affecting Function (Mobility) awareness of need for assistance;insight into deficits/self-awareness;safety  precaution awareness  -CM     Impairments Affecting Function (Mobility) balance;endurance/activity tolerance;pain;strength;postural/trunk control;sensation/sensory awareness  -CM               User Key  (r) = Recorded By, (t) = Taken By, (c) = Cosigned By      Initials Name Provider Type    Karen Armstrong PT Physical Therapist                   Mobility       Row Name 01/08/24 1408          Bed Mobility    Comment, (Bed Mobility) UIC pre/post treatment  -CM       Row Name 01/08/24 1408          Transfers    Comment, (Transfers) LSO donned while seated in chair and adjusted/cinched in standing, doffed in chair following ambulation as below  -CM       Row Name 01/08/24 1408          Sit-Stand Transfer    Sit-Stand [place] (Transfers) standby assist  -CM     Assistive Device (Sit-Stand Transfers) walker, front-wheeled  -CM       Row Name 01/08/24 1408          Gait/Stairs (Locomotion)    [place] Level (Gait) contact guard;1 person assist;verbal cues  -CM     Assistive Device (Gait) walker, front-wheeled  -CM     Distance in Feet (Gait) 700  -CM     Deviations/Abnormal Patterns (Gait) bilateral deviations;werner decreased;gait speed decreased;stride length decreased;base of support, wide  -CM     Comment, (Gait/Stairs) Patient ambulated in bass with a step through gait pattern while on RA. She was steady on her feet with no LOB, minimal cues for increased clearance of walker around objects as patient will get very close to objects, but did not overtly hit anything today. Cues provided for relaxed shoulders with good follow through from patient. Verbally reviewed navigating a single platform step with FWW with good comprehension from patient. Also encouraged walker use 24/7 at home.  -CM               User Key  (r) = Recorded By, (t) = Taken By, (c) = Cosigned By      Initials Name Provider Type    Karen Armstrong PT Physical Therapist                   Obj/Interventions       Row Name  01/08/24 1449          Balance    Balance Assessment sitting static balance;standing static balance;standing dynamic balance  -CM     Static Sitting Balance independent  -CM     Position, Sitting Balance sitting in chair;unsupported  -CM     Static Standing Balance standby assist  -CM     Dynamic Standing Balance contact guard  -CM     Position/Device Used, Standing Balance supported;walker, front-wheeled  -CM     Comment, Balance no unsteadiness or LOB  -CM               User Key  (r) = Recorded By, (t) = Taken By, (c) = Cosigned By      Initials Name Provider Type    Karen Armstrong, PT Physical Therapist                   Goals/Plan    No documentation.                  Clinical Impression       Row Name 01/08/24 1449          Pain    Pretreatment Pain Rating 0/10 - no pain  -CM     Posttreatment Pain Rating 1/10  -CM     Pain Location generalized  -CM     Pain Location - back  -CM     Pre/Posttreatment Pain Comment minimal pain post ambulation, patient reports that lidocaine patch is working very well for her  -CM     Pain Intervention(s) Ambulation/increased activity;Repositioned;Rest  -CM       Row Name 01/08/24 1449          Plan of Care Review    Plan of Care Reviewed With patient;family  -CM     Progress improving  -CM     Outcome Evaluation Patient showing significant improvement with mobility with ability to ambulate 700' CGA with FWW and no LOB. She is still mobilizing below her baseline indicating IPPT intervention, although given improvement will update D/C rec to home with OPPT for balance retraining. Educated patient on donning/doffing LSO and also use of FWW at all times at D/C.  -CM       Row Name 01/08/24 1449          Vital Signs    Pre Systolic BP Rehab 134  -CM     Pre Treatment Diastolic BP 96  -CM     Pretreatment Heart Rate (beats/min) 83  -CM     Intratreatment Heart Rate (beats/min) 125  -CM     Posttreatment Heart Rate (beats/min) 82  -CM     O2 Delivery Pre Treatment room air   -CM     O2 Delivery Intra Treatment room air  -CM     O2 Delivery Post Treatment room air  -CM     Pre Patient Position Sitting  -CM     Intra Patient Position Standing  -CM     Post Patient Position Sitting  -CM       Row Name 01/08/24 1449          Positioning and Restraints    Pre-Treatment Position sitting in chair/recliner  -CM     Post Treatment Position chair  -CM     In Chair reclined;call light within reach;encouraged to call for assist;exit alarm on;with family/caregiver;waffle cushion;notified nsg  -CM               User Key  (r) = Recorded By, (t) = Taken By, (c) = Cosigned By      Initials Name Provider Type    Karen Armstrong, MARIBETH Physical Therapist                   Outcome Measures       Row Name 01/08/24 1453 01/08/24 0800       How much help from another person do you currently need...    Turning from your back to your side while in flat bed without using bedrails? 4  -CM 3  -LN    Moving from lying on back to sitting on the side of a flat bed without bedrails? 4  -CM 3  -LN    Moving to and from a bed to a chair (including a wheelchair)? 3  -CM 3  -LN    Standing up from a chair using your arms (e.g., wheelchair, bedside chair)? 3  -CM 3  -LN    Climbing 3-5 steps with a railing? 3  -CM 2  -LN    To walk in hospital room? 3  -CM 3  -LN    AM-PAC 6 Clicks Score (PT) 20  -CM 17  -LN    Highest Level of Mobility Goal 6 --> Walk 10 steps or more  -CM 5 --> Static standing  -LN      Row Name 01/08/24 1453          Functional Assessment    Outcome Measure Options AM-PAC 6 Clicks Basic Mobility (PT)  -CM               User Key  (r) = Recorded By, (t) = Taken By, (c) = Cosigned By      Initials Name Provider Type    Cassidy Carbone RN Registered Nurse    Karen Armstrong, MARIBETH Physical Therapist                                 Physical Therapy Education       Title: PT OT SLP Therapies (Done)       Topic: Physical Therapy (Done)       Point: Mobility training (Done)       Learning Progress  Summary             Patient Acceptance, E, VU by CM at 1/8/2024 1454    Comment: educated patient on donning/doffing LSO and to wear throughout day at home for activity. Also educated patient to use FWW AAT at D/C rather than furniture surfing at home    Acceptance, E,TB, VU by BT at 1/6/2024 2247    Acceptance, E,TB, NR by  at 1/5/2024 1122    Acceptance, E, VU,NR by BA at 1/3/2024 1704   Family Acceptance, E, VU by CM at 1/8/2024 1454    Comment: educated patient on donning/doffing LSO and to wear throughout day at home for activity. Also educated patient to use FWW AAT at D/C rather than furniture surfing at home                         Point: Home exercise program (Done)       Learning Progress Summary             Patient Acceptance, E, VU by CM at 1/8/2024 1454    Comment: educated patient on donning/doffing LSO and to wear throughout day at home for activity. Also educated patient to use FWW AAT at D/C rather than furniture surfing at home    Acceptance, E,TB, VU by BT at 1/6/2024 2247    Acceptance, E,TB, NR by  at 1/5/2024 1122   Family Acceptance, E, VU by CM at 1/8/2024 1454    Comment: educated patient on donning/doffing LSO and to wear throughout day at home for activity. Also educated patient to use FWW AAT at D/C rather than furniture surfing at home                         Point: Body mechanics (Done)       Learning Progress Summary             Patient Acceptance, E, VU by CM at 1/8/2024 1454    Comment: educated patient on donning/doffing LSO and to wear throughout day at home for activity. Also educated patient to use FWW AAT at D/C rather than furniture surfing at home    Acceptance, E,TB, VU by BT at 1/6/2024 2247    Acceptance, E,TB, NR by  at 1/5/2024 1122    Acceptance, E, VU,NR by BA at 1/3/2024 1704   Family Acceptance, E, VU by CM at 1/8/2024 1454    Comment: educated patient on donning/doffing LSO and to wear throughout day at home for activity. Also educated patient to use FWW AAT at  D/C rather than furniture surfing at home                         Point: Precautions (Done)       Learning Progress Summary             Patient Acceptance, E, VU by CM at 1/8/2024 1454    Comment: educated patient on donning/doffing LSO and to wear throughout day at home for activity. Also educated patient to use FWW AAT at D/C rather than furniture surfing at home    Acceptance, E,TB, VU by BT at 1/6/2024 2247    Acceptance, E,TB, NR by  at 1/5/2024 1122    Acceptance, E, VU,NR by BA at 1/3/2024 1704   Family Acceptance, E, VU by CM at 1/8/2024 1454    Comment: educated patient on donning/doffing LSO and to wear throughout day at home for activity. Also educated patient to use FWW AAT at D/C rather than furniture surfing at home                                         User Key       Initials Effective Dates Name Provider Type Discipline    BT 10/19/23 -  Bola Givens, RN Registered Nurse Nurse    BA 09/21/21 -  Chiquis Rowland, PT Physical Therapist PT     09/22/22 -  Marilee Hayes, PT Physical Therapist PT     09/22/22 -  Karen Mcgovern, PT Physical Therapist PT                  PT Recommendation and Plan     Plan of Care Reviewed With: patient, family  Progress: improving  Outcome Evaluation: Patient showing significant improvement with mobility with ability to ambulate 700' CGA with FWW and no LOB. She is still mobilizing below her baseline indicating IPPT intervention, although given improvement will update D/C rec to home with OPPT for balance retraining. Educated patient on donning/doffing LSO and also use of FWW at all times at D/C.     Time Calculation:         PT Charges       Row Name 01/08/24 1455             Time Calculation    Start Time 1329  -CM      PT Received On 01/08/24  -CM      PT Goal Re-Cert Due Date 01/13/24  -CM         Timed Charges    93616 - PT Therapeutic Activity Minutes 38  -CM         Total Minutes    Timed Charges Total Minutes 38  -CM       Total Minutes 38   -CM                User Key  (r) = Recorded By, (t) = Taken By, (c) = Cosigned By      Initials Name Provider Type    CM Karen Mcgovern, PT Physical Therapist                  Therapy Charges for Today       Code Description Service Date Service Provider Modifiers Qty    [id number]  PT THERAPEUTIC ACT EA 15 MIN 1/8/2024 Karen Mcgovern, MARIBETH GP 3            PT G-Codes  Outcome Measure Options: AM-PAC 6 Clicks Basic Mobility (PT)  AM-PAC 6 Clicks Score (PT): 20  AM-PAC 6 Clicks Score (OT): 14  PT Discharge Summary  Anticipated Discharge Disposition (PT): home, home with outpatient therapy services    Karen Mcgovern, MARIBETH  1/8/2024

## 2024-01-08 NOTE — PROGRESS NOTES
[facility] Medicine Services  PROGRESS NOTE    Patient Name: Alysia Sierra  : 1955  MRN: [id number]    Date of Admission: 2024  Primary Care Physician: Liana So APRN    Subjective   Subjective     CC:  S/p fall    HPI:  Pt is continuing to improve.  C/o persistent pain on L lower back.  Has tried multiple meds for it, tramadol helped last night.  Otherwise, would like to go home.      Objective   Objective     Vital Signs:   Temp:  [98 °F (36.7 °C)-98.2 °F (36.8 °C)] 98.1 °F (36.7 °C)  Heart Rate:  [] 79  Resp:  [16-18] 16  BP: (134-160)/(82-96) 134/96  Flow (L/min):  [0] 0     Physical Exam:  Constitutional: No acute distress  HENT: NCAT, mucous membranes moist  Respiratory: Clear to auscultation bilaterally, respiratory effort normal   Cardiovascular: RRR, no murmurs, rubs, or gallops  Gastrointestinal: Positive bowel sounds, soft, nontender, nondistended  Musculoskeletal: No bilateral ankle edema.  Pain with palpation of L lower back.  Psychiatric: Normal affect, cooperative  Neurologic: Awake, alert, oriented x 3, no focality appreciated   skin: No rashes     Results Reviewed:  LAB RESULTS:      Lab 24   WBC 5.13 6.78   HEMOGLOBIN 9.3* 9.6*   HEMATOCRIT 29.9* 30.7*   PLATELETS 151 183   NEUTROS ABS 2.36 3.71   IMMATURE GRANS (ABS) 0.02 0.04   LYMPHS ABS 1.86 2.18   MONOS ABS 0.72 0.73   EOS ABS 0.14 0.10   .2* 107.3*         Lab 24  0617 24  0352 24  0011 24  0501 24   SODIUM  --  143  --  135* 144 140   POTASSIUM  --  3.6  --  4.0 4.8 4.7   CHLORIDE  --  103  --  102 111* 105   CO2  --  27.0  --  24.0 20.0* 21.0*   ANION GAP  --  13.0  --  9.0 13.0 14.0   BUN  --  9  --  20 26* 31*   CREATININE  --  1.15*  --  1.27* 1.57* 2.44*   EGFR  --  52.3*  --  46.4* 36.0* 21.2*   GLUCOSE  --  81  --  96 65 92   CALCIUM  --  8.9  --  8.6 8.4* 8.5*   MAGNESIUM 1.8 1.5* 2.0 1.4*   --   --    PHOSPHORUS  --  3.9  --  2.9  --   --          Lab 01/02/24  1958   TOTAL PROTEIN 6.5   ALBUMIN 4.2   GLOBULIN 2.3   ALT (SGPT) 9   AST (SGOT) 18   BILIRUBIN 0.2   ALK PHOS 74                 Lab 01/03/24  0522   FOLATE 2.93*   VITAMIN B 12 691         Brief Urine Lab Results  (Last result in the past 365 days)        Color   Clarity   Blood   Leuk Est   Nitrite   Protein   CREAT   Urine HCG        01/02/24 2115 Yellow   Cloudy   Negative   Small (1+)   Negative   Negative                   Microbiology Results Abnormal       None            No radiology results from the last 24 hrs    Results for orders placed during the hospital encounter of 01/02/24    Adult Transthoracic Echo Complete W/ Cont if Necessary Per Protocol    Interpretation Summary    Left ventricular systolic function is normal. Calculated left ventricular EF = 57.9% Left ventricular ejection fraction appears to be 56 - 60%.    Left ventricular diastolic function was normal.    Estimated right ventricular systolic pressure from tricuspid regurgitation is normal (<35 mmHg).    No significant change from previous study      Current medications:  Scheduled Meds:DULoxetine, 20 mg, Oral, BID  folic acid, 1 mg, Oral, Daily  gabapentin, 300 mg, Oral, Q8H  heparin (porcine), 5,000 Units, Subcutaneous, Q8H  hydrALAZINE, 25 mg, Oral, Q12H  levothyroxine, 25 mcg, Oral, Q AM  Lidocaine, 1 patch, Transdermal, Q24H  metoprolol succinate XL, 25 mg, Oral, Nightly  pantoprazole, 40 mg, Oral, Q AM  rosuvastatin, 5 mg, Oral, Nightly  senna-docusate sodium, 2 tablet, Oral, BID  sodium chloride, 10 mL, Intravenous, Q12H  thiamine (B-1) IV, 200 mg, Intravenous, Q8H   Followed by  thiamine, 100 mg, Oral, Daily  vitamin B-12, 1,000 mcg, Oral, Daily      Continuous Infusions:     PRN Meds:.  senna-docusate sodium **AND** polyethylene glycol **AND** bisacodyl **AND** bisacodyl    docusate sodium    HYDROcodone-acetaminophen    LORazepam **OR** midazolam **OR**  LORazepam **OR** midazolam **OR** midazolam **OR** midazolam    Magnesium Standard Dose Replacement - Follow Nurse / BPA Driven Protocol    nitroglycerin    ondansetron    sodium chloride    sodium chloride    traMADol    Assessment & Plan   Assessment & Plan     Active Hospital Problems    Diagnosis  POA    **UTI (urinary tract infection) [N39.0]  Yes    MARY (acute kidney injury) [N17.9]  Yes      Resolved Hospital Problems   No resolved problems to display.        Brief Hospital Course to date:  Alysia Sierra is a 68 y.o. female with past medical history significant for hypertension, chronic kidney disease, history of uterine cancer, alcohol abuse.  Patient was admitted for severe back pain after fall.    **S/p fall, possibly secondary to alcohol abuse as patient had significantly elevated alcohol level on admission.    **Back pain secondary to mildly displaced fracture of the left L1, L2 and L3 transverse processes.  This is based on the CT scan which was done on admission.  There is no role for any surgical intervention.  -Neurosurgery ordered rigid LSO for spine stabilization.  Much appreciate their help.  --cont PT/OT  --Add lidocaine patch for improved pain control.    **Alcohol abuse, on alcohol withdrawal protocol.  --CIWA score 0    **Acute kidney injury with creatinine of 2.44.  Patient's baseline creatinine seems to be around 1.  Creatinine is improving with hydration.    **Hypertension, reasonably controlled on on home medication    **Uterine cancer.    **Urine drug screening is positive for oxycodone.  However, patient is not on any narcotic pain medication at home.      PLAN:  - continue current care  - PT/OT  -rehab placement when bed available      Expected Discharge Location and Transportation: Most likely home  Expected Discharge to be determined  Expected Discharge Date: 1/10/2024; Expected Discharge Time:      DVT prophylaxis:  Medical DVT prophylaxis orders are present.     AM-PAC 6 Clicks  Score (PT): 17 (01/08/24 0800)    CODE STATUS:   Code Status and Medical Interventions:   Ordered at: 01/02/24 3634     Code Status (Patient has no pulse and is not breathing):    CPR (Attempt to Resuscitate)     Medical Interventions (Patient has pulse or is breathing):    Full Support       Georgina Lamar MD  01/08/24

## 2024-01-09 ENCOUNTER — READMISSION MANAGEMENT (OUTPATIENT)
Dept: CALL CENTER | Facility: HOSPITAL | Age: 69
End: 2024-01-09
Payer: MEDICARE

## 2024-01-09 VITALS
WEIGHT: 128 LBS | RESPIRATION RATE: 18 BRPM | OXYGEN SATURATION: 93 % | HEART RATE: 84 BPM | HEIGHT: 65 IN | SYSTOLIC BLOOD PRESSURE: 132 MMHG | BODY MASS INDEX: 21.33 KG/M2 | TEMPERATURE: 98.4 F | DIASTOLIC BLOOD PRESSURE: 75 MMHG

## 2024-01-09 PROBLEM — N39.0 UTI (URINARY TRACT INFECTION): Status: RESOLVED | Noted: 2024-01-02 | Resolved: 2024-01-09

## 2024-01-09 PROBLEM — S32.029A CLOSED FRACTURE OF SECOND LUMBAR VERTEBRA: Status: ACTIVE | Noted: 2024-01-09

## 2024-01-09 PROBLEM — N17.9 AKI (ACUTE KIDNEY INJURY): Status: RESOLVED | Noted: 2024-01-03 | Resolved: 2024-01-09

## 2024-01-09 PROCEDURE — 25010000002 HEPARIN (PORCINE) PER 1000 UNITS: Performed by: INTERNAL MEDICINE

## 2024-01-09 PROCEDURE — 97530 THERAPEUTIC ACTIVITIES: CPT

## 2024-01-09 PROCEDURE — 97535 SELF CARE MNGMENT TRAINING: CPT

## 2024-01-09 RX ORDER — LIDOCAINE 4 G/G
1 PATCH TOPICAL
Qty: 15 EACH | Refills: 0 | Status: SHIPPED | OUTPATIENT
Start: 2024-01-09

## 2024-01-09 RX ORDER — FOLIC ACID 1 MG/1
1 TABLET ORAL DAILY
Qty: 30 TABLET | Refills: 0 | Status: SHIPPED | OUTPATIENT
Start: 2024-01-09

## 2024-01-09 RX ORDER — PANTOPRAZOLE SODIUM 40 MG/1
40 TABLET, DELAYED RELEASE ORAL
Status: DISCONTINUED | OUTPATIENT
Start: 2024-01-10 | End: 2024-01-09 | Stop reason: HOSPADM

## 2024-01-09 RX ORDER — LANOLIN ALCOHOL/MO/W.PET/CERES
100 CREAM (GRAM) TOPICAL DAILY
Qty: 30 TABLET | Refills: 0 | Status: SHIPPED | OUTPATIENT
Start: 2024-01-09

## 2024-01-09 RX ORDER — TRAMADOL HYDROCHLORIDE 50 MG/1
50 TABLET ORAL EVERY 12 HOURS PRN
Qty: 10 TABLET | Refills: 0 | Status: SHIPPED | OUTPATIENT
Start: 2024-01-09

## 2024-01-09 RX ADMIN — FOLIC ACID 1 MG: 1 TABLET ORAL at 09:40

## 2024-01-09 RX ADMIN — GABAPENTIN 300 MG: 300 CAPSULE ORAL at 06:27

## 2024-01-09 RX ADMIN — TRAMADOL HYDROCHLORIDE 50 MG: 50 TABLET, COATED ORAL at 12:07

## 2024-01-09 RX ADMIN — PANTOPRAZOLE SODIUM 40 MG: 40 TABLET, DELAYED RELEASE ORAL at 06:26

## 2024-01-09 RX ADMIN — HEPARIN SODIUM 5000 UNITS: 5000 INJECTION INTRAVENOUS; SUBCUTANEOUS at 06:27

## 2024-01-09 RX ADMIN — CYANOCOBALAMIN TAB 1000 MCG 1000 MCG: 1000 TAB at 09:40

## 2024-01-09 RX ADMIN — HYDRALAZINE HYDROCHLORIDE 25 MG: 50 TABLET, FILM COATED ORAL at 09:39

## 2024-01-09 RX ADMIN — LEVOTHYROXINE SODIUM 25 MCG: 25 TABLET ORAL at 06:27

## 2024-01-09 RX ADMIN — LIDOCAINE 1 PATCH: 4 PATCH TOPICAL at 09:39

## 2024-01-09 RX ADMIN — SENNOSIDES AND DOCUSATE SODIUM 2 TABLET: 8.6; 5 TABLET ORAL at 09:40

## 2024-01-09 RX ADMIN — Medication 10 ML: at 09:41

## 2024-01-09 RX ADMIN — THIAMINE HCL TAB 100 MG 100 MG: 100 TAB at 09:41

## 2024-01-09 RX ADMIN — DULOXETINE HYDROCHLORIDE 20 MG: 20 CAPSULE, DELAYED RELEASE ORAL at 09:40

## 2024-01-09 NOTE — DISCHARGE SUMMARY
[facility] Medicine Services  DISCHARGE SUMMARY    Patient Name: Alysia Sierra  : 1955  MRN: [id number]    Date of Admission: 2024  7:10 PM  Date of Discharge:  2024  Primary Care Physician: Liana So APRN    Consults       Date and Time Order Name Status Description    2024  9:01 AM Inpatient Neurosurgery Consult              Hospital Course     Presenting Problem: fall    Active Hospital Problems    Diagnosis  POA    Closed fracture of second lumbar vertebra [S32.029A]  Yes    Hyperlipidemia LDL goal <100 [E78.5]  Yes    CKD (chronic kidney disease) stage 3, GFR 30-59 ml/min [N18.30]  Yes    Hypertension [I10]  Yes      Resolved Hospital Problems    Diagnosis Date Resolved POA    **UTI (urinary tract infection) [N39.0] 2024 Yes    MARY (acute kidney injury) [N17.9] 2024 Yes          Hospital Course:  Alysia Sierra is a 68 y.o. female with past medical history significant for hypertension, chronic kidney disease, history of uterine cancer, alcohol abuse.  Patient was admitted for severe back pain after fall.     **Back pain secondary to mildly displaced fracture of the left L1, L2 and L3 transverse processes.    -- There is no role for any surgical intervention.  -- Neurosurgery ordered rigid LSO for spine stabilization.  She is to wear when out of bed for the  next 8-12 weeks  --cont PT/OT  -- lidocaine patch and prn tramadol for pain control.     **Alcohol abuse, on alcohol withdrawal protocol.  --+ETOH level on admission  --Discussed with patient the importance of abstinence lisseth is setting of fall and in addition to many medications that she takes, increases her risk for injury.     **Acute kidney injury -resolved     **Hypertension, reasonably controlled on on home medication     **Uterine cancer.--cont f/u with oncology         Discharge Follow Up Recommendations for outpatient labs/diagnostics:       Day of Discharge     HPI:   Pain  controlled with addition of the patch.  Walking well with PT today        Vital Signs:   Temp:  [98.2 °F (36.8 °C)-98.7 °F (37.1 °C)] 98.4 °F (36.9 °C)  Heart Rate:  [72-87] 84  Resp:  [18] 18  BP: (121-134)/(71-96) 132/75      Physical Exam:  Constitutional: No acute distress  HENT: NCAT, mucous membranes moist  Respiratory: Clear to auscultation bilaterally, respiratory effort normal   Cardiovascular: RRR, no murmurs, rubs, or gallops  Gastrointestinal: Positive bowel sounds, soft, nontender, nondistended  Musculoskeletal: No bilateral ankle edema.    Psychiatric: Normal affect, cooperative  Neurologic: Awake, alert, oriented x 3, no focality appreciated   skin: No rashes     Pertinent  and/or Most Recent Results     LAB RESULTS:      Lab 01/03/24  0522 01/02/24 1958   WBC 5.13 6.78   HEMOGLOBIN 9.3* 9.6*   HEMATOCRIT 29.9* 30.7*   PLATELETS 151 183   NEUTROS ABS 2.36 3.71   IMMATURE GRANS (ABS) 0.02 0.04   LYMPHS ABS 1.86 2.18   MONOS ABS 0.72 0.73   EOS ABS 0.14 0.10   .2* 107.3*         Lab 01/07/24  0617 01/06/24  0352 01/05/24  0011 01/04/24  0501 01/03/24  0522 01/02/24 1958   SODIUM  --  143  --  135* 144 140   POTASSIUM  --  3.6  --  4.0 4.8 4.7   CHLORIDE  --  103  --  102 111* 105   CO2  --  27.0  --  24.0 20.0* 21.0*   ANION GAP  --  13.0  --  9.0 13.0 14.0   BUN  --  9  --  20 26* 31*   CREATININE  --  1.15*  --  1.27* 1.57* 2.44*   EGFR  --  52.3*  --  46.4* 36.0* 21.2*   GLUCOSE  --  81  --  96 65 92   CALCIUM  --  8.9  --  8.6 8.4* 8.5*   MAGNESIUM 1.8 1.5* 2.0 1.4*  --   --    PHOSPHORUS  --  3.9  --  2.9  --   --          Lab 01/02/24 1958   TOTAL PROTEIN 6.5   ALBUMIN 4.2   GLOBULIN 2.3   ALT (SGPT) 9   AST (SGOT) 18   BILIRUBIN 0.2   ALK PHOS 74                 Lab 01/03/24  0522   FOLATE 2.93*   VITAMIN B 12 691         Brief Urine Lab Results  (Last result in the past 365 days)        Color   Clarity   Blood   Leuk Est   Nitrite   Protein   CREAT   Urine HCG        01/02/24 2110  Yellow   Cloudy   Negative   Small (1+)   Negative   Negative                 Microbiology Results (last 10 days)       ** No results found for the last 240 hours. **            Adult Transthoracic Echo Complete W/ Cont if Necessary Per Protocol    Result Date: 1/3/2024    Left ventricular systolic function is normal. Calculated left ventricular EF = 57.9% Left ventricular ejection fraction appears to be 56 - 60%.   Left ventricular diastolic function was normal.   Estimated right ventricular systolic pressure from tricuspid regurgitation is normal (<35 mmHg). No significant change from previous study     CT Lumbar Spine Without Contrast    Result Date: 1/2/2024  CT LUMBAR SPINE WO CONTRAST Date of Exam: 1/2/2024 11:17 PM EST Indication: Low back pain status post fall with L3 fracture apparent on pelvis CT. Comparison: MR lumbar spine 9/18/2020. Technique: Axial CT images were obtained of the lumbar spine without contrast administration.  Reconstructed coronal and sagittal images were also obtained. Automated exposure control and iterative construction methods were used. Findings: 5 lumbar vertebral bodies are identified. There is persistent mild levoscoliosis of the mid lumbar spine. No spondylolisthesis. Disc spaces are normal. There is mild multilevel marginal osteophyte formation. There is moderate multilevel mid and lower lumbar facet arthritis on the left and there is severe mid and moderate lower lumbar facet arthritis on the right, most pronounced at the L2-L3 level. Vertebral bodies maintain normal height. There are mildly displaced fractures of the left L1, L2 and L3  transverse processes. The transverse processes on the right appear intact. Spinous processes also appear intact although with hypertrophic changes. No erosions or destructive osseous lesions. There is mild atrophy of the posterior paraspinal musculature. No abnormal fluid collections.  Limited view of the retroperitoneal structures is  unremarkable. There is no significant spinal canal stenosis. There is no obvious large disc protrusion or extrusion. There are concentric disc bulges at the L3-L4, L4-L5 levels and there is a stable left foraminal disc protrusion at the L5-S1 level. There is high-grade stenosis at the right L1-L2 neural foramen due to bony overgrowth and a large bridging osteophyte. There is moderate right neuroforaminal narrowing at L2-L3. There is mild bilateral neuroforaminal narrowing at the L4-L5 level and there is mild  left neuroforaminal narrowing at the L5-S1 level.     Impression: 1.Mildly displaced fractures of the left L1, L2 and L3 transverse processes. 2.Stable mild levoscoliosis of the mid lumbar spine and stable multilevel disc and facet joint degeneration resulting in varying degrees of neuroforaminal narrowing. No significant spinal canal stenosis. Electronically Signed: Bishnu Sepulveda MD  1/2/2024 11:37 PM EST  Workstation ID: [id number]    CT Pelvis Without Contrast    Result Date: 1/2/2024  CT PELVIS WO CONTRAST Date of Exam: 1/2/2024 9:28 PM EST Indication: trauma with left-sided pelvis pain. Comparison: CT abdomen and pelvis 4/13/2023 Technique: Axial CT images were obtained of the pelvis without contrast administration.  Reconstructed coronal and sagittal images were also obtained. Automated exposure control and iterative construction methods were used. Findings: Visualized lower abdomen: Unremarkable. Gastrointestinal: Diverticulosis of the sigmoid colon without evidence of acute diverticulitis. Otherwise the visualized GI tract is unremarkable. Bladder: The bladder is normal. Pelvis: Patient is status post hysterectomy. No suspicious mass. Peritoneum/Mesentery: No fluid collection, ascities, or free air.   Lymph Nodes: No lymphadenopathy. Vasculature: Unremarkable Pelvic Wall: Unremarkable. No suspicious hematoma. Bony Structures: Acute appearing mildly displaced fracture through the left L3 transverse  process. No other definite acute osseous abnormalities identified. Patient is status post internal fixation of the left femoral neck. No evidence of hardware failure or loosening.     Impression: Acute appearing mildly displaced fracture through the left transverse process of L3. Please consider dedicated lumbar spine MRI to rule out additional fractures of the lumbar spine. No other acute traumatic injury to the pelvic structures. Patient is status post internal fixation of the left femoral neck. The hardware appears intact without evidence of loosening. Electronically Signed: Tim Castro DO  1/2/2024 10:10 PM EST  Workstation ID: [id number]    CT Head Without Contrast    Result Date: 1/2/2024  CT HEAD WO CONTRAST Date of Exam: 1/2/2024 9:28 PM EST Indication: Syncope.  Possible head injury.  Alcohol intoxication.. Comparison: 11/12/2023 Technique: Axial CT images were obtained of the head without contrast administration.  Automated exposure control and iterative construction methods were used. Findings: No large territory infarct. There is no evidence of hemorrhage. No mass effect, edema or midline shift Mild periventricular and subcortical white matter hypodensities, nonspecific but most likely represents chronic small vessel ischemic changes. No extra-axial fluid collection. Prominent ventricular system secondary to chronic parenchymal volume loss. The visualized orbits are unremarkable. The visualized paranasal sinuses and mastoid air cells are clear. Subtle left frontal scalp hematoma/contusion. Otherwise unremarkable. No acute osseous abnormality.     Impression: No acute intracranial abnormality. Subtle left frontal scalp hematoma/soft tissue contusion. Electronically Signed: Tim Castro DO  1/2/2024 10:04 PM EST  Workstation ID: [id number]             Results for orders placed during the hospital encounter of 01/02/24    Adult Transthoracic Echo Complete W/ Cont if Necessary Per  Protocol    Interpretation Summary    Left ventricular systolic function is normal. Calculated left ventricular EF = 57.9% Left ventricular ejection fraction appears to be 56 - 60%.    Left ventricular diastolic function was normal.    Estimated right ventricular systolic pressure from tricuspid regurgitation is normal (<35 mmHg).    No significant change from previous study      Plan for Follow-up of Pending Labs/Results:     Discharge Details        Discharge Medications        New Medications        Instructions Start Date   folic acid 1 MG tablet  Commonly known as: FOLVITE   1 mg, Oral, Daily      Lidocaine 4 %   1 patch, Transdermal, Every 24 Hours Scheduled, Remove & Discard patch within 12 hours or as directed by MD      thiamine 100 MG tablet  Commonly known as: VITAMIN B1   100 mg, Oral, Daily      traMADol 50 MG tablet  Commonly known as: ULTRAM   50 mg, Oral, Every 12 Hours PRN             Continue These Medications        Instructions Start Date   Alpha-Lipoic Acid 600 MG capsule   Oral      CALCIUM/VITAMIN D3/ADULT GUMMY PO   Oral      cholecalciferol 25 MCG (1000 UT) tablet  Commonly known as: VITAMIN D3   1,000 Units, Oral, Daily      docusate sodium 100 MG capsule  Commonly known as: COLACE   100 mg, Oral, 2 Times Daily      DULoxetine 20 MG capsule  Commonly known as: Cymbalta   20 mg, Oral, 2 Times Daily      esomeprazole 20 MG capsule  Commonly known as: nexIUM   20 mg, Oral, 2 Times Weekly, OTC      gabapentin 300 MG capsule  Commonly known as: Neurontin   Take 1 capsule in the morning and 2 capsules at night.      hydrALAZINE 25 MG tablet  Commonly known as: APRESOLINE   25 mg, 2 Times Daily      levothyroxine 25 MCG tablet  Commonly known as: SYNTHROID, LEVOTHROID   TAKE ONE TABLET BY MOUTH EVERY MORNING ON AN EMPTY STOMACH      magnesium oxide 400 (241.3 Mg) MG tablet tablet  Commonly known as: MAGOX   400 mg, Oral, Daily, OTC      metoprolol succinate XL 25 MG 24 hr tablet  Commonly known  as: TOPROL-XL   25 mg, Oral, Nightly      nitroglycerin 0.4 MG SL tablet  Commonly known as: NITROSTAT   0.4 mg, Sublingual, Every 5 Minutes PRN, Take no more than 3 doses in 15 minutes.      rosuvastatin 5 MG tablet  Commonly known as: Crestor   5 mg, Oral, Daily      vitamin B-12 1000 MCG tablet  Commonly known as: CYANOCOBALAMIN   1,000 mcg, Oral, Daily             Stop These Medications      aspirin 81 MG EC tablet     Hydrocortisone Ace-Pramoxine 1-1 % rectal cream     naloxone 4 MG/0.1ML nasal spray  Commonly known as: NARCAN              Allergies   Allergen Reactions    Lisinopril Angioedema    Metal Rash     Possible nickel -   Gold is only metal that doesn't have issues      Milk-Related Compounds Other (See Comments)     LACTOSE INTOLERANT    Tylenol [Acetaminophen] Other (See Comments)     ckd    Atorvastatin Myalgia         Discharge Disposition:  Home or Self Care    Diet:  Hospital:  Diet Order   Procedures    Diet: Regular/House Diet; Texture: Regular Texture (IDDSI 7); Fluid Consistency: Thin (IDDSI 0)            Activity:      Restrictions or Other Recommendations:  As tolerated with brace in place       CODE STATUS:    Code Status and Medical Interventions:   Ordered at: 01/02/24 2208     Code Status (Patient has no pulse and is not breathing):    CPR (Attempt to Resuscitate)     Medical Interventions (Patient has pulse or is breathing):    Full Support       Future Appointments   Date Time Provider Department Center   1/10/2024  2:45 PM Liana So APRN [facility]   2/12/2024  2:15 PM Caden Dietz MD [facility]   3/25/2024 11:30 AM Basilia Mata MD [facility] None   6/5/2024  1:00 PM Juanpablo Lee MD [facility]   6/12/2024  1:30 PM Sarah Stern MD [facility]   11/11/2024  9:30 AM Gray Bahena MD [facility]       Additional Instructions for the Follow-ups that You Need to Schedule       Ambulatory Referral to Occupational Therapy   As  directed      Specialty needed: Evaluate and treat   Follow-up needed: Yes        Ambulatory Referral to Physical Therapy Evaluate and treat; (as tolerated)   As directed      Specialty needed: Evaluate and treat   Weight Bearing Status:  (as tolerated)   Follow-up needed: Yes                      Georgina Lamar MD  01/09/24      Time Spent on Discharge:  I spent  27  minutes on this discharge activity which included: face-to-face encounter with the patient, reviewing the data in the system, coordination of the care with the nursing staff as well as consultants, documentation, and entering orders.

## 2024-01-09 NOTE — DISCHARGE INSTRUCTIONS
Take all medications as prescribed.  Keep all follow up appointments as scheduled.  Seek medical care if any symptoms return or worsen.    Abstain from any alcohol use.  Recommend minimizing the medications that are sedating.  Wear the lumbar brace when out of bed for the next 8-12 weeks.

## 2024-01-09 NOTE — OUTREACH NOTE
Prep Survey      Flowsheet Row Responses   [facility] patient discharged from? [place]   Is LACE score < 7 ? No   Eligibility Psychiatric   Date of Admission 01/02/24   Date of Discharge 01/09/24   Discharge Disposition Home or Self Care   Discharge diagnosis Acute kidney injury:   Does the patient have one of the following disease processes/diagnoses(primary or secondary)? Other   Does the patient have Home health ordered? No   Is there a DME ordered? No   Prep survey completed? Yes            JULIENNE TOLEDO - Registered Nurse

## 2024-01-09 NOTE — CASE MANAGEMENT/SOCIAL WORK
Discharge Planning Assessment  [facility]     Patient Name: Alysia Sierra  MRN: [id number]  Today's Date: 1/9/2024    Admit Date: 1/2/2024    Plan: HOME   Discharge Needs Assessment    No documentation.                  Discharge Plan       Row Name 01/09/24 0930       Plan    Plan Comments OP PT/OT orders provided to pt for her to arrange at a local clinic of choice at her convenience.                  Continued Care and Services - Admitted Since 1/2/2024       Destination       Service Provider Request Status Selected Services Address Phone Fax Patient Preferred    [facility] SUBACUTE Pending - No Request Sent N/A [address] [facility] [address] [facility] [id number] [phone] --    [facility] PLACE Pending - No Request Sent N/A [address] [facility] [phone] --                  Expected Discharge Date and Time       Expected Discharge Date Expected Discharge Time    Jan 9, 2024            Demographic Summary    No documentation.                  Functional Status    No documentation.                  Psychosocial    No documentation.                  Abuse/Neglect    No documentation.                  Legal    No documentation.                  Substance Abuse    No documentation.                  Patient Forms    No documentation.                     Rosi Hoffmann RN

## 2024-01-09 NOTE — THERAPY TREATMENT NOTE
Patient Name: Alysia Sierra  : 1955    MRN: [id number]                              Today's Date: 2024       Admit Date: 2024    Visit Dx:     ICD-10-CM ICD-9-CM   1. Acute kidney injury  N17.9 584.9   2. Dehydration  E86.0 276.51   3. Hypotension due to hypovolemia  I95.89 458.8    E86.1 276.52   4. Alcoholic intoxication without complication  F10.920 305.00   5. Pelvic pain  R10.2 [id number]   6. Neuropathy  G62.9 355.9   7. Leg weakness, bilateral  R29.898 729.89     Patient Active Problem List   Diagnosis    Hypertension    Paroxysmal SVT (supraventricular tachycardia)    Leg weakness, bilateral    Syncope    CKD (chronic kidney disease) stage 3, GFR 30-59 ml/min    Circadian rhythm sleep disorder, advanced sleep phase type    Neuropathy    Hyperlipidemia LDL goal <100    Endometrial adenocarcinoma    Mild episode of recurrent major depressive disorder    Dizziness    Hip fracture    Anemia    Hypomagnesemia    Proteinuria    Hypothyroidism    Post-menopausal    Other osteoporosis without current pathological fracture    UTI (urinary tract infection)    MARY (acute kidney injury)     Past Medical History:   Diagnosis Date    Allergic lisinopril  2017    Anemia     Arrhythmia     Arthritis     Cancer     uterine    Cataract mild 2020    stil lpresent    Chicken pox     Chronic fatigue     CKD (chronic kidney disease), stage III     sees nephro    Clotting disorder     Dental root implant present     lower left  x1 - possible dental implant    Depression mild 2019    Difficulty walking 2019    Disease of thyroid gland     Dizzy     NIEVES (dyspnea on exertion)     2017    Fracture of hip     Generalized anxiety disorder     GERD (gastroesophageal reflux disease) 2018    History of brachytherapy 2023    vaginal brachytherapy    Hyperlipidemia     Hypertension     Iron deficiency anemia     Liver disease     fatty    Liver problem     Measles     Menopause     Mumps     Neuromuscular disorder  Peripheral Neuropathy    Orthostatic hypotension     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    Renal insufficiency     Scoliosis     Unintentional weight loss     Uses contact lenses     bilat    Uterine cancer     Uterine cancer 2022    UTI (urinary tract infection)     Visual impairment Nearsighted    Wears glasses      Past Surgical History:   Procedure Laterality Date     SECTION  1981    DILATION AND CURETTAGE, DIAGNOSTIC / THERAPEUTIC      HIP OPEN REDUCTION Left 2023    Procedure: FEMORAL NECK OPEN REDUCTION INTERNAL FIXATION LEFT;  Surgeon: Juanpablo Lee MD;  Location: [facility];  Service: Orthopedics;  Laterality: Left;    HIP SURGERY      OOPHORECTOMY      ORAL LESION EXCISION/BIOPSY  2021    TOTAL LAPAROSCOPIC HYSTERECTOMY SALPINGO OOPHORECTOMY N/A 10/28/2022    Procedure: TOTAL LAPAROSCOPIC HYSTERECTOMY BILATERAL SALPINGO-OOPHORECTOMY, INJECTION FOR SENTINEL LYMPH NODE MAPPING, BILATERAL SENTINEL LYMPH NODE DISSECTION WITH DAVINCI ROBOT;  Surgeon: Jasmine Rich MD;  [facility]  Service: Robotics - DaVinci;  Laterality: N/A;    TUBAL ABDOMINAL LIGATION        General Information       Row Name 24          OT Time and Intention    Document Type therapy note (daily note)  -     Mode of Treatment occupational therapy  -       Row Name 24          General Information    Patient Profile Reviewed yes  -     Existing Precautions/Restrictions fall;seizures;spinal;LSO  spinal for comfort, LSO for OOB/chair activity  -       Row Name 24          Cognition    Orientation Status (Cognition) oriented x 3  -       Row Name 24          Safety Issues, Functional Mobility    Impairments Affecting Function (Mobility) balance;endurance/activity tolerance;pain;strength;postural/trunk control;sensation/sensory awareness  -               User Key  (r) = Recorded By, (t) = Taken By, (c) = Cosigned  By      Initials Name Provider Type    Estrellita Lunsford OT Occupational Therapist                     Mobility/ADL's       Row Name 01/09/24 0805          Bed Mobility    Bed Mobility supine-sit  -SW     Supine-Sit [place] (Bed Mobility) set up  -[facility] Name 01/09/24 0805          Transfers    Transfers sit-stand transfer;bed-chair transfer  -       Row Name 01/09/24 0805          Bed-Chair Transfer    Bed-Chair [place] (Transfers) standby assist  -SW       Row Name 01/09/24 0805          Sit-Stand Transfer    Sit-Stand [place] (Transfers) standby assist;verbal cues  -     Assistive Device (Sit-Stand Transfers) walker, front-wheeled  -       Row Name 01/09/24 0805          Functional Mobility    Functional Mobility- Ind. Level supervision required  -     Functional Mobility- Device walker, front-wheeled  -     Functional Mobility-Distance (Feet) 400  -[facility] Name 01/09/24 0805          Activities of Daily Living    BADL Assessment/Intervention grooming;upper body dressing  -SW       Row Name 01/09/24 0805          Grooming Assessment/Training    [place] Level (Grooming) grooming skills;wash face, hands;oral care regimen;set up  -SW     Position (Grooming) sink side;unsupported standing  -SW       [facility] Name 01/09/24 0805          Upper Body Dressing Assessment/Training    [place] Level (Upper Body Dressing) upper body dressing skills;pajama/robe;standby assist  -     Position (Upper Body Dressing) unsupported standing  -               User Key  (r) = Recorded By, (t) = Taken By, (c) = Cosigned By      Initials Name Provider Type    Estrellita Lunsford OT Occupational Therapist                   Obj/Interventions       Row Name 01/09/24 0805          Balance    Balance Assessment sitting static balance;sitting dynamic balance;sit to stand dynamic balance;standing static balance;standing dynamic balance  -     Static Sitting Balance independent  -     Dynamic Sitting  Balance independent  -SW     Position, Sitting Balance unsupported  -SW     Sit to Stand Dynamic Balance standby assist  -SW     Static Standing Balance standby assist  -SW     Dynamic Standing Balance standby assist  -SW     Position/Device Used, Standing Balance supported;unsupported  -SW     Balance Interventions sitting;standing;sit to stand;supported;static;dynamic;minimal challenge;occupation based/functional task  -               User Key  (r) = Recorded By, (t) = Taken By, (c) = Cosigned By      Initials Name Provider Type     Estrellita Heller OT Occupational Therapist                   Goals/Plan    No documentation.                  Clinical Impression       Row Name 01/09/24 0805          Pain Assessment    Pretreatment Pain Rating 0/10 - no pain  -     Posttreatment Pain Rating 4/10  -     Pain Location lower  -     Pain Location - back  -     Pain Intervention(s) Repositioned;Ambulation/increased activity  -       Row Name 01/09/24 0805          Plan of Care Review    Plan of Care Reviewed With patient  -     Progress improving  -     Outcome Evaluation OT promoted oob act with pt improving bed mob to setup, sts to sba, grooming with setup, fxl mob in hallway with sba using rw, and stood times 10 minutes at sink side and other with improved endurance. Pt making great progress toward goals and recommend home with Op therapy at d/c.  -       Row Name 01/09/24 0805          Therapy Plan Review/Discharge Plan (OT)    Anticipated Discharge Disposition (OT) home;home with outpatient therapy services  -       Row Name 01/09/24 0805          Vital Signs    Pre Systolic BP Rehab 132  -SW     Pre Treatment Diastolic BP 73  -SW     O2 Delivery Pre Treatment room air  -SW     O2 Delivery Intra Treatment room air  -SW     O2 Delivery Post Treatment room air  -SW     Pre Patient Position Supine  -SW     Intra Patient Position Standing  -SW     Post Patient Position Sitting  -       Row Name  01/09/24 0805          Positioning and Restraints    Pre-Treatment Position in bed  -SW     Post Treatment Position chair  -SW     In Chair notified nsg;reclined;sitting;call light within reach;encouraged to call for assist;exit alarm on;with other staff;waffle cushion;legs elevated  -               User Key  (r) = Recorded By, (t) = Taken By, (c) = Cosigned By      Initials Name Provider Type    Estrellita Lunsford OT Occupational Therapist                   Outcome Measures       Row Name 01/09/24 0850          How much help from another is currently needed...    Putting on and taking off regular lower body clothing? 3  -SW     Bathing (including washing, rinsing, and drying) 3  -SW     Toileting (which includes using toilet bed pan or urinal) 4  -SW     Putting on and taking off regular upper body clothing 4  -SW     Taking care of personal grooming (such as brushing teeth) 4  -SW     Eating meals 4  -SW     AM-PAC 6 Clicks Score (OT) 22  -       Row Name 01/09/24 0850          Functional Assessment    Outcome Measure Options AM-PAC 6 Clicks Daily Activity (OT)  -               User Key  (r) = Recorded By, (t) = Taken By, (c) = Cosigned By      Initials Name Provider Type    Estrellita Lunsford OT Occupational Therapist                    Occupational Therapy Education       Title: PT OT SLP Therapies (Done)       Topic: Occupational Therapy (Done)       Point: ADL training (Done)       Description:   Instruct learner(s) on proper safety adaptation and remediation techniques during self care or transfers.   Instruct in proper use of assistive devices.                  Learning Progress Summary             Patient Acceptance, E, VU by NAHEED at 1/9/2024 0850    Acceptance, E,TB, VU by SUE at 1/6/2024 2247    Acceptance, E, NR by  at 1/4/2024 0850    Comment: Educated pt regarding role of therapy and log roll                         Point: Home exercise program (Done)       Description:   Instruct learner(s) on  appropriate technique for monitoring, assisting and/or progressing therapeutic exercises/activities.                  Learning Progress Summary             Patient Acceptance, E,TB, VU by BT at 1/6/2024 2247                         Point: Precautions (Done)       Description:   Instruct learner(s) on prescribed precautions during self-care and functional transfers.                  Learning Progress Summary             Patient Acceptance, E, VU by  at 1/9/2024 0850    Acceptance, E,TB, VU by BT at 1/6/2024 2247                         Point: Body mechanics (Done)       Description:   Instruct learner(s) on proper positioning and spine alignment during self-care, functional mobility activities and/or exercises.                  Learning Progress Summary             Patient Acceptance, E,TB, VU by BT at 1/6/2024 2247                                         User Key       Initials Effective Dates Name Provider Type Discipline     02/03/23 -  Beverly Hinojosa OT Occupational Therapist OT     10/19/23 -  Bola Givens, RN Registered Nurse Nurse     06/16/21 -  Estrellita Heller OT Occupational Therapist OT                  OT Recommendation and Plan     Plan of Care Review  Plan of Care Reviewed With: patient  Progress: improving  Outcome Evaluation: OT promoted oob act with pt improving bed mob to setup, sts to sba, grooming with setup, fxl mob in hallway with sba using rw, and stood times 10 minutes at sink side and other with improved endurance. Pt making great progress toward goals and recommend home with Op therapy at d/c.     Time Calculation:         Time Calculation- OT       Row Name 01/09/24 0805             Time Calculation- OT    OT Start Time 0805  -SW      OT Received On 01/09/24  -SW         Timed Charges    98090 - OT Therapeutic Activity Minutes 10  -SW      83244 - OT Self Care/Mgmt Minutes 15  -SW         Total Minutes    Timed Charges Total Minutes 25 -SW       Total Minutes 25 -SW                 User Key  (r) = Recorded By, (t) = Taken By, (c) = Cosigned By      Initials Name Provider Type     Estrellita Heller OT Occupational Therapist                  Therapy Charges for Today       Code Description Service Date Service Provider Modifiers Qty    [id number]  OT THERAPEUTIC ACT EA 15 MIN 1/9/2024 Estrellita Heller OT GO 1    [id number]  OT SELF CARE/MGMT/TRAIN EA 15 MIN 1/9/2024 Estrellita Heller OT GO 1                 Estrellita Heller OT  1/9/2024

## 2024-01-09 NOTE — PLAN OF CARE
Goal Outcome Evaluation:  Plan of Care Reviewed With: patient        Progress: improving  Outcome Evaluation: OT promoted oob act with pt improving bed mob to setup, sts to sba, grooming with setup, fxl mob in hallway with sba using rw, and stood times 10 minutes at sink side and other with improved endurance. Pt making great progress toward goals and recommend home with Op therapy at d/c.      Anticipated Discharge Disposition (OT): home, home with outpatient therapy services

## 2024-01-10 ENCOUNTER — TRANSITIONAL CARE MANAGEMENT TELEPHONE ENCOUNTER (OUTPATIENT)
Dept: CALL CENTER | Facility: HOSPITAL | Age: 69
End: 2024-01-10
Payer: MEDICARE

## 2024-01-10 NOTE — OUTREACH NOTE
Call Center TCM Note      Flowsheet Row Responses   [facility] patient discharged from? [place]   Does the patient have one of the following disease processes/diagnoses(primary or secondary)? Other   TCM attempt successful? No   Unsuccessful attempts Attempt 1            Mery Herring RN    1/10/2024, 09:15 EST

## 2024-01-10 NOTE — OUTREACH NOTE
Call Center TCM Note      Flowsheet Row Responses   [facility] patient discharged from? [place]   Does the patient have one of the following disease processes/diagnoses(primary or secondary)? Other   TCM attempt successful? No   Unsuccessful attempts Attempt 2            Mery Herring RN    1/10/2024, 14:41 EST

## 2024-01-10 NOTE — PAYOR COMM NOTE
"Jayden Sierra (68 y.o. Female)     [id number]     Bevrely Arriaga RN  Utilization Review  [phone]  [phone]        Date of Birth   1955    Social Security Number       Address   [address] [facility] [address]    Home Phone   [phone]    MRN   [id number]       Latter-day   Confucianism    Marital Status                               Admission Date   1/2/24    Admission Type   Emergency    Admitting Provider   Georgina Lamar MD    Attending Provider       Department, Room/Bed   [facility] 4G, S450/1       Discharge Date   1/9/2024    Discharge Disposition   Home or Self Care    Discharge Destination                                 Attending Provider: (none)   Allergies: Lisinopril, Metal, Milk-related Compounds, Tylenol [Acetaminophen], Atorvastatin    Isolation: None   Infection: None   Code Status: Prior    Ht: 163.8 cm (64.5\")   Wt: 58.1 kg (128 lb)    Admission Cmt: None   Principal Problem: UTI (urinary tract infection) [N39.0]                   Active Insurance as of 1/2/2024       Primary Coverage       Payor Plan Insurance Group Employer/Plan Group    ANTHEM MEDICARE REPLACEMENT ANTHEM MEDICARE ADVANTAGE [id number]       Payor Plan Address Payor Plan Phone Number Payor Plan Fax Number Effective Dates    [phone]  1/1/2024 - None Entered    [facility] [address]         Subscriber Name Subscriber Birth Date Member ID       JAYDEN SIERRA P 1955 [id number]               Secondary Coverage       Payor Plan Insurance Group Employer/Plan Group    [place]        Payor Plan Address Payor Plan Phone Number Payor Plan Fax Number Effective Dates    [address] [phone]  1/2/2024 - None Entered    [facility] [id number]         Subscriber Name Subscriber Birth Date Member ID       JAYDEN SIERRA ALLIE 1955 [id number]                     Emergency Contacts        (Rel.) Home " Phone Work Phone Mobile Phone    ALYSHA HOLLY (Sister) [phone] -- [phone]                 Discharge Summary        Georgina Lamar MD at 24 0901              [facility] Medicine Services  DISCHARGE SUMMARY    Patient Name: Alysia Sierra  : 1955  MRN: [id number]    Date of Admission: 2024  7:10 PM  Date of Discharge:  2024  Primary Care Physician: Liana So APRN    Consults       Date and Time Order Name Status Description    2024  9:01 AM Inpatient Neurosurgery Consult              Hospital Course     Presenting Problem: fall    Active Hospital Problems    Diagnosis  POA    Closed fracture of second lumbar vertebra [S32.029A]  Yes    Hyperlipidemia LDL goal <100 [E78.5]  Yes    CKD (chronic kidney disease) stage 3, GFR 30-59 ml/min [N18.30]  Yes    Hypertension [I10]  Yes      Resolved Hospital Problems    Diagnosis Date Resolved POA    **UTI (urinary tract infection) [N39.0] 2024 Yes    MARY (acute kidney injury) [N17.9] 2024 Yes          Hospital Course:  Alysia Sierra is a 68 y.o. female with past medical history significant for hypertension, chronic kidney disease, history of uterine cancer, alcohol abuse.  Patient was admitted for severe back pain after fall.     **Back pain secondary to mildly displaced fracture of the left L1, L2 and L3 transverse processes.    -- There is no role for any surgical intervention.  -- Neurosurgery ordered rigid LSO for spine stabilization.  She is to wear when out of bed for the  next 8-12 weeks  --cont PT/OT  -- lidocaine patch and prn tramadol for pain control.     **Alcohol abuse, on alcohol withdrawal protocol.  --+ETOH level on admission  --Discussed with patient the importance of abstinence lisseth is setting of fall and in addition to many medications that she takes, increases her risk for injury.     **Acute kidney injury -resolved     **Hypertension, reasonably controlled on on  home medication     **Uterine cancer.--cont f/u with oncology         Discharge Follow Up Recommendations for outpatient labs/diagnostics:       Day of Discharge     HPI:   Pain controlled with addition of the patch.  Walking well with PT today        Vital Signs:   Temp:  [98.2 °F (36.8 °C)-98.7 °F (37.1 °C)] 98.4 °F (36.9 °C)  Heart Rate:  [72-87] 84  Resp:  [18] 18  BP: (121-134)/(71-96) 132/75      Physical Exam:  Constitutional: No acute distress  HENT: NCAT, mucous membranes moist  Respiratory: Clear to auscultation bilaterally, respiratory effort normal   Cardiovascular: RRR, no murmurs, rubs, or gallops  Gastrointestinal: Positive bowel sounds, soft, nontender, nondistended  Musculoskeletal: No bilateral ankle edema.    Psychiatric: Normal affect, cooperative  Neurologic: Awake, alert, oriented x 3, no focality appreciated   skin: No rashes     Pertinent  and/or Most Recent Results     LAB RESULTS:      Lab 01/03/24  0522 01/02/24 1958   WBC 5.13 6.78   HEMOGLOBIN 9.3* 9.6*   HEMATOCRIT 29.9* 30.7*   PLATELETS 151 183   NEUTROS ABS 2.36 3.71   IMMATURE GRANS (ABS) 0.02 0.04   LYMPHS ABS 1.86 2.18   MONOS ABS 0.72 0.73   EOS ABS 0.14 0.10   .2* 107.3*         Lab 01/07/24  0617 01/06/24  0352 01/05/24  0011 01/04/24  0501 01/03/24  0522 01/02/24 1958   SODIUM  --  143  --  135* 144 140   POTASSIUM  --  3.6  --  4.0 4.8 4.7   CHLORIDE  --  103  --  102 111* 105   CO2  --  27.0  --  24.0 20.0* 21.0*   ANION GAP  --  13.0  --  9.0 13.0 14.0   BUN  --  9  --  20 26* 31*   CREATININE  --  1.15*  --  1.27* 1.57* 2.44*   EGFR  --  52.3*  --  46.4* 36.0* 21.2*   GLUCOSE  --  81  --  96 65 92   CALCIUM  --  8.9  --  8.6 8.4* 8.5*   MAGNESIUM 1.8 1.5* 2.0 1.4*  --   --    PHOSPHORUS  --  3.9  --  2.9  --   --          Lab 01/02/24 1958   TOTAL PROTEIN 6.5   ALBUMIN 4.2   GLOBULIN 2.3   ALT (SGPT) 9   AST (SGOT) 18   BILIRUBIN 0.2   ALK PHOS 74                 Lab 01/03/24  0522   FOLATE 2.93*   VITAMIN B 12  691         Brief Urine Lab Results  (Last result in the past 365 days)        Color   Clarity   Blood   Leuk Est   Nitrite   Protein   CREAT   Urine HCG        01/02/24 2115 Yellow   Cloudy   Negative   Small (1+)   Negative   Negative                 Microbiology Results (last 10 days)       ** No results found for the last 240 hours. **            Adult Transthoracic Echo Complete W/ Cont if Necessary Per Protocol    Result Date: 1/3/2024    Left ventricular systolic function is normal. Calculated left ventricular EF = 57.9% Left ventricular ejection fraction appears to be 56 - 60%.   Left ventricular diastolic function was normal.   Estimated right ventricular systolic pressure from tricuspid regurgitation is normal (<35 mmHg). No significant change from previous study     CT Lumbar Spine Without Contrast    Result Date: 1/2/2024  CT LUMBAR SPINE WO CONTRAST Date of Exam: 1/2/2024 11:17 PM EST Indication: Low back pain status post fall with L3 fracture apparent on pelvis CT. Comparison: MR lumbar spine 9/18/2020. Technique: Axial CT images were obtained of the lumbar spine without contrast administration.  Reconstructed coronal and sagittal images were also obtained. Automated exposure control and iterative construction methods were used. Findings: 5 lumbar vertebral bodies are identified. There is persistent mild levoscoliosis of the mid lumbar spine. No spondylolisthesis. Disc spaces are normal. There is mild multilevel marginal osteophyte formation. There is moderate multilevel mid and lower lumbar facet arthritis on the left and there is severe mid and moderate lower lumbar facet arthritis on the right, most pronounced at the L2-L3 level. Vertebral bodies maintain normal height. There are mildly displaced fractures of the left L1, L2 and L3  transverse processes. The transverse processes on the right appear intact. Spinous processes also appear intact although with hypertrophic changes. No erosions or  destructive osseous lesions. There is mild atrophy of the posterior paraspinal musculature. No abnormal fluid collections.  Limited view of the retroperitoneal structures is unremarkable. There is no significant spinal canal stenosis. There is no obvious large disc protrusion or extrusion. There are concentric disc bulges at the L3-L4, L4-L5 levels and there is a stable left foraminal disc protrusion at the L5-S1 level. There is high-grade stenosis at the right L1-L2 neural foramen due to bony overgrowth and a large bridging osteophyte. There is moderate right neuroforaminal narrowing at L2-L3. There is mild bilateral neuroforaminal narrowing at the L4-L5 level and there is mild  left neuroforaminal narrowing at the L5-S1 level.     Impression: 1.Mildly displaced fractures of the left L1, L2 and L3 transverse processes. 2.Stable mild levoscoliosis of the mid lumbar spine and stable multilevel disc and facet joint degeneration resulting in varying degrees of neuroforaminal narrowing. No significant spinal canal stenosis. Electronically Signed: Bishnu Sepulveda MD  1/2/2024 11:37 PM EST  Workstation ID: [id number]    CT Pelvis Without Contrast    Result Date: 1/2/2024  CT PELVIS WO CONTRAST Date of Exam: 1/2/2024 9:28 PM EST Indication: trauma with left-sided pelvis pain. Comparison: CT abdomen and pelvis 4/13/2023 Technique: Axial CT images were obtained of the pelvis without contrast administration.  Reconstructed coronal and sagittal images were also obtained. Automated exposure control and iterative construction methods were used. Findings: Visualized lower abdomen: Unremarkable. Gastrointestinal: Diverticulosis of the sigmoid colon without evidence of acute diverticulitis. Otherwise the visualized GI tract is unremarkable. Bladder: The bladder is normal. Pelvis: Patient is status post hysterectomy. No suspicious mass. Peritoneum/Mesentery: No fluid collection, ascities, or free air.   Lymph Nodes: No lymphadenopathy.  Vasculature: Unremarkable Pelvic Wall: Unremarkable. No suspicious hematoma. Bony Structures: Acute appearing mildly displaced fracture through the left L3 transverse process. No other definite acute osseous abnormalities identified. Patient is status post internal fixation of the left femoral neck. No evidence of hardware failure or loosening.     Impression: Acute appearing mildly displaced fracture through the left transverse process of L3. Please consider dedicated lumbar spine MRI to rule out additional fractures of the lumbar spine. No other acute traumatic injury to the pelvic structures. Patient is status post internal fixation of the left femoral neck. The hardware appears intact without evidence of loosening. Electronically Signed: Tim Castro DO  1/2/2024 10:10 PM EST  Workstation ID: [id number]    CT Head Without Contrast    Result Date: 1/2/2024  CT HEAD WO CONTRAST Date of Exam: 1/2/2024 9:28 PM EST Indication: Syncope.  Possible head injury.  Alcohol intoxication.. Comparison: 11/12/2023 Technique: Axial CT images were obtained of the head without contrast administration.  Automated exposure control and iterative construction methods were used. Findings: No large territory infarct. There is no evidence of hemorrhage. No mass effect, edema or midline shift Mild periventricular and subcortical white matter hypodensities, nonspecific but most likely represents chronic small vessel ischemic changes. No extra-axial fluid collection. Prominent ventricular system secondary to chronic parenchymal volume loss. The visualized orbits are unremarkable. The visualized paranasal sinuses and mastoid air cells are clear. Subtle left frontal scalp hematoma/contusion. Otherwise unremarkable. No acute osseous abnormality.     Impression: No acute intracranial abnormality. Subtle left frontal scalp hematoma/soft tissue contusion. Electronically Signed: Tim Castro DO  1/2/2024 10:04 PM EST  Workstation ID:  [id number]             Results for orders placed during the hospital encounter of 01/02/24    Adult Transthoracic Echo Complete W/ Cont if Necessary Per Protocol    Interpretation Summary    Left ventricular systolic function is normal. Calculated left ventricular EF = 57.9% Left ventricular ejection fraction appears to be 56 - 60%.    Left ventricular diastolic function was normal.    Estimated right ventricular systolic pressure from tricuspid regurgitation is normal (<35 mmHg).    No significant change from previous study      Plan for Follow-up of Pending Labs/Results:     Discharge Details        Discharge Medications        New Medications        Instructions Start Date   folic acid 1 MG tablet  Commonly known as: FOLVITE   1 mg, Oral, Daily      Lidocaine 4 %   1 patch, Transdermal, Every 24 Hours Scheduled, Remove & Discard patch within 12 hours or as directed by MD      thiamine 100 MG tablet  Commonly known as: VITAMIN B1   100 mg, Oral, Daily      traMADol 50 MG tablet  Commonly known as: ULTRAM   50 mg, Oral, Every 12 Hours PRN             Continue These Medications        Instructions Start Date   Alpha-Lipoic Acid 600 MG capsule   Oral      CALCIUM/VITAMIN D3/ADULT GUMMY PO   Oral      cholecalciferol 25 MCG (1000 UT) tablet  Commonly known as: VITAMIN D3   1,000 Units, Oral, Daily      docusate sodium 100 MG capsule  Commonly known as: COLACE   100 mg, Oral, 2 Times Daily      DULoxetine 20 MG capsule  Commonly known as: Cymbalta   20 mg, Oral, 2 Times Daily      esomeprazole 20 MG capsule  Commonly known as: nexIUM   20 mg, Oral, 2 Times Weekly, OTC      gabapentin 300 MG capsule  Commonly known as: Neurontin   Take 1 capsule in the morning and 2 capsules at night.      hydrALAZINE 25 MG tablet  Commonly known as: APRESOLINE   25 mg, 2 Times Daily      levothyroxine 25 MCG tablet  Commonly known as: SYNTHROID, LEVOTHROID   TAKE ONE TABLET BY MOUTH EVERY MORNING ON AN EMPTY STOMACH      magnesium oxide  400 (241.3 Mg) MG tablet tablet  Commonly known as: MAGOX   400 mg, Oral, Daily, OTC      metoprolol succinate XL 25 MG 24 hr tablet  Commonly known as: TOPROL-XL   25 mg, Oral, Nightly      nitroglycerin 0.4 MG SL tablet  Commonly known as: NITROSTAT   0.4 mg, Sublingual, Every 5 Minutes PRN, Take no more than 3 doses in 15 minutes.      rosuvastatin 5 MG tablet  Commonly known as: Crestor   5 mg, Oral, Daily      vitamin B-12 1000 MCG tablet  Commonly known as: CYANOCOBALAMIN   1,000 mcg, Oral, Daily             Stop These Medications      aspirin 81 MG EC tablet     Hydrocortisone Ace-Pramoxine 1-1 % rectal cream     naloxone 4 MG/0.1ML nasal spray  Commonly known as: NARCAN              Allergies   Allergen Reactions    Lisinopril Angioedema    Metal Rash     Possible nickel -   Gold is only metal that doesn't have issues      Milk-Related Compounds Other (See Comments)     LACTOSE INTOLERANT    Tylenol [Acetaminophen] Other (See Comments)     ckd    Atorvastatin Myalgia         Discharge Disposition:  Home or Self Care    Diet:  Hospital:  Diet Order   Procedures    Diet: Regular/House Diet; Texture: Regular Texture (IDDSI 7); Fluid Consistency: Thin (IDDSI 0)            Activity:      Restrictions or Other Recommendations:  As tolerated with brace in place       CODE STATUS:    Code Status and Medical Interventions:   Ordered at: 01/02/24 2208     Code Status (Patient has no pulse and is not breathing):    CPR (Attempt to Resuscitate)     Medical Interventions (Patient has pulse or is breathing):    Full Support       Future Appointments   Date Time Provider Department Center   1/10/2024  2:45 PM Liana So APRN [facility]   2/12/2024  2:15 PM Caden Dietz MD [facility]   3/25/2024 11:30 AM Basilia Mata MD [facility] None   6/5/2024  1:00 PM Juanpablo Lee MD [facility]   6/12/2024  1:30 PM Sarah Stern MD [facility]   11/11/2024  9:30 AM Gray Bahena MD  [facility]       Additional Instructions for the Follow-ups that You Need to Schedule       Ambulatory Referral to Occupational Therapy   As directed      Specialty needed: Evaluate and treat   Follow-up needed: Yes        Ambulatory Referral to Physical Therapy Evaluate and treat; (as tolerated)   As directed      Specialty needed: Evaluate and treat   Weight Bearing Status:  (as tolerated)   Follow-up needed: Yes                      Georgina Lamar MD  01/09/24      Time Spent on Discharge:  I spent  27  minutes on this discharge activity which included: face-to-face encounter with the patient, reviewing the data in the system, coordination of the care with the nursing staff as well as consultants, documentation, and entering orders.            Electronically signed by Georgina Lamar MD at 01/09/24 6971

## 2024-01-11 ENCOUNTER — OFFICE VISIT (OUTPATIENT)
Dept: INTERNAL MEDICINE | Facility: CLINIC | Age: 69
End: 2024-01-11
Payer: MEDICARE

## 2024-01-11 ENCOUNTER — TRANSITIONAL CARE MANAGEMENT TELEPHONE ENCOUNTER (OUTPATIENT)
Dept: CALL CENTER | Facility: HOSPITAL | Age: 69
End: 2024-01-11
Payer: MEDICARE

## 2024-01-11 ENCOUNTER — LAB (OUTPATIENT)
Dept: LAB | Facility: HOSPITAL | Age: 69
End: 2024-01-11
Payer: MEDICARE

## 2024-01-11 VITALS
RESPIRATION RATE: 18 BRPM | HEART RATE: 85 BPM | SYSTOLIC BLOOD PRESSURE: 126 MMHG | BODY MASS INDEX: 21.33 KG/M2 | HEIGHT: 65 IN | DIASTOLIC BLOOD PRESSURE: 78 MMHG | OXYGEN SATURATION: 96 % | TEMPERATURE: 97.3 F | WEIGHT: 128 LBS

## 2024-01-11 DIAGNOSIS — I10 PRIMARY HYPERTENSION: ICD-10-CM

## 2024-01-11 DIAGNOSIS — D64.9 ANEMIA, UNSPECIFIED TYPE: ICD-10-CM

## 2024-01-11 DIAGNOSIS — M81.8 OTHER OSTEOPOROSIS WITHOUT CURRENT PATHOLOGICAL FRACTURE: ICD-10-CM

## 2024-01-11 DIAGNOSIS — N18.31 STAGE 3A CHRONIC KIDNEY DISEASE: ICD-10-CM

## 2024-01-11 DIAGNOSIS — F10.10 ALCOHOL ABUSE: ICD-10-CM

## 2024-01-11 DIAGNOSIS — S72.045D CLOSED NONDISPLACED BASICERVICAL FRACTURE OF LEFT FEMUR WITH ROUTINE HEALING, SUBSEQUENT ENCOUNTER: ICD-10-CM

## 2024-01-11 DIAGNOSIS — Z76.89 ENCOUNTER FOR SUPPORT AND COORDINATION OF TRANSITION OF CARE: Primary | ICD-10-CM

## 2024-01-11 DIAGNOSIS — Z63.79 STRESSFUL LIFE EVENT AFFECTING FAMILY: ICD-10-CM

## 2024-01-11 DIAGNOSIS — M80.80XS OTHER OSTEOPOROSIS WITH CURRENT PATHOLOGICAL FRACTURE, SEQUELA: ICD-10-CM
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
## 2024-01-11 LAB
25(OH)D3 SERPL-MCNC: 54.8 NG/ML (ref 30–100)
ALBUMIN SERPL-MCNC: 4.6 G/DL (ref 3.5–5.2)
ALBUMIN/GLOB SERPL: 1.9 G/DL
ALP SERPL-CCNC: 71 U/L (ref 39–117)
ALT SERPL W P-5'-P-CCNC: 8 U/L (ref 1–33)
ANION GAP SERPL CALCULATED.3IONS-SCNC: 14 MMOL/L (ref 5–15)
AST SERPL-CCNC: 20 U/L (ref 1–32)
BILIRUB SERPL-MCNC: 0.3 MG/DL (ref 0–1.2)
BUN SERPL-MCNC: 10 MG/DL (ref 8–23)
BUN/CREAT SERPL: 7.6 (ref 7–25)
CALCIUM SPEC-SCNC: 9.8 MG/DL (ref 8.6–10.5)
CHLORIDE SERPL-SCNC: 100 MMOL/L (ref 98–107)
CO2 SERPL-SCNC: 27 MMOL/L (ref 22–29)
CREAT SERPL-MCNC: 1.32 MG/DL (ref 0.57–1)
DEPRECATED RDW RBC AUTO: 44.4 FL (ref 37–54)
EGFRCR SERPLBLD CKD-EPI 2021: 44.3 ML/MIN/1.73
ERYTHROCYTE [DISTWIDTH] IN BLOOD BY AUTOMATED COUNT: 12.6 % (ref 12.3–15.4)
GLOBULIN UR ELPH-MCNC: 2.4 GM/DL
GLUCOSE SERPL-MCNC: 83 MG/DL (ref 65–99)
HCT VFR BLD AUTO: 31.1 % (ref 34–46.6)
HGB BLD-MCNC: 10 G/DL (ref 12–15.9)
MAGNESIUM SERPL-MCNC: 1.5 MG/DL (ref 1.6–2.4)
MCH RBC QN AUTO: 31.3 PG (ref 26.6–33)
MCHC RBC AUTO-ENTMCNC: 32.2 G/DL (ref 31.5–35.7)
MCV RBC AUTO: 97.2 FL (ref 79–97)
PHOSPHATE SERPL-MCNC: 3.1 MG/DL (ref 2.5–4.5)
PLATELET # BLD AUTO: 225 10*3/MM3 (ref 140–450)
PMV BLD AUTO: 11.6 FL (ref 6–12)
POTASSIUM SERPL-SCNC: 3.6 MMOL/L (ref 3.5–5.2)
PROT SERPL-MCNC: 7 G/DL (ref 6–8.5)
RBC # BLD AUTO: 3.2 10*6/MM3 (ref 3.77–5.28)
SODIUM SERPL-SCNC: 141 MMOL/L (ref 136–145)
WBC NRBC COR # BLD AUTO: 6.13 10*3/MM3 (ref 3.4–10.8)

## 2024-01-11 PROCEDURE — 82306 VITAMIN D 25 HYDROXY: CPT

## 2024-01-11 PROCEDURE — 85027 COMPLETE CBC AUTOMATED: CPT

## 2024-01-11 PROCEDURE — 80053 COMPREHEN METABOLIC PANEL: CPT

## 2024-01-11 PROCEDURE — 83735 ASSAY OF MAGNESIUM: CPT

## 2024-01-11 PROCEDURE — 84100 ASSAY OF PHOSPHORUS: CPT

## 2024-01-11 PROCEDURE — 36415 COLL VENOUS BLD VENIPUNCTURE: CPT

## 2024-01-11 NOTE — OUTREACH NOTE
Call Center TCM Note      Flowsheet Row Responses   [facility] patient discharged from? [facility]   Does the patient have one of the following disease processes/diagnoses(primary or secondary)? Other   TCM attempt successful? Yes   Call start time 0903   Call end time 0903   Discharge diagnosis Acute kidney injury:   TCM call completed? Yes   Wrap up additional comments Patient has a documented hospital f/u appt with PCP in chart.   Call end time 0903   Would this patient benefit from a Referral to [facility] Social Work? No   Is the patient interested in additional calls from an ambulatory ? No            Mery Herring RN    1/11/2024, 09:04 EST

## 2024-01-11 NOTE — PROGRESS NOTES
Transitional Care Follow Up Visit  Subjective     Alysia Sierra is a 68 y.o. female who presents for a transitional care management visit.    Within 48 business hours after discharge our office contacted her via telephone to coordinate her care and needs.      I reviewed and discussed the details of that call along with the discharge summary, hospital problems, inpatient lab results, inpatient diagnostic studies, and consultation reports with Alysia.     Current outpatient and discharge medications have been reconciled for the patient.  Reviewed by: ANETTE Vasquez          1/9/2024     5:05 PM   Date of TCM Phone Call   Hospital [facility]   Date of Admission 1/2/2024   Date of Discharge 1/9/2024   Discharge Disposition Home or Self Care     Risk for Readmission (LACE) Score: 14 (1/9/2024  6:00 AM)      History of Present Illness   Course During Hospital Stay:  The patient is a 68-year-old female who is here today for TCM visit. She was admitted to [facility] on 01/02/2024 and discharged on 01/09/2024.    TCM.   Fell and injured her hip.   Spent a week at the hospital and was able to get back on her feet and walking with a walker again.   Has a referral sheet for PT, but she needs to drop that off.   She would like to start PT as soon as possible.   Is still doing exercises at home.   She does not think she needs occupational therapy.   She took a shower and washed her hair today.   Has been sleeping well.   Still has to get up and urinate because she is drinking a lot of water.   She inquired about Prolia.  Received dental clearance.  Alfonso approved it, but she is not sure if she can start it now.   Is currently utilizing a brace.   Has not taken anything for pain since last night around 9:00 PM.  Was on tramadol in the hospital, but it was not helping.   Was then put on codeine.   She took the first dose and it was fine, but when she took the second and third dose, she  vomited.   Her drug of choice is aspirin.   If she has pain, she will take 2 full-strength aspirin 325 mg.    Behavorial health.   Requests a referral to a counselor.   She talks to herself at home too much.    Health maintenance.   Has an appointment with Dr. Lee on 06/12/2024.  Has a dermatology appointment in 04/2024.   Denies any UTI symptoms.         The following portions of the patient's history were reviewed and updated as appropriate: allergies, current medications, past family history, past medical history, past social history, past surgical history, and problem list.    Review of Systems   Constitutional:  Positive for activity change.   Respiratory: Negative.     Cardiovascular: Negative.    Musculoskeletal:  Positive for arthralgias.   Skin: Negative.    Neurological:         Ambulating with walker    Hematological:  Negative for adenopathy. Does not bruise/bleed easily.       Objective   Physical Exam  Vitals and nursing note reviewed.   Constitutional:       General: She is not in acute distress.  Cardiovascular:      Rate and Rhythm: Normal rate and regular rhythm.   Pulmonary:      Effort: Pulmonary effort is normal. No respiratory distress.      Breath sounds: Normal breath sounds.   Musculoskeletal:      Comments: Abdominal/low back support brace in place   Skin:     General: Skin is warm and dry.   Neurological:      Mental Status: She is alert and oriented to person, place, and time.         Assessment & Plan   Problems Addressed this Visit          Cardiac and Vasculature    Hypertension       Genitourinary and Reproductive     CKD (chronic kidney disease) stage 3, GFR 30-59 ml/min    Relevant Orders    Basic metabolic panel       Hematology and Neoplasia    Anemia    Relevant Orders    CBC (No Diff)       Musculoskeletal and Injuries    Other osteoporosis without current pathological fracture     Other Visit Diagnoses       Encounter for support and coordination of transition of care    -   Primary    Closed nondisplaced basicervical fracture of left femur with routine healing, subsequent encounter        Fall, sequela        Alcohol abuse        Stressful life event affecting family        Relevant Orders    Ambulatory Referral to Behavioral Health          Diagnoses         Codes Comments    Encounter for support and coordination of transition of care    -  Primary ICD-10-CM: Z76.89  ICD-9-CM: V65.8     Other osteoporosis with current pathological fracture, sequela     ICD-10-CM: M80.80XS  ICD-9-CM: 905.5, 733.00     Closed nondisplaced basicervical fracture of left femur with routine healing, subsequent encounter     ICD-10-CM: S72.045D  ICD-9-CM: V54.13     Fall, sequela     ICD-10-CM: W19.XXXS  ICD-9-CM: 909.4, E929.3     Alcohol abuse     ICD-10-CM: F10.10  ICD-9-CM: 305.00     Anemia, unspecified type     ICD-10-CM: D64.9  ICD-9-CM: 285.9     Stage 3a chronic kidney disease     ICD-10-CM: N18.31  ICD-9-CM: 585.3     Primary hypertension     ICD-10-CM: I10  ICD-9-CM: 401.9     Stressful life event affecting family     ICD-10-CM: Z63.79  ICD-9-CM: V61.09           -New meds since discharge: Folic acid, lidocaine 4% patch, thiamine, tramadol.  Meds discontinued at discharge: Aspirin 81 mg, hydrocortisone Ace-pramoxine rectal cream, and Narcan nasal spray  -Blood pressure reading in office today at goal  -Advised PT and OT are both recommended.  Proceed with referrals as planned.  Instructed patient to call me if she has not heard from anyone by middle to end of next week and I will help facilitate these referrals  -I will double check on recommended start for Prolia given acute fracture  -Repeat CBC and BMP at this time.  -I will refer her to behavioral health for counseling with ALLY Avila  -Discussed EtOH use and risk moving forward with her health.  States she is motivated to abstain for the benefit of her overall health and well bring moving forward    Transcribed from ambient dictation for  Liana So, APRN by Anju Trejo.  01/11/24   10:55 EST    Patient or patient representative verbalized consent to the visit recording.  I have personally performed the services described in this document as transcribed by the above individual, and it is both accurate and complete.

## 2024-01-15 DIAGNOSIS — N28.9 KIDNEY DYSFUNCTION: Primary | ICD-10-CM

## 2024-01-16 ENCOUNTER — TELEPHONE (OUTPATIENT)
Dept: INTERNAL MEDICINE | Facility: CLINIC | Age: 69
End: 2024-01-16
Payer: MEDICARE

## 2024-01-16 NOTE — TELEPHONE ENCOUNTER
HUB to relay message, please give message below.        ----- Message from ANETTE Vasquez sent at 1/16/2024 12:06 AM EST -----  Please call.  Anemia improving.  Magnesium low.  Magnesium supplementation is listed on medication list, please confirm if she is taking or not.  Kidney function which was showing improvement by the end of recent hospitalization has again declined.  Absolutely no NSAIDs, including aspirin.  Avoid ibuprofen, Advil, Aleve, naproxen etc. increase daily water intake to a minimum of 100 ounces.  I have placed additional lab orders to try and figure out cause of renal dysfunction. Most are urine studies, but given her history of chemotherapy, I have placed a blood test in as well.  Recent CT abdomen pelvis performed when she was in the ED did not mention any abnormalities about the kidneys, we may end up ordering a designated renal ultrasound as well.  I will give further instruction once results are available.

## 2024-01-17 NOTE — TELEPHONE ENCOUNTER
HUB to relay message, please give message below.           ----- Message from ANETTE Vasquez sent at 1/16/2024 12:06 AM EST -----  Please call.  Anemia improving.  Magnesium low.  Magnesium supplementation is listed on medication list, please confirm if she is taking or not.  Kidney function which was showing improvement by the end of recent hospitalization has again declined.  Absolutely no NSAIDs, including aspirin.  Avoid ibuprofen, Advil, Aleve, naproxen etc. increase daily water intake to a minimum of 100 ounces.  I have placed additional lab orders to try and figure out cause of renal dysfunction. Most are urine studies, but given her history of chemotherapy, I have placed a blood test in as well.  Recent CT abdomen pelvis performed when she was in the ED did not mention any abnormalities about the kidneys, we may end up ordering a designated renal ultrasound as well.  I will give further instruction once results are available            Name: Alysia Sierra    Relationship: Self    Best Callback Number: [phone]     HUB PROVIDED THE RELAY MESSAGE FROM THE OFFICE   PATIENT VOICED UNDERSTANDING AND HAS NO FURTHER QUESTIONS AT THIS TIME    ADDITIONAL INFORMATION:

## 2024-01-18 ENCOUNTER — READMISSION MANAGEMENT (OUTPATIENT)
Dept: CALL CENTER | Facility: HOSPITAL | Age: 69
End: 2024-01-18
Payer: MEDICARE

## 2024-01-18 NOTE — OUTREACH NOTE
Medical Week 2 Survey      Flowsheet Row Responses   [facility] patient discharged from? [place]   Does the patient have one of the following disease processes/diagnoses(primary or secondary)? Other   Week 2 attempt successful? Yes   Call start time 0953   Discharge diagnosis Acute kidney injury:   Call end time 0959   Does the patient have all medications ordered at discharge? Yes   Is the patient taking all medications as directed (includes completed medication regime)? Yes   Does the patient have a primary care provider?  Yes   Does the patient have an appointment with their PCP within 7 days of discharge? Yes   Comments regarding PCP seen PCP on 1/11   Has the patient kept scheduled appointments due by today? Yes   Has home health visited the patient within 72 hours of discharge? N/A   Psychosocial issues? No   Did the patient receive a copy of their discharge instructions? Yes   What is the patient's perception of their health status since discharge? Improving   Is the patient/caregiver able to teach back signs and symptoms related to disease process for when to call PCP? Yes   Is the patient/caregiver able to teach back signs and symptoms related to disease process for when to call 911? Yes   Is the patient/caregiver able to teach back the hierarchy of who to call/visit for symptoms/problems? PCP, Specialist, Home health nurse, Urgent Care, ED, 911 Yes   Week 2 Call Completed? Yes   Graduated Yes   Did the patient feel the follow up calls were helpful during their recovery period? Yes   Was the number of calls appropriate? Yes   Is the patient interested in additional calls from an ambulatory ? No   Would this patient benefit from a Referral to Amb Social Work? No   Graduated/Revoked comments Doing well, has seen PCP for a f/u appt, no further calls needed.   Call end time 0959            Mery OAKES - Registered Nurse

## 2024-02-01 DIAGNOSIS — E03.9 HYPOTHYROIDISM, UNSPECIFIED TYPE: ICD-10-CM

## 2024-02-01 RX ORDER — LEVOTHYROXINE SODIUM 0.03 MG/1
TABLET ORAL
Qty: 90 TABLET | Refills: 0 | Status: SHIPPED | OUTPATIENT
Start: 2024-02-01

## 2024-02-12 ENCOUNTER — OFFICE VISIT (OUTPATIENT)
Dept: NEUROLOGY | Facility: CLINIC | Age: 69
End: 2024-02-12
Payer: MEDICARE

## 2024-02-12 VITALS
HEIGHT: 65 IN | HEART RATE: 72 BPM | SYSTOLIC BLOOD PRESSURE: 128 MMHG | BODY MASS INDEX: 21.63 KG/M2 | TEMPERATURE: 97.1 F | DIASTOLIC BLOOD PRESSURE: 78 MMHG

## 2024-02-12 DIAGNOSIS — G62.9 NEUROPATHY: ICD-10-CM

## 2024-02-12 DIAGNOSIS — M79.7 FIBROMYALGIA: Primary | ICD-10-CM

## 2024-02-12 PROCEDURE — 99214 OFFICE O/P EST MOD 30 MIN: CPT | Performed by: PSYCHIATRY & NEUROLOGY

## 2024-02-12 PROCEDURE — 3078F DIAST BP <80 MM HG: CPT | Performed by: PSYCHIATRY & NEUROLOGY

## 2024-02-12 PROCEDURE — 1160F RVW MEDS BY RX/DR IN RCRD: CPT | Performed by: PSYCHIATRY & NEUROLOGY

## 2024-02-12 PROCEDURE — 3074F SYST BP LT 130 MM HG: CPT | Performed by: PSYCHIATRY & NEUROLOGY

## 2024-02-12 PROCEDURE — 1159F MED LIST DOCD IN RCRD: CPT | Performed by: PSYCHIATRY & NEUROLOGY

## 2024-02-12 RX ORDER — GABAPENTIN 300 MG/1
CAPSULE ORAL
Qty: 90 CAPSULE | Refills: 6 | Status: SHIPPED | OUTPATIENT
Start: 2024-02-12

## 2024-02-28 ENCOUNTER — TELEPHONE (OUTPATIENT)
Dept: INTERNAL MEDICINE | Facility: CLINIC | Age: 69
End: 2024-02-28
Payer: MEDICARE

## 2024-02-28 NOTE — TELEPHONE ENCOUNTER
PLEASE CALL JAYDEN TO R/S HER INITIAL EVAL WITH PERI. [phone].  HER VERTIGO WAS AN ISSUE TODAY AND SHE WAS AFRAID TO DRIVE.

## 2024-03-12 DIAGNOSIS — F33.0 MILD EPISODE OF RECURRENT MAJOR DEPRESSIVE DISORDER: ICD-10-CM

## 2024-03-13 NOTE — TELEPHONE ENCOUNTER
Rx Refill Note  Requested Prescriptions     Pending Prescriptions Disp Refills    DULoxetine (CYMBALTA) 20 MG capsule [Pharmacy Med Name: DULoxetine HCL DR 20 MG CAPSULE] 180 capsule      Sig: TAKE ONE CAPSULE BY MOUTH TWICE A DAY      Last filled: 4/18/23 for 7 months total. Called patient and confirmed she is still taking 20mg twice daily    Last office visit with prescribing clinician: 2/12/2024      Next office visit with prescribing clinician: 8/12/2024     Demarcus Rich MA  03/13/24, 16:18 EDT

## 2024-03-14 RX ORDER — DULOXETIN HYDROCHLORIDE 20 MG/1
20 CAPSULE, DELAYED RELEASE ORAL 2 TIMES DAILY
Qty: 180 CAPSULE | Refills: 1 | Status: SHIPPED | OUTPATIENT
Start: 2024-03-14

## 2024-03-25 ENCOUNTER — HOSPITAL ENCOUNTER (OUTPATIENT)
Dept: RADIATION ONCOLOGY | Facility: HOSPITAL | Age: 69
Setting detail: RADIATION/ONCOLOGY SERIES
Discharge: HOME | End: 2024-03-25
Payer: MEDICARE

## 2024-03-25 ENCOUNTER — APPOINTMENT (OUTPATIENT)
Dept: RADIATION ONCOLOGY | Facility: HOSPITAL | Age: 69
End: 2024-03-25
Payer: MEDICARE

## 2024-03-25 ENCOUNTER — TELEPHONE (OUTPATIENT)
Dept: RADIATION ONCOLOGY | Facility: HOSPITAL | Age: 69
End: 2024-03-25
Payer: MEDICARE

## 2024-03-25 NOTE — TELEPHONE ENCOUNTER
Called patient to re-schedule appointment form Monday, 3/25/24 to Friday, 3/29/24 at 10:00 am as patient is having care trouble this AM and unable to make appointment today.  Patient verbalized an understanding of date, time, and location of F/U appointment.  Contact information provided and patient will call with any questions or concerns.

## 2024-03-29 ENCOUNTER — OFFICE VISIT (OUTPATIENT)
Dept: RADIATION ONCOLOGY | Facility: HOSPITAL | Age: 69
End: 2024-03-29
Payer: MEDICARE

## 2024-03-29 VITALS
BODY MASS INDEX: 22.71 KG/M2 | DIASTOLIC BLOOD PRESSURE: 93 MMHG | TEMPERATURE: 98.3 F | WEIGHT: 133 LBS | OXYGEN SATURATION: 98 % | HEIGHT: 64 IN | HEART RATE: 66 BPM | RESPIRATION RATE: 16 BRPM | SYSTOLIC BLOOD PRESSURE: 158 MMHG

## 2024-03-29 DIAGNOSIS — C54.1 ENDOMETRIAL ADENOCARCINOMA: Primary | ICD-10-CM

## 2024-03-29 NOTE — PROGRESS NOTES
Follow Up Office Visit      Encounter Date: 03/29/2024   Patient Name: Alysia Sierra  YOB: 1955   Medical Record Number: [id number]   Primary Diagnosis: Endometrial adenocarcinoma [C54.1]   Cancer Staging: Cancer Staging   Endometrial adenocarcinoma  Staging form: Corpus Uteri - Carcinoma And Carcinosarcoma, AJCC 8th Edition  - Pathologic stage from 10/28/2022: FIGO Stage IIIA (pT3a, pN0(sn), cM0) - Signed by Jasmine Rich MD on 11/12/2022                Cancer Staging   FIGO Stage IIIA (pT3a, pN0(sn), cM0)        Chief Complaint:    Chief Complaint   Patient presents with    Uterine Cancer     Follow up       Oncologic History: Alysia Sierra is a 69 y.o. female here for follow up regarding her (pT3a, pN0(sn), cM0) grade 2 endometrioid adenocarcinoma.  Following hysterectomy with staging, the patient was started on adjuvant chemotherapy but declined external beam radiotherapy to the pelvis which was recommended due to the involvement of serosal adhesions and high risk of recurrence.  She was amenable to adjuvant vaginal cuff brachytherapy, 30 Gy/5 fractions, completed 1/2023. This was interdigitated between cycles of adjuvant chemotherapy.     Interval History: She is doing well. She denies vaginal bleeding or abnormal discharge. She denies melena, hematochezia, dysuria, or hematuria. She has chronic pain . She had a left hip fracture in 5/2023 managed surgically and has been working to maintain mobility. She denies new unexplained symptoms.     Completion Date: 1/18/2023           Subjective      Review of Systems: Review of Systems   Constitutional:  Positive for appetite change (eating less, request dietitian consult) and fatigue.   Genitourinary:  Positive for vaginal discharge (sensation of discharge but not noted on tissue).        Nocturia 2-3 times per night; urinary leakage at night, wears pad.      Musculoskeletal:  Positive for arthralgias  "(bilateral shoulders; relates to history of bursitis.).   Neurological:  Positive for dizziness (history of vertigo).        Neuropathies in bilateral lower extremties     Psychiatric/Behavioral:  Positive for dysphoric mood.        The following portions of the patient's history were reviewed and updated as appropriate: allergies, current medications, past family history, past medical history, past social history, past surgical history and problem list.    Measures:   KPS/Quality of Life: 80 - Restricted Physical Activity      Objective     Physical Exam:   Vital Signs:   Vitals:    03/29/24 1005   BP: 158/93   Pulse: 66   Resp: 16   Temp: 98.3 °F (36.8 °C)   TempSrc: Temporal   SpO2: 98%   Weight: 60.3 kg (133 lb)   Height: 163.8 cm (64.49\")   PainSc: 0-No pain     Body mass index is 22.49 kg/m².     Constitutional: No acute distress, sitting comfortably  Eye: EOMI, anicteric sclerae  HENT: NC/AT, MMM   Respiratory: Symmetric expansion, nonlabored respiration  MSK: ROM intact in all four extremities, no obvious deformities  Neuro: Alert, oriented x3, CN3-12 grossly intact.   Psych: Appropriate mood and affect.  Gyn: no evidence of disease in vaginal vault or involving vaginal cuff          Assessment / Plan        Assessment/Plan:     Diagnoses and all orders for this visit:    1. Endometrial adenocarcinoma (Primary)  -     Ambulatory Referral to ONC Social Work        Alysia Sierra is a pleasant 69 y.o. female with FIGO stage IIIA (pT3a, pN0(sn), cM0) endometrioid adenocarcinoma, grade 2.  Following hysterectomy with staging, the patient was started on adjuvant chemotherapy  She refused external beam radiotherapy to the pelvis, but was amenable to adjuvant vaginal cuff brachytherapy, which she completed uneventfully btwn cycles of chemotherapy in 1/2023. She remains stable with no clinical evidence of disease. She denies new clinical symptoms warranting restaging imaging. She will return to clinic in 6 months " to see Ms. ANETTE Gavin.       Follow Up:   Return in about 6 months (around 9/29/2024) for Office Visit.        Time:   I spent 35 minutes on this encounter today, 03/29/24. Activities that took place during this time include:   - preparing to see the patient  - obtaining and reviewing separately obtained history  - performing a medically appropriate examination and evaluation  - counseling and educating the patient  - ordering medications/tests/procedures  - communicating with other healthcare providers  - documenting clinical information in the health record  - coordinating care for this patient.     Sincerely,        Basilia Mata MD  Radiation Oncology  This document has been signed by Basilia Mata MD on March 29, 2024 18:06 EDT           NOTICE TO PATIENTS  At [facility], we believe that sharing information builds trust and better relationships. You are receiving this note because you recently visited [facility]. It is possible you will see health information before a provider has talked with you about it. This kind of information can be easy to misunderstand. To help you fully understand what it means for your health, we urge you to discuss this note with your provider.

## 2024-04-01 ENCOUNTER — DOCUMENTATION (OUTPATIENT)
Dept: OTHER | Facility: HOSPITAL | Age: 69
End: 2024-04-01
Payer: MEDICARE

## 2024-04-01 NOTE — PROGRESS NOTES
Distress Screening Follow-up    Name: Alysia Sierra    : 1955    Diagnosis: Endometrial adenocarcinoma     Location of Distress Screening: Radiation Oncology    Distress Level: 5 (3/29/2024 10:16 AM)    Emotional Concerns:  Sadness or depression: Y     Interventions:        Comments:  SW called to follow up with pt regarding recent distress screen results. SW left a VM with pt introducing self, and offering additional support. SW provided call back number, ([phone]) and encouraged pt to reach out at her convenience.     SW will remain available to offer support.     Mercedes Bocanegra, FAN  Oncology Social Worker

## 2024-04-05 ENCOUNTER — TELEPHONE (OUTPATIENT)
Dept: GYNECOLOGIC ONCOLOGY | Facility: CLINIC | Age: 69
End: 2024-04-05
Payer: MEDICARE

## 2024-04-05 NOTE — TELEPHONE ENCOUNTER
Caller: Jayden Sierra    Relationship: Self    Best call back number: [phone]    What is the best time to reach you: ANY    Who are you requesting to speak with (clinical staff, provider,  specific staff member): CLINICAL    What was the call regarding: JAYDEN IS WANTING TO KNOW WHY SHE SEE ORDERS IN HER MT CHART FOR URINE TEST   SHE STATED SHE SEES A DR ARBOLEDA , NEPHROLOGIST THAT TAKES CARE OF ALL THIS     Is it okay if the provider responds through MyChart: NO      PLEASE ADVISE

## 2024-04-05 NOTE — TELEPHONE ENCOUNTER
Called Patient, went thru chart and explained where Labs come from and she wishes to have Nephrology follow that as Liana is no longer with family care.  Will have Liana disregard orders at next visit.  Thanked patient for allowing us to care for her.

## 2024-04-09 ENCOUNTER — OFFICE VISIT (OUTPATIENT)
Dept: BEHAVIORAL HEALTH | Facility: CLINIC | Age: 69
End: 2024-04-09
Payer: MEDICARE

## 2024-04-09 DIAGNOSIS — F32.1 CURRENT MODERATE EPISODE OF MAJOR DEPRESSIVE DISORDER, UNSPECIFIED WHETHER RECURRENT: Primary | ICD-10-CM

## 2024-04-09 NOTE — PROGRESS NOTES
[facility] [place]                  Initial Assessment      Initial Adult Note     Date:2024   Patient Name: Alysia Sierra  : 1955   MRN: [id number]   Time IN: 1:25  pm    Time OUT: 2:18 pm     Referring Provider: Cassy Foster MD    Chief Complaint:      ICD-10-CM ICD-9-CM   1. Current moderate episode of major depressive disorder, unspecified whether recurrent  F32.1 296.22        History of Present Illness:   Alysia Sierra is a 69 y.o. female who is being seen today for counseling for depression.      Past Psychiatric History:   Brief period of outpatient treatment before COVID.     Review of Systems    PHQ-9: Brief Depression Severity Measure Score: 8    ROYAL 7 Total Score: 1    Patient's Support Network Includes:  extended family    Functional Status: Mild impairment     Significant Life Events:   Verbal, physical, sexual abuse? No  Has patient experienced a death / loss of relationship? Yes  Has patient experienced a major accident or tragic events? No    Work History:   Highest level of education obtained: college  Ever been active duty in the ? No  Patient's Occupation: Currently unemployed    Interpersonal/Relational:  Marital Status:   Support system: extended family    Mental/Behavioral Health History:  History of prior treatment or hospitalization: Brief period of outpatient treatment before COVID  Past diagnoses: None reported  Are there any significant health issues (current or past): See problem list  History of seizures: No    Family Psychiatric History:  Alcoholism (mother, sister)    History of Substance Use:  Patient answered: No    Triggers: (Persons/Places/Things/Events/Thought/Emotions): Lack of motivation, isolation, contemplating mortality    Social History     Socioeconomic History    Marital status:     Number of children: 1    Highest education level: Associate degree: academic program   Tobacco Use     Smoking status: Never     Passive exposure: Never    Smokeless tobacco: Never    Tobacco comments:     Never   Vaping Use    Vaping status: Never Used   Substance and Sexual Activity    Alcohol use: Yes     Alcohol/week: 6.0 standard drinks of alcohol     Types: 6 Glasses of wine per week     Comment: 6-8 glasses a week    Drug use: No    Sexual activity: Not Currently     Partners: Male     Birth control/protection: Surgical, Post-menopausal        Past Medical History:   Diagnosis Date    Allergic lisinopril  2017    Anemia     Arrhythmia     Arthritis     Cancer     uterine    Cataract mild 2020    stil lpresent    Chicken pox     Chronic fatigue     CKD (chronic kidney disease), stage III     sees nephro    Clotting disorder     Dental root implant present     lower left  x1 - possible dental implant    Depression mild 2019    Difficulty walking 2019    Disease of thyroid gland     Dizzy     NIEVES (dyspnea on exertion)     2017    Fracture of hip     Generalized anxiety disorder     GERD (gastroesophageal reflux disease) 2018    History of brachytherapy 2023    vaginal brachytherapy    Hyperlipidemia     Hypertension     Iron deficiency anemia     Liver disease     fatty    Liver problem     Measles     Menopause     Mumps     Neuromuscular disorder Peripheral Neuropathy    Orthostatic hypotension     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    Renal insufficiency 2018    Scoliosis     Unintentional weight loss     Uses contact lenses     bilat    Uterine cancer     Uterine cancer 2022    UTI (urinary tract infection)     Visual impairment Nearsighted    Wears glasses        Past Surgical History:   Procedure Laterality Date     SECTION  1981    DILATION AND CURETTAGE, DIAGNOSTIC / THERAPEUTIC      HIP OPEN REDUCTION Left 2023    Procedure: FEMORAL NECK OPEN REDUCTION INTERNAL FIXATION LEFT;  Surgeon: Juanpablo Lee MD;  Location: [facility];  Service:  Orthopedics;  Laterality: Left;    HIP SURGERY  2023    OOPHORECTOMY      ORAL LESION EXCISION/BIOPSY  08/2021    TOTAL LAPAROSCOPIC HYSTERECTOMY SALPINGO OOPHORECTOMY N/A 10/28/2022    Procedure: TOTAL LAPAROSCOPIC HYSTERECTOMY BILATERAL SALPINGO-OOPHORECTOMY, INJECTION FOR SENTINEL LYMPH NODE MAPPING, BILATERAL SENTINEL LYMPH NODE DISSECTION WITH DAVINCI ROBOT;  Surgeon: Jasmine Rich MD;  Location: [facility];  Service: Robotics - DaVinci;  Laterality: N/A;    TUBAL ABDOMINAL LIGATION  1983       Family History   Problem Relation Age of Onset    Spondylolisthesis Mother     COPD Mother     Stroke Mother     Hypertension Mother     Lung cancer Father     Hypertension Father     Heart attack Father     Coronary artery disease Father     Heart disease Father     Cancer Father     Lung disease Father     Hyperlipidemia Sister     Rheum arthritis Sister     Malig Hypertension Sister     Osteoarthritis Sister     Other Sister         alcoholic    Hypertension Sister     Scoliosis Sister     Hypertension Brother     Depression Sister     Early death Sister     Breast cancer Neg Hx     Ovarian cancer Neg Hx     Uterine cancer Neg Hx     Colon cancer Neg Hx     Melanoma Neg Hx     Prostate cancer Neg Hx          Current Outpatient Medications:     Alpha-Lipoic Acid 600 MG capsule, Take  by mouth., Disp: , Rfl:     Calcium-Phosphorus-Vitamin D (CALCIUM/VITAMIN D3/ADULT GUMMY PO), Take  by mouth. (Patient not taking: Reported on 3/29/2024), Disp: , Rfl:     Cholecalciferol 25 MCG (1000 UT) tablet, Take 1 tablet by mouth Daily., Disp: , Rfl:     docusate sodium (COLACE) 100 MG capsule, Take 1 capsule by mouth 2 (Two) Times a Day. (Patient not taking: Reported on 3/29/2024), Disp: 60 capsule, Rfl: 0    DULoxetine (CYMBALTA) 20 MG capsule, TAKE ONE CAPSULE BY MOUTH TWICE A DAY, Disp: 180 capsule, Rfl: 1    esomeprazole (nexIUM) 20 MG capsule, Take 1 capsule by mouth 2 (Two) Times a Week. OTC, Disp: , Rfl:     folic acid  (FOLVITE) 1 MG tablet, Take 1 tablet by mouth Daily., Disp: 30 tablet, Rfl: 0    gabapentin (Neurontin) 300 MG capsule, Take 1 capsule in the morning and 2 capsules at night., Disp: 90 capsule, Rfl: 6    hydrALAZINE (APRESOLINE) 25 MG tablet, 1 tablet 2 (Two) Times a Day., Disp: , Rfl:     levothyroxine (SYNTHROID, LEVOTHROID) 25 MCG tablet, TAKE ONE TABLET BY MOUTH EVERY MORNING ON AN EMPTY STOMACH, Disp: 90 tablet, Rfl: 0    Lidocaine 4 %, Place 1 patch on the skin as directed by provider Daily. Remove & Discard patch within 12 hours or as directed by MD, Disp: 15 each, Rfl: 0    magnesium oxide (MAGOX) 400 (241.3 Mg) MG tablet tablet, Take 1 tablet by mouth Daily. OTC, Disp: , Rfl:     metoprolol succinate XL (TOPROL-XL) 25 MG 24 hr tablet, TAKE ONE TABLET BY MOUTH ONCE NIGHTLY, Disp: 90 tablet, Rfl: 1    nitroglycerin (NITROSTAT) 0.4 MG SL tablet, Place 1 tablet under the tongue Every 5 (Five) Minutes As Needed for Chest Pain. Take no more than 3 doses in 15 minutes. (Patient not taking: Reported on 3/29/2024), Disp: , Rfl:     rosuvastatin (Crestor) 5 MG tablet, Take 1 tablet by mouth Daily., Disp: 90 tablet, Rfl: 1    thiamine (VITAMIN B1) 100 MG tablet, Take 1 tablet by mouth Daily., Disp: 30 tablet, Rfl: 0    traMADol (ULTRAM) 50 MG tablet, Take 1 tablet by mouth Every 12 (Twelve) Hours As Needed for Moderate Pain. (Patient not taking: Reported on 3/29/2024), Disp: 10 tablet, Rfl: 0    vitamin B-12 (CYANOCOBALAMIN) 1000 MCG tablet, Take 1 tablet by mouth Daily., Disp: , Rfl:     Allergies   Allergen Reactions    Lisinopril Angioedema    Metal Rash     Possible nickel -   Gold is only metal that doesn't have issues      Milk-Related Compounds Other (See Comments)     LACTOSE INTOLERANT    Tylenol [Acetaminophen] Other (See Comments)     ckd    Atorvastatin Myalgia       Objective     Physical Exam:  Vital Signs: There were no vitals filed for this visit.  There is no height or weight on file to calculate BMI.      Physical Exam    Mental Status Exam:   Hygiene:   good  Cooperation:  Cooperative  Eye Contact:  Good  Psychomotor Behavior:  Appropriate  Affect:  Full range  Mood: normal  Speech:  Normal  Thought Process:  Goal directed  Thought Content:  Normal  Suicidal:  None  Homicidal:  None  Hallucinations:  None  Delusion:  None  Memory:  Intact  Orientation:  Person, Place, Time, and Situation  Reliability:  good  Insight:  Good  Judgement:  Good  Impulse Control:  Good  Physical/Medical Issues:   See problem list      SUICIDE RISK ASSESSMENT/CSSRS:  1. Does patient have thoughts of suicide? no  2. Does patient have intent for suicide? no  3. Does patient have a current plan for suicide? no  4. History of suicide attempts: no  5. Family history of suicide or attempts: no  6. History of violent behaviors towards others or property or thoughts of committing suicide: no  7. History of sexual aggression toward others: no  8. Access to firearms or weapons: no    Assessment / Plan      Diagnosis  Diagnoses and all orders for this visit:    1. Current moderate episode of major depressive disorder, unspecified whether recurrent (Primary)    Patient presented for intake with clinician.  Patient states that they are  and living alone in [facility].  Patient reports they are a cancer survivor.  Patient reports discovering the cancer at the end of 2022.  Patient reports being in full remission after approximately 6 rounds of chemotherapy.  Patient states that they are .  Patient states that they were  for 22 years and have 1 adult son.  Patient reports son lives in [place] and is currently .  States they come from a family of 8 children.  Patient reports 6 living siblings.  Patient reports good relationship with siblings.  Patient states they have lived all over the [place] including [facility] and [place], [place], [place], [place] and [place].  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  She reports limited mobility due to peripheral neuropathy.  She reports neuropathy currently prevents them from working.  She reports missing the activity of working as well as running and biking.  Patient reports a close relationship with their neighbors in their apartment building.  Patient reports seeking behavioral health services due to symptoms of depression.  Patient reports the approximate length of symptoms as 36 months.  Patient reports a family history of alcoholism.  Patient reports depression has resulted in a decline in social functioning.  Patient elaborates that they missed Thanksgiving and Antoni with their family last year.  Patient states that they are single and that their siblings hosting the festivities are  with children.  Patient reports no criminal history.  Patient reports no history of substance abuse.  Symptoms of depression and note are as follows: Anhedonia, depression, irregular sleep pattern, fatigue, poor appetite, and trouble concentrating.    Prognosis  Prognosis is dependent upon treatment    Progress toward goal: Long-term goal of independent management of symptoms related to depression identified by patient.      PLAN:  Clinician introduced a cognitive behavioral intervention which focuses on mindfulness.  Patient was receptive to intervention.  Follow-ups will occur approximately every 3 weeks and will last from 45 to 60 minutes in duration.    Safety: No acute safety concerns  Risk Assessment: Risk of self-harm acutely is low. Risk of self-harm chronically is also low, but could be further elevated in the event of treatment noncompliance and/or AODA.    Treatment Plan/Goals: Continue supportive psychotherapy efforts and medications as indicated. Treatment and medication options discussed during today's visit. Patient ackowledged and verbally consented to continue with current treatment plan and was educated on the  importance of compliance with treatment and follow-up appointments. Patient seems reasonably able to adhere to treatment plan.      Assisted Patient in processing above session content; acknowledged and normalized patient’s thoughts, feelings, and concerns.  Rationalized patient thought process regarding depression.      Allowed Patient to freely discuss issues  without interruption or judgement with unconditional positive regard, active listening skills, and empathy. Therapist provided a safe, confidential environment to facilitate the development of a positive therapeutic relationship and encouraged open, honest communication. Assisted Patient in identifying risk factors which would indicate the need for higher level of care including thoughts to harm self or others and/or self-harming behavior and encouraged Patient to contact this office, call 911, or present to the nearest emergency room should any of these events occur. Discussed crisis intervention services and means to access. Patient adamantly and convincingly denies current suicidal or homicidal ideation or perceptual disturbance. Assisted Patient in processing session content; acknowledged and normalized Patient’s thoughts, feelings, and concerns by utilizing a person-centered approach in efforts to build appropriate rapport and a positive therapeutic relationship with open and honest communication.     Part of this note may be an electronic transcription/translation of spoken language to printed text using the Dragon Dictation System.       Follow Up:   Return in about 3 weeks (around 4/30/2024) for Next scheduled follow up.    Austin Tatum LCSW

## 2024-04-12 ENCOUNTER — OFFICE VISIT (OUTPATIENT)
Dept: INTERNAL MEDICINE | Facility: CLINIC | Age: 69
End: 2024-04-12
Payer: MEDICARE

## 2024-04-12 ENCOUNTER — LAB (OUTPATIENT)
Dept: LAB | Facility: HOSPITAL | Age: 69
End: 2024-04-12
Payer: MEDICARE

## 2024-04-12 VITALS
WEIGHT: 130 LBS | HEART RATE: 74 BPM | DIASTOLIC BLOOD PRESSURE: 70 MMHG | OXYGEN SATURATION: 99 % | BODY MASS INDEX: 22.2 KG/M2 | RESPIRATION RATE: 14 BRPM | SYSTOLIC BLOOD PRESSURE: 104 MMHG | HEIGHT: 64 IN

## 2024-04-12 DIAGNOSIS — Z00.00 MEDICARE ANNUAL WELLNESS VISIT, SUBSEQUENT: Primary | ICD-10-CM

## 2024-04-12 DIAGNOSIS — E78.5 HYPERLIPIDEMIA LDL GOAL <100: ICD-10-CM

## 2024-04-12 DIAGNOSIS — E78.2 MIXED HYPERLIPIDEMIA: ICD-10-CM

## 2024-04-12 DIAGNOSIS — I10 PRIMARY HYPERTENSION: ICD-10-CM

## 2024-04-12 DIAGNOSIS — Z76.89 ENCOUNTER TO ESTABLISH CARE WITH NEW DOCTOR: ICD-10-CM

## 2024-04-12 DIAGNOSIS — M81.8 OTHER OSTEOPOROSIS WITHOUT CURRENT PATHOLOGICAL FRACTURE: ICD-10-CM

## 2024-04-12 DIAGNOSIS — G62.9 NEUROPATHY: ICD-10-CM

## 2024-04-12 DIAGNOSIS — E83.42 HYPOMAGNESEMIA: ICD-10-CM

## 2024-04-12 DIAGNOSIS — E03.9 HYPOTHYROIDISM, UNSPECIFIED TYPE: ICD-10-CM

## 2024-04-12 DIAGNOSIS — N18.31 STAGE 3A CHRONIC KIDNEY DISEASE: ICD-10-CM

## 2024-04-12 PROBLEM — R80.9 PROTEINURIA: Status: RESOLVED | Noted: 2023-05-31 | Resolved: 2024-04-12

## 2024-04-12 PROBLEM — G47.22 CIRCADIAN RHYTHM SLEEP DISORDER, ADVANCED SLEEP PHASE TYPE: Status: RESOLVED | Noted: 2021-01-11 | Resolved: 2024-04-12

## 2024-04-12 PROBLEM — I47.10 PAROXYSMAL SVT (SUPRAVENTRICULAR TACHYCARDIA): Status: RESOLVED | Noted: 2019-09-23 | Resolved: 2024-04-12

## 2024-04-12 LAB
25(OH)D3 SERPL-MCNC: 80.4 NG/ML (ref 30–100)
ALBUMIN SERPL-MCNC: 4.9 G/DL (ref 3.5–5.2)
ALBUMIN/GLOB SERPL: 1.6 G/DL
ALP SERPL-CCNC: 91 U/L (ref 39–117)
ALT SERPL W P-5'-P-CCNC: 13 U/L (ref 1–33)
ANION GAP SERPL CALCULATED.3IONS-SCNC: 15 MMOL/L (ref 5–15)
AST SERPL-CCNC: 19 U/L (ref 1–32)
BASOPHILS # BLD AUTO: 0.03 10*3/MM3 (ref 0–0.2)
BASOPHILS NFR BLD AUTO: 0.5 % (ref 0–1.5)
BILIRUB SERPL-MCNC: 0.3 MG/DL (ref 0–1.2)
BUN SERPL-MCNC: 13 MG/DL (ref 8–23)
BUN/CREAT SERPL: 9.8 (ref 7–25)
CALCIUM SPEC-SCNC: 10 MG/DL (ref 8.6–10.5)
CHLORIDE SERPL-SCNC: 102 MMOL/L (ref 98–107)
CHOLEST SERPL-MCNC: 206 MG/DL (ref 0–200)
CO2 SERPL-SCNC: 23 MMOL/L (ref 22–29)
CREAT SERPL-MCNC: 1.32 MG/DL (ref 0.57–1)
DEPRECATED RDW RBC AUTO: 47.9 FL (ref 37–54)
EGFRCR SERPLBLD CKD-EPI 2021: 44.1 ML/MIN/1.73
EOSINOPHIL # BLD AUTO: 0.13 10*3/MM3 (ref 0–0.4)
EOSINOPHIL NFR BLD AUTO: 2.4 % (ref 0.3–6.2)
ERYTHROCYTE [DISTWIDTH] IN BLOOD BY AUTOMATED COUNT: 13.7 % (ref 12.3–15.4)
FOLATE SERPL-MCNC: 5.23 NG/ML (ref 4.78–24.2)
GLOBULIN UR ELPH-MCNC: 3 GM/DL
GLUCOSE SERPL-MCNC: 79 MG/DL (ref 65–99)
HCT VFR BLD AUTO: 37.9 % (ref 34–46.6)
HDLC SERPL-MCNC: 87 MG/DL (ref 40–60)
HGB BLD-MCNC: 12.2 G/DL (ref 12–15.9)
IMM GRANULOCYTES # BLD AUTO: 0.02 10*3/MM3 (ref 0–0.05)
IMM GRANULOCYTES NFR BLD AUTO: 0.4 % (ref 0–0.5)
LDLC SERPL CALC-MCNC: 74 MG/DL (ref 0–100)
LDLC/HDLC SERPL: 0.71 {RATIO}
LYMPHOCYTES # BLD AUTO: 2.22 10*3/MM3 (ref 0.7–3.1)
LYMPHOCYTES NFR BLD AUTO: 40.4 % (ref 19.6–45.3)
MAGNESIUM SERPL-MCNC: 1.7 MG/DL (ref 1.6–2.4)
MCH RBC QN AUTO: 30.7 PG (ref 26.6–33)
MCHC RBC AUTO-ENTMCNC: 32.2 G/DL (ref 31.5–35.7)
MCV RBC AUTO: 95.2 FL (ref 79–97)
MONOCYTES # BLD AUTO: 0.57 10*3/MM3 (ref 0.1–0.9)
MONOCYTES NFR BLD AUTO: 10.4 % (ref 5–12)
NEUTROPHILS NFR BLD AUTO: 2.52 10*3/MM3 (ref 1.7–7)
NEUTROPHILS NFR BLD AUTO: 45.9 % (ref 42.7–76)
NRBC BLD AUTO-RTO: 0 /100 WBC (ref 0–0.2)
PLATELET # BLD AUTO: 246 10*3/MM3 (ref 140–450)
PMV BLD AUTO: 10.5 FL (ref 6–12)
POTASSIUM SERPL-SCNC: 5.7 MMOL/L (ref 3.5–5.2)
PROT SERPL-MCNC: 7.9 G/DL (ref 6–8.5)
RBC # BLD AUTO: 3.98 10*6/MM3 (ref 3.77–5.28)
SODIUM SERPL-SCNC: 140 MMOL/L (ref 136–145)
T3FREE SERPL-MCNC: 2.76 PG/ML (ref 2–4.4)
T4 FREE SERPL-MCNC: 1.22 NG/DL (ref 0.93–1.7)
TRIGL SERPL-MCNC: 286 MG/DL (ref 0–150)
TSH SERPL DL<=0.05 MIU/L-ACNC: 2.72 UIU/ML (ref 0.27–4.2)
VIT B12 BLD-MCNC: 781 PG/ML (ref 211–946)
VLDLC SERPL-MCNC: 45 MG/DL (ref 5–40)
WBC NRBC COR # BLD AUTO: 5.49 10*3/MM3 (ref 3.4–10.8)

## 2024-04-12 PROCEDURE — 83735 ASSAY OF MAGNESIUM: CPT

## 2024-04-12 PROCEDURE — 84443 ASSAY THYROID STIM HORMONE: CPT

## 2024-04-12 PROCEDURE — 84439 ASSAY OF FREE THYROXINE: CPT

## 2024-04-12 PROCEDURE — 80053 COMPREHEN METABOLIC PANEL: CPT

## 2024-04-12 PROCEDURE — 85025 COMPLETE CBC W/AUTO DIFF WBC: CPT

## 2024-04-12 PROCEDURE — 82607 VITAMIN B-12: CPT

## 2024-04-12 PROCEDURE — 82306 VITAMIN D 25 HYDROXY: CPT

## 2024-04-12 PROCEDURE — 80061 LIPID PANEL: CPT

## 2024-04-12 PROCEDURE — 84481 FREE ASSAY (FT-3): CPT

## 2024-04-12 PROCEDURE — 82746 ASSAY OF FOLIC ACID SERUM: CPT

## 2024-04-12 RX ORDER — ROSUVASTATIN CALCIUM 5 MG/1
5 TABLET, COATED ORAL DAILY
Qty: 90 TABLET | Refills: 1 | Status: SHIPPED | OUTPATIENT
Start: 2024-04-12

## 2024-04-12 RX ORDER — METOPROLOL SUCCINATE 25 MG/1
25 TABLET, EXTENDED RELEASE ORAL NIGHTLY
Qty: 90 TABLET | Refills: 1 | Status: SHIPPED | OUTPATIENT
Start: 2024-04-12

## 2024-04-12 NOTE — PATIENT INSTRUCTIONS
Diagnosis Discussed   Continue to monitor   Plenty of fluids, monitor diet and exercise   Labs ordered will notify of results   Immunizations up to date - will schedule Tdap at pharmacy   Follow up with nephrology   Follow up with cardiology   Follow up with gyn/onc   Follow up with dermatology   Continue to monitor BP daily as directed- notify nephrology if continued fluctuations   Take medications as instructed- refills today   Follow up as directed   If symptoms worsen or persist please seek further evaluation

## 2024-04-12 NOTE — PROGRESS NOTES
The ABCs of the Annual Wellness Visit  Subsequent Medicare Wellness Visit    Subjective    Alysia Sierra is a 69 y.o. female who presents for a Subsequent Medicare Wellness Visit.-is currently fasting   Concerned for BP- BP up and down - all over the place-     PMH-  HPL   HTN   Hypothyroidism   CKD stage 3   Hypomagnesemia   Post menopausal   Hx of anemia   Endometrial adenocarcinoma   Depression   Osteoporosis   Hx of hip fracture   Leg weakness- L>R   Hx of lumbar fracture   Neuropathy   Dizziness - Vertigo   Syncope     Meds-  Alpha Lipoic Acid 600mg daily   Vitamin D 1000units daily   Docusate as needed   Duloxetine 20mg twice a day   Nexium 20mg 2-3 times weekly   Folic acid 1mg daily   Gabapentin 300mg- 300mg in AM, 600mg in PM   Hydralazine 25mg twice a day- hold if BP is low in AM   Metoprolol XL 25mg nightly   Levothyroxine 25mcg daily   Lidocaine patches as needed   Mag oxide 400mg daily   Nitroglycerin as needed   Rosuvastatin 5mg daily   Thiamine 100mg daily   Tramadol as needed  Vitamin B12 daily       Alcohol - 4-6 per week   Smoking none   Drug use -none   Job retired- RN - [employer]   Stress - 5/10    Sun Exposure - will try to protect skin- Dermatology annually     Dental/Eye exams - up to date. Vision is stable   Diet/Exercise- Diet- Healthy- moderate. Sweet tooth.   Exercise- limited- will try to get more active     ADRIANA Gunter   PAP - follow up with Jani  Mammo- due end of year- will schedule   DEXA - next due 2025- osteoporosis   BC/Hormone replacement - none   Vitamin D - supplement      Colonoscopy/Cologuard - cologuard- 2022- negative   Next 2025     Immunizations  Tdap- will schedule   Flu- completed  Shingles- completed   COVID- completed + boosters  RSV- completed   PNA- will completed next year     The following portions of the patient's history were reviewed and   updated as appropriate: allergies, current medications, past family history, past medical history, past social history,  past surgical history, and problem list.    Compared to one year ago, the patient feels her physical   health is better.    Compared to one year ago, the patient feels her mental   health is worse.    Recent Hospitalizations:  This patient has had a [facility] admission record on file within the last 365 days.    Current Medical Providers:  Patient Care Team:  Cassy Foster MD as PCP - General (Internal Medicine)  Russ Carrington MD as Consulting Physician (Nephrology)  Gray Bahena MD as Consulting Physician (Cardiology)  Caden Dietz MD as Consulting Physician (Neurology)  Charity Cornejo, CESAR as Ambulatory  (Oncology) (Population Health)  Juanpablo Lee MD as Consulting Physician (Orthopedic Surgery)  Basilia Mata MD as Consulting Physician (Radiation Oncology)  Sarah Stern MD as Consulting Physician (Gynecology)  Jared Wheatley MD as Consulting Physician (Nephrology)  Jose G Florez MD as Consulting Physician (Dermatology)    Outpatient Medications Prior to Visit   Medication Sig Dispense Refill    Alpha-Lipoic Acid 600 MG capsule Take  by mouth.      Cholecalciferol 25 MCG (1000 UT) tablet Take 1 tablet by mouth Daily.      docusate sodium (COLACE) 100 MG capsule Take 1 capsule by mouth 2 (Two) Times a Day. 60 capsule 0    DULoxetine (CYMBALTA) 20 MG capsule TAKE ONE CAPSULE BY MOUTH TWICE A  capsule 1    esomeprazole (nexIUM) 20 MG capsule Take 1 capsule by mouth 2 (Two) Times a Week. OTC      folic acid (FOLVITE) 1 MG tablet Take 1 tablet by mouth Daily. 30 tablet 0    gabapentin (Neurontin) 300 MG capsule Take 1 capsule in the morning and 2 capsules at night. 90 capsule 6    hydrALAZINE (APRESOLINE) 25 MG tablet 1 tablet 2 (Two) Times a Day.      levothyroxine (SYNTHROID, LEVOTHROID) 25 MCG tablet TAKE ONE TABLET BY MOUTH EVERY MORNING ON AN EMPTY STOMACH 90 tablet 0    Lidocaine 4 % Place 1 patch on the skin as directed by provider Daily. Remove & Discard patch  within 12 hours or as directed by MD 15 each 0    magnesium oxide (MAGOX) 400 (241.3 Mg) MG tablet tablet Take 1 tablet by mouth Daily. OTC      nitroglycerin (NITROSTAT) 0.4 MG SL tablet Place 1 tablet under the tongue Every 5 (Five) Minutes As Needed for Chest Pain. Take no more than 3 doses in 15 minutes.      thiamine (VITAMIN B1) 100 MG tablet Take 1 tablet by mouth Daily. 30 tablet 0    traMADol (ULTRAM) 50 MG tablet Take 1 tablet by mouth Every 12 (Twelve) Hours As Needed for Moderate Pain. 10 tablet 0    vitamin B-12 (CYANOCOBALAMIN) 1000 MCG tablet Take 1 tablet by mouth Daily.      metoprolol succinate XL (TOPROL-XL) 25 MG 24 hr tablet TAKE ONE TABLET BY MOUTH ONCE NIGHTLY 90 tablet 1    rosuvastatin (Crestor) 5 MG tablet Take 1 tablet by mouth Daily. 90 tablet 1    Calcium-Phosphorus-Vitamin D (CALCIUM/VITAMIN D3/ADULT GUMMY PO) Take  by mouth. (Patient not taking: Reported on 4/12/2024)       No facility-administered medications prior to visit.       Opioid medication/s are on active medication list.  and I have evaluated her active treatment plan and pain score trends (see table).  There were no vitals filed for this visit.  I have reviewed the chart for potential of high risk medication and harmful drug interactions in the elderly.          Aspirin is not on active medication list.  Aspirin use is not indicated based on review of current medical condition/s. Risk of harm outweighs potential benefits.  .    Patient Active Problem List   Diagnosis    Hypertension    Leg weakness, bilateral    Syncope    CKD (chronic kidney disease) stage 3, GFR 30-59 ml/min    Neuropathy    Hyperlipidemia LDL goal <100    Endometrial adenocarcinoma    Mild episode of recurrent major depressive disorder    Dizziness    Hip fracture    Anemia    Hypomagnesemia    Hypothyroidism    Post-menopausal    Other osteoporosis without current pathological fracture    Closed fracture of second lumbar vertebra     Advance Care  "Planning   Advance Care Planning     Advance Directive is on file.  ACP discussion was held with the patient during this visit. Patient has an advance directive in EMR which is still valid.      Objective    Vitals:    24 1031   BP: 104/70   Pulse: 74   Resp: 14   SpO2: 99%   Weight: 59 kg (130 lb)   Height: 163.8 cm (64.49\")     Estimated body mass index is 21.98 kg/m² as calculated from the following:    Height as of this encounter: 163.8 cm (64.49\").    Weight as of this encounter: 59 kg (130 lb).    BMI is within normal parameters. No other follow-up for BMI required.      Does the patient have evidence of cognitive impairment? No          HEALTH RISK ASSESSMENT    Smoking Status:  Social History     Tobacco Use   Smoking Status Never    Passive exposure: Never   Smokeless Tobacco Never   Tobacco Comments    Never     Alcohol Consumption:  Social History     Substance and Sexual Activity   Alcohol Use Yes    Alcohol/week: 4.0 - 6.0 standard drinks of alcohol    Types: 4 - 6 Glasses of wine per week    Comment: 6-8 glasses a week     Fall Risk Screen:    STEADI Fall Risk Assessment was completed, and patient is at HIGH risk for falls. Assessment completed on:2024    Depression Screenin/12/2024    10:42 AM   PHQ-2/PHQ-9 Depression Screening   Little Interest or Pleasure in Doing Things 0-->not at all   Feeling Down, Depressed or Hopeless 1-->several days   PHQ-9: Brief Depression Severity Measure Score 1       Health Habits and Functional and Cognitive Screenin/12/2024    10:38 AM   Functional & Cognitive Status   Do you have difficulty preparing food and eating? No   Do you have difficulty bathing yourself, getting dressed or grooming yourself? Yes   Do you have difficulty using the toilet? No   Do you have difficulty moving around from place to place? Yes   Do you have trouble with steps or getting out of a bed or a chair? Yes   Current Diet Well Balanced Diet   Dental Exam Up to " date   Eye Exam Up to date   Exercise (times per week) 0 times per week   Current Exercises Include No Regular Exercise   Do you need help using the phone?  No   Are you deaf or do you have serious difficulty hearing?  No   Do you need help to go to places out of walking distance? Yes   Do you need help shopping? No   Do you need help preparing meals?  No   Do you need help with housework?  Yes   Do you need help with laundry? No   Do you need help taking your medications? No   Do you need help managing money? No   Do you ever drive or ride in a car without wearing a seat belt? No   Have you felt unusual stress, anger or loneliness in the last month? Yes   Who do you live with? Alone   If you need help, do you have trouble finding someone available to you? No   Have you been bothered in the last four weeks by sexual problems? No   Do you have difficulty concentrating, remembering or making decisions? Yes       Age-appropriate Screening Schedule:  Refer to the list below for future screening recommendations based on patient's age, sex and/or medical conditions. Orders for these recommended tests are listed in the plan section. The patient has been provided with a written plan.    Health Maintenance   Topic Date Due    PAP SMEAR  Never done    COVID-19 Vaccine (6 - 2023-24 season) 09/12/2024 (Originally 1/27/2024)    Pneumococcal Vaccine 65+ (2 of 2 - PCV) 04/12/2025 (Originally 12/29/2022)    TDAP/TD VACCINES (2 - Td or Tdap) 07/24/2024    INFLUENZA VACCINE  08/01/2024    LIPID PANEL  11/01/2024    MAMMOGRAM  12/30/2024    ANNUAL WELLNESS VISIT  04/12/2025    COLORECTAL CANCER SCREENING  08/10/2025    DXA SCAN  09/18/2025    HEPATITIS C SCREENING  Completed    RSV Vaccine - Adults  Completed    ZOSTER VACCINE  Completed                  CMS Preventative Services Quick Reference  Risk Factors Identified During Encounter  None Identified  The above risks/problems have been discussed with the patient.  Pertinent  "information has been shared with the patient in the After Visit Summary.  An After Visit Summary and PPPS were made available to the patient.    Follow Up:   Next Medicare Wellness visit to be scheduled in 1 year.       Additional E&M Note during same encounter follows:  Patient has multiple medical problems which are significant and separately identifiable that require additional work above and beyond the Medicare Wellness Visit.      Chief Complaint  Medicare Wellness-subsequent    Subjective        HPI  Alysia Sierra is also being seen today for concerns for BP- up and down. Has been high 148/99 and low 78/50   Not symptomatic of fluctuation   Has been seen by nephrology- no major concern from them- will follow up. Labs today.   Follow up with cardiology      Review of Systems   Constitutional:  Negative for chills and fever.   HENT:  Negative for dental problem, trouble swallowing and voice change.    Eyes:  Negative for visual disturbance.   Respiratory:  Negative for cough, chest tightness, shortness of breath and wheezing.    Cardiovascular:  Negative for chest pain, palpitations and leg swelling.   Gastrointestinal:  Negative for abdominal pain, blood in stool, constipation, diarrhea, nausea and vomiting.   Genitourinary:  Negative for dysuria.   Musculoskeletal:  Positive for arthralgias, gait problem and myalgias.        Bilateral leg weakness- cane for stability    Skin:  Negative for rash.   Neurological:  Positive for weakness. Negative for seizures and speech difficulty.   Psychiatric/Behavioral:  Positive for dysphoric mood. Negative for self-injury, sleep disturbance and suicidal ideas. The patient is not nervous/anxious.        Objective   Vital Signs:  /70   Pulse 74   Resp 14   Ht 163.8 cm (64.49\")   Wt 59 kg (130 lb)   SpO2 99%   BMI 21.98 kg/m²     Physical Exam  Vitals and nursing note reviewed.   Constitutional:       General: She is not in acute distress.     Appearance: Normal " appearance.   HENT:      Head: Normocephalic and atraumatic.      Right Ear: Tympanic membrane normal.      Left Ear: Tympanic membrane normal.      Nose: Nose normal.      Mouth/Throat:      Mouth: Mucous membranes are moist.   Eyes:      Extraocular Movements: Extraocular movements intact.      Conjunctiva/sclera: Conjunctivae normal.      Pupils: Pupils are equal, round, and reactive to light.   Cardiovascular:      Rate and Rhythm: Normal rate and regular rhythm.      Heart sounds: Normal heart sounds.   Pulmonary:      Effort: Pulmonary effort is normal. No respiratory distress.      Breath sounds: Normal breath sounds.   Abdominal:      General: Bowel sounds are normal.      Palpations: Abdomen is soft.   Musculoskeletal:         General: Normal range of motion.      Cervical back: Normal range of motion.      Comments: Moving all extremities    Skin:     General: Skin is warm and dry.   Neurological:      General: No focal deficit present.      Mental Status: She is alert and oriented to person, place, and time.   Psychiatric:         Mood and Affect: Mood normal.         Behavior: Behavior normal.         Thought Content: Thought content normal.         Judgment: Judgment normal.            Assessment and Plan   Diagnoses and all orders for this visit:  Diagnosis Discussed   Continue to monitor   Plenty of fluids, monitor diet and exercise   Labs ordered will notify of results   Immunizations up to date - will schedule Tdap at pharmacy   Follow up with nephrology   Follow up with cardiology   Follow up with gyn/onc   Follow up with dermatology   Continue to monitor BP daily as directed- notify nephrology if continued fluctuations   Take medications as instructed- refills today   Follow up as directed   If symptoms worsen or persist please seek further evaluation       1. Medicare annual wellness visit, subsequent (Primary)    2. Encounter to establish care with new doctor    3. Primary hypertension  -      CBC & Differential; Future  -     Comprehensive Metabolic Panel; Future  -     metoprolol succinate XL (TOPROL-XL) 25 MG 24 hr tablet; Take 1 tablet by mouth Every Night.  Dispense: 90 tablet; Refill: 1    4. Stage 3a chronic kidney disease  -     CBC & Differential; Future  -     Comprehensive Metabolic Panel; Future    5. Hyperlipidemia LDL goal <100  -     Lipid Panel; Future    6. Hypomagnesemia  -     Magnesium; Future    7. Hypothyroidism, unspecified type  -     TSH; Future  -     T4, Free; Future  -     T3, Free; Future  -     Vitamin D,25-Hydroxy; Future    8. Other osteoporosis without current pathological fracture  -     Vitamin D,25-Hydroxy; Future    9. Neuropathy  -     Vitamin B12; Future  -     Folate; Future    10. Mixed hyperlipidemia  -     rosuvastatin (Crestor) 5 MG tablet; Take 1 tablet by mouth Daily.  Dispense: 90 tablet; Refill: 1         I spent 30 minutes caring for Alysia on this date of service. This time includes time spent by me in the following activities:preparing for the visit, reviewing tests, obtaining and/or reviewing a separately obtained history, ordering medications, tests, or procedures, and documenting information in the medical record  Follow Up   Return in about 6 months (around 10/12/2024), or if symptoms worsen or fail to improve, for Recheck follow up .  Patient was given instructions and counseling regarding her condition or for health maintenance advice. Please see specific information pulled into the AVS if appropriate.

## 2024-04-16 DIAGNOSIS — E87.5 HYPERKALEMIA: Primary | ICD-10-CM

## 2024-04-17 ENCOUNTER — TELEPHONE (OUTPATIENT)
Dept: INTERNAL MEDICINE | Facility: CLINIC | Age: 69
End: 2024-04-17
Payer: MEDICARE

## 2024-04-17 NOTE — TELEPHONE ENCOUNTER
----- Message from Cassy Foster MD sent at 4/16/2024  1:50 PM EDT -----  Please advise patient of lab results  CBC- complete blood count- stable. No anemia   TSH- thyroid stimulating hormone- normal   B12 and Folate- normal   Free T3 and Free T4- normal  Vitamin D level- within normal limits   CMP- comprehensive metabolic panel- stable.   Glucose- normal   Liver Function- normal   Elevated potassium- monitor intake increased dietary intake? - repeat in 1-2 weeks - BMP ordered for follow up   Kidney Function- elevated creatinine level- stable from previous- kidney disease   Magnesium- stable.   Lipid panel- Elevated cholesterol levels and triglycerides.  Continue to monitor diet and exercise   Will continue to monitor

## 2024-05-01 ENCOUNTER — LAB (OUTPATIENT)
Dept: LAB | Facility: HOSPITAL | Age: 69
End: 2024-05-01
Payer: MEDICARE

## 2024-05-01 ENCOUNTER — OFFICE VISIT (OUTPATIENT)
Dept: BEHAVIORAL HEALTH | Facility: CLINIC | Age: 69
End: 2024-05-01
Payer: MEDICARE

## 2024-05-01 DIAGNOSIS — E87.5 HYPERKALEMIA: ICD-10-CM

## 2024-05-01 DIAGNOSIS — M54.12 CERVICAL RADICULOPATHY AT C6: ICD-10-CM

## 2024-05-01 DIAGNOSIS — F32.1 CURRENT MODERATE EPISODE OF MAJOR DEPRESSIVE DISORDER, UNSPECIFIED WHETHER RECURRENT: Primary | ICD-10-CM

## 2024-05-01 LAB
ANION GAP SERPL CALCULATED.3IONS-SCNC: 16 MMOL/L (ref 5–15)
BUN SERPL-MCNC: 17 MG/DL (ref 8–23)
BUN/CREAT SERPL: 8 (ref 7–25)
CALCIUM SPEC-SCNC: 9.6 MG/DL (ref 8.6–10.5)
CHLORIDE SERPL-SCNC: 101 MMOL/L (ref 98–107)
CO2 SERPL-SCNC: 23 MMOL/L (ref 22–29)
CREAT SERPL-MCNC: 2.13 MG/DL (ref 0.57–1)
EGFRCR SERPLBLD CKD-EPI 2021: 24.8 ML/MIN/1.73
GLUCOSE SERPL-MCNC: 96 MG/DL (ref 65–99)
POTASSIUM SERPL-SCNC: 4.5 MMOL/L (ref 3.5–5.2)
SODIUM SERPL-SCNC: 140 MMOL/L (ref 136–145)

## 2024-05-01 PROCEDURE — 36415 COLL VENOUS BLD VENIPUNCTURE: CPT

## 2024-05-01 PROCEDURE — 80048 BASIC METABOLIC PNL TOTAL CA: CPT

## 2024-05-01 RX ORDER — GABAPENTIN 300 MG/1
CAPSULE ORAL
Qty: 90 CAPSULE | OUTPATIENT
Start: 2024-05-01

## 2024-05-01 NOTE — PROGRESS NOTES
[facility] [place]                 Follow Up Adult      Follow Up Adult Note     Date:2024   Patient Name: Alysia Sierra  : 1955   MRN: [id number]   Time IN: 12:01 pm    Time OUT: 1:03 pm     Referring Provider: Cassy Foster MD    Chief Complaint:      ICD-10-CM ICD-9-CM   1. Current moderate episode of major depressive disorder, unspecified whether recurrent  F32.1 296.22        History of Present Illness:   Alysia Sierra is a 69 y.o. female who is being seen today for follow up counseling for depression.    Subjective               Patient's Support Network Includes: extended family    Functional Status: Mild impairment       Current Outpatient Medications:     Alpha-Lipoic Acid 600 MG capsule, Take  by mouth., Disp: , Rfl:     Cholecalciferol 25 MCG (1000 UT) tablet, Take 1 tablet by mouth Daily., Disp: , Rfl:     docusate sodium (COLACE) 100 MG capsule, Take 1 capsule by mouth 2 (Two) Times a Day., Disp: 60 capsule, Rfl: 0    DULoxetine (CYMBALTA) 20 MG capsule, TAKE ONE CAPSULE BY MOUTH TWICE A DAY, Disp: 180 capsule, Rfl: 1    esomeprazole (nexIUM) 20 MG capsule, Take 1 capsule by mouth 2 (Two) Times a Week. OTC, Disp: , Rfl:     folic acid (FOLVITE) 1 MG tablet, Take 1 tablet by mouth Daily., Disp: 30 tablet, Rfl: 0    gabapentin (Neurontin) 300 MG capsule, Take 1 capsule in the morning and 2 capsules at night., Disp: 90 capsule, Rfl: 6    hydrALAZINE (APRESOLINE) 25 MG tablet, 1 tablet 2 (Two) Times a Day., Disp: , Rfl:     levothyroxine (SYNTHROID, LEVOTHROID) 25 MCG tablet, TAKE ONE TABLET BY MOUTH EVERY MORNING ON AN EMPTY STOMACH, Disp: 90 tablet, Rfl: 0    Lidocaine 4 %, Place 1 patch on the skin as directed by provider Daily. Remove & Discard patch within 12 hours or as directed by MD, Disp: 15 each, Rfl: 0    magnesium oxide (MAGOX) 400 (241.3 Mg) MG tablet tablet, Take 1 tablet by mouth Daily. OTC, Disp: , Rfl:     metoprolol succinate  XL (TOPROL-XL) 25 MG 24 hr tablet, Take 1 tablet by mouth Every Night., Disp: 90 tablet, Rfl: 1    nitroglycerin (NITROSTAT) 0.4 MG SL tablet, Place 1 tablet under the tongue Every 5 (Five) Minutes As Needed for Chest Pain. Take no more than 3 doses in 15 minutes., Disp: , Rfl:     rosuvastatin (Crestor) 5 MG tablet, Take 1 tablet by mouth Daily., Disp: 90 tablet, Rfl: 1    thiamine (VITAMIN B1) 100 MG tablet, Take 1 tablet by mouth Daily., Disp: 30 tablet, Rfl: 0    traMADol (ULTRAM) 50 MG tablet, Take 1 tablet by mouth Every 12 (Twelve) Hours As Needed for Moderate Pain., Disp: 10 tablet, Rfl: 0    vitamin B-12 (CYANOCOBALAMIN) 1000 MCG tablet, Take 1 tablet by mouth Daily., Disp: , Rfl:     Allergies   Allergen Reactions    Lisinopril Angioedema    Metal Rash     Possible nickel -   Gold is only metal that doesn't have issues      Milk-Related Compounds Other (See Comments)     LACTOSE INTOLERANT    Tylenol [Acetaminophen] Other (See Comments)     ckd    Atorvastatin Myalgia       Objective     Physical Exam:  Vital Signs: There were no vitals filed for this visit.  There is no height or weight on file to calculate BMI.     Mental Status Exam:   Hygiene:   good  Cooperation:  Cooperative  Eye Contact:  Good  Psychomotor Behavior:  Appropriate  Affect:  Full range  Mood: normal  Speech:  Normal  Thought Process:  Goal directed  Thought Content:  Normal  Suicidal:  None  Homicidal:  None  Hallucinations:  None  Delusion:  None  Memory:  Intact  Orientation:  Person, Place, Time, and Situation  Reliability:  good  Insight:  Good  Judgement:  Good  Impulse Control:  Good  Physical/Medical Issues:   See problem list      Assessment / Plan      Diagnosis:  Diagnoses and all orders for this visit:    1. Current moderate episode of major depressive disorder, unspecified whether recurrent (Primary)    Patient presented for follow-up with clinician.  Patient reported worsening depressive symptoms which occurred the week  preceding session.  Patient reported that they drank to excess.  Patient reported intent on abstaining from alcohol and other unhealthy foods in the interest of mental health and nutrition.  Patient and clinician collaborated to determine a future course of treatment.  Clinician introduced a solution focused intervention which promotes short-term goals related to daily functioning to be completed in between sessions.  Patient stated that they had begun reading materials given to them by clinician regarding mindfulness and would begin exercises before the start of next session.  Clinician utilized active listening and reflective response to convey empathy and support.    Progress toward goal: Not at goal    Prognosis: Good with further treatment    PLAN:  Patient and clinician will continue to utilize a cognitive behavioral intervention which focuses on mindfulness.  Follow-ups will occur every 21 days will last from 45 to 60 minutes in duration.    Safety: No acute safety concerns  Risk Assessment: Risk of self-harm acutely is low. Risk of self-harm chronically is also low, but could be further elevated in the event of treatment noncompliance and/or AODA.    Treatment Plan/Goals: Continue supportive psychotherapy efforts and medications as indicated. Treatment and medication options discussed during today's visit. Patient ackowledged and verbally consented to continue with current treatment plan and was educated on the importance of compliance with treatment and follow-up appointments. Patient seems reasonably able to adhere to treatment plan.      Assisted Patient in processing above session content; acknowledged and normalized patient’s thoughts, feelings, and concerns.  Rationalized patient thought process regarding depression.      Allowed Patient to freely discuss issues  without interruption or judgement with unconditional positive regard, active listening skills, and empathy. Therapist provided a safe,  confidential environment to facilitate the development of a positive therapeutic relationship and encouraged open, honest communication. Assisted Patient in identifying risk factors which would indicate the need for higher level of care including thoughts to harm self or others and/or self-harming behavior and encouraged Patient to contact this office, call 911, or present to the nearest emergency room should any of these events occur. Discussed crisis intervention services and means to access. Patient adamantly and convincingly denies current suicidal or homicidal ideation or perceptual disturbance. Assisted Patient in processing session content; acknowledged and normalized Patient’s thoughts, feelings, and concerns by utilizing a person-centered approach in efforts to build appropriate rapport and a positive therapeutic relationship with open and honest communication. .     Part of this note may be an electronic transcription/translation of spoken language to printed text using the Dragon Dictation System.       Follow Up:   Return in about 3 weeks (around 5/22/2024).    Austin Tatum LCSW

## 2024-05-01 NOTE — TREATMENT PLAN
Multi-Disciplinary Problems (from Behavioral Health Treatment Plan)      Active Problems       Problem: Depression  Start Date: 05/01/24      Problem Details: The patient self-scales this problem as a 4 with 10 being the worst.          Goal Priority Start Date Expected End Date End Date    Patient will demonstrate the ability to initiate new constructive life skills outside of sessions on a consistent basis. -- 05/01/24 -- --    Goal Details: Progress toward goal:  Not appropriate to rate progress toward goal since this is the initial treatment plan.          Goal Intervention Frequency Start Date End Date    Assist patient in setting attainable activities of daily living goals. PRN 05/01/24 --    Intervention Details: na        Goal Intervention Frequency Start Date End Date    Provide education about depression Weekly 05/01/24 --    Intervention Details: Duration of treatment until until discharged.          Goal Intervention Frequency Start Date End Date    Assist patient in developing healthy coping strategies. Weekly 05/01/24 --    Intervention Details: Duration of treatment until until discharged.                          Reviewed By       Austin Tatum LCSW 05/01/24 0024                     I have discussed and reviewed this treatment plan with the patient.  It has been printed for signatures.

## 2024-05-07 DIAGNOSIS — M54.12 CERVICAL RADICULOPATHY AT C6: ICD-10-CM

## 2024-05-07 RX ORDER — GABAPENTIN 300 MG/1
CAPSULE ORAL
Qty: 90 CAPSULE | Refills: 6 | OUTPATIENT
Start: 2024-05-07

## 2024-06-10 NOTE — PROGRESS NOTES
Alysia Sierra  [id number]  1955      Reason for visit:  follow up Stg IIIA endometrial cancer       History of present illness:  The patient is a 69 y.o. year old female who presents today for treatment and evaluation of the above issues. Pt was seen and treated by Dr Rich for Stg 3A Grd 2 EAC. She underwent surgery, 6 cycles of docetaxel and carboplatin. Imaging 4/2023 showed IBIS. Pt has not been seen since Dec 2023     Today, she is doing well. Notes depression - seeing therapist. Can't run like she use to  - good and bad days.  Goal of 1 mile ambulating, working on it.  Was doing PT, but had to stop due to concerns re: blood pressure/HTN.  Has St III CKD - sees nephrology.  Sees neurology for neuropathy.  Symptoms worsened with poor hydration.  Today has back pain - uses lidocaine patch, sometimes uses back brace.   Urinary urgency at night with some incontinence.      Oncologic History:  Oncology/Hematology History   Endometrial adenocarcinoma   10/2022 Initial Diagnosis    8/22/2022: TVUS with uterus measuring 5.7 x 4.8 x 4.1 cm with an endometrial stripe thickness of 9.8 mm.  Right ovary not visualized.  Left ovary normal.  No free fluid.  9/29/2022: Saline infusion sonogram with an irregularly shaped prominent lesion on the posterior endometrial surface concerning for neoplasia.  10/6/2022: Endometrial biopsy with FIGO grade 1 endometrial cancer      10/28/2022 Surgery    Robotic-assisted total laparoscopic hysterectomy with bilateral salpingo-oophorectomy with bilateral SLND    Pathology with FIGO grade 2 endometrioid adenocarcinoma with 94% MI. Involvement of endocervix and uterine serosal adhesions. Negative parametrium and bilateral sentinel lymph nodes. MMR intact.     Surgery at [facility] by Jasmine Rich MD      Cancer Staged    Cancer Staging   Endometrial adenocarcinoma (HCC)  Staging form: Corpus Uteri - Carcinoma And Carcinosarcoma, AJCC 8th Edition  - Pathologic stage from 10/28/2022:  FIGO Stage IIIA (pT3a, pN0(sn), cM0) - Signed by Jasmine Rich MD on 11/12/2022       10/28/2022 Cancer Staged    Staging form: Corpus Uteri - Carcinoma And Carcinosarcoma, AJCC 8th Edition  - Pathologic stage from 10/28/2022: FIGO Stage IIIA (pT3a, pN0(sn), cM0) - Signed by Jasmine Rich MD on 11/12/2022 12/8/2022 -  Chemotherapy    OP OVARIAN DOCEtaxel / CARBOplatin  6 cycles adjuvant treatment completed  - worsening of baseline neuropathy required dose reduction of docetaxel at cycle #4  - cycle #5 complicated by grade 3 anemia requiring transfusion  - docetaxel and carboplatin both dose reduced at cycle 6     1/9/2023 - 1/18/2023 Radiation    Radiation OncologyTreatment Course:  Alysia Sierra received 3000 cGy in 5 fractions to upper vagina via High Dose Radiation - HDR.     4/13/2023 Imaging    Post-treatment CT chest, abdomen, pelvis:  1. No evidence of metastatic disease in the chest, abdomen, or pelvis.  2. Hysterectomy.  3. Additional findings include: Healing left rib fractures, small esophageal hiatal hernia, thoracolumbar levoscoliosis, bilateral renal cortical scarring and small bilateral renal cysts, sparse sigmoid colonic diverticulosis.     6/19/2023 Survivorship    Survivorship Care Plan completed and discussed with patient.  Copy of Survivorship Care Plan provided to patient and primary care provider.           Past Medical History:   Diagnosis Date    Allergic lisinopril  2017    Anemia     Arrhythmia     Arthritis     Cancer     uterine    Cataract mild 2020    stil lpresent    Chicken pox     Chronic fatigue     CKD (chronic kidney disease), stage III     sees nephro    Clotting disorder     Dental root implant present     lower left  x1 - possible dental implant    Depression mild 2019    Difficulty walking 2019    Disease of thyroid gland     Dizzy     NIEVES (dyspnea on exertion)     2017    Fracture of hip 2023    Generalized anxiety disorder     GERD (gastroesophageal reflux  disease) 2018    History of brachytherapy 2023    vaginal brachytherapy    Hyperlipidemia     Hypertension     Iron deficiency anemia     Liver disease     fatty    Liver problem     Measles     Menopause     Mumps     Neuromuscular disorder Peripheral Neuropathy    Orthostatic hypotension     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    Renal insufficiency 2018    Scoliosis     Unintentional weight loss     Uses contact lenses     bilat    Uterine cancer     Uterine cancer 2022    UTI (urinary tract infection)     Visual impairment Nearsighted    Wears glasses        Past Surgical History:   Procedure Laterality Date     SECTION  1981    DILATION AND CURETTAGE, DIAGNOSTIC / THERAPEUTIC      HIP OPEN REDUCTION Left 2023    Procedure: FEMORAL NECK OPEN REDUCTION INTERNAL FIXATION LEFT;  Surgeon: Juanpablo Lee MD;  Location:  [facility];  Service: Orthopedics;  Laterality: Left;    HIP SURGERY      OOPHORECTOMY      ORAL LESION EXCISION/BIOPSY  2021    TOTAL LAPAROSCOPIC HYSTERECTOMY SALPINGO OOPHORECTOMY N/A 10/28/2022    Procedure: TOTAL LAPAROSCOPIC HYSTERECTOMY BILATERAL SALPINGO-OOPHORECTOMY, INJECTION FOR SENTINEL LYMPH NODE MAPPING, BILATERAL SENTINEL LYMPH NODE DISSECTION WITH DAVINCI ROBOT;  Surgeon: Jasmine Rich MD;  Location:  [facility];  Service: Robotics - DaVinci;  Laterality: N/A;    TUBAL ABDOMINAL LIGATION         MEDICATIONS:    Current Outpatient Medications:     Alpha-Lipoic Acid 600 MG capsule, Take  by mouth., Disp: , Rfl:     Cholecalciferol 25 MCG (1000 UT) tablet, Take 1 tablet by mouth Daily., Disp: , Rfl:     docusate sodium (COLACE) 100 MG capsule, Take 1 capsule by mouth 2 (Two) Times a Day., Disp: 60 capsule, Rfl: 0    DULoxetine (CYMBALTA) 20 MG capsule, TAKE ONE CAPSULE BY MOUTH TWICE A DAY, Disp: 180 capsule, Rfl: 1    esomeprazole (nexIUM) 20 MG capsule, Take 1 capsule by mouth 2 (Two) Times a Week. OTC, Disp: , Rfl:      folic acid (FOLVITE) 1 MG tablet, Take 1 tablet by mouth Daily., Disp: 30 tablet, Rfl: 0    gabapentin (Neurontin) 300 MG capsule, Take 1 capsule in the morning and 2 capsules at night., Disp: 90 capsule, Rfl: 6    hydrALAZINE (APRESOLINE) 25 MG tablet, 1 tablet 2 (Two) Times a Day., Disp: , Rfl:     levothyroxine (SYNTHROID, LEVOTHROID) 25 MCG tablet, TAKE ONE TABLET BY MOUTH EVERY MORNING ON AN EMPTY STOMACH, Disp: 90 tablet, Rfl: 0    Lidocaine 4 %, Place 1 patch on the skin as directed by provider Daily. Remove & Discard patch within 12 hours or as directed by MD, Disp: 15 each, Rfl: 0    magnesium oxide (MAGOX) 400 (241.3 Mg) MG tablet tablet, Take 1 tablet by mouth Daily. OTC, Disp: , Rfl:     metoprolol succinate XL (TOPROL-XL) 25 MG 24 hr tablet, Take 1 tablet by mouth Every Night., Disp: 90 tablet, Rfl: 1    nitroglycerin (NITROSTAT) 0.4 MG SL tablet, Place 1 tablet under the tongue Every 5 (Five) Minutes As Needed for Chest Pain. Take no more than 3 doses in 15 minutes., Disp: , Rfl:     rosuvastatin (Crestor) 5 MG tablet, Take 1 tablet by mouth Daily., Disp: 90 tablet, Rfl: 1    thiamine (VITAMIN B1) 100 MG tablet, Take 1 tablet by mouth Daily., Disp: 30 tablet, Rfl: 0    traMADol (ULTRAM) 50 MG tablet, Take 1 tablet by mouth Every 12 (Twelve) Hours As Needed for Moderate Pain., Disp: 10 tablet, Rfl: 0    vitamin B-12 (CYANOCOBALAMIN) 1000 MCG tablet, Take 1 tablet by mouth Daily., Disp: , Rfl:      Allergies:  is allergic to lisinopril, metal, milk-related compounds, tylenol [acetaminophen], and atorvastatin.    Social History:   Social History     Socioeconomic History    Marital status:     Number of children: 1    Highest education level: Associate degree: academic program   Tobacco Use    Smoking status: Never     Passive exposure: Never    Smokeless tobacco: Never    Tobacco comments:     Never   Vaping Use    Vaping status: Never Used   Substance and Sexual Activity    Alcohol use: Yes      "Alcohol/week: 4.0 - 6.0 standard drinks of alcohol     Types: 4 - 6 Glasses of wine per week     Comment: 6-8 glasses a week    Drug use: No    Sexual activity: Not Currently     Partners: Male     Birth control/protection: Surgical, Post-menopausal       Family History:    Family History   Problem Relation Age of Onset    Spondylolisthesis Mother     COPD Mother     Stroke Mother     Hypertension Mother     Lung cancer Father     Hypertension Father     Heart attack Father     Coronary artery disease Father     Heart disease Father     Cancer Father     Lung disease Father     Hyperlipidemia Sister     Rheum arthritis Sister     Malig Hypertension Sister     Osteoarthritis Sister     Other Sister         alcoholic    Hypertension Sister     Scoliosis Sister     Hypertension Brother     Depression Sister     Early death Sister     Breast cancer Neg Hx     Ovarian cancer Neg Hx     Uterine cancer Neg Hx     Colon cancer Neg Hx     Melanoma Neg Hx     Prostate cancer Neg Hx        Health Maintenance:    Health Maintenance   Topic Date Due    PAP SMEAR  Never done    COVID-19 Vaccine (6 - 2023-24 season) 09/12/2024 (Originally 1/27/2024)    Pneumococcal Vaccine 65+ (2 of 2 - PCV) 04/12/2025 (Originally 12/29/2022)    TDAP/TD VACCINES (2 - Td or Tdap) 07/24/2024    INFLUENZA VACCINE  08/01/2024    MAMMOGRAM  12/30/2024    ANNUAL WELLNESS VISIT  04/12/2025    LIPID PANEL  04/12/2025    COLORECTAL CANCER SCREENING  08/10/2025    DXA SCAN  09/18/2025    HEPATITIS C SCREENING  Completed    RSV Vaccine - Adults  Completed    ZOSTER VACCINE  Completed       Review of Systems:  Please refer to history of present illness.  Review of systems otherwise negative.  Physical Exam:  Vitals:    06/12/24 1312   BP: 156/82   Pulse: 82   Resp: 17   Temp: 97.6 °F (36.4 °C)   TempSrc: Temporal   SpO2: 100%   Weight: 60.7 kg (133 lb 12.8 oz)   Height: 163.8 cm (64.49\")   PainSc:   7   PainLoc: Back     Body mass index is 22.62 kg/m².  Wt " Readings from Last 3 Encounters:   06/12/24 60.7 kg (133 lb 12.8 oz)   04/12/24 59 kg (130 lb)   03/29/24 60.3 kg (133 lb)     GENERAL: Alert, well-appearing female appearing her stated age who is in no apparent distress.   HEENT: Sclera anicteric. Head normocephalic, atraumatic. Mucus membranes moist.   BREASTS: Deferred  CARDIOVASCULAR: Normal rate, regular rhythm, no murmurs, rubs, or gallops.   No peripheral edema.  RESPIRATORY: Clear to auscultation bilaterally, normal respiratory effort  GASTROINTESTINAL:  Abdomen is soft, non-tender, non-distended, no rebound or guarding, no masses, or hernias. No HSM.    SKIN:  Warm, dry, well-perfused.  All visible areas intact.  No rashes, lesions, ulcers.  PSYCHIATRIC: AO x3, with appropriate affect, normal thought processes.  NEUROLOGIC: No focal deficits.  Moves extremities well.  MUSCULOSKELETAL: Normal gait and station.   EXTREMITIES:   No cyanosis, clubbing, symmetric.     PELVIC exam:  External genitalia are free from lesion. On speculum examination, the vaginal cuff was intact and no lesions were appreciated.  On bimanual examination, no fullness was appreciated.  Uterus, cervix and adnexa were absent.  There was no significant tenderness.  Rectovaginal exam was deferred.    ECOG PS 0    PROCEDURES:  None    Diagnostic Data:    No Images in the past 120 days found.    Lab Results   Component Value Date    WBC 5.49 04/12/2024    HGB 12.2 04/12/2024    HCT 37.9 04/12/2024    MCV 95.2 04/12/2024     04/12/2024    NEUTROABS 2.52 04/12/2024    GLUCOSE 96 05/01/2024    BUN 17 05/01/2024    CREATININE 2.13 (H) 05/01/2024    EGFRIFNONA 32 (L) 07/13/2021    EGFRIFAFRI 38 (L) 07/13/2021     05/01/2024    K 4.5 05/01/2024     05/01/2024    CO2 23.0 05/01/2024    MG 1.7 04/12/2024    PHOS 3.1 01/11/2024    CALCIUM 9.6 05/01/2024    ALBUMIN 4.9 04/12/2024    AST 19 04/12/2024    ALT 13 04/12/2024    BILITOT 0.3 04/12/2024     Lab Results   Component Value  "Date    TSH 2.720 04/12/2024    TSH 0.903 11/01/2023    TSH 2.350 05/02/2023    TSH 2.990 11/08/2022    TSH 3.810 08/26/2022     No results found for: \"FT4\"  Lab Results   Component Value Date     27.9 03/23/2023     21.3 03/02/2023     27.0 02/09/2023     27.5 01/18/2023     21.1 12/28/2022     20.9 12/06/2022       Assessment & Plan        Alysia Sierra is a 69 y.o. with a history of a stage IIIA endometrial cancer s/p carboplatin/paclitaxel and VBT(treatment completed in 3/2023).      She is currently without clinical evidence of disease. Signs of recurrent disease, such as vaginal bleeding, persistent abdominopelvic pain, urinary or bowel changes, and shortness of breath were reviewed.  She was advised to follow up immediately if she develops any of the above symptoms. She will follow-up in 3 months with radiation oncology and in 6 months with Gyn Onc     #Peripheral neuropathy - Baseline neuropathy controlled with cymbalta and gabapentin. Managed by neurology. Oxycodone 2.5 mg as needed    #Chronic medical conditions  -chronic kidney disease stage III, hypertension, hyperlipidemia, generalized anxiety disorder: follow with primary and specialty physicians   Encounter Diagnosis   Name Primary?    Endometrial adenocarcinoma Yes       Pain assessment was performed today as a part of patient’s care.  For patients with pain related to surgery, gynecologic malignancy or cancer treatment, the plan is as noted in the assessment/plan.  For patients with pain not related to these issues, they are to seek any further needed care from a more appropriate provider, such as PCP.      No orders of the defined types were placed in this encounter.    FOLLOW UP:  6 months here    Joceline Dubon MD  OBGYN PGY-3     I spent 23 minutes caring for Alysia on this date of service. This time includes time spent by me in the following activities: preparing for the visit, reviewing tests, " performing a medically appropriate examination and/or evaluation, counseling and educating the patient/family/caregiver, ordering medications, tests, or procedures, referring and communicating with other health care professionals, documenting information in the medical record, and care coordination    Patient was seen and examined with Dr. Dubon,  resident, who performed portions of the examination and documentation for this patient's care under my direct supervision.  I agree with the above documentation and plan.    Sarah Stern MD  06/12/24  16:51 EDT

## 2024-06-12 ENCOUNTER — OFFICE VISIT (OUTPATIENT)
Dept: GYNECOLOGIC ONCOLOGY | Facility: CLINIC | Age: 69
End: 2024-06-12
Payer: MEDICARE

## 2024-06-12 VITALS
BODY MASS INDEX: 22.84 KG/M2 | SYSTOLIC BLOOD PRESSURE: 156 MMHG | HEIGHT: 64 IN | WEIGHT: 133.8 LBS | TEMPERATURE: 97.6 F | HEART RATE: 82 BPM | OXYGEN SATURATION: 100 % | RESPIRATION RATE: 17 BRPM | DIASTOLIC BLOOD PRESSURE: 82 MMHG

## 2024-06-12 DIAGNOSIS — C54.1 ENDOMETRIAL ADENOCARCINOMA: Primary | ICD-10-CM

## 2024-06-12 PROCEDURE — 99213 OFFICE O/P EST LOW 20 MIN: CPT | Performed by: OBSTETRICS & GYNECOLOGY

## 2024-06-12 PROCEDURE — 1160F RVW MEDS BY RX/DR IN RCRD: CPT | Performed by: OBSTETRICS & GYNECOLOGY

## 2024-06-12 PROCEDURE — 3077F SYST BP >= 140 MM HG: CPT | Performed by: OBSTETRICS & GYNECOLOGY

## 2024-06-12 PROCEDURE — 1159F MED LIST DOCD IN RCRD: CPT | Performed by: OBSTETRICS & GYNECOLOGY

## 2024-06-12 PROCEDURE — 3079F DIAST BP 80-89 MM HG: CPT | Performed by: OBSTETRICS & GYNECOLOGY

## 2024-06-12 PROCEDURE — 1125F AMNT PAIN NOTED PAIN PRSNT: CPT | Performed by: OBSTETRICS & GYNECOLOGY

## 2024-06-18 ENCOUNTER — OFFICE VISIT (OUTPATIENT)
Dept: BEHAVIORAL HEALTH | Facility: CLINIC | Age: 69
End: 2024-06-18
Payer: MEDICARE

## 2024-06-18 DIAGNOSIS — F32.1 CURRENT MODERATE EPISODE OF MAJOR DEPRESSIVE DISORDER, UNSPECIFIED WHETHER RECURRENT: Primary | ICD-10-CM

## 2024-06-18 PROCEDURE — 90837 PSYTX W PT 60 MINUTES: CPT | Performed by: SOCIAL WORKER

## 2024-06-18 NOTE — PROGRESS NOTES
[facility]                 Follow Up Adult      Follow Up Adult Note     Date:2024   Patient Name: Alysia Sierra  : 1955   MRN: [id number]   Time IN: 2:30 pm    Time OUT: 3:30 pm     Referring Provider: Cassy Foster MD    Chief Complaint:      ICD-10-CM ICD-9-CM   1. Current moderate episode of major depressive disorder, unspecified whether recurrent  F32.1 296.22        History of Present Illness:   Alysia Sierra is a 69 y.o. female who is being seen today for follow up counseling for depression.    Subjective               Patient's Support Network Includes: extended family    Functional Status: Mild impairment       Current Outpatient Medications:     Alpha-Lipoic Acid 600 MG capsule, Take  by mouth., Disp: , Rfl:     Cholecalciferol 25 MCG (1000 UT) tablet, Take 1 tablet by mouth Daily., Disp: , Rfl:     docusate sodium (COLACE) 100 MG capsule, Take 1 capsule by mouth 2 (Two) Times a Day., Disp: 60 capsule, Rfl: 0    DULoxetine (CYMBALTA) 20 MG capsule, TAKE ONE CAPSULE BY MOUTH TWICE A DAY, Disp: 180 capsule, Rfl: 1    esomeprazole (nexIUM) 20 MG capsule, Take 1 capsule by mouth 2 (Two) Times a Week. OTC, Disp: , Rfl:     folic acid (FOLVITE) 1 MG tablet, Take 1 tablet by mouth Daily., Disp: 30 tablet, Rfl: 0    gabapentin (Neurontin) 300 MG capsule, Take 1 capsule in the morning and 2 capsules at night., Disp: 90 capsule, Rfl: 6    hydrALAZINE (APRESOLINE) 25 MG tablet, 1 tablet 2 (Two) Times a Day., Disp: , Rfl:     levothyroxine (SYNTHROID, LEVOTHROID) 25 MCG tablet, TAKE ONE TABLET BY MOUTH EVERY MORNING ON AN EMPTY STOMACH, Disp: 90 tablet, Rfl: 0    Lidocaine 4 %, Place 1 patch on the skin as directed by provider Daily. Remove & Discard patch within 12 hours or as directed by MD, Disp: 15 each, Rfl: 0    magnesium oxide (MAGOX) 400 (241.3 Mg) MG tablet tablet, Take 1 tablet by mouth Daily. OTC, Disp: , Rfl:     metoprolol succinate XL  (TOPROL-XL) 25 MG 24 hr tablet, Take 1 tablet by mouth Every Night., Disp: 90 tablet, Rfl: 1    nitroglycerin (NITROSTAT) 0.4 MG SL tablet, Place 1 tablet under the tongue Every 5 (Five) Minutes As Needed for Chest Pain. Take no more than 3 doses in 15 minutes., Disp: , Rfl:     rosuvastatin (Crestor) 5 MG tablet, Take 1 tablet by mouth Daily., Disp: 90 tablet, Rfl: 1    thiamine (VITAMIN B1) 100 MG tablet, Take 1 tablet by mouth Daily., Disp: 30 tablet, Rfl: 0    traMADol (ULTRAM) 50 MG tablet, Take 1 tablet by mouth Every 12 (Twelve) Hours As Needed for Moderate Pain., Disp: 10 tablet, Rfl: 0    vitamin B-12 (CYANOCOBALAMIN) 1000 MCG tablet, Take 1 tablet by mouth Daily., Disp: , Rfl:     Allergies   Allergen Reactions    Lisinopril Angioedema    Metal Rash     Possible nickel -   Gold is only metal that doesn't have issues      Milk-Related Compounds Other (See Comments)     LACTOSE INTOLERANT    Tylenol [Acetaminophen] Other (See Comments)     ckd    Atorvastatin Myalgia       Objective     Physical Exam:  Vital Signs: There were no vitals filed for this visit.  There is no height or weight on file to calculate BMI.     Mental Status Exam:   Hygiene:   good  Cooperation:  Cooperative  Eye Contact:  Good  Psychomotor Behavior:  Appropriate  Affect:  Full range  Mood: normal  Speech:  Normal  Thought Process:  Goal directed  Thought Content:  Normal  Suicidal:  None  Homicidal:  None  Hallucinations:  None  Delusion:  None  Memory:  Intact  Orientation:  Person, Place, Time, and Situation  Reliability:  good  Insight:  Good  Judgement:  Good  Impulse Control:  Good  Physical/Medical Issues:   See problem list      Assessment / Plan      Diagnosis:  Diagnoses and all orders for this visit:    1. Current moderate episode of major depressive disorder, unspecified whether recurrent (Primary)    Patient presented for follow-up with clinician.  Patient stated that they are having good and bad days in relation to their  overall mood and functioning.  Patient stated they were taking all medication as directed and keeping all medical appointments.  Identified triggers for symptoms of depression are the ongoing conflict with her ex- over money owed to them for the sale of a mobile home and the ensuing friction with their son when the patient asked her son to contact their ex- and asked them to please contact them regarding the sale of the trailer.  Patient stated that son answered back with an angry response and went on to elaborate that they had been very anxious as a child and were as a result unhappy.  Patient stated this led to several days of feelings of hurt and confusion.  Patient stated they view themselves as a nurturing and kind parent that gave their child the opportunity to travel and received an excellent education.  Patient stated there was no conflict that they could remember in the home between themselves and their  that would have made the child angry or anxious.  Patient and clinician reviewed coping mechanisms designed to help alleviate symptoms of depression.  Clinician utilized active listening and reflective response to convey empathy and support.    Progress toward goal: Not at goal    Prognosis: Good with ongoing treatment    PLAN:  Patient and clinician will continue to utilize a cognitive behavioral intervention which identifies and addresses patient cognitive distortions related to depression.  Follow-ups will occur every 21 days and will last for 45 to 60 minutes in duration.    Safety: No acute safety concerns  Risk Assessment: Risk of self-harm acutely is low. Risk of self-harm chronically is also low, but could be further elevated in the event of treatment noncompliance and/or AODA.    Treatment Plan/Goals: Continue supportive psychotherapy efforts and medications as indicated. Treatment and medication options discussed during today's visit. Patient ackowledged and verbally consented  to continue with current treatment plan and was educated on the importance of compliance with treatment and follow-up appointments. Patient seems reasonably able to adhere to treatment plan.      Assisted Patient in processing above session content; acknowledged and normalized patient’s thoughts, feelings, and concerns.  Rationalized patient thought process regarding depression.      Allowed Patient to freely discuss issues  without interruption or judgement with unconditional positive regard, active listening skills, and empathy. Therapist provided a safe, confidential environment to facilitate the development of a positive therapeutic relationship and encouraged open, honest communication. Assisted Patient in identifying risk factors which would indicate the need for higher level of care including thoughts to harm self or others and/or self-harming behavior and encouraged Patient to contact this office, call 911, or present to the nearest emergency room should any of these events occur. Discussed crisis intervention services and means to access. Patient adamantly and convincingly denies current suicidal or homicidal ideation or perceptual disturbance. Assisted Patient in processing session content; acknowledged and normalized Patient’s thoughts, feelings, and concerns by utilizing a person-centered approach in efforts to build appropriate rapport and a positive therapeutic relationship with open and honest communication. .     Part of this note may be an electronic transcription/translation of spoken language to printed text using the Dragon Dictation System.       Follow Up:   Return in about 3 weeks (around 7/9/2024) for Next scheduled follow up.    Austin Tatum LCSW

## 2024-06-19 ENCOUNTER — OFFICE VISIT (OUTPATIENT)
Dept: INTERNAL MEDICINE | Facility: CLINIC | Age: 69
End: 2024-06-19
Payer: MEDICARE

## 2024-06-19 VITALS
DIASTOLIC BLOOD PRESSURE: 76 MMHG | WEIGHT: 136 LBS | BODY MASS INDEX: 23.22 KG/M2 | HEART RATE: 62 BPM | OXYGEN SATURATION: 98 % | HEIGHT: 64 IN | RESPIRATION RATE: 18 BRPM | TEMPERATURE: 97.5 F | SYSTOLIC BLOOD PRESSURE: 108 MMHG

## 2024-06-19 DIAGNOSIS — I10 PRIMARY HYPERTENSION: Primary | ICD-10-CM

## 2024-06-19 DIAGNOSIS — R07.89 CHEST PRESSURE: ICD-10-CM

## 2024-06-19 DIAGNOSIS — N18.31 STAGE 3A CHRONIC KIDNEY DISEASE: ICD-10-CM

## 2024-06-19 PROCEDURE — 3078F DIAST BP <80 MM HG: CPT | Performed by: FAMILY MEDICINE

## 2024-06-19 PROCEDURE — 1125F AMNT PAIN NOTED PAIN PRSNT: CPT | Performed by: FAMILY MEDICINE

## 2024-06-19 PROCEDURE — 3074F SYST BP LT 130 MM HG: CPT | Performed by: FAMILY MEDICINE

## 2024-06-19 PROCEDURE — 1160F RVW MEDS BY RX/DR IN RCRD: CPT | Performed by: FAMILY MEDICINE

## 2024-06-19 PROCEDURE — 1159F MED LIST DOCD IN RCRD: CPT | Performed by: FAMILY MEDICINE

## 2024-06-19 PROCEDURE — 99213 OFFICE O/P EST LOW 20 MIN: CPT | Performed by: FAMILY MEDICINE

## 2024-06-19 NOTE — PATIENT INSTRUCTIONS
Diagnosis Discussed   Continue to monitor   Plenty of fluids  Take medications as directed   Follow up with Cardiology as directed- as soon as possible   If symptoms worsen or persist please seek further evaluation - if persisting and chest pressure worsens- ER for immediate further evaluation

## 2024-06-19 NOTE — PROGRESS NOTES
Office Note     Name: Alysia Sierra    : 1955     MRN: [id number]     Chief Complaint  Elevated Blood Pressure (Pt states BP's have been elevated, highest being 174/106, in the last few weeks)    Subjective     History of Present Illness:  Alysia Sierra is a 69 y.o. female who presents today for concerns for HTN- BP has been up and down the last few weeks per patient  A lot more stress recently- family stress/money   174/106 has been the highest   Does improve with hydralazine in AM when taking   Has recently had chest pressure - aspirin with minimal relief   Recently took Nitroglycerin for the first time- - has not had pressure sensation previously   Need another nitro for pressure to resolve.     BP increases with exercise - limits her activity     Admits to missing magnesium supplement every now and then     Taking medications as prescribed   Advised to follow up with Cardiology - Will call office in AM and schedule for as soon as possible     Denies fevers, chills, sob, chest pain, abdominal pain, diarrhea, constipation    Does feel tired all the time   Recent follow up with oncology     Kidney function- markedly decreased- appointment with Nephrology. Follows with Dr. Wheatley.     Review of Systems:   Review of Systems   Constitutional:  Negative for chills and fever.   HENT:  Negative for trouble swallowing.    Respiratory:  Positive for chest tightness. Negative for cough, shortness of breath and wheezing.    Cardiovascular:  Positive for chest pain. Negative for palpitations and leg swelling.        Chest pressure    Gastrointestinal:  Negative for abdominal pain, constipation, diarrhea, nausea and vomiting.   Genitourinary:  Negative for dysuria.   Neurological:  Negative for light-headedness and headache.   Psychiatric/Behavioral:  Positive for dysphoric mood and stress. The patient is nervous/anxious.        Past Medical History:   Past Medical History:   Diagnosis Date    Allergic  lisinopril  2017    Anemia     Arrhythmia     Arthritis     Cancer     uterine    Cataract mild 2020    stil lpresent    Chicken pox     Chronic fatigue     CKD (chronic kidney disease), stage III     sees nephro    Clotting disorder     Dental root implant present     lower left  x1 - possible dental implant    Depression mild 2019    Difficulty walking 2019    Disease of thyroid gland     Dizzy     NIEVES (dyspnea on exertion)     2017    Fracture of hip     Generalized anxiety disorder     GERD (gastroesophageal reflux disease) 2018    History of brachytherapy 2023    vaginal brachytherapy    Hyperlipidemia     Hypertension     Iron deficiency anemia     Liver disease     fatty    Liver problem     Measles     Menopause     Mumps     Neuromuscular disorder Peripheral Neuropathy    Orthostatic hypotension     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    Renal insufficiency 2018    Scoliosis     Unintentional weight loss     Uses contact lenses     bilat    Uterine cancer     Uterine cancer 2022    UTI (urinary tract infection)     Visual impairment Nearsighted    Wears glasses        Past Surgical History:   Past Surgical History:   Procedure Laterality Date     SECTION      DILATION AND CURETTAGE, DIAGNOSTIC / THERAPEUTIC      HIP OPEN REDUCTION Left 2023    Procedure: FEMORAL NECK OPEN REDUCTION INTERNAL FIXATION LEFT;  Surgeon: Juanpablo Lee MD;  Location: [facility] OR;  Service: Orthopedics;  Laterality: Left;    HIP SURGERY      OOPHORECTOMY      ORAL LESION EXCISION/BIOPSY  2021    TOTAL LAPAROSCOPIC HYSTERECTOMY SALPINGO OOPHORECTOMY N/A 10/28/2022    Procedure: TOTAL LAPAROSCOPIC HYSTERECTOMY BILATERAL SALPINGO-OOPHORECTOMY, INJECTION FOR SENTINEL LYMPH NODE MAPPING, BILATERAL SENTINEL LYMPH NODE DISSECTION WITH DAVINCI ROBOT;  Surgeon: Jasmine Rich MD;  Location:  [facility] OR;  Service: Robotics - DaVinci;  Laterality: N/A;    TUBAL ABDOMINAL  LIGATION  1983       Immunizations:   Immunization History   Administered Date(s) Administered    ABRYSVO (RSV, 60+ or pregnant women 32-36 wks) 12/15/2023    COVID-19 (PFIZER) BIVALENT 12+YRS 10/18/2022    COVID-19 (PFIZER) Purple Cap Monovalent 03/16/2021, 04/14/2021, 11/13/2021    COVID-19 F23 (PFIZER) 12YRS+ (COMIRNATY) 12/02/2023    Flublock Quad =>18yrs 10/10/2019, 10/09/2020    Fluzone High Dose =>65 Years ([employer] ONLY) 10/04/2023    Fluzone High-Dose 65+yrs 10/09/2021, 11/08/2022    Hepatitis B Adult/Adolescent IM 07/24/2014    Influenza, Unspecified 10/13/2020    Pneumococcal Polysaccharide (PPSV23) 12/29/2021    Shingrix 09/17/2019, 01/06/2020, 01/17/2020, 01/25/2020    Tdap 07/24/2014        Medications:     Current Outpatient Medications:     Alpha-Lipoic Acid 600 MG capsule, Take  by mouth., Disp: , Rfl:     Cholecalciferol 25 MCG (1000 UT) tablet, Take 1 tablet by mouth Daily., Disp: , Rfl:     docusate sodium (COLACE) 100 MG capsule, Take 1 capsule by mouth 2 (Two) Times a Day., Disp: 60 capsule, Rfl: 0    DULoxetine (CYMBALTA) 20 MG capsule, TAKE ONE CAPSULE BY MOUTH TWICE A DAY, Disp: 180 capsule, Rfl: 1    esomeprazole (nexIUM) 20 MG capsule, Take 1 capsule by mouth 2 (Two) Times a Week. OTC, Disp: , Rfl:     folic acid (FOLVITE) 1 MG tablet, Take 1 tablet by mouth Daily., Disp: 30 tablet, Rfl: 0    gabapentin (Neurontin) 300 MG capsule, Take 1 capsule in the morning and 2 capsules at night., Disp: 90 capsule, Rfl: 6    hydrALAZINE (APRESOLINE) 25 MG tablet, 1 tablet 2 (Two) Times a Day., Disp: , Rfl:     levothyroxine (SYNTHROID, LEVOTHROID) 25 MCG tablet, TAKE ONE TABLET BY MOUTH EVERY MORNING ON AN EMPTY STOMACH, Disp: 90 tablet, Rfl: 0    Lidocaine 4 %, Place 1 patch on the skin as directed by provider Daily. Remove & Discard patch within 12 hours or as directed by MD, Disp: 15 each, Rfl: 0    magnesium oxide (MAGOX) 400 (241.3 Mg) MG tablet tablet, Take 1 tablet by mouth Daily. OTC, Disp: ,  Rfl:     metoprolol succinate XL (TOPROL-XL) 25 MG 24 hr tablet, Take 1 tablet by mouth Every Night., Disp: 90 tablet, Rfl: 1    nitroglycerin (NITROSTAT) 0.4 MG SL tablet, Place 1 tablet under the tongue Every 5 (Five) Minutes As Needed for Chest Pain. Take no more than 3 doses in 15 minutes., Disp: , Rfl:     rosuvastatin (Crestor) 5 MG tablet, Take 1 tablet by mouth Daily., Disp: 90 tablet, Rfl: 1    thiamine (VITAMIN B1) 100 MG tablet, Take 1 tablet by mouth Daily., Disp: 30 tablet, Rfl: 0    traMADol (ULTRAM) 50 MG tablet, Take 1 tablet by mouth Every 12 (Twelve) Hours As Needed for Moderate Pain., Disp: 10 tablet, Rfl: 0    vitamin B-12 (CYANOCOBALAMIN) 1000 MCG tablet, Take 1 tablet by mouth Daily., Disp: , Rfl:     Allergies:   Allergies   Allergen Reactions    Lisinopril Angioedema    Metal Rash     Possible nickel -   Gold is only metal that doesn't have issues      Milk-Related Compounds Other (See Comments)     LACTOSE INTOLERANT    Tylenol [Acetaminophen] Other (See Comments)     ckd    Atorvastatin Myalgia       Family History:   Family History   Problem Relation Age of Onset    Spondylolisthesis Mother     COPD Mother     Stroke Mother     Hypertension Mother     Lung cancer Father     Hypertension Father     Heart attack Father     Coronary artery disease Father     Heart disease Father     Cancer Father     Lung disease Father     Hyperlipidemia Sister     Rheum arthritis Sister     Malig Hypertension Sister     Osteoarthritis Sister     Other Sister         alcoholic    Hypertension Sister     Scoliosis Sister     Hypertension Brother     Depression Sister     Early death Sister     Breast cancer Neg Hx     Ovarian cancer Neg Hx     Uterine cancer Neg Hx     Colon cancer Neg Hx     Melanoma Neg Hx     Prostate cancer Neg Hx        Social History:   Social History     Socioeconomic History    Marital status:     Number of children: 1    Highest education level: Associate degree: academic  "program   Tobacco Use    Smoking status: Never     Passive exposure: Never    Smokeless tobacco: Never    Tobacco comments:     Never   Vaping Use    Vaping status: Never Used   Substance and Sexual Activity    Alcohol use: Yes     Alcohol/week: 4.0 - 6.0 standard drinks of alcohol     Types: 4 - 6 Glasses of wine per week     Comment: 6-8 glasses a week    Drug use: No    Sexual activity: Not Currently     Partners: Male     Birth control/protection: Surgical, Post-menopausal         Objective     Vital Signs  /76   Pulse 62   Temp 97.5 °F (36.4 °C)   Resp 18   Ht 163.8 cm (64.49\")   Wt 61.7 kg (136 lb)   SpO2 98%   BMI 22.99 kg/m²   Estimated body mass index is 22.99 kg/m² as calculated from the following:    Height as of this encounter: 163.8 cm (64.49\").    Weight as of this encounter: 61.7 kg (136 lb).    BMI is within normal parameters. No other follow-up for BMI required.      Physical Exam  Vitals and nursing note reviewed.   Constitutional:       General: She is not in acute distress.     Appearance: Normal appearance.   Cardiovascular:      Rate and Rhythm: Normal rate and regular rhythm.      Heart sounds: Normal heart sounds.   Pulmonary:      Effort: Pulmonary effort is normal. No respiratory distress.      Breath sounds: Normal breath sounds.   Skin:     General: Skin is warm and dry.   Neurological:      Mental Status: She is alert and oriented to person, place, and time.          Procedures     Assessment and Plan   Diagnosis Discussed   Continue to monitor   Plenty of fluids  Take medications as directed   Follow up with Cardiology as directed- as soon as possible   If symptoms worsen or persist please seek further evaluation - if persisting and chest pressure worsens- ER for immediate further evaluation     1. Primary hypertension  BP stable today in office   Per patient log from home all over the place   Taking medications as prescribed.     2. Chest pressure  On and off. Nitro x 2 " with relief   Advised immediate follow up with Cardiology for further evaluation     3. Stage 3a chronic kidney disease  Decreased renal function- possible HTN/BP related. Follow up with Nephrology as directed as well.          Follow Up  Return if symptoms worsen or fail to improve, for Next scheduled follow up 10/14/2024.    Cassy Foster MD  [facility] INTERNAL MEDICINE  [address] [place] [id number] [phone]

## 2024-06-20 ENCOUNTER — TELEPHONE (OUTPATIENT)
Dept: CARDIOLOGY | Facility: CLINIC | Age: 69
End: 2024-06-20
Payer: MEDICARE

## 2024-06-20 DIAGNOSIS — R07.9 CHEST PAIN, UNSPECIFIED TYPE: Primary | ICD-10-CM

## 2024-06-20 NOTE — TELEPHONE ENCOUNTER
Caller: Alysai Sierra    Relationship: Self    Best call back number: [phone]    What is the best time to reach you: ANY    Who are you requesting to speak with (clinical staff, provider,  specific staff member): CLINICAL      What was the call regarding: PATIENT CALLED TO ADVISE THAT ON THE EVENING OF 06-16-24 SHE BEGAN EXPERIENCING CHEST PRESSURE. SHE STATES SHE TOOK SOME ASPIRIN, WENT BACK TO BED, AND THE NEXT MORNING THE PRESSURE WAS STILL THERE SO SHE TOOK HER NITRO PILL. SHE STATES HER BLOOD PRESSURE HAS ALSO BEEN FLUCTUATING FOR APPROX 2-3 WEEKS, BUT SHE HAS NOT EXPERIENCED ANY CHEST PRESSURE SINCE THE EPISODE ON 6-16-24. PLEASE CONTACT PATIENT IN REGARDS TO THIS ISSUE.    Is it okay if the provider responds through [employer]: PLEASE CALL

## 2024-06-20 NOTE — TELEPHONE ENCOUNTER
Pt reports one episode of chest pressure on 6/17 located middle of chest, did not radiate, presented when waking up in the middle of the night to go to the restroom, resolved on its own after 20 min of rest. She went back to sleep, and woke up for the day at 6 am, experienced more chest pressure. She took 2 nitro, resolved after 20 min. Has not reoccurred since. She denies chest pain, SOB, and palpitations. Mentions her BP has been fluctuating recently, does not check it everyday. Nephrologist changed hydralazine to 25 mg PRN in the morning (no specific guidelines on when to take it), and 1 tablet at night. She said he would like to get her off hydralazine. Instructed to follow nephrologist for BP management.     6/9- 170/109  6/16- 166/109  6/17- day of chest pressure 133/92 at 1 am with chest pressure, and 132/89 at 6 am with chest pressure before nitro.   6/19- 141/93  HR avgs 70's    Last stress test was low risk 1/2023, echo 1/2024 EF 56-60%. Please advise.

## 2024-06-20 NOTE — TELEPHONE ENCOUNTER
Pt LVM stating she had an episode of chest pressure and took a nitro. PCP told her to reach out to us to get a sooner appt. Returned call, no answer, CAITM and sent Kimberlee lemong.

## 2024-06-21 NOTE — TELEPHONE ENCOUNTER
Caller: Alysia Sierra    Relationship: Self    Best call back number: [phone]      What was the call regarding: PATIENT WAS CALLING IN TO CHECK ON THE SCHEDULING STATUS OF A TEST  TOLD HER TO GET. SHE IS READY TO SCHEDULE THIS ASAP.

## 2024-06-21 NOTE — TELEPHONE ENCOUNTER
Attempted to reach pt to discuss plan, no answer LVM and sent MYChart msg. She is already taking Toprol 25 mg at night. Per RDS regarding CT pre med metoprolol-  take an extra 25 mg night before and 50 one hour before if HR > 70, 25 one hour before if HR > 60.

## 2024-06-22 ENCOUNTER — APPOINTMENT (OUTPATIENT)
Dept: CT IMAGING | Facility: HOSPITAL | Age: 69
End: 2024-06-22
Payer: MEDICARE

## 2024-06-22 ENCOUNTER — APPOINTMENT (OUTPATIENT)
Dept: GENERAL RADIOLOGY | Facility: HOSPITAL | Age: 69
End: 2024-06-22
Payer: MEDICARE

## 2024-06-22 ENCOUNTER — HOSPITAL ENCOUNTER (INPATIENT)
Facility: HOSPITAL | Age: 69
LOS: 2 days | Discharge: HOME | End: 2024-06-25
Attending: STUDENT IN AN ORGANIZED HEALTH CARE EDUCATION/TRAINING PROGRAM | Admitting: INTERNAL MEDICINE
Payer: MEDICARE

## 2024-06-22 DIAGNOSIS — E87.29 KETOACIDOSIS: ICD-10-CM

## 2024-06-22 DIAGNOSIS — R65.20 SEPSIS WITH ACUTE RENAL FAILURE AND TUBULAR NECROSIS WITHOUT SEPTIC SHOCK, DUE TO UNSPECIFIED ORGANISM: ICD-10-CM

## 2024-06-22 DIAGNOSIS — F10.11 HISTORY OF ALCOHOL ABUSE: ICD-10-CM

## 2024-06-22 DIAGNOSIS — N18.31 STAGE 3A CHRONIC KIDNEY DISEASE: ICD-10-CM

## 2024-06-22 DIAGNOSIS — E83.39 HYPOPHOSPHATEMIA: ICD-10-CM

## 2024-06-22 DIAGNOSIS — E87.29 HIGH ANION GAP METABOLIC ACIDOSIS: Primary | ICD-10-CM

## 2024-06-22 DIAGNOSIS — N17.0 SEPSIS WITH ACUTE RENAL FAILURE AND TUBULAR NECROSIS WITHOUT SEPTIC SHOCK, DUE TO UNSPECIFIED ORGANISM: ICD-10-CM

## 2024-06-22 DIAGNOSIS — A41.9 SEPSIS WITH ACUTE RENAL FAILURE AND TUBULAR NECROSIS WITHOUT SEPTIC SHOCK, DUE TO UNSPECIFIED ORGANISM: ICD-10-CM

## 2024-06-22 DIAGNOSIS — K20.90 ESOPHAGITIS: ICD-10-CM

## 2024-06-22 PROBLEM — D72.829 LEUKOCYTOSIS: Status: ACTIVE | Noted: 2024-06-22

## 2024-06-22 PROBLEM — K21.00 GERD WITH ESOPHAGITIS: Status: ACTIVE | Noted: 2024-06-22

## 2024-06-22 LAB
ABO GROUP BLD: NORMAL
ALBUMIN SERPL-MCNC: 4.7 G/DL (ref 3.5–5.2)
ALBUMIN/GLOB SERPL: 1.6 G/DL
ALP SERPL-CCNC: 90 U/L (ref 39–117)
ALT SERPL W P-5'-P-CCNC: 15 U/L (ref 1–33)
AMPHET+METHAMPHET UR QL: NEGATIVE
AMPHETAMINES UR QL: NEGATIVE
ANION GAP SERPL CALCULATED.3IONS-SCNC: 24 MMOL/L (ref 5–15)
ANION GAP SERPL CALCULATED.3IONS-SCNC: 35 MMOL/L (ref 5–15)
APAP SERPL-MCNC: <5 MCG/ML (ref 0–30)
APTT PPP: 22.6 SECONDS (ref 60–90)
ARTERIAL PATENCY WRIST A: ABNORMAL
AST SERPL-CCNC: 36 U/L (ref 1–32)
ATMOSPHERIC PRESS: ABNORMAL MM[HG]
B-OH-BUTYR SERPL-SCNC: 7.86 MMOL/L (ref 0.02–0.27)
BARBITURATES UR QL SCN: NEGATIVE
BASE EXCESS BLDA CALC-SCNC: -18.6 MMOL/L (ref 0–2)
BASOPHILS # BLD AUTO: 0.05 10*3/MM3 (ref 0–0.2)
BASOPHILS NFR BLD AUTO: 0.3 % (ref 0–1.5)
BDY SITE: ABNORMAL
BENZODIAZ UR QL SCN: NEGATIVE
BILIRUB SERPL-MCNC: 0.6 MG/DL (ref 0–1.2)
BILIRUB UR QL STRIP: NEGATIVE
BLD GP AB SCN SERPL QL: NEGATIVE
BODY TEMPERATURE: 37
BUN BLDA-MCNC: 34 MG/DL (ref 8–26)
BUN SERPL-MCNC: 29 MG/DL (ref 8–23)
BUN SERPL-MCNC: 31 MG/DL (ref 8–23)
BUN/CREAT SERPL: 19 (ref 7–25)
BUN/CREAT SERPL: 20 (ref 7–25)
BUPRENORPHINE SERPL-MCNC: NEGATIVE NG/ML
CA-I BLDA-SCNC: 1.07 MMOL/L (ref 1.2–1.32)
CALCIUM SPEC-SCNC: 8.1 MG/DL (ref 8.6–10.5)
CALCIUM SPEC-SCNC: 9.3 MG/DL (ref 8.6–10.5)
CANNABINOIDS SERPL QL: NEGATIVE
CHLORIDE BLDA-SCNC: 101 MMOL/L (ref 98–109)
CHLORIDE SERPL-SCNC: 91 MMOL/L (ref 98–107)
CHLORIDE SERPL-SCNC: 97 MMOL/L (ref 98–107)
CK SERPL-CCNC: 56 U/L (ref 20–180)
CLARITY UR: CLEAR
CO2 BLDA-SCNC: 12 MMOL/L (ref 24–29)
CO2 BLDA-SCNC: 8.5 MMOL/L (ref 22–33)
CO2 SERPL-SCNC: 10 MMOL/L (ref 22–29)
CO2 SERPL-SCNC: 16 MMOL/L (ref 22–29)
COCAINE UR QL: NEGATIVE
COHGB MFR BLD: 0.9 % (ref 0–2)
COLOR UR: YELLOW
CREAT BLDA-MCNC: 1.3 MG/DL (ref 0.6–1.3)
CREAT SERPL-MCNC: 1.45 MG/DL (ref 0.57–1)
CREAT SERPL-MCNC: 1.63 MG/DL (ref 0.57–1)
CRP SERPL-MCNC: 0.33 MG/DL (ref 0–0.5)
D DIMER PPP FEU-MCNC: 1.12 MCGFEU/ML (ref 0–0.68)
D-LACTATE SERPL-SCNC: 4.4 MMOL/L (ref 0.5–2)
D-LACTATE SERPL-SCNC: 5.1 MMOL/L (ref 0.5–2)
D-LACTATE SERPL-SCNC: 6.9 MMOL/L (ref 0.5–2)
DEPRECATED RDW RBC AUTO: 54.1 FL (ref 37–54)
EGFRCR SERPLBLD CKD-EPI 2021: 34.2 ML/MIN/1.73
EGFRCR SERPLBLD CKD-EPI 2021: 39.4 ML/MIN/1.73
EGFRCR SERPLBLD CKD-EPI 2021: 44.9 ML/MIN/1.73
EOSINOPHIL # BLD AUTO: 0.01 10*3/MM3 (ref 0–0.4)
EOSINOPHIL NFR BLD AUTO: 0.1 % (ref 0.3–6.2)
EPAP: 0
ERYTHROCYTE [DISTWIDTH] IN BLOOD BY AUTOMATED COUNT: 13.7 % (ref 12.3–15.4)
ETHANOL BLD-MCNC: <10 MG/DL (ref 0–10)
FENTANYL UR-MCNC: NEGATIVE NG/ML
GEN 5 2HR TROPONIN T REFLEX: 21 NG/L
GLOBULIN UR ELPH-MCNC: 2.9 GM/DL
GLUCOSE BLDC GLUCOMTR-MCNC: 161 MG/DL (ref 70–130)
GLUCOSE SERPL-MCNC: 160 MG/DL (ref 65–99)
GLUCOSE SERPL-MCNC: 162 MG/DL (ref 65–99)
GLUCOSE UR STRIP-MCNC: NEGATIVE MG/DL
HBA1C MFR BLD: 5.2 % (ref 4.8–5.6)
HCO3 BLDA-SCNC: 7.9 MMOL/L (ref 20–26)
HCT VFR BLD AUTO: 39.7 % (ref 34–46.6)
HCT VFR BLD CALC: 35.4 % (ref 38–51)
HCT VFR BLDA CALC: 41 % (ref 38–51)
HGB BLD-MCNC: 12.1 G/DL (ref 12–15.9)
HGB BLDA-MCNC: 11.6 G/DL (ref 14–18)
HGB BLDA-MCNC: 13.9 G/DL (ref 12–17)
HGB UR QL STRIP.AUTO: NEGATIVE
HOLD SPECIMEN: NORMAL
IMM GRANULOCYTES # BLD AUTO: 0.12 10*3/MM3 (ref 0–0.05)
IMM GRANULOCYTES NFR BLD AUTO: 0.6 % (ref 0–0.5)
INHALED O2 CONCENTRATION: 21 %
INR PPP: 1 (ref 0.89–1.12)
IPAP: 0
KETONES UR QL STRIP: ABNORMAL
LEUKOCYTE ESTERASE UR QL STRIP.AUTO: NEGATIVE
LIPASE SERPL-CCNC: 25 U/L (ref 13–60)
LYMPHOCYTES # BLD AUTO: 1.56 10*3/MM3 (ref 0.7–3.1)
LYMPHOCYTES NFR BLD AUTO: 7.9 % (ref 19.6–45.3)
Lab: ABNORMAL
MAGNESIUM SERPL-MCNC: 1.9 MG/DL (ref 1.6–2.4)
MCH RBC QN AUTO: 32.6 PG (ref 26.6–33)
MCHC RBC AUTO-ENTMCNC: 30.5 G/DL (ref 31.5–35.7)
MCV RBC AUTO: 107 FL (ref 79–97)
METHADONE UR QL SCN: NEGATIVE
METHGB BLD QL: 0.2 % (ref 0–1.5)
MODALITY: ABNORMAL
MONOCYTES # BLD AUTO: 2.27 10*3/MM3 (ref 0.1–0.9)
MONOCYTES NFR BLD AUTO: 11.5 % (ref 5–12)
MRSA DNA SPEC QL NAA+PROBE: NEGATIVE
NEUTROPHILS NFR BLD AUTO: 15.74 10*3/MM3 (ref 1.7–7)
NEUTROPHILS NFR BLD AUTO: 79.6 % (ref 42.7–76)
NITRITE UR QL STRIP: NEGATIVE
NOTIFIED BY: ABNORMAL
NOTIFIED WHO: ABNORMAL
NRBC BLD AUTO-RTO: 0 /100 WBC (ref 0–0.2)
NT-PROBNP SERPL-MCNC: 683.3 PG/ML (ref 0–900)
OPIATES UR QL: NEGATIVE
OSMOLALITY SERPL: 307 MOSM/KG (ref 275–295)
OXYCODONE UR QL SCN: NEGATIVE
OXYHGB MFR BLDV: 97.4 % (ref 94–99)
PAW @ PEAK INSP FLOW SETTING VENT: 0 CMH2O
PCO2 BLDA: 21 MM HG (ref 35–45)
PCO2 TEMP ADJ BLD: 21 MM HG (ref 35–45)
PCP UR QL SCN: NEGATIVE
PH BLDA: 7.18 PH UNITS (ref 7.35–7.45)
PH UR STRIP.AUTO: 5.5 [PH] (ref 5–8)
PH, TEMP CORRECTED: 7.18 PH UNITS
PHOSPHATE SERPL-MCNC: 5.7 MG/DL (ref 2.5–4.5)
PLATELET # BLD AUTO: 276 10*3/MM3 (ref 140–450)
PMV BLD AUTO: 9.9 FL (ref 6–12)
PO2 BLDA: 116 MM HG (ref 83–108)
PO2 TEMP ADJ BLD: 116 MM HG (ref 83–108)
POTASSIUM BLDA-SCNC: 4.9 MMOL/L (ref 3.5–4.9)
POTASSIUM SERPL-SCNC: 4.9 MMOL/L (ref 3.5–5.2)
POTASSIUM SERPL-SCNC: 5 MMOL/L (ref 3.5–5.2)
PROCALCITONIN SERPL-MCNC: 0.49 NG/ML (ref 0–0.25)
PROT SERPL-MCNC: 7.6 G/DL (ref 6–8.5)
PROT UR QL STRIP: NEGATIVE
PROTHROMBIN TIME: 13.3 SECONDS (ref 12.2–14.5)
RBC # BLD AUTO: 3.71 10*6/MM3 (ref 3.77–5.28)
RH BLD: POSITIVE
SALICYLATES SERPL-MCNC: 1 MG/DL
SODIUM BLD-SCNC: 133 MMOL/L (ref 138–146)
SODIUM SERPL-SCNC: 136 MMOL/L (ref 136–145)
SODIUM SERPL-SCNC: 137 MMOL/L (ref 136–145)
SP GR UR STRIP: 1.04 (ref 1–1.03)
T&S EXPIRATION DATE: NORMAL
TOTAL RATE: 0 BREATHS/MINUTE
TRICYCLICS UR QL SCN: NEGATIVE
TROPONIN T DELTA: 7 NG/L
TROPONIN T SERPL HS-MCNC: 14 NG/L
TSH SERPL DL<=0.05 MIU/L-ACNC: 0.47 UIU/ML (ref 0.27–4.2)
UROBILINOGEN UR QL STRIP: ABNORMAL
WBC NRBC COR # BLD AUTO: 19.75 10*3/MM3 (ref 3.4–10.8)
WHOLE BLOOD HOLD COAG: NORMAL
WHOLE BLOOD HOLD SPECIMEN: NORMAL

## 2024-06-22 PROCEDURE — 87040 BLOOD CULTURE FOR BACTERIA: CPT | Performed by: STUDENT IN AN ORGANIZED HEALTH CARE EDUCATION/TRAINING PROGRAM

## 2024-06-22 PROCEDURE — 83036 HEMOGLOBIN GLYCOSYLATED A1C: CPT | Performed by: NURSE PRACTITIONER

## 2024-06-22 PROCEDURE — 82010 KETONE BODYS QUAN: CPT | Performed by: STUDENT IN AN ORGANIZED HEALTH CARE EDUCATION/TRAINING PROGRAM

## 2024-06-22 PROCEDURE — 74174 CTA ABD&PLVS W/CONTRAST: CPT

## 2024-06-22 PROCEDURE — 99291 CRITICAL CARE FIRST HOUR: CPT | Performed by: INTERNAL MEDICINE

## 2024-06-22 PROCEDURE — 80053 COMPREHEN METABOLIC PANEL: CPT | Performed by: STUDENT IN AN ORGANIZED HEALTH CARE EDUCATION/TRAINING PROGRAM

## 2024-06-22 PROCEDURE — 83605 ASSAY OF LACTIC ACID: CPT | Performed by: STUDENT IN AN ORGANIZED HEALTH CARE EDUCATION/TRAINING PROGRAM

## 2024-06-22 PROCEDURE — 36600 WITHDRAWAL OF ARTERIAL BLOOD: CPT

## 2024-06-22 PROCEDURE — 25010000002 ONDANSETRON PER 1 MG: Performed by: STUDENT IN AN ORGANIZED HEALTH CARE EDUCATION/TRAINING PROGRAM

## 2024-06-22 PROCEDURE — 82077 ASSAY SPEC XCP UR&BREATH IA: CPT | Performed by: STUDENT IN AN ORGANIZED HEALTH CARE EDUCATION/TRAINING PROGRAM

## 2024-06-22 PROCEDURE — 25010000002 THIAMINE PER 100 MG: Performed by: NURSE PRACTITIONER

## 2024-06-22 PROCEDURE — 83735 ASSAY OF MAGNESIUM: CPT | Performed by: STUDENT IN AN ORGANIZED HEALTH CARE EDUCATION/TRAINING PROGRAM

## 2024-06-22 PROCEDURE — 93005 ELECTROCARDIOGRAM TRACING: CPT | Performed by: STUDENT IN AN ORGANIZED HEALTH CARE EDUCATION/TRAINING PROGRAM

## 2024-06-22 PROCEDURE — 86900 BLOOD TYPING SEROLOGIC ABO: CPT | Performed by: STUDENT IN AN ORGANIZED HEALTH CARE EDUCATION/TRAINING PROGRAM

## 2024-06-22 PROCEDURE — 83880 ASSAY OF NATRIURETIC PEPTIDE: CPT | Performed by: STUDENT IN AN ORGANIZED HEALTH CARE EDUCATION/TRAINING PROGRAM

## 2024-06-22 PROCEDURE — 25810000003 SODIUM CHLORIDE 0.9 % SOLUTION 250 ML FLEX CONT: Performed by: STUDENT IN AN ORGANIZED HEALTH CARE EDUCATION/TRAINING PROGRAM

## 2024-06-22 PROCEDURE — 25010000002 HYDROMORPHONE PER 4 MG: Performed by: STUDENT IN AN ORGANIZED HEALTH CARE EDUCATION/TRAINING PROGRAM

## 2024-06-22 PROCEDURE — 25810000003 SEPSIS FLUID NS 0.9 % SOLUTION: Performed by: STUDENT IN AN ORGANIZED HEALTH CARE EDUCATION/TRAINING PROGRAM

## 2024-06-22 PROCEDURE — 25510000001 IOPAMIDOL PER 1 ML: Performed by: STUDENT IN AN ORGANIZED HEALTH CARE EDUCATION/TRAINING PROGRAM

## 2024-06-22 PROCEDURE — 84484 ASSAY OF TROPONIN QUANT: CPT | Performed by: STUDENT IN AN ORGANIZED HEALTH CARE EDUCATION/TRAINING PROGRAM

## 2024-06-22 PROCEDURE — 25810000003 DEXTROSE 5 % AND SODIUM CHLORIDE 0.9 % 5-0.9 % SOLUTION: Performed by: INTERNAL MEDICINE

## 2024-06-22 PROCEDURE — 83930 ASSAY OF BLOOD OSMOLALITY: CPT | Performed by: INTERNAL MEDICINE

## 2024-06-22 PROCEDURE — 99291 CRITICAL CARE FIRST HOUR: CPT

## 2024-06-22 PROCEDURE — 84443 ASSAY THYROID STIM HORMONE: CPT | Performed by: STUDENT IN AN ORGANIZED HEALTH CARE EDUCATION/TRAINING PROGRAM

## 2024-06-22 PROCEDURE — 36415 COLL VENOUS BLD VENIPUNCTURE: CPT

## 2024-06-22 PROCEDURE — 80179 DRUG ASSAY SALICYLATE: CPT | Performed by: STUDENT IN AN ORGANIZED HEALTH CARE EDUCATION/TRAINING PROGRAM

## 2024-06-22 PROCEDURE — 80047 BASIC METABLC PNL IONIZED CA: CPT | Performed by: STUDENT IN AN ORGANIZED HEALTH CARE EDUCATION/TRAINING PROGRAM

## 2024-06-22 PROCEDURE — 71275 CT ANGIOGRAPHY CHEST: CPT

## 2024-06-22 PROCEDURE — 85014 HEMATOCRIT: CPT | Performed by: STUDENT IN AN ORGANIZED HEALTH CARE EDUCATION/TRAINING PROGRAM

## 2024-06-22 PROCEDURE — 84100 ASSAY OF PHOSPHORUS: CPT | Performed by: STUDENT IN AN ORGANIZED HEALTH CARE EDUCATION/TRAINING PROGRAM

## 2024-06-22 PROCEDURE — 85379 FIBRIN DEGRADATION QUANT: CPT | Performed by: STUDENT IN AN ORGANIZED HEALTH CARE EDUCATION/TRAINING PROGRAM

## 2024-06-22 PROCEDURE — 25010000002 THIAMINE HCL 200 MG/2ML SOLUTION: Performed by: STUDENT IN AN ORGANIZED HEALTH CARE EDUCATION/TRAINING PROGRAM

## 2024-06-22 PROCEDURE — 25010000002 VANCOMYCIN HCL 1.25 G RECONSTITUTED SOLUTION 1 EACH VIAL: Performed by: STUDENT IN AN ORGANIZED HEALTH CARE EDUCATION/TRAINING PROGRAM

## 2024-06-22 PROCEDURE — 83690 ASSAY OF LIPASE: CPT | Performed by: STUDENT IN AN ORGANIZED HEALTH CARE EDUCATION/TRAINING PROGRAM

## 2024-06-22 PROCEDURE — 86140 C-REACTIVE PROTEIN: CPT | Performed by: STUDENT IN AN ORGANIZED HEALTH CARE EDUCATION/TRAINING PROGRAM

## 2024-06-22 PROCEDURE — 80307 DRUG TEST PRSMV CHEM ANLYZR: CPT | Performed by: STUDENT IN AN ORGANIZED HEALTH CARE EDUCATION/TRAINING PROGRAM

## 2024-06-22 PROCEDURE — 85025 COMPLETE CBC W/AUTO DIFF WBC: CPT | Performed by: STUDENT IN AN ORGANIZED HEALTH CARE EDUCATION/TRAINING PROGRAM

## 2024-06-22 PROCEDURE — 81003 URINALYSIS AUTO W/O SCOPE: CPT | Performed by: STUDENT IN AN ORGANIZED HEALTH CARE EDUCATION/TRAINING PROGRAM

## 2024-06-22 PROCEDURE — 83050 HGB METHEMOGLOBIN QUAN: CPT

## 2024-06-22 PROCEDURE — 87641 MR-STAPH DNA AMP PROBE: CPT

## 2024-06-22 PROCEDURE — 85730 THROMBOPLASTIN TIME PARTIAL: CPT | Performed by: STUDENT IN AN ORGANIZED HEALTH CARE EDUCATION/TRAINING PROGRAM

## 2024-06-22 PROCEDURE — 71045 X-RAY EXAM CHEST 1 VIEW: CPT

## 2024-06-22 PROCEDURE — 86850 RBC ANTIBODY SCREEN: CPT | Performed by: STUDENT IN AN ORGANIZED HEALTH CARE EDUCATION/TRAINING PROGRAM

## 2024-06-22 PROCEDURE — 82805 BLOOD GASES W/O2 SATURATION: CPT

## 2024-06-22 PROCEDURE — 85610 PROTHROMBIN TIME: CPT | Performed by: STUDENT IN AN ORGANIZED HEALTH CARE EDUCATION/TRAINING PROGRAM

## 2024-06-22 PROCEDURE — 84145 PROCALCITONIN (PCT): CPT | Performed by: INTERNAL MEDICINE

## 2024-06-22 PROCEDURE — 82550 ASSAY OF CK (CPK): CPT | Performed by: STUDENT IN AN ORGANIZED HEALTH CARE EDUCATION/TRAINING PROGRAM

## 2024-06-22 PROCEDURE — 82375 ASSAY CARBOXYHB QUANT: CPT

## 2024-06-22 PROCEDURE — 80143 DRUG ASSAY ACETAMINOPHEN: CPT | Performed by: STUDENT IN AN ORGANIZED HEALTH CARE EDUCATION/TRAINING PROGRAM

## 2024-06-22 PROCEDURE — 25010000002 PIPERACILLIN SOD-TAZOBACTAM PER 1 G: Performed by: STUDENT IN AN ORGANIZED HEALTH CARE EDUCATION/TRAINING PROGRAM

## 2024-06-22 PROCEDURE — 86901 BLOOD TYPING SEROLOGIC RH(D): CPT | Performed by: STUDENT IN AN ORGANIZED HEALTH CARE EDUCATION/TRAINING PROGRAM

## 2024-06-22 RX ORDER — MIDAZOLAM HYDROCHLORIDE 1 MG/ML
4 INJECTION INTRAMUSCULAR; INTRAVENOUS
Status: DISCONTINUED | OUTPATIENT
Start: 2024-06-22 | End: 2024-06-24

## 2024-06-22 RX ORDER — LABETALOL HYDROCHLORIDE 5 MG/ML
10 INJECTION, SOLUTION INTRAVENOUS ONCE
Status: COMPLETED | OUTPATIENT
Start: 2024-06-22 | End: 2024-06-22

## 2024-06-22 RX ORDER — GABAPENTIN 300 MG/1
300 CAPSULE ORAL
Status: DISCONTINUED | OUTPATIENT
Start: 2024-06-23 | End: 2024-06-25 | Stop reason: HOSPADM

## 2024-06-22 RX ORDER — THIAMINE HYDROCHLORIDE 100 MG/ML
200 INJECTION, SOLUTION INTRAMUSCULAR; INTRAVENOUS EVERY 8 HOURS SCHEDULED
Status: DISCONTINUED | OUTPATIENT
Start: 2024-06-22 | End: 2024-06-22

## 2024-06-22 RX ORDER — LORAZEPAM 1 MG/1
1 TABLET ORAL DAILY
Status: DISCONTINUED | OUTPATIENT
Start: 2024-06-26 | End: 2024-06-22

## 2024-06-22 RX ORDER — DULOXETIN HYDROCHLORIDE 20 MG/1
20 CAPSULE, DELAYED RELEASE ORAL 2 TIMES DAILY
Status: DISCONTINUED | OUTPATIENT
Start: 2024-06-22 | End: 2024-06-25 | Stop reason: HOSPADM

## 2024-06-22 RX ORDER — ONDANSETRON 2 MG/ML
4 INJECTION INTRAMUSCULAR; INTRAVENOUS ONCE
Status: COMPLETED | OUTPATIENT
Start: 2024-06-22 | End: 2024-06-22

## 2024-06-22 RX ORDER — MIDAZOLAM HYDROCHLORIDE 1 MG/ML
2 INJECTION INTRAMUSCULAR; INTRAVENOUS
Status: DISCONTINUED | OUTPATIENT
Start: 2024-06-22 | End: 2024-06-24

## 2024-06-22 RX ORDER — LORAZEPAM 1 MG/1
1 TABLET ORAL EVERY 12 HOURS SCHEDULED
Status: DISCONTINUED | OUTPATIENT
Start: 2024-06-24 | End: 2024-06-22

## 2024-06-22 RX ORDER — PANTOPRAZOLE SODIUM 40 MG/10ML
40 INJECTION, POWDER, LYOPHILIZED, FOR SOLUTION INTRAVENOUS DAILY
Status: DISCONTINUED | OUTPATIENT
Start: 2024-06-22 | End: 2024-06-22

## 2024-06-22 RX ORDER — ROSUVASTATIN CALCIUM 10 MG/1
5 TABLET, COATED ORAL DAILY
Status: DISCONTINUED | OUTPATIENT
Start: 2024-06-22 | End: 2024-06-23

## 2024-06-22 RX ORDER — LEVOTHYROXINE SODIUM 0.03 MG/1
25 TABLET ORAL
Status: DISCONTINUED | OUTPATIENT
Start: 2024-06-23 | End: 2024-06-25 | Stop reason: HOSPADM

## 2024-06-22 RX ORDER — PANTOPRAZOLE SODIUM 40 MG/10ML
40 INJECTION, POWDER, LYOPHILIZED, FOR SOLUTION INTRAVENOUS
Status: DISCONTINUED | OUTPATIENT
Start: 2024-06-23 | End: 2024-06-25 | Stop reason: HOSPADM

## 2024-06-22 RX ORDER — LORAZEPAM 1 MG/1
2 TABLET ORAL
Status: DISCONTINUED | OUTPATIENT
Start: 2024-06-22 | End: 2024-06-24

## 2024-06-22 RX ORDER — OXYCODONE HYDROCHLORIDE 5 MG/1
5 CAPSULE ORAL EVERY 6 HOURS PRN
COMMUNITY
End: 2024-06-25 | Stop reason: HOSPADM

## 2024-06-22 RX ORDER — THIAMINE HYDROCHLORIDE 100 MG/ML
200 INJECTION, SOLUTION INTRAMUSCULAR; INTRAVENOUS EVERY 8 HOURS SCHEDULED
Status: DISCONTINUED | OUTPATIENT
Start: 2024-06-24 | End: 2024-06-25 | Stop reason: HOSPADM

## 2024-06-22 RX ORDER — LORAZEPAM 1 MG/1
2 TABLET ORAL EVERY 6 HOURS
Status: DISCONTINUED | OUTPATIENT
Start: 2024-06-22 | End: 2024-06-22

## 2024-06-22 RX ORDER — LORAZEPAM 1 MG/1
1 TABLET ORAL EVERY 6 HOURS
Status: DISCONTINUED | OUTPATIENT
Start: 2024-06-23 | End: 2024-06-22

## 2024-06-22 RX ORDER — SODIUM CHLORIDE 0.9 % (FLUSH) 0.9 %
10 SYRINGE (ML) INJECTION AS NEEDED
Status: DISCONTINUED | OUTPATIENT
Start: 2024-06-22 | End: 2024-06-25 | Stop reason: HOSPADM

## 2024-06-22 RX ORDER — HYDROMORPHONE HYDROCHLORIDE 1 MG/ML
0.25 INJECTION, SOLUTION INTRAMUSCULAR; INTRAVENOUS; SUBCUTANEOUS ONCE
Status: COMPLETED | OUTPATIENT
Start: 2024-06-22 | End: 2024-06-22

## 2024-06-22 RX ORDER — DEXTROSE MONOHYDRATE AND SODIUM CHLORIDE 5; .9 G/100ML; G/100ML
150 INJECTION, SOLUTION INTRAVENOUS CONTINUOUS
Status: DISCONTINUED | OUTPATIENT
Start: 2024-06-22 | End: 2024-06-23

## 2024-06-22 RX ORDER — SODIUM CHLORIDE 9 MG/ML
1000 INJECTION, SOLUTION INTRAVENOUS ONCE
Status: DISCONTINUED | OUTPATIENT
Start: 2024-06-22 | End: 2024-06-22

## 2024-06-22 RX ORDER — GABAPENTIN 300 MG/1
600 CAPSULE ORAL NIGHTLY
Status: DISCONTINUED | OUTPATIENT
Start: 2024-06-22 | End: 2024-06-25 | Stop reason: HOSPADM

## 2024-06-22 RX ORDER — THIAMINE HYDROCHLORIDE 100 MG/ML
200 INJECTION, SOLUTION INTRAMUSCULAR; INTRAVENOUS ONCE
Status: DISCONTINUED | OUTPATIENT
Start: 2024-06-22 | End: 2024-06-22

## 2024-06-22 RX ORDER — ASPIRIN 81 MG/1
324 TABLET, CHEWABLE ORAL ONCE
Status: DISCONTINUED | OUTPATIENT
Start: 2024-06-22 | End: 2024-06-24

## 2024-06-22 RX ORDER — CALCIUM CARBONATE 500 MG/1
1 TABLET, CHEWABLE ORAL 3 TIMES DAILY PRN
Status: DISCONTINUED | OUTPATIENT
Start: 2024-06-22 | End: 2024-06-25 | Stop reason: HOSPADM

## 2024-06-22 RX ORDER — LANOLIN ALCOHOL/MO/W.PET/CERES
1000 CREAM (GRAM) TOPICAL DAILY
Status: DISCONTINUED | OUTPATIENT
Start: 2024-06-22 | End: 2024-06-25 | Stop reason: HOSPADM

## 2024-06-22 RX ORDER — LORAZEPAM 1 MG/1
1 TABLET ORAL
Status: DISCONTINUED | OUTPATIENT
Start: 2024-06-22 | End: 2024-06-24

## 2024-06-22 RX ORDER — LIDOCAINE HYDROCHLORIDE 20 MG/ML
5 SOLUTION OROPHARYNGEAL
Status: DISCONTINUED | OUTPATIENT
Start: 2024-06-22 | End: 2024-06-25 | Stop reason: HOSPADM

## 2024-06-22 RX ADMIN — IOPAMIDOL 100 ML: 755 INJECTION, SOLUTION INTRAVENOUS at 15:22

## 2024-06-22 RX ADMIN — LORAZEPAM 2 MG: 1 TABLET ORAL at 20:05

## 2024-06-22 RX ADMIN — GABAPENTIN 600 MG: 300 CAPSULE ORAL at 20:25

## 2024-06-22 RX ADMIN — LORAZEPAM 2 MG: 1 TABLET ORAL at 16:34

## 2024-06-22 RX ADMIN — ROSUVASTATIN 5 MG: 10 TABLET, FILM COATED ORAL at 17:50

## 2024-06-22 RX ADMIN — DULOXETINE HYDROCHLORIDE 20 MG: 20 CAPSULE, DELAYED RELEASE ORAL at 20:26

## 2024-06-22 RX ADMIN — PIPERACILLIN AND TAZOBACTAM 3.38 G: 3; .375 INJECTION, POWDER, LYOPHILIZED, FOR SOLUTION INTRAVENOUS at 16:05

## 2024-06-22 RX ADMIN — THIAMINE HYDROCHLORIDE 200 MG: 100 INJECTION, SOLUTION INTRAMUSCULAR; INTRAVENOUS at 16:34

## 2024-06-22 RX ADMIN — HYDROMORPHONE HYDROCHLORIDE 0.25 MG: 1 INJECTION, SOLUTION INTRAMUSCULAR; INTRAVENOUS; SUBCUTANEOUS at 15:00

## 2024-06-22 RX ADMIN — FOLIC ACID 1 MG: 5 INJECTION, SOLUTION INTRAMUSCULAR; INTRAVENOUS; SUBCUTANEOUS at 16:42

## 2024-06-22 RX ADMIN — SODIUM CHLORIDE 1809 ML: 9 INJECTION, SOLUTION INTRAVENOUS at 15:02

## 2024-06-22 RX ADMIN — PANTOPRAZOLE SODIUM 40 MG: 40 INJECTION, POWDER, FOR SOLUTION INTRAVENOUS at 17:50

## 2024-06-22 RX ADMIN — VANCOMYCIN HYDROCHLORIDE 1250 MG: 1.25 INJECTION, POWDER, LYOPHILIZED, FOR SOLUTION INTRAVENOUS at 17:25

## 2024-06-22 RX ADMIN — Medication 1000 MCG: at 17:50

## 2024-06-22 RX ADMIN — DEXTROSE AND SODIUM CHLORIDE 150 ML/HR: 5; 900 INJECTION, SOLUTION INTRAVENOUS at 17:24

## 2024-06-22 RX ADMIN — Medication 10 MG: at 16:37

## 2024-06-22 RX ADMIN — ONDANSETRON 4 MG: 2 INJECTION INTRAMUSCULAR; INTRAVENOUS at 14:59

## 2024-06-22 RX ADMIN — THIAMINE HYDROCHLORIDE 500 MG: 100 INJECTION, SOLUTION INTRAMUSCULAR; INTRAVENOUS at 20:56

## 2024-06-22 NOTE — H&P
Intensive Care Admission Note     Severe metabolic acidosis    History of Present Illness     69-year-old female with past medical history of grade 2 endometrioid adenocarcinoma s/p hysterectomy and adjuvant chemotherapy 2023, hypertension, dyslipidemia, supraventricular tachycardia, fibromyalgia, neuropathy, depression, GERD.  Patient presented with acute onset of severe chest pain with which she woke up.  Patient states that pain was radiating to her back and to her upper abdomen and was also having nausea and vomiting.  Patient was admitted to emergency room and underwent further workup.  Found to have severe metabolic acidosis with bicarb of 10 and high anion gap of 35.  Patient denies any spurious alcohol intake or any other extraneous substance intake.  States that she used to drink alcohol but now has been drinking wine maybe 1 to 2 glasses a day.  Patient was given fluid bolus.  She was noted to have leukocytosis.  Underwent CT chest abdomen and pelvis which did not reveal any acute pathology other than diffuse esophagitis.  ABG obtained and pH of 7.18.  patient was also felt to be having tremulous activity so started on CIWA protocol.  Patient had significant lactic acidosis of 6.9.  Denies any seizure activity.  Urine drug screen checked and negative.  Alcohol level, acetaminophen and salicylate levels negative as well.  Beta-hydroxybutyrate elevated.  Will get urinalysis as well.  Patient will be admitted to ICU for closer monitoring and management of severe metabolic acidosis.    Patient is awake and alert on arrival to ICU.  Hemodynamically stable otherwise.  Quite tremulous.  States that she is feeling cold.  Last alcoholic drink was yesterday late afternoon.    Problem List, Surgical History, Family, Social History, and ROS     Past Medical History:   Diagnosis Date    Allergic lisinopril  2017    Anemia     Arrhythmia     Arthritis     Cancer     uterine    Cataract mild 2020    stil lpresent     Chicken pox     Chronic fatigue     CKD (chronic kidney disease), stage III     sees nephro    Clotting disorder     Dental root implant present     lower left  x1 - possible dental implant    Depression mild 2019    Difficulty walking 2019    Disease of thyroid gland     Dizzy     NIEVES (dyspnea on exertion)     2017    Fracture of hip     Generalized anxiety disorder     GERD (gastroesophageal reflux disease) 2018    History of brachytherapy 2023    vaginal brachytherapy    Hyperlipidemia     Hypertension     Iron deficiency anemia     Liver disease     fatty    Liver problem     Measles     Menopause     Mumps     Neuromuscular disorder Peripheral Neuropathy    Orthostatic hypotension     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    Renal insufficiency 2018    Scoliosis     Unintentional weight loss     Uses contact lenses     bilat    Uterine cancer     Uterine cancer 2022    UTI (urinary tract infection)     Visual impairment Nearsighted    Wears glasses       Past Surgical History:   Procedure Laterality Date     SECTION      DILATION AND CURETTAGE, DIAGNOSTIC / THERAPEUTIC      HIP OPEN REDUCTION Left 2023    Procedure: FEMORAL NECK OPEN REDUCTION INTERNAL FIXATION LEFT;  Surgeon: Juanpablo Lee MD;  Location:  [facility];  Service: Orthopedics;  Laterality: Left;    HIP SURGERY      OOPHORECTOMY      ORAL LESION EXCISION/BIOPSY  2021    TOTAL LAPAROSCOPIC HYSTERECTOMY SALPINGO OOPHORECTOMY N/A 10/28/2022    Procedure: TOTAL LAPAROSCOPIC HYSTERECTOMY BILATERAL SALPINGO-OOPHORECTOMY, INJECTION FOR SENTINEL LYMPH NODE MAPPING, BILATERAL SENTINEL LYMPH NODE DISSECTION WITH [employer];  Surgeon: Jasmine Rich MD;  Location:  [facility];  Service: Robotics - [employer];  Laterality: N/A;    TUBAL ABDOMINAL LIGATION         Allergies   Allergen Reactions    Lisinopril Angioedema    Metal Rash     Possible nickel -   Gold is only metal that doesn't  have issues      Milk-Related Compounds Other (See Comments)     LACTOSE INTOLERANT    Tylenol [Acetaminophen] Other (See Comments)     ckd    Atorvastatin Myalgia     No current facility-administered medications on file prior to encounter.     Current Outpatient Medications on File Prior to Encounter   Medication Sig    Alpha-Lipoic Acid 600 MG capsule Take  by mouth.    Cholecalciferol 25 MCG (1000 UT) tablet Take 1 tablet by mouth Daily.    docusate sodium (COLACE) 100 MG capsule Take 1 capsule by mouth 2 (Two) Times a Day.    DULoxetine (CYMBALTA) 20 MG capsule TAKE ONE CAPSULE BY MOUTH TWICE A DAY    esomeprazole (nexIUM) 20 MG capsule Take 1 capsule by mouth 2 (Two) Times a Week. OTC    folic acid (FOLVITE) 1 MG tablet Take 1 tablet by mouth Daily.    gabapentin (Neurontin) 300 MG capsule Take 1 capsule in the morning and 2 capsules at night.    hydrALAZINE (APRESOLINE) 25 MG tablet 1 tablet 2 (Two) Times a Day.    levothyroxine (SYNTHROID, LEVOTHROID) 25 MCG tablet TAKE ONE TABLET BY MOUTH EVERY MORNING ON AN EMPTY STOMACH    Lidocaine 4 % Place 1 patch on the skin as directed by provider Daily. Remove & Discard patch within 12 hours or as directed by MD    magnesium oxide (MAGOX) 400 (241.3 Mg) MG tablet tablet Take 1 tablet by mouth Daily. OTC    metoprolol succinate XL (TOPROL-XL) 25 MG 24 hr tablet Take 1 tablet by mouth Every Night.    nitroglycerin (NITROSTAT) 0.4 MG SL tablet Place 1 tablet under the tongue Every 5 (Five) Minutes As Needed for Chest Pain. Take no more than 3 doses in 15 minutes.    rosuvastatin (Crestor) 5 MG tablet Take 1 tablet by mouth Daily.    thiamine (VITAMIN B1) 100 MG tablet Take 1 tablet by mouth Daily.    traMADol (ULTRAM) 50 MG tablet Take 1 tablet by mouth Every 12 (Twelve) Hours As Needed for Moderate Pain.    vitamin B-12 (CYANOCOBALAMIN) 1000 MCG tablet Take 1 tablet by mouth Daily.     MEDICATION LIST AND ALLERGIES REVIEWED.    Family History   Problem Relation Age of  "Onset    Spondylolisthesis Mother     COPD Mother     Stroke Mother     Hypertension Mother     Lung cancer Father     Hypertension Father     Heart attack Father     Coronary artery disease Father     Heart disease Father     Cancer Father     Lung disease Father     Hyperlipidemia Sister     Rheum arthritis Sister     Malig Hypertension Sister     Osteoarthritis Sister     Other Sister         alcoholic    Hypertension Sister     Scoliosis Sister     Hypertension Brother     Depression Sister     Early death Sister     Breast cancer Neg Hx     Ovarian cancer Neg Hx     Uterine cancer Neg Hx     Colon cancer Neg Hx     Melanoma Neg Hx     Prostate cancer Neg Hx      Social History     Tobacco Use    Smoking status: Never     Passive exposure: Never    Smokeless tobacco: Never    Tobacco comments:     Never   Vaping Use    Vaping status: Never Used   Substance Use Topics    Alcohol use: Yes     Alcohol/week: 4.0 - 6.0 standard drinks of alcohol     Types: 4 - 6 Glasses of wine per week     Comment: 6-8 glasses a week    Drug use: No     Social History     Social History Narrative    Caffeine: None     FAMILY AND SOCIAL HISTORY REVIEWED.    Review of Systems  ALL OTHER SYSTEMS REVIEWED AND ARE NEGATIVE.    Physical Exam and Clinical Information   /83   Pulse 105   Temp 98 °F (36.7 °C) (Oral)   Resp 20   Ht 162.6 cm (64\")   Wt 60.3 kg (133 lb)   LMP  (LMP Unknown)   SpO2 99%   BMI 22.83 kg/m²   Physical Exam  General Appearance: Awake, alert, in no acute distress  Head:    Atraumatic, Normocephalic, without obvious abnormality, Pupils reactive & symmetrical B/L.  Neck:   Supple.   Lungs:   B/L Breath sounds present with decreased breath sounds on bases, no wheezing heard, no crackles.   Heart: S1 and S2 present, no murmur  Abdomen: Soft, nontender, no guarding or rigidity, bowel sounds positive.  Extremities:  no cyanosis or clubbing,  no edema, cool to touch.  Pulses: Positive and " symmetric.  Neurologic:  Moving all four extremities. Good strength bilaterally. Coarse tremors noted bilateral upper extremities.   Psychological: Normal affect, Cooperative    Results from last 7 days   Lab Units 06/22/24  1441 06/22/24  1437   WBC 10*3/mm3  --  19.75*   HEMOGLOBIN g/dL  --  12.1   HEMOGLOBIN, POC g/dL 13.9  --    PLATELETS 10*3/mm3  --  276     Results from last 7 days   Lab Units 06/22/24  1441 06/22/24  1437   SODIUM mmol/L  --  136   POTASSIUM mmol/L  --  5.0   CO2 mmol/L  --  10.0*   BUN mg/dL  --  31*   CREATININE mg/dL 1.30 1.63*   MAGNESIUM mg/dL  --  1.9   PHOSPHORUS mg/dL  --  5.7*   GLUCOSE mg/dL  --  162*     Estimated Creatinine Clearance: 39.4 mL/min (by C-G formula based on SCr of 1.3 mg/dL).      Results from last 7 days   Lab Units 06/22/24  1427   PH, ARTERIAL pH units 7.182*   PCO2, ARTERIAL mm Hg 21.0*   PO2 ART mm Hg 116.0*     Lab Results   Component Value Date    LACTATE 6.9 (C) 06/22/2024        Images:  Chest x-ray reviewed and showed dextroscoliosis.  Retrocardiac opacity with air bronchogram concerning for possible atelectasis versus consolidation.    Chest CT scan and abdominal CT scan reviewed.  Showed diffuse esophageal wall thickening concerning for esophagitis.  No other acute abnormality noted.  Colonic diverticulosis noted.    I reviewed the patient's results and images.     EKG reviewed and showed sinus tachycardia with occasional PACs.  No acute ST changes noted.  QTc 470.        Impression     CKD (chronic kidney disease) stage 3, GFR 30-59 ml/min    Hypothyroidism    Alcoholic ketoacidosis    Leukocytosis    GERD with esophagitis    Plan/Recommendations     1.  Patient presented with nausea and vomiting and chest pain and found to have severe metabolic acidosis.  EKG does not suggest any changes concerning for ST elevation MI. Follow serial troponins. CTA angiogram chest and abdomen did not show any evidence of dissection or esophageal perforation.  She does  have severe esophagitis which could be causing chest discomfort.  Will place patient on IV Protonix for now.  No history of GI bleed.  Will monitor closely.  2.  Patient has severe metabolic acidosis.  Denies any spurious alcohol intake.  Given her history of alcohol use in the past would be a concern about alcoholic ketoacidosis or starvation ketoacidosis if she was not eating well.  She does have combined high anion gap metabolic acidosis along with metabolic alkalosis likely from vomiting as well.  Patient has been given 2 L saline bolus.  Given ongoing ketoacidosis I will start patient on D5 normal saline at 150 mL/h and continue the hydration.  Follow basic metabolic panel every 6 hours.  3.  Monitor electrolytes closely.  Likely with changing acid-base status we will see changes in electrolytes.  Will replace aggressively per ICU protocol.  4.  Monitor urine output closely.  Patient has chronic kidney disease and need to be monitored closely.  5.  Significant leukocytosis which could be stress response as well.  No definite source of infection.  Get urinalysis.  Check MRSA screen.  Blood cultures have been obtained.  Check procalcitonin as well.  If no definite source of infection will have low threshold of stopping antibiotics.  6.  Patient is not on very forthcoming with history.  Will monitor with CIWA protocol.  Slightly tremulous.  Getting benzodiazepine as needed for now.  High-dose thiamine given given ongoing lactic acidosis which is probably type B lactic acidosis.  Will monitor clearance closely.  7.  For history of hypothyroidism continue levothyroxine.  8.  Resume home dose of Cymbalta.  Resume gabapentin.   9.  For dyslipidemia resume statins.    Patient will be monitored closely in intensive care unit.  High risk of decline.      Time spent Critical care 32 min (exclusive of procedure time)  including high complexity decision making to assess, manipulate, and support vital organ system failure in  this individual who has impairment of one or more vital organ systems such that there is a high probability of imminent or life threatening deterioration in the patient’s condition.      Danilo Smith MD, [facility]  Pulmonary and Critical Care Medicine  06/22/24 16:58 EDT     CC: Cassy Foster MD

## 2024-06-22 NOTE — ED PROVIDER NOTES
EMERGENCY DEPARTMENT ENCOUNTER    Pt Name: Alysia Sierra  MRN: [id number]  Pt :   1955  Room Number:  N231/1  Date of encounter:  2024  PCP: Cassy Foster MD  ED Provider: Edgar Serrano MD    Historian: EMS, patient      HPI:  Chief Complaint: Chest pain, nausea, vomiting        Context: Alysia Sierra is a 69-year-old woman who presents by EMS because of acute severe chest pain.  She says she woke up with it at 7 AM this morning she does have history of uncontrolled hypertension.  She describes a severe sharp substernal chest pain that is radiated to her back and to her upper abdomen she had associated nausea and vomiting.  Rates her pain as severe.  Has never had pain like this before.  No other complaints at this time.      PAST MEDICAL HISTORY  Past Medical History:   Diagnosis Date    Allergic lisinopril  2017    Anemia     Arrhythmia     Arthritis     Cancer     uterine    Cataract mild 2020    stil lpresent    Chicken pox     Chronic fatigue     CKD (chronic kidney disease), stage III     sees nephro    Clotting disorder     Dental root implant present     lower left  x1 - possible dental implant    Depression mild 2019    Difficulty walking 2019    Disease of thyroid gland     Dizzy     NIEVES (dyspnea on exertion)     2017    Fracture of hip     Generalized anxiety disorder     GERD (gastroesophageal reflux disease) 2018    History of brachytherapy 2023    vaginal brachytherapy    Hyperlipidemia     Hypertension     Iron deficiency anemia     Liver disease     fatty    Liver problem     Measles     Menopause     Mumps     Neuromuscular disorder Peripheral Neuropathy    Orthostatic hypotension     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    Renal insufficiency 2018    Scoliosis     Unintentional weight loss     Uses contact lenses     bilat    Uterine cancer     Uterine cancer 2022    UTI (urinary tract infection)     Visual impairment Nearsighted     Wears glasses          PAST SURGICAL HISTORY  Past Surgical History:   Procedure Laterality Date     SECTION      DILATION AND CURETTAGE, DIAGNOSTIC / THERAPEUTIC      HIP OPEN REDUCTION Left 2023    Procedure: FEMORAL NECK OPEN REDUCTION INTERNAL FIXATION LEFT;  Surgeon: Juanpablo Lee MD;  Location:  [facility];  Service: Orthopedics;  Laterality: Left;    HIP SURGERY      OOPHORECTOMY      ORAL LESION EXCISION/BIOPSY  2021    TOTAL LAPAROSCOPIC HYSTERECTOMY SALPINGO OOPHORECTOMY N/A 10/28/2022    Procedure: TOTAL LAPAROSCOPIC HYSTERECTOMY BILATERAL SALPINGO-OOPHORECTOMY, INJECTION FOR SENTINEL LYMPH NODE MAPPING, BILATERAL SENTINEL LYMPH NODE DISSECTION WITH DAVINCI ROBOT;  Surgeon: Jasmine Rich MD;  Location:  [facility];  Service: Robotics - DaVinci;  Laterality: N/A;    TUBAL ABDOMINAL LIGATION           FAMILY HISTORY  Family History   Problem Relation Age of Onset    Spondylolisthesis Mother     COPD Mother     Stroke Mother     Hypertension Mother     Lung cancer Father     Hypertension Father     Heart attack Father     Coronary artery disease Father     Heart disease Father     Cancer Father     Lung disease Father     Hyperlipidemia Sister     Rheum arthritis Sister     Malig Hypertension Sister     Osteoarthritis Sister     Other Sister         alcoholic    Hypertension Sister     Scoliosis Sister     Hypertension Brother     Depression Sister     Early death Sister     Breast cancer Neg Hx     Ovarian cancer Neg Hx     Uterine cancer Neg Hx     Colon cancer Neg Hx     Melanoma Neg Hx     Prostate cancer Neg Hx          SOCIAL HISTORY  Social History     Socioeconomic History    Marital status:     Number of children: 1    Highest education level: Associate degree: academic program   Tobacco Use    Smoking status: Never     Passive exposure: Never    Smokeless tobacco: Never    Tobacco comments:     Never   Vaping Use    Vaping status: Never Used   Substance  and Sexual Activity    Alcohol use: Yes     Alcohol/week: 4.0 - 6.0 standard drinks of alcohol     Types: 4 - 6 Glasses of wine per week     Comment: 6-8 glasses a week    Drug use: No    Sexual activity: Not Currently     Partners: Male     Birth control/protection: Surgical, Post-menopausal         ALLERGIES  Lisinopril, Metal, Milk-related compounds, Tylenol [acetaminophen], and Atorvastatin        REVIEW OF SYSTEMS  Review of Systems       All systems reviewed and negative except for those discussed in HPI.       PHYSICAL EXAM    I have reviewed the triage vital signs and nursing notes.    ED Triage Vitals   Temp Heart Rate Resp BP SpO2   06/22/24 1420 06/22/24 1416 06/22/24 1420 06/22/24 1416 06/22/24 1416   98 °F (36.7 °C) 107 20 166/94 99 %      Temp src Heart Rate Source Patient Position BP Location FiO2 (%)   06/22/24 1420 -- -- -- --   Oral           Physical Exam  GENERAL:   Appears in obvious pain/distress  HENT: Nares patent.  EYES: No scleral icterus.  CV: Regular rhythm, regular rate.  RESPIRATORY: Normal effort.  No audible wheezes, rales or rhonchi.  ABDOMEN: tender in epigasrium  MUSCULOSKELETAL: No deformities.   NEURO: Alert, moves all extremities, follows commands.  SKIN: Warm, dry, no rash visualized.      LAB RESULTS  Recent Results (from the past 24 hour(s))   ECG 12 Lead ED Triage Standing Order; Chest Pain    Collection Time: 06/22/24  2:08 PM   Result Value Ref Range    QT Interval 356 ms    QTC Interval 470 ms   Blood Gas, Arterial With Co-Ox    Collection Time: 06/22/24  2:27 PM    Specimen: Arterial Blood   Result Value Ref Range    Site Right Radial     Isaac's Test N/A     pH, Arterial 7.182 (C) 7.350 - 7.450 pH units    pCO2, Arterial 21.0 (L) 35.0 - 45.0 mm Hg    pO2, Arterial 116.0 (H) 83.0 - 108.0 mm Hg    HCO3, Arterial 7.9 (L) 20.0 - 26.0 mmol/L    Base Excess, Arterial -18.6 (L) 0.0 - 2.0 mmol/L    Hemoglobin, Blood Gas 11.6 (L) 14 - 18 g/dL    Hematocrit, Blood Gas 35.4 (L)  38.0 - 51.0 %    Oxyhemoglobin 97.4 94 - 99 %    Methemoglobin 0.20 0.00 - 1.50 %    Carboxyhemoglobin 0.9 0 - 2 %    CO2 Content 8.5 (L) 22 - 33 mmol/L    Temperature 37.0     Barometric Pressure for Blood Gas      Modality Room Air     FIO2 21 %    Rate 0 Breaths/minute    PIP 0 cmH2O    IPAP 0     EPAP 0     Notified [facility] LUIS YUSUF MD     Notified By [id number]     Notified Time 06/22/2024 14:29     pH, Temp Corrected 7.182 pH Units    pCO2, Temperature Corrected 21.0 (L) 35 - 45 mm Hg    pO2, Temperature Corrected 116 (H) 83 - 108 mm Hg   High Sensitivity Troponin T    Collection Time: 06/22/24  2:37 PM    Specimen: Blood   Result Value Ref Range    HS Troponin T 14 (H) <14 ng/L   Comprehensive Metabolic Panel    Collection Time: 06/22/24  2:37 PM    Specimen: Blood   Result Value Ref Range    Glucose 162 (H) 65 - 99 mg/dL    BUN 31 (H) 8 - 23 mg/dL    Creatinine 1.63 (H) 0.57 - 1.00 mg/dL    Sodium 136 136 - 145 mmol/L    Potassium 5.0 3.5 - 5.2 mmol/L    Chloride 91 (L) 98 - 107 mmol/L    CO2 10.0 (L) 22.0 - 29.0 mmol/L    Calcium 9.3 8.6 - 10.5 mg/dL    Total Protein 7.6 6.0 - 8.5 g/dL    Albumin 4.7 3.5 - 5.2 g/dL    ALT (SGPT) 15 1 - 33 U/L    AST (SGOT) 36 (H) 1 - 32 U/L    Alkaline Phosphatase 90 39 - 117 U/L    Total Bilirubin 0.6 0.0 - 1.2 mg/dL    Globulin 2.9 gm/dL    A/G Ratio 1.6 g/dL    BUN/Creatinine Ratio 19.0 7.0 - 25.0    Anion Gap 35.0 (H) 5.0 - 15.0 mmol/L    eGFR 34.2 (L) >60.0 mL/min/1.73   Lipase    Collection Time: 06/22/24  2:37 PM    Specimen: Blood   Result Value Ref Range    Lipase 25 13 - 60 U/L   BNP    Collection Time: 06/22/24  2:37 PM    Specimen: Blood   Result Value Ref Range    proBNP 683.3 0.0 - 900.0 pg/mL   Green Top (Gel)    Collection Time: 06/22/24  2:37 PM   Result Value Ref Range    Extra Tube Hold for add-ons.    Lavender Top    Collection Time: 06/22/24  2:37 PM   Result Value Ref Range    Extra Tube hold for add-on    Gold Top - SST    Collection Time: 06/22/24  2:37 PM    Result Value Ref Range    Extra Tube Hold for add-ons.    Gray Top    Collection Time: 06/22/24  2:37 PM   Result Value Ref Range    Extra Tube Hold for add-ons.    Light Blue Top    Collection Time: 06/22/24  2:37 PM   Result Value Ref Range    Extra Tube Hold for add-ons.    CBC Auto Differential    Collection Time: 06/22/24  2:37 PM    Specimen: Blood   Result Value Ref Range    WBC 19.75 (H) 3.40 - 10.80 10*3/mm3    RBC 3.71 (L) 3.77 - 5.28 10*6/mm3    Hemoglobin 12.1 12.0 - 15.9 g/dL    Hematocrit 39.7 34.0 - 46.6 %    .0 (H) 79.0 - 97.0 fL    MCH 32.6 26.6 - 33.0 pg    MCHC 30.5 (L) 31.5 - 35.7 g/dL    RDW 13.7 12.3 - 15.4 %    RDW-SD 54.1 (H) 37.0 - 54.0 fl    MPV 9.9 6.0 - 12.0 fL    Platelets 276 140 - 450 10*3/mm3    Neutrophil % 79.6 (H) 42.7 - 76.0 %    Lymphocyte % 7.9 (L) 19.6 - 45.3 %    Monocyte % 11.5 5.0 - 12.0 %    Eosinophil % 0.1 (L) 0.3 - 6.2 %    Basophil % 0.3 0.0 - 1.5 %    Immature Grans % 0.6 (H) 0.0 - 0.5 %    Neutrophils, Absolute 15.74 (H) 1.70 - 7.00 10*3/mm3    Lymphocytes, Absolute 1.56 0.70 - 3.10 10*3/mm3    Monocytes, Absolute 2.27 (H) 0.10 - 0.90 10*3/mm3    Eosinophils, Absolute 0.01 0.00 - 0.40 10*3/mm3    Basophils, Absolute 0.05 0.00 - 0.20 10*3/mm3    Immature Grans, Absolute 0.12 (H) 0.00 - 0.05 10*3/mm3    nRBC 0.0 0.0 - 0.2 /100 WBC   D-dimer, Quantitative    Collection Time: 06/22/24  2:37 PM    Specimen: Blood   Result Value Ref Range    D-Dimer, Quantitative 1.12 (H) 0.00 - 0.68 MCGFEU/mL   Lactic Acid, Plasma    Collection Time: 06/22/24  2:37 PM    Specimen: Blood   Result Value Ref Range    Lactate 6.9 (C) 0.5 - 2.0 mmol/L   C-reactive Protein    Collection Time: 06/22/24  2:37 PM    Specimen: Blood   Result Value Ref Range    C-Reactive Protein 0.33 0.00 - 0.50 mg/dL   Type & Screen    Collection Time: 06/22/24  2:37 PM    Specimen: Arm, Left; Blood   Result Value Ref Range    ABO Type A     RH type Positive     Antibody Screen Negative     T&S Expiration  Date 6/25/2024 11:59:59 PM    Protime-INR    Collection Time: 06/22/24  2:37 PM    Specimen: Blood   Result Value Ref Range    Protime 13.3 12.2 - 14.5 Seconds    INR 1.00 0.89 - 1.12   aPTT    Collection Time: 06/22/24  2:37 PM    Specimen: Blood   Result Value Ref Range    PTT 22.6 (L) 60.0 - 90.0 seconds   Magnesium    Collection Time: 06/22/24  2:37 PM    Specimen: Blood   Result Value Ref Range    Magnesium 1.9 1.6 - 2.4 mg/dL   Phosphorus    Collection Time: 06/22/24  2:37 PM    Specimen: Blood   Result Value Ref Range    Phosphorus 5.7 (H) 2.5 - 4.5 mg/dL   TSH    Collection Time: 06/22/24  2:37 PM    Specimen: Blood   Result Value Ref Range    TSH 0.469 0.270 - 4.200 uIU/mL   Acetaminophen Level    Collection Time: 06/22/24  2:37 PM    Specimen: Blood   Result Value Ref Range    Acetaminophen <5.0 0.0 - 30.0 mcg/mL   Ethanol    Collection Time: 06/22/24  2:37 PM    Specimen: Blood   Result Value Ref Range    Ethanol <10 0 - 10 mg/dL   Salicylate Level    Collection Time: 06/22/24  2:37 PM    Specimen: Blood   Result Value Ref Range    Salicylate 1.0 <=30.0 mg/dL   CK    Collection Time: 06/22/24  2:37 PM    Specimen: Blood   Result Value Ref Range    Creatine Kinase 56 20 - 180 U/L   Beta Hydroxybutyrate Quantitative    Collection Time: 06/22/24  2:37 PM    Specimen: Blood   Result Value Ref Range    Beta-Hydroxybutyrate Quant 7.861 (H) 0.020 - 0.270 mmol/L   Procalcitonin    Collection Time: 06/22/24  2:37 PM    Specimen: Blood   Result Value Ref Range    Procalcitonin 0.49 (H) 0.00 - 0.25 ng/mL   Hemoglobin A1c    Collection Time: 06/22/24  2:37 PM    Specimen: Blood   Result Value Ref Range    Hemoglobin A1C 5.20 4.80 - 5.60 %   POC Chem 8    Collection Time: 06/22/24  2:41 PM    Specimen: Blood   Result Value Ref Range    Glucose 161 (H) 70 - 130 mg/dL    BUN 34 (H) 8 - 26 mg/dL    Creatinine 1.30 0.60 - 1.30 mg/dL    Sodium 133 (L) 138 - 146 mmol/L    POC Potassium 4.9 3.5 - 4.9 mmol/L    Chloride 101  98 - 109 mmol/L    Total CO2 12 (L) 24 - 29 mmol/L    Hemoglobin 13.9 12.0 - 17.0 g/dL    Hematocrit 41 38 - 51 %    Ionized Calcium 1.07 (L) 1.20 - 1.32 mmol/L    eGFR 44.9 (L) >60.0 mL/min/1.73   Urine Drug Screen - Urine, Clean Catch    Collection Time: 06/22/24  4:04 PM    Specimen: Urine, Clean Catch   Result Value Ref Range    THC, Screen, Urine Negative Negative    Phencyclidine (PCP), Urine Negative Negative    Cocaine Screen, Urine Negative Negative    Methamphetamine, Ur Negative Negative    Opiate Screen Negative Negative    Amphetamine Screen, Urine Negative Negative    Benzodiazepine Screen, Urine Negative Negative    Tricyclic Antidepressants Screen Negative Negative    Methadone Screen, Urine Negative Negative    Barbiturates Screen, Urine Negative Negative    Oxycodone Screen, Urine Negative Negative    Buprenorphine, Screen, Urine Negative Negative   Urinalysis With Microscopic If Indicated (No Culture) - Urine, Clean Catch    Collection Time: 06/22/24  4:04 PM    Specimen: Urine, Clean Catch   Result Value Ref Range    Color, UA Yellow Yellow, Straw    Appearance, UA Clear Clear    pH, UA 5.5 5.0 - 8.0    Specific Gravity, UA 1.040 (H) 1.001 - 1.030    Glucose, UA Negative Negative    Ketones, UA >=160 mg/dL (4+) (A) Negative    Bilirubin, UA Negative Negative    Blood, UA Negative Negative    Protein, UA Negative Negative    Leuk Esterase, UA Negative Negative    Nitrite, UA Negative Negative    Urobilinogen, UA 0.2 E.U./dL 0.2 - 1.0 E.U./dL   Fentanyl, Urine - Urine, Clean Catch    Collection Time: 06/22/24  4:04 PM    Specimen: Urine, Clean Catch   Result Value Ref Range    Fentanyl, Urine Negative Negative   Osmolality, Serum    Collection Time: 06/22/24  5:41 PM    Specimen: Blood   Result Value Ref Range    Osmolality 307 (H) 275 - 295 mOsm/kg   Basic Metabolic Panel    Collection Time: 06/22/24  5:41 PM    Specimen: Blood   Result Value Ref Range    Glucose 160 (H) 65 - 99 mg/dL    BUN 29  (H) 8 - 23 mg/dL    Creatinine 1.45 (H) 0.57 - 1.00 mg/dL    Sodium 137 136 - 145 mmol/L    Potassium 4.9 3.5 - 5.2 mmol/L    Chloride 97 (L) 98 - 107 mmol/L    CO2 16.0 (L) 22.0 - 29.0 mmol/L    Calcium 8.1 (L) 8.6 - 10.5 mg/dL    BUN/Creatinine Ratio 20.0 7.0 - 25.0    Anion Gap 24.0 (H) 5.0 - 15.0 mmol/L    eGFR 39.4 (L) >60.0 mL/min/1.73   STAT Lactic Acid, Reflex    Collection Time: 06/22/24  5:48 PM    Specimen: Blood   Result Value Ref Range    Lactate 5.1 (C) 0.5 - 2.0 mmol/L   High Sensitivity Troponin T 2Hr    Collection Time: 06/22/24  5:48 PM    Specimen: Blood   Result Value Ref Range    HS Troponin T 21 (H) <14 ng/L    Troponin T Delta 7 (C) >=-4 - <+4 ng/L   MRSA Screen, PCR (Inpatient) - Swab, Nares    Collection Time: 06/22/24  5:55 PM    Specimen: Nares; Swab   Result Value Ref Range    MRSA PCR Negative Negative   ECG 12 Lead ED Triage Standing Order; Chest Pain    Collection Time: 06/22/24  6:43 PM   Result Value Ref Range    QT Interval 358 ms    QTC Interval 470 ms   STAT Lactic Acid, Reflex    Collection Time: 06/22/24  8:52 PM    Specimen: Arm, Right; Blood   Result Value Ref Range    Lactate 4.4 (C) 0.5 - 2.0 mmol/L       If labs were ordered, I independently reviewed the results and considered them in treating the patient.        RADIOLOGY  CT Angiogram Chest    Result Date: 6/22/2024  CT ANGIOGRAM CHEST, CT ANGIOGRAM ABDOMEN PELVIS Date of Exam: 6/22/2024 3:12 PM EDT Indication: Acute severe substernal chest pain 2 back and upper abdomen, nausea vomiting, hypertension, appears ill. Comparison: Chest CTA dated 12/17/2022, CT of the abdomen and pelvis dated 4/13/2023 Technique: CTA of the chest was performed after the uneventful intravenous administration of 100 mL Isovue 370. Reconstructed coronal and sagittal images were also obtained. In addition, a 3-D volume rendered image was created for interpretation. Automated exposure control and iterative reconstruction methods were used.  FINDINGS: Thoracic inlet: Unremarkable. Pulmonary arteries: This examination is not optimized for detection of pulmonary emboli. No large central pulmonary embolism is seen. Great vessels: The thoracic aorta and proximal arch vessels appear unremarkable. No thoracic aortic dissection or aneurysm is seen. Mediastinum/Kelly: No pathologically enlarged mediastinal lymph nodes are seen. There is diffuse esophageal wall thickening, concerning for esophagitis. Small hiatal hernia is present. Lung parenchyma: There is mild left basilar atelectasis. Lungs are otherwise adequately aerated. No acute consolidation is seen. No suspicious pulmonary nodules are seen. Trachea and airways: The trachea and central airways appear unremarkable. Pleural space: No significant pleural effusion or pneumothorax is seen. Heart and pericardium: The heart and pericardium appear unremarkable. Liver: The liver is unremarkable in morphology and decreased in attenuation. No focal liver lesion is seen. No biliary dilation is seen. Gallbladder: Unremarkable. Pancreas: Unremarkable. Spleen: Unremarkable. Adrenal glands: Unremarkable. Genitourinary tract: Kidneys are unremarkable. No hydronephrosis is seen. The visualized portions of the ureters and urinary bladder appear unremarkable. Patient is status post hysterectomy. Gastrointestinal tract: Colonic diverticulosis. Several small duodenal diverticula noted. Esophageal wall thickening. No evidence of bowel obstruction. Appendix: No findings to suggest acute appendicitis. Other findings: No free air or free fluid is identified. No pathologically enlarged lymph nodes are seen. The abdominal aorta is normal in course and caliber. The celiac artery, SMA, DEEPTI, bilateral renal arteries, and visualized iliofemoral arteries appear patent without significant stenosis. No abdominal aortic aneurysm or dissection is seen. IVC is grossly unremarkable. Bones and soft tissues: No acute or suspicious osseous or  soft tissue lesion is identified. Surgical hardware within the left proximal femur. Degenerative changes and mild scoliotic curvature of the visualized spine.     1.Diffuse esophageal wall thickening, concerning for esophagitis. Small hiatal hernia. Consider further evaluation with EGD. 2.Otherwise, no acute abnormality identified within the chest, abdomen, or pelvis. 3.No acute abnormality of the thoracic or abdominal aorta. 4.Colonic diverticulosis and several small distal duodenal diverticula. 5.Additional findings as detailed above. Electronically Signed: Erik Garcia MD  6/22/2024 3:49 PM EDT  Workstation ID: [id number]    CT Angiogram Abdomen Pelvis    Result Date: 6/22/2024  CT ANGIOGRAM CHEST, CT ANGIOGRAM ABDOMEN PELVIS Date of Exam: 6/22/2024 3:12 PM EDT Indication: Acute severe substernal chest pain 2 back and upper abdomen, nausea vomiting, hypertension, appears ill. Comparison: Chest CTA dated 12/17/2022, CT of the abdomen and pelvis dated 4/13/2023 Technique: CTA of the chest was performed after the uneventful intravenous administration of 100 mL Isovue 370. Reconstructed coronal and sagittal images were also obtained. In addition, a 3-D volume rendered image was created for interpretation. Automated exposure control and iterative reconstruction methods were used. FINDINGS: Thoracic inlet: Unremarkable. Pulmonary arteries: This examination is not optimized for detection of pulmonary emboli. No large central pulmonary embolism is seen. Great vessels: The thoracic aorta and proximal arch vessels appear unremarkable. No thoracic aortic dissection or aneurysm is seen. Mediastinum/Kelly: No pathologically enlarged mediastinal lymph nodes are seen. There is diffuse esophageal wall thickening, concerning for esophagitis. Small hiatal hernia is present. Lung parenchyma: There is mild left basilar atelectasis. Lungs are otherwise adequately aerated. No acute consolidation is seen. No suspicious pulmonary  nodules are seen. Trachea and airways: The trachea and central airways appear unremarkable. Pleural space: No significant pleural effusion or pneumothorax is seen. Heart and pericardium: The heart and pericardium appear unremarkable. Liver: The liver is unremarkable in morphology and decreased in attenuation. No focal liver lesion is seen. No biliary dilation is seen. Gallbladder: Unremarkable. Pancreas: Unremarkable. Spleen: Unremarkable. Adrenal glands: Unremarkable. Genitourinary tract: Kidneys are unremarkable. No hydronephrosis is seen. The visualized portions of the ureters and urinary bladder appear unremarkable. Patient is status post hysterectomy. Gastrointestinal tract: Colonic diverticulosis. Several small duodenal diverticula noted. Esophageal wall thickening. No evidence of bowel obstruction. Appendix: No findings to suggest acute appendicitis. Other findings: No free air or free fluid is identified. No pathologically enlarged lymph nodes are seen. The abdominal aorta is normal in course and caliber. The celiac artery, SMA, DEEPTI, bilateral renal arteries, and visualized iliofemoral arteries appear patent without significant stenosis. No abdominal aortic aneurysm or dissection is seen. IVC is grossly unremarkable. Bones and soft tissues: No acute or suspicious osseous or soft tissue lesion is identified. Surgical hardware within the left proximal femur. Degenerative changes and mild scoliotic curvature of the visualized spine.     1.Diffuse esophageal wall thickening, concerning for esophagitis. Small hiatal hernia. Consider further evaluation with EGD. 2.Otherwise, no acute abnormality identified within the chest, abdomen, or pelvis. 3.No acute abnormality of the thoracic or abdominal aorta. 4.Colonic diverticulosis and several small distal duodenal diverticula. 5.Additional findings as detailed above. Electronically Signed: Erik Garcia MD  6/22/2024 3:49 PM EDT  Workstation ID: [id number]    XR Chest  1 View    Result Date: 6/22/2024  XR CHEST 1 VW Date of Exam: 6/22/2024 2:57 PM EDT Indication: Chest Pain Triage Protocol Comparison: 11/12/2023 Findings: There is a dextroconvex scoliosis. Patient appears mildly rotated to the left, likely as result, and similar to the comparison exam. Heart shadow and pulmonary vasculature appear normal in size. Lungs appear clear except for stable mild chronic interstitial changes. No edema effusion or pneumothorax is seen.     Impression: Dextroconvex scoliosis again noted. No new chest disease is seen. Electronically Signed: Reggie Barillas MD  6/22/2024 3:40 PM EDT  Workstation ID: [id number]     I ordered and independently reviewed the above noted radiographic studies.      I viewed images of chest x-ray which is clear per my evaluation  CT of the chest and abdomen which shows esophageal thickening consistent with esophagitis but no evidence of aortic injury, pneumonia, pneumothorax, or other acute pathology that I can appreciate that would explain her chest pain.    See radiologist's dictation for official interpretation.        PROCEDURES    Procedures    ECG 12 Lead ED Triage Standing Order; Chest Pain   Preliminary Result   Test Reason : ED Triage Standing Order~   Blood Pressure :   */*   mmHG   Vent. Rate : 104 BPM     Atrial Rate : 104 BPM      P-R Int : 178 ms          QRS Dur :  74 ms       QT Int : 358 ms       P-R-T Axes :  66  24  38 degrees      QTc Int : 470 ms      Sinus tachycardia with fusion complexes   Nonspecific ST and T wave abnormality   Abnormal ECG   When compared with ECG of 22-JUN-2024 14:08, (Unconfirmed)   Nonspecific T wave abnormality now evident in Anterior leads      Referred By:            Confirmed By:       ECG 12 Lead ED Triage Standing Order; Chest Pain   Preliminary Result   Test Reason : ED Triage Standing Order~   Blood Pressure :   */*   mmHG   Vent. Rate : 105 BPM     Atrial Rate : 105 BPM      P-R Int : 156 ms          QRS Dur :  74 ms        QT Int : 356 ms       P-R-T Axes :  86   5  58 degrees      QTc Int : 470 ms      Sinus tachycardia with fusion complexes   Otherwise normal ECG   When compared with ECG of 02-JAN-2024 19:39,   fusion complexes are now present      Referred By: PARAMJIT           Confirmed By:           MEDICATIONS GIVEN IN ER    Medications   sodium chloride 0.9 % flush 10 mL (has no administration in time range)   aspirin chewable tablet 324 mg (324 mg Oral Not Given 6/22/24 1419)   Magnesium Standard Dose Replacement - Follow Nurse / BPA Driven Protocol (has no administration in time range)   folic acid 1 mg in sodium chloride 0.9 % 50 mL IVPB (has no administration in time range)   LORazepam (ATIVAN) tablet 1 mg ( Oral Not Given:  See Alt 6/22/24 2005)     Or   midazolam (VERSED) injection 2 mg ( Intravenous Not Given:  See Alt 6/22/24 2005)     Or   LORazepam (ATIVAN) tablet 2 mg (2 mg Oral Given 6/22/24 2005)     Or   midazolam (VERSED) injection 4 mg ( Intravenous Not Given:  See Alt 6/22/24 2005)     Or   midazolam (VERSED) injection 4 mg ( Intravenous Not Given:  See Alt 6/22/24 2005)     Or   midazolam (VERSED) injection 4 mg ( Intramuscular Not Given:  See Alt 6/22/24 2005)   dextrose 5 % and sodium chloride 0.9 % infusion (150 mL/hr Intravenous New Bag 6/22/24 1724)   DULoxetine (CYMBALTA) DR capsule 20 mg (20 mg Oral Given 6/22/24 2026)   levothyroxine (SYNTHROID, LEVOTHROID) tablet 25 mcg (has no administration in time range)   rosuvastatin (CRESTOR) tablet 5 mg (5 mg Oral Given 6/22/24 1750)   vitamin B-12 (CYANOCOBALAMIN) tablet 1,000 mcg (1,000 mcg Oral Given 6/22/24 1750)   gabapentin (NEURONTIN) capsule 300 mg (has no administration in time range)   gabapentin (NEURONTIN) capsule 600 mg (600 mg Oral Given 6/22/24 2025)   thiamine (B-1) 500 mg in sodium chloride 0.9 % 100 mL IVPB (500 mg Intravenous New Bag 6/22/24 2056)   thiamine (B-1) injection 200 mg (has no administration in time range)     Followed by    thiamine (VITAMIN B-1) tablet 100 mg (has no administration in time range)   Lidocaine Viscous HCl (XYLOCAINE) 2 % solution 5 mL (has no administration in time range)   pantoprazole (PROTONIX) injection 40 mg (has no administration in time range)   calcium carbonate (TUMS) chewable tablet 500 mg (200 mg elemental) (has no administration in time range)   ondansetron (ZOFRAN) injection 4 mg (4 mg Intravenous Given 6/22/24 1459)   HYDROmorphone (DILAUDID) injection 0.25 mg (0.25 mg Intravenous Given 6/22/24 1500)   labetalol (NORMODYNE,TRANDATE) injection 10 mg (10 mg Intravenous Given 6/22/24 1637)   sepsis fluid NS 0.9 % bolus 1,809 mL ( Intravenous Currently Infusing 6/22/24 1514)   Vancomycin HCl 1,250 mg in sodium chloride 0.9 % 250 mL VTB (1,250 mg Intravenous New Bag 6/22/24 1725)   piperacillin-tazobactam (ZOSYN) 3.375 g IVPB in 100 mL NS MBP (CD) ( Intravenous Currently Infusing 6/22/24 1636)   iopamidol (ISOVUE-370) 76 % injection 100 mL (100 mL Intravenous Given 6/22/24 1522)   folic acid 1 mg in sodium chloride 0.9 % 50 mL IVPB (1 mg Intravenous New Bag 6/22/24 1642)         MEDICAL DECISION MAKING, PROGRESS, and CONSULTS    All labs, if obtained, have been independently reviewed by me.  All radiology studies, if obtained, have been reviewed by me and the radiologist dictating the report.  All EKG's, if obtained, have been independently viewed and interpreted by me/my attending physician.      Discussion below represents my analysis of pertinent findings related to patient's condition, differential diagnosis, treatment plan and final disposition.                         Differential diagnosis:    Aortic dissection, pancreatitis, myocardial infarction, pulmonary embolism, pneumonia, pneumothorax, sepsis, DKA, anemia, electrolyte abnormality, hypertensive emergency, heart failure      Additional sources:    - Discussed/ obtained information from independent historians: EMS    - External (non-ED) record  review:  Chart review of previous hospitalization for acute kidney injury shows history of:  chronic kidney disease, history of uterine cancer, alcohol abuse    - Chronic or social conditions impacting care: Chronic kidney disease, history of uterine cancer and alcohol abuse    - Shared decision making: Patient/patient representative in complete agreement with current plans for evaluation and management.      Orders placed during this visit:  Orders Placed This Encounter   Procedures    Blood Culture - Blood,    Blood Culture - Blood,    MRSA Screen, PCR (Inpatient) - Swab, Nares    XR Chest 1 View    CT Angiogram Chest    CT Angiogram Abdomen Pelvis    [place] Draw    High Sensitivity Troponin T    Comprehensive Metabolic Panel    Lipase    BNP    CBC Auto Differential    D-dimer, Quantitative    Blood Gas, Arterial -With Co-Ox Panel: Yes    Lactic Acid, Plasma    C-reactive Protein    Protime-INR    aPTT    Magnesium    Phosphorus    TSH    Blood Gas, Arterial With Co-Ox    Acetaminophen Level    Ethanol    Urine Drug Screen - Urine, Clean Catch    Salicylate Level    CK    Beta Hydroxybutyrate Quantitative    Urinalysis With Microscopic If Indicated (No Culture) - Urine, Clean Catch    STAT Lactic Acid, Reflex    High Sensitivity Troponin T 2Hr    Fentanyl, Urine - Urine, Clean Catch    Osmolality, Serum    Basic Metabolic Panel    Comprehensive Metabolic Panel    Magnesium    Phosphorus    Calcium, Ionized    Procalcitonin    STAT Lactic Acid, Reflex    CBC Auto Differential    Hemoglobin A1c    Lactic Acid, Plasma    Diet: Liquid; Clear Liquid; Fluid Consistency: Thin (IDDSI 0)    Undress & Gown    Vital Signs    Continuous Pulse Oximetry    Obtain Baseline Clinical [place] Withdrawal Assessment - Ar (CIWA-Ar), Sedation Scale & Vital Signs    Clinical [place] Withdrawal Assessment (CIWA-Ar)    If CIWA-Ar Score Less Than 8 For 3 Consecutive Assessments, Monitor Every 4 Hours & Discontinue Assessment When  CIWA-Ar Less Than 8 for 24 Hours    Obtain Pre & Post Sedation Scores With Every Sedative Dose - Hold For POSS Greater Than 2 or RASS of -3 or Less    Notify Provider - Withdrawal    Notify Provider of Abnormal Lab Results    Notify Provider - Vitals    CIWA Q4 Hours X3    CIWA Q12 Hours X3    Inpatient Case Management  Consult    Oxygen Therapy- Nasal Cannula; Titrate 1-6 LPM Per SpO2; 90 - 95%    POC Chem 8    ECG 12 Lead ED Triage Standing Order; Chest Pain    ECG 12 Lead ED Triage Standing Order; Chest Pain    Type & Screen    Insert Peripheral IV    ICU / CCU Bed Request ([facility] / [facility] / [facility] ONLY)    Seizure Precautions    Safety Precautions    CBC & Differential    Green Top (Gel)    Lavender Top    Gold Top - SST    Gray Top    Light Blue Top    Ketone Bodies, Serum (Not performed at [place])    CBC & Differential         Additional orders considered but not ordered:      ED Course:    Consultants:      ED Course as of 06/22/24 5799   Sat Jun 22, 2024   1411 In summary is a 69-year-old woman who presents by EMS because of acute severe chest pain.  She says she woke up with it at 7 AM this morning she does have history of uncontrolled hypertension.  She describes a severe sharp substernal chest pain that is radiated to her back and to her upper abdomen she had associated nausea and vomiting.  Rates her pain as severe.  Has never had pain like this before.  No other complaints at this time. [CC]   1413 She arrived awake and alert but very ill-appearing, tremulous, in obvious distress, describing pain distribution consistent with aortic dissection I have ordered emergent CTA of the chest and abdomen she does have history of chronic kidney disease I discussed the risk of contrast related kidney injury she is agreeable to proceeding with the scans I am giving IV fluid bolus to hopefully help prophylax against this obtaining full cardiac workup obtaining emergent chest CT.  Treating with IV fluids  Zofran Dilaudid. [CC]   1425 Mild sinus tachycardia on initial blood pressure elevated giving single dose of labetalol. [CC]   1428 Chart review of previous hospitalization for acute kidney injury shows history of:  chronic kidney disease, history of uterine cancer, alcohol abuse [CC]   1429 Initial blood gas shows severe metabolic acidosis pH of 7.18 with hypercapnia respiratory compensation, already receiving IV fluids adding on ketones toxicologic screening creatinine kinase. [CC]   1507 CBC shows profound leukocytosis at nearly 20,000 with neutrophilic predominance this in conjunction with the severe metabolic acidosis treating empirically as sepsis with vancomycin and Zosyn sepsis fluid bolus was already been ordered. [CC]   2318 Fortunately CT scans only showed findings consistent with esophagitis which may explain her pain but no other acute pathology that I can appreciate.  Significantly elevated lactic acid.  She does have elevated serum ketones as well, I think this is likely a ketoacidosis continue aggressive fluid resuscitation IV antibiotics.  Discussed with Dr. Sofia in the intensive care unit for ICU admission. [CC]      ED Course User Index  [CC] Edgar Serrano MD       90 minutes of critical care provided. This time excludes other billable procedures. Time does include preparation of documents, medical consultations, review of old records, and direct bedside care. Patient is at high risk for life-threatening deterioration due to ketoacidosis, acute severe chest pain concerning for aortic dissection requiring emergent scans, metabolic acidosis and severe leukocytosis meeting sepsis criteria and treated as sepsis,.         Shared Decision Making:  After my consideration of clinical presentation and any laboratory/radiology studies obtained, I discussed the findings with the patient/patient representative who is in agreement with the treatment plan and the final disposition.   Risks and  benefits of discharge and/or observation/admission were discussed.       AS OF 23:19 EDT VITALS:    BP - 98/60  HR - 113  TEMP - 98.2 °F (36.8 °C) (Oral)  O2 SATS - 92%                  DIAGNOSIS  Final diagnoses:   High anion gap metabolic acidosis   Ketoacidosis   Stage 3a chronic kidney disease   Esophagitis   History of alcohol abuse         DISPOSITION  Intensive care unit      Please note that portions of this document were completed with voice recognition software.        Edgar Serrano MD  06/22/24 5322

## 2024-06-23 PROBLEM — A41.9 SEPSIS: Status: ACTIVE | Noted: 2024-06-23

## 2024-06-23 LAB
ALBUMIN SERPL-MCNC: 3.4 G/DL (ref 3.5–5.2)
ALBUMIN/GLOB SERPL: 2 G/DL
ALP SERPL-CCNC: 62 U/L (ref 39–117)
ALT SERPL W P-5'-P-CCNC: 8 U/L (ref 1–33)
ANION GAP SERPL CALCULATED.3IONS-SCNC: 10 MMOL/L (ref 5–15)
ANION GAP SERPL CALCULATED.3IONS-SCNC: 11 MMOL/L (ref 5–15)
ANION GAP SERPL CALCULATED.3IONS-SCNC: 13 MMOL/L (ref 5–15)
ANION GAP SERPL CALCULATED.3IONS-SCNC: 14 MMOL/L (ref 5–15)
AST SERPL-CCNC: 18 U/L (ref 1–32)
BASOPHILS # BLD AUTO: 0.02 10*3/MM3 (ref 0–0.2)
BASOPHILS NFR BLD AUTO: 0.2 % (ref 0–1.5)
BILIRUB SERPL-MCNC: 0.4 MG/DL (ref 0–1.2)
BUN SERPL-MCNC: 13 MG/DL (ref 8–23)
BUN SERPL-MCNC: 17 MG/DL (ref 8–23)
BUN SERPL-MCNC: 19 MG/DL (ref 8–23)
BUN SERPL-MCNC: 24 MG/DL (ref 8–23)
BUN/CREAT SERPL: 10.9 (ref 7–25)
BUN/CREAT SERPL: 12.9 (ref 7–25)
BUN/CREAT SERPL: 15 (ref 7–25)
BUN/CREAT SERPL: 17 (ref 7–25)
CA-I SERPL ISE-MCNC: 1.21 MMOL/L (ref 1.12–1.32)
CALCIUM SPEC-SCNC: 7.4 MG/DL (ref 8.6–10.5)
CALCIUM SPEC-SCNC: 7.6 MG/DL (ref 8.6–10.5)
CALCIUM SPEC-SCNC: 8.2 MG/DL (ref 8.6–10.5)
CALCIUM SPEC-SCNC: 8.5 MG/DL (ref 8.6–10.5)
CHLORIDE SERPL-SCNC: 103 MMOL/L (ref 98–107)
CHLORIDE SERPL-SCNC: 103 MMOL/L (ref 98–107)
CHLORIDE SERPL-SCNC: 104 MMOL/L (ref 98–107)
CHLORIDE SERPL-SCNC: 107 MMOL/L (ref 98–107)
CO2 SERPL-SCNC: 17 MMOL/L (ref 22–29)
CO2 SERPL-SCNC: 18 MMOL/L (ref 22–29)
CO2 SERPL-SCNC: 19 MMOL/L (ref 22–29)
CO2 SERPL-SCNC: 21 MMOL/L (ref 22–29)
CREAT SERPL-MCNC: 1.19 MG/DL (ref 0.57–1)
CREAT SERPL-MCNC: 1.27 MG/DL (ref 0.57–1)
CREAT SERPL-MCNC: 1.32 MG/DL (ref 0.57–1)
CREAT SERPL-MCNC: 1.41 MG/DL (ref 0.57–1)
D-LACTATE SERPL-SCNC: 1.8 MMOL/L (ref 0.5–2)
DEPRECATED RDW RBC AUTO: 50.6 FL (ref 37–54)
EGFRCR SERPLBLD CKD-EPI 2021: 40.7 ML/MIN/1.73
EGFRCR SERPLBLD CKD-EPI 2021: 44.1 ML/MIN/1.73
EGFRCR SERPLBLD CKD-EPI 2021: 46.2 ML/MIN/1.73
EGFRCR SERPLBLD CKD-EPI 2021: 49.9 ML/MIN/1.73
EOSINOPHIL # BLD AUTO: 0.01 10*3/MM3 (ref 0–0.4)
EOSINOPHIL NFR BLD AUTO: 0.1 % (ref 0.3–6.2)
ERYTHROCYTE [DISTWIDTH] IN BLOOD BY AUTOMATED COUNT: 13.8 % (ref 12.3–15.4)
GLOBULIN UR ELPH-MCNC: 1.7 GM/DL
GLUCOSE BLDC GLUCOMTR-MCNC: 126 MG/DL (ref 70–130)
GLUCOSE BLDC GLUCOMTR-MCNC: 141 MG/DL (ref 70–130)
GLUCOSE BLDC GLUCOMTR-MCNC: 155 MG/DL (ref 70–130)
GLUCOSE BLDC GLUCOMTR-MCNC: 194 MG/DL (ref 70–130)
GLUCOSE SERPL-MCNC: 132 MG/DL (ref 65–99)
GLUCOSE SERPL-MCNC: 150 MG/DL (ref 65–99)
GLUCOSE SERPL-MCNC: 185 MG/DL (ref 65–99)
GLUCOSE SERPL-MCNC: 273 MG/DL (ref 65–99)
HCT VFR BLD AUTO: 31.7 % (ref 34–46.6)
HGB BLD-MCNC: 10.3 G/DL (ref 12–15.9)
IMM GRANULOCYTES # BLD AUTO: 0.03 10*3/MM3 (ref 0–0.05)
IMM GRANULOCYTES NFR BLD AUTO: 0.3 % (ref 0–0.5)
LYMPHOCYTES # BLD AUTO: 1.06 10*3/MM3 (ref 0.7–3.1)
LYMPHOCYTES NFR BLD AUTO: 9.9 % (ref 19.6–45.3)
MAGNESIUM SERPL-MCNC: 1.4 MG/DL (ref 1.6–2.4)
MCH RBC QN AUTO: 32.2 PG (ref 26.6–33)
MCHC RBC AUTO-ENTMCNC: 32.5 G/DL (ref 31.5–35.7)
MCV RBC AUTO: 99.1 FL (ref 79–97)
MONOCYTES # BLD AUTO: 1.39 10*3/MM3 (ref 0.1–0.9)
MONOCYTES NFR BLD AUTO: 13 % (ref 5–12)
NEUTROPHILS NFR BLD AUTO: 76.5 % (ref 42.7–76)
NEUTROPHILS NFR BLD AUTO: 8.17 10*3/MM3 (ref 1.7–7)
NRBC BLD AUTO-RTO: 0 /100 WBC (ref 0–0.2)
PHOSPHATE SERPL-MCNC: 1.8 MG/DL (ref 2.5–4.5)
PHOSPHATE SERPL-MCNC: 1.8 MG/DL (ref 2.5–4.5)
PLATELET # BLD AUTO: 169 10*3/MM3 (ref 140–450)
PMV BLD AUTO: 9.6 FL (ref 6–12)
POTASSIUM SERPL-SCNC: 3.8 MMOL/L (ref 3.5–5.2)
POTASSIUM SERPL-SCNC: 4.1 MMOL/L (ref 3.5–5.2)
POTASSIUM SERPL-SCNC: 4.1 MMOL/L (ref 3.5–5.2)
POTASSIUM SERPL-SCNC: 4.2 MMOL/L (ref 3.5–5.2)
PROCALCITONIN SERPL-MCNC: 0.45 NG/ML (ref 0–0.25)
PROT SERPL-MCNC: 5.1 G/DL (ref 6–8.5)
QT INTERVAL: 356 MS
QT INTERVAL: 358 MS
QTC INTERVAL: 470 MS
QTC INTERVAL: 470 MS
RBC # BLD AUTO: 3.2 10*6/MM3 (ref 3.77–5.28)
SODIUM SERPL-SCNC: 133 MMOL/L (ref 136–145)
SODIUM SERPL-SCNC: 134 MMOL/L (ref 136–145)
SODIUM SERPL-SCNC: 135 MMOL/L (ref 136–145)
SODIUM SERPL-SCNC: 138 MMOL/L (ref 136–145)
WBC NRBC COR # BLD AUTO: 10.68 10*3/MM3 (ref 3.4–10.8)

## 2024-06-23 PROCEDURE — 25010000002 MAGNESIUM SULFATE 2 GM/50ML SOLUTION: Performed by: INTERNAL MEDICINE

## 2024-06-23 PROCEDURE — 63710000001 INSULIN LISPRO (HUMAN) PER 5 UNITS: Performed by: NURSE PRACTITIONER

## 2024-06-23 PROCEDURE — 83735 ASSAY OF MAGNESIUM: CPT | Performed by: INTERNAL MEDICINE

## 2024-06-23 PROCEDURE — 82948 REAGENT STRIP/BLOOD GLUCOSE: CPT

## 2024-06-23 PROCEDURE — 82330 ASSAY OF CALCIUM: CPT | Performed by: INTERNAL MEDICINE

## 2024-06-23 PROCEDURE — 25010000002 HEPARIN (PORCINE) PER 1000 UNITS: Performed by: INTERNAL MEDICINE

## 2024-06-23 PROCEDURE — 80053 COMPREHEN METABOLIC PANEL: CPT | Performed by: INTERNAL MEDICINE

## 2024-06-23 PROCEDURE — 25810000003 SODIUM CHLORIDE 0.9 % SOLUTION 250 ML FLEX CONT: Performed by: INTERNAL MEDICINE

## 2024-06-23 PROCEDURE — 83605 ASSAY OF LACTIC ACID: CPT | Performed by: NURSE PRACTITIONER

## 2024-06-23 PROCEDURE — 25010000002 THIAMINE PER 100 MG: Performed by: NURSE PRACTITIONER

## 2024-06-23 PROCEDURE — 84100 ASSAY OF PHOSPHORUS: CPT | Performed by: INTERNAL MEDICINE

## 2024-06-23 PROCEDURE — 25010000002 PIPERACILLIN SOD-TAZOBACTAM PER 1 G: Performed by: INTERNAL MEDICINE

## 2024-06-23 PROCEDURE — 25810000003 DEXTROSE 5 % AND SODIUM CHLORIDE 0.9 % 5-0.9 % SOLUTION: Performed by: INTERNAL MEDICINE

## 2024-06-23 PROCEDURE — 85025 COMPLETE CBC W/AUTO DIFF WBC: CPT | Performed by: INTERNAL MEDICINE

## 2024-06-23 PROCEDURE — 99232 SBSQ HOSP IP/OBS MODERATE 35: CPT | Performed by: INTERNAL MEDICINE

## 2024-06-23 PROCEDURE — 84145 PROCALCITONIN (PCT): CPT

## 2024-06-23 PROCEDURE — 25810000003 SODIUM CHLORIDE 0.9 % SOLUTION: Performed by: INTERNAL MEDICINE

## 2024-06-23 RX ORDER — ROSUVASTATIN CALCIUM 10 MG/1
5 TABLET, COATED ORAL NIGHTLY
Status: DISCONTINUED | OUTPATIENT
Start: 2024-06-23 | End: 2024-06-25 | Stop reason: HOSPADM

## 2024-06-23 RX ORDER — HEPARIN SODIUM 5000 [USP'U]/ML
5000 INJECTION, SOLUTION INTRAVENOUS; SUBCUTANEOUS EVERY 8 HOURS SCHEDULED
Status: DISCONTINUED | OUTPATIENT
Start: 2024-06-23 | End: 2024-06-25 | Stop reason: HOSPADM

## 2024-06-23 RX ORDER — NICOTINE POLACRILEX 4 MG
15 LOZENGE BUCCAL
Status: DISCONTINUED | OUTPATIENT
Start: 2024-06-23 | End: 2024-06-25 | Stop reason: HOSPADM

## 2024-06-23 RX ORDER — INSULIN LISPRO 100 [IU]/ML
2-7 INJECTION, SOLUTION INTRAVENOUS; SUBCUTANEOUS
Status: DISCONTINUED | OUTPATIENT
Start: 2024-06-23 | End: 2024-06-25 | Stop reason: HOSPADM

## 2024-06-23 RX ORDER — MAGNESIUM SULFATE HEPTAHYDRATE 40 MG/ML
2 INJECTION, SOLUTION INTRAVENOUS
Qty: 150 ML | Refills: 0 | Status: COMPLETED | OUTPATIENT
Start: 2024-06-23 | End: 2024-06-23

## 2024-06-23 RX ORDER — IBUPROFEN 600 MG/1
1 TABLET ORAL
Status: DISCONTINUED | OUTPATIENT
Start: 2024-06-23 | End: 2024-06-25 | Stop reason: HOSPADM

## 2024-06-23 RX ORDER — FENTANYL/ROPIVACAINE/NS/PF 2-625MCG/1
15 PLASTIC BAG, INJECTION (ML) EPIDURAL ONCE
Qty: 250 ML | Refills: 0 | Status: COMPLETED | OUTPATIENT
Start: 2024-06-23 | End: 2024-06-23

## 2024-06-23 RX ORDER — DEXTROSE MONOHYDRATE 25 G/50ML
25 INJECTION, SOLUTION INTRAVENOUS
Status: DISCONTINUED | OUTPATIENT
Start: 2024-06-23 | End: 2024-06-25 | Stop reason: HOSPADM

## 2024-06-23 RX ADMIN — PIPERACILLIN AND TAZOBACTAM 3.38 G: 3; .375 INJECTION, POWDER, LYOPHILIZED, FOR SOLUTION INTRAVENOUS at 22:18

## 2024-06-23 RX ADMIN — THIAMINE HYDROCHLORIDE 500 MG: 100 INJECTION, SOLUTION INTRAMUSCULAR; INTRAVENOUS at 04:31

## 2024-06-23 RX ADMIN — THIAMINE HYDROCHLORIDE 500 MG: 100 INJECTION, SOLUTION INTRAMUSCULAR; INTRAVENOUS at 13:10

## 2024-06-23 RX ADMIN — FOLIC ACID 1 MG: 5 INJECTION, SOLUTION INTRAMUSCULAR; INTRAVENOUS; SUBCUTANEOUS at 08:25

## 2024-06-23 RX ADMIN — LEVOTHYROXINE SODIUM 25 MCG: 25 TABLET ORAL at 06:17

## 2024-06-23 RX ADMIN — MAGNESIUM SULFATE HEPTAHYDRATE 2 G: 2 INJECTION, SOLUTION INTRAVENOUS at 10:32

## 2024-06-23 RX ADMIN — SODIUM PHOSPHATE, MONOBASIC, MONOHYDRATE AND SODIUM PHOSPHATE, DIBASIC, ANHYDROUS 15 MMOL: 142; 276 INJECTION, SOLUTION INTRAVENOUS at 20:19

## 2024-06-23 RX ADMIN — Medication 1000 MCG: at 08:27

## 2024-06-23 RX ADMIN — MAGNESIUM SULFATE HEPTAHYDRATE 2 G: 2 INJECTION, SOLUTION INTRAVENOUS at 08:28

## 2024-06-23 RX ADMIN — PIPERACILLIN AND TAZOBACTAM 3.38 G: 3; .375 INJECTION, POWDER, LYOPHILIZED, FOR SOLUTION INTRAVENOUS at 14:02

## 2024-06-23 RX ADMIN — THIAMINE HYDROCHLORIDE 500 MG: 100 INJECTION, SOLUTION INTRAMUSCULAR; INTRAVENOUS at 20:20

## 2024-06-23 RX ADMIN — HEPARIN SODIUM 5000 UNITS: 5000 INJECTION INTRAVENOUS; SUBCUTANEOUS at 22:17

## 2024-06-23 RX ADMIN — PANTOPRAZOLE SODIUM 40 MG: 40 INJECTION, POWDER, FOR SOLUTION INTRAVENOUS at 17:58

## 2024-06-23 RX ADMIN — MAGNESIUM SULFATE HEPTAHYDRATE 2 G: 2 INJECTION, SOLUTION INTRAVENOUS at 13:09

## 2024-06-23 RX ADMIN — DEXTROSE AND SODIUM CHLORIDE 150 ML/HR: 5; 900 INJECTION, SOLUTION INTRAVENOUS at 06:42

## 2024-06-23 RX ADMIN — PANTOPRAZOLE SODIUM 40 MG: 40 INJECTION, POWDER, FOR SOLUTION INTRAVENOUS at 06:17

## 2024-06-23 RX ADMIN — POTASSIUM PHOSPHATE, MONOBASIC POTASSIUM PHOSPHATE, DIBASIC 15 MMOL: 224; 236 INJECTION, SOLUTION, CONCENTRATE INTRAVENOUS at 08:27

## 2024-06-23 RX ADMIN — GABAPENTIN 300 MG: 300 CAPSULE ORAL at 06:17

## 2024-06-23 RX ADMIN — INSULIN LISPRO 2 UNITS: 100 INJECTION, SOLUTION INTRAVENOUS; SUBCUTANEOUS at 13:10

## 2024-06-23 RX ADMIN — INSULIN LISPRO 2 UNITS: 100 INJECTION, SOLUTION INTRAVENOUS; SUBCUTANEOUS at 17:58

## 2024-06-23 RX ADMIN — GABAPENTIN 600 MG: 300 CAPSULE ORAL at 20:19

## 2024-06-23 RX ADMIN — DEXTROSE AND SODIUM CHLORIDE 150 ML/HR: 5; 900 INJECTION, SOLUTION INTRAVENOUS at 00:06

## 2024-06-23 RX ADMIN — ROSUVASTATIN 5 MG: 10 TABLET, FILM COATED ORAL at 20:19

## 2024-06-23 RX ADMIN — DULOXETINE HYDROCHLORIDE 20 MG: 20 CAPSULE, DELAYED RELEASE ORAL at 08:27

## 2024-06-23 RX ADMIN — HEPARIN SODIUM 5000 UNITS: 5000 INJECTION INTRAVENOUS; SUBCUTANEOUS at 14:01

## 2024-06-23 RX ADMIN — DULOXETINE HYDROCHLORIDE 20 MG: 20 CAPSULE, DELAYED RELEASE ORAL at 20:19

## 2024-06-23 NOTE — PROGRESS NOTES
Pulmonary/Critical Care Follow-up     LOS: 1 day   Patient Care Team:  Cassy Foster MD as PCP - General (Internal Medicine)  Russ Carrington MD as Consulting Physician (Nephrology)  Gray Bahena MD as Consulting Physician (Cardiology)  Caden Dietz MD as Consulting Physician (Neurology)  Charity Cornejo, CESAR as Ambulatory  (Oncology) ([facility])  Juanpablo Lee MD as Consulting Physician (Orthopedic Surgery)  Basilia Mata MD as Consulting Physician (Radiation Oncology)  Sarah Stern MD as Consulting Physician (Gynecology)  Jared Wheatley MD as Consulting Physician (Nephrology)  Jose G Florez MD as Consulting Physician (Dermatology)    Chief Complaint/reason: Chest/epigastric pain/metabolic acidosis      Chief Complaint   Patient presents with    Chest Pain     Subjective     History reviewed and updated in EMR as indicated.    Interval History:     Patient is sleeping but arousable.  She reports mild epigastric pain currently, which is improved.  No fevers or chills.  No nausea or vomiting.    History taken from: Patient    PMH/FH/Social History were reviewed and updated appropriately in the electronic medical record.     Review of Systems:    Review of 14 systems was completed with positives and pertinent negatives noted in the subjective section.  All other systems reviewed and are negative.         Objective     Vital Signs  Temp:  [97.6 °F (36.4 °C)-98.8 °F (37.1 °C)] (P) 97.6 °F (36.4 °C)  Heart Rate:  [100-115] (P) 109  Resp:  [16-20] (P) 18  BP: ()/() (P) 133/77  06/22 0701 - 06/23 0700  In: 2422.2 [I.V.:2222.2]  Out: 450 [Urine:450]  Body mass index is 22.83 kg/m².     IV drips:      Physical Exam:     Constitutional:   Alert, in no acute distress   Head:   Normocephalic, atraumatic   Eyes:           Lids and lashes normal, conjunctivae and sclerae normal.  PER   ENMT:  Ears appear intact with no abnormalities noted     Lips normal.     Neck:  Trachea  midline, no JVD   Lungs/Resp:    Normal effort, symmetric chest rise, no crepitus, clear to      auscultation bilaterally.               Heart/CV:   Regular rhythm and normal rate, no murmur   Abdomen/GI:    Soft, nontender, nondistended   :    Deferred   Extremities/MSK:  No clubbing or cyanosis.  No edema.     Pulses:  Pulses palpable and equal bilaterally   Skin:  No bleeding, bruising or rash   Heme/Lymph:  No cervical or supraclavicular adenopathy.   Neurologic:    Psychiatric:    Moves all extremities with no obvious focal motor deficit.  Cranial nerves 2 - 12 grossly intact  Non-agitated, normal affect.    The above physical exam findings were reviewed and reflect my exam findings as of today's exam.   Electronically signed by:  Collin Ricardo MD  06/23/24  12:38 EDT      Results Review:     I reviewed the patient's new clinical results.   Results from last 7 days   Lab Units 06/23/24  0554 06/23/24  0003 06/22/24  1741 06/22/24  1441 06/22/24  1437   SODIUM mmol/L 138 133* 137  --  136   POTASSIUM mmol/L 3.8 4.2 4.9  --  5.0   CHLORIDE mmol/L 107 103 97*  --  91*   CO2 mmol/L 21.0* 19.0* 16.0*  --  10.0*   BUN mg/dL 19 24* 29*  --  31*   CREATININE mg/dL 1.27* 1.41* 1.45*   < > 1.63*   CALCIUM mg/dL 7.6* 7.4* 8.1*  --  9.3   BILIRUBIN mg/dL 0.4  --   --   --  0.6   ALK PHOS U/L 62  --   --   --  90   ALT (SGPT) U/L 8  --   --   --  15   AST (SGOT) U/L 18  --   --   --  36*   GLUCOSE mg/dL 185* 273* 160*  --  162*    < > = values in this interval not displayed.     Results from last 7 days   Lab Units 06/23/24  0833 06/22/24  1441 06/22/24  1437   WBC 10*3/mm3 10.68  --  19.75*   HEMOGLOBIN g/dL 10.3*  --  12.1   HEMOGLOBIN, POC g/dL  --  13.9  --    HEMATOCRIT % 31.7*  --  39.7   HEMATOCRIT POC %  --  41  --    PLATELETS 10*3/mm3 169  --  276     Results from last 7 days   Lab Units 06/22/24  1427   PH, ARTERIAL pH units 7.182*   PO2 ART mm Hg 116.0*   PCO2, ARTERIAL mm Hg 21.0*   HCO3 ART mmol/L 7.9*      Results from last 7 days   Lab Units 06/23/24  0554 06/22/24  1437   MAGNESIUM mg/dL 1.4* 1.9   PHOSPHORUS mg/dL 1.8* 5.7*       I reviewed the patient's new imaging including images and reports.    Medication Review:   aspirin, 324 mg, Oral, Once  DULoxetine, 20 mg, Oral, BID  folic acid 1 mg in sodium chloride 0.9 % 50 mL IVPB, 1 mg, Intravenous, Daily  gabapentin, 300 mg, Oral, Q AM  gabapentin, 600 mg, Oral, Nightly  insulin lispro, 2-7 Units, Subcutaneous, 4x Daily AC & at Bedtime  levothyroxine, 25 mcg, Oral, Q AM  magnesium sulfate, 2 g, Intravenous, Q2H  pantoprazole, 40 mg, Intravenous, BID AC  rosuvastatin, 5 mg, Oral, Nightly  thiamine (B-1) IV, 500 mg, Intravenous, Q8H  [START ON 6/24/2024] thiamine (B-1) IV, 200 mg, Intravenous, Q8H   Followed by  [START ON 6/27/2024] thiamine, 100 mg, Oral, Daily  vitamin B-12, 1,000 mcg, Oral, Daily           Assessment & Plan         CKD (chronic kidney disease) stage 3, GFR 30-59 ml/min    Hypothyroidism    Alcoholic ketoacidosis    Leukocytosis    GERD with esophagitis    69 y.o. lifetime non-smoker with history of grade 2 endometrioid adenocarcinoma status post hysterectomy and adjuvant chemotherapy 2023, HTN, HLD, SVT, fibromyalgia, neuropathy, depression, GERD, presented with acute onset severe chest pain radiating to her back and upper abdomen and associated with nausea/vomiting.    Patient went to the ER and was found to have severe metabolic acidosis with bicarbonate of 10 and anion gap of 35.  She reports drinking about a glass or 2 of wine a day.  She does report poor overall diet secondary to being single and living alone.    CT of the abdomen pelvis showed no significant abnormality other than diffuse esophagitis.  Lactic acid level initially was 6.9.  Patient had elevated ketones.  Procalcitonin was mildly elevated at 0.49 and decreased to 0.45 on follow-up.  Patient was initially given antibiotics in the emergency department but these were not  continued.  She had an MARY which is improving.    Clinical picture is overall suggestive of starvation or alcohol ketosis.  Cannot rule out sepsis but I do not have a definite source.  She responded to fluid.    Plan:  1.  For possible sepsis versus hypovolemia with starvation or alcoholic ketosis: Fluids.  Monitoring.  Continue Zosyn.  Follow-up cultures.  No evidence of septic source on CT scan or labs so far.  MRSA swab was negative.  2.  For anion gap metabolic acidosis: Likely ketosis from starvation or alcohol.  Resolved with fluid.  Monitor.  3.  For alcohol use: Patient admits to 1-2 drinks per day.  Empiric thiamine and vitamin replacement.  WA protocol.  4.  DVT prophylaxis: Subcutaneous heparin.  5.  For esophagitis: Protonix twice daily.  6.  For hypothyroidism: Levothyroxine.  TSH 0.46.    Okay to telemetry/hospitalist.    Electronically signed by:    Collin Ricardo MD  06/23/24  12:38 EDT      *. Please note that portions of this note were completed with [employer] - a voice recognition program.

## 2024-06-23 NOTE — CASE MANAGEMENT/SOCIAL WORK
Discharge Planning Assessment  [facility]     Patient Name: Alysia Sierra  MRN: [id number]  Today's Date: 6/23/2024    Admit Date: 6/22/2024        Discharge Needs Assessment       Row Name 06/23/24 1459       Living Environment    People in Home alone    Current Living Arrangements apartment    Potentially Unsafe Housing Conditions unable to assess    In the past 12 months has the electric, gas, oil, or water company threatened to shut off services in your home? No    Primary Care Provided by self    Provides Primary Care For no one    Family Caregiver if Needed sibling(s)    Family Caregiver Names Akila Guidry, sister [phone]    Quality of Family Relationships unable to assess    Able to Return to Prior Arrangements yes       Resource/Environmental Concerns    Resource/Environmental Concerns none    Transportation Concerns none       Transportation Needs    In the past 12 months, has lack of transportation kept you from medical appointments or from getting medications? no    In the past 12 months, has lack of transportation kept you from meetings, work, or from getting things needed for daily living? No       Food Insecurity    Within the past 12 months, you worried that your food would run out before you got the money to buy more. Never true    Within the past 12 months, the food you bought just didn't last and you didn't have money to get more. Never true       Transition Planning    Patient/Family Anticipates Transition to home    Patient/Family Anticipated Services at Transition     Transportation Anticipated family or friend will provide       Discharge Needs Assessment    Readmission Within the Last 30 Days no previous admission in last 30 days    Equipment Currently Used at Home shower chair;grab bar;cane, quad;walker, rolling;rollator;bp cuff;other (see comments)  transport chair, elevated toilet    Concerns to be Addressed discharge planning    Anticipated Changes Related to Illness  none    Equipment Needed After Discharge other (see comments);grab bar, tub/shower;bp cuff;rollator;cane, quad;walker, rolling;shower chair  transport chair, elevated toilet    Current Discharge Risk lives alone    Discharge Coordination/Progress With verbal consent, I spoke with Pt regarding discharge plan. Pt is admitted with sepsis. She lives alone in [facility]. There is a ramp at the entrance, and no stairs inside, the apartment. She is independent with ADLs and has the following DME: shower chair, grab bars, quad cane, rolling walker, rollator, BP cuff, transport chair and elevated toilet seat. Her PCP is Dr Cassy Foster. She has [employer] Medicare insurance which has Rx coverage. She uses [employer], [facility]. She has previously used home health but is uncertain which agency she used. She denies home O2. Her goal is to return home at discharge. Her sister will provide transportation. CM will continue to follow hospital course.                   Discharge Plan    No documentation.                 Continued Care and Services - Admitted Since 6/22/2024    No active coordination exists for this encounter.          Demographic Summary       Row Name 06/23/24 1458       General Information    Arrived From home    Reason for Consult discharge planning    Preferred Language English    General Information Comments PCP Dr Casys Foster       Contact Information    Permission Granted to Share Info With     Contact Information Obtained for     Contact Information Comments Akila Guidry, sister [phone]                   Functional Status       Row Name 06/23/24 1458       Functional Status    Usual Activity Tolerance moderate    Current Activity Tolerance fair       Functional Status, IADL    Medications independent    Meal Preparation independent    Housekeeping independent    Laundry independent    Shopping independent                   Psychosocial    No documentation.                   Abuse/Neglect    No documentation.                  Legal    No documentation.                  Substance Abuse    No documentation.                  Patient Forms    No documentation.                     Anjelica Odonnell RN

## 2024-06-24 LAB
ANION GAP SERPL CALCULATED.3IONS-SCNC: 11 MMOL/L (ref 5–15)
ANION GAP SERPL CALCULATED.3IONS-SCNC: 14 MMOL/L (ref 5–15)
BUN SERPL-MCNC: 11 MG/DL (ref 8–23)
BUN SERPL-MCNC: 9 MG/DL (ref 8–23)
BUN/CREAT SERPL: 10.2 (ref 7–25)
BUN/CREAT SERPL: 8.1 (ref 7–25)
CALCIUM SPEC-SCNC: 8 MG/DL (ref 8.6–10.5)
CALCIUM SPEC-SCNC: 8.3 MG/DL (ref 8.6–10.5)
CHLORIDE SERPL-SCNC: 102 MMOL/L (ref 98–107)
CHLORIDE SERPL-SCNC: 104 MMOL/L (ref 98–107)
CO2 SERPL-SCNC: 20 MMOL/L (ref 22–29)
CO2 SERPL-SCNC: 22 MMOL/L (ref 22–29)
CREAT SERPL-MCNC: 1.08 MG/DL (ref 0.57–1)
CREAT SERPL-MCNC: 1.11 MG/DL (ref 0.57–1)
EGFRCR SERPLBLD CKD-EPI 2021: 54.3 ML/MIN/1.73
EGFRCR SERPLBLD CKD-EPI 2021: 56.1 ML/MIN/1.73
GLUCOSE BLDC GLUCOMTR-MCNC: 106 MG/DL (ref 70–130)
GLUCOSE BLDC GLUCOMTR-MCNC: 109 MG/DL (ref 70–130)
GLUCOSE BLDC GLUCOMTR-MCNC: 95 MG/DL (ref 70–130)
GLUCOSE BLDC GLUCOMTR-MCNC: 95 MG/DL (ref 70–130)
GLUCOSE SERPL-MCNC: 100 MG/DL (ref 65–99)
GLUCOSE SERPL-MCNC: 92 MG/DL (ref 65–99)
MAGNESIUM SERPL-MCNC: 2.5 MG/DL (ref 1.6–2.4)
PHOSPHATE SERPL-MCNC: 2.4 MG/DL (ref 2.5–4.5)
POTASSIUM SERPL-SCNC: 3.7 MMOL/L (ref 3.5–5.2)
POTASSIUM SERPL-SCNC: 3.7 MMOL/L (ref 3.5–5.2)
SODIUM SERPL-SCNC: 135 MMOL/L (ref 136–145)
SODIUM SERPL-SCNC: 138 MMOL/L (ref 136–145)

## 2024-06-24 PROCEDURE — 97161 PT EVAL LOW COMPLEX 20 MIN: CPT

## 2024-06-24 PROCEDURE — 99232 SBSQ HOSP IP/OBS MODERATE 35: CPT | Performed by: NURSE PRACTITIONER

## 2024-06-24 PROCEDURE — 82948 REAGENT STRIP/BLOOD GLUCOSE: CPT

## 2024-06-24 PROCEDURE — 25010000002 THIAMINE PER 100 MG: Performed by: NURSE PRACTITIONER

## 2024-06-24 PROCEDURE — 25010000002 PIPERACILLIN SOD-TAZOBACTAM PER 1 G: Performed by: INTERNAL MEDICINE

## 2024-06-24 PROCEDURE — 25010000002 HEPARIN (PORCINE) PER 1000 UNITS: Performed by: INTERNAL MEDICINE

## 2024-06-24 PROCEDURE — 80048 BASIC METABOLIC PNL TOTAL CA: CPT | Performed by: INTERNAL MEDICINE

## 2024-06-24 PROCEDURE — 84100 ASSAY OF PHOSPHORUS: CPT | Performed by: INTERNAL MEDICINE

## 2024-06-24 RX ORDER — FOLIC ACID 1 MG/1
1 TABLET ORAL DAILY
Status: DISCONTINUED | OUTPATIENT
Start: 2024-06-24 | End: 2024-06-24

## 2024-06-24 RX ORDER — FOLIC ACID 1 MG/1
1 TABLET ORAL DAILY
Status: DISCONTINUED | OUTPATIENT
Start: 2024-06-24 | End: 2024-06-25 | Stop reason: HOSPADM

## 2024-06-24 RX ADMIN — ROSUVASTATIN 5 MG: 10 TABLET, FILM COATED ORAL at 20:50

## 2024-06-24 RX ADMIN — HEPARIN SODIUM 5000 UNITS: 5000 INJECTION INTRAVENOUS; SUBCUTANEOUS at 14:20

## 2024-06-24 RX ADMIN — HEPARIN SODIUM 5000 UNITS: 5000 INJECTION INTRAVENOUS; SUBCUTANEOUS at 06:38

## 2024-06-24 RX ADMIN — DULOXETINE HYDROCHLORIDE 20 MG: 20 CAPSULE, DELAYED RELEASE ORAL at 08:21

## 2024-06-24 RX ADMIN — PIPERACILLIN AND TAZOBACTAM 3.38 G: 3; .375 INJECTION, POWDER, LYOPHILIZED, FOR SOLUTION INTRAVENOUS at 06:39

## 2024-06-24 RX ADMIN — THIAMINE HYDROCHLORIDE 500 MG: 100 INJECTION, SOLUTION INTRAMUSCULAR; INTRAVENOUS at 11:48

## 2024-06-24 RX ADMIN — THIAMINE HYDROCHLORIDE 500 MG: 100 INJECTION, SOLUTION INTRAMUSCULAR; INTRAVENOUS at 04:25

## 2024-06-24 RX ADMIN — GABAPENTIN 300 MG: 300 CAPSULE ORAL at 06:38

## 2024-06-24 RX ADMIN — PANTOPRAZOLE SODIUM 40 MG: 40 INJECTION, POWDER, FOR SOLUTION INTRAVENOUS at 06:38

## 2024-06-24 RX ADMIN — DULOXETINE HYDROCHLORIDE 20 MG: 20 CAPSULE, DELAYED RELEASE ORAL at 22:28

## 2024-06-24 RX ADMIN — HEPARIN SODIUM 5000 UNITS: 5000 INJECTION INTRAVENOUS; SUBCUTANEOUS at 22:28

## 2024-06-24 RX ADMIN — GABAPENTIN 600 MG: 300 CAPSULE ORAL at 20:50

## 2024-06-24 RX ADMIN — FOLIC ACID 1 MG: 1 TABLET ORAL at 08:22

## 2024-06-24 RX ADMIN — Medication 1000 MCG: at 08:21

## 2024-06-24 RX ADMIN — PIPERACILLIN AND TAZOBACTAM 3.38 G: 3; .375 INJECTION, POWDER, LYOPHILIZED, FOR SOLUTION INTRAVENOUS at 22:31

## 2024-06-24 RX ADMIN — THIAMINE HYDROCHLORIDE 200 MG: 100 INJECTION, SOLUTION INTRAMUSCULAR; INTRAVENOUS at 20:51

## 2024-06-24 RX ADMIN — PANTOPRAZOLE SODIUM 40 MG: 40 INJECTION, POWDER, FOR SOLUTION INTRAVENOUS at 16:56

## 2024-06-24 RX ADMIN — PIPERACILLIN AND TAZOBACTAM 3.38 G: 3; .375 INJECTION, POWDER, LYOPHILIZED, FOR SOLUTION INTRAVENOUS at 14:27

## 2024-06-24 RX ADMIN — LEVOTHYROXINE SODIUM 25 MCG: 25 TABLET ORAL at 06:38

## 2024-06-24 NOTE — PROGRESS NOTES
Intensive Care Follow-up     Hospital:  LOS: 2 days   Ms. Alysia Sierra, 69 y.o. female is followed for:   Sepsis     Subjective   Interval History:  The chart has been reviewed. No acute events overnight. Serum bicarbonate normalized on AM labs.     Patient sitting up in bed this AM in NAD. She remains afebrile, hemodynamically stable, and is oxygenating appropriately on RA. Tolerating a PO diet without issue. Has not been up out of bed ambulating as of yet.     She denies current dizziness, chest pain, shortness of breath, nausea, vomiting, diarrhea, or abdominal discomfort. Only complaint is poor sleep last night / frequent wake ups 2* being in ICU.      The patient's past medical, surgical and social history were reviewed and updated in Epic as appropriate.    Objective     Infusions:     Medications:  DULoxetine, 20 mg, Oral, BID  folic acid, 1 mg, Oral, Daily  gabapentin, 300 mg, Oral, Q AM  gabapentin, 600 mg, Oral, Nightly  heparin (porcine), 5,000 Units, Subcutaneous, Q8H  insulin lispro, 2-7 Units, Subcutaneous, 4x Daily AC & at Bedtime  levothyroxine, 25 mcg, Oral, Q AM  pantoprazole, 40 mg, Intravenous, BID AC  piperacillin-tazobactam, 3.375 g, Intravenous, Q8H  rosuvastatin, 5 mg, Oral, Nightly  thiamine (B-1) IV, 200 mg, Intravenous, Q8H   Followed by  [START ON 6/27/2024] thiamine, 100 mg, Oral, Daily  vitamin B-12, 1,000 mcg, Oral, Daily      I reviewed the patient's medications.    Vital Sign Min/Max for last 24 hours  Temp  Min: 98 °F (36.7 °C)  Max: 98.6 °F (37 °C)   BP  Min: 119/75  Max: 159/93   Pulse  Min: 85  Max: 105   Resp  Min: 14  Max: 20   SpO2  Min: 85 %  Max: 99 %   No data recorded       Input/Output for last 24 hour shift  06/23 0701 - 06/24 0700  In: 2453.8 [P.O.:993; I.V.:549.7]  Out: 2625 [Urine:2625]     Physical Exam:  GENERAL: Female resting supine in bed and conversant. No acute distress.   HEENT: Normocephalic and atraumatic. Trachea midline. PER. EOM WNL.   LUNGS: Chest  rise of normal depth and symmetric. Lungs clear to auscultation bilaterally. No wheezes, rhonchi, or rales.   HEART: S1,S2 detected. Regular rate and rhythm. No rub, murmur, or gallop.   ABDOMEN: Soft, round, nondistended, and nontender. Bowel sounds present.   EXTREMITIES: No clubbing, edema, or cyanosis. Peripheral pulses present. Skin warm and dry.    NEURO/PSYCH: Alert and oriented. Follows commands. Moves all extremities. No focal deficits.      Results from last 7 days   Lab Units 06/23/24  0833 06/22/24  1441 06/22/24  1437   WBC 10*3/mm3 10.68  --  19.75*   HEMOGLOBIN g/dL 10.3*  --  12.1   HEMOGLOBIN, POC g/dL  --  13.9  --    PLATELETS 10*3/mm3 169  --  276     Results from last 7 days   Lab Units 06/24/24  0724 06/23/24  2350 06/23/24  1825 06/23/24  1225 06/23/24  0554 06/22/24  1441 06/22/24  1437   SODIUM mmol/L 138 135* 134*   < > 138   < > 136   POTASSIUM mmol/L 3.7 3.7 4.1   < > 3.8   < > 5.0   CO2 mmol/L 22.0 20.0* 18.0*   < > 21.0*   < > 10.0*   BUN mg/dL 9 11 13   < > 19   < > 31*   CREATININE mg/dL 1.11* 1.08* 1.19*   < > 1.27*   < > 1.63*   MAGNESIUM mg/dL  --  2.5*  --   --  1.4*  --  1.9   PHOSPHORUS mg/dL 2.4*  --  1.8*  --  1.8*  --  5.7*   GLUCOSE mg/dL 92 100* 150*   < > 185*   < > 162*    < > = values in this interval not displayed.     Estimated Creatinine Clearance: 46.2 mL/min (A) (by C-G formula based on SCr of 1.11 mg/dL (H)).    Results from last 7 days   Lab Units 06/22/24  1427   PH, ARTERIAL pH units 7.182*   PCO2, ARTERIAL mm Hg 21.0*   PO2 ART mm Hg 116.0*     I reviewed the patient's new clinical results.  I reviewed the patient's new imaging results/reports including actual images and agree with reports.     Assessment & Plan   Impression      Sepsis    CKD (chronic kidney disease) stage 3, GFR 30-59 ml/min    Hypothyroidism    Alcoholic ketoacidosis    Leukocytosis    GERD with esophagitis    69 yoF non-smoker with PMH of ETOH use, grade 2 endometrioid adenocarcinoma s/p  hysterectomy and adjuvant chemotherapy (2023), HTN, dyslipidemia, SVT, fibromyalgia, neuropathy, depression, and GERD who presented to [facility] ED with acute onset of severe chest pain radiating to her back and upper abdomen with associated nausea & vomiting.     She was found to have severe metabolic acidosis with bicarbonate of 10 and anion gap of 35. CT A/P showed diffuse esophagitis but was otherwise negative for an acute abdominopelvic process. She was pancultured and initiated on empiric antibiotics with admission to ICU for further management.      Plan        For possible sepsis versus hypovolemia with starvation or alcoholic ketosis: Continue empiric Zosyn and follow up culture results. No evidence of septic source on imaging or labs thus far. MRSA swab negative.   For anion gap metabolic acidosis: Felt likely 2* ketosis from starvation or alcohol. Resolved with IV fluid. Monitor.   For ETOH use: Patient admitted to drinking 1-2 glasses of wine per day. Continue CIWA protocol as well as empiric thiamine and multivitamin.   For esophagitis: Continue BID PPI.   For neuropathy: Continue Cymbalta & gabapentin.   For dyslipidemia: Continue rosuvastatin.   For hypothyroidism: Continue levothyroxine.   Consult PT & OT.   AM labs.     DVT Prophylaxis: SQ Heparin  GI Prophylaxis: PPI  Dispo: Transfer to Telemetry; Hospitalists to assume attending role in AM     Time spent: 37 minutes   Plan of care and goals reviewed with multidisciplinary/antibiotic stewardship team during rounds.   I discussed the patient's findings and my recommendations with patient and nursing staff     Bernie Merritt, PASHA, APRN, AGAP-BC  Pulmonary and Critical Care Medicine

## 2024-06-24 NOTE — THERAPY EVALUATION
Patient Name: Alysia Sierra  : 1955    MRN: [id number]                              Today's Date: 2024       Admit Date: 2024    Visit Dx:     ICD-10-CM ICD-9-CM   1. High anion gap metabolic acidosis  E87.29 276.2   2. Ketoacidosis  E87.29 276.2   3. Stage 3a chronic kidney disease  N18.31 585.3   4. Esophagitis  K20.90 530.10   5. History of alcohol abuse  F10.11 305.03     Patient Active Problem List   Diagnosis    Hypertension    Leg weakness, bilateral    Syncope    CKD (chronic kidney disease) stage 3, GFR 30-59 ml/min    Neuropathy    Hyperlipidemia LDL goal <100    Endometrial adenocarcinoma    Mild episode of recurrent major depressive disorder    Dizziness    Hip fracture    Anemia    Hypomagnesemia    Hypothyroidism    Post-menopausal    Other osteoporosis without current pathological fracture    Closed fracture of second lumbar vertebra    Alcoholic ketoacidosis    Leukocytosis    GERD with esophagitis    Sepsis     Past Medical History:   Diagnosis Date    Allergic lisinopril  2017    Anemia     Arrhythmia     Arthritis     Cancer     uterine    Cataract mild 2020    stil lpresent    Chicken pox     Chronic fatigue     CKD (chronic kidney disease), stage III     sees nephro    Clotting disorder     Dental root implant present     lower left  x1 - possible dental implant    Depression mild 2019    Difficulty walking 2019    Disease of thyroid gland     Dizzy     NIEVES (dyspnea on exertion)     2017    Fracture of hip     Generalized anxiety disorder     GERD (gastroesophageal reflux disease) 2018    History of brachytherapy 2023    vaginal brachytherapy    Hyperlipidemia     Hypertension     Iron deficiency anemia     Liver disease     fatty    Liver problem     Measles     Menopause     Mumps     Neuromuscular disorder Peripheral Neuropathy    Orthostatic hypotension     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    Renal insufficiency 2018    Scoliosis      Unintentional weight loss     Uses contact lenses     bilat    Uterine cancer     Uterine cancer 2022    UTI (urinary tract infection)     Visual impairment Nearsighted    Wears glasses      Past Surgical History:   Procedure Laterality Date     SECTION      DILATION AND CURETTAGE, DIAGNOSTIC / THERAPEUTIC      HIP OPEN REDUCTION Left 2023    Procedure: FEMORAL NECK OPEN REDUCTION INTERNAL FIXATION LEFT;  Surgeon: Juanpablo Lee MD;  Location: [facility];  Service: Orthopedics;  Laterality: Left;    HIP SURGERY      OOPHORECTOMY      ORAL LESION EXCISION/BIOPSY  2021    TOTAL LAPAROSCOPIC HYSTERECTOMY SALPINGO OOPHORECTOMY N/A 10/28/2022    Procedure: TOTAL LAPAROSCOPIC HYSTERECTOMY BILATERAL SALPINGO-OOPHORECTOMY, INJECTION FOR SENTINEL LYMPH NODE MAPPING, BILATERAL SENTINEL LYMPH NODE DISSECTION WITH DAVINCI ROBOT;  Surgeon: Jasmine Rich MD;  Location: [facility];  Service: Robotics - DaVinci;  Laterality: N/A;    TUBAL ABDOMINAL LIGATION        General Information       Row Name 24 1529          Physical Therapy Time and Intention    Document Type evaluation  -KG     Mode of Treatment physical therapy  -KG       Row Name 24 1529          General Information    Patient Profile Reviewed yes  -KG     Prior Level of Function independent:;all household mobility;gait;transfer;ADL's;dressing;bathing  -KG     Existing Precautions/Restrictions fall;other (see comments)  situational confusion  -KG     Barriers to Rehab none identified  -KG       Row Name 24 1529          Living Environment    People in Home alone  -KG       Row Name 24 1529          Home Main Entrance    Number of Stairs, Main Entrance one  -KG     Stair Railings, Main Entrance none  -KG       Row Name 24 1529          Stairs Within Home, Primary    Number of Stairs, Within Home, Primary none  -KG       Row Name 24 1529          Cognition    Orientation Status (Cognition) oriented  x 3  -KG       Row Name 06/24/24 1529          Safety Issues, Functional Mobility    Safety Issues Affecting Function (Mobility) awareness of need for assistance;insight into deficits/self-awareness;safety precaution awareness;safety precautions follow-through/compliance  -KG     Impairments Affecting Function (Mobility) balance;cognition;coordination;endurance/activity tolerance;postural/trunk control;strength  -KG     Cognitive Impairments, Mobility Safety/Performance attention;awareness, need for assistance;insight into deficits/self-awareness;safety precaution awareness;safety precaution follow-through;sequencing abilities  -KG               User Key  (r) = Recorded By, (t) = Taken By, (c) = Cosigned By      Initials Name Provider Type    KG Milka Elizalde, PT Physical Therapist                   Mobility       Row Name 06/24/24 1530          Bed Mobility    Bed Mobility supine-sit  -KG     Supine-Sit Coyle (Bed Mobility) standby assist;verbal cues  -KG     Assistive Device (Bed Mobility) bed rails;head of bed elevated  -KG     Comment, (Bed Mobility) VC's for sequencing and technique. Pt mildly impulsive. Did not require any physical assistance.  -KG       Row Name 06/24/24 1530          Transfers    Comment, (Transfers) VC's for sequencing and safety awareness.  -KG       Row Name 06/24/24 1530          Sit-Stand Transfer    Sit-Stand Coyle (Transfers) contact guard;verbal cues  -KG       Row Name 06/24/24 1530          Gait/Stairs (Locomotion)    [place]oyle Level (Gait) minimum assist (75% patient effort);verbal cues  -KG     Assistive Device (Gait) other (see comments)  B UE support on tripod monitor  -KG     Distance in Feet (Gait) 300  -KG     Deviations/Abnormal Patterns (Gait) bilateral deviations;base of support, narrow;werner decreased;festinating/shuffling;stride length decreased  -KG     Bilateral Gait Deviations forward flexed posture;heel strike decreased  -KG     Comment,  (Gait/Stairs) Pt demonstrated step through gait pattern with slow werner and short, shuffled steps. Frequent cues for weight shifting to properly advance LEs, but no improvement. Pt with adequate stability; no LOB. Limited by fatigue.  -KG               User Key  (r) = Recorded By, (t) = Taken By, (c) = Cosigned By      Initials Name Provider Type    KG Milka Elizalde PT Physical Therapist                   Obj/Interventions       Row Name 06/24/24 1531          Range of Motion Comprehensive    General Range of Motion no range of motion deficits identified  -KG     Comment, General Range of Motion B LE WFL  -KG       Row Name 06/24/24 1531          Strength Comprehensive (MMT)    Comment, General Manual Muscle Testing (MMT) Assessment B LE grossly 4-/5  -KG       Row Name 06/24/24 1531          Balance    Balance Assessment sitting static balance;standing static balance;standing dynamic balance  -KG     Static Sitting Balance supervision  -KG     Position, Sitting Balance unsupported;sitting edge of bed  -KG     Static Standing Balance contact guard  -KG     Dynamic Standing Balance minimal assist  -KG     Position/Device Used, Standing Balance supported  -KG       Row Name 06/24/24 1531          Sensory Assessment (Somatosensory)    Sensory Assessment (Somatosensory) LE sensation intact  -KG               User Key  (r) = Recorded By, (t) = Taken By, (c) = Cosigned By      Initials Name Provider Type    Milka Jimenez PT Physical Therapist                   Goals/Plan       Row Name 06/24/24 1533          Bed Mobility Goal 1 (PT)    Activity/Assistive Device (Bed Mobility Goal 1, PT) sit to supine;supine to sit  -KG     [place] Level/Cues Needed (Bed Mobility Goal 1, PT) independent  -KG     Time Frame (Bed Mobility Goal 1, PT) short term goal (STG);3 days  -KG     Progress/Outcomes (Bed Mobility Goal 1, PT) goal ongoing  -KG       Row Name 06/24/24 1533          Transfer Goal 1 (PT)     Activity/Assistive Device (Transfer Goal 1, PT) sit-to-stand/stand-to-sit;bed-to-chair/chair-to-bed;walker, rolling  -KG     [place] Level/Cues Needed (Transfer Goal 1, PT) modified independence  -KG     Time Frame (Transfer Goal 1, PT) long term goal (LTG);1 week  -KG     Progress/Outcome (Transfer Goal 1, PT) goal ongoing  -KG       Row Name 06/24/24 1533          Gait Training Goal 1 (PT)    Activity/Assistive Device (Gait Training Goal 1, PT) gait (walking locomotion);assistive device use;walker, rolling  -KG     [place] Level (Gait Training Goal 1, PT) modified independence  -KG     Distance (Gait Training Goal 1, PT) 400 feet  -KG     Time Frame (Gait Training Goal 1, PT) long term goal (LTG);1 week  -KG     Progress/Outcome (Gait Training Goal 1, PT) goal ongoing  -KG       Row Name 06/24/24 1533          Stairs Goal 1 (PT)    Activity/Assistive Device (Stairs Goal 1, PT) ascending stairs;descending stairs;step-to-step  -KG     [place] Level/Cues Needed (Stairs Goal 1, PT) supervision required  -KG     Number of Stairs (Stairs Goal 1, PT) 1  -KG     Time Frame (Stairs Goal 1, PT) long term goal (LTG);1 week  -KG     Progress/Outcome (Stairs Goal 1, PT) goal ongoing  -KG       Row Name 06/24/24 1533          Therapy Assessment/Plan (PT)    Planned Therapy Interventions (PT) balance training;bed mobility training;gait training;stair training;strengthening;transfer training  -KG               User Key  (r) = Recorded By, (t) = Taken By, (c) = Cosigned By      Initials Name Provider Type    KG Milka Elizalde N, PT Physical Therapist                   Clinical Impression       Row Name 06/24/24 1532          Pain    Pretreatment Pain Rating 0/10 - no pain  -KG     Posttreatment Pain Rating 0/10 - no pain  -KG       Row Name 06/24/24 1532          Plan of Care Review    Plan of Care Reviewed With patient  -KG     Outcome Evaluation PT initial evaluation completed for pt presenting with  generalized weakness, mild balance impairments, and decreased functional mobility. Pt ambulated 300ft with Roxana and B UE support. Pt's decreased independence warrants PT skilled care. Recommend D/C home with assistance and  PT services.  -KG       Row Name 06/24/24 1532          Therapy Assessment/Plan (PT)    Patient/Family Therapy Goals Statement (PT) return to PLOF  -KG     Rehab Potential (PT) good, to achieve stated therapy goals  -KG     Criteria for Skilled Interventions Met (PT) yes;skilled treatment is necessary  -KG     Therapy Frequency (PT) daily  -KG     Predicted Duration of Therapy Intervention (PT) 7 days  -KG       Row Name 06/24/24 1532          Vital Signs    Pre Systolic BP Rehab 141  -KG     Pre Treatment Diastolic BP 89  -KG     Post Systolic BP Rehab 146  -KG     Post Treatment Diastolic BP 90  -KG     Pretreatment Heart Rate (beats/min) 95  -KG     Intratreatment Heart Rate (beats/min) 130  -KG     Posttreatment Heart Rate (beats/min) 106  -KG     Pre SpO2 (%) 96  -KG     O2 Delivery Pre Treatment room air  -KG     Post SpO2 (%) 97  -KG     O2 Delivery Post Treatment room air  -KG     Pre Patient Position Supine  -KG     Intra Patient Position Standing  -KG     Post Patient Position Sitting  -KG       Row Name 06/24/24 1532          Positioning and Restraints    Pre-Treatment Position in bed  -KG     Post Treatment Position chair  -KG     In Chair notified nsg;reclined;call light within reach;encouraged to call for assist;exit alarm on;legs elevated  -KG               User Key  (r) = Recorded By, (t) = Taken By, (c) = Cosigned By      Initials Name Provider Type    KG Milka Elizalde, PT Physical Therapist                   Outcome Measures       Row Name 06/24/24 1534 06/24/24 0800       How much help from another person do you currently need...    Turning from your back to your side while in flat bed without using bedrails? 4  -KG 3  -SW    Moving from lying on back to sitting on  the side of a flat bed without bedrails? 3  -KG 3  -SW    Moving to and from a bed to a chair (including a wheelchair)? 3  -KG 3  -SW    Standing up from a chair using your arms (e.g., wheelchair, bedside chair)? 3  -KG 3  -SW    Climbing 3-5 steps with a railing? 2  -KG 3  -SW    To walk in hospital room? 3  -KG 3  -SW    AM-PAC 6 Clicks Score (PT) 18  -KG 18  -SW    Highest Level of Mobility Goal 6 --> Walk 10 steps or more  -KG 6 --> Walk 10 steps or more  -SW      Row Name 06/24/24 1534          Functional Assessment    Outcome Measure Options AM-PAC 6 Clicks Basic Mobility (PT)  -KG               User Key  (r) = Recorded By, (t) = Taken By, (c) = Cosigned By      Initials Name Provider Type    SW Carly Devries RN Registered Nurse    Milka Jimenez, PT Physical Therapist                                 Physical Therapy Education       Title: PT OT SLP Therapies (In Progress)       Topic: Physical Therapy (In Progress)       Point: Mobility training (In Progress)       Learning Progress Summary             Patient Acceptance, E, NR by KG at 6/24/2024 1408                         Point: Home exercise program (Not Started)       Learner Progress:  Not documented in this visit.              Point: Body mechanics (In Progress)       Learning Progress Summary             Patient Acceptance, E, NR by KG at 6/24/2024 1408                         Point: Precautions (In Progress)       Learning Progress Summary             Patient Acceptance, E, NR by KG at 6/24/2024 1408                                         User Key       Initials Effective Dates Name Provider Type Discipline    LISETTE 05/22/20 -  Milka Elizalde, PT Physical Therapist PT                  PT Recommendation and Plan  Planned Therapy Interventions (PT): balance training, bed mobility training, gait training, stair training, strengthening, transfer training  Plan of Care Reviewed With: patient  Outcome Evaluation: PT initial evaluation  completed for pt presenting with generalized weakness, mild balance impairments, and decreased functional mobility. Pt ambulated 300ft with Roxana and B UE support. Pt's decreased independence warrants PT skilled care. Recommend D/C home with assistance and  PT services.     Time Calculation:   PT Evaluation Complexity  History, PT Evaluation Complexity: 3 or more personal factors and/or comorbidities  Examination of Body Systems (PT Eval Complexity): total of 3 or more elements  Clinical Presentation (PT Evaluation Complexity): stable  Clinical Decision Making (PT Evaluation Complexity): low complexity  Overall Complexity (PT Evaluation Complexity): low complexity     PT Charges       Row Name 06/24/24 1408             Time Calculation    Start Time 1408  -KG      PT Received On 06/24/24  -KG      PT Goal Re-Cert Due Date 07/04/24  -KG         Untimed Charges    PT Eval/Re-eval Minutes 48  -KG         Total Minutes    Untimed Charges Total Minutes 48  -KG       Total Minutes 48  -KG                User Key  (r) = Recorded By, (t) = Taken By, (c) = Cosigned By      Initials Name Provider Type    KG Milka Elizalde, PT Physical Therapist                  Therapy Charges for Today       Code Description Service Date Service Provider Modifiers Qty    [id number]  PT EVAL LOW COMPLEXITY 4 6/24/2024 Milka Elizalde, PT GP 1            PT G-Codes  Outcome Measure Options: AM-PAC 6 Clicks Basic Mobility (PT)  AM-PAC 6 Clicks Score (PT): 18  PT Discharge Summary  Anticipated Discharge Disposition (PT): home with assist, home with home health    Valencia Elizalde, PT  6/24/2024

## 2024-06-24 NOTE — PAYOR COMM NOTE
"Ref# [id number]  6/22/24 Admission from emergency department    Utilization Review  Phone [phone]  Fax [phone]    [place] [address]             Jayden Sierra (69 y.o. Female)       Date of Birth   1955    Social Security Number       Address   [address] [place] [address] [address] [address]    Home Phone   [phone]    MRN   [id number]       Sikh   Baptism    Marital Status                               Admission Date   6/22/24    Admission Type   Emergency    Admitting Provider   Danilo Smith MD    Attending Provider   Saniya Batista DO    Department, Room/Bed   [facility] 2B ICU, N231/1       Discharge Date       Discharge Disposition       Discharge Destination                                 Attending Provider: Saniya Batista DO    Allergies: Lisinopril, Metal, Milk-related Compounds, Tylenol [Acetaminophen], Atorvastatin    Isolation: None   Infection: None   Code Status: CPR    Ht: 162.6 cm (64\")   Wt: 60.3 kg (133 lb)    Admission Cmt: None   Principal Problem: Sepsis [A41.9]                   Active Insurance as of 6/22/2024       Primary Coverage       Payor Plan Insurance Group Employer/Plan Group    ANTHEM MEDICARE REPLACEMENT ANTHEM MEDICARE ADVANTAGE [id number]       Payor Plan Address Payor Plan Phone Number Payor Plan Fax Number Effective Dates    [address] [phone]  1/1/2024 - None Entered    [facility] [id number]         Subscriber Name Subscriber Birth Date Member ID       JAYDEN SIERRA 1955 [id number]                     Emergency Contacts        (Rel.) Home Phone Work Phone Mobile Phone    ALYSHA HOLLY (Sister) [phone] -- [phone]              [place]: NPI [id number] Tax ID [id number]  Insurance Information                  ANTHEM MEDICARE REPLACEMENT/ANTHEM MEDICARE ADVANTAGE Phone: [phone]    Subscriber: Jayden Sierra Subscriber#: " [id number]    Group#: [id number] Precert#: --             History & Physical        Danilo Smith MD at 06/22/24 1715            Intensive Care Admission Note     Severe metabolic acidosis    History of Present Illness     69-year-old female with past medical history of grade 2 endometrioid adenocarcinoma s/p hysterectomy and adjuvant chemotherapy 2023, hypertension, dyslipidemia, supraventricular tachycardia, fibromyalgia, neuropathy, depression, GERD.  Patient presented with acute onset of severe chest pain with which she woke up.  Patient states that pain was radiating to her back and to her upper abdomen and was also having nausea and vomiting.  Patient was admitted to emergency room and underwent further workup.  Found to have severe metabolic acidosis with bicarb of 10 and high anion gap of 35.  Patient denies any spurious alcohol intake or any other extraneous substance intake.  States that she used to drink alcohol but now has been drinking wine maybe 1 to 2 glasses a day.  Patient was given fluid bolus.  She was noted to have leukocytosis.  Underwent CT chest abdomen and pelvis which did not reveal any acute pathology other than diffuse esophagitis.  ABG obtained and pH of 7.18.  patient was also felt to be having tremulous activity so started on CIWA protocol.  Patient had significant lactic acidosis of 6.9.  Denies any seizure activity.  Urine drug screen checked and negative.  Alcohol level, acetaminophen and salicylate levels negative as well.  Beta-hydroxybutyrate elevated.  Will get urinalysis as well.  Patient will be admitted to ICU for closer monitoring and management of severe metabolic acidosis.    Patient is awake and alert on arrival to ICU.  Hemodynamically stable otherwise.  Quite tremulous.  States that she is feeling cold.  Last alcoholic drink was yesterday late afternoon.    Problem List, Surgical History, Family, Social History, and ROS     Past Medical History:   Diagnosis Date     Allergic lisinopril  2017    Anemia     Arrhythmia     Arthritis     Cancer     uterine    Cataract mild 2020    stil lpresent    Chicken pox     Chronic fatigue     CKD (chronic kidney disease), stage III     sees nephro    Clotting disorder     Dental root implant present     lower left  x1 - possible dental implant    Depression mild 2019    Difficulty walking 2019    Disease of thyroid gland     Dizzy     NIEVES (dyspnea on exertion)     2017    Fracture of hip     Generalized anxiety disorder     GERD (gastroesophageal reflux disease) 2018    History of brachytherapy 2023    vaginal brachytherapy    Hyperlipidemia     Hypertension     Iron deficiency anemia     Liver disease     fatty    Liver problem     Measles     Menopause     Mumps     Neuromuscular disorder Peripheral Neuropathy    Orthostatic hypotension     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    Renal insufficiency 2018    Scoliosis     Unintentional weight loss     Uses contact lenses     bilat    Uterine cancer     Uterine cancer 2022    UTI (urinary tract infection)     Visual impairment Nearsighted    Wears glasses       Past Surgical History:   Procedure Laterality Date     SECTION      DILATION AND CURETTAGE, DIAGNOSTIC / THERAPEUTIC      HIP OPEN REDUCTION Left 2023    Procedure: FEMORAL NECK OPEN REDUCTION INTERNAL FIXATION LEFT;  Surgeon: Juanpablo Lee MD;  Location:  [facility];  Service: Orthopedics;  Laterality: Left;    HIP SURGERY      OOPHORECTOMY      ORAL LESION EXCISION/BIOPSY  2021    TOTAL LAPAROSCOPIC HYSTERECTOMY SALPINGO OOPHORECTOMY N/A 10/28/2022    Procedure: TOTAL LAPAROSCOPIC HYSTERECTOMY BILATERAL SALPINGO-OOPHORECTOMY, INJECTION FOR SENTINEL LYMPH NODE MAPPING, BILATERAL SENTINEL LYMPH NODE DISSECTION WITH DAVINCI ROBOT;  Surgeon: Jasmine Rich MD;  Location:  [facility];  Service: Robotics - DaVinci;  Laterality: N/A;    TUBAL ABDOMINAL LIGATION          Allergies   Allergen Reactions    Lisinopril Angioedema    Metal Rash     Possible nickel -   Gold is only metal that doesn't have issues      Milk-Related Compounds Other (See Comments)     LACTOSE INTOLERANT    Tylenol [Acetaminophen] Other (See Comments)     ckd    Atorvastatin Myalgia     No current facility-administered medications on file prior to encounter.     Current Outpatient Medications on File Prior to Encounter   Medication Sig    Alpha-Lipoic Acid 600 MG capsule Take  by mouth.    Cholecalciferol 25 MCG (1000 UT) tablet Take 1 tablet by mouth Daily.    docusate sodium (COLACE) 100 MG capsule Take 1 capsule by mouth 2 (Two) Times a Day.    DULoxetine (CYMBALTA) 20 MG capsule TAKE ONE CAPSULE BY MOUTH TWICE A DAY    esomeprazole (nexIUM) 20 MG capsule Take 1 capsule by mouth 2 (Two) Times a Week. OTC    folic acid (FOLVITE) 1 MG tablet Take 1 tablet by mouth Daily.    gabapentin (Neurontin) 300 MG capsule Take 1 capsule in the morning and 2 capsules at night.    hydrALAZINE (APRESOLINE) 25 MG tablet 1 tablet 2 (Two) Times a Day.    levothyroxine (SYNTHROID, LEVOTHROID) 25 MCG tablet TAKE ONE TABLET BY MOUTH EVERY MORNING ON AN EMPTY STOMACH    Lidocaine 4 % Place 1 patch on the skin as directed by provider Daily. Remove & Discard patch within 12 hours or as directed by MD    magnesium oxide (MAGOX) 400 (241.3 Mg) MG tablet tablet Take 1 tablet by mouth Daily. OTC    metoprolol succinate XL (TOPROL-XL) 25 MG 24 hr tablet Take 1 tablet by mouth Every Night.    nitroglycerin (NITROSTAT) 0.4 MG SL tablet Place 1 tablet under the tongue Every 5 (Five) Minutes As Needed for Chest Pain. Take no more than 3 doses in 15 minutes.    rosuvastatin (Crestor) 5 MG tablet Take 1 tablet by mouth Daily.    thiamine (VITAMIN B1) 100 MG tablet Take 1 tablet by mouth Daily.    traMADol (ULTRAM) 50 MG tablet Take 1 tablet by mouth Every 12 (Twelve) Hours As Needed for Moderate Pain.    vitamin B-12 (CYANOCOBALAMIN)  "1000 MCG tablet Take 1 tablet by mouth Daily.     MEDICATION LIST AND ALLERGIES REVIEWED.    Family History   Problem Relation Age of Onset    Spondylolisthesis Mother     COPD Mother     Stroke Mother     Hypertension Mother     Lung cancer Father     Hypertension Father     Heart attack Father     Coronary artery disease Father     Heart disease Father     Cancer Father     Lung disease Father     Hyperlipidemia Sister     Rheum arthritis Sister     Malig Hypertension Sister     Osteoarthritis Sister     Other Sister         alcoholic    Hypertension Sister     Scoliosis Sister     Hypertension Brother     Depression Sister     Early death Sister     Breast cancer Neg Hx     Ovarian cancer Neg Hx     Uterine cancer Neg Hx     Colon cancer Neg Hx     Melanoma Neg Hx     Prostate cancer Neg Hx      Social History     Tobacco Use    Smoking status: Never     Passive exposure: Never    Smokeless tobacco: Never    Tobacco comments:     Never   Vaping Use    Vaping status: Never Used   Substance Use Topics    Alcohol use: Yes     Alcohol/week: 4.0 - 6.0 standard drinks of alcohol     Types: 4 - 6 Glasses of wine per week     Comment: 6-8 glasses a week    Drug use: No     Social History     Social History Narrative    Caffeine: None     FAMILY AND SOCIAL HISTORY REVIEWED.    Review of Systems  ALL OTHER SYSTEMS REVIEWED AND ARE NEGATIVE.    Physical Exam and Clinical Information   /83   Pulse 105   Temp 98 °F (36.7 °C) (Oral)   Resp 20   Ht 162.6 cm (64\")   Wt 60.3 kg (133 lb)   LMP  (LMP Unknown)   SpO2 99%   BMI 22.83 kg/m²   Physical Exam  General Appearance: Awake, alert, in no acute distress  Head:    Atraumatic, Normocephalic, without obvious abnormality, Pupils reactive & symmetrical B/L.  Neck:   Supple.   Lungs:   B/L Breath sounds present with decreased breath sounds on bases, no wheezing heard, no crackles.   Heart: S1 and S2 present, no murmur  Abdomen: Soft, nontender, no guarding or rigidity, " bowel sounds positive.  Extremities:  no cyanosis or clubbing,  no edema, cool to touch.  Pulses: Positive and symmetric.  Neurologic:  Moving all four extremities. Good strength bilaterally. Coarse tremors noted bilateral upper extremities.   Psychological: Normal affect, Cooperative    Results from last 7 days   Lab Units 06/22/24  1441 06/22/24  1437   WBC 10*3/mm3  --  19.75*   HEMOGLOBIN g/dL  --  12.1   HEMOGLOBIN, POC g/dL 13.9  --    PLATELETS 10*3/mm3  --  276     Results from last 7 days   Lab Units 06/22/24  1441 06/22/24  1437   SODIUM mmol/L  --  136   POTASSIUM mmol/L  --  5.0   CO2 mmol/L  --  10.0*   BUN mg/dL  --  31*   CREATININE mg/dL 1.30 1.63*   MAGNESIUM mg/dL  --  1.9   PHOSPHORUS mg/dL  --  5.7*   GLUCOSE mg/dL  --  162*     Estimated Creatinine Clearance: 39.4 mL/min (by C-G formula based on SCr of 1.3 mg/dL).      Results from last 7 days   Lab Units 06/22/24  1427   PH, ARTERIAL pH units 7.182*   PCO2, ARTERIAL mm Hg 21.0*   PO2 ART mm Hg 116.0*     Lab Results   Component Value Date    LACTATE 6.9 (C) 06/22/2024        Images:  Chest x-ray reviewed and showed dextroscoliosis.  Retrocardiac opacity with air bronchogram concerning for possible atelectasis versus consolidation.    Chest CT scan and abdominal CT scan reviewed.  Showed diffuse esophageal wall thickening concerning for esophagitis.  No other acute abnormality noted.  Colonic diverticulosis noted.    I reviewed the patient's results and images.     EKG reviewed and showed sinus tachycardia with occasional PACs.  No acute ST changes noted.  QTc 470.        Impression     CKD (chronic kidney disease) stage 3, GFR 30-59 ml/min    Hypothyroidism    Alcoholic ketoacidosis    Leukocytosis    GERD with esophagitis    Plan/Recommendations     1.  Patient presented with nausea and vomiting and chest pain and found to have severe metabolic acidosis.  EKG does not suggest any changes concerning for ST elevation MI. Follow serial troponins.  CTA angiogram chest and abdomen did not show any evidence of dissection or esophageal perforation.  She does have severe esophagitis which could be causing chest discomfort.  Will place patient on IV Protonix for now.  No history of GI bleed.  Will monitor closely.  2.  Patient has severe metabolic acidosis.  Denies any spurious alcohol intake.  Given her history of alcohol use in the past would be a concern about alcoholic ketoacidosis or starvation ketoacidosis if she was not eating well.  She does have combined high anion gap metabolic acidosis along with metabolic alkalosis likely from vomiting as well.  Patient has been given 2 L saline bolus.  Given ongoing ketoacidosis I will start patient on D5 normal saline at 150 mL/h and continue the hydration.  Follow basic metabolic panel every 6 hours.  3.  Monitor electrolytes closely.  Likely with changing acid-base status we will see changes in electrolytes.  Will replace aggressively per ICU protocol.  4.  Monitor urine output closely.  Patient has chronic kidney disease and need to be monitored closely.  5.  Significant leukocytosis which could be stress response as well.  No definite source of infection.  Get urinalysis.  Check MRSA screen.  Blood cultures have been obtained.  Check procalcitonin as well.  If no definite source of infection will have low threshold of stopping antibiotics.  6.  Patient is not on very forthcoming with history.  Will monitor with CIWA protocol.  Slightly tremulous.  Getting benzodiazepine as needed for now.  High-dose thiamine given given ongoing lactic acidosis which is probably type B lactic acidosis.  Will monitor clearance closely.  7.  For history of hypothyroidism continue levothyroxine.  8.  Resume home dose of Cymbalta.  Resume gabapentin.   9.  For dyslipidemia resume statins.    Patient will be monitored closely in intensive care unit.  High risk of decline.      Time spent Critical care 32 min (exclusive of procedure  time)  including high complexity decision making to assess, manipulate, and support vital organ system failure in this individual who has impairment of one or more vital organ systems such that there is a high probability of imminent or life threatening deterioration in the patient’s condition.      Danilo Smith MD, [facility]  Pulmonary and Critical Care Medicine  24 16:58 EDT     CC: Cassy Foster MD      Electronically signed by Danilo Smith MD at 24 1745          Emergency Department Notes        Edgar Serrano MD at 24 1425           EMERGENCY DEPARTMENT ENCOUNTER    Pt Name: Alysia Sierra  MRN: [id number]  Pt :   1955  Room Number:  N231/1  Date of encounter:  2024  PCP: Cassy Foster MD  ED Provider: Edgar Serrano MD    Historian: EMS, patient      HPI:  Chief Complaint: Chest pain, nausea, vomiting        Context: Alysia Sierra is a 69-year-old woman who presents by EMS because of acute severe chest pain.  She says she woke up with it at 7 AM this morning she does have history of uncontrolled hypertension.  She describes a severe sharp substernal chest pain that is radiated to her back and to her upper abdomen she had associated nausea and vomiting.  Rates her pain as severe.  Has never had pain like this before.  No other complaints at this time.      PAST MEDICAL HISTORY  Past Medical History:   Diagnosis Date    Allergic lisinopril  2017    Anemia     Arrhythmia     Arthritis     Cancer     uterine    Cataract mild 2020    stil lpresent    Chicken pox     Chronic fatigue     CKD (chronic kidney disease), stage III     sees nephro    Clotting disorder     Dental root implant present     lower left  x1 - possible dental implant    Depression mild 2019    Difficulty walking 2019    Disease of thyroid gland     Dizzy     NIEVES (dyspnea on exertion)     2017    Fracture of hip     Generalized anxiety disorder     GERD (gastroesophageal reflux disease)  2018    History of brachytherapy 2023    vaginal brachytherapy    Hyperlipidemia     Hypertension     Iron deficiency anemia     Liver disease     fatty    Liver problem     Measles     Menopause     Mumps     Neuromuscular disorder Peripheral Neuropathy    Orthostatic hypotension     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    Renal insufficiency 2018    Scoliosis     Unintentional weight loss     Uses contact lenses     bilat    Uterine cancer     Uterine cancer 2022    UTI (urinary tract infection)     Visual impairment Nearsighted    Wears glasses          PAST SURGICAL HISTORY  Past Surgical History:   Procedure Laterality Date     SECTION  1981    DILATION AND CURETTAGE, DIAGNOSTIC / THERAPEUTIC      HIP OPEN REDUCTION Left 2023    Procedure: FEMORAL NECK OPEN REDUCTION INTERNAL FIXATION LEFT;  Surgeon: Juanpablo Lee MD;  Location:  [facility];  Service: Orthopedics;  Laterality: Left;    HIP SURGERY      OOPHORECTOMY      ORAL LESION EXCISION/BIOPSY  2021    TOTAL LAPAROSCOPIC HYSTERECTOMY SALPINGO OOPHORECTOMY N/A 10/28/2022    Procedure: TOTAL LAPAROSCOPIC HYSTERECTOMY BILATERAL SALPINGO-OOPHORECTOMY, INJECTION FOR SENTINEL LYMPH NODE MAPPING, BILATERAL SENTINEL LYMPH NODE DISSECTION WITH DAVINCI ROBOT;  Surgeon: Jasmine Rich MD;  Location:  [facility];  Service: Robotics - DaVinci;  Laterality: N/A;    TUBAL ABDOMINAL LIGATION           FAMILY HISTORY  Family History   Problem Relation Age of Onset    Spondylolisthesis Mother     COPD Mother     Stroke Mother     Hypertension Mother     Lung cancer Father     Hypertension Father     Heart attack Father     Coronary artery disease Father     Heart disease Father     Cancer Father     Lung disease Father     Hyperlipidemia Sister     Rheum arthritis Sister     Malig Hypertension Sister     Osteoarthritis Sister     Other Sister         alcoholic    Hypertension Sister     Scoliosis Sister      Hypertension Brother     Depression Sister     Early death Sister     Breast cancer Neg Hx     Ovarian cancer Neg Hx     Uterine cancer Neg Hx     Colon cancer Neg Hx     Melanoma Neg Hx     Prostate cancer Neg Hx          SOCIAL HISTORY  Social History     Socioeconomic History    Marital status:     Number of children: 1    Highest education level: Associate degree: academic program   Tobacco Use    Smoking status: Never     Passive exposure: Never    Smokeless tobacco: Never    Tobacco comments:     Never   Vaping Use    Vaping status: Never Used   Substance and Sexual Activity    Alcohol use: Yes     Alcohol/week: 4.0 - 6.0 standard drinks of alcohol     Types: 4 - 6 Glasses of wine per week     Comment: 6-8 glasses a week    Drug use: No    Sexual activity: Not Currently     Partners: Male     Birth control/protection: Surgical, Post-menopausal         ALLERGIES  Lisinopril, Metal, Milk-related compounds, Tylenol [acetaminophen], and Atorvastatin        REVIEW OF SYSTEMS  Review of Systems       All systems reviewed and negative except for those discussed in HPI.       PHYSICAL EXAM    I have reviewed the triage vital signs and nursing notes.    ED Triage Vitals   Temp Heart Rate Resp BP SpO2   06/22/24 1420 06/22/24 1416 06/22/24 1420 06/22/24 1416 06/22/24 1416   98 °F (36.7 °C) 107 20 166/94 99 %      Temp src Heart Rate Source Patient Position BP Location FiO2 (%)   06/22/24 1420 -- -- -- --   Oral           Physical Exam  GENERAL:   Appears in obvious pain/distress  HENT: Nares patent.  EYES: No scleral icterus.  CV: Regular rhythm, regular rate.  RESPIRATORY: Normal effort.  No audible wheezes, rales or rhonchi.  ABDOMEN: tender in epigasrium  MUSCULOSKELETAL: No deformities.   NEURO: Alert, moves all extremities, follows commands.  SKIN: Warm, dry, no rash visualized.      LAB RESULTS  Recent Results (from the past 24 hour(s))   ECG 12 Lead ED Triage Standing Order; Chest Pain    Collection Time:  06/22/24  2:08 PM   Result Value Ref Range    QT Interval 356 ms    QTC Interval 470 ms   Blood Gas, Arterial With Co-Ox    Collection Time: 06/22/24  2:27 PM    Specimen: Arterial Blood   Result Value Ref Range    Site Right Radial     Isaac's Test N/A     pH, Arterial 7.182 (C) 7.350 - 7.450 pH units    pCO2, Arterial 21.0 (L) 35.0 - 45.0 mm Hg    pO2, Arterial 116.0 (H) 83.0 - 108.0 mm Hg    HCO3, Arterial 7.9 (L) 20.0 - 26.0 mmol/L    Base Excess, Arterial -18.6 (L) 0.0 - 2.0 mmol/L    Hemoglobin, Blood Gas 11.6 (L) 14 - 18 g/dL    Hematocrit, Blood Gas 35.4 (L) 38.0 - 51.0 %    Oxyhemoglobin 97.4 94 - 99 %    Methemoglobin 0.20 0.00 - 1.50 %    Carboxyhemoglobin 0.9 0 - 2 %    CO2 Content 8.5 (L) 22 - 33 mmol/L    Temperature 37.0     Barometric Pressure for Blood Gas      Modality Room Air     FIO2 21 %    Rate 0 Breaths/minute    PIP 0 cmH2O    IPAP 0     EPAP 0     Notified Who LUIS YUSUF MD     Notified By [id number]     Notified Time 06/22/2024 14:29     pH, Temp Corrected 7.182 pH Units    pCO2, Temperature Corrected 21.0 (L) 35 - 45 mm Hg    pO2, Temperature Corrected 116 (H) 83 - 108 mm Hg   High Sensitivity Troponin T    Collection Time: 06/22/24  2:37 PM    Specimen: Blood   Result Value Ref Range    HS Troponin T 14 (H) <14 ng/L   Comprehensive Metabolic Panel    Collection Time: 06/22/24  2:37 PM    Specimen: Blood   Result Value Ref Range    Glucose 162 (H) 65 - 99 mg/dL    BUN 31 (H) 8 - 23 mg/dL    Creatinine 1.63 (H) 0.57 - 1.00 mg/dL    Sodium 136 136 - 145 mmol/L    Potassium 5.0 3.5 - 5.2 mmol/L    Chloride 91 (L) 98 - 107 mmol/L    CO2 10.0 (L) 22.0 - 29.0 mmol/L    Calcium 9.3 8.6 - 10.5 mg/dL    Total Protein 7.6 6.0 - 8.5 g/dL    Albumin 4.7 3.5 - 5.2 g/dL    ALT (SGPT) 15 1 - 33 U/L    AST (SGOT) 36 (H) 1 - 32 U/L    Alkaline Phosphatase 90 39 - 117 U/L    Total Bilirubin 0.6 0.0 - 1.2 mg/dL    Globulin 2.9 gm/dL    A/G Ratio 1.6 g/dL    BUN/Creatinine Ratio 19.0 7.0 - 25.0    Anion Gap 35.0  (H) 5.0 - 15.0 mmol/L    eGFR 34.2 (L) >60.0 mL/min/1.73   Lipase    Collection Time: 06/22/24  2:37 PM    Specimen: Blood   Result Value Ref Range    Lipase 25 13 - 60 U/L   BNP    Collection Time: 06/22/24  2:37 PM    Specimen: Blood   Result Value Ref Range    proBNP 683.3 0.0 - 900.0 pg/mL   Green Top (Gel)    Collection Time: 06/22/24  2:37 PM   Result Value Ref Range    Extra Tube Hold for add-ons.    Lavender Top    Collection Time: 06/22/24  2:37 PM   Result Value Ref Range    Extra Tube hold for add-on    Gold Top - SST    Collection Time: 06/22/24  2:37 PM   Result Value Ref Range    Extra Tube Hold for add-ons.    Gray Top    Collection Time: 06/22/24  2:37 PM   Result Value Ref Range    Extra Tube Hold for add-ons.    Light Blue Top    Collection Time: 06/22/24  2:37 PM   Result Value Ref Range    Extra Tube Hold for add-ons.    CBC Auto Differential    Collection Time: 06/22/24  2:37 PM    Specimen: Blood   Result Value Ref Range    WBC 19.75 (H) 3.40 - 10.80 10*3/mm3    RBC 3.71 (L) 3.77 - 5.28 10*6/mm3    Hemoglobin 12.1 12.0 - 15.9 g/dL    Hematocrit 39.7 34.0 - 46.6 %    .0 (H) 79.0 - 97.0 fL    MCH 32.6 26.6 - 33.0 pg    MCHC 30.5 (L) 31.5 - 35.7 g/dL    RDW 13.7 12.3 - 15.4 %    RDW-SD 54.1 (H) 37.0 - 54.0 fl    MPV 9.9 6.0 - 12.0 fL    Platelets 276 140 - 450 10*3/mm3    Neutrophil % 79.6 (H) 42.7 - 76.0 %    Lymphocyte % 7.9 (L) 19.6 - 45.3 %    Monocyte % 11.5 5.0 - 12.0 %    Eosinophil % 0.1 (L) 0.3 - 6.2 %    Basophil % 0.3 0.0 - 1.5 %    Immature Grans % 0.6 (H) 0.0 - 0.5 %    Neutrophils, Absolute 15.74 (H) 1.70 - 7.00 10*3/mm3    Lymphocytes, Absolute 1.56 0.70 - 3.10 10*3/mm3    Monocytes, Absolute 2.27 (H) 0.10 - 0.90 10*3/mm3    Eosinophils, Absolute 0.01 0.00 - 0.40 10*3/mm3    Basophils, Absolute 0.05 0.00 - 0.20 10*3/mm3    Immature Grans, Absolute 0.12 (H) 0.00 - 0.05 10*3/mm3    nRBC 0.0 0.0 - 0.2 /100 WBC   D-dimer, Quantitative    Collection Time: 06/22/24  2:37 PM     Specimen: Blood   Result Value Ref Range    D-Dimer, Quantitative 1.12 (H) 0.00 - 0.68 MCGFEU/mL   Lactic Acid, Plasma    Collection Time: 06/22/24  2:37 PM    Specimen: Blood   Result Value Ref Range    Lactate 6.9 (C) 0.5 - 2.0 mmol/L   C-reactive Protein    Collection Time: 06/22/24  2:37 PM    Specimen: Blood   Result Value Ref Range    C-Reactive Protein 0.33 0.00 - 0.50 mg/dL   Type & Screen    Collection Time: 06/22/24  2:37 PM    Specimen: Arm, Left; Blood   Result Value Ref Range    ABO Type A     RH type Positive     Antibody Screen Negative     T&S Expiration Date 6/25/2024 11:59:59 PM    Protime-INR    Collection Time: 06/22/24  2:37 PM    Specimen: Blood   Result Value Ref Range    Protime 13.3 12.2 - 14.5 Seconds    INR 1.00 0.89 - 1.12   aPTT    Collection Time: 06/22/24  2:37 PM    Specimen: Blood   Result Value Ref Range    PTT 22.6 (L) 60.0 - 90.0 seconds   Magnesium    Collection Time: 06/22/24  2:37 PM    Specimen: Blood   Result Value Ref Range    Magnesium 1.9 1.6 - 2.4 mg/dL   Phosphorus    Collection Time: 06/22/24  2:37 PM    Specimen: Blood   Result Value Ref Range    Phosphorus 5.7 (H) 2.5 - 4.5 mg/dL   TSH    Collection Time: 06/22/24  2:37 PM    Specimen: Blood   Result Value Ref Range    TSH 0.469 0.270 - 4.200 uIU/mL   Acetaminophen Level    Collection Time: 06/22/24  2:37 PM    Specimen: Blood   Result Value Ref Range    Acetaminophen <5.0 0.0 - 30.0 mcg/mL   Ethanol    Collection Time: 06/22/24  2:37 PM    Specimen: Blood   Result Value Ref Range    Ethanol <10 0 - 10 mg/dL   Salicylate Level    Collection Time: 06/22/24  2:37 PM    Specimen: Blood   Result Value Ref Range    Salicylate 1.0 <=30.0 mg/dL   CK    Collection Time: 06/22/24  2:37 PM    Specimen: Blood   Result Value Ref Range    Creatine Kinase 56 20 - 180 U/L   Beta Hydroxybutyrate Quantitative    Collection Time: 06/22/24  2:37 PM    Specimen: Blood   Result Value Ref Range    Beta-Hydroxybutyrate Quant 7.861 (H) 0.020  - 0.270 mmol/L   Procalcitonin    Collection Time: 06/22/24  2:37 PM    Specimen: Blood   Result Value Ref Range    Procalcitonin 0.49 (H) 0.00 - 0.25 ng/mL   Hemoglobin A1c    Collection Time: 06/22/24  2:37 PM    Specimen: Blood   Result Value Ref Range    Hemoglobin A1C 5.20 4.80 - 5.60 %   POC Chem 8    Collection Time: 06/22/24  2:41 PM    Specimen: Blood   Result Value Ref Range    Glucose 161 (H) 70 - 130 mg/dL    BUN 34 (H) 8 - 26 mg/dL    Creatinine 1.30 0.60 - 1.30 mg/dL    Sodium 133 (L) 138 - 146 mmol/L    POC Potassium 4.9 3.5 - 4.9 mmol/L    Chloride 101 98 - 109 mmol/L    Total CO2 12 (L) 24 - 29 mmol/L    Hemoglobin 13.9 12.0 - 17.0 g/dL    Hematocrit 41 38 - 51 %    Ionized Calcium 1.07 (L) 1.20 - 1.32 mmol/L    eGFR 44.9 (L) >60.0 mL/min/1.73   Urine Drug Screen - Urine, Clean Catch    Collection Time: 06/22/24  4:04 PM    Specimen: Urine, Clean Catch   Result Value Ref Range    THC, Screen, Urine Negative Negative    Phencyclidine (PCP), Urine Negative Negative    Cocaine Screen, Urine Negative Negative    Methamphetamine, Ur Negative Negative    Opiate Screen Negative Negative    Amphetamine Screen, Urine Negative Negative    Benzodiazepine Screen, Urine Negative Negative    Tricyclic Antidepressants Screen Negative Negative    Methadone Screen, Urine Negative Negative    Barbiturates Screen, Urine Negative Negative    Oxycodone Screen, Urine Negative Negative    Buprenorphine, Screen, Urine Negative Negative   Urinalysis With Microscopic If Indicated (No Culture) - Urine, Clean Catch    Collection Time: 06/22/24  4:04 PM    Specimen: Urine, Clean Catch   Result Value Ref Range    Color, UA Yellow Yellow, Straw    Appearance, UA Clear Clear    pH, UA 5.5 5.0 - 8.0    Specific Gravity, UA 1.040 (H) 1.001 - 1.030    Glucose, UA Negative Negative    Ketones, UA >=160 mg/dL (4+) (A) Negative    Bilirubin, UA Negative Negative    Blood, UA Negative Negative    Protein, UA Negative Negative    Leuk  Esterase, UA Negative Negative    Nitrite, UA Negative Negative    Urobilinogen, UA 0.2 E.U./dL 0.2 - 1.0 E.U./dL   Fentanyl, Urine - Urine, Clean Catch    Collection Time: 06/22/24  4:04 PM    Specimen: Urine, Clean Catch   Result Value Ref Range    Fentanyl, Urine Negative Negative   Osmolality, Serum    Collection Time: 06/22/24  5:41 PM    Specimen: Blood   Result Value Ref Range    Osmolality 307 (H) 275 - 295 mOsm/kg   Basic Metabolic Panel    Collection Time: 06/22/24  5:41 PM    Specimen: Blood   Result Value Ref Range    Glucose 160 (H) 65 - 99 mg/dL    BUN 29 (H) 8 - 23 mg/dL    Creatinine 1.45 (H) 0.57 - 1.00 mg/dL    Sodium 137 136 - 145 mmol/L    Potassium 4.9 3.5 - 5.2 mmol/L    Chloride 97 (L) 98 - 107 mmol/L    CO2 16.0 (L) 22.0 - 29.0 mmol/L    Calcium 8.1 (L) 8.6 - 10.5 mg/dL    BUN/Creatinine Ratio 20.0 7.0 - 25.0    Anion Gap 24.0 (H) 5.0 - 15.0 mmol/L    eGFR 39.4 (L) >60.0 mL/min/1.73   STAT Lactic Acid, Reflex    Collection Time: 06/22/24  5:48 PM    Specimen: Blood   Result Value Ref Range    Lactate 5.1 (C) 0.5 - 2.0 mmol/L   High Sensitivity Troponin T 2Hr    Collection Time: 06/22/24  5:48 PM    Specimen: Blood   Result Value Ref Range    HS Troponin T 21 (H) <14 ng/L    Troponin T Delta 7 (C) >=-4 - <+4 ng/L   MRSA Screen, PCR (Inpatient) - Swab, Nares    Collection Time: 06/22/24  5:55 PM    Specimen: Nares; Swab   Result Value Ref Range    MRSA PCR Negative Negative   ECG 12 Lead ED Triage Standing Order; Chest Pain    Collection Time: 06/22/24  6:43 PM   Result Value Ref Range    QT Interval 358 ms    QTC Interval 470 ms   STAT Lactic Acid, Reflex    Collection Time: 06/22/24  8:52 PM    Specimen: Arm, Right; Blood   Result Value Ref Range    Lactate 4.4 (C) 0.5 - 2.0 mmol/L       If labs were ordered, I independently reviewed the results and considered them in treating the patient.        RADIOLOGY  CT Angiogram Chest    Result Date: 6/22/2024  CT ANGIOGRAM CHEST, CT ANGIOGRAM ABDOMEN  [facility] Date of Exam: 6/22/2024 3:12 PM EDT Indication: Acute severe substernal chest pain 2 back and upper abdomen, nausea vomiting, hypertension, appears ill. Comparison: Chest CTA dated 12/17/2022, CT of the abdomen and pelvis dated 4/13/2023 Technique: CTA of the chest was performed after the uneventful intravenous administration of 100 mL Isovue 370. Reconstructed coronal and sagittal images were also obtained. In addition, a 3-D volume rendered image was created for interpretation. Automated exposure control and iterative reconstruction methods were used. FINDINGS: Thoracic inlet: Unremarkable. Pulmonary arteries: This examination is not optimized for detection of pulmonary emboli. No large central pulmonary embolism is seen. Great vessels: The thoracic aorta and proximal arch vessels appear unremarkable. No thoracic aortic dissection or aneurysm is seen. Mediastinum/Kelly: No pathologically enlarged mediastinal lymph nodes are seen. There is diffuse esophageal wall thickening, concerning for esophagitis. Small hiatal hernia is present. Lung parenchyma: There is mild left basilar atelectasis. Lungs are otherwise adequately aerated. No acute consolidation is seen. No suspicious pulmonary nodules are seen. Trachea and airways: The trachea and central airways appear unremarkable. Pleural space: No significant pleural effusion or pneumothorax is seen. Heart and pericardium: The heart and pericardium appear unremarkable. Liver: The liver is unremarkable in morphology and decreased in attenuation. No focal liver lesion is seen. No biliary dilation is seen. Gallbladder: Unremarkable. Pancreas: Unremarkable. Spleen: Unremarkable. Adrenal glands: Unremarkable. Genitourinary tract: Kidneys are unremarkable. No hydronephrosis is seen. The visualized portions of the ureters and urinary bladder appear unremarkable. Patient is status post hysterectomy. Gastrointestinal tract: Colonic diverticulosis. Several small duodenal  diverticula noted. Esophageal wall thickening. No evidence of bowel obstruction. Appendix: No findings to suggest acute appendicitis. Other findings: No free air or free fluid is identified. No pathologically enlarged lymph nodes are seen. The abdominal aorta is normal in course and caliber. The celiac artery, SMA, DEEPTI, bilateral renal arteries, and visualized iliofemoral arteries appear patent without significant stenosis. No abdominal aortic aneurysm or dissection is seen. IVC is grossly unremarkable. Bones and soft tissues: No acute or suspicious osseous or soft tissue lesion is identified. Surgical hardware within the left proximal femur. Degenerative changes and mild scoliotic curvature of the visualized spine.     1.Diffuse esophageal wall thickening, concerning for esophagitis. Small hiatal hernia. Consider further evaluation with EGD. 2.Otherwise, no acute abnormality identified within the chest, abdomen, or pelvis. 3.No acute abnormality of the thoracic or abdominal aorta. 4.Colonic diverticulosis and several small distal duodenal diverticula. 5.Additional findings as detailed above. Electronically Signed: Erik Garcia MD  6/22/2024 3:49 PM EDT  Workstation ID: [id number]    CT Angiogram Abdomen Pelvis    Result Date: 6/22/2024  CT ANGIOGRAM CHEST, CT ANGIOGRAM ABDOMEN PELVIS Date of Exam: 6/22/2024 3:12 PM EDT Indication: Acute severe substernal chest pain 2 back and upper abdomen, nausea vomiting, hypertension, appears ill. Comparison: Chest CTA dated 12/17/2022, CT of the abdomen and pelvis dated 4/13/2023 Technique: CTA of the chest was performed after the uneventful intravenous administration of 100 mL Isovue 370. Reconstructed coronal and sagittal images were also obtained. In addition, a 3-D volume rendered image was created for interpretation. Automated exposure control and iterative reconstruction methods were used. FINDINGS: Thoracic inlet: Unremarkable. Pulmonary arteries: This examination is  not optimized for detection of pulmonary emboli. No large central pulmonary embolism is seen. Great vessels: The thoracic aorta and proximal arch vessels appear unremarkable. No thoracic aortic dissection or aneurysm is seen. Mediastinum/Kelly: No pathologically enlarged mediastinal lymph nodes are seen. There is diffuse esophageal wall thickening, concerning for esophagitis. Small hiatal hernia is present. Lung parenchyma: There is mild left basilar atelectasis. Lungs are otherwise adequately aerated. No acute consolidation is seen. No suspicious pulmonary nodules are seen. Trachea and airways: The trachea and central airways appear unremarkable. Pleural space: No significant pleural effusion or pneumothorax is seen. Heart and pericardium: The heart and pericardium appear unremarkable. Liver: The liver is unremarkable in morphology and decreased in attenuation. No focal liver lesion is seen. No biliary dilation is seen. Gallbladder: Unremarkable. Pancreas: Unremarkable. Spleen: Unremarkable. Adrenal glands: Unremarkable. Genitourinary tract: Kidneys are unremarkable. No hydronephrosis is seen. The visualized portions of the ureters and urinary bladder appear unremarkable. Patient is status post hysterectomy. Gastrointestinal tract: Colonic diverticulosis. Several small duodenal diverticula noted. Esophageal wall thickening. No evidence of bowel obstruction. Appendix: No findings to suggest acute appendicitis. Other findings: No free air or free fluid is identified. No pathologically enlarged lymph nodes are seen. The abdominal aorta is normal in course and caliber. The celiac artery, SMA, DEEPTI, bilateral renal arteries, and visualized iliofemoral arteries appear patent without significant stenosis. No abdominal aortic aneurysm or dissection is seen. IVC is grossly unremarkable. Bones and soft tissues: No acute or suspicious osseous or soft tissue lesion is identified. Surgical hardware within the left proximal femur.  Degenerative changes and mild scoliotic curvature of the visualized spine.     1.Diffuse esophageal wall thickening, concerning for esophagitis. Small hiatal hernia. Consider further evaluation with EGD. 2.Otherwise, no acute abnormality identified within the chest, abdomen, or pelvis. 3.No acute abnormality of the thoracic or abdominal aorta. 4.Colonic diverticulosis and several small distal duodenal diverticula. 5.Additional findings as detailed above. Electronically Signed: Erik Garcia MD  6/22/2024 3:49 PM EDT  Workstation ID: [id number]    XR Chest 1 View    Result Date: 6/22/2024  XR CHEST 1 VW Date of Exam: 6/22/2024 2:57 PM EDT Indication: Chest Pain Triage Protocol Comparison: 11/12/2023 Findings: There is a dextroconvex scoliosis. Patient appears mildly rotated to the left, likely as result, and similar to the comparison exam. Heart shadow and pulmonary vasculature appear normal in size. Lungs appear clear except for stable mild chronic interstitial changes. No edema effusion or pneumothorax is seen.     Impression: Dextroconvex scoliosis again noted. No new chest disease is seen. Electronically Signed: Reggie Barillas MD  6/22/2024 3:40 PM EDT  Workstation ID: [id number]     I ordered and independently reviewed the above noted radiographic studies.      I viewed images of chest x-ray which is clear per my evaluation  CT of the chest and abdomen which shows esophageal thickening consistent with esophagitis but no evidence of aortic injury, pneumonia, pneumothorax, or other acute pathology that I can appreciate that would explain her chest pain.    See radiologist's dictation for official interpretation.        PROCEDURES    Procedures    ECG 12 Lead ED Triage Standing Order; Chest Pain   Preliminary Result   Test Reason : ED Triage Standing Order~   Blood Pressure :   */*   mmHG   Vent. Rate : 104 BPM     Atrial Rate : 104 BPM      P-R Int : 178 ms          QRS Dur :  74 ms       QT Int : 358 ms       P-R-T  Axes :  66  24  38 degrees      QTc Int : 470 ms      Sinus tachycardia with fusion complexes   Nonspecific ST and T wave abnormality   Abnormal ECG   When compared with ECG of 22-JUN-2024 14:08, (Unconfirmed)   Nonspecific T wave abnormality now evident in Anterior leads      Referred By:            Confirmed By:       ECG 12 Lead ED Triage Standing Order; Chest Pain   Preliminary Result   Test Reason : ED Triage Standing Order~   Blood Pressure :   */*   mmHG   Vent. Rate : 105 BPM     Atrial Rate : 105 BPM      P-R Int : 156 ms          QRS Dur :  74 ms       QT Int : 356 ms       P-R-T Axes :  86   5  58 degrees      QTc Int : 470 ms      Sinus tachycardia with fusion complexes   Otherwise normal ECG   When compared with ECG of 02-JAN-2024 19:39,   fusion complexes are now present      Referred By: PARAMJIT           Confirmed By:           MEDICATIONS GIVEN IN ER    Medications   sodium chloride 0.9 % flush 10 mL (has no administration in time range)   aspirin chewable tablet 324 mg (324 mg Oral Not Given 6/22/24 1419)   Magnesium Standard Dose Replacement - Follow Nurse / BPA Driven Protocol (has no administration in time range)   folic acid 1 mg in sodium chloride 0.9 % 50 mL IVPB (has no administration in time range)   LORazepam (ATIVAN) tablet 1 mg ( Oral Not Given:  See Alt 6/22/24 2005)     Or   midazolam (VERSED) injection 2 mg ( Intravenous Not Given:  See Alt 6/22/24 2005)     Or   LORazepam (ATIVAN) tablet 2 mg (2 mg Oral Given 6/22/24 2005)     Or   midazolam (VERSED) injection 4 mg ( Intravenous Not Given:  See Alt 6/22/24 2005)     Or   midazolam (VERSED) injection 4 mg ( Intravenous Not Given:  See Alt 6/22/24 2005)     Or   midazolam (VERSED) injection 4 mg ( Intramuscular Not Given:  See Alt 6/22/24 2005)   dextrose 5 % and sodium chloride 0.9 % infusion (150 mL/hr Intravenous New Bag 6/22/24 8165)   DULoxetine (CYMBALTA) DR capsule 20 mg (20 mg Oral Given 6/22/24 2026)   levothyroxine (SYNTHROID,  LEVOTHROID) tablet 25 mcg (has no administration in time range)   rosuvastatin (CRESTOR) tablet 5 mg (5 mg Oral Given 6/22/24 1750)   vitamin B-12 (CYANOCOBALAMIN) tablet 1,000 mcg (1,000 mcg Oral Given 6/22/24 1750)   gabapentin (NEURONTIN) capsule 300 mg (has no administration in time range)   gabapentin (NEURONTIN) capsule 600 mg (600 mg Oral Given 6/22/24 2025)   thiamine (B-1) 500 mg in sodium chloride 0.9 % 100 mL IVPB (500 mg Intravenous New Bag 6/22/24 2056)   thiamine (B-1) injection 200 mg (has no administration in time range)     Followed by   thiamine (VITAMIN B-1) tablet 100 mg (has no administration in time range)   Lidocaine Viscous HCl (XYLOCAINE) 2 % solution 5 mL (has no administration in time range)   pantoprazole (PROTONIX) injection 40 mg (has no administration in time range)   calcium carbonate (TUMS) chewable tablet 500 mg (200 mg elemental) (has no administration in time range)   ondansetron (ZOFRAN) injection 4 mg (4 mg Intravenous Given 6/22/24 1459)   HYDROmorphone (DILAUDID) injection 0.25 mg (0.25 mg Intravenous Given 6/22/24 1500)   labetalol (NORMODYNE,TRANDATE) injection 10 mg (10 mg Intravenous Given 6/22/24 1637)   sepsis fluid NS 0.9 % bolus 1,809 mL ( Intravenous Currently Infusing 6/22/24 1514)   Vancomycin HCl 1,250 mg in sodium chloride 0.9 % 250 mL VTB (1,250 mg Intravenous New Bag 6/22/24 1725)   piperacillin-tazobactam (ZOSYN) 3.375 g IVPB in 100 mL NS MBP (CD) ( Intravenous Currently Infusing 6/22/24 1636)   iopamidol (ISOVUE-370) 76 % injection 100 mL (100 mL Intravenous Given 6/22/24 1522)   folic acid 1 mg in sodium chloride 0.9 % 50 mL IVPB (1 mg Intravenous New Bag 6/22/24 1642)         MEDICAL DECISION MAKING, PROGRESS, and CONSULTS    All labs, if obtained, have been independently reviewed by me.  All radiology studies, if obtained, have been reviewed by me and the radiologist dictating the report.  All EKG's, if obtained, have been independently viewed and  interpreted by me/my attending physician.      Discussion below represents my analysis of pertinent findings related to patient's condition, differential diagnosis, treatment plan and final disposition.                         Differential diagnosis:    Aortic dissection, pancreatitis, myocardial infarction, pulmonary embolism, pneumonia, pneumothorax, sepsis, DKA, anemia, electrolyte abnormality, hypertensive emergency, heart failure      Additional sources:    - Discussed/ obtained information from independent historians: EMS    - External (non-ED) record review:  Chart review of previous hospitalization for acute kidney injury shows history of:  chronic kidney disease, history of uterine cancer, alcohol abuse    - Chronic or social conditions impacting care: Chronic kidney disease, history of uterine cancer and alcohol abuse    - Shared decision making: Patient/patient representative in complete agreement with current plans for evaluation and management.      Orders placed during this visit:  Orders Placed This Encounter   Procedures    Blood Culture - Blood,    Blood Culture - Blood,    MRSA Screen, PCR (Inpatient) - Swab, Nares    XR Chest 1 View    CT Angiogram Chest    CT Angiogram Abdomen Pelvis    [place] Draw    High Sensitivity Troponin T    Comprehensive Metabolic Panel    Lipase    BNP    CBC Auto Differential    D-dimer, Quantitative    Blood Gas, Arterial -With Co-Ox Panel: Yes    Lactic Acid, Plasma    C-reactive Protein    Protime-INR    aPTT    Magnesium    Phosphorus    TSH    Blood Gas, Arterial With Co-Ox    Acetaminophen Level    Ethanol    Urine Drug Screen - Urine, Clean Catch    Salicylate Level    CK    Beta Hydroxybutyrate Quantitative    Urinalysis With Microscopic If Indicated (No Culture) - Urine, Clean Catch    STAT Lactic Acid, Reflex    High Sensitivity Troponin T 2Hr    Fentanyl, Urine - Urine, Clean Catch    Osmolality, Serum    Basic Metabolic Panel    Comprehensive Metabolic  Panel    Magnesium    Phosphorus    Calcium, Ionized    Procalcitonin    STAT Lactic Acid, Reflex    CBC Auto Differential    Hemoglobin A1c    Lactic Acid, Plasma    Diet: Liquid; Clear Liquid; Fluid Consistency: Thin (IDDSI 0)    Undress & Gown    Vital Signs    Continuous Pulse Oximetry    Obtain Baseline Clinical [place] Withdrawal Assessment - Ar (CIWA-Ar), Sedation Scale & Vital Signs    Clinical [place] Withdrawal Assessment (CIWA-Ar)    If CIWA-Ar Score Less Than 8 For 3 Consecutive Assessments, Monitor Every 4 Hours & Discontinue Assessment When CIWA-Ar Less Than 8 for 24 Hours    Obtain Pre & Post Sedation Scores With Every Sedative Dose - Hold For POSS Greater Than 2 or RASS of -3 or Less    Notify Provider - Withdrawal    Notify Provider of Abnormal Lab Results    Notify Provider - Vitals    CIWA Q4 Hours X3    CIWA Q12 Hours X3    Inpatient Case Management  Consult    Oxygen Therapy- Nasal Cannula; Titrate 1-6 LPM Per SpO2; 90 - 95%    POC Chem 8    ECG 12 Lead ED Triage Standing Order; Chest Pain    ECG 12 Lead ED Triage Standing Order; Chest Pain    Type & Screen    Insert Peripheral IV    ICU / CCU Bed Request ([facility] / [facility] / [facility] ONLY)    Seizure Precautions    Safety Precautions    CBC & Differential    Green Top (Gel)    Lavender Top    Gold Top - SST    Gray Top    Light Blue Top    Ketone Bodies, Serum (Not performed at [place])    CBC & Differential         Additional orders considered but not ordered:      ED Course:    Consultants:      ED Course as of 06/22/24 8304   Sat Jun 22, 2024   1411 In summary is a 69-year-old woman who presents by EMS because of acute severe chest pain.  She says she woke up with it at 7 AM this morning she does have history of uncontrolled hypertension.  She describes a severe sharp substernal chest pain that is radiated to her back and to her upper abdomen she had associated nausea and vomiting.  Rates her pain as severe.  Has never had pain like  this before.  No other complaints at this time. [CC]   1413 She arrived awake and alert but very ill-appearing, tremulous, in obvious distress, describing pain distribution consistent with aortic dissection I have ordered emergent CTA of the chest and abdomen she does have history of chronic kidney disease I discussed the risk of contrast related kidney injury she is agreeable to proceeding with the scans I am giving IV fluid bolus to hopefully help prophylax against this obtaining full cardiac workup obtaining emergent chest CT.  Treating with IV fluids Zofran Dilaudid. [CC]   1425 Mild sinus tachycardia on initial blood pressure elevated giving single dose of labetalol. [CC]   1428 Chart review of previous hospitalization for acute kidney injury shows history of:  chronic kidney disease, history of uterine cancer, alcohol abuse [CC]   1429 Initial blood gas shows severe metabolic acidosis pH of 7.18 with hypercapnia respiratory compensation, already receiving IV fluids adding on ketones toxicologic screening creatinine kinase. [CC]   1507 CBC shows profound leukocytosis at nearly 20,000 with neutrophilic predominance this in conjunction with the severe metabolic acidosis treating empirically as sepsis with vancomycin and Zosyn sepsis fluid bolus was already been ordered. [CC]   2318 Fortunately CT scans only showed findings consistent with esophagitis which may explain her pain but no other acute pathology that I can appreciate.  Significantly elevated lactic acid.  She does have elevated serum ketones as well, I think this is likely a ketoacidosis continue aggressive fluid resuscitation IV antibiotics.  Discussed with Dr. Sofia in the intensive care unit for ICU admission. [CC]      ED Course User Index  [CC] Edgar Serrano MD       90 minutes of critical care provided. This time excludes other billable procedures. Time does include preparation of documents, medical consultations, review of old records,  and direct bedside care. Patient is at high risk for life-threatening deterioration due to ketoacidosis, acute severe chest pain concerning for aortic dissection requiring emergent scans, metabolic acidosis and severe leukocytosis meeting sepsis criteria and treated as sepsis,.         Shared Decision Making:  After my consideration of clinical presentation and any laboratory/radiology studies obtained, I discussed the findings with the patient/patient representative who is in agreement with the treatment plan and the final disposition.   Risks and benefits of discharge and/or observation/admission were discussed.       AS OF 23:19 EDT VITALS:    BP - 98/60  HR - 113  TEMP - 98.2 °F (36.8 °C) (Oral)  O2 SATS - 92%                  DIAGNOSIS  Final diagnoses:   High anion gap metabolic acidosis   Ketoacidosis   Stage 3a chronic kidney disease   Esophagitis   History of alcohol abuse         DISPOSITION  Intensive care unit      Please note that portions of this document were completed with voice recognition software.        Edgar Serrano MD  06/22/24 2316      Electronically signed by Edgar Serrano MD at 06/22/24 0942       Vital Signs (last day)       Date/Time Temp Temp src Pulse Resp BP Patient Position SpO2    06/24/24 1000 -- -- 99 18 142/90 Lying 96    06/24/24 0900 -- -- 96 -- -- -- 94    06/24/24 0800 98.4 (36.9) Oral 98 18 153/92 Lying 95    06/24/24 0700 -- -- 97 -- 142/91 -- 97    06/24/24 0600 -- -- 98 20 147/86 Lying 99    06/24/24 0500 -- -- 99 -- 147/89 Lying 98    06/24/24 0400 98.4 (36.9) Oral 98 20 144/88 Lying 96    06/24/24 0300 -- -- 103 -- 156/96 Lying 96    06/24/24 0200 -- -- 105 16 145/89 Lying 95    06/24/24 0100 -- -- 105 -- 137/76 Lying 97    06/24/24 0000 98.6 (37) Oral 105 16 144/87 Lying 95    06/23/24 2300 -- -- 104 -- 146/87 Lying 96    06/23/24 2200 -- -- 101 16 151/90 Lying 96    06/23/24 2100 -- -- 101 -- 143/86 Lying 95    06/23/24 2000 98.3 (36.8) Oral 97 20  145/100 Lying 97    06/23/24 1900 -- -- 101 -- 159/93 Lying 98    06/23/24 1800 -- -- -- 16 -- -- --    06/23/24 1600 98 (36.7) Oral 96 16 119/75 -- 95    06/23/24 1500 -- -- 104 -- 119/71 -- 96    06/23/24 1400 -- -- 103 14 119/71 -- 85    06/23/24 1300 -- -- 94 -- 176/111 -- 98    06/23/24 1200 97.9 (36.6) Oral 103 16 118/74 -- 97    06/23/24 1100 -- -- 94 -- 114/73 -- 95    06/23/24 1000 -- -- 109 18 133/77 -- 97    06/23/24 0900 -- -- 108 -- 120/70 -- 94    06/23/24 0800 97.6 (36.4) Oral 105 18 103/61 -- 94    06/23/24 0700 -- -- 100 -- 117/71 -- 96    06/23/24 0645 -- -- 102 -- 115/73 -- 97    06/23/24 0630 -- -- 105 -- 126/75 -- 98    06/23/24 0615 -- -- 110 -- 93/60 -- 95    06/23/24 0600 -- -- 108 16 102/62 Lying 94    06/23/24 0545 -- -- 112 -- 101/57 -- 94    06/23/24 0530 -- -- 108 -- 96/58 -- 95    06/23/24 0515 -- -- 109 -- 107/63 -- 96    06/23/24 0500 -- -- 105 -- 98/62 Lying 94    06/23/24 0445 -- -- 104 -- 103/61 -- 96    06/23/24 0430 -- -- 112 -- 88/57 -- 93    06/23/24 0415 -- -- 112 -- 89/51 -- 94    06/23/24 0400 98.8 (37.1) Oral 112 16 87/52 Lying 94    06/23/24 0345 -- -- 112 -- 91/52 -- 94    06/23/24 0330 -- -- 108 -- 91/56 -- 95    06/23/24 0315 -- -- 109 -- 92/58 -- 95    06/23/24 0300 -- -- 111 -- 92/60 Lying 95    06/23/24 0245 -- -- 111 -- 91/56 -- 95    06/23/24 0230 -- -- 108 -- 92/58 -- 95    06/23/24 0215 -- -- 108 -- 97/59 -- 95    06/23/24 0200 -- -- 107 16 99/60 Lying 94    06/23/24 0145 -- -- 114 -- 96/57 -- 93    06/23/24 0130 -- -- 112 -- 85/55 -- 93    06/23/24 0115 -- -- 112 -- 88/55 -- 93    06/23/24 0100 -- -- 112 -- 97/57 -- 94    06/23/24 0045 -- -- 108 -- 94/61 -- 93    06/23/24 0030 -- -- 108 -- 109/64 -- 94    06/23/24 0015 -- -- 108 -- 99/57 -- 90    06/23/24 0000 98.2 (36.8) Oral 112 16 96/58 Lying 92          CIWA (since admission)        Date/Time CIWA-Ar Score    06/24/24 0800 0     06/23/24 2000 2     06/23/24 1000 --     06/23/24 0800 2     06/23/24 0600 4      06/23/24 0400 0     06/23/24 0200 0     06/23/24 0000 0     06/22/24 2200 0     06/22/24 2100 0     06/22/24 1951 14     06/22/24 16:33:06 9                   Facility-Administered Medications as of 6/24/2024   Medication Dose Route Frequency Provider Last Rate Last Admin    calcium carbonate (TUMS) chewable tablet 500 mg (200 mg elemental)  1 tablet Oral TID PRN Christo Alejadnra APRN        dextrose (D50W) (25 g/50 mL) IV injection 25 g  25 g Intravenous Q15 Min PRN Christo Alejandra APRN        dextrose (GLUTOSE) oral gel 15 g  15 g Oral Q15 Min PRN Christo Alejandra APRN        DULoxetine (CYMBALTA) DR capsule 20 mg  20 mg Oral BID Danilo Smith MD   20 mg at 06/24/24 0821    folic acid (FOLVITE) tablet 1 mg  1 mg Oral Daily Collin Ricardo MD   1 mg at 06/24/24 0822    [COMPLETED] folic acid 1 mg in sodium chloride 0.9 % 50 mL IVPB  1 mg Intravenous Once Gilbert Majano, Marifer 100 mL/hr at 06/22/24 1642 1 mg at 06/22/24 1642    gabapentin (NEURONTIN) capsule 300 mg  300 mg Oral Q AM Danilo Smith MD   300 mg at 06/24/24 0638    gabapentin (NEURONTIN) capsule 600 mg  600 mg Oral Nightly Danilo Smith MD   600 mg at 06/23/24 2019    glucagon (GLUCAGEN) injection 1 mg  1 mg Intramuscular Q15 Min PRN Christo Alejandra APRN        heparin (porcine) 5000 UNIT/ML injection 5,000 Units  5,000 Units Subcutaneous Q8H Collin Ricardo MD   5,000 Units at 06/24/24 0638    [COMPLETED] HYDROmorphone (DILAUDID) injection 0.25 mg  0.25 mg Intravenous Once Edgar Serrano MD   0.25 mg at 06/22/24 1500    Insulin Lispro (humaLOG) injection 2-7 Units  2-7 Units Subcutaneous 4x Daily AC & at Bedtime Christo Alejandra APRN   2 Units at 06/23/24 1758    [COMPLETED] iopamidol (ISOVUE-370) 76 % injection 100 mL  100 mL Intravenous Once in imaging Edgar Serrano MD   100 mL at 06/22/24 1522    [COMPLETED] labetalol (NORMODYNE,TRANDATE) injection 10 mg  10 mg Intravenous Once Edgar Serrano  MD [place]   10 mg at 06/22/24 1637    levothyroxine (SYNTHROID, LEVOTHROID) tablet 25 mcg  25 mcg Oral Q AM Danilo Smith MD   25 mcg at 06/24/24 0638    Lidocaine Viscous HCl (XYLOCAINE) 2 % solution 5 mL  5 mL Mouth/Throat Q3H PRN Chrisot Alejandra APRN        LORazepam (ATIVAN) tablet 1 mg  1 mg Oral Q1H PRN Edgar Serrano MD        Or    midazolam (VERSED) injection 2 mg  2 mg Intravenous Q1H PRN Edgar Serrano MD        Or    LORazepam (ATIVAN) tablet 2 mg  2 mg Oral Q1H PRN Edgar Serrano MD   2 mg at 06/22/24 2005    Or    midazolam (VERSED) injection 4 mg  4 mg Intravenous Q1H PRN Edgar Serrano MD        Or    midazolam (VERSED) injection 4 mg  4 mg Intravenous Q15 Min PRN Edgar Serrano MD        Or    midazolam (VERSED) injection 4 mg  4 mg Intramuscular Q15 Min PRN Edgar Serrano MD        Magnesium Standard Dose Replacement - Follow Nurse / BPA Driven Protocol   Does not apply PRN Edgar Serrano MD        [COMPLETED] magnesium sulfate 2g/50 mL (PREMIX) infusion  2 g Intravenous Q2H Danilo Smith MD   2 g at 06/23/24 1309    [COMPLETED] ondansetron (ZOFRAN) injection 4 mg  4 mg Intravenous Once Edgar Serrano MD   4 mg at 06/22/24 1459    pantoprazole (PROTONIX) injection 40 mg  40 mg Intravenous BID AC Christo Alejandra APRN   40 mg at 06/24/24 0638    Phosphorus Replacement - Follow Nurse / BPA Driven Protocol   Does not apply PRN Austin Escamilla, PharmD        [COMPLETED] piperacillin-tazobactam (ZOSYN) 3.375 g IVPB in 100 mL NS MBP (CD)  3.375 g Intravenous Once Edgar Serrano MD   Currently Infusing at 06/22/24 1636    piperacillin-tazobactam (ZOSYN) 3.375 g IVPB in 100 mL NS MBP (CD)  3.375 g Intravenous Q8H Case, Saniya V., DO        [COMPLETED] potassium phosphate 15 mmol in 0.9% normal saline 250 mL IVPB  15 mmol Intravenous Once Danilo Smith MD   15 mmol at 06/23/24 0827    rosuvastatin (CRESTOR) tablet 5 mg   5 mg Oral Nightly Danilo Smith MD   5 mg at 06/23/24 2019    [COMPLETED] sepsis fluid NS 0.9 % bolus 1,809 mL  30 mL/kg Intravenous Once Edgar Serrano MD   Currently Infusing at 06/22/24 1514    sodium chloride 0.9 % flush 10 mL  10 mL Intravenous PRN Edgar Serrano MD        [COMPLETED] sodium phosphates 15 mmol in sodium chloride 0.9 % 250 mL infusion  15 mmol Intravenous Once Danilo Smith MD   15 mmol at 06/23/24 2019    thiamine (B-1) 500 mg in sodium chloride 0.9 % 100 mL IVPB  500 mg Intravenous Q8H Christo Alejandra APRN 200 mL/hr at 06/24/24 0425 500 mg at 06/24/24 0425    thiamine (B-1) injection 200 mg  200 mg Intravenous Q8H Christo Alejandra APRN        Followed by    [START ON 6/27/2024] thiamine (VITAMIN B-1) tablet 100 mg  100 mg Oral Daily Christo Alejandra APRN        [COMPLETED] Vancomycin HCl 1,250 mg in sodium chloride 0.9 % 250 mL VTB  22 mg/kg Intravenous Once Edgar Serrano  mL/hr at 06/22/24 1725 1,250 mg at 06/22/24 1725    vitamin B-12 (CYANOCOBALAMIN) tablet 1,000 mcg  1,000 mcg Oral Daily Danilo Smith MD   1,000 mcg at 06/24/24 0821     Lab Results (all)       Procedure Component Value Units Date/Time    Phosphorus [[id number]]  (Abnormal) Collected: 06/24/24 0724    Specimen: Blood Updated: 06/24/24 0815     Phosphorus 2.4 mg/dL     Basic Metabolic Panel [[id number]]  (Abnormal) Collected: 06/24/24 0724    Specimen: Blood Updated: 06/24/24 0815     Glucose 92 mg/dL      BUN 9 mg/dL      Creatinine 1.11 mg/dL      Sodium 138 mmol/L      Potassium 3.7 mmol/L      Chloride 102 mmol/L      CO2 22.0 mmol/L      Calcium 8.3 mg/dL      BUN/Creatinine Ratio 8.1     Anion Gap 14.0 mmol/L      eGFR 54.3 mL/min/1.73     Narrative:      GFR Normal >60  Chronic Kidney Disease <60  Kidney Failure <15      POC Glucose Once [[id number]]  (Normal) Collected: 06/24/24 0718    Specimen: Blood Updated: 06/24/24 0719     Glucose 95 mg/dL     Magnesium  [[id number]]  (Abnormal) Collected: 06/23/24 2350    Specimen: Blood Updated: 06/24/24 0047     Magnesium 2.5 mg/dL     Basic Metabolic Panel [[id number]]  (Abnormal) Collected: 06/23/24 2350    Specimen: Blood Updated: 06/24/24 0043     Glucose 100 mg/dL      BUN 11 mg/dL      Creatinine 1.08 mg/dL      Sodium 135 mmol/L      Potassium 3.7 mmol/L      Chloride 104 mmol/L      CO2 20.0 mmol/L      Calcium 8.0 mg/dL      BUN/Creatinine Ratio 10.2     Anion Gap 11.0 mmol/L      eGFR 56.1 mL/min/1.73     Narrative:      GFR Normal >60  Chronic Kidney Disease <60  Kidney Failure <15      POC Glucose Once [[id number]]  (Normal) Collected: 06/23/24 1957    Specimen: Blood Updated: 06/23/24 1959     Glucose 126 mg/dL     Phosphorus [[id number]]  (Abnormal) Collected: 06/23/24 1825    Specimen: Blood Updated: 06/23/24 1909     Phosphorus 1.8 mg/dL     Basic Metabolic Panel [[id number]]  (Abnormal) Collected: 06/23/24 1825    Specimen: Blood Updated: 06/23/24 1907     Glucose 150 mg/dL      BUN 13 mg/dL      Creatinine 1.19 mg/dL      Sodium 134 mmol/L      Potassium 4.1 mmol/L      Chloride 103 mmol/L      CO2 18.0 mmol/L      Calcium 8.5 mg/dL      BUN/Creatinine Ratio 10.9     Anion Gap 13.0 mmol/L      eGFR 49.9 mL/min/1.73     Narrative:      GFR Normal >60  Chronic Kidney Disease <60  Kidney Failure <15      Blood Culture - Blood, Hand, Left [[id number]]  (Normal) Collected: 06/22/24 1600    Specimen: Blood from Hand, Left Updated: 06/23/24 1616     Blood Culture No growth at 24 hours    Narrative:      Less than seven (7) mL's of blood was collected.  Insufficient quantity may yield false negative results.    POC Glucose Once [[id number]]  (Abnormal) Collected: 06/23/24 1614    Specimen: Blood Updated: 06/23/24 1615     Glucose 155 mg/dL     Blood Culture - Blood, Hand, Right [[id number]]  (Normal) Collected: 06/22/24 1540    Specimen: Blood from Hand, Right Updated: 06/23/24 1600     Blood Culture No growth at 24 hours     "Narrative:      Less than seven (7) mL's of blood was collected.  Insufficient quantity may yield false negative results.    Basic Metabolic Panel [[id number]]  (Abnormal) Collected: 06/23/24 1225    Specimen: Blood Updated: 06/23/24 1302     Glucose 132 mg/dL      BUN 17 mg/dL      Creatinine 1.32 mg/dL      Sodium 135 mmol/L      Potassium 4.1 mmol/L      Comment: Slight hemolysis detected by analyzer. Result may be falsely elevated.        Chloride 104 mmol/L      CO2 17.0 mmol/L      Calcium 8.2 mg/dL      BUN/Creatinine Ratio 12.9     Anion Gap 14.0 mmol/L      eGFR 44.1 mL/min/1.73     Narrative:      GFR Normal >60  Chronic Kidney Disease <60  Kidney Failure <15      Procalcitonin [[id number]]  (Abnormal) Collected: 06/23/24 0554    Specimen: Blood Updated: 06/23/24 1236     Procalcitonin 0.45 ng/mL     Narrative:      As a Marker for Sepsis (Non-Neonates):    1. <0.5 ng/mL represents a low risk of severe sepsis and/or septic shock.  2. >2 ng/mL represents a high risk of severe sepsis and/or septic shock.    As a Marker for Lower Respiratory Tract Infections that require antibiotic therapy:    PCT on Admission    Antibiotic Therapy       6-12 Hrs later    >0.5                Strongly Recommended  >0.25 - <0.5        Recommended   0.1 - 0.25          Discouraged              Remeasure/reassess PCT  <0.1                Strongly Discouraged     Remeasure/reassess PCT    As 28 day mortality risk marker: \"Change in Procalcitonin Result\" (>80% or <=80%) if Day 0 (or Day 1) and Day 4 values are available. Refer to http://[email or web address]-pct-calculator.[email or web address]    Change in PCT <=80%  A decrease of PCT levels below or equal to 80% defines a positive change in PCT test result representing a higher risk for 28-day all-cause mortality of patients diagnosed with severe sepsis for septic shock.    Change in PCT >80%  A decrease of PCT levels of more than 80% defines a negative change in PCT result representing a lower risk for 28-day " all-cause mortality of patients diagnosed with severe sepsis or septic shock.       POC Glucose Once [[id number]]  (Abnormal) Collected: 06/23/24 1117    Specimen: Blood Updated: 06/23/24 1120     Glucose 194 mg/dL     CBC & Differential [[id number]]  (Abnormal) Collected: 06/23/24 0833    Specimen: Blood Updated: 06/23/24 0929    Narrative:      The following orders were created for panel order CBC & Differential.  Procedure                               Abnormality         Status                     ---------                               -----------         ------                     CBC Auto Differential[[id number]]        Abnormal            Final result                 Please view results for these tests on the individual orders.    CBC Auto Differential [[id number]]  (Abnormal) Collected: 06/23/24 0833    Specimen: Blood Updated: 06/23/24 0929     WBC 10.68 10*3/mm3      RBC 3.20 10*6/mm3      Hemoglobin 10.3 g/dL      Hematocrit 31.7 %      MCV 99.1 fL      MCH 32.2 pg      MCHC 32.5 g/dL      RDW 13.8 %      RDW-SD 50.6 fl      MPV 9.6 fL      Platelets 169 10*3/mm3      Neutrophil % 76.5 %      Lymphocyte % 9.9 %      Monocyte % 13.0 %      Eosinophil % 0.1 %      Basophil % 0.2 %      Immature Grans % 0.3 %      Neutrophils, Absolute 8.17 10*3/mm3      Lymphocytes, Absolute 1.06 10*3/mm3      Monocytes, Absolute 1.39 10*3/mm3      Eosinophils, Absolute 0.01 10*3/mm3      Basophils, Absolute 0.02 10*3/mm3      Immature Grans, Absolute 0.03 10*3/mm3      nRBC 0.0 /100 WBC     POC Glucose Once [[id number]]  (Abnormal) Collected: 06/23/24 0803    Specimen: Blood Updated: 06/23/24 0805     Glucose 141 mg/dL     Calcium, Ionized [[id number]]  (Normal) Collected: 06/23/24 0648    Specimen: Blood Updated: 06/23/24 0648     Ionized Calcium 1.21 mmol/L     Phosphorus [[id number]]  (Abnormal) Collected: 06/23/24 0554    Specimen: Blood Updated: 06/23/24 0648     Phosphorus 1.8 mg/dL     Comprehensive Metabolic Panel  [[id number]]  (Abnormal) Collected: 06/23/24 0554    Specimen: Blood Updated: 06/23/24 0646     Glucose 185 mg/dL      BUN 19 mg/dL      Creatinine 1.27 mg/dL      Sodium 138 mmol/L      Potassium 3.8 mmol/L      Chloride 107 mmol/L      CO2 21.0 mmol/L      Calcium 7.6 mg/dL      Total Protein 5.1 g/dL      Albumin 3.4 g/dL      ALT (SGPT) 8 U/L      AST (SGOT) 18 U/L      Alkaline Phosphatase 62 U/L      Total Bilirubin 0.4 mg/dL      Globulin 1.7 gm/dL      Comment: Calculated Result        A/G Ratio 2.0 g/dL      BUN/Creatinine Ratio 15.0     Anion Gap 10.0 mmol/L      eGFR 46.2 mL/min/1.73     Narrative:      GFR Normal >60  Chronic Kidney Disease <60  Kidney Failure <15      Magnesium [[id number]]  (Abnormal) Collected: 06/23/24 0554    Specimen: Blood Updated: 06/23/24 0646     Magnesium 1.4 mg/dL     Lactic Acid, Plasma [[id number]]  (Normal) Collected: 06/23/24 0554    Specimen: Blood Updated: 06/23/24 0642     Lactate 1.8 mmol/L      Comment: Falsely depressed results may occur on samples drawn from patients receiving N-Acetylcysteine (NAC) or Metamizole.       Basic Metabolic Panel [[id number]]  (Abnormal) Collected: 06/23/24 0003    Specimen: Blood Updated: 06/23/24 0033     Glucose 273 mg/dL      BUN 24 mg/dL      Creatinine 1.41 mg/dL      Sodium 133 mmol/L      Potassium 4.2 mmol/L      Chloride 103 mmol/L      CO2 19.0 mmol/L      Calcium 7.4 mg/dL      BUN/Creatinine Ratio 17.0     Anion Gap 11.0 mmol/L      eGFR 40.7 mL/min/1.73     Narrative:      GFR Normal >60  Chronic Kidney Disease <60  Kidney Failure <15      Hemoglobin A1c [[id number]]  (Normal) Collected: 06/22/24 1437    Specimen: Blood Updated: 06/22/24 2306     Hemoglobin A1C 5.20 %     Narrative:      Hemoglobin A1C Ranges:    Increased Risk for Diabetes  5.7% to 6.4%  Diabetes                     >= 6.5%  Diabetic Goal                < 7.0%    STAT Lactic Acid, Reflex [[id number]]  (Abnormal) Collected: 06/22/24 2052    Specimen: Blood from  Arm, Right Updated: 06/22/24 2122     Lactate 4.4 mmol/L      Comment: Falsely depressed results may occur on samples drawn from patients receiving N-Acetylcysteine (NAC) or Metamizole.       MRSA Screen, PCR (Inpatient) - Swab, Nares [[id number]]  (Normal) Collected: 06/22/24 1755    Specimen: Swab from Nares Updated: 06/22/24 1931     MRSA PCR Negative    Narrative:      The negative predictive value of this diagnostic test is high and should only be used to consider de-escalating anti-MRSA therapy. A positive result may indicate colonization with MRSA and must be correlated clinically.  MRSA Negative    STAT Lactic Acid, Reflex [[id number]]  (Abnormal) Collected: 06/22/24 1748    Specimen: Blood Updated: 06/22/24 1814     Lactate 5.1 mmol/L      Comment: Falsely depressed results may occur on samples drawn from patients receiving N-Acetylcysteine (NAC) or Metamizole.       High Sensitivity Troponin T 2Hr [[id number]]  (Abnormal) Collected: 06/22/24 1748    Specimen: Blood Updated: 06/22/24 1814     HS Troponin T 21 ng/L      Troponin T Delta 7 ng/L     Narrative:      High Sensitive Troponin T Reference Range:  <14.0 ng/L- Negative Female for AMI  <22.0 ng/L- Negative Male for AMI  >=14 - Abnormal Female indicating possible myocardial injury.  >=22 - Abnormal Male indicating possible myocardial injury.   Clinicians would have to utilize clinical acumen, EKG, Troponin, and serial changes to determine if it is an Acute Myocardial Infarction or myocardial injury due to an underlying chronic condition.         Basic Metabolic Panel [[id number]]  (Abnormal) Collected: 06/22/24 1741    Specimen: Blood Updated: 06/22/24 1812     Glucose 160 mg/dL      BUN 29 mg/dL      Creatinine 1.45 mg/dL      Sodium 137 mmol/L      Potassium 4.9 mmol/L      Chloride 97 mmol/L      CO2 16.0 mmol/L      Calcium 8.1 mg/dL      BUN/Creatinine Ratio 20.0     Anion Gap 24.0 mmol/L      eGFR 39.4 mL/min/1.73     Narrative:      GFR Normal  ">60  Chronic Kidney Disease <60  Kidney Failure <15      Osmolality, Serum [[id number]]  (Abnormal) Collected: 06/22/24 1741    Specimen: Blood Updated: 06/22/24 1808     Osmolality 307 mOsm/kg     Fentanyl, Urine - Urine, Clean Catch [[id number]]  (Normal) Collected: 06/22/24 1604    Specimen: Urine, Clean Catch Updated: 06/22/24 1738     Fentanyl, Urine Negative    Narrative:      Negative Threshold:      Fentanyl 5 ng/mL     The normal value for the drug tested is negative. This report includes final unconfirmed screening results to be used for medical treatment purposes only. Unconfirmed results must not be used for non-medical purposes such as employment or legal testing. Clinical consideration should be applied to any drug of abuse test, particularly when unconfirmed results are used.           Procalcitonin [[id number]]  (Abnormal) Collected: 06/22/24 1437    Specimen: Blood Updated: 06/22/24 1727     Procalcitonin 0.49 ng/mL     Narrative:      As a Marker for Sepsis (Non-Neonates):    1. <0.5 ng/mL represents a low risk of severe sepsis and/or septic shock.  2. >2 ng/mL represents a high risk of severe sepsis and/or septic shock.    As a Marker for Lower Respiratory Tract Infections that require antibiotic therapy:    PCT on Admission    Antibiotic Therapy       6-12 Hrs later    >0.5                Strongly Recommended  >0.25 - <0.5        Recommended   0.1 - 0.25          Discouraged              Remeasure/reassess PCT  <0.1                Strongly Discouraged     Remeasure/reassess PCT    As 28 day mortality risk marker: \"Change in Procalcitonin Result\" (>80% or <=80%) if Day 0 (or Day 1) and Day 4 values are available. Refer to http://www.[employer]-pct-calculator.com    Change in PCT <=80%  A decrease of PCT levels below or equal to 80% defines a positive change in PCT test result representing a higher risk for 28-day all-cause mortality of patients diagnosed with severe sepsis for septic shock.    Change in " PCT >80%  A decrease of PCT levels of more than 80% defines a negative change in PCT result representing a lower risk for 28-day all-cause mortality of patients diagnosed with severe sepsis or septic shock.       Urinalysis With Microscopic If Indicated (No Culture) - Urine, Clean Catch [[id number]]  (Abnormal) Collected: 06/22/24 1604    Specimen: Urine, Clean Catch Updated: 06/22/24 1639     Color, UA Yellow     Appearance, UA Clear     pH, UA 5.5     Specific Gravity, UA 1.040     Glucose, UA Negative     Ketones, UA >=160 mg/dL (4+)     Bilirubin, UA Negative     Blood, UA Negative     Protein, UA Negative     Leuk Esterase, UA Negative     Nitrite, UA Negative     Urobilinogen, UA 0.2 E.U./dL    Narrative:      Urine microscopic not indicated.    Urine Drug Screen - Urine, Clean Catch [[id number]]  (Normal) Collected: 06/22/24 1604    Specimen: Urine, Clean Catch Updated: 06/22/24 1637     THC, Screen, Urine Negative     Phencyclidine (PCP), Urine Negative     Cocaine Screen, Urine Negative     Methamphetamine, Ur Negative     Opiate Screen Negative     Amphetamine Screen, Urine Negative     Benzodiazepine Screen, Urine Negative     Tricyclic Antidepressants Screen Negative     Methadone Screen, Urine Negative     Barbiturates Screen, Urine Negative     Oxycodone Screen, Urine Negative     Buprenorphine, Screen, Urine Negative    Narrative:      Cutoff For Drugs Screened:    Amphetamines               500 ng/ml  Barbiturates               200 ng/ml  Benzodiazepines            150 ng/ml  Cocaine                    150 ng/ml  Methadone                  200 ng/ml  Opiates                    100 ng/ml  Phencyclidine               25 ng/ml  THC                         50 ng/ml  Methamphetamine            500 ng/ml  Tricyclic Antidepressants  300 ng/ml  Oxycodone                  100 ng/ml  Buprenorphine               10 ng/ml    The normal value for all drugs tested is negative. This report includes unconfirmed  screening results, with the cutoff values listed, to be used for medical treatment purposes only.  Unconfirmed results must not be used for non-medical purposes such as employment or legal testing.  Clinical consideration should be applied to any drug of abuse test, particularly when unconfirmed results are used.      Ketone Bodies, Serum (Not performed at [facility]) [[id number]]  (Abnormal) Collected: 06/22/24 1437    Specimen: Blood Updated: 06/22/24 1524    Narrative:      The following orders were created for panel order Ketone Bodies, Serum (Not performed at [facility]).  Procedure                               Abnormality         Status                     ---------                               -----------         ------                     Beta Hydroxybutyrate Terrance...[[id number]]  Abnormal            Final result                 Please view results for these tests on the individual orders.    Beta Hydroxybutyrate Quantitative [[id number]]  (Abnormal) Collected: 06/22/24 1437    Specimen: Blood Updated: 06/22/24 1524     Beta-Hydroxybutyrate Quant 7.861 mmol/L     Narrative:      In the assessment of possible diabetic ketoacidosis, the test should be interpreted along with other clinical and laboratory findings.  A level greater than 1 mmol/L should require further evaluation and levels of more than 3 mmol/L require immediate medical review.    High Sensitivity Troponin T [[id number]]  (Abnormal) Collected: 06/22/24 1437    Specimen: Blood Updated: 06/22/24 1516     HS Troponin T 14 ng/L     Narrative:      High Sensitive Troponin T Reference Range:  <14.0 ng/L- Negative Female for AMI  <22.0 ng/L- Negative Male for AMI  >=14 - Abnormal Female indicating possible myocardial injury.  >=22 - Abnormal Male indicating possible myocardial injury.   Clinicians would have to utilize clinical acumen, EKG, Troponin, and serial changes to determine if it is an Acute Myocardial Infarction or myocardial injury due to an  underlying chronic condition.         BNP [[id number]]  (Normal) Collected: 06/22/24 1437    Specimen: Blood Updated: 06/22/24 1516     proBNP 683.3 pg/mL     Narrative:      This assay is used as an aid in the diagnosis of individuals suspected of having heart failure. It can be used as an aid in the diagnosis of acute decompensated heart failure (ADHF) in patients presenting with signs and symptoms of ADHF to the emergency department (ED). In addition, NT-proBNP of <300 pg/mL indicates ADHF is not likely.    Age Range Result Interpretation  NT-proBNP Concentration (pg/mL:      <50             Positive            >450                   Gray                 300-450                    Negative             <300    50-75           Positive            >900                  Gray                300-900                  Negative            <300      >75             Positive            >1800                  Gray                300-1800                  Negative            <300    TSH [[id number]]  (Normal) Collected: 06/22/24 1437    Specimen: Blood Updated: 06/22/24 1516     TSH 0.469 uIU/mL     Salicylate Level [[id number]]  (Normal) Collected: 06/22/24 1437    Specimen: Blood Updated: 06/22/24 1512     Salicylate 1.0 mg/dL     Lactic Acid, Plasma [[id number]]  (Abnormal) Collected: 06/22/24 1437    Specimen: Blood Updated: 06/22/24 1511     Lactate 6.9 mmol/L      Comment: Falsely depressed results may occur on samples drawn from patients receiving N-Acetylcysteine (NAC) or Metamizole.       Comprehensive Metabolic Panel [[id number]]  (Abnormal) Collected: 06/22/24 1437    Specimen: Blood Updated: 06/22/24 1509     Glucose 162 mg/dL      BUN 31 mg/dL      Creatinine 1.63 mg/dL      Sodium 136 mmol/L      Potassium 5.0 mmol/L      Comment: Slight hemolysis detected by analyzer. Result may be falsely elevated.        Chloride 91 mmol/L      CO2 10.0 mmol/L      Calcium 9.3 mg/dL      Total Protein 7.6 g/dL      Albumin 4.7  g/dL      ALT (SGPT) 15 U/L      AST (SGOT) 36 U/L      Alkaline Phosphatase 90 U/L      Total Bilirubin 0.6 mg/dL      Globulin 2.9 gm/dL      Comment: Calculated Result        A/G Ratio 1.6 g/dL      BUN/Creatinine Ratio 19.0     Anion Gap 35.0 mmol/L      eGFR 34.2 mL/min/1.73     Narrative:      GFR Normal >60  Chronic Kidney Disease <60  Kidney Failure <15      Lipase [[id number]]  (Normal) Collected: 06/22/24 1437    Specimen: Blood Updated: 06/22/24 1509     Lipase 25 U/L     Magnesium [[id number]]  (Normal) Collected: 06/22/24 1437    Specimen: Blood Updated: 06/22/24 1509     Magnesium 1.9 mg/dL     Phosphorus [[id number]]  (Abnormal) Collected: 06/22/24 1437    Specimen: Blood Updated: 06/22/24 1509     Phosphorus 5.7 mg/dL     Ethanol [[id number]]  (Normal) Collected: 06/22/24 1437    Specimen: Blood Updated: 06/22/24 1509     Ethanol <10 mg/dL     Narrative:      Elevated lactic acid concentration and lactate dehydrogenase(LD) activity may falsely elevate enzymatically determined ethanol levels. Not for legal purposes.     D-dimer, Quantitative [[id number]]  (Abnormal) Collected: 06/22/24 1437    Specimen: Blood Updated: 06/22/24 1508     D-Dimer, Quantitative 1.12 MCGFEU/mL     Narrative:      According to the assay 's published package insert, a normal (<0.50 MCGFEU/mL) D-dimer result in conjunction with a non-high clinical probability assessment, excludes deep vein thrombosis (DVT) and pulmonary embolism (PE) with high sensitivity.    D-dimer values increase with age and this can make VTE exclusion of an older population difficult. To address this, the [employer], based on best available evidence and recent guidelines, recommends that clinicians use age-adjusted D-dimer thresholds in patients greater than 50 years of age with: a) a low probability of PE who do not meet all Pulmonary Embolism Rule Out Criteria, or b) in those with intermediate probability of PE.   The formula  "for an age-adjusted D-dimer cut-off is \"age/100\".  For example, a 60 year old patient would have an age-adjusted cut-off of 0.60 MCGFEU/mL and an 80 year old 0.80 MCGFEU/mL.    Protime-INR [[id number]]  (Normal) Collected: 06/22/24 1437    Specimen: Blood Updated: 06/22/24 1508     Protime 13.3 Seconds      INR 1.00    aPTT [[id number]]  (Abnormal) Collected: 06/22/24 1437    Specimen: Blood Updated: 06/22/24 1508     PTT 22.6 seconds     Narrative:      PTT = The equivalent PTT values for the therapeutic range of heparin levels at 0.3 to 0.5 U/ml are 60 to 70 seconds.    C-reactive Protein [[id number]]  (Normal) Collected: 06/22/24 1437    Specimen: Blood Updated: 06/22/24 1507     C-Reactive Protein 0.33 mg/dL     Acetaminophen Level [[id number]]  (Normal) Collected: 06/22/24 1437    Specimen: Blood Updated: 06/22/24 1507     Acetaminophen <5.0 mcg/mL     CK [[id number]]  (Normal) Collected: 06/22/24 1437    Specimen: Blood Updated: 06/22/24 1507     Creatine Kinase 56 U/L     [place] [facility] [[id number]] Collected: 06/22/24 1437    Specimen: Blood Updated: 06/22/24 1500    Narrative:      The following orders were created for panel order [place] [facility].  Procedure                               Abnormality         Status                     ---------                               -----------         ------                     Green Top (Gel)[[id number]]                                  Final result               Lavender Top[[id number]]                                     Final result               Gold Top - SST[[id number]]                                   Final result               Rouse Top[[id number]]                                         Final result               Light Blue Top[[id number]]                                   Final result                 Please view results for these tests on the individual orders.    Lavender Top [[id number]] Collected: 06/22/24 1437    Specimen: Blood Updated: 06/22/24 1500     Extra Tube " hold for add-on     Comment: Auto resulted       CBC & Differential [[id number]]  (Abnormal) Collected: 06/22/24 1437    Specimen: Blood Updated: 06/22/24 1448    Narrative:      The following orders were created for panel order CBC & Differential.  Procedure                               Abnormality         Status                     ---------                               -----------         ------                     CBC Auto Differential[[id number]]        Abnormal            Final result               Scan Slide[[id number]]                                                                    Please view results for these tests on the individual orders.    CBC Auto Differential [[id number]]  (Abnormal) Collected: 06/22/24 1437    Specimen: Blood Updated: 06/22/24 1448     WBC 19.75 10*3/mm3      RBC 3.71 10*6/mm3      Hemoglobin 12.1 g/dL      Hematocrit 39.7 %      .0 fL      MCH 32.6 pg      MCHC 30.5 g/dL      RDW 13.7 %      RDW-SD 54.1 fl      MPV 9.9 fL      Platelets 276 10*3/mm3      Neutrophil % 79.6 %      Lymphocyte % 7.9 %      Monocyte % 11.5 %      Eosinophil % 0.1 %      Basophil % 0.3 %      Immature Grans % 0.6 %      Neutrophils, Absolute 15.74 10*3/mm3      Lymphocytes, Absolute 1.56 10*3/mm3      Monocytes, Absolute 2.27 10*3/mm3      Eosinophils, Absolute 0.01 10*3/mm3      Basophils, Absolute 0.05 10*3/mm3      Immature Grans, Absolute 0.12 10*3/mm3      nRBC 0.0 /100 WBC     Green Top (Gel) [[id number]] Collected: 06/22/24 1437    Specimen: Blood Updated: 06/22/24 1445     Extra Tube Hold for add-ons.     Comment: Auto resulted.       Gold Top - SST [[id number]] Collected: 06/22/24 1437    Specimen: Blood Updated: 06/22/24 1445     Extra Tube Hold for add-ons.     Comment: Auto resulted.       Gray Top [[id number]] Collected: 06/22/24 1437    Specimen: Blood Updated: 06/22/24 1445     Extra Tube Hold for add-ons.     Comment: Auto resulted.       Light Blue Top [[id number]] Collected:  06/22/24 1437    Specimen: Blood Updated: 06/22/24 1445     Extra Tube Hold for add-ons.     Comment: Auto resulted       POC Chem 8 [[id number]]  (Abnormal) Collected: 06/22/24 1441    Specimen: Blood Updated: 06/22/24 1445     Glucose 161 mg/dL      BUN 34 mg/dL      Creatinine 1.30 mg/dL      Sodium 133 mmol/L      POC Potassium 4.9 mmol/L      Chloride 101 mmol/L      Total CO2 12 mmol/L      Hemoglobin 13.9 g/dL      Comment: Serial Number: [id number]:  [id number]        Hematocrit 41 %      Ionized Calcium 1.07 mmol/L      eGFR 44.9 mL/min/1.73     Blood Gas, Arterial With Co-Ox [[id number]]  (Abnormal) Collected: 06/22/24 1427    Specimen: Arterial Blood Updated: 06/22/24 1427     Site Right Radial     Isaac's Test N/A     pH, Arterial 7.182 pH units      Comment: 85 Value below critical limit        pCO2, Arterial 21.0 mm Hg      Comment: 84 Value below reference range        pO2, Arterial 116.0 mm Hg      Comment: 83 Value above reference range        HCO3, Arterial 7.9 mmol/L      Base Excess, Arterial -18.6 mmol/L      Hemoglobin, Blood Gas 11.6 g/dL      Comment: 84 Value below reference range        Hematocrit, Blood Gas 35.4 %      Oxyhemoglobin 97.4 %      Methemoglobin 0.20 %      Carboxyhemoglobin 0.9 %      CO2 Content 8.5 mmol/L      Temperature 37.0     Barometric Pressure for Blood Gas --     Comment: N/A        Modality Room Air     FIO2 21 %      Rate 0 Breaths/minute      PIP 0 cmH2O      Comment: Meter: [id number]     :  [id number]        IPAP 0     EPAP 0     Notified [facility] LUIS YUSUF MD     Notified By [id number]     Notified Time 06/22/2024 14:29     pH, Temp Corrected 7.182 pH Units      pCO2, Temperature Corrected 21.0 mm Hg      pO2, Temperature Corrected 116 mm Hg           Imaging Results (All)       Procedure Component Value Units Date/Time    CT Angiogram Chest [[id number]] Collected: 06/22/24 1539     Updated: 06/22/24 1553    Narrative:      CT ANGIOGRAM CHEST, CT ANGIOGRAM  ABDOMEN PELVIS    Date of Exam: 6/22/2024 3:12 PM EDT    Indication: Acute severe substernal chest pain 2 back and upper abdomen, nausea vomiting, hypertension, appears ill.    Comparison: Chest CTA dated 12/17/2022, CT of the abdomen and pelvis dated 4/13/2023    Technique: CTA of the chest was performed after the uneventful intravenous administration of 100 mL Isovue 370. Reconstructed coronal and sagittal images were also obtained. In addition, a 3-D volume rendered image was created for interpretation.   Automated exposure control and iterative reconstruction methods were used.    FINDINGS:      Thoracic inlet: Unremarkable.    Pulmonary arteries: This examination is not optimized for detection of pulmonary emboli. No large central pulmonary embolism is seen.    Great vessels: The thoracic aorta and proximal arch vessels appear unremarkable. No thoracic aortic dissection or aneurysm is seen.    Mediastinum/Kelly: No pathologically enlarged mediastinal lymph nodes are seen. There is diffuse esophageal wall thickening, concerning for esophagitis. Small hiatal hernia is present.    Lung parenchyma: There is mild left basilar atelectasis. Lungs are otherwise adequately aerated. No acute consolidation is seen. No suspicious pulmonary nodules are seen.    Trachea and airways: The trachea and central airways appear unremarkable.    Pleural space: No significant pleural effusion or pneumothorax is seen.    Heart and pericardium: The heart and pericardium appear unremarkable.    Liver: The liver is unremarkable in morphology and decreased in attenuation. No focal liver lesion is seen. No biliary dilation is seen.    Gallbladder: Unremarkable.    Pancreas: Unremarkable.    Spleen: Unremarkable.    Adrenal glands: Unremarkable.    Genitourinary tract: Kidneys are unremarkable. No hydronephrosis is seen. The visualized portions of the ureters and urinary bladder appear unremarkable. Patient is status post  hysterectomy.    Gastrointestinal tract: Colonic diverticulosis. Several small duodenal diverticula noted. Esophageal wall thickening. No evidence of bowel obstruction.    Appendix: No findings to suggest acute appendicitis.    Other findings: No free air or free fluid is identified. No pathologically enlarged lymph nodes are seen. The abdominal aorta is normal in course and caliber. The celiac artery, SMA, DEEPTI, bilateral renal arteries, and visualized iliofemoral arteries   appear patent without significant stenosis. No abdominal aortic aneurysm or dissection is seen. IVC is grossly unremarkable.    Bones and soft tissues: No acute or suspicious osseous or soft tissue lesion is identified. Surgical hardware within the left proximal femur. Degenerative changes and mild scoliotic curvature of the visualized spine.          Impression:      1.Diffuse esophageal wall thickening, concerning for esophagitis. Small hiatal hernia. Consider further evaluation with EGD.  2.Otherwise, no acute abnormality identified within the chest, abdomen, or pelvis.  3.No acute abnormality of the thoracic or abdominal aorta.  4.Colonic diverticulosis and several small distal duodenal diverticula.  5.Additional findings as detailed above.    Electronically Signed: Erik Garcia MD    6/22/2024 3:49 PM EDT    Workstation ID: [id number]    CT Angiogram Abdomen Pelvis [[id number]] Collected: 06/22/24 1539     Updated: 06/22/24 1553    Narrative:      CT ANGIOGRAM CHEST, CT ANGIOGRAM ABDOMEN PELVIS    Date of Exam: 6/22/2024 3:12 PM EDT    Indication: Acute severe substernal chest pain 2 back and upper abdomen, nausea vomiting, hypertension, appears ill.    Comparison: Chest CTA dated 12/17/2022, CT of the abdomen and pelvis dated 4/13/2023    Technique: CTA of the chest was performed after the uneventful intravenous administration of 100 mL Isovue 370. Reconstructed coronal and sagittal images were also obtained. In addition, a 3-D volume  rendered image was created for interpretation.   Automated exposure control and iterative reconstruction methods were used.    FINDINGS:      Thoracic inlet: Unremarkable.    Pulmonary arteries: This examination is not optimized for detection of pulmonary emboli. No large central pulmonary embolism is seen.    Great vessels: The thoracic aorta and proximal arch vessels appear unremarkable. No thoracic aortic dissection or aneurysm is seen.    Mediastinum/Kelly: No pathologically enlarged mediastinal lymph nodes are seen. There is diffuse esophageal wall thickening, concerning for esophagitis. Small hiatal hernia is present.    Lung parenchyma: There is mild left basilar atelectasis. Lungs are otherwise adequately aerated. No acute consolidation is seen. No suspicious pulmonary nodules are seen.    Trachea and airways: The trachea and central airways appear unremarkable.    Pleural space: No significant pleural effusion or pneumothorax is seen.    Heart and pericardium: The heart and pericardium appear unremarkable.    Liver: The liver is unremarkable in morphology and decreased in attenuation. No focal liver lesion is seen. No biliary dilation is seen.    Gallbladder: Unremarkable.    Pancreas: Unremarkable.    Spleen: Unremarkable.    Adrenal glands: Unremarkable.    Genitourinary tract: Kidneys are unremarkable. No hydronephrosis is seen. The visualized portions of the ureters and urinary bladder appear unremarkable. Patient is status post hysterectomy.    Gastrointestinal tract: Colonic diverticulosis. Several small duodenal diverticula noted. Esophageal wall thickening. No evidence of bowel obstruction.    Appendix: No findings to suggest acute appendicitis.    Other findings: No free air or free fluid is identified. No pathologically enlarged lymph nodes are seen. The abdominal aorta is normal in course and caliber. The celiac artery, SMA, DEEPTI, bilateral renal arteries, and visualized iliofemoral arteries    appear patent without significant stenosis. No abdominal aortic aneurysm or dissection is seen. IVC is grossly unremarkable.    Bones and soft tissues: No acute or suspicious osseous or soft tissue lesion is identified. Surgical hardware within the left proximal femur. Degenerative changes and mild scoliotic curvature of the visualized spine.          Impression:      1.Diffuse esophageal wall thickening, concerning for esophagitis. Small hiatal hernia. Consider further evaluation with EGD.  2.Otherwise, no acute abnormality identified within the chest, abdomen, or pelvis.  3.No acute abnormality of the thoracic or abdominal aorta.  4.Colonic diverticulosis and several small distal duodenal diverticula.  5.Additional findings as detailed above.    Electronically Signed: Erik Garcia MD    6/22/2024 3:49 PM EDT    Workstation ID: [id number]    XR Chest 1 View [[id number]] Collected: 06/22/24 1538     Updated: 06/22/24 1543    Narrative:      XR CHEST 1 VW    Date of Exam: 6/22/2024 2:57 PM EDT    Indication: Chest Pain Triage Protocol    Comparison: 11/12/2023    Findings:  There is a dextroconvex scoliosis. Patient appears mildly rotated to the left, likely as result, and similar to the comparison exam. Heart shadow and pulmonary vasculature appear normal in size. Lungs appear clear except for stable mild chronic   interstitial changes. No edema effusion or pneumothorax is seen.      Impression:      Impression:  Dextroconvex scoliosis again noted. No new chest disease is seen.      Electronically Signed: Reggie Barillas MD    6/22/2024 3:40 PM EDT    Workstation ID: [id number]          ECG/EMG Results (all)       Procedure Component Value Units Date/Time    ECG 12 Lead ED Triage Standing Order; Chest Pain [[id number]] Collected: 06/22/24 1408     Updated: 06/23/24 1159     QT Interval 356 ms      QTC Interval 470 ms     Narrative:      Test Reason : ED Triage Standing Order~  Blood Pressure :   */*   mmHG  Vent. Rate :  105 BPM     Atrial Rate : 105 BPM     P-R Int : 156 ms          QRS Dur :  74 ms      QT Int : 356 ms       P-R-T Axes :  86   5  58 degrees     QTc Int : 470 ms    Sinus tachycardia with fusion complexes  Otherwise normal ECG  When compared with ECG of 02-JAN-2024 19:39,  fusion complexes are now present  Confirmed by BACILIO YUSUF ([id number]) on 6/23/2024 11:58:41 AM    Referred By: PARAMJIT           Confirmed By: BACILIO YUSUF    ECG 12 Lead ED Triage Standing Order; Chest Pain [[id number]] Collected: 06/22/24 1843     Updated: 06/23/24 1202     QT Interval 358 ms      QTC Interval 470 ms     Narrative:      Test Reason : ED Triage Standing Order~  Blood Pressure :   */*   mmHG  Vent. Rate : 104 BPM     Atrial Rate : 104 BPM     P-R Int : 178 ms          QRS Dur :  74 ms      QT Int : 358 ms       P-R-T Axes :  66  24  38 degrees     QTc Int : 470 ms    Sinus tachycardia with fusion complexes  Nonspecific ST and T wave abnormality  Abnormal ECG  When compared with ECG of 22-JUN-2024 14:08, (Unconfirmed)  Nonspecific T wave abnormality now evident in Anterior leads  Confirmed by BACILIO YUSUF ([id number]) on 6/23/2024 12:01:30 PM    Referred By:            Confirmed By: BACILIO YUSUF             Physician Progress Notes (all)        Collin Ricardo MD at 06/23/24 1238          Pulmonary/Critical Care Follow-up     LOS: 1 day   Patient Care Team:  Cassy Foster MD as PCP - General (Internal Medicine)  Russ Carrington MD as Consulting Physician (Nephrology)  Gray Bahena MD as Consulting Physician (Cardiology)  Caden Dietz MD as Consulting Physician (Neurology)  Charity Cornejo, CESAR as Ambulatory  (Oncology) (Population Health)  Juanpablo Lee MD as Consulting Physician (Orthopedic Surgery)  Basilia Mata MD as Consulting Physician (Radiation Oncology)  Sarah Stern MD as Consulting Physician (Gynecology)  Jared Wheatley MD as Consulting Physician (Nephrology)  Jose G Florez  MD RAVINDER as Consulting Physician (Dermatology)    Chief Complaint/reason: Chest/epigastric pain/metabolic acidosis      Chief Complaint   Patient presents with    Chest Pain     Subjective     History reviewed and updated in EMR as indicated.    Interval History:     Patient is sleeping but arousable.  She reports mild epigastric pain currently, which is improved.  No fevers or chills.  No nausea or vomiting.    History taken from: Patient    PMH/FH/Social History were reviewed and updated appropriately in the electronic medical record.     Review of Systems:    Review of 14 systems was completed with positives and pertinent negatives noted in the subjective section.  All other systems reviewed and are negative.         Objective     Vital Signs  Temp:  [97.6 °F (36.4 °C)-98.8 °F (37.1 °C)] (P) 97.6 °F (36.4 °C)  Heart Rate:  [100-115] (P) 109  Resp:  [16-20] (P) 18  BP: ()/() (P) 133/77  06/22 0701 - 06/23 0700  In: 2422.2 [I.V.:2222.2]  Out: 450 [Urine:450]  Body mass index is 22.83 kg/m².     IV drips:      Physical Exam:     Constitutional:   Alert, in no acute distress   Head:   Normocephalic, atraumatic   Eyes:           Lids and lashes normal, conjunctivae and sclerae normal.  PER   ENMT:  Ears appear intact with no abnormalities noted     Lips normal.     Neck:  Trachea midline, no JVD   Lungs/Resp:    Normal effort, symmetric chest rise, no crepitus, clear to      auscultation bilaterally.               Heart/CV:   Regular rhythm and normal rate, no murmur   Abdomen/GI:    Soft, nontender, nondistended   :    Deferred   Extremities/MSK:  No clubbing or cyanosis.  No edema.     Pulses:  Pulses palpable and equal bilaterally   Skin:  No bleeding, bruising or rash   Heme/Lymph:  No cervical or supraclavicular adenopathy.   Neurologic:    Psychiatric:    Moves all extremities with no obvious focal motor deficit.  Cranial nerves 2 - 12 grossly intact  Non-agitated, normal affect.    The above physical  exam findings were reviewed and reflect my exam findings as of today's exam.   Electronically signed by:  Collin Ricardo MD  06/23/24  12:38 EDT      Results Review:     I reviewed the patient's new clinical results.   Results from last 7 days   Lab Units 06/23/24  0554 06/23/24  0003 06/22/24  1741 06/22/24  1441 06/22/24  1437   SODIUM mmol/L 138 133* 137  --  136   POTASSIUM mmol/L 3.8 4.2 4.9  --  5.0   CHLORIDE mmol/L 107 103 97*  --  91*   CO2 mmol/L 21.0* 19.0* 16.0*  --  10.0*   BUN mg/dL 19 24* 29*  --  31*   CREATININE mg/dL 1.27* 1.41* 1.45*   < > 1.63*   CALCIUM mg/dL 7.6* 7.4* 8.1*  --  9.3   BILIRUBIN mg/dL 0.4  --   --   --  0.6   ALK PHOS U/L 62  --   --   --  90   ALT (SGPT) U/L 8  --   --   --  15   AST (SGOT) U/L 18  --   --   --  36*   GLUCOSE mg/dL 185* 273* 160*  --  162*    < > = values in this interval not displayed.     Results from last 7 days   Lab Units 06/23/24  0833 06/22/24  1441 06/22/24  1437   WBC 10*3/mm3 10.68  --  19.75*   HEMOGLOBIN g/dL 10.3*  --  12.1   HEMOGLOBIN, POC g/dL  --  13.9  --    HEMATOCRIT % 31.7*  --  39.7   HEMATOCRIT POC %  --  41  --    PLATELETS 10*3/mm3 169  --  276     Results from last 7 days   Lab Units 06/22/24  1427   PH, ARTERIAL pH units 7.182*   PO2 ART mm Hg 116.0*   PCO2, ARTERIAL mm Hg 21.0*   HCO3 ART mmol/L 7.9*     Results from last 7 days   Lab Units 06/23/24  0554 06/22/24  1437   MAGNESIUM mg/dL 1.4* 1.9   PHOSPHORUS mg/dL 1.8* 5.7*       I reviewed the patient's new imaging including images and reports.    Medication Review:   aspirin, 324 mg, Oral, Once  DULoxetine, 20 mg, Oral, BID  folic acid 1 mg in sodium chloride 0.9 % 50 mL IVPB, 1 mg, Intravenous, Daily  gabapentin, 300 mg, Oral, Q AM  gabapentin, 600 mg, Oral, Nightly  insulin lispro, 2-7 Units, Subcutaneous, 4x Daily AC & at Bedtime  levothyroxine, 25 mcg, Oral, Q AM  magnesium sulfate, 2 g, Intravenous, Q2H  pantoprazole, 40 mg, Intravenous, BID AC  rosuvastatin, 5 mg, Oral,  Nightly  thiamine (B-1) IV, 500 mg, Intravenous, Q8H  [START ON 6/24/2024] thiamine (B-1) IV, 200 mg, Intravenous, Q8H   Followed by  [START ON 6/27/2024] thiamine, 100 mg, Oral, Daily  vitamin B-12, 1,000 mcg, Oral, Daily           Assessment & Plan         CKD (chronic kidney disease) stage 3, GFR 30-59 ml/min    Hypothyroidism    Alcoholic ketoacidosis    Leukocytosis    GERD with esophagitis    69 y.o. lifetime non-smoker with history of grade 2 endometrioid adenocarcinoma status post hysterectomy and adjuvant chemotherapy 2023, HTN, HLD, SVT, fibromyalgia, neuropathy, depression, GERD, presented with acute onset severe chest pain radiating to her back and upper abdomen and associated with nausea/vomiting.    Patient went to the ER and was found to have severe metabolic acidosis with bicarbonate of 10 and anion gap of 35.  She reports drinking about a glass or 2 of wine a day.  She does report poor overall diet secondary to being single and living alone.    CT of the abdomen pelvis showed no significant abnormality other than diffuse esophagitis.  Lactic acid level initially was 6.9.  Patient had elevated ketones.  Procalcitonin was mildly elevated at 0.49 and decreased to 0.45 on follow-up.  Patient was initially given antibiotics in the emergency department but these were not continued.  She had an MARY which is improving.    Clinical picture is overall suggestive of starvation or alcohol ketosis.  Cannot rule out sepsis but I do not have a definite source.  She responded to fluid.    Plan:  1.  For possible sepsis versus hypovolemia with starvation or alcoholic ketosis: Fluids.  Monitoring.  Continue Zosyn.  Follow-up cultures.  No evidence of septic source on CT scan or labs so far.  MRSA swab was negative.  2.  For anion gap metabolic acidosis: Likely ketosis from starvation or alcohol.  Resolved with fluid.  Monitor.  3.  For alcohol use: Patient admits to 1-2 drinks per day.  Empiric thiamine and vitamin  replacement.  CIWA protocol.  4.  DVT prophylaxis: Subcutaneous heparin.  5.  For esophagitis: Protonix twice daily.  6.  For hypothyroidism: Levothyroxine.  TSH 0.46.    Okay to telemetry/hospitalist.    Electronically signed by:    Collin Ricardo MD  06/23/24  12:38 EDT      *. Please note that portions of this note were completed with [employer] - a voice recognition program.     Electronically signed by Collin Ricardo MD at 06/23/24 1256       Consult Notes (all)    No notes of this type exist for this encounter.

## 2024-06-24 NOTE — CASE MANAGEMENT/SOCIAL WORK
Continued Stay Note  [facility]     Patient Name: Alysia Sierra  MRN: [id number]  Today's Date: 6/24/2024    Admit Date: 6/22/2024    Plan: home   Discharge Plan       Row Name 06/24/24 1432       Plan    Plan home    Patient/Family in Agreement with Plan yes    Plan Comments I spoke with the patient at the bedside. She stated that she will be going home at discharge. She asked for the sitter list. I provided that list to the patient. Patient denied any further needs from CM at this time. CM following.    Final Discharge Disposition Code 01 - home or self-care                   Discharge Codes    No documentation.                       Leesa Block RN

## 2024-06-24 NOTE — PLAN OF CARE
Goal Outcome Evaluation:  Plan of Care Reviewed With: patient           Outcome Evaluation: PT initial evaluation completed for pt presenting with generalized weakness, mild balance impairments, and decreased functional mobility. Pt ambulated 300ft with Roxana and B UE support. Pt's decreased independence warrants PT skilled care. Recommend D/C home with assistance and  PT services.      Anticipated Discharge Disposition (PT): home with assist, home with home health

## 2024-06-25 ENCOUNTER — TELEPHONE (OUTPATIENT)
Dept: INTERNAL MEDICINE | Facility: CLINIC | Age: 69
End: 2024-06-25
Payer: MEDICARE

## 2024-06-25 ENCOUNTER — READMISSION MANAGEMENT (OUTPATIENT)
Dept: CALL CENTER | Facility: HOSPITAL | Age: 69
End: 2024-06-25
Payer: MEDICARE

## 2024-06-25 VITALS
TEMPERATURE: 98.6 F | BODY MASS INDEX: 22.71 KG/M2 | HEIGHT: 64 IN | DIASTOLIC BLOOD PRESSURE: 97 MMHG | HEART RATE: 85 BPM | OXYGEN SATURATION: 99 % | SYSTOLIC BLOOD PRESSURE: 147 MMHG | WEIGHT: 133 LBS | RESPIRATION RATE: 16 BRPM

## 2024-06-25 LAB
ANION GAP SERPL CALCULATED.3IONS-SCNC: 11 MMOL/L (ref 5–15)
BUN SERPL-MCNC: 7 MG/DL (ref 8–23)
BUN/CREAT SERPL: 6.1 (ref 7–25)
CALCIUM SPEC-SCNC: 8.2 MG/DL (ref 8.6–10.5)
CHLORIDE SERPL-SCNC: 104 MMOL/L (ref 98–107)
CO2 SERPL-SCNC: 25 MMOL/L (ref 22–29)
CREAT SERPL-MCNC: 1.14 MG/DL (ref 0.57–1)
DEPRECATED RDW RBC AUTO: 48.3 FL (ref 37–54)
EGFRCR SERPLBLD CKD-EPI 2021: 52.5 ML/MIN/1.73
ERYTHROCYTE [DISTWIDTH] IN BLOOD BY AUTOMATED COUNT: 13.4 % (ref 12.3–15.4)
GLUCOSE BLDC GLUCOMTR-MCNC: 101 MG/DL (ref 70–130)
GLUCOSE BLDC GLUCOMTR-MCNC: 109 MG/DL (ref 70–130)
GLUCOSE SERPL-MCNC: 97 MG/DL (ref 65–99)
HCT VFR BLD AUTO: 28.2 % (ref 34–46.6)
HGB BLD-MCNC: 9.1 G/DL (ref 12–15.9)
MAGNESIUM SERPL-MCNC: 1.5 MG/DL (ref 1.6–2.4)
MCH RBC QN AUTO: 31.7 PG (ref 26.6–33)
MCHC RBC AUTO-ENTMCNC: 32.3 G/DL (ref 31.5–35.7)
MCV RBC AUTO: 98.3 FL (ref 79–97)
PHOSPHATE SERPL-MCNC: 1.7 MG/DL (ref 2.5–4.5)
PHOSPHATE SERPL-MCNC: 2.2 MG/DL (ref 2.5–4.5)
PLATELET # BLD AUTO: 117 10*3/MM3 (ref 140–450)
PMV BLD AUTO: 9.5 FL (ref 6–12)
POTASSIUM SERPL-SCNC: 3.4 MMOL/L (ref 3.5–5.2)
POTASSIUM SERPL-SCNC: 5 MMOL/L (ref 3.5–5.2)
RBC # BLD AUTO: 2.87 10*6/MM3 (ref 3.77–5.28)
SODIUM SERPL-SCNC: 140 MMOL/L (ref 136–145)
WBC NRBC COR # BLD AUTO: 4.64 10*3/MM3 (ref 3.4–10.8)

## 2024-06-25 PROCEDURE — 25010000002 HEPARIN (PORCINE) PER 1000 UNITS: Performed by: INTERNAL MEDICINE

## 2024-06-25 PROCEDURE — 25010000002 PIPERACILLIN SOD-TAZOBACTAM PER 1 G: Performed by: INTERNAL MEDICINE

## 2024-06-25 PROCEDURE — 99239 HOSP IP/OBS DSCHRG MGMT >30: CPT | Performed by: NURSE PRACTITIONER

## 2024-06-25 PROCEDURE — 84100 ASSAY OF PHOSPHORUS: CPT | Performed by: NURSE PRACTITIONER

## 2024-06-25 PROCEDURE — 82948 REAGENT STRIP/BLOOD GLUCOSE: CPT

## 2024-06-25 PROCEDURE — 25010000002 MAGNESIUM SULFATE 2 GM/50ML SOLUTION: Performed by: INTERNAL MEDICINE

## 2024-06-25 PROCEDURE — 84132 ASSAY OF SERUM POTASSIUM: CPT | Performed by: NURSE PRACTITIONER

## 2024-06-25 PROCEDURE — 80048 BASIC METABOLIC PNL TOTAL CA: CPT | Performed by: NURSE PRACTITIONER

## 2024-06-25 PROCEDURE — 85027 COMPLETE CBC AUTOMATED: CPT | Performed by: NURSE PRACTITIONER

## 2024-06-25 PROCEDURE — 25010000002 THIAMINE HCL 200 MG/2ML SOLUTION: Performed by: NURSE PRACTITIONER

## 2024-06-25 PROCEDURE — 83735 ASSAY OF MAGNESIUM: CPT | Performed by: NURSE PRACTITIONER

## 2024-06-25 RX ORDER — POTASSIUM CHLORIDE 20 MEQ/1
40 TABLET, EXTENDED RELEASE ORAL EVERY 4 HOURS
Status: COMPLETED | OUTPATIENT
Start: 2024-06-25 | End: 2024-06-25

## 2024-06-25 RX ORDER — MAGNESIUM SULFATE HEPTAHYDRATE 40 MG/ML
2 INJECTION, SOLUTION INTRAVENOUS
Status: COMPLETED | OUTPATIENT
Start: 2024-06-25 | End: 2024-06-25

## 2024-06-25 RX ADMIN — HEPARIN SODIUM 5000 UNITS: 5000 INJECTION INTRAVENOUS; SUBCUTANEOUS at 06:05

## 2024-06-25 RX ADMIN — GABAPENTIN 300 MG: 300 CAPSULE ORAL at 06:05

## 2024-06-25 RX ADMIN — POTASSIUM & SODIUM PHOSPHATES POWDER PACK 280-160-250 MG 2 PACKET: 280-160-250 PACK at 06:06

## 2024-06-25 RX ADMIN — Medication 10 ML: at 06:30

## 2024-06-25 RX ADMIN — PIPERACILLIN AND TAZOBACTAM 3.38 G: 3; .375 INJECTION, POWDER, LYOPHILIZED, FOR SOLUTION INTRAVENOUS at 06:30

## 2024-06-25 RX ADMIN — POTASSIUM CHLORIDE 40 MEQ: 1500 TABLET, EXTENDED RELEASE ORAL at 06:05

## 2024-06-25 RX ADMIN — PANTOPRAZOLE SODIUM 40 MG: 40 INJECTION, POWDER, FOR SOLUTION INTRAVENOUS at 16:30

## 2024-06-25 RX ADMIN — MAGNESIUM SULFATE HEPTAHYDRATE 2 G: 2 INJECTION, SOLUTION INTRAVENOUS at 10:11

## 2024-06-25 RX ADMIN — DULOXETINE HYDROCHLORIDE 20 MG: 20 CAPSULE, DELAYED RELEASE ORAL at 08:00

## 2024-06-25 RX ADMIN — FOLIC ACID 1 MG: 1 TABLET ORAL at 08:00

## 2024-06-25 RX ADMIN — POTASSIUM & SODIUM PHOSPHATES POWDER PACK 280-160-250 MG 2 PACKET: 280-160-250 PACK at 16:30

## 2024-06-25 RX ADMIN — MAGNESIUM SULFATE HEPTAHYDRATE 2 G: 2 INJECTION, SOLUTION INTRAVENOUS at 08:00

## 2024-06-25 RX ADMIN — Medication 1000 MCG: at 08:00

## 2024-06-25 RX ADMIN — PANTOPRAZOLE SODIUM 40 MG: 40 INJECTION, POWDER, FOR SOLUTION INTRAVENOUS at 06:30

## 2024-06-25 RX ADMIN — THIAMINE HYDROCHLORIDE 200 MG: 100 INJECTION, SOLUTION INTRAMUSCULAR; INTRAVENOUS at 06:05

## 2024-06-25 RX ADMIN — POTASSIUM CHLORIDE 40 MEQ: 1500 TABLET, EXTENDED RELEASE ORAL at 10:11

## 2024-06-25 RX ADMIN — LEVOTHYROXINE SODIUM 25 MCG: 25 TABLET ORAL at 06:05

## 2024-06-25 RX ADMIN — MAGNESIUM SULFATE HEPTAHYDRATE 2 G: 2 INJECTION, SOLUTION INTRAVENOUS at 06:16

## 2024-06-25 NOTE — DISCHARGE SUMMARY
DISCHARGE SUMMARY       Patient name: Alysia Sierra  CSN: [id number]  MRN: [id number]  : 1955    Date of Admission: 2024  Date of Discharge:  2024    Admitting Physician: Danilo Smith MD   Primary Care Provider: Cassy Foster MD    Admission Diagnosis: Sepsis     Discharge Diagnoses:     Sepsis    CKD (chronic kidney disease) stage 3, GFR 30-59 ml/min    Hypothyroidism    Alcoholic ketoacidosis    Leukocytosis    GERD with esophagitis    Imaging:  CT Angiogram Chest    Result Date: 2024  1.Diffuse esophageal wall thickening, concerning for esophagitis. Small hiatal hernia. Consider further evaluation with EGD. 2.Otherwise, no acute abnormality identified within the chest, abdomen, or pelvis. 3.No acute abnormality of the thoracic or abdominal aorta. 4.Colonic diverticulosis and several small distal duodenal diverticula. 5.Additional findings as detailed above. Electronically Signed: Erik Garcia MD  2024 3:49 PM EDT  Workstation ID: [id number]    CT Angiogram Abdomen Pelvis    Result Date: 2024  1.Diffuse esophageal wall thickening, concerning for esophagitis. Small hiatal hernia. Consider further evaluation with EGD. 2.Otherwise, no acute abnormality identified within the chest, abdomen, or pelvis. 3.No acute abnormality of the thoracic or abdominal aorta. 4.Colonic diverticulosis and several small distal duodenal diverticula. 5.Additional findings as detailed above. Electronically Signed: Erik Garcia MD  2024 3:49 PM EDT  Workstation ID: [id number]    XR Chest 1 View    Result Date: 2024  Impression: Dextroconvex scoliosis again noted. No new chest disease is seen. Electronically Signed: Reggie Barillas MD  2024 3:40 PM EDT  Workstation ID: [id number]     History of Present Illness (copied from H&P note on 24):  69-year-old female with past medical history of grade 2 endometrioid adenocarcinoma s/p hysterectomy and adjuvant chemotherapy , hypertension,  dyslipidemia, supraventricular tachycardia, fibromyalgia, neuropathy, depression, GERD.  Patient presented with acute onset of severe chest pain with which she woke up.  Patient states that pain was radiating to her back and to her upper abdomen and was also having nausea and vomiting.  Patient was admitted to emergency room and underwent further workup.  Found to have severe metabolic acidosis with bicarb of 10 and high anion gap of 35.  Patient denies any spurious alcohol intake or any other extraneous substance intake.  States that she used to drink alcohol but now has been drinking wine maybe 1 to 2 glasses a day.  Patient was given fluid bolus.  She was noted to have leukocytosis.  Underwent CT chest abdomen and pelvis which did not reveal any acute pathology other than diffuse esophagitis.  ABG obtained and pH of 7.18.  patient was also felt to be having tremulous activity so started on CIWA protocol.  Patient had significant lactic acidosis of 6.9.  Denies any seizure activity.  Urine drug screen checked and negative.  Alcohol level, acetaminophen and salicylate levels negative as well.  Beta-hydroxybutyrate elevated.  Will get urinalysis as well.  Patient will be admitted to ICU for closer monitoring and management of severe metabolic acidosis.     Patient is awake and alert on arrival to ICU.  Hemodynamically stable otherwise.  Quite tremulous.  States that she is feeling cold.  Last alcoholic drink was yesterday late afternoon (end of copied text).    Hospital Course:  Patient was admitted to ICU and continued on CIWA protocol (ultimately only received 2 doses of PO Ativan) and empiric Zosyn. She completed 3 days of IV Zosyn with no evidence of septic source on imaging or labs. Her clinical status improved drastically and she returned to her pre-hospitalization baseline on 6/25.     Patient is felt to have reached maximum benefit from this hospitalization and has been deemed appropriate for discharge home.  "She is feeling well, is tolerating a PO diet, and is ambulating without difficulty. She was noted to have hypokalemia, hypomagnesemia, and hypophosphatemia on AM labs and received replacement. Phosphorous was rechecked later this afternoon and remained low at 1.7. Patient still adamant about wanting to go home. She will receive an additional dose of phosphorous replacement prior to D/C and will follow up with her PCP this week with a repeat BMP, magnesium, and phosphorous check.      Vitals:  /97   Pulse 85   Temp 98.6 °F (37 °C) (Oral)   Resp 16   Ht 162.6 cm (64\")   Wt 60.3 kg (133 lb)   LMP  (LMP Unknown)   SpO2 99%   BMI 22.83 kg/m²     Physical Exam:  Constitutional:  Appears well-developed and well-nourished. No distress.   HEENT:  Normocephalic and atraumatic. PER  Neck: Normal range of motion. Neck supple. No JVD present.   Cardiovascular: Normal rate, regular rhythm, normal heart sounds and intact distal pulses.  No gallop and no friction rub.  No murmur heard.  Pulmonary/Chest: Effort normal and breath sounds normal. No respiratory distress. No wheezes, rhonchi or rales.   Abdominal: Soft. No distension and no mass. There is no tenderness.   Musculoskeletal: Normal range of motion.   Neurological: Alert and oriented to person, place, and time.  No focal deficits  Skin: Skin is warm and dry. No rash noted.   Extremities:  No clubbing, edema or cyanosis  Psychiatric: Normal mood and affect. Behavior is normal.     Labs:  Results from last 7 days   Lab Units 06/25/24  0401   WBC 10*3/mm3 4.64   HEMOGLOBIN g/dL 9.1*   HEMATOCRIT % 28.2*   PLATELETS 10*3/mm3 117*     Results from last 7 days   Lab Units 06/25/24  1506 06/25/24  0401 06/23/24  1225 06/23/24  0554   SODIUM mmol/L  --  140   < > 138   POTASSIUM mmol/L 5.0 3.4*   < > 3.8   CHLORIDE mmol/L  --  104   < > 107   CO2 mmol/L  --  25.0   < > 21.0*   BUN mg/dL  --  7*   < > 19   CREATININE mg/dL  --  1.14*   < > 1.27*   CALCIUM mg/dL  --  " 8.2*   < > 7.6*   BILIRUBIN mg/dL  --   --   --  0.4   ALK PHOS U/L  --   --   --  62   ALT (SGPT) U/L  --   --   --  8   AST (SGOT) U/L  --   --   --  18   GLUCOSE mg/dL  --  97   < > 185*    < > = values in this interval not displayed.         Magnesium   Date Value Ref Range Status   06/25/2024 1.5 (L) 1.6 - 2.4 mg/dL Final   06/23/2024 2.5 (H) 1.6 - 2.4 mg/dL Final   06/23/2024 1.4 (L) 1.6 - 2.4 mg/dL Final     Phosphorus   Date Value Ref Range Status   06/25/2024 1.7 (C) 2.5 - 4.5 mg/dL Final   06/25/2024 2.2 (L) 2.5 - 4.5 mg/dL Final   06/24/2024 2.4 (L) 2.5 - 4.5 mg/dL Final           Discharge Medications:     Discharge Medications        Continue These Medications        Instructions Start Date   Alpha-Lipoic Acid 600 MG capsule   Oral      cholecalciferol 25 MCG (1000 UT) tablet  Commonly known as: VITAMIN D3   1,000 Units, Oral, Daily      DULoxetine 20 MG capsule  Commonly known as: CYMBALTA   20 mg, Oral, 2 Times Daily      esomeprazole 20 MG capsule  Commonly known as: nexIUM   20 mg, Oral, 2 Times Weekly, OTC      folic acid 1 MG tablet  Commonly known as: FOLVITE   1 mg, Oral, Daily      gabapentin 300 MG capsule  Commonly known as: Neurontin   Take 1 capsule in the morning and 2 capsules at night.      levothyroxine 25 MCG tablet  Commonly known as: SYNTHROID, LEVOTHROID   TAKE ONE TABLET BY MOUTH EVERY MORNING ON AN EMPTY STOMACH      Lidocaine 4 %   1 patch, Transdermal, Every 24 Hours Scheduled, Remove & Discard patch within 12 hours or as directed by MD      magnesium oxide 400 (241.3 Mg) MG tablet tablet  Commonly known as: MAGOX   400 mg, Oral, Daily, OTC      metoprolol succinate XL 25 MG 24 hr tablet  Commonly known as: TOPROL-XL   25 mg, Oral, Nightly      rosuvastatin 5 MG tablet  Commonly known as: Crestor   5 mg, Oral, Daily      thiamine 100 MG tablet  Commonly known as: VITAMIN B1   100 mg, Oral, Daily      vitamin B-12 1000 MCG tablet  Commonly known as: CYANOCOBALAMIN   1,000 mcg,  Oral, Daily             Stop These Medications      docusate sodium 100 MG capsule  Commonly known as: COLACE     hydrALAZINE 25 MG tablet  Commonly known as: APRESOLINE     nitroglycerin 0.4 MG SL tablet  Commonly known as: NITROSTAT     oxyCODONE 5 MG capsule  Commonly known as: OXY-IR     traMADol 50 MG tablet  Commonly known as: ULTRAM            Discharge Diet:   Diet Instructions       Diet: Regular/House Diet; Regular (IDDSI 7); Thin (IDDSI 0)      Discharge Diet: Regular/House Diet    Texture: Regular (IDDSI 7)    Fluid Consistency: Thin (IDDSI 0)          Activity at Discharge:    Activity Instructions       Activity as Tolerated            Follow-up Appointments  Future Appointments   Date Time Provider Department Center   6/28/2024 11:15 AM Jevon Foster MD [facility]   7/11/2024  3:30 PM Austin Tatum LCSW [facility] None   8/12/2024 11:00 AM Caden Dietz MD [facility]   8/16/2024 11:45 AM HAM CT 1 BH [facility] CT None   10/1/2024 11:30 AM Franklin Hamm APRN [facility] None   10/14/2024 11:00 AM Jevon Foster MD [facility]   11/11/2024  9:30 AM Gray Bahena MD [facility]   12/12/2024 11:30 AM Liana So APRN [facility]     Additional Instructions for the Follow-ups that You Need to Schedule       Ambulatory Referral to Home Health   As directed      Face to Face Visit Date: 6/25/2024   Follow-up provider for Plan of Care?: I treated the patient in an acute care facility and will not continue treatment after discharge.   Follow-up provider: JEVON FOSTER [[id number]]   Reason/Clinical Findings: Sepsis   Describe mobility limitations that make leaving home difficult: Impaired gait, mobility, balance, and endurance   Nursing/Therapeutic Services Requested: Physical Therapy   PT orders: Therapeutic exercise Gait Training Transfer training Strengthening Home safety assessment   Weight Bearing Status: As Tolerated   Frequency: 1 Week 1        Discharge  Follow-up with PCP   As directed       Currently Documented PCP:    Cassy Foster MD    PCP Phone Number:    [phone]     Follow Up Details: Within 1 week with repeat BMP        Basic Metabolic Panel    Jun 30, 2024 (Approximate)      At PCP appointment    Order Comments: At PCP appointment    Release to patient: Routine Release        Magnesium    Jun 30, 2024 (Approximate)      To be obtained at PCP appointment    Order Comments: To be obtained at PCP appointment    Release to patient: Routine Release        Phosphorus    Jun 30, 2024 (Approximate)      To be obtained at PCP appointment    Order Comments: To be obtained at PCP appointment    Release to patient: Routine Release              Discharge Instructions:  Okay to discharge home   Medications as above   Follow up with providers as above     Current Code Status       Date Active Code Status Order ID Comments User Context       6/23/2024 1246 CPR (Attempt to Resuscitate) [id number]  Collin Ricardo MD Inpatient        Question Answer    Code Status (Patient has no pulse and is not breathing) CPR (Attempt to Resuscitate)    Medical Interventions (Patient has pulse or is breathing) Full Support             Bernie Merritt, DNP, APRN, AGACNP-BC  Pulmonary and Critical Care Medicine  06/25/24     Time: Discharge 40 min    CC: Cassy Foster MD    Please note that portions of this note were completed with a voice recognition program.

## 2024-06-25 NOTE — PLAN OF CARE
Goal Outcome Evaluation:    Phos 1.7. Replaced before discharge. Educated on ways to consume phos. Educated patient on getting labs drawn with PCP.     Education complete.

## 2024-06-25 NOTE — TELEPHONE ENCOUNTER
Caller: ROSALIND    Relationship to patient: [facility]    Best call back number: [phone]     Patient is needing: INQUIRING IF PCP WILL FOLLOW HOME HEALTH ORDERS FOR PATIENT. NEEDS VERBAL APPROVAL/ORDERS FOR PHYSICAL THERAPY.

## 2024-06-25 NOTE — OUTREACH NOTE
Prep Survey      Flowsheet Row Responses   [facility] patient discharged from? [facility]   Is LACE score < 7 ? No   Eligibility [facility]   Date of Admission 06/22/24   Date of Discharge 06/25/24   Discharge Disposition Home-Health Care [facility]   Discharge diagnosis Sepsis   Does the patient have one of the following disease processes/diagnoses(primary or secondary)? Sepsis   Does the patient have Home health ordered? Yes   What is the Home health agency?  [facility] for PT   Is there a DME ordered? No   Prep survey completed? Yes            Chelsie JAMIL - Registered Nurse

## 2024-06-25 NOTE — CASE MANAGEMENT/SOCIAL WORK
Continued Stay Note  [facility]     Patient Name: Alysia Sierra  MRN: [id number]  Today's Date: 6/25/2024    Admit Date: 6/22/2024    Plan: Home with HH   Discharge Plan       Row Name 06/25/24 8940       Plan    Plan Home with HH    Patient/Family in Agreement with Plan yes    Plan Comments Patient is agreeable to home health physical therapy and requested [facility].  Confirmed with Margaret that [facility] can accept patient for  PT; orders in [facility].  Patient states she has transportation home.    Final Discharge Disposition Code 06 - home with home health care                   Discharge Codes    No documentation.                       Carolina Vega RN

## 2024-06-26 ENCOUNTER — TRANSITIONAL CARE MANAGEMENT TELEPHONE ENCOUNTER (OUTPATIENT)
Dept: CALL CENTER | Facility: HOSPITAL | Age: 69
End: 2024-06-26
Payer: MEDICARE

## 2024-06-26 ENCOUNTER — TELEPHONE (OUTPATIENT)
Dept: CARDIOLOGY | Facility: CLINIC | Age: 69
End: 2024-06-26
Payer: MEDICARE

## 2024-06-26 NOTE — TELEPHONE ENCOUNTER
Spoke w/pt advised to call nephrologist to discuss BP, she sees Dr. Jared Wheatley. She told me when we spoke on 6/20 that he wanted to get her off hydralazine, this was stopped at discharge. I told her I will fax hospital records to Dr. Wheatley F: [phone] and she will call them to discuss. Advised we will still proceed with coronary CTA that is scheduled 8/16. Pt verbalized understanding and agreeable to plan

## 2024-06-26 NOTE — PAYOR COMM NOTE
"    Ref# [id number]   Still Pending  6/25/24 Discharge Date    Utilization Review  Phone [phone]  Fax [phone]    [facility]  [address] [place] [address]             Jayden Magana (69 y.o. Female)       Date of Birth   1955    Social Security Number       Address   [address] [place] [address]    Home Phone   [phone]    MRN   [id number]       Sikh   Mormonism    Marital Status                               Admission Date   6/22/24    Admission Type   Emergency    Admitting Provider   Danilo Smith MD    Attending Provider       Department, Room/Bed   [facility] 2B ICU, N231/1       Discharge Date   6/25/2024    Discharge Disposition   Home or Self Care    Discharge Destination                                 Attending Provider: (none)   Allergies: Lisinopril, Metal, Milk-related Compounds, Tylenol [Acetaminophen], Atorvastatin    Isolation: None   Infection: None   Code Status: Prior    Ht: 162.6 cm (64\")   Wt: 60.3 kg (133 lb)    Admission Cmt: None   Principal Problem: Sepsis [A41.9]                   Active Insurance as of 6/22/2024       Primary Coverage       Payor Plan Insurance Group Employer/Plan Group    ANTHEM MEDICARE REPLACEMENT ANTHEM MEDICARE ADVANTAGE [id number]       Payor Plan Address Payor Plan Phone Number Payor Plan Fax Number Effective Dates    [address] [phone]  1/1/2024 - None Entered    [facility] [address]         Subscriber Name Subscriber Birth Date Member ID       JAYDEN MAGANA 1955 [id number]                     Emergency Contacts        (Rel.) Home Phone Work Phone Mobile Phone    ALYSHA HOLLY (Sister) [phone] -- [phone]              No current facility-administered medications for this encounter.     Current Outpatient Medications   Medication Sig Dispense Refill    Alpha-Lipoic Acid 600 MG capsule Take  by mouth.      Cholecalciferol 25 " MCG (1000 UT) tablet Take 1 tablet by mouth Daily.      DULoxetine (CYMBALTA) 20 MG capsule TAKE ONE CAPSULE BY MOUTH TWICE A  capsule 1    esomeprazole (nexIUM) 20 MG capsule Take 1 capsule by mouth 2 (Two) Times a Week. OTC      folic acid (FOLVITE) 1 MG tablet Take 1 tablet by mouth Daily. 30 tablet 0    gabapentin (Neurontin) 300 MG capsule Take 1 capsule in the morning and 2 capsules at night. 90 capsule 6    levothyroxine (SYNTHROID, LEVOTHROID) 25 MCG tablet TAKE ONE TABLET BY MOUTH EVERY MORNING ON AN EMPTY STOMACH 90 tablet 0    Lidocaine 4 % Place 1 patch on the skin as directed by provider Daily. Remove & Discard patch within 12 hours or as directed by MD 15 each 0    magnesium oxide (MAGOX) 400 (241.3 Mg) MG tablet tablet Take 1 tablet by mouth Daily. OTC      metoprolol succinate XL (TOPROL-XL) 25 MG 24 hr tablet Take 1 tablet by mouth Every Night. 90 tablet 1    rosuvastatin (Crestor) 5 MG tablet Take 1 tablet by mouth Daily. 90 tablet 1    thiamine (VITAMIN B1) 100 MG tablet Take 1 tablet by mouth Daily. 30 tablet 0    vitamin B-12 (CYANOCOBALAMIN) 1000 MCG tablet Take 1 tablet by mouth Daily.       Lab Results (most recent)       Procedure Component Value Units Date/Time    Blood Culture - Blood, Hand, Left [[id number]]  (Normal) Collected: 06/22/24 1600    Specimen: Blood from Hand, Left Updated: 06/25/24 1615     Blood Culture No growth at 3 days    Narrative:      Less than seven (7) mL's of blood was collected.  Insufficient quantity may yield false negative results.    Blood Culture - Blood, Hand, Right [[id number]]  (Normal) Collected: 06/22/24 1540    Specimen: Blood from Hand, Right Updated: 06/25/24 1600     Blood Culture No growth at 3 days    Narrative:      Less than seven (7) mL's of blood was collected.  Insufficient quantity may yield false negative results.    Phosphorus [[id number]]  (Abnormal) Collected: 06/25/24 1506    Specimen: Blood Updated: 06/25/24 1535     Phosphorus 1.7  mg/dL     Potassium [[id number]]  (Normal) Collected: 06/25/24 1506    Specimen: Blood Updated: 06/25/24 1533     Potassium 5.0 mmol/L      Comment: Slight hemolysis detected by analyzer. Result may be falsely elevated.       POC Glucose Once [[id number]]  (Normal) Collected: 06/25/24 1120    Specimen: Blood Updated: 06/25/24 1124     Glucose 109 mg/dL     POC Glucose Once [[id number]]  (Normal) Collected: 06/25/24 0756    Specimen: Blood Updated: 06/25/24 0758     Glucose 101 mg/dL     Magnesium [[id number]]  (Abnormal) Collected: 06/25/24 0401    Specimen: Blood Updated: 06/25/24 0439     Magnesium 1.5 mg/dL     Basic Metabolic Panel [[id number]]  (Abnormal) Collected: 06/25/24 0401    Specimen: Blood Updated: 06/25/24 0436     Glucose 97 mg/dL      BUN 7 mg/dL      Creatinine 1.14 mg/dL      Sodium 140 mmol/L      Potassium 3.4 mmol/L      Chloride 104 mmol/L      CO2 25.0 mmol/L      Calcium 8.2 mg/dL      BUN/Creatinine Ratio 6.1     Anion Gap 11.0 mmol/L      eGFR 52.5 mL/min/1.73     Narrative:      GFR Normal >60  Chronic Kidney Disease <60  Kidney Failure <15      Phosphorus [[id number]]  (Abnormal) Collected: 06/25/24 0401    Specimen: Blood Updated: 06/25/24 0436     Phosphorus 2.2 mg/dL     CBC (No Diff) [[id number]]  (Abnormal) Collected: 06/25/24 0401    Specimen: Blood Updated: 06/25/24 0423     WBC 4.64 10*3/mm3      RBC 2.87 10*6/mm3      Hemoglobin 9.1 g/dL      Hematocrit 28.2 %      MCV 98.3 fL      MCH 31.7 pg      MCHC 32.3 g/dL      RDW 13.4 %      RDW-SD 48.3 fl      MPV 9.5 fL      Platelets 117 10*3/mm3     Basic Metabolic Panel [[id number]]  (Abnormal) Collected: 06/24/24 0724    Specimen: Blood Updated: 06/24/24 0815     Glucose 92 mg/dL      BUN 9 mg/dL      Creatinine 1.11 mg/dL      Sodium 138 mmol/L      Potassium 3.7 mmol/L      Chloride 102 mmol/L      CO2 22.0 mmol/L      Calcium 8.3 mg/dL      BUN/Creatinine Ratio 8.1     Anion Gap 14.0 mmol/L      eGFR 54.3 mL/min/1.73     Narrative:    "   GFR Normal >60  Chronic Kidney Disease <60  Kidney Failure <15      Magnesium [[id number]]  (Abnormal) Collected: 06/23/24 2350    Specimen: Blood Updated: 06/24/24 0047     Magnesium 2.5 mg/dL     Procalcitonin [[id number]]  (Abnormal) Collected: 06/23/24 0554    Specimen: Blood Updated: 06/23/24 1236     Procalcitonin 0.45 ng/mL     Narrative:      As a Marker for Sepsis (Non-Neonates):    1. <0.5 ng/mL represents a low risk of severe sepsis and/or septic shock.  2. >2 ng/mL represents a high risk of severe sepsis and/or septic shock.    As a Marker for Lower Respiratory Tract Infections that require antibiotic therapy:    PCT on Admission    Antibiotic Therapy       6-12 Hrs later    >0.5                Strongly Recommended  >0.25 - <0.5        Recommended   0.1 - 0.25          Discouraged              Remeasure/reassess PCT  <0.1                Strongly Discouraged     Remeasure/reassess PCT    As 28 day mortality risk marker: \"Change in Procalcitonin Result\" (>80% or <=80%) if Day 0 (or Day 1) and Day 4 values are available. Refer to [email or web address]    Change in PCT <=80%  A decrease of PCT levels below or equal to 80% defines a positive change in PCT test result representing a higher risk for 28-day all-cause mortality of patients diagnosed with severe sepsis for septic shock.    Change in PCT >80%  A decrease of PCT levels of more than 80% defines a negative change in PCT result representing a lower risk for 28-day all-cause mortality of patients diagnosed with severe sepsis or septic shock.       CBC & Differential [[id number]]  (Abnormal) Collected: 06/23/24 0833    Specimen: Blood Updated: 06/23/24 0929    Narrative:      The following orders were created for panel order CBC & Differential.  Procedure                               Abnormality         Status                     ---------                               -----------         ------                     CBC Auto " Differential[[id number]]        Abnormal            Final result                 Please view results for these tests on the individual orders.    CBC Auto Differential [[id number]]  (Abnormal) Collected: 06/23/24 0833    Specimen: Blood Updated: 06/23/24 0929     WBC 10.68 10*3/mm3      RBC 3.20 10*6/mm3      Hemoglobin 10.3 g/dL      Hematocrit 31.7 %      MCV 99.1 fL      MCH 32.2 pg      MCHC 32.5 g/dL      RDW 13.8 %      RDW-SD 50.6 fl      MPV 9.6 fL      Platelets 169 10*3/mm3      Neutrophil % 76.5 %      Lymphocyte % 9.9 %      Monocyte % 13.0 %      Eosinophil % 0.1 %      Basophil % 0.2 %      Immature Grans % 0.3 %      Neutrophils, Absolute 8.17 10*3/mm3      Lymphocytes, Absolute 1.06 10*3/mm3      Monocytes, Absolute 1.39 10*3/mm3      Eosinophils, Absolute 0.01 10*3/mm3      Basophils, Absolute 0.02 10*3/mm3      Immature Grans, Absolute 0.03 10*3/mm3      nRBC 0.0 /100 WBC     Calcium, Ionized [[id number]]  (Normal) Collected: 06/23/24 0648    Specimen: Blood Updated: 06/23/24 0648     Ionized Calcium 1.21 mmol/L     Comprehensive Metabolic Panel [[id number]]  (Abnormal) Collected: 06/23/24 0554    Specimen: Blood Updated: 06/23/24 0646     Glucose 185 mg/dL      BUN 19 mg/dL      Creatinine 1.27 mg/dL      Sodium 138 mmol/L      Potassium 3.8 mmol/L      Chloride 107 mmol/L      CO2 21.0 mmol/L      Calcium 7.6 mg/dL      Total Protein 5.1 g/dL      Albumin 3.4 g/dL      ALT (SGPT) 8 U/L      AST (SGOT) 18 U/L      Alkaline Phosphatase 62 U/L      Total Bilirubin 0.4 mg/dL      Globulin 1.7 gm/dL      Comment: Calculated Result        A/G Ratio 2.0 g/dL      BUN/Creatinine Ratio 15.0     Anion Gap 10.0 mmol/L      eGFR 46.2 mL/min/1.73     Narrative:      GFR Normal >60  Chronic Kidney Disease <60  Kidney Failure <15      Lactic Acid, Plasma [[id number]]  (Normal) Collected: 06/23/24 0554    Specimen: Blood Updated: 06/23/24 0642     Lactate 1.8 mmol/L      Comment: Falsely depressed results may occur  on samples drawn from patients receiving N-Acetylcysteine (NAC) or Metamizole.       Hemoglobin A1c [[id number]]  (Normal) Collected: 06/22/24 1437    Specimen: Blood Updated: 06/22/24 2306     Hemoglobin A1C 5.20 %     Narrative:      Hemoglobin A1C Ranges:    Increased Risk for Diabetes  5.7% to 6.4%  Diabetes                     >= 6.5%  Diabetic Goal                < 7.0%    STAT Lactic Acid, Reflex [[id number]]  (Abnormal) Collected: 06/22/24 2052    Specimen: Blood from Arm, Right Updated: 06/22/24 2122     Lactate 4.4 mmol/L      Comment: Falsely depressed results may occur on samples drawn from patients receiving N-Acetylcysteine (NAC) or Metamizole.       MRSA Screen, PCR (Inpatient) - Swab, Nares [[id number]]  (Normal) Collected: 06/22/24 1755    Specimen: Swab from Nares Updated: 06/22/24 1931     MRSA PCR Negative    Narrative:      The negative predictive value of this diagnostic test is high and should only be used to consider de-escalating anti-MRSA therapy. A positive result may indicate colonization with MRSA and must be correlated clinically.  MRSA Negative    STAT Lactic Acid, Reflex [[id number]]  (Abnormal) Collected: 06/22/24 1748    Specimen: Blood Updated: 06/22/24 1814     Lactate 5.1 mmol/L      Comment: Falsely depressed results may occur on samples drawn from patients receiving N-Acetylcysteine (NAC) or Metamizole.       High Sensitivity Troponin T 2Hr [[id number]]  (Abnormal) Collected: 06/22/24 1748    Specimen: Blood Updated: 06/22/24 1814     HS Troponin T 21 ng/L      Troponin T Delta 7 ng/L     Narrative:      High Sensitive Troponin T Reference Range:  <14.0 ng/L- Negative Female for AMI  <22.0 ng/L- Negative Male for AMI  >=14 - Abnormal Female indicating possible myocardial injury.  >=22 - Abnormal Male indicating possible myocardial injury.   Clinicians would have to utilize clinical acumen, EKG, Troponin, and serial changes to determine if it is an Acute Myocardial Infarction or  "myocardial injury due to an underlying chronic condition.         Osmolality, Serum [[id number]]  (Abnormal) Collected: 06/22/24 1741    Specimen: Blood Updated: 06/22/24 1808     Osmolality 307 mOsm/kg     Fentanyl, Urine - Urine, Clean Catch [[id number]]  (Normal) Collected: 06/22/24 1604    Specimen: Urine, Clean Catch Updated: 06/22/24 1738     Fentanyl, Urine Negative    Narrative:      Negative Threshold:      Fentanyl 5 ng/mL     The normal value for the drug tested is negative. This report includes final unconfirmed screening results to be used for medical treatment purposes only. Unconfirmed results must not be used for non-medical purposes such as employment or legal testing. Clinical consideration should be applied to any drug of abuse test, particularly when unconfirmed results are used.           Procalcitonin [[id number]]  (Abnormal) Collected: 06/22/24 1437    Specimen: Blood Updated: 06/22/24 1727     Procalcitonin 0.49 ng/mL     Narrative:      As a Marker for Sepsis (Non-Neonates):    1. <0.5 ng/mL represents a low risk of severe sepsis and/or septic shock.  2. >2 ng/mL represents a high risk of severe sepsis and/or septic shock.    As a Marker for Lower Respiratory Tract Infections that require antibiotic therapy:    PCT on Admission    Antibiotic Therapy       6-12 Hrs later    >0.5                Strongly Recommended  >0.25 - <0.5        Recommended   0.1 - 0.25          Discouraged              Remeasure/reassess PCT  <0.1                Strongly Discouraged     Remeasure/reassess PCT    As 28 day mortality risk marker: \"Change in Procalcitonin Result\" (>80% or <=80%) if Day 0 (or Day 1) and Day 4 values are available. Refer to http://[email or web address]    Change in PCT <=80%  A decrease of PCT levels below or equal to 80% defines a positive change in PCT test result representing a higher risk for 28-day all-cause mortality of patients diagnosed with severe sepsis for septic " shock.    Change in PCT >80%  A decrease of PCT levels of more than 80% defines a negative change in PCT result representing a lower risk for 28-day all-cause mortality of patients diagnosed with severe sepsis or septic shock.       Urinalysis With Microscopic If Indicated (No Culture) - Urine, Clean Catch [[id number]]  (Abnormal) Collected: 06/22/24 1604    Specimen: Urine, Clean Catch Updated: 06/22/24 1639     Color, UA Yellow     Appearance, UA Clear     pH, UA 5.5     Specific Gravity, UA 1.040     Glucose, UA Negative     Ketones, UA >=160 mg/dL (4+)     Bilirubin, UA Negative     Blood, UA Negative     Protein, UA Negative     Leuk Esterase, UA Negative     Nitrite, UA Negative     Urobilinogen, UA 0.2 E.U./dL    Narrative:      Urine microscopic not indicated.    Urine Drug Screen - Urine, Clean Catch [[id number]]  (Normal) Collected: 06/22/24 1604    Specimen: Urine, Clean Catch Updated: 06/22/24 1637     THC, Screen, Urine Negative     Phencyclidine (PCP), Urine Negative     Cocaine Screen, Urine Negative     Methamphetamine, Ur Negative     Opiate Screen Negative     Amphetamine Screen, Urine Negative     Benzodiazepine Screen, Urine Negative     Tricyclic Antidepressants Screen Negative     Methadone Screen, Urine Negative     Barbiturates Screen, Urine Negative     Oxycodone Screen, Urine Negative     Buprenorphine, Screen, Urine Negative    Narrative:      Cutoff For Drugs Screened:    Amphetamines               500 ng/ml  Barbiturates               200 ng/ml  Benzodiazepines            150 ng/ml  Cocaine                    150 ng/ml  Methadone                  200 ng/ml  Opiates                    100 ng/ml  Phencyclidine               25 ng/ml  THC                         50 ng/ml  Methamphetamine            500 ng/ml  Tricyclic Antidepressants  300 ng/ml  Oxycodone                  100 ng/ml  Buprenorphine               10 ng/ml    The normal value for all drugs tested is negative. This report  includes unconfirmed screening results, with the cutoff values listed, to be used for medical treatment purposes only.  Unconfirmed results must not be used for non-medical purposes such as employment or legal testing.  Clinical consideration should be applied to any drug of abuse test, particularly when unconfirmed results are used.      Ketone Bodies, Serum (Not performed at [place]) [[id number]]  (Abnormal) Collected: 06/22/24 1437    Specimen: Blood Updated: 06/22/24 1524    Narrative:      The following orders were created for panel order Ketone Bodies, Serum (Not performed at [place]).  Procedure                               Abnormality         Status                     ---------                               -----------         ------                     Beta Hydroxybutyrate Terrance...[[id number]]  Abnormal            Final result                 Please view results for these tests on the individual orders.    Beta Hydroxybutyrate Quantitative [[id number]]  (Abnormal) Collected: 06/22/24 1437    Specimen: Blood Updated: 06/22/24 1524     Beta-Hydroxybutyrate Quant 7.861 mmol/L     Narrative:      In the assessment of possible diabetic ketoacidosis, the test should be interpreted along with other clinical and laboratory findings.  A level greater than 1 mmol/L should require further evaluation and levels of more than 3 mmol/L require immediate medical review.    High Sensitivity Troponin T [[id number]]  (Abnormal) Collected: 06/22/24 1437    Specimen: Blood Updated: 06/22/24 1516     HS Troponin T 14 ng/L     Narrative:      High Sensitive Troponin T Reference Range:  <14.0 ng/L- Negative Female for AMI  <22.0 ng/L- Negative Male for AMI  >=14 - Abnormal Female indicating possible myocardial injury.  >=22 - Abnormal Male indicating possible myocardial injury.   Clinicians would have to utilize clinical acumen, EKG, Troponin, and serial changes to determine if it is an Acute Myocardial Infarction or myocardial  injury due to an underlying chronic condition.         BNP [[id number]]  (Normal) Collected: 06/22/24 1437    Specimen: Blood Updated: 06/22/24 1516     proBNP 683.3 pg/mL     Narrative:      This assay is used as an aid in the diagnosis of individuals suspected of having heart failure. It can be used as an aid in the diagnosis of acute decompensated heart failure (ADHF) in patients presenting with signs and symptoms of ADHF to the emergency department (ED). In addition, NT-proBNP of <300 pg/mL indicates ADHF is not likely.    Age Range Result Interpretation  NT-proBNP Concentration (pg/mL:      <50             Positive            >450                   Gray                 300-450                    Negative             <300    50-75           Positive            >900                  Gray                300-900                  Negative            <300      >75             Positive            >1800                  Gray                300-1800                  Negative            <300    TSH [[id number]]  (Normal) Collected: 06/22/24 1437    Specimen: Blood Updated: 06/22/24 1516     TSH 0.469 uIU/mL     Salicylate Level [[id number]]  (Normal) Collected: 06/22/24 1437    Specimen: Blood Updated: 06/22/24 1512     Salicylate 1.0 mg/dL     Lactic Acid, Plasma [[id number]]  (Abnormal) Collected: 06/22/24 1437    Specimen: Blood Updated: 06/22/24 1511     Lactate 6.9 mmol/L      Comment: Falsely depressed results may occur on samples drawn from patients receiving N-Acetylcysteine (NAC) or Metamizole.       Comprehensive Metabolic Panel [[id number]]  (Abnormal) Collected: 06/22/24 1437    Specimen: Blood Updated: 06/22/24 1509     Glucose 162 mg/dL      BUN 31 mg/dL      Creatinine 1.63 mg/dL      Sodium 136 mmol/L      Potassium 5.0 mmol/L      Comment: Slight hemolysis detected by analyzer. Result may be falsely elevated.        Chloride 91 mmol/L      CO2 10.0 mmol/L      Calcium 9.3 mg/dL      Total Protein 7.6 g/dL      " Albumin 4.7 g/dL      ALT (SGPT) 15 U/L      AST (SGOT) 36 U/L      Alkaline Phosphatase 90 U/L      Total Bilirubin 0.6 mg/dL      Globulin 2.9 gm/dL      Comment: Calculated Result        A/G Ratio 1.6 g/dL      BUN/Creatinine Ratio 19.0     Anion Gap 35.0 mmol/L      eGFR 34.2 mL/min/1.73     Narrative:      GFR Normal >60  Chronic Kidney Disease <60  Kidney Failure <15      Lipase [[id number]]  (Normal) Collected: 06/22/24 1437    Specimen: Blood Updated: 06/22/24 1509     Lipase 25 U/L     Ethanol [[id number]]  (Normal) Collected: 06/22/24 1437    Specimen: Blood Updated: 06/22/24 1509     Ethanol <10 mg/dL     Narrative:      Elevated lactic acid concentration and lactate dehydrogenase(LD) activity may falsely elevate enzymatically determined ethanol levels. Not for legal purposes.     D-dimer, Quantitative [[id number]]  (Abnormal) Collected: 06/22/24 1437    Specimen: Blood Updated: 06/22/24 1508     D-Dimer, Quantitative 1.12 MCGFEU/mL     Narrative:      According to the assay 's published package insert, a normal (<0.50 MCGFEU/mL) D-dimer result in conjunction with a non-high clinical probability assessment, excludes deep vein thrombosis (DVT) and pulmonary embolism (PE) with high sensitivity.    D-dimer values increase with age and this can make VTE exclusion of an older population difficult. To address this, the [employer], based on best available evidence and recent guidelines, recommends that clinicians use age-adjusted D-dimer thresholds in patients greater than 50 years of age with: a) a low probability of PE who do not meet all Pulmonary Embolism Rule Out Criteria, or b) in those with intermediate probability of PE.   The formula for an age-adjusted D-dimer cut-off is \"age/100\".  For example, a 60 year old patient would have an age-adjusted cut-off of 0.60 MCGFEU/mL and an 80 year old 0.80 MCGFEU/mL.    Protime-INR [[id number]]  (Normal) Collected: 06/22/24 1437    " Specimen: Blood Updated: 06/22/24 1508     Protime 13.3 Seconds      INR 1.00    aPTT [[id number]]  (Abnormal) Collected: 06/22/24 1437    Specimen: Blood Updated: 06/22/24 1508     PTT 22.6 seconds     Narrative:      PTT = The equivalent PTT values for the therapeutic range of heparin levels at 0.3 to 0.5 U/ml are 60 to 70 seconds.    C-reactive Protein [[id number]]  (Normal) Collected: 06/22/24 1437    Specimen: Blood Updated: 06/22/24 1507     C-Reactive Protein 0.33 mg/dL     Acetaminophen Level [[id number]]  (Normal) Collected: 06/22/24 1437    Specimen: Blood Updated: 06/22/24 1507     Acetaminophen <5.0 mcg/mL     CK [[id number]]  (Normal) Collected: 06/22/24 1437    Specimen: Blood Updated: 06/22/24 1507     Creatine Kinase 56 U/L     [place] Draw [[id number]] Collected: 06/22/24 1437    Specimen: Blood Updated: 06/22/24 1500    Narrative:      The following orders were created for panel order [place] Draw.  Procedure                               Abnormality         Status                     ---------                               -----------         ------                     Green Top (Gel)[[id number]]                                  Final result               Lavender Top[[id number]]                                     Final result               Gold Top - SST[[id number]]                                   Final result               Rouse Top[[id number]]                                         Final result               Light Blue Top[[id number]]                                   Final result                 Please view results for these tests on the individual orders.    Lavender Top [[id number]] Collected: 06/22/24 1437    Specimen: Blood Updated: 06/22/24 1500     Extra Tube hold for add-on     Comment: Auto resulted       CBC & Differential [[id number]]  (Abnormal) Collected: 06/22/24 1437    Specimen: Blood Updated: 06/22/24 1448    Narrative:      The following orders were created for panel order CBC &  Differential.  Procedure                               Abnormality         Status                     ---------                               -----------         ------                     CBC Auto Differential[[id number]]        Abnormal            Final result               Scan Slide[[id number]]                                                                    Please view results for these tests on the individual orders.    CBC Auto Differential [[id number]]  (Abnormal) Collected: 06/22/24 1437    Specimen: Blood Updated: 06/22/24 1448     WBC 19.75 10*3/mm3      RBC 3.71 10*6/mm3      Hemoglobin 12.1 g/dL      Hematocrit 39.7 %      .0 fL      MCH 32.6 pg      MCHC 30.5 g/dL      RDW 13.7 %      RDW-SD 54.1 fl      MPV 9.9 fL      Platelets 276 10*3/mm3      Neutrophil % 79.6 %      Lymphocyte % 7.9 %      Monocyte % 11.5 %      Eosinophil % 0.1 %      Basophil % 0.3 %      Immature Grans % 0.6 %      Neutrophils, Absolute 15.74 10*3/mm3      Lymphocytes, Absolute 1.56 10*3/mm3      Monocytes, Absolute 2.27 10*3/mm3      Eosinophils, Absolute 0.01 10*3/mm3      Basophils, Absolute 0.05 10*3/mm3      Immature Grans, Absolute 0.12 10*3/mm3      nRBC 0.0 /100 WBC     Green Top (Gel) [[id number]] Collected: 06/22/24 1437    Specimen: Blood Updated: 06/22/24 1445     Extra Tube Hold for add-ons.     Comment: Auto resulted.       Gold Top - SST [[id number]] Collected: 06/22/24 1437    Specimen: Blood Updated: 06/22/24 1445     Extra Tube Hold for add-ons.     Comment: Auto resulted.       Gray Top [[id number]] Collected: 06/22/24 1437    Specimen: Blood Updated: 06/22/24 1445     Extra Tube Hold for add-ons.     Comment: Auto resulted.       Light Blue Top [[id number]] Collected: 06/22/24 1437    Specimen: Blood Updated: 06/22/24 1445     Extra Tube Hold for add-ons.     Comment: Auto resulted       POC Chem 8 [[id number]]  (Abnormal) Collected: 06/22/24 1441    Specimen: Blood Updated: 06/22/24 1445     Glucose 161  mg/dL      BUN 34 mg/dL      Creatinine 1.30 mg/dL      Sodium 133 mmol/L      POC Potassium 4.9 mmol/L      Chloride 101 mmol/L      Total CO2 12 mmol/L      Hemoglobin 13.9 g/dL      Comment: Serial Number: [id number]:  [id number]        Hematocrit 41 %      Ionized Calcium 1.07 mmol/L      eGFR 44.9 mL/min/1.73     Blood Gas, Arterial With Co-Ox [[id number]]  (Abnormal) Collected: 06/22/24 1427    Specimen: Arterial Blood Updated: 06/22/24 1427     Site Right Radial     Isaac's Test N/A     pH, Arterial 7.182 pH units      Comment: 85 Value below critical limit        pCO2, Arterial 21.0 mm Hg      Comment: 84 Value below reference range        pO2, Arterial 116.0 mm Hg      Comment: 83 Value above reference range        HCO3, Arterial 7.9 mmol/L      Base Excess, Arterial -18.6 mmol/L      Hemoglobin, Blood Gas 11.6 g/dL      Comment: 84 Value below reference range        Hematocrit, Blood Gas 35.4 %      Oxyhemoglobin 97.4 %      Methemoglobin 0.20 %      Carboxyhemoglobin 0.9 %      CO2 Content 8.5 mmol/L      Temperature 37.0     Barometric Pressure for Blood Gas --     Comment: N/A        Modality Room Air     FIO2 21 %      Rate 0 Breaths/minute      PIP 0 cmH2O      Comment: Meter: [id number]     :  [id number]        IPAP 0     EPAP 0     Notified [facility] LUIS YUSUF MD     Notified By [id number]     Notified Time 06/22/2024 14:29     pH, Temp Corrected 7.182 pH Units      pCO2, Temperature Corrected 21.0 mm Hg      pO2, Temperature Corrected 116 mm Hg           Imaging Results (Most Recent)       Procedure Component Value Units Date/Time    CT Angiogram Chest [[id number]] Collected: 06/22/24 1539     Updated: 06/22/24 1553    Narrative:      CT ANGIOGRAM CHEST, CT ANGIOGRAM ABDOMEN PELVIS    Date of Exam: 6/22/2024 3:12 PM EDT    Indication: Acute severe substernal chest pain 2 back and upper abdomen, nausea vomiting, hypertension, appears ill.    Comparison: Chest CTA dated 12/17/2022, CT of the abdomen  and pelvis dated 4/13/2023    Technique: CTA of the chest was performed after the uneventful intravenous administration of 100 mL Isovue 370. Reconstructed coronal and sagittal images were also obtained. In addition, a 3-D volume rendered image was created for interpretation.   Automated exposure control and iterative reconstruction methods were used.    FINDINGS:      Thoracic inlet: Unremarkable.    Pulmonary arteries: This examination is not optimized for detection of pulmonary emboli. No large central pulmonary embolism is seen.    Great vessels: The thoracic aorta and proximal arch vessels appear unremarkable. No thoracic aortic dissection or aneurysm is seen.    Mediastinum/Kelly: No pathologically enlarged mediastinal lymph nodes are seen. There is diffuse esophageal wall thickening, concerning for esophagitis. Small hiatal hernia is present.    Lung parenchyma: There is mild left basilar atelectasis. Lungs are otherwise adequately aerated. No acute consolidation is seen. No suspicious pulmonary nodules are seen.    Trachea and airways: The trachea and central airways appear unremarkable.    Pleural space: No significant pleural effusion or pneumothorax is seen.    Heart and pericardium: The heart and pericardium appear unremarkable.    Liver: The liver is unremarkable in morphology and decreased in attenuation. No focal liver lesion is seen. No biliary dilation is seen.    Gallbladder: Unremarkable.    Pancreas: Unremarkable.    Spleen: Unremarkable.    Adrenal glands: Unremarkable.    Genitourinary tract: Kidneys are unremarkable. No hydronephrosis is seen. The visualized portions of the ureters and urinary bladder appear unremarkable. Patient is status post hysterectomy.    Gastrointestinal tract: Colonic diverticulosis. Several small duodenal diverticula noted. Esophageal wall thickening. No evidence of bowel obstruction.    Appendix: No findings to suggest acute appendicitis.    Other findings: No free  air or free fluid is identified. No pathologically enlarged lymph nodes are seen. The abdominal aorta is normal in course and caliber. The celiac artery, SMA, DEEPTI, bilateral renal arteries, and visualized iliofemoral arteries   appear patent without significant stenosis. No abdominal aortic aneurysm or dissection is seen. IVC is grossly unremarkable.    Bones and soft tissues: No acute or suspicious osseous or soft tissue lesion is identified. Surgical hardware within the left proximal femur. Degenerative changes and mild scoliotic curvature of the visualized spine.          Impression:      1.Diffuse esophageal wall thickening, concerning for esophagitis. Small hiatal hernia. Consider further evaluation with EGD.  2.Otherwise, no acute abnormality identified within the chest, abdomen, or pelvis.  3.No acute abnormality of the thoracic or abdominal aorta.  4.Colonic diverticulosis and several small distal duodenal diverticula.  5.Additional findings as detailed above.    Electronically Signed: Erik Garcia MD    6/22/2024 3:49 PM EDT    Workstation ID: [id number]    CT Angiogram Abdomen Pelvis [[id number]] Collected: 06/22/24 1539     Updated: 06/22/24 1553    Narrative:      CT ANGIOGRAM CHEST, CT ANGIOGRAM ABDOMEN PELVIS    Date of Exam: 6/22/2024 3:12 PM EDT    Indication: Acute severe substernal chest pain 2 back and upper abdomen, nausea vomiting, hypertension, appears ill.    Comparison: Chest CTA dated 12/17/2022, CT of the abdomen and pelvis dated 4/13/2023    Technique: CTA of the chest was performed after the uneventful intravenous administration of 100 mL Isovue 370. Reconstructed coronal and sagittal images were also obtained. In addition, a 3-D volume rendered image was created for interpretation.   Automated exposure control and iterative reconstruction methods were used.    FINDINGS:      Thoracic inlet: Unremarkable.    Pulmonary arteries: This examination is not optimized for detection of pulmonary  emboli. No large central pulmonary embolism is seen.    Great vessels: The thoracic aorta and proximal arch vessels appear unremarkable. No thoracic aortic dissection or aneurysm is seen.    Mediastinum/Kelly: No pathologically enlarged mediastinal lymph nodes are seen. There is diffuse esophageal wall thickening, concerning for esophagitis. Small hiatal hernia is present.    Lung parenchyma: There is mild left basilar atelectasis. Lungs are otherwise adequately aerated. No acute consolidation is seen. No suspicious pulmonary nodules are seen.    Trachea and airways: The trachea and central airways appear unremarkable.    Pleural space: No significant pleural effusion or pneumothorax is seen.    Heart and pericardium: The heart and pericardium appear unremarkable.    Liver: The liver is unremarkable in morphology and decreased in attenuation. No focal liver lesion is seen. No biliary dilation is seen.    Gallbladder: Unremarkable.    Pancreas: Unremarkable.    Spleen: Unremarkable.    Adrenal glands: Unremarkable.    Genitourinary tract: Kidneys are unremarkable. No hydronephrosis is seen. The visualized portions of the ureters and urinary bladder appear unremarkable. Patient is status post hysterectomy.    Gastrointestinal tract: Colonic diverticulosis. Several small duodenal diverticula noted. Esophageal wall thickening. No evidence of bowel obstruction.    Appendix: No findings to suggest acute appendicitis.    Other findings: No free air or free fluid is identified. No pathologically enlarged lymph nodes are seen. The abdominal aorta is normal in course and caliber. The celiac artery, SMA, DEEPTI, bilateral renal arteries, and visualized iliofemoral arteries   appear patent without significant stenosis. No abdominal aortic aneurysm or dissection is seen. IVC is grossly unremarkable.    Bones and soft tissues: No acute or suspicious osseous or soft tissue lesion is identified. Surgical hardware within the left  proximal femur. Degenerative changes and mild scoliotic curvature of the visualized spine.          Impression:      1.Diffuse esophageal wall thickening, concerning for esophagitis. Small hiatal hernia. Consider further evaluation with EGD.  2.Otherwise, no acute abnormality identified within the chest, abdomen, or pelvis.  3.No acute abnormality of the thoracic or abdominal aorta.  4.Colonic diverticulosis and several small distal duodenal diverticula.  5.Additional findings as detailed above.    Electronically Signed: Erik Garcia MD    6/22/2024 3:49 PM EDT    Workstation ID: [id number]    XR Chest 1 View [[id number]] Collected: 06/22/24 1538     Updated: 06/22/24 1543    Narrative:      XR CHEST 1 VW    Date of Exam: 6/22/2024 2:57 PM EDT    Indication: Chest Pain Triage Protocol    Comparison: 11/12/2023    Findings:  There is a dextroconvex scoliosis. Patient appears mildly rotated to the left, likely as result, and similar to the comparison exam. Heart shadow and pulmonary vasculature appear normal in size. Lungs appear clear except for stable mild chronic   interstitial changes. No edema effusion or pneumothorax is seen.      Impression:      Impression:  Dextroconvex scoliosis again noted. No new chest disease is seen.      Electronically Signed: Reggie Barillas MD    6/22/2024 3:40 PM EDT    Workstation ID: [id number]             Physician Progress Notes (most recent note)        Bernie Merritt, DNP, APRN at 06/24/24 0837            Intensive Care Follow-up     Hospital:  LOS: 2 days   Ms. Alysia Sierra, 69 y.o. female is followed for:   Sepsis     Subjective   Interval History:  The chart has been reviewed. No acute events overnight. Serum bicarbonate normalized on AM labs.     Patient sitting up in bed this AM in NAD. She remains afebrile, hemodynamically stable, and is oxygenating appropriately on RA. Tolerating a PO diet without issue. Has not been up out of bed ambulating as of yet.     She denies  current dizziness, chest pain, shortness of breath, nausea, vomiting, diarrhea, or abdominal discomfort. Only complaint is poor sleep last night / frequent wake ups 2* being in ICU.      The patient's past medical, surgical and social history were reviewed and updated in Epic as appropriate.    Objective     Infusions:     Medications:  DULoxetine, 20 mg, Oral, BID  folic acid, 1 mg, Oral, Daily  gabapentin, 300 mg, Oral, Q AM  gabapentin, 600 mg, Oral, Nightly  heparin (porcine), 5,000 Units, Subcutaneous, Q8H  insulin lispro, 2-7 Units, Subcutaneous, 4x Daily AC & at Bedtime  levothyroxine, 25 mcg, Oral, Q AM  pantoprazole, 40 mg, Intravenous, BID AC  piperacillin-tazobactam, 3.375 g, Intravenous, Q8H  rosuvastatin, 5 mg, Oral, Nightly  thiamine (B-1) IV, 200 mg, Intravenous, Q8H   Followed by  [START ON 6/27/2024] thiamine, 100 mg, Oral, Daily  vitamin B-12, 1,000 mcg, Oral, Daily      I reviewed the patient's medications.    Vital Sign Min/Max for last 24 hours  Temp  Min: 98 °F (36.7 °C)  Max: 98.6 °F (37 °C)   BP  Min: 119/75  Max: 159/93   Pulse  Min: 85  Max: 105   Resp  Min: 14  Max: 20   SpO2  Min: 85 %  Max: 99 %   No data recorded       Input/Output for last 24 hour shift  06/23 0701 - 06/24 0700  In: 2453.8 [P.O.:993; I.V.:549.7]  Out: 2625 [Urine:2625]     Physical Exam:  GENERAL: Female resting supine in bed and conversant. No acute distress.   HEENT: Normocephalic and atraumatic. Trachea midline. PER. EOM WNL.   LUNGS: Chest rise of normal depth and symmetric. Lungs clear to auscultation bilaterally. No wheezes, rhonchi, or rales.   HEART: S1,S2 detected. Regular rate and rhythm. No rub, murmur, or gallop.   ABDOMEN: Soft, round, nondistended, and nontender. Bowel sounds present.   EXTREMITIES: No clubbing, edema, or cyanosis. Peripheral pulses present. Skin warm and dry.    NEURO/PSYCH: Alert and oriented. Follows commands. Moves all extremities. No focal deficits.      Results from last 7 days    Lab Units 06/23/24  0833 06/22/24  1441 06/22/24  1437   WBC 10*3/mm3 10.68  --  19.75*   HEMOGLOBIN g/dL 10.3*  --  12.1   HEMOGLOBIN, POC g/dL  --  13.9  --    PLATELETS 10*3/mm3 169  --  276     Results from last 7 days   Lab Units 06/24/24  0724 06/23/24  2350 06/23/24  1825 06/23/24  1225 06/23/24  0554 06/22/24  1441 06/22/24  1437   SODIUM mmol/L 138 135* 134*   < > 138   < > 136   POTASSIUM mmol/L 3.7 3.7 4.1   < > 3.8   < > 5.0   CO2 mmol/L 22.0 20.0* 18.0*   < > 21.0*   < > 10.0*   BUN mg/dL 9 11 13   < > 19   < > 31*   CREATININE mg/dL 1.11* 1.08* 1.19*   < > 1.27*   < > 1.63*   MAGNESIUM mg/dL  --  2.5*  --   --  1.4*  --  1.9   PHOSPHORUS mg/dL 2.4*  --  1.8*  --  1.8*  --  5.7*   GLUCOSE mg/dL 92 100* 150*   < > 185*   < > 162*    < > = values in this interval not displayed.     Estimated Creatinine Clearance: 46.2 mL/min (A) (by C-G formula based on SCr of 1.11 mg/dL (H)).    Results from last 7 days   Lab Units 06/22/24  1427   PH, ARTERIAL pH units 7.182*   PCO2, ARTERIAL mm Hg 21.0*   PO2 ART mm Hg 116.0*     I reviewed the patient's new clinical results.  I reviewed the patient's new imaging results/reports including actual images and agree with reports.     Assessment & Plan   Impression      Sepsis    CKD (chronic kidney disease) stage 3, GFR 30-59 ml/min    Hypothyroidism    Alcoholic ketoacidosis    Leukocytosis    GERD with esophagitis    69 yoF non-smoker with PMH of ETOH use, grade 2 endometrioid adenocarcinoma s/p hysterectomy and adjuvant chemotherapy (2023), HTN, dyslipidemia, SVT, fibromyalgia, neuropathy, depression, and GERD who presented to [facility] ED with acute onset of severe chest pain radiating to her back and upper abdomen with associated nausea & vomiting.     She was found to have severe metabolic acidosis with bicarbonate of 10 and anion gap of 35. CT A/P showed diffuse esophagitis but was otherwise negative for an acute abdominopelvic process. She was pancultured and initiated  on empiric antibiotics with admission to ICU for further management.      Plan        For possible sepsis versus hypovolemia with starvation or alcoholic ketosis: Continue empiric Zosyn and follow up culture results. No evidence of septic source on imaging or labs thus far. MRSA swab negative.   For anion gap metabolic acidosis: Felt likely 2* ketosis from starvation or alcohol. Resolved with IV fluid. Monitor.   For ETOH use: Patient admitted to drinking 1-2 glasses of wine per day. Continue CIWA protocol as well as empiric thiamine and multivitamin.   For esophagitis: Continue BID PPI.   For neuropathy: Continue Cymbalta & gabapentin.   For dyslipidemia: Continue rosuvastatin.   For hypothyroidism: Continue levothyroxine.   Consult PT & OT.   AM labs.     DVT Prophylaxis: SQ Heparin  GI Prophylaxis: PPI  Dispo: Transfer to Telemetry; Hospitalists to assume attending role in AM     Time spent: 37 minutes   Plan of care and goals reviewed with multidisciplinary/antibiotic stewardship team during rounds.   I discussed the patient's findings and my recommendations with patient and nursing staff     Bernie Merritt DNP, APRN, [facility]  Pulmonary and Critical Care Medicine        Electronically signed by Bernie Merritt DNP, APRN at 24 1433       Consult Notes (most recent note)    No notes of this type exist for this encounter.          Discharge Summary        Bernie Merritt DNP, APRN at 24 1340       Attestation signed by Saniya Batista DO at 24 0984    I have reviewed this documentation and agree.                    DISCHARGE SUMMARY       Patient name: Alysia Sierra  CSN: [id number]  MRN: [id number]  : 1955    Date of Admission: 2024  Date of Discharge:  2024    Admitting Physician: Danilo Smith MD   Primary Care Provider: Cassy Foster MD    Admission Diagnosis: Sepsis     Discharge Diagnoses:     Sepsis    CKD (chronic kidney disease) stage 3, GFR 30-59 ml/min     Hypothyroidism    Alcoholic ketoacidosis    Leukocytosis    GERD with esophagitis    Imaging:  CT Angiogram Chest    Result Date: 6/22/2024  1.Diffuse esophageal wall thickening, concerning for esophagitis. Small hiatal hernia. Consider further evaluation with EGD. 2.Otherwise, no acute abnormality identified within the chest, abdomen, or pelvis. 3.No acute abnormality of the thoracic or abdominal aorta. 4.Colonic diverticulosis and several small distal duodenal diverticula. 5.Additional findings as detailed above. Electronically Signed: Erik Garcia MD  6/22/2024 3:49 PM EDT  Workstation ID: [id number]    CT Angiogram Abdomen Pelvis    Result Date: 6/22/2024  1.Diffuse esophageal wall thickening, concerning for esophagitis. Small hiatal hernia. Consider further evaluation with EGD. 2.Otherwise, no acute abnormality identified within the chest, abdomen, or pelvis. 3.No acute abnormality of the thoracic or abdominal aorta. 4.Colonic diverticulosis and several small distal duodenal diverticula. 5.Additional findings as detailed above. Electronically Signed: Erik Garcia MD  6/22/2024 3:49 PM EDT  Workstation ID: [id number]    XR Chest 1 View    Result Date: 6/22/2024  Impression: Dextroconvex scoliosis again noted. No new chest disease is seen. Electronically Signed: Reggie Barillas MD  6/22/2024 3:40 PM EDT  Workstation ID: [id number]     History of Present Illness (copied from H&P note on 6/22/24):  69-year-old female with past medical history of grade 2 endometrioid adenocarcinoma s/p hysterectomy and adjuvant chemotherapy 2023, hypertension, dyslipidemia, supraventricular tachycardia, fibromyalgia, neuropathy, depression, GERD.  Patient presented with acute onset of severe chest pain with which she woke up.  Patient states that pain was radiating to her back and to her upper abdomen and was also having nausea and vomiting.  Patient was admitted to emergency room and underwent further workup.  Found to have severe  metabolic acidosis with bicarb of 10 and high anion gap of 35.  Patient denies any spurious alcohol intake or any other extraneous substance intake.  States that she used to drink alcohol but now has been drinking wine maybe 1 to 2 glasses a day.  Patient was given fluid bolus.  She was noted to have leukocytosis.  Underwent CT chest abdomen and pelvis which did not reveal any acute pathology other than diffuse esophagitis.  ABG obtained and pH of 7.18.  patient was also felt to be having tremulous activity so started on CIWA protocol.  Patient had significant lactic acidosis of 6.9.  Denies any seizure activity.  Urine drug screen checked and negative.  Alcohol level, acetaminophen and salicylate levels negative as well.  Beta-hydroxybutyrate elevated.  Will get urinalysis as well.  Patient will be admitted to ICU for closer monitoring and management of severe metabolic acidosis.     Patient is awake and alert on arrival to ICU.  Hemodynamically stable otherwise.  Quite tremulous.  States that she is feeling cold.  Last alcoholic drink was yesterday late afternoon (end of copied text).    Hospital Course:  Patient was admitted to ICU and continued on CIWA protocol (ultimately only received 2 doses of PO Ativan) and empiric Zosyn. She completed 3 days of IV Zosyn with no evidence of septic source on imaging or labs. Her clinical status improved drastically and she returned to her pre-hospitalization baseline on 6/25.     Patient is felt to have reached maximum benefit from this hospitalization and has been deemed appropriate for discharge home. She is feeling well, is tolerating a PO diet, and is ambulating without difficulty. She was noted to have hypokalemia, hypomagnesemia, and hypophosphatemia on AM labs and received replacement. Phosphorous was rechecked later this afternoon and remained low at 1.7. Patient still adamant about wanting to go home. She will receive an additional dose of phosphorous replacement  "prior to D/C and will follow up with her PCP this week with a repeat BMP, magnesium, and phosphorous check.      Vitals:  /97   Pulse 85   Temp 98.6 °F (37 °C) (Oral)   Resp 16   Ht 162.6 cm (64\")   Wt 60.3 kg (133 lb)   LMP  (LMP Unknown)   SpO2 99%   BMI 22.83 kg/m²     Physical Exam:  Constitutional:  Appears well-developed and well-nourished. No distress.   HEENT:  Normocephalic and atraumatic. PER  Neck: Normal range of motion. Neck supple. No JVD present.   Cardiovascular: Normal rate, regular rhythm, normal heart sounds and intact distal pulses.  No gallop and no friction rub.  No murmur heard.  Pulmonary/Chest: Effort normal and breath sounds normal. No respiratory distress. No wheezes, rhonchi or rales.   Abdominal: Soft. No distension and no mass. There is no tenderness.   Musculoskeletal: Normal range of motion.   Neurological: Alert and oriented to person, place, and time.  No focal deficits  Skin: Skin is warm and dry. No rash noted.   Extremities:  No clubbing, edema or cyanosis  Psychiatric: Normal mood and affect. Behavior is normal.     Labs:  Results from last 7 days   Lab Units 06/25/24  0401   WBC 10*3/mm3 4.64   HEMOGLOBIN g/dL 9.1*   HEMATOCRIT % 28.2*   PLATELETS 10*3/mm3 117*     Results from last 7 days   Lab Units 06/25/24  1506 06/25/24  0401 06/23/24  1225 06/23/24  0554   SODIUM mmol/L  --  140   < > 138   POTASSIUM mmol/L 5.0 3.4*   < > 3.8   CHLORIDE mmol/L  --  104   < > 107   CO2 mmol/L  --  25.0   < > 21.0*   BUN mg/dL  --  7*   < > 19   CREATININE mg/dL  --  1.14*   < > 1.27*   CALCIUM mg/dL  --  8.2*   < > 7.6*   BILIRUBIN mg/dL  --   --   --  0.4   ALK PHOS U/L  --   --   --  62   ALT (SGPT) U/L  --   --   --  8   AST (SGOT) U/L  --   --   --  18   GLUCOSE mg/dL  --  97   < > 185*    < > = values in this interval not displayed.         Magnesium   Date Value Ref Range Status   06/25/2024 1.5 (L) 1.6 - 2.4 mg/dL Final   06/23/2024 2.5 (H) 1.6 - 2.4 mg/dL Final "   06/23/2024 1.4 (L) 1.6 - 2.4 mg/dL Final     Phosphorus   Date Value Ref Range Status   06/25/2024 1.7 (C) 2.5 - 4.5 mg/dL Final   06/25/2024 2.2 (L) 2.5 - 4.5 mg/dL Final   06/24/2024 2.4 (L) 2.5 - 4.5 mg/dL Final           Discharge Medications:     Discharge Medications        Continue These Medications        Instructions Start Date   Alpha-Lipoic Acid 600 MG capsule   Oral      cholecalciferol 25 MCG (1000 UT) tablet  Commonly known as: VITAMIN D3   1,000 Units, Oral, Daily      DULoxetine 20 MG capsule  Commonly known as: CYMBALTA   20 mg, Oral, 2 Times Daily      esomeprazole 20 MG capsule  Commonly known as: nexIUM   20 mg, Oral, 2 Times Weekly, OTC      folic acid 1 MG tablet  Commonly known as: FOLVITE   1 mg, Oral, Daily      gabapentin 300 MG capsule  Commonly known as: Neurontin   Take 1 capsule in the morning and 2 capsules at night.      levothyroxine 25 MCG tablet  Commonly known as: SYNTHROID, LEVOTHROID   TAKE ONE TABLET BY MOUTH EVERY MORNING ON AN EMPTY STOMACH      Lidocaine 4 %   1 patch, Transdermal, Every 24 Hours Scheduled, Remove & Discard patch within 12 hours or as directed by MD      magnesium oxide 400 (241.3 Mg) MG tablet tablet  Commonly known as: MAGOX   400 mg, Oral, Daily, OTC      metoprolol succinate XL 25 MG 24 hr tablet  Commonly known as: TOPROL-XL   25 mg, Oral, Nightly      rosuvastatin 5 MG tablet  Commonly known as: Crestor   5 mg, Oral, Daily      thiamine 100 MG tablet  Commonly known as: VITAMIN B1   100 mg, Oral, Daily      vitamin B-12 1000 MCG tablet  Commonly known as: CYANOCOBALAMIN   1,000 mcg, Oral, Daily             Stop These Medications      docusate sodium 100 MG capsule  Commonly known as: COLACE     hydrALAZINE 25 MG tablet  Commonly known as: APRESOLINE     nitroglycerin 0.4 MG SL tablet  Commonly known as: NITROSTAT     oxyCODONE 5 MG capsule  Commonly known as: OXY-IR     traMADol 50 MG tablet  Commonly known as: ULTRAM            Discharge Diet:    Diet Instructions       Diet: Regular/House Diet; Regular (IDDSI 7); Thin (IDDSI 0)      Discharge Diet: Regular/House Diet    Texture: Regular (IDDSI 7)    Fluid Consistency: Thin (IDDSI 0)          Activity at Discharge:    Activity Instructions       Activity as Tolerated            Follow-up Appointments  Future Appointments   Date Time Provider Department Center   6/28/2024 11:15 AM Jevon Foster MD [facility]   7/11/2024  3:30 PM Austin Tatum LCSW [facility] None   8/12/2024 11:00 AM Caden Dietz MD [facility]   8/16/2024 11:45 AM H[facility] CT 1 BH [facility] CT None   10/1/2024 11:30 AM Franklin Hamm APRN [facility] None   10/14/2024 11:00 AM Jevon Foster MD [facility]   11/11/2024  9:30 AM Gray Bahena MD [facility]   12/12/2024 11:30 AM Liana So APRN [facility]     Additional Instructions for the Follow-ups that You Need to Schedule       Ambulatory Referral to Home Health   As directed      Face to Face Visit Date: 6/25/2024   Follow-up provider for Plan of Care?: I treated the patient in an acute care facility and will not continue treatment after discharge.   Follow-up provider: JEVON FOSTER [[id number]]   Reason/Clinical Findings: Sepsis   Describe mobility limitations that make leaving home difficult: Impaired gait, mobility, balance, and endurance   Nursing/Therapeutic Services Requested: Physical Therapy   PT orders: Therapeutic exercise Gait Training Transfer training Strengthening Home safety assessment   Weight Bearing Status: As Tolerated   Frequency: 1 Week 1        Discharge Follow-up with PCP   As directed       Currently Documented PCP:    Jevon Foster MD    PCP Phone Number:    [phone]     Follow Up Details: Within 1 week with repeat BMP        Basic Metabolic Panel    Jun 30, 2024 (Approximate)      At PCP appointment    Order Comments: At PCP appointment    Release to patient: Routine Release        Magnesium    Jun 30, 2024  (Approximate)      To be obtained at PCP appointment    Order Comments: To be obtained at PCP appointment    Release to patient: Routine Release        Phosphorus    Jun 30, 2024 (Approximate)      To be obtained at PCP appointment    Order Comments: To be obtained at PCP appointment    Release to patient: Routine Release              Discharge Instructions:  Okay to discharge home   Medications as above   Follow up with providers as above     Current Code Status       Date Active Code Status Order ID Comments User Context       6/23/2024 1246 CPR (Attempt to Resuscitate) [id number]  Collin Ricardo MD Inpatient        Question Answer    Code Status (Patient has no pulse and is not breathing) CPR (Attempt to Resuscitate)    Medical Interventions (Patient has pulse or is breathing) Full Support             Bernie Merritt, DNP, APRN, AGACNP-BC  Pulmonary and Critical Care Medicine  06/25/24     Time: Discharge 40 min    CC: Cassy Foster MD    Please note that portions of this note were completed with a voice recognition program.     Electronically signed by Saniya Batista, DO at 06/26/24 0949       Discharge Order (From admission, onward)       Start     Ordered    06/25/24 1543  Discharge patient  Once        Expected Discharge Date: 06/25/24   Discharge Disposition: Home or Self Care   Physician of Record for Attribution - Please select from Treatment Team: SANIYA BATISTA [[id number]]   Review needed by CMO to determine Physician of Record: No      Question Answer Comment   Physician of Record for Attribution - Please select from Treatment Team SANIYA BATISTA    Review needed by CMO to determine Physician of Record No        06/25/24 4834

## 2024-06-26 NOTE — OUTREACH NOTE
Call Center TCM Note      Flowsheet Row Responses   [facility] patient discharged from? [place]   Does the patient have one of the following disease processes/diagnoses(primary or secondary)? Sepsis   TCM attempt successful? Yes   Call start time 1108   Call end time 1110   Discharge diagnosis Sepsis   Is patient permission given to speak with other caregiver? Yes   List who call center can speak with Akila lees   Person spoke with today (if not patient) and relationship Akila lees   Meds reviewed with patient/caregiver? Yes   Is the patient having any side effects they believe may be caused by any medication additions or changes? No   Does the patient have all medications related to this admission filled (includes all antibiotics, inhalers, nebulizers,steroids,etc.) N/A   Is the patient taking all medications as directed (includes completed medication regime)? Yes   Comments Hosp dc [facility] apt on 6/28/24 with PCP   Does the patient have an appointment with their PCP within 7-14 days of discharge? Yes   Psychosocial issues? No   Did the patient receive a copy of their discharge instructions? Yes   Nursing interventions Reviewed instructions with patient   What is the patient's perception of their health status since discharge? Improving  [BP running high-pt is waiting for a return call from cardiologist]   Is the patient/caregiver able to teach back TIME? T emperature - higher or lower than normal, I nfection - may have signs and symptoms of an infection, M ental Decline - confused, sleepy, difficult to arouse, E xtremely Ill - severe pain, discomfort, shortness of breath   Is patient/caregiver able to teach back steps to recovery at home? Set small, achievable goals for return to baseline health, Talk about feelings with family/friends, Rest and regain strength, Record milestones and struggles in a journal, Make a list of questions for PCP appoinment, Exercise as tolerated   Is the patient/caregiver able to  teach back signs and symptoms of worsening condition: Fever, Rapid heart rate (>90)   If the patient is a current smoker, are they able to teach back resources for cessation? Not a smoker   TCM call completed? Yes   Call end time 1110            Jamila Mccartney RN    6/26/2024, 11:11 EDT

## 2024-06-26 NOTE — TELEPHONE ENCOUNTER
Caller: Alysia Sierra    Relationship to patient: Self    Best call back number: [phone]     New or established patient?  [] New  [x] Established    Date of discharge: 06/25/24    Facility discharged from: [facility]     Diagnosis/Symptoms: CHEST PAIN, HIGH BLOOD PRESSURE, HYPOPHOSPHATEMIA, AND KETOASIDOSIS.     Length of stay (If applicable): 3 DAYS 06/22/24-06/25/24    Specialty Only: Did you see a Taoist health provider?    [] Yes  [x] No    INFORMATION PROVIDED: PATIENT WAS ADMITTED INTO THE HOSPITAL ON 06/22/24 FOR CHEST PAIN. PATIENT STATED SHE WAS DIAGNOSED WITH A FEW NEW THINGS AND A LOT OF TESTING WAS COMPLETED. PATIENT WOULD LIKE TO GET IN TO SEE DR. MACK TO DISCUSS TEST RESULTS AND HER BLOOD PRESSURE. PATIENT STATED THAT WHILE IN THE HOSPITAL SHE FELT FINE, BUT THIS MORNING WHEN SHE WOKE UP HER BLOOD PRESSURE WAS VERY HIGH /102 AND SHE HAS A HEADACHE. PATIENT STATED SHE IS AFRAID THAT SHE IS GOING TO STROKE OUT IF HER BLOOD PRESSURE CONTINUES TO GET HIGHER. PATIENT SAID SHE IS WILLING TO GO TO EITHER OFFICE.     PATIENT REQUESTED THAT THIS BE SENT OVER AS HIGH PRIORITY.

## 2024-06-27 LAB
BACTERIA SPEC AEROBE CULT: NORMAL
BACTERIA SPEC AEROBE CULT: NORMAL

## 2024-06-28 ENCOUNTER — LAB (OUTPATIENT)
Dept: LAB | Facility: HOSPITAL | Age: 69
End: 2024-06-28
Payer: MEDICARE

## 2024-06-28 ENCOUNTER — OFFICE VISIT (OUTPATIENT)
Dept: INTERNAL MEDICINE | Facility: CLINIC | Age: 69
End: 2024-06-28
Payer: MEDICARE

## 2024-06-28 VITALS
HEART RATE: 90 BPM | BODY MASS INDEX: 23.05 KG/M2 | WEIGHT: 135 LBS | HEIGHT: 64 IN | OXYGEN SATURATION: 96 % | SYSTOLIC BLOOD PRESSURE: 110 MMHG | DIASTOLIC BLOOD PRESSURE: 72 MMHG | RESPIRATION RATE: 16 BRPM

## 2024-06-28 DIAGNOSIS — E83.39 HYPOPHOSPHATEMIA: ICD-10-CM

## 2024-06-28 DIAGNOSIS — E87.20 METABOLIC ACIDOSIS: ICD-10-CM

## 2024-06-28 DIAGNOSIS — R79.9 ABNORMAL FINDING OF BLOOD CHEMISTRY, UNSPECIFIED: ICD-10-CM

## 2024-06-28 DIAGNOSIS — E83.39 HYPOPHOSPHATEMIA DUE TO CHRONIC KIDNEY DISEASE: ICD-10-CM

## 2024-06-28 DIAGNOSIS — Z76.89 ENCOUNTER FOR SUPPORT AND COORDINATION OF TRANSITION OF CARE: Primary | ICD-10-CM

## 2024-06-28 DIAGNOSIS — N18.9 HYPOPHOSPHATEMIA DUE TO CHRONIC KIDNEY DISEASE: ICD-10-CM

## 2024-06-28 DIAGNOSIS — R11.0 NAUSEA: ICD-10-CM

## 2024-06-28 DIAGNOSIS — N18.31 STAGE 3A CHRONIC KIDNEY DISEASE: ICD-10-CM

## 2024-06-28 DIAGNOSIS — G89.29 CHRONIC LEFT-SIDED LOW BACK PAIN WITHOUT SCIATICA: ICD-10-CM

## 2024-06-28 DIAGNOSIS — M54.50 CHRONIC LEFT-SIDED LOW BACK PAIN WITHOUT SCIATICA: ICD-10-CM

## 2024-06-28 PROCEDURE — 84100 ASSAY OF PHOSPHORUS: CPT

## 2024-06-28 PROCEDURE — 83735 ASSAY OF MAGNESIUM: CPT

## 2024-06-28 PROCEDURE — 80053 COMPREHEN METABOLIC PANEL: CPT

## 2024-06-28 PROCEDURE — 36415 COLL VENOUS BLD VENIPUNCTURE: CPT

## 2024-06-28 RX ORDER — ONDANSETRON 4 MG/1
4 TABLET, ORALLY DISINTEGRATING ORAL EVERY 8 HOURS PRN
Qty: 30 TABLET | Refills: 1 | Status: SHIPPED | OUTPATIENT
Start: 2024-06-28

## 2024-06-28 RX ORDER — LIDOCAINE 50 MG/G
1 PATCH TOPICAL EVERY 24 HOURS
Qty: 14 EACH | Refills: 1 | Status: SHIPPED | OUTPATIENT
Start: 2024-06-28

## 2024-06-28 NOTE — PROGRESS NOTES
Transitional Care Follow Up Visit  Subjective     Alysia Sierra is a 69 y.o. female who presents for a transitional care management visit.    Within 48 business hours after discharge our office contacted her via telephone to coordinate her care and needs.      I reviewed and discussed the details of that call along with the discharge summary, hospital problems, inpatient lab results, inpatient diagnostic studies, and consultation reports with Alysia.     Current outpatient and discharge medications have been reconciled for the patient.  Reviewed by: Cassy Foster MD          6/25/2024     6:56 PM   Date of TCM Phone Call   [facility]   Date of Admission 6/22/2024   Date of Discharge 6/25/2024   Discharge Disposition [facility] [place] [facility]     Risk for Readmission (LACE) Score: 11 (6/25/2024  6:00 AM)      History of Present Illness   Course During Hospital Stay:  admitted 6/22/2024 to 6/25/2024     Admitted for Sepsis   69-year-old female with past medical history of grade 2 endometrioid adenocarcinoma s/p hysterectomy and adjuvant chemotherapy 2023, hypertension, dyslipidemia, supraventricular tachycardia, fibromyalgia, neuropathy, depression, GERD. Patient presented with acute onset of severe chest pain     Found to have severe metabolic acidosis with bicarb of 10 and high anion gap of 35. Patient denies any spurious alcohol intake or any other extraneous substance intake.     ABG obtained and pH of 7.18. patient was also felt to be having tremulous activity so started on CIWA protocol. Patient had significant lactic acidosis of 6.9. Denies any seizure activity. Urine drug screen checked and negative. Alcohol level, acetaminophen and salicylate levels negative as well. Beta-hydroxybutyrate elevated. Will get urinalysis as well. Patient will be admitted to ICU for closer monitoring and management of severe metabolic acidosis.     Patient was admitted to ICU and continued on CIWA protocol  "(ultimately only received 2 doses of PO Ativan) and empiric Zosyn. She completed 3 days of IV Zosyn with no evidence of septic source on imaging or labs. Her clinical status improved drastically and she returned to her pre-hospitalization baseline on 6/25.     Per patient has not been eating right- to tired to fix something   Will sometimes go a day without eating   Doesn't always feel hungry   Overall feels better   No recurrent chest pain   Has stopped drinking completely. No alcohol since prior to admission     Will be starting with home health   CMP, Mag, phosphorous, Hga1c     Does have follow up with Dr. Wheatley- nephrology- July 15th   Scheduled for CTA 8/16    The following portions of the patient's history were reviewed and updated as appropriate: allergies, current medications, past family history, past medical history, past social history, past surgical history, and problem list.      Review of Systems   Constitutional:  Negative for chills and fever.   Respiratory:  Negative for cough and shortness of breath.    Cardiovascular:  Negative for chest pain, palpitations and leg swelling.   Gastrointestinal:  Negative for abdominal pain, nausea and vomiting.   Genitourinary:  Negative for dysuria.   Musculoskeletal:  Positive for myalgias.   Skin:  Negative for rash.   Neurological:  Negative for light-headedness and headaches.       Objective   /72   Pulse 90   Resp 16   Ht 162.6 cm (64\")   Wt 61.2 kg (135 lb)   SpO2 96%   BMI 23.17 kg/m²   Physical Exam  Vitals and nursing note reviewed.   Constitutional:       General: She is not in acute distress.     Appearance: Normal appearance.   HENT:      Head: Normocephalic and atraumatic.   Cardiovascular:      Rate and Rhythm: Normal rate and regular rhythm.      Heart sounds: Normal heart sounds.   Pulmonary:      Effort: Pulmonary effort is normal. No respiratory distress.      Breath sounds: Normal breath sounds.   Skin:     General: Skin is warm and " dry.   Neurological:      General: No focal deficit present.      Mental Status: She is alert and oriented to person, place, and time.         Assessment & Plan   Diagnoses and all orders for this visit:    Diagnosis Discussed   Continue to monitor   Plenty of fluids, monitor diet and exercise   Labs ordered will notify of results   Follow up with Nephrology  Follow up with Cardiology   If symptoms worsen or persist please seek further evaluation     1. Encounter for support and coordination of transition of care (Primary)    2. Metabolic acidosis  -     Comprehensive Metabolic Panel; Future  -     Magnesium; Future  -     Phosphorus; Future  -     Hemoglobin A1c; Future    3. Stage 3a chronic kidney disease  - improved in Hospital.   Recheck today   Continue to follow with Nephrology- Dr. Wheatley     4. 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  Recheck today   Follow up with Nephrology- Dr. Wheatley     5. Nausea  -     ondansetron ODT (ZOFRAN-ODT) 4 MG disintegrating tablet; Place 1 tablet on the tongue Every 8 (Eight) Hours As Needed for Nausea or Vomiting.  Dispense: 30 tablet; Refill: 1    6. Chronic left-sided low back pain without sciatica  -     lidocaine (LIDODERM) 5 %; Place 1 patch on the skin as directed by provider Daily. Remove & Discard patch within 12 hours or as directed by MD  Dispense: 14 each; Refill: 1    7. Abnormal finding of blood chemistry, unspecified  -     Hemoglobin A1c; Future

## 2024-06-28 NOTE — PATIENT INSTRUCTIONS
Diagnosis Discussed   Continue to monitor   Plenty of fluids, monitor diet and exercise   Labs ordered will notify of results   Follow up with Nephrology  Follow up with Cardiology   If symptoms worsen or persist please seek further evaluation

## 2024-06-29 LAB
ALBUMIN SERPL-MCNC: 4.5 G/DL (ref 3.5–5.2)
ALBUMIN/GLOB SERPL: 1.7 G/DL
ALP SERPL-CCNC: 60 U/L (ref 39–117)
ALT SERPL W P-5'-P-CCNC: 12 U/L (ref 1–33)
ANION GAP SERPL CALCULATED.3IONS-SCNC: 15.1 MMOL/L (ref 5–15)
AST SERPL-CCNC: 26 U/L (ref 1–32)
BILIRUB SERPL-MCNC: 0.2 MG/DL (ref 0–1.2)
BUN SERPL-MCNC: 10 MG/DL (ref 8–23)
BUN/CREAT SERPL: 8.5 (ref 7–25)
CALCIUM SPEC-SCNC: 9.6 MG/DL (ref 8.6–10.5)
CHLORIDE SERPL-SCNC: 97 MMOL/L (ref 98–107)
CO2 SERPL-SCNC: 24.9 MMOL/L (ref 22–29)
CREAT SERPL-MCNC: 1.17 MG/DL (ref 0.57–1)
EGFRCR SERPLBLD CKD-EPI 2021: 50.9 ML/MIN/1.73
GLOBULIN UR ELPH-MCNC: 2.7 GM/DL
GLUCOSE SERPL-MCNC: 89 MG/DL (ref 65–99)
MAGNESIUM SERPL-MCNC: 1.7 MG/DL (ref 1.6–2.4)
PHOSPHATE SERPL-MCNC: 3.3 MG/DL (ref 2.5–4.5)
POTASSIUM SERPL-SCNC: 4 MMOL/L (ref 3.5–5.2)
PROT SERPL-MCNC: 7.2 G/DL (ref 6–8.5)
SODIUM SERPL-SCNC: 137 MMOL/L (ref 136–145)

## 2024-07-02 NOTE — PAYOR COMM NOTE
"Auth# [id number]   6/25/24 Discharge Date  Discharge Summary    Utilization Review  Phone [phone]  Fax [phone]    [facility]  [address] [place] [address]       Jayden Sierra (69 y.o. Female)       Date of Birth   1955    Social Security Number       Address   [address] [place] [address]    Home Phone   [phone]    MRN   [id number]       Anabaptism   Catholic    Marital Status                               Admission Date   6/22/24    Admission Type   Emergency    Admitting Provider   Danilo Smith MD    Attending Provider       Department, Room/Bed   [facility] 2B ICU, N231/1       Discharge Date   6/25/2024    Discharge Disposition   Home or Self Care    Discharge Destination                                 Attending Provider: (none)   Allergies: Lisinopril, Metal, Milk-related Compounds, Tylenol [Acetaminophen], Atorvastatin    Isolation: None   Infection: None   Code Status: Prior    Ht: 162.6 cm (64\")   Wt: 60.3 kg (133 lb)    Admission Cmt: None   Principal Problem: Sepsis [A41.9]                   Active Insurance as of 6/22/2024       Primary Coverage       Payor Plan Insurance Group Employer/Plan Group    ANTHEM MEDICARE REPLACEMENT ANTHEM MEDICARE ADVANTAGE [id number]       Payor Plan Address Payor Plan Phone Number Payor Plan Fax Number Effective Dates    [address] [phone]  1/1/2024 - None Entered    [facility] [id number]         Subscriber Name Subscriber Birth Date Member ID       JAYDEN SIERRA 1955 [id number]                     Emergency Contacts        (Rel.) Home Phone Work Phone Mobile Phone    ALYSHA HOLLY (Sister) [phone] -- [phone]                 Discharge Summary        Bernie Merritt, DNP, APRN at 06/25/24 1340       Attestation signed by Saniya Batista DO at 06/26/24 0949    I have reviewed this documentation and agree.                    DISCHARGE " SUMMARY       Patient name: Alysia Sierra  CSN: [id number]  MRN: [id number]  : 1955    Date of Admission: 2024  Date of Discharge:  2024    Admitting Physician: Danilo Smith MD   Primary Care Provider: Cassy Foster MD    Admission Diagnosis: Sepsis     Discharge Diagnoses:     Sepsis    CKD (chronic kidney disease) stage 3, GFR 30-59 ml/min    Hypothyroidism    Alcoholic ketoacidosis    Leukocytosis    GERD with esophagitis    Imaging:  CT Angiogram Chest    Result Date: 2024  1.Diffuse esophageal wall thickening, concerning for esophagitis. Small hiatal hernia. Consider further evaluation with EGD. 2.Otherwise, no acute abnormality identified within the chest, abdomen, or pelvis. 3.No acute abnormality of the thoracic or abdominal aorta. 4.Colonic diverticulosis and several small distal duodenal diverticula. 5.Additional findings as detailed above. Electronically Signed: Erik Garcia MD  2024 3:49 PM EDT  Workstation ID: [id number]    CT Angiogram Abdomen Pelvis    Result Date: 2024  1.Diffuse esophageal wall thickening, concerning for esophagitis. Small hiatal hernia. Consider further evaluation with EGD. 2.Otherwise, no acute abnormality identified within the chest, abdomen, or pelvis. 3.No acute abnormality of the thoracic or abdominal aorta. 4.Colonic diverticulosis and several small distal duodenal diverticula. 5.Additional findings as detailed above. Electronically Signed: Erik Garcia MD  2024 3:49 PM EDT  Workstation ID: [id number]    XR Chest 1 View    Result Date: 2024  Impression: Dextroconvex scoliosis again noted. No new chest disease is seen. Electronically Signed: Reggie Barillas MD  2024 3:40 PM EDT  Workstation ID: [id number]     History of Present Illness (copied from H&P note on 24):  69-year-old female with past medical history of grade 2 endometrioid adenocarcinoma s/p hysterectomy and adjuvant chemotherapy , hypertension,  dyslipidemia, supraventricular tachycardia, fibromyalgia, neuropathy, depression, GERD.  Patient presented with acute onset of severe chest pain with which she woke up.  Patient states that pain was radiating to her back and to her upper abdomen and was also having nausea and vomiting.  Patient was admitted to emergency room and underwent further workup.  Found to have severe metabolic acidosis with bicarb of 10 and high anion gap of 35.  Patient denies any spurious alcohol intake or any other extraneous substance intake.  States that she used to drink alcohol but now has been drinking wine maybe 1 to 2 glasses a day.  Patient was given fluid bolus.  She was noted to have leukocytosis.  Underwent CT chest abdomen and pelvis which did not reveal any acute pathology other than diffuse esophagitis.  ABG obtained and pH of 7.18.  patient was also felt to be having tremulous activity so started on CIWA protocol.  Patient had significant lactic acidosis of 6.9.  Denies any seizure activity.  Urine drug screen checked and negative.  Alcohol level, acetaminophen and salicylate levels negative as well.  Beta-hydroxybutyrate elevated.  Will get urinalysis as well.  Patient will be admitted to ICU for closer monitoring and management of severe metabolic acidosis.     Patient is awake and alert on arrival to ICU.  Hemodynamically stable otherwise.  Quite tremulous.  States that she is feeling cold.  Last alcoholic drink was yesterday late afternoon (end of copied text).    Hospital Course:  Patient was admitted to ICU and continued on CIWA protocol (ultimately only received 2 doses of PO Ativan) and empiric Zosyn. She completed 3 days of IV Zosyn with no evidence of septic source on imaging or labs. Her clinical status improved drastically and she returned to her pre-hospitalization baseline on 6/25.     Patient is felt to have reached maximum benefit from this hospitalization and has been deemed appropriate for discharge home.  "She is feeling well, is tolerating a PO diet, and is ambulating without difficulty. She was noted to have hypokalemia, hypomagnesemia, and hypophosphatemia on AM labs and received replacement. Phosphorous was rechecked later this afternoon and remained low at 1.7. Patient still adamant about wanting to go home. She will receive an additional dose of phosphorous replacement prior to D/C and will follow up with her PCP this week with a repeat BMP, magnesium, and phosphorous check.      Vitals:  /97   Pulse 85   Temp 98.6 °F (37 °C) (Oral)   Resp 16   Ht 162.6 cm (64\")   Wt 60.3 kg (133 lb)   LMP  (LMP Unknown)   SpO2 99%   BMI 22.83 kg/m²     Physical Exam:  Constitutional:  Appears well-developed and well-nourished. No distress.   HEENT:  Normocephalic and atraumatic. PER  Neck: Normal range of motion. Neck supple. No JVD present.   Cardiovascular: Normal rate, regular rhythm, normal heart sounds and intact distal pulses.  No gallop and no friction rub.  No murmur heard.  Pulmonary/Chest: Effort normal and breath sounds normal. No respiratory distress. No wheezes, rhonchi or rales.   Abdominal: Soft. No distension and no mass. There is no tenderness.   Musculoskeletal: Normal range of motion.   Neurological: Alert and oriented to person, place, and time.  No focal deficits  Skin: Skin is warm and dry. No rash noted.   Extremities:  No clubbing, edema or cyanosis  Psychiatric: Normal mood and affect. Behavior is normal.     Labs:  Results from last 7 days   Lab Units 06/25/24  0401   WBC 10*3/mm3 4.64   HEMOGLOBIN g/dL 9.1*   HEMATOCRIT % 28.2*   PLATELETS 10*3/mm3 117*     Results from last 7 days   Lab Units 06/25/24  1506 06/25/24  0401 06/23/24  1225 06/23/24  0554   SODIUM mmol/L  --  140   < > 138   POTASSIUM mmol/L 5.0 3.4*   < > 3.8   CHLORIDE mmol/L  --  104   < > 107   CO2 mmol/L  --  25.0   < > 21.0*   BUN mg/dL  --  7*   < > 19   CREATININE mg/dL  --  1.14*   < > 1.27*   CALCIUM mg/dL  --  " 8.2*   < > 7.6*   BILIRUBIN mg/dL  --   --   --  0.4   ALK PHOS U/L  --   --   --  62   ALT (SGPT) U/L  --   --   --  8   AST (SGOT) U/L  --   --   --  18   GLUCOSE mg/dL  --  97   < > 185*    < > = values in this interval not displayed.         Magnesium   Date Value Ref Range Status   06/25/2024 1.5 (L) 1.6 - 2.4 mg/dL Final   06/23/2024 2.5 (H) 1.6 - 2.4 mg/dL Final   06/23/2024 1.4 (L) 1.6 - 2.4 mg/dL Final     Phosphorus   Date Value Ref Range Status   06/25/2024 1.7 (C) 2.5 - 4.5 mg/dL Final   06/25/2024 2.2 (L) 2.5 - 4.5 mg/dL Final   06/24/2024 2.4 (L) 2.5 - 4.5 mg/dL Final           Discharge Medications:     Discharge Medications        Continue These Medications        Instructions Start Date   Alpha-Lipoic Acid 600 MG capsule   Oral      cholecalciferol 25 MCG (1000 UT) tablet  Commonly known as: VITAMIN D3   1,000 Units, Oral, Daily      DULoxetine 20 MG capsule  Commonly known as: CYMBALTA   20 mg, Oral, 2 Times Daily      esomeprazole 20 MG capsule  Commonly known as: nexIUM   20 mg, Oral, 2 Times Weekly, OTC      folic acid 1 MG tablet  Commonly known as: FOLVITE   1 mg, Oral, Daily      gabapentin 300 MG capsule  Commonly known as: Neurontin   Take 1 capsule in the morning and 2 capsules at night.      levothyroxine 25 MCG tablet  Commonly known as: SYNTHROID, LEVOTHROID   TAKE ONE TABLET BY MOUTH EVERY MORNING ON AN EMPTY STOMACH      Lidocaine 4 %   1 patch, Transdermal, Every 24 Hours Scheduled, Remove & Discard patch within 12 hours or as directed by MD      magnesium oxide 400 (241.3 Mg) MG tablet tablet  Commonly known as: MAGOX   400 mg, Oral, Daily, OTC      metoprolol succinate XL 25 MG 24 hr tablet  Commonly known as: TOPROL-XL   25 mg, Oral, Nightly      rosuvastatin 5 MG tablet  Commonly known as: Crestor   5 mg, Oral, Daily      thiamine 100 MG tablet  Commonly known as: VITAMIN B1   100 mg, Oral, Daily      vitamin B-12 1000 MCG tablet  Commonly known as: CYANOCOBALAMIN   1,000 mcg,  Oral, Daily             Stop These Medications      docusate sodium 100 MG capsule  Commonly known as: COLACE     hydrALAZINE 25 MG tablet  Commonly known as: APRESOLINE     nitroglycerin 0.4 MG SL tablet  Commonly known as: NITROSTAT     oxyCODONE 5 MG capsule  Commonly known as: OXY-IR     traMADol 50 MG tablet  Commonly known as: ULTRAM            Discharge Diet:   Diet Instructions       Diet: Regular/House Diet; Regular (IDDSI 7); Thin (IDDSI 0)      Discharge Diet: Regular/House Diet    Texture: Regular (IDDSI 7)    Fluid Consistency: Thin (IDDSI 0)          Activity at Discharge:    Activity Instructions       Activity as Tolerated            Follow-up Appointments  Future Appointments   Date Time Provider Department Center   6/28/2024 11:15 AM Jevon Foster MD [facility]   7/11/2024  3:30 PM Austin Tatum LCSW [facility] None   8/12/2024 11:00 AM Caden Dietz MD [facility]   8/16/2024 11:45 AM H[facility] CT 1 BH [facility] CT None   10/1/2024 11:30 AM Franklin Hamm APRN [facility] None   10/14/2024 11:00 AM Jevon Foster MD [facility]   11/11/2024  9:30 AM Gray Bahena MD [facility]   12/12/2024 11:30 AM Liana So APRN [facility]     Additional Instructions for the Follow-ups that You Need to Schedule       Ambulatory Referral to Home Health   As directed      Face to Face Visit Date: 6/25/2024   Follow-up provider for Plan of Care?: I treated the patient in an acute care facility and will not continue treatment after discharge.   Follow-up provider: JEVON FOSTER [[id number]]   Reason/Clinical Findings: Sepsis   Describe mobility limitations that make leaving home difficult: Impaired gait, mobility, balance, and endurance   Nursing/Therapeutic Services Requested: Physical Therapy   PT orders: Therapeutic exercise Gait Training Transfer training Strengthening Home safety assessment   Weight Bearing Status: As Tolerated   Frequency: 1 Week 1        Discharge  Follow-up with PCP   As directed       Currently Documented PCP:    Cassy Foster MD    PCP Phone Number:    [phone]     Follow Up Details: Within 1 week with repeat BMP        Basic Metabolic Panel    Jun 30, 2024 (Approximate)      At PCP appointment    Order Comments: At PCP appointment    Release to patient: Routine Release        Magnesium    Jun 30, 2024 (Approximate)      To be obtained at PCP appointment    Order Comments: To be obtained at PCP appointment    Release to patient: Routine Release        Phosphorus    Jun 30, 2024 (Approximate)      To be obtained at PCP appointment    Order Comments: To be obtained at PCP appointment    Release to patient: Routine Release              Discharge Instructions:  Okay to discharge home   Medications as above   Follow up with providers as above     Current Code Status       Date Active Code Status Order ID Comments User Context       6/23/2024 1246 CPR (Attempt to Resuscitate) [id number]  Collin Ricardo MD Inpatient        Question Answer    Code Status (Patient has no pulse and is not breathing) CPR (Attempt to Resuscitate)    Medical Interventions (Patient has pulse or is breathing) Full Support             Bernie Merritt, DNP, APRN, AGACNP-BC  Pulmonary and Critical Care Medicine  06/25/24     Time: Discharge 40 min    CC: Cassy Foster MD    Please note that portions of this note were completed with a voice recognition program.     Electronically signed by Saniya Batista,  at 06/26/24 0949       Discharge Order (From admission, onward)       Start     Ordered    06/25/24 1543  Discharge patient  Once        Expected Discharge Date: 06/25/24   Discharge Disposition: Home or Self Care   Physician of Record for Attribution - Please select from Treatment Team: SANIYA BATISTA [[id number]]   Review needed by CMO to determine Physician of Record: No      Question Answer Comment   Physician of Record for Attribution - Please select from Treatment Team CASE,  JOHAN WILSON    Review needed by CMO to determine Physician of Record No        06/25/24 6730

## 2024-07-03 ENCOUNTER — READMISSION MANAGEMENT (OUTPATIENT)
Dept: CALL CENTER | Facility: HOSPITAL | Age: 69
End: 2024-07-03
Payer: MEDICARE

## 2024-07-03 DIAGNOSIS — F33.0 MILD EPISODE OF RECURRENT MAJOR DEPRESSIVE DISORDER: ICD-10-CM

## 2024-07-03 RX ORDER — DULOXETIN HYDROCHLORIDE 20 MG/1
20 CAPSULE, DELAYED RELEASE ORAL 2 TIMES DAILY
Qty: 180 CAPSULE | Refills: 1 | OUTPATIENT
Start: 2024-07-03

## 2024-07-03 NOTE — TELEPHONE ENCOUNTER
Rx Refill Note  Requested Prescriptions     Pending Prescriptions Disp Refills    DULoxetine (CYMBALTA) 20 MG capsule [Pharmacy Med Name: DULoxetine HCL DR 20 MG CAPSULE] 180 capsule 1     Sig: TAKE 1 CAPSULE BY MOUTH 2 TIMES A DAY      Last filled:   Last office visit with prescribing clinician: 2/12/2024      Next office visit with prescribing clinician: 8/12/2024     Demarcus Rich MA  07/03/24, 08:56 EDT

## 2024-07-03 NOTE — OUTREACH NOTE
Sepsis Week 2 Survey      Flowsheet Row Responses   [facility] [place] facility patient discharged from? [facility]   Does the patient have one of the following disease processes/diagnoses(primary or secondary)? Sepsis   Week 2 attempt successful? No   Unsuccessful attempts Attempt 2  [All numbers listed were attempted-no answer]            Jamila H - Registered Nurse

## 2024-07-08 ENCOUNTER — OFFICE VISIT (OUTPATIENT)
Dept: ORTHOPEDIC SURGERY | Facility: CLINIC | Age: 69
End: 2024-07-08
Payer: MEDICARE

## 2024-07-08 VITALS
SYSTOLIC BLOOD PRESSURE: 120 MMHG | DIASTOLIC BLOOD PRESSURE: 76 MMHG | HEIGHT: 64 IN | BODY MASS INDEX: 23.05 KG/M2 | WEIGHT: 135 LBS

## 2024-07-08 DIAGNOSIS — S72.042D CLOSED FRACTURE OF BASE OF FEMORAL NECK WITH ROUTINE HEALING, LEFT: Primary | ICD-10-CM

## 2024-07-08 PROCEDURE — 3074F SYST BP LT 130 MM HG: CPT | Performed by: ORTHOPAEDIC SURGERY

## 2024-07-08 PROCEDURE — 1159F MED LIST DOCD IN RCRD: CPT | Performed by: ORTHOPAEDIC SURGERY

## 2024-07-08 PROCEDURE — 3078F DIAST BP <80 MM HG: CPT | Performed by: ORTHOPAEDIC SURGERY

## 2024-07-08 PROCEDURE — 99212 OFFICE O/P EST SF 10 MIN: CPT | Performed by: ORTHOPAEDIC SURGERY

## 2024-07-08 PROCEDURE — 1160F RVW MEDS BY RX/DR IN RCRD: CPT | Performed by: ORTHOPAEDIC SURGERY

## 2024-07-08 NOTE — PROGRESS NOTES
[facility] Note    Subjective     Chief Complaint   Patient presents with    Follow-up     7 month f/u; 14 months S/P Closed reduction and internal fixation with femoral neck (5/3/23)        HPI    It has been 7  month(s) since Ms. Sierra's last visit. She returns to clinic today for postoperative follow-up of left hip fracture. The issue has been ongoing for 14 month(s). She rates her pain a 0/10 on the pain scale. Previous/current treatments: physical therapy. Current symptoms:  no . The pain is worse with  none ;  Overall, she is doing better.  No pain in her hip or groin.  Ambulating with the aid of a cane for balance issues regarding neuropathy.    I have reviewed the following portions of the patient's history and agree with: History of Present Illness and Review of Systems    Patient Active Problem List   Diagnosis    Hypertension    Leg weakness, bilateral    Syncope    CKD (chronic kidney disease) stage 3, GFR 30-59 ml/min    Neuropathy    Hyperlipidemia LDL goal <100    Endometrial adenocarcinoma    Mild episode of recurrent major depressive disorder    Dizziness    Hip fracture    Anemia    Hypomagnesemia    Hypothyroidism    Post-menopausal    Other osteoporosis without current pathological fracture    Closed fracture of second lumbar vertebra    Alcoholic ketoacidosis    Leukocytosis    GERD with esophagitis    Sepsis    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     Past Medical History:   Diagnosis Date    Allergic lisinopril  2017    Anemia     Arrhythmia     Arthritis     Cancer     uterine    Cataract mild 2020    stil lpresent    Chicken pox     Chronic fatigue     CKD (chronic kidney disease), stage III     sees nephro    Clotting disorder     Dental root implant present     lower left  x1 - possible dental implant    Depression mild 2019    Difficulty walking 2019    Disease of thyroid gland     Dizzy     NIEVES (dyspnea on exertion)     2017    Fracture of hip 2023     Generalized anxiety disorder     GERD (gastroesophageal reflux disease) 2018    History of brachytherapy 2023    vaginal brachytherapy    Hyperlipidemia     Hypertension     Iron deficiency anemia     Liver disease     fatty    Liver problem     Measles     Menopause     Mumps     Neuromuscular disorder Peripheral Neuropathy    Orthostatic hypotension     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    Renal insufficiency 2018    Scoliosis     Unintentional weight loss     Uses contact lenses     bilat    Uterine cancer     Uterine cancer 2022    UTI (urinary tract infection)     Visual impairment Nearsighted    Wears glasses       Past Surgical History:   Procedure Laterality Date     SECTION      DILATION AND CURETTAGE, DIAGNOSTIC / THERAPEUTIC      HIP OPEN REDUCTION Left 2023    Procedure: FEMORAL NECK OPEN REDUCTION INTERNAL FIXATION LEFT;  Surgeon: Juanpablo Lee MD;  Location:  [facility];  Service: Orthopedics;  Laterality: Left;    HIP SURGERY      OOPHORECTOMY      ORAL LESION EXCISION/BIOPSY  2021    TOTAL LAPAROSCOPIC HYSTERECTOMY SALPINGO OOPHORECTOMY N/A 10/28/2022    Procedure: TOTAL LAPAROSCOPIC HYSTERECTOMY BILATERAL SALPINGO-OOPHORECTOMY, INJECTION FOR SENTINEL LYMPH NODE MAPPING, BILATERAL SENTINEL LYMPH NODE DISSECTION WITH DAVINCI ROBOT;  Surgeon: Jasmine Rich MD;  Location:  [facility];  Service: Robotics - DaVinci;  Laterality: N/A;    TUBAL ABDOMINAL LIGATION        Family History   Problem Relation Age of Onset    Spondylolisthesis Mother     COPD Mother     Stroke Mother     Hypertension Mother     Lung cancer Father     Hypertension Father     Heart attack Father     Coronary artery disease Father     Heart disease Father     Cancer Father     Lung disease Father     Hyperlipidemia Sister     Rheum arthritis Sister     Malig Hypertension Sister     Osteoarthritis Sister     Other Sister         alcoholic    Hypertension Sister      Scoliosis Sister     Hypertension Brother     Depression Sister     Early death Sister     Breast cancer Neg Hx     Ovarian cancer Neg Hx     Uterine cancer Neg Hx     Colon cancer Neg Hx     Melanoma Neg Hx     Prostate cancer Neg Hx      Social History     Socioeconomic History    Marital status:     Number of children: 1    Highest education level: Associate degree: academic program   Tobacco Use    Smoking status: Never     Passive exposure: Never    Smokeless tobacco: Never    Tobacco comments:     Never   Vaping Use    Vaping status: Never Used   Substance and Sexual Activity    Alcohol use: Yes     Alcohol/week: 4.0 - 6.0 standard drinks of alcohol     Types: 4 - 6 Glasses of wine per week     Comment: 6-8 glasses a week    Drug use: No    Sexual activity: Not Currently     Partners: Male     Birth control/protection: Surgical, Post-menopausal      Current Outpatient Medications on File Prior to Visit   Medication Sig Dispense Refill    Alpha-Lipoic Acid 600 MG capsule Take  by mouth.      Cholecalciferol 25 MCG (1000 UT) tablet Take 1 tablet by mouth Daily.      DULoxetine (CYMBALTA) 20 MG capsule TAKE ONE CAPSULE BY MOUTH TWICE A  capsule 1    esomeprazole (nexIUM) 20 MG capsule Take 1 capsule by mouth 2 (Two) Times a Week. OTC      folic acid (FOLVITE) 1 MG tablet Take 1 tablet by mouth Daily. 30 tablet 0    gabapentin (Neurontin) 300 MG capsule Take 1 capsule in the morning and 2 capsules at night. 90 capsule 6    levothyroxine (SYNTHROID, LEVOTHROID) 25 MCG tablet TAKE ONE TABLET BY MOUTH EVERY MORNING ON AN EMPTY STOMACH 90 tablet 0    lidocaine (LIDODERM) 5 % Place 1 patch on the skin as directed by provider Daily. Remove & Discard patch within 12 hours or as directed by MD 14 each 1    Lidocaine 4 % Place 1 patch on the skin as directed by provider Daily. Remove & Discard patch within 12 hours or as directed by MD 15 each 0    magnesium oxide (MAGOX) 400 (241.3 Mg) MG tablet tablet  Take 1 tablet by mouth Daily. OTC      metoprolol succinate XL (TOPROL-XL) 25 MG 24 hr tablet Take 1 tablet by mouth Every Night. 90 tablet 1    ondansetron ODT (ZOFRAN-ODT) 4 MG disintegrating tablet Place 1 tablet on the tongue Every 8 (Eight) Hours As Needed for Nausea or Vomiting. 30 tablet 1    rosuvastatin (Crestor) 5 MG tablet Take 1 tablet by mouth Daily. 90 tablet 1    thiamine (VITAMIN B1) 100 MG tablet Take 1 tablet by mouth Daily. 30 tablet 0    vitamin B-12 (CYANOCOBALAMIN) 1000 MCG tablet Take 1 tablet by mouth Daily.       No current facility-administered medications on file prior to visit.      Allergies   Allergen Reactions    Lisinopril Angioedema    Metal Rash     Possible nickel -   Gold is only metal that doesn't have issues      Milk-Related Compounds Other (See Comments)     LACTOSE INTOLERANT    Tylenol [Acetaminophen] Other (See Comments)     ckd    Atorvastatin Myalgia        Review of Systems   Constitutional: Negative.  Negative for activity change, appetite change, chills, diaphoresis, fatigue, fever and unexpected weight change.   HENT: Negative.  Negative for congestion, dental problem, drooling, ear discharge, ear pain, facial swelling, hearing loss, mouth sores, nosebleeds, postnasal drip, rhinorrhea, sinus pressure, sneezing, sore throat, tinnitus, trouble swallowing and voice change.    Eyes: Negative.  Negative for photophobia, pain, discharge, redness, itching and visual disturbance.   Respiratory: Negative.  Negative for apnea, cough, choking, chest tightness, shortness of breath, wheezing and stridor.    Cardiovascular: Negative.  Negative for chest pain, palpitations and leg swelling.   Gastrointestinal: Negative.  Negative for abdominal distention, abdominal pain, anal bleeding, blood in stool, constipation, diarrhea, nausea, rectal pain and vomiting.   Endocrine: Negative.  Negative for cold intolerance, heat intolerance, polydipsia, polyphagia and polyuria.   Genitourinary:  "Negative.  Negative for decreased urine volume, difficulty urinating, dysuria, enuresis, flank pain, frequency, genital sores, hematuria and urgency.   Musculoskeletal:  Positive for arthralgias. Negative for back pain, gait problem, joint swelling, myalgias, neck pain and neck stiffness.   Skin: Negative.  Negative for color change, pallor, rash and wound.   Allergic/Immunologic: Negative.  Negative for environmental allergies, food allergies and immunocompromised state.   Neurological: Negative.  Negative for dizziness, tremors, seizures, syncope, facial asymmetry, speech difficulty, weakness, light-headedness, numbness and headaches.   Hematological: Negative.  Negative for adenopathy. Does not bruise/bleed easily.   Psychiatric/Behavioral: Negative.  Negative for agitation, behavioral problems, confusion, decreased concentration, dysphoric mood, hallucinations, self-injury, sleep disturbance and suicidal ideas. The patient is not nervous/anxious and is not hyperactive.         Objective      Physical Exam  /76   Ht 162.6 cm (64.02\")   Wt 61.2 kg (135 lb)   LMP  (LMP Unknown)   BMI 23.16 kg/m²     Body mass index is 23.16 kg/m².  BMI is within normal parameters. No other follow-up for BMI required.      General:   Mental Status:  Alert   Appearance: Cooperative, in no acute distress   Build and Nutrition: Thin female   Orientation: Alert and oriented to person, place and time   Posture: Normal   Gait: With a cane, nonantalgic, slow, shuffling    Integument:              Left hip: Wound is well-healed with no signs of infection     Lower Extremity:              Left Hip:                          Tenderness:    None                          Swelling:          None                          Crepitus:          None                          Range of motion:        External Rotation:       40°, no pain                                                              Internal Rotation:        40°, no pain           "                                                    Flexion:                       100°                                                              Extension:                   0°                       Deformities:     None  Functional testing: Negative Stinchfield                          Slightly short on the left compared to the right    Imaging/Studies  Imaging Results (Last 24 Hours)       Procedure Component Value Units Date/Time    XR Hip With or Without Pelvis 2 - 3 View Left [[id number]] Resulted: 07/08/24 1627     Updated: 07/08/24 1628    Narrative:      Left Hip Radiographs  Indication: Follow-up left femoral neck fracture status post femoral neck   fixation system  Views: low AP pelvis and lateral of the left hip    Comparison: 6/12/2023 and 12/4/2023    Findings:   Valgus impacted femoral neck fracture is stable compared to previous   imaging, with no signs of hardware loosening or failure.  No avascular   changes are appreciated.                Assessment and Plan     Diagnoses and all orders for this visit:    1. Closed fracture of base of femoral neck with routine healing, left (Primary)  -     XR Hip With or Without Pelvis 2 - 3 View Left  -     Cancel: XR Hip With or Without Pelvis 2 - 3 View Left        1. Closed fracture of base of femoral neck with routine healing, left        I reviewed my findings with the patient.  No pain in her left hip.  I see no avascular changes in the femoral head, with stable alignment and implant.  I will see her back in 6 months with a new x-ray, but I will be happy to see her back sooner for any problems.  Likely plan for another x-ray 6 months after that, for 2-year follow-up, then likely after that as needed if appropriate.    Return in about 6 months (around 1/8/2025) for recheck with x-rays.      Juanpablo Lee MD  07/08/24  18:20 EDT    Dictated Utilizing Dragon Dictation

## 2024-07-10 DIAGNOSIS — E03.9 HYPOTHYROIDISM, UNSPECIFIED TYPE: ICD-10-CM

## 2024-07-10 RX ORDER — LEVOTHYROXINE SODIUM 0.03 MG/1
TABLET ORAL
Qty: 90 TABLET | Refills: 0 | OUTPATIENT
Start: 2024-07-10

## 2024-07-11 ENCOUNTER — OFFICE VISIT (OUTPATIENT)
Dept: BEHAVIORAL HEALTH | Facility: CLINIC | Age: 69
End: 2024-07-11
Payer: MEDICARE

## 2024-07-11 DIAGNOSIS — F32.1 CURRENT MODERATE EPISODE OF MAJOR DEPRESSIVE DISORDER, UNSPECIFIED WHETHER RECURRENT: Primary | ICD-10-CM

## 2024-07-11 DIAGNOSIS — E03.9 HYPOTHYROIDISM, UNSPECIFIED TYPE: ICD-10-CM

## 2024-07-11 NOTE — PROGRESS NOTES
[facility] [place]                 Follow Up Adult      Follow Up Adult Note     Date:2024   Patient Name: Alysia Sierra  : 1955   MRN: [id number]   Time IN: 3:30 pm    Time OUT: 4:28 pm     Referring Provider: Cassy Foster MD    Chief Complaint:      ICD-10-CM ICD-9-CM   1. Current moderate episode of major depressive disorder, unspecified whether recurrent  F32.1 296.22        History of Present Illness:   Alysia Sierra is a 69 y.o. female who is being seen today for follow up counseling for depression.    Subjective               Patient's Support Network Includes: extended family    Functional Status: Mild impairment       Current Outpatient Medications:     Alpha-Lipoic Acid 600 MG capsule, Take  by mouth., Disp: , Rfl:     Cholecalciferol 25 MCG (1000 UT) tablet, Take 1 tablet by mouth Daily., Disp: , Rfl:     DULoxetine (CYMBALTA) 20 MG capsule, TAKE ONE CAPSULE BY MOUTH TWICE A DAY, Disp: 180 capsule, Rfl: 1    esomeprazole (nexIUM) 20 MG capsule, Take 1 capsule by mouth 2 (Two) Times a Week. OTC, Disp: , Rfl:     folic acid (FOLVITE) 1 MG tablet, Take 1 tablet by mouth Daily., Disp: 30 tablet, Rfl: 0    gabapentin (Neurontin) 300 MG capsule, Take 1 capsule in the morning and 2 capsules at night., Disp: 90 capsule, Rfl: 6    levothyroxine (SYNTHROID, LEVOTHROID) 25 MCG tablet, TAKE ONE TABLET BY MOUTH EVERY MORNING ON AN EMPTY STOMACH, Disp: 90 tablet, Rfl: 0    lidocaine (LIDODERM) 5 %, Place 1 patch on the skin as directed by provider Daily. Remove & Discard patch within 12 hours or as directed by MD, Disp: 14 each, Rfl: 1    Lidocaine 4 %, Place 1 patch on the skin as directed by provider Daily. Remove & Discard patch within 12 hours or as directed by MD, Disp: 15 each, Rfl: 0    magnesium oxide (MAGOX) 400 (241.3 Mg) MG tablet tablet, Take 1 tablet by mouth Daily. OTC, Disp: , Rfl:     metoprolol succinate XL (TOPROL-XL) 25 MG 24 hr tablet,  Take 1 tablet by mouth Every Night., Disp: 90 tablet, Rfl: 1    ondansetron ODT (ZOFRAN-ODT) 4 MG disintegrating tablet, Place 1 tablet on the tongue Every 8 (Eight) Hours As Needed for Nausea or Vomiting., Disp: 30 tablet, Rfl: 1    rosuvastatin (Crestor) 5 MG tablet, Take 1 tablet by mouth Daily., Disp: 90 tablet, Rfl: 1    thiamine (VITAMIN B1) 100 MG tablet, Take 1 tablet by mouth Daily., Disp: 30 tablet, Rfl: 0    vitamin B-12 (CYANOCOBALAMIN) 1000 MCG tablet, Take 1 tablet by mouth Daily., Disp: , Rfl:     Allergies   Allergen Reactions    Lisinopril Angioedema    Metal Rash     Possible nickel -   Gold is only metal that doesn't have issues      Milk-Related Compounds Other (See Comments)     LACTOSE INTOLERANT    Tylenol [Acetaminophen] Other (See Comments)     ckd    Atorvastatin Myalgia       Objective     Physical Exam:  Vital Signs: There were no vitals filed for this visit.  There is no height or weight on file to calculate BMI.     Mental Status Exam:   Hygiene:   good  Cooperation:  Cooperative  Eye Contact:  Good  Psychomotor Behavior:  Appropriate  Affect:  Full range  Mood: normal  Speech:  Normal  Thought Process:  Goal directed  Thought Content:  Normal  Suicidal:  None  Homicidal:  None  Hallucinations:  None  Delusion:  None  Memory:  Intact  Orientation:  Person, Place, Time, and Situation  Reliability:  good  Insight:  Good  Judgement:  Good  Impulse Control:  Good  Physical/Medical Issues:   See problem list yes      Assessment / Plan      Diagnosis:  Diagnoses and all orders for this visit:    1. Current moderate episode of major depressive disorder, unspecified whether recurrent (Primary)    Patient presented for follow-up with clinician.  Patient and clinician identified and addressed patient cognitive distortions related to depression.  Patient was responsive to intervention.  Clinician utilized active listening and reflective response to convey empathy and support.    Progress toward  goal: Not at goal    Prognosis: Good with ongoing treatment    PLAN:  Patient and clinician will continue to utilize a cognitive behavioral intervention which identifies and addresses patient cognitive distortions related to depression.  Follow-ups will occur approximately every 21 days and will last from 45 to 60 minutes in duration.    Safety: No acute safety concerns  Risk Assessment: Risk of self-harm acutely is low. Risk of self-harm chronically is also low, but could be further elevated in the event of treatment noncompliance and/or AODA.    Treatment Plan/Goals: Continue supportive psychotherapy efforts and medications as indicated. Treatment and medication options discussed during today's visit. Patient ackowledged and verbally consented to continue with current treatment plan and was educated on the importance of compliance with treatment and follow-up appointments. Patient seems reasonably able to adhere to treatment plan.      Assisted Patient in processing above session content; acknowledged and normalized patient’s thoughts, feelings, and concerns.  Rationalized patient thought process regarding depression.      Allowed Patient to freely discuss issues  without interruption or judgement with unconditional positive regard, active listening skills, and empathy. Therapist provided a safe, confidential environment to facilitate the development of a positive therapeutic relationship and encouraged open, honest communication. Assisted Patient in identifying risk factors which would indicate the need for higher level of care including thoughts to harm self or others and/or self-harming behavior and encouraged Patient to contact this office, call 911, or present to the nearest emergency room should any of these events occur. Discussed crisis intervention services and means to access. Patient adamantly and convincingly denies current suicidal or homicidal ideation or perceptual disturbance. Assisted Patient in  processing session content; acknowledged and normalized Patient’s thoughts, feelings, and concerns by utilizing a person-centered approach in efforts to build appropriate rapport and a positive therapeutic relationship with open and honest communication. .     Part of this note may be an electronic transcription/translation of spoken language to printed text using the Dragon Dictation System.       Follow Up:   Return in about 3 weeks (around 8/1/2024).    Austin Tatum LCSW

## 2024-07-12 ENCOUNTER — OUTSIDE FACILITY SERVICE (OUTPATIENT)
Dept: INTERNAL MEDICINE | Facility: CLINIC | Age: 69
End: 2024-07-12
Payer: MEDICARE

## 2024-07-12 PROCEDURE — G0180 MD CERTIFICATION HHA PATIENT: HCPCS | Performed by: FAMILY MEDICINE

## 2024-07-12 RX ORDER — LEVOTHYROXINE SODIUM 0.03 MG/1
TABLET ORAL
Qty: 90 TABLET | Refills: 0 | Status: SHIPPED | OUTPATIENT
Start: 2024-07-12

## 2024-07-30 ENCOUNTER — OFFICE VISIT (OUTPATIENT)
Dept: BEHAVIORAL HEALTH | Facility: CLINIC | Age: 69
End: 2024-07-30
Payer: MEDICARE

## 2024-07-30 DIAGNOSIS — F32.1 CURRENT MODERATE EPISODE OF MAJOR DEPRESSIVE DISORDER, UNSPECIFIED WHETHER RECURRENT: Primary | ICD-10-CM

## 2024-07-30 PROCEDURE — 90837 PSYTX W PT 60 MINUTES: CPT | Performed by: SOCIAL WORKER

## 2024-07-30 NOTE — PROGRESS NOTES
[facility] [place]                 Follow Up Adult      Follow Up Adult Note     Date:2024   Patient Name: Alysia Sierra  : 1955   MRN: [id number]   Time In: 2:30 pm    Time OUT: 3:23 pm     Referring Provider: Cassy Foster MD    Chief Complaint:      ICD-10-CM ICD-9-CM   1. Current moderate episode of major depressive disorder, unspecified whether recurrent  F32.1 296.22        History of Present Illness:   Alysia Sierra is a 69 y.o. female who is being seen today for follow up counseling for depression,    Subjective               Patient's Support Network Includes: extended family    Functional Status: Mild impairment       Current Outpatient Medications:     Alpha-Lipoic Acid 600 MG capsule, Take  by mouth., Disp: , Rfl:     Cholecalciferol 25 MCG (1000 UT) tablet, Take 1 tablet by mouth Daily., Disp: , Rfl:     DULoxetine (CYMBALTA) 20 MG capsule, TAKE ONE CAPSULE BY MOUTH TWICE A DAY, Disp: 180 capsule, Rfl: 1    esomeprazole (nexIUM) 20 MG capsule, Take 1 capsule by mouth 2 (Two) Times a Week. OTC, Disp: , Rfl:     folic acid (FOLVITE) 1 MG tablet, Take 1 tablet by mouth Daily., Disp: 30 tablet, Rfl: 0    gabapentin (Neurontin) 300 MG capsule, Take 1 capsule in the morning and 2 capsules at night., Disp: 90 capsule, Rfl: 6    levothyroxine (SYNTHROID, LEVOTHROID) 25 MCG tablet, TAKE ONE TABLET BY MOUTH EVERY MORNING ON AN EMPTY STOMACH, Disp: 90 tablet, Rfl: 0    lidocaine (LIDODERM) 5 %, Place 1 patch on the skin as directed by provider Daily. Remove & Discard patch within 12 hours or as directed by MD, Disp: 14 each, Rfl: 1    Lidocaine 4 %, Place 1 patch on the skin as directed by provider Daily. Remove & Discard patch within 12 hours or as directed by MD, Disp: 15 each, Rfl: 0    magnesium oxide (MAGOX) 400 (241.3 Mg) MG tablet tablet, Take 1 tablet by mouth Daily. OTC, Disp: , Rfl:     metoprolol succinate XL (TOPROL-XL) 25 MG 24 hr tablet,  Take 1 tablet by mouth Every Night., Disp: 90 tablet, Rfl: 1    ondansetron ODT (ZOFRAN-ODT) 4 MG disintegrating tablet, Place 1 tablet on the tongue Every 8 (Eight) Hours As Needed for Nausea or Vomiting., Disp: 30 tablet, Rfl: 1    rosuvastatin (Crestor) 5 MG tablet, Take 1 tablet by mouth Daily., Disp: 90 tablet, Rfl: 1    thiamine (VITAMIN B1) 100 MG tablet, Take 1 tablet by mouth Daily., Disp: 30 tablet, Rfl: 0    vitamin B-12 (CYANOCOBALAMIN) 1000 MCG tablet, Take 1 tablet by mouth Daily., Disp: , Rfl:     Allergies   Allergen Reactions    Lisinopril Angioedema    Metal Rash     Possible nickel -   Gold is only metal that doesn't have issues      Milk-Related Compounds Other (See Comments)     LACTOSE INTOLERANT    Tylenol [Acetaminophen] Other (See Comments)     ckd    Atorvastatin Myalgia       Objective     Physical Exam:  Vital Signs: There were no vitals filed for this visit.  There is no height or weight on file to calculate BMI.     Mental Status Exam:   Hygiene:   good  Cooperation:  Cooperative  Eye Contact:  Good  Psychomotor Behavior:  Appropriate  Affect:  Full range  Mood: normal  Speech:  Normal  Thought Process:  Goal directed  Thought Content:  Normal  Suicidal:  None  Homicidal:  None  Hallucinations:  None  Delusion:  None  Memory:  Intact  Orientation:  Person, Place, Time, and Situation  Reliability:  good  Insight:  Good  Judgement:  Good  Impulse Control:  Good  Physical/Medical Issues:   See problem list      Assessment / Plan      Diagnosis:  Diagnoses and all orders for this visit:    1. Current moderate episode of major depressive disorder, unspecified whether recurrent (Primary)    Patient presented for follow-up with clinician.  Patient reported improvement in symptoms of depression.  Patient reported improved appetite, more socializing, more exercise.  Clinician praised patient progress.  Clinician utilized active listening and reflective response to convey empathy and  support.    Progress toward goal: Not at goal    Prognosis: Good with ongoing treatment    PLAN:  Patient and clinician will continue to utilize a cognitive behavioral intervention which identifies and addresses patient cognitive distortions related to depression.  Patient will continue to utilize behavioral activation between sessions due to reported improvement.  Follow-ups will occur approximately every 21 days and will last from 45 to 60 minutes in duration.    Safety: No acute safety concerns  Risk Assessment: Risk of self-harm acutely is low. Risk of self-harm chronically is also low, but could be further elevated in the event of treatment noncompliance and/or AODA.    Treatment Plan/Goals: Continue supportive psychotherapy efforts and medications as indicated. Treatment and medication options discussed during today's visit. Patient ackowledged and verbally consented to continue with current treatment plan and was educated on the importance of compliance with treatment and follow-up appointments. Patient seems reasonably able to adhere to treatment plan.      Assisted Patient in processing above session content; acknowledged and normalized patient’s thoughts, feelings, and concerns.  Rationalized patient thought process regarding depression.      Allowed Patient to freely discuss issues  without interruption or judgement with unconditional positive regard, active listening skills, and empathy. Therapist provided a safe, confidential environment to facilitate the development of a positive therapeutic relationship and encouraged open, honest communication. Assisted Patient in identifying risk factors which would indicate the need for higher level of care including thoughts to harm self or others and/or self-harming behavior and encouraged Patient to contact this office, call 911, or present to the nearest emergency room should any of these events occur. Discussed crisis intervention services and means to access.  Patient adamantly and convincingly denies current suicidal or homicidal ideation or perceptual disturbance. Assisted Patient in processing session content; acknowledged and normalized Patient’s thoughts, feelings, and concerns by utilizing a person-centered approach in efforts to build appropriate rapport and a positive therapeutic relationship with open and honest communication. .     Part of this note may be an electronic transcription/translation of spoken language to printed text using the Dragon Dictation System.       Follow Up:   Return in about 4 weeks (around 8/27/2024).    Austin Tatum LCSW

## 2024-08-06 ENCOUNTER — TELEPHONE (OUTPATIENT)
Dept: INTERNAL MEDICINE | Facility: CLINIC | Age: 69
End: 2024-08-06

## 2024-08-06 NOTE — TELEPHONE ENCOUNTER
PT STATES SHE HAVING A ROUGH DAY WITH ANXIETY. NO SUICIDAL INTENTIONS. WANTS TO SPEAK TO PERI OR MA, DOESN'T WANT TO COME IN FOR AN APT    [phone]

## 2024-08-12 ENCOUNTER — OFFICE VISIT (OUTPATIENT)
Dept: NEUROLOGY | Facility: CLINIC | Age: 69
End: 2024-08-12
Payer: MEDICARE

## 2024-08-12 VITALS — OXYGEN SATURATION: 96 % | HEART RATE: 85 BPM | DIASTOLIC BLOOD PRESSURE: 86 MMHG | SYSTOLIC BLOOD PRESSURE: 128 MMHG

## 2024-08-12 DIAGNOSIS — F33.0 MILD EPISODE OF RECURRENT MAJOR DEPRESSIVE DISORDER: ICD-10-CM

## 2024-08-12 DIAGNOSIS — M54.12 CERVICAL RADICULOPATHY AT C6: ICD-10-CM

## 2024-08-12 DIAGNOSIS — G62.9 NEUROPATHY: ICD-10-CM

## 2024-08-12 DIAGNOSIS — M79.7 FIBROMYALGIA: Primary | ICD-10-CM

## 2024-08-12 PROCEDURE — 3074F SYST BP LT 130 MM HG: CPT | Performed by: PSYCHIATRY & NEUROLOGY

## 2024-08-12 PROCEDURE — 99214 OFFICE O/P EST MOD 30 MIN: CPT | Performed by: PSYCHIATRY & NEUROLOGY

## 2024-08-12 PROCEDURE — 3079F DIAST BP 80-89 MM HG: CPT | Performed by: PSYCHIATRY & NEUROLOGY

## 2024-08-12 RX ORDER — DULOXETIN HYDROCHLORIDE 20 MG/1
20 CAPSULE, DELAYED RELEASE ORAL 2 TIMES DAILY
Qty: 180 CAPSULE | Refills: 1 | Status: SHIPPED | OUTPATIENT
Start: 2024-08-12

## 2024-08-12 RX ORDER — GABAPENTIN 300 MG/1
CAPSULE ORAL
Qty: 90 CAPSULE | Refills: 5 | Status: SHIPPED | OUTPATIENT
Start: 2024-08-12

## 2024-08-12 RX ORDER — HYDRALAZINE HYDROCHLORIDE 25 MG/1
25 TABLET, FILM COATED ORAL 2 TIMES DAILY
COMMUNITY
Start: 2024-08-03

## 2024-08-12 NOTE — PROGRESS NOTES
Subjective:    CC: Alysia Sierra is in clinic today for follow up for history of neuropathy, cervical radiculopathy and fibromyalgia.    HPI:  Initial visit: 8/31/2020: Patient is a 65-year-old female with past medical history of hypertension, supraventricular tachycardia referred to the clinic for evaluation of bilateral leg and arm weakness.  She reports her symptoms started about 2 years ago and it has progressively become worse.  She reports that in last 6 months, she has had great difficulty getting up from chair if there is no side arms.  She reports that if she is on the floor, it is almost impossible for her to get up.  In addition she has noticed difficulty going up and down stairs.  She also reports tingling, numbness and extreme coldness in both her feet and sometimes it leads to pain.  This also started about 6 months ago and it has progressively become worse.    10/8/2020: She is in clinic for regular follow-up.  Since her last visit, she reports that overall she feels worse.  She feels that she does not have any energy to do anything throughout the day.  She is involved in physical therapy but it is extremely difficult for her to go through the exercises.  She reports that overall she feels weaker than her last visit.  Since her last visit, she has now completed MRI of cervical spine, MRI lumbar spine blood work-up including myasthenia gravis antibody panel, vitamin D, copper and B12.  MRI of cervical and lumbosacral spine shows mild to moderate multilevel spondylitic changes without any evidence of myelopathy.  Vitamin B12, copper and vitamin levels for within normal limits as well.  Upon further questioning, she reports that she sleeps usually at 4 or 5 PM until 1 or 2 AM and then she is awake after that.  She has done this for quite some time now.  In addition, she also reports to heavy alcohol use from age 34 until about 2 years ago.  She still drinks alcohol but it is much less as compared to  before.    12/11/2020: She is in clinic for regular follow-up.  Since her last visit, she underwent MRI brain without contrast which I reviewed personally.  It showed white matter changes suspicious for multiple sclerosis so following that, lumbar puncture was recommended.  Lumbar puncture was negative for MS profile.  She reports that since her last visit, she has now completed physical therapy and she continues to have good days and bad days.  She reports that she continues to struggle with poor sleep quality, does not have good appetite.  She is also reporting almost continuous tingling, numbness and coldness in both her feet.    3/1/2021: She is in clinic for regular follow-up.  Since her last visit in December, she reports that there has not been much change in bilateral upper and lower extremity strength is concerned.  She continues to have episodes of difficulty with walking, subjective feeling of generalized body weakness.  She is planning to go back to the gym and start exercising and will try to pay more attention to nutrition to help with this.  Really she reports that there has been no worsening since her last visit.  She did try gabapentin 300 mg twice daily and reports that the morning dose caused her to have side effects that she is currently taking only the nighttime dose which seems to be helping significantly with sleep.    9/1/2021: She is in clinic for regular follow-up.  Since her last visit in March, she reports that she is trying her best to exercise as much as possible at home to maintain strength in both upper and lower extremities.  She continues to use cane while walking.  She takes gabapentin 300 mg at bedtime which helps her with pain and also help her sleep better.  She continues to have some good days and bad days but overall there has been no worsening.    3/8/2022: She is in clinic for regular follow-up.  Since her last visit in September 2021, she reports that overall both upper and  lower extremity weakness remained stable without any worsening.  She continues to do exercises.  She has noticed that days that she is well hydrated, she tends to do better.  Lately in last 4 to 6 weeks, she is reporting right shoulder pain radiating down to right forearm and right hand.  She does have moderate spondylitic changes at C5-C6 and C6-C7 levels that were noted on MRI that was performed in August 2020.  She is currently taking gabapentin 300 mg at bedtime.    9/7/2022: She is in clinic for regular follow-up.  Since her last visit in March 2022, she reports that overall she has done really well.  She had an episode of passing out lasting for few seconds couple of months ago.  Which she thinks could be related to dehydration leading to hypotension.  She is trying to keep herself well-hydrated.  She continues to use [place] to help with walking and to prevent from falling.  She currently takes gabapentin 100 mg in the morning and 300 mg at night which does help with symptoms of cervical radiculopathy.  She was recently started on Cymbalta 20 mg twice daily which seems to be helping with symptoms of fibromyalgia as well.    4/18/2023: She is in clinic for regular follow-up.  Since her last visit in September 2022, she was diagnosed to have endometrial uterine cancer and has completed chemotherapy and radiation therapy.  She is currently in remission now however reports worsening in neuropathy symptoms.  She reports that she is currently taking gabapentin 100 mg in the morning and 300 mg at night which does help some with neuropathy symptoms.  She denies any side effects with gabapentin use.  She continues to take Cymbalta 20 mg twice a day which helps with symptoms of neuropathy and fibromyalgia.    8/10/2023: She is in clinic for regular follow-up.  Since her last visit in April 2023, she has now completed chemotherapy and radiation treatment and she tells me that she is in remission and doing well.   Unfortunately she fell and broke her left hip in May 2023 requiring surgery but she has recovered well from surgery as well.  She is currently taking gabapentin 300 mg twice daily but does report that nighttime symptoms are still worse.  She is not able to sleep well at night.    2/20/2024: She is in clinic for regular follow-up.  Since her last visit in August 2023, she reports that she is now in complete remission of endometrial cancer.  She is overall doing well and reports reduction in neuropathy symptoms since her last visit.  She however reports episodes of tingling numbness involving the right lateral thigh.  She is taking high-dose of gabapentin-600 mg at bedtime and that seems to have helped nighttime symptoms.  She also reporting episodes of vertigo especially when she rolls out of the bed.  It is not very severe and tolerable for the most part.  She continues to use cane and remains cautious while walking to prevent from falling.    8/12/2024: She is in clinic for regular follow-up.  Since her last visit 6 months ago, she reports that overall symptoms of neuropathy and fibromyalgia remain stable without any worsening.  She continues to take gabapentin 300 mg in the morning, 600 mg at night and Cymbalta 20 mg twice daily.  She denies any side effects with this combination.  Endometrial cancer remains under remission.    The following portions of the patient's history were reviewed and updated as of 08/12/2024: allergies, social history, and problem list.       Current Outpatient Medications:     Alpha-Lipoic Acid 600 MG capsule, Take  by mouth Daily., Disp: , Rfl:     Cholecalciferol 25 MCG (1000 UT) tablet, Take 1 tablet by mouth Daily., Disp: , Rfl:     DULoxetine (CYMBALTA) 20 MG capsule, Take 1 capsule by mouth 2 (Two) Times a Day., Disp: 180 capsule, Rfl: 1    esomeprazole (nexIUM) 20 MG capsule, Take 1 capsule by mouth 2 (Two) Times a Week. OTC, Disp: , Rfl:     folic acid (FOLVITE) 1 MG tablet, Take  1 tablet by mouth Daily., Disp: 30 tablet, Rfl: 0    gabapentin (Neurontin) 300 MG capsule, Take 1 capsule in the morning and 2 capsules at night., Disp: 90 capsule, Rfl: 5    hydrALAZINE (APRESOLINE) 25 MG tablet, Take 1 tablet by mouth 2 (Two) Times a Day., Disp: , Rfl:     levothyroxine (SYNTHROID, LEVOTHROID) 25 MCG tablet, TAKE ONE TABLET BY MOUTH EVERY MORNING ON AN EMPTY STOMACH, Disp: 90 tablet, Rfl: 0    lidocaine (LIDODERM) 5 %, Place 1 patch on the skin as directed by provider Daily. Remove & Discard patch within 12 hours or as directed by MD, Disp: 14 each, Rfl: 1    Lidocaine 4 %, Place 1 patch on the skin as directed by provider Daily. Remove & Discard patch within 12 hours or as directed by MD, Disp: 15 each, Rfl: 0    magnesium oxide (MAGOX) 400 (241.3 Mg) MG tablet tablet, Take 1 tablet by mouth Daily. OTC, Disp: , Rfl:     metoprolol succinate XL (TOPROL-XL) 25 MG 24 hr tablet, Take 1 tablet by mouth Every Night., Disp: 90 tablet, Rfl: 1    ondansetron ODT (ZOFRAN-ODT) 4 MG disintegrating tablet, Place 1 tablet on the tongue Every 8 (Eight) Hours As Needed for Nausea or Vomiting., Disp: 30 tablet, Rfl: 1    rosuvastatin (Crestor) 5 MG tablet, Take 1 tablet by mouth Daily., Disp: 90 tablet, Rfl: 1    thiamine (VITAMIN B1) 100 MG tablet, Take 1 tablet by mouth Daily., Disp: 30 tablet, Rfl: 0    vitamin B-12 (CYANOCOBALAMIN) 1000 MCG tablet, Take 1 tablet by mouth Daily., Disp: , Rfl:    Past Medical History:   Diagnosis Date    Allergic lisinopril  2017    Anemia     Arrhythmia     Arthritis     Cancer     uterine    Cataract mild 2020    stil lpresent    Chicken pox     Chronic fatigue     CKD (chronic kidney disease), stage III     sees nephro    Clotting disorder     Dental root implant present     lower left  x1 - possible dental implant    Depression mild 2019    Difficulty walking 2019    Disease of thyroid gland     Dizzy     NIEVES (dyspnea on exertion)     2017    Fracture of hip 2023     Generalized anxiety disorder     GERD (gastroesophageal reflux disease) 2018    History of brachytherapy 2023    vaginal brachytherapy    Hyperlipidemia     Hypertension     Iron deficiency anemia     Liver disease     fatty    Liver problem     Measles     Menopause     Mumps     Neuromuscular disorder Peripheral Neuropathy    Orthostatic hypotension     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    Renal insufficiency 2018    Scoliosis     Unintentional weight loss     Uses contact lenses     bilat    Uterine cancer     Uterine cancer 2022    UTI (urinary tract infection)     Visual impairment Nearsighted    Wears glasses       Past Surgical History:   Procedure Laterality Date     SECTION      DILATION AND CURETTAGE, DIAGNOSTIC / THERAPEUTIC      HIP OPEN REDUCTION Left 2023    Procedure: FEMORAL NECK OPEN REDUCTION INTERNAL FIXATION LEFT;  Surgeon: Juanpablo Lee MD;  Location:  [facility];  Service: Orthopedics;  Laterality: Left;    HIP SURGERY      OOPHORECTOMY      ORAL LESION EXCISION/BIOPSY  2021    TOTAL LAPAROSCOPIC HYSTERECTOMY SALPINGO OOPHORECTOMY N/A 10/28/2022    Procedure: TOTAL LAPAROSCOPIC HYSTERECTOMY BILATERAL SALPINGO-OOPHORECTOMY, INJECTION FOR SENTINEL LYMPH NODE MAPPING, BILATERAL SENTINEL LYMPH NODE DISSECTION WITH DAVINCI ROBOT;  Surgeon: Jasmine Rich MD;  Location:  [facility];  Service: Robotics - DaVinci;  Laterality: N/A;    TUBAL ABDOMINAL LIGATION        Family History   Problem Relation Age of Onset    Spondylolisthesis Mother     COPD Mother     Stroke Mother     Hypertension Mother     Lung cancer Father     Hypertension Father     Heart attack Father     Coronary artery disease Father     Heart disease Father     Cancer Father     Lung disease Father     Hyperlipidemia Sister     Rheum arthritis Sister     Malig Hypertension Sister     Osteoarthritis Sister     Other Sister         alcoholic    Hypertension Sister      Scoliosis Sister     Hypertension Brother     Depression Sister     Early death Sister     Breast cancer Neg Hx     Ovarian cancer Neg Hx     Uterine cancer Neg Hx     Colon cancer Neg Hx     Melanoma Neg Hx     Prostate cancer Neg Hx         Review of Systems  Objective:    /86   Pulse 85   LMP  (LMP Unknown)   SpO2 96%     Neurology Exam:  General apperance: NAD.     Mental status: Alert, awake and oriented to time place and person.    Language and Speech: No aphasia or dysarthria.    CN II to XII: Intact.    Opthalmoscopic Exam: No papilledema.    Motor:  Right UE muscle strength 5/5. Normal tone.     Left UE muscle strength 5/5. Normal tone.      Right LE muscle strength 5/5. Normal tone.     Left LE muscle strength 5/5. Normal tone.      Sensory: Reduced light touch, vibration and pinprick sensation bilaterally.    DTRs: 1+ bilaterally.    Babinski: Negative bilaterally.    Co-ordination: Normal finger-to-nose, heel to shin B/L.    Rhomberg: Negative.    Gait: Normal.    Cardiovascular: Regular rate and rhythm without murmur, gallop or rub.    Assessment and Plan:  1. Fibromyalgia  2. Neuropathy  -Stable without worsening.  Gabapentin and Cymbalta is helping in keeping symptoms of neuropathy and fibromyalgia under good control so it to be continued at the same doses.  Endometrial cancer remains under remission that is reassuring.  Have advised her to call office with any questions or concerns she may have resolved see her back in clinic in 6 months for follow-up.       I spent 30 minutes in patient care: Reviewing records prior to the visit, entering orders and documentation and spent more than diggs 50% of this time face-to-face in management, instructions and education regarding above mentioned diagnosis and also on counseling and discussing about taking medication regularly, possible side effects with medication use, importance of good sleep hygiene, good hydration and regular exercise.    Return  in about 6 months (around 2/12/2025).       Note to patient: The 21st Century Cures Act makes medical notes like these available to patients in the interest of transparency. However, be advised this is a medical document. It is intended as peer to peer communication. It is written in medical language and may contain abbreviations or verbiage that are unfamiliar. It may appear blunt or direct. Medical documents are intended to carry relevant information, facts as evident, and the clinical opinion of the physician.

## 2024-08-15 ENCOUNTER — TELEPHONE (OUTPATIENT)
Facility: HOSPITAL | Age: 69
End: 2024-08-15
Payer: MEDICARE

## 2024-08-15 NOTE — TELEPHONE ENCOUNTER
Pt contacted as pre-procedure phone call prior to planned CTA coronary for 1145 on 8/16/24. Reviewed with patient arrival time between [id number] to main registration, nothing to eat or drink by mouth 4 hours prior to arrival, no caffeine after midnight, please take premedications night before and morning of procedure with a small sip of water as instructed,  recommended,  reviewed procedure instructions and allowed time for questions, and reviewed home medications, allergies, and medical history. Verified [place] location for procedure.

## 2024-08-16 ENCOUNTER — HOSPITAL ENCOUNTER (OUTPATIENT)
Facility: HOSPITAL | Age: 69
Discharge: HOME | End: 2024-08-16
Payer: MEDICARE

## 2024-08-16 VITALS
SYSTOLIC BLOOD PRESSURE: 134 MMHG | BODY MASS INDEX: 23.18 KG/M2 | DIASTOLIC BLOOD PRESSURE: 67 MMHG | RESPIRATION RATE: 13 BRPM | HEIGHT: 64 IN | WEIGHT: 135.8 LBS | HEART RATE: 64 BPM | OXYGEN SATURATION: 100 % | TEMPERATURE: 98 F

## 2024-08-16 DIAGNOSIS — R07.9 CHEST PAIN, UNSPECIFIED TYPE: ICD-10-CM

## 2024-08-16 LAB — CREAT BLDA-MCNC: 1.3 MG/DL (ref 0.6–1.3)

## 2024-08-16 PROCEDURE — 25510000001 IOPAMIDOL PER 1 ML: Performed by: INTERNAL MEDICINE

## 2024-08-16 PROCEDURE — 75574 CT ANGIO HRT W/3D IMAGE: CPT

## 2024-08-16 PROCEDURE — 75574 CT ANGIO HRT W/3D IMAGE: CPT | Performed by: INTERNAL MEDICINE

## 2024-08-16 PROCEDURE — 82565 ASSAY OF CREATININE: CPT | Performed by: INTERNAL MEDICINE

## 2024-08-16 RX ORDER — METOPROLOL TARTRATE 1 MG/ML
5 INJECTION, SOLUTION INTRAVENOUS
Status: DISCONTINUED | OUTPATIENT
Start: 2024-08-16 | End: 2024-08-17 | Stop reason: HOSPADM

## 2024-08-16 RX ORDER — NITROGLYCERIN 0.4 MG/1
0.4 TABLET SUBLINGUAL
Status: COMPLETED | OUTPATIENT
Start: 2024-08-16 | End: 2024-08-16

## 2024-08-16 RX ORDER — METOPROLOL TARTRATE 100 MG/1
100 TABLET ORAL ONCE
Status: COMPLETED | OUTPATIENT
Start: 2024-08-16 | End: 2024-08-16

## 2024-08-16 RX ORDER — METOPROLOL TARTRATE 100 MG/1
200 TABLET ORAL ONCE
Status: COMPLETED | OUTPATIENT
Start: 2024-08-16 | End: 2024-08-16

## 2024-08-16 RX ORDER — NITROGLYCERIN 0.4 MG/1
0.8 TABLET SUBLINGUAL
Status: COMPLETED | OUTPATIENT
Start: 2024-08-16 | End: 2024-08-16

## 2024-08-16 RX ADMIN — NITROGLYCERIN 0.8 MG: 0.4 TABLET, ORALLY DISINTEGRATING SUBLINGUAL at 12:43

## 2024-08-16 RX ADMIN — METOPROLOL TARTRATE 150 MG: 100 TABLET, FILM COATED ORAL at 11:39

## 2024-08-16 RX ADMIN — IOPAMIDOL 65 ML: 755 INJECTION, SOLUTION INTRAVENOUS at 13:01

## 2024-08-19 ENCOUNTER — TELEPHONE (OUTPATIENT)
Dept: CARDIOLOGY | Facility: CLINIC | Age: 69
End: 2024-08-19
Payer: MEDICARE

## 2024-08-19 ENCOUNTER — HOSPITAL ENCOUNTER (OUTPATIENT)
Dept: CT IMAGING | Facility: HOSPITAL | Age: 69
End: 2024-08-19
Payer: MEDICARE

## 2024-08-19 DIAGNOSIS — R93.1 ABNORMAL FINDINGS ON DIAGNOSTIC IMAGING OF HEART AND CORONARY CIRCULATION: ICD-10-CM

## 2024-08-19 DIAGNOSIS — R93.1 ABNORMAL FINDINGS ON DIAGNOSTIC IMAGING OF HEART AND CORONARY CIRCULATION: Primary | ICD-10-CM

## 2024-08-19 PROCEDURE — 75580 N-INVAS EST C FFR SW ALY CTA: CPT | Performed by: INTERNAL MEDICINE

## 2024-08-19 PROCEDURE — 75580 N-INVAS EST C FFR SW ALY CTA: CPT

## 2024-08-23 ENCOUNTER — TELEPHONE (OUTPATIENT)
Dept: CARDIOLOGY | Facility: CLINIC | Age: 69
End: 2024-08-23
Payer: MEDICARE

## 2024-08-23 NOTE — TELEPHONE ENCOUNTER
----- Message from Gray Bahena sent at 8/23/2024 11:28 AM EDT -----  Please inform the patient of their test results.  Continue aspirin and statin, moderate blockage in the LAD I am waiting on the CT FFR to be approved by insurance to further assess this blockage.  We will update her when it is back. Thank you.

## 2024-08-26 NOTE — PROGRESS NOTES
Please inform the patient of their test results.  No need for cardiac catheterization, continue rosuvastatin, and I would recommend aspirin 81 mg daily. Thank you.

## 2024-08-27 ENCOUNTER — TELEPHONE (OUTPATIENT)
Dept: CARDIOLOGY | Facility: CLINIC | Age: 69
End: 2024-08-27
Payer: MEDICARE

## 2024-08-27 RX ORDER — ASPIRIN 81 MG/1
81 TABLET ORAL DAILY
Qty: 90 TABLET | Refills: 3 | Status: SHIPPED | OUTPATIENT
Start: 2024-08-27

## 2024-08-27 NOTE — LETTER
08/30/24        Fax # - [phone]    Re: Alysia Sierra, 1955   Procedure/Surgery - Ptosis procedure        Dear Dr. Eduardo Wright,     Alysia Sierra, 1955 is LOW RISK  for scheduled procedure/surgery from a cardiac/EP standpoint.  Any questions please call [phone].    [x]  Continue Aspirin               Sincerely,          MD Mindy Willis RN

## 2024-08-27 NOTE — TELEPHONE ENCOUNTER
----- Message from Gray Bahena sent at 8/26/2024  4:08 PM EDT -----  Please inform the patient of their test results.  No need for cardiac catheterization, continue rosuvastatin, and I would recommend aspirin 81 mg daily. Thank you.

## 2024-08-29 NOTE — TELEPHONE ENCOUNTER
Pt is having sxy for ptosis with Dr. Eduardo Wright, was awaiting results of CT prior to clearance.     If okay to proceed will fax CC letter to  Sharri F: [phone]

## 2024-09-10 ENCOUNTER — OFFICE VISIT (OUTPATIENT)
Dept: BEHAVIORAL HEALTH | Facility: CLINIC | Age: 69
End: 2024-09-10
Payer: MEDICARE

## 2024-09-10 DIAGNOSIS — F32.1 CURRENT MODERATE EPISODE OF MAJOR DEPRESSIVE DISORDER, UNSPECIFIED WHETHER RECURRENT: Primary | ICD-10-CM

## 2024-09-10 PROCEDURE — 90837 PSYTX W PT 60 MINUTES: CPT | Performed by: SOCIAL WORKER

## 2024-09-10 NOTE — TREATMENT PLAN
Multi-Disciplinary Problems (from Behavioral Health Treatment Plan)      Active Problems       Problem: Depression  Start Date: 09/10/24      Problem Details: The patient self-scales this problem as a 8 with 10 being the worst.          Goal Priority Start Date Expected End Date End Date    Patient will demonstrate the ability to initiate new constructive life skills outside of sessions on a consistent basis. -- 09/10/24 03/11/25 --    Goal Details: Progress toward goal:  The patient self-scales their progress related to this goal as a 7 with 10 being the worst.          Goal Intervention Frequency Start Date End Date    Assist patient in setting attainable activities of daily living goals. PRN 09/10/24 --    Intervention Details: See care plan note        Goal Intervention Frequency Start Date End Date    Provide education about depression PRN 09/10/24 --    Intervention Details: Duration of treatment until discharged.          Goal Intervention Frequency Start Date End Date    Assist patient in developing healthy coping strategies. PRN 09/10/24 --    Intervention Details: Duration of treatment until discharged.                          Reviewed By       Austin Tatum LCSW 09/10/24 5235                     I have discussed and reviewed this treatment plan with the patient.  It has been printed for signatures.

## 2024-09-10 NOTE — PROGRESS NOTES
[facility] [place]                 Follow Up Adult      Follow Up Adult Note     Date:09/10/2024   Patient Name: Alysia Sierra  : 1955   MRN: [id number]   Time IN: 10:45 am      Time OUT: 11:55 am     Referring Provider: Cassy Foster MD    Chief Complaint:      ICD-10-CM ICD-9-CM   1. Current moderate episode of major depressive disorder, unspecified whether recurrent  F32.1 296.22        History of Present Illness:   Alysia Sierra is a 69 y.o. female who is being seen today for follow up counseling for depression.    Subjective               Patient's Support Network Includes: extended family    Functional Status: Mild impairment       Current Outpatient Medications:     Alpha-Lipoic Acid 600 MG capsule, Take  by mouth Daily., Disp: , Rfl:     aspirin 81 MG EC tablet, Take 1 tablet by mouth Daily., Disp: 90 tablet, Rfl: 3    Cholecalciferol 25 MCG (1000 UT) tablet, Take 1 tablet by mouth Daily., Disp: , Rfl:     DULoxetine (CYMBALTA) 20 MG capsule, Take 1 capsule by mouth 2 (Two) Times a Day., Disp: 180 capsule, Rfl: 1    esomeprazole (nexIUM) 20 MG capsule, Take 1 capsule by mouth 2 (Two) Times a Week. OTC, Disp: , Rfl:     folic acid (FOLVITE) 1 MG tablet, Take 1 tablet by mouth Daily., Disp: 30 tablet, Rfl: 0    gabapentin (Neurontin) 300 MG capsule, Take 1 capsule in the morning and 2 capsules at night., Disp: 90 capsule, Rfl: 5    hydrALAZINE (APRESOLINE) 25 MG tablet, Take 1 tablet by mouth 2 (Two) Times a Day., Disp: , Rfl:     levothyroxine (SYNTHROID, LEVOTHROID) 25 MCG tablet, TAKE ONE TABLET BY MOUTH EVERY MORNING ON AN EMPTY STOMACH, Disp: 90 tablet, Rfl: 0    lidocaine (LIDODERM) 5 %, Place 1 patch on the skin as directed by provider Daily. Remove & Discard patch within 12 hours or as directed by MD, Disp: 14 each, Rfl: 1    Lidocaine 4 %, Place 1 patch on the skin as directed by provider Daily. Remove & Discard patch within 12 hours or as  directed by MD, Disp: 15 each, Rfl: 0    magnesium oxide (MAGOX) 400 (241.3 Mg) MG tablet tablet, Take 1 tablet by mouth Daily. OTC, Disp: , Rfl:     metoprolol succinate XL (TOPROL-XL) 25 MG 24 hr tablet, Take 1 tablet by mouth Every Night., Disp: 90 tablet, Rfl: 1    ondansetron ODT (ZOFRAN-ODT) 4 MG disintegrating tablet, Place 1 tablet on the tongue Every 8 (Eight) Hours As Needed for Nausea or Vomiting., Disp: 30 tablet, Rfl: 1    rosuvastatin (Crestor) 5 MG tablet, Take 1 tablet by mouth Daily., Disp: 90 tablet, Rfl: 1    thiamine (VITAMIN B1) 100 MG tablet, Take 1 tablet by mouth Daily., Disp: 30 tablet, Rfl: 0    vitamin B-12 (CYANOCOBALAMIN) 1000 MCG tablet, Take 1 tablet by mouth Daily., Disp: , Rfl:     Allergies   Allergen Reactions    Lisinopril Angioedema    Metal Rash     Possible nickel -   Gold is only metal that doesn't have issues      Milk-Related Compounds Other (See Comments)     LACTOSE INTOLERANT    Tylenol [Acetaminophen] Other (See Comments)     ckd    Atorvastatin Myalgia       Objective     Physical Exam:  Vital Signs: There were no vitals filed for this visit.  There is no height or weight on file to calculate BMI.     Mental Status Exam:   Hygiene:   good  Cooperation:  Cooperative  Eye Contact:  Good  Psychomotor Behavior:  Appropriate  Affect:  Full range  Mood: normal  Speech:  Normal  Thought Process:  Goal directed  Thought Content:  Normal  Suicidal:  None  Homicidal:  None  Hallucinations:  None  Delusion:  None  Memory:  Intact  Orientation:  Person, Place, Time, and Situation  Reliability:  good  Insight:  Good  Judgement:  Good  Impulse Control:  Good  Physical/Medical Issues:   See problem list      Assessment / Plan      Diagnosis:  Diagnoses and all orders for this visit:    1. Current moderate episode of major depressive disorder, unspecified whether recurrent (Primary)    Treatment plan updated September 10, 2024.  Patient and clinician agreed to more frequent sessions at  more frequent intervals in order to prove patient outcomes.  Minimal progress reported by patient.  Treatment plan will be updated in 90 days.    Progress toward goal: Not at goal    Prognosis: Good with ongoing treatment    PLAN:  Patient and clinician will continue to utilize a cognitive behavioral intervention which identifies addresses patient cognitive distortions related to depression.  Follow-ups will occur every 14 days and will last from 45 to 60 minutes in duration.    Safety: No acute safety concerns  Risk Assessment: Risk of self-harm acutely is low. Risk of self-harm chronically is also low, but could be further elevated in the event of treatment noncompliance and/or AODA.    Treatment Plan/Goals: Continue supportive psychotherapy efforts and medications as indicated. Treatment and medication options discussed during today's visit. Patient ackowledged and verbally consented to continue with current treatment plan and was educated on the importance of compliance with treatment and follow-up appointments. Patient seems reasonably able to adhere to treatment plan.      Assisted Patient in processing above session content; acknowledged and normalized patient’s thoughts, feelings, and concerns.  Rationalized patient thought process regarding depression.      Allowed Patient to freely discuss issues  without interruption or judgement with unconditional positive regard, active listening skills, and empathy. Therapist provided a safe, confidential environment to facilitate the development of a positive therapeutic relationship and encouraged open, honest communication. Assisted Patient in identifying risk factors which would indicate the need for higher level of care including thoughts to harm self or others and/or self-harming behavior and encouraged Patient to contact this office, call 911, or present to the nearest emergency room should any of these events occur. Discussed crisis intervention services and  means to access. Patient adamantly and convincingly denies current suicidal or homicidal ideation or perceptual disturbance. Assisted Patient in processing session content; acknowledged and normalized Patient’s thoughts, feelings, and concerns by utilizing a person-centered approach in efforts to build appropriate rapport and a positive therapeutic relationship with open and honest communication. .     Part of this note may be an electronic transcription/translation of spoken language to printed text using the Dragon Dictation System.       Follow Up:   Return in about 2 weeks (around 9/24/2024) for Next scheduled follow up.    Austin Tatum LCSW

## 2024-09-10 NOTE — PLAN OF CARE
Treatment plan updated September 10, 2024.  Patient and clinician agreed to more frequent sessions at more frequent intervals in order to prove patient outcomes.  Minimal progress reported by patient.  Treatment plan will be updated in 90 days.

## 2024-09-30 ENCOUNTER — OFFICE VISIT (OUTPATIENT)
Dept: BEHAVIORAL HEALTH | Facility: CLINIC | Age: 69
End: 2024-09-30
Payer: MEDICARE

## 2024-09-30 DIAGNOSIS — F32.1 CURRENT MODERATE EPISODE OF MAJOR DEPRESSIVE DISORDER, UNSPECIFIED WHETHER RECURRENT: Primary | ICD-10-CM

## 2024-09-30 PROCEDURE — 90837 PSYTX W PT 60 MINUTES: CPT | Performed by: SOCIAL WORKER

## 2024-09-30 NOTE — PROGRESS NOTES
[facility] [place]                 Follow Up Adult      Follow Up Adult Note     Date:2024   Patient Name: Alysia Sierra  : 1955   MRN: [id number]   Time IN: 11:00 am    Time OUT: 11:53 am     Referring Provider: Cassy Foster MD    Chief Complaint:      ICD-10-CM ICD-9-CM   1. Current moderate episode of major depressive disorder, unspecified whether recurrent  F32.1 296.22        History of Present Illness:   Alysia Sierra is a 69 y.o. female who is being seen today for follow up counseling for depression.    Subjective               Patient's Support Network Includes: extended family    Functional Status: Mild impairment       Current Outpatient Medications:     Alpha-Lipoic Acid 600 MG capsule, Take  by mouth Daily., Disp: , Rfl:     aspirin 81 MG EC tablet, Take 1 tablet by mouth Daily., Disp: 90 tablet, Rfl: 3    Cholecalciferol 25 MCG (1000 UT) tablet, Take 1 tablet by mouth Daily., Disp: , Rfl:     DULoxetine (CYMBALTA) 20 MG capsule, Take 1 capsule by mouth 2 (Two) Times a Day., Disp: 180 capsule, Rfl: 1    esomeprazole (nexIUM) 20 MG capsule, Take 1 capsule by mouth 2 (Two) Times a Week. OTC, Disp: , Rfl:     folic acid (FOLVITE) 1 MG tablet, Take 1 tablet by mouth Daily., Disp: 30 tablet, Rfl: 0    gabapentin (Neurontin) 300 MG capsule, Take 1 capsule in the morning and 2 capsules at night., Disp: 90 capsule, Rfl: 5    hydrALAZINE (APRESOLINE) 25 MG tablet, Take 1 tablet by mouth 2 (Two) Times a Day., Disp: , Rfl:     levothyroxine (SYNTHROID, LEVOTHROID) 25 MCG tablet, TAKE ONE TABLET BY MOUTH EVERY MORNING ON AN EMPTY STOMACH, Disp: 90 tablet, Rfl: 0    lidocaine (LIDODERM) 5 %, Place 1 patch on the skin as directed by provider Daily. Remove & Discard patch within 12 hours or as directed by MD, Disp: 14 each, Rfl: 1    Lidocaine 4 %, Place 1 patch on the skin as directed by provider Daily. Remove & Discard patch within 12 hours or as  directed by MD, Disp: 15 each, Rfl: 0    magnesium oxide (MAGOX) 400 (241.3 Mg) MG tablet tablet, Take 1 tablet by mouth Daily. OTC, Disp: , Rfl:     metoprolol succinate XL (TOPROL-XL) 25 MG 24 hr tablet, Take 1 tablet by mouth Every Night., Disp: 90 tablet, Rfl: 1    ondansetron ODT (ZOFRAN-ODT) 4 MG disintegrating tablet, Place 1 tablet on the tongue Every 8 (Eight) Hours As Needed for Nausea or Vomiting., Disp: 30 tablet, Rfl: 1    rosuvastatin (Crestor) 5 MG tablet, Take 1 tablet by mouth Daily., Disp: 90 tablet, Rfl: 1    thiamine (VITAMIN B1) 100 MG tablet, Take 1 tablet by mouth Daily., Disp: 30 tablet, Rfl: 0    vitamin B-12 (CYANOCOBALAMIN) 1000 MCG tablet, Take 1 tablet by mouth Daily., Disp: , Rfl:     Allergies   Allergen Reactions    Lisinopril Angioedema    Metal Rash     Possible nickel -   Gold is only metal that doesn't have issues      Milk-Related Compounds Other (See Comments)     LACTOSE INTOLERANT    Tylenol [Acetaminophen] Other (See Comments)     ckd    Atorvastatin Myalgia       Objective     Physical Exam:  Vital Signs: There were no vitals filed for this visit.  There is no height or weight on file to calculate BMI.     Mental Status Exam:   Hygiene:   good  Cooperation:  Cooperative  Eye Contact:  Good  Psychomotor Behavior:  Appropriate  Affect:  Full range  Mood: normal  Speech:  Normal  Thought Process:  Goal directed  Thought Content:  Normal  Suicidal:  None  Homicidal:  None  Hallucinations:  None  Delusion:  None  Memory:  Intact  Orientation:  Person, Place, Time, and Situation  Reliability:  good  Insight:  Good  Judgement:  Good  Impulse Control:  Good  Physical/Medical Issues:   See problem list      Assessment / Plan      Diagnosis:  Diagnoses and all orders for this visit:    1. Current moderate episode of major depressive disorder, unspecified whether recurrent (Primary)    Patient presented for follow-up with clinician.  Patient and clinician identified and addressed  patient cognitive distortions related to depression.  Patient was responsive to intervention.  Patient and clinician reviewed healthy coping except suicide to assist patient in relieving the symptoms of depression.  Clinician utilized active listening and reflective response to convey empathy and support.    Progress toward goal: Not at goal    Prognosis: Good with ongoing treatment    PLAN:  Patient clinician will continue to utilize a cognitive behavioral intervention which identifies and addresses patient cognitive distortions related to depression.  Follow-ups will occur every 21 days and will last from 45 to 60 minutes in duration.    Safety: No acute safety concerns  Risk Assessment: Risk of self-harm acutely is low. Risk of self-harm chronically is also low, but could be further elevated in the event of treatment noncompliance and/or AODA.    Treatment Plan/Goals: Continue supportive psychotherapy efforts and medications as indicated. Treatment and medication options discussed during today's visit. Patient ackowledged and verbally consented to continue with current treatment plan and was educated on the importance of compliance with treatment and follow-up appointments. Patient seems reasonably able to adhere to treatment plan.      Assisted Patient in processing above session content; acknowledged and normalized patient’s thoughts, feelings, and concerns.  Rationalized patient thought process regarding depression.      Allowed Patient to freely discuss issues  without interruption or judgement with unconditional positive regard, active listening skills, and empathy. Therapist provided a safe, confidential environment to facilitate the development of a positive therapeutic relationship and encouraged open, honest communication. Assisted Patient in identifying risk factors which would indicate the need for higher level of care including thoughts to harm self or others and/or self-harming behavior and  encouraged Patient to contact this office, call 911, or present to the nearest emergency room should any of these events occur. Discussed crisis intervention services and means to access. Patient adamantly and convincingly denies current suicidal or homicidal ideation or perceptual disturbance. Assisted Patient in processing session content; acknowledged and normalized Patient’s thoughts, feelings, and concerns by utilizing a person-centered approach in efforts to build appropriate rapport and a positive therapeutic relationship with open and honest communication. .     Part of this note may be an electronic transcription/translation of spoken language to printed text using the Dragon Dictation System.       Follow Up:   Return in about 2 weeks (around 10/14/2024) for Next scheduled follow up.    Austin Tatum LCSW

## 2024-10-01 ENCOUNTER — OFFICE VISIT (OUTPATIENT)
Dept: RADIATION ONCOLOGY | Facility: HOSPITAL | Age: 69
End: 2024-10-01
Payer: MEDICARE

## 2024-10-01 ENCOUNTER — HOSPITAL ENCOUNTER (OUTPATIENT)
Dept: RADIATION ONCOLOGY | Facility: HOSPITAL | Age: 69
Setting detail: RADIATION/ONCOLOGY SERIES
Discharge: HOME | End: 2024-10-01
Payer: MEDICARE

## 2024-10-01 VITALS
DIASTOLIC BLOOD PRESSURE: 69 MMHG | OXYGEN SATURATION: 98 % | SYSTOLIC BLOOD PRESSURE: 145 MMHG | TEMPERATURE: 96.8 F | BODY MASS INDEX: 23.43 KG/M2 | RESPIRATION RATE: 16 BRPM | HEART RATE: 72 BPM | WEIGHT: 136.5 LBS

## 2024-10-01 DIAGNOSIS — C54.1 ENDOMETRIAL ADENOCARCINOMA: Primary | ICD-10-CM

## 2024-10-01 NOTE — PROGRESS NOTES
FOLLOW UP NOTE    PATIENT:                                                      Alysia Sierra  MEDICAL RECORD #:                        [id number]  :                                                          1955  COMPLETION DATE:   2023  DIAGNOSIS:     Endometrial adenocarcinoma  - FIGO Stage IIIA (pT3a, pN0(sn), cM0)      BRIEF HISTORY:  Alysia Sierra is a 69 y.o. female returning for routine follow-up visit.  She initially presented with postmenopausal bleeding and underwent endometrial biopsy on 10/6/2022.  Pathology revealed well-differentiated endometrioid adenocarcinoma.  She underwent robotic hysterectomy with bilateral salpingo-oophorectomy with sentinel pelvic node dissection on 10/28/2022 per Dr. Rich.  Final pathology showed a grade 2 endometrioid adenocarcinoma with 94% myometrial invasion of 16 of 17 mm in thickness.  There was no lymphovascular space invasion and lymph nodes were negative.  She did have tumor involvement of serosal adhesions and endocervical involvement.  Tumor size was 5.5 cm.  The patient was started on adjuvant carboplatin/docetaxel.  The patient declined recommendations for external beam radiation therapy to the pelvis.  The patient did undergo adjuvant vaginal brachytherapy to a dose of 30 Gray in 5 fractions, which was delivered between the third and fourth cycles of chemotherapy.  She had her final cylinder brachytherapy treatment 2023.  She tolerated this well and has since completed her sixth and final cycle of chemotherapy.  Repeat CT scans performed 2023 showed no evidence of disease.     Since we last saw the patient, she has had imaging for work up of back pain status post fall and during hospital admission for work up of chest pain and sepsis.  CT pelvis 2024 was without evidence of recurrent disease but did identify fractures further characterized on MRI lumbar spine involving the left L1, L2, and L3 transverse processes.  She was  recommended for LSO brace and PT/OT.  CT angiogram of the abdomen on 6/22/2024 was also without evidence of recurrent disease but did show diffuse esophagitis for which she was started on twice daily PPI.  She reports functionally, she is doing well.  She is using a cane or hiking sticks to help ambulate and she joined the [employer] program.  She remains independent.  She denies vaginal bleeding, pain, or or discharge.  She admits to infrequent use of her vaginal dilator.  She reports urinary and bowel symptoms are stable.  She denies abdominopelvic pain.  She continues to see a therapist for management of depression and feels that this is going well.        MEDICATIONS: Medication reconciliation for the patient was reviewed and confirmed in the electronic medical record.    Review of Systems   Constitutional:  Positive for fatigue.   Musculoskeletal:  Positive for arthralgias (left hip s/p prior surgery 5/2023), back pain (lower back s/p lumbar fractures, not a surgical candidate) and gait problem (uses cane/hiking sticks).   Neurological:  Positive for gait problem (uses cane/hiking sticks) and numbness (hx peripheral neuropathy in feet prior to chemo made worse following chemo).   Psychiatric/Behavioral:  Positive for depression.    All other systems reviewed and are negative.      KPS 80%      Physical Exam  Vitals and nursing note reviewed.   Constitutional:       General: She is not in acute distress.     Appearance: Normal appearance. She is well-developed.   HENT:      Head: Normocephalic and atraumatic.   Eyes:      Conjunctiva/sclera: Conjunctivae normal.      Pupils: Pupils are equal, round, and reactive to light.   Cardiovascular:      Rate and Rhythm: Normal rate and regular rhythm.   Pulmonary:      Effort: Pulmonary effort is normal. No respiratory distress.      Breath sounds: Normal breath sounds.   Abdominal:      General: Bowel sounds are normal. There is no distension.      Palpations:  Abdomen is soft. There is no mass.      Tenderness: There is no abdominal tenderness.   Genitourinary:     Rectum: External hemorrhoid (nonthrombosed) present.      Comments: External genitalia appear free from lesion.  Speculum exam reveals mild atrophic changes within the vagina consistent with prior radiation.  No evidence of aggressive appearing lesions or nodules within the vagina or cuff which appears smooth and well-healed.  Bimanual exam reveals no palpable abnormality or fullness.  Exam was well-tolerated.  Musculoskeletal:         General: Normal range of motion.      Cervical back: Normal range of motion and neck supple.   Lymphadenopathy:      Lower Body: No right inguinal adenopathy. No left inguinal adenopathy.   Skin:     General: Skin is warm and dry.   Neurological:      Mental Status: She is alert and oriented to person, place, and time.   Psychiatric:         Behavior: Behavior normal.         Thought Content: Thought content normal.         Judgment: Judgment normal.         VITAL SIGNS:   Vitals:    10/01/24 1134   BP: 145/69   Pulse: 72   Resp: 16   Temp: 96.8 °F (36 °C)   TempSrc: Temporal   SpO2: 98%   Weight: 61.9 kg (136 lb 8 oz)   PainSc: 0-No pain                 The following portions of the patient's history were reviewed and updated as appropriate: allergies, current medications, past family history, past medical history, past social history, past surgical history and problem list.         Diagnoses and all orders for this visit:    1. Endometrial adenocarcinoma (Primary)         IMPRESSION:  Alysia Sierra is a 68 y.o. female with known history of a FIGO stage IIIA (pT3a, pN0(sn), cM0) grade 2 endometrioid adenocarcinoma.  Following hysterectomy with staging, the patient was started on adjuvant chemotherapy but declined external beam radiotherapy to the pelvis which was recommended due to the involvement of serosal adhesions and high risk of recurrence.  She was amenable to adjuvant  vaginal cuff brachytherapy, which she completed 1 year, 9 months ago interdigitated between cycles of chemotherapy.  She continues to remain without radiographic or clinical evidence of disease recurrence.  We again discussed recommendations for use of a vaginal dilator which was previously provided.  The patient and I reviewed follow-up intervals and the role of serial clinical exams.  We discussed no routine imaging unless clinically indicated.  Signs and symptoms of recurrent disease, such as vaginal bleeding, persistent abdominopelvic pain, urinary or bowel changes, and shortness of breath were reviewed.  She was advised to follow up immediately if she develops any of the above.       RECOMMENDATIONS:  Aylsia Sierra continues routine surveillance in the GYN oncology survivorship clinic, with next follow-up scheduled 12/24/2024.  She will be approaching 2 years out from radiation completion and it is anticipated that her visits will then be spaced out to q6 months until the 5-year leonor with GYN oncology.  At this point, Radiation Oncology will sign off and remain available as needed.      Return if symptoms worsen or fail to improve, for Office Visit.    ANETTE Samuel      I spent a total of 35 minutes on today's visit, with more than 15 minutes in direct face to face communication, and the remainder of the time spent in reviewing the relevant history, records, available imaging, and for documentation.

## 2024-10-05 ENCOUNTER — APPOINTMENT (OUTPATIENT)
Dept: CT IMAGING | Facility: HOSPITAL | Age: 69
DRG: 493 | End: 2024-10-05
Payer: MEDICARE

## 2024-10-05 ENCOUNTER — APPOINTMENT (OUTPATIENT)
Dept: GENERAL RADIOLOGY | Facility: HOSPITAL | Age: 69
DRG: 493 | End: 2024-10-05
Payer: MEDICARE

## 2024-10-05 ENCOUNTER — HOSPITAL ENCOUNTER (INPATIENT)
Facility: HOSPITAL | Age: 69
LOS: 14 days | Discharge: NURSING, REHAB OR LONG-TERM CARE | DRG: 493 | End: 2024-10-21
Attending: EMERGENCY MEDICINE | Admitting: STUDENT IN AN ORGANIZED HEALTH CARE EDUCATION/TRAINING PROGRAM
Payer: MEDICARE

## 2024-10-05 DIAGNOSIS — S92.345A CLOSED NONDISPLACED FRACTURE OF FOURTH METATARSAL BONE OF LEFT FOOT, INITIAL ENCOUNTER: ICD-10-CM

## 2024-10-05 DIAGNOSIS — S92.342A CLOSED DISPLACED FRACTURE OF FOURTH METATARSAL BONE OF LEFT FOOT, INITIAL ENCOUNTER: ICD-10-CM

## 2024-10-05 DIAGNOSIS — S92.325A CLOSED NONDISPLACED FRACTURE OF SECOND METATARSAL BONE OF LEFT FOOT, INITIAL ENCOUNTER: ICD-10-CM

## 2024-10-05 DIAGNOSIS — S82.892A CLOSED FRACTURE OF LEFT ANKLE, INITIAL ENCOUNTER: ICD-10-CM

## 2024-10-05 DIAGNOSIS — S92.315A CLOSED NONDISPLACED FRACTURE OF FIRST METATARSAL BONE OF LEFT FOOT, INITIAL ENCOUNTER: ICD-10-CM

## 2024-10-05 DIAGNOSIS — S82.302A DISPLACED FRACTURE OF DISTAL END OF LEFT TIBIA: Primary | ICD-10-CM

## 2024-10-05 DIAGNOSIS — S93.325A LISFRANC DISLOCATION, LEFT, INITIAL ENCOUNTER: ICD-10-CM

## 2024-10-05 DIAGNOSIS — S92.335A CLOSED NONDISPLACED FRACTURE OF THIRD METATARSAL BONE OF LEFT FOOT, INITIAL ENCOUNTER: ICD-10-CM

## 2024-10-05 LAB
BASOPHILS # BLD AUTO: 0.03 10*3/MM3 (ref 0–0.2)
BASOPHILS NFR BLD AUTO: 0.3 % (ref 0–1.5)
DEPRECATED RDW RBC AUTO: 49.1 FL (ref 37–54)
EOSINOPHIL # BLD AUTO: 0.05 10*3/MM3 (ref 0–0.4)
EOSINOPHIL NFR BLD AUTO: 0.5 % (ref 0.3–6.2)
ERYTHROCYTE [DISTWIDTH] IN BLOOD BY AUTOMATED COUNT: 13.7 % (ref 12.3–15.4)
HCT VFR BLD AUTO: 33.4 % (ref 34–46.6)
HGB BLD-MCNC: 10.7 G/DL (ref 12–15.9)
IMM GRANULOCYTES # BLD AUTO: 0.03 10*3/MM3 (ref 0–0.05)
IMM GRANULOCYTES NFR BLD AUTO: 0.3 % (ref 0–0.5)
LYMPHOCYTES # BLD AUTO: 1.86 10*3/MM3 (ref 0.7–3.1)
LYMPHOCYTES NFR BLD AUTO: 18.3 % (ref 19.6–45.3)
MCH RBC QN AUTO: 31.5 PG (ref 26.6–33)
MCHC RBC AUTO-ENTMCNC: 32 G/DL (ref 31.5–35.7)
MCV RBC AUTO: 98.2 FL (ref 79–97)
MONOCYTES # BLD AUTO: 1.41 10*3/MM3 (ref 0.1–0.9)
MONOCYTES NFR BLD AUTO: 13.9 % (ref 5–12)
NEUTROPHILS NFR BLD AUTO: 6.76 10*3/MM3 (ref 1.7–7)
NEUTROPHILS NFR BLD AUTO: 66.7 % (ref 42.7–76)
NRBC BLD AUTO-RTO: 0 /100 WBC (ref 0–0.2)
PLATELET # BLD AUTO: 224 10*3/MM3 (ref 140–450)
PMV BLD AUTO: 10.3 FL (ref 6–12)
RBC # BLD AUTO: 3.4 10*6/MM3 (ref 3.77–5.28)
WBC NRBC COR # BLD AUTO: 10.14 10*3/MM3 (ref 3.4–10.8)

## 2024-10-05 PROCEDURE — 25010000002 HYDROMORPHONE PER 4 MG: Performed by: EMERGENCY MEDICINE

## 2024-10-05 PROCEDURE — 80053 COMPREHEN METABOLIC PANEL: CPT | Performed by: PHYSICIAN ASSISTANT

## 2024-10-05 PROCEDURE — 73700 CT LOWER EXTREMITY W/O DYE: CPT

## 2024-10-05 PROCEDURE — 73630 X-RAY EXAM OF FOOT: CPT

## 2024-10-05 PROCEDURE — 83735 ASSAY OF MAGNESIUM: CPT | Performed by: STUDENT IN AN ORGANIZED HEALTH CARE EDUCATION/TRAINING PROGRAM

## 2024-10-05 PROCEDURE — 2W3RX1Z IMMOBILIZATION OF LEFT LOWER LEG USING SPLINT: ICD-10-PCS | Performed by: EMERGENCY MEDICINE

## 2024-10-05 PROCEDURE — 99285 EMERGENCY DEPT VISIT HI MDM: CPT

## 2024-10-05 PROCEDURE — 85025 COMPLETE CBC W/AUTO DIFF WBC: CPT | Performed by: PHYSICIAN ASSISTANT

## 2024-10-05 PROCEDURE — 83036 HEMOGLOBIN GLYCOSYLATED A1C: CPT | Performed by: ORTHOPAEDIC SURGERY

## 2024-10-05 RX ORDER — SODIUM CHLORIDE 0.9 % (FLUSH) 0.9 %
10 SYRINGE (ML) INJECTION AS NEEDED
Status: DISCONTINUED | OUTPATIENT
Start: 2024-10-05 | End: 2024-10-21 | Stop reason: HOSPADM

## 2024-10-05 RX ORDER — HYDROMORPHONE HYDROCHLORIDE 1 MG/ML
0.5 INJECTION, SOLUTION INTRAMUSCULAR; INTRAVENOUS; SUBCUTANEOUS ONCE
Status: COMPLETED | OUTPATIENT
Start: 2024-10-05 | End: 2024-10-05

## 2024-10-05 RX ORDER — OXYCODONE HYDROCHLORIDE 5 MG/1
5 TABLET ORAL ONCE
Status: COMPLETED | OUTPATIENT
Start: 2024-10-05 | End: 2024-10-05

## 2024-10-05 RX ADMIN — HYDROMORPHONE HYDROCHLORIDE 0.5 MG: 1 INJECTION, SOLUTION INTRAMUSCULAR; INTRAVENOUS; SUBCUTANEOUS at 23:04

## 2024-10-05 RX ADMIN — OXYCODONE HYDROCHLORIDE 5 MG: 5 TABLET ORAL at 21:06

## 2024-10-06 ENCOUNTER — APPOINTMENT (OUTPATIENT)
Dept: GENERAL RADIOLOGY | Facility: HOSPITAL | Age: 69
DRG: 493 | End: 2024-10-06
Payer: MEDICARE

## 2024-10-06 PROBLEM — S93.325A LISFRANC DISLOCATION, LEFT, INITIAL ENCOUNTER: Status: ACTIVE | Noted: 2024-10-06

## 2024-10-06 PROBLEM — S92.342A CLOSED DISPLACED FRACTURE OF FOURTH METATARSAL BONE OF LEFT FOOT: Status: ACTIVE | Noted: 2024-10-06

## 2024-10-06 LAB
ALBUMIN SERPL-MCNC: 4.1 G/DL (ref 3.5–5.2)
ALBUMIN/GLOB SERPL: 1.8 G/DL
ALP SERPL-CCNC: 74 U/L (ref 39–117)
ALT SERPL W P-5'-P-CCNC: 10 U/L (ref 1–33)
ANION GAP SERPL CALCULATED.3IONS-SCNC: 12 MMOL/L (ref 5–15)
AST SERPL-CCNC: 22 U/L (ref 1–32)
BILIRUB SERPL-MCNC: 0.3 MG/DL (ref 0–1.2)
BUN SERPL-MCNC: 16 MG/DL (ref 8–23)
BUN/CREAT SERPL: 12 (ref 7–25)
CALCIUM SPEC-SCNC: 8.5 MG/DL (ref 8.6–10.5)
CHLORIDE SERPL-SCNC: 102 MMOL/L (ref 98–107)
CO2 SERPL-SCNC: 22 MMOL/L (ref 22–29)
CREAT SERPL-MCNC: 1.33 MG/DL (ref 0.57–1)
EGFRCR SERPLBLD CKD-EPI 2021: 43.7 ML/MIN/1.73
ETHANOL BLD-MCNC: <10 MG/DL (ref 0–10)
GLOBULIN UR ELPH-MCNC: 2.3 GM/DL
GLUCOSE SERPL-MCNC: 104 MG/DL (ref 65–99)
HBA1C MFR BLD: 5.2 % (ref 4.8–5.6)
MAGNESIUM SERPL-MCNC: 1.8 MG/DL (ref 1.6–2.4)
POTASSIUM SERPL-SCNC: 4.4 MMOL/L (ref 3.5–5.2)
PROT SERPL-MCNC: 6.4 G/DL (ref 6–8.5)
SODIUM SERPL-SCNC: 136 MMOL/L (ref 136–145)

## 2024-10-06 PROCEDURE — G0378 HOSPITAL OBSERVATION PER HR: HCPCS

## 2024-10-06 PROCEDURE — 82077 ASSAY SPEC XCP UR&BREATH IA: CPT | Performed by: INTERNAL MEDICINE

## 2024-10-06 PROCEDURE — 73590 X-RAY EXAM OF LOWER LEG: CPT

## 2024-10-06 PROCEDURE — 99222 1ST HOSP IP/OBS MODERATE 55: CPT | Performed by: STUDENT IN AN ORGANIZED HEALTH CARE EDUCATION/TRAINING PROGRAM

## 2024-10-06 PROCEDURE — 99222 1ST HOSP IP/OBS MODERATE 55: CPT | Performed by: ORTHOPAEDIC SURGERY

## 2024-10-06 PROCEDURE — 25810000003 SODIUM CHLORIDE 0.9 % SOLUTION: Performed by: PHYSICIAN ASSISTANT

## 2024-10-06 PROCEDURE — 25010000002 THIAMINE HCL 200 MG/2ML SOLUTION: Performed by: INTERNAL MEDICINE

## 2024-10-06 PROCEDURE — 73610 X-RAY EXAM OF ANKLE: CPT

## 2024-10-06 PROCEDURE — 25010000002 HYDROMORPHONE PER 4 MG: Performed by: STUDENT IN AN ORGANIZED HEALTH CARE EDUCATION/TRAINING PROGRAM

## 2024-10-06 RX ORDER — OXYCODONE HYDROCHLORIDE 5 MG/1
5 TABLET ORAL EVERY 6 HOURS PRN
Status: DISCONTINUED | OUTPATIENT
Start: 2024-10-06 | End: 2024-10-08

## 2024-10-06 RX ORDER — GABAPENTIN 300 MG/1
600 CAPSULE ORAL NIGHTLY
Status: DISCONTINUED | OUTPATIENT
Start: 2024-10-06 | End: 2024-10-21 | Stop reason: HOSPADM

## 2024-10-06 RX ORDER — FOLIC ACID 1 MG/1
1 TABLET ORAL DAILY
Status: DISCONTINUED | OUTPATIENT
Start: 2024-10-06 | End: 2024-10-21 | Stop reason: HOSPADM

## 2024-10-06 RX ORDER — NALOXONE HCL 0.4 MG/ML
0.4 VIAL (ML) INJECTION
Status: DISCONTINUED | OUTPATIENT
Start: 2024-10-06 | End: 2024-10-08

## 2024-10-06 RX ORDER — AMOXICILLIN 250 MG
2 CAPSULE ORAL 2 TIMES DAILY PRN
Status: DISCONTINUED | OUTPATIENT
Start: 2024-10-06 | End: 2024-10-21 | Stop reason: HOSPADM

## 2024-10-06 RX ORDER — BISACODYL 5 MG/1
5 TABLET, DELAYED RELEASE ORAL DAILY PRN
Status: DISCONTINUED | OUTPATIENT
Start: 2024-10-06 | End: 2024-10-21 | Stop reason: HOSPADM

## 2024-10-06 RX ORDER — LEVOTHYROXINE SODIUM 25 UG/1
25 TABLET ORAL
Status: DISCONTINUED | OUTPATIENT
Start: 2024-10-06 | End: 2024-10-21 | Stop reason: HOSPADM

## 2024-10-06 RX ORDER — BISACODYL 10 MG
10 SUPPOSITORY, RECTAL RECTAL DAILY PRN
Status: DISCONTINUED | OUTPATIENT
Start: 2024-10-06 | End: 2024-10-21 | Stop reason: HOSPADM

## 2024-10-06 RX ORDER — HYDROMORPHONE HYDROCHLORIDE 1 MG/ML
0.5 INJECTION, SOLUTION INTRAMUSCULAR; INTRAVENOUS; SUBCUTANEOUS
Status: DISCONTINUED | OUTPATIENT
Start: 2024-10-06 | End: 2024-10-08

## 2024-10-06 RX ORDER — THIAMINE HYDROCHLORIDE 100 MG/ML
200 INJECTION, SOLUTION INTRAMUSCULAR; INTRAVENOUS DAILY
Status: DISCONTINUED | OUTPATIENT
Start: 2024-10-06 | End: 2024-10-08

## 2024-10-06 RX ORDER — POLYETHYLENE GLYCOL 3350 17 G/17G
17 POWDER, FOR SOLUTION ORAL DAILY PRN
Status: DISCONTINUED | OUTPATIENT
Start: 2024-10-06 | End: 2024-10-21 | Stop reason: HOSPADM

## 2024-10-06 RX ORDER — METOPROLOL SUCCINATE 25 MG/1
25 TABLET, EXTENDED RELEASE ORAL NIGHTLY
Status: DISCONTINUED | OUTPATIENT
Start: 2024-10-06 | End: 2024-10-21 | Stop reason: HOSPADM

## 2024-10-06 RX ORDER — DULOXETIN HYDROCHLORIDE 20 MG/1
20 CAPSULE, DELAYED RELEASE ORAL 2 TIMES DAILY
Status: DISCONTINUED | OUTPATIENT
Start: 2024-10-06 | End: 2024-10-21 | Stop reason: HOSPADM

## 2024-10-06 RX ORDER — SODIUM CHLORIDE 0.9 % (FLUSH) 0.9 %
10 SYRINGE (ML) INJECTION AS NEEDED
Status: DISCONTINUED | OUTPATIENT
Start: 2024-10-06 | End: 2024-10-21 | Stop reason: HOSPADM

## 2024-10-06 RX ORDER — SODIUM CHLORIDE 0.9 % (FLUSH) 0.9 %
10 SYRINGE (ML) INJECTION EVERY 12 HOURS SCHEDULED
Status: DISCONTINUED | OUTPATIENT
Start: 2024-10-06 | End: 2024-10-21 | Stop reason: HOSPADM

## 2024-10-06 RX ORDER — ASPIRIN 81 MG/1
81 TABLET ORAL DAILY
Status: DISCONTINUED | OUTPATIENT
Start: 2024-10-06 | End: 2024-10-07

## 2024-10-06 RX ORDER — SODIUM CHLORIDE 9 MG/ML
75 INJECTION, SOLUTION INTRAVENOUS CONTINUOUS
Status: DISCONTINUED | OUTPATIENT
Start: 2024-10-06 | End: 2024-10-07

## 2024-10-06 RX ORDER — GABAPENTIN 300 MG/1
300 CAPSULE ORAL DAILY
Status: DISCONTINUED | OUTPATIENT
Start: 2024-10-06 | End: 2024-10-21 | Stop reason: HOSPADM

## 2024-10-06 RX ADMIN — OXYCODONE HYDROCHLORIDE 5 MG: 5 TABLET ORAL at 19:14

## 2024-10-06 RX ADMIN — OXYCODONE HYDROCHLORIDE 5 MG: 5 TABLET ORAL at 13:04

## 2024-10-06 RX ADMIN — DULOXETINE HYDROCHLORIDE 20 MG: 20 CAPSULE, DELAYED RELEASE ORAL at 19:50

## 2024-10-06 RX ADMIN — SODIUM CHLORIDE 75 ML/HR: 9 INJECTION, SOLUTION INTRAVENOUS at 00:34

## 2024-10-06 RX ADMIN — GABAPENTIN 300 MG: 300 CAPSULE ORAL at 08:38

## 2024-10-06 RX ADMIN — HYDROMORPHONE HYDROCHLORIDE 0.5 MG: 1 INJECTION, SOLUTION INTRAMUSCULAR; INTRAVENOUS; SUBCUTANEOUS at 16:27

## 2024-10-06 RX ADMIN — LEVOTHYROXINE SODIUM 25 MCG: 25 TABLET ORAL at 06:18

## 2024-10-06 RX ADMIN — HYDROMORPHONE HYDROCHLORIDE 0.5 MG: 1 INJECTION, SOLUTION INTRAMUSCULAR; INTRAVENOUS; SUBCUTANEOUS at 22:38

## 2024-10-06 RX ADMIN — THIAMINE HYDROCHLORIDE 200 MG: 100 INJECTION, SOLUTION INTRAMUSCULAR; INTRAVENOUS at 13:04

## 2024-10-06 RX ADMIN — HYDROMORPHONE HYDROCHLORIDE 0.5 MG: 1 INJECTION, SOLUTION INTRAMUSCULAR; INTRAVENOUS; SUBCUTANEOUS at 02:55

## 2024-10-06 RX ADMIN — Medication 10 ML: at 08:38

## 2024-10-06 RX ADMIN — FOLIC ACID 1 MG: 1 TABLET ORAL at 08:38

## 2024-10-06 RX ADMIN — GABAPENTIN 600 MG: 300 CAPSULE ORAL at 19:50

## 2024-10-06 RX ADMIN — DULOXETINE HYDROCHLORIDE 20 MG: 20 CAPSULE, DELAYED RELEASE ORAL at 08:38

## 2024-10-06 RX ADMIN — Medication 10 ML: at 19:50

## 2024-10-06 RX ADMIN — METOPROLOL SUCCINATE 25 MG: 25 TABLET, EXTENDED RELEASE ORAL at 19:49

## 2024-10-06 RX ADMIN — HYDROMORPHONE HYDROCHLORIDE 0.5 MG: 1 INJECTION, SOLUTION INTRAMUSCULAR; INTRAVENOUS; SUBCUTANEOUS at 06:18

## 2024-10-06 RX ADMIN — HYDROMORPHONE HYDROCHLORIDE 0.5 MG: 1 INJECTION, SOLUTION INTRAMUSCULAR; INTRAVENOUS; SUBCUTANEOUS at 10:58

## 2024-10-06 RX ADMIN — OXYCODONE HYDROCHLORIDE 5 MG: 5 TABLET ORAL at 06:52

## 2024-10-06 NOTE — PLAN OF CARE
Problem: Adult Inpatient Plan of Care  Goal: Absence of Hospital-Acquired Illness or Injury  Intervention: Identify and Manage Fall Risk  Recent Flowsheet Documentation  Taken 10/6/2024 0425 by Ramandeep Black RN  Safety Promotion/Fall Prevention:   activity supervised   assistive device/personal items within reach   clutter free environment maintained   lighting adjusted   nonskid shoes/slippers when out of bed   room organization consistent   safety round/check completed  Taken 10/6/2024 0232 by Ramandeep Black RN  Safety Promotion/Fall Prevention:   activity supervised   assistive device/personal items within reach   clutter free environment maintained   lighting adjusted   nonskid shoes/slippers when out of bed   room organization consistent   safety round/check completed  Intervention: Prevent Skin Injury  Recent Flowsheet Documentation  Taken 10/6/2024 0425 by Ramandeep Black RN  Body Position: supine, legs elevated  Skin Protection:   adhesive use limited   incontinence pads utilized   tubing/devices free from skin contact   skin-to-skin areas padded   skin-to-device areas padded   skin sealant/moisture barrier applied   pulse oximeter probe site changed  Taken 10/6/2024 0232 by Ramandeep Black RN  Body Position: supine, legs elevated  Skin Protection:   adhesive use limited   incontinence pads utilized   tubing/devices free from skin contact   skin-to-device areas padded   skin sealant/moisture barrier applied   skin-to-skin areas padded  Intervention: Prevent and Manage VTE (Venous Thromboembolism) Risk  Recent Flowsheet Documentation  Taken 10/6/2024 0232 by Ramandeep Black RN  Activity Management: bedrest  VTE Prevention/Management:   right   compression stockings on  Range of Motion: (SPLINT IN PLACE) other (see comments)  Intervention: Prevent Infection  Recent Flowsheet Documentation  Taken 10/6/2024 0425 by Ramandeep Black RN  Infection Prevention:   environmental surveillance performed   hand hygiene  promoted   rest/sleep promoted   single patient room provided  Taken 10/6/2024 0232 by Ramandeep Black RN  Infection Prevention:   environmental surveillance performed   hand hygiene promoted   rest/sleep promoted   single patient room provided  Goal: Optimal Comfort and Wellbeing  Intervention: Monitor Pain and Promote Comfort  Recent Flowsheet Documentation  Taken 10/6/2024 0425 by Ramandeep lBack RN  Pain Management Interventions:   pillow support provided   pain management plan reviewed with patient/caregiver   position adjusted  Taken 10/6/2024 0232 by Ramandeep Black RN  Pain Management Interventions:   pillow support provided   position adjusted   pain management plan reviewed with patient/caregiver   see MAR  Intervention: Provide Person-Centered Care  Recent Flowsheet Documentation  Taken 10/6/2024 0232 by Ramandeep Black RN  Trust Relationship/Rapport:   care explained   choices provided   emotional support provided   empathic listening provided   questions answered   questions encouraged   reassurance provided   thoughts/feelings acknowledged   Goal Outcome Evaluation:   A&OX4 RA.Splint in place to left leg. Pain controlled with IV meds. NPO. First CHG bath given in anticipation of surgical repair.

## 2024-10-06 NOTE — PROGRESS NOTES
[facility] Medicine Services  PROGRESS NOTE    Patient Name: Alysia Sierra  : 1955  MRN: [id number]    Date of Admission: 10/5/2024  Primary Care Physician: Cassy Foster MD    Subjective   Subjective     CC:  fracture    HPI:  Foot aches with radiation up to thigh.  Slight nausea this morning better now      Objective   Objective     Vital Signs:   Temp:  [98 °F (36.7 °C)-99.3 °F (37.4 °C)] 98.1 °F (36.7 °C)  Heart Rate:  [69-95] 69  Resp:  [14-20] 18  BP: (114-153)/(50-96) 123/79     Physical Exam:  Non toxic, in bed  MM moist  RRR  CTAB  Abd soft, NT  Left foot with wraps  Alert, speech clear   Normal affect    Results Reviewed:  LAB RESULTS:      Lab 10/05/24  2259   WBC 10.14   HEMOGLOBIN 10.7*   HEMATOCRIT 33.4*   PLATELETS 224   NEUTROS ABS 6.76   IMMATURE GRANS (ABS) 0.03   LYMPHS ABS 1.86   MONOS ABS 1.41*   EOS ABS 0.05   MCV 98.2*         Lab 10/05/24  2336   SODIUM 136   POTASSIUM 4.4   CHLORIDE 102   CO2 22.0   ANION GAP 12.0   BUN 16   CREATININE 1.33*   EGFR 43.7*   GLUCOSE 104*   CALCIUM 8.5*   MAGNESIUM 1.8         Lab 10/05/24  2336   TOTAL PROTEIN 6.4   ALBUMIN 4.1   GLOBULIN 2.3   ALT (SGPT) 10   AST (SGOT) 22   BILIRUBIN 0.3   ALK PHOS 74                     Brief Urine Lab Results  (Last result in the past 365 days)        Color   Clarity   Blood   Leuk Est   Nitrite   Protein   CREAT   Urine HCG        24 1604 Yellow   Clear   Negative   Negative   Negative   Negative                   Microbiology Results Abnormal       None            CT Lower Extremity Left Without Contrast    Result Date: 10/5/2024  CT LOWER EXTREMITY LEFT WO CONTRAST Date of Exam: 10/5/2024 11:14 PM EDT Indication: CT of foot as requested by Ortho for Lisfranc fracture. Comparison: None available. Technique: Axial CT images were obtained of the left lower extremity without contrast administration.  Reconstructed coronal and sagittal images were also obtained. Automated exposure  control and iterative construction methods were used. Findings: There is a minimally displaced fracture of the anteromedial aspect of the distal tibia which extends to the articulation with the lateral talus. There is a minimally displaced fracture of the posterior medial talus. There are comminuted fractures involving the proximal first, second, third and fourth metatarsals which extend to involve the Lisfranc joint at all levels. There is diffuse soft tissue swelling over the forefoot.     Impression: Impression: 1.Comminuted fractures of the proximal first, second, third and fourth metatarsals with extension to the Lisfranc joint. 2.Minimally displaced fracture of the anteromedial distal tibia. 3.Minimally displaced fracture of the posterior medial talus. Electronically Signed: Geoff Abdalla MD  10/5/2024 11:26 PM EDT  Workstation ID: [id number]    XR Foot 3+ View Left    Result Date: 10/5/2024  XR FOOT 3+ VW LEFT Date of Exam: 10/5/2024 9:10 PM EDT Indication: Pain and swelling from fall Comparison: None available. Findings: There is a comminuted fracture of the proximal aspect of the first metatarsal. This extends to involve the tarsometatarsal joint. Lucent areas are also seen in the proximal more medial aspect of the second third and fourth digits which may indicate additional Lisfranc fractures. There is diffuse soft tissue swelling over the dorsum of the foot. There is calcaneal spurring.     Impression: Impression: Comminuted fracture of the proximal first metatarsal with extension to the tarsometatarsal joint. There are also lucencies in the proximal second third and fourth metatarsals which may indicate additional Lisfranc fractures. Electronically Signed: Geoff Abdalal MD  10/5/2024 9:42 PM EDT  Workstation ID: [id number]     Results for orders placed during the hospital encounter of 01/02/24    Adult Transthoracic Echo Complete W/ Cont if Necessary Per Protocol    Interpretation Summary    Left ventricular  systolic function is normal. Calculated left ventricular EF = 57.9% Left ventricular ejection fraction appears to be 56 - 60%.    Left ventricular diastolic function was normal.    Estimated right ventricular systolic pressure from tricuspid regurgitation is normal (<35 mmHg).    No significant change from previous study      Current medications:  Scheduled Meds:[Held by provider] aspirin, 81 mg, Oral, Daily  DULoxetine, 20 mg, Oral, BID  folic acid, 1 mg, Oral, Daily  gabapentin, 300 mg, Oral, Daily  gabapentin, 600 mg, Oral, Nightly  levothyroxine, 25 mcg, Oral, Q AM  metoprolol succinate XL, 25 mg, Oral, Nightly  sodium chloride, 10 mL, Intravenous, Q12H      Continuous Infusions:sodium chloride, 75 mL/hr, Last Rate: 75 mL/hr (10/06/24 0839)      PRN Meds:.  senna-docusate sodium **AND** polyethylene glycol **AND** bisacodyl **AND** bisacodyl    Calcium Replacement - Follow Nurse / BPA Driven Protocol    HYDROmorphone **AND** naloxone    influenza vaccine    Magnesium Standard Dose Replacement - Follow Nurse / BPA Driven Protocol    oxyCODONE    Phosphorus Replacement - Follow Nurse / BPA Driven Protocol    Potassium Replacement - Follow Nurse / BPA Driven Protocol    [COMPLETED] Insert Peripheral IV **AND** sodium chloride    sodium chloride    Assessment & Plan   Assessment & Plan     Active Hospital Problems    Diagnosis  POA    Lisfranc dislocation, left, initial encounter [S98.109A]  Yes      Resolved Hospital Problems   No resolved problems to display.        Brief Hospital Course to date:  Alysia Sierra is a 69 y.o. female with history of CKD III, chronic anemia, uterine cancer, hypothyroid who presents with left foot pain after stepping awkwardly off her bed.    Lisfranc fracture  Ankle fractures  - orthopedics follow, will likely need surgical fixation    CKD III  - gentle fluids today  - bmp am    Anemia    Hypothyroid  - levothyroxine    Alcohol use  - thiamine IV  - consider  CIWA    GERD    Neuropathy  - gabapentin      Expected Discharge Location and Transportation:   Expected Discharge   Expected discharge date/ time has not been documented.     VTE Prophylaxis: mechanical, add subq heparin when OK with orthopedic  Mechanical VTE prophylaxis orders are present.         AM-PAC 6 Clicks Score (PT): 14 (10/06/24 0232)    CODE STATUS:   Code Status and Medical Interventions: CPR (Attempt to Resuscitate); Full Support   Ordered at: 10/06/24 0246     Code Status (Patient has no pulse and is not breathing):    CPR (Attempt to Resuscitate)     Medical Interventions (Patient has pulse or is breathing):    Full Support       Adalid Funes MD  10/06/24

## 2024-10-06 NOTE — CONSULTS
[facility] Consultation Note    Subjective     Patient Care Team:  Patient Care Team:  Cassy Foster MD as PCP - General (Internal Medicine)  Russ Carrington MD as Consulting Physician (Nephrology)  Gray Bahena MD as Consulting Physician (Cardiology)  Caden Dietz MD as Consulting Physician (Neurology)  Charity Cornejo, CESAR as Ambulatory  (Oncology) (Population Health)  Juanpablo Lee MD as Consulting Physician (Orthopedic Surgery)  Basilia Mata MD as Consulting Physician (Radiation Oncology)  Sarah Stern MD as Consulting Physician (Gynecology)  Jared Wheatley MD as Consulting Physician (Nephrology)  Jose G Florez MD as Consulting Physician (Dermatology)    Referring Provider: Saturnino Dewitt PA-C  Reason for Consultation: Left foot pain    Chief Complaint   Patient presents with    Foot Injury        HPI    Alysia Sierra is a 69 y.o. female who was getting out of a new bed yesterday morning and at the onset of pain when she stepped wrong, with the onset of pain and swelling in her left foot.  She did weight-bear initially, but then presented to the emergency room via ambulance secondary to difficulty weightbearing with increasing pain in the left foot.  X-rays were obtained which showed a Lisfranc fracture, and CT scan was subsequently obtained.  CT scan showed additional fractures of the medial malleolus and posterior medial talus.  Past medical history significant for stage III chronic kidney disease, chronic anemia, hypothyroidism, and fatty liver.        Lisfranc dislocation, left, initial encounter     Past Medical History:   Diagnosis Date    Allergic lisinopril  2017    Anemia     Arrhythmia     Arthritis     Cancer     uterine    Cataract mild 2020    stil lpresent    Chicken pox     Chronic fatigue     CKD (chronic kidney disease), stage III     sees nephro    Clotting disorder     Dental root implant present     lower left  x1 - possible dental implant     Depression mild 2019    Difficulty walking 2019    Disease of thyroid gland     Dizzy     NIEVES (dyspnea on exertion)     2017    Fracture of hip     Generalized anxiety disorder     GERD (gastroesophageal reflux disease) 2018    History of brachytherapy 2023    vaginal brachytherapy    Hyperlipidemia     Hypertension     Iron deficiency anemia     Liver disease     fatty    Liver problem     Measles     Menopause     Mumps     Neuromuscular disorder Peripheral Neuropathy    Orthostatic hypotension     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    Renal insufficiency 2018    Scoliosis     Unintentional weight loss     Uses contact lenses     bilat    Uterine cancer     Uterine cancer 2022    UTI (urinary tract infection)     Visual impairment Nearsighted    Wears glasses       Past Surgical History:   Procedure Laterality Date     SECTION      DILATION AND CURETTAGE, DIAGNOSTIC / THERAPEUTIC      HIP OPEN REDUCTION Left 2023    Procedure: FEMORAL NECK OPEN REDUCTION INTERNAL FIXATION LEFT;  Surgeon: Juanpablo Lee MD;  Location: [facility] OR;  Service: Orthopedics;  Laterality: Left;    HIP SURGERY      OOPHORECTOMY      ORAL LESION EXCISION/BIOPSY  2021    TOTAL LAPAROSCOPIC HYSTERECTOMY SALPINGO OOPHORECTOMY N/A 10/28/2022    Procedure: TOTAL LAPAROSCOPIC HYSTERECTOMY BILATERAL SALPINGO-OOPHORECTOMY, INJECTION FOR SENTINEL LYMPH NODE MAPPING, BILATERAL SENTINEL LYMPH NODE DISSECTION WITH DAVINCI ROBOT;  Surgeon: Jasmine Rich MD;  Location:  [facility];  Service: Robotics - DaVinci;  Laterality: N/A;    TUBAL ABDOMINAL LIGATION        Family History   Problem Relation Age of Onset    Spondylolisthesis Mother     COPD Mother     Stroke Mother     Hypertension Mother     Lung cancer Father     Hypertension Father     Heart attack Father     Coronary artery disease Father     Heart disease Father     Cancer Father     Lung disease Father      Hyperlipidemia Sister     Rheum arthritis Sister     Malig Hypertension Sister     Osteoarthritis Sister     Other Sister         alcoholic    Hypertension Sister     Scoliosis Sister     Hypertension Brother     Depression Sister     Early death Sister     Breast cancer Neg Hx     Ovarian cancer Neg Hx     Uterine cancer Neg Hx     Colon cancer Neg Hx     Melanoma Neg Hx     Prostate cancer Neg Hx      Social History     Socioeconomic History    Marital status:     Number of children: 1    Highest education level: Associate degree: academic program   Tobacco Use    Smoking status: Never     Passive exposure: Never    Smokeless tobacco: Never    Tobacco comments:     Never   Vaping Use    Vaping status: Never Used   Substance and Sexual Activity    Alcohol use: Yes     Alcohol/week: 4.0 - 6.0 standard drinks of alcohol     Types: 4 - 6 Glasses of wine per week     Comment: 6-8 glasses a week    Drug use: No    Sexual activity: Not Currently     Partners: Male     Birth control/protection: Surgical, Post-menopausal      Medications Prior to Admission   Medication Sig Dispense Refill Last Dose    Alpha-Lipoic Acid 600 MG capsule Take  by mouth Daily.       aspirin 81 MG EC tablet Take 1 tablet by mouth Daily. 90 tablet 3     Cholecalciferol 25 MCG (1000 UT) tablet Take 1 tablet by mouth Daily.       DULoxetine (CYMBALTA) 20 MG capsule Take 1 capsule by mouth 2 (Two) Times a Day. 180 capsule 1     esomeprazole (nexIUM) 20 MG capsule Take 1 capsule by mouth 2 (Two) Times a Week. OTC       folic acid (FOLVITE) 1 MG tablet Take 1 tablet by mouth Daily. 30 tablet 0     gabapentin (Neurontin) 300 MG capsule Take 1 capsule in the morning and 2 capsules at night. 90 capsule 5     hydrALAZINE (APRESOLINE) 25 MG tablet Take 1 tablet by mouth 2 (Two) Times a Day.       levothyroxine (SYNTHROID, LEVOTHROID) 25 MCG tablet TAKE ONE TABLET BY MOUTH EVERY MORNING ON AN EMPTY STOMACH 90 tablet 0     lidocaine (LIDODERM) 5 %  "Place 1 patch on the skin as directed by provider Daily. Remove & Discard patch within 12 hours or as directed by MD 14 each 1     Lidocaine 4 % Place 1 patch on the skin as directed by provider Daily. Remove & Discard patch within 12 hours or as directed by MD 15 each 0     magnesium oxide (MAGOX) 400 (241.3 Mg) MG tablet tablet Take 1 tablet by mouth Daily. OTC       metoprolol succinate XL (TOPROL-XL) 25 MG 24 hr tablet Take 1 tablet by mouth Every Night. 90 tablet 1     ondansetron ODT (ZOFRAN-ODT) 4 MG disintegrating tablet Place 1 tablet on the tongue Every 8 (Eight) Hours As Needed for Nausea or Vomiting. 30 tablet 1     rosuvastatin (Crestor) 5 MG tablet Take 1 tablet by mouth Daily. 90 tablet 1     thiamine (VITAMIN B1) 100 MG tablet Take 1 tablet by mouth Daily. 30 tablet 0     vitamin B-12 (CYANOCOBALAMIN) 1000 MCG tablet Take 1 tablet by mouth Daily.        Allergies   Allergen Reactions    Lisinopril Angioedema    Metal Rash     Possible nickel -   Gold is only metal that doesn't have issues      Milk-Related Compounds Other (See Comments)     LACTOSE INTOLERANT    Tylenol [Acetaminophen] Other (See Comments)     ckd    Atorvastatin Myalgia        Review of Systems   Constitutional: Negative.    HENT: Negative.     Eyes: Negative.    Respiratory: Negative.     Cardiovascular: Negative.    Gastrointestinal: Negative.    Endocrine: Negative.    Genitourinary: Negative.    Musculoskeletal:  Positive for arthralgias.   Skin: Negative.    Allergic/Immunologic: Negative.    Neurological: Negative.    Hematological: Negative.    Psychiatric/Behavioral: Negative.          Objective      Physical Exam  /85 (BP Location: Right arm, Patient Position: Lying)   Pulse 74   Temp 98 °F (36.7 °C) (Oral)   Resp 18   Ht 162.6 cm (64\")   Wt 61.2 kg (135 lb)   LMP  (LMP Unknown)   SpO2 95%   BMI 23.17 kg/m²     Body mass index is 23.17 kg/m².    General:   Mental Status:  Alert   Appearance: Cooperative, in no " acute distress   Build and Nutrition: Deconditioned female   Orientation: Alert and oriented to person, place and time   Posture: Laying in a hospital bed    Integument:   Left ankle: Mild swelling   Left foot: Ecchymosis and swelling on the dorsum and plantar aspect of the foot    Neurologic:   Sensation:    Left foot: Intact to light touch on the dorsal and plantar aspect   Motor:  Left lower extremity: Intact ankle dorsiflexors, and ankle plantar flexors  Vascular:   Left lower extremity: Good doppler signal dorsalis pedis pu    Lower Extremities:   Left ankle and foot:    Tenderness:  Medial and lateral ankle and midfoot    Swelling:  Diffusely in the foot    Range of motion:  Ankle dorsiflexion:  5°       Ankle plantarflexion:  10°      Imaging/Studies  Imaging Results (Last 24 Hours)       Procedure Component Value Units Date/Time    XR Ankle 3+ View Left [[id number]] Resulted: 10/06/24 0731     Updated: 10/06/24 0859    CT Lower Extremity Left Without Contrast [[id number]] Collected: 10/05/24 2322     Updated: 10/05/24 2329    Narrative:      CT LOWER EXTREMITY LEFT WO CONTRAST    Date of Exam: 10/5/2024 11:14 PM EDT    Indication: CT of foot as requested by Ortho for Lisfranc fracture.    Comparison: None available.    Technique: Axial CT images were obtained of the left lower extremity without contrast administration.  Reconstructed coronal and sagittal images were also obtained. Automated exposure control and iterative construction methods were used.      Findings:  There is a minimally displaced fracture of the anteromedial aspect of the distal tibia which extends to the articulation with the lateral talus. There is a minimally displaced fracture of the posterior medial talus.    There are comminuted fractures involving the proximal first, second, third and fourth metatarsals which extend to involve the Lisfranc joint at all levels. There is diffuse soft tissue swelling over the forefoot.      Impression:       Impression:  1.Comminuted fractures of the proximal first, second, third and fourth metatarsals with extension to the Lisfranc joint.  2.Minimally displaced fracture of the anteromedial distal tibia.  3.Minimally displaced fracture of the posterior medial talus.        Electronically Signed: Geoff Abdalla MD    10/5/2024 11:26 PM EDT    Workstation ID: [id number]    XR Foot 3+ View Left [[id number]] Collected: 10/05/24 2141     Updated: 10/05/24 2145    Narrative:      XR FOOT 3+ VW LEFT    Date of Exam: 10/5/2024 9:10 PM EDT    Indication: Pain and swelling from fall    Comparison: None available.    Findings:  There is a comminuted fracture of the proximal aspect of the first metatarsal. This extends to involve the tarsometatarsal joint. Lucent areas are also seen in the proximal more medial aspect of the second third and fourth digits which may indicate   additional Lisfranc fractures. There is diffuse soft tissue swelling over the dorsum of the foot. There is calcaneal spurring.      Impression:      Impression:  Comminuted fracture of the proximal first metatarsal with extension to the tarsometatarsal joint. There are also lucencies in the proximal second third and fourth metatarsals which may indicate additional Lisfranc fractures.        Electronically Signed: Geoff Abdalla MD    10/5/2024 9:42 PM EDT    Workstation ID: [id number]            Results from last 7 days   Lab Units 10/05/24  2259   WBC 10*3/mm3 10.14   HEMOGLOBIN g/dL 10.7*   HEMATOCRIT % 33.4*   MCV fL 98.2*   PLATELETS 10*3/mm3 224         Results Review:   I reviewed the patient's new clinical lab test results., I reviewed the patient's new imaging test results., and I reviewed the patient's other test results.    Assessment and Plan     Assessment:    Left foot Lisfranc fracture, with involvement of the first, second, third, and fourth metatarsal bases    Nondisplaced medial malleolus fracture left ankle, nondisplaced left posterior medial talus  fracture      Plan:    I reviewed my findings with the patient.  Her left foot Lisfranc fracture will likely require operative intervention to best align and stabilize the fracture, but I will review with one of my foot and ankle colleagues tomorrow for recommendations and continued treatment.  In the meantime, she will be nonweightbearing on the left lower extremity, with ice and elevation.  Likely nonoperative management of the medial malleolus and posterior medial talus fractures.    I discussed the patients findings and my recommendations with patient, nursing staff, and consulting provider    Medical Decision Making  Management Options : close treatment of fracture or dislocation  Data/Risk: independent visualization of imaging, lab tests, or EMG/NCV      Juanpablo Lee MD  10/06/24  10:11 EDT

## 2024-10-06 NOTE — ED NOTES
Alysia Sierra    Nursing Report ED to Floor:  Mental status: AAOx4  Ambulatory status: assist x2  Oxygen Therapy:  ra  Cardiac Rhythm: nsr  Admitted from: home  Safety Concerns:  fall risk  Social Issues: n/a  ED Room #:  22    ED Nurse Phone Extension - [phone] or may call [phone].      HPI:   Chief Complaint   Patient presents with    Foot Injury       Past Medical History:  Past Medical History:   Diagnosis Date    Allergic lisinopril  2017    Anemia     Arrhythmia     Arthritis     Cancer     uterine    Cataract mild 2020    stil lpresent    Chicken pox     Chronic fatigue     CKD (chronic kidney disease), stage III     sees nephro    Clotting disorder     Dental root implant present     lower left  x1 - possible dental implant    Depression mild 2019    Difficulty walking 2019    Disease of thyroid gland     Dizzy     NIEVES (dyspnea on exertion)     2017    Fracture of hip     Generalized anxiety disorder     GERD (gastroesophageal reflux disease) 2018    History of brachytherapy 2023    vaginal brachytherapy    Hyperlipidemia     Hypertension     Iron deficiency anemia     Liver disease     fatty    Liver problem     Measles     Menopause     Mumps     Neuromuscular disorder Peripheral Neuropathy    Orthostatic hypotension     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    Renal insufficiency 2018    Scoliosis     Unintentional weight loss     Uses contact lenses     bilat    Uterine cancer     Uterine cancer 2022    UTI (urinary tract infection)     Visual impairment Nearsighted    Wears glasses         Past Surgical History:  Past Surgical History:   Procedure Laterality Date     SECTION      DILATION AND CURETTAGE, DIAGNOSTIC / THERAPEUTIC      HIP OPEN REDUCTION Left 2023    Procedure: FEMORAL NECK OPEN REDUCTION INTERNAL FIXATION LEFT;  Surgeon: Juanpablo Lee MD;  [facility];  Service: Orthopedics;  Laterality: Left;    HIP SURGERY       OOPHORECTOMY      ORAL LESION EXCISION/BIOPSY  08/2021    TOTAL LAPAROSCOPIC HYSTERECTOMY SALPINGO OOPHORECTOMY N/A 10/28/2022    Procedure: TOTAL LAPAROSCOPIC HYSTERECTOMY BILATERAL SALPINGO-OOPHORECTOMY, INJECTION FOR SENTINEL LYMPH NODE MAPPING, BILATERAL SENTINEL LYMPH NODE DISSECTION WITH DAVINCI ROBOT;  Surgeon: Jasmine Rich MD;  Location: [facility];  Service: Robotics - DaVinci;  Laterality: N/A;    TUBAL ABDOMINAL LIGATION  1983        Admitting Doctor:   Jose G Damon MD    Consulting Provider(s):  Consults       No orders found for last 30 day(s).             Admitting Diagnosis:   The primary encounter diagnosis was Displaced fracture of distal end of left tibia. Diagnoses of Closed nondisplaced fracture of second metatarsal bone of left foot, initial encounter, Closed nondisplaced fracture of first metatarsal bone of left foot, initial encounter, Closed nondisplaced fracture of third metatarsal bone of left foot, initial encounter, and Closed nondisplaced fracture of fourth metatarsal bone of left foot, initial encounter were also pertinent to this visit.    Most Recent Vitals:   Vitals:    10/05/24 2304 10/06/24 0010 10/06/24 0030 10/06/24 0100   BP: 134/84 131/87 128/68 114/50   BP Location: Right arm Right arm Right arm Right arm   Patient Position: Lying Lying Lying Lying   Pulse: 81  72 72   Resp: 20 18 18 18   Temp:       TempSrc:       SpO2: 98% 96% 96% 94%   Weight:       Height:           Active LDAs/IV Access:   Lines, Drains & Airways       Active LDAs       Name Placement date Placement time Site Days    Peripheral IV 10/05/24 2303 Posterior;Right Hand 10/05/24  2303  Hand  less than 1                    Labs (abnormal labs have a star):   Labs Reviewed   COMPREHENSIVE METABOLIC PANEL - Abnormal; Notable for the following components:       Result Value    Glucose 104 (*)     Creatinine 1.33 (*)     Calcium 8.5 (*)     eGFR 43.7 (*)     All other components within normal limits     Narrative:     GFR Normal >60  Chronic Kidney Disease <60  Kidney Failure <15     CBC WITH AUTO DIFFERENTIAL - Abnormal; Notable for the following components:    RBC 3.40 (*)     Hemoglobin 10.7 (*)     Hematocrit 33.4 (*)     MCV 98.2 (*)     Lymphocyte % 18.3 (*)     Monocyte % 13.9 (*)     Monocytes, Absolute 1.41 (*)     All other components within normal limits   CBC AND DIFFERENTIAL    Narrative:     The following orders were created for panel order CBC & Differential.  Procedure                               Abnormality         Status                     ---------                               -----------         ------                     CBC Auto Differential[[id number]]        Abnormal            Final result                 Please view results for these tests on the individual orders.       Meds Given in ED:   Medications   sodium chloride 0.9 % flush 10 mL (has no administration in time range)   sodium chloride 0.9 % infusion (75 mL/hr Intravenous New Bag 10/6/24 0034)   oxyCODONE (ROXICODONE) immediate release tablet 5 mg (5 mg Oral Given 10/5/24 2106)   HYDROmorphone (DILAUDID) injection 0.5 mg (0.5 mg Intravenous Given 10/5/24 2304)     sodium chloride, 75 mL/hr, Last Rate: 75 mL/hr (10/06/24 0034)         Last NIH score:                                                          Dysphagia screening results:        [place] Coma Scale:  No data recorded     CIWA:        Restraint Type:            Isolation Status:  No active isolations

## 2024-10-06 NOTE — PLAN OF CARE
Goal Outcome Evaluation:      A&Ox4 and VSS on RA. PRN pain medication administered, ice applied, and LLE elevated. CIWA assessed q 4hr per order. One episode of emesis. SCD in place on RLE. Allevyn in place and waffle mattress utilized. Call light in reach

## 2024-10-06 NOTE — ED PROVIDER NOTES
EMERGENCY DEPARTMENT ENCOUNTER    Pt Name: Alysia Sierra  MRN: [id number]  Pt :   1955  Room Number:  S353/1  Date of encounter:  10/5/2024  PCP: Cassy Foster MD  ED Provider: IKE Montgomery    Historian: Patient    HPI:  Chief Complaint: Left Foot Pain    Context: Alysia Sierra is a 69 y.o. female who presents to the ED c/o left foot pain. Patient shares that she has a new bed and that this morning when getting out of her bed she stepped wrong. She reports that throughout the day she has had increased pain and swelling in her left foot. Patient reports that she was concerned and phoned EMS as she lives by herself. Per EMS report patient did not have a palpable pulse in her left foot. No additional complaints on interview and exam.   HPI     REVIEW OF SYSTEMS  A chief complaint appropriate review of systems was completed and is negative except as noted in the HPI.     PAST MEDICAL HISTORY  Past Medical History:   Diagnosis Date    Allergic lisinopril  2017    Anemia     Arrhythmia     Arthritis     Cancer     uterine    Cataract mild 2020    stil lpresent    Chicken pox     Chronic fatigue     CKD (chronic kidney disease), stage III     sees nephro    Clotting disorder     Dental root implant present     lower left  x1 - possible dental implant    Depression mild 2019    Difficulty walking 2019    Disease of thyroid gland     Dizzy     NIEVES (dyspnea on exertion)     2017    Fracture of hip     Generalized anxiety disorder     GERD (gastroesophageal reflux disease) 2018    History of brachytherapy 2023    vaginal brachytherapy    Hyperlipidemia     Hypertension     Iron deficiency anemia     Liver disease     fatty    Liver problem     Measles     Menopause     Mumps     Neuromuscular disorder Peripheral Neuropathy    Orthostatic hypotension     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    Renal insufficiency 2018    Scoliosis     Unintentional weight loss     Uses  contact lenses     bilat    Uterine cancer     Uterine cancer 2022    UTI (urinary tract infection)     Visual impairment Nearsighted    Wears glasses        PAST SURGICAL HISTORY  Past Surgical History:   Procedure Laterality Date     SECTION      DILATION AND CURETTAGE, DIAGNOSTIC / THERAPEUTIC      HIP OPEN REDUCTION Left 2023    Procedure: FEMORAL NECK OPEN REDUCTION INTERNAL FIXATION LEFT;  Surgeon: Juanpablo Lee MD;  Location:  [facility];  Service: Orthopedics;  Laterality: Left;    HIP SURGERY      OOPHORECTOMY      ORAL LESION EXCISION/BIOPSY  2021    TOTAL LAPAROSCOPIC HYSTERECTOMY SALPINGO OOPHORECTOMY N/A 10/28/2022    Procedure: TOTAL LAPAROSCOPIC HYSTERECTOMY BILATERAL SALPINGO-OOPHORECTOMY, INJECTION FOR SENTINEL LYMPH NODE MAPPING, BILATERAL SENTINEL LYMPH NODE DISSECTION WITH DAVINCI ROBOT;  Surgeon: Jasmine Rich MD;  Location:  [facility];  Service: Robotics - DaVinci;  Laterality: N/A;    TUBAL ABDOMINAL LIGATION         FAMILY HISTORY  Family History   Problem Relation Age of Onset    Spondylolisthesis Mother     COPD Mother     Stroke Mother     Hypertension Mother     Lung cancer Father     Hypertension Father     Heart attack Father     Coronary artery disease Father     Heart disease Father     Cancer Father     Lung disease Father     Hyperlipidemia Sister     Rheum arthritis Sister     Malig Hypertension Sister     Osteoarthritis Sister     Other Sister         alcoholic    Hypertension Sister     Scoliosis Sister     Hypertension Brother     Depression Sister     Early death Sister     Breast cancer Neg Hx     Ovarian cancer Neg Hx     Uterine cancer Neg Hx     Colon cancer Neg Hx     Melanoma Neg Hx     Prostate cancer Neg Hx        SOCIAL HISTORY  Social History     Socioeconomic History    Marital status:     Number of children: 1    Highest education level: Associate degree: academic program   Tobacco Use    Smoking status: Never      Passive exposure: Never    Smokeless tobacco: Never    Tobacco comments:     Never   Vaping Use    Vaping status: Never Used   Substance and Sexual Activity    Alcohol use: Yes     Alcohol/week: 4.0 - 6.0 standard drinks of alcohol     Types: 4 - 6 Glasses of wine per week     Comment: 6-8 glasses a week    Drug use: No    Sexual activity: Not Currently     Partners: Male     Birth control/protection: Surgical, Post-menopausal       ALLERGIES  Lisinopril, Metal, Milk-related compounds, Tylenol [acetaminophen], and Atorvastatin    PHYSICAL EXAM  Physical Exam  Vitals and nursing note reviewed.   Constitutional:       General: She is not in acute distress.     Appearance: Normal appearance. She is not ill-appearing or toxic-appearing.   HENT:      Head: Normocephalic and atraumatic.      Nose: Nose normal.      Mouth/Throat:      Mouth: Mucous membranes are moist.   Eyes:      Extraocular Movements: Extraocular movements intact.   Cardiovascular:      Rate and Rhythm: Normal rate.      Pulses:           Dorsalis pedis pulses are detected w/ Doppler on the left side.        Posterior tibial pulses are detected w/ Doppler on the left side.   Pulmonary:      Effort: Pulmonary effort is normal.   Musculoskeletal:      Cervical back: Normal range of motion and neck supple.      Right foot: Normal.      Left foot: Decreased range of motion. Normal capillary refill. Swelling, tenderness and bony tenderness present. Normal pulse.   Skin:     General: Skin is warm and dry.   Neurological:      General: No focal deficit present.      Mental Status: She is alert.      Sensory: No sensory deficit.   Psychiatric:         Mood and Affect: Mood normal.         Behavior: Behavior normal.       LAB RESULTS  Results for orders placed or performed during the hospital encounter of 10/05/24   Comprehensive Metabolic Panel    Specimen: Blood   Result Value Ref Range    Glucose 104 (H) 65 - 99 mg/dL    BUN 16 8 - 23 mg/dL    Creatinine 1.33  (H) 0.57 - 1.00 mg/dL    Sodium 136 136 - 145 mmol/L    Potassium 4.4 3.5 - 5.2 mmol/L    Chloride 102 98 - 107 mmol/L    CO2 22.0 22.0 - 29.0 mmol/L    Calcium 8.5 (L) 8.6 - 10.5 mg/dL    Total Protein 6.4 6.0 - 8.5 g/dL    Albumin 4.1 3.5 - 5.2 g/dL    ALT (SGPT) 10 1 - 33 U/L    AST (SGOT) 22 1 - 32 U/L    Alkaline Phosphatase 74 39 - 117 U/L    Total Bilirubin 0.3 0.0 - 1.2 mg/dL    Globulin 2.3 gm/dL    A/G Ratio 1.8 g/dL    BUN/Creatinine Ratio 12.0 7.0 - 25.0    Anion Gap 12.0 5.0 - 15.0 mmol/L    eGFR 43.7 (L) >60.0 mL/min/1.73   CBC Auto Differential    Specimen: Blood   Result Value Ref Range    WBC 10.14 3.40 - 10.80 10*3/mm3    RBC 3.40 (L) 3.77 - 5.28 10*6/mm3    Hemoglobin 10.7 (L) 12.0 - 15.9 g/dL    Hematocrit 33.4 (L) 34.0 - 46.6 %    MCV 98.2 (H) 79.0 - 97.0 fL    MCH 31.5 26.6 - 33.0 pg    MCHC 32.0 31.5 - 35.7 g/dL    RDW 13.7 12.3 - 15.4 %    RDW-SD 49.1 37.0 - 54.0 fl    MPV 10.3 6.0 - 12.0 fL    Platelets 224 140 - 450 10*3/mm3    Neutrophil % 66.7 42.7 - 76.0 %    Lymphocyte % 18.3 (L) 19.6 - 45.3 %    Monocyte % 13.9 (H) 5.0 - 12.0 %    Eosinophil % 0.5 0.3 - 6.2 %    Basophil % 0.3 0.0 - 1.5 %    Immature Grans % 0.3 0.0 - 0.5 %    Neutrophils, Absolute 6.76 1.70 - 7.00 10*3/mm3    Lymphocytes, Absolute 1.86 0.70 - 3.10 10*3/mm3    Monocytes, Absolute 1.41 (H) 0.10 - 0.90 10*3/mm3    Eosinophils, Absolute 0.05 0.00 - 0.40 10*3/mm3    Basophils, Absolute 0.03 0.00 - 0.20 10*3/mm3    Immature Grans, Absolute 0.03 0.00 - 0.05 10*3/mm3    nRBC 0.0 0.0 - 0.2 /100 WBC   Magnesium    Specimen: Blood   Result Value Ref Range    Magnesium 1.8 1.6 - 2.4 mg/dL       If labs were ordered, I independently reviewed the results and considered them in treating the patient.    RADIOLOGY  CT Lower Extremity Left Without Contrast   Final Result   Impression:   1.Comminuted fractures of the proximal first, second, third and fourth metatarsals with extension to the Lisfranc joint.   2.Minimally displaced  fracture of the anteromedial distal tibia.   3.Minimally displaced fracture of the posterior medial talus.            Electronically Signed: Geoff Abdalla MD     10/5/2024 11:26 PM EDT     Workstation ID: [id number]      XR Foot 3+ View Left   Final Result   Impression:   Comminuted fracture of the proximal first metatarsal with extension to the tarsometatarsal joint. There are also lucencies in the proximal second third and fourth metatarsals which may indicate additional Lisfranc fractures.            Electronically Signed: Geoff Abdalla MD     10/5/2024 9:42 PM EDT     Workstation ID: [id number]        [x] Radiologist's Report Reviewed:  I ordered and independently interpreted the above noted radiographic studies.  See radiologist's dictation for official interpretation.      PROCEDURES    Splint - Cast - Strapping    Date/Time: 10/5/2024 11:15 PM    Performed by: Saturnino Dewitt PA  Authorized by: Federico Mcleod DO    Consent:     Consent obtained:  Verbal    Consent given by:  Patient    Risks, benefits, and alternatives were discussed: yes      Risks discussed:  Discoloration, numbness, pain and swelling  Pre-procedure details:     Distal neurologic exam:  Normal    Distal perfusion: distal pulses strong and brisk capillary refill    Procedure details:     Location:  Foot    Foot location:  L foot    Splint type:  Short leg    Supplies:  Elastic bandage, fiberglass and cotton padding    Attestation: Splint applied and adjusted personally by me    Post-procedure details:     Distal neurologic exam:  Normal    Distal perfusion: distal pulses strong, brisk capillary refill and unchanged      Procedure completion:  Tolerated      No orders to display       MEDICATIONS GIVEN IN ER    Medications   sodium chloride 0.9 % flush 10 mL (has no administration in time range)   sodium chloride 0.9 % infusion (75 mL/hr Intravenous New Bag 10/6/24 0034)   oxyCODONE (ROXICODONE) immediate release tablet 5 mg (5 mg Oral  Given 10/5/24 2106)   HYDROmorphone (DILAUDID) injection 0.5 mg (0.5 mg Intravenous Given 10/5/24 2304)       MEDICAL DECISION MAKING, PROGRESS, and CONSULTS   Medical Decision Making  This is a nontoxic-appearing 69-year-old female who presents ER for evaluation of left foot pain after getting out of bed this morning and stepping wrong on her foot.  Obvious pain and swelling of the left foot, neurovascularly intact with dorsalis pedis and posterior tibialis pulses observed via Doppler at bedside.  Imaging completed with fractures as documented.  Orthopedic consult placed who will follow patient on admission.  Patient admitted to hospital medicine for further evaluation and treatment.    Problems Addressed:  Closed nondisplaced fracture of first metatarsal bone of left foot, initial encounter: complicated acute illness or injury  Closed nondisplaced fracture of fourth metatarsal bone of left foot, initial encounter: complicated acute illness or injury  Closed nondisplaced fracture of second metatarsal bone of left foot, initial encounter: complicated acute illness or injury  Closed nondisplaced fracture of third metatarsal bone of left foot, initial encounter: complicated acute illness or injury  Displaced fracture of distal end of left tibia: complicated acute illness or injury    Amount and/or Complexity of Data Reviewed  Labs: ordered. Decision-making details documented in ED Course.  Radiology: ordered and independent interpretation performed. Decision-making details documented in ED Course.    Risk  Prescription drug management.  Decision regarding hospitalization.      Discussion below represents my analysis of pertinent findings related to patient's condition, differential diagnosis, treatment plan and final disposition.    Differential diagnosis:  The differential diagnosis associated with the patient's presentation includes: Contusion, arthritis, fracture, dislocation, tendon/ligamentous injury.       Additional sources  Discussed/ obtained information from independent historians:   [] Spouse  [] Parent  [] Family member  [] Friend  [] EMS   [] Other:  External (non-ED) record review:   [] Inpatient record:   [] Office record:   [] Outpatient record:   [] Prior Outpatient labs:   [] Prior Outpatient radiology:   [] Primary Care record:   [] Outside ED record:   [] Other:   Patient's care impacted by:   [] Diabetes  [x] Hypertension  [x] Hyperlipidemia  [x] Hypothyroidism   [] Coronary Artery Disease   [] COPD   [] Cancer   [] Obesity  [x] GERD   [] Tobacco Abuse   [] Substance Abuse    [] Anxiety   [] Depression   [x] Other: Stage III CKD,   Care significantly affected by Social Determinants of Health (housing and economic circumstances, unemployment)    [] Yes     [x] No   If yes, Patient's care significantly limited by  Social Determinants of Health including:   [] Inadequate housing   [] Low income   [] Alcoholism and drug addiction in family   [] Problems related to primary support group   [] Unemployment   [] Problems related to employment   [] Other Social Determinants of Health:   Shared decision making: I reviewed workup performed in ED including labs and imaging. Based on findings, recommendation made for admission. Patient is agreeable to plan of care and hospital admission.      Orders placed during this visit:  Orders Placed This Encounter   Procedures    XR Foot 3+ View Left    CT Lower Extremity Left Without Contrast    Comprehensive Metabolic Panel    CBC Auto Differential    Magnesium    Insert Peripheral IV    Initiate Observation Status    CBC & Differential     ED Course:    ED Course as of 10/06/24 0235   Sat Oct 05, 2024   2046 Vitals and Telemetry tracing was reviewed and directly interpreted by myself demonstrating blood pressure 132/90, temperature 99.3 °F, heart rate of 84, respirations of 14 breaths/min and oxygen saturation 97% room air [JG]   2047 BP: 132/90 [JG]   2047 Temp: 99.3  °F (37.4 °C) [JG]   2047 Heart Rate: 84 [JG]   2047 Resp: 14 [JG]   2047 SpO2: 97 % [JG]   2200 XR foot  [JG]   2230 Case reviewed with Dr. Lee who recommended CT foot, splint. Will likely require admission for this complicated fracture.  [JG]   Sun Oct 06, 2024   0019 Hospital medicine consulted for admission Comminuted fracture of the proximal first metatarsal with extension to the tarsometatarsal joint with lucencies in the proximal second third and fourth metatarsals which may indicate additional Lisfranc fractures.  [JG]   0231 CT foot completed as directed by orthopedic that demonstrated comminuted fractures of the proximal first, second, third and fourth metatarsals with extension to the Lisfranc joint.  Minimally displaced fracture of the anteromedial distal tibia. Minimally displaced fracture of the posterior medial talus.   [JG]   0231 Hospital medicine agreeable to accept patient.  [JG]      ED Course User Index  [JG] Saturnino Dewitt PA          DIAGNOSIS  Final diagnoses:   Displaced fracture of distal end of left tibia   Closed nondisplaced fracture of second metatarsal bone of left foot, initial encounter   Closed nondisplaced fracture of first metatarsal bone of left foot, initial encounter   Closed nondisplaced fracture of third metatarsal bone of left foot, initial encounter   Closed nondisplaced fracture of fourth metatarsal bone of left foot, initial encounter     DISPOSITION    ED Disposition       ED Disposition   Decision to Admit    Condition   --    Comment   Level of Care: Telemetry [5]   Diagnosis: Closed displaced fracture of fourth metatarsal bone of left foot [[id number]]   Admitting Physician: YONATHAN FLEMING [[id number]]   Attending Physician: YONATHAN FLEMING [[id number]]               Please note that portions of this document were completed with voice recognition software.        Saturnino Dewitt PA  10/06/24 0235       Saturnino Dewitt PA  10/06/24 0238

## 2024-10-06 NOTE — H&P
[facility]  HISTORY AND PHYSICAL    Patient Name: Alysia Sierra  : 1955  MRN: [id number]  Primary Care Physician: Cassy Foster MD  Date of admission: 10/5/2024      Subjective   Subjective     Chief Complaint:  Fall    HPI:  Alysia Sierra is a 69 y.o. female hx uterine cancer in remission, CKD3, chronic anemia, hypothyroidism, presents with L foot pain after fall at home. Just got a new bed and was getting up from it when she tripped and fell. Severe pain in L foot and ankle. Otherwise in normal state of health. Found to have multiple fractures LLE and ortho consulted.       Personal History     Past Medical History:   Diagnosis Date    Allergic lisinopril  2017    Anemia     Arrhythmia     Arthritis     Cancer     uterine    Cataract mild 2020    stil lpresent    Chicken pox     Chronic fatigue     CKD (chronic kidney disease), stage III     sees nephro    Clotting disorder     Dental root implant present     lower left  x1 - possible dental implant    Depression mild 2019    Difficulty walking 2019    Disease of thyroid gland     Dizzy     NIEVES (dyspnea on exertion)     2017    Fracture of hip     Generalized anxiety disorder     GERD (gastroesophageal reflux disease) 2018    History of brachytherapy 2023    vaginal brachytherapy    Hyperlipidemia     Hypertension     Iron deficiency anemia     Liver disease     fatty    Liver problem     Measles     Menopause     Mumps     Neuromuscular disorder Peripheral Neuropathy    Orthostatic hypotension     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    Renal insufficiency 2018    Scoliosis     Unintentional weight loss     Uses contact lenses     bilat    Uterine cancer     Uterine cancer 2022    UTI (urinary tract infection)     Visual impairment Nearsighted    Wears glasses          Oncology Problem List:  Endometrial adenocarcinoma (2022; Status:  Active)    Oncology/Hematology History   Endometrial adenocarcinoma   10/2022 Initial Diagnosis    2022: TVUS with uterus measuring 5.7 x 4.8 x 4.1 cm with an endometrial stripe thickness of 9.8 mm.  Right ovary not visualized.  Left ovary normal.  No free fluid.  2022: Saline infusion sonogram with an irregularly shaped prominent lesion on the posterior endometrial surface concerning for neoplasia.  10/6/2022: Endometrial biopsy with FIGO grade 1 endometrial cancer      10/28/2022 Surgery    Robotic-assisted total laparoscopic hysterectomy with bilateral salpingo-oophorectomy with bilateral SLND    Pathology with FIGO grade 2 endometrioid adenocarcinoma with 94% MI. Involvement of endocervix and uterine serosal adhesions. Negative parametrium and bilateral sentinel lymph nodes. MMR intact.     Surgery at [facility] by Jasmine Rich MD      Cancer Staged    Cancer Staging   Endometrial adenocarcinoma (HCC)  Staging form: Corpus Uteri - Carcinoma And Carcinosarcoma, AJCC 8th Edition  - Pathologic stage from 10/28/2022: FIGO Stage IIIA (pT3a, pN0(sn), cM0) - Signed by Jasmine Rich MD on 2022       10/28/2022 Cancer Staged    Staging form: Corpus Uteri - Carcinoma And Carcinosarcoma, AJCC 8th Edition  - Pathologic stage from 10/28/2022: FIGO Stage IIIA (pT3a, pN0(sn), cM0) - Signed by Jasmine Rich MD on 2022 -  Chemotherapy    OP OVARIAN DOCEtaxel / CARBOplatin  6 cycles adjuvant treatment completed  - worsening of baseline neuropathy required dose reduction of docetaxel at cycle #4  - cycle #5 complicated by grade 3 anemia requiring transfusion  - docetaxel and carboplatin both dose reduced at cycle 6     2023 - 2023 Radiation    Radiation OncologyTreatment Course:  Alysia Sierra received 3000 cGy in 5 fractions to upper vagina via High Dose Radiation - HDR.       Past Surgical History:   Procedure Laterality Date     SECTION      DILATION AND  CURETTAGE, DIAGNOSTIC / THERAPEUTIC      HIP OPEN REDUCTION Left 05/02/2023    Procedure: FEMORAL NECK OPEN REDUCTION INTERNAL FIXATION LEFT;  Surgeon: Juanpablo Lee MD;  Location:  [facility];  Service: Orthopedics;  Laterality: Left;    HIP SURGERY  2023    OOPHORECTOMY      ORAL LESION EXCISION/BIOPSY  08/2021    TOTAL LAPAROSCOPIC HYSTERECTOMY SALPINGO OOPHORECTOMY N/A 10/28/2022    Procedure: TOTAL LAPAROSCOPIC HYSTERECTOMY BILATERAL SALPINGO-OOPHORECTOMY, INJECTION FOR SENTINEL LYMPH NODE MAPPING, BILATERAL SENTINEL LYMPH NODE DISSECTION WITH DAVINCI ROBOT;  Surgeon: Jasmine Rich MD;  Location:  [facility];  Service: Robotics - DaVinci;  Laterality: N/A;    TUBAL ABDOMINAL LIGATION  1983       Family History: family history includes COPD in her mother; Cancer in her father; Coronary artery disease in her father; Depression in her sister; Early death in her sister; Heart attack in her father; Heart disease in her father; Hyperlipidemia in her sister; Hypertension in her brother, father, mother, and sister; Lung cancer in her father; Lung disease in her father; Malig Hypertension in her sister; Osteoarthritis in her sister; Other in her sister; Rheum arthritis in her sister; Scoliosis in her sister; Spondylolisthesis in her mother; Stroke in her mother.     Social History:  reports that she has never smoked. She has never been exposed to tobacco smoke. She has never used smokeless tobacco. She reports current alcohol use of about 4.0 - 6.0 standard drinks of alcohol per week. She reports that she does not use drugs.  Social History     Social History Narrative    Caffeine: None       Medications:  Available home medication information reviewed.  Alpha-Lipoic Acid, DULoxetine, Lidocaine, aspirin, cholecalciferol, esomeprazole, folic acid, gabapentin, hydrALAZINE, levothyroxine, lidocaine, magnesium oxide, metoprolol succinate XL, ondansetron ODT, rosuvastatin, thiamine, and vitamin B-12    Allergies    Allergen Reactions    Lisinopril Angioedema    Metal Rash     Possible nickel -   Gold is only metal that doesn't have issues      Milk-Related Compounds Other (See Comments)     LACTOSE INTOLERANT    Tylenol [Acetaminophen] Other (See Comments)     ckd    Atorvastatin Myalgia       Objective   Objective     Vital Signs:   Temp:  [99.3 °F (37.4 °C)] 99.3 °F (37.4 °C)  Heart Rate:  [72-84] 72  Resp:  [14-20] 18  BP: (114-148)/(50-96) 114/50       Physical Exam   AAO3  Comfortable  No thyromegaly  Heart RRR  Lungs CTAB  Abd nondistended  LLE casted, distal cap refill intact    Result Review:  I have personally reviewed the results from the time of this admission to 10/6/2024 02:51 EDT and agree with these findings:  [x]  Laboratory list / accordion  []  Microbiology  [x]  Radiology  []  EKG/Telemetry   []  Cardiology/Vascular   []  Pathology  [x]  Old records  []  Other:  Most notable findings include: See A+P      LAB RESULTS:      Lab 10/05/24  2259   WBC 10.14   HEMOGLOBIN 10.7*   HEMATOCRIT 33.4*   PLATELETS 224   NEUTROS ABS 6.76   IMMATURE GRANS (ABS) 0.03   LYMPHS ABS 1.86   MONOS ABS 1.41*   EOS ABS 0.05   MCV 98.2*         Lab 10/05/24  2336   SODIUM 136   POTASSIUM 4.4   CHLORIDE 102   CO2 22.0   ANION GAP 12.0   BUN 16   CREATININE 1.33*   EGFR 43.7*   GLUCOSE 104*   CALCIUM 8.5*   MAGNESIUM 1.8         Lab 10/05/24  2336   TOTAL PROTEIN 6.4   ALBUMIN 4.1   GLOBULIN 2.3   ALT (SGPT) 10   AST (SGOT) 22   BILIRUBIN 0.3   ALK PHOS 74                     UA          11/12/2023    21:51 1/2/2024    21:15 6/22/2024    16:04   Urinalysis   Squamous Epithelial Cells, UA  7-12     Specific Gravity, UA <=1.005  1.012  1.040    Ketones, UA Negative  Negative  >=160 mg/dL (4+)    Blood, UA Negative  Negative  Negative    Leukocytes, UA Negative  Small (1+)  Negative    Nitrite, UA Negative  Negative  Negative    RBC, UA  0-2     WBC, UA  6-10     Bacteria, UA  4+         Microbiology Results (last 10 days)       **  No results found for the last 240 hours. **            CT Lower Extremity Left Without Contrast    Result Date: 10/5/2024  CT LOWER EXTREMITY LEFT WO CONTRAST Date of Exam: 10/5/2024 11:14 PM EDT Indication: CT of foot as requested by Ortho for Lisfranc fracture. Comparison: None available. Technique: Axial CT images were obtained of the left lower extremity without contrast administration.  Reconstructed coronal and sagittal images were also obtained. Automated exposure control and iterative construction methods were used. Findings: There is a minimally displaced fracture of the anteromedial aspect of the distal tibia which extends to the articulation with the lateral talus. There is a minimally displaced fracture of the posterior medial talus. There are comminuted fractures involving the proximal first, second, third and fourth metatarsals which extend to involve the Lisfranc joint at all levels. There is diffuse soft tissue swelling over the forefoot.     Impression: Impression: 1.Comminuted fractures of the proximal first, second, third and fourth metatarsals with extension to the Lisfranc joint. 2.Minimally displaced fracture of the anteromedial distal tibia. 3.Minimally displaced fracture of the posterior medial talus. Electronically Signed: Geoff Abdalla MD  10/5/2024 11:26 PM EDT  Workstation ID: [id number]    XR Foot 3+ View Left    Result Date: 10/5/2024  XR FOOT 3+ VW LEFT Date of Exam: 10/5/2024 9:10 PM EDT Indication: Pain and swelling from fall Comparison: None available. Findings: There is a comminuted fracture of the proximal aspect of the first metatarsal. This extends to involve the tarsometatarsal joint. Lucent areas are also seen in the proximal more medial aspect of the second third and fourth digits which may indicate additional Lisfranc fractures. There is diffuse soft tissue swelling over the dorsum of the foot. There is calcaneal spurring.     Impression: Impression: Comminuted fracture of the  proximal first metatarsal with extension to the tarsometatarsal joint. There are also lucencies in the proximal second third and fourth metatarsals which may indicate additional Lisfranc fractures. Electronically Signed: Geoff Abdalla MD  10/5/2024 9:42 PM EDT  Workstation ID: [id number]     Results for orders placed during the hospital encounter of 01/02/24    Adult Transthoracic Echo Complete W/ Cont if Necessary Per Protocol    Interpretation Summary    Left ventricular systolic function is normal. Calculated left ventricular EF = 57.9% Left ventricular ejection fraction appears to be 56 - 60%.    Left ventricular diastolic function was normal.    Estimated right ventricular systolic pressure from tricuspid regurgitation is normal (<35 mmHg).    No significant change from previous study      Assessment & Plan   Assessment & Plan       Closed displaced fracture of fourth metatarsal bone of left foot    Comminuted fractures of the proximal first, second, third and fourth metatarsals with extension to the Lisfranc joint. Minimally displaced fracture of the anteromedial distal tibia.  Minimally displaced fracture of the posterior medial talus.  -Ortho consulted and following, appreciate recs  -Was told she had osteoporosis before but hesitant regarding therapy 2/2 side effects  -NPO    CKD3  -follows with nephrology. Near baseline. IVF while NPO. Monitor     Anemia  -near baseline    Hypothyroidism  -synthroid      VTE Prophylaxis:  Mechanical VTE prophylaxis orders are present.          CODE STATUS:    Code Status and Medical Interventions: CPR (Attempt to Resuscitate); Full Support   Ordered at: 10/06/24 0246     Code Status (Patient has no pulse and is not breathing):    CPR (Attempt to Resuscitate)     Medical Interventions (Patient has pulse or is breathing):    Full Support       Expected Discharge   Expected discharge date/ time has not been documented.     Jose G Damon MD  10/06/24

## 2024-10-07 ENCOUNTER — PREP FOR SURGERY (OUTPATIENT)
Dept: OTHER | Facility: HOSPITAL | Age: 69
End: 2024-10-07
Payer: MEDICARE

## 2024-10-07 DIAGNOSIS — S82.892A CLOSED FRACTURE OF LEFT ANKLE, INITIAL ENCOUNTER: ICD-10-CM

## 2024-10-07 DIAGNOSIS — S93.325A LISFRANC DISLOCATION, LEFT, INITIAL ENCOUNTER: Primary | ICD-10-CM

## 2024-10-07 PROBLEM — S92.342A CLOSED DISPLACED FRACTURE OF FOURTH METATARSAL BONE OF LEFT FOOT: Status: ACTIVE | Noted: 2024-10-07

## 2024-10-07 LAB
ANION GAP SERPL CALCULATED.3IONS-SCNC: 10 MMOL/L (ref 5–15)
BUN SERPL-MCNC: 11 MG/DL (ref 8–23)
BUN/CREAT SERPL: 9.6 (ref 7–25)
CALCIUM SPEC-SCNC: 8.8 MG/DL (ref 8.6–10.5)
CHLORIDE SERPL-SCNC: 99 MMOL/L (ref 98–107)
CO2 SERPL-SCNC: 26 MMOL/L (ref 22–29)
CREAT SERPL-MCNC: 1.15 MG/DL (ref 0.57–1)
DEPRECATED RDW RBC AUTO: 50.3 FL (ref 37–54)
EGFRCR SERPLBLD CKD-EPI 2021: 52 ML/MIN/1.73
ERYTHROCYTE [DISTWIDTH] IN BLOOD BY AUTOMATED COUNT: 13.7 % (ref 12.3–15.4)
GLUCOSE SERPL-MCNC: 104 MG/DL (ref 65–99)
HCT VFR BLD AUTO: 30.2 % (ref 34–46.6)
HGB BLD-MCNC: 9.4 G/DL (ref 12–15.9)
MCH RBC QN AUTO: 31.2 PG (ref 26.6–33)
MCHC RBC AUTO-ENTMCNC: 31.1 G/DL (ref 31.5–35.7)
MCV RBC AUTO: 100.3 FL (ref 79–97)
PLATELET # BLD AUTO: 172 10*3/MM3 (ref 140–450)
PMV BLD AUTO: 10.5 FL (ref 6–12)
POTASSIUM SERPL-SCNC: 4.4 MMOL/L (ref 3.5–5.2)
RBC # BLD AUTO: 3.01 10*6/MM3 (ref 3.77–5.28)
SODIUM SERPL-SCNC: 135 MMOL/L (ref 136–145)
TSH SERPL DL<=0.05 MIU/L-ACNC: 4.54 UIU/ML (ref 0.27–4.2)
WBC NRBC COR # BLD AUTO: 5.71 10*3/MM3 (ref 3.4–10.8)

## 2024-10-07 PROCEDURE — 84443 ASSAY THYROID STIM HORMONE: CPT | Performed by: INTERNAL MEDICINE

## 2024-10-07 PROCEDURE — 99232 SBSQ HOSP IP/OBS MODERATE 35: CPT | Performed by: INTERNAL MEDICINE

## 2024-10-07 PROCEDURE — 80048 BASIC METABOLIC PNL TOTAL CA: CPT | Performed by: STUDENT IN AN ORGANIZED HEALTH CARE EDUCATION/TRAINING PROGRAM

## 2024-10-07 PROCEDURE — 99231 SBSQ HOSP IP/OBS SF/LOW 25: CPT | Performed by: ORTHOPAEDIC SURGERY

## 2024-10-07 PROCEDURE — 25010000002 THIAMINE HCL 200 MG/2ML SOLUTION: Performed by: INTERNAL MEDICINE

## 2024-10-07 PROCEDURE — 85027 COMPLETE CBC AUTOMATED: CPT | Performed by: STUDENT IN AN ORGANIZED HEALTH CARE EDUCATION/TRAINING PROGRAM

## 2024-10-07 PROCEDURE — 25010000002 HYDROMORPHONE PER 4 MG: Performed by: STUDENT IN AN ORGANIZED HEALTH CARE EDUCATION/TRAINING PROGRAM

## 2024-10-07 RX ORDER — PANTOPRAZOLE SODIUM 40 MG/1
40 TABLET, DELAYED RELEASE ORAL EVERY OTHER DAY
Status: DISCONTINUED | OUTPATIENT
Start: 2024-10-07 | End: 2024-10-21 | Stop reason: HOSPADM

## 2024-10-07 RX ORDER — ASPIRIN 81 MG/1
81 TABLET ORAL DAILY
Status: DISCONTINUED | OUTPATIENT
Start: 2024-10-07 | End: 2024-10-21 | Stop reason: HOSPADM

## 2024-10-07 RX ADMIN — HYDROMORPHONE HYDROCHLORIDE 0.5 MG: 1 INJECTION, SOLUTION INTRAMUSCULAR; INTRAVENOUS; SUBCUTANEOUS at 00:21

## 2024-10-07 RX ADMIN — GABAPENTIN 600 MG: 300 CAPSULE ORAL at 19:45

## 2024-10-07 RX ADMIN — OXYCODONE HYDROCHLORIDE 5 MG: 5 TABLET ORAL at 18:16

## 2024-10-07 RX ADMIN — LEVOTHYROXINE SODIUM 25 MCG: 25 TABLET ORAL at 05:56

## 2024-10-07 RX ADMIN — THIAMINE HYDROCHLORIDE 200 MG: 100 INJECTION, SOLUTION INTRAMUSCULAR; INTRAVENOUS at 09:22

## 2024-10-07 RX ADMIN — BISACODYL 5 MG: 5 TABLET, COATED ORAL at 10:10

## 2024-10-07 RX ADMIN — METOPROLOL SUCCINATE 25 MG: 25 TABLET, EXTENDED RELEASE ORAL at 19:45

## 2024-10-07 RX ADMIN — Medication 10 ML: at 09:23

## 2024-10-07 RX ADMIN — DULOXETINE HYDROCHLORIDE 20 MG: 20 CAPSULE, DELAYED RELEASE ORAL at 19:45

## 2024-10-07 RX ADMIN — PANTOPRAZOLE SODIUM 40 MG: 40 TABLET, DELAYED RELEASE ORAL at 12:25

## 2024-10-07 RX ADMIN — GABAPENTIN 300 MG: 300 CAPSULE ORAL at 09:22

## 2024-10-07 RX ADMIN — ASPIRIN 81 MG: 81 TABLET, COATED ORAL at 12:25

## 2024-10-07 RX ADMIN — HYDROMORPHONE HYDROCHLORIDE 0.5 MG: 1 INJECTION, SOLUTION INTRAMUSCULAR; INTRAVENOUS; SUBCUTANEOUS at 06:00

## 2024-10-07 RX ADMIN — FOLIC ACID 1 MG: 1 TABLET ORAL at 09:22

## 2024-10-07 RX ADMIN — POLYETHYLENE GLYCOL 3350 17 G: 17 POWDER, FOR SOLUTION ORAL at 18:16

## 2024-10-07 RX ADMIN — SENNOSIDES AND DOCUSATE SODIUM 2 TABLET: 50; 8.6 TABLET ORAL at 10:10

## 2024-10-07 RX ADMIN — DULOXETINE HYDROCHLORIDE 20 MG: 20 CAPSULE, DELAYED RELEASE ORAL at 09:22

## 2024-10-07 RX ADMIN — OXYCODONE HYDROCHLORIDE 5 MG: 5 TABLET ORAL at 04:12

## 2024-10-07 RX ADMIN — OXYCODONE HYDROCHLORIDE 5 MG: 5 TABLET ORAL at 10:10

## 2024-10-07 NOTE — PROGRESS NOTES
"      [facility] [place]    Subjective      LOS: 0 days   Patient Care Team:  Cassy Foster MD as PCP - General (Internal Medicine)  Russ Carrington MD as Consulting Physician (Nephrology)  Gray Bahena MD as Consulting Physician (Cardiology)  Caden Dietz MD as Consulting Physician (Neurology)  Charity Cornejo, CESAR as Ambulatory  (Oncology) (Population Health)  Juanpablo Lee MD as Consulting Physician (Orthopedic Surgery)  Basilia Mata MD as Consulting Physician (Radiation Oncology)  Sarah Stern MD as Consulting Physician (Gynecology)  Jared Wheatley MD as Consulting Physician (Nephrology)  Jose G Florez MD as Consulting Physician (Dermatology)    CC: Left foot and ankle pain    Interval History:   No new complaints this morning.  Resting comfortably in bed.    Objective      Vital Signs  Temp (24hrs), Av.3 °F (36.8 °C), Min:98.1 °F (36.7 °C), Max:98.8 °F (37.1 °C)      BP 93/62 (BP Location: Right arm, Patient Position: Lying)   Pulse 67   Temp 98.3 °F (36.8 °C) (Oral)   Resp 18   Ht 162.6 cm (64\")   Wt 61.2 kg (135 lb)   LMP  (LMP Unknown)   SpO2 98%   BMI 23.17 kg/m²     Examination:   Lower Extremities:              Left ankle and foot:                          Tenderness:    Medial and lateral ankle and midfoot                          Swelling:          Diffusely in the foot, fracture blisters distally                          Range of motion:        Ankle dorsiflexion:        0°                                                              Ankle plantarflexion:    10°    Labs:  Results from last 7 days   Lab Units 10/07/24  0636 10/05/24  2259   WBC 10*3/mm3 5.71 10.14   HEMOGLOBIN g/dL 9.4* 10.7*   HEMATOCRIT % 30.2* 33.4*   MCV fL 100.3* 98.2*   PLATELETS 10*3/mm3 172 224       Radiology:  Imaging Results (Last 24 Hours)       Procedure Component Value Units Date/Time    XR Ankle 3+ View Left [[id number]] Collected: 10/06/24 1527     Updated: " 10/06/24 1535    Narrative:      XR ANKLE 3+ VW LEFT    Date of Exam: 10/6/2024 7:31 AM EDT    Indication: ankle pain - ct scan showed ankle fracture    Comparison: Lower extremity [place] [address] 24    Findings:  Subtle nondisplaced medial malleolus fracture stable from prior. Nondisplaced fracture involving the medial talus, near the posterior subtalar joint stable from prior. Incompletely assessed known fractures of the first through fourth proximal metatarsals   as well as lateral cuneiform probably similar to recent CT. Fractures involve Lisfranc articulation with redemonstrated widening at the base of first and second metatarsals. Degenerative osteoarthritis. No convincing new fractures. Nonfocal soft tissue   swelling noted.      Impression:      Impression:  Grossly stable known fractures of the medial malleolus, medial talus, lateral cuneiform and first through fourth proximal metatarsals with Lisfranc involvement.      Electronically Signed: Collin Bullard MD    10/6/2024 3:32 PM EDT    Workstation ID: [id number]    XR Tibia Fibula 2 View Left [[id number]] Collected: 10/06/24 1303     Updated: 10/06/24 1310    Narrative:      XR TIBIA FIBULA 2 VW LEFT    Date of Exam: 10/6/2024 12:35 PM EDT    Indication: left ankle fracture - medial malleolus - evaluate for possible proximal fibula fracture    Comparison: None available.    Findings:  There is a vertically oriented intra-articular fracture of the medial malleolus in near-anatomic alignment. No fracture of the proximal fibula is demonstrated. Lateral ankle soft tissue swelling is noted. No significant joint effusion or loose body is   seen.      Impression:      Impression:  Nondisplaced intra-articular fracture of the medial malleolus.  Moderate lateral ankle soft tissue swelling.      Electronically Signed: Konstantin Coronado MD    10/6/2024 1:07 PM EDT    Workstation ID: [id number]            PT:  Physical Therapy - Plan of Care Review - Outcome Summary:    ]       Results Review:     I reviewed the patient's new clinical results.  I reviewed the patient's new imaging results and agree with the interpretation.  I reviewed the patient's other test results and agree with the interpretation    Assessment and Plan     Assessment:   Lisfranc fracture, left foot, with nondisplaced medial malleolus fracture and posterior medial talus fracture, left ankle    Will discuss with foot and ankle colleague today.  Plan for splint placement after their assessment.  Continue with ice and elevation for now.  No immediate surgical plans in light of her soft tissues.      Lisfranc dislocation, left, initial encounter      Plan for disposition: To be determined.      Future Appointments   Date Time Provider Department Center   10/14/2024 10:30 AM Cassy Foster MD [facility]   10/14/2024 11:00 AM Austin Tatum LCSW [facility] None   10/30/2024 11:00 AM Austin Tatum LCSW [facility] None   11/11/2024  9:30 AM Gray Bahena MD [facility]   12/12/2024 11:30 AM Liana oS APRN [facility]   1/6/2025 12:40 PM Juanpablo Lee MD [facility]   2/13/2025 11:30 AM Caden Dietz MD [facility]           Juanpablo Lee MD  10/07/24  07:45 EDT

## 2024-10-07 NOTE — PLAN OF CARE
Goal Outcome Evaluation:  Plan of Care Reviewed With: patient        Progress: no change     Pt is alert and oriented x4. VSS on room air. Pt has slept off and on throughout the shift. PRN pain meds given as ordered. Ace wrap to left ankle in place and foot elevated on pillows. Ice packs utilized. CIWA scored 0. Purewick in place and voiding spontaneously. Waffle mattress and SCD to RLE in place.

## 2024-10-07 NOTE — CASE MANAGEMENT/SOCIAL WORK
Continued Stay Note  [facility]     Patient Name: Alysia Sierra  MRN: [id number]  Today's Date: 10/7/2024    Admit Date: 10/5/2024    Plan: Home   Discharge Plan       Row Name 10/07/24 1252       Plan    Plan Home    Plan Comments I spoke with patient in regards to discharge planning. She resides in [place], alone, sister in the community, present with her today. She reports being independent with ADL's, and has the following DME: Cane, 4 prong cane, rolling walker, rollator, tall toilet, shower chair. She is not current with any outpatient rehab. Await rehab recommendations. She has gone to outpatient rehab in [place] in the past. She has [place] Medicare and [place] Medicaid. Her PCP is Cassy Foster. She has advanced directives.    Final Discharge Disposition Code 01 - home or self-care                   Discharge Codes    No documentation.                       Melvi Bear RN

## 2024-10-07 NOTE — PROGRESS NOTES
[facility] Medicine Services  PROGRESS NOTE    Patient Name: Alysia Sierra  : 1955  MRN: [id number]    Date of Admission: 10/5/2024  Primary Care Physician: Cassy Foster MD    Subjective   Subjective     CC:  fracture    HPI:  Foot still aches. Says it remains swollen and she is keeping ice on it.      Objective   Objective     Vital Signs:   Temp:  [98.1 °F (36.7 °C)-98.8 °F (37.1 °C)] 98.1 °F (36.7 °C)  Heart Rate:  [67-86] 84  Resp:  [18] 18  BP: ()/(62-91) 109/66     Physical Exam:  Non toxic, in bed  MM moist  RRR  CTAB  Abd soft, NT  Normal affect  Alert, speech clear  Left foot with ace wrap, some distal foot bruising    Results Reviewed:  LAB RESULTS:      Lab 10/07/24  0636 10/05/24  2259   WBC 5.71 10.14   HEMOGLOBIN 9.4* 10.7*   HEMATOCRIT 30.2* 33.4*   PLATELETS 172 224   NEUTROS ABS  --  6.76   IMMATURE GRANS (ABS)  --  0.03   LYMPHS ABS  --  1.86   MONOS ABS  --  1.41*   EOS ABS  --  0.05   .3* 98.2*         Lab 10/07/24  0636 10/05/24  2336 10/05/24  2259   SODIUM 135* 136  --    POTASSIUM 4.4 4.4  --    CHLORIDE 99 102  --    CO2 26.0 22.0  --    ANION GAP 10.0 12.0  --    BUN 11 16  --    CREATININE 1.15* 1.33*  --    EGFR 52.0* 43.7*  --    GLUCOSE 104* 104*  --    CALCIUM 8.8 8.5*  --    MAGNESIUM  --  1.8  --    HEMOGLOBIN A1C  --   --  5.20   TSH 4.540*  --   --          Lab 10/05/24  2336   TOTAL PROTEIN 6.4   ALBUMIN 4.1   GLOBULIN 2.3   ALT (SGPT) 10   AST (SGOT) 22   BILIRUBIN 0.3   ALK PHOS 74                     Brief Urine Lab Results  (Last result in the past 365 days)        Color   Clarity   Blood   Leuk Est   Nitrite   Protein   CREAT   Urine HCG        24 1604 Yellow   Clear   Negative   Negative   Negative   Negative                   Microbiology Results Abnormal       None            XR Ankle 3+ View Left    Result Date: 10/6/2024  XR ANKLE 3+ VW LEFT Date of Exam: 10/6/2024 7:31 AM EDT Indication: ankle pain - ct scan  showed ankle fracture Comparison: Lower extremity [place] [address] 24 Findings: Subtle nondisplaced medial malleolus fracture stable from prior. Nondisplaced fracture involving the medial talus, near the posterior subtalar joint stable from prior. Incompletely assessed known fractures of the first through fourth proximal metatarsals  as well as lateral cuneiform probably similar to recent CT. Fractures involve Lisfranc articulation with redemonstrated widening at the base of first and second metatarsals. Degenerative osteoarthritis. No convincing new fractures. Nonfocal soft tissue swelling noted.     Impression: Impression: Grossly stable known fractures of the medial malleolus, medial talus, lateral cuneiform and first through fourth proximal metatarsals with Lisfranc involvement. Electronically Signed: Collin Bullard MD  10/6/2024 3:32 PM EDT  Workstation ID: [id number]    XR Tibia Fibula 2 View Left    Result Date: 10/6/2024  XR TIBIA FIBULA 2 VW LEFT Date of Exam: 10/6/2024 12:35 PM EDT Indication: left ankle fracture - medial malleolus - evaluate for possible proximal fibula fracture Comparison: None available. Findings: There is a vertically oriented intra-articular fracture of the medial malleolus in near-anatomic alignment. No fracture of the proximal fibula is demonstrated. Lateral ankle soft tissue swelling is noted. No significant joint effusion or loose body is seen.     Impression: Impression: Nondisplaced intra-articular fracture of the medial malleolus. Moderate lateral ankle soft tissue swelling. Electronically Signed: Konstantin Coronado MD  10/6/2024 1:07 PM EDT  Workstation ID: [id number]    CT Lower Extremity Left Without Contrast    Result Date: 10/5/2024  CT LOWER EXTREMITY LEFT WO CONTRAST Date of Exam: 10/5/2024 11:14 PM EDT Indication: CT of foot as requested by Ortho for Lisfranc fracture. Comparison: None available. Technique: Axial CT images were obtained of the left lower extremity without contrast  administration.  Reconstructed coronal and sagittal images were also obtained. Automated exposure control and iterative construction methods were used. Findings: There is a minimally displaced fracture of the anteromedial aspect of the distal tibia which extends to the articulation with the lateral talus. There is a minimally displaced fracture of the posterior medial talus. There are comminuted fractures involving the proximal first, second, third and fourth metatarsals which extend to involve the Lisfranc joint at all levels. There is diffuse soft tissue swelling over the forefoot.     Impression: Impression: 1.Comminuted fractures of the proximal first, second, third and fourth metatarsals with extension to the Lisfranc joint. 2.Minimally displaced fracture of the anteromedial distal tibia. 3.Minimally displaced fracture of the posterior medial talus. Electronically Signed: Geoff Abdalla MD  10/5/2024 11:26 PM EDT  Workstation ID: [id number]    XR Foot 3+ View Left    Result Date: 10/5/2024  XR FOOT 3+ VW LEFT Date of Exam: 10/5/2024 9:10 PM EDT Indication: Pain and swelling from fall Comparison: None available. Findings: There is a comminuted fracture of the proximal aspect of the first metatarsal. This extends to involve the tarsometatarsal joint. Lucent areas are also seen in the proximal more medial aspect of the second third and fourth digits which may indicate additional Lisfranc fractures. There is diffuse soft tissue swelling over the dorsum of the foot. There is calcaneal spurring.     Impression: Impression: Comminuted fracture of the proximal first metatarsal with extension to the tarsometatarsal joint. There are also lucencies in the proximal second third and fourth metatarsals which may indicate additional Lisfranc fractures. Electronically Signed: Geoff Abdalla MD  10/5/2024 9:42 PM EDT  Workstation ID: [id number]     Results for orders placed during the hospital encounter of 01/02/24    Adult  Transthoracic Echo Complete W/ Cont if Necessary Per Protocol    Interpretation Summary    Left ventricular systolic function is normal. Calculated left ventricular EF = 57.9% Left ventricular ejection fraction appears to be 56 - 60%.    Left ventricular diastolic function was normal.    Estimated right ventricular systolic pressure from tricuspid regurgitation is normal (<35 mmHg).    No significant change from previous study      Current medications:  Scheduled Meds:aspirin, 81 mg, Oral, Daily  DULoxetine, 20 mg, Oral, BID  folic acid, 1 mg, Oral, Daily  gabapentin, 300 mg, Oral, Daily  gabapentin, 600 mg, Oral, Nightly  levothyroxine, 25 mcg, Oral, Q AM  metoprolol succinate XL, 25 mg, Oral, Nightly  pantoprazole, 40 mg, Oral, Every Other Day  sodium chloride, 10 mL, Intravenous, Q12H  thiamine (B-1) IV, 200 mg, Intravenous, Daily      Continuous Infusions:     PRN Meds:.  senna-docusate sodium **AND** polyethylene glycol **AND** bisacodyl **AND** bisacodyl    Calcium Replacement - Follow Nurse / BPA Driven Protocol    HYDROmorphone **AND** naloxone    influenza vaccine    Magnesium Standard Dose Replacement - Follow Nurse / BPA Driven Protocol    oxyCODONE    Phosphorus Replacement - Follow Nurse / BPA Driven Protocol    Potassium Replacement - Follow Nurse / BPA Driven Protocol    [COMPLETED] Insert Peripheral IV **AND** sodium chloride    sodium chloride    Assessment & Plan   Assessment & Plan     Active Hospital Problems    Diagnosis  POA    **Closed fracture of left ankle [A18.204C]  Unknown    Lisfranc dislocation, left, initial encounter [K22.108G]  Yes      Resolved Hospital Problems   No resolved problems to display.        Brief Hospital Course to date:  Alysia Sierra is a 69 y.o. female with history of CKD III, chronic anemia, uterine cancer, hypothyroid who presents with left foot pain after stepping awkwardly off her bed.    Lisfranc fracture  Ankle fractures  - orthopedics follow, will likely  need surgical fixation  - will need PT/OT    CKD III  - cr 1.1  - hold further IV fluids    Anemia  - hemoglobin 9.4    Hypothyroid  - levothyroxine    Alcohol use  - thiamine IV  - consider CIWA    GERD    Neuropathy  - gabapentin      Expected Discharge Location and Transportation:   Expected Discharge   Expected discharge date/ time has not been documented.     VTE Prophylaxis: mechanical, add subq heparin when OK with orthopedic  Mechanical VTE prophylaxis orders are present.         AM-PAC 6 Clicks Score (PT): 12 (10/06/24 1950)    CODE STATUS:   Code Status and Medical Interventions: CPR (Attempt to Resuscitate); Full Support   Ordered at: 10/06/24 0246     Code Status (Patient has no pulse and is not breathing):    CPR (Attempt to Resuscitate)     Medical Interventions (Patient has pulse or is breathing):    Full Support       Adalid Funes MD  10/07/24

## 2024-10-08 LAB
IRON 24H UR-MRATE: 20 MCG/DL (ref 37–145)
IRON SATN MFR SERPL: 7 % (ref 20–50)
TIBC SERPL-MCNC: 280 MCG/DL (ref 298–536)
TRANSFERRIN SERPL-MCNC: 188 MG/DL (ref 200–360)

## 2024-10-08 PROCEDURE — 83540 ASSAY OF IRON: CPT | Performed by: PHYSICIAN ASSISTANT

## 2024-10-08 PROCEDURE — 99232 SBSQ HOSP IP/OBS MODERATE 35: CPT | Performed by: ORTHOPAEDIC SURGERY

## 2024-10-08 PROCEDURE — 84466 ASSAY OF TRANSFERRIN: CPT | Performed by: PHYSICIAN ASSISTANT

## 2024-10-08 PROCEDURE — 99232 SBSQ HOSP IP/OBS MODERATE 35: CPT | Performed by: PHYSICIAN ASSISTANT

## 2024-10-08 PROCEDURE — 25010000002 HEPARIN (PORCINE) PER 1000 UNITS: Performed by: PHYSICIAN ASSISTANT

## 2024-10-08 RX ORDER — DIAZEPAM 5 MG
5 TABLET ORAL EVERY 6 HOURS
Status: DISCONTINUED | OUTPATIENT
Start: 2024-10-08 | End: 2024-10-09

## 2024-10-08 RX ORDER — HYDROMORPHONE HYDROCHLORIDE 1 MG/ML
0.5 INJECTION, SOLUTION INTRAMUSCULAR; INTRAVENOUS; SUBCUTANEOUS EVERY 4 HOURS PRN
Status: DISCONTINUED | OUTPATIENT
Start: 2024-10-08 | End: 2024-10-19

## 2024-10-08 RX ORDER — LORAZEPAM 1 MG/1
1 TABLET ORAL DAILY
Status: DISCONTINUED | OUTPATIENT
Start: 2024-10-12 | End: 2024-10-08

## 2024-10-08 RX ORDER — NALOXONE HCL 0.4 MG/ML
0.4 VIAL (ML) INJECTION
Status: DISCONTINUED | OUTPATIENT
Start: 2024-10-08 | End: 2024-10-21 | Stop reason: HOSPADM

## 2024-10-08 RX ORDER — LORAZEPAM 1 MG/1
2 TABLET ORAL EVERY 6 HOURS
Status: DISCONTINUED | OUTPATIENT
Start: 2024-10-08 | End: 2024-10-08

## 2024-10-08 RX ORDER — MIDAZOLAM HYDROCHLORIDE 1 MG/ML
4 INJECTION INTRAMUSCULAR; INTRAVENOUS
Status: DISCONTINUED | OUTPATIENT
Start: 2024-10-08 | End: 2024-10-11

## 2024-10-08 RX ORDER — LORAZEPAM 1 MG/1
1 TABLET ORAL EVERY 6 HOURS
Status: DISCONTINUED | OUTPATIENT
Start: 2024-10-09 | End: 2024-10-08

## 2024-10-08 RX ORDER — MIDAZOLAM HYDROCHLORIDE 1 MG/ML
2 INJECTION INTRAMUSCULAR; INTRAVENOUS
Status: DISCONTINUED | OUTPATIENT
Start: 2024-10-08 | End: 2024-10-11

## 2024-10-08 RX ORDER — LORAZEPAM 1 MG/1
2 TABLET ORAL
Status: DISCONTINUED | OUTPATIENT
Start: 2024-10-08 | End: 2024-10-11

## 2024-10-08 RX ORDER — OXYCODONE HYDROCHLORIDE 5 MG/1
5 TABLET ORAL EVERY 4 HOURS PRN
Status: DISCONTINUED | OUTPATIENT
Start: 2024-10-08 | End: 2024-10-21 | Stop reason: HOSPADM

## 2024-10-08 RX ORDER — LORAZEPAM 1 MG/1
1 TABLET ORAL
Status: DISCONTINUED | OUTPATIENT
Start: 2024-10-08 | End: 2024-10-11

## 2024-10-08 RX ORDER — HEPARIN SODIUM 5000 [USP'U]/ML
5000 INJECTION, SOLUTION INTRAVENOUS; SUBCUTANEOUS EVERY 8 HOURS SCHEDULED
Status: DISCONTINUED | OUTPATIENT
Start: 2024-10-08 | End: 2024-10-21 | Stop reason: HOSPADM

## 2024-10-08 RX ORDER — LORAZEPAM 1 MG/1
1 TABLET ORAL EVERY 12 HOURS SCHEDULED
Status: DISCONTINUED | OUTPATIENT
Start: 2024-10-10 | End: 2024-10-08

## 2024-10-08 RX ADMIN — THIAMINE HCL TAB 100 MG 200 MG: 100 TAB at 08:54

## 2024-10-08 RX ADMIN — DIAZEPAM 5 MG: 5 TABLET ORAL at 23:53

## 2024-10-08 RX ADMIN — DULOXETINE HYDROCHLORIDE 20 MG: 20 CAPSULE, DELAYED RELEASE ORAL at 08:54

## 2024-10-08 RX ADMIN — HEPARIN SODIUM 5000 UNITS: 5000 INJECTION INTRAVENOUS; SUBCUTANEOUS at 20:26

## 2024-10-08 RX ADMIN — GABAPENTIN 300 MG: 300 CAPSULE ORAL at 08:54

## 2024-10-08 RX ADMIN — DULOXETINE HYDROCHLORIDE 20 MG: 20 CAPSULE, DELAYED RELEASE ORAL at 20:27

## 2024-10-08 RX ADMIN — DIAZEPAM 5 MG: 5 TABLET ORAL at 17:36

## 2024-10-08 RX ADMIN — METOPROLOL SUCCINATE 25 MG: 25 TABLET, EXTENDED RELEASE ORAL at 20:26

## 2024-10-08 RX ADMIN — ASPIRIN 81 MG: 81 TABLET, COATED ORAL at 08:54

## 2024-10-08 RX ADMIN — GABAPENTIN 600 MG: 300 CAPSULE ORAL at 20:26

## 2024-10-08 RX ADMIN — LEVOTHYROXINE SODIUM 25 MCG: 25 TABLET ORAL at 05:53

## 2024-10-08 RX ADMIN — FOLIC ACID 1 MG: 1 TABLET ORAL at 08:54

## 2024-10-08 RX ADMIN — Medication 10 ML: at 20:27

## 2024-10-08 RX ADMIN — Medication 5 MG: at 20:27

## 2024-10-08 NOTE — PLAN OF CARE
Goal Outcome Evaluation:  Plan of Care Reviewed With: patient        Progress: no change     Pt is alert and oriented x4; forgetful at times. VSS on room air. Pt has slept off and on throughout the shift. Minimal c/o of pain; pain meds offered but pt refused. Splint to left ankle in place and foot elevated on pillows. CIWA scored 0. Purewick in place and voiding spontaneously. Waffle mattress and SCD to RLE in place.

## 2024-10-08 NOTE — PROGRESS NOTES
[facility] Medicine Services  PROGRESS NOTE    Patient Name: Alysia Sierra  : 1955  MRN: [id number]    Date of Admission: 10/5/2024  Primary Care Physician: Cassy Foster MD    Subjective     CC: f/u L ankle fracture     HPI:  Pain manageable. Declines Tylenol due to history of heavy drinking in the past and says she cannot take NSAIDs due to renal dysfunction. Surgery planned for 10/17 or 10/18. Not sure she could manage at home. Open to SNF in the interim if needed.     Objective     Vital Signs:   Temp:  [97.8 °F (36.6 °C)-98.5 °F (36.9 °C)] 98.1 °F (36.7 °C)  Heart Rate:  [76-85] 84  Resp:  [18] 18  BP: ()/(63-76) 98/70     Physical Exam:  Constitutional: No acute distress, awake, alert and conversant.   HENT: NCAT, mucous membranes moist  Respiratory: Clear to auscultation bilaterally, respiratory effort normal   Cardiovascular: RRR  Gastrointestinal: Positive bowel sounds, soft, nontender, nondistended  Musculoskeletal: No  R ankle edema. LLE splited - intact blister on dorsal aspect of L foot below splint. No erythema or induration noted  Psychiatric: Appropriate affect, cooperative with exam  Neurologic: Oriented x 3, moves all extremities spontaneously without focal deficits, speech clear  Skin: No rashes to exposed surfaces     Results Reviewed:  LAB RESULTS:      Lab 10/07/24  0636 10/05/24  2259   WBC 5.71 10.14   HEMOGLOBIN 9.4* 10.7*   HEMATOCRIT 30.2* 33.4*   PLATELETS 172 224   NEUTROS ABS  --  6.76   IMMATURE GRANS (ABS)  --  0.03   LYMPHS ABS  --  1.86   MONOS ABS  --  1.41*   EOS ABS  --  0.05   .3* 98.2*         Lab 10/07/24  0636 10/05/24  2336 10/05/24  2259   SODIUM 135* 136  --    POTASSIUM 4.4 4.4  --    CHLORIDE 99 102  --    CO2 26.0 22.0  --    ANION GAP 10.0 12.0  --    BUN 11 16  --    CREATININE 1.15* 1.33*  --    EGFR 52.0* 43.7*  --    GLUCOSE 104* 104*  --    CALCIUM 8.8 8.5*  --    MAGNESIUM  --  1.8  --    HEMOGLOBIN A1C  --   --   5.20   TSH 4.540*  --   --          Lab 10/05/24  2336   TOTAL PROTEIN 6.4   ALBUMIN 4.1   GLOBULIN 2.3   ALT (SGPT) 10   AST (SGOT) 22   BILIRUBIN 0.3   ALK PHOS 74     Brief Urine Lab Results  (Last result in the past 365 days)        Color   Clarity   Blood   Leuk Est   Nitrite   Protein   CREAT   Urine HCG        06/22/24 1604 Yellow   Clear   Negative   Negative   Negative   Negative                 Microbiology Results Abnormal       None          No radiology results from the last 24 hrs    Results for orders placed during the hospital encounter of 01/02/24    Adult Transthoracic Echo Complete W/ Cont if Necessary Per Protocol    Interpretation Summary    Left ventricular systolic function is normal. Calculated left ventricular EF = 57.9% Left ventricular ejection fraction appears to be 56 - 60%.    Left ventricular diastolic function was normal.    Estimated right ventricular systolic pressure from tricuspid regurgitation is normal (<35 mmHg).    No significant change from previous study    Current medications:  Scheduled Meds:aspirin, 81 mg, Oral, Daily  DULoxetine, 20 mg, Oral, BID  folic acid, 1 mg, Oral, Daily  gabapentin, 300 mg, Oral, Daily  gabapentin, 600 mg, Oral, Nightly  levothyroxine, 25 mcg, Oral, Q AM  metoprolol succinate XL, 25 mg, Oral, Nightly  pantoprazole, 40 mg, Oral, Every Other Day  sodium chloride, 10 mL, Intravenous, Q12H  thiamine, 200 mg, Oral, Daily      Continuous Infusions:     PRN Meds:.  senna-docusate sodium **AND** polyethylene glycol **AND** bisacodyl **AND** bisacodyl    Calcium Replacement - Follow Nurse / BPA Driven Protocol    HYDROmorphone **AND** naloxone    influenza vaccine    Magnesium Standard Dose Replacement - Follow Nurse / BPA Driven Protocol    oxyCODONE    Phosphorus Replacement - Follow Nurse / BPA Driven Protocol    Potassium Replacement - Follow Nurse / BPA Driven Protocol    [COMPLETED] Insert Peripheral IV **AND** sodium chloride    sodium  chloride    Assessment & Plan   Assessment & Plan     Active Hospital Problems    Diagnosis  POA    **Closed fracture of left ankle [S82.958C]  Unknown    Closed displaced fracture of fourth metatarsal bone of left foot [S92.342A]  Yes    Lisfranc dislocation, left, initial encounter [S93.325A]  Yes      Resolved Hospital Problems   No resolved problems to display.        Brief Hospital Course to date:  Alysia Sierra is a 69 y.o. female with history of CKD III, chronic anemia, uterine cancer, hypothyroid who presents with left foot pain after stepping awkwardly off her bed.    Lisfranc fracture  Ankle fractures  - LLE splinted. Fracture blister noted - per ortho allow blisters to decompress on their own  - Surgery tentatively planned for 10/17 or 10/18  - PRN pain management  - PT/OT to see - perhaps to SNF and return to surgery?     CKD III  - Creatinine stable. Monitor off of IV fluids     Anemia  - hemoglobin 9.4  - Check anemia studies / iron panel     Hypothyroid  - levothyroxine  - TSH slightly elevated, add free T4    Alcohol use  - thiamine IV  - Per ortho note, seemed confused this afternoon  - Watch CIWA scores  - Add Melatonin to facilitate sleep     GERD, PPI     Neuropathy  - gabapentin    Expected Discharge Location and Transportation: SNF eventually  Expected Discharge Expected discharge date/ time has not been documented.     VTE Prophylaxis: Mechanical VTE prophylaxis orders are present. - will ask ortho about adding heparin for DVT PPX    AM-PAC 6 Clicks Score (PT): 14 (10/08/24 0860)    CODE STATUS:   Code Status and Medical Interventions: CPR (Attempt to Resuscitate); Full Support   Ordered at: 10/06/24 0246     Code Status (Patient has no pulse and is not breathing):    CPR (Attempt to Resuscitate)     Medical Interventions (Patient has pulse or is breathing):    Full Support     Lawanda Arvizu PA-C  10/08/24

## 2024-10-08 NOTE — PROGRESS NOTES
"      Orthopaedic Surgery Progress Note  Chief complaint  Left foot and ankle pain  Subjective   Interval History:   Laying in bed.  Confused, states was just planning on going for a bike ride.  States pain is well-controlled.  Spoke with nurse who reports that patient has been confused.  There is suspicion that patient may be experiencing some withdrawal symptoms.    ROS: Denies fever, chills, nausea or vomiting    Objective     Vital Signs:  Temp (24hrs), Av.1 °F (36.7 °C), Min:97.8 °F (36.6 °C), Max:98.3 °F (36.8 °C)    BP 97/66 (BP Location: Right arm, Patient Position: Lying)   Pulse 76   Temp 98 °F (36.7 °C) (Oral)   Resp 18   Ht 162.6 cm (64\")   Wt 61.2 kg (135 lb)   LMP  (LMP Unknown)   SpO2 94%   BMI 23.17 kg/m²   Body mass index is 23.17 kg/m².    Physical Exam:  left LE: Well-padded 3-way short leg splint in place, compartments above splint soft/compressible   + Wiggling toes, silt in toes   CR brisk in all toes    Assessment and Plan:  69-year-old female with left foot Lisfranc fracture dislocation as well as ipsilateral ankle fracture    Spoke to my partner Dr. Juanpablo Lee, I will be taking over management of this patient from an orthopedic standpoint.    -Plan for surgery the end of next week likely Thursday the  versus Friday the   -Nonweightbearing left lower extremity  -PT/OT  -Keep splint clean dry and intact  -Pain control  -Aggressive elevation, spoke to nursing staff about the importance of elevation and helping to redirect patient if confused to maintain elevation above the heart is much as possible  -Diet per primary  -DVT prophylaxis, chemical and mechanical, per primary  -Okay for discharge from orthopedic standpoint, aggressive elevation in order to help prepare her for surgery.  If patient is discharged she can follow-up with me in the office early next week prior to surgery for a skin check.  -Patient with fracture blisters identified on exam yesterday, plan is to " allow blisters to decompress on their own, keep splint in place.      Sergio Godoy MD  10/08/24  10:34 EDT

## 2024-10-08 NOTE — PAYOR COMM NOTE
"Jayden Magana (69 y.o. Female)       Date of Birth   1955    Social Security Number       Address   [address] [facility] [address]    Home Phone   [phone]    MRN   [id number]       Yazdanism   Judaism    Marital Status                               Admission Date   10/5/24    Admission Type   Emergency    Admitting Provider   Edgar Luo MD    Attending Provider   Edgar Luo MD    Department, Room/Bed   [facility] 3G, S353/1       Discharge Date       Discharge Disposition       Discharge Destination                                 Attending Provider: Edgar Luo MD    Allergies: Lisinopril, Metal, Tylenol [Acetaminophen], Atorvastatin    Isolation: None   Infection: None   Code Status: CPR    Ht: 162.6 cm (64\")   Wt: 61.2 kg (135 lb)    Admission Cmt: None   Principal Problem: Closed fracture of left ankle [S82.892A]                   Active Insurance as of 10/5/2024       Primary Coverage       Payor Plan Insurance Group Employer/Plan Group    ANTHEM MEDICARE REPLACEMENT ANTHEM MEDICARE ADVANTAGE [id number]       Payor Plan Address Payor Plan Phone Number Payor Plan Fax Number Effective Dates    [address] [phone]  1/1/2024 - None Entered    [facility] [id number]         Subscriber Name Subscriber Birth Date Member ID       JAYDEN MAGANA 1955 [id number]               Secondary Coverage       Payor Plan Insurance Group Employer/Plan Group    [place]        Payor Plan Address Payor Plan Phone Number Payor Plan Fax Number Effective Dates    [address] [phone]  1/2/2024 - None Entered    [facility] [address]         Subscriber Name Subscriber Birth Date Member ID       JAYDEN MAGANA 1955 [id number]                     Emergency Contacts        (Rel.) Home Phone Work Phone Mobile Phone    ALYSHA HOLLY (Sister) [phone] -- [phone]                 History & " Physical        Jose G Damon MD at 10/06/24 0111              [facility] Medicine Services  HISTORY AND PHYSICAL    Patient Name: Alysia Sierra  : 1955  MRN: [id number]  Primary Care Physician: Cassy Foster MD  Date of admission: 10/5/2024      Subjective  Subjective     Chief Complaint:  Fall    HPI:  Alysia Sierra is a 69 y.o. female hx uterine cancer in remission, CKD3, chronic anemia, hypothyroidism, presents with L foot pain after fall at home. Just got a new bed and was getting up from it when she tripped and fell. Severe pain in L foot and ankle. Otherwise in normal state of health. Found to have multiple fractures LLE and ortho consulted.       Personal History     Past Medical History:   Diagnosis Date    Allergic lisinopril  2017    Anemia     Arrhythmia     Arthritis     Cancer     uterine    Cataract mild 2020    stil lpresent    Chicken pox     Chronic fatigue     CKD (chronic kidney disease), stage III     sees nephro    Clotting disorder     Dental root implant present     lower left  x1 - possible dental implant    Depression mild 2019    Difficulty walking 2019    Disease of thyroid gland     Dizzy     NIEVES (dyspnea on exertion)     2017    Fracture of hip     Generalized anxiety disorder     GERD (gastroesophageal reflux disease) 2018    History of brachytherapy 2023    vaginal brachytherapy    Hyperlipidemia     Hypertension     Iron deficiency anemia     Liver disease     fatty    Liver problem     Measles     Menopause     Mumps     Neuromuscular disorder Peripheral Neuropathy    Orthostatic hypotension     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    Renal insufficiency 2018    Scoliosis     Unintentional weight loss     Uses contact lenses     bilat    Uterine cancer     Uterine cancer 2022    UTI (urinary tract infection)     Visual impairment Nearsighted    Wears glasses          Oncology Problem List:  Endometrial  adenocarcinoma (07/28/2022; Status: Active)    Oncology/Hematology History   Endometrial adenocarcinoma   10/2022 Initial Diagnosis    8/22/2022: TVUS with uterus measuring 5.7 x 4.8 x 4.1 cm with an endometrial stripe thickness of 9.8 mm.  Right ovary not visualized.  Left ovary normal.  No free fluid.  9/29/2022: Saline infusion sonogram with an irregularly shaped prominent lesion on the posterior endometrial surface concerning for neoplasia.  10/6/2022: Endometrial biopsy with FIGO grade 1 endometrial cancer      10/28/2022 Surgery    Robotic-assisted total laparoscopic hysterectomy with bilateral salpingo-oophorectomy with bilateral SLND    Pathology with FIGO grade 2 endometrioid adenocarcinoma with 94% MI. Involvement of endocervix and uterine serosal adhesions. Negative parametrium and bilateral sentinel lymph nodes. MMR intact.     [facility]      Cancer Staged    Cancer Staging   Endometrial adenocarcinoma (HCC)  Staging form: Corpus Uteri - Carcinoma And Carcinosarcoma, AJCC 8th Edition  - Pathologic stage from 10/28/2022: FIGO Stage IIIA (pT3a, pN0(sn), cM0) - Signed by Jasmine Rich MD on 11/12/2022       10/28/2022 Cancer Staged    Staging form: Corpus Uteri - Carcinoma And Carcinosarcoma, AJCC 8th Edition  - Pathologic stage from 10/28/2022: FIGO Stage IIIA (pT3a, pN0(sn), cM0) - Signed by Jasmine Rich MD on 11/12/2022 12/8/2022 -  Chemotherapy    OP OVARIAN DOCEtaxel / CARBOplatin  6 cycles adjuvant treatment completed  - worsening of baseline neuropathy required dose reduction of docetaxel at cycle #4  - cycle #5 complicated by grade 3 anemia requiring transfusion  - docetaxel and carboplatin both dose reduced at cycle 6     1/9/2023 - 1/18/2023 Radiation    Radiation OncologyTreatment Course:  Alysia Sierra received 3000 cGy in 5 fractions to upper vagina via High Dose Radiation - HDR.       Past Surgical History:   Procedure Laterality Date      SECTION      DILATION AND CURETTAGE, DIAGNOSTIC / THERAPEUTIC      HIP OPEN REDUCTION Left 2023    Procedure: FEMORAL NECK OPEN REDUCTION INTERNAL FIXATION LEFT;  Surgeon: Juanpablo Lee MD;  Location: [facility] OR;  Service: Orthopedics;  Laterality: Left;    HIP SURGERY      OOPHORECTOMY      ORAL LESION EXCISION/BIOPSY  2021    TOTAL LAPAROSCOPIC HYSTERECTOMY SALPINGO OOPHORECTOMY N/A 10/28/2022    Procedure: TOTAL LAPAROSCOPIC HYSTERECTOMY BILATERAL SALPINGO-OOPHORECTOMY, INJECTION FOR SENTINEL LYMPH NODE MAPPING, BILATERAL SENTINEL LYMPH NODE DISSECTION WITH DAVINCI ROBOT;  Surgeon: Jasmine Rich MD;  Location: [facility] OR;  Service: Robotics - DaVinci;  Laterality: N/A;    TUBAL ABDOMINAL LIGATION         Family History: family history includes COPD in her mother; Cancer in her father; Coronary artery disease in her father; Depression in her sister; Early death in her sister; Heart attack in her father; Heart disease in her father; Hyperlipidemia in her sister; Hypertension in her brother, father, mother, and sister; Lung cancer in her father; Lung disease in her father; Malig Hypertension in her sister; Osteoarthritis in her sister; Other in her sister; Rheum arthritis in her sister; Scoliosis in her sister; Spondylolisthesis in her mother; Stroke in her mother.     Social History:  reports that she has never smoked. She has never been exposed to tobacco smoke. She has never used smokeless tobacco. She reports current alcohol use of about 4.0 - 6.0 standard drinks of alcohol per week. She reports that she does not use drugs.  Social History     Social History Narrative    Caffeine: None       Medications:  Available home medication information reviewed.  Alpha-Lipoic Acid, DULoxetine, Lidocaine, aspirin, cholecalciferol, esomeprazole, folic acid, gabapentin, hydrALAZINE, levothyroxine, lidocaine, magnesium oxide, metoprolol succinate XL, ondansetron ODT, rosuvastatin, thiamine,  and vitamin B-12    Allergies   Allergen Reactions    Lisinopril Angioedema    Metal Rash     Possible nickel -   Gold is only metal that doesn't have issues      Milk-Related Compounds Other (See Comments)     LACTOSE INTOLERANT    Tylenol [Acetaminophen] Other (See Comments)     ckd    Atorvastatin Myalgia       Objective  Objective     Vital Signs:   Temp:  [99.3 °F (37.4 °C)] 99.3 °F (37.4 °C)  Heart Rate:  [72-84] 72  Resp:  [14-20] 18  BP: (114-148)/(50-96) 114/50       Physical Exam   AAO3  Comfortable  No thyromegaly  Heart RRR  Lungs CTAB  Abd nondistended  LLE casted, distal cap refill intact    Result Review:  I have personally reviewed the results from the time of this admission to 10/6/2024 02:51 EDT and agree with these findings:  [x]  Laboratory list / accordion  []  Microbiology  [x]  Radiology  []  EKG/Telemetry   []  Cardiology/Vascular   []  Pathology  [x]  Old records  []  Other:  Most notable findings include: See A+P      LAB RESULTS:      Lab 10/05/24  2259   WBC 10.14   HEMOGLOBIN 10.7*   HEMATOCRIT 33.4*   PLATELETS 224   NEUTROS ABS 6.76   IMMATURE GRANS (ABS) 0.03   LYMPHS ABS 1.86   MONOS ABS 1.41*   EOS ABS 0.05   MCV 98.2*         Lab 10/05/24  2336   SODIUM 136   POTASSIUM 4.4   CHLORIDE 102   CO2 22.0   ANION GAP 12.0   BUN 16   CREATININE 1.33*   EGFR 43.7*   GLUCOSE 104*   CALCIUM 8.5*   MAGNESIUM 1.8         Lab 10/05/24  2336   TOTAL PROTEIN 6.4   ALBUMIN 4.1   GLOBULIN 2.3   ALT (SGPT) 10   AST (SGOT) 22   BILIRUBIN 0.3   ALK PHOS 74                     UA          11/12/2023    21:51 1/2/2024    21:15 6/22/2024    16:04   Urinalysis   Squamous Epithelial Cells, UA  7-12     Specific Gravity, UA <=1.005  1.012  1.040    Ketones, UA Negative  Negative  >=160 mg/dL (4+)    Blood, UA Negative  Negative  Negative    Leukocytes, UA Negative  Small (1+)  Negative    Nitrite, UA Negative  Negative  Negative    RBC, UA  0-2     WBC, UA  6-10     Bacteria, UA  4+         Microbiology  Results (last 10 days)       ** No results found for the last 240 hours. **            CT Lower Extremity Left Without Contrast    Result Date: 10/5/2024  CT LOWER EXTREMITY LEFT WO CONTRAST Date of Exam: 10/5/2024 11:14 PM EDT Indication: CT of foot as requested by Ortho for Lisfranc fracture. Comparison: None available. Technique: Axial CT images were obtained of the left lower extremity without contrast administration.  Reconstructed coronal and sagittal images were also obtained. Automated exposure control and iterative construction methods were used. Findings: There is a minimally displaced fracture of the anteromedial aspect of the distal tibia which extends to the articulation with the lateral talus. There is a minimally displaced fracture of the posterior medial talus. There are comminuted fractures involving the proximal first, second, third and fourth metatarsals which extend to involve the Lisfranc joint at all levels. There is diffuse soft tissue swelling over the forefoot.     Impression: Impression: 1.Comminuted fractures of the proximal first, second, third and fourth metatarsals with extension to the Lisfranc joint. 2.Minimally displaced fracture of the anteromedial distal tibia. 3.Minimally displaced fracture of the posterior medial talus. Electronically Signed: Geoff Abdalla MD  10/5/2024 11:26 PM EDT  Workstation ID: [id number]    XR Foot 3+ View Left    Result Date: 10/5/2024  XR FOOT 3+ VW LEFT Date of Exam: 10/5/2024 9:10 PM EDT Indication: Pain and swelling from fall Comparison: None available. Findings: There is a comminuted fracture of the proximal aspect of the first metatarsal. This extends to involve the tarsometatarsal joint. Lucent areas are also seen in the proximal more medial aspect of the second third and fourth digits which may indicate additional Lisfranc fractures. There is diffuse soft tissue swelling over the dorsum of the foot. There is calcaneal spurring.     Impression:  Impression: Comminuted fracture of the proximal first metatarsal with extension to the tarsometatarsal joint. There are also lucencies in the proximal second third and fourth metatarsals which may indicate additional Lisfranc fractures. Electronically Signed: Geoff Abdalla MD  10/5/2024 9:42 PM EDT  Workstation ID: [id number]     Results for orders placed during the hospital encounter of 01/02/24    Adult Transthoracic Echo Complete W/ Cont if Necessary Per Protocol    Interpretation Summary    Left ventricular systolic function is normal. Calculated left ventricular EF = 57.9% Left ventricular ejection fraction appears to be 56 - 60%.    Left ventricular diastolic function was normal.    Estimated right ventricular systolic pressure from tricuspid regurgitation is normal (<35 mmHg).    No significant change from previous study      Assessment & Plan  Assessment & Plan       Closed displaced fracture of fourth metatarsal bone of left foot    Comminuted fractures of the proximal first, second, third and fourth metatarsals with extension to the Lisfranc joint. Minimally displaced fracture of the anteromedial distal tibia.  Minimally displaced fracture of the posterior medial talus.  -Ortho consulted and following, appreciate recs  -Was told she had osteoporosis before but hesitant regarding therapy 2/2 side effects  -NPO    CKD3  -follows with nephrology. Near baseline. IVF while NPO. Monitor     Anemia  -near baseline    Hypothyroidism  -synthroid      VTE Prophylaxis:  Mechanical VTE prophylaxis orders are present.          CODE STATUS:    Code Status and Medical Interventions: CPR (Attempt to Resuscitate); Full Support   Ordered at: 10/06/24 0246     Code Status (Patient has no pulse and is not breathing):    CPR (Attempt to Resuscitate)     Medical Interventions (Patient has pulse or is breathing):    Full Support       Expected Discharge   Expected discharge date/ time has not been documented.     Jose G Damon,  MD  10/06/24      Electronically signed by Jose G Damon MD at 10/06/24 0252       Facility-Administered Medications as of 10/8/2024   Medication Dose Route Frequency Provider Last Rate Last Admin    aspirin EC tablet 81 mg  81 mg Oral Daily Adalid Funes MD   81 mg at 10/08/24 0854    sennosides-docusate (PERICOLACE) 8.6-50 MG per tablet 2 tablet  2 tablet Oral BID PRN Jose G Damon MD   2 tablet at 10/07/24 1010    And    polyethylene glycol (MIRALAX) packet 17 g  17 g Oral Daily PRN Jose G Damon MD   17 g at 10/07/24 1816    And    bisacodyl (DULCOLAX) EC tablet 5 mg  5 mg Oral Daily PRN Jose G Damon MD   5 mg at 10/07/24 1010    And    bisacodyl (DULCOLAX) suppository 10 mg  10 mg Rectal Daily PRN Jose G Damon MD        Calcium Replacement - Follow Nurse / BPA Driven Protocol   Does not apply PRN Jose G Damon MD        DULoxetine (CYMBALTA) DR capsule 20 mg  20 mg Oral BID Jose G Damon MD   20 mg at 10/08/24 0854    folic acid (FOLVITE) tablet 1 mg  1 mg Oral Daily Jose G Damon MD   1 mg at 10/08/24 0854    gabapentin (NEURONTIN) capsule 300 mg  300 mg Oral Daily Jose G Damon MD   300 mg at 10/08/24 0854    gabapentin (NEURONTIN) capsule 600 mg  600 mg Oral Nightly Jose G Damon MD   600 mg at 10/07/24 1945    [COMPLETED] HYDROmorphone (DILAUDID) injection 0.5 mg  0.5 mg Intravenous Once Federico Mcleod DO   0.5 mg at 10/05/24 2304    HYDROmorphone (DILAUDID) injection 0.5 mg  0.5 mg Intravenous Q4H PRN Edgar Luo MD        And    naloxone (NARCAN) injection 0.4 mg  0.4 mg Intravenous Q5 Min PRN Edgar Luo MD        influenza vac split high-dose (FLUZONE HIGH DOSE) injection 0.5 mL  0.5 mL Intramuscular During Hospitalization Jose G Damon MD        levothyroxine (SYNTHROID, LEVOTHROID) tablet 25 mcg  25 mcg Oral Q AM Jose G Damon MD   25 mcg at 10/08/24 0553    Magnesium Standard Dose Replacement - Follow Nurse / BPA Driven  "Protocol   Does not apply PRN Jose G Damon MD        metoprolol succinate XL (TOPROL-XL) 24 hr tablet 25 mg  25 mg Oral Nightly Jose G Damon MD   25 mg at 10/07/24 1945    [COMPLETED] oxyCODONE (ROXICODONE) immediate release tablet 5 mg  5 mg Oral Once Federico Mcleod DO   5 mg at 10/05/24 2106    oxyCODONE (ROXICODONE) immediate release tablet 5 mg  5 mg Oral Q4H PRN Edgar Luo MD        pantoprazole (PROTONIX) EC tablet 40 mg  40 mg Oral Every Other Day Adalid Funes MD   40 mg at 10/07/24 1225    Phosphorus Replacement - Follow Nurse / BPA Driven Protocol   Does not apply PRN Jose G Damon MD        Potassium Replacement - Follow Nurse / BPA Driven Protocol   Does not apply PRN Jose G Damon MD        sodium chloride 0.9 % flush 10 mL  10 mL Intravenous PRN Saturnino Dewitt PA        sodium chloride 0.9 % flush 10 mL  10 mL Intravenous Q12H Jose G Damon MD   10 mL at 10/07/24 0923    sodium chloride 0.9 % flush 10 mL  10 mL Intravenous PRN oJse G Damon MD        thiamine (VITAMIN B-1) tablet 200 mg  200 mg Oral Daily Edgar Luo MD   200 mg at 10/08/24 0854        Physician Progress Notes (all)        Sergio Godoy MD at 10/08/24 1034                Orthopaedic Surgery Progress Note  Chief complaint  Left foot and ankle pain  Subjective   Interval History:   Laying in bed.  Confused, states was just planning on going for a bike ride.  States pain is well-controlled.  Spoke with nurse who reports that patient has been confused.  There is suspicion that patient may be experiencing some withdrawal symptoms.    ROS: Denies fever, chills, nausea or vomiting    Objective     Vital Signs:  Temp (24hrs), Av.1 °F (36.7 °C), Min:97.8 °F (36.6 °C), Max:98.3 °F (36.8 °C)    BP 97/66 (BP Location: Right arm, Patient Position: Lying)   Pulse 76   Temp 98 °F (36.7 °C) (Oral)   Resp 18   Ht 162.6 cm (64\")   Wt 61.2 kg (135 lb)   LMP  (LMP Unknown)   SpO2 94%   " BMI 23.17 kg/m²   Body mass index is 23.17 kg/m².    Physical Exam:  left LE: Well-padded 3-way short leg splint in place, compartments above splint soft/compressible   + Wiggling toes, silt in toes   CR brisk in all toes    Assessment and Plan:  69-year-old female with left foot Lisfranc fracture dislocation as well as ipsilateral ankle fracture    Spoke to my partner Dr. Juanpablo Lee, I will be taking over management of this patient from an orthopedic standpoint.    -Plan for surgery the end of next week likely Thursday the  versus Friday the   -Nonweightbearing left lower extremity  -PT/OT  -Keep splint clean dry and intact  -Pain control  -Aggressive elevation, spoke to nursing staff about the importance of elevation and helping to redirect patient if confused to maintain elevation above the heart is much as possible  -Diet per primary  -DVT prophylaxis, chemical and mechanical, per primary  -Okay for discharge from orthopedic standpoint, aggressive elevation in order to help prepare her for surgery.  If patient is discharged she can follow-up with me in the office early next week prior to surgery for a skin check.  -Patient with fracture blisters identified on exam yesterday, plan is to allow blisters to decompress on their own, keep splint in place.      Sergio Godoy MD  10/08/24  10:34 EDT         Electronically signed by Sergio Godoy MD at 10/08/24 1040       Adalid Funes MD at 10/07/24 1401              [facility] Medicine Services  PROGRESS NOTE    Patient Name: Alysia Sierra  : 1955  MRN: [id number]    Date of Admission: 10/5/2024  Primary Care Physician: Cassy Foster MD    Subjective   Subjective     CC:  fracture    HPI:  Foot still aches. Says it remains swollen and she is keeping ice on it.      Objective   Objective     Vital Signs:   Temp:  [98.1 °F (36.7 °C)-98.8 °F (37.1 °C)] 98.1 °F (36.7 °C)  Heart Rate:  [67-86] 84  Resp:  [18] 18  BP:  ()/(62-91) 109/66     Physical Exam:  Non toxic, in bed  MM moist  RRR  CTAB  Abd soft, NT  Normal affect  Alert, speech clear  Left foot with ace wrap, some distal foot bruising    Results Reviewed:  LAB RESULTS:      Lab 10/07/24  0636 10/05/24  2259   WBC 5.71 10.14   HEMOGLOBIN 9.4* 10.7*   HEMATOCRIT 30.2* 33.4*   PLATELETS 172 224   NEUTROS ABS  --  6.76   IMMATURE GRANS (ABS)  --  0.03   LYMPHS ABS  --  1.86   MONOS ABS  --  1.41*   EOS ABS  --  0.05   .3* 98.2*         Lab 10/07/24  0636 10/05/24  2336 10/05/24  2259   SODIUM 135* 136  --    POTASSIUM 4.4 4.4  --    CHLORIDE 99 102  --    CO2 26.0 22.0  --    ANION GAP 10.0 12.0  --    BUN 11 16  --    CREATININE 1.15* 1.33*  --    EGFR 52.0* 43.7*  --    GLUCOSE 104* 104*  --    CALCIUM 8.8 8.5*  --    MAGNESIUM  --  1.8  --    HEMOGLOBIN A1C  --   --  5.20   TSH 4.540*  --   --          Lab 10/05/24  2336   TOTAL PROTEIN 6.4   ALBUMIN 4.1   GLOBULIN 2.3   ALT (SGPT) 10   AST (SGOT) 22   BILIRUBIN 0.3   ALK PHOS 74                     Brief Urine Lab Results  (Last result in the past 365 days)        Color   Clarity   Blood   Leuk Est   Nitrite   Protein   CREAT   Urine HCG        06/22/24 1604 Yellow   Clear   Negative   Negative   Negative   Negative                   Microbiology Results Abnormal       None            XR Ankle 3+ View Left    Result Date: 10/6/2024  XR ANKLE 3+ VW LEFT Date of Exam: 10/6/2024 7:31 AM EDT Indication: ankle pain - ct scan showed ankle fracture Comparison: Lower [place] [address] 24 Findings: Subtle nondisplaced medial malleolus fracture stable from prior. Nondisplaced fracture involving the medial talus, near the posterior subtalar joint stable from prior. Incompletely assessed known fractures of the first through fourth proximal metatarsals  as well as lateral cuneiform probably similar to recent CT. Fractures involve Lisfranc articulation with redemonstrated widening at the base of first and second  metatarsals. Degenerative osteoarthritis. No convincing new fractures. Nonfocal soft tissue swelling noted.     Impression: Impression: Grossly stable known fractures of the medial malleolus, medial talus, lateral cuneiform and first through fourth proximal metatarsals with Lisfranc involvement. Electronically Signed: Collin Bullard MD  10/6/2024 3:32 PM EDT  Workstation ID: [id number]    XR Tibia Fibula 2 View Left    Result Date: 10/6/2024  XR TIBIA FIBULA 2 VW LEFT Date of Exam: 10/6/2024 12:35 PM EDT Indication: left ankle fracture - medial malleolus - evaluate for possible proximal fibula fracture Comparison: None available. Findings: There is a vertically oriented intra-articular fracture of the medial malleolus in near-anatomic alignment. No fracture of the proximal fibula is demonstrated. Lateral ankle soft tissue swelling is noted. No significant joint effusion or loose body is seen.     Impression: Impression: Nondisplaced intra-articular fracture of the medial malleolus. Moderate lateral ankle soft tissue swelling. Electronically Signed: Konstantin Coronado MD  10/6/2024 1:07 PM EDT  Workstation ID: [id number]    CT Lower Extremity Left Without Contrast    Result Date: 10/5/2024  CT LOWER EXTREMITY LEFT WO CONTRAST Date of Exam: 10/5/2024 11:14 PM EDT Indication: CT of foot as requested by Ortho for Lisfranc fracture. Comparison: None available. Technique: Axial CT images were obtained of the left lower extremity without contrast administration.  Reconstructed coronal and sagittal images were also obtained. Automated exposure control and iterative construction methods were used. Findings: There is a minimally displaced fracture of the anteromedial aspect of the distal tibia which extends to the articulation with the lateral talus. There is a minimally displaced fracture of the posterior medial talus. There are comminuted fractures involving the proximal first, second, third and fourth metatarsals which extend to  involve the Lisfranc joint at all levels. There is diffuse soft tissue swelling over the forefoot.     Impression: Impression: 1.Comminuted fractures of the proximal first, second, third and fourth metatarsals with extension to the Lisfranc joint. 2.Minimally displaced fracture of the anteromedial distal tibia. 3.Minimally displaced fracture of the posterior medial talus. Electronically Signed: Geoff Abdalla MD  10/5/2024 11:26 PM EDT  Workstation ID: [id number]    XR Foot 3+ View Left    Result Date: 10/5/2024  XR FOOT 3+ VW LEFT Date of Exam: 10/5/2024 9:10 PM EDT Indication: Pain and swelling from fall Comparison: None available. Findings: There is a comminuted fracture of the proximal aspect of the first metatarsal. This extends to involve the tarsometatarsal joint. Lucent areas are also seen in the proximal more medial aspect of the second third and fourth digits which may indicate additional Lisfranc fractures. There is diffuse soft tissue swelling over the dorsum of the foot. There is calcaneal spurring.     Impression: Impression: Comminuted fracture of the proximal first metatarsal with extension to the tarsometatarsal joint. There are also lucencies in the proximal second third and fourth metatarsals which may indicate additional Lisfranc fractures. Electronically Signed: Geoff Abdalla MD  10/5/2024 9:42 PM EDT  Workstation ID: [id number]     Results for orders placed during the hospital encounter of 01/02/24    Adult Transthoracic Echo Complete W/ Cont if Necessary Per Protocol    Interpretation Summary    Left ventricular systolic function is normal. Calculated left ventricular EF = 57.9% Left ventricular ejection fraction appears to be 56 - 60%.    Left ventricular diastolic function was normal.    Estimated right ventricular systolic pressure from tricuspid regurgitation is normal (<35 mmHg).    No significant change from previous study      Current medications:  Scheduled Meds:aspirin, 81 mg, Oral,  Daily  DULoxetine, 20 mg, Oral, BID  folic acid, 1 mg, Oral, Daily  gabapentin, 300 mg, Oral, Daily  gabapentin, 600 mg, Oral, Nightly  levothyroxine, 25 mcg, Oral, Q AM  metoprolol succinate XL, 25 mg, Oral, Nightly  pantoprazole, 40 mg, Oral, Every Other Day  sodium chloride, 10 mL, Intravenous, Q12H  thiamine (B-1) IV, 200 mg, Intravenous, Daily      Continuous Infusions:     PRN Meds:.  senna-docusate sodium **AND** polyethylene glycol **AND** bisacodyl **AND** bisacodyl    Calcium Replacement - Follow Nurse / BPA Driven Protocol    HYDROmorphone **AND** naloxone    influenza vaccine    Magnesium Standard Dose Replacement - Follow Nurse / BPA Driven Protocol    oxyCODONE    Phosphorus Replacement - Follow Nurse / BPA Driven Protocol    Potassium Replacement - Follow Nurse / BPA Driven Protocol    [COMPLETED] Insert Peripheral IV **AND** sodium chloride    sodium chloride    Assessment & Plan   Assessment & Plan     Active Hospital Problems    Diagnosis  POA    **Closed fracture of left ankle [K89.069A]  Unknown    Lisfranc dislocation, left, initial encounter [G66.437A]  Yes      Resolved Hospital Problems   No resolved problems to display.        Brief Hospital Course to date:  Alysia Sierra is a 69 y.o. female with history of CKD III, chronic anemia, uterine cancer, hypothyroid who presents with left foot pain after stepping awkwardly off her bed.    Lisfranc fracture  Ankle fractures  - orthopedics follow, will likely need surgical fixation  - will need PT/OT    CKD III  - cr 1.1  - hold further IV fluids    Anemia  - hemoglobin 9.4    Hypothyroid  - levothyroxine    Alcohol use  - thiamine IV  - consider CIWA    GERD    Neuropathy  - gabapentin      Expected Discharge Location and Transportation:   Expected Discharge   Expected discharge date/ time has not been documented.     VTE Prophylaxis: mechanical, add subq heparin when OK with orthopedic  Mechanical VTE prophylaxis orders are present.      "    AM-PAC 6 Clicks Score (PT): 12 (10/06/24 1950)    CODE STATUS:   Code Status and Medical Interventions: CPR (Attempt to Resuscitate); Full Support   Ordered at: 10/06/24 0246     Code Status (Patient has no pulse and is not breathing):    CPR (Attempt to Resuscitate)     Medical Interventions (Patient has pulse or is breathing):    Full Support       Adalid Funes MD  10/07/24        Electronically signed by Adalid Funes MD at 10/07/24 1405       Juanpablo Lee MD at 10/07/24 0743                [facility] [place] Orthopaedic Surgery Progress Note    Subjective      LOS: 0 days   Patient Care Team:  Cassy Foster MD as PCP - General (Internal Medicine)  Russ Carrington MD as Consulting Physician (Nephrology)  Gray Bahena MD as Consulting Physician (Cardiology)  Caden Dietz MD as Consulting Physician (Neurology)  Charity Cornejo, RN as Ambulatory  (Oncology) (Population Health)  Juanpablo Lee MD as Consulting Physician (Orthopedic Surgery)  Basilia Mata MD as Consulting Physician (Radiation Oncology)  Sarah Stern MD as Consulting Physician (Gynecology)  Jared Wheatley MD as Consulting Physician (Nephrology)  Jose G Florez MD as Consulting Physician (Dermatology)    CC: Left foot and ankle pain    Interval History:   No new complaints this morning.  Resting comfortably in bed.    Objective      Vital Signs  Temp (24hrs), Av.3 °F (36.8 °C), Min:98.1 °F (36.7 °C), Max:98.8 °F (37.1 °C)      BP 93/62 (BP Location: Right arm, Patient Position: Lying)   Pulse 67   Temp 98.3 °F (36.8 °C) (Oral)   Resp 18   Ht 162.6 cm (64\")   Wt 61.2 kg (135 lb)   LMP  (LMP Unknown)   SpO2 98%   BMI 23.17 kg/m²     Examination:   Lower Extremities:              Left ankle and foot:                          Tenderness:    Medial and lateral ankle and midfoot                          Swelling:          Diffusely in the foot, fracture blisters distally                          Range of motion:   "      Ankle dorsiflexion:        0°                                                              Ankle plantarflexion:    10°    Labs:  Results from last 7 days   Lab Units 10/07/24  0636 10/05/24  2259   WBC 10*3/mm3 5.71 10.14   HEMOGLOBIN g/dL 9.4* 10.7*   HEMATOCRIT % 30.2* 33.4*   MCV fL 100.3* 98.2*   PLATELETS 10*3/mm3 172 224       Radiology:  Imaging Results (Last 24 Hours)       Procedure Component Value Units Date/Time    XR Ankle 3+ View Left [[id number]] Collected: 10/06/24 1527     Updated: 10/06/24 1535    Narrative:      XR ANKLE 3+ VW LEFT    Date of Exam: 10/6/2024 7:31 AM EDT    Indication: ankle pain - ct scan showed ankle fracture    Comparison: Lower extremity CT [address] 24    Findings:  Subtle nondisplaced medial malleolus fracture stable from prior. Nondisplaced fracture involving the medial talus, near the posterior subtalar joint stable from prior. Incompletely assessed known fractures of the first through fourth proximal metatarsals   as well as lateral cuneiform probably similar to recent CT. Fractures involve Lisfranc articulation with redemonstrated widening at the base of first and second metatarsals. Degenerative osteoarthritis. No convincing new fractures. Nonfocal soft tissue   swelling noted.      Impression:      Impression:  Grossly stable known fractures of the medial malleolus, medial talus, lateral cuneiform and first through fourth proximal metatarsals with Lisfranc involvement.      Electronically Signed: Collin Bullard MD    10/6/2024 3:32 PM EDT    Workstation ID: [id number]    XR Tibia Fibula 2 View Left [[id number]] Collected: 10/06/24 1303     Updated: 10/06/24 1310    Narrative:      XR TIBIA FIBULA 2 VW LEFT    Date of Exam: 10/6/2024 12:35 PM EDT    Indication: left ankle fracture - medial malleolus - evaluate for possible proximal fibula fracture    Comparison: None available.    Findings:  There is a vertically oriented intra-articular fracture of the medial malleolus in  near-anatomic alignment. No fracture of the proximal fibula is demonstrated. Lateral ankle soft tissue swelling is noted. No significant joint effusion or loose body is   seen.      Impression:      Impression:  Nondisplaced intra-articular fracture of the medial malleolus.  Moderate lateral ankle soft tissue swelling.      Electronically Signed: Konstantin Coronado MD    10/6/2024 1:07 PM EDT    Workstation ID: [id number]            PT:  Physical Therapy - Plan of Care Review - Outcome Summary:   ]       Results Review:     I reviewed the patient's new clinical results.  I reviewed the patient's new imaging results and agree with the interpretation.  I reviewed the patient's other test results and agree with the interpretation    Assessment and Plan     Assessment:   Lisfranc fracture, left foot, with nondisplaced medial malleolus fracture and posterior medial talus fracture, left ankle    Will discuss with foot and ankle colleague today.  Plan for splint placement after their assessment.  Continue with ice and elevation for now.  No immediate surgical plans in light of her soft tissues.      Lisfranc dislocation, left, initial encounter      Plan for disposition: To be determined.      Future Appointments   Date Time Provider Department Center   10/14/2024 10:30 AM Cassy Foster MD [facility]   10/14/2024 11:00 AM Austin Tatum LCSW [facility] BMT None   10/30/2024 11:00 AM Austin Tatum LCSW E [facility] BMT None   11/11/2024  9:30 AM Gray Bahena MD [facility]   12/12/2024 11:30 AM Liana So APRN [facility]   1/6/2025 12:40 PM Juanpablo Lee MD [facility]   2/13/2025 11:30 AM Caden Dietz MD [facility] N [facility]           Juanpablo Lee MD  10/07/24  07:45 EDT      Electronically signed by Juanpablo Lee MD at 10/07/24 0745       Adalid Funes MD at 10/06/24 1140              [facility] Medicine Services  PROGRESS NOTE    Patient Name: Alysia KELLEY  Rigo  : 1955  MRN: [id number]    Date of Admission: 10/5/2024  Primary Care Physician: Cassy Foster MD    Subjective   Subjective     CC:  fracture    HPI:  Foot aches with radiation up to thigh.  Slight nausea this morning better now      Objective   Objective     Vital Signs:   Temp:  [98 °F (36.7 °C)-99.3 °F (37.4 °C)] 98.1 °F (36.7 °C)  Heart Rate:  [69-95] 69  Resp:  [14-20] 18  BP: (114-153)/(50-96) 123/79     Physical Exam:  Non toxic, in bed  MM moist  RRR  CTAB  Abd soft, NT  Left foot with wraps  Alert, speech clear   Normal affect    Results Reviewed:  LAB RESULTS:      Lab 10/05/24  2259   WBC 10.14   HEMOGLOBIN 10.7*   HEMATOCRIT 33.4*   PLATELETS 224   NEUTROS ABS 6.76   IMMATURE GRANS (ABS) 0.03   LYMPHS ABS 1.86   MONOS ABS 1.41*   EOS ABS 0.05   MCV 98.2*         Lab 10/05/24  2336   SODIUM 136   POTASSIUM 4.4   CHLORIDE 102   CO2 22.0   ANION GAP 12.0   BUN 16   CREATININE 1.33*   EGFR 43.7*   GLUCOSE 104*   CALCIUM 8.5*   MAGNESIUM 1.8         Lab 10/05/24  2336   TOTAL PROTEIN 6.4   ALBUMIN 4.1   GLOBULIN 2.3   ALT (SGPT) 10   AST (SGOT) 22   BILIRUBIN 0.3   ALK PHOS 74                     Brief Urine Lab Results  (Last result in the past 365 days)        Color   Clarity   Blood   Leuk Est   Nitrite   Protein   CREAT   Urine HCG        24 1604 Yellow   Clear   Negative   Negative   Negative   Negative                   Microbiology Results Abnormal       None            CT Lower Extremity Left Without Contrast    Result Date: 10/5/2024  CT LOWER EXTREMITY LEFT WO CONTRAST Date of Exam: 10/5/2024 11:14 PM EDT Indication: CT of foot as requested by Ortho for Lisfranc fracture. Comparison: None available. Technique: Axial CT images were obtained of the left lower extremity without contrast administration.  Reconstructed coronal and sagittal images were also obtained. Automated exposure control and iterative construction methods were used. Findings: There is a minimally displaced  fracture of the anteromedial aspect of the distal tibia which extends to the articulation with the lateral talus. There is a minimally displaced fracture of the posterior medial talus. There are comminuted fractures involving the proximal first, second, third and fourth metatarsals which extend to involve the Lisfranc joint at all levels. There is diffuse soft tissue swelling over the forefoot.     Impression: Impression: 1.Comminuted fractures of the proximal first, second, third and fourth metatarsals with extension to the Lisfranc joint. 2.Minimally displaced fracture of the anteromedial distal tibia. 3.Minimally displaced fracture of the posterior medial talus. Electronically Signed: Geoff Abdalla MD  10/5/2024 11:26 PM EDT  Workstation ID: [id number]    XR Foot 3+ View Left    Result Date: 10/5/2024  XR FOOT 3+ VW LEFT Date of Exam: 10/5/2024 9:10 PM EDT Indication: Pain and swelling from fall Comparison: None available. Findings: There is a comminuted fracture of the proximal aspect of the first metatarsal. This extends to involve the tarsometatarsal joint. Lucent areas are also seen in the proximal more medial aspect of the second third and fourth digits which may indicate additional Lisfranc fractures. There is diffuse soft tissue swelling over the dorsum of the foot. There is calcaneal spurring.     Impression: Impression: Comminuted fracture of the proximal first metatarsal with extension to the tarsometatarsal joint. There are also lucencies in the proximal second third and fourth metatarsals which may indicate additional Lisfranc fractures. Electronically Signed: Geoff Abdalla MD  10/5/2024 9:42 PM EDT  Workstation ID: [id number]     Results for orders placed during the hospital encounter of 01/02/24    Adult Transthoracic Echo Complete W/ Cont if Necessary Per Protocol    Interpretation Summary    Left ventricular systolic function is normal. Calculated left ventricular EF = 57.9% Left ventricular ejection  fraction appears to be 56 - 60%.    Left ventricular diastolic function was normal.    Estimated right ventricular systolic pressure from tricuspid regurgitation is normal (<35 mmHg).    No significant change from previous study      Current medications:  Scheduled Meds:[Held by provider] aspirin, 81 mg, Oral, Daily  DULoxetine, 20 mg, Oral, BID  folic acid, 1 mg, Oral, Daily  gabapentin, 300 mg, Oral, Daily  gabapentin, 600 mg, Oral, Nightly  levothyroxine, 25 mcg, Oral, Q AM  metoprolol succinate XL, 25 mg, Oral, Nightly  sodium chloride, 10 mL, Intravenous, Q12H      Continuous Infusions:sodium chloride, 75 mL/hr, Last Rate: 75 mL/hr (10/06/24 0839)      PRN Meds:.  senna-docusate sodium **AND** polyethylene glycol **AND** bisacodyl **AND** bisacodyl    Calcium Replacement - Follow Nurse / BPA Driven Protocol    HYDROmorphone **AND** naloxone    influenza vaccine    Magnesium Standard Dose Replacement - Follow Nurse / BPA Driven Protocol    oxyCODONE    Phosphorus Replacement - Follow Nurse / BPA Driven Protocol    Potassium Replacement - Follow Nurse / BPA Driven Protocol    [COMPLETED] Insert Peripheral IV **AND** sodium chloride    sodium chloride    Assessment & Plan   Assessment & Plan     Active Hospital Problems    Diagnosis  POA    Lisfranc dislocation, left, initial encounter [S92.325A]  Yes      Resolved Hospital Problems   No resolved problems to display.        Brief Hospital Course to date:  Alysia Sierra is a 69 y.o. female with history of CKD III, chronic anemia, uterine cancer, hypothyroid who presents with left foot pain after stepping awkwardly off her bed.    Lisfranc fracture  Ankle fractures  - orthopedics follow, will likely need surgical fixation    CKD III  - gentle fluids today  - bmp am    Anemia    Hypothyroid  - levothyroxine    Alcohol use  - thiamine IV  - consider CIWA    GERD    Neuropathy  - gabapentin      Expected Discharge Location and Transportation:   Expected Discharge    Expected discharge date/ time has not been documented.     VTE Prophylaxis: mechanical, add subq heparin when OK with orthopedic  Mechanical VTE prophylaxis orders are present.         AM-PAC 6 Clicks Score (PT): 14 (10/06/24 0232)    CODE STATUS:   Code Status and Medical Interventions: CPR (Attempt to Resuscitate); Full Support   Ordered at: 10/06/24 0246     Code Status (Patient has no pulse and is not breathing):    CPR (Attempt to Resuscitate)     Medical Interventions (Patient has pulse or is breathing):    Full Support       Adalid Funes MD  10/06/24        Electronically signed by Adalid Funes MD at 10/06/24 1150          Consult Notes (all)        Juanpablo Lee MD at 10/06/24 0954              [facility] [place]    Subjective     Patient Care Team:  Patient Care Team:  Cassy Foster MD as PCP - General (Internal Medicine)  Russ Carrington MD as Consulting Physician (Nephrology)  Gray Bahena MD as Consulting Physician (Cardiology)  Caden Dietz MD as Consulting Physician (Neurology)  Charity Cornejo RN as Ambulatory  (Oncology) (Population Health)  Juanpablo Lee MD as Consulting Physician (Orthopedic Surgery)  Basilia Mata MD as Consulting Physician (Radiation Oncology)  Sarah Stern MD as Consulting Physician (Gynecology)  Jared Wheatley MD as Consulting Physician (Nephrology)  Jose G Florez MD as Consulting Physician (Dermatology)    Referring Provider: Saturnino Dewitt PA-C  Reason for Consultation: Left foot pain    Chief Complaint   Patient presents with    Foot Injury        HPI    Alysia Sierra is a 69 y.o. female who was getting out of a new bed yesterday morning and at the onset of pain when she stepped wrong, with the onset of pain and swelling in her left foot.  She did weight-bear initially, but then presented to the emergency room via ambulance secondary to difficulty weightbearing with increasing pain in the left foot.   X-rays were obtained which showed a Lisfranc fracture, and CT scan was subsequently obtained.  CT scan showed additional fractures of the medial malleolus and posterior medial talus.  Past medical history significant for stage III chronic kidney disease, chronic anemia, hypothyroidism, and fatty liver.        Lisfranc dislocation, left, initial encounter     Past Medical History:   Diagnosis Date    Allergic lisinopril  2017    Anemia     Arrhythmia     Arthritis     Cancer     uterine    Cataract mild 2020    stil lpresent    Chicken pox     Chronic fatigue     CKD (chronic kidney disease), stage III     sees nephro    Clotting disorder     Dental root implant present     lower left  x1 - possible dental implant    Depression mild 2019    Difficulty walking 2019    Disease of thyroid gland     Dizzy     NIEVES (dyspnea on exertion)     2017    Fracture of hip     Generalized anxiety disorder     GERD (gastroesophageal reflux disease) 2018    History of brachytherapy 2023    vaginal brachytherapy    Hyperlipidemia     Hypertension     Iron deficiency anemia     Liver disease     fatty    Liver problem     Measles     Menopause     Mumps     Neuromuscular disorder Peripheral Neuropathy    Orthostatic hypotension     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    Renal insufficiency 2018    Scoliosis     Unintentional weight loss     Uses contact lenses     bilat    Uterine cancer     Uterine cancer 2022    UTI (urinary tract infection)     Visual impairment Nearsighted    Wears glasses       Past Surgical History:   Procedure Laterality Date     SECTION      DILATION AND CURETTAGE, DIAGNOSTIC / THERAPEUTIC      HIP OPEN REDUCTION Left 2023    Procedure: FEMORAL NECK OPEN REDUCTION INTERNAL FIXATION LEFT;  Surgeon: Juanpablo Lee MD;  Location: [facility];  Service: Orthopedics;  Laterality: Left;    HIP SURGERY      OOPHORECTOMY      ORAL LESION EXCISION/BIOPSY   08/2021    TOTAL LAPAROSCOPIC HYSTERECTOMY SALPINGO OOPHORECTOMY N/A 10/28/2022    Procedure: TOTAL LAPAROSCOPIC HYSTERECTOMY BILATERAL SALPINGO-OOPHORECTOMY, INJECTION FOR SENTINEL LYMPH NODE MAPPING, BILATERAL SENTINEL LYMPH NODE DISSECTION WITH DAVINCI ROBOT;  Surgeon: Jasmine Rich MD;  Location: [facility];  Service: Robotics - DaVinci;  Laterality: N/A;    TUBAL ABDOMINAL LIGATION  1983      Family History   Problem Relation Age of Onset    Spondylolisthesis Mother     COPD Mother     Stroke Mother     Hypertension Mother     Lung cancer Father     Hypertension Father     Heart attack Father     Coronary artery disease Father     Heart disease Father     Cancer Father     Lung disease Father     Hyperlipidemia Sister     Rheum arthritis Sister     Malig Hypertension Sister     Osteoarthritis Sister     Other Sister         alcoholic    Hypertension Sister     Scoliosis Sister     Hypertension Brother     Depression Sister     Early death Sister     Breast cancer Neg Hx     Ovarian cancer Neg Hx     Uterine cancer Neg Hx     Colon cancer Neg Hx     Melanoma Neg Hx     Prostate cancer Neg Hx      Social History     Socioeconomic History    Marital status:     Number of children: 1    Highest education level: Associate degree: academic program   Tobacco Use    Smoking status: Never     Passive exposure: Never    Smokeless tobacco: Never    Tobacco comments:     Never   Vaping Use    Vaping status: Never Used   Substance and Sexual Activity    Alcohol use: Yes     Alcohol/week: 4.0 - 6.0 standard drinks of alcohol     Types: 4 - 6 Glasses of wine per week     Comment: 6-8 glasses a week    Drug use: No    Sexual activity: Not Currently     Partners: Male     Birth control/protection: Surgical, Post-menopausal      Medications Prior to Admission   Medication Sig Dispense Refill Last Dose    Alpha-Lipoic Acid 600 MG capsule Take  by mouth Daily.       aspirin 81 MG EC tablet Take 1 tablet by mouth Daily.  90 tablet 3     Cholecalciferol 25 MCG (1000 UT) tablet Take 1 tablet by mouth Daily.       DULoxetine (CYMBALTA) 20 MG capsule Take 1 capsule by mouth 2 (Two) Times a Day. 180 capsule 1     esomeprazole (nexIUM) 20 MG capsule Take 1 capsule by mouth 2 (Two) Times a Week. OTC       folic acid (FOLVITE) 1 MG tablet Take 1 tablet by mouth Daily. 30 tablet 0     gabapentin (Neurontin) 300 MG capsule Take 1 capsule in the morning and 2 capsules at night. 90 capsule 5     hydrALAZINE (APRESOLINE) 25 MG tablet Take 1 tablet by mouth 2 (Two) Times a Day.       levothyroxine (SYNTHROID, LEVOTHROID) 25 MCG tablet TAKE ONE TABLET BY MOUTH EVERY MORNING ON AN EMPTY STOMACH 90 tablet 0     lidocaine (LIDODERM) 5 % Place 1 patch on the skin as directed by provider Daily. Remove & Discard patch within 12 hours or as directed by MD 14 each 1     Lidocaine 4 % Place 1 patch on the skin as directed by provider Daily. Remove & Discard patch within 12 hours or as directed by MD 15 each 0     magnesium oxide (MAGOX) 400 (241.3 Mg) MG tablet tablet Take 1 tablet by mouth Daily. OTC       metoprolol succinate XL (TOPROL-XL) 25 MG 24 hr tablet Take 1 tablet by mouth Every Night. 90 tablet 1     ondansetron ODT (ZOFRAN-ODT) 4 MG disintegrating tablet Place 1 tablet on the tongue Every 8 (Eight) Hours As Needed for Nausea or Vomiting. 30 tablet 1     rosuvastatin (Crestor) 5 MG tablet Take 1 tablet by mouth Daily. 90 tablet 1     thiamine (VITAMIN B1) 100 MG tablet Take 1 tablet by mouth Daily. 30 tablet 0     vitamin B-12 (CYANOCOBALAMIN) 1000 MCG tablet Take 1 tablet by mouth Daily.        Allergies   Allergen Reactions    Lisinopril Angioedema    Metal Rash     Possible nickel -   Gold is only metal that doesn't have issues      Milk-Related Compounds Other (See Comments)     LACTOSE INTOLERANT    Tylenol [Acetaminophen] Other (See Comments)     ckd    Atorvastatin Myalgia        Review of Systems   Constitutional: Negative.    HENT:  "Negative.     Eyes: Negative.    Respiratory: Negative.     Cardiovascular: Negative.    Gastrointestinal: Negative.    Endocrine: Negative.    Genitourinary: Negative.    Musculoskeletal:  Positive for arthralgias.   Skin: Negative.    Allergic/Immunologic: Negative.    Neurological: Negative.    Hematological: Negative.    Psychiatric/Behavioral: Negative.          Objective      Physical Exam  /85 (BP Location: Right arm, Patient Position: Lying)   Pulse 74   Temp 98 °F (36.7 °C) (Oral)   Resp 18   Ht 162.6 cm (64\")   Wt 61.2 kg (135 lb)   LMP  (LMP Unknown)   SpO2 95%   BMI 23.17 kg/m²     Body mass index is 23.17 kg/m².    General:   Mental Status:  Alert   Appearance: Cooperative, in no acute distress   Build and Nutrition: Deconditioned female   Orientation: Alert and oriented to person, place and time   Posture: Laying in a hospital bed    Integument:   Left ankle: Mild swelling   Left foot: Ecchymosis and swelling on the dorsum and plantar aspect of the foot    Neurologic:   Sensation:    Left foot: Intact to light touch on the dorsal and plantar aspect   Motor:  Left lower extremity: Intact ankle dorsiflexors, and ankle plantar flexors  Vascular:   Left lower extremity: Good doppler signal dorsalis pedis pu    Lower Extremities:   Left ankle and foot:    Tenderness:  Medial and lateral ankle and midfoot    Swelling:  Diffusely in the foot    Range of motion:  Ankle dorsiflexion:  5°       Ankle plantarflexion:  10°      Imaging/Studies  Imaging Results (Last 24 Hours)       Procedure Component Value Units Date/Time    XR Ankle 3+ View Left [[id number]] Resulted: 10/06/24 0731     Updated: 10/06/24 0859    CT Lower Extremity Left Without Contrast [[id number]] Collected: 10/05/24 2322     Updated: 10/05/24 2329    Narrative:      CT LOWER EXTREMITY LEFT WO CONTRAST    Date of Exam: 10/5/2024 11:14 PM EDT    Indication: CT of foot as requested by Ortho for Lisfranc fracture.    Comparison: None " available.    Technique: Axial CT images were obtained of the left lower extremity without contrast administration.  Reconstructed coronal and sagittal images were also obtained. Automated exposure control and iterative construction methods were used.      Findings:  There is a minimally displaced fracture of the anteromedial aspect of the distal tibia which extends to the articulation with the lateral talus. There is a minimally displaced fracture of the posterior medial talus.    There are comminuted fractures involving the proximal first, second, third and fourth metatarsals which extend to involve the Lisfranc joint at all levels. There is diffuse soft tissue swelling over the forefoot.      Impression:      Impression:  1.Comminuted fractures of the proximal first, second, third and fourth metatarsals with extension to the Lisfranc joint.  2.Minimally displaced fracture of the anteromedial distal tibia.  3.Minimally displaced fracture of the posterior medial talus.        Electronically Signed: Geoff Abdalla MD    10/5/2024 11:26 PM EDT    Workstation ID: [id number]    XR Foot 3+ View Left [[id number]] Collected: 10/05/24 2141     Updated: 10/05/24 2145    Narrative:      XR FOOT 3+ VW LEFT    Date of Exam: 10/5/2024 9:10 PM EDT    Indication: Pain and swelling from fall    Comparison: None available.    Findings:  There is a comminuted fracture of the proximal aspect of the first metatarsal. This extends to involve the tarsometatarsal joint. Lucent areas are also seen in the proximal more medial aspect of the second third and fourth digits which may indicate   additional Lisfranc fractures. There is diffuse soft tissue swelling over the dorsum of the foot. There is calcaneal spurring.      Impression:      Impression:  Comminuted fracture of the proximal first metatarsal with extension to the tarsometatarsal joint. There are also lucencies in the proximal second third and fourth metatarsals which may indicate  additional Lisfranc fractures.        Electronically Signed: Geoff Abdalla MD    10/5/2024 9:42 PM EDT    Workstation ID: [id number]            Results from last 7 days   Lab Units 10/05/24  2259   WBC 10*3/mm3 10.14   HEMOGLOBIN g/dL 10.7*   HEMATOCRIT % 33.4*   MCV fL 98.2*   PLATELETS 10*3/mm3 224         Results Review:   I reviewed the patient's new clinical lab test results., I reviewed the patient's new imaging test results., and I reviewed the patient's other test results.    Assessment and Plan     Assessment:    Left foot Lisfranc fracture, with involvement of the first, second, third, and fourth metatarsal bases    Nondisplaced medial malleolus fracture left ankle, nondisplaced left posterior medial talus fracture      Plan:    I reviewed my findings with the patient.  Her left foot Lisfranc fracture will likely require operative intervention to best align and stabilize the fracture, but I will review with one of my foot and ankle colleagues tomorrow for recommendations and continued treatment.  In the meantime, she will be nonweightbearing on the left lower extremity, with ice and elevation.  Likely nonoperative management of the medial malleolus and posterior medial talus fractures.    I discussed the patients findings and my recommendations with patient, nursing staff, and consulting provider    Medical Decision Making  Management Options : close treatment of fracture or dislocation  Data/Risk: independent visualization of imaging, lab tests, or EMG/NCV      Juanpablo Lee MD  10/06/24  10:11 EDT      Electronically signed by Juanpablo Lee MD at 10/06/24 1011

## 2024-10-09 LAB
DEPRECATED RDW RBC AUTO: 48.5 FL (ref 37–54)
ERYTHROCYTE [DISTWIDTH] IN BLOOD BY AUTOMATED COUNT: 13.5 % (ref 12.3–15.4)
FOLATE SERPL-MCNC: >20 NG/ML (ref 4.78–24.2)
HCT VFR BLD AUTO: 27.6 % (ref 34–46.6)
HGB BLD-MCNC: 8.8 G/DL (ref 12–15.9)
MCH RBC QN AUTO: 31.4 PG (ref 26.6–33)
MCHC RBC AUTO-ENTMCNC: 31.9 G/DL (ref 31.5–35.7)
MCV RBC AUTO: 98.6 FL (ref 79–97)
PLATELET # BLD AUTO: 176 10*3/MM3 (ref 140–450)
PMV BLD AUTO: 10.8 FL (ref 6–12)
RBC # BLD AUTO: 2.8 10*6/MM3 (ref 3.77–5.28)
T4 FREE SERPL-MCNC: 1.22 NG/DL (ref 0.92–1.68)
VIT B12 BLD-MCNC: 832 PG/ML (ref 211–946)
WBC NRBC COR # BLD AUTO: 4.49 10*3/MM3 (ref 3.4–10.8)

## 2024-10-09 PROCEDURE — 82607 VITAMIN B-12: CPT | Performed by: PHYSICIAN ASSISTANT

## 2024-10-09 PROCEDURE — 99232 SBSQ HOSP IP/OBS MODERATE 35: CPT | Performed by: PHYSICIAN ASSISTANT

## 2024-10-09 PROCEDURE — 97162 PT EVAL MOD COMPLEX 30 MIN: CPT

## 2024-10-09 PROCEDURE — 25010000002 NA FERRIC GLUC CPLX PER 12.5 MG: Performed by: PHYSICIAN ASSISTANT

## 2024-10-09 PROCEDURE — 25010000002 HEPARIN (PORCINE) PER 1000 UNITS: Performed by: PHYSICIAN ASSISTANT

## 2024-10-09 PROCEDURE — 25810000003 SODIUM CHLORIDE 0.9 % SOLUTION 250 ML FLEX CONT: Performed by: PHYSICIAN ASSISTANT

## 2024-10-09 PROCEDURE — 97110 THERAPEUTIC EXERCISES: CPT | Performed by: OCCUPATIONAL THERAPIST

## 2024-10-09 PROCEDURE — 82746 ASSAY OF FOLIC ACID SERUM: CPT | Performed by: PHYSICIAN ASSISTANT

## 2024-10-09 PROCEDURE — 84439 ASSAY OF FREE THYROXINE: CPT | Performed by: PHYSICIAN ASSISTANT

## 2024-10-09 PROCEDURE — 97165 OT EVAL LOW COMPLEX 30 MIN: CPT | Performed by: OCCUPATIONAL THERAPIST

## 2024-10-09 PROCEDURE — 85027 COMPLETE CBC AUTOMATED: CPT | Performed by: PHYSICIAN ASSISTANT

## 2024-10-09 RX ADMIN — FOLIC ACID 1 MG: 1 TABLET ORAL at 09:21

## 2024-10-09 RX ADMIN — OXYCODONE HYDROCHLORIDE 5 MG: 5 TABLET ORAL at 09:21

## 2024-10-09 RX ADMIN — PANTOPRAZOLE SODIUM 40 MG: 40 TABLET, DELAYED RELEASE ORAL at 09:21

## 2024-10-09 RX ADMIN — HEPARIN SODIUM 5000 UNITS: 5000 INJECTION INTRAVENOUS; SUBCUTANEOUS at 14:43

## 2024-10-09 RX ADMIN — GABAPENTIN 600 MG: 300 CAPSULE ORAL at 21:20

## 2024-10-09 RX ADMIN — Medication 10 ML: at 09:23

## 2024-10-09 RX ADMIN — HEPARIN SODIUM 5000 UNITS: 5000 INJECTION INTRAVENOUS; SUBCUTANEOUS at 06:15

## 2024-10-09 RX ADMIN — DULOXETINE HYDROCHLORIDE 20 MG: 20 CAPSULE, DELAYED RELEASE ORAL at 21:20

## 2024-10-09 RX ADMIN — HEPARIN SODIUM 5000 UNITS: 5000 INJECTION INTRAVENOUS; SUBCUTANEOUS at 21:19

## 2024-10-09 RX ADMIN — Medication 10 ML: at 21:18

## 2024-10-09 RX ADMIN — SODIUM CHLORIDE 250 MG: 9 INJECTION, SOLUTION INTRAVENOUS at 09:22

## 2024-10-09 RX ADMIN — DULOXETINE HYDROCHLORIDE 20 MG: 20 CAPSULE, DELAYED RELEASE ORAL at 09:21

## 2024-10-09 RX ADMIN — LEVOTHYROXINE SODIUM 25 MCG: 25 TABLET ORAL at 06:15

## 2024-10-09 RX ADMIN — GABAPENTIN 300 MG: 300 CAPSULE ORAL at 09:21

## 2024-10-09 RX ADMIN — ASPIRIN 81 MG: 81 TABLET, COATED ORAL at 09:21

## 2024-10-09 RX ADMIN — THIAMINE HCL TAB 100 MG 200 MG: 100 TAB at 09:21

## 2024-10-09 RX ADMIN — Medication 5 MG: at 21:20

## 2024-10-09 NOTE — THERAPY EVALUATION
Patient Name: Alysia Sierra  : 1955    MRN: [id number]                              Today's Date: 10/9/2024       Admit Date: 10/5/2024    Visit Dx:     ICD-10-CM ICD-9-CM   1. Displaced fracture of distal end of left tibia  S82.302A 824.8   2. Closed nondisplaced fracture of second metatarsal bone of left foot, initial encounter  S92.325A 825.25   3. Closed nondisplaced fracture of first metatarsal bone of left foot, initial encounter  S92.315A 825.25   4. Closed nondisplaced fracture of third metatarsal bone of left foot, initial encounter  S92.335A 825.25   5. Closed nondisplaced fracture of fourth metatarsal bone of left foot, initial encounter  S92.345A 825.25     Patient Active Problem List   Diagnosis    Hypertension    Leg weakness, bilateral    Syncope    CKD (chronic kidney disease) stage 3, GFR 30-59 ml/min    Neuropathy    Hyperlipidemia LDL goal <100    Endometrial adenocarcinoma    Mild episode of recurrent major depressive disorder    Dizziness    Hip fracture    Anemia    Hypomagnesemia    Hypothyroidism    Post-menopausal    Other osteoporosis without current pathological fracture    Closed fracture of second lumbar vertebra    Alcoholic ketoacidosis    Leukocytosis    GERD with esophagitis    Sepsis    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    Lisfranc dislocation, left, initial encounter    Closed fracture of left ankle    Closed displaced fracture of fourth metatarsal bone of left foot     Past Medical History:   Diagnosis Date    Allergic lisinopril  2017    Anemia     Arrhythmia     Arthritis     Cancer     uterine    Cataract mild 2020    stil lpresent    Chicken pox     Chronic fatigue     CKD (chronic kidney disease), stage III     sees nephro    Clotting disorder     Dental root implant present     lower left  x1 - possible dental implant    Depression mild 2019    Difficulty walking 2019    Disease of thyroid gland     Dizzy     NIEVES (dyspnea on exertion)     2017     Fracture of hip     Generalized anxiety disorder     GERD (gastroesophageal reflux disease) 2018    History of brachytherapy 2023    vaginal brachytherapy    Hyperlipidemia     Hypertension     Iron deficiency anemia     Liver disease     fatty    Liver problem     Measles     Menopause     Mumps     Neuromuscular disorder Peripheral Neuropathy    Orthostatic hypotension     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    Renal insufficiency 2018    Scoliosis     Unintentional weight loss     Uses contact lenses     bilat    Uterine cancer     Uterine cancer 2022    UTI (urinary tract infection)     Visual impairment Nearsighted    Wears glasses      Past Surgical History:   Procedure Laterality Date     SECTION      DILATION AND CURETTAGE, DIAGNOSTIC / THERAPEUTIC      HIP OPEN REDUCTION Left 2023    Procedure: FEMORAL NECK OPEN REDUCTION INTERNAL FIXATION LEFT;  Surgeon: Juanpablo Lee MD;  Location: [facility] OR;  Service: Orthopedics;  Laterality: Left;    HIP SURGERY      OOPHORECTOMY      ORAL LESION EXCISION/BIOPSY  2021    TOTAL LAPAROSCOPIC HYSTERECTOMY SALPINGO OOPHORECTOMY N/A 10/28/2022    Procedure: TOTAL LAPAROSCOPIC HYSTERECTOMY BILATERAL SALPINGO-OOPHORECTOMY, INJECTION FOR SENTINEL LYMPH NODE MAPPING, BILATERAL SENTINEL LYMPH NODE DISSECTION WITH DAVINCI ROBOT;  Surgeon: Jasmine Rich MD;  Location: [facility] OR;  Service: Robotics - DaVinci;  Laterality: N/A;    TUBAL ABDOMINAL LIGATION        General Information       Row Name 10/09/24 1132          OT Time and Intention    Document Type evaluation  -AR     Mode of Treatment individual therapy;occupational therapy  -AR       Row Name 10/09/24 1132          General Information    Patient Profile Reviewed yes  -AR     Prior Level of Function independent:;all household mobility;community mobility;gait;transfer;ADL's  h/o falls, uses cane/RW and rollator as needed  -AR     Existing  Precautions/Restrictions fall;other (see comments);left;non-weight bearing;seizures  -AR     Barriers to Rehab previous functional deficit  -AR       Row Name 10/09/24 1132          Living Environment    People in Home alone  -AR       Row Name 10/09/24 1132          Home Main Entrance    Number of Stairs, Main Entrance one  one step from curb, ramp entry  -AR     Stair Railings, Main Entrance none  -AR       Row Name 10/09/24 1132          Stairs Within Home, Primary    Number of Stairs, Within Home, Primary none  -AR       Row Name 10/09/24 1132          Cognition    Orientation Status (Cognition) oriented x 4  -AR       Row Name 10/09/24 1132          Safety Issues, Functional Mobility    Safety Issues Affecting Function (Mobility) awareness of need for assistance;safety precaution awareness;safety precautions follow-through/compliance  -AR     Impairments Affecting Function (Mobility) endurance/activity tolerance;balance;strength;pain  -AR               User Key  (r) = Recorded By, (t) = Taken By, (c) = Cosigned By      Initials Name Provider Type    Bernie Jean Baptiste OT Occupational Therapist                     Mobility/ADL's       Row Name 10/09/24 1135          Bed Mobility    Bed Mobility scooting/bridging;supine-sit  -AR     Scooting/Bridging [place] (Bed Mobility) supervision;verbal cues  -AR     Supine-Sit [place] (Bed Mobility) supervision;verbal cues  -AR     Assistive Device (Bed Mobility) head of bed elevated  -AR     Comment, (Bed Mobility) Min cues to maintain NWB RLE  -AR       Row Name 10/09/24 1135          Transfers    Transfers sit-stand transfer;stand-sit transfer;bed-chair transfer  -AR     Comment, (Transfers) Cues for hand placement and chair approach. Pt with good ability to maintain NWB LLE.  -AR       Row Name 10/09/24 1135          Bed-Chair Transfer    Bed-Chair [place] (Transfers) minimum assist (75% patient effort);2 person assist;verbal cues  -AR     Assistive  Device (Bed-Chair Transfers) walker, front-wheeled  -AR       Row Name 10/09/24 1135          Sit-Stand Transfer    Sit-Stand [place] (Transfers) minimum assist (75% patient effort);2 person assist;verbal cues  -AR     Assistive Device (Sit-Stand Transfers) walker, front-wheeled  -AR       Row Name 10/09/24 1135          Stand-Sit Transfer    Stand-Sit [place] (Transfers) minimum assist (75% patient effort);2 person assist;verbal cues  -AR     Assistive Device (Stand-Sit Transfers) walker, front-wheeled  -AR       Row Name 10/09/24 1135          Activities of Daily Living    BADL Assessment/Intervention upper body dressing;lower body dressing;feeding  -AR       Row Name 10/09/24 1135          Mobility    Extremity Weight-bearing Status left lower extremity  -AR     Left Lower Extremity (Weight-bearing Status) non weight-bearing (NWB)   -AR       Row Name 10/09/24 1135          Upper Body Dressing Assessment/Training    [place] Level (Upper Body Dressing) don;front opening garment;minimum assist (75% patient effort)  -AR     Position (Upper Body Dressing) edge of bed sitting  -AR       Row Name 10/09/24 1135          Lower Body Dressing Assessment/Training    [place] Level (Lower Body Dressing) don;socks;maximum assist (25% patient effort)  -AR     Position (Lower Body Dressing) edge of bed sitting  -AR       Row Name 10/09/24 1135          Self-Feeding Assessment/Training    [place] Level (Feeding) liquids to mouth;supervision  -AR     Position (Self-Feeding) supported sitting  -AR               User Key  (r) = Recorded By, (t) = Taken By, (c) = Cosigned By      Initials Name Provider Type    Bernie Jean Baptiste OT Occupational Therapist                   Obj/Interventions       Row Name 10/09/24 1136          Sensory Assessment (Somatosensory)    Sensory Assessment (Somatosensory) UE sensation intact  -AR       Row Name 10/09/24 1136          Vision Assessment/Intervention    Visual  Impairment/Limitations WNL  -AR       Row Name 10/09/24 1136          Range of Motion Comprehensive    General Range of Motion no range of motion deficits identified  -AR       Row Name 10/09/24 1136          Strength Comprehensive (MMT)    General Manual Muscle Testing (MMT) Assessment upper extremity strength deficits identified  -AR     Comment, General Manual Muscle Testing (MMT) Assessment BUE 4+/5  -AR       Row Name 10/09/24 1136          Shoulder (Therapeutic Exercise)    Shoulder (Therapeutic Exercise) strengthening exercise  -AR     Shoulder Strengthening (Therapeutic Exercise) left;flexion;extension;horizontal aBduction/aDduction;scapular stabilization;sitting;resistance tubing;red;10 repetitions  -AR       Row Name 10/09/24 1136          Elbow/Forearm (Therapeutic Exercise)    Elbow/Forearm (Therapeutic Exercise) strengthening exercise  -AR     Elbow/Forearm Strengthening (Therapeutic Exercise) left;flexion;extension;sitting;10 repetitions  Issued and reviewed written RUE theraband HEP, deferred RUE d/t iron IV infusion  -AR       Row Name 10/09/24 1136          Motor Skills    Therapeutic Exercise shoulder;elbow/forearm  -AR       Row Name 10/09/24 1136          Balance    Balance Assessment sitting static balance;sitting dynamic balance;standing dynamic balance;standing static balance  -AR     Static Sitting Balance supervision  -AR     Dynamic Sitting Balance supervision  -AR     Position, Sitting Balance unsupported;sitting edge of bed  -AR     Static Standing Balance minimal assist;1-person assist  -AR     Dynamic Standing Balance minimal assist;2-person assist  -AR     Position/Device Used, Standing Balance supported;walker, front-wheeled  -AR               User Key  (r) = Recorded By, (t) = Taken By, (c) = Cosigned By      Initials Name Provider Type    Bernie Jean Baptiste OT Occupational Therapist                   Goals/Plan       Row Name 10/09/24 1141          Transfer Goal 1 (OT)     Activity/Assistive Device (Transfer Goal 1, OT) sit-to-stand/stand-to-sit;commode, bedside without drop arms;walker, rolling  -AR     [place] Level/Cues Needed (Transfer Goal 1, OT) verbal cues required;contact guard required  -AR     Time Frame (Transfer Goal 1, OT) short term goal (STG);5 days  -AR     Progress/Outcome (Transfer Goal 1, OT) goal ongoing  -AR       Row Name 10/09/24 1141          Dressing Goal 1 (OT)    Activity/Device (Dressing Goal 1, OT) lower body dressing;reacher  -AR     [place]/Cues Needed (Dressing Goal 1, OT) moderate assist (50-74% patient effort);verbal cues required  -AR     Time Frame (Dressing Goal 1, OT) long term goal (LTG);10 days  -AR     Progress/Outcome (Dressing Goal 1, OT) goal ongoing  -AR       Row Name 10/09/24 1141          Toileting Goal 1 (OT)    Activity/Device (Toileting Goal 1, OT) toileting skills, all;commode, bedside with drop arms  -AR     [place] Level/Cues Needed (Toileting Goal 1, OT) verbal cues required;contact guard required  -AR     Time Frame (Toileting Goal 1, OT) long term goal (LTG);10 days  -AR     Progress/Outcome (Toileting Goal 1, OT) goal ongoing  -AR       Row Name 10/09/24 1141          Strength Goal 1 (OT)    Strength Goal 1 (OT) Pt will complete BUE theraband HEP with supervision to support ADL function  -AR     Time Frame (Strength Goal 1, OT) long term goal (LTG);10 days  -AR     Progress/Outcome (Strength Goal 1, OT) goal ongoing  -AR       Row Name 10/09/24 1141          Therapy Assessment/Plan (OT)    Planned Therapy Interventions (OT) activity tolerance training;adaptive equipment training;BADL retraining;edema control/reduction;functional balance retraining;IADL retraining;occupation/activity based interventions;patient/caregiver education/training;ROM/therapeutic exercise;strengthening exercise;transfer/mobility retraining  -AR               User Key  (r) = Recorded By, (t) = Taken By, (c) = Cosigned By      Initials Name  Provider Type    AR Bernie Henriquez, OT Occupational Therapist                   Clinical Impression       Row Name 10/09/24 1137          Pain Assessment    Pretreatment Pain Rating 2/10  -AR     Posttreatment Pain Rating 2/10  -AR     Pain Location - Side/Orientation Left  -AR     Pain Location - ankle  -AR     Pain Intervention(s) Repositioned;Ambulation/increased activity;Cold applied;Elevated  -AR       Row Name 10/09/24 1137          Plan of Care Review    Plan of Care Reviewed With patient  -AR     Outcome Evaluation Pt alert, Ox4 and reports tolerable pain. She completed bed mobility with supervision, UB dressing with min assist, LB dressing with max assist and transfer to chair with min assist x2 demo good ability to maintain NWB LLE. Pt limited with pain, weakness, decreased balance and is performing below baseline status. She lives alone, recommend IPR and pt in agreement.  -AR       Row Name 10/09/24 1137          Therapy Assessment/Plan (OT)    Rehab Potential (OT) good, to achieve stated therapy goals  -AR     Criteria for Skilled Therapeutic Interventions Met (OT) yes  -AR     Therapy Frequency (OT) daily  -AR       Row Name 10/09/24 1137          Therapy Plan Review/Discharge Plan (OT)    Anticipated Discharge Disposition (OT) inpatient rehabilitation facility  -AR       Row Name 10/09/24 1137          Vital Signs    Post Systolic BP Rehab 133  -AR     Post Treatment Diastolic BP 74  -AR     Pre Patient Position Supine  -AR     Intra Patient Position Standing  -AR     Post Patient Position Sitting  -AR       Row Name 10/09/24 1137          Positioning and Restraints    Pre-Treatment Position in bed  -AR     Post Treatment Position chair  -AR     In Chair notified nsg;reclined;call light within reach;encouraged to call for assist;exit alarm on;compression device;waffle cushion;on mechanical lift sling;legs elevated;LLE elevated  -AR               User Key  (r) = Recorded By, (t) = Taken By, (c) =  Cosigned By      Initials Name Provider Type    Bernie Jean Baptiste OT Occupational Therapist                   Outcome Measures       Row Name 10/09/24 1144          How much help from another is currently needed...    Putting on and taking off regular lower body clothing? 2  -AR     Bathing (including washing, rinsing, and drying) 2  -AR     Toileting (which includes using toilet bed pan or urinal) 3  -AR     Putting on and taking off regular upper body clothing 3  -AR     Taking care of personal grooming (such as brushing teeth) 3  -AR     Eating meals 3  -AR     AM-PAC 6 Clicks Score (OT) 16  -AR       Row Name 10/09/24 1005          How much help from another person do you currently need...    Turning from your back to your side while in flat bed without using bedrails? 3  -AB     Moving from lying on back to sitting on the side of a flat bed without bedrails? 3  -AB     Moving to and from a bed to a chair (including a wheelchair)? 3  -AB     Standing up from a chair using your arms (e.g., wheelchair, bedside chair)? 3  -AB     Climbing 3-5 steps with a railing? 2  -AB     To walk in hospital room? 3  -AB     AM-PAC 6 Clicks Score (PT) 17  -AB     Highest Level of Mobility Goal 5 --> Static standing  -AB       Row Name 10/09/24 1144 10/09/24 1005       Functional Assessment    Outcome Measure Options AM-PAC 6 Clicks Daily Activity (OT)  -AR AM-PAC 6 Clicks Basic Mobility (PT)  -AB              User Key  (r) = Recorded By, (t) = Taken By, (c) = Cosigned By      Initials Name Provider Type    Bernie Jean Baptiste OT Occupational Therapist    AB Margaret Bhatt PT Physical Therapist                    Occupational Therapy Education       Title: PT OT SLP Therapies (In Progress)       Topic: Occupational Therapy (Done)       Point: ADL training (Done)       Description:   Instruct learner(s) on proper safety adaptation and remediation techniques during self care or transfers.   Instruct in proper use of  assistive devices.                  Learning Progress Summary             Patient AMANDA Castorena,TB,D,H, VU,NR by AR at 10/9/2024 1145                         Point: Home exercise program (Done)       Description:   Instruct learner(s) on appropriate technique for monitoring, assisting and/or progressing therapeutic exercises/activities.                  Learning Progress Summary             Patient AMANDA Castorena,TB,D,H, VU,NR by AR at 10/9/2024 1145                         Point: Precautions (Done)       Description:   Instruct learner(s) on prescribed precautions during self-care and functional transfers.                  Learning Progress Summary             Patient AMNADA Castorena,TB,D,H, VU,NR by AR at 10/9/2024 1145                         Point: Body mechanics (Done)       Description:   Instruct learner(s) on proper positioning and spine alignment during self-care, functional mobility activities and/or exercises.                  Learning Progress Summary             Patient AMANDA Castorena,TB,D,H, VU,NR by AR at 10/9/2024 1145                                         User Key       Initials Effective Dates Name Provider Type Discipline    AR 07/11/23 -  Bernie Henriquez, CALVIN Occupational Therapist OT                  OT Recommendation and Plan  Planned Therapy Interventions (OT): activity tolerance training, adaptive equipment training, BADL retraining, edema control/reduction, functional balance retraining, IADL retraining, occupation/activity based interventions, patient/caregiver education/training, ROM/therapeutic exercise, strengthening exercise, transfer/mobility retraining  Therapy Frequency (OT): daily  Plan of Care Review  Plan of Care Reviewed With: patient  Outcome Evaluation: Pt alert, Ox4 and reports tolerable pain. She completed bed mobility with supervision, UB dressing with min assist, LB dressing with max assist and transfer to chair with min assist x2 demo good ability to maintain NWB LLE. Pt limited with pain,  weakness, decreased balance and is performing below baseline status. She lives alone, recommend IPR and pt in agreement.     Time Calculation:   Evaluation Complexity (OT)  Review Occupational Profile/Medical/Therapy History Complexity: brief/low complexity  Assessment, Occupational Performance/Identification of Deficit Complexity: 1-3 performance deficits  Clinical Decision Making Complexity (OT): problem focused assessment/low complexity  Overall Complexity of Evaluation (OT): low complexity     Time Calculation- OT       Row Name 10/09/24 1148             Time Calculation- OT    OT Start Time 0904  -AR      OT Received On 10/09/24  -AR      OT Goal Re-Cert Due Date 10/19/24  -AR         Timed Charges    11880 - OT Therapeutic Exercise Minutes 10  -AR         Untimed Charges    OT Eval/Re-eval Minutes 59  -AR         Total Minutes    Timed Charges Total Minutes 10  -AR      Untimed Charges Total Minutes 59  -AR       Total Minutes 69  -AR                User Key  (r) = Recorded By, (t) = Taken By, (c) = Cosigned By      Initials Name Provider Type    AR Bernie Henriquez, OT Occupational Therapist                  Therapy Charges for Today       Code Description Service Date Service Provider Modifiers Qty    74486809537 HC OT THER PROC EA 15 MIN 10/9/2024 Bernie Henriquez, OT GO 1    33818365298 HC OT THER SUPP EA 15 MIN 10/9/2024 Bernie Henriquez OT GO 2    99921312868 HC OT EVAL LOW COMPLEXITY 4 10/9/2024 Bernie Henriquez OT GO 1                 Bernie Henriquez OT  10/9/2024   Rivka

## 2024-10-09 NOTE — PROGRESS NOTES
[facility] Medicine Services  PROGRESS NOTE    Patient Name: Alysia Sierra  : 1955  MRN: [id number]    Date of Admission: 10/5/2024  Primary Care Physician: Cassy Foster MD    Subjective     CC: f/u L ankle fracture     HPI:  In bed. Pain manageable. Anxious to be out of bed - PT/OT planning to work with her today.   Became increasingly confused yesterday afternoon and developed hallucinations. She says she remembers being confused. Denies daily alcohol abuse. Says she drinks a few glasses of wine a few days a week. Per RN, family visited yesterday and informed RN that patient drinks heavily on a daily basis.     Objective     Vital Signs:   Temp:  [97.6 °F (36.4 °C)-98.5 °F (36.9 °C)] 97.6 °F (36.4 °C)  Heart Rate:  [68-91] 68  Resp:  [16-18] 16  BP: ()/(61-81) 123/79     Physical Exam:  Constitutional: No acute distress, awake, alert and conversant.   HENT: NCAT, mucous membranes moist  Respiratory: Clear to auscultation bilaterally, respiratory effort normal   Cardiovascular: RRR  Gastrointestinal: Positive bowel sounds, soft, nontender, nondistended  Musculoskeletal: No  R ankle edema. LLE splited - intact blister on dorsal aspect of L foot below splint. No erythema or induration noted  Psychiatric: Appropriate affect, cooperative with exam  Neurologic: Oriented x 4, moves all extremities spontaneously without focal deficits, speech clear  Skin: No rashes to exposed surfaces     Results Reviewed:  LAB RESULTS:      Lab 10/09/24  0600 10/07/24  0636 10/05/24  2259   WBC 4.49 5.71 10.14   HEMOGLOBIN 8.8* 9.4* 10.7*   HEMATOCRIT 27.6* 30.2* 33.4*   PLATELETS 176 172 224   NEUTROS ABS  --   --  6.76   IMMATURE GRANS (ABS)  --   --  0.03   LYMPHS ABS  --   --  1.86   MONOS ABS  --   --  1.41*   EOS ABS  --   --  0.05   MCV 98.6* 100.3* 98.2*         Lab 10/07/24  0636 10/05/24  2336 10/05/24  2259   SODIUM 135* 136  --    POTASSIUM 4.4 4.4  --    CHLORIDE 99 102  --    CO2  26.0 22.0  --    ANION GAP 10.0 12.0  --    BUN 11 16  --    CREATININE 1.15* 1.33*  --    EGFR 52.0* 43.7*  --    GLUCOSE 104* 104*  --    CALCIUM 8.8 8.5*  --    MAGNESIUM  --  1.8  --    HEMOGLOBIN A1C  --   --  5.20   TSH 4.540*  --   --          Lab 10/05/24  2336   TOTAL PROTEIN 6.4   ALBUMIN 4.1   GLOBULIN 2.3   ALT (SGPT) 10   AST (SGOT) 22   BILIRUBIN 0.3   ALK PHOS 74     Brief Urine Lab Results  (Last result in the past 365 days)        Color   Clarity   Blood   Leuk Est   Nitrite   Protein   CREAT   Urine HCG        06/22/24 1604 Yellow   Clear   Negative   Negative   Negative   Negative                 Microbiology Results Abnormal       None          No radiology results from the last 24 hrs    Results for orders placed during the hospital encounter of 01/02/24    Adult Transthoracic Echo Complete W/ Cont if Necessary Per Protocol    Interpretation Summary    Left ventricular systolic function is normal. Calculated left ventricular EF = 57.9% Left ventricular ejection fraction appears to be 56 - 60%.    Left ventricular diastolic function was normal.    Estimated right ventricular systolic pressure from tricuspid regurgitation is normal (<35 mmHg).    No significant change from previous study    Current medications:  Scheduled Meds:aspirin, 81 mg, Oral, Daily  DULoxetine, 20 mg, Oral, BID  ferric gluconate, 250 mg, Intravenous, Daily  folic acid, 1 mg, Oral, Daily  gabapentin, 300 mg, Oral, Daily  gabapentin, 600 mg, Oral, Nightly  heparin (porcine), 5,000 Units, Subcutaneous, Q8H  levothyroxine, 25 mcg, Oral, Q AM  melatonin, 5 mg, Oral, Nightly  metoprolol succinate XL, 25 mg, Oral, Nightly  pantoprazole, 40 mg, Oral, Every Other Day  sodium chloride, 10 mL, Intravenous, Q12H  thiamine, 200 mg, Oral, Daily      Continuous Infusions:     PRN Meds:.  senna-docusate sodium **AND** polyethylene glycol **AND** bisacodyl **AND** bisacodyl    Calcium Replacement - Follow Nurse / BPA Driven Protocol     HYDROmorphone **AND** naloxone    influenza vaccine    LORazepam **OR** midazolam **OR** LORazepam **OR** midazolam **OR** midazolam **OR** midazolam    Magnesium Standard Dose Replacement - Follow Nurse / BPA Driven Protocol    oxyCODONE    Phosphorus Replacement - Follow Nurse / BPA Driven Protocol    Potassium Replacement - Follow Nurse / BPA Driven Protocol    [COMPLETED] Insert Peripheral IV **AND** sodium chloride    sodium chloride    Assessment & Plan   Assessment & Plan     Active Hospital Problems    Diagnosis  POA    **Closed fracture of left ankle [S82.652A]  Unknown    Closed displaced fracture of fourth metatarsal bone of left foot [S92.342A]  Yes    Lisfranc dislocation, left, initial encounter [G06.150A]  Yes      Resolved Hospital Problems   No resolved problems to display.     Brief Hospital Course to date:  Alysia Sierra is a 69 y.o. female with history of CKD III, chronic anemia, uterine cancer, hypothyroidism and alcoho abuse who was admitted to [facility] on 10/5/24 for L ankle fracture after stepping awkwardly off her bed.    Acute L foot Lisfranc fracture with involvement of 1st, 2nd, 3rd and 4th metatarsal bases  - Acute nondisplaced L medial malleolus fracture  Acute nondisplaced L posterior medial talus fracture   1st-4th proximal metatarsal fracture Lisfranc fracture  - LLE splinted. Fracture blister on dorsal aspect of L foot noted - per ortho allow blisters to decompress on their own  - Surgery tentatively planned for 10/18 with Dr. Godoy   - PRN pain management  - PT/OT to see today - perhaps to SNF and return to surgery?     Alcohol withdrawal  History of alcohol abuse   - Patient denies daily alcohol use however family reports daily heavy alcohol use  - Developed confusion / hallucinations on afternoon of 10/8 - improved after Valium 5mg PO x 2 doses and AAOx4 this morning. She declined AM dose of Valium  - DC scheduled Valium - continue CIWA with PRNS  - RN to call  if mental status worsens again today   - Continue IV Thiamine  - QHS Melatonin    CKD III  - Creatinine stable. Monitoring off of IV fluids     Anemia  - Hgb stable around 9  - Folate / B12 pending but iron studies show ACOD with iron deficiency  - Give IV iron x 3 doses    Hypothyroid  - Continue Levothyroxine  - TSH slightly elevated, free T4 normal    GERD  - Continue PPI     Neuropathy  - Continue Gabapentin    Expected Discharge Location and Transportation: SNF eventually  Expected Discharge Expected discharge date/ time has not been documented.     VTE Prophylaxis: Pharmacologic & mechanical VTE prophylaxis orders are present.     AM-PAC 6 Clicks Score (PT): 14 (10/08/24 2000)    CODE STATUS:   Code Status and Medical Interventions: CPR (Attempt to Resuscitate); Full Support   Ordered at: 10/06/24 0246     Code Status (Patient has no pulse and is not breathing):    CPR (Attempt to Resuscitate)     Medical Interventions (Patient has pulse or is breathing):    Full Support     Lawanda Arvizu PA-C  10/09/24

## 2024-10-09 NOTE — PLAN OF CARE
Problem: Adult Inpatient Plan of Care  Goal: Plan of Care Review  Outcome: Progressing  Flowsheets (Taken 10/9/2024 0430)  Progress: improving  Plan of Care Reviewed With: patient  Goal: Optimal Comfort and Wellbeing  Outcome: Progressing  Intervention: Provide Person-Centered Care  Recent Flowsheet Documentation  Taken 10/9/2024 0400 by Robinson Mandujano RN  Trust Relationship/Rapport: care explained  Taken 10/9/2024 0200 by Robinson Mandujano, CESAR  Trust Relationship/Rapport: care explained  Taken 10/9/2024 0000 by Robinson Mandujano, RN  Trust Relationship/Rapport: care explained  Taken 10/8/2024 2200 by Robinson Mandujano, CESAR  Trust Relationship/Rapport: care explained  Taken 10/8/2024 2000 by Robinson Mandujano RN  Trust Relationship/Rapport:   care explained   choices provided   empathic listening provided   questions answered   questions encouraged   reassurance provided     Problem: Pain Chronic (Persistent) (Comorbidity Management)  Goal: Acceptable Pain Control and Functional Ability  Outcome: Progressing  Intervention: Manage Persistent Pain  Recent Flowsheet Documentation  Taken 10/9/2024 0400 by Robinson Mandujano, RN  Sleep/Rest Enhancement:   regular sleep/rest pattern promoted   room darkened   noise level reduced  Medication Review/Management: medications reviewed  Taken 10/9/2024 0200 by Robinson Mandujano RN  Sleep/Rest Enhancement:   regular sleep/rest pattern promoted   room darkened   noise level reduced  Medication Review/Management: medications reviewed  Taken 10/9/2024 0000 by Robinson Mandujano RN  Sleep/Rest Enhancement:   room darkened   noise level reduced   regular sleep/rest pattern promoted  Medication Review/Management: medications reviewed  Taken 10/8/2024 2200 by Robinson Mandujano RN  Sleep/Rest Enhancement:   regular sleep/rest pattern promoted   room darkened   noise level reduced  Medication Review/Management: medications reviewed  Taken 10/8/2024 2000 by Nazia  Robinson D, RN  Sleep/Rest Enhancement:   regular sleep/rest pattern promoted   room darkened   noise level reduced  Medication Review/Management: medications reviewed  Intervention: Optimize Psychosocial Wellbeing  Recent Flowsheet Documentation  Taken 10/9/2024 0400 by Robinson Mandujano RN  Diversional Activities:   smartphone   television  Family/Support System Care:   self-care encouraged   support provided  Taken 10/9/2024 0200 by Robinson Mnadujano RN  Diversional Activities:   smartphone   television  Family/Support System Care:   self-care encouraged   support provided  Taken 10/9/2024 0000 by Robinson Mandujano RN  Diversional Activities:   smartphone   television  Family/Support System Care:   self-care encouraged   support provided  Taken 10/8/2024 2200 by Robinson Mandujano RN  Diversional Activities:   television   smartphone  Family/Support System Care:   self-care encouraged   support provided  Taken 10/8/2024 2000 by Robinson Mandujano RN  Supportive Measures:   active listening utilized   verbalization of feelings encouraged  Diversional Activities:   television   smartphone  Family/Support System Care:   self-care encouraged   support provided     Problem: Skin Injury Risk Increased  Goal: Skin Health and Integrity  Outcome: Progressing  Intervention: Optimize Skin Protection  Recent Flowsheet Documentation  Taken 10/9/2024 0400 by Robinson Mandujano, RN  Pressure Reduction Techniques:   frequent weight shift encouraged   pressure points protected  Head of Bed (HOB) Positioning: HOB elevated  Pressure Reduction Devices: pressure-redistributing mattress utilized  Skin Protection:   adhesive use limited   incontinence pads utilized   silicone foam dressing in place   tubing/devices free from skin contact  Taken 10/9/2024 0200 by Robinson Mandujano RN  Pressure Reduction Techniques:   frequent weight shift encouraged   pressure points protected  Head of Bed (HOB) Positioning: HOB  elevated  Pressure Reduction Devices: pressure-redistributing mattress utilized  Skin Protection:   adhesive use limited   incontinence pads utilized   silicone foam dressing in place   tubing/devices free from skin contact  Taken 10/9/2024 0000 by Robinson Mandujano RN  Pressure Reduction Techniques:   frequent weight shift encouraged   pressure points protected  Head of Bed (HOB) Positioning: [facility] elevated  Pressure Reduction Devices: pressure-redistributing mattress utilized  Skin Protection:   adhesive use limited   incontinence pads utilized   silicone foam dressing in place   tubing/devices free from skin contact  Taken 10/8/2024 2200 by Robinson Mandujano RN  Pressure Reduction Techniques:   frequent weight shift encouraged   pressure points protected  Head of Bed (HOB) Positioning: [facility] elevated  Pressure Reduction Devices: pressure-redistributing mattress utilized  Skin Protection:   adhesive use limited   incontinence pads utilized   silicone foam dressing in place   tubing/devices free from skin contact  Taken 10/8/2024 2000 by Robinson Mandujano RN  Pressure Reduction Techniques:   frequent weight shift encouraged   pressure points protected  Head of Bed (HOB) Positioning: [facility] elevated  Pressure Reduction Devices: pressure-redistributing mattress utilized  Skin Protection:   adhesive use limited   incontinence pads utilized   silicone foam dressing in place   tubing/devices free from skin contact     Problem: Fall Injury Risk  Goal: Absence of Fall and Fall-Related Injury  Outcome: Progressing  Intervention: Identify and Manage Contributors  Recent Flowsheet Documentation  Taken 10/9/2024 0400 by Robinson Mandujano RN  Medication Review/Management: medications reviewed  Taken 10/9/2024 0200 by Robinson Mandujano RN  Medication Review/Management: medications reviewed  Taken 10/9/2024 0000 by Robinson Mandujano RN  Medication Review/Management: medications reviewed  Taken 10/8/2024 2200 by Nazia  Robinson NOLAND, RN  Medication Review/Management: medications reviewed  Taken 10/8/2024 2000 by Robinson Mandujano, RN  Medication Review/Management: medications reviewed  Intervention: Promote Injury-Free Environment  Recent Flowsheet Documentation  Taken 10/9/2024 0400 by Robinson Mandujano, RN  Safety Promotion/Fall Prevention:   activity supervised   assistive device/personal items within reach   clutter free environment maintained   fall prevention program maintained   gait belt   nonskid shoes/slippers when out of bed   room organization consistent   safety round/check completed  Taken 10/9/2024 0200 by Robinson Mandujano, RN  Safety Promotion/Fall Prevention:   activity supervised   assistive device/personal items within reach   clutter free environment maintained   fall prevention program maintained   gait belt   nonskid shoes/slippers when out of bed   room organization consistent   safety round/check completed  Taken 10/9/2024 0000 by Robinson Mandujano, RN  Safety Promotion/Fall Prevention:   activity supervised   assistive device/personal items within reach   clutter free environment maintained   fall prevention program maintained   gait belt   nonskid shoes/slippers when out of bed   room organization consistent   safety round/check completed  Taken 10/8/2024 2200 by Robinson Mandujano, RN  Safety Promotion/Fall Prevention:   activity supervised   assistive device/personal items within reach   clutter free environment maintained   fall prevention program maintained   gait belt   nonskid shoes/slippers when out of bed   room organization consistent   safety round/check completed  Taken 10/8/2024 2000 by Robinson Mandujano, RN  Safety Promotion/Fall Prevention:   activity supervised   assistive device/personal items within reach   clutter free environment maintained   fall prevention program maintained   gait belt   nonskid shoes/slippers when out of bed   room organization consistent   safety round/check  completed     Problem: Respiratory Compromise (Orthopaedic Fracture)  Goal: Effective Oxygenation and Ventilation  Outcome: Progressing  Intervention: Promote Airway Secretion Clearance  Recent Flowsheet Documentation  Taken 10/8/2024 2000 by Robinson Mandujano RN  Cough And Deep Breathing: done independently per patient     Problem: Adult Inpatient Plan of Care  Goal: Absence of Hospital-Acquired Illness or Injury  Intervention: Identify and Manage Fall Risk  Recent Flowsheet Documentation  Taken 10/9/2024 0400 by Robinson Mandujano RN  Safety Promotion/Fall Prevention:   activity supervised   assistive device/personal items within reach   clutter free environment maintained   fall prevention program maintained   gait belt   nonskid shoes/slippers when out of bed   room organization consistent   safety round/check completed  Taken 10/9/2024 0200 by Robinson Mandujano RN  Safety Promotion/Fall Prevention:   activity supervised   assistive device/personal items within reach   clutter free environment maintained   fall prevention program maintained   gait belt   nonskid shoes/slippers when out of bed   room organization consistent   safety round/check completed  Taken 10/9/2024 0000 by Robinson Mandujano RN  Safety Promotion/Fall Prevention:   activity supervised   assistive device/personal items within reach   clutter free environment maintained   fall prevention program maintained   gait belt   nonskid shoes/slippers when out of bed   room organization consistent   safety round/check completed  Taken 10/8/2024 2200 by Robinson Mandujano RN  Safety Promotion/Fall Prevention:   activity supervised   assistive device/personal items within reach   clutter free environment maintained   fall prevention program maintained   gait belt   nonskid shoes/slippers when out of bed   room organization consistent   safety round/check completed  Taken 10/8/2024 2000 by Robinson Mandujano RN  Safety Promotion/Fall Prevention:    activity supervised   assistive device/personal items within reach   clutter free environment maintained   fall prevention program maintained   gait belt   nonskid shoes/slippers when out of bed   room organization consistent   safety round/check completed  Intervention: Prevent Skin Injury  Recent Flowsheet Documentation  Taken 10/9/2024 0400 by Robinson Mandujano, RN  Body Position: position changed independently  Skin Protection:   adhesive use limited   incontinence pads utilized   silicone foam dressing in place   tubing/devices free from skin contact  Taken 10/9/2024 0200 by Robinson Mandujano RN  Body Position: position changed independently  Skin Protection:   adhesive use limited   incontinence pads utilized   silicone foam dressing in place   tubing/devices free from skin contact  Taken 10/9/2024 0000 by Robinson Mandujano RN  Body Position:   position changed independently   left   lower extremity elevated  Skin Protection:   adhesive use limited   incontinence pads utilized   silicone foam dressing in place   tubing/devices free from skin contact  Taken 10/8/2024 2200 by Robinson Mandujano RN  Body Position:   position changed independently   left   lower extremity elevated  Skin Protection:   adhesive use limited   incontinence pads utilized   silicone foam dressing in place   tubing/devices free from skin contact  Taken 10/8/2024 2000 by Robinson Mandujano RN  Body Position:   position changed independently   left   lower extremity elevated  Skin Protection:   adhesive use limited   incontinence pads utilized   silicone foam dressing in place   tubing/devices free from skin contact  Intervention: Prevent and Manage VTE (Venous Thromboembolism) Risk  Recent Flowsheet Documentation  Taken 10/9/2024 0400 by Robinson Mandujano, RN  Activity Management: activity encouraged  VTE Prevention/Management:   right   sequential compression devices on  Taken 10/9/2024 0200 by Robinson Mandujano RN  Activity  Management: activity encouraged  Taken 10/9/2024 0000 by Robinson Mandujano RN  Activity Management: activity encouraged  VTE Prevention/Management:   right   sequential compression devices on  Taken 10/8/2024 2200 by Robinson Mandujano RN  Activity Management: activity encouraged  Taken 10/8/2024 2000 by Robinson Mandujano RN  Activity Management: activity encouraged  VTE Prevention/Management:   right   sequential compression devices on  Range of Motion: active ROM (range of motion) encouraged  Intervention: Prevent Infection  Recent Flowsheet Documentation  Taken 10/9/2024 0400 by Robinson Mandujano RN  Infection Prevention:   environmental surveillance performed   hand hygiene promoted   rest/sleep promoted   single patient room provided  Taken 10/9/2024 0200 by Robinson Mandujano RN  Infection Prevention:   environmental surveillance performed   hand hygiene promoted   rest/sleep promoted   single patient room provided  Taken 10/9/2024 0000 by Robinson Mandujano RN  Infection Prevention:   environmental surveillance performed   hand hygiene promoted   rest/sleep promoted   single patient room provided  Taken 10/8/2024 2200 by Robinson Mandujano RN  Infection Prevention:   environmental surveillance performed   hand hygiene promoted   rest/sleep promoted   single patient room provided  Taken 10/8/2024 2000 by Robinson Mandujano RN  Infection Prevention:   environmental surveillance performed   hand hygiene promoted   rest/sleep promoted   single patient room provided     Problem: Embolism (Orthopaedic Fracture)  Goal: Absence of Embolism Signs and Symptoms  Intervention: Prevent or Manage Embolism Risk  Recent Flowsheet Documentation  Taken 10/9/2024 0400 by Robinson Mandujano RN  VTE Prevention/Management:   right   sequential compression devices on  Taken 10/9/2024 0000 by Robinson Mandujano RN  VTE Prevention/Management:   right   sequential compression devices on  Taken 10/8/2024 2000 by Nazia  Robinson NOLAND RN  VTE Prevention/Management:   right   sequential compression devices on     Problem: Functional Ability Impaired (Orthopaedic Fracture)  Goal: Optimal Functional Ability  Intervention: Optimize Functional Ability  Recent Flowsheet Documentation  Taken 10/9/2024 0400 by Robinson Mandujano RN  Activity Management: activity encouraged  Positioning/Transfer Devices:   pillows   in use  Taken 10/9/2024 0200 by Robinson Mandujano RN  Activity Management: activity encouraged  Positioning/Transfer Devices:   pillows   in use  Taken 10/9/2024 0000 by Robinson Mandujano RN  Activity Management: activity encouraged  Positioning/Transfer Devices:   pillows   in use  Taken 10/8/2024 2200 by Robinson Mandujano RN  Activity Management: activity encouraged  Positioning/Transfer Devices:   pillows   in use  Taken 10/8/2024 2000 by Robinson Mandujano RN  Activity Management: activity encouraged  Positioning/Transfer Devices:   pillows   in use  Range of Motion: active ROM (range of motion) encouraged     Problem: Infection (Orthopaedic Fracture)  Goal: Absence of Infection Signs and Symptoms  Intervention: Prevent or Manage Infection  Recent Flowsheet Documentation  Taken 10/9/2024 0400 by Robinson Mandujano RN  Infection Prevention:   environmental surveillance performed   hand hygiene promoted   rest/sleep promoted   single patient room provided  Taken 10/9/2024 0200 by Robinson Mandujano RN  Infection Prevention:   environmental surveillance performed   hand hygiene promoted   rest/sleep promoted   single patient room provided  Taken 10/9/2024 0000 by Robinson Mandujano RN  Infection Prevention:   environmental surveillance performed   hand hygiene promoted   rest/sleep promoted   single patient room provided  Taken 10/8/2024 2200 by Robinson Mandujano RN  Infection Prevention:   environmental surveillance performed   hand hygiene promoted   rest/sleep promoted   single patient room provided  Taken 10/8/2024  2000 by Robinson Mandujano, RN  Infection Prevention:   environmental surveillance performed   hand hygiene promoted   rest/sleep promoted   single patient room provided     Problem: Pain (Orthopaedic Fracture)  Goal: Acceptable Pain Control  Intervention: Manage Acute Orthopaedic-Related Pain  Recent Flowsheet Documentation  Taken 10/9/2024 0400 by Robinson Mandujano, RN  Diversional Activities:   smartphone   television  Taken 10/9/2024 0200 by Robinson Mandujano, RN  Diversional Activities:   smartphone   television  Taken 10/9/2024 0000 by Robinson Mandujano, RN  Diversional Activities:   smartphone   television  Taken 10/8/2024 2200 by Robinson Mandujano, RN  Diversional Activities:   television   smartphone  Taken 10/8/2024 2000 by Robinson Mandujano RN  Diversional Activities:   television   smartphone   Goal Outcome Evaluation:  Plan of Care Reviewed With: patient        Progress: improving          Pt a/o x4. Unusual statements at times. Pt was talking to self in room with eyes closed. Pt stated she sleep talks. Denied hallucinations. VSS. Maintaining SpO2 on  RA. CIWA score 1 throughout night. Pt resting well. LLE splinted. Elevated with ice intermittently.

## 2024-10-09 NOTE — THERAPY EVALUATION
Patient Name: Alysia Sierra  : 1955    MRN: [id number]                              Today's Date: 10/9/2024       Admit Date: 10/5/2024    Visit Dx:     ICD-10-CM ICD-9-CM   1. Displaced fracture of distal end of left tibia  S82.302A 824.8   2. Closed nondisplaced fracture of second metatarsal bone of left foot, initial encounter  S92.325A 825.25   3. Closed nondisplaced fracture of first metatarsal bone of left foot, initial encounter  S92.315A 825.25   4. Closed nondisplaced fracture of third metatarsal bone of left foot, initial encounter  S92.335A 825.25   5. Closed nondisplaced fracture of fourth metatarsal bone of left foot, initial encounter  S92.345A 825.25     Patient Active Problem List   Diagnosis    Hypertension    Leg weakness, bilateral    Syncope    CKD (chronic kidney disease) stage 3, GFR 30-59 ml/min    Neuropathy    Hyperlipidemia LDL goal <100    Endometrial adenocarcinoma    Mild episode of recurrent major depressive disorder    Dizziness    Hip fracture    Anemia    Hypomagnesemia    Hypothyroidism    Post-menopausal    Other osteoporosis without current pathological fracture    Closed fracture of second lumbar vertebra    Alcoholic ketoacidosis    Leukocytosis    GERD with esophagitis    Sepsis    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    Lisfranc dislocation, left, initial encounter    Closed fracture of left ankle    Closed displaced fracture of fourth metatarsal bone of left foot     Past Medical History:   Diagnosis Date    Allergic lisinopril  2017    Anemia     Arrhythmia     Arthritis     Cancer     uterine    Cataract mild 2020    stil lpresent    Chicken pox     Chronic fatigue     CKD (chronic kidney disease), stage III     sees nephro    Clotting disorder     Dental root implant present     lower left  x1 - possible dental implant    Depression mild 2019    Difficulty walking 2019    Disease of thyroid gland     Dizzy     NIEVES (dyspnea on exertion)     2017     Fracture of hip     Generalized anxiety disorder     GERD (gastroesophageal reflux disease) 2018    History of brachytherapy 2023    vaginal brachytherapy    Hyperlipidemia     Hypertension     Iron deficiency anemia     Liver disease     fatty    Liver problem     Measles     Menopause     Mumps     Neuromuscular disorder Peripheral Neuropathy    Orthostatic hypotension     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    Renal insufficiency 2018    Scoliosis     Unintentional weight loss     Uses contact lenses     bilat    Uterine cancer     Uterine cancer 2022    UTI (urinary tract infection)     Visual impairment Nearsighted    Wears glasses      Past Surgical History:   Procedure Laterality Date     SECTION      DILATION AND CURETTAGE, DIAGNOSTIC / THERAPEUTIC      HIP OPEN REDUCTION Left 2023    Procedure: FEMORAL NECK OPEN REDUCTION INTERNAL FIXATION LEFT;  Surgeon: Juanpablo Lee MD;  Location: [facility] OR;  Service: Orthopedics;  Laterality: Left;    HIP SURGERY      OOPHORECTOMY      ORAL LESION EXCISION/BIOPSY  2021    TOTAL LAPAROSCOPIC HYSTERECTOMY SALPINGO OOPHORECTOMY N/A 10/28/2022    Procedure: TOTAL LAPAROSCOPIC HYSTERECTOMY BILATERAL SALPINGO-OOPHORECTOMY, INJECTION FOR SENTINEL LYMPH NODE MAPPING, BILATERAL SENTINEL LYMPH NODE DISSECTION WITH DAVINCI ROBOT;  Surgeon: Jasmine Rich MD;  Location: [facility] OR;  Service: Robotics - DaVinci;  Laterality: N/A;    TUBAL ABDOMINAL LIGATION        General Information       Row Name 10/09/24 0946          Physical Therapy Time and Intention    Document Type evaluation  -AB     Mode of Treatment physical therapy  -AB       Row Name 10/09/24 0946          General Information    Patient Profile Reviewed yes  -AB     Prior Level of Function independent:;all household mobility;community mobility;gait;transfer;bed mobility;ADL's  Hx of falls. Using cane, rollator, and RW as needed.  -AB      Existing Precautions/Restrictions fall;other (see comments);left;non-weight bearing   LLE NWB in splint  -AB     Barriers to Rehab previous functional deficit  -AB       Row Name 10/09/24 0946          Living Environment    People in Home alone  -AB       Row Name 10/09/24 0946          Home Main Entrance    Number of Stairs, Main Entrance one  Lives in main floor apartment with ramp to enter front door. However, one step onto sidewalk from parking lot.  -AB     Stair Railings, Main Entrance none  -AB       Row Name 10/09/24 0946          Stairs Within Home, Primary    Number of Stairs, Within Home, Primary none  -AB       Row Name 10/09/24 0946          Cognition    Orientation Status (Cognition) oriented x 4  -AB       Row Name 10/09/24 0946          Safety Issues, Functional Mobility    Safety Issues Affecting Function (Mobility) awareness of need for assistance;insight into deficits/self-awareness;safety precaution awareness  -AB     Impairments Affecting Function (Mobility) endurance/activity tolerance;balance;strength;pain  -AB               User Key  (r) = Recorded By, (t) = Taken By, (c) = Cosigned By      Initials Name Provider Type    AB Margaret Bhatt, PT Physical Therapist                   Mobility       Row Name 10/09/24 0951          Bed Mobility    Bed Mobility supine-sit;scooting/bridging  -AB     Scooting/Bridging [place] (Bed Mobility) supervision  -AB     Supine-Sit [place] (Bed Mobility) supervision  -AB     Assistive Device (Bed Mobility) head of bed elevated  -AB     Comment, (Bed Mobility) increased time/effort.  -AB       Row Name 10/09/24 0951          Transfers    Comment, (Transfers) Cues for hand placement and sequencing. Educated on WB status of LLE, pt verbalized understanding.  -AB       Row Name 10/09/24 0951          Sit-Stand Transfer    Sit-Stand [place] (Transfers) minimum assist (75% patient effort);2 person assist;verbal cues;nonverbal cues (demo/gesture)  -AB      Assistive Device (Sit-Stand Transfers) walker, front-wheeled  -AB     Comment, (Sit-Stand Transfer) Cues to keep LLE advanced and push up from bed  -AB       Row Name 10/09/24 0951          Gait/Stairs (Locomotion)    [place] Level (Gait) minimum assist (75% patient effort);2 person assist;verbal cues;nonverbal cues (demo/gesture)  -AB     Assistive Device (Gait) walker, front-wheeled  -AB     Patient was able to Ambulate yes  -AB     Distance in Feet (Gait) 10  -AB     Deviations/Abnormal Patterns (Gait) bilateral deviations;werner decreased;gait speed decreased;stride length decreased  -AB     Bilateral Gait Deviations heel strike decreased  -AB     [place] Level (Stairs) unable to assess  -AB     Comment, (Gait/Stairs) Pt took short hopping steps using three point gait pattern. Cues provided for sequencing step on RLE with walker. No overt LOB. Min A for improved stability. Further activity limited by fatigue and weakness.  -AB       Row Name 10/09/24 0951          Mobility    Extremity Weight-bearing Status left lower extremity  -AB     Left Lower Extremity (Weight-bearing Status) non weight-bearing (NWB)   -AB               User Key  (r) = Recorded By, (t) = Taken By, (c) = Cosigned By      Initials Name Provider Type    AB Margaret Bhatt, PT Physical Therapist                   Obj/Interventions       Row Name 10/09/24 0959          Range of Motion Comprehensive    General Range of Motion lower extremity range of motion deficits identified  -AB     Comment, General Range of Motion RLE WFL; L ankle ROM limited by splint.  -AB       Row Name 10/09/24 0959          Balance    Balance Assessment sitting static balance;sitting dynamic balance;standing static balance;standing dynamic balance  -AB     Static Sitting Balance supervision  -AB     Dynamic Sitting Balance supervision  -AB     Position, Sitting Balance unsupported;sitting edge of bed  -AB     Static Standing Balance minimal assist  -AB      Dynamic Standing Balance minimal assist;2-person assist;verbal cues;non-verbal cues (demo/gesture)  -AB     Position/Device Used, Standing Balance supported;walker, front-wheeled  -AB     Balance Interventions sitting;standing;sit to stand;supported;static;dynamic;occupation based/functional task  -AB     Comment, Balance Min A to steady  -AB       Row Name 10/09/24 0962          Sensory Assessment (Somatosensory)    Sensory Assessment (Somatosensory) LE sensation intact;other (see comments)  BLE neuropathy at baseline  -AB               User Key  (r) = Recorded By, (t) = Taken By, (c) = Cosigned By      Initials Name Provider Type    AB Margaret Bhatt, PT Physical Therapist                   Goals/Plan       Row Name 10/09/24 1005          Bed Mobility Goal 1 (PT)    Activity/Assistive Device (Bed Mobility Goal 1, PT) sit to supine;supine to sit  -AB     [place] Level/Cues Needed (Bed Mobility Goal 1, PT) independent  -AB     Time Frame (Bed Mobility Goal 1, PT) short term goal (STG);5 days  -AB       Row Name 10/09/24 1005          Transfer Goal 1 (PT)    Activity/Assistive Device (Transfer Goal 1, PT) sit-to-stand/stand-to-sit;bed-to-chair/chair-to-bed;walker, rolling  -AB     [place] Level/Cues Needed (Transfer Goal 1, PT) contact guard required  -AB     Time Frame (Transfer Goal 1, PT) long term goal (LTG);10 days  -AB       Row Name 10/09/24 1005          Gait Training Goal 1 (PT)    Activity/Assistive Device (Gait Training Goal 1, PT) gait (walking locomotion);assistive device use;walker, rolling  -AB     [place] Level (Gait Training Goal 1, PT) contact guard required  -AB     Distance (Gait Training Goal 1, PT) 50  -AB     Time Frame (Gait Training Goal 1, PT) long term goal (LTG);10 days  -AB       Row Name 10/09/24 1005          Therapy Assessment/Plan (PT)    Planned Therapy Interventions (PT) balance training;bed mobility training;gait training;home exercise program;patient/family  education;postural re-education;transfer training;stretching;strengthening;ROM (range of motion)  -AB               User Key  (r) = Recorded By, (t) = Taken By, (c) = Cosigned By      Initials Name Provider Type    AB Margaret Bhatt, PT Physical Therapist                   Clinical Impression       Row Name 10/09/24 1002          Pain    Pretreatment Pain Rating 2/10  -AB     Posttreatment Pain Rating 2/10  -AB     Pain Location - Side/Orientation Left  -AB     Pain Location lower  -AB     Pain Location - extremity  -AB     Pre/Posttreatment Pain Comment tolerated.  -AB     Pain Intervention(s) Ambulation/increased activity;Repositioned;Cold applied;Elevated  -AB     Additional Documentation Pain Scale: Numbers Pre/Post-Treatment (Group)  -AB       Row Name 10/09/24 1002          Plan of Care Review    Plan of Care Reviewed With patient  -AB     Progress no change  -AB     Outcome Evaluation PT initial eval completed. Pt presents below baseline with impaired balance, acute pain, and decreased endurance. NWB precautions reviewed, pt verbalized understanding. Pt ambulated 10' with min Ax2 and RW while maintaining NWB of LLE. Further IPPT is warrented. PT rec d/c to IPR for best functional outcomes.  -AB       Row Name 10/09/24 1002          Therapy Assessment/Plan (PT)    Patient/Family Therapy Goals Statement (PT) Get better  -AB     Rehab Potential (PT) good, to achieve stated therapy goals  -AB     Criteria for Skilled Interventions Met (PT) yes;meets criteria;skilled treatment is necessary  -AB     Therapy Frequency (PT) daily  -AB     Predicted Duration of Therapy Intervention (PT) 10 days  -AB       Row Name 10/09/24 1002          Vital Signs    Pre Systolic BP Rehab 123  -AB     Pre Treatment Diastolic BP 79  -AB     Post Systolic BP Rehab 133  -AB     Post Treatment Diastolic BP 74  -AB     O2 Delivery Pre Treatment room air  -AB     O2 Delivery Intra Treatment room air  -AB     O2 Delivery Post Treatment  room air  -AB     Pre Patient Position Supine  -AB     Intra Patient Position Standing  -AB     Post Patient Position Sitting  -AB       Row Name 10/09/24 1002          Positioning and Restraints    Pre-Treatment Position in bed  -AB     Post Treatment Position chair  -AB     In Chair notified nsg;reclined;sitting;call light within reach;encouraged to call for assist;exit alarm on;legs elevated;waffle cushion;on mechanical lift sling;compression device  -AB               User Key  (r) = Recorded By, (t) = Taken By, (c) = Cosigned By      Initials Name Provider Type    Margaret Dale, PT Physical Therapist                   Outcome Measures       Row Name 10/09/24 1005          How much help from another person do you currently need...    Turning from your back to your side while in flat bed without using bedrails? 3  -AB     Moving from lying on back to sitting on the side of a flat bed without bedrails? 3  -AB     Moving to and from a bed to a chair (including a wheelchair)? 3  -AB     Standing up from a chair using your arms (e.g., wheelchair, bedside chair)? 3  -AB     Climbing 3-5 steps with a railing? 2  -AB     To walk in hospital room? 3  -AB     AM-PAC 6 Clicks Score (PT) 17  -AB     Highest Level of Mobility Goal 5 --> Static standing  -AB       Row Name 10/09/24 1005          Functional Assessment    Outcome Measure Options AM-PAC 6 Clicks Basic Mobility (PT)  -AB               User Key  (r) = Recorded By, (t) = Taken By, (c) = Cosigned By      Initials Name Provider Type    Margaret Dale, PT Physical Therapist                                 Physical Therapy Education       Title: PT OT SLP Therapies (In Progress)       Topic: Physical Therapy (In Progress)       Point: Mobility training (Done)       Learning Progress Summary             Patient Acceptance, E,D, VU,NR by AB at 10/9/2024 1006                         Point: Home exercise program (Not Started)       Learner Progress:  Not documented  in this visit.              Point: Body mechanics (Done)       Learning Progress Summary             Patient Acceptance, E,D, VU,NR by AB at 10/9/2024 1006                         Point: Precautions (Done)       Learning Progress Summary             Patient Acceptance, E,D, VU,NR by AB at 10/9/2024 1006                                         User Key       Initials Effective Dates Name Provider Type Discipline    AB 09/22/22 -  Margaret Bhatt, PT Physical Therapist PT                  PT Recommendation and Plan  Planned Therapy Interventions (PT): balance training, bed mobility training, gait training, home exercise program, patient/family education, postural re-education, transfer training, stretching, strengthening, ROM (range of motion)  Plan of Care Reviewed With: patient  Progress: no change  Outcome Evaluation: PT initial eval completed. Pt presents below baseline with impaired balance, acute pain, and decreased endurance. NWB precautions reviewed, pt verbalized understanding. Pt ambulated 10' with min Ax2 and RW while maintaining NWB of LLE. Further IPPT is warrented. PT rec d/c to IPR for best functional outcomes.     Time Calculation:   PT Evaluation Complexity  History, PT Evaluation Complexity: 3 or more personal factors and/or comorbidities  Examination of Body Systems (PT Eval Complexity): total of 3 or more elements  Clinical Presentation (PT Evaluation Complexity): evolving  Clinical Decision Making (PT Evaluation Complexity): moderate complexity  Overall Complexity (PT Evaluation Complexity): moderate complexity     PT Charges       Row Name 10/09/24 1006             Time Calculation    Start Time 0906  -AB      PT Received On 10/09/24  -AB      PT Goal Re-Cert Due Date 10/19/24  -AB         Untimed Charges    PT Eval/Re-eval Minutes 50  -AB         Total Minutes    Untimed Charges Total Minutes 50  -AB       Total Minutes 50  -AB                User Key  (r) = Recorded By, (t) = Taken By, (c) =  Cosigned By      Initials Name Provider Type    AB Margaret Bhatt, PT Physical Therapist                  Therapy Charges for Today       Code Description Service Date Service Provider Modifiers Qty    [id number] HC PT EVAL MOD COMPLEXITY 4 10/9/2024 Margaret Bhatt, PT GP 1            PT G-Codes  Outcome Measure Options: AM-PAC 6 Clicks Basic Mobility (PT)  AM-PAC 6 Clicks Score (PT): 17  PT Discharge Summary  Anticipated Discharge Disposition (PT): inpatient rehabilitation facility    Margaret Bhatt PT  10/9/2024

## 2024-10-09 NOTE — PLAN OF CARE
Goal Outcome Evaluation:  Plan of Care Reviewed With: patient        Progress: no change  Outcome Evaluation: PT initial eval completed. Pt presents below baseline with impaired balance, acute pain, and decreased endurance. NWB precautions reviewed, pt verbalized understanding. Pt ambulated 10' with min Ax2 and RW while maintaining NWB of LLE. Further IPPT is warrented. PT rec d/c to [facility] for best functional outcomes.      Anticipated Discharge Disposition (PT): inpatient rehabilitation facility

## 2024-10-09 NOTE — PLAN OF CARE
Goal Outcome Evaluation:  Plan of Care Reviewed With: patient           Outcome Evaluation: Pt alert, Ox4 and reports tolerable pain. She completed bed mobility with supervision, UB dressing with min assist, LB dressing with max assist and transfer to chair with min assist x2 demo good ability to maintain NWB LLE. Pt limited with pain, weakness, decreased balance and is performing below baseline status. She lives alone, recommend IPR and pt in agreement.      Anticipated Discharge Disposition (OT): inpatient rehabilitation facility

## 2024-10-10 ENCOUNTER — DOCUMENTATION (OUTPATIENT)
Dept: HOME HEALTH SERVICES | Facility: HOME HEALTHCARE | Age: 69
End: 2024-10-10
Payer: MEDICARE

## 2024-10-10 ENCOUNTER — APPOINTMENT (OUTPATIENT)
Dept: GENERAL RADIOLOGY | Facility: HOSPITAL | Age: 69
DRG: 493 | End: 2024-10-10
Payer: MEDICARE

## 2024-10-10 ENCOUNTER — TELEPHONE (OUTPATIENT)
Dept: INTERNAL MEDICINE | Facility: CLINIC | Age: 69
End: 2024-10-10
Payer: MEDICARE

## 2024-10-10 LAB
ANION GAP SERPL CALCULATED.3IONS-SCNC: 12 MMOL/L (ref 5–15)
BUN SERPL-MCNC: 10 MG/DL (ref 8–23)
BUN/CREAT SERPL: 6.8 (ref 7–25)
CALCIUM SPEC-SCNC: 8.7 MG/DL (ref 8.6–10.5)
CHLORIDE SERPL-SCNC: 109 MMOL/L (ref 98–107)
CO2 SERPL-SCNC: 21 MMOL/L (ref 22–29)
CREAT SERPL-MCNC: 1.47 MG/DL (ref 0.57–1)
EGFRCR SERPLBLD CKD-EPI 2021: 38.7 ML/MIN/1.73
GLUCOSE SERPL-MCNC: 129 MG/DL (ref 65–99)
POTASSIUM SERPL-SCNC: 4.7 MMOL/L (ref 3.5–5.2)
SODIUM SERPL-SCNC: 142 MMOL/L (ref 136–145)

## 2024-10-10 PROCEDURE — 29515 APPLICATION SHORT LEG SPLINT: CPT | Performed by: ORTHOPAEDIC SURGERY

## 2024-10-10 PROCEDURE — 99232 SBSQ HOSP IP/OBS MODERATE 35: CPT | Performed by: NURSE PRACTITIONER

## 2024-10-10 PROCEDURE — 80048 BASIC METABOLIC PNL TOTAL CA: CPT | Performed by: PHYSICIAN ASSISTANT

## 2024-10-10 PROCEDURE — 97535 SELF CARE MNGMENT TRAINING: CPT | Performed by: OCCUPATIONAL THERAPIST

## 2024-10-10 PROCEDURE — 97116 GAIT TRAINING THERAPY: CPT

## 2024-10-10 PROCEDURE — 25010000002 NA FERRIC GLUC CPLX PER 12.5 MG: Performed by: PHYSICIAN ASSISTANT

## 2024-10-10 PROCEDURE — 25810000003 SODIUM CHLORIDE 0.9 % SOLUTION 250 ML FLEX CONT: Performed by: PHYSICIAN ASSISTANT

## 2024-10-10 PROCEDURE — 73610 X-RAY EXAM OF ANKLE: CPT

## 2024-10-10 PROCEDURE — 97110 THERAPEUTIC EXERCISES: CPT

## 2024-10-10 PROCEDURE — 25010000002 HEPARIN (PORCINE) PER 1000 UNITS: Performed by: PHYSICIAN ASSISTANT

## 2024-10-10 PROCEDURE — 99024 POSTOP FOLLOW-UP VISIT: CPT | Performed by: ORTHOPAEDIC SURGERY

## 2024-10-10 RX ADMIN — OXYCODONE HYDROCHLORIDE 5 MG: 5 TABLET ORAL at 21:08

## 2024-10-10 RX ADMIN — DULOXETINE HYDROCHLORIDE 20 MG: 20 CAPSULE, DELAYED RELEASE ORAL at 08:25

## 2024-10-10 RX ADMIN — Medication 10 ML: at 08:27

## 2024-10-10 RX ADMIN — FOLIC ACID 1 MG: 1 TABLET ORAL at 08:24

## 2024-10-10 RX ADMIN — Medication 10 ML: at 20:59

## 2024-10-10 RX ADMIN — HEPARIN SODIUM 5000 UNITS: 5000 INJECTION INTRAVENOUS; SUBCUTANEOUS at 05:55

## 2024-10-10 RX ADMIN — GABAPENTIN 600 MG: 300 CAPSULE ORAL at 20:57

## 2024-10-10 RX ADMIN — GABAPENTIN 300 MG: 300 CAPSULE ORAL at 08:25

## 2024-10-10 RX ADMIN — HEPARIN SODIUM 5000 UNITS: 5000 INJECTION INTRAVENOUS; SUBCUTANEOUS at 14:07

## 2024-10-10 RX ADMIN — THIAMINE HCL TAB 100 MG 200 MG: 100 TAB at 08:24

## 2024-10-10 RX ADMIN — HEPARIN SODIUM 5000 UNITS: 5000 INJECTION INTRAVENOUS; SUBCUTANEOUS at 21:00

## 2024-10-10 RX ADMIN — LEVOTHYROXINE SODIUM 25 MCG: 25 TABLET ORAL at 05:55

## 2024-10-10 RX ADMIN — SODIUM CHLORIDE 250 MG: 9 INJECTION, SOLUTION INTRAVENOUS at 10:00

## 2024-10-10 RX ADMIN — METOPROLOL SUCCINATE 25 MG: 25 TABLET, EXTENDED RELEASE ORAL at 20:58

## 2024-10-10 RX ADMIN — DULOXETINE HYDROCHLORIDE 20 MG: 20 CAPSULE, DELAYED RELEASE ORAL at 20:58

## 2024-10-10 RX ADMIN — Medication 5 MG: at 20:58

## 2024-10-10 RX ADMIN — ASPIRIN 81 MG: 81 TABLET, COATED ORAL at 08:24

## 2024-10-10 NOTE — CASE MANAGEMENT/SOCIAL WORK
Continued Stay Note  [facility]     Patient Name: Alysia Sierra  MRN: [id number]  Today's Date: 10/10/2024    Admit Date: 10/5/2024    Plan: Home   Discharge Plan       Row Name 10/10/24 1341       Plan    Plan Home    Plan Comments Patient is telling me she prefers to home at discharge with home health. She tells me her sister will be available to assist as needed although she does not live with her. She tells me she has worked with therapy, using knee scooter. She plans to order her knee scooter for home. I have given a referral to [place] with [facility], if patient is  able to return home and not need inpatient rehab.    Final Discharge Disposition Code 06 - home with home health care                   Discharge Codes    No documentation.                       Melvi Bear RN

## 2024-10-10 NOTE — PROGRESS NOTES
Met with patient at bedside she is agreeable to [facility] services. Verified address, contact number and PCP Dr Foster. She is current message sent to follow. Spoke with Amadou Figueroa RN, [facility] [place]-Liaison.

## 2024-10-10 NOTE — PROGRESS NOTES
Spoke with Jamila Foster will follow patient for home health services. Carolina GUERRERO, [facility]-Liaison.

## 2024-10-10 NOTE — CASE MANAGEMENT/SOCIAL WORK
Continued Stay Note   [facility]     Patient Name: Alysia Sierra  MRN: [id number]  Today's Date: 10/10/2024    Admit Date: 10/5/2024    Plan: SNF   Discharge Plan       Row Name 10/10/24 0840       Plan    Plan SNF    Plan Comments Will work on SNF rehab with timing of discharge from SNF for her upcoming surgery. I will discuss with patient facility choices    Final Discharge Disposition Code 03 - skilled nursing facility (SNF)                   Discharge Codes    No documentation.                       Melvi Bear RN

## 2024-10-10 NOTE — PLAN OF CARE
Goal Outcome Evaluation:  Plan of Care Reviewed With: patient        Progress: improving  Outcome Evaluation: Patient was able to initiate ambulation with scooter today. She did required min assist to control turning. Overall good effort. Will need more practice. Recommend IPR      Anticipated Discharge Disposition (PT): inpatient rehabilitation facility

## 2024-10-10 NOTE — PLAN OF CARE
Problem: Adult Inpatient Plan of Care  Goal: Plan of Care Review  Outcome: Progressing  Flowsheets (Taken 10/10/2024 0503)  Progress: no change  Plan of Care Reviewed With: patient  Goal: Optimal Comfort and Wellbeing  Outcome: Progressing  Intervention: Provide Person-Centered Care  Recent Flowsheet Documentation  Taken 10/10/2024 0400 by Robinson Mandujano RN  Trust Relationship/Rapport: care explained  Taken 10/10/2024 0200 by Robinson Mandujano RN  Trust Relationship/Rapport: care explained  Taken 10/10/2024 0000 by Robinson Mandujano RN  Trust Relationship/Rapport: care explained  Taken 10/9/2024 2200 by Robinson Mandujano RN  Trust Relationship/Rapport: care explained  Taken 10/9/2024 2000 by Robinson Mandujano RN  Trust Relationship/Rapport:   care explained   choices provided   empathic listening provided   questions answered   questions encouraged   reassurance provided     Problem: Skin Injury Risk Increased  Goal: Skin Health and Integrity  Outcome: Progressing  Intervention: Optimize Skin Protection  Recent Flowsheet Documentation  Taken 10/10/2024 0400 by Robinson Mandujano RN  Pressure Reduction Techniques:   frequent weight shift encouraged   pressure points protected  Head of Bed (HOB) Positioning: HOB elevated  Pressure Reduction Devices:   pressure-redistributing mattress utilized   positioning supports utilized   foam padding utilized  Skin Protection:   adhesive use limited   incontinence pads utilized   silicone foam dressing in place   tubing/devices free from skin contact  Taken 10/10/2024 0200 by Robinson Mandujano RN  Pressure Reduction Techniques:   frequent weight shift encouraged   pressure points protected  Head of Bed (HOB) Positioning: HOB elevated  Pressure Reduction Devices:   pressure-redistributing mattress utilized   positioning supports utilized   foam padding utilized  Skin Protection:   adhesive use limited   incontinence pads utilized   silicone foam dressing in  place   tubing/devices free from skin contact  Taken 10/10/2024 0000 by Robinson Mandujano RN  Pressure Reduction Techniques:   frequent weight shift encouraged   pressure points protected  Head of Bed (HOB) Positioning: [facility] [place] elevated  Pressure Reduction Devices:   pressure-redistributing mattress utilized   positioning supports utilized   foam padding utilized  Skin Protection:   adhesive use limited   incontinence pads utilized   silicone foam dressing in place   tubing/devices free from skin contact  Taken 10/9/2024 2200 by Robinson Mandujano RN  Pressure Reduction Techniques:   frequent weight shift encouraged   pressure points protected  Head of Bed (HOB) Positioning: [facility] [place] elevated  Pressure Reduction Devices:   pressure-redistributing mattress utilized   positioning supports utilized  Skin Protection:   adhesive use limited   incontinence pads utilized   silicone foam dressing in place   tubing/devices free from skin contact  Taken 10/9/2024 2000 by Robinson Mandujano RN  Pressure Reduction Techniques:   frequent weight shift encouraged   pressure points protected  Head of Bed (HOB) Positioning: [facility] [place] elevated  Pressure Reduction Devices:   pressure-redistributing mattress utilized   positioning supports utilized  Skin Protection:   adhesive use limited   incontinence pads utilized   silicone foam dressing in place   tubing/devices free from skin contact     Problem: Fall Injury Risk  Goal: Absence of Fall and Fall-Related Injury  Outcome: Progressing  Intervention: Identify and Manage Contributors  Recent Flowsheet Documentation  Taken 10/10/2024 0400 by Robinson Mandujano RN  Medication Review/Management: medications reviewed  Taken 10/10/2024 0200 by Robinson Mandujano RN  Medication Review/Management: medications reviewed  Taken 10/10/2024 0000 by Robinson Mandujano RN  Medication Review/Management: medications reviewed  Taken 10/9/2024 2200 by Robinson Mandujano RN  Medication Review/Management:  medications reviewed  Taken 10/9/2024 2000 by Robinson Mandujano, RN  Medication Review/Management: medications reviewed  Intervention: Promote Injury-Free Environment  Recent Flowsheet Documentation  Taken 10/10/2024 0400 by Robinson Mandujano, RN  Safety Promotion/Fall Prevention:   activity supervised   assistive device/personal items within reach   clutter free environment maintained   fall prevention program maintained   gait belt   nonskid shoes/slippers when out of bed   room organization consistent   safety round/check completed  Taken 10/10/2024 0200 by Robinson Mandujano, RN  Safety Promotion/Fall Prevention:   activity supervised   assistive device/personal items within reach   clutter free environment maintained   fall prevention program maintained   room organization consistent   safety round/check completed   gait belt   nonskid shoes/slippers when out of bed  Taken 10/10/2024 0000 by Robinson Mandujano, RN  Safety Promotion/Fall Prevention:   activity supervised   assistive device/personal items within reach   clutter free environment maintained   fall prevention program maintained   gait belt   nonskid shoes/slippers when out of bed   room organization consistent   safety round/check completed  Taken 10/9/2024 2200 by Robinson Mandujano, RN  Safety Promotion/Fall Prevention:   activity supervised   assistive device/personal items within reach   clutter free environment maintained   fall prevention program maintained   gait belt   nonskid shoes/slippers when out of bed   room organization consistent   safety round/check completed  Taken 10/9/2024 2000 by Robinson Mandujano, RN  Safety Promotion/Fall Prevention:   activity supervised   assistive device/personal items within reach   clutter free environment maintained   fall prevention program maintained   gait belt   nonskid shoes/slippers when out of bed   room organization consistent   safety round/check completed     Problem: Fracture Stabilization  and Management (Orthopaedic Fracture)  Goal: Fracture Stability  Outcome: Progressing  Intervention: Promote Fracture Stability and Healing  Recent Flowsheet Documentation  Taken 10/10/2024 0000 by Robinson Mandujano RN  Fracture Immobilization: immobilization device maintained  Taken 10/9/2024 2000 by Robinson Mandujano RN  Fracture Immobilization: immobilization device maintained     Problem: Respiratory Compromise (Orthopaedic Fracture)  Goal: Effective Oxygenation and Ventilation  Outcome: Progressing     Problem: Adult Inpatient Plan of Care  Goal: Absence of Hospital-Acquired Illness or Injury  Intervention: Identify and Manage Fall Risk  Recent Flowsheet Documentation  Taken 10/10/2024 0400 by Robinson Mandujano, RN  Safety Promotion/Fall Prevention:   activity supervised   assistive device/personal items within reach   clutter free environment maintained   fall prevention program maintained   gait belt   nonskid shoes/slippers when out of bed   room organization consistent   safety round/check completed  Taken 10/10/2024 0200 by Robinson Mandujano RN  Safety Promotion/Fall Prevention:   activity supervised   assistive device/personal items within reach   clutter free environment maintained   fall prevention program maintained   room organization consistent   safety round/check completed   gait belt   nonskid shoes/slippers when out of bed  Taken 10/10/2024 0000 by Robinson Mandujano, RN  Safety Promotion/Fall Prevention:   activity supervised   assistive device/personal items within reach   clutter free environment maintained   fall prevention program maintained   gait belt   nonskid shoes/slippers when out of bed   room organization consistent   safety round/check completed  Taken 10/9/2024 2200 by Robinson Mandujano, RN  Safety Promotion/Fall Prevention:   activity supervised   assistive device/personal items within reach   clutter free environment maintained   fall prevention program maintained   gait  belt   nonskid shoes/slippers when out of bed   room organization consistent   safety round/check completed  Taken 10/9/2024 2000 by Robinson Mandujano RN  Safety Promotion/Fall Prevention:   activity supervised   assistive device/personal items within reach   clutter free environment maintained   fall prevention program maintained   gait belt   nonskid shoes/slippers when out of bed   room organization consistent   safety round/check completed  Intervention: Prevent Skin Injury  Recent Flowsheet Documentation  Taken 10/10/2024 0400 by Robinson Mandujano, RN  Body Position:   position changed independently   left   lower extremity elevated  Skin Protection:   adhesive use limited   incontinence pads utilized   silicone foam dressing in place   tubing/devices free from skin contact  Taken 10/10/2024 0200 by Robinson Mandujano RN  Body Position:   position changed independently   left   lower extremity elevated  Skin Protection:   adhesive use limited   incontinence pads utilized   silicone foam dressing in place   tubing/devices free from skin contact  Taken 10/10/2024 0000 by Robinson Mandujano, RN  Body Position:   position changed independently   left   lower extremity elevated  Skin Protection:   adhesive use limited   incontinence pads utilized   silicone foam dressing in place   tubing/devices free from skin contact  Taken 10/9/2024 2200 by Robinson Mandujano RN  Body Position:   position changed independently   left   lower extremity elevated  Skin Protection:   adhesive use limited   incontinence pads utilized   silicone foam dressing in place   tubing/devices free from skin contact  Taken 10/9/2024 2000 by Robinson Mandujano RN  Body Position: position changed independently  Skin Protection:   adhesive use limited   incontinence pads utilized   silicone foam dressing in place   tubing/devices free from skin contact  Intervention: Prevent and Manage VTE (Venous Thromboembolism) Risk  Recent Flowsheet  Documentation  Taken 10/10/2024 0400 by Robinson Mandujano RN  Activity Management: activity encouraged  VTE Prevention/Management:   right   sequential compression devices on  Taken 10/10/2024 0200 by Robinson Mandujano RN  Activity Management: activity encouraged  Taken 10/10/2024 0000 by Robinson Mandujano RN  Activity Management: activity encouraged  VTE Prevention/Management:   right   sequential compression devices on  Taken 10/9/2024 2200 by Robinson Mandujano RN  Activity Management: activity encouraged  Taken 10/9/2024 2000 by Robinson Mandujano RN  Activity Management: activity encouraged  VTE Prevention/Management:   right   sequential compression devices on  Range of Motion: active ROM (range of motion) encouraged  Intervention: Prevent Infection  Recent Flowsheet Documentation  Taken 10/10/2024 0400 by Robinson Mandujano RN  Infection Prevention:   environmental surveillance performed   hand hygiene promoted   rest/sleep promoted   single patient room provided  Taken 10/10/2024 0200 by Robinson Mandujano RN  Infection Prevention:   environmental surveillance performed   hand hygiene promoted   rest/sleep promoted   single patient room provided  Taken 10/10/2024 0000 by Robinson Mandujano RN  Infection Prevention:   environmental surveillance performed   hand hygiene promoted   rest/sleep promoted   single patient room provided  Taken 10/9/2024 2200 by Robinson Mandujano RN  Infection Prevention:   environmental surveillance performed   hand hygiene promoted   rest/sleep promoted   single patient room provided  Taken 10/9/2024 2000 by Robinson Mandujano RN  Infection Prevention:   environmental surveillance performed   hand hygiene promoted   rest/sleep promoted   single patient room provided     Problem: Pain Chronic (Persistent) (Comorbidity Management)  Goal: Acceptable Pain Control and Functional Ability  Intervention: Manage Persistent Pain  Recent Flowsheet Documentation  Taken 10/10/2024  0400 by Robinson Mandujano RN  Sleep/Rest Enhancement:   regular sleep/rest pattern promoted   room darkened   noise level reduced  Medication Review/Management: medications reviewed  Taken 10/10/2024 0200 by Robinson Mandujano RN  Sleep/Rest Enhancement:   regular sleep/rest pattern promoted   room darkened   noise level reduced  Medication Review/Management: medications reviewed  Taken 10/10/2024 0000 by Robinson Mandujano RN  Sleep/Rest Enhancement:   regular sleep/rest pattern promoted   room darkened   noise level reduced  Medication Review/Management: medications reviewed  Taken 10/9/2024 2200 by Robinson Mandujano RN  Sleep/Rest Enhancement:   regular sleep/rest pattern promoted   room darkened   noise level reduced  Medication Review/Management: medications reviewed  Taken 10/9/2024 2000 by Robinson Mandujano RN  Sleep/Rest Enhancement:   regular sleep/rest pattern promoted   room darkened   noise level reduced  Medication Review/Management: medications reviewed  Intervention: Optimize Psychosocial Wellbeing  Recent Flowsheet Documentation  Taken 10/10/2024 0400 by Robinson Mandujano RN  Diversional Activities:   smartphone   television  Family/Support System Care:   self-care encouraged   support provided  Taken 10/10/2024 0200 by Robinson Mandujano RN  Diversional Activities:   smartphone   television  Family/Support System Care:   self-care encouraged   support provided  Taken 10/10/2024 0000 by Robinson Mandujano RN  Diversional Activities:   smartphone   television  Family/Support System Care:   self-care encouraged   support provided  Taken 10/9/2024 2200 by Robinson Mandujano RN  Diversional Activities:   smartphone   television  Family/Support System Care:   self-care encouraged   support provided  Taken 10/9/2024 2000 by Robinson Mandujano RN  Supportive Measures:   active listening utilized   verbalization of feelings encouraged  Diversional Activities:   smartphone    television  Family/Support System Care:   self-care encouraged   support provided     Problem: Bleeding (Orthopaedic Fracture)  Goal: Absence of Bleeding  Intervention: Monitor and Manage Fracture Bleeding  Recent Flowsheet Documentation  Taken 10/10/2024 0000 by Robinson Mandujano RN  Fracture Immobilization: immobilization device maintained  Taken 10/9/2024 2000 by Robinson Mandujano RN  Fracture Immobilization: immobilization device maintained     Problem: Embolism (Orthopaedic Fracture)  Goal: Absence of Embolism Signs and Symptoms  Intervention: Prevent or Manage Embolism Risk  Recent Flowsheet Documentation  Taken 10/10/2024 0400 by Robinson Mandujano RN  VTE Prevention/Management:   right   sequential compression devices on  Taken 10/10/2024 0000 by Robinson Mandujano RN  VTE Prevention/Management:   right   sequential compression devices on  Taken 10/9/2024 2000 by Robinson Mandujano RN  VTE Prevention/Management:   right   sequential compression devices on     Problem: Functional Ability Impaired (Orthopaedic Fracture)  Goal: Optimal Functional Ability  Intervention: Optimize Functional Ability  Recent Flowsheet Documentation  Taken 10/10/2024 0400 by Robinson Mandujano RN  Activity Management: activity encouraged  Positioning/Transfer Devices:   pillows   in use  Taken 10/10/2024 0200 by Robinson Mandujano RN  Activity Management: activity encouraged  Positioning/Transfer Devices:   pillows   in use  Taken 10/10/2024 0000 by Robinson Mandujano RN  Activity Management: activity encouraged  Positioning/Transfer Devices:   pillows   in use  Taken 10/9/2024 2200 by Robinson Mandujano RN  Activity Management: activity encouraged  Positioning/Transfer Devices:   pillows   in use  Taken 10/9/2024 2000 by Robinson Mandujano RN  Activity Management: activity encouraged  Positioning/Transfer Devices:   pillows   in use  Range of Motion: active ROM (range of motion) encouraged     Problem: Infection  (Orthopaedic Fracture)  Goal: Absence of Infection Signs and Symptoms  Intervention: Prevent or Manage Infection  Recent Flowsheet Documentation  Taken 10/10/2024 0400 by Robinson Mandujano RN  Infection Prevention:   environmental surveillance performed   hand hygiene promoted   rest/sleep promoted   single patient room provided  Taken 10/10/2024 0200 by Robinson Mandujano RN  Infection Prevention:   environmental surveillance performed   hand hygiene promoted   rest/sleep promoted   single patient room provided  Taken 10/10/2024 0000 by Robinson Mandujano RN  Infection Prevention:   environmental surveillance performed   hand hygiene promoted   rest/sleep promoted   single patient room provided  Taken 10/9/2024 2200 by Robinson Mandujano RN  Infection Prevention:   environmental surveillance performed   hand hygiene promoted   rest/sleep promoted   single patient room provided  Taken 10/9/2024 2000 by Robinson Mandujano RN  Infection Prevention:   environmental surveillance performed   hand hygiene promoted   rest/sleep promoted   single patient room provided     Problem: Pain (Orthopaedic Fracture)  Goal: Acceptable Pain Control  Intervention: Manage Acute Orthopaedic-Related Pain  Recent Flowsheet Documentation  Taken 10/10/2024 0400 by Robinson Mandujano RN  Diversional Activities:   smartphone   television  Taken 10/10/2024 0200 by Robinson Mandujano RN  Diversional Activities:   smartphone   television  Taken 10/10/2024 0000 by Robinson Mandujano RN  Diversional Activities:   smartphone   television  Taken 10/9/2024 2200 by Robinson Mandujano RN  Diversional Activities:   smartphone   television  Taken 10/9/2024 2000 by Robinson Mandujano RN  Diversional Activities:   smartphone   television   Goal Outcome Evaluation:  Plan of Care Reviewed With: patient        Progress: no change          Pt a/o x4. VSS. Maintaining SpO2 on RA. LLE immobilized in splint. Elevated. Ice applied intermittently. Pt  denying pain. Rested well.

## 2024-10-10 NOTE — TELEPHONE ENCOUNTER
Caller: KIRT    Relationship: [facility]    Best call back number: [phone]     What was the call regarding: [facility] WANTS TO CONFIRM THAT DR CONCEPCION IS THE PATIENTS PCP. [facility] WILL BE SENDING OUT PHYSICAL THERAPY TO PATIENTS HOME.

## 2024-10-10 NOTE — PLAN OF CARE
Goal Outcome Evaluation:      Patient worked with PT/OT. Up to chair, up to bedside commode. Patient resting comfortably in bed. Has decided on rehab at discharge. Follow up x ray to left foot d/t new complaint of burning and feeling like something is poking ankle. VSS. Awaiting discharge planning

## 2024-10-10 NOTE — THERAPY TREATMENT NOTE
Patient Name: Alysia Sierra  : 1955    MRN: [id number]                              Today's Date: 10/10/2024       Admit Date: 10/5/2024    Visit Dx:     ICD-10-CM ICD-9-CM   1. Displaced fracture of distal end of left tibia  S82.302A 824.8   2. Closed nondisplaced fracture of second metatarsal bone of left foot, initial encounter  S92.325A 825.25   3. Closed nondisplaced fracture of first metatarsal bone of left foot, initial encounter  S92.315A 825.25   4. Closed nondisplaced fracture of third metatarsal bone of left foot, initial encounter  S92.335A 825.25   5. Closed nondisplaced fracture of fourth metatarsal bone of left foot, initial encounter  S92.345A 825.25     Patient Active Problem List   Diagnosis    Hypertension    Leg weakness, bilateral    Syncope    CKD (chronic kidney disease) stage 3, GFR 30-59 ml/min    Neuropathy    Hyperlipidemia LDL goal <100    Endometrial adenocarcinoma    Mild episode of recurrent major depressive disorder    Dizziness    Hip fracture    Anemia    Hypomagnesemia    Hypothyroidism    Post-menopausal    Other osteoporosis without current pathological fracture    Closed fracture of second lumbar vertebra    Alcoholic ketoacidosis    Leukocytosis    GERD with esophagitis    Sepsis    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    Lisfranc dislocation, left, initial encounter    Closed fracture of left ankle    Closed displaced fracture of fourth metatarsal bone of left foot     Past Medical History:   Diagnosis Date    Allergic lisinopril  2017    Anemia     Arrhythmia     Arthritis     Cancer     uterine    Cataract mild 2020    stil lpresent    Chicken pox     Chronic fatigue     CKD (chronic kidney disease), stage III     sees nephro    Clotting disorder     Dental root implant present     lower left  x1 - possible dental implant    Depression mild 2019    Difficulty walking 2019    Disease of thyroid gland     Dizzy     NIEVES (dyspnea on exertion)     2017     Fracture of hip     Generalized anxiety disorder     GERD (gastroesophageal reflux disease) 2018    History of brachytherapy 2023    vaginal brachytherapy    Hyperlipidemia     Hypertension     Iron deficiency anemia     Liver disease     fatty    Liver problem     Measles     Menopause     Mumps     Neuromuscular disorder Peripheral Neuropathy    Orthostatic hypotension     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    Renal insufficiency 2018    Scoliosis     Unintentional weight loss     Uses contact lenses     bilat    Uterine cancer     Uterine cancer 2022    UTI (urinary tract infection)     Visual impairment Nearsighted    Wears glasses      Past Surgical History:   Procedure Laterality Date     SECTION      DILATION AND CURETTAGE, DIAGNOSTIC / THERAPEUTIC      HIP OPEN REDUCTION Left 2023    Procedure: FEMORAL NECK OPEN REDUCTION INTERNAL FIXATION LEFT;  Surgeon: Juanpablo Lee MD;  Location: [facility] OR;  Service: Orthopedics;  Laterality: Left;    HIP SURGERY      OOPHORECTOMY      ORAL LESION EXCISION/BIOPSY  2021    TOTAL LAPAROSCOPIC HYSTERECTOMY SALPINGO OOPHORECTOMY N/A 10/28/2022    Procedure: TOTAL LAPAROSCOPIC HYSTERECTOMY BILATERAL SALPINGO-OOPHORECTOMY, INJECTION FOR SENTINEL LYMPH NODE MAPPING, BILATERAL SENTINEL LYMPH NODE DISSECTION WITH DAVINCI ROBOT;  Surgeon: Jasmine Rich MD;  Location: [facility] OR;  Service: Robotics - DaVinci;  Laterality: N/A;    TUBAL ABDOMINAL LIGATION        General Information       Row Name 10/10/24 1634          OT Time and Intention    Document Type therapy note (daily note)  -AR     Mode of Treatment individual therapy;occupational therapy  -AR       Row Name 10/10/24 1634          General Information    Existing Precautions/Restrictions fall;left;non-weight bearing  -AR       Row Name 10/10/24 1634          Cognition    Orientation Status (Cognition) oriented x 4  -AR       Row Name 10/10/24 3761           Safety Issues, Functional Mobility    Safety Issues Affecting Function (Mobility) awareness of need for assistance;insight into deficits/self-awareness;safety precaution awareness;safety precautions follow-through/compliance  -AR     Impairments Affecting Function (Mobility) endurance/activity tolerance;balance;strength;pain  -AR               User Key  (r) = Recorded By, (t) = Taken By, (c) = Cosigned By      Initials Name Provider Type    AR Bernie Henriquez OT Occupational Therapist                     Mobility/ADL's       Row Name 10/10/24 4867          Bed Mobility    Bed Mobility scooting/bridging;sit-supine  -AR     Scooting/Bridging [place] (Bed Mobility) supervision  -AR     Sit-Supine [place] (Bed Mobility) supervision;verbal cues  -AR     Comment, (Bed Mobility) Cues to maintain NWB LLE  -AR       Row Name 10/10/24 3856          Transfers    Transfers sit-stand transfer;stand-sit transfer;toilet transfer  -AR     Comment, (Transfers) Pt ambulated to BSC and demo moderate LOB instance transitioning onto BSC needing assist for fall prevention and to maintain NWB LLE. She needs cues for hand placement and bed approach.  -AR       Row Name 10/10/24 4631          Sit-Stand Transfer    Sit-Stand [place] (Transfers) minimum assist (75% patient effort);verbal cues  -AR     Assistive Device (Sit-Stand Transfers) walker, front-wheeled  -AR       Row Name 10/10/24 9598          Stand-Sit Transfer    Stand-Sit [place] (Transfers) minimum assist (75% patient effort);verbal cues  -AR     Assistive Device (Stand-Sit Transfers) walker, front-wheeled  -AR       Row Name 10/10/24 0117          Toilet Transfer    Type (Toilet Transfer) stand-sit;sit-stand  -AR     [place] Level (Toilet Transfer) moderate assist (50% patient effort);verbal cues  -AR     Assistive Device (Toilet Transfer) walker, front-wheeled;commode, bedside without drop arms  -AR       Row Name 10/10/24 6055           Functional Mobility    Functional Mobility- Ind. Level minimum assist (75% patient effort);verbal cues required  -AR     Functional Mobility- Device walker, front-wheeled  -AR     Functional Mobility-Distance (Feet) 20  -AR       Row Name 10/10/24 1635          Activities of Daily Living    BADL Assessment/Intervention lower body dressing;toileting;feeding  -AR       Row Name 10/10/24 1635          Mobility    Extremity Weight-bearing Status left lower extremity  -AR     Left Lower Extremity (Weight-bearing Status) non weight-bearing (NWB)   -AR       Row Name 10/10/24 1635          Self-Feeding Assessment/Training    [place] Level (Feeding) supervision  -AR     Position (Self-Feeding) supine  -AR       Row Name 10/10/24 1635          Toileting Assessment/Training    [place] Level (Toileting) adjust/manage clothing;contact guard assist;perform perineal hygiene  -AR     Assistive Devices (Toileting) commode chair  -AR     Position (Toileting) supported sitting  -AR               User Key  (r) = Recorded By, (t) = Taken By, (c) = Cosigned By      Initials Name Provider Type    Bernie Jean Baptiste OT Occupational Therapist                   Obj/Interventions       Row Name 10/10/24 1642          Balance    Balance Assessment sitting static balance;sitting dynamic balance;standing dynamic balance;standing static balance  -AR     Static Sitting Balance supervision  -AR     Dynamic Sitting Balance supervision  -AR     Position, Sitting Balance unsupported;sitting edge of bed  -AR     Static Standing Balance minimal assist  -AR     Dynamic Standing Balance moderate assist  -AR     Position/Device Used, Standing Balance supported;walker, front-wheeled  -AR               User Key  (r) = Recorded By, (t) = Taken By, (c) = Cosigned By      Initials Name Provider Type    Bernie Jean Baptiste OT Occupational Therapist                   Goals/Plan    No documentation.                  Clinical Impression        Row Name 10/10/24 1643          Pain Assessment    Pretreatment Pain Rating 2/10  -AR     Posttreatment Pain Rating 0/10 - no pain  -AR     Pain Location - Side/Orientation Left  -AR     Pain Location generalized  -AR     Pain Location - ankle  -AR     Pain Intervention(s) Repositioned;Ambulation/increased activity;Elevated  -AR       Row Name 10/10/24 1643          Plan of Care Review    Plan of Care Reviewed With patient  -AR     Progress improving  -AR     Outcome Evaluation Pt completed in-room ambulation with min assist using RW, BSC transfer with mod assist and bed mobility with supervision. Pt limited with decreased balance, decreased strength, decreased safety awareness and is performing below baseline status. She stated she does not feel strong enough to DC home alone. Recommend IPR, pt in agreement.  -AR       Row Name 10/10/24 1643          Therapy Plan Review/Discharge Plan (OT)    Anticipated Discharge Disposition (OT) inpatient rehabilitation facility  -AR       Row Name 10/10/24 1643          Vital Signs    Pre Patient Position Sitting  -AR     Intra Patient Position Standing  -AR     Post Patient Position Supine  -AR       Row Name 10/10/24 1643          Positioning and Restraints    Pre-Treatment Position sitting in chair/recliner  -AR     Post Treatment Position bed  -AR     In Bed supine;notified nsg;call light within reach;encouraged to call for assist;exit alarm on;LLE elevated;SCD pump applied  pt declined cold pack, bed exit alarm not working but sensor pad activated and RN notified  -AR               User Key  (r) = Recorded By, (t) = Taken By, (c) = Cosigned By      Initials Name Provider Type    Bernie Jean Baptiste, OT Occupational Therapist                   Outcome Measures       Row Name 10/10/24 5747          How much help from another is currently needed...    Putting on and taking off regular lower body clothing? 3  -AR     Bathing (including washing, rinsing, and drying) 2  -AR      Toileting (which includes using toilet bed pan or urinal) 3  -AR     Putting on and taking off regular upper body clothing 3  -AR     Taking care of personal grooming (such as brushing teeth) 3  -AR     Eating meals 3  -AR     AM-PAC 6 Clicks Score (OT) 17  -AR       Row Name 10/10/24 1108 10/10/24 0828       How much help from another person do you currently need...    Turning from your back to your side while in flat bed without using bedrails? 3  -SC 3  -HAYDEN    Moving from lying on back to sitting on the side of a flat bed without bedrails? 3  -SC 3  -HAYDEN    Moving to and from a bed to a chair (including a wheelchair)? 3  -SC 3  -HAYDEN    Standing up from a chair using your arms (e.g., wheelchair, bedside chair)? 3  -SC 3  -HAYDEN    Climbing 3-5 steps with a railing? 2  -SC 2  -HAYDEN    To walk in hospital room? 3  -SC 3  -HAYDEN    AM-PAC 6 Clicks Score (PT) 17  -SC 17  -HAYDEN    Highest Level of Mobility Goal 5 --> Static standing  -SC 5 --> Static standing  -HAYDEN      Row Name 10/10/24 1646 10/10/24 1108       Functional Assessment    Outcome Measure Options AM-PAC 6 Clicks Daily Activity (OT)  -AR AM-PAC 6 Clicks Basic Mobility (PT)  -SC              User Key  (r) = Recorded By, (t) = Taken By, (c) = Cosigned By      Initials Name Provider Type    Grace Russell, PT Physical Therapist    Bernie Jean Baptiste, OT Occupational Therapist    Monica Hutson, RN Registered Nurse                    Occupational Therapy Education       Title: PT OT SLP Therapies (Done)       Topic: Occupational Therapy (Done)       Point: ADL training (Done)       Description:   Instruct learner(s) on proper safety adaptation and remediation techniques during self care or transfers.   Instruct in proper use of assistive devices.                  Learning Progress Summary             Patient AMANDA Castorena,TB,D, VU,NR by AR at 10/10/2024 1647    AMANDA Castorena,CAROLINE,D,H, VU,NR by AR at 10/9/2024 1145                         Point: Home exercise  program (Done)       Description:   Instruct learner(s) on appropriate technique for monitoring, assisting and/or progressing therapeutic exercises/activities.                  Learning Progress Summary             Patient Eager, E,TB,D, VU,NR by AR at 10/10/2024 1647    Eager, E,TB,D,H, VU,NR by AR at 10/9/2024 1145                         Point: Precautions (Done)       Description:   Instruct learner(s) on prescribed precautions during self-care and functional transfers.                  Learning Progress Summary             Patient Eager, E,TB,D, VU,NR by AR at 10/10/2024 1647    Eager, E,TB,D,H, VU,NR by AR at 10/9/2024 1145                         Point: Body mechanics (Done)       Description:   Instruct learner(s) on proper positioning and spine alignment during self-care, functional mobility activities and/or exercises.                  Learning Progress Summary             Patient Eager, E,TB,D, VU,NR by AR at 10/10/2024 1647    Eager, E,TB,D,H, VU,NR by AR at 10/9/2024 1145                                         User Key       Initials Effective Dates Name Provider Type Discipline    AR 07/11/23 -  Bernie Henriquez, OT Occupational Therapist OT                  OT Recommendation and Plan  Planned Therapy Interventions (OT): activity tolerance training, adaptive equipment training, BADL retraining, edema control/reduction, functional balance retraining, IADL retraining, occupation/activity based interventions, patient/caregiver education/training, ROM/therapeutic exercise, strengthening exercise, transfer/mobility retraining  Therapy Frequency (OT): daily  Plan of Care Review  Plan of Care Reviewed With: patient  Progress: improving  Outcome Evaluation: Pt completed in-room ambulation with min assist using RW, BSC transfer with mod assist and bed mobility with supervision. Pt limited with decreased balance, decreased strength, decreased safety awareness and is performing below baseline status. She  stated she does not feel strong enough to DC home alone. Recommend IPR, pt in agreement.     Time Calculation:   Evaluation Complexity (OT)  Review Occupational Profile/Medical/Therapy History Complexity: brief/low complexity  Assessment, Occupational Performance/Identification of Deficit Complexity: 1-3 performance deficits  Clinical Decision Making Complexity (OT): problem focused assessment/low complexity  Overall Complexity of Evaluation (OT): low complexity     Time Calculation- OT       Row Name 10/10/24 1647 10/10/24 1008          Time Calculation- OT    OT Start Time 1600  -AR --     OT Received On 10/10/24  -AR --     OT Goal Re-Cert Due Date 10/19/24  -AR --        Timed Charges    83483 - Gait Training Minutes  -- 15  -SC     63598 - OT Self Care/Mgmt Minutes 40  -AR --        Total Minutes    Timed Charges Total Minutes 40  -AR 15  -SC      Total Minutes 40  -AR 15  -SC               User Key  (r) = Recorded By, (t) = Taken By, (c) = Cosigned By      Initials Name Provider Type    SC Grace Bradley, PT Physical Therapist    AR Bernie Henriquez, OT Occupational Therapist                  Therapy Charges for Today       Code Description Service Date Service Provider Modifiers Qty    60463992397 HC OT THER PROC EA 15 MIN 10/9/2024 Bernie Henriquez, OT GO 1    52853477077 HC OT THER SUPP EA 15 MIN 10/9/2024 Bernie Henriquez OT GO 2    75951933529 HC OT EVAL LOW COMPLEXITY 4 10/9/2024 Bernie Henriquez OT GO 1    90136222658 HC OT SELF CARE/MGMT/TRAIN EA 15 MIN 10/10/2024 Bernie Henriquez OT GO 3                 Bernie Henriquez OT  10/10/2024

## 2024-10-10 NOTE — PLAN OF CARE
Goal Outcome Evaluation:  Plan of Care Reviewed With: patient        Progress: improving  Outcome Evaluation: Pt completed in-room ambulation with min assist using RW, BSC transfer with mod assist and bed mobility with supervision. Pt limited with decreased balance, decreased strength, decreased safety awareness and is performing below baseline status. She stated she does not feel strong enough to DC home alone. Recommend IPR, pt in agreement.      Anticipated Discharge Disposition (OT): inpatient rehabilitation facility

## 2024-10-10 NOTE — PROGRESS NOTES
[facility] Medicine Services  PROGRESS NOTE    Patient Name: Alysia Sierra  : 1955  MRN: [id number]    Date of Admission: 10/5/2024  Primary Care Physician: Cassy Foster MD    Subjective   Subjective     CC:  F/u ankle fracture    HPI:  Patient sitting up in chair.  Says she feels like something is rubbing against her left ankle in the cast.  Is waiting to ortho.  Denies other concerns.      Objective   Objective     Vital Signs:   Temp:  [97.9 °F (36.6 °C)-98.2 °F (36.8 °C)] 98.2 °F (36.8 °C)  Heart Rate:  [70-86] 72  Resp:  [16] 16  BP: ()/(46-77) 123/75     Physical Exam:  Constitutional: No acute distress, awake, alert  HENT: NCAT, mucous membranes moist  Respiratory: Clear to auscultation bilaterally, respiratory effort normal, room air  Cardiovascular: RRR, no murmurs, rubs, or gallops  Gastrointestinal: Positive bowel sounds, soft, nontender, nondistended  Musculoskeletal: No right ankle edema, LLE cast  Psychiatric: Appropriate affect, cooperative  Neurologic: Oriented x 3, moves all extremities, speech clear  Skin: No rashes      Results Reviewed:  LAB RESULTS:      Lab 10/09/24  0600 10/07/24  0636 10/05/24  2259   WBC 4.49 5.71 10.14   HEMOGLOBIN 8.8* 9.4* 10.7*   HEMATOCRIT 27.6* 30.2* 33.4*   PLATELETS 176 172 224   NEUTROS ABS  --   --  6.76   IMMATURE GRANS (ABS)  --   --  0.03   LYMPHS ABS  --   --  1.86   MONOS ABS  --   --  1.41*   EOS ABS  --   --  0.05   MCV 98.6* 100.3* 98.2*         Lab 10/10/24  1013 10/07/24  0636 10/05/24  2336 10/05/24  2259   SODIUM 142 135* 136  --    POTASSIUM 4.7 4.4 4.4  --    CHLORIDE 109* 99 102  --    CO2 21.0* 26.0 22.0  --    ANION GAP 12.0 10.0 12.0  --    BUN 10 11 16  --    CREATININE 1.47* 1.15* 1.33*  --    EGFR 38.7* 52.0* 43.7*  --    GLUCOSE 129* 104* 104*  --    CALCIUM 8.7 8.8 8.5*  --    MAGNESIUM  --   --  1.8  --    HEMOGLOBIN A1C  --   --   --  5.20   TSH  --  4.540*  --   --          Lab 10/05/24  5894    TOTAL PROTEIN 6.4   ALBUMIN 4.1   GLOBULIN 2.3   ALT (SGPT) 10   AST (SGOT) 22   BILIRUBIN 0.3   ALK PHOS 74                 Lab 10/09/24  0600 10/08/24  1718   IRON  --  20*   IRON SATURATION (TSAT)  --  7*   TIBC  --  280*   TRANSFERRIN  --  188*   FOLATE >20.00  --    VITAMIN B 12 832  --          Brief Urine Lab Results  (Last result in the past 365 days)        Color   Clarity   Blood   Leuk Est   Nitrite   Protein   CREAT   Urine HCG        06/22/24 1604 Yellow   Clear   Negative   Negative   Negative   Negative                   Microbiology Results Abnormal       None            No radiology results from the last 24 hrs    Results for orders placed during the hospital encounter of 01/02/24    Adult Transthoracic Echo Complete W/ Cont if Necessary Per Protocol    Interpretation Summary    Left ventricular systolic function is normal. Calculated left ventricular EF = 57.9% Left ventricular ejection fraction appears to be 56 - 60%.    Left ventricular diastolic function was normal.    Estimated right ventricular systolic pressure from tricuspid regurgitation is normal (<35 mmHg).    No significant change from previous study      Current medications:  Scheduled Meds:aspirin, 81 mg, Oral, Daily  DULoxetine, 20 mg, Oral, BID  ferric gluconate, 250 mg, Intravenous, Daily  folic acid, 1 mg, Oral, Daily  gabapentin, 300 mg, Oral, Daily  gabapentin, 600 mg, Oral, Nightly  heparin (porcine), 5,000 Units, Subcutaneous, Q8H  levothyroxine, 25 mcg, Oral, Q AM  melatonin, 5 mg, Oral, Nightly  metoprolol succinate XL, 25 mg, Oral, Nightly  pantoprazole, 40 mg, Oral, Every Other Day  sodium chloride, 10 mL, Intravenous, Q12H  thiamine, 200 mg, Oral, Daily      Continuous Infusions:   PRN Meds:.  senna-docusate sodium **AND** polyethylene glycol **AND** bisacodyl **AND** bisacodyl    Calcium Replacement - Follow Nurse / BPA Driven Protocol    HYDROmorphone **AND** naloxone    influenza vaccine    LORazepam **OR** midazolam  **OR** LORazepam **OR** midazolam **OR** midazolam **OR** midazolam    Magnesium Standard Dose Replacement - Follow Nurse / BPA Driven Protocol    oxyCODONE    Phosphorus Replacement - Follow Nurse / BPA Driven Protocol    Potassium Replacement - Follow Nurse / BPA Driven Protocol    [COMPLETED] Insert Peripheral IV **AND** sodium chloride    sodium chloride    Assessment & Plan   Assessment & Plan     Active Hospital Problems    Diagnosis  POA    **Closed fracture of left ankle [S82.872A]  Unknown    Closed displaced fracture of fourth metatarsal bone of left foot [S92.342A]  Yes    Lisfranc dislocation, left, initial encounter [I25.140A]  Yes      Resolved Hospital Problems   No resolved problems to display.        Brief Hospital Course to date:  Alysia Sierra is a 69 y.o. female with history of CKD III, chronic anemia, uterine cancer, hypothyroidism and alcohol abuse who was admitted to [facility] on 10/5/24 for L ankle fracture after stepping awkwardly off her bed.     This patient's problems and plans were partially entered by my partner and updated as appropriate by me 10/10/24.      Acute L foot Lisfranc fracture with involvement of 1st, 2nd, 3rd and 4th metatarsal bases  Acute nondisplaced L medial malleolus fracture  Acute nondisplaced L posterior medial talus fracture   1st-4th proximal metatarsal fracture Lisfranc fracture  - LLE splinted. Fracture blister on dorsal aspect of L foot noted - per ortho allow blisters to decompress on their own  - Aggressive elevation, goal to maintain elevation above heart  - Surgery tentatively planned for 10/18 with sadia Jaeger to discharge and return for surgery, f/u with Dr. Godoy in office next week  - PRN pain management  - PT/OT following, recommendation for rehab p/t surgery     Alcohol withdrawal  History of alcohol abuse   - Patient denies daily alcohol use, however family reports daily heavy alcohol use  - Developed confusion / hallucinations  on afternoon of 10/8 - improved after Valium 5mg PO x 2 doses and AAOx4 10/9. She declined AM dose of Valium  - DC scheduled Valium - continue CIWA with PRNS for now as episode of confusion was  only 2 days ago, not requiring prns today, plan to DC CIWA and prns if pt remains w/o s/s withdrawal in AM  - PO thiamine, folic acid  - QHS Melatonin     CKD III  - Creatinine stable. Monitoring off of IV fluids   - Cr 1.47 today, slightly up, CTM  - AM BMP     Anemia  - Hgb stable around 9  - iron studies show ACD with iron deficiency  - Give IV iron x 3 doses  - B12, folate wnl     Hypothyroid  - Continue Levothyroxine  - TSH slightly elevated, free T4 normal    GERD  - Continue PPI      Neuropathy  - Continue Gabapentin      Expected Discharge Location and Transportation: SNF rehab vs home with   Expected Discharge   Expected Discharge Date: 10/11/2024; Expected Discharge Time:      VTE Prophylaxis:  Pharmacologic & mechanical VTE prophylaxis orders are present.         AM-PAC 6 Clicks Score (PT): 17 (10/10/24 1108)    CODE STATUS:   Code Status and Medical Interventions: CPR (Attempt to Resuscitate); Full Support   Ordered at: 10/06/24 0246     Code Status (Patient has no pulse and is not breathing):    CPR (Attempt to Resuscitate)     Medical Interventions (Patient has pulse or is breathing):    Full Support       Sherri Jones, ANETTE  10/10/24

## 2024-10-10 NOTE — THERAPY TREATMENT NOTE
Patient Name: Alysia Sierra  : 1955    MRN: [id number]                              Today's Date: 10/10/2024       Admit Date: 10/5/2024    Visit Dx:     ICD-10-CM ICD-9-CM   1. Displaced fracture of distal end of left tibia  S82.302A 824.8   2. Closed nondisplaced fracture of second metatarsal bone of left foot, initial encounter  S92.325A 825.25   3. Closed nondisplaced fracture of first metatarsal bone of left foot, initial encounter  S92.315A 825.25   4. Closed nondisplaced fracture of third metatarsal bone of left foot, initial encounter  S92.335A 825.25   5. Closed nondisplaced fracture of fourth metatarsal bone of left foot, initial encounter  S92.345A 825.25     Patient Active Problem List   Diagnosis    Hypertension    Leg weakness, bilateral    Syncope    CKD (chronic kidney disease) stage 3, GFR 30-59 ml/min    Neuropathy    Hyperlipidemia LDL goal <100    Endometrial adenocarcinoma    Mild episode of recurrent major depressive disorder    Dizziness    Hip fracture    Anemia    Hypomagnesemia    Hypothyroidism    Post-menopausal    Other osteoporosis without current pathological fracture    Closed fracture of second lumbar vertebra    Alcoholic ketoacidosis    Leukocytosis    GERD with esophagitis    Sepsis    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    Lisfranc dislocation, left, initial encounter    Closed fracture of left ankle    Closed displaced fracture of fourth metatarsal bone of left foot     Past Medical History:   Diagnosis Date    Allergic lisinopril  2017    Anemia     Arrhythmia     Arthritis     Cancer     uterine    Cataract mild 2020    stil lpresent    Chicken pox     Chronic fatigue     CKD (chronic kidney disease), stage III     sees nephro    Clotting disorder     Dental root implant present     lower left  x1 - possible dental implant    Depression mild 2019    Difficulty walking 2019    Disease of thyroid gland     Dizzy     NIEVES (dyspnea on exertion)     2017     Fracture of hip     Generalized anxiety disorder     GERD (gastroesophageal reflux disease) 2018    History of brachytherapy 2023    vaginal brachytherapy    Hyperlipidemia     Hypertension     Iron deficiency anemia     Liver disease     fatty    Liver problem     Measles     Menopause     Mumps     Neuromuscular disorder Peripheral Neuropathy    Orthostatic hypotension     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    Renal insufficiency 2018    Scoliosis     Unintentional weight loss     Uses contact lenses     bilat    Uterine cancer     Uterine cancer 2022    UTI (urinary tract infection)     Visual impairment Nearsighted    Wears glasses      Past Surgical History:   Procedure Laterality Date     SECTION      DILATION AND CURETTAGE, DIAGNOSTIC / THERAPEUTIC      HIP OPEN REDUCTION Left 2023    Procedure: FEMORAL NECK OPEN REDUCTION INTERNAL FIXATION LEFT;  Surgeon: Juanpablo Lee MD;  Location: [facility];  Service: Orthopedics;  Laterality: Left;    HIP SURGERY      OOPHORECTOMY      ORAL LESION EXCISION/BIOPSY  2021    TOTAL LAPAROSCOPIC HYSTERECTOMY SALPINGO OOPHORECTOMY N/A 10/28/2022    Procedure: TOTAL LAPAROSCOPIC HYSTERECTOMY BILATERAL SALPINGO-OOPHORECTOMY, INJECTION FOR SENTINEL LYMPH NODE MAPPING, BILATERAL SENTINEL LYMPH NODE DISSECTION WITH DAVINCI ROBOT;  Surgeon: Jasmine Rich MD;  Location: [facility] OR;  Service: Robotics - DaVinci;  Laterality: N/A;    TUBAL ABDOMINAL LIGATION        General Information       Row Name 10/10/24 1101          Physical Therapy Time and Intention    Document Type therapy note (daily note)  -SC     Mode of Treatment physical therapy  -SC       Row Name 10/10/24 1101          General Information    Patient Profile Reviewed yes  -SC     Existing Precautions/Restrictions fall;other (see comments);left;non-weight bearing;seizures  -SC       Row Name 10/10/24 1101          Cognition    Orientation Status  (Cognition) oriented x 4  -SC       Row Name 10/10/24 1101          Safety Issues, Functional Mobility    Impairments Affecting Function (Mobility) endurance/activity tolerance;balance;strength;pain  -SC     Comment, Safety Issues/Impairments (Mobility) alert, following commands  -SC               User Key  (r) = Recorded By, (t) = Taken By, (c) = Cosigned By      Initials Name Provider Type    SC Grace Bradley, PT Physical Therapist                   Mobility       Row Name 10/10/24 1102          Bed Mobility    Bed Mobility scooting/bridging;supine-sit  -SC     Scooting/Bridging Parks (Bed Mobility) independent  -SC     Supine-Sit Parks (Bed Mobility) supervision;verbal cues  -SC     Comment, (Bed Mobility) able to get to EOB  -SC       Row Name 10/10/24 1102          Sit-Stand Transfer    Sit-Stand Parks (Transfers) minimum assist (75% patient effort);2 person assist;verbal cues  -SC     Assistive Device (Sit-Stand Transfers) other (see comments);walker knee scooter  -SC     Comment, (Sit-Stand Transfer) worked on sequencing getting on/off scooter with min assist to place leg on padding  -SC       Row Name 10/10/24 1102          Gait/Stairs (Locomotion)    Parks Level (Gait) 1 person to manage equipment;1 person assist;verbal cues;minimum assist (75% patient effort)  -SC     Assistive Device (Gait) walker, knee scooter  -SC     Distance in Feet (Gait) 200  -SC     Deviations/Abnormal Patterns (Gait) stride length decreased;weight shifting decreased;werner decreased  -SC     Bilateral Gait Deviations forward flexed posture  -SC     Comment, (Gait/Stairs) Gt training focused on controlilng knee scooter . Cues for full wt shifting onto knee pad. Required min assist to turn safely. Overall no LOB  -SC               User Key  (r) = Recorded By, (t) = Taken By, (c) = Cosigned By      Initials Name Provider Type    Grace Russell, PT Physical Therapist                    Obj/Interventions       Row Name 10/10/24 1105          Motor Skills    Therapeutic Exercise knee  -SC       Row Name 10/10/24 1105          Knee (Therapeutic Exercise)    Knee (Therapeutic Exercise) AROM (active range of motion);strengthening exercise  -SC     Knee AROM (Therapeutic Exercise) left;LAQ (long arc quad);10 repetitions  -SC     Knee Strengthening (Therapeutic Exercise) left;LAQ (long arc quad);10 repetitions  -SC       Row Name 10/10/24 1105          Balance    Dynamic Standing Balance 1-person assist;1 person to manage equipment;minimal assist  -SC     Comment, Balance used scooter  -SC               User Key  (r) = Recorded By, (t) = Taken By, (c) = Cosigned By      Initials Name Provider Type    SC Grace Bradley, PT Physical Therapist                   Goals/Plan    No documentation.                  Clinical Impression       Row Name 10/10/24 1106          Pain    Additional Documentation Pain Scale: FACES Pre/Post-Treatment (Group)  -[facility] Name 10/10/24 1106          Pain Scale: FACES Pre/Post-Treatment    Pain: FACES Scale, Pretreatment 2-->hurts little bit  -SC     Posttreatment Pain Rating 2-->hurts little bit  -SC     Pain Location - Side/Orientation Left  -SC     Pain Location lower  -SC     Pain Location - extremity  -SC       Row Name 10/10/24 1106          Plan of Care Review    Plan of Care Reviewed With patient  -SC     Progress improving  -SC     Outcome Evaluation Patient was able to initiate ambulation with scooter today. She did required min assist to control turning. Overall good effort. Will need more practice. Recommend IPR  -[facility] Name 10/10/24 1106          Therapy Assessment/Plan (PT)    Patient/Family Therapy Goals Statement (PT) get better  -SC     Rehab Potential (PT) good, to achieve stated therapy goals  -SC     Criteria for Skilled Interventions Met (PT) yes;meets criteria;skilled treatment is necessary  -SC     Therapy Frequency (PT) daily  -[facility]  Name 10/10/24 1106          Positioning and Restraints    Pre-Treatment Position in bed  -SC     Post Treatment Position chair  -SC     In Chair notified nsg;reclined;sitting;call light within reach;encouraged to call for assist;exit alarm on  -SC               User Key  (r) = Recorded By, (t) = Taken By, (c) = Cosigned By      Initials Name Provider Type    SC Grace Bradley, PT Physical Therapist                   Outcome Measures       Row Name 10/10/24 1108 10/10/24 0828       How much help from another person do you currently need...    Turning from your back to your side while in flat bed without using bedrails? 3  -SC 3  -HAYDEN    Moving from lying on back to sitting on the side of a flat bed without bedrails? 3  -SC 3  -HAYDEN    Moving to and from a bed to a chair (including a wheelchair)? 3  -SC 3  -HAYDEN    Standing up from a chair using your arms (e.g., wheelchair, bedside chair)? 3  -SC 3  -HAYDEN    Climbing 3-5 steps with a railing? 2  -SC 2  -HAYDEN    To walk in hospital room? 3  -SC 3  -HAYDEN    AM-PAC 6 Clicks Score (PT) 17  -SC 17  -HAYDEN    Highest Level of Mobility Goal 5 --> Static standing  -SC 5 --> Static standing  -HAYDEN      Row Name 10/10/24 1108          Functional Assessment    Outcome Measure Options AM-PAC 6 Clicks Basic Mobility (PT)  -SC               User Key  (r) = Recorded By, (t) = Taken By, (c) = Cosigned By      Initials Name Provider Type    SC Grace Bradley, PT Physical Therapist    Monica Hutson, RN Registered Nurse                                 Physical Therapy Education       Title: PT OT SLP Therapies (Done)       Topic: Physical Therapy (Done)       Point: Mobility training (Done)       Learning Progress Summary             Patient AMANDA Castorena VU by SC at 10/10/2024 1108    Comment: reviewed safety with mobility    AcceptanceAMANDA D, MELINA,NR by AB at 10/9/2024 1006                         Point: Home exercise program (Done)       Learning Progress Summary             Patient AMANDA Castorena,  VU by SC at 10/10/2024 1108    Comment: reviewed safety with mobility                         Point: Body mechanics (Done)       Learning Progress Summary             Patient Eager, E, VU by SC at 10/10/2024 1108    Comment: reviewed safety with mobility    Acceptance, E,D, VU,NR by AB at 10/9/2024 1006                         Point: Precautions (Done)       Learning Progress Summary             Patient Eager, E, VU by SC at 10/10/2024 1108    Comment: reviewed safety with mobility    Acceptance, E,D, VU,NR by AB at 10/9/2024 1006                                         User Key       Initials Effective Dates Name Provider Type Discipline    SC 02/03/23 -  Grace Bradley PT Physical Therapist PT    AB 09/22/22 -  Margaret Bhatt, PT Physical Therapist PT                  PT Recommendation and Plan     Plan of Care Reviewed With: patient  Progress: improving  Outcome Evaluation: Patient was able to initiate ambulation with scooter today. She did required min assist to control turning. Overall good effort. Will need more practice. Recommend IPR     Time Calculation:         PT Charges       Row Name 10/10/24 1008             Time Calculation    Start Time 1008  -SC      PT Received On 10/10/24  -SC      PT Goal Re-Cert Due Date 10/19/24  -SC         Timed Charges    35277 - PT Therapeutic Exercise Minutes 8  -SC      39447 - Gait Training Minutes  15  -SC      07554 - PT Therapeutic Activity Minutes 5  -SC         Total Minutes    Timed Charges Total Minutes 28  -SC       Total Minutes 28  -SC                User Key  (r) = Recorded By, (t) = Taken By, (c) = Cosigned By      Initials Name Provider Type    SC Grace Bradley, PT Physical Therapist                  Therapy Charges for Today       Code Description Service Date Service Provider Modifiers Qty    24430653889 HC PT THER PROC EA 15 MIN 10/10/2024 Grace Bradley, PT GP 1    05347445419 HC GAIT TRAINING EA 15 MIN 10/10/2024 Grace Bradley PT GP 1     [id number]  PT THER SUPP EA 15 MIN 10/10/2024 Grace Bradley PT GP 2            PT G-Codes  Outcome Measure Options: AM-PAC 6 Clicks Basic Mobility (PT)  AM-PAC 6 Clicks Score (PT): 17  AM-PAC 6 Clicks Score (OT): 16  PT Discharge Summary  Anticipated Discharge Disposition (PT): inpatient rehabilitation facility    Grace Bradley, PT  10/10/2024

## 2024-10-10 NOTE — TELEPHONE ENCOUNTER
Name: KIRT    Relationship: [facility]    Best Callback Number: [phone]     HUB PROVIDED THE RELAY MESSAGE FROM THE OFFICE   PATIENT VOICED UNDERSTANDING AND HAS NO FURTHER QUESTIONS AT THIS TIME    ADDITIONAL INFORMATION:

## 2024-10-11 LAB
ANION GAP SERPL CALCULATED.3IONS-SCNC: 11 MMOL/L (ref 5–15)
BUN SERPL-MCNC: 12 MG/DL (ref 8–23)
BUN/CREAT SERPL: 8.2 (ref 7–25)
CALCIUM SPEC-SCNC: 8.7 MG/DL (ref 8.6–10.5)
CHLORIDE SERPL-SCNC: 105 MMOL/L (ref 98–107)
CO2 SERPL-SCNC: 25 MMOL/L (ref 22–29)
CREAT SERPL-MCNC: 1.46 MG/DL (ref 0.57–1)
EGFRCR SERPLBLD CKD-EPI 2021: 39 ML/MIN/1.73
GLUCOSE SERPL-MCNC: 108 MG/DL (ref 65–99)
POTASSIUM SERPL-SCNC: 4.2 MMOL/L (ref 3.5–5.2)
SODIUM SERPL-SCNC: 141 MMOL/L (ref 136–145)

## 2024-10-11 PROCEDURE — 99232 SBSQ HOSP IP/OBS MODERATE 35: CPT | Performed by: INTERNAL MEDICINE

## 2024-10-11 PROCEDURE — 25810000003 SODIUM CHLORIDE 0.9 % SOLUTION 250 ML FLEX CONT: Performed by: PHYSICIAN ASSISTANT

## 2024-10-11 PROCEDURE — 97116 GAIT TRAINING THERAPY: CPT

## 2024-10-11 PROCEDURE — 25010000002 HEPARIN (PORCINE) PER 1000 UNITS: Performed by: PHYSICIAN ASSISTANT

## 2024-10-11 PROCEDURE — 80048 BASIC METABOLIC PNL TOTAL CA: CPT | Performed by: NURSE PRACTITIONER

## 2024-10-11 PROCEDURE — 25010000002 NA FERRIC GLUC CPLX PER 12.5 MG: Performed by: PHYSICIAN ASSISTANT

## 2024-10-11 PROCEDURE — 97530 THERAPEUTIC ACTIVITIES: CPT

## 2024-10-11 RX ORDER — DIAZEPAM 5 MG
5 TABLET ORAL EVERY 8 HOURS
Status: DISCONTINUED | OUTPATIENT
Start: 2024-10-11 | End: 2024-10-13

## 2024-10-11 RX ORDER — DOCOSANOL 100 MG/G
1 CREAM TOPICAL
Status: DISCONTINUED | OUTPATIENT
Start: 2024-10-11 | End: 2024-10-21 | Stop reason: HOSPADM

## 2024-10-11 RX ADMIN — SODIUM CHLORIDE 250 MG: 9 INJECTION, SOLUTION INTRAVENOUS at 09:23

## 2024-10-11 RX ADMIN — HEPARIN SODIUM 5000 UNITS: 5000 INJECTION INTRAVENOUS; SUBCUTANEOUS at 15:10

## 2024-10-11 RX ADMIN — GABAPENTIN 600 MG: 300 CAPSULE ORAL at 20:43

## 2024-10-11 RX ADMIN — HEPARIN SODIUM 5000 UNITS: 5000 INJECTION INTRAVENOUS; SUBCUTANEOUS at 20:43

## 2024-10-11 RX ADMIN — ASPIRIN 81 MG: 81 TABLET, COATED ORAL at 09:19

## 2024-10-11 RX ADMIN — DIAZEPAM 5 MG: 5 TABLET ORAL at 18:05

## 2024-10-11 RX ADMIN — OXYCODONE HYDROCHLORIDE 5 MG: 5 TABLET ORAL at 11:18

## 2024-10-11 RX ADMIN — LEVOTHYROXINE SODIUM 25 MCG: 25 TABLET ORAL at 05:58

## 2024-10-11 RX ADMIN — Medication 10 ML: at 09:25

## 2024-10-11 RX ADMIN — OXYCODONE HYDROCHLORIDE 5 MG: 5 TABLET ORAL at 16:58

## 2024-10-11 RX ADMIN — HEPARIN SODIUM 5000 UNITS: 5000 INJECTION INTRAVENOUS; SUBCUTANEOUS at 05:58

## 2024-10-11 RX ADMIN — DULOXETINE HYDROCHLORIDE 20 MG: 20 CAPSULE, DELAYED RELEASE ORAL at 09:20

## 2024-10-11 RX ADMIN — DIAZEPAM 5 MG: 5 TABLET ORAL at 09:20

## 2024-10-11 RX ADMIN — THIAMINE HCL TAB 100 MG 200 MG: 100 TAB at 09:19

## 2024-10-11 RX ADMIN — Medication 10 ML: at 20:44

## 2024-10-11 RX ADMIN — PANTOPRAZOLE SODIUM 40 MG: 40 TABLET, DELAYED RELEASE ORAL at 09:19

## 2024-10-11 RX ADMIN — GABAPENTIN 300 MG: 300 CAPSULE ORAL at 09:19

## 2024-10-11 RX ADMIN — Medication 5 MG: at 20:42

## 2024-10-11 RX ADMIN — DULOXETINE HYDROCHLORIDE 20 MG: 20 CAPSULE, DELAYED RELEASE ORAL at 20:43

## 2024-10-11 RX ADMIN — FOLIC ACID 1 MG: 1 TABLET ORAL at 09:19

## 2024-10-11 RX ADMIN — METOPROLOL SUCCINATE 25 MG: 25 TABLET, EXTENDED RELEASE ORAL at 20:42

## 2024-10-11 NOTE — PROGRESS NOTES
[facility] Medicine Services  PROGRESS NOTE    Patient Name: Alysia Sierra  : 1955  MRN: [id number]    Date of Admission: 10/5/2024  Primary Care Physician: Cassy Foster MD    Subjective   Subjective     CC:  F/u ankle fracture    HPI:  Patient was seen and examined this morning. Comfortable in bed. No withdrawal symptoms. Still c/o tense feeling that something is rubbing against her ankle.  Seen by orthopedic yesterday evening for the same complaint but she still having it.  Complaining of sore on her upper lip      Objective   Objective     Vital Signs:   Temp:  [97.8 °F (36.6 °C)-98.5 °F (36.9 °C)] 97.8 °F (36.6 °C)  Heart Rate:  [68-89] 73  Resp:  [16-18] 18  BP: ()/(58-82) 134/72     Physical Exam:  General: Comfortable, not in distress, conversant and cooperative  Head: Atraumatic and normocephalic  Eyes: No Icterus. No pallor  Ears:  Ears appear intact with no abnormalities noted  Throat: No oral lesions, no thrush  Neck: Supple, trachea midline  Lungs: Clear to auscultation bilaterally, equal air entry, no wheezing or crackles  Heart:  Normal S1 and S2, no murmur, no gallop, No JVD, no lower extremity swelling  Abdomen:  Soft, no tenderness, no organomegaly, normal bowel sounds, no organomegaly  Extremities: Left leg in cast  Skin: No bleeding, bruising or rash, normal skin turgor and elasticity  Neurologic: Cranial nerves appear intact with no evidence of facial asymmetry, normal motor and sensory functions in all 4 extremities  Psych: Alert and oriented x 3, normal mood    Results Reviewed:  LAB RESULTS:      Lab 10/09/24  0600 10/07/24  0636 10/05/24  2259   WBC 4.49 5.71 10.14   HEMOGLOBIN 8.8* 9.4* 10.7*   HEMATOCRIT 27.6* 30.2* 33.4*   PLATELETS 176 172 224   NEUTROS ABS  --   --  6.76   IMMATURE GRANS (ABS)  --   --  0.03   LYMPHS ABS  --   --  1.86   MONOS ABS  --   --  1.41*   EOS ABS  --   --  0.05   MCV 98.6* 100.3* 98.2*         Lab 10/10/24  1013  10/07/24  0636 10/05/24  2336 10/05/24  2259   SODIUM 142 135* 136  --    POTASSIUM 4.7 4.4 4.4  --    CHLORIDE 109* 99 102  --    CO2 21.0* 26.0 22.0  --    ANION GAP 12.0 10.0 12.0  --    BUN 10 11 16  --    CREATININE 1.47* 1.15* 1.33*  --    EGFR 38.7* 52.0* 43.7*  --    GLUCOSE 129* 104* 104*  --    CALCIUM 8.7 8.8 8.5*  --    MAGNESIUM  --   --  1.8  --    HEMOGLOBIN A1C  --   --   --  5.20   TSH  --  4.540*  --   --          Lab 10/05/24  2336   TOTAL PROTEIN 6.4   ALBUMIN 4.1   GLOBULIN 2.3   ALT (SGPT) 10   AST (SGOT) 22   BILIRUBIN 0.3   ALK PHOS 74                 Lab 10/09/24  0600 10/08/24  1718   IRON  --  20*   IRON SATURATION (TSAT)  --  7*   TIBC  --  280*   TRANSFERRIN  --  188*   FOLATE >20.00  --    VITAMIN B 12 832  --          Brief Urine Lab Results  (Last result in the past 365 days)        Color   Clarity   Blood   Leuk Est   Nitrite   Protein   CREAT   Urine HCG        06/22/24 1604 Yellow   Clear   Negative   Negative   Negative   Negative                   Microbiology Results Abnormal       None            XR Ankle 3+ View Left    Result Date: 10/10/2024  XR ANKLE 3+ VW LEFT Date of Exam: 10/10/2024 2:14 PM EDT Indication: fracture follow-up; new pain Comparison: 10/6/2024. Findings: The exam was obtained through a cast.. 3 views. Subtle nondisplaced medial malleolar fracture appears similar. Fracture of the medial talus is similar. Known fractures of the first through fourth proximal metatarsals are not well seen on this exam. Ankle  joint is maintained.     Impression: Impression: Stable ankle fractures. Electronically Signed: Aracely Argueta MD  10/10/2024 2:54 PM EDT  Workstation ID: [id number]     Results for orders placed during the hospital encounter of 01/02/24    Adult Transthoracic Echo Complete W/ Cont if Necessary Per Protocol    Interpretation Summary    Left ventricular systolic function is normal. Calculated left ventricular EF = 57.9% Left ventricular ejection fraction  appears to be 56 - 60%.    Left ventricular diastolic function was normal.    Estimated right ventricular systolic pressure from tricuspid regurgitation is normal (<35 mmHg).    No significant change from previous study      Current medications:  Scheduled Meds:aspirin, 81 mg, Oral, Daily  diazePAM, 5 mg, Oral, Q8H  DULoxetine, 20 mg, Oral, BID  ferric gluconate, 250 mg, Intravenous, Daily  folic acid, 1 mg, Oral, Daily  gabapentin, 300 mg, Oral, Daily  gabapentin, 600 mg, Oral, Nightly  heparin (porcine), 5,000 Units, Subcutaneous, Q8H  levothyroxine, 25 mcg, Oral, Q AM  melatonin, 5 mg, Oral, Nightly  metoprolol succinate XL, 25 mg, Oral, Nightly  pantoprazole, 40 mg, Oral, Every Other Day  sodium chloride, 10 mL, Intravenous, Q12H  thiamine, 200 mg, Oral, Daily      Continuous Infusions:   PRN Meds:.  senna-docusate sodium **AND** polyethylene glycol **AND** bisacodyl **AND** bisacodyl    Calcium Replacement - Follow Nurse / BPA Driven Protocol    docosanol    HYDROmorphone **AND** naloxone    influenza vaccine    Magnesium Standard Dose Replacement - Follow Nurse / BPA Driven Protocol    oxyCODONE    Phosphorus Replacement - Follow Nurse / BPA Driven Protocol    Potassium Replacement - Follow Nurse / BPA Driven Protocol    [COMPLETED] Insert Peripheral IV **AND** sodium chloride    sodium chloride    Assessment & Plan   Assessment & Plan     Active Hospital Problems    Diagnosis  POA    **Closed fracture of left ankle [S82.362A]  Unknown    Closed displaced fracture of fourth metatarsal bone of left foot [S92.342A]  Yes    Lisfranc dislocation, left, initial encounter [M10.228A]  Yes      Resolved Hospital Problems   No resolved problems to display.        Brief Hospital Course to date:  Alysia Sierra is a 69 y.o. female with history of CKD III, chronic anemia, uterine cancer, hypothyroidism and alcohol abuse who was admitted to [facility] on 10/5/24 for L ankle fracture after stepping awkwardly  off her bed.     Acute L foot Lisfranc fracture with involvement of 1st, 2nd, 3rd and 4th metatarsal bases  Acute nondisplaced L medial malleolus fracture  Acute nondisplaced L posterior medial talus fracture   1st-4th proximal metatarsal fracture Lisfranc fracture  LLE splinted. Fracture blister on dorsal aspect of L foot noted - per ortho allow blisters to decompress on their own  Aggressive elevation, goal to maintain elevation above heart  Surgery tentatively planned for 10/18 with sadia Jaeger to discharge and return for surgery, f/u with Dr. Godoy in office next week  PRN pain management  PT/OT following, recommendation for rehab       Alcohol withdrawal, improved  History of alcohol abuse   Patient denies daily alcohol use, however family reports daily heavy alcohol use  Developed confusion / hallucinations on afternoon of 10/8 and started on CIWA protocol  Withdrawal symptoms resolved.  Discontinue CIWA protocol  Continue scheduled Valium and taper down over the next few days  Continue PO thiamine, folic acid  Continue QHS Melatonin     CKD III  Creatinine stable around baseline    Anemia  Iron studies show ACD with iron deficiency  Status post IV iron  B12, folate within normal limits     Hypothyroid  Continue Levothyroxine    GERD  Continue PPI      Neuropathy  Continue Gabapentin      Expected Discharge Location and Transportation: SNF rehab vs home with   Expected Discharge   Expected Discharge Date: 10/12/2024; Expected Discharge Time:      VTE Prophylaxis:  Pharmacologic & mechanical VTE prophylaxis orders are present.         AM-PAC 6 Clicks Score (PT): 18 (10/11/24 0953)    CODE STATUS:   Code Status and Medical Interventions: CPR (Attempt to Resuscitate); Full Support   Ordered at: 10/06/24 0246     Code Status (Patient has no pulse and is not breathing):    CPR (Attempt to Resuscitate)     Medical Interventions (Patient has pulse or is breathing):    Full Support       Nandini Wilcox,  MD  10/11/24

## 2024-10-11 NOTE — PLAN OF CARE
Goal Outcome Evaluation:      Patient VSS. A&O on room air. Splint redone by ortho office. Patient pain much better after. PRN pain medication given as ordered. Left leg elevated. LBM today. Pending rehab for discharge.

## 2024-10-11 NOTE — PLAN OF CARE
Problem: Adult Inpatient Plan of Care  Goal: Plan of Care Review  Outcome: Progressing  Goal: Patient-Specific Goal (Individualized)  Outcome: Progressing  Goal: Absence of Hospital-Acquired Illness or Injury  Outcome: Progressing  Intervention: Identify and Manage Fall Risk  Recent Flowsheet Documentation  Taken 10/11/2024 0607 by Robinson Rosa RN  Safety Promotion/Fall Prevention:   activity supervised   assistive device/personal items within reach   clutter free environment maintained   fall prevention program maintained   gait belt   mobility aid in reach   nonskid shoes/slippers when out of bed   room organization consistent   safety round/check completed  Taken 10/11/2024 0400 by Robinson Rosa RN  Safety Promotion/Fall Prevention:   activity supervised   assistive device/personal items within reach   clutter free environment maintained   fall prevention program maintained   gait belt   mobility aid in reach   nonskid shoes/slippers when out of bed   room organization consistent   safety round/check completed  Taken 10/11/2024 0200 by Robinson Rosa RN  Safety Promotion/Fall Prevention:   activity supervised   assistive device/personal items within reach   clutter free environment maintained   fall prevention program maintained   gait belt   mobility aid in reach   nonskid shoes/slippers when out of bed   room organization consistent   safety round/check completed  Taken 10/10/2024 2355 by Robinson Rosa RN  Safety Promotion/Fall Prevention:   activity supervised   assistive device/personal items within reach   clutter free environment maintained   fall prevention program maintained   gait belt   mobility aid in reach   nonskid shoes/slippers when out of bed   room organization consistent   safety round/check completed  Taken 10/10/2024 2155 by Robinson Rosa RN  Safety Promotion/Fall Prevention:   activity supervised   assistive device/personal items within reach   clutter free environment  maintained   fall prevention program maintained   gait belt   mobility aid in reach   nonskid shoes/slippers when out of bed   room organization consistent   safety round/check completed  Taken 10/10/2024 1945 by Robinson Rosa RN  Safety Promotion/Fall Prevention:   activity supervised   assistive device/personal items within reach   clutter free environment maintained   fall prevention program maintained   gait belt   mobility aid in reach   nonskid shoes/slippers when out of bed   room organization consistent   safety round/check completed  Intervention: Prevent Skin Injury  Recent Flowsheet Documentation  Taken 10/11/2024 0607 by Robinson Rosa RN  Body Position: position changed independently  Taken 10/11/2024 0400 by Robinson Rosa RN  Body Position: position changed independently  Taken 10/11/2024 0200 by Robinson Rosa RN  Body Position: position changed independently  Skin Protection:   adhesive use limited   incontinence pads utilized   tubing/devices free from skin contact  Taken 10/10/2024 2355 by Robinson Rosa RN  Body Position: position changed independently  Skin Protection:   adhesive use limited   incontinence pads utilized   tubing/devices free from skin contact  Taken 10/10/2024 2155 by Robinson Rosa RN  Body Position: position changed independently  Taken 10/10/2024 1945 by Robinson Rosa RN  Body Position: position changed independently  Skin Protection:   adhesive use limited   tubing/devices free from skin contact  Intervention: Prevent and Manage VTE (Venous Thromboembolism) Risk  Recent Flowsheet Documentation  Taken 10/11/2024 0607 by Robinson Rosa RN  Activity Management: activity encouraged  VTE Prevention/Management: (heparin sq) other (see comments)  Taken 10/11/2024 0400 by Robinson Rosa RN  Activity Management: activity encouraged  VTE Prevention/Management: (heparin sq) other (see comments)  Taken 10/11/2024 0200 by Robinson Rosa RN  Activity Management: activity  encouraged  VTE Prevention/Management: (heparin) other (see comments)  Taken 10/10/2024 2355 by Robinson Rosa RN  Activity Management: activity minimized  VTE Prevention/Management: (heparin sq)   right   sequential compression devices on   other (see comments)  Taken 10/10/2024 2155 by Robinson Rosa RN  Activity Management: activity encouraged  VTE Prevention/Management: (heparin sq)   right   sequential compression devices on   other (see comments)  Taken 10/10/2024 1945 by Robinson Rosa RN  Activity Management: activity encouraged  VTE Prevention/Management: (heaprin sq)   right   sequential compression devices on  Range of Motion: active ROM (range of motion) encouraged  Intervention: Prevent Infection  Recent Flowsheet Documentation  Taken 10/11/2024 0607 by Robinson Rosa RN  Infection Prevention:   rest/sleep promoted   single patient room provided  Taken 10/11/2024 0400 by Robinson Rosa RN  Infection Prevention:   rest/sleep promoted   single patient room provided  Taken 10/11/2024 0200 by Robinson Rosa RN  Infection Prevention:   rest/sleep promoted   single patient room provided  Taken 10/10/2024 2355 by Robinson Rosa RN  Infection Prevention:   rest/sleep promoted   single patient room provided  Taken 10/10/2024 2155 by Robinson Rosa RN  Infection Prevention:   rest/sleep promoted   single patient room provided  Taken 10/10/2024 1945 by Robinson Rosa RN  Infection Prevention:   environmental surveillance performed   rest/sleep promoted   single patient room provided  Goal: Optimal Comfort and Wellbeing  Outcome: Progressing  Intervention: Monitor Pain and Promote Comfort  Recent Flowsheet Documentation  Taken 10/11/2024 0607 by Robinson Rosa RN  Pain Management Interventions:   no interventions per patient request   quiet environment facilitated  Taken 10/10/2024 2355 by Robinson Rosa RN  Pain Management Interventions:   no interventions per patient request   quiet environment  facilitated  Taken 10/10/2024 2155 by Robinson Rosa RN  Pain Management Interventions: see MAR  Taken 10/10/2024 1945 by Robinson Rosa RN  Pain Management Interventions:   pillow support provided   position adjusted   quiet environment facilitated  Intervention: Provide Person-Centered Care  Recent Flowsheet Documentation  Taken 10/11/2024 0607 by Robinson Rosa RN  Trust Relationship/Rapport:   care explained   questions encouraged  Taken 10/10/2024 2155 by Robinson Rosa RN  Trust Relationship/Rapport:   care explained   questions encouraged  Taken 10/10/2024 1945 by Robinson Rosa RN  Trust Relationship/Rapport: care explained  Goal: Readiness for Transition of Care  Outcome: Progressing     Problem: Pain Chronic (Persistent) (Comorbidity Management)  Goal: Acceptable Pain Control and Functional Ability  Outcome: Progressing  Intervention: Manage Persistent Pain  Recent Flowsheet Documentation  Taken 10/10/2024 1945 by Robinson Rosa RN  Medication Review/Management: medications reviewed  Intervention: Develop Pain Management Plan  Recent Flowsheet Documentation  Taken 10/11/2024 0607 by Robinson Rosa RN  Pain Management Interventions:   no interventions per patient request   quiet environment facilitated  Taken 10/10/2024 2355 by Robinson Rosa RN  Pain Management Interventions:   no interventions per patient request   quiet environment facilitated  Taken 10/10/2024 2155 by Robinson Rosa RN  Pain Management Interventions: see MAR  Taken 10/10/2024 1945 by Robinson Rosa RN  Pain Management Interventions:   pillow support provided   position adjusted   quiet environment facilitated  Intervention: Optimize Psychosocial Wellbeing  Recent Flowsheet Documentation  Taken 10/11/2024 0607 by Robinson Rosa RN  Diversional Activities:   smartphone   television  Family/Support System Care:   self-care encouraged   support provided  Taken 10/10/2024 2155 by Robinson Rosa RN  Diversional Activities:    smartphone   television  Family/Support System Care:   self-care encouraged   support provided  Taken 10/10/2024 1945 by Robinson Rosa RN  Diversional Activities:   smartphone   television  Family/Support System Care: self-care encouraged     Problem: Skin Injury Risk Increased  Goal: Skin Health and Integrity  Outcome: Progressing  Intervention: Optimize Skin Protection  Recent Flowsheet Documentation  Taken 10/11/2024 0607 by Robinson Rosa RN  Head of Bed ([facility]) Positioning: HOB elevated  Taken 10/11/2024 0400 by Robinson Rosa RN  Head of Bed ([facility]) Positioning: HOB elevated  Taken 10/11/2024 0200 by Robinson Rosa RN  Pressure Reduction Techniques: frequent weight shift encouraged  Head of Bed (HOB) Positioning: HOB elevated  Pressure Reduction Devices: pressure-redistributing mattress utilized  Skin Protection:   adhesive use limited   incontinence pads utilized   tubing/devices free from skin contact  Taken 10/10/2024 2355 by Robinson Rosa RN  Pressure Reduction Techniques: frequent weight shift encouraged  Head of Bed (HOB) Positioning: [facility] elevated  Pressure Reduction Devices: pressure-redistributing mattress utilized  Skin Protection:   adhesive use limited   incontinence pads utilized   tubing/devices free from skin contact  Taken 10/10/2024 2155 by Robinson oRsa RN  Head of Bed ([facility]) Positioning: HOB elevated  Taken 10/10/2024 1945 by Robinson Rosa RN  Pressure Reduction Techniques:   frequent weight shift encouraged   pressure points protected  Head of Bed (HOB) Positioning: [facility] elevated  Pressure Reduction Devices: pressure-redistributing mattress utilized  Skin Protection:   adhesive use limited   tubing/devices free from skin contact     Problem: Fall Injury Risk  Goal: Absence of Fall and Fall-Related Injury  Outcome: Progressing  Intervention: Identify and Manage Contributors  Recent Flowsheet Documentation  Taken 10/11/2024 0607 by Robinson Rosa RN  Self-Care Promotion: independence  encouraged  Taken 10/11/2024 0400 by Robinson Rosa RN  Self-Care Promotion: independence encouraged  Taken 10/11/2024 0200 by Robinson Rosa RN  Self-Care Promotion: independence encouraged  Taken 10/10/2024 2355 by Robinson Rosa RN  Self-Care Promotion: independence encouraged  Taken 10/10/2024 2155 by Robinson Rosa RN  Self-Care Promotion: independence encouraged  Taken 10/10/2024 1945 by Robinson Rosa RN  Medication Review/Management: medications reviewed  Self-Care Promotion: independence encouraged  Intervention: Promote Injury-Free Environment  Recent Flowsheet Documentation  Taken 10/11/2024 0607 by Robinson Rosa RN  Safety Promotion/Fall Prevention:   activity supervised   assistive device/personal items within reach   clutter free environment maintained   fall prevention program maintained   gait belt   mobility aid in reach   nonskid shoes/slippers when out of bed   room organization consistent   safety round/check completed  Taken 10/11/2024 0400 by Robinson Rosa RN  Safety Promotion/Fall Prevention:   activity supervised   assistive device/personal items within reach   clutter free environment maintained   fall prevention program maintained   gait belt   mobility aid in reach   nonskid shoes/slippers when out of bed   room organization consistent   safety round/check completed  Taken 10/11/2024 0200 by Robinson Rosa RN  Safety Promotion/Fall Prevention:   activity supervised   assistive device/personal items within reach   clutter free environment maintained   fall prevention program maintained   gait belt   mobility aid in reach   nonskid shoes/slippers when out of bed   room organization consistent   safety round/check completed  Taken 10/10/2024 2355 by Robinson Rosa RN  Safety Promotion/Fall Prevention:   activity supervised   assistive device/personal items within reach   clutter free environment maintained   fall prevention program maintained   gait belt   mobility aid in reach    nonskid shoes/slippers when out of bed   room organization consistent   safety round/check completed  Taken 10/10/2024 2155 by Robinson Rosa RN  Safety Promotion/Fall Prevention:   activity supervised   assistive device/personal items within reach   clutter free environment maintained   fall prevention program maintained   gait belt   mobility aid in reach   nonskid shoes/slippers when out of bed   room organization consistent   safety round/check completed  Taken 10/10/2024 1945 by Robinson Rosa RN  Safety Promotion/Fall Prevention:   activity supervised   assistive device/personal items within reach   clutter free environment maintained   fall prevention program maintained   gait belt   mobility aid in reach   nonskid shoes/slippers when out of bed   room organization consistent   safety round/check completed     Problem: Bleeding (Orthopaedic Fracture)  Goal: Absence of Bleeding  Outcome: Progressing     Problem: Embolism (Orthopaedic Fracture)  Goal: Absence of Embolism Signs and Symptoms  Outcome: Progressing  Intervention: Prevent or Manage Embolism Risk  Recent Flowsheet Documentation  Taken 10/11/2024 0607 by Robinson Rosa RN  VTE Prevention/Management: (heparin sq) other (see comments)  Taken 10/11/2024 0400 by Robinson Rosa RN  VTE Prevention/Management: (heparin sq) other (see comments)  Taken 10/11/2024 0200 by Robinson Rosa RN  VTE Prevention/Management: (heparin) other (see comments)  Taken 10/10/2024 2355 by Robinson Rosa RN  VTE Prevention/Management: (heparin sq)   right   sequential compression devices on   other (see comments)  Taken 10/10/2024 2155 by Robinson Rosa RN  VTE Prevention/Management: (heparin sq)   right   sequential compression devices on   other (see comments)  Taken 10/10/2024 1945 by Robinson Rosa RN  VTE Prevention/Management: (heaprin sq)   right   sequential compression devices on     Problem: Fracture Stabilization and Management (Orthopaedic Fracture)  Goal:  Fracture Stability  Outcome: Progressing     Problem: Functional Ability Impaired (Orthopaedic Fracture)  Goal: Optimal Functional Ability  Outcome: Progressing  Intervention: Optimize Functional Ability  Recent Flowsheet Documentation  Taken 10/11/2024 0607 by Robinson Rosa RN  Activity Management: activity encouraged  Positioning/Transfer Devices:   pillows   in use  Self-Care Promotion: independence encouraged  Taken 10/11/2024 0400 by Robinson Rosa RN  Activity Management: activity encouraged  Positioning/Transfer Devices:   pillows   in use  Self-Care Promotion: independence encouraged  Taken 10/11/2024 0200 by Robinson Rosa RN  Activity Management: activity encouraged  Positioning/Transfer Devices:   pillows   in use  Self-Care Promotion: independence encouraged  Taken 10/10/2024 2355 by Robinson Rosa RN  Activity Management: activity minimized  Positioning/Transfer Devices:   pillows   in use  Self-Care Promotion: independence encouraged  Taken 10/10/2024 2155 by Robinson Rosa RN  Activity Management: activity encouraged  Positioning/Transfer Devices:   pillows   in use  Self-Care Promotion: independence encouraged  Taken 10/10/2024 1945 by Robinson Rosa RN  Activity Management: activity encouraged  Positioning/Transfer Devices:   pillows   in use  Self-Care Promotion: independence encouraged  Range of Motion: active ROM (range of motion) encouraged     Problem: Infection (Orthopaedic Fracture)  Goal: Absence of Infection Signs and Symptoms  Outcome: Progressing  Intervention: Prevent or Manage Infection  Recent Flowsheet Documentation  Taken 10/11/2024 0607 by Robinson Rosa RN  Infection Prevention:   rest/sleep promoted   single patient room provided  Taken 10/11/2024 0400 by Robinson Rosa RN  Infection Prevention:   rest/sleep promoted   single patient room provided  Taken 10/11/2024 0200 by Robinson Rosa RN  Infection Prevention:   rest/sleep promoted   single patient room  provided  Taken 10/10/2024 2355 by Robinson Rosa RN  Infection Prevention:   rest/sleep promoted   single patient room provided  Taken 10/10/2024 2155 by Robinson Rosa RN  Infection Prevention:   rest/sleep promoted   single patient room provided  Taken 10/10/2024 1945 by Robinson Rosa RN  Infection Prevention:   environmental surveillance performed   rest/sleep promoted   single patient room provided     Problem: Neurovascular Compromise (Orthopaedic Fracture)  Goal: Effective Tissue Perfusion  Outcome: Progressing     Problem: Pain (Orthopaedic Fracture)  Goal: Acceptable Pain Control  Outcome: Progressing  Intervention: Manage Acute Orthopaedic-Related Pain  Recent Flowsheet Documentation  Taken 10/11/2024 0607 by Robinson Rosa RN  Pain Management Interventions:   no interventions per patient request   quiet environment facilitated  Diversional Activities:   smartphone   television  Taken 10/10/2024 2355 by Robinson Rosa RN  Pain Management Interventions:   no interventions per patient request   quiet environment facilitated  Taken 10/10/2024 2155 by Robinson Rosa RN  Pain Management Interventions: see MAR  Diversional Activities:   smartphone   television  Taken 10/10/2024 1945 by Robinson Rosa RN  Pain Management Interventions:   pillow support provided   position adjusted   quiet environment facilitated  Diversional Activities:   smartphone   television     Problem: Respiratory Compromise (Orthopaedic Fracture)  Goal: Effective Oxygenation and Ventilation  Outcome: Progressing  Intervention: Promote Airway Secretion Clearance  Recent Flowsheet Documentation  Taken 10/10/2024 1945 by Robinson Rosa RN  Cough And Deep Breathing: done independently per patient   Goal Outcome Evaluation:         Pt A&Ox4. VSS. RA. C/o intermittent pain to left foot which pt states is being well controlled by PRN po pain mediction. LLE elevated.

## 2024-10-11 NOTE — CASE MANAGEMENT/SOCIAL WORK
Continued Stay Note  [facility]     Patient Name: Alysia Sierra  MRN: [id number]  Today's Date: 10/11/2024    Admit Date: 10/5/2024    Plan: [facility]   Discharge Plan       Row Name 10/11/24 1159       Plan    Plan [facility]    Plan Comments I spoke with patient earlier today.   She would like referral or inpatient rehab and request a referral to [facility]. I have given the referral to [facility].    Final Discharge Disposition Code 03 - skilled nursing facility (SNF)                   Discharge Codes    No documentation.                 Expected Discharge Date and Time       Expected Discharge Date Expected Discharge Time    Oct 12, 2024               Melvi Bear RN

## 2024-10-11 NOTE — PLAN OF CARE
Goal Outcome Evaluation:  Plan of Care Reviewed With: patient        Progress: no change  Outcome Evaluation: Patient limited today by significant pain in L ankle, patient reports she feels something digging into her skin, RN and MDs aware. She was able to complete mobility on knee scooter still requiring Roxana for balance and navigating turns as well as frequent cues for safety. IPPT remains indicated to address current deficits. Continue to recommend D/C to IPR.      Anticipated Discharge Disposition (PT): inpatient rehabilitation facility

## 2024-10-11 NOTE — PROGRESS NOTES
"      Orthopaedic Surgery Progress Note  Chief complaint  Burning pain lateral left ankle  Subjective   Interval History:   Paged by nurse stating that patient was having left ankle pain and it felt like a piece of metal was sticking out of her ankle.  I arrived at the patient's bedside.  She was resting comfortably in her chair.  I asked her if she was having any pain she said now she said she was having a burning sensation in her ankle and it was not painful.  I partially took down her splint and could not see any visible deformity or area of unusual swelling.    ROS: Denies fever, chills, nausea or vomiting    Objective     Vital Signs:  Temp (24hrs), Av.2 °F (36.8 °C), Min:98 °F (36.7 °C), Max:98.5 °F (36.9 °C)    /82 (BP Location: Left arm, Patient Position: Lying)   Pulse 76   Temp 98.3 °F (36.8 °C) (Oral)   Resp 16   Ht 162.6 cm (64\")   Wt 61.2 kg (135 lb)   LMP  (LMP Unknown)   SpO2 97%   BMI 23.17 kg/m²   Body mass index is 23.17 kg/m².    Physical Exam:  left LE: Splint taken down for exam compartments soft/compressible about the leg, no areas of prominence on exam   + Wiggling toes   SILT in toes   CR brisk      Imaging:  XR ANKLE 3+ VW LEFT  Date of Exam: 10/10/2024 2:14 PM EDT  Indication: fracture follow-up; new pain  Comparison: 10/6/2024.  Findings:  The exam was obtained through a cast.. 3 views. Subtle nondisplaced medial malleolar  fracture appears similar. Fracture of the medial talus is similar. Known fractures of the first  through fourth proximal metatarsals are not well seen on this exam.  Ankle   joint is maintained.  IMPRESSION:  Impression:  Stable ankle fractures.  Electronically Signed: Aracely Argueta MD   10/10/2024 2:54 PM EDT   Workstation ID: [id number]  Signed by: Aracely Argueta MD on 10/10/2024  2:54 PM  PATIENT: JAYDEN MAGANA, [id number], Female, 69 y    10/10/24 I have personally reviewed and interpreted the images from outside facility with the documented " findings, redemonstration nondisplaced medial malleolar fracture, no other identifiable fractures about the ankle, ankle mortise well-maintained and x-ray similar to previous    Assessment and Plan:  69-year-old female with left foot Lisfranc fracture dislocation as well as ipsilateral ankle fracture    -Short leg splint removed and replaced with new short leg fiberglass nonweightbearing splint, supplies provided by clinic  -Plan for surgery the end of next week likely Thursday the 17th versus Friday the 18th  -Nonweightbearing left lower extremity  -PT/OT  -Keep splint clean dry and intact  -Pain control  -Aggressive elevation, spoke to nursing staff about the importance of elevation and helping to redirect patient if confused to maintain elevation above the heart is much as possible  -Diet per primary  -DVT prophylaxis, chemical and mechanical, per primary  -Okay for discharge from orthopedic standpoint, aggressive elevation in order to help prepare her for surgery.  If patient is discharged she can follow-up with me in the office early next week prior to surgery for a skin check.  -Patient with fracture blisters identified on exam yesterday, plan is to allow blisters to decompress on their own, keep splint in place.       Sergio Godoy MD  10/10/24  20:48 EDT        Continue Regimen: clobetasol ointment PRN Render In Strict Bullet Format?: No Detail Level: Zone

## 2024-10-11 NOTE — THERAPY TREATMENT NOTE
Patient Name: Alysia Sierra  : 1955    MRN: [id number]                              Today's Date: 10/11/2024       Admit Date: 10/5/2024    Visit Dx:     ICD-10-CM ICD-9-CM   1. Displaced fracture of distal end of left tibia  S82.302A 824.8   2. Closed nondisplaced fracture of second metatarsal bone of left foot, initial encounter  S92.325A 825.25   3. Closed nondisplaced fracture of first metatarsal bone of left foot, initial encounter  S92.315A 825.25   4. Closed nondisplaced fracture of third metatarsal bone of left foot, initial encounter  S92.335A 825.25   5. Closed nondisplaced fracture of fourth metatarsal bone of left foot, initial encounter  S92.345A 825.25     Patient Active Problem List   Diagnosis    Hypertension    Leg weakness, bilateral    Syncope    CKD (chronic kidney disease) stage 3, GFR 30-59 ml/min    Neuropathy    Hyperlipidemia LDL goal <100    Endometrial adenocarcinoma    Mild episode of recurrent major depressive disorder    Dizziness    Hip fracture    Anemia    Hypomagnesemia    Hypothyroidism    Post-menopausal    Other osteoporosis without current pathological fracture    Closed fracture of second lumbar vertebra    Alcoholic ketoacidosis    Leukocytosis    GERD with esophagitis    Sepsis    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    Lisfranc dislocation, left, initial encounter    Closed fracture of left ankle    Closed displaced fracture of fourth metatarsal bone of left foot     Past Medical History:   Diagnosis Date    Allergic lisinopril  2017    Anemia     Arrhythmia     Arthritis     Cancer     uterine    Cataract mild 2020    stil lpresent    Chicken pox     Chronic fatigue     CKD (chronic kidney disease), stage III     sees nephro    Clotting disorder     Dental root implant present     lower left  x1 - possible dental implant    Depression mild 2019    Difficulty walking 2019    Disease of thyroid gland     Dizzy     NIEVES (dyspnea on exertion)     2017     Fracture of hip     Generalized anxiety disorder     GERD (gastroesophageal reflux disease) 2018    History of brachytherapy 2023    vaginal brachytherapy    Hyperlipidemia     Hypertension     Iron deficiency anemia     Liver disease     fatty    Liver problem     Measles     Menopause     Mumps     Neuromuscular disorder Peripheral Neuropathy    Orthostatic hypotension     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    Renal insufficiency 2018    Scoliosis     Unintentional weight loss     Uses contact lenses     bilat    Uterine cancer     Uterine cancer 2022    UTI (urinary tract infection)     Visual impairment Nearsighted    Wears glasses      Past Surgical History:   Procedure Laterality Date     SECTION      DILATION AND CURETTAGE, DIAGNOSTIC / THERAPEUTIC      HIP OPEN REDUCTION Left 2023    Procedure: FEMORAL NECK OPEN REDUCTION INTERNAL FIXATION LEFT;  Surgeon: Juanpablo Lee MD;  Location: [facility] [place];  Service: Orthopedics;  Laterality: Left;    HIP SURGERY      OOPHORECTOMY      ORAL LESION EXCISION/BIOPSY  2021    TOTAL LAPAROSCOPIC HYSTERECTOMY SALPINGO OOPHORECTOMY N/A 10/28/2022    Procedure: TOTAL LAPAROSCOPIC HYSTERECTOMY BILATERAL SALPINGO-OOPHORECTOMY, INJECTION FOR SENTINEL LYMPH NODE MAPPING, BILATERAL SENTINEL LYMPH NODE DISSECTION WITH DAVINCI ROBOT;  Surgeon: Jasmine Rich MD;  Location: [facility] OR;  Service: Robotics - DaVinci;  Laterality: N/A;    TUBAL ABDOMINAL LIGATION        General Information       Row Name 10/11/24 1145          Physical Therapy Time and Intention    Document Type therapy note (daily note)  -CK     Mode of Treatment individual therapy;physical therapy  -CK       Row Name 10/11/24 1145          General Information    Patient Profile Reviewed yes  -CK     Existing Precautions/Restrictions fall;left;non-weight bearing;other (see comments)  LLE NWB  -CK     Barriers to Rehab previous functional deficit   -CK       Row Name 10/11/24 1145          Cognition    Orientation Status (Cognition) oriented x 4  -CK       Row Name 10/11/24 1145          Safety Issues, Functional Mobility    Safety Issues Affecting Function (Mobility) awareness of need for assistance;insight into deficits/self-awareness;safety precaution awareness;safety precautions follow-through/compliance  -CK     Impairments Affecting Function (Mobility) endurance/activity tolerance;balance;strength;pain  -CK               User Key  (r) = Recorded By, (t) = Taken By, (c) = Cosigned By      Initials Name Provider Type    CK Karen Mcarthur, PT Physical Therapist                   Mobility       Row Name 10/11/24 1145          Bed Mobility    Bed Mobility supine-sit  -CK     Supine-Sit Stanford (Bed Mobility) supervision  -CK       Row Name 10/11/24 1145          Sit-Stand Transfer    Sit-Stand Stanford (Transfers) minimum assist (75% patient effort);verbal cues;1 person assist  -CK     Assistive Device (Sit-Stand Transfers) other (see comments)  knee scooter  -CK     Comment, (Sit-Stand Transfer) cues for safe hand placement and scooter setup, requires assist for balance when standing and assist for proper knee placement on pad  -CK       Row Name 10/11/24 1145          Gait/Stairs (Locomotion)    Stanford Level (Gait) 1 person assist;verbal cues;minimum assist (75% patient effort)  -CK     Assistive Device (Gait) walker, knee scooter  -CK     Distance in Feet (Gait) 120  -CK     Deviations/Abnormal Patterns (Gait) bilateral deviations;werner decreased;gait speed decreased  -CK     Bilateral Gait Deviations forward flexed posture  -CK     Comment, (Gait/Stairs) Patient completed gait training on knee scooter. Cues for relaxed shoulders and to keep BUEs on handlebars at all times. Requires assist for balance and also sequencing during turns. She required 2 standing rest breaks due to c/o L thigh pain/fatigue while on knee scooter.  -CK        Row Name 10/11/24 1145          Mobility    Extremity Weight-bearing Status left lower extremity  -CK     Left Lower Extremity (Weight-bearing Status) non weight-bearing (NWB)   -CK               User Key  (r) = Recorded By, (t) = Taken By, (c) = Cosigned By      Initials Name Provider Type    Karen Ritter PT Physical Therapist                   Obj/Interventions       Row Name 10/11/24 1148          Motor Skills    Therapeutic Exercise other (see comments)  unable to perform due to L ankle pain  -CK       Row Name 10/11/24 1148          Balance    Balance Assessment sitting static balance;standing static balance;standing dynamic balance  -CK     Static Sitting Balance supervision  -CK     Position, Sitting Balance unsupported;sitting in chair;sitting edge of bed  -CK     Static Standing Balance contact guard  -CK     Dynamic Standing Balance minimal assist  -CK     Position/Device Used, Standing Balance supported;other (see comments)  knee scooter  -CK     Comment, Balance unsteady at times on knee scooter  -CK               User Key  (r) = Recorded By, (t) = Taken By, (c) = Cosigned By      Initials Name Provider Type    Karen Ritter PT Physical Therapist                   Goals/Plan    No documentation.                  Clinical Impression       Row Name 10/11/24 1149          Pain    Pain Intervention(s) Ambulation/increased activity;Repositioned;Elevated;Nursing Notified  -CK     Additional Documentation Pain Scale: FACES Pre/Post-Treatment (Group)  -CK       Row Name 10/11/24 1140          Pain Scale: FACES Pre/Post-Treatment    Pain: FACES Scale, Pretreatment 4-->hurts little more  -CK     Posttreatment Pain Rating 8-->hurts whole lot  -CK     Pain Location - Side/Orientation Left  -CK     Pain Location lower  -CK     Pain Location - extremity  -CK     Pre/Posttreatment Pain Comment patient c/o pain at her L lateral malleolus feels that something is digging into her skin, RN and multiple  MDs aware  -CK       Row Name 10/11/24 1149          Plan of Care Review    Plan of Care Reviewed With patient  -CK     Progress no change  -CK     Outcome Evaluation Patient limited today by significant pain in L ankle, patient reports she feels something digging into her skin, RN and MDs aware. She was able to complete mobility on knee scooter still requiring Roxana for balance and navigating turns as well as frequent cues for safety. IPPT remains indicated to address current deficits. Continue to recommend D/C to IPR.  -CK       Row Name 10/11/24 1149          Vital Signs    O2 Delivery Pre Treatment room air  -CK     O2 Delivery Intra Treatment room air  -CK     O2 Delivery Post Treatment room air  -CK     Pre Patient Position Supine  -CK     Post Patient Position Sitting  -CK       Row Name 10/11/24 1149          Positioning and Restraints    Pre-Treatment Position in bed  -CK     Post Treatment Position chair  -CK     In Chair reclined;call light within reach;encouraged to call for assist;exit alarm on;waffle cushion;on mechanical lift sling;LLE elevated;notified nsg  -CK               User Key  (r) = Recorded By, (t) = Taken By, (c) = Cosigned By      Initials Name Provider Type    CK Karen Mcarthur, PT Physical Therapist                   Outcome Measures       Row Name 10/11/24 1154 10/11/24 0925       How much help from another person do you currently need...    Turning from your back to your side while in flat bed without using bedrails? 4  -CK 3  -HAYDEN    Moving from lying on back to sitting on the side of a flat bed without bedrails? 4  -CK 3  -HAYDEN    Moving to and from a bed to a chair (including a wheelchair)? 3  -CK 3  -HAYDEN    Standing up from a chair using your arms (e.g., wheelchair, bedside chair)? 3  -CK 3  -HAYDEN    Climbing 3-5 steps with a railing? 2  -CK 3  -HAYDEN    To walk in hospital room? 3  -CK 3  -HAYDEN    AM-PAC 6 Clicks Score (PT) 19  -CK 18  -HAYDEN    Highest Level of Mobility Goal 6 --> Walk 10  steps or more  -CK 6 --> Walk 10 steps or more  -      Row Name 10/11/24 1154          Functional Assessment    Outcome Measure Options AM-PAC 6 Clicks Basic Mobility (PT)  -               User Key  (r) = Recorded By, (t) = Taken By, (c) = Cosigned By      Initials Name Provider Type    CK Karen Mcarthur, PT Physical Therapist    Monica Hutson, RN Registered Nurse                                 Physical Therapy Education       Title: PT OT SLP Therapies (Done)       Topic: Physical Therapy (Done)       Point: Mobility training (Done)       Learning Progress Summary             Patient Acceptance, E, VU by  at 10/11/2024 1154    Eager, E, VU by SC at 10/10/2024 1108    Comment: reviewed safety with mobility    Acceptance, E,D, VU,NR by AB at 10/9/2024 1006                         Point: Home exercise program (Done)       Learning Progress Summary             Patient Eager, E, VU by SC at 10/10/2024 1108    Comment: reviewed safety with mobility                         Point: Body mechanics (Done)       Learning Progress Summary             Patient Acceptance, E, VU by  at 10/11/2024 1154    Eager, E, VU by SC at 10/10/2024 1108    Comment: reviewed safety with mobility    Acceptance, E,D, VU,NR by AB at 10/9/2024 1006                         Point: Precautions (Done)       Learning Progress Summary             Patient Acceptance, E, VU by  at 10/11/2024 1154    Eager, E, VU by SC at 10/10/2024 1108    Comment: reviewed safety with mobility    Acceptance, E,D, VU,NR by AB at 10/9/2024 1006                                         User Key       Initials Effective Dates Name Provider Type Discipline    SC 02/03/23 -  Grace Bradley, PT Physical Therapist PT    AB 09/22/22 -  Margaret Bhatt, PT Physical Therapist PT     02/06/24 -  Karen Mcarthur, PT Physical Therapist PT                  PT Recommendation and Plan     Plan of Care Reviewed With: patient  Progress: no change  Outcome  Evaluation: Patient limited today by significant pain in L ankle, patient reports she feels something digging into her skin, RN and MDs aware. She was able to complete mobility on knee scooter still requiring Roxana for balance and navigating turns as well as frequent cues for safety. IPPT remains indicated to address current deficits. Continue to recommend D/C to IPR.     Time Calculation:         PT Charges       Row Name 10/11/24 1154             Time Calculation    Start Time 1047  -CK      PT Received On 10/11/24  -CK         Timed Charges    71861 - Gait Training Minutes  15  -CK      40767 - PT Therapeutic Activity Minutes 14  -CK         Total Minutes    Timed Charges Total Minutes 29  -CK       Total Minutes 29  -CK                User Key  (r) = Recorded By, (t) = Taken By, (c) = Cosigned By      Initials Name Provider Type    CK Karen Mcarthur, PT Physical Therapist                  Therapy Charges for Today       Code Description Service Date Service Provider Modifiers Qty    [id number] HC GAIT TRAINING EA 15 MIN 10/11/2024 Karen Mcarthur, PT GP 1    [id number]  PT THERAPEUTIC ACT EA 15 MIN 10/11/2024 Karen Mcarthur, PT GP 1            PT G-Codes  Outcome Measure Options: AM-PAC 6 Clicks Basic Mobility (PT)  AM-PAC 6 Clicks Score (PT): 19  AM-PAC 6 Clicks Score (OT): 17  PT Discharge Summary  Anticipated Discharge Disposition (PT): inpatient rehabilitation facility    Karen Mcarthur PT  10/11/2024

## 2024-10-12 PROCEDURE — 99232 SBSQ HOSP IP/OBS MODERATE 35: CPT | Performed by: INTERNAL MEDICINE

## 2024-10-12 PROCEDURE — 97110 THERAPEUTIC EXERCISES: CPT

## 2024-10-12 PROCEDURE — 97116 GAIT TRAINING THERAPY: CPT

## 2024-10-12 PROCEDURE — 25010000002 HEPARIN (PORCINE) PER 1000 UNITS: Performed by: PHYSICIAN ASSISTANT

## 2024-10-12 RX ORDER — LANOLIN ALCOHOL/MO/W.PET/CERES
1000 CREAM (GRAM) TOPICAL DAILY
Status: DISCONTINUED | OUTPATIENT
Start: 2024-10-12 | End: 2024-10-21 | Stop reason: HOSPADM

## 2024-10-12 RX ORDER — ROSUVASTATIN CALCIUM 10 MG/1
5 TABLET, COATED ORAL DAILY
Status: DISCONTINUED | OUTPATIENT
Start: 2024-10-12 | End: 2024-10-21 | Stop reason: HOSPADM

## 2024-10-12 RX ADMIN — Medication 10 ML: at 08:41

## 2024-10-12 RX ADMIN — HEPARIN SODIUM 5000 UNITS: 5000 INJECTION INTRAVENOUS; SUBCUTANEOUS at 05:24

## 2024-10-12 RX ADMIN — GABAPENTIN 600 MG: 300 CAPSULE ORAL at 20:15

## 2024-10-12 RX ADMIN — MAGNESIUM OXIDE TAB 400 MG (241.3 MG ELEMENTAL MG) 400 MG: 400 (241.3 MG) TAB at 10:25

## 2024-10-12 RX ADMIN — DULOXETINE HYDROCHLORIDE 20 MG: 20 CAPSULE, DELAYED RELEASE ORAL at 20:14

## 2024-10-12 RX ADMIN — FOLIC ACID 1 MG: 1 TABLET ORAL at 08:40

## 2024-10-12 RX ADMIN — GABAPENTIN 300 MG: 300 CAPSULE ORAL at 08:41

## 2024-10-12 RX ADMIN — THIAMINE HCL TAB 100 MG 200 MG: 100 TAB at 08:41

## 2024-10-12 RX ADMIN — METOPROLOL SUCCINATE 25 MG: 25 TABLET, EXTENDED RELEASE ORAL at 20:15

## 2024-10-12 RX ADMIN — OXYCODONE HYDROCHLORIDE 5 MG: 5 TABLET ORAL at 14:34

## 2024-10-12 RX ADMIN — LEVOTHYROXINE SODIUM 25 MCG: 25 TABLET ORAL at 05:24

## 2024-10-12 RX ADMIN — ASPIRIN 81 MG: 81 TABLET, COATED ORAL at 08:41

## 2024-10-12 RX ADMIN — DULOXETINE HYDROCHLORIDE 20 MG: 20 CAPSULE, DELAYED RELEASE ORAL at 08:41

## 2024-10-12 RX ADMIN — DIAZEPAM 5 MG: 5 TABLET ORAL at 10:25

## 2024-10-12 RX ADMIN — DOCOSANOL 10 % TOPICAL CREAM 1 APPLICATION: at 05:29

## 2024-10-12 RX ADMIN — Medication 5 MG: at 20:14

## 2024-10-12 RX ADMIN — HEPARIN SODIUM 5000 UNITS: 5000 INJECTION INTRAVENOUS; SUBCUTANEOUS at 20:52

## 2024-10-12 RX ADMIN — CYANOCOBALAMIN TAB 1000 MCG 1000 MCG: 1000 TAB at 10:25

## 2024-10-12 RX ADMIN — ROSUVASTATIN 5 MG: 10 TABLET, FILM COATED ORAL at 10:25

## 2024-10-12 RX ADMIN — DIAZEPAM 5 MG: 5 TABLET ORAL at 17:48

## 2024-10-12 RX ADMIN — Medication 10 ML: at 20:15

## 2024-10-12 RX ADMIN — HEPARIN SODIUM 5000 UNITS: 5000 INJECTION INTRAVENOUS; SUBCUTANEOUS at 14:29

## 2024-10-12 NOTE — PLAN OF CARE
Problem: Adult Inpatient Plan of Care  Goal: Plan of Care Review  Outcome: Progressing  Goal: Patient-Specific Goal (Individualized)  Outcome: Progressing  Goal: Absence of Hospital-Acquired Illness or Injury  Outcome: Progressing  Intervention: Identify and Manage Fall Risk  Recent Flowsheet Documentation  Taken 10/12/2024 0632 by Robinson Rosa RN  Safety Promotion/Fall Prevention:   activity supervised   assistive device/personal items within reach   clutter free environment maintained   fall prevention program maintained   gait belt   mobility aid in reach   nonskid shoes/slippers when out of bed   room organization consistent   safety round/check completed  Taken 10/12/2024 0410 by Robinson Rosa RN  Safety Promotion/Fall Prevention:   activity supervised   assistive device/personal items within reach   clutter free environment maintained   fall prevention program maintained   gait belt   mobility aid in reach   nonskid shoes/slippers when out of bed   room organization consistent   safety round/check completed  Taken 10/12/2024 0210 by Robinson Rosa RN  Safety Promotion/Fall Prevention:   activity supervised   assistive device/personal items within reach   clutter free environment maintained   gait belt   mobility aid in reach   nonskid shoes/slippers when out of bed   room organization consistent   safety round/check completed  Taken 10/12/2024 0010 by Robinson Rosa RN  Safety Promotion/Fall Prevention:   activity supervised   assistive device/personal items within reach   clutter free environment maintained   gait belt   mobility aid in reach   nonskid shoes/slippers when out of bed   room organization consistent   safety round/check completed  Taken 10/11/2024 2205 by Robinson Rosa RN  Safety Promotion/Fall Prevention:   activity supervised   assistive device/personal items within reach   clutter free environment maintained   fall prevention program maintained   gait belt   mobility aid in reach    nonskid shoes/slippers when out of bed   room organization consistent   safety round/check completed  Taken 10/11/2024 2000 by Robinson Rosa RN  Safety Promotion/Fall Prevention:   activity supervised   assistive device/personal items within reach   clutter free environment maintained   fall prevention program maintained   gait belt  Intervention: Prevent Skin Injury  Recent Flowsheet Documentation  Taken 10/12/2024 0632 by Robinson Rosa RN  Body Position: position changed independently  Skin Protection: incontinence pads utilized  Taken 10/12/2024 0410 by Robinson Rosa RN  Body Position: position changed independently  Skin Protection: incontinence pads utilized  Taken 10/12/2024 0210 by Robinson Rosa RN  Body Position: position changed independently  Skin Protection: incontinence pads utilized  Taken 10/12/2024 0010 by Robinson Rosa RN  Body Position: position changed independently  Skin Protection: incontinence pads utilized  Taken 10/11/2024 2205 by Robinson Rosa RN  Body Position: position changed independently  Taken 10/11/2024 2000 by Robinson Rosa RN  Body Position: position changed independently  Skin Protection:   adhesive use limited   tubing/devices free from skin contact  Intervention: Prevent and Manage VTE (Venous Thromboembolism) Risk  Recent Flowsheet Documentation  Taken 10/12/2024 0632 by Robinson Rosa RN  VTE Prevention/Management: (heparin sq) other (see comments)  Taken 10/12/2024 0410 by Robinson Rosa RN  VTE Prevention/Management: (heparin sq) other (see comments)  Taken 10/12/2024 0210 by Robinson Rosa RN  VTE Prevention/Management: (heaprin sq) other (see comments)  Taken 10/12/2024 0010 by Robinson Rosa RN  VTE Prevention/Management: (heparin sq) other (see comments)  Taken 10/11/2024 2205 by Robinson Rosa RN  VTE Prevention/Management: (heparin sq) other (see comments)  Taken 10/11/2024 2000 by Robinson Rosa RN  VTE Prevention/Management: (heparin sq) other (see  comments)  Intervention: Prevent Infection  Recent Flowsheet Documentation  Taken 10/12/2024 0632 by Robinson Rosa RN  Infection Prevention:   rest/sleep promoted   single patient room provided  Taken 10/12/2024 0410 by Robinson Rosa RN  Infection Prevention:   environmental surveillance performed   rest/sleep promoted   single patient room provided  Taken 10/12/2024 0210 by Robinson Rosa RN  Infection Prevention:   environmental surveillance performed   rest/sleep promoted   single patient room provided  Taken 10/12/2024 0010 by Robinson Rosa RN  Infection Prevention:   rest/sleep promoted   single patient room provided  Taken 10/11/2024 2205 by Robinson Rosa RN  Infection Prevention:   environmental surveillance performed   single patient room provided   rest/sleep promoted  Taken 10/11/2024 2000 by Robinson Rosa RN  Infection Prevention:   environmental surveillance performed   rest/sleep promoted   single patient room provided  Goal: Optimal Comfort and Wellbeing  Outcome: Progressing  Intervention: Monitor Pain and Promote Comfort  Recent Flowsheet Documentation  Taken 10/12/2024 0632 by Robinson Rosa RN  Pain Management Interventions:   quiet environment facilitated   no interventions per patient request  Taken 10/11/2024 2205 by Robinson Rosa RN  Pain Management Interventions: quiet environment facilitated  Taken 10/11/2024 2000 by Robinson Rosa RN  Pain Management Interventions:   position adjusted   no interventions per patient request  Intervention: Provide Person-Centered Care  Recent Flowsheet Documentation  Taken 10/12/2024 0632 by Robinson Rosa RN  Trust Relationship/Rapport:   care explained   questions answered   questions encouraged  Taken 10/11/2024 2000 by Robinson Rosa RN  Trust Relationship/Rapport:   care explained   questions encouraged  Goal: Readiness for Transition of Care  Outcome: Progressing     Problem: Pain Chronic (Persistent) (Comorbidity Management)  Goal:  Acceptable Pain Control and Functional Ability  Outcome: Progressing  Intervention: Manage Persistent Pain  Recent Flowsheet Documentation  Taken 10/11/2024 2205 by Robinson Rosa RN  Medication Review/Management: medications reviewed  Intervention: Develop Pain Management Plan  Recent Flowsheet Documentation  Taken 10/12/2024 0632 by Robinson Rosa RN  Pain Management Interventions:   quiet environment facilitated   no interventions per patient request  Taken 10/11/2024 2205 by Robinson Rosa RN  Pain Management Interventions: quiet environment facilitated  Taken 10/11/2024 2000 by Robinson Rosa RN  Pain Management Interventions:   position adjusted   no interventions per patient request  Intervention: Optimize Psychosocial Wellbeing  Recent Flowsheet Documentation  Taken 10/12/2024 0632 by Robinson Rosa RN  Diversional Activities:   smartphone   television  Family/Support System Care:   self-care encouraged   support provided  Taken 10/11/2024 2000 by Robinson Rosa RN  Diversional Activities:   smartphone   television  Family/Support System Care:   self-care encouraged   support provided     Problem: Skin Injury Risk Increased  Goal: Skin Health and Integrity  Outcome: Progressing  Intervention: Optimize Skin Protection  Recent Flowsheet Documentation  Taken 10/12/2024 0632 by Robinson Rosa RN  Pressure Reduction Techniques: frequent weight shift encouraged  Head of Bed (HOB) Positioning: [facility] elevated  Pressure Reduction Devices: pressure-redistributing mattress utilized  Skin Protection: incontinence pads utilized  Taken 10/12/2024 0410 by Robinson Rosa RN  Pressure Reduction Techniques: frequent weight shift encouraged  Head of Bed (HOB) Positioning: HOB elevated  Pressure Reduction Devices: pressure-redistributing mattress utilized  Skin Protection: incontinence pads utilized  Taken 10/12/2024 0210 by Robinson Rosa RN  Pressure Reduction Techniques: frequent weight shift encouraged  Head of Bed  (HOB) Positioning: HOB elevated  Pressure Reduction Devices: pressure-redistributing mattress utilized  Skin Protection: incontinence pads utilized  Taken 10/12/2024 0010 by Robinson Rosa RN  Pressure Reduction Techniques: frequent weight shift encouraged  Head of Bed (HOB) Positioning: HOB elevated  Pressure Reduction Devices: pressure-redistributing mattress utilized  Skin Protection: incontinence pads utilized  Taken 10/11/2024 2205 by Robinson Rosa RN  Activity Management: activity encouraged  Head of Bed (HOB) Positioning: HOB elevated  Taken 10/11/2024 2000 by Robinson Rosa RN  Activity Management: activity encouraged  Pressure Reduction Techniques: frequent weight shift encouraged  Head of Bed (HOB) Positioning: HOB elevated  Pressure Reduction Devices: pressure-redistributing mattress utilized  Skin Protection:   adhesive use limited   tubing/devices free from skin contact     Problem: Fall Injury Risk  Goal: Absence of Fall and Fall-Related Injury  Outcome: Progressing  Intervention: Identify and Manage Contributors  Recent Flowsheet Documentation  Taken 10/12/2024 0632 by Robinson Rosa RN  Self-Care Promotion: independence encouraged  Taken 10/12/2024 0410 by Robinson Rosa RN  Self-Care Promotion: independence encouraged  Taken 10/12/2024 0210 by Robinson Rosa RN  Self-Care Promotion: independence encouraged  Taken 10/12/2024 0010 by Robinson Rosa RN  Self-Care Promotion: independence encouraged  Taken 10/11/2024 2205 by Robinson Rosa RN  Medication Review/Management: medications reviewed  Self-Care Promotion: independence encouraged  Taken 10/11/2024 2000 by Robinson Rosa RN  Self-Care Promotion: independence encouraged  Intervention: Promote Injury-Free Environment  Recent Flowsheet Documentation  Taken 10/12/2024 0632 by Robinson Rosa RN  Safety Promotion/Fall Prevention:   activity supervised   assistive device/personal items within reach   clutter free environment maintained   fall  prevention program maintained   gait belt   mobility aid in reach   nonskid shoes/slippers when out of bed   room organization consistent   safety round/check completed  Taken 10/12/2024 0410 by Robinson Rosa RN  Safety Promotion/Fall Prevention:   activity supervised   assistive device/personal items within reach   clutter free environment maintained   fall prevention program maintained   gait belt   mobility aid in reach   nonskid shoes/slippers when out of bed   room organization consistent   safety round/check completed  Taken 10/12/2024 0210 by Robinson Rosa RN  Safety Promotion/Fall Prevention:   activity supervised   assistive device/personal items within reach   clutter free environment maintained   gait belt   mobility aid in reach   nonskid shoes/slippers when out of bed   room organization consistent   safety round/check completed  Taken 10/12/2024 0010 by Robinson Rosa RN  Safety Promotion/Fall Prevention:   activity supervised   assistive device/personal items within reach   clutter free environment maintained   gait belt   mobility aid in reach   nonskid shoes/slippers when out of bed   room organization consistent   safety round/check completed  Taken 10/11/2024 2205 by Robinson Rosa RN  Safety Promotion/Fall Prevention:   activity supervised   assistive device/personal items within reach   clutter free environment maintained   fall prevention program maintained   gait belt   mobility aid in reach   nonskid shoes/slippers when out of bed   room organization consistent   safety round/check completed  Taken 10/11/2024 2000 by Robinson Rosa RN  Safety Promotion/Fall Prevention:   activity supervised   assistive device/personal items within reach   clutter free environment maintained   fall prevention program maintained   gait belt     Problem: Bleeding (Orthopaedic Fracture)  Goal: Absence of Bleeding  Outcome: Progressing     Problem: Embolism (Orthopaedic Fracture)  Goal: Absence of Embolism  Signs and Symptoms  Outcome: Progressing  Intervention: Prevent or Manage Embolism Risk  Recent Flowsheet Documentation  Taken 10/12/2024 0632 by Robinson Rosa RN  VTE Prevention/Management: (heparin sq) other (see comments)  Taken 10/12/2024 0410 by Robinson Rosa RN  VTE Prevention/Management: (heparin sq) other (see comments)  Taken 10/12/2024 0210 by Robinson Rosa RN  VTE Prevention/Management: (heaprin sq) other (see comments)  Taken 10/12/2024 0010 by Robinson Rosa RN  VTE Prevention/Management: (heparin sq) other (see comments)  Taken 10/11/2024 2205 by Robinson Rosa RN  VTE Prevention/Management: (heparin sq) other (see comments)  Taken 10/11/2024 2000 by Robinson Rosa RN  VTE Prevention/Management: (heparin sq) other (see comments)     Problem: Fracture Stabilization and Management (Orthopaedic Fracture)  Goal: Fracture Stability  Outcome: Progressing     Problem: Functional Ability Impaired (Orthopaedic Fracture)  Goal: Optimal Functional Ability  Outcome: Progressing  Intervention: Optimize Functional Ability  Recent Flowsheet Documentation  Taken 10/12/2024 0632 by Robinson Rosa RN  Positioning/Transfer Devices:   pillows   in use  Self-Care Promotion: independence encouraged  Taken 10/12/2024 0410 by Robinson Rosa RN  Positioning/Transfer Devices:   pillows   in use  Self-Care Promotion: independence encouraged  Taken 10/12/2024 0210 by Robinson Rosa RN  Positioning/Transfer Devices:   pillows   in use  Self-Care Promotion: independence encouraged  Taken 10/12/2024 0010 by Robinson Rosa RN  Positioning/Transfer Devices:   pillows   in use  Self-Care Promotion: independence encouraged  Taken 10/11/2024 2205 by Robinson Rosa RN  Activity Management: activity encouraged  Positioning/Transfer Devices:   pillows   in use  Self-Care Promotion: independence encouraged  Taken 10/11/2024 2000 by Robinson Rosa RN  Activity Management: activity encouraged  Positioning/Transfer Devices:    pillows   in use  Self-Care Promotion: independence encouraged  Range of Motion: active ROM (range of motion) encouraged     Problem: Infection (Orthopaedic Fracture)  Goal: Absence of Infection Signs and Symptoms  Outcome: Progressing     Problem: Neurovascular Compromise (Orthopaedic Fracture)  Goal: Effective Tissue Perfusion  Outcome: Progressing     Problem: Pain (Orthopaedic Fracture)  Goal: Acceptable Pain Control  Outcome: Progressing  Intervention: Manage Acute Orthopaedic-Related Pain  Recent Flowsheet Documentation  Taken 10/12/2024 0632 by Robinson Rosa RN  Pain Management Interventions:   quiet environment facilitated   no interventions per patient request  Taken 10/11/2024 2205 by Robinson Rosa RN  Pain Management Interventions: quiet environment facilitated  Taken 10/11/2024 2000 by Robinson Rosa RN  Pain Management Interventions:   position adjusted   no interventions per patient request     Problem: Respiratory Compromise (Orthopaedic Fracture)  Goal: Effective Oxygenation and Ventilation  Outcome: Progressing  Intervention: Promote Airway Secretion Clearance  Recent Flowsheet Documentation  Taken 10/11/2024 2205 by Robinson Rosa RN  Activity Management: activity encouraged  Taken 10/11/2024 2000 by Robinson Rosa RN  Activity Management: activity encouraged  Cough And Deep Breathing: done independently per patient  Intervention: Optimize Oxygenation and Ventilation  Recent Flowsheet Documentation  Taken 10/12/2024 0632 by Robinson Rosa RN  Head of Bed (HOB) Positioning: HOB elevated  Taken 10/12/2024 0410 by Robinson Rosa RN  Head of Bed ([facility]) Positioning: HOB elevated  Taken 10/12/2024 0210 by Robinson Rosa RN  Head of Bed (HOB) Positioning: HOB elevated  Taken 10/12/2024 0010 by Robinson Rosa RN  Head of Bed (HOB) Positioning: HOB elevated  Taken 10/11/2024 2205 by Robinson Rosa RN  Head of Bed (HOB) Positioning: HOB elevated  Taken 10/11/2024 2000 by Robinson Rosa RN  Head  of Bed (HOB) Positioning: HOB elevated   Goal Outcome Evaluation:

## 2024-10-12 NOTE — THERAPY TREATMENT NOTE
Patient Name: Alysia Sierra  : 1955    MRN: [id number]                              Today's Date: 10/12/2024       Admit Date: 10/5/2024    Visit Dx:     ICD-10-CM ICD-9-CM   1. Displaced fracture of distal end of left tibia  S82.302A 824.8   2. Closed nondisplaced fracture of second metatarsal bone of left foot, initial encounter  S92.325A 825.25   3. Closed nondisplaced fracture of first metatarsal bone of left foot, initial encounter  S92.315A 825.25   4. Closed nondisplaced fracture of third metatarsal bone of left foot, initial encounter  S92.335A 825.25   5. Closed nondisplaced fracture of fourth metatarsal bone of left foot, initial encounter  S92.345A 825.25     Patient Active Problem List   Diagnosis    Hypertension    Leg weakness, bilateral    Syncope    CKD (chronic kidney disease) stage 3, GFR 30-59 ml/min    Neuropathy    Hyperlipidemia LDL goal <100    Endometrial adenocarcinoma    Mild episode of recurrent major depressive disorder    Dizziness    Hip fracture    Anemia    Hypomagnesemia    Hypothyroidism    Post-menopausal    Other osteoporosis without current pathological fracture    Closed fracture of second lumbar vertebra    Alcoholic ketoacidosis    Leukocytosis    GERD with esophagitis    Sepsis    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    Lisfranc dislocation, left, initial encounter    Closed fracture of left ankle    Closed displaced fracture of fourth metatarsal bone of left foot     Past Medical History:   Diagnosis Date    Allergic lisinopril  2017    Anemia     Arrhythmia     Arthritis     Cancer     uterine    Cataract mild 2020    stil lpresent    Chicken pox     Chronic fatigue     CKD (chronic kidney disease), stage III     sees nephro    Clotting disorder     Dental root implant present     lower left  x1 - possible dental implant    Depression mild 2019    Difficulty walking 2019    Disease of thyroid gland     Dizzy     NIEVES (dyspnea on exertion)     2017     Fracture of hip     Generalized anxiety disorder     GERD (gastroesophageal reflux disease) 2018    History of brachytherapy 2023    vaginal brachytherapy    Hyperlipidemia     Hypertension     Iron deficiency anemia     Liver disease     fatty    Liver problem     Measles     Menopause     Mumps     Neuromuscular disorder Peripheral Neuropathy    Orthostatic hypotension     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    Renal insufficiency 2018    Scoliosis     Unintentional weight loss     Uses contact lenses     bilat    Uterine cancer     Uterine cancer 2022    UTI (urinary tract infection)     Visual impairment Nearsighted    Wears glasses      Past Surgical History:   Procedure Laterality Date     SECTION      DILATION AND CURETTAGE, DIAGNOSTIC / THERAPEUTIC      HIP OPEN REDUCTION Left 2023    Procedure: FEMORAL NECK OPEN REDUCTION INTERNAL FIXATION LEFT;  Surgeon: Juanpablo Lee MD;  Location: [facility];  Service: Orthopedics;  Laterality: Left;    HIP SURGERY      OOPHORECTOMY      ORAL LESION EXCISION/BIOPSY  2021    TOTAL LAPAROSCOPIC HYSTERECTOMY SALPINGO OOPHORECTOMY N/A 10/28/2022    Procedure: TOTAL LAPAROSCOPIC HYSTERECTOMY BILATERAL SALPINGO-OOPHORECTOMY, INJECTION FOR SENTINEL LYMPH NODE MAPPING, BILATERAL SENTINEL LYMPH NODE DISSECTION WITH DAVINCI ROBOT;  Surgeon: Jasmine Rich MD;  Location: [facility] OR;  Service: Robotics - DaVinci;  Laterality: N/A;    TUBAL ABDOMINAL LIGATION        General Information       Row Name 10/12/24 0956          Physical Therapy Time and Intention    Document Type therapy note (daily note)  -CK     Mode of Treatment individual therapy;physical therapy  -CK       Row Name 10/12/24 0956          General Information    Patient Profile Reviewed yes  -CK     Existing Precautions/Restrictions fall;left;non-weight bearing;other (see comments)  LLE NWB  -CK     Barriers to Rehab previous functional deficit   -CK       Row Name 10/12/24 0956          Cognition    Orientation Status (Cognition) oriented x 4  -CK       Row Name 10/12/24 0956          Safety Issues/Impairments Affecting Functional Mobility    Safety Issues Affecting Function (Mobility) awareness of need for assistance;insight into deficits/self-awareness;safety precaution awareness;safety precautions follow-through/compliance  -CK     Impairments Affecting Function (Mobility) endurance/activity tolerance;balance;strength;pain  -CK               User Key  (r) = Recorded By, (t) = Taken By, (c) = Cosigned By      Initials Name Provider Type    CK Karen Mcarthur, PT Physical Therapist                   Mobility       Row Name 10/12/24 0957          Bed Mobility    Bed Mobility supine-sit  -CK     Supine-Sit Jennings (Bed Mobility) supervision  -CK     Assistive Device (Bed Mobility) head of bed elevated  -CK       Row Name 10/12/24 0957          Sit-Stand Transfer    Sit-Stand Jennings (Transfers) minimum assist (75% patient effort);verbal cues;1 person assist  -CK     Assistive Device (Sit-Stand Transfers) other (see comments)  knee scooter  -CK     Comment, (Sit-Stand Transfer) cues for safe hand placement and scooter setup, patient did a better job placing scooter prior to sitting today, but still requires Roxana for balance to get on and off scooter  -CK       Row Name 10/12/24 0957          Gait/Stairs (Locomotion)    Jennings Level (Gait) 1 person assist;verbal cues;minimum assist (75% patient effort)  -CK     Assistive Device (Gait) walker, knee scooter  -CK     Distance in Feet (Gait) 200  -CK     Deviations/Abnormal Patterns (Gait) bilateral deviations;werner decreased;gait speed decreased  -CK     Bilateral Gait Deviations forward flexed posture  -CK     Comment, (Gait/Stairs) Patient mobilized in bass on knee scooter, improved pain today and did not require standing rest breaks. Cues for relaxed shoulders and most efficient RLE foot  placement to propel scooter. She needs Roxana during tight turns.  -CK       Row Name 10/12/24 0957          Mobility    Extremity Weight-bearing Status left lower extremity  -CK     Left Lower Extremity (Weight-bearing Status) non weight-bearing (NWB)   -CK               User Key  (r) = Recorded By, (t) = Taken By, (c) = Cosigned By      Initials Name Provider Type    CK Karen Mcarthur PT Physical Therapist                   Obj/Interventions       Row Name 10/12/24 0959          Motor Skills    Therapeutic Exercise knee;other (see comments)  patient reports compliance with performing HEP in bed  -CK       Row Name 10/12/24 0959          Knee (Therapeutic Exercise)    Knee Strengthening (Therapeutic Exercise) bilateral;LAQ (long arc quad);10 repetitions  -CK       Row Name 10/12/24 0959          Balance    Balance Assessment sitting static balance;standing static balance;standing dynamic balance  -CK     Static Sitting Balance supervision  -CK     Position, Sitting Balance unsupported;sitting in chair;sitting edge of bed  -CK     Static Standing Balance contact guard  -CK     Dynamic Standing Balance minimal assist  -CK     Position/Device Used, Standing Balance supported;other (see comments)  knee scooter  -CK               User Key  (r) = Recorded By, (t) = Taken By, (c) = Cosigned By      Initials Name Provider Type    CK Karen Mcarthur PT Physical Therapist                   Goals/Plan    No documentation.                  Clinical Impression       Row Name 10/12/24 1000          Pain    Pain Location extremity  -CK     Pain Side/Orientation left;lower  -CK     Pain Management Interventions exercise or physical activity utilized;positioning techniques utilized;premedicated for activity  -CK     Response to Pain Interventions activity level improved;functional ability improved;activity participation with tolerable pain  -CK     Additional Documentation Pain Scale: FACES Pre/Post-Treatment (Group)  -CK        Row Name 10/12/24 1000          Pain Scale: FACES Pre/Post-Treatment    Pain: FACES Scale, Pretreatment 2-->hurts little bit  -CK     Posttreatment Pain Rating 2-->hurts little bit  -CK       Row Name 10/12/24 1000          Plan of Care Review    Plan of Care Reviewed With patient  -CK     Progress improving  -CK     Outcome Evaluation Patient's L ankle pain improved today and she was able to participate better with mobility. She is still requiring assist to get on/off knee scooter and also in tight turns while mobilizing on scooter. Patient would continue to benefit from knee scooter training as well as dynamic balance activities. Continue to recommend D/C to IPR.  -CK       Row Name 10/12/24 1000          Vital Signs    O2 Delivery Pre Treatment room air  -CK     O2 Delivery Intra Treatment room air  -CK     O2 Delivery Post Treatment room air  -CK       Row Name 10/12/24 1000          Positioning and Restraints    Pre-Treatment Position in bed  -CK     Post Treatment Position chair  -CK     In Chair reclined;call light within reach;encouraged to call for assist;exit alarm on;waffle cushion;on mechanical lift sling;LLE elevated;RLE elevated;notified nsg  -CK               User Key  (r) = Recorded By, (t) = Taken By, (c) = Cosigned By      Initials Name Provider Type    CK Karen Mcarthur, PT Physical Therapist                   Outcome Measures       Row Name 10/12/24 1004 10/12/24 0410       How much help from another person do you currently need...    Turning from your back to your side while in flat bed without using bedrails? 4  -CK 4  -TA    Moving from lying on back to sitting on the side of a flat bed without bedrails? 4  -CK 4  -TA    Moving to and from a bed to a chair (including a wheelchair)? 3  -CK 3  -TA    Standing up from a chair using your arms (e.g., wheelchair, bedside chair)? 3  -CK 3  -TA    Climbing 3-5 steps with a railing? 2  -CK 2  -TA    To walk in hospital room? 3  -CK 3  -TA     AM-PAC 6 Clicks Score (PT) 19  -CK 19  -TA    Highest Level of Mobility Goal 6 --> Walk 10 steps or more  -CK 6 --> Walk 10 steps or more  -TA      Row Name 10/12/24 1004          Functional Assessment    Outcome Measure Options AM-PAC 6 Clicks Basic Mobility (PT)  -CK               User Key  (r) = Recorded By, (t) = Taken By, (c) = Cosigned By      Initials Name Provider Type    CK Karen Mcarthur, PT Physical Therapist    Robinson Chavez, RN Registered Nurse                                 Physical Therapy Education       Title: PT OT SLP Therapies (Done)       Topic: Physical Therapy (Done)       Point: Mobility training (Done)       Learning Progress Summary            Patient Acceptance, E, VU by  at 10/12/2024 1004    Acceptance, E, VU by  at 10/11/2024 1154    Eager, E, VU by SC at 10/10/2024 1108    Comment: reviewed safety with mobility    Acceptance, E,D, VU,NR by AB at 10/9/2024 1006                      Point: Home exercise program (Done)       Learning Progress Summary            Patient Acceptance, E, VU by  at 10/12/2024 1004    Eager, E, VU by SC at 10/10/2024 1108    Comment: reviewed safety with mobility                      Point: Body mechanics (Done)       Learning Progress Summary            Patient Acceptance, E, VU by  at 10/12/2024 1004    Acceptance, E, VU by  at 10/11/2024 1154    Eager, E, VU by SC at 10/10/2024 1108    Comment: reviewed safety with mobility    Acceptance, E,D, VU,NR by AB at 10/9/2024 1006                      Point: Precautions (Done)       Learning Progress Summary            Patient Acceptance, E, VU by  at 10/12/2024 1004    Acceptance, E, VU by  at 10/11/2024 1154    Eager, E, VU by SC at 10/10/2024 1108    Comment: reviewed safety with mobility    Acceptance, E,D, VU,NR by AB at 10/9/2024 1006                                      User Key       Initials Effective Dates Name Provider Type [facility] 02/03/23 -  Mirna, Emilianoon A, PT Physical  Therapist PT    AB 09/22/22 -  Margaret Bhatt, PT Physical Therapist PT    CK 02/06/24 -  Karen Mcarthur PT Physical Therapist PT                  PT Recommendation and Plan     Plan of Care Reviewed With: patient  Progress: improving  Outcome Evaluation: Patient's L ankle pain improved today and she was able to participate better with mobility. She is still requiring assist to get on/off knee scooter and also in tight turns while mobilizing on scooter. Patient would continue to benefit from knee scooter training as well as dynamic balance activities. Continue to recommend D/C to IPR.     Time Calculation:         PT Charges       Row Name 10/12/24 1005             Time Calculation    Start Time 0835  -CK      PT Received On 10/12/24  -CK         Timed Charges    19146 - PT Therapeutic Exercise Minutes 5  -CK      53962 - Gait Training Minutes  19  -CK         Total Minutes    Timed Charges Total Minutes 24  -CK       Total Minutes 24  -CK                User Key  (r) = Recorded By, (t) = Taken By, (c) = Cosigned By      Initials Name Provider Type    CK Karen Mcarthur, PT Physical Therapist                  Therapy Charges for Today       Code Description Service Date Service Provider Modifiers Qty    86494089511 HC GAIT TRAINING EA 15 MIN 10/11/2024 Karen Mcarthur, PT GP 1    72539761659 HC PT THERAPEUTIC ACT EA 15 MIN 10/11/2024 Karen Mcarthur, PT GP 1    13240071865 HC PT THER PROC EA 15 MIN 10/12/2024 Karen Mcarthur, PT GP 1    04442808690 HC GAIT TRAINING EA 15 MIN 10/12/2024 Karen Mcarthur, PT GP 1            PT G-Codes  Outcome Measure Options: AM-PAC 6 Clicks Basic Mobility (PT)  AM-PAC 6 Clicks Score (PT): 19  AM-PAC 6 Clicks Score (OT): 17  PT Discharge Summary  Anticipated Discharge Disposition (PT): inpatient rehabilitation facility    Karen Mcarthur PT  10/12/2024

## 2024-10-12 NOTE — PROGRESS NOTES
[facility] Medicine Services  PROGRESS NOTE    Patient Name: Alysia Sierra  : 1955  MRN: [id number]    Date of Admission: 10/5/2024  Primary Care Physician: Cassy Foster MD    Subjective   Subjective     CC:  F/u ankle fracture    HPI:  Patient was seen and examined this morning.  Continues to feel well.  Pain in her left ankle improved after cast was changed yesterday evening.  Currently no acute complaints.  Requested rehab at [facility]    Objective   Objective     Vital Signs:   Temp:  [97.8 °F (36.6 °C)-98.2 °F (36.8 °C)] 98.1 °F (36.7 °C)  Heart Rate:  [73] 73  Resp:  [18-20] 18  BP: ()/(43-80) 96/57     Physical Exam:  General: Comfortable, not in distress, conversant and cooperative  Head: Atraumatic and normocephalic  Eyes: No Icterus. No pallor  Ears:  Ears appear intact with no abnormalities noted  Throat: No oral lesions, no thrush  Neck: Supple, trachea midline  Lungs: Clear to auscultation bilaterally, equal air entry, no wheezing or crackles  Heart:  Normal S1 and S2, no murmur, no gallop, No JVD, no lower extremity swelling  Abdomen:  Soft, no tenderness, no organomegaly, normal bowel sounds, no organomegaly  Extremities: Left leg in cast  Skin: No bleeding, bruising or rash, normal skin turgor and elasticity  Neurologic: Cranial nerves appear intact with no evidence of facial asymmetry, normal motor and sensory functions in all 4 extremities  Psych: Alert and oriented x 3, normal mood    Results Reviewed:  LAB RESULTS:      Lab 10/09/24  0600 10/07/24  0636 10/05/24  2259   WBC 4.49 5.71 10.14   HEMOGLOBIN 8.8* 9.4* 10.7*   HEMATOCRIT 27.6* 30.2* 33.4*   PLATELETS 176 172 224   NEUTROS ABS  --   --  6.76   IMMATURE GRANS (ABS)  --   --  0.03   LYMPHS ABS  --   --  1.86   MONOS ABS  --   --  1.41*   EOS ABS  --   --  0.05   MCV 98.6* 100.3* 98.2*         Lab 10/11/24  0949 10/10/24  1013 10/07/24  0636 10/05/24  3536 10/05/24  4030   SODIUM 141 142 135*  136  --    POTASSIUM 4.2 4.7 4.4 4.4  --    CHLORIDE 105 109* 99 102  --    CO2 25.0 21.0* 26.0 22.0  --    ANION GAP 11.0 12.0 10.0 12.0  --    BUN 12 10 11 16  --    CREATININE 1.46* 1.47* 1.15* 1.33*  --    EGFR 39.0* 38.7* 52.0* 43.7*  --    GLUCOSE 108* 129* 104* 104*  --    CALCIUM 8.7 8.7 8.8 8.5*  --    MAGNESIUM  --   --   --  1.8  --    HEMOGLOBIN A1C  --   --   --   --  5.20   TSH  --   --  4.540*  --   --          Lab 10/05/24  2336   TOTAL PROTEIN 6.4   ALBUMIN 4.1   GLOBULIN 2.3   ALT (SGPT) 10   AST (SGOT) 22   BILIRUBIN 0.3   ALK PHOS 74                 Lab 10/09/24  0600 10/08/24  1718   IRON  --  20*   IRON SATURATION (TSAT)  --  7*   TIBC  --  280*   TRANSFERRIN  --  188*   FOLATE >20.00  --    VITAMIN B 12 832  --          Brief Urine Lab Results  (Last result in the past 365 days)        Color   Clarity   Blood   Leuk Est   Nitrite   Protein   CREAT   Urine HCG        06/22/24 1604 Yellow   Clear   Negative   Negative   Negative   Negative                   Microbiology Results Abnormal       None            XR Ankle 3+ View Left    Result Date: 10/10/2024  XR ANKLE 3+ VW LEFT Date of Exam: 10/10/2024 2:14 PM EDT Indication: fracture follow-up; new pain Comparison: 10/6/2024. Findings: The exam was obtained through a cast.. 3 views. Subtle nondisplaced medial malleolar fracture appears similar. Fracture of the medial talus is similar. Known fractures of the first through fourth proximal metatarsals are not well seen on this exam. Ankle  joint is maintained.     Impression: Impression: Stable ankle fractures. Electronically Signed: Aracely Argueta MD  10/10/2024 2:54 PM EDT  Workstation ID: [id number]     Results for orders placed during the hospital encounter of 01/02/24    Adult Transthoracic Echo Complete W/ Cont if Necessary Per Protocol    Interpretation Summary    Left ventricular systolic function is normal. Calculated left ventricular EF = 57.9% Left ventricular ejection fraction appears to  be 56 - 60%.    Left ventricular diastolic function was normal.    Estimated right ventricular systolic pressure from tricuspid regurgitation is normal (<35 mmHg).    No significant change from previous study      Current medications:  Scheduled Meds:aspirin, 81 mg, Oral, Daily  diazePAM, 5 mg, Oral, Q8H  DULoxetine, 20 mg, Oral, BID  folic acid, 1 mg, Oral, Daily  gabapentin, 300 mg, Oral, Daily  gabapentin, 600 mg, Oral, Nightly  heparin (porcine), 5,000 Units, Subcutaneous, Q8H  levothyroxine, 25 mcg, Oral, Q AM  melatonin, 5 mg, Oral, Nightly  metoprolol succinate XL, 25 mg, Oral, Nightly  pantoprazole, 40 mg, Oral, Every Other Day  sodium chloride, 10 mL, Intravenous, Q12H  thiamine, 200 mg, Oral, Daily      Continuous Infusions:   PRN Meds:.  senna-docusate sodium **AND** polyethylene glycol **AND** bisacodyl **AND** bisacodyl    Calcium Replacement - Follow Nurse / BPA Driven Protocol    docosanol    HYDROmorphone **AND** naloxone    influenza vaccine    Magnesium Standard Dose Replacement - Follow Nurse / BPA Driven Protocol    oxyCODONE    Phosphorus Replacement - Follow Nurse / BPA Driven Protocol    Potassium Replacement - Follow Nurse / BPA Driven Protocol    [COMPLETED] Insert Peripheral IV **AND** sodium chloride    sodium chloride    Assessment & Plan   Assessment & Plan     Active Hospital Problems    Diagnosis  POA    **Closed fracture of left ankle [S82.892A]  Unknown    Closed displaced fracture of fourth metatarsal bone of left foot [S92.342A]  Yes    Lisfranc dislocation, left, initial encounter [E27.421A]  Yes      Resolved Hospital Problems   No resolved problems to display.        Brief Hospital Course to date:  Alysia Sierra is a 69 y.o. female with history of CKD III, chronic anemia, uterine cancer, hypothyroidism and alcohol abuse who was admitted to [facility] on 10/5/24 for L ankle fracture after stepping awkwardly off her bed.     Acute L foot Lisfranc fracture with  involvement of 1st, 2nd, 3rd and 4th metatarsal bases  Acute nondisplaced L medial malleolus fracture  Acute nondisplaced L posterior medial talus fracture   1st-4th proximal metatarsal fracture Lisfranc fracture  LLE splinted. Fracture blister on dorsal aspect of L foot noted - per ortho allow blisters to decompress on their own  Aggressive elevation, goal to maintain elevation above heart  Surgery tentatively planned for 10/18 with sadia Jaeger to discharge and return for surgery, f/u with Dr. Godoy in office next week  PRN pain management  PT/OT following, recommendation for rehab       Alcohol withdrawal, improved  History of alcohol abuse   Patient denies daily alcohol use, however family reports daily heavy alcohol use  Developed confusion / hallucinations on afternoon of 10/8 and started on CIWA protocol  Withdrawal symptoms resolved.  CIWA protocol discontinued  Wean off scheduled Valium  Continue PO thiamine, folic acid  Continue QHS Melatonin     CKD III  Creatinine stable around baseline    Anemia  Iron studies show ACD with iron deficiency  Status post IV iron  B12, folate within normal limits     Hypothyroid  Continue Levothyroxine    GERD  Continue PPI      Neuropathy  Continue Gabapentin      Expected Discharge Location and Transportation: SNF rehab vs home with   Expected Discharge   Expected Discharge Date: 10/12/2024; Expected Discharge Time:      VTE Prophylaxis:  Pharmacologic & mechanical VTE prophylaxis orders are present.         AM-PAC 6 Clicks Score (PT): 19 (10/11/24 3805)    CODE STATUS:   Code Status and Medical Interventions: CPR (Attempt to Resuscitate); Full Support   Ordered at: 10/06/24 0246     Code Status (Patient has no pulse and is not breathing):    CPR (Attempt to Resuscitate)     Medical Interventions (Patient has pulse or is breathing):    Full Support       Nandini Wilcxo MD  10/12/24

## 2024-10-12 NOTE — PLAN OF CARE
Goal Outcome Evaluation:  Plan of Care Reviewed With: patient  Plan of Care Reviewed With: patient        Progress: improving  Outcome Evaluation: Patient's L ankle pain improved today and she was able to participate better with mobility. She is still requiring assist to get on/off knee scooter and also in tight turns while mobilizing on scooter. Patient would continue to benefit from knee scooter training as well as dynamic balance activities. Continue to recommend D/C to IPR.    Anticipated Discharge Disposition (PT): inpatient rehabilitation facility

## 2024-10-13 PROCEDURE — 97530 THERAPEUTIC ACTIVITIES: CPT

## 2024-10-13 PROCEDURE — 25010000002 HEPARIN (PORCINE) PER 1000 UNITS: Performed by: PHYSICIAN ASSISTANT

## 2024-10-13 PROCEDURE — 97116 GAIT TRAINING THERAPY: CPT

## 2024-10-13 PROCEDURE — 99231 SBSQ HOSP IP/OBS SF/LOW 25: CPT | Performed by: INTERNAL MEDICINE

## 2024-10-13 RX ORDER — DIAZEPAM 5 MG
5 TABLET ORAL EVERY 8 HOURS PRN
Status: ACTIVE | OUTPATIENT
Start: 2024-10-13 | End: 2024-10-16

## 2024-10-13 RX ADMIN — MAGNESIUM OXIDE TAB 400 MG (241.3 MG ELEMENTAL MG) 400 MG: 400 (241.3 MG) TAB at 13:54

## 2024-10-13 RX ADMIN — Medication 5 MG: at 20:32

## 2024-10-13 RX ADMIN — DIAZEPAM 5 MG: 5 TABLET ORAL at 10:32

## 2024-10-13 RX ADMIN — GABAPENTIN 300 MG: 300 CAPSULE ORAL at 09:12

## 2024-10-13 RX ADMIN — ASPIRIN 81 MG: 81 TABLET, COATED ORAL at 09:12

## 2024-10-13 RX ADMIN — CYANOCOBALAMIN TAB 1000 MCG 1000 MCG: 1000 TAB at 09:11

## 2024-10-13 RX ADMIN — HEPARIN SODIUM 5000 UNITS: 5000 INJECTION INTRAVENOUS; SUBCUTANEOUS at 13:54

## 2024-10-13 RX ADMIN — DULOXETINE HYDROCHLORIDE 20 MG: 20 CAPSULE, DELAYED RELEASE ORAL at 09:12

## 2024-10-13 RX ADMIN — FOLIC ACID 1 MG: 1 TABLET ORAL at 09:12

## 2024-10-13 RX ADMIN — LEVOTHYROXINE SODIUM 25 MCG: 25 TABLET ORAL at 05:45

## 2024-10-13 RX ADMIN — Medication 10 ML: at 20:35

## 2024-10-13 RX ADMIN — GABAPENTIN 600 MG: 300 CAPSULE ORAL at 20:32

## 2024-10-13 RX ADMIN — ROSUVASTATIN 5 MG: 10 TABLET, FILM COATED ORAL at 09:12

## 2024-10-13 RX ADMIN — OXYCODONE HYDROCHLORIDE 5 MG: 5 TABLET ORAL at 13:54

## 2024-10-13 RX ADMIN — THIAMINE HCL TAB 100 MG 200 MG: 100 TAB at 09:12

## 2024-10-13 RX ADMIN — Medication 10 ML: at 09:14

## 2024-10-13 RX ADMIN — DIAZEPAM 5 MG: 5 TABLET ORAL at 01:33

## 2024-10-13 RX ADMIN — HEPARIN SODIUM 5000 UNITS: 5000 INJECTION INTRAVENOUS; SUBCUTANEOUS at 20:32

## 2024-10-13 RX ADMIN — PANTOPRAZOLE SODIUM 40 MG: 40 TABLET, DELAYED RELEASE ORAL at 09:11

## 2024-10-13 RX ADMIN — DULOXETINE HYDROCHLORIDE 20 MG: 20 CAPSULE, DELAYED RELEASE ORAL at 20:32

## 2024-10-13 RX ADMIN — HEPARIN SODIUM 5000 UNITS: 5000 INJECTION INTRAVENOUS; SUBCUTANEOUS at 05:45

## 2024-10-13 RX ADMIN — METOPROLOL SUCCINATE 25 MG: 25 TABLET, EXTENDED RELEASE ORAL at 20:32

## 2024-10-13 NOTE — THERAPY TREATMENT NOTE
Patient Name: Alysia Sierra  : 1955    MRN: [id number]                              Today's Date: 10/13/2024       Admit Date: 10/5/2024    Visit Dx:     ICD-10-CM ICD-9-CM   1. Displaced fracture of distal end of left tibia  S82.302A 824.8   2. Closed nondisplaced fracture of second metatarsal bone of left foot, initial encounter  S92.325A 825.25   3. Closed nondisplaced fracture of first metatarsal bone of left foot, initial encounter  S92.315A 825.25   4. Closed nondisplaced fracture of third metatarsal bone of left foot, initial encounter  S92.335A 825.25   5. Closed nondisplaced fracture of fourth metatarsal bone of left foot, initial encounter  S92.345A 825.25     Patient Active Problem List   Diagnosis    Hypertension    Leg weakness, bilateral    Syncope    CKD (chronic kidney disease) stage 3, GFR 30-59 ml/min    Neuropathy    Hyperlipidemia LDL goal <100    Endometrial adenocarcinoma    Mild episode of recurrent major depressive disorder    Dizziness    Hip fracture    Anemia    Hypomagnesemia    Hypothyroidism    Post-menopausal    Other osteoporosis without current pathological fracture    Closed fracture of second lumbar vertebra    Alcoholic ketoacidosis    Leukocytosis    GERD with esophagitis    Sepsis    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    Lisfranc dislocation, left, initial encounter    Closed fracture of left ankle    Closed displaced fracture of fourth metatarsal bone of left foot     Past Medical History:   Diagnosis Date    Allergic lisinopril  2017    Anemia     Arrhythmia     Arthritis     Cancer     uterine    Cataract mild 2020    stil lpresent    Chicken pox     Chronic fatigue     CKD (chronic kidney disease), stage III     sees nephro    Clotting disorder     Dental root implant present     lower left  x1 - possible dental implant    Depression mild 2019    Difficulty walking 2019    Disease of thyroid gland     Dizzy     NIEVES (dyspnea on exertion)     2017     Fracture of hip     Generalized anxiety disorder     GERD (gastroesophageal reflux disease) 2018    History of brachytherapy 2023    vaginal brachytherapy    Hyperlipidemia     Hypertension     Iron deficiency anemia     Liver disease     fatty    Liver problem     Measles     Menopause     Mumps     Neuromuscular disorder Peripheral Neuropathy    Orthostatic hypotension     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    Renal insufficiency 2018    Scoliosis     Unintentional weight loss     Uses contact lenses     bilat    Uterine cancer     Uterine cancer 2022    UTI (urinary tract infection)     Visual impairment Nearsighted    Wears glasses      Past Surgical History:   Procedure Laterality Date     SECTION      DILATION AND CURETTAGE, DIAGNOSTIC / THERAPEUTIC      HIP OPEN REDUCTION Left 2023    Procedure: FEMORAL NECK OPEN REDUCTION INTERNAL FIXATION LEFT;  Surgeon: Juanpablo Lee MD;  Location: [facility] OR;  Service: Orthopedics;  Laterality: Left;    HIP SURGERY      OOPHORECTOMY      ORAL LESION EXCISION/BIOPSY  2021    TOTAL LAPAROSCOPIC HYSTERECTOMY SALPINGO OOPHORECTOMY N/A 10/28/2022    Procedure: TOTAL LAPAROSCOPIC HYSTERECTOMY BILATERAL SALPINGO-OOPHORECTOMY, INJECTION FOR SENTINEL LYMPH NODE MAPPING, BILATERAL SENTINEL LYMPH NODE DISSECTION WITH DAVINCI ROBOT;  Surgeon: Jasmine Rich MD;  Location: [facility] OR;  Service: Robotics - DaVinci;  Laterality: N/A;    TUBAL ABDOMINAL LIGATION        General Information       Row Name 10/13/24 1150          Physical Therapy Time and Intention    Document Type therapy note (daily note)  -KG     Mode of Treatment individual therapy;physical therapy  -KG       Row Name 10/13/24 1157          General Information    Patient Profile Reviewed yes  -KG     Existing Precautions/Restrictions fall;left;non-weight bearing;other (see comments)  LLE NWB  -KG       Row Name 10/13/24 1150          Cognition     Orientation Status (Cognition) oriented x 4  -KG       Row Name 10/13/24 1150          Safety Issues/Impairments Affecting Functional Mobility    Safety Issues Affecting Function (Mobility) awareness of need for assistance;insight into deficits/self-awareness;problem-solving;safety precaution awareness;safety precautions follow-through/compliance  -KG     Impairments Affecting Function (Mobility) endurance/activity tolerance;balance;strength;pain  -KG               User Key  (r) = Recorded By, (t) = Taken By, (c) = Cosigned By      Initials Name Provider Type    KG Monica Rosario Physical Therapist                   Mobility       Row Name 10/13/24 1154          Bed Mobility    Bed Mobility supine-sit  -KG     Scooting/Bridging [place] (Bed Mobility) supervision  -KG     Supine-Sit [place] (Bed Mobility) supervision  -KG       Row Name 10/13/24 1154          Transfers    Comment, (Transfers) min-A for STS to scooter, cues for sequencing  -KG       Row Name 10/13/24 1154          Sit-Stand Transfer    Sit-Stand [place] (Transfers) minimum assist (75% patient effort);verbal cues;1 person assist  -KG     Assistive Device (Sit-Stand Transfers) other (see comments)  scooter  -KG       Row Name 10/13/24 1154          Gait/Stairs (Locomotion)    [place] Level (Gait) 1 person assist;verbal cues;minimum assist (75% patient effort)  -KG     Assistive Device (Gait) walker, knee scooter  -KG     Patient was able to Ambulate yes  -KG     Distance in Feet (Gait) 200  -KG     Deviations/Abnormal Patterns (Gait) bilateral deviations;werner decreased;gait speed decreased  -KG     Bilateral Gait Deviations forward flexed posture  -KG     Comment, (Gait/Stairs) Pt with great difficulty sequencing and performing knee scooter smoothly.  Tendancy to lean to left and drag RLE behind while doing push-off.  Able to go 200' but became very fatigued and chair needed to be pulled behind.  -KG       Row Name 10/13/24  1154          Mobility    Extremity Weight-bearing Status left lower extremity  -KG     Left Lower Extremity (Weight-bearing Status) non weight-bearing (NWB)   -KG               User Key  (r) = Recorded By, (t) = Taken By, (c) = Cosigned By      Initials Name Provider Type    Monica Ramires Physical Therapist                   Obj/Interventions       Row Name 10/13/24 1158          Balance    Balance Interventions standing;sit to stand  -KG               User Key  (r) = Recorded By, (t) = Taken By, (c) = Cosigned By      Initials Name Provider Type    Monica Ramires Physical Therapist                   Goals/Plan    No documentation.                  Clinical Impression       Row Name 10/13/24 1158          Pain    Pretreatment Pain Rating 2/10  -KG     Posttreatment Pain Rating 2/10  -KG     Pain Location extremity  -KG     Pain Side/Orientation left;lower  -KG     Pain Management Interventions exercise or physical activity utilized  -KG     Response to Pain Interventions activity level improved  -KG     Pain Intervention(s) Repositioned;Ambulation/increased activity  -KG       Row Name 10/13/24 1158          Plan of Care Review    Plan of Care Reviewed With patient  -KG     Progress no change  -KG     Outcome Evaluation Pt with great difficulty sequencing and performing knee scooter smoothly. Tendancy to lean to left and drag RLE behind while doing push-off. Able to go 200' but became very fatigued and chair needed to be pulled behind.  -KG       Row Name 10/13/24 1158          Positioning and Restraints    Pre-Treatment Position in bed  -KG     Post Treatment Position chair  -KG     In Chair notified nsg;call light within reach;encouraged to call for assist;exit alarm on;waffle cushion;legs elevated  -KG               User Key  (r) = Recorded By, (t) = Taken By, (c) = Cosigned By      Initials Name Provider Type    Monica Ramires Physical Therapist                   Outcome Measures       Row  Name 10/13/24 1158 10/13/24 0418       How much help from another person do you currently need...    Turning from your back to your side while in flat bed without using bedrails? 4  -KG 4  -TA    Moving from lying on back to sitting on the side of a flat bed without bedrails? 4  -KG 4  -TA    Moving to and from a bed to a chair (including a wheelchair)? 3  -KG 3  -TA    Standing up from a chair using your arms (e.g., wheelchair, bedside chair)? 3  -KG 3  -TA    Climbing 3-5 steps with a railing? 2  -KG 2  -TA    To walk in hospital room? 3  -KG 3  -TA    AM-PAC 6 Clicks Score (PT) 19  -KG 19  -TA    Highest Level of Mobility Goal 6 --> Walk 10 steps or more  -KG 6 --> Walk 10 steps or more  -TA      Row Name 10/13/24 1158          Functional Assessment    Outcome Measure Options AM-PAC 6 Clicks Basic Mobility (PT)  -KG               User Key  (r) = Recorded By, (t) = Taken By, (c) = Cosigned By      Initials Name Provider Type    KG Monica Rosario Physical Therapist    Robinson Chavez, RN Registered Nurse                                 Physical Therapy Education       Title: PT OT SLP Therapies (Done)       Topic: Physical Therapy (Done)       Point: Mobility training (Done)       Learning Progress Summary            Patient Acceptance, E, VU by CK at 10/12/2024 1004    Acceptance, E, VU by CK at 10/11/2024 1154    Eager, E, VU by SC at 10/10/2024 1108    Comment: reviewed safety with mobility    Acceptance, E,D, VU,NR by AB at 10/9/2024 1006                      Point: Home exercise program (Done)       Learning Progress Summary            Patient Acceptance, E, VU by CK at 10/12/2024 1004    Eager, E, VU by SC at 10/10/2024 1108    Comment: reviewed safety with mobility                      Point: Body mechanics (Done)       Learning Progress Summary            Patient Acceptance, E, VU by CK at 10/12/2024 1004    Acceptance, E, VU by CK at 10/11/2024 1154    Eager, E, VU by SC at 10/10/2024 1108    Comment:  reviewed safety with mobility    Acceptance, E,D, VU,NR by AB at 10/9/2024 1006                      Point: Precautions (Done)       Learning Progress Summary            Patient Acceptance, E, VU by  at 10/12/2024 1004    Acceptance, E, VU by  at 10/11/2024 1154    Eager, E, VU by SC at 10/10/2024 1108    Comment: reviewed safety with mobility    Acceptance, E,D, VU,NR by AB at 10/9/2024 1006                                      User Key       Initials Effective Dates Name Provider Type Discipline    SC 02/03/23 -  Grace Bradley, PT Physical Therapist PT    AB 09/22/22 -  Margaret Bhatt, PT Physical Therapist PT     02/06/24 -  Karen Mcarthur, PT Physical Therapist PT                  PT Recommendation and Plan     Progress: no change  Outcome Evaluation: Pt with great difficulty sequencing and performing knee scooter smoothly. Tendancy to lean to left and drag RLE behind while doing push-off. Able to go 200' but became very fatigued and chair needed to be pulled behind.     Time Calculation:         PT Charges       Row Name 10/13/24 1159             Time Calculation    Start Time 1040  -KG      PT Received On 10/13/24  -KG         Timed Charges    93112 - Gait Training Minutes  25  -KG      41575 - PT Therapeutic Activity Minutes 15  -KG         Total Minutes    Timed Charges Total Minutes 40  -KG       Total Minutes 40  -KG                User Key  (r) = Recorded By, (t) = Taken By, (c) = Cosigned By      Initials Name Provider Type    KG Monica Rosario Physical Therapist                  Therapy Charges for Today       Code Description Service Date Service Provider Modifiers Qty    44526596816 HC GAIT TRAINING EA 15 MIN 10/13/2024 Monica Rosario GP 2    48349737275 HC PT THERAPEUTIC ACT EA 15 MIN 10/13/2024 Monica Rosario GP 1            PT G-Codes  Outcome Measure Options: AM-PAC 6 Clicks Basic Mobility (PT)  AM-PAC 6 Clicks Score (PT): 19  AM-PAC 6 Clicks Score (OT): 17       Monica  Abraham  10/13/2024

## 2024-10-13 NOTE — PLAN OF CARE
Problem: Adult Inpatient Plan of Care  Goal: Plan of Care Review  Outcome: Progressing  Goal: Patient-Specific Goal (Individualized)  Outcome: Progressing  Goal: Absence of Hospital-Acquired Illness or Injury  Outcome: Progressing  Intervention: Identify and Manage Fall Risk  Recent Flowsheet Documentation  Taken 10/13/2024 0418 by Robinson Rosa RN  Safety Promotion/Fall Prevention:   activity supervised   assistive device/personal items within reach   clutter free environment maintained   fall prevention program maintained   gait belt   mobility aid in reach   nonskid shoes/slippers when out of bed   room organization consistent   safety round/check completed  Taken 10/13/2024 0216 by Robinson Rosa RN  Safety Promotion/Fall Prevention:   activity supervised   assistive device/personal items within reach   clutter free environment maintained   fall prevention program maintained   gait belt   mobility aid in reach   nonskid shoes/slippers when out of bed   safety round/check completed   room organization consistent  Taken 10/13/2024 0015 by Robinson Rosa RN  Safety Promotion/Fall Prevention:   activity supervised   assistive device/personal items within reach   clutter free environment maintained   fall prevention program maintained   gait belt   mobility aid in reach   nonskid shoes/slippers when out of bed   room organization consistent   safety round/check completed  Taken 10/12/2024 2215 by Robinson Rosa RN  Safety Promotion/Fall Prevention:   activity supervised   assistive device/personal items within reach   clutter free environment maintained   fall prevention program maintained   gait belt   mobility aid in reach   nonskid shoes/slippers when out of bed   room organization consistent   safety round/check completed  Taken 10/12/2024 2015 by Robinson Rosa RN  Safety Promotion/Fall Prevention:   activity supervised   assistive device/personal items within reach   clutter free environment  maintained   fall prevention program maintained   gait belt   mobility aid in reach   nonskid shoes/slippers when out of bed   room organization consistent   safety round/check completed  Intervention: Prevent Skin Injury  Recent Flowsheet Documentation  Taken 10/13/2024 0418 by Robinson Rosa RN  Body Position: position changed independently  Skin Protection: incontinence pads utilized  Taken 10/13/2024 0216 by Robinson Rosa RN  Skin Protection: incontinence pads utilized  Taken 10/13/2024 0015 by Robinson Rosa RN  Body Position: position changed independently  Skin Protection: incontinence pads utilized  Taken 10/12/2024 2215 by Robinson Rosa RN  Body Position: position changed independently  Taken 10/12/2024 2015 by Robinson Rosa RN  Body Position: position changed independently  Skin Protection:   incontinence pads utilized   transparent dressing maintained  Intervention: Prevent and Manage VTE (Venous Thromboembolism) Risk  Recent Flowsheet Documentation  Taken 10/13/2024 0418 by Robinson Rosa RN  VTE Prevention/Management: (HEPARIN SQ) other (see comments)  Taken 10/13/2024 0216 by Robinson Rosa RN  VTE Prevention/Management: (heparin sq) other (see comments)  Taken 10/13/2024 0015 by Robinson Rosa RN  VTE Prevention/Management: (heparin sq) other (see comments)  Taken 10/12/2024 2215 by Robinson Rosa RN  VTE Prevention/Management: (heparin sq) other (see comments)  Taken 10/12/2024 2015 by Robinson Rosa RN  VTE Prevention/Management: (heparin sq) other (see comments)  Intervention: Prevent Infection  Recent Flowsheet Documentation  Taken 10/13/2024 0418 by Robinson Rosa RN  Infection Prevention:   environmental surveillance performed   rest/sleep promoted   single patient room provided  Taken 10/13/2024 0216 by Robinson Rosa RN  Infection Prevention:   rest/sleep promoted   single patient room provided  Taken 10/13/2024 0015 by Robinson Rosa RN  Infection Prevention:   rest/sleep  promoted   single patient room provided  Taken 10/12/2024 2215 by Robinson Rosa RN  Infection Prevention:   rest/sleep promoted   single patient room provided  Taken 10/12/2024 2015 by Robinson Rosa RN  Infection Prevention:   environmental surveillance performed   rest/sleep promoted   single patient room provided  Goal: Optimal Comfort and Wellbeing  Outcome: Progressing  Intervention: Monitor Pain and Promote Comfort  Recent Flowsheet Documentation  Taken 10/13/2024 0418 by Robinson Rosa RN  Pain Management Interventions:   no interventions per patient request   quiet environment facilitated  Taken 10/13/2024 0015 by Robinson Rosa RN  Pain Management Interventions: quiet environment facilitated  Taken 10/12/2024 2015 by Robinson Rosa RN  Pain Management Interventions:   no interventions per patient request   quiet environment facilitated  Intervention: Provide Person-Centered Care  Recent Flowsheet Documentation  Taken 10/13/2024 0418 by Robinson Rosa RN  Trust Relationship/Rapport:   care explained   choices provided  Taken 10/13/2024 0216 by Robinson Rosa RN  Trust Relationship/Rapport:   care explained   reassurance provided  Taken 10/12/2024 2015 by Robinson Rosa RN  Trust Relationship/Rapport:   care explained   questions answered   questions encouraged  Goal: Readiness for Transition of Care  Outcome: Progressing     Problem: Pain Chronic (Persistent) (Comorbidity Management)  Goal: Acceptable Pain Control and Functional Ability  Outcome: Progressing  Intervention: Manage Persistent Pain  Recent Flowsheet Documentation  Taken 10/12/2024 2015 by Robinson Rosa RN  Medication Review/Management: medications reviewed  Intervention: Develop Pain Management Plan  Recent Flowsheet Documentation  Taken 10/13/2024 0418 by Robinson Rosa RN  Pain Management Interventions:   no interventions per patient request   quiet environment facilitated  Taken 10/13/2024 0015 by Robinson Rosa RN  Pain  Management Interventions: quiet environment facilitated  Taken 10/12/2024 2015 by Robinson Rosa RN  Pain Management Interventions:   no interventions per patient request   quiet environment facilitated  Intervention: Optimize Psychosocial Wellbeing  Recent Flowsheet Documentation  Taken 10/13/2024 0418 by Robinson Rosa RN  Family/Support System Care: self-care encouraged  Taken 10/13/2024 0216 by Robinson Rosa RN  Family/Support System Care:   self-care encouraged   support provided  Taken 10/12/2024 2015 by Robinson Rosa RN  Supportive Measures: active listening utilized  Family/Support System Care:   self-care encouraged   support provided     Problem: Skin Injury Risk Increased  Goal: Skin Health and Integrity  Outcome: Progressing  Intervention: Optimize Skin Protection  Recent Flowsheet Documentation  Taken 10/13/2024 0418 by Robinson Rosa RN  Activity Management: activity encouraged  Pressure Reduction Techniques: frequent weight shift encouraged  Head of Bed (HOB) Positioning: HOB elevated  Pressure Reduction Devices: pressure-redistributing mattress utilized  Skin Protection: incontinence pads utilized  Taken 10/13/2024 0216 by Robinson Rosa RN  Activity Management: activity encouraged  Pressure Reduction Techniques: frequent weight shift encouraged  Pressure Reduction Devices: pressure-redistributing mattress utilized  Skin Protection: incontinence pads utilized  Taken 10/13/2024 0015 by Robinson Rosa RN  Activity Management: activity encouraged  Pressure Reduction Techniques: frequent weight shift encouraged  Head of Bed (HOB) Positioning: HOB elevated  Pressure Reduction Devices: pressure-redistributing mattress utilized  Skin Protection: incontinence pads utilized  Taken 10/12/2024 2215 by Robinson Rosa RN  Activity Management: activity encouraged  Head of Bed (HOB) Positioning: HOB elevated  Taken 10/12/2024 2015 by Robinson Rosa RN  Activity Management: activity encouraged  Pressure  Reduction Techniques: frequent weight shift encouraged  Head of Bed (HOB) Positioning: HOB elevated  Pressure Reduction Devices: pressure-redistributing mattress utilized  Skin Protection:   incontinence pads utilized   transparent dressing maintained     Problem: Fall Injury Risk  Goal: Absence of Fall and Fall-Related Injury  Outcome: Progressing  Intervention: Identify and Manage Contributors  Recent Flowsheet Documentation  Taken 10/13/2024 0418 by Robinson Rosa RN  Self-Care Promotion: independence encouraged  Taken 10/13/2024 0216 by Robinson Rosa RN  Self-Care Promotion: independence encouraged  Taken 10/12/2024 2215 by Robinson Rosa RN  Self-Care Promotion: independence encouraged  Taken 10/12/2024 2015 by Robinson Rosa RN  Medication Review/Management: medications reviewed  Self-Care Promotion: independence encouraged  Intervention: Promote Injury-Free Environment  Recent Flowsheet Documentation  Taken 10/13/2024 0418 by Robinson Rosa RN  Safety Promotion/Fall Prevention:   activity supervised   assistive device/personal items within reach   clutter free environment maintained   fall prevention program maintained   gait belt   mobility aid in reach   nonskid shoes/slippers when out of bed   room organization consistent   safety round/check completed  Taken 10/13/2024 0216 by Robinson Rosa RN  Safety Promotion/Fall Prevention:   activity supervised   assistive device/personal items within reach   clutter free environment maintained   fall prevention program maintained   gait belt   mobility aid in reach   nonskid shoes/slippers when out of bed   safety round/check completed   room organization consistent  Taken 10/13/2024 0015 by Robinson Rosa RN  Safety Promotion/Fall Prevention:   activity supervised   assistive device/personal items within reach   clutter free environment maintained   fall prevention program maintained   gait belt   mobility aid in reach   nonskid shoes/slippers when out of  bed   room organization consistent   safety round/check completed  Taken 10/12/2024 2215 by Robinson Rosa RN  Safety Promotion/Fall Prevention:   activity supervised   assistive device/personal items within reach   clutter free environment maintained   fall prevention program maintained   gait belt   mobility aid in reach   nonskid shoes/slippers when out of bed   room organization consistent   safety round/check completed  Taken 10/12/2024 2015 by Robinson Rosa RN  Safety Promotion/Fall Prevention:   activity supervised   assistive device/personal items within reach   clutter free environment maintained   fall prevention program maintained   gait belt   mobility aid in reach   nonskid shoes/slippers when out of bed   room organization consistent   safety round/check completed     Problem: Bleeding (Orthopaedic Fracture)  Goal: Absence of Bleeding  Outcome: Progressing     Problem: Embolism (Orthopaedic Fracture)  Goal: Absence of Embolism Signs and Symptoms  Outcome: Progressing  Intervention: Prevent or Manage Embolism Risk  Recent Flowsheet Documentation  Taken 10/13/2024 0418 by Robinson Rosa RN  VTE Prevention/Management: (HEPARIN SQ) other (see comments)  Taken 10/13/2024 0216 by Robinson Rosa RN  VTE Prevention/Management: (heparin sq) other (see comments)  Taken 10/13/2024 0015 by Robinson Rosa RN  VTE Prevention/Management: (heparin sq) other (see comments)  Taken 10/12/2024 2215 by Robinson Rosa RN  VTE Prevention/Management: (heparin sq) other (see comments)  Taken 10/12/2024 2015 by Robinson Rosa RN  VTE Prevention/Management: (heparin sq) other (see comments)     Problem: Fracture Stabilization and Management (Orthopaedic Fracture)  Goal: Fracture Stability  Outcome: Progressing     Problem: Functional Ability Impaired (Orthopaedic Fracture)  Goal: Optimal Functional Ability  Outcome: Progressing  Intervention: Optimize Functional Ability  Recent Flowsheet Documentation  Taken 10/13/2024  0418 by Rosa, Robinson, RN  Activity Management: activity encouraged  Positioning/Transfer Devices:   pillows   in use  Self-Care Promotion: independence encouraged  Taken 10/13/2024 0216 by Robinson Rsoa RN  Activity Management: activity encouraged  Self-Care Promotion: independence encouraged  Taken 10/13/2024 0015 by Robinson Rosa RN  Activity Management: activity encouraged  Positioning/Transfer Devices:   pillows   in use  Taken 10/12/2024 2215 by Robinson Rosa RN  Activity Management: activity encouraged  Positioning/Transfer Devices:   pillows   in use  Self-Care Promotion: independence encouraged  Taken 10/12/2024 2015 by Robinson Rosa RN  Activity Management: activity encouraged  Positioning/Transfer Devices:   pillows   wedge   in use  Self-Care Promotion: independence encouraged  Range of Motion: active ROM (range of motion) encouraged     Problem: Infection (Orthopaedic Fracture)  Goal: Absence of Infection Signs and Symptoms  Outcome: Progressing     Problem: Neurovascular Compromise (Orthopaedic Fracture)  Goal: Effective Tissue Perfusion  Outcome: Progressing     Problem: Pain (Orthopaedic Fracture)  Goal: Acceptable Pain Control  Outcome: Progressing  Intervention: Manage Acute Orthopaedic-Related Pain  Recent Flowsheet Documentation  Taken 10/13/2024 0418 by Robinson Rosa RN  Pain Management Interventions:   no interventions per patient request   quiet environment facilitated  Taken 10/13/2024 0015 by Robinson Rosa RN  Pain Management Interventions: quiet environment facilitated  Taken 10/12/2024 2015 by Robinson Rosa RN  Pain Management Interventions:   no interventions per patient request   quiet environment facilitated     Problem: Respiratory Compromise (Orthopaedic Fracture)  Goal: Effective Oxygenation and Ventilation  Outcome: Progressing  Intervention: Promote Airway Secretion Clearance  Recent Flowsheet Documentation  Taken 10/13/2024 0418 by Robinson Rosa RN  Activity  Management: activity encouraged  Taken 10/13/2024 0216 by Robinson Rosa RN  Activity Management: activity encouraged  Taken 10/13/2024 0015 by Robinson Rosa RN  Activity Management: activity encouraged  Taken 10/12/2024 2215 by Robinson Rosa RN  Activity Management: activity encouraged  Taken 10/12/2024 2015 by Robinson Rosa RN  Activity Management: activity encouraged  Cough And Deep Breathing: done independently per patient  Intervention: Optimize Oxygenation and Ventilation  Recent Flowsheet Documentation  Taken 10/13/2024 0418 by Robinson Rosa RN  Head of Bed (HOB) Positioning: HOB elevated  Taken 10/13/2024 0015 by Robinson Rosa RN  Head of Bed (HOB) Positioning: HOB elevated  Taken 10/12/2024 2215 by Robinson Rosa RN  Head of Bed (HOB) Positioning: HOB elevated  Taken 10/12/2024 2015 by Robinson Rosa RN  Head of Bed (HOB) Positioning: HOB elevated   Goal Outcome Evaluation:

## 2024-10-13 NOTE — PLAN OF CARE
Goal Outcome Evaluation:  Plan of Care Reviewed With: patient        Progress: no change  Outcome Evaluation: Pt with great difficulty sequencing and performing knee scooter smoothly. Tendancy to lean to left and drag RLE behind while doing push-off. Able to go 200' but became very fatigued and chair needed to be pulled behind.

## 2024-10-13 NOTE — PROGRESS NOTES
[facility] Medicine Services  PROGRESS NOTE    Patient Name: Alysia Sierra  : 1955  MRN: [id number]    Date of Admission: 10/5/2024  Primary Care Physician: Cassy Foster MD    Subjective   Subjective     CC:  F/u ankle fracture    HPI:  Patient was seen and examined this morning.  Feeling well today.  Slept well overnight.  No acute complaints.  Awaiting rehab    Objective   Objective     Vital Signs:   Temp:  [97.8 °F (36.6 °C)-98.2 °F (36.8 °C)] 98 °F (36.7 °C)  Heart Rate:  [78-89] 86  Resp:  [18] 18  BP: (102-116)/(61-76) 102/66     Physical Exam:  General: Comfortable, not in distress, conversant and cooperative  Head: Atraumatic and normocephalic  Eyes: No Icterus. No pallor  Ears:  Ears appear intact with no abnormalities noted  Throat: No oral lesions, no thrush  Neck: Supple, trachea midline  Lungs: Clear to auscultation bilaterally, equal air entry, no wheezing or crackles  Heart:  Normal S1 and S2, no murmur, no gallop, No JVD, no lower extremity swelling  Abdomen:  Soft, no tenderness, no organomegaly, normal bowel sounds, no organomegaly  Extremities: Left leg in cast  Skin: No bleeding, bruising or rash, normal skin turgor and elasticity  Neurologic: Cranial nerves appear intact with no evidence of facial asymmetry, normal motor and sensory functions in all 4 extremities  Psych: Alert and oriented x 3, normal mood    Results Reviewed:  LAB RESULTS:      Lab 10/09/24  0600 10/07/24  0636   WBC 4.49 5.71   HEMOGLOBIN 8.8* 9.4*   HEMATOCRIT 27.6* 30.2*   PLATELETS 176 172   MCV 98.6* 100.3*         Lab 10/11/24  0949 10/10/24  1013 10/07/24  0636   SODIUM 141 142 135*   POTASSIUM 4.2 4.7 4.4   CHLORIDE 105 109* 99   CO2 25.0 21.0* 26.0   ANION GAP 11.0 12.0 10.0   BUN 12 10 11   CREATININE 1.46* 1.47* 1.15*   EGFR 39.0* 38.7* 52.0*   GLUCOSE 108* 129* 104*   CALCIUM 8.7 8.7 8.8   TSH  --   --  4.540*                       Lab 10/09/24  0600 10/08/24  2738   IRON  --   20*   IRON SATURATION (TSAT)  --  7*   TIBC  --  280*   TRANSFERRIN  --  188*   FOLATE >20.00  --    VITAMIN B 12 832  --          Brief Urine Lab Results  (Last result in the past 365 days)        Color   Clarity   Blood   Leuk Est   Nitrite   Protein   CREAT   Urine HCG        06/22/24 1604 Yellow   Clear   Negative   Negative   Negative   Negative                   Microbiology Results Abnormal       None            No radiology results from the last 24 hrs    Results for orders placed during the hospital encounter of 01/02/24    Adult Transthoracic Echo Complete W/ Cont if Necessary Per Protocol    Interpretation Summary    Left ventricular systolic function is normal. Calculated left ventricular EF = 57.9% Left ventricular ejection fraction appears to be 56 - 60%.    Left ventricular diastolic function was normal.    Estimated right ventricular systolic pressure from tricuspid regurgitation is normal (<35 mmHg).    No significant change from previous study      Current medications:  Scheduled Meds:aspirin, 81 mg, Oral, Daily  diazePAM, 5 mg, Oral, Q8H  DULoxetine, 20 mg, Oral, BID  folic acid, 1 mg, Oral, Daily  gabapentin, 300 mg, Oral, Daily  gabapentin, 600 mg, Oral, Nightly  heparin (porcine), 5,000 Units, Subcutaneous, Q8H  levothyroxine, 25 mcg, Oral, Q AM  magnesium oxide, 400 mg, Oral, Daily  melatonin, 5 mg, Oral, Nightly  metoprolol succinate XL, 25 mg, Oral, Nightly  pantoprazole, 40 mg, Oral, Every Other Day  rosuvastatin, 5 mg, Oral, Daily  sodium chloride, 10 mL, Intravenous, Q12H  thiamine, 200 mg, Oral, Daily  vitamin B-12, 1,000 mcg, Oral, Daily      Continuous Infusions:   PRN Meds:.  senna-docusate sodium **AND** polyethylene glycol **AND** bisacodyl **AND** bisacodyl    Calcium Replacement - Follow Nurse / BPA Driven Protocol    docosanol    HYDROmorphone **AND** naloxone    influenza vaccine    Magnesium Standard Dose Replacement - Follow Nurse / BPA Driven Protocol    oxyCODONE     Phosphorus Replacement - Follow Nurse / BPA Driven Protocol    Potassium Replacement - Follow Nurse / BPA Driven Protocol    [COMPLETED] Insert Peripheral IV **AND** sodium chloride    sodium chloride    Assessment & Plan   Assessment & Plan     Active Hospital Problems    Diagnosis  POA    **Closed fracture of left ankle [G92.872A]  Unknown    Closed displaced fracture of fourth metatarsal bone of left foot [S92.342A]  Yes    Lisfranc dislocation, left, initial encounter [N02.693A]  Yes      Resolved Hospital Problems   No resolved problems to display.        Brief Hospital Course to date:  Alysia Sierra is a 69 y.o. female with history of CKD III, chronic anemia, uterine cancer, hypothyroidism and alcohol abuse who was admitted to [facility] on 10/5/24 for L ankle fracture after stepping awkwardly off her bed.     Left foot fracture with involvement of 1st, 2nd, 3rd and 4th metatarsal bases  Acute nondisplaced L medial malleolus fracture  Acute nondisplaced L posterior medial talus fracture   1st-4th proximal metatarsal fracture Lisfranc fracture  LLE splinted. Fracture blister on dorsal aspect of L foot noted - per ortho allow blisters to decompress on their own  Aggressive elevation, goal to maintain elevation above heart  Surgery tentatively planned for 10/18 with sadia Jaeger to discharge and return for surgery, f/u with Dr. Godoy in office next week  PRN pain management  Continue PT and OT   Awaiting rehab      Alcohol withdrawal, resolved  History of alcohol abuse   Patient denies daily alcohol use, however family reports daily heavy alcohol use  Developed confusion / hallucinations on afternoon of 10/8 and started on CIWA protocol  Withdrawal symptoms resolved.  CIWA protocol discontinued  Continue PO thiamine, folic acid  Continue QHS Melatonin     CKD III  Creatinine stable around baseline    Anemia  Iron studies show ACD with iron deficiency  Status post IV iron  B12, folate within  normal limits     Hypothyroid  Continue Levothyroxine    GERD  Continue PPI      Neuropathy  Continue Gabapentin      Expected Discharge Location and Transportation: SNF rehab vs home with   Expected Discharge   Expected Discharge Date: 10/14/2024; Expected Discharge Time:      VTE Prophylaxis:  Pharmacologic & mechanical VTE prophylaxis orders are present.         AM-PAC 6 Clicks Score (PT): 19 (10/13/24 2971)    CODE STATUS:   Code Status and Medical Interventions: CPR (Attempt to Resuscitate); Full Support   Ordered at: 10/06/24 0246     Code Status (Patient has no pulse and is not breathing):    CPR (Attempt to Resuscitate)     Medical Interventions (Patient has pulse or is breathing):    Full Support       Nandini Wilcox MD  10/13/24

## 2024-10-14 PROCEDURE — 97110 THERAPEUTIC EXERCISES: CPT

## 2024-10-14 PROCEDURE — 99232 SBSQ HOSP IP/OBS MODERATE 35: CPT | Performed by: INTERNAL MEDICINE

## 2024-10-14 PROCEDURE — 97535 SELF CARE MNGMENT TRAINING: CPT

## 2024-10-14 PROCEDURE — 97116 GAIT TRAINING THERAPY: CPT

## 2024-10-14 PROCEDURE — 25010000002 HEPARIN (PORCINE) PER 1000 UNITS: Performed by: PHYSICIAN ASSISTANT

## 2024-10-14 RX ADMIN — HEPARIN SODIUM 5000 UNITS: 5000 INJECTION INTRAVENOUS; SUBCUTANEOUS at 20:43

## 2024-10-14 RX ADMIN — OXYCODONE HYDROCHLORIDE 5 MG: 5 TABLET ORAL at 10:03

## 2024-10-14 RX ADMIN — GABAPENTIN 600 MG: 300 CAPSULE ORAL at 20:42

## 2024-10-14 RX ADMIN — GABAPENTIN 300 MG: 300 CAPSULE ORAL at 10:03

## 2024-10-14 RX ADMIN — MAGNESIUM OXIDE TAB 400 MG (241.3 MG ELEMENTAL MG) 400 MG: 400 (241.3 MG) TAB at 10:03

## 2024-10-14 RX ADMIN — OXYCODONE HYDROCHLORIDE 5 MG: 5 TABLET ORAL at 02:43

## 2024-10-14 RX ADMIN — Medication 5 MG: at 20:43

## 2024-10-14 RX ADMIN — Medication 10 ML: at 10:04

## 2024-10-14 RX ADMIN — ASPIRIN 81 MG: 81 TABLET, COATED ORAL at 10:03

## 2024-10-14 RX ADMIN — FOLIC ACID 1 MG: 1 TABLET ORAL at 10:03

## 2024-10-14 RX ADMIN — DULOXETINE HYDROCHLORIDE 20 MG: 20 CAPSULE, DELAYED RELEASE ORAL at 20:43

## 2024-10-14 RX ADMIN — ROSUVASTATIN 5 MG: 10 TABLET, FILM COATED ORAL at 10:03

## 2024-10-14 RX ADMIN — HEPARIN SODIUM 5000 UNITS: 5000 INJECTION INTRAVENOUS; SUBCUTANEOUS at 06:04

## 2024-10-14 RX ADMIN — METOPROLOL SUCCINATE 25 MG: 25 TABLET, EXTENDED RELEASE ORAL at 20:43

## 2024-10-14 RX ADMIN — THIAMINE HCL TAB 100 MG 200 MG: 100 TAB at 10:03

## 2024-10-14 RX ADMIN — Medication 10 ML: at 20:44

## 2024-10-14 RX ADMIN — LEVOTHYROXINE SODIUM 25 MCG: 25 TABLET ORAL at 06:04

## 2024-10-14 RX ADMIN — DULOXETINE HYDROCHLORIDE 20 MG: 20 CAPSULE, DELAYED RELEASE ORAL at 10:03

## 2024-10-14 RX ADMIN — HEPARIN SODIUM 5000 UNITS: 5000 INJECTION INTRAVENOUS; SUBCUTANEOUS at 14:54

## 2024-10-14 RX ADMIN — OXYCODONE HYDROCHLORIDE 5 MG: 5 TABLET ORAL at 14:54

## 2024-10-14 RX ADMIN — CYANOCOBALAMIN TAB 1000 MCG 1000 MCG: 1000 TAB at 10:03

## 2024-10-14 NOTE — THERAPY TREATMENT NOTE
Patient Name: Alysia Sierra  : 1955    MRN: [id number]                              Today's Date: 10/14/2024       Admit Date: 10/5/2024    Visit Dx:     ICD-10-CM ICD-9-CM   1. Displaced fracture of distal end of left tibia  S82.302A 824.8   2. Closed nondisplaced fracture of second metatarsal bone of left foot, initial encounter  S92.325A 825.25   3. Closed nondisplaced fracture of first metatarsal bone of left foot, initial encounter  S92.315A 825.25   4. Closed nondisplaced fracture of third metatarsal bone of left foot, initial encounter  S92.335A 825.25   5. Closed nondisplaced fracture of fourth metatarsal bone of left foot, initial encounter  S92.345A 825.25     Patient Active Problem List   Diagnosis    Hypertension    Leg weakness, bilateral    Syncope    CKD (chronic kidney disease) stage 3, GFR 30-59 ml/min    Neuropathy    Hyperlipidemia LDL goal <100    Endometrial adenocarcinoma    Mild episode of recurrent major depressive disorder    Dizziness    Hip fracture    Anemia    Hypomagnesemia    Hypothyroidism    Post-menopausal    Other osteoporosis without current pathological fracture    Closed fracture of second lumbar vertebra    Alcoholic ketoacidosis    Leukocytosis    GERD with esophagitis    Sepsis    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    Lisfranc dislocation, left, initial encounter    Closed fracture of left ankle    Closed displaced fracture of fourth metatarsal bone of left foot     Past Medical History:   Diagnosis Date    Allergic lisinopril  2017    Anemia     Arrhythmia     Arthritis     Cancer     uterine    Cataract mild 2020    stil lpresent    Chicken pox     Chronic fatigue     CKD (chronic kidney disease), stage III     sees nephro    Clotting disorder     Dental root implant present     lower left  x1 - possible dental implant    Depression mild 2019    Difficulty walking 2019    Disease of thyroid gland     Dizzy     NIEVES (dyspnea on exertion)     2017     Fracture of hip     Generalized anxiety disorder     GERD (gastroesophageal reflux disease) 2018    History of brachytherapy 2023    vaginal brachytherapy    Hyperlipidemia     Hypertension     Iron deficiency anemia     Liver disease     fatty    Liver problem     Measles     Menopause     Mumps     Neuromuscular disorder Peripheral Neuropathy    Orthostatic hypotension     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    Renal insufficiency 2018    Scoliosis     Unintentional weight loss     Uses contact lenses     bilat    Uterine cancer     Uterine cancer 2022    UTI (urinary tract infection)     Visual impairment Nearsighted    Wears glasses      Past Surgical History:   Procedure Laterality Date     SECTION      DILATION AND CURETTAGE, DIAGNOSTIC / THERAPEUTIC      HIP OPEN REDUCTION Left 2023    Procedure: FEMORAL NECK OPEN REDUCTION INTERNAL FIXATION LEFT;  Surgeon: Juanpablo Lee MD;  Location: [facility] OR;  Service: Orthopedics;  Laterality: Left;    HIP SURGERY      OOPHORECTOMY      ORAL LESION EXCISION/BIOPSY  2021    TOTAL LAPAROSCOPIC HYSTERECTOMY SALPINGO OOPHORECTOMY N/A 10/28/2022    Procedure: TOTAL LAPAROSCOPIC HYSTERECTOMY BILATERAL SALPINGO-OOPHORECTOMY, INJECTION FOR SENTINEL LYMPH NODE MAPPING, BILATERAL SENTINEL LYMPH NODE DISSECTION WITH DAVINCI ROBOT;  Surgeon: Jasmine Rich MD;  Location: [facility] OR;  Service: Robotics - DaVinci;  Laterality: N/A;    TUBAL ABDOMINAL LIGATION        General Information       Row Name 10/14/24 142          Physical Therapy Time and Intention    Document Type therapy note (daily note)  -KG     Mode of Treatment physical therapy  -KG       Row Name 10/14/24 142          General Information    Patient Profile Reviewed yes  -KG     Existing Precautions/Restrictions fall;left;non-weight bearing;other (see comments)  NWB LLE in splint  -KG       Row Name 10/14/24 1421          Cognition     Orientation Status (Cognition) oriented x 4  -KG       Row Name 10/14/24 1421          Safety Issues/Impairments Affecting Functional Mobility    Safety Issues Affecting Function (Mobility) insight into deficits/self-awareness;safety precaution awareness;safety precautions follow-through/compliance  -KG     Impairments Affecting Function (Mobility) endurance/activity tolerance;balance;strength;pain  -KG               User Key  (r) = Recorded By, (t) = Taken By, (c) = Cosigned By      Initials Name Provider Type    KG Monica Rosario Physical Therapist                   Mobility       Row Name 10/14/24 1423          Bed Mobility    Comment, (Bed Mobility) UIC  -KG       Row Name 10/14/24 1423          Transfers    Comment, (Transfers) STS from chair, min-A to stabilize on scooter  -KG       Row Name 10/14/24 1423          Sit-Stand Transfer    Sit-Stand White (Transfers) verbal cues;contact guard;minimum assist (75% patient effort)  -KG     Assistive Device (Sit-Stand Transfers) other (see comments)  scooter  -KG       Row Name 10/14/24 1423          Gait/Stairs (Locomotion)    White Level (Gait) 1 person assist;verbal cues;minimum assist (75% patient effort)  -KG     Assistive Device (Gait) walker, knee scooter  -KG     Patient was able to Ambulate yes  -KG     Distance in Feet (Gait) 100  -KG     Deviations/Abnormal Patterns (Gait) bilateral deviations;werner decreased;gait speed decreased  -KG     Bilateral Gait Deviations forward flexed posture  -KG     Comment, (Gait/Stairs) Pt with improved scooter technique today.  100', fluctuated between CGA and min-A with better contol and technique  -KG       Row Name 10/14/24 1423          Mobility    Extremity Weight-bearing Status left lower extremity  -KG     Left Lower Extremity (Weight-bearing Status) non weight-bearing (NWB)   -KG               User Key  (r) = Recorded By, (t) = Taken By, (c) = Cosigned By      Initials Name Provider Type    KG  Monica Rosario Physical Therapist                   Obj/Interventions       Row Name 10/14/24 1425          Motor Skills    Therapeutic Exercise other (see comments)  LAQ, chair push ups, SLR  -KG       Row Name 10/14/24 1425          Balance    Balance Interventions standing;sit to stand  -KG               User Key  (r) = Recorded By, (t) = Taken By, (c) = Cosigned By      Initials Name Provider Type    Monica Ramires Physical Therapist                   Goals/Plan    No documentation.                  Clinical Impression       Row Name 10/14/24 1426          Pain    Pretreatment Pain Rating 0/10 - no pain  -KG     Posttreatment Pain Rating 0/10 - no pain  -KG       Row Name 10/14/24 1426          Plan of Care Review    Plan of Care Reviewed With patient  -KG     Progress improving  -KG     Outcome Evaluation Pt with improved scooter technique today. 100', fluctuated between CGA and min-A with better contol and technique  -KG       Row Name 10/14/24 1426          Positioning and Restraints    Pre-Treatment Position sitting in chair/recliner  -KG     Post Treatment Position chair  -KG     In Chair notified nsg;call light within reach;encouraged to call for assist;exit alarm on;waffle cushion;legs elevated  -KG               User Key  (r) = Recorded By, (t) = Taken By, (c) = Cosigned By      Initials Name Provider Type    Monica Ramires Physical Therapist                   Outcome Measures       Row Name 10/14/24 1426          How much help from another person do you currently need...    Turning from your back to your side while in flat bed without using bedrails? 4  -KG     Moving from lying on back to sitting on the side of a flat bed without bedrails? 3  -KG     Moving to and from a bed to a chair (including a wheelchair)? 3  -KG     Standing up from a chair using your arms (e.g., wheelchair, bedside chair)? 3  -KG     Climbing 3-5 steps with a railing? 2  -KG     To walk in hospital room? 3  -KG      AM-PAC 6 Clicks Score (PT) 18  -KG     Highest Level of Mobility Goal 6 --> Walk 10 steps or more  -KG       Row Name 10/14/24 1426 10/14/24 0955       Functional Assessment    Outcome Measure Options AM-PAC 6 Clicks Basic Mobility (PT)  -KG AM-PAC 6 Clicks Daily Activity (OT)  -AN              User Key  (r) = Recorded By, (t) = Taken By, (c) = Cosigned By      Initials Name Provider Type    KG Monica Rosario Physical Therapist    Keisha Paula, CALVIN Occupational Therapist                                 Physical Therapy Education       Title: PT OT SLP Therapies (Done)       Topic: Physical Therapy (Done)       Point: Mobility training (Done)       Learning Progress Summary            Patient Acceptance, E, VU by  at 10/12/2024 1004    Acceptance, E, VU by  at 10/11/2024 1154    Eager, E, VU by SC at 10/10/2024 1108    Comment: reviewed safety with mobility    Acceptance, E,D, VU,NR by AB at 10/9/2024 1006                      Point: Home exercise program (Done)       Learning Progress Summary            Patient Acceptance, E, VU by  at 10/12/2024 1004    Eager, E, VU by SC at 10/10/2024 1108    Comment: reviewed safety with mobility                      Point: Body mechanics (Done)       Learning Progress Summary            Patient Acceptance, E, VU by  at 10/12/2024 1004    Acceptance, E, VU by  at 10/11/2024 1154    Eager, E, VU by SC at 10/10/2024 1108    Comment: reviewed safety with mobility    Acceptance, E,D, VU,NR by AB at 10/9/2024 1006                      Point: Precautions (Done)       Learning Progress Summary            Patient Acceptance, E, VU by  at 10/12/2024 1004    Acceptance, E, VU by  at 10/11/2024 1154    Eager, E, VU by SC at 10/10/2024 1108    Comment: reviewed safety with mobility    Acceptance, E,D, VU,NR by AB at 10/9/2024 1006                                      User Key       Initials Effective Dates Name Provider Type [facility] 02/03/23 -  Mirna,  Grace ALBERTO, PT Physical Therapist PT    AB 09/22/22 -  Margaret Bhatt, PT Physical Therapist PT    CK 02/06/24 -  Karen Mcarthur, PT Physical Therapist PT                  PT Recommendation and Plan     Progress: improving  Outcome Evaluation: Pt with improved scooter technique today. 100', fluctuated between CGA and min-A with better contol and technique     Time Calculation:         PT Charges       Row Name 10/14/24 1427             Time Calculation    Start Time 1300  -KG      PT Received On 10/14/24  -KG         Timed Charges    92461 - PT Therapeutic Exercise Minutes 10  -KG      14428 - Gait Training Minutes  13  -KG         Total Minutes    Timed Charges Total Minutes 23  -KG       Total Minutes 23  -KG                User Key  (r) = Recorded By, (t) = Taken By, (c) = Cosigned By      Initials Name Provider Type    LISETTE Monica Rosario Physical Therapist                  Therapy Charges for Today       Code Description Service Date Service Provider Modifiers Qty    76818124917 HC GAIT TRAINING EA 15 MIN 10/13/2024 Monica Rosario GP 2    01401424150 HC PT THERAPEUTIC ACT EA 15 MIN 10/13/2024 Monica Rosario GP 1    68974599959 HC PT THER PROC EA 15 MIN 10/14/2024 Monica Rosario GP 1    50588572287 HC GAIT TRAINING EA 15 MIN 10/14/2024 Monica Rosario GP 1            PT G-Codes  Outcome Measure Options: AM-PAC 6 Clicks Basic Mobility (PT)  AM-PAC 6 Clicks Score (PT): 18  AM-PAC 6 Clicks Score (OT): 18       Monica Rosario  10/14/2024

## 2024-10-14 NOTE — CASE MANAGEMENT/SOCIAL WORK
Continued Stay Note  [facility]     Patient Name: Alysia Sierra  MRN: [id number]  Today's Date: 10/14/2024    Admit Date: 10/5/2024    Plan: [facility]   Discharge Plan       Row Name 10/14/24 0918       Plan    Plan [facility] on hold    Plan Comments   [facility] on hold until after surgery as it is upcoming on Friday. Her stay @ [facility] would require a precert with insurance and may take a few days for approval. If patient still requires rehab after OR, I will reach out to [facility].  I spoke with patient this am. She tells me she would be not safe to go home alone      Final Discharge Disposition Code 03 - skilled nursing facility (SNF)                   Discharge Codes    No documentation.                 Expected Discharge Date and Time       Expected Discharge Date Expected Discharge Time    Oct 14, 2024               Melvi Bear RN

## 2024-10-14 NOTE — PLAN OF CARE
Goal Outcome Evaluation:  Plan of Care Reviewed With: patient        Progress: improving  Outcome Evaluation: Pt improved to CGA with functional transfers including STS and SPT to BSC. BSC used due to urgency. Pt able to maintain NWB of LLE throughout. Cont POC.    Anticipated Discharge Disposition (OT): inpatient rehabilitation facility

## 2024-10-14 NOTE — PLAN OF CARE
Goal Outcome Evaluation:  Plan of Care Reviewed With: patient        Progress: improving  Outcome Evaluation: Pt with improved scooter technique today. 100', fluctuated between CGA and min-A with better contol and technique

## 2024-10-14 NOTE — PLAN OF CARE
Goal Outcome Evaluation:  Plan of Care Reviewed With: patient  Plan of Care Reviewed With: patient        Progress: no change     Pt is alert and oriented x4. VSS on room air. Pt has slept off and on throughout the shift. PRN oxycodone given as ordered for pain. Splint to LLE in place and elevated on pillows. Up with an assist x1 to BSC; voiding spontaneously.

## 2024-10-14 NOTE — THERAPY TREATMENT NOTE
Patient Name: Alysia Sierra  : 1955    MRN: [id number]                              Today's Date: 10/14/2024       Admit Date: 10/5/2024    Visit Dx:     ICD-10-CM ICD-9-CM   1. Displaced fracture of distal end of left tibia  S82.302A 824.8   2. Closed nondisplaced fracture of second metatarsal bone of left foot, initial encounter  S92.325A 825.25   3. Closed nondisplaced fracture of first metatarsal bone of left foot, initial encounter  S92.315A 825.25   4. Closed nondisplaced fracture of third metatarsal bone of left foot, initial encounter  S92.335A 825.25   5. Closed nondisplaced fracture of fourth metatarsal bone of left foot, initial encounter  S92.345A 825.25     Patient Active Problem List   Diagnosis    Hypertension    Leg weakness, bilateral    Syncope    CKD (chronic kidney disease) stage 3, GFR 30-59 ml/min    Neuropathy    Hyperlipidemia LDL goal <100    Endometrial adenocarcinoma    Mild episode of recurrent major depressive disorder    Dizziness    Hip fracture    Anemia    Hypomagnesemia    Hypothyroidism    Post-menopausal    Other osteoporosis without current pathological fracture    Closed fracture of second lumbar vertebra    Alcoholic ketoacidosis    Leukocytosis    GERD with esophagitis    Sepsis    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    Lisfranc dislocation, left, initial encounter    Closed fracture of left ankle    Closed displaced fracture of fourth metatarsal bone of left foot     Past Medical History:   Diagnosis Date    Allergic lisinopril  2017    Anemia     Arrhythmia     Arthritis     Cancer     uterine    Cataract mild 2020    stil lpresent    Chicken pox     Chronic fatigue     CKD (chronic kidney disease), stage III     sees nephro    Clotting disorder     Dental root implant present     lower left  x1 - possible dental implant    Depression mild 2019    Difficulty walking 2019    Disease of thyroid gland     Dizzy     NIEVES (dyspnea on exertion)     2017     Fracture of hip     Generalized anxiety disorder     GERD (gastroesophageal reflux disease) 2018    History of brachytherapy 2023    vaginal brachytherapy    Hyperlipidemia     Hypertension     Iron deficiency anemia     Liver disease     fatty    Liver problem     Measles     Menopause     Mumps     Neuromuscular disorder Peripheral Neuropathy    Orthostatic hypotension     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    Renal insufficiency 2018    Scoliosis     Unintentional weight loss     Uses contact lenses     bilat    Uterine cancer     Uterine cancer 2022    UTI (urinary tract infection)     Visual impairment Nearsighted    Wears glasses      Past Surgical History:   Procedure Laterality Date     SECTION      DILATION AND CURETTAGE, DIAGNOSTIC / THERAPEUTIC      HIP OPEN REDUCTION Left 2023    Procedure: FEMORAL NECK OPEN REDUCTION INTERNAL FIXATION LEFT;  Surgeon: Juanpablo Lee MD;  Location: [facility] OR;  Service: Orthopedics;  Laterality: Left;    HIP SURGERY      OOPHORECTOMY      ORAL LESION EXCISION/BIOPSY  2021    TOTAL LAPAROSCOPIC HYSTERECTOMY SALPINGO OOPHORECTOMY N/A 10/28/2022    Procedure: TOTAL LAPAROSCOPIC HYSTERECTOMY BILATERAL SALPINGO-OOPHORECTOMY, INJECTION FOR SENTINEL LYMPH NODE MAPPING, BILATERAL SENTINEL LYMPH NODE DISSECTION WITH DAVINCI ROBOT;  Surgeon: Jasmine Rich MD;  Location: [facility] OR;  Service: Robotics - DaVinci;  Laterality: N/A;    TUBAL ABDOMINAL LIGATION        General Information       Row Name 10/14/24 0946          OT Time and Intention    Document Type therapy note (daily note)  -AN     Mode of Treatment occupational therapy  -AN       Row Name 10/14/24 0946          General Information    Patient Profile Reviewed yes  -AN     Existing Precautions/Restrictions fall;left;non-weight bearing;other (see comments)  NWB LLE in splint  -AN       Row Name 10/14/24 0946          Cognition    Orientation Status  (Cognition) oriented x 4  -AN       Row Name 10/14/24 0946          Safety Issues/Impairments Affecting Functional Mobility    Safety Issues Affecting Function (Mobility) safety precaution awareness;safety precautions follow-through/compliance  -AN     Impairments Affecting Function (Mobility) endurance/activity tolerance;balance;strength;pain  -AN               User Key  (r) = Recorded By, (t) = Taken By, (c) = Cosigned By      Initials Name Provider Type    Keisha Paula OT Occupational Therapist                     Mobility/ADL's       Row Name 10/14/24 0949          Bed Mobility    Bed Mobility supine-sit;scooting/bridging  -AN     Scooting/Bridging [place]ulk (Bed Mobility) supervision  -AN     Supine-Sit [place] (Bed Mobility) supervision  -AN     Assistive Device (Bed Mobility) head of bed elevated  -AN       Row Name 10/14/24 0949          Transfers    Transfers sit-stand transfer;stand-sit transfer;toilet transfer;bed-chair transfer  -AN     Comment, (Transfers) SPT performed to BSC and then BSC to chair  -AN       Row Name 10/14/24 0949          Bed-Chair Transfer    Bed-Chair Faulk (Transfers) verbal cues;contact guard  -AN       Row Name 10/14/24 0949          Sit-Stand Transfer    Sit-Stand [place]ulk (Transfers) verbal cues;contact guard  -AN       Row Name 10/14/24 0949          Stand-Sit Transfer    Stand-Sit [place]ulk (Transfers) verbal cues;contact guard  -AN       Row Name 10/14/24 0949          Toilet Transfer    Type (Toilet Transfer) stand pivot/stand step;sit-stand;stand-sit  -AN     Faulk Level (Toilet Transfer) verbal cues;contact guard  -AN     Assistive Device (Toilet Transfer) commode, bedside without drop arms  -AN       Row Name 10/14/24 0949          Functional Mobility    Functional Mobility- Ind. Level not tested  -AN       Row Name 10/14/24 0949          Activities of Daily Living    BADL Assessment/Intervention grooming;toileting  -AN       Row  Name 10/14/24 0949          Mobility    Extremity Weight-bearing Status left lower extremity  -AN     Left Lower Extremity (Weight-bearing Status) non weight-bearing (NWB)   -AN       Row Name 10/14/24 0949          Toileting Assessment/Training    [place] Level (Toileting) adjust/manage clothing;set up;perform perineal hygiene  -AN     Assistive Devices (Toileting) commode, bedside without drop arms  -AN     Position (Toileting) unsupported sitting  -AN     Comment, (Toileting) performed in sitting  -AN       Row Name 10/14/24 0949          Grooming Assessment/Training    [place] Level (Grooming) oral care regimen;wash face, hands;set up  -AN     Position (Grooming) unsupported sitting  -AN               User Key  (r) = Recorded By, (t) = Taken By, (c) = Cosigned By      Initials Name Provider Type    Keisha Paula OT Occupational Therapist                   Obj/Interventions       Row Name 10/14/24 0952          Balance    Balance Assessment sitting static balance;sitting dynamic balance;sit to stand dynamic balance;standing dynamic balance;standing static balance  -AN     Static Sitting Balance supervision  -AN     Dynamic Sitting Balance supervision  -AN     Position, Sitting Balance sitting in chair;sitting edge of bed  -AN     Sit to Stand Dynamic Balance verbal cues;contact guard  -AN     Static Standing Balance contact guard  -AN     Dynamic Standing Balance contact guard  -AN     Position/Device Used, Standing Balance supported  -AN     Balance Interventions standing;sit to stand;supported;static;dynamic;minimal challenge  -AN               User Key  (r) = Recorded By, (t) = Taken By, (c) = Cosigned By      Initials Name Provider Type    Keisha Paula OT Occupational Therapist                   Goals/Plan    No documentation.                  Clinical Impression       Row Name 10/14/24 0952          Pain Assessment    Pretreatment Pain Rating 2/10  -AN     Posttreatment Pain Rating  2/10  -AN     Pain Location extremity  -AN     Pain Side/Orientation left;lower  -AN     Pain Management Interventions positioning techniques utilized  -AN     Response to Pain Interventions intervention effective per patient report  -AN     Pain Intervention(s) Repositioned;Ambulation/increased activity  -AN       Row Name 10/14/24 0952          Plan of Care Review    Plan of Care Reviewed With patient  -AN     Progress improving  -AN     Outcome Evaluation Pt improved to CGA with functional transfers including STS and SPT to BSC. BSC used due to urgency. Pt able to maintain NWB of LLE throughout. Cont POC.  -AN       Row Name 10/14/24 0952          Therapy Assessment/Plan (OT)    Rehab Potential (OT) good  -AN       Row Name 10/14/24 0952          Therapy Plan Review/Discharge Plan (OT)    Anticipated Discharge Disposition (OT) inpatient rehabilitation facility  -AN       Row Name 10/14/24 0952          Positioning and Restraints    Pre-Treatment Position in bed  -AN     Post Treatment Position chair  -AN     In Chair notified nsg;reclined;call light within reach;encouraged to call for assist;on mechanical lift sling;waffle cushion;LLE elevated  RN made aware of no alarm and approved  -AN               User Key  (r) = Recorded By, (t) = Taken By, (c) = Cosigned By      Initials Name Provider Type    AN Keisha Oates OT Occupational Therapist                   Outcome Measures       Row Name 10/14/24 0955          How much help from another is currently needed...    Putting on and taking off regular lower body clothing? 3  -AN     Bathing (including washing, rinsing, and drying) 2  -AN     Toileting (which includes using toilet bed pan or urinal) 3  -AN     Putting on and taking off regular upper body clothing 3  -AN     Taking care of personal grooming (such as brushing teeth) 3  -AN     Eating meals 4  -AN     AM-PAC 6 Clicks Score (OT) 18  -AN       Row Name 10/14/24 0955          Functional Assessment     Outcome Measure Options AM-PAC 6 Clicks Daily Activity (OT)  -AN               User Key  (r) = Recorded By, (t) = Taken By, (c) = Cosigned By      Initials Name Provider Type    Keisha Paula OT Occupational Therapist                    Occupational Therapy Education       Title: PT OT SLP Therapies (Done)       Topic: Occupational Therapy (Done)       Point: ADL training (Done)       Description:   Instruct learner(s) on proper safety adaptation and remediation techniques during self care or transfers.   Instruct in proper use of assistive devices.                  Learning Progress Summary            Patient Acceptance, E, VU by AN at 10/14/2024 0955    Eager, E,TB,D, VU,NR by AR at 10/10/2024 1647    Eager, E,TB,D,H, VU,NR by AR at 10/9/2024 1145                      Point: Home exercise program (Done)       Description:   Instruct learner(s) on appropriate technique for monitoring, assisting and/or progressing therapeutic exercises/activities.                  Learning Progress Summary            Patient Eager, E,TB,D, VU,NR by AR at 10/10/2024 1647    Eager, E,TB,D,H, VU,NR by AR at 10/9/2024 1145                      Point: Precautions (Done)       Description:   Instruct learner(s) on prescribed precautions during self-care and functional transfers.                  Learning Progress Summary            Patient Acceptance, E, VU by AN at 10/14/2024 0955    Eager, E,TB,D, VU,NR by AR at 10/10/2024 1647    Eager, E,TB,D,H, VU,NR by AR at 10/9/2024 1145                      Point: Body mechanics (Done)       Description:   Instruct learner(s) on proper positioning and spine alignment during self-care, functional mobility activities and/or exercises.                  Learning Progress Summary            Patient Acceptance, E, VU by AN at 10/14/2024 0955    Eager, E,TB,D, VU,NR by AR at 10/10/2024 1647    Eager, E,TB,D,H, VU,NR by AR at 10/9/2024 1145                                      User Key        Initials Effective Dates Name Provider Type Discipline    AR 07/11/23 -  Bernie Henriquez OT Occupational Therapist OT    AN 09/21/21 -  Keisha Oates OT Occupational Therapist OT                  OT Recommendation and Plan     Plan of Care Review  Plan of Care Reviewed With: patient  Progress: improving  Outcome Evaluation: Pt improved to CGA with functional transfers including STS and SPT to BSC. BSC used due to urgency. Pt able to maintain NWB of LLE throughout. Cont POC.     Time Calculation:         Time Calculation- OT       Row Name 10/14/24 0956             Time Calculation- OT    OT Start Time 0920  -AN      OT Received On 10/14/24  -AN         Timed Charges    17294 - OT Self Care/Mgmt Minutes 23  -AN         Total Minutes    Timed Charges Total Minutes 23  -AN       Total Minutes 23  -AN                User Key  (r) = Recorded By, (t) = Taken By, (c) = Cosigned By      Initials Name Provider Type    AN Keisha Oates OT Occupational Therapist                  Therapy Charges for Today       Code Description Service Date Service Provider Modifiers Qty    13902458015 HC OT SELF CARE/MGMT/TRAIN EA 15 MIN 10/14/2024 Keisha Oates OT GO 2                 Keisha Oates OT  10/14/2024

## 2024-10-14 NOTE — PROGRESS NOTES
Nutrition Services    Patient Name:  Alysia Sierra  YOB: 1955  MRN: [id number]  Admit Date:  10/5/2024    Pt identified 2/2 LOS. No nutrition risk noted at this time. Patient eating adequately per documentation without significant changes in weight. Please consult for intake avg <50% or significant weight change.      Electronically signed by:  Yulia Sidhu RD  10/14/24 10:37 EDT

## 2024-10-14 NOTE — PROGRESS NOTES
[facility] Medicine Services  PROGRESS NOTE    Patient Name: Alysia Sierra  : 1955  MRN: [id number]    Date of Admission: 10/5/2024  Primary Care Physician: Cassy Foster MD    Subjective   Subjective     CC:  F/u ankle fracture    HPI:  Patient was seen and examined this morning.  Feeling well today.  No acute complaints.  She might need to stay here till Thursday to get her ankle surgery done.    Objective   Objective     Vital Signs:   Temp:  [98.2 °F (36.8 °C)-99 °F (37.2 °C)] 98.6 °F (37 °C)  Heart Rate:  [77-90] 77  Resp:  [18] 18  BP: ()/(64-75) 94/66     Physical Exam:  General: Comfortable, not in distress, conversant and cooperative  Head: Atraumatic and normocephalic  Eyes: No Icterus. No pallor  Ears:  Ears appear intact with no abnormalities noted  Throat: No oral lesions, no thrush  Neck: Supple, trachea midline  Lungs: Clear to auscultation bilaterally, equal air entry, no wheezing or crackles  Heart:  Normal S1 and S2, no murmur, no gallop, No JVD, no lower extremity swelling  Abdomen:  Soft, no tenderness, no organomegaly, normal bowel sounds, no organomegaly  Extremities: Left leg in cast  Skin: No bleeding, bruising or rash, normal skin turgor and elasticity  Neurologic: Cranial nerves appear intact with no evidence of facial asymmetry, normal motor and sensory functions in all 4 extremities  Psych: Alert and oriented x 3, normal mood    Results Reviewed:  LAB RESULTS:      Lab 10/09/24  0600   WBC 4.49   HEMOGLOBIN 8.8*   HEMATOCRIT 27.6*   PLATELETS 176   MCV 98.6*         Lab 10/11/24  0949 10/10/24  1013   SODIUM 141 142   POTASSIUM 4.2 4.7   CHLORIDE 105 109*   CO2 25.0 21.0*   ANION GAP 11.0 12.0   BUN 12 10   CREATININE 1.46* 1.47*   EGFR 39.0* 38.7*   GLUCOSE 108* 129*   CALCIUM 8.7 8.7                       Lab 10/09/24  0600 10/08/24  1718   IRON  --  20*   IRON SATURATION (TSAT)  --  7*   TIBC  --  280*   TRANSFERRIN  --  188*   FOLATE >20.00   --    VITAMIN B 12 832  --          Brief Urine Lab Results  (Last result in the past 365 days)        Color   Clarity   Blood   Leuk Est   Nitrite   Protein   CREAT   Urine HCG        06/22/24 1604 Yellow   Clear   Negative   Negative   Negative   Negative                   Microbiology Results Abnormal       None            No radiology results from the last 24 hrs    Results for orders placed during the hospital encounter of 01/02/24    Adult Transthoracic Echo Complete W/ Cont if Necessary Per Protocol    Interpretation Summary    Left ventricular systolic function is normal. Calculated left ventricular EF = 57.9% Left ventricular ejection fraction appears to be 56 - 60%.    Left ventricular diastolic function was normal.    Estimated right ventricular systolic pressure from tricuspid regurgitation is normal (<35 mmHg).    No significant change from previous study      Current medications:  Scheduled Meds:aspirin, 81 mg, Oral, Daily  DULoxetine, 20 mg, Oral, BID  folic acid, 1 mg, Oral, Daily  gabapentin, 300 mg, Oral, Daily  gabapentin, 600 mg, Oral, Nightly  heparin (porcine), 5,000 Units, Subcutaneous, Q8H  levothyroxine, 25 mcg, Oral, Q AM  magnesium oxide, 400 mg, Oral, Daily  melatonin, 5 mg, Oral, Nightly  metoprolol succinate XL, 25 mg, Oral, Nightly  pantoprazole, 40 mg, Oral, Every Other Day  rosuvastatin, 5 mg, Oral, Daily  sodium chloride, 10 mL, Intravenous, Q12H  thiamine, 200 mg, Oral, Daily  vitamin B-12, 1,000 mcg, Oral, Daily      Continuous Infusions:   PRN Meds:.  senna-docusate sodium **AND** polyethylene glycol **AND** bisacodyl **AND** bisacodyl    Calcium Replacement - Follow Nurse / BPA Driven Protocol    diazePAM    docosanol    HYDROmorphone **AND** naloxone    influenza vaccine    Magnesium Standard Dose Replacement - Follow Nurse / BPA Driven Protocol    oxyCODONE    Phosphorus Replacement - Follow Nurse / BPA Driven Protocol    Potassium Replacement - Follow Nurse / BPA Driven  Protocol    [COMPLETED] Insert Peripheral IV **AND** sodium chloride    sodium chloride    Assessment & Plan   Assessment & Plan     Active Hospital Problems    Diagnosis  POA    **Closed fracture of left ankle [T12.670M]  Unknown    Closed displaced fracture of fourth metatarsal bone of left foot [T62.342A]  Yes    Lisfranc dislocation, left, initial encounter [Q35.445A]  Yes      Resolved Hospital Problems   No resolved problems to display.        Brief Hospital Course to date:  Alysia Sierra is a 69 y.o. female with history of CKD III, chronic anemia, uterine cancer, hypothyroidism and alcohol abuse who was admitted to [facility] on 10/5/24 for L ankle fracture after stepping awkwardly off her bed.     Left foot fracture with involvement of 1st, 2nd, 3rd and 4th metatarsal bases  Acute nondisplaced L medial malleolus fracture  Acute nondisplaced L posterior medial talus fracture   1st-4th proximal metatarsal fracture Lisfranc fracture  LLE splinted. Fracture blister on dorsal aspect of L foot noted - per ortho allow blisters to decompress on their own  Surgery tentatively planned for 10/18 with sadia Jaeger to discharge and return for surgery, f/u with Dr. Godoy in office next week.  Might need to stay till her surgery which will happen few days  PRN pain management  Continue PT and OT   Awaiting rehab      Alcohol withdrawal, resolved  History of alcohol abuse   Patient denies daily alcohol use, however family reports daily heavy alcohol use  Developed confusion / hallucinations on afternoon of 10/8 and started on CIWA protocol  Withdrawal symptoms resolved.  CIWA protocol discontinued  Continue PO thiamine, folic acid  Continue QHS Melatonin     CKD III  Creatinine stable around baseline    Anemia  Iron studies show ACD with iron deficiency  Status post IV iron  B12, folate within normal limits     Hypothyroid  Continue Levothyroxine    GERD  Continue PPI      Neuropathy  Continue  Gabapentin      Expected Discharge Location and Transportation: Rehab   Expected Discharge   Expected Discharge Date: 10/18/2024; Expected Discharge Time:      VTE Prophylaxis:  Pharmacologic & mechanical VTE prophylaxis orders are present.         AM-PAC 6 Clicks Score (PT): 19 (10/13/24 2032)    CODE STATUS:   Code Status and Medical Interventions: CPR (Attempt to Resuscitate); Full Support   Ordered at: 10/06/24 0246     Code Status (Patient has no pulse and is not breathing):    CPR (Attempt to Resuscitate)     Medical Interventions (Patient has pulse or is breathing):    Full Support       Nandini Wilcox MD  10/14/24

## 2024-10-15 PROCEDURE — 25010000002 HEPARIN (PORCINE) PER 1000 UNITS: Performed by: PHYSICIAN ASSISTANT

## 2024-10-15 PROCEDURE — 97116 GAIT TRAINING THERAPY: CPT

## 2024-10-15 PROCEDURE — 97530 THERAPEUTIC ACTIVITIES: CPT

## 2024-10-15 PROCEDURE — 99024 POSTOP FOLLOW-UP VISIT: CPT | Performed by: ORTHOPAEDIC SURGERY

## 2024-10-15 PROCEDURE — 99231 SBSQ HOSP IP/OBS SF/LOW 25: CPT | Performed by: INTERNAL MEDICINE

## 2024-10-15 RX ADMIN — Medication 10 ML: at 08:31

## 2024-10-15 RX ADMIN — MAGNESIUM OXIDE TAB 400 MG (241.3 MG ELEMENTAL MG) 400 MG: 400 (241.3 MG) TAB at 08:30

## 2024-10-15 RX ADMIN — GABAPENTIN 300 MG: 300 CAPSULE ORAL at 08:30

## 2024-10-15 RX ADMIN — OXYCODONE HYDROCHLORIDE 5 MG: 5 TABLET ORAL at 08:30

## 2024-10-15 RX ADMIN — LEVOTHYROXINE SODIUM 25 MCG: 25 TABLET ORAL at 05:48

## 2024-10-15 RX ADMIN — Medication 10 ML: at 20:53

## 2024-10-15 RX ADMIN — METOPROLOL SUCCINATE 25 MG: 25 TABLET, EXTENDED RELEASE ORAL at 20:49

## 2024-10-15 RX ADMIN — HEPARIN SODIUM 5000 UNITS: 5000 INJECTION INTRAVENOUS; SUBCUTANEOUS at 14:47

## 2024-10-15 RX ADMIN — GABAPENTIN 600 MG: 300 CAPSULE ORAL at 20:48

## 2024-10-15 RX ADMIN — Medication 5 MG: at 20:52

## 2024-10-15 RX ADMIN — ASPIRIN 81 MG: 81 TABLET, COATED ORAL at 08:30

## 2024-10-15 RX ADMIN — DULOXETINE HYDROCHLORIDE 20 MG: 20 CAPSULE, DELAYED RELEASE ORAL at 08:31

## 2024-10-15 RX ADMIN — HEPARIN SODIUM 5000 UNITS: 5000 INJECTION INTRAVENOUS; SUBCUTANEOUS at 20:54

## 2024-10-15 RX ADMIN — FOLIC ACID 1 MG: 1 TABLET ORAL at 08:30

## 2024-10-15 RX ADMIN — PANTOPRAZOLE SODIUM 40 MG: 40 TABLET, DELAYED RELEASE ORAL at 08:31

## 2024-10-15 RX ADMIN — HEPARIN SODIUM 5000 UNITS: 5000 INJECTION INTRAVENOUS; SUBCUTANEOUS at 05:48

## 2024-10-15 RX ADMIN — OXYCODONE HYDROCHLORIDE 5 MG: 5 TABLET ORAL at 01:12

## 2024-10-15 RX ADMIN — OXYCODONE HYDROCHLORIDE 5 MG: 5 TABLET ORAL at 17:45

## 2024-10-15 RX ADMIN — CYANOCOBALAMIN TAB 1000 MCG 1000 MCG: 1000 TAB at 08:31

## 2024-10-15 RX ADMIN — ROSUVASTATIN 5 MG: 10 TABLET, FILM COATED ORAL at 08:31

## 2024-10-15 RX ADMIN — THIAMINE HCL TAB 100 MG 200 MG: 100 TAB at 08:30

## 2024-10-15 RX ADMIN — DULOXETINE HYDROCHLORIDE 20 MG: 20 CAPSULE, DELAYED RELEASE ORAL at 20:48

## 2024-10-15 NOTE — THERAPY TREATMENT NOTE
Patient Name: Alysia Sierra  : 1955    MRN: [id number]                              Today's Date: 10/15/2024       Admit Date: 10/5/2024    Visit Dx:     ICD-10-CM ICD-9-CM   1. Displaced fracture of distal end of left tibia  S82.302A 824.8   2. Closed nondisplaced fracture of second metatarsal bone of left foot, initial encounter  S92.325A 825.25   3. Closed nondisplaced fracture of first metatarsal bone of left foot, initial encounter  S92.315A 825.25   4. Closed nondisplaced fracture of third metatarsal bone of left foot, initial encounter  S92.335A 825.25   5. Closed nondisplaced fracture of fourth metatarsal bone of left foot, initial encounter  S92.345A 825.25     Patient Active Problem List   Diagnosis    Hypertension    Leg weakness, bilateral    Syncope    CKD (chronic kidney disease) stage 3, GFR 30-59 ml/min    Neuropathy    Hyperlipidemia LDL goal <100    Endometrial adenocarcinoma    Mild episode of recurrent major depressive disorder    Dizziness    Hip fracture    Anemia    Hypomagnesemia    Hypothyroidism    Post-menopausal    Other osteoporosis without current pathological fracture    Closed fracture of second lumbar vertebra    Alcoholic ketoacidosis    Leukocytosis    GERD with esophagitis    Sepsis    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    Lisfranc dislocation, left, initial encounter    Closed fracture of left ankle    Closed displaced fracture of fourth metatarsal bone of left foot     Past Medical History:   Diagnosis Date    Allergic lisinopril  2017    Anemia     Arrhythmia     Arthritis     Cancer     uterine    Cataract mild 2020    stil lpresent    Chicken pox     Chronic fatigue     CKD (chronic kidney disease), stage III     sees nephro    Clotting disorder     Dental root implant present     lower left  x1 - possible dental implant    Depression mild 2019    Difficulty walking 2019    Disease of thyroid gland     Dizzy     NIEVES (dyspnea on exertion)     2017     Fracture of hip     Generalized anxiety disorder     GERD (gastroesophageal reflux disease) 2018    History of brachytherapy 2023    vaginal brachytherapy    Hyperlipidemia     Hypertension     Iron deficiency anemia     Liver disease     fatty    Liver problem     Measles     Menopause     Mumps     Neuromuscular disorder Peripheral Neuropathy    Orthostatic hypotension     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    Renal insufficiency 2018    Scoliosis     Unintentional weight loss     Uses contact lenses     bilat    Uterine cancer     Uterine cancer 2022    UTI (urinary tract infection)     Visual impairment Nearsighted    Wears glasses      Past Surgical History:   Procedure Laterality Date     SECTION      DILATION AND CURETTAGE, DIAGNOSTIC / THERAPEUTIC      HIP OPEN REDUCTION Left 2023    Procedure: FEMORAL NECK OPEN REDUCTION INTERNAL FIXATION LEFT;  Surgeon: Juanpablo Lee MD;  Location: [facility] OR;  Service: Orthopedics;  Laterality: Left;    HIP SURGERY      OOPHORECTOMY      ORAL LESION EXCISION/BIOPSY  2021    TOTAL LAPAROSCOPIC HYSTERECTOMY SALPINGO OOPHORECTOMY N/A 10/28/2022    Procedure: TOTAL LAPAROSCOPIC HYSTERECTOMY BILATERAL SALPINGO-OOPHORECTOMY, INJECTION FOR SENTINEL LYMPH NODE MAPPING, BILATERAL SENTINEL LYMPH NODE DISSECTION WITH DAVINCI ROBOT;  Surgeon: Jasmine Rich MD;  Location: [facility] OR;  Service: Robotics - DaVinci;  Laterality: N/A;    TUBAL ABDOMINAL LIGATION        General Information       Row Name 10/15/24 1029          Physical Therapy Time and Intention    Document Type therapy note (daily note)  -ND     Mode of Treatment physical therapy  -ND       Row Name 10/15/24 1029          General Information    Patient Profile Reviewed yes  -ND     Existing Precautions/Restrictions fall;left;non-weight bearing;other (see comments)  LLE NWB + splint.  -ND     Barriers to Rehab previous functional deficit  -ND        Row Name 10/15/24 1029          Cognition    Orientation Status (Cognition) oriented x 4  -ND       Row Name 10/15/24 1029          Safety Issues/Impairments Affecting Functional Mobility    Safety Issues Affecting Function (Mobility) awareness of need for assistance;insight into deficits/self-awareness;safety precaution awareness;safety precautions follow-through/compliance;sequencing abilities  -ND     Impairments Affecting Function (Mobility) balance;strength;pain;endurance/activity tolerance  -ND               User Key  (r) = Recorded By, (t) = Taken By, (c) = Cosigned By      Initials Name Provider Type    ND Thea Gunter, PT Physical Therapist                   Mobility       Row Name 10/15/24 1032          Bed Mobility    Bed Mobility supine-sit  -ND     Supine-Sit [place] (Bed Mobility) standby assist  -ND     Assistive Device (Bed Mobility) head of bed elevated;bed rails  -ND     Comment, (Bed Mobility) Pt performs without issue. Denies symptoms with position change.  -ND       Row Name 10/15/24 1032          Bed-Chair Transfer    Bed-Chair [place] (Transfers) contact guard;verbal cues;nonverbal cues (demo/gesture)  -ND     Assistive Device (Bed-Chair Transfers) walker, knee scooter  -ND     Comment, (Bed-Chair Transfer) SPT to and from BSC to knee scooter.  -ND       Row Name 10/15/24 1032          Sit-Stand Transfer    Sit-Stand [place] (Transfers) contact guard;verbal cues;nonverbal cues (demo/gesture)  -ND     Assistive Device (Sit-Stand Transfers) walker, knee scooter  -ND     Comment, (Sit-Stand Transfer) Cues for proper sequencing and hand placement. Pt able to perform while maintaining precautions.  -ND       Row Name 10/15/24 1032          Gait/Stairs (Locomotion)    [place] Level (Gait) contact guard;verbal cues;nonverbal cues (demo/gesture)  -ND     Assistive Device (Gait) walker, knee scooter  -ND     Distance in Feet (Gait) 200  -ND     Deviations/Abnormal  Patterns (Gait) werner decreased;stride length decreased  -ND     Bilateral Gait Deviations forward flexed posture  -ND     Comment, (Gait/Stairs) Pt utilizing knee scooter for mobility with decreased RLE step length and decreased speed. Pt demonstrates appropriate technique and safety awareness.  -ND       Row Name 10/15/24 1032          Mobility    Extremity Weight-bearing Status left lower extremity  -ND     Left Lower Extremity (Weight-bearing Status) non weight-bearing (NWB)  -ND               User Key  (r) = Recorded By, (t) = Taken By, (c) = Cosigned By      Initials Name Provider Type    Thea Allen, MARIBETH Physical Therapist                   Obj/Interventions       Row Name 10/15/24 1039          Motor Skills    Therapeutic Exercise other (see comments)  Pt reports increased fatigue and politely defers ther ex.  -ND       Row Name 10/15/24 1039          Balance    Balance Assessment sitting static balance;sitting dynamic balance;standing static balance;standing dynamic balance  -ND     Static Sitting Balance standby assist  -ND     Dynamic Sitting Balance standby assist  -ND     Position, Sitting Balance unsupported;sitting edge of bed  -ND     Static Standing Balance contact guard  -ND     Dynamic Standing Balance contact guard  -ND     Position/Device Used, Standing Balance supported;other (see comments)  knee scooter  -ND     Balance Interventions sitting;standing;sit to stand;static;supported;dynamic  -ND     Comment, Balance Increased unsteadiness with turns while utilizing knee scooter. Pt able to perform karen-hygiene independently in sitting.  -ND               User Key  (r) = Recorded By, (t) = Taken By, (c) = Cosigned By      Initials Name Provider Type    Thea Allen PT Physical Therapist                   Goals/Plan    No documentation.                  Clinical Impression       Row Name 10/15/24 1041          Pain    Pretreatment Pain Rating 0/10 - no pain  -ND      Posttreatment Pain Rating 0/10 - no pain  -ND       Row Name 10/15/24 1041          Plan of Care Review    Plan of Care Reviewed With patient  -ND     Progress improving  -ND     Outcome Evaluation Pt performs mobility utilizing knee scooter this date with adequate safety awareness and cues for sequencing. Pt would benefit from continued skilled therapy to address strength, balance, and activity tolerance and facilitate return to baseline. Recommend IRF following d/c for best functional outcome.  -ND       Row Name 10/15/24 1041          Vital Signs    Pre Systolic BP Rehab 112  RN approved his session  -ND     Pre Treatment Diastolic BP 72  -ND     O2 Delivery Pre Treatment room air  -ND     O2 Delivery Intra Treatment room air  -ND     O2 Delivery Post Treatment room air  -ND     Pre Patient Position Supine  -ND     Intra Patient Position Standing  -ND     Post Patient Position Sitting  -ND       Row Name 10/15/24 1041          Positioning and Restraints    Pre-Treatment Position in bed  -ND     Post Treatment Position chair  -ND     In Chair notified nsg;reclined;legs elevated;call light within reach;encouraged to call for assist;exit alarm on;waffle cushion;LLE elevated  -ND               User Key  (r) = Recorded By, (t) = Taken By, (c) = Cosigned By      Initials Name Provider Type    Thea Allen, PT Physical Therapist                   Outcome Measures       Row Name 10/15/24 1043 10/15/24 0830       How much help from another person do you currently need...    Turning from your back to your side while in flat bed without using bedrails? 4  -ND 4  -KL    Moving from lying on back to sitting on the side of a flat bed without bedrails? 3  -ND 3  -KL    Moving to and from a bed to a chair (including a wheelchair)? 3  -ND 3  -KL    Standing up from a chair using your arms (e.g., wheelchair, bedside chair)? 3  -ND 3  -KL    Climbing 3-5 steps with a railing? 3  -ND 2  -KL    To walk in hospital room? 3   -ND 3  -KL    AM-PAC 6 Clicks Score (PT) 19  -ND 18  -KL    Highest Level of Mobility Goal 6 --> Walk 10 steps or more  -ND 6 --> Walk 10 steps or more  -      Row Name 10/15/24 1043          Functional Assessment    Outcome Measure Options AM-PAC 6 Clicks Basic Mobility (PT)  -ND               User Key  (r) = Recorded By, (t) = Taken By, (c) = Cosigned By      Initials Name Provider Type    Mora Malave, RN Registered Nurse    Thea Allen, PT Physical Therapist                                 Physical Therapy Education       Title: PT OT SLP Therapies (Done)       Topic: Physical Therapy (Done)       Point: Mobility training (Done)       Learning Progress Summary            Patient Acceptance, E, VU by ND at 10/15/2024 1044    Acceptance, E, VU by CK at 10/12/2024 1004    Acceptance, E, VU by  at 10/11/2024 1154    Eager, E, VU by SC at 10/10/2024 1108    Comment: reviewed safety with mobility    Acceptance, E,D, VU,NR by AB at 10/9/2024 1006                      Point: Home exercise program (Done)       Learning Progress Summary            Patient Acceptance, E, VU by CK at 10/12/2024 1004    Eager, E, VU by SC at 10/10/2024 1108    Comment: reviewed safety with mobility                      Point: Body mechanics (Done)       Learning Progress Summary            Patient Acceptance, E, VU by ND at 10/15/2024 1044    Acceptance, E, VU by CK at 10/12/2024 1004    Acceptance, E, VU by CK at 10/11/2024 1154    Eager, E, VU by SC at 10/10/2024 1108    Comment: reviewed safety with mobility    Acceptance, E,D, VU,NR by AB at 10/9/2024 1006                      Point: Precautions (Done)       Learning Progress Summary            Patient Acceptance, E, VU by ND at 10/15/2024 1044    Acceptance, E, VU by CK at 10/12/2024 1004    Acceptance, E, VU by CK at 10/11/2024 1154    Eager, E, VU by SC at 10/10/2024 1108    Comment: reviewed safety with mobility    Acceptance, E,D, VU,NR by AB at 10/9/2024 1006                                       User Key       Initials Effective Dates Name Provider Type Discipline    SC 02/03/23 -  Grace Bradley, PT Physical Therapist PT    AB 09/22/22 -  Margaret Bhatt, PT Physical Therapist PT    CK 02/06/24 -  Karen Mcarthur, PT Physical Therapist PT    ND 11/16/23 -  Thea Gunter PT Physical Therapist PT                  PT Recommendation and Plan     Progress: improving  Outcome Evaluation: Pt performs mobility utilizing knee scooter this date with adequate safety awareness and cues for sequencing. Pt would benefit from continued skilled therapy to address strength, balance, and activity tolerance and facilitate return to baseline. Recommend IRF following d/c for best functional outcome.     Time Calculation:         PT Charges       Row Name 10/15/24 1044             Time Calculation    Start Time 0847  -ND      PT Received On 10/15/24  -ND         Timed Charges    68600 - Gait Training Minutes  15  -ND      57009 - PT Therapeutic Activity Minutes 28  -ND         Total Minutes    Timed Charges Total Minutes 43  -ND       Total Minutes 43  -ND                User Key  (r) = Recorded By, (t) = Taken By, (c) = Cosigned By      Initials Name Provider Type    ND Thea Gunter, PT Physical Therapist                  Therapy Charges for Today       Code Description Service Date Service Provider Modifiers Qty    43472408951 HC GAIT TRAINING EA 15 MIN 10/15/2024 Thea Gunter, PT GP 1    33439733774 HC PT THERAPEUTIC ACT EA 15 MIN 10/15/2024 Thea Gunter, PT GP 2            PT G-Codes  Outcome Measure Options: AM-PAC 6 Clicks Basic Mobility (PT)  AM-PAC 6 Clicks Score (PT): 19  AM-PAC 6 Clicks Score (OT): 18  PT Discharge Summary  Anticipated Discharge Disposition (PT): inpatient rehabilitation facility    Thea Gunter PT  10/15/2024

## 2024-10-15 NOTE — PLAN OF CARE
Goal Outcome Evaluation:  Plan of Care Reviewed With: patient        Progress: improving  Outcome Evaluation: Pt performs mobility utilizing knee scooter this date with adequate safety awareness and cues for sequencing. Pt would benefit from continued skilled therapy to address strength, balance, and activity tolerance and facilitate return to baseline. Recommend IRF following d/c for best functional outcome.    Anticipated Discharge Disposition (PT): inpatient rehabilitation facility

## 2024-10-15 NOTE — PLAN OF CARE
Goal Outcome Evaluation:  Plan of Care Reviewed With: patient  Plan of Care Reviewed With: patient        Progress: no change     Pt is alert and oriented x4. VSS on room air. Pt has slept off and on throughout the shift. PRN oxycodone given as ordered for pain. Splint to LLE in place and elevated on pillows. Up with an assist x1 to BSC; voiding spontaneously. Awaiting surgery later this week.

## 2024-10-15 NOTE — PROGRESS NOTES
"      Orthopaedic Surgery Progress Note  Chief complaint  Left foot and ankle pain  Subjective   Interval History:   Sleeping in chair. [place] for exam. Denies pain. No complaints. Ready for surgery Friday.    ROS: Denies fever, chills, nausea or vomiting    Objective     Vital Signs:  Temp (24hrs), Av.4 °F (36.9 °C), Min:98.2 °F (36.8 °C), Max:98.7 °F (37.1 °C)    /72 (BP Location: Right arm, Patient Position: Lying)   Pulse 70   Temp 98.3 °F (36.8 °C) (Axillary)   Resp 18   Ht 162.6 cm (64\")   Wt 61.2 kg (135 lb)   LMP  (LMP Unknown)   SpO2 97%   BMI 23.17 kg/m²   Body mass index is 23.17 kg/m².    Physical Exam:  left LE: Well-padded 3-way short leg splint in place, compartments above splint soft/compressible   Splint partially removed over medial ankle and foot for exam, palpable DP pulse, swelling much improved from previous, serous filled blister over distal dorsal foot, see picture in chart.  Skin intact with wrinkling over midfoot and medial ankle over planned surgical sites.   + Wiggling toes, silt in toes   CR brisk in all toes, Foot WWP    A1C on 10/5/24 = 5.2    Assessment and Plan:  69-year-old female with left foot Lisfranc fracture dislocation as well as ipsilateral ankle fracture    Spoke to my partner Dr. Juanpablo Lee, I will be taking over management of this patient from an orthopedic standpoint.    -Plan for surgery Friday the   -Nonweightbearing left lower extremity  -PT/OT  -Keep splint clean dry and intact  -Pain control  -Aggressive elevation LLE  -Diet per primary, NPO at m1201 on 10/18  -DVT prophylaxis, chemical and mechanical, per primary  -Rest of mgmt per primary team      Sergio Godoy MD  10/15/24  12:06 EDT       "

## 2024-10-15 NOTE — PROGRESS NOTES
[facility] Medicine Services  PROGRESS NOTE    Patient Name: Alysia Sierra  : 1955  MRN: [id number]    Date of Admission: 10/5/2024  Primary Care Physician: Cassy Foster MD    Subjective   Subjective     CC:  F/u ankle fracture    HPI:  Patient was seen and examined this morning.  Feeling well today.  Tentative plan for surgery on Thursday or Friday then discharged to [facility] for rehab.    Objective   Objective     Vital Signs:   Temp:  [98.2 °F (36.8 °C)-98.7 °F (37.1 °C)] 98.3 °F (36.8 °C)  Heart Rate:  [70-91] 70  Resp:  [18] 18  BP: (102-112)/(59-72) 112/72     Physical Exam:  General: Comfortable, not in distress, conversant and cooperative  Head: Atraumatic and normocephalic  Eyes: No Icterus. No pallor  Ears:  Ears appear intact with no abnormalities noted  Throat: No oral lesions, no thrush  Neck: Supple, trachea midline  Lungs: Clear to auscultation bilaterally, equal air entry, no wheezing or crackles  Heart:  Normal S1 and S2, no murmur, no gallop, No JVD, no lower extremity swelling  Abdomen:  Soft, no tenderness, no organomegaly, normal bowel sounds, no organomegaly  Extremities: Left leg in cast  Skin: No bleeding, bruising or rash, normal skin turgor and elasticity  Neurologic: Cranial nerves appear intact with no evidence of facial asymmetry, normal motor and sensory functions in all 4 extremities  Psych: Alert and oriented x 3, normal mood    Results Reviewed:  LAB RESULTS:      Lab 10/09/24  0600   WBC 4.49   HEMOGLOBIN 8.8*   HEMATOCRIT 27.6*   PLATELETS 176   MCV 98.6*         Lab 10/11/24  0949 10/10/24  1013   SODIUM 141 142   POTASSIUM 4.2 4.7   CHLORIDE 105 109*   CO2 25.0 21.0*   ANION GAP 11.0 12.0   BUN 12 10   CREATININE 1.46* 1.47*   EGFR 39.0* 38.7*   GLUCOSE 108* 129*   CALCIUM 8.7 8.7                       Lab 10/09/24  0600 10/08/24  1718   IRON  --  20*   IRON SATURATION (TSAT)  --  7*   TIBC  --  280*   TRANSFERRIN  --  188*   FOLATE  >20.00  --    VITAMIN B 12 832  --          Brief Urine Lab Results  (Last result in the past 365 days)        Color   Clarity   Blood   Leuk Est   Nitrite   Protein   CREAT   Urine HCG        06/22/24 1604 Yellow   Clear   Negative   Negative   Negative   Negative                   Microbiology Results Abnormal       None            No radiology results from the last 24 hrs    Results for orders placed during the hospital encounter of 01/02/24    Adult Transthoracic Echo Complete W/ Cont if Necessary Per Protocol    Interpretation Summary    Left ventricular systolic function is normal. Calculated left ventricular EF = 57.9% Left ventricular ejection fraction appears to be 56 - 60%.    Left ventricular diastolic function was normal.    Estimated right ventricular systolic pressure from tricuspid regurgitation is normal (<35 mmHg).    No significant change from previous study      Current medications:  Scheduled Meds:aspirin, 81 mg, Oral, Daily  DULoxetine, 20 mg, Oral, BID  folic acid, 1 mg, Oral, Daily  gabapentin, 300 mg, Oral, Daily  gabapentin, 600 mg, Oral, Nightly  heparin (porcine), 5,000 Units, Subcutaneous, Q8H  levothyroxine, 25 mcg, Oral, Q AM  magnesium oxide, 400 mg, Oral, Daily  melatonin, 5 mg, Oral, Nightly  metoprolol succinate XL, 25 mg, Oral, Nightly  pantoprazole, 40 mg, Oral, Every Other Day  rosuvastatin, 5 mg, Oral, Daily  sodium chloride, 10 mL, Intravenous, Q12H  thiamine, 200 mg, Oral, Daily  vitamin B-12, 1,000 mcg, Oral, Daily      Continuous Infusions:   PRN Meds:.  senna-docusate sodium **AND** polyethylene glycol **AND** bisacodyl **AND** bisacodyl    Calcium Replacement - Follow Nurse / BPA Driven Protocol    diazePAM    docosanol    HYDROmorphone **AND** naloxone    influenza vaccine    Magnesium Standard Dose Replacement - Follow Nurse / BPA Driven Protocol    oxyCODONE    Phosphorus Replacement - Follow Nurse / BPA Driven Protocol    Potassium Replacement - Follow Nurse / BPA  Driven Protocol    [COMPLETED] Insert Peripheral IV **AND** sodium chloride    sodium chloride    Assessment & Plan   Assessment & Plan     Active Hospital Problems    Diagnosis  POA    **Closed fracture of left ankle [A42.179Q]  Unknown    Closed displaced fracture of fourth metatarsal bone of left foot [S92.342A]  Yes    Lisfranc dislocation, left, initial encounter [K02.573A]  Yes      Resolved Hospital Problems   No resolved problems to display.        Brief Hospital Course to date:  Alysia Sierra is a 69 y.o. female with history of CKD III, chronic anemia, uterine cancer, hypothyroidism and alcohol abuse who was admitted to [facility] on 10/5/24 for L ankle fracture after stepping awkwardly off her bed.     Left foot fracture with involvement of 1st, 2nd, 3rd and 4th metatarsal bases  Acute nondisplaced L medial malleolus fracture  Acute nondisplaced L posterior medial talus fracture   1st-4th proximal metatarsal fracture Lisfranc fracture  LLE splinted. Fracture blister on dorsal aspect of L foot noted - per ortho allow blisters to decompress on their own  PRN pain management  Continue PT and OT   Tentative plan for surgery on Thursday or Friday then discharged to rehab     Alcohol withdrawal, resolved  History of alcohol abuse   Patient denies daily alcohol use, however family reports daily heavy alcohol use  Developed confusion / hallucinations on afternoon of 10/8 and started on CIWA protocol  Withdrawal symptoms resolved.  CIWA protocol discontinued  Continue PO thiamine, folic acid  Continue QHS Melatonin     CKD III  Creatinine stable around baseline    Chronic anemia  Iron deficiency  Iron studies show ACD with iron deficiency  Status post IV iron  B12, folate within normal limits     Hypothyroid  Continue Levothyroxine    GERD  Continue PPI      Neuropathy  Continue Gabapentin      Expected Discharge Location and Transportation: Rehab   Expected Discharge   Expected Discharge Date:  10/19/2024; Expected Discharge Time:      VTE Prophylaxis:  Pharmacologic & mechanical VTE prophylaxis orders are present.         AM-PAC 6 Clicks Score (PT): 18 (10/14/24 2043)    CODE STATUS:   Code Status and Medical Interventions: CPR (Attempt to Resuscitate); Full Support   Ordered at: 10/06/24 0246     Code Status (Patient has no pulse and is not breathing):    CPR (Attempt to Resuscitate)     Medical Interventions (Patient has pulse or is breathing):    Full Support       Nandini Wilcox MD  10/15/24

## 2024-10-16 PROCEDURE — 97530 THERAPEUTIC ACTIVITIES: CPT

## 2024-10-16 PROCEDURE — 25010000002 HEPARIN (PORCINE) PER 1000 UNITS: Performed by: PHYSICIAN ASSISTANT

## 2024-10-16 PROCEDURE — 97110 THERAPEUTIC EXERCISES: CPT

## 2024-10-16 PROCEDURE — 97116 GAIT TRAINING THERAPY: CPT

## 2024-10-16 PROCEDURE — 99232 SBSQ HOSP IP/OBS MODERATE 35: CPT

## 2024-10-16 PROCEDURE — 97535 SELF CARE MNGMENT TRAINING: CPT

## 2024-10-16 RX ADMIN — CYANOCOBALAMIN TAB 1000 MCG 1000 MCG: 1000 TAB at 09:54

## 2024-10-16 RX ADMIN — HEPARIN SODIUM 5000 UNITS: 5000 INJECTION INTRAVENOUS; SUBCUTANEOUS at 05:42

## 2024-10-16 RX ADMIN — Medication 10 ML: at 09:54

## 2024-10-16 RX ADMIN — GABAPENTIN 300 MG: 300 CAPSULE ORAL at 09:54

## 2024-10-16 RX ADMIN — THIAMINE HCL TAB 100 MG 200 MG: 100 TAB at 09:53

## 2024-10-16 RX ADMIN — DULOXETINE HYDROCHLORIDE 20 MG: 20 CAPSULE, DELAYED RELEASE ORAL at 09:54

## 2024-10-16 RX ADMIN — LEVOTHYROXINE SODIUM 25 MCG: 25 TABLET ORAL at 05:42

## 2024-10-16 RX ADMIN — DULOXETINE HYDROCHLORIDE 20 MG: 20 CAPSULE, DELAYED RELEASE ORAL at 20:44

## 2024-10-16 RX ADMIN — HEPARIN SODIUM 5000 UNITS: 5000 INJECTION INTRAVENOUS; SUBCUTANEOUS at 15:00

## 2024-10-16 RX ADMIN — METOPROLOL SUCCINATE 25 MG: 25 TABLET, EXTENDED RELEASE ORAL at 20:44

## 2024-10-16 RX ADMIN — Medication 10 ML: at 20:48

## 2024-10-16 RX ADMIN — OXYCODONE HYDROCHLORIDE 5 MG: 5 TABLET ORAL at 03:26

## 2024-10-16 RX ADMIN — OXYCODONE HYDROCHLORIDE 5 MG: 5 TABLET ORAL at 20:48

## 2024-10-16 RX ADMIN — Medication 5 MG: at 20:44

## 2024-10-16 RX ADMIN — ROSUVASTATIN 5 MG: 10 TABLET, FILM COATED ORAL at 09:54

## 2024-10-16 RX ADMIN — ASPIRIN 81 MG: 81 TABLET, COATED ORAL at 09:53

## 2024-10-16 RX ADMIN — HEPARIN SODIUM 5000 UNITS: 5000 INJECTION INTRAVENOUS; SUBCUTANEOUS at 20:48

## 2024-10-16 RX ADMIN — GABAPENTIN 600 MG: 300 CAPSULE ORAL at 20:44

## 2024-10-16 RX ADMIN — MAGNESIUM OXIDE TAB 400 MG (241.3 MG ELEMENTAL MG) 400 MG: 400 (241.3 MG) TAB at 09:54

## 2024-10-16 RX ADMIN — OXYCODONE HYDROCHLORIDE 5 MG: 5 TABLET ORAL at 09:53

## 2024-10-16 RX ADMIN — FOLIC ACID 1 MG: 1 TABLET ORAL at 09:54

## 2024-10-16 NOTE — PLAN OF CARE
Problem: Adult Inpatient Plan of Care  Goal: Plan of Care Review  Outcome: Progressing  Goal: Patient-Specific Goal (Individualized)  Outcome: Progressing  Goal: Absence of Hospital-Acquired Illness or Injury  Outcome: Progressing  Intervention: Identify and Manage Fall Risk  Recent Flowsheet Documentation  Taken 10/16/2024 0410 by Robinson Rosa RN  Safety Promotion/Fall Prevention:   activity supervised   assistive device/personal items within reach   clutter free environment maintained   fall prevention program maintained   gait belt   mobility aid in reach   nonskid shoes/slippers when out of bed   room organization consistent   safety round/check completed  Taken 10/16/2024 0201 by Robinson Rosa RN  Safety Promotion/Fall Prevention:   activity supervised   assistive device/personal items within reach   clutter free environment maintained   fall prevention program maintained   gait belt   mobility aid in reach   nonskid shoes/slippers when out of bed   room organization consistent   safety round/check completed  Taken 10/15/2024 2357 by Robinson Rosa RN  Safety Promotion/Fall Prevention:   activity supervised   clutter free environment maintained   assistive device/personal items within reach   fall prevention program maintained   gait belt   mobility aid in reach   nonskid shoes/slippers when out of bed   room organization consistent   safety round/check completed  Taken 10/15/2024 2153 by Robinson Rosa RN  Safety Promotion/Fall Prevention:   activity supervised   assistive device/personal items within reach   clutter free environment maintained   fall prevention program maintained   gait belt   lighting adjusted   mobility aid in reach   nonskid shoes/slippers when out of bed   room organization consistent   safety round/check completed  Taken 10/15/2024 1953 by Robinson Rosa RN  Safety Promotion/Fall Prevention:   activity supervised   clutter free environment maintained   assistive device/personal  items within reach   fall prevention program maintained   gait belt   mobility aid in [facility]   nonskid shoes/slippers when out of bed   room organization consistent   safety round/check completed  Intervention: Prevent Skin Injury  Recent Flowsheet Documentation  Taken 10/16/2024 0410 by Robinson Rosa RN  Body Position: position changed independently  Skin Protection:   incontinence pads utilized   transparent dressing maintained  Taken 10/16/2024 0201 by Robinson Rosa RN  Body Position: position changed independently  Skin Protection:   incontinence pads utilized   transparent dressing maintained  Taken 10/15/2024 2357 by Robinson Rosa RN  Body Position: position changed independently  Skin Protection: incontinence pads utilized  Taken 10/15/2024 2153 by Robinson Rosa RN  Body Position: position changed independently  Taken 10/15/2024 1953 by Robinson Rosa RN  Body Position: position changed independently  Skin Protection:   incontinence pads utilized   transparent dressing maintained  Intervention: Prevent and Manage VTE (Venous Thromboembolism) Risk  Recent Flowsheet Documentation  Taken 10/16/2024 0410 by Robinson Rosa RN  VTE Prevention/Management: (asq heparin) other (see comments)  Taken 10/16/2024 0201 by Robinson Rosa RN  VTE Prevention/Management: (sq heparin) other (see comments)  Taken 10/15/2024 2357 by Robinson Rosa RN  VTE Prevention/Management: (sq heparin) other (see comments)  Taken 10/15/2024 2153 by Robinson Rosa RN  VTE Prevention/Management: (sq heparin)   right   SCDs (sequential compression devices) on   other (see comments)  Taken 10/15/2024 1953 by Robinson Rosa RN  VTE Prevention/Management: (sq heparin)   right   SCDs (sequential compression devices) on   other (see comments)  Intervention: Prevent Infection  Recent Flowsheet Documentation  Taken 10/16/2024 0410 by Robinson Rosa RN  Infection Prevention:   environmental surveillance performed   rest/sleep promoted    single patient room provided  Taken 10/16/2024 0201 by Robinson Rosa RN  Infection Prevention:   environmental surveillance performed   rest/sleep promoted   single patient room provided  Taken 10/15/2024 2357 by Robinson Rosa RN  Infection Prevention: environmental surveillance performed  Taken 10/15/2024 2153 by Robinson Rosa RN  Infection Prevention:   environmental surveillance performed   rest/sleep promoted   single patient room provided  Taken 10/15/2024 1953 by Robinson Rosa RN  Infection Prevention:   environmental surveillance performed   rest/sleep promoted   single patient room provided  Goal: Optimal Comfort and Wellbeing  Outcome: Progressing  Intervention: Monitor Pain and Promote Comfort  Recent Flowsheet Documentation  Taken 10/16/2024 0410 by Robinson Rosa RN  Pain Management Interventions: pain medication given  Taken 10/15/2024 2153 by Robinson Rosa RN  Pain Management Interventions:   no interventions per patient request   quiet environment facilitated  Taken 10/15/2024 1953 by Robinson Rosa RN  Pain Management Interventions:   position adjusted   quiet environment facilitated  Intervention: Provide Person-Centered Care  Recent Flowsheet Documentation  Taken 10/16/2024 0410 by Robinson Rosa RN  Trust Relationship/Rapport:   care explained   questions answered   questions encouraged  Taken 10/15/2024 2153 by Robinson Rosa RN  Trust Relationship/Rapport:   care explained   questions answered   questions encouraged  Taken 10/15/2024 1953 by Robinson Rosa RN  Trust Relationship/Rapport:   care explained   questions answered   questions encouraged  Goal: Readiness for Transition of Care  Outcome: Progressing     Problem: Pain Chronic (Persistent) (Comorbidity Management)  Goal: Acceptable Pain Control and Functional Ability  Outcome: Progressing  Intervention: Manage Persistent Pain  Recent Flowsheet Documentation  Taken 10/15/2024 1953 by Robinson Rosa RN  Medication  Review/Management: medications reviewed  Intervention: Develop Pain Management Plan  Recent Flowsheet Documentation  Taken 10/16/2024 0410 by Robinson Rosa RN  Pain Management Interventions: pain medication given  Taken 10/15/2024 2153 by Robinson Rosa RN  Pain Management Interventions:   no interventions per patient request   quiet environment facilitated  Taken 10/15/2024 1953 by Robinson Rosa RN  Pain Management Interventions:   position adjusted   quiet environment facilitated  Intervention: Optimize Psychosocial Wellbeing  Recent Flowsheet Documentation  Taken 10/15/2024 2153 by Robinson Rosa RN  Diversional Activities:   television   smartphone  Family/Support System Care:   self-care encouraged   support provided  Taken 10/15/2024 1953 by Robinson Rosa RN  Diversional Activities:   television   smartphone  Family/Support System Care:   self-care encouraged   support provided     Problem: Skin Injury Risk Increased  Goal: Skin Health and Integrity  Outcome: Progressing  Intervention: Optimize Skin Protection  Recent Flowsheet Documentation  Taken 10/16/2024 0410 by Robinson Rosa RN  Activity Management: activity encouraged  Pressure Reduction Techniques: frequent weight shift encouraged  Head of Bed (HOB) Positioning: HOB elevated  Pressure Reduction Devices:   pressure-redistributing mattress utilized   heel offloading device utilized  Skin Protection:   incontinence pads utilized   transparent dressing maintained  Taken 10/16/2024 0328 by Robinson Rosa RN  Activity Management: ambulated in room  Taken 10/16/2024 0201 by Robinson Rosa RN  Activity Management: activity encouraged  Pressure Reduction Techniques: frequent weight shift encouraged  Head of Bed (HOB) Positioning: HOB elevated  Pressure Reduction Devices:   pressure-redistributing mattress utilized   heel offloading device utilized  Skin Protection:   incontinence pads utilized   transparent dressing maintained  Taken 10/15/2024 5956  by Rosa, Robinson, RN  Pressure Reduction Techniques: frequent weight shift encouraged  Head of Bed (HOB) Positioning: HOB at 20-30 degrees  Pressure Reduction Devices:   pressure-redistributing mattress utilized   heel offloading device utilized  Skin Protection: incontinence pads utilized  Taken 10/15/2024 2153 by Robinson Rosa RN  Activity Management: activity encouraged  Head of Bed (HOB) Positioning: HOB at 20-30 degrees  Taken 10/15/2024 2137 by Robinson Rosa RN  Activity Management: ambulated in room  Taken 10/15/2024 1953 by Robinson Rosa RN  Activity Management: activity encouraged  Pressure Reduction Techniques: frequent weight shift encouraged  Head of Bed (HOB) Positioning: HOB at 30-45 degrees  Pressure Reduction Devices:   pressure-redistributing mattress utilized   heel offloading device utilized  Skin Protection:   incontinence pads utilized   transparent dressing maintained     Problem: Fall Injury Risk  Goal: Absence of Fall and Fall-Related Injury  Outcome: Progressing  Intervention: Identify and Manage Contributors  Recent Flowsheet Documentation  Taken 10/16/2024 0410 by Robinson Rosa RN  Self-Care Promotion: independence encouraged  Taken 10/16/2024 0201 by Robinson Rosa RN  Self-Care Promotion: independence encouraged  Taken 10/15/2024 2357 by Robinson Rosa RN  Self-Care Promotion: independence encouraged  Taken 10/15/2024 2153 by Robinson Rosa RN  Self-Care Promotion: independence encouraged  Taken 10/15/2024 1953 by Robinson Rosa RN  Medication Review/Management: medications reviewed  Self-Care Promotion: independence encouraged  Intervention: Promote Injury-Free Environment  Recent Flowsheet Documentation  Taken 10/16/2024 0410 by Robinson Rosa RN  Safety Promotion/Fall Prevention:   activity supervised   assistive device/personal items within reach   clutter free environment maintained   fall prevention program maintained   gait belt   mobility aid in reach   nonskid  shoes/slippers when out of bed   room organization consistent   safety round/check completed  Taken 10/16/2024 0201 by Robinson Rosa RN  Safety Promotion/Fall Prevention:   activity supervised   assistive device/personal items within reach   clutter free environment maintained   fall prevention program maintained   gait belt   mobility aid in reach   nonskid shoes/slippers when out of bed   room organization consistent   safety round/check completed  Taken 10/15/2024 2357 by Robinson Rosa RN  Safety Promotion/Fall Prevention:   activity supervised   clutter free environment maintained   assistive device/personal items within reach   fall prevention program maintained   gait belt   mobility aid in reach   nonskid shoes/slippers when out of bed   room organization consistent   safety round/check completed  Taken 10/15/2024 2153 by Robinson Rosa RN  Safety Promotion/Fall Prevention:   activity supervised   assistive device/personal items within reach   clutter free environment maintained   fall prevention program maintained   gait belt   lighting adjusted   mobility aid in reach   nonskid shoes/slippers when out of bed   room organization consistent   safety round/check completed  Taken 10/15/2024 1953 by Robinson Rosa RN  Safety Promotion/Fall Prevention:   activity supervised   clutter free environment maintained   assistive device/personal items within reach   fall prevention program maintained   gait belt   mobility aid in reach   nonskid shoes/slippers when out of bed   room organization consistent   safety round/check completed     Problem: Bleeding (Orthopaedic Fracture)  Goal: Absence of Bleeding  Outcome: Progressing     Problem: Embolism (Orthopaedic Fracture)  Goal: Absence of Embolism Signs and Symptoms  Outcome: Progressing  Intervention: Prevent or Manage Embolism Risk  Recent Flowsheet Documentation  Taken 10/16/2024 0410 by Robinson Rosa RN  VTE Prevention/Management: (asq heparin) other (see  comments)  Taken 10/16/2024 0201 by Robinson Rosa RN  VTE Prevention/Management: (sq heparin) other (see comments)  Taken 10/15/2024 2357 by Robinson Rosa RN  VTE Prevention/Management: (sq heparin) other (see comments)  Taken 10/15/2024 2153 by Robinson Rosa RN  VTE Prevention/Management: (sq heparin)   right   SCDs (sequential compression devices) on   other (see comments)  Taken 10/15/2024 1953 by Robinson Rosa RN  VTE Prevention/Management: (sq heparin)   right   SCDs (sequential compression devices) on   other (see comments)     Problem: Fracture Stabilization and Management (Orthopaedic Fracture)  Goal: Fracture Stability  Outcome: Progressing     Problem: Functional Ability Impaired (Orthopaedic Fracture)  Goal: Optimal Functional Ability  Outcome: Progressing  Intervention: Optimize Functional Ability  Recent Flowsheet Documentation  Taken 10/16/2024 0410 by Robinson Rosa RN  Activity Management: activity encouraged  Positioning/Transfer Devices:   pillows   wedge   in use  Self-Care Promotion: independence encouraged  Taken 10/16/2024 0328 by Robinson Rosa RN  Activity Management: ambulated in room  Taken 10/16/2024 0201 by Robinson Rosa RN  Activity Management: activity encouraged  Positioning/Transfer Devices:   pillows   in use  Self-Care Promotion: independence encouraged  Taken 10/15/2024 2357 by Robinson Rosa RN  Positioning/Transfer Devices:   pillows   in use   wedge  Self-Care Promotion: independence encouraged  Taken 10/15/2024 2153 by Robinson Rosa RN  Activity Management: activity encouraged  Positioning/Transfer Devices:   pillows   in use  Self-Care Promotion: independence encouraged  Taken 10/15/2024 2137 by Robinson Rosa RN  Activity Management: ambulated in room  Taken 10/15/2024 1953 by Robinson Rosa RN  Activity Management: activity encouraged  Positioning/Transfer Devices:   pillows   in use  Self-Care Promotion: independence encouraged  Range of Motion: active ROM  (range of motion) encouraged     Problem: Infection (Orthopaedic Fracture)  Goal: Absence of Infection Signs and Symptoms  Outcome: Progressing     Problem: Neurovascular Compromise (Orthopaedic Fracture)  Goal: Effective Tissue Perfusion  Outcome: Progressing     Problem: Pain (Orthopaedic Fracture)  Goal: Acceptable Pain Control  Outcome: Progressing  Intervention: Manage Acute Orthopaedic-Related Pain  Recent Flowsheet Documentation  Taken 10/16/2024 0410 by Robinson Rosa RN  Pain Management Interventions: pain medication given  Taken 10/15/2024 2153 by Robinson Rosa RN  Pain Management Interventions:   no interventions per patient request   quiet environment facilitated  Taken 10/15/2024 1953 by Robinson Rosa RN  Pain Management Interventions:   position adjusted   quiet environment facilitated     Problem: Respiratory Compromise (Orthopaedic Fracture)  Goal: Effective Oxygenation and Ventilation  Outcome: Progressing  Intervention: Promote Airway Secretion Clearance  Recent Flowsheet Documentation  Taken 10/16/2024 0410 by Robinson Rosa RN  Activity Management: activity encouraged  Taken 10/16/2024 0328 by Robinson Rosa RN  Activity Management: ambulated in room  Taken 10/16/2024 0201 by Robinson Rosa RN  Activity Management: activity encouraged  Taken 10/15/2024 2153 by Robinson Rosa RN  Activity Management: activity encouraged  Taken 10/15/2024 2137 by Robinson Rosa RN  Activity Management: ambulated in room  Taken 10/15/2024 1953 by Robinson Rosa RN  Activity Management: activity encouraged  Cough And Deep Breathing: done independently per patient  Intervention: Optimize Oxygenation and Ventilation  Recent Flowsheet Documentation  Taken 10/16/2024 0410 by Robinson Rosa RN  Head of Bed (HOB) Positioning: HOB elevated  Taken 10/16/2024 0201 by Robinson Rosa RN  Head of Bed (HOB) Positioning: HOB elevated  Taken 10/15/2024 2357 by Robinson Rosa RN  Head of Bed (HOB) Positioning: HOB at  20-30 degrees  Taken 10/15/2024 2153 by Robinson Rosa, RN  Head of Bed (HOB) Positioning: HOB at 20-30 degrees  Taken 10/15/2024 1953 by Robinson Rosa, RN  Head of Bed (HOB) Positioning: HOB at 30-45 degrees   Goal Outcome Evaluation:      Pt A&OX4. Room air. Leg elevated. Pain controlled well with PRN pain medication. Awaiting anticipated surgery on Friday. NWB to LLE. Pivoting to BSC with 1 assist and gait belt.

## 2024-10-16 NOTE — PROGRESS NOTES
[facility] Medicine Services  PROGRESS NOTE    Patient Name: Alysia Sierra  : 1955  MRN: [id number]    Date of Admission: 10/5/2024  Primary Care Physician: Cassy Foster MD    Subjective   Subjective     CC:  F/u ankle fracture    HPI:  Patient reports she is looking forward to her surgery on Friday, she is eager to start her rehabilitation process. No acute complaints.    Objective   Objective     Vital Signs:   Temp:  [97.5 °F (36.4 °C)-99.2 °F (37.3 °C)] 97.5 °F (36.4 °C)  Heart Rate:  [76] 76  Resp:  [18] 18  BP: (101-102)/(59-68) 102/65     Physical Exam:  Constitutional: Patient resting in bed in NAD  HENT: NCAT, mucous membranes moist  Respiratory: Clear to auscultation bilaterally, nonlabored respirations   Cardiovascular: RRR, no murmurs, rubs, or gallops, no bilateral ankle edema  Gastrointestinal: Positive bowel sounds, soft, nontender, nondistended  Musculoskeletal: Decreased muscle tone throughout  Psychiatric: Appropriate affect, cooperative  Neurologic: Alert and oriented, speech clear  Skin: No rashes on exposed areas    Results Reviewed:  LAB RESULTS:            Lab 10/11/24  0949 10/10/24  1013   SODIUM 141 142   POTASSIUM 4.2 4.7   CHLORIDE 105 109*   CO2 25.0 21.0*   ANION GAP 11.0 12.0   BUN 12 10   CREATININE 1.46* 1.47*   EGFR 39.0* 38.7*   GLUCOSE 108* 129*   CALCIUM 8.7 8.7                             Brief Urine Lab Results  (Last result in the past 365 days)        Color   Clarity   Blood   Leuk Est   Nitrite   Protein   CREAT   Urine HCG        24 1604 Yellow   Clear   Negative   Negative   Negative   Negative                   Microbiology Results Abnormal       None            No radiology results from the last 24 hrs    Results for orders placed during the hospital encounter of 24    Adult Transthoracic Echo Complete W/ Cont if Necessary Per Protocol    Interpretation Summary    Left ventricular systolic function is normal. Calculated  left ventricular EF = 57.9% Left ventricular ejection fraction appears to be 56 - 60%.    Left ventricular diastolic function was normal.    Estimated right ventricular systolic pressure from tricuspid regurgitation is normal (<35 mmHg).    No significant change from previous study      Current medications:  Scheduled Meds:aspirin, 81 mg, Oral, Daily  DULoxetine, 20 mg, Oral, BID  folic acid, 1 mg, Oral, Daily  gabapentin, 300 mg, Oral, Daily  gabapentin, 600 mg, Oral, Nightly  heparin (porcine), 5,000 Units, Subcutaneous, Q8H  levothyroxine, 25 mcg, Oral, Q AM  magnesium oxide, 400 mg, Oral, Daily  melatonin, 5 mg, Oral, Nightly  metoprolol succinate XL, 25 mg, Oral, Nightly  pantoprazole, 40 mg, Oral, Every Other Day  rosuvastatin, 5 mg, Oral, Daily  sodium chloride, 10 mL, Intravenous, Q12H  thiamine, 200 mg, Oral, Daily  vitamin B-12, 1,000 mcg, Oral, Daily      Continuous Infusions:   PRN Meds:.  senna-docusate sodium **AND** polyethylene glycol **AND** bisacodyl **AND** bisacodyl    Calcium Replacement - Follow Nurse / BPA Driven Protocol    docosanol    HYDROmorphone **AND** naloxone    influenza vaccine    Magnesium Standard Dose Replacement - Follow Nurse / BPA Driven Protocol    oxyCODONE    Phosphorus Replacement - Follow Nurse / BPA Driven Protocol    Potassium Replacement - Follow Nurse / BPA Driven Protocol    [COMPLETED] Insert Peripheral IV **AND** sodium chloride    sodium chloride    Assessment & Plan   Assessment & Plan     Active Hospital Problems    Diagnosis  POA    **Closed fracture of left ankle [S82.232A]  Unknown    Closed displaced fracture of fourth metatarsal bone of left foot [S92.342A]  Yes    Lisfranc dislocation, left, initial encounter [Q31.988A]  Yes      Resolved Hospital Problems   No resolved problems to display.        Brief Hospital Course to date:  Alysia Sierra is a 69 y.o. female with history of CKD III, chronic anemia, uterine cancer, hypothyroidism and alcohol abuse  who was admitted to [facility] on 10/5/24 for L ankle fracture after stepping awkwardly off her bed.     Left foot fracture with involvement of 1st, 2nd, 3rd and 4th metatarsal bases  Acute nondisplaced L medial malleolus fracture  Acute nondisplaced L posterior medial talus fracture   1st-4th proximal metatarsal fracture Lisfranc fracture  - LLE splinted. Fracture blister on dorsal aspect of L foot noted - per ortho allow blisters to decompress on their own  - PRN pain management  - Continue PT and OT   - Plan for surgery Friday 10/18 then discharge to [facility]     Alcohol withdrawal, resolved  History of alcohol abuse   - Patient denies daily alcohol use, however family reports daily heavy alcohol use  - Developed confusion / hallucinations on afternoon of 10/8 and started on CIWA protocol  - Withdrawal symptoms resolved. CIWA protocol discontinued.  - Continue PO thiamine, folic acid  - Continue QHS Melatonin     CKD III  - Creatinine stable around baseline    Chronic anemia  Iron deficiency  - Iron studies show ACD with iron deficiency  - Status post IV iron  - B12, folate within normal limits     Chronic medical conditions  #Hypothyroidism - Continue Levothyroxine  #GERD - Continue PPI  #Neuropathy - Continue Gabapentin    Expected Discharge Location and Transportation: [place]  Expected Discharge   Expected Discharge Date: 10/20/2024; Expected Discharge Time:      VTE Prophylaxis:  Pharmacologic & mechanical VTE prophylaxis orders are present.         AM-PAC 6 Clicks Score (PT): 19 (10/15/24 6614)    CODE STATUS:   Code Status and Medical Interventions: CPR (Attempt to Resuscitate); Full Support   Ordered at: 10/06/24 0246     Code Status (Patient has no pulse and is not breathing):    CPR (Attempt to Resuscitate)     Medical Interventions (Patient has pulse or is breathing):    Full Support       Shaneka Chino PA-C  10/16/24

## 2024-10-16 NOTE — PLAN OF CARE
Goal Outcome Evaluation:  Plan of Care Reviewed With: patient        Progress: improving  Outcome Evaluation: Pt alert and participatory in OT interventions. OT educated pt on use of LH AE (available at pt's home) for more I LBD while adhering to precautions, NWB at LLE. Pt reported understanding of use of LH reacher for pants, undergarments mgmt however AE not available for authentic use, will need further training. Pt able to manage sock at RLE w/o A or AE. Pt demonstrated bed mobility progressing to EOB with spv and bed supports. Pt req'd min A this date to t/f to BSC for toileting for safety in squat/stand pivot to pt's least impacted R side. Pt adhered to NWB LLE yet unsteady without AD of which pt declined. Pt req'd CGA to min A for clothing mgmt pre and post toileting for balance impairments impacted when pt attempted to reach away from SUKHWINDER while maintaining support only through RLE. Pt is still below occupational performance and would benefit from cont'd IPOT POC and IRF at d/c when medically ready.    Anticipated Discharge Disposition (OT): inpatient rehabilitation facility

## 2024-10-16 NOTE — PLAN OF CARE
Goal Outcome Evaluation:  Plan of Care Reviewed With: patient        Progress: improving  Outcome Evaluation: Pt continues to present below baseline function d/t LLE NWB, generalized weakness, balance deficits, and decreased activity tolerance. Pt ambulated 120 ft using a knee scooter. She required a chair to be brought up and was rolled back to her room d/t R thigh pain. Pt would continue to benefit from skilled IP PT. Recommend IRF at d/c.    Anticipated Discharge Disposition (PT): inpatient rehabilitation facility

## 2024-10-16 NOTE — THERAPY TREATMENT NOTE
Patient Name: Alysia Sierra  : 1955    MRN: [id number]                              Today's Date: 10/16/2024       Admit Date: 10/5/2024    Visit Dx:     ICD-10-CM ICD-9-CM   1. Displaced fracture of distal end of left tibia  S82.302A 824.8   2. Closed nondisplaced fracture of second metatarsal bone of left foot, initial encounter  S92.325A 825.25   3. Closed nondisplaced fracture of first metatarsal bone of left foot, initial encounter  S92.315A 825.25   4. Closed nondisplaced fracture of third metatarsal bone of left foot, initial encounter  S92.335A 825.25   5. Closed nondisplaced fracture of fourth metatarsal bone of left foot, initial encounter  S92.345A 825.25     Patient Active Problem List   Diagnosis    Hypertension    Leg weakness, bilateral    Syncope    CKD (chronic kidney disease) stage 3, GFR 30-59 ml/min    Neuropathy    Hyperlipidemia LDL goal <100    Endometrial adenocarcinoma    Mild episode of recurrent major depressive disorder    Dizziness    Hip fracture    Anemia    Hypomagnesemia    Hypothyroidism    Post-menopausal    Other osteoporosis without current pathological fracture    Closed fracture of second lumbar vertebra    Alcoholic ketoacidosis    Leukocytosis    GERD with esophagitis    Sepsis    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    Lisfranc dislocation, left, initial encounter    Closed fracture of left ankle    Closed displaced fracture of fourth metatarsal bone of left foot     Past Medical History:   Diagnosis Date    Allergic lisinopril  2017    Anemia     Arrhythmia     Arthritis     Cancer     uterine    Cataract mild 2020    stil lpresent    Chicken pox     Chronic fatigue     CKD (chronic kidney disease), stage III     sees nephro    Clotting disorder     Dental root implant present     lower left  x1 - possible dental implant    Depression mild 2019    Difficulty walking 2019    Disease of thyroid gland     Dizzy     NIEVES (dyspnea on exertion)     2017     Fracture of hip     Generalized anxiety disorder     GERD (gastroesophageal reflux disease) 2018    History of brachytherapy 2023    vaginal brachytherapy    Hyperlipidemia     Hypertension     Iron deficiency anemia     Liver disease     fatty    Liver problem     Measles     Menopause     Mumps     Neuromuscular disorder Peripheral Neuropathy    Orthostatic hypotension     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    Renal insufficiency 2018    Scoliosis     Unintentional weight loss     Uses contact lenses     bilat    Uterine cancer     Uterine cancer 2022    UTI (urinary tract infection)     Visual impairment Nearsighted    Wears glasses      Past Surgical History:   Procedure Laterality Date     SECTION      DILATION AND CURETTAGE, DIAGNOSTIC / THERAPEUTIC      HIP OPEN REDUCTION Left 2023    Procedure: FEMORAL NECK OPEN REDUCTION INTERNAL FIXATION LEFT;  Surgeon: Juanpablo Lee MD;  Location: Replaced by [facility];  Service: Orthopedics;  Laterality: Left;    HIP SURGERY      OOPHORECTOMY      ORAL LESION EXCISION/BIOPSY  2021    TOTAL LAPAROSCOPIC HYSTERECTOMY SALPINGO OOPHORECTOMY N/A 10/28/2022    Procedure: TOTAL LAPAROSCOPIC HYSTERECTOMY BILATERAL SALPINGO-OOPHORECTOMY, INJECTION FOR SENTINEL LYMPH NODE MAPPING, BILATERAL SENTINEL LYMPH NODE DISSECTION WITH DAVINCI ROBOT;  Surgeon: Jasmine Rich MD;  Location: Replaced by [facility] OR;  Service: Robotics - DaVinci;  Laterality: N/A;    TUBAL ABDOMINAL LIGATION        General Information       Row Name 10/16/24 1429          OT Time and Intention    Document Type therapy note (daily note)  -JY     Mode of Treatment occupational therapy;individual therapy  -JY       Row Name 10/16/24 1429          General Information    Patient Profile Reviewed yes  -JY     Existing Precautions/Restrictions fall;left;non-weight bearing;other (see comments)  NWB LLE in splint, aggressive elevation LLE, chronic anemia  -JY      Barriers to Rehab previous functional deficit  -JY       Row Name 10/16/24 1429          Cognition    Orientation Status (Cognition) oriented x 4  -JY       Row Name 10/16/24 1429          Safety Issues/Impairments Affecting Functional Mobility    Safety Issues Affecting Function (Mobility) awareness of need for assistance;insight into deficits/self-awareness;safety precaution awareness;safety precautions follow-through/compliance;sequencing abilities  -JY     Impairments Affecting Function (Mobility) balance;strength;pain;endurance/activity tolerance  -JY     Comment, Safety Issues/Impairments (Mobility) pt alert and following commands; req'd cues for optimal safety  -JY               User Key  (r) = Recorded By, (t) = Taken By, (c) = Cosigned By      Initials Name Provider Type    Anjelica Poole OT Occupational Therapist                     Mobility/ADL's       Row Name 10/16/24 1433          Bed Mobility    Bed Mobility supine-sit;scooting/bridging  -JY     Scooting/Bridging [place] (Bed Mobility) supervision  -JY     Supine-Sit [place] (Bed Mobility) supervision  -JY     Bed Mobility, Safety Issues decreased use of legs for bridging/pushing  -JY     Assistive Device (Bed Mobility) head of bed elevated;bed rails  -JY     Comment, (Bed Mobility) pt did not require any physical A for bed mobility, skilled cues provided to adhere to NWB at LLE, advance LEs to and over EOB, reach across midline to grasp bedrail for support to bring self forward and upright and to push through UE to upright trunk into sitting all while adhering to NWB LLE throughout; denies any dizziness or feeling LH at EOB  -JY       Row Name 10/16/24 1433          Transfers    Transfers sit-stand transfer;stand-sit transfer;bed-chair transfer  -JY     Comment, (Transfers) skilled cues for hand placement for controlled ascend, descend specifically to push up from seated surface, to reach back prior to sitting once aligned and in close  proximity to seated surface; emphasized importance of transferring to non impacted R side when able and adhering to NWB LLE throughout; pt declined use of AD to aid in STS and SPT to BSC and initiated grossly squat pivot to BSC; min A for stabilization on scooter when coming to stand to position self at knee scooter  -FAIZAN       Row Name 10/16/24 [facility]          Sit-Stand Transfer    Sit-Stand [place] (Transfers) contact guard;verbal cues  -JY     Assistive Device (Sit-Stand Transfers) walker, knee scooter  -JY     Comment, (Sit-Stand Transfer) cues for proper sequencing and hand placement while adhering to NWB precautions at LLE  -       Row Name 10/16/24 1433          Toilet Transfer    Type (Toilet Transfer) stand pivot/stand step  -JY     [place] Level (Toilet Transfer) minimum assist (75% patient effort);verbal cues  -JY     Assistive Device (Toilet Transfer) commode, bedside without drop arms  -JY     Comment, (Toilet Transfer) min A provided d/t declination of AD; pt initiated stand, cues provided for optimal set up, hand placement and seq; pt maintained NWB LLE with min A  -[facility] Name 10/16/24 [facility]          Functional Mobility    Functional Mobility- Ind. Level not tested  -JY     Functional Mobility- Comment defer to PT for specifics  -[facility] Name 10/16/24 [facility]          Mobility    Extremity Weight-bearing Status left lower extremity   -JY     Left Lower Extremity (Weight-bearing Status) non weight-bearing (NWB)   -[facility] Name 10/16/24 [facility]          Upper Body Dressing Assessment/Training    [place] Level (Upper Body Dressing) don;pajama/robe;minimum assist (75% patient effort);verbal cues  -JY     Position (Upper Body Dressing) supported standing  -JY     Comment, (Upper Body Dressing) min A for proximal and posterior mgmt of robe; pt initiated donning in standing of which pt was assisted and educated to complete in sitting for safety given NWB at LLE positioned on knee  scooter and balance implications  -JY       Row Name 10/16/24 1433          Lower Body Dressing Assessment/Training    [place] Level (Lower Body Dressing) don;doff;socks;contact guard assist;other (see comments)  R sock  -JY     Assistive Devices (Lower Body Dressing) other (see comments)  discussed with pt option of LH AE use for LBD of which pt reported having LH AE from prior sx  -JY     Position (Lower Body Dressing) sitting up in bed  -JY     Comment, (Lower Body Dressing) educated pt on how to use LH AE to support most I and safe LBD lisseth to manage around LLE currently and in prep for s/p sx; pt has LH AE at home thus did not authentically trial this date, however pt would benefit from further training; pt able to elevate RLE up to more proximal position and manage w/o AE or A, needs more applicable to LLE  -JY       Row Name 10/16/24 1433          Toileting Assessment/Training    [place] Level (Toileting) adjust/manage clothing;minimum assist (75% patient effort);perform perineal hygiene;set up  -JY     Assistive Devices (Toileting) commode, bedside without drop arms  -JY     Position (Toileting) unsupported sitting  -JY     Comment, (Toileting) pt did not require physical A for seated hygiene, req'd upward of min A for mgmt of gown pre and post toileting in order to maintain balance and reach away from SUKHWINDER  -JY       Row Name 10/16/24 1433          Grooming Assessment/Training    [place] Level (Grooming) wash face, hands;set up  -JY     Position (Grooming) unsupported sitting  -JY               User Key  (r) = Recorded By, (t) = Taken By, (c) = Cosigned By      Initials Name Provider Type    Anjelica Poole OT Occupational Therapist                   Obj/Interventions       Row Name 10/16/24 1451          Motor Skills    Motor Skills functional endurance  -JY     Functional Endurance decreased activity tolerance toward more dynamic demands  -JY       Row Name 10/16/24 1451          Balance     Balance Assessment sitting static balance;sitting dynamic balance;standing static balance;standing dynamic balance  -JY     Static Sitting Balance standby assist  -JY     Dynamic Sitting Balance standby assist  -JY     Position, Sitting Balance unsupported;sitting edge of bed;other (see comments)  sitting on BSC  -JY     Static Standing Balance contact guard  -JY     Dynamic Standing Balance minimal assist;verbal cues  -JY     Position/Device Used, Standing Balance supported;other (see comments)  knee scooter  -JY     Balance Interventions sitting;standing;static;dynamic;sit to stand;supported;occupation based/functional task  -JY     Comment, Balance noted unsteadiness in pivot, cues for using AD most optimally and sitting for dressing  -JY               User Key  (r) = Recorded By, (t) = Taken By, (c) = Cosigned By      Initials Name Provider Type    Anjelica Poole OT Occupational Therapist                   Goals/Plan    No documentation.                  Clinical Impression       Row Name 10/16/24 1454          Pain Assessment    Pretreatment Pain Rating 0/10 - no pain  -JY     Posttreatment Pain Rating 0/10 - no pain  -JY     Pain Side/Orientation left;lower  -JY     Pain Management Interventions positioning techniques utilized;exercise or physical activity utilized  -JY     Response to Pain Interventions functional ability improved;activity level improved  -JY     Pre/Posttreatment Pain Comment denied pain  -JY     Pain Intervention(s) Repositioned;Ambulation/increased activity  -JY       Row Name 10/16/24 1454          Plan of Care Review    Plan of Care Reviewed With patient  -JY     Progress improving  -JY     Outcome Evaluation Pt alert and participatory in OT interventions. OT educated pt on use of  AE (available at pt's home) for more I LBD while adhering to precautions, NWB at LLE. Pt reported understanding of use of  reacher for pants, undergarments mgmt however AE not available for  authentic use, will need further training. Pt able to manage sock at RLE w/o A or AE. Pt demonstrated bed mobility progressing to EOB with spv and bed supports. Pt req'd min A this date to t/f to C for toileting for safety in squat/stand pivot to pt's least impacted R side. Pt adhered to NWB LLE yet unsteady without AD of which pt declined. Pt req'd CGA to min A for clothing mgmt pre and post toileting for balance impairments impacted when pt attempted to reach away from SUKHWINDER while maintaining support only through RLE. Pt is still below occupational performance and would benefit from cont'd IPOT POC and IRF at d/c when medically ready.  -JY       Row Name 10/16/24 1454          Therapy Assessment/Plan (OT)    Rehab Potential (OT) good  -JY     Criteria for Skilled Therapeutic Interventions Met (OT) yes  -JY     Therapy Frequency (OT) daily  -JY       Row Name 10/16/24 1454          Therapy Plan Review/Discharge Plan (OT)    Anticipated Discharge Disposition (OT) inpatient rehabilitation facility  -JY       Row Name 10/16/24 1454          Vital Signs    Pre Systolic BP Rehab 102  -JY     Pre Treatment Diastolic BP 65  -JY     O2 Delivery Pre Treatment room air  -JY     O2 Delivery Intra Treatment room air  -JY     O2 Delivery Post Treatment room air  -JY     Pre Patient Position Supine  -JY     Post Patient Position Sitting  -JY       Row Name 10/16/24 1454          Positioning and Restraints    Pre-Treatment Position in bed  -JY     Post Treatment Position other  w/ PT standing using knee scooter  -JY     Other Position with PT  PT to finish set up after interventions complete  -JY               User Key  (r) = Recorded By, (t) = Taken By, (c) = Cosigned By      Initials Name Provider Type    Anjelica Poole, OT Occupational Therapist                   Outcome Measures       Row Name 10/16/24 6642          How much help from another is currently needed...    Putting on and taking off regular lower body clothing?  3  -JY     Bathing (including washing, rinsing, and drying) 2  -JY     Toileting (which includes using toilet bed pan or urinal) 3  -JY     Putting on and taking off regular upper body clothing 3  -JY     Taking care of personal grooming (such as brushing teeth) 3  -JY     Eating meals 4  -JY     AM-PAC 6 Clicks Score (OT) 18  -JY       Row Name 10/16/24 1502          Functional Assessment    Outcome Measure Options AM-PAC 6 Clicks Daily Activity (OT)  -JY               User Key  (r) = Recorded By, (t) = Taken By, (c) = Cosigned By      Initials Name Provider Type    Anjelica Poole OT Occupational Therapist                    Occupational Therapy Education       Title: PT OT SLP Therapies (In Progress)       Topic: Occupational Therapy (In Progress)       Point: ADL training (In Progress)       Description:   Instruct learner(s) on proper safety adaptation and remediation techniques during self care or transfers.   Instruct in proper use of assistive devices.                  Learning Progress Summary            Patient Acceptance, E,D, NR by ANTONINA at 10/16/2024 1358    Acceptance, E, VU by AN at 10/14/2024 0955    Eager, E,TB,D, VU,NR by AR at 10/10/2024 1647    Eager, E,TB,D,H, VU,NR by AR at 10/9/2024 1145                      Point: Home exercise program (Done)       Description:   Instruct learner(s) on appropriate technique for monitoring, assisting and/or progressing therapeutic exercises/activities.                  Learning Progress Summary            Patient Eager, E,TB,D, VU,NR by AR at 10/10/2024 1647    Eager, E,TB,D,H, VU,NR by AR at 10/9/2024 1145                      Point: Precautions (In Progress)       Description:   Instruct learner(s) on prescribed precautions during self-care and functional transfers.                  Learning Progress Summary            Patient Acceptance, E,D, NR by ANTONINA at 10/16/2024 1358    Acceptance, E, VU by AN at 10/14/2024 0955    Eager, E,TB,D, VU,NR by AR at  10/10/2024 1647    Eager, E,TB,D,H, VU,NR by AR at 10/9/2024 1145                      Point: Body mechanics (In Progress)       Description:   Instruct learner(s) on proper positioning and spine alignment during self-care, functional mobility activities and/or exercises.                  Learning Progress Summary            Patient Acceptance, E,D, NR by JFAIZAN at 10/16/2024 1358    Acceptance, E, VU by AN at 10/14/2024 0955    Eager, E,TB,D, VU,NR by AR at 10/10/2024 1647    Eager, E,TB,D,H, VU,NR by AR at 10/9/2024 1145                                      User Key       Initials Effective Dates Name Provider Type Discipline    AR 07/11/23 -  Bernie Henriquez, OT Occupational Therapist OT    ANTONINA 06/16/21 -  Anjelica Parra OT Occupational Therapist OT    UDAY 09/21/21 -  Keisha Oates, OT Occupational Therapist OT                  OT Recommendation and Plan  Therapy Frequency (OT): daily  Plan of Care Review  Plan of Care Reviewed With: patient  Progress: improving  Outcome Evaluation: Pt alert and participatory in OT interventions. OT educated pt on use of LH AE (available at pt's home) for more I LBD while adhering to precautions, NWB at LLE. Pt reported understanding of use of LH reacher for pants, undergarments mgmt however AE not available for authentic use, will need further training. Pt able to manage sock at RLE w/o A or AE. Pt demonstrated bed mobility progressing to EOB with spv and bed supports. Pt req'd min A this date to t/f to BSC for toileting for safety in squat/stand pivot to pt's least impacted R side. Pt adhered to NWB LLE yet unsteady without AD of which pt declined. Pt req'd CGA to min A for clothing mgmt pre and post toileting for balance impairments impacted when pt attempted to reach away from SUKHWINDER while maintaining support only through RLE. Pt is still below occupational performance and would benefit from cont'd IPOT POC and IRF at d/c when medically ready.     Time Calculation:          Time Calculation- OT       Row Name 10/16/24 1504             Time Calculation- OT    OT Start Time 1358  -JY      OT Received On 10/16/24  -JY      OT Goal Re-Cert Due Date 10/19/24  -JY         Timed Charges    31610 - OT Therapeutic Activity Minutes 7  -JY      24946 - OT Self Care/Mgmt Minutes 16  -JY         Total Minutes    Timed Charges Total Minutes 23  -JY       Total Minutes 23  -JY                User Key  (r) = Recorded By, (t) = Taken By, (c) = Cosigned By      Initials Name Provider Type    Anjelica Poole OT Occupational Therapist                  Therapy Charges for Today       Code Description Service Date Service Provider Modifiers Qty    30847167399 HC OT THERAPEUTIC ACT EA 15 MIN 10/16/2024 Anjelica Parra OT GO 1    90105796548 HC OT SELF CARE/MGMT/TRAIN EA 15 MIN 10/16/2024 Anjelica Parra OT GO 1                 Anjelica Parra OT  10/16/2024

## 2024-10-16 NOTE — THERAPY TREATMENT NOTE
Patient Name: Alysia Sierra  : 1955    MRN: [id number]                              Today's Date: 10/16/2024       Admit Date: 10/5/2024    Visit Dx:     ICD-10-CM ICD-9-CM   1. Displaced fracture of distal end of left tibia  S82.302A 824.8   2. Closed nondisplaced fracture of second metatarsal bone of left foot, initial encounter  S92.325A 825.25   3. Closed nondisplaced fracture of first metatarsal bone of left foot, initial encounter  S92.315A 825.25   4. Closed nondisplaced fracture of third metatarsal bone of left foot, initial encounter  S92.335A 825.25   5. Closed nondisplaced fracture of fourth metatarsal bone of left foot, initial encounter  S92.345A 825.25     Patient Active Problem List   Diagnosis    Hypertension    Leg weakness, bilateral    Syncope    CKD (chronic kidney disease) stage 3, GFR 30-59 ml/min    Neuropathy    Hyperlipidemia LDL goal <100    Endometrial adenocarcinoma    Mild episode of recurrent major depressive disorder    Dizziness    Hip fracture    Anemia    Hypomagnesemia    Hypothyroidism    Post-menopausal    Other osteoporosis without current pathological fracture    Closed fracture of second lumbar vertebra    Alcoholic ketoacidosis    Leukocytosis    GERD with esophagitis    Sepsis    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    Lisfranc dislocation, left, initial encounter    Closed fracture of left ankle    Closed displaced fracture of fourth metatarsal bone of left foot     Past Medical History:   Diagnosis Date    Allergic lisinopril  2017    Anemia     Arrhythmia     Arthritis     Cancer     uterine    Cataract mild 2020    stil lpresent    Chicken pox     Chronic fatigue     CKD (chronic kidney disease), stage III     sees nephro    Clotting disorder     Dental root implant present     lower left  x1 - possible dental implant    Depression mild 2019    Difficulty walking 2019    Disease of thyroid gland     Dizzy     NIEVES (dyspnea on exertion)     2017     Fracture of hip     Generalized anxiety disorder     GERD (gastroesophageal reflux disease) 2018    History of brachytherapy 2023    vaginal brachytherapy    Hyperlipidemia     Hypertension     Iron deficiency anemia     Liver disease     fatty    Liver problem     Measles     Menopause     Mumps     Neuromuscular disorder Peripheral Neuropathy    Orthostatic hypotension     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    Renal insufficiency 2018    Scoliosis     Unintentional weight loss     Uses contact lenses     bilat    Uterine cancer     Uterine cancer 2022    UTI (urinary tract infection)     Visual impairment Nearsighted    Wears glasses      Past Surgical History:   Procedure Laterality Date     SECTION      DILATION AND CURETTAGE, DIAGNOSTIC / THERAPEUTIC      HIP OPEN REDUCTION Left 2023    Procedure: FEMORAL NECK OPEN REDUCTION INTERNAL FIXATION LEFT;  Surgeon: Juanpablo Lee MD;  Location: [facility] OR;  Service: Orthopedics;  Laterality: Left;    HIP SURGERY      OOPHORECTOMY      ORAL LESION EXCISION/BIOPSY  2021    TOTAL LAPAROSCOPIC HYSTERECTOMY SALPINGO OOPHORECTOMY N/A 10/28/2022    Procedure: TOTAL LAPAROSCOPIC HYSTERECTOMY BILATERAL SALPINGO-OOPHORECTOMY, INJECTION FOR SENTINEL LYMPH NODE MAPPING, BILATERAL SENTINEL LYMPH NODE DISSECTION WITH DAVINCI ROBOT;  Surgeon: Jasmine Rich MD;  Location: [facility] OR;  Service: Robotics - DaVinci;  Laterality: N/A;    TUBAL ABDOMINAL LIGATION        General Information       Row Name 10/16/24 1541          Physical Therapy Time and Intention    Document Type therapy note (daily note)  -     Mode of Treatment physical therapy  -       Row Name 10/16/24 1541          General Information    Patient Profile Reviewed yes  -     Existing Precautions/Restrictions fall;left;non-weight bearing;other (see comments)  NWB LLE in splint, aggressive elevation LLE, chronic anemia  -     Barriers to  Rehab previous functional deficit  -       Row Name 10/16/24 1541          Cognition    Orientation Status (Cognition) oriented x 4  -[facility] Name 10/16/24 1541          Safety Issues/Impairments Affecting Functional Mobility    Safety Issues Affecting Function (Mobility) awareness of need for assistance;insight into deficits/self-awareness;safety precaution awareness;safety precautions follow-through/compliance;sequencing abilities  -     Impairments Affecting Function (Mobility) balance;strength;pain;endurance/activity tolerance  -               User Key  (r) = Recorded By, (t) = Taken By, (c) = Cosigned By      Initials Name Provider Type     Rose Bush, PT Physical Therapist                   Mobility       Row Name 10/16/24 1542          Bed Mobility    Comment, (Bed Mobility) On [facility] w/ OT in room upon arrival. Pt able to recall L LE NWB precautions  -[facility] Name 10/16/24 1542          Transfers    Comment, (Transfers) VCs for hand placement and sequencing. Transfer from BSC to knee scooter w/ Roxana to steady. Pt able to set up knee scooter prior to transfer  -[facility] Name 10/16/24 1542          Bed-Chair Transfer    Bed-Chair [place] (Transfers) contact guard;verbal cues;nonverbal cues (demo/gesture)  -     Assistive Device (Bed-Chair Transfers) walker, knee scooter  -     Comment, (Bed-Chair Transfer) BSC>knee scooter  -[facility] Name 10/16/24 1542          Sit-Stand Transfer    Sit-Stand [place] (Transfers) contact guard;verbal cues  -     Assistive Device (Sit-Stand Transfers) walker, knee scooter  -[facility] Name 10/16/24 1542          Gait/Stairs (Locomotion)    [place] Level (Gait) contact guard;verbal cues;nonverbal cues (demo/gesture)  -     Assistive Device (Gait) walker, knee scooter  -     Distance in Feet (Gait) 120  -     Deviations/Abnormal Patterns (Gait) werner decreased;stride length decreased  -     Bilateral Gait Deviations  forward flexed posture  -     Comment, (Gait/Stairs) Pt utilized knee scooter for mobility. Demo decreased RLE step length w/ shuffled steps. Pt able to correct momentarily w/ cues. Pt reported R thigh pain and required chair to be brought up and pt transferred to recliner and was rolled back to room  -       Row Name 10/16/24 1542          Mobility    Extremity Weight-bearing Status left lower extremity  -     Left Lower Extremity (Weight-bearing Status) non weight-bearing (NWB)   -               User Key  (r) = Recorded By, (t) = Taken By, (c) = Cosigned By      Initials Name Provider Type     Rose Bush PT Physical Therapist                   Obj/Interventions       [facility] Name 10/16/24 1549          Motor Skills    Therapeutic Exercise hip;knee;ankle  -[facility] Name 10/16/24 1549          Hip (Therapeutic Exercise)    Hip (Therapeutic Exercise) strengthening exercise  -     Hip Strengthening (Therapeutic Exercise) bilateral;marching while seated;10 repetitions  -[facility] Name 10/16/24 1549          Knee (Therapeutic Exercise)    Knee (Therapeutic Exercise) strengthening exercise;isometric exercises  -     Knee Isometrics (Therapeutic Exercise) bilateral;quad sets;10 repetitions  -     Knee Strengthening (Therapeutic Exercise) bilateral;SLR (straight leg raise);LAQ (long arc quad);10 repetitions  -[facility] Name 10/16/24 1549          Ankle (Therapeutic Exercise)    Ankle (Therapeutic Exercise) AROM (active range of motion)  -     Ankle AROM (Therapeutic Exercise) right;dorsiflexion;plantarflexion;10 repetitions  -       Row Name 10/16/24 1549          Balance    Balance Assessment sitting static balance;sitting dynamic balance;standing static balance;standing dynamic balance  -     Static Sitting Balance standby assist  -     Dynamic Sitting Balance standby assist  -     Position, Sitting Balance unsupported;sitting in chair  -     Static Standing Balance contact guard   -     Dynamic Standing Balance minimal assist  -     Position/Device Used, Standing Balance supported  -     Balance Interventions sitting;standing;sit to stand;supported;static;dynamic  -               User Key  (r) = Recorded By, (t) = Taken By, (c) = Cosigned By      Initials Name Provider Type     Rose Bush PT Physical Therapist                   Goals/Plan    No documentation.                  Clinical Impression       [facility] Name 10/16/24 1223          Pain    Pretreatment Pain Rating 2/10  -     Posttreatment Pain Rating 6/10  -     Pain Location extremity  -     Pain Side/Orientation left;lower  -     Pain Management Interventions exercise or physical activity utilized  -     Response to Pain Interventions activity level improved  -     Pain Intervention(s) Repositioned;Ambulation/increased activity  -[facility] Name 10/16/24 6175          Plan of Care Review    Plan of Care Reviewed With patient  -     Progress improving  -     Outcome Evaluation Pt continues to present below baseline function d/t LLE NWB, generalized weakness, balance deficits, and decreased activity tolerance. Pt ambulated 120 ft using a knee scooter. She required a chair to be brought up and was rolled back to her room d/t R thigh pain. Pt would continue to benefit from skilled IP PT. Recommend IRF at d/c.  -[facility] Name 10/16/24 7246          Positioning and Restraints    Pre-Treatment Position bedside commode  -     Post Treatment Position chair  -     In Chair notified nsg;reclined;sitting;call light within reach;encouraged to call for assist;exit alarm on;waffle cushion;legs elevated;LLE elevated  -               User Key  (r) = Recorded By, (t) = Taken By, (c) = Cosigned By      Initials Name Provider Type     Rose Bush PT Physical Therapist                   Outcome Measures       [facility] Name 10/16/24 0850          How much help from another person do you currently need...    Turning  from your back to your side while in flat bed without using bedrails? 3  -LH     Moving from lying on back to sitting on the side of a flat bed without bedrails? 3  -LH     Moving to and from a bed to a chair (including a wheelchair)? 3  -LH     Standing up from a chair using your arms (e.g., wheelchair, bedside chair)? 3  -LH     Climbing 3-5 steps with a railing? 2  -LH     To walk in hospital room? 2  -     AM-PAC 6 Clicks Score (PT) 16  -     Highest Level of Mobility Goal 5 --> Static standing  -       Row Name 10/16/24 1602 10/16/24 1502       Functional Assessment    Outcome Measure Options AM-PAC 6 Clicks Basic Mobility (PT)  - AM-PAC 6 Clicks Daily Activity (OT)  -              User Key  (r) = Recorded By, (t) = Taken By, (c) = Cosigned By      Initials Name Provider Type    Anjelica Poole, OT Occupational Therapist     Rose Bush, PT Physical Therapist                                 Physical Therapy Education       Title: PT OT SLP Therapies (In Progress)       Topic: Physical Therapy (Done)       Point: Mobility training (Done)       Learning Progress Summary            Patient Acceptance, E, VU,NR by  at 10/16/2024 1602    Acceptance, E, VU by ND at 10/15/2024 1044    Acceptance, E, VU by CK at 10/12/2024 1004    Acceptance, E, VU by CK at 10/11/2024 1154    Eager, E, VU by SC at 10/10/2024 1108    Comment: reviewed safety with mobility    Acceptance, E,D, VU,NR by AB at 10/9/2024 1006                      Point: Home exercise program (Done)       Learning Progress Summary            Patient Acceptance, E, VU,NR by  at 10/16/2024 1602    Acceptance, E, VU by CK at 10/12/2024 1004    Eager, E, VU by SC at 10/10/2024 1108    Comment: reviewed safety with mobility                      Point: Body mechanics (Done)       Learning Progress Summary            Patient Acceptance, E, VU,NR by  at 10/16/2024 1602    Acceptance, E, VU by ND at 10/15/2024 1044    Acceptance, E, VU by CK at  10/12/2024 1004    Acceptance, E, VU by  at 10/11/2024 1154    Eager, E, VU by SC at 10/10/2024 1108    Comment: reviewed safety with mobility    Acceptance, E,D, VU,NR by AB at 10/9/2024 1006                      Point: Precautions (Done)       Learning Progress Summary            Patient Acceptance, E, VU,NR by  at 10/16/2024 1602    Acceptance, E, VU by ND at 10/15/2024 1044    Acceptance, E, VU by CK at 10/12/2024 1004    Acceptance, E, VU by CK at 10/11/2024 1154    Eager, E, VU by SC at 10/10/2024 1108    Comment: reviewed safety with mobility    Acceptance, E,D, VU,NR by AB at 10/9/2024 1006                                      User Key       Initials Effective Dates Name Provider Type Discipline    SC 02/03/23 -  Grace Bradley, PT Physical Therapist PT    AB 09/22/22 -  Margaret Bhatt, PT Physical Therapist PT     02/06/24 -  Karen Mcarthur, PT Physical Therapist PT     09/21/23 -  Rose Bush, PT Physical Therapist PT    ND 11/16/23 -  Thea Gunter, PT Physical Therapist PT                  PT Recommendation and Plan     Progress: improving  Outcome Evaluation: Pt continues to present below baseline function d/t LLE NWB, generalized weakness, balance deficits, and decreased activity tolerance. Pt ambulated 120 ft using a knee scooter. She required a chair to be brought up and was rolled back to her room d/t R thigh pain. Pt would continue to benefit from skilled IP PT. Recommend IRF at d/c.     Time Calculation:         PT Charges       Row Name 10/16/24 1602             Time Calculation    Start Time 1422  -LH      PT Received On 10/16/24  -      PT Goal Re-Cert Due Date 10/19/24  -         Timed Charges    53428 - PT Therapeutic Exercise Minutes 10  -LH      17626 - Gait Training Minutes  17  -LH         Total Minutes    Timed Charges Total Minutes 27  -LH       Total Minutes 27  -LH                User Key  (r) = Recorded By, (t) = Taken By, (c) = Cosigned By      Initials  Name Provider Type     Rose Bush, PT Physical Therapist                  Therapy Charges for Today       Code Description Service Date Service Provider Modifiers Qty    70980229203 HC PT THER PROC EA 15 MIN 10/16/2024 Rose Bush, PT GP 1    43815790421 HC GAIT TRAINING EA 15 MIN 10/16/2024 Rose Bush, PT GP 1            PT G-Codes  Outcome Measure Options: AM-PAC 6 Clicks Basic Mobility (PT)  AM-PAC 6 Clicks Score (PT): 16  AM-PAC 6 Clicks Score (OT): 18  PT Discharge Summary  Anticipated Discharge Disposition (PT): inpatient rehabilitation facility    Rose Bush PT  10/16/2024

## 2024-10-17 ENCOUNTER — ANESTHESIA EVENT (OUTPATIENT)
Dept: PERIOP | Facility: HOSPITAL | Age: 69
DRG: 493 | End: 2024-10-17
Payer: MEDICARE

## 2024-10-17 LAB — GLUCOSE BLDC GLUCOMTR-MCNC: 133 MG/DL (ref 70–130)

## 2024-10-17 PROCEDURE — 97530 THERAPEUTIC ACTIVITIES: CPT

## 2024-10-17 PROCEDURE — 99232 SBSQ HOSP IP/OBS MODERATE 35: CPT | Performed by: INTERNAL MEDICINE

## 2024-10-17 PROCEDURE — 97110 THERAPEUTIC EXERCISES: CPT

## 2024-10-17 PROCEDURE — 97535 SELF CARE MNGMENT TRAINING: CPT

## 2024-10-17 PROCEDURE — 82948 REAGENT STRIP/BLOOD GLUCOSE: CPT

## 2024-10-17 PROCEDURE — 93005 ELECTROCARDIOGRAM TRACING: CPT | Performed by: ANESTHESIOLOGY

## 2024-10-17 PROCEDURE — 25010000002 HEPARIN (PORCINE) PER 1000 UNITS: Performed by: PHYSICIAN ASSISTANT

## 2024-10-17 RX ORDER — LIDOCAINE HYDROCHLORIDE 10 MG/ML
0.5 INJECTION, SOLUTION EPIDURAL; INFILTRATION; INTRACAUDAL; PERINEURAL ONCE AS NEEDED
Status: DISCONTINUED | OUTPATIENT
Start: 2024-10-17 | End: 2024-10-18 | Stop reason: HOSPADM

## 2024-10-17 RX ORDER — SODIUM CHLORIDE 0.9 % (FLUSH) 0.9 %
10 SYRINGE (ML) INJECTION EVERY 12 HOURS SCHEDULED
Status: DISCONTINUED | OUTPATIENT
Start: 2024-10-17 | End: 2024-10-18 | Stop reason: HOSPADM

## 2024-10-17 RX ORDER — FAMOTIDINE 20 MG/1
20 TABLET, FILM COATED ORAL
Status: DISCONTINUED | OUTPATIENT
Start: 2024-10-18 | End: 2024-10-18 | Stop reason: HOSPADM

## 2024-10-17 RX ORDER — FAMOTIDINE 10 MG/ML
20 INJECTION, SOLUTION INTRAVENOUS ONCE
Status: COMPLETED | OUTPATIENT
Start: 2024-10-18 | End: 2024-10-18

## 2024-10-17 RX ORDER — MIDAZOLAM HYDROCHLORIDE 1 MG/ML
0.5 INJECTION INTRAMUSCULAR; INTRAVENOUS
Status: DISCONTINUED | OUTPATIENT
Start: 2024-10-17 | End: 2024-10-18 | Stop reason: HOSPADM

## 2024-10-17 RX ORDER — SODIUM CHLORIDE 0.9 % (FLUSH) 0.9 %
10 SYRINGE (ML) INJECTION AS NEEDED
Status: DISCONTINUED | OUTPATIENT
Start: 2024-10-17 | End: 2024-10-18 | Stop reason: HOSPADM

## 2024-10-17 RX ORDER — SODIUM CHLORIDE, SODIUM LACTATE, POTASSIUM CHLORIDE, CALCIUM CHLORIDE 600; 310; 30; 20 MG/100ML; MG/100ML; MG/100ML; MG/100ML
9 INJECTION, SOLUTION INTRAVENOUS CONTINUOUS
Status: ACTIVE | OUTPATIENT
Start: 2024-10-18 | End: 2024-10-19

## 2024-10-17 RX ADMIN — ASPIRIN 81 MG: 81 TABLET, COATED ORAL at 08:13

## 2024-10-17 RX ADMIN — LEVOTHYROXINE SODIUM 25 MCG: 25 TABLET ORAL at 05:33

## 2024-10-17 RX ADMIN — Medication 10 ML: at 08:14

## 2024-10-17 RX ADMIN — ROSUVASTATIN 5 MG: 10 TABLET, FILM COATED ORAL at 08:12

## 2024-10-17 RX ADMIN — HEPARIN SODIUM 5000 UNITS: 5000 INJECTION INTRAVENOUS; SUBCUTANEOUS at 20:52

## 2024-10-17 RX ADMIN — OXYCODONE HYDROCHLORIDE 5 MG: 5 TABLET ORAL at 20:50

## 2024-10-17 RX ADMIN — PANTOPRAZOLE SODIUM 40 MG: 40 TABLET, DELAYED RELEASE ORAL at 08:13

## 2024-10-17 RX ADMIN — CYANOCOBALAMIN TAB 1000 MCG 1000 MCG: 1000 TAB at 08:12

## 2024-10-17 RX ADMIN — Medication 5 MG: at 20:51

## 2024-10-17 RX ADMIN — FOLIC ACID 1 MG: 1 TABLET ORAL at 08:13

## 2024-10-17 RX ADMIN — OXYCODONE HYDROCHLORIDE 5 MG: 5 TABLET ORAL at 11:54

## 2024-10-17 RX ADMIN — MAGNESIUM OXIDE TAB 400 MG (241.3 MG ELEMENTAL MG) 400 MG: 400 (241.3 MG) TAB at 08:13

## 2024-10-17 RX ADMIN — GABAPENTIN 600 MG: 300 CAPSULE ORAL at 20:50

## 2024-10-17 RX ADMIN — GABAPENTIN 300 MG: 300 CAPSULE ORAL at 08:12

## 2024-10-17 RX ADMIN — DULOXETINE HYDROCHLORIDE 20 MG: 20 CAPSULE, DELAYED RELEASE ORAL at 20:50

## 2024-10-17 RX ADMIN — HEPARIN SODIUM 5000 UNITS: 5000 INJECTION INTRAVENOUS; SUBCUTANEOUS at 05:32

## 2024-10-17 RX ADMIN — THIAMINE HCL TAB 100 MG 200 MG: 100 TAB at 08:13

## 2024-10-17 RX ADMIN — DULOXETINE HYDROCHLORIDE 20 MG: 20 CAPSULE, DELAYED RELEASE ORAL at 08:13

## 2024-10-17 RX ADMIN — Medication 10 ML: at 20:51

## 2024-10-17 RX ADMIN — HEPARIN SODIUM 5000 UNITS: 5000 INJECTION INTRAVENOUS; SUBCUTANEOUS at 13:30

## 2024-10-17 RX ADMIN — METOPROLOL SUCCINATE 25 MG: 25 TABLET, EXTENDED RELEASE ORAL at 20:51

## 2024-10-17 NOTE — THERAPY TREATMENT NOTE
Patient Name: Alysia Sierra  : 1955    MRN: [id number]                              Today's Date: 10/17/2024       Admit Date: 10/5/2024    Visit Dx:     ICD-10-CM ICD-9-CM   1. Displaced fracture of distal end of left tibia  S82.302A 824.8   2. Closed nondisplaced fracture of second metatarsal bone of left foot, initial encounter  S92.325A 825.25   3. Closed nondisplaced fracture of first metatarsal bone of left foot, initial encounter  S92.315A 825.25   4. Closed nondisplaced fracture of third metatarsal bone of left foot, initial encounter  S92.335A 825.25   5. Closed nondisplaced fracture of fourth metatarsal bone of left foot, initial encounter  S92.345A 825.25     Patient Active Problem List   Diagnosis    Hypertension    Leg weakness, bilateral    Syncope    CKD (chronic kidney disease) stage 3, GFR 30-59 ml/min    Neuropathy    Hyperlipidemia LDL goal <100    Endometrial adenocarcinoma    Mild episode of recurrent major depressive disorder    Dizziness    Hip fracture    Anemia    Hypomagnesemia    Hypothyroidism    Post-menopausal    Other osteoporosis without current pathological fracture    Closed fracture of second lumbar vertebra    Alcoholic ketoacidosis    Leukocytosis    GERD with esophagitis    Sepsis    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    Lisfranc dislocation, left, initial encounter    Closed fracture of left ankle    Closed displaced fracture of fourth metatarsal bone of left foot     Past Medical History:   Diagnosis Date    Allergic lisinopril  2017    Anemia     Arrhythmia     Arthritis     Cancer     uterine    Cataract mild 2020    stil lpresent    Chicken pox     Chronic fatigue     CKD (chronic kidney disease), stage III     sees nephro    Clotting disorder     Dental root implant present     lower left  x1 - possible dental implant    Depression mild 2019    Difficulty walking 2019    Disease of thyroid gland     Dizzy     NIEVES (dyspnea on exertion)     2017     Fracture of hip     Generalized anxiety disorder     GERD (gastroesophageal reflux disease) 2018    History of brachytherapy 2023    vaginal brachytherapy    Hyperlipidemia     Hypertension     Iron deficiency anemia     Liver disease     fatty    Liver problem     Measles     Menopause     Mumps     Neuromuscular disorder Peripheral Neuropathy    Orthostatic hypotension     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    Renal insufficiency 2018    Scoliosis     Unintentional weight loss     Uses contact lenses     bilat    Uterine cancer     Uterine cancer 2022    UTI (urinary tract infection)     Visual impairment Nearsighted    Wears glasses      Past Surgical History:   Procedure Laterality Date     SECTION      DILATION AND CURETTAGE, DIAGNOSTIC / THERAPEUTIC      HIP OPEN REDUCTION Left 2023    Procedure: FEMORAL NECK OPEN REDUCTION INTERNAL FIXATION LEFT;  Surgeon: Juanpablo Lee MD;  Location:  [facility];  Service: Orthopedics;  Laterality: Left;    HIP SURGERY      OOPHORECTOMY      ORAL LESION EXCISION/BIOPSY  2021    TOTAL LAPAROSCOPIC HYSTERECTOMY SALPINGO OOPHORECTOMY N/A 10/28/2022    Procedure: TOTAL LAPAROSCOPIC HYSTERECTOMY BILATERAL SALPINGO-OOPHORECTOMY, INJECTION FOR SENTINEL LYMPH NODE MAPPING, BILATERAL SENTINEL LYMPH NODE DISSECTION WITH DAVINCI ROBOT;  Surgeon: Jasmine Rich MD;  Location:  [facility];  Service: Robotics - DaVinci;  Laterality: N/A;    TUBAL ABDOMINAL LIGATION        General Information       Row Name 10/17/24 1554          OT Time and Intention    Document Type therapy note (daily note) (P)   -     Mode of Treatment occupational therapy (P)   -       Row Name 10/17/24 1554          General Information    Patient Profile Reviewed yes (P)   -     Existing Precautions/Restrictions fall;left;non-weight bearing;other (see comments) (P)   LLE NWB + splint. Elevate LLE.  -     Barriers to Rehab previous  functional deficit (P)   -       Row Name 10/17/24 1554          Cognition    Orientation Status (Cognition) oriented x 4 (P)   -[facility] Name 10/17/24 1554          Safety Issues/Impairments Affecting Functional Mobility    Safety Issues Affecting Function (Mobility) awareness of need for assistance;insight into deficits/self-awareness;safety precaution awareness;safety precautions follow-through/compliance;positioning of assistive device;sequencing abilities (P)   -     Impairments Affecting Function (Mobility) balance;endurance/activity tolerance;strength;pain (P)   -               User Key  (r) = Recorded By, (t) = Taken By, (c) = Cosigned By      Initials Name Provider Type     Fozia Calhoun, OT Student OT Student                     Mobility/ADL's       [facility] Name 10/17/24 1555          Bed Mobility    Bed Mobility supine-sit (P)   -     Supine-Sit [place] (Bed Mobility) supervision (P)   -     Sit-Supine [place] (Bed Mobility) supervision (P)   -     Bed Mobility, Safety Issues decreased use of legs for bridging/pushing (P)   -     Assistive Device (Bed Mobility) bed rails;head of bed elevated (P)   -     Comment, (Bed Mobility) Pt perfomed bed mobility without breaking NWB precautions. (P)   -[facility] Name 10/17/24 1555          Transfers    Transfers sit-stand transfer;stand-sit transfer (P)   -[facility] Name 10/17/24 1555          Sit-Stand Transfer    Sit-Stand [place] (Transfers) contact guard;verbal cues;nonverbal cues (demo/gesture) (P)   -     Assistive Device (Sit-Stand Transfers) walker, knee scooter (P)   -[facility] Name 10/17/24 1555          Stand-Sit Transfer    Stand-Sit [place] (Transfers) verbal cues;contact guard;nonverbal cues (demo/gesture) (P)   -     Assistive Device (Stand-Sit Transfers) walker, knee scooter (P)   -[facility] Name 10/17/24 1555          Functional Mobility    Functional Mobility- Ind. Level contact guard  assist;1 person (P)   -     Functional Mobility- Device other (see comments) (P)   Knee scooter  -     Functional Mobility-Distance (Feet) -- (P)   HH distances  -     Functional Mobility-Maintain WBing able to maintain weight bearing status (P)   -     Patient was able to Ambulate yes (P)   -       Row Name 10/17/24 1555          Activities of Daily Living    BADL Assessment/Intervention bathing;grooming (P)   -       Row Name 10/17/24 1557          Mobility    Extremity Weight-bearing Status left lower extremity (P)   -     Left Lower Extremity (Weight-bearing Status) non weight-bearing (NWB) (P)   -       Row Name 10/17/24 1556          Grooming Assessment/Training    [place] Level (Grooming) wash face, hands;hair care, combing/brushing;oral care regimen;contact guard assist (P)   -     Position (Grooming) sink side (P)   -       Row Name 10/17/24 1554          Bathing Assessment/Intervention    [place] Level (Bathing) upper body;perineal area;contact guard assist (P)   -     Position (Bathing) sink side (P)   -               User Key  (r) = Recorded By, (t) = Taken By, (c) = Cosigned By      Initials Name Provider Type     Fozia Calhoun, OT Student OT Student                   Obj/Interventions       [facility] Name 10/17/24 1553          Balance    Balance Assessment sitting static balance;sitting dynamic balance;sit to stand dynamic balance;standing static balance;standing dynamic balance (P)   -     Static Sitting Balance standby assist (P)   -     Dynamic Sitting Balance standby assist (P)   -     Position, Sitting Balance unsupported;sitting edge of bed (P)   -     Sit to Stand Dynamic Balance contact guard;verbal cues (P)   -     Static Standing Balance contact guard (P)   -     Dynamic Standing Balance contact guard (P)   -     Position/Device Used, Standing Balance supported (P)   knee scooter  -     Balance Interventions sitting;standing;sit to  stand;supported;static;dynamic;occupation based/functional task (P)   -               User Key  (r) = Recorded By, (t) = Taken By, (c) = Cosigned By      Initials Name Provider Type     Fozia Calhoun, OT Student OT Student                   Goals/Plan    No documentation.                  Clinical Impression       Row Name 10/17/24 1559          Pain Assessment    Pretreatment Pain Rating 0/10 - no pain (P)   -     Posttreatment Pain Rating 5/10 (P)   -     Pain Location back (P)   -     Pain Side/Orientation lower (P)   -     Pain Management Interventions exercise or physical activity utilized (P)   -     Response to Pain Interventions activity participation with tolerable pain (P)   -     Pre/Posttreatment Pain Comment Tolerated (P)   -     Pain Intervention(s) Repositioned;Ambulation/increased activity (P)   -       Row Name 10/17/24 2464          Plan of Care Review    Plan of Care Reviewed With patient (P)   -     Progress improving (P)   -     Outcome Evaluation Pt continues to present with generalized weakness, decreased activity tolerance, and mild balance deficits impacting ADL independence. Pt tolerated sinkside grooming with CGA to steady this date. Continue IP OT to address current deficits. Rec IRF at d/c for best functional outcome. (P)   -       Row Name 10/17/24 2971          Therapy Plan Review/Discharge Plan (OT)    Anticipated Discharge Disposition (OT) inpatient rehabilitation facility (P)   -       Row Name 10/17/24 8676          Vital Signs    Pre Systolic BP Rehab 99 (P)   -BH     Pre Treatment Diastolic BP 55 (P)   -BH     O2 Delivery Pre Treatment room air (P)   -     O2 Delivery Intra Treatment room air (P)   -     O2 Delivery Post Treatment room air (P)   -     Pre Patient Position Supine (P)   -     Intra Patient Position Standing (P)   -     Post Patient Position Supine (P)   -       Row Name 10/17/24 9303          Positioning and Restraints     Pre-Treatment Position in bed (P)   -BH     Post Treatment Position bed (P)   -BH     In Bed supine;call light within reach;encouraged to call for assist;exit alarm on;LLE elevated (P)   -               User Key  (r) = Recorded By, (t) = Taken By, (c) = Cosigned By      Initials Name Provider Type     Fozia Calhoun, OT Student OT Student                   Outcome Measures       Row Name 10/17/24 1601          How much help from another is currently needed...    Putting on and taking off regular lower body clothing? 3 (P)   -BH     Bathing (including washing, rinsing, and drying) 3 (P)   -BH     Toileting (which includes using toilet bed pan or urinal) 3 (P)   -BH     Putting on and taking off regular upper body clothing 3 (P)   -BH     Taking care of personal grooming (such as brushing teeth) 3 (P)   -BH     Eating meals 4 (P)   -     AM-PAC 6 Clicks Score (OT) 19 (P)   -       Row Name 10/17/24 1547          How much help from another person do you currently need...    Turning from your back to your side while in flat bed without using bedrails? 4  -ND     Moving from lying on back to sitting on the side of a flat bed without bedrails? 3  -ND     Moving to and from a bed to a chair (including a wheelchair)? 3  -ND     Standing up from a chair using your arms (e.g., wheelchair, bedside chair)? 3  -ND     Climbing 3-5 steps with a railing? 2  -ND     To walk in hospital room? 3  -ND     AM-PAC 6 Clicks Score (PT) 18  -ND     Highest Level of Mobility Goal 6 --> Walk 10 steps or more  -ND       Row Name 10/17/24 1601 10/17/24 1547       Functional Assessment    Outcome Measure Options AM-PAC 6 Clicks Daily Activity (OT) (P)   - AM-PAC 6 Clicks Basic Mobility (PT)  -ND              User Key  (r) = Recorded By, (t) = Taken By, (c) = Cosigned By      Initials Name Provider Type    Thea Allen, PT Physical Therapist    Fozia Cormier, OT Student OT Student                    Occupational  Therapy Education       Title: PT OT SLP Therapies (Done)       Topic: Occupational Therapy (Done)       Point: ADL training (Done)       Description:   Instruct learner(s) on proper safety adaptation and remediation techniques during self care or transfers.   Instruct in proper use of assistive devices.                  Learning Progress Summary            Patient Acceptance, E, VU by  at 10/17/2024 1601    Acceptance, E,D, NR by ANTONINA at 10/16/2024 1358    Acceptance, E, VU by AN at 10/14/2024 0955    Eager, E,TB,D, VU,NR by AR at 10/10/2024 1647    Eager, E,TB,D,H, VU,NR by AR at 10/9/2024 1145                      Point: Home exercise program (Done)       Description:   Instruct learner(s) on appropriate technique for monitoring, assisting and/or progressing therapeutic exercises/activities.                  Learning Progress Summary            Patient Acceptance, E, VU by  at 10/17/2024 1601    Eager, E,TB,D, VU,NR by AR at 10/10/2024 1647    Eager, E,TB,D,H, VU,NR by AR at 10/9/2024 1145                      Point: Precautions (Done)       Description:   Instruct learner(s) on prescribed precautions during self-care and functional transfers.                  Learning Progress Summary            Patient Acceptance, E, VU by  at 10/17/2024 1601    Acceptance, E,D, NR by ANTONINA at 10/16/2024 1358    Acceptance, E, VU by AN at 10/14/2024 0955    Eager, E,TB,D, VU,NR by AR at 10/10/2024 1647    Eager, E,TB,D,H, VU,NR by AR at 10/9/2024 1145                      Point: Body mechanics (Done)       Description:   Instruct learner(s) on proper positioning and spine alignment during self-care, functional mobility activities and/or exercises.                  Learning Progress Summary            Patient Acceptance, E, VU by  at 10/17/2024 1601    Acceptance, E,D, NR by ANTONINA at 10/16/2024 1358    Acceptance, E, VU by AN at 10/14/2024 0955    Eager, E,TB,D, VU,NR by AR at 10/10/2024 1647    Eager, E,TB,D,H, VU,NR by AR at  10/9/2024 1145                                      User Key       Initials Effective Dates Name Provider Type Discipline    AR 07/11/23 -  Bernie Henriquez OT Occupational Therapist OT    JFAIZAN 06/16/21 -  Anjelica Parra OT Occupational Therapist OT    DUAY 09/21/21 -  Keisha Oates OT Occupational Therapist OT     08/08/24 -  Fozia Calhoun OT Student OT Student OT                  OT Recommendation and Plan     Plan of Care Review  Plan of Care Reviewed With: (P) patient  Progress: (P) improving  Outcome Evaluation: (P) Pt continues to present with generalized weakness, decreased activity tolerance, and mild balance deficits impacting ADL independence. Pt tolerated sinkside grooming with CGA to steady this date. Continue IP OT to address current deficits. Rec IRF at d/c for best functional outcome.     Time Calculation:         Time Calculation- OT       Row Name 10/17/24 1602             Time Calculation- OT    OT Start Time 1514 (P)   -         Timed Charges    92758 - OT Therapeutic Activity Minutes 10 (P)   -      00871 - OT Self Care/Mgmt Minutes 15 (P)   -         Total Minutes    Timed Charges Total Minutes 25 (P)   -       Total Minutes 25 (P)   -                User Key  (r) = Recorded By, (t) = Taken By, (c) = Cosigned By      Initials Name Provider Type     Fozia Calhoun, OT Student OT Student                  Therapy Charges for Today       Code Description Service Date Service Provider Modifiers Qty    45601623849 HC OT THERAPEUTIC ACT EA 15 MIN 10/17/2024 Fozia Calhoun OT Student GO 1    32677494545 HC OT SELF CARE/MGMT/TRAIN EA 15 MIN 10/17/2024 Fozia Calhoun OT Student GO 1                 CALVIN Ledesma  10/17/2024

## 2024-10-17 NOTE — PLAN OF CARE
Problem: Adult Inpatient Plan of Care  Goal: Plan of Care Review  Outcome: Progressing  Goal: Patient-Specific Goal (Individualized)  Outcome: Progressing  Goal: Absence of Hospital-Acquired Illness or Injury  Outcome: Progressing  Intervention: Identify and Manage Fall Risk  Recent Flowsheet Documentation  Taken 10/17/2024 0612 by Robinson Rosa RN  Safety Promotion/Fall Prevention:   activity supervised   assistive device/personal items within reach   clutter free environment maintained   fall prevention program maintained   gait belt   mobility aid in reach   nonskid shoes/slippers when out of bed   room organization consistent   safety round/check completed  Taken 10/17/2024 0409 by Robinson Rosa RN  Safety Promotion/Fall Prevention:   activity supervised   assistive device/personal items within reach   clutter free environment maintained   fall prevention program maintained   gait belt   mobility aid in reach   nonskid shoes/slippers when out of bed   room organization consistent   safety round/check completed  Taken 10/17/2024 0158 by Robinson Rosa RN  Safety Promotion/Fall Prevention:   activity supervised   assistive device/personal items within reach   clutter free environment maintained   fall prevention program maintained   gait belt   lighting adjusted   mobility aid in reach   nonskid shoes/slippers when out of bed   room organization consistent   safety round/check completed  Taken 10/17/2024 0005 by Robinson Rosa RN  Safety Promotion/Fall Prevention:   activity supervised   assistive device/personal items within reach   clutter free environment maintained   fall prevention program maintained   gait belt   mobility aid in reach   nonskid shoes/slippers when out of bed   room organization consistent   safety round/check completed  Taken 10/16/2024 2158 by Robinson Rosa RN  Safety Promotion/Fall Prevention:   activity supervised   assistive device/personal items within reach   clutter free  environment maintained   fall prevention program maintained   gait belt   mobility aid in reach   nonskid shoes/slippers when out of bed   room organization consistent   safety round/check completed  Taken 10/16/2024 1946 by Robinson Rosa RN  Safety Promotion/Fall Prevention:   activity supervised   assistive device/personal items within reach   clutter free environment maintained   fall prevention program maintained   gait belt   mobility aid in reach   nonskid shoes/slippers when out of bed   room organization consistent   safety round/check completed  Intervention: Prevent Skin Injury  Recent Flowsheet Documentation  Taken 10/17/2024 0612 by Robinson Rosa RN  Body Position: position changed independently  Taken 10/17/2024 0409 by Robinson Rosa RN  Body Position: position changed independently  Skin Protection: incontinence pads utilized  Taken 10/17/2024 0158 by Robinson Rosa RN  Body Position: position changed independently  Taken 10/17/2024 0005 by Robinson Rosa RN  Body Position: position changed independently  Skin Protection: incontinence pads utilized  Taken 10/16/2024 2158 by Robinson Rosa RN  Body Position: position changed independently  Taken 10/16/2024 1946 by Robinson Rosa RN  Body Position: position changed independently  Skin Protection: incontinence pads utilized  Intervention: Prevent and Manage VTE (Venous Thromboembolism) Risk  Recent Flowsheet Documentation  Taken 10/17/2024 0612 by Robinson Rosa RN  VTE Prevention/Management: (sq heparin) other (see comments)  Taken 10/17/2024 0409 by Robinson Rosa RN  VTE Prevention/Management: (sq heparin) other (see comments)  Taken 10/17/2024 0158 by Robinson Rsoa RN  VTE Prevention/Management: (heparin) other (see comments)  Taken 10/17/2024 0005 by Robinson Rosa RN  VTE Prevention/Management: (sq heparin) other (see comments)  Taken 10/16/2024 2158 by Robinson Rosa RN  VTE Prevention/Management:   right   SCDs (sequential  compression devices) on  Taken 10/16/2024 1946 by Robinson Rosa RN  VTE Prevention/Management:   right   SCDs (sequential compression devices) on  Intervention: Prevent Infection  Recent Flowsheet Documentation  Taken 10/17/2024 0612 by Robinson Rosa RN  Infection Prevention:   environmental surveillance performed   rest/sleep promoted   single patient room provided  Taken 10/17/2024 0409 by Robinson Rosa RN  Infection Prevention:   rest/sleep promoted   single patient room provided  Taken 10/17/2024 0158 by Robinson Rosa RN  Infection Prevention:   environmental surveillance performed   rest/sleep promoted   single patient room provided  Taken 10/17/2024 0005 by Robinson Rosa RN  Infection Prevention:   rest/sleep promoted   single patient room provided  Taken 10/16/2024 2158 by Robinson Rosa RN  Infection Prevention:   environmental surveillance performed   rest/sleep promoted   single patient room provided  Taken 10/16/2024 1946 by Robinson Rosa RN  Infection Prevention:   environmental surveillance performed   rest/sleep promoted   single patient room provided  Goal: Optimal Comfort and Wellbeing  Outcome: Progressing  Intervention: Monitor Pain and Promote Comfort  Recent Flowsheet Documentation  Taken 10/17/2024 0612 by Robinson Rosa RN  Pain Management Interventions:   no interventions per patient request   quiet environment facilitated  Taken 10/17/2024 0409 by Robinson Rosa RN  Pain Management Interventions: quiet environment facilitated  Taken 10/16/2024 2158 by Robinson Rosa RN  Pain Management Interventions: pain medication given  Taken 10/16/2024 1946 by Robinson Rosa RN  Pain Management Interventions:   no interventions per patient request   quiet environment facilitated  Intervention: Provide Person-Centered Care  Recent Flowsheet Documentation  Taken 10/17/2024 0612 by Robinson Rosa RN  Trust Relationship/Rapport: care explained  Taken 10/17/2024 0409 by Robinson Rosa  RN  Trust Relationship/Rapport: care explained  Taken 10/16/2024 1946 by Robinson Rosa RN  Trust Relationship/Rapport:   care explained   questions answered   questions encouraged  Goal: Readiness for Transition of Care  Outcome: Progressing     Problem: Pain Chronic (Persistent) (Comorbidity Management)  Goal: Acceptable Pain Control and Functional Ability  Outcome: Progressing  Intervention: Manage Persistent Pain  Recent Flowsheet Documentation  Taken 10/16/2024 1946 by Robinson Rosa RN  Medication Review/Management: medications reviewed  Intervention: Develop Pain Management Plan  Recent Flowsheet Documentation  Taken 10/17/2024 0612 by Robinson Rosa RN  Pain Management Interventions:   no interventions per patient request   quiet environment facilitated  Taken 10/17/2024 0409 by Robinson Rosa RN  Pain Management Interventions: quiet environment facilitated  Taken 10/16/2024 2158 by Robinson Rosa RN  Pain Management Interventions: pain medication given  Taken 10/16/2024 1946 by Robinson Rosa RN  Pain Management Interventions:   no interventions per patient request   quiet environment facilitated  Intervention: Optimize Psychosocial Wellbeing  Recent Flowsheet Documentation  Taken 10/17/2024 0612 by Robinson Rosa RN  Family/Support System Care:   self-care encouraged   support provided  Taken 10/17/2024 0409 by Robinson Rosa RN  Family/Support System Care: self-care encouraged  Taken 10/16/2024 1946 by Robinson Rosa RN  Diversional Activities:   television   smartphone  Family/Support System Care:   self-care encouraged   support provided     Problem: Skin Injury Risk Increased  Goal: Skin Health and Integrity  Outcome: Progressing  Intervention: Optimize Skin Protection  Recent Flowsheet Documentation  Taken 10/17/2024 0612 by Robinson Rosa RN  Activity Management: activity encouraged  Head of Bed (HOB) Positioning: HOB at 30-45 degrees  Taken 10/17/2024 0409 by Robinson Rosa RN  Activity  Management: activity encouraged  Pressure Reduction Techniques: frequent weight shift encouraged  Head of Bed (HOB) Positioning: HOB at 20-30 degrees  Pressure Reduction Devices: pressure-redistributing mattress utilized  Skin Protection: incontinence pads utilized  Taken 10/17/2024 0158 by Robinson Rosa RN  Head of Bed (HOB) Positioning: HOB at 20-30 degrees  Taken 10/17/2024 0005 by Robinson Rosa RN  Activity Management: activity encouraged  Pressure Reduction Techniques: frequent weight shift encouraged  Head of Bed (HOB) Positioning: HOB at 20-30 degrees  Pressure Reduction Devices:   pressure-redistributing mattress utilized   heel offloading device utilized  Skin Protection: incontinence pads utilized  Taken 10/16/2024 2319 by Robinson Rosa RN  Activity Management:   ambulated in room   up to bedside commode  Taken 10/16/2024 2158 by Robinson Rosa RN  Activity Management: activity encouraged  Head of Bed (HOB) Positioning: HOB at 20-30 degrees  Taken 10/16/2024 1946 by Robinson Rosa RN  Activity Management: activity encouraged  Pressure Reduction Techniques: frequent weight shift encouraged  Head of Bed (HOB) Positioning: HOB at 30-45 degrees  Pressure Reduction Devices:   pressure-redistributing mattress utilized   heel offloading device utilized  Skin Protection: incontinence pads utilized     Problem: Fall Injury Risk  Goal: Absence of Fall and Fall-Related Injury  Outcome: Progressing  Intervention: Identify and Manage Contributors  Recent Flowsheet Documentation  Taken 10/17/2024 0612 by Robinson Rosa RN  Self-Care Promotion: independence encouraged  Taken 10/17/2024 0409 by Robinson Rosa RN  Self-Care Promotion: independence encouraged  Taken 10/16/2024 2158 by Robinson Rosa RN  Self-Care Promotion: independence encouraged  Taken 10/16/2024 1946 by Robinson Rosa RN  Medication Review/Management: medications reviewed  Self-Care Promotion: independence encouraged  Intervention: Promote  Injury-Free Environment  Recent Flowsheet Documentation  Taken 10/17/2024 0612 by Robinson Rosa RN  Safety Promotion/Fall Prevention:   activity supervised   assistive device/personal items within reach   clutter free environment maintained   fall prevention program maintained   gait belt   mobility aid in reach   nonskid shoes/slippers when out of bed   room organization consistent   safety round/check completed  Taken 10/17/2024 0409 by Robinson Rosa RN  Safety Promotion/Fall Prevention:   activity supervised   assistive device/personal items within reach   clutter free environment maintained   fall prevention program maintained   gait belt   mobility aid in reach   nonskid shoes/slippers when out of bed   room organization consistent   safety round/check completed  Taken 10/17/2024 0158 by Robinson Rosa RN  Safety Promotion/Fall Prevention:   activity supervised   assistive device/personal items within reach   clutter free environment maintained   fall prevention program maintained   gait belt   lighting adjusted   mobility aid in reach   nonskid shoes/slippers when out of bed   room organization consistent   safety round/check completed  Taken 10/17/2024 0005 by Robinson Rosa RN  Safety Promotion/Fall Prevention:   activity supervised   assistive device/personal items within reach   clutter free environment maintained   fall prevention program maintained   gait belt   mobility aid in reach   nonskid shoes/slippers when out of bed   room organization consistent   safety round/check completed  Taken 10/16/2024 2158 by Robinson Rosa RN  Safety Promotion/Fall Prevention:   activity supervised   assistive device/personal items within reach   clutter free environment maintained   fall prevention program maintained   gait belt   mobility aid in reach   nonskid shoes/slippers when out of bed   room organization consistent   safety round/check completed  Taken 10/16/2024 1946 by oRbinson Rosa RN  Safety  Promotion/Fall Prevention:   activity supervised   assistive device/personal items within reach   clutter free environment maintained   fall prevention program maintained   gait belt   mobility aid in reach   nonskid shoes/slippers when out of bed   room organization consistent   safety round/check completed     Problem: Bleeding (Orthopaedic Fracture)  Goal: Absence of Bleeding  Outcome: Progressing     Problem: Embolism (Orthopaedic Fracture)  Goal: Absence of Embolism Signs and Symptoms  Outcome: Progressing  Intervention: Prevent or Manage Embolism Risk  Recent Flowsheet Documentation  Taken 10/17/2024 0612 by Robinson Rosa RN  VTE Prevention/Management: (sq heparin) other (see comments)  Taken 10/17/2024 0409 by Robinson Rosa RN  VTE Prevention/Management: (sq heparin) other (see comments)  Taken 10/17/2024 0158 by Robinson Rosa RN  VTE Prevention/Management: (heparin) other (see comments)  Taken 10/17/2024 0005 by Robinson Rosa RN  VTE Prevention/Management: (sq heparin) other (see comments)  Taken 10/16/2024 2158 by Robinson Rosa RN  VTE Prevention/Management:   right   SCDs (sequential compression devices) on  Taken 10/16/2024 1946 by Robinson Rsoa RN  VTE Prevention/Management:   right   SCDs (sequential compression devices) on     Problem: Fracture Stabilization and Management (Orthopaedic Fracture)  Goal: Fracture Stability  Outcome: Progressing     Problem: Functional Ability Impaired (Orthopaedic Fracture)  Goal: Optimal Functional Ability  Outcome: Progressing  Intervention: Optimize Functional Ability  Recent Flowsheet Documentation  Taken 10/17/2024 0612 by Robinson Rosa RN  Activity Management: activity encouraged  Positioning/Transfer Devices:   pillows   in use   wedge  Self-Care Promotion: independence encouraged  Taken 10/17/2024 0409 by Robinson Rosa RN  Activity Management: activity encouraged  Positioning/Transfer Devices:   pillows   in use  Self-Care Promotion: independence  encouraged  Taken 10/17/2024 0158 by Robinson Rosa RN  Positioning/Transfer Devices:   pillows   in use  Taken 10/17/2024 0005 by Robinson Rosa RN  Activity Management: activity encouraged  Positioning/Transfer Devices:   pillows   in use  Taken 10/16/2024 2319 by Robinson Rosa RN  Activity Management:   ambulated in room   up to bedside commode  Taken 10/16/2024 2158 by Robinson Rosa RN  Activity Management: activity encouraged  Positioning/Transfer Devices:   pillows   in use  Self-Care Promotion: independence encouraged  Taken 10/16/2024 1946 by Robinson Rosa RN  Activity Management: activity encouraged  Positioning/Transfer Devices:   pillows   in use  Self-Care Promotion: independence encouraged  Range of Motion: active ROM (range of motion) encouraged     Problem: Infection (Orthopaedic Fracture)  Goal: Absence of Infection Signs and Symptoms  Outcome: Progressing     Problem: Neurovascular Compromise (Orthopaedic Fracture)  Goal: Effective Tissue Perfusion  Outcome: Progressing     Problem: Pain (Orthopaedic Fracture)  Goal: Acceptable Pain Control  Outcome: Progressing  Intervention: Manage Acute Orthopaedic-Related Pain  Recent Flowsheet Documentation  Taken 10/17/2024 0612 by Robinson Rosa RN  Pain Management Interventions:   no interventions per patient request   quiet environment facilitated  Taken 10/17/2024 0409 by Robinson Rosa RN  Pain Management Interventions: quiet environment facilitated  Taken 10/16/2024 2158 by Robinson Rosa RN  Pain Management Interventions: pain medication given  Taken 10/16/2024 1946 by Robinson Rosa RN  Pain Management Interventions:   no interventions per patient request   quiet environment facilitated     Problem: Respiratory Compromise (Orthopaedic Fracture)  Goal: Effective Oxygenation and Ventilation  Outcome: Progressing  Intervention: Promote Airway Secretion Clearance  Recent Flowsheet Documentation  Taken 10/17/2024 0612 by Robinson Rosa  RN  Activity Management: activity encouraged  Cough And Deep Breathing: done independently per patient  Taken 10/17/2024 0409 by Robinson Rosa RN  Activity Management: activity encouraged  Taken 10/17/2024 0005 by Robinson Rosa RN  Activity Management: activity encouraged  Taken 10/16/2024 2319 by Robinson Rosa RN  Activity Management:   ambulated in room   up to bedside commode  Taken 10/16/2024 2158 by Robinson Rosa RN  Activity Management: activity encouraged  Taken 10/16/2024 1946 by Robinson Rosa RN  Activity Management: activity encouraged  Intervention: Optimize Oxygenation and Ventilation  Recent Flowsheet Documentation  Taken 10/17/2024 0612 by Robinson Rosa RN  Head of Bed (HOB) Positioning: HOB at 30-45 degrees  Taken 10/17/2024 0409 by Robinson Rosa RN  Head of Bed (HOB) Positioning: HOB at 20-30 degrees  Taken 10/17/2024 0158 by Robinson Rosa RN  Head of Bed (HOB) Positioning: HOB at 20-30 degrees  Taken 10/17/2024 0005 by Robinson Rosa RN  Head of Bed (HOB) Positioning: HOB at 20-30 degrees  Taken 10/16/2024 2158 by Robinson Rosa RN  Head of Bed (HOB) Positioning: HOB at 20-30 degrees  Taken 10/16/2024 1946 by Robinson Rosa RN  Head of Bed (HOB) Positioning: HOB at 30-45 degrees   Goal Outcome Evaluation:         Pt A&OX4. VSS. RA. Affected extremity elevated. Pain controlled well with PRN pain medication. Awaiting anticipated surgery on Friday. NWB to LLE. Pivoting to BSC with 1 assist and gait belt.

## 2024-10-17 NOTE — PROGRESS NOTES
[facility] Medicine Services  PROGRESS NOTE    Patient Name: Alysia Sierra  : 1955  MRN: [id number]    Date of Admission: 10/5/2024  Primary Care Physician: Cassy Foster MD    Subjective   Subjective     CC:  Ankle pain    HPI:  Resting comfortably but awakens to voice.  Waiting for OR tomorrow      Objective   Objective     Vital Signs:   Temp:  [98.2 °F (36.8 °C)-99.4 °F (37.4 °C)] 99.4 °F (37.4 °C)  Heart Rate:  [69-73] 73  Resp:  [18] 18  BP: (105-119)/(59-70) 111/70     Physical Exam:  Constitutional: No acute distress, awake, alert  HENT: NCAT, mucous membranes moist  Respiratory: Respiratory effort normal   Gastrointestinal: Soft, nontender, nondistended  Musculoskeletal: Left foot wrapped, elevated  Psychiatric: Appropriate affect, cooperative  Neurologic: Oriented x 3, speech clear  Skin: No rashes      Results Reviewed:  LAB RESULTS:          Lab 10/11/24  0949   SODIUM 141   POTASSIUM 4.2   CHLORIDE 105   CO2 25.0   ANION GAP 11.0   BUN 12   CREATININE 1.46*   EGFR 39.0*   GLUCOSE 108*   CALCIUM 8.7                         Brief Urine Lab Results  (Last result in the past 365 days)        Color   Clarity   Blood   Leuk Est   Nitrite   Protein   CREAT   Urine HCG        24 1604 Yellow   Clear   Negative   Negative   Negative   Negative                   Microbiology Results Abnormal       None            No radiology results from the last 24 hrs    Results for orders placed during the hospital encounter of 24    Adult Transthoracic Echo Complete W/ Cont if Necessary Per Protocol    Interpretation Summary    Left ventricular systolic function is normal. Calculated left ventricular EF = 57.9% Left ventricular ejection fraction appears to be 56 - 60%.    Left ventricular diastolic function was normal.    Estimated right ventricular systolic pressure from tricuspid regurgitation is normal (<35 mmHg).    No significant change from previous study      Current  medications:  Scheduled Meds:aspirin, 81 mg, Oral, Daily  DULoxetine, 20 mg, Oral, BID  folic acid, 1 mg, Oral, Daily  gabapentin, 300 mg, Oral, Daily  gabapentin, 600 mg, Oral, Nightly  heparin (porcine), 5,000 Units, Subcutaneous, Q8H  levothyroxine, 25 mcg, Oral, Q AM  magnesium oxide, 400 mg, Oral, Daily  melatonin, 5 mg, Oral, Nightly  metoprolol succinate XL, 25 mg, Oral, Nightly  pantoprazole, 40 mg, Oral, Every Other Day  rosuvastatin, 5 mg, Oral, Daily  sodium chloride, 10 mL, Intravenous, Q12H  thiamine, 200 mg, Oral, Daily  vitamin B-12, 1,000 mcg, Oral, Daily      Continuous Infusions:   PRN Meds:.  senna-docusate sodium **AND** polyethylene glycol **AND** bisacodyl **AND** bisacodyl    Calcium Replacement - Follow Nurse / BPA Driven Protocol    docosanol    HYDROmorphone **AND** naloxone    influenza vaccine    Magnesium Standard Dose Replacement - Follow Nurse / BPA Driven Protocol    oxyCODONE    Phosphorus Replacement - Follow Nurse / BPA Driven Protocol    Potassium Replacement - Follow Nurse / BPA Driven Protocol    [COMPLETED] Insert Peripheral IV **AND** sodium chloride    sodium chloride    Assessment & Plan   Assessment & Plan     Active Hospital Problems    Diagnosis  POA    **Closed fracture of left ankle [S82.892A]  Unknown    Closed displaced fracture of fourth metatarsal bone of left foot [S92.342A]  Yes    Lisfranc dislocation, left, initial encounter [G50.325A]  Yes      Resolved Hospital Problems   No resolved problems to display.        Brief Hospital Course to date:  Alysia Sierra is a 69 y.o. female with history of CKD III, chronic anemia, uterine cancer, hypothyroidism and alcohol abuse who was admitted to [facility] on 10/5/24 for L ankle fracture after stepping awkwardly off her bed.    This patient's problems and plans were partially entered by my partner and updated as appropriate by me 10/17/24.       Left foot fracture with involvement of 1st, 2nd, 3rd and 4th  metatarsal bases  Acute nondisplaced L medial malleolus fracture  Acute nondisplaced L posterior medial talus fracture   1st-4th proximal metatarsal fracture Lisfranc fracture  - LLE splinted. Fracture blister on dorsal aspect of L foot noted - per ortho allow blisters to decompress on their own  - PRN pain management  - Continue PT and OT   - Plan for surgery Friday 10/18 then discharge to rehab     Alcohol withdrawal, resolved  History of alcohol abuse   - Patient denies daily alcohol use, however family reports daily heavy alcohol use  - Developed confusion / hallucinations on afternoon of 10/8 and started on CIWA protocol  - Withdrawal symptoms resolved. CIWA protocol discontinued.  - Continue PO thiamine, folic acid  - Continue QHS Melatonin     CKD III  - recheck renal function in am     Chronic anemia  Iron deficiency  - Iron studies show ACD with iron deficiency  - Status post IV iron  - B12, folate within normal limits     Hypothyroidism   - Continue Levothyroxine    GERD   - Continue PPI    Neuropathy   - Continue Gabapentin    Expected Discharge Location and Transportation: [facility]  Expected Discharge   Expected Discharge Date: 10/20/2024; Expected Discharge Time:      VTE Prophylaxis:  Pharmacologic & mechanical VTE prophylaxis orders are present.         AM-PAC 6 Clicks Score (PT): 17 (10/16/24 1946)    CODE STATUS:   Code Status and Medical Interventions: CPR (Attempt to Resuscitate); Full Support   Ordered at: 10/06/24 0246     Code Status (Patient has no pulse and is not breathing):    CPR (Attempt to Resuscitate)     Medical Interventions (Patient has pulse or is breathing):    Full Support       Shaylee Main, DO  10/17/24

## 2024-10-17 NOTE — PLAN OF CARE
Goal Outcome Evaluation:  Plan of Care Reviewed With: (P) patient        Progress: (P) improving  Outcome Evaluation: (P) Pt continues to present with generalized weakness, decreased activity tolerance, and mild balance deficits impacting ADL independence. Pt tolerated sinkside grooming with CGA to steady this date. Continue IP OT to address current deficits. Rec IRF at d/c for best functional outcome.    Anticipated Discharge Disposition (OT): (P) inpatient rehabilitation facility

## 2024-10-17 NOTE — THERAPY TREATMENT NOTE
Patient Name: Alysia Sierra  : 1955    MRN: [id number]                              Today's Date: 10/17/2024       Admit Date: 10/5/2024    Visit Dx:     ICD-10-CM ICD-9-CM   1. Displaced fracture of distal end of left tibia  S82.302A 824.8   2. Closed nondisplaced fracture of second metatarsal bone of left foot, initial encounter  S92.325A 825.25   3. Closed nondisplaced fracture of first metatarsal bone of left foot, initial encounter  S92.315A 825.25   4. Closed nondisplaced fracture of third metatarsal bone of left foot, initial encounter  S92.335A 825.25   5. Closed nondisplaced fracture of fourth metatarsal bone of left foot, initial encounter  S92.345A 825.25     Patient Active Problem List   Diagnosis    Hypertension    Leg weakness, bilateral    Syncope    CKD (chronic kidney disease) stage 3, GFR 30-59 ml/min    Neuropathy    Hyperlipidemia LDL goal <100    Endometrial adenocarcinoma    Mild episode of recurrent major depressive disorder    Dizziness    Hip fracture    Anemia    Hypomagnesemia    Hypothyroidism    Post-menopausal    Other osteoporosis without current pathological fracture    Closed fracture of second lumbar vertebra    Alcoholic ketoacidosis    Leukocytosis    GERD with esophagitis    Sepsis    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    Lisfranc dislocation, left, initial encounter    Closed fracture of left ankle    Closed displaced fracture of fourth metatarsal bone of left foot     Past Medical History:   Diagnosis Date    Allergic lisinopril  2017    Anemia     Arrhythmia     Arthritis     Cancer     uterine    Cataract mild 2020    stil lpresent    Chicken pox     Chronic fatigue     CKD (chronic kidney disease), stage III     sees nephro    Clotting disorder     Dental root implant present     lower left  x1 - possible dental implant    Depression mild 2019    Difficulty walking 2019    Disease of thyroid gland     Dizzy     NIEVES (dyspnea on exertion)     2017     Fracture of hip     Generalized anxiety disorder     GERD (gastroesophageal reflux disease) 2018    History of brachytherapy 2023    vaginal brachytherapy    Hyperlipidemia     Hypertension     Iron deficiency anemia     Liver disease     fatty    Liver problem     Measles     Menopause     Mumps     Neuromuscular disorder Peripheral Neuropathy    Orthostatic hypotension     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    Renal insufficiency 2018    Scoliosis     Unintentional weight loss     Uses contact lenses     bilat    Uterine cancer     Uterine cancer 2022    UTI (urinary tract infection)     Visual impairment Nearsighted    Wears glasses      Past Surgical History:   Procedure Laterality Date     SECTION      DILATION AND CURETTAGE, DIAGNOSTIC / THERAPEUTIC      HIP OPEN REDUCTION Left 2023    Procedure: FEMORAL NECK OPEN REDUCTION INTERNAL FIXATION LEFT;  Surgeon: Juanpablo Lee MD;  Location: [facility] OR;  Service: Orthopedics;  Laterality: Left;    HIP SURGERY      OOPHORECTOMY      ORAL LESION EXCISION/BIOPSY  2021    TOTAL LAPAROSCOPIC HYSTERECTOMY SALPINGO OOPHORECTOMY N/A 10/28/2022    Procedure: TOTAL LAPAROSCOPIC HYSTERECTOMY BILATERAL SALPINGO-OOPHORECTOMY, INJECTION FOR SENTINEL LYMPH NODE MAPPING, BILATERAL SENTINEL LYMPH NODE DISSECTION WITH DAVINCI ROBOT;  Surgeon: Jasmine Rich MD;  Location: [facility] OR;  Service: Robotics - DaVinci;  Laterality: N/A;    TUBAL ABDOMINAL LIGATION        General Information       Row Name 10/17/24 1527          Physical Therapy Time and Intention    Document Type therapy note (daily note)  -ND     Mode of Treatment physical therapy  -ND       Row Name 10/17/24 1527          General Information    Patient Profile Reviewed yes  -ND     Existing Precautions/Restrictions fall;left;non-weight bearing;other (see comments)  LLE NWB + splint. Elevate LLE.  -ND     Barriers to Rehab previous functional  deficit  -ND       Row Name 10/17/24 1527          Cognition    Orientation Status (Cognition) oriented x 4  -ND       Row Name 10/17/24 1527          Safety Issues/Impairments Affecting Functional Mobility    Safety Issues Affecting Function (Mobility) awareness of need for assistance;insight into deficits/self-awareness;positioning of assistive device;safety precaution awareness;safety precautions follow-through/compliance;sequencing abilities  -ND     Impairments Affecting Function (Mobility) balance;endurance/activity tolerance;strength;pain  -ND               User Key  (r) = Recorded By, (t) = Taken By, (c) = Cosigned By      Initials Name Provider Type    ND Thea Gunter, PT Physical Therapist                   Mobility       Row Name 10/17/24 1528          Bed Mobility    Bed Mobility supine-sit  -ND     Supine-Sit [place] (Bed Mobility) supervision  -ND     Assistive Device (Bed Mobility) bed rails;head of bed elevated  -ND     Comment, (Bed Mobility) Performs without physical assist. Pt demo's appropriate recall of NWB precations t/o session.  -ND       Row Name 10/17/24 1528          Transfers    Comment, (Transfers) Pt does not have shoes at the hospital and would likely perform better with shoe for RLE.   -ND       Row Name 10/17/24 1528          Bed-Chair Transfer    Bed-Chair [place] (Transfers) minimum assist (75% patient effort);1 person assist;verbal cues;nonverbal cues (demo/gesture)  -ND     Assistive Device (Bed-Chair Transfers) walker, knee scooter  -ND     Comment, (Bed-Chair Transfer) Worked on transfers from knee scooter>chair and appropriate sequencing.  -ND       Row Name 10/17/24 1528          Sit-Stand Transfer    Sit-Stand [place] (Transfers) contact guard;verbal cues;nonverbal cues (demo/gesture)  -ND     Assistive Device (Sit-Stand Transfers) walker, knee scooter  -ND     Comment, (Sit-Stand Transfer) Requires reminders for appropriate set up of knee scooter and  for locking brakes.  -ND       Row Name 10/17/24 1528          Gait/Stairs (Locomotion)    [place] (Gait) contact guard;verbal cues;nonverbal cues (demo/gesture)  -ND     Assistive Device (Gait) walker, knee scooter  -ND     Distance in Feet (Gait) 200  -ND     Deviations/Abnormal Patterns (Gait) stride length decreased  -ND     Bilateral Gait Deviations forward flexed posture  -ND     Comment, (Gait/Stairs) Pt mobilizes via knee scooter with CGA and chair follow for safety. Pt is limited by pain and fatigue.  -ND       Row Name 10/17/24 1528          Mobility    Extremity Weight-bearing Status left lower extremity  -ND     Left Lower Extremity (Weight-bearing Status) non weight-bearing (NWB)  -ND               User Key  (r) = Recorded By, (t) = Taken By, (c) = Cosigned By      Initials Name Provider Type    ND Thea Gunter PT Physical Therapist                   Obj/Interventions       Row Name 10/17/24 1538          Motor Skills    Therapeutic Exercise hip;knee  -ND       Row Name 10/17/24 1538          Hip (Therapeutic Exercise)    Hip (Therapeutic Exercise) strengthening exercise  -ND     Hip Strengthening (Therapeutic Exercise) bilateral;marching while seated;aBduction;aDduction;10 repetitions  -ND       Row Name 10/17/24 1538          Knee (Therapeutic Exercise)    Knee (Therapeutic Exercise) strengthening exercise  -ND     Knee Strengthening (Therapeutic Exercise) bilateral;LAQ (long arc quad);SLR (straight leg raise);10 repetitions  -ND       Row Name 10/17/24 1538          Balance    Balance Assessment sitting static balance;sitting dynamic balance;standing static balance;standing dynamic balance  -ND     Static Sitting Balance standby assist  -ND     Dynamic Sitting Balance standby assist  -ND     Position, Sitting Balance unsupported;sitting edge of bed  -ND     Static Standing Balance contact guard  -ND     Dynamic Standing Balance minimal assist  -ND     Position/Device Used, Standing  Balance supported;other (see comments)  knee scooter  -ND     Balance Interventions sitting;standing;sit to stand;supported;static;dynamic  -ND               User Key  (r) = Recorded By, (t) = Taken By, (c) = Cosigned By      Initials Name Provider Type    Thea Allen PT Physical Therapist                   Goals/Plan    No documentation.                  Clinical Impression       Row Name 10/17/24 1543          Pain    Pretreatment Pain Rating 0/10 - no pain  -ND     Posttreatment Pain Rating 0/10 - no pain  -ND       Row Name 10/17/24 1543          Plan of Care Review    Plan of Care Reviewed With patient  -ND     Progress improving  -ND     Outcome Evaluation Pt mobilizes with knee scooter and chair follow for safety but is overall limited by pain and endurance. Pt would benefit from continued skilled therapy to address deficits and promote return to baseline. Recommend IRF following d/c for best functional outcome.  -ND       Row Name 10/17/24 1543          Vital Signs    Pre Systolic BP Rehab 111  -ND     Pre Treatment Diastolic BP 70  -ND     Post Systolic BP Rehab 124  -ND     Post Treatment Diastolic BP 72  -ND     O2 Delivery Pre Treatment room air  -ND     O2 Delivery Intra Treatment room air  -ND     O2 Delivery Post Treatment room air  -ND     Pre Patient Position Supine  -ND     Intra Patient Position Standing  -ND     Post Patient Position Sitting  -ND       Row Name 10/17/24 1543          Positioning and Restraints    Pre-Treatment Position in bed  -ND     Post Treatment Position chair  -ND     In Chair notified nsg;reclined;legs elevated;call light within reach;encouraged to call for assist;exit alarm on;waffle cushion;LLE elevated  -ND               User Key  (r) = Recorded By, (t) = Taken By, (c) = Cosigned By      Initials Name Provider Type    Thea Allen PT Physical Therapist                   Outcome Measures       Row Name 10/17/24 2669          How much help from another  person do you currently need...    Turning from your back to your side while in flat bed without using bedrails? 4  -ND     Moving from lying on back to sitting on the side of a flat bed without bedrails? 3  -ND     Moving to and from a bed to a chair (including a wheelchair)? 3  -ND     Standing up from a chair using your arms (e.g., wheelchair, bedside chair)? 3  -ND     Climbing 3-5 steps with a railing? 2  -ND     To walk in hospital room? 3  -ND     AM-PAC 6 Clicks Score (PT) 18  -ND     Highest Level of Mobility Goal 6 --> Walk 10 steps or more  -ND       Row Name 10/17/24 1547          Functional Assessment    Outcome Measure Options AM-PAC 6 Clicks Basic Mobility (PT)  -ND               User Key  (r) = Recorded By, (t) = Taken By, (c) = Cosigned By      Initials Name Provider Type    Thea Allen PT Physical Therapist                                 Physical Therapy Education       Title: PT OT SLP Therapies (In Progress)       Topic: Physical Therapy (Done)       Point: Mobility training (Done)       Learning Progress Summary            Patient Acceptance, E, VU by ND at 10/17/2024 1547    Acceptance, E, VU,NR by  at 10/16/2024 1602    Acceptance, E, VU by ND at 10/15/2024 1044    Acceptance, E, VU by  at 10/12/2024 1004    Acceptance, E, VU by  at 10/11/2024 1154    Eager, E, VU by SC at 10/10/2024 1108    Comment: reviewed safety with mobility    Acceptance, E,D, VU,NR by AB at 10/9/2024 1006                      Point: Home exercise program (Done)       Learning Progress Summary            Patient Acceptance, E, VU by ND at 10/17/2024 1547    Acceptance, E, VU,NR by  at 10/16/2024 1602    Acceptance, E, VU by  at 10/12/2024 1004    Eager, E, VU by SC at 10/10/2024 1108    Comment: reviewed safety with mobility                      Point: Body mechanics (Done)       Learning Progress Summary            Patient Acceptance, E, VU by ND at 10/17/2024 1547    Acceptance, E, VU,NR by  at  10/16/2024 1602    Acceptance, E, VU by ND at 10/15/2024 1044    Acceptance, E, VU by  at 10/12/2024 1004    Acceptance, E, VU by  at 10/11/2024 1154    Eager, E, VU by SC at 10/10/2024 1108    Comment: reviewed safety with mobility    Acceptance, E,D, VU,NR by AB at 10/9/2024 1006                      Point: Precautions (Done)       Learning Progress Summary            Patient Acceptance, E, VU by ND at 10/17/2024 1547    Acceptance, E, VU,NR by  at 10/16/2024 1602    Acceptance, E, VU by ND at 10/15/2024 1044    Acceptance, E, VU by  at 10/12/2024 1004    Acceptance, E, VU by  at 10/11/2024 1154    Eager, E, VU by SC at 10/10/2024 1108    Comment: reviewed safety with mobility    Acceptance, E,D, VU,NR by AB at 10/9/2024 1006                                      User Key       Initials Effective Dates Name Provider Type Discipline    SC 02/03/23 -  Grace Bradley, PT Physical Therapist PT    AB 09/22/22 -  Margaret Bhatt, PT Physical Therapist PT     02/06/24 -  Karen Mcarthur, PT Physical Therapist PT     09/21/23 -  Rose Bush, PT Physical Therapist PT    ND 11/16/23 -  Thea Gunter, PT Physical Therapist PT                  PT Recommendation and Plan     Progress: improving  Outcome Evaluation: Pt mobilizes with knee scooter and chair follow for safety but is overall limited by pain and endurance. Pt would benefit from continued skilled therapy to address deficits and promote return to baseline. Recommend IRF following d/c for best functional outcome.     Time Calculation:         PT Charges       Row Name 10/17/24 1547             Time Calculation    Start Time 1027  -ND      PT Received On 10/17/24  -ND         Timed Charges    13493 - PT Therapeutic Exercise Minutes 10  -ND      22196 - PT Therapeutic Activity Minutes 15  -ND         Total Minutes    Timed Charges Total Minutes 25  -ND       Total Minutes 25  -ND                User Key  (r) = Recorded By, (t) = Taken By, (c) =  Cosigned By      Initials Name Provider Type    ND Thea Gunter, PT Physical Therapist                  Therapy Charges for Today       Code Description Service Date Service Provider Modifiers Qty    76740197481 HC PT THER PROC EA 15 MIN 10/17/2024 Thea Gunter, PT GP 1    [id number] HC PT THERAPEUTIC ACT EA 15 MIN 10/17/2024 Thea Gunter, PT GP 1            PT G-Codes  Outcome Measure Options: AM-PAC 6 Clicks Basic Mobility (PT)  AM-PAC 6 Clicks Score (PT): 18  AM-PAC 6 Clicks Score (OT): 18  PT Discharge Summary  Anticipated Discharge Disposition (PT): inpatient rehabilitation facility    Thea Gunter PT  10/17/2024

## 2024-10-17 NOTE — PLAN OF CARE
Goal Outcome Evaluation:  Plan of Care Reviewed With: patient        Progress: improving  Outcome Evaluation: Pt mobilizes with knee scooter and chair follow for safety but is overall limited by pain and endurance. Pt would benefit from continued skilled therapy to address deficits and promote return to baseline. Recommend IRF following d/c for best functional outcome.    Anticipated Discharge Disposition (PT): inpatient rehabilitation facility

## 2024-10-18 ENCOUNTER — ANESTHESIA EVENT CONVERTED (OUTPATIENT)
Dept: ANESTHESIOLOGY | Facility: HOSPITAL | Age: 69
DRG: 493 | End: 2024-10-18
Payer: MEDICARE

## 2024-10-18 ENCOUNTER — ANESTHESIA (OUTPATIENT)
Dept: PERIOP | Facility: HOSPITAL | Age: 69
DRG: 493 | End: 2024-10-18
Payer: MEDICARE

## 2024-10-18 ENCOUNTER — APPOINTMENT (OUTPATIENT)
Dept: GENERAL RADIOLOGY | Facility: HOSPITAL | Age: 69
DRG: 493 | End: 2024-10-18
Payer: MEDICARE

## 2024-10-18 LAB
ALBUMIN SERPL-MCNC: 3.4 G/DL (ref 3.5–5.2)
ALBUMIN/GLOB SERPL: 1.3 G/DL
ALP SERPL-CCNC: 72 U/L (ref 39–117)
ALT SERPL W P-5'-P-CCNC: 7 U/L (ref 1–33)
ANION GAP SERPL CALCULATED.3IONS-SCNC: 14 MMOL/L (ref 5–15)
AST SERPL-CCNC: 19 U/L (ref 1–32)
BASOPHILS # BLD AUTO: 0.01 10*3/MM3 (ref 0–0.2)
BASOPHILS NFR BLD AUTO: 0.1 % (ref 0–1.5)
BILIRUB SERPL-MCNC: 0.2 MG/DL (ref 0–1.2)
BUN SERPL-MCNC: 9 MG/DL (ref 8–23)
BUN/CREAT SERPL: 6.5 (ref 7–25)
CALCIUM SPEC-SCNC: 8.6 MG/DL (ref 8.6–10.5)
CHLORIDE SERPL-SCNC: 106 MMOL/L (ref 98–107)
CO2 SERPL-SCNC: 22 MMOL/L (ref 22–29)
CREAT SERPL-MCNC: 1.38 MG/DL (ref 0.57–1)
DEPRECATED RDW RBC AUTO: 54.5 FL (ref 37–54)
EGFRCR SERPLBLD CKD-EPI 2021: 41.8 ML/MIN/1.73
EOSINOPHIL # BLD AUTO: 0 10*3/MM3 (ref 0–0.4)
EOSINOPHIL NFR BLD AUTO: 0 % (ref 0.3–6.2)
ERYTHROCYTE [DISTWIDTH] IN BLOOD BY AUTOMATED COUNT: 14.6 % (ref 12.3–15.4)
GLOBULIN UR ELPH-MCNC: 2.6 GM/DL
GLUCOSE SERPL-MCNC: 178 MG/DL (ref 65–99)
HCT VFR BLD AUTO: 29 % (ref 34–46.6)
HGB BLD-MCNC: 8.9 G/DL (ref 12–15.9)
IMM GRANULOCYTES # BLD AUTO: 0.06 10*3/MM3 (ref 0–0.05)
IMM GRANULOCYTES NFR BLD AUTO: 0.8 % (ref 0–0.5)
LYMPHOCYTES # BLD AUTO: 0.43 10*3/MM3 (ref 0.7–3.1)
LYMPHOCYTES NFR BLD AUTO: 5.6 % (ref 19.6–45.3)
MCH RBC QN AUTO: 31.4 PG (ref 26.6–33)
MCHC RBC AUTO-ENTMCNC: 30.7 G/DL (ref 31.5–35.7)
MCV RBC AUTO: 102.5 FL (ref 79–97)
MONOCYTES # BLD AUTO: 0.23 10*3/MM3 (ref 0.1–0.9)
MONOCYTES NFR BLD AUTO: 3 % (ref 5–12)
NEUTROPHILS NFR BLD AUTO: 6.94 10*3/MM3 (ref 1.7–7)
NEUTROPHILS NFR BLD AUTO: 90.5 % (ref 42.7–76)
NRBC BLD AUTO-RTO: 0 /100 WBC (ref 0–0.2)
PLATELET # BLD AUTO: 256 10*3/MM3 (ref 140–450)
PMV BLD AUTO: 10.6 FL (ref 6–12)
POTASSIUM SERPL-SCNC: 3.7 MMOL/L (ref 3.5–5.2)
PROT SERPL-MCNC: 6 G/DL (ref 6–8.5)
QT INTERVAL: 444 MS
QTC INTERVAL: 461 MS
RBC # BLD AUTO: 2.83 10*6/MM3 (ref 3.77–5.28)
SODIUM SERPL-SCNC: 142 MMOL/L (ref 136–145)
WBC NRBC COR # BLD AUTO: 7.67 10*3/MM3 (ref 3.4–10.8)

## 2024-10-18 PROCEDURE — 28485 OPTX METATARSAL FX EACH: CPT | Performed by: ORTHOPAEDIC SURGERY

## 2024-10-18 PROCEDURE — 25010000002 FENTANYL CITRATE (PF) 50 MCG/ML SOLUTION: Performed by: NURSE ANESTHETIST, CERTIFIED REGISTERED

## 2024-10-18 PROCEDURE — 27766 OPTX MEDIAL ANKLE FX: CPT | Performed by: PHYSICIAN ASSISTANT

## 2024-10-18 PROCEDURE — 25010000002 PROPOFOL 10 MG/ML EMULSION: Performed by: ANESTHESIOLOGY

## 2024-10-18 PROCEDURE — 25810000003 LACTATED RINGERS PER 1000 ML: Performed by: ANESTHESIOLOGY

## 2024-10-18 PROCEDURE — 25010000002 HEPARIN (PORCINE) PER 1000 UNITS: Performed by: ORTHOPAEDIC SURGERY

## 2024-10-18 PROCEDURE — 28615 REPAIR FOOT DISLOCATION: CPT | Performed by: PHYSICIAN ASSISTANT

## 2024-10-18 PROCEDURE — 25010000002 DEXAMETHASONE PER 1 MG: Performed by: ANESTHESIOLOGY

## 2024-10-18 PROCEDURE — 25810000003 SODIUM CHLORIDE 0.9 % SOLUTION: Performed by: ORTHOPAEDIC SURGERY

## 2024-10-18 PROCEDURE — C1713 ANCHOR/SCREW BN/BN,TIS/BN: HCPCS | Performed by: ORTHOPAEDIC SURGERY

## 2024-10-18 PROCEDURE — 25010000002 PHENYLEPHRINE 10 MG/ML SOLUTION: Performed by: ANESTHESIOLOGY

## 2024-10-18 PROCEDURE — 76000 FLUOROSCOPY <1 HR PHYS/QHP: CPT | Performed by: ORTHOPAEDIC SURGERY

## 2024-10-18 PROCEDURE — 99024 POSTOP FOLLOW-UP VISIT: CPT | Performed by: ORTHOPAEDIC SURGERY

## 2024-10-18 PROCEDURE — 28485 OPTX METATARSAL FX EACH: CPT | Performed by: PHYSICIAN ASSISTANT

## 2024-10-18 PROCEDURE — 93010 ELECTROCARDIOGRAM REPORT: CPT | Performed by: INTERNAL MEDICINE

## 2024-10-18 PROCEDURE — 25010000002 SUGAMMADEX 200 MG/2ML SOLUTION: Performed by: ANESTHESIOLOGY

## 2024-10-18 PROCEDURE — 25010000002 ROPIVACAINE HCL-NACL 0.2-0.9 % SOLUTION: Performed by: ORTHOPAEDIC SURGERY

## 2024-10-18 PROCEDURE — C1769 GUIDE WIRE: HCPCS | Performed by: ORTHOPAEDIC SURGERY

## 2024-10-18 PROCEDURE — 25810000003 SODIUM CHLORIDE 0.9 % SOLUTION: Performed by: ANESTHESIOLOGY

## 2024-10-18 PROCEDURE — 27766 OPTX MEDIAL ANKLE FX: CPT | Performed by: ORTHOPAEDIC SURGERY

## 2024-10-18 PROCEDURE — 25010000002 CEFAZOLIN PER 500 MG: Performed by: ANESTHESIOLOGY

## 2024-10-18 PROCEDURE — 25010000002 BUPIVACAINE (PF) 0.25 % SOLUTION: Performed by: NURSE ANESTHETIST, CERTIFIED REGISTERED

## 2024-10-18 PROCEDURE — 25010000002 CEFAZOLIN PER 500 MG: Performed by: INTERNAL MEDICINE

## 2024-10-18 PROCEDURE — 25010000002 CEFAZOLIN PER 500 MG: Performed by: ORTHOPAEDIC SURGERY

## 2024-10-18 PROCEDURE — 76000 FLUOROSCOPY <1 HR PHYS/QHP: CPT

## 2024-10-18 PROCEDURE — 0QSH04Z REPOSITION LEFT TIBIA WITH INTERNAL FIXATION DEVICE, OPEN APPROACH: ICD-10-PCS | Performed by: ORTHOPAEDIC SURGERY

## 2024-10-18 PROCEDURE — 25010000002 PHENYLEPHRINE 10 MG/ML SOLUTION 1 ML VIAL: Performed by: ANESTHESIOLOGY

## 2024-10-18 PROCEDURE — 25010000002 LIDOCAINE PF 1% 1 % SOLUTION: Performed by: ANESTHESIOLOGY

## 2024-10-18 PROCEDURE — 99232 SBSQ HOSP IP/OBS MODERATE 35: CPT | Performed by: INTERNAL MEDICINE

## 2024-10-18 PROCEDURE — 85025 COMPLETE CBC W/AUTO DIFF WBC: CPT

## 2024-10-18 PROCEDURE — 0QSP04Z REPOSITION LEFT METATARSAL WITH INTERNAL FIXATION DEVICE, OPEN APPROACH: ICD-10-PCS | Performed by: ORTHOPAEDIC SURGERY

## 2024-10-18 PROCEDURE — 25010000002 DEXAMETHASONE SODIUM PHOSPHATE 10 MG/ML SOLUTION: Performed by: NURSE ANESTHETIST, CERTIFIED REGISTERED

## 2024-10-18 PROCEDURE — 25010000002 ONDANSETRON PER 1 MG: Performed by: ANESTHESIOLOGY

## 2024-10-18 PROCEDURE — 28615 REPAIR FOOT DISLOCATION: CPT | Performed by: ORTHOPAEDIC SURGERY

## 2024-10-18 PROCEDURE — 80053 COMPREHEN METABOLIC PANEL: CPT | Performed by: ORTHOPAEDIC SURGERY

## 2024-10-18 RX ORDER — DROPERIDOL 2.5 MG/ML
0.62 INJECTION, SOLUTION INTRAMUSCULAR; INTRAVENOUS ONCE AS NEEDED
Status: DISCONTINUED | OUTPATIENT
Start: 2024-10-18 | End: 2024-10-18

## 2024-10-18 RX ORDER — FENTANYL CITRATE 50 UG/ML
INJECTION, SOLUTION INTRAMUSCULAR; INTRAVENOUS AS NEEDED
Status: DISCONTINUED | OUTPATIENT
Start: 2024-10-18 | End: 2024-10-18

## 2024-10-18 RX ORDER — SODIUM CHLORIDE 9 MG/ML
100 INJECTION, SOLUTION INTRAVENOUS CONTINUOUS
Status: ACTIVE | OUTPATIENT
Start: 2024-10-18 | End: 2024-10-19

## 2024-10-18 RX ORDER — PROPOFOL 10 MG/ML
VIAL (ML) INTRAVENOUS AS NEEDED
Status: DISCONTINUED | OUTPATIENT
Start: 2024-10-18 | End: 2024-10-18 | Stop reason: SURG

## 2024-10-18 RX ORDER — FENTANYL CITRATE 50 UG/ML
INJECTION, SOLUTION INTRAMUSCULAR; INTRAVENOUS
Status: COMPLETED | OUTPATIENT
Start: 2024-10-18 | End: 2024-10-18

## 2024-10-18 RX ORDER — ONDANSETRON 2 MG/ML
INJECTION INTRAMUSCULAR; INTRAVENOUS AS NEEDED
Status: DISCONTINUED | OUTPATIENT
Start: 2024-10-18 | End: 2024-10-18 | Stop reason: SURG

## 2024-10-18 RX ORDER — FENTANYL CITRATE 50 UG/ML
50 INJECTION, SOLUTION INTRAMUSCULAR; INTRAVENOUS
Status: DISCONTINUED | OUTPATIENT
Start: 2024-10-18 | End: 2024-10-18

## 2024-10-18 RX ORDER — SODIUM CHLORIDE 0.9 % (FLUSH) 0.9 %
10 SYRINGE (ML) INJECTION EVERY 12 HOURS SCHEDULED
Status: DISCONTINUED | OUTPATIENT
Start: 2024-10-18 | End: 2024-10-21 | Stop reason: HOSPADM

## 2024-10-18 RX ORDER — CEFAZOLIN SODIUM 2 G/100ML
2 INJECTION, SOLUTION INTRAVENOUS EVERY 8 HOURS
Status: DISCONTINUED | OUTPATIENT
Start: 2024-10-18 | End: 2024-10-18

## 2024-10-18 RX ORDER — DEXAMETHASONE SODIUM PHOSPHATE 10 MG/ML
INJECTION, SOLUTION INTRAMUSCULAR; INTRAVENOUS
Status: COMPLETED | OUTPATIENT
Start: 2024-10-18 | End: 2024-10-18

## 2024-10-18 RX ORDER — DEXAMETHASONE SODIUM PHOSPHATE 4 MG/ML
INJECTION, SOLUTION INTRA-ARTICULAR; INTRALESIONAL; INTRAMUSCULAR; INTRAVENOUS; SOFT TISSUE AS NEEDED
Status: DISCONTINUED | OUTPATIENT
Start: 2024-10-18 | End: 2024-10-18 | Stop reason: SURG

## 2024-10-18 RX ORDER — PHENYLEPHRINE HYDROCHLORIDE 10 MG/ML
INJECTION INTRAVENOUS AS NEEDED
Status: DISCONTINUED | OUTPATIENT
Start: 2024-10-18 | End: 2024-10-18 | Stop reason: SURG

## 2024-10-18 RX ORDER — HYDROMORPHONE HYDROCHLORIDE 1 MG/ML
0.5 INJECTION, SOLUTION INTRAMUSCULAR; INTRAVENOUS; SUBCUTANEOUS
Status: DISCONTINUED | OUTPATIENT
Start: 2024-10-18 | End: 2024-10-18

## 2024-10-18 RX ORDER — LIDOCAINE HYDROCHLORIDE 10 MG/ML
0.5 INJECTION, SOLUTION EPIDURAL; INFILTRATION; INTRACAUDAL; PERINEURAL ONCE AS NEEDED
Status: DISCONTINUED | OUTPATIENT
Start: 2024-10-18 | End: 2024-10-18 | Stop reason: HOSPADM

## 2024-10-18 RX ORDER — BISACODYL 10 MG
10 SUPPOSITORY, RECTAL RECTAL DAILY PRN
Status: DISCONTINUED | OUTPATIENT
Start: 2024-10-18 | End: 2024-10-21 | Stop reason: HOSPADM

## 2024-10-18 RX ORDER — BUPIVACAINE HYDROCHLORIDE 2.5 MG/ML
INJECTION, SOLUTION EPIDURAL; INFILTRATION; INTRACAUDAL
Status: COMPLETED | OUTPATIENT
Start: 2024-10-18 | End: 2024-10-18

## 2024-10-18 RX ORDER — LIDOCAINE HYDROCHLORIDE 10 MG/ML
INJECTION, SOLUTION EPIDURAL; INFILTRATION; INTRACAUDAL; PERINEURAL AS NEEDED
Status: DISCONTINUED | OUTPATIENT
Start: 2024-10-18 | End: 2024-10-18 | Stop reason: SURG

## 2024-10-18 RX ORDER — SODIUM CHLORIDE 9 MG/ML
40 INJECTION, SOLUTION INTRAVENOUS AS NEEDED
Status: ACTIVE | OUTPATIENT
Start: 2024-10-18 | End: 2024-10-19

## 2024-10-18 RX ORDER — EPHEDRINE SULFATE 50 MG/ML
INJECTION INTRAVENOUS AS NEEDED
Status: DISCONTINUED | OUTPATIENT
Start: 2024-10-18 | End: 2024-10-18 | Stop reason: SURG

## 2024-10-18 RX ORDER — SODIUM CHLORIDE 0.9 % (FLUSH) 0.9 %
10 SYRINGE (ML) INJECTION AS NEEDED
Status: DISCONTINUED | OUTPATIENT
Start: 2024-10-18 | End: 2024-10-18 | Stop reason: HOSPADM

## 2024-10-18 RX ORDER — ONDANSETRON 4 MG/1
4 TABLET, ORALLY DISINTEGRATING ORAL EVERY 6 HOURS PRN
Status: DISCONTINUED | OUTPATIENT
Start: 2024-10-18 | End: 2024-10-21 | Stop reason: HOSPADM

## 2024-10-18 RX ORDER — ONDANSETRON 2 MG/ML
4 INJECTION INTRAMUSCULAR; INTRAVENOUS EVERY 6 HOURS PRN
Status: DISCONTINUED | OUTPATIENT
Start: 2024-10-18 | End: 2024-10-21 | Stop reason: HOSPADM

## 2024-10-18 RX ORDER — CEFAZOLIN SODIUM 1 G/3ML
INJECTION, POWDER, FOR SOLUTION INTRAMUSCULAR; INTRAVENOUS AS NEEDED
Status: DISCONTINUED | OUTPATIENT
Start: 2024-10-18 | End: 2024-10-18 | Stop reason: SURG

## 2024-10-18 RX ORDER — ROCURONIUM BROMIDE 10 MG/ML
INJECTION, SOLUTION INTRAVENOUS AS NEEDED
Status: DISCONTINUED | OUTPATIENT
Start: 2024-10-18 | End: 2024-10-18 | Stop reason: SURG

## 2024-10-18 RX ORDER — ROPIVACAINE HYDROCHLORIDE 2 MG/ML
INJECTION, SOLUTION EPIDURAL; INFILTRATION; PERINEURAL CONTINUOUS
Status: DISCONTINUED | OUTPATIENT
Start: 2024-10-18 | End: 2024-10-21 | Stop reason: HOSPADM

## 2024-10-18 RX ORDER — SODIUM CHLORIDE 0.9 % (FLUSH) 0.9 %
10 SYRINGE (ML) INJECTION AS NEEDED
Status: DISCONTINUED | OUTPATIENT
Start: 2024-10-18 | End: 2024-10-21 | Stop reason: HOSPADM

## 2024-10-18 RX ORDER — SODIUM CHLORIDE 9 MG/ML
9 INJECTION, SOLUTION INTRAVENOUS ONCE
Status: COMPLETED | OUTPATIENT
Start: 2024-10-18 | End: 2024-10-18

## 2024-10-18 RX ADMIN — ONDANSETRON 4 MG: 2 INJECTION INTRAMUSCULAR; INTRAVENOUS at 07:45

## 2024-10-18 RX ADMIN — ROCURONIUM BROMIDE 10 MG: 10 INJECTION INTRAVENOUS at 09:47

## 2024-10-18 RX ADMIN — FENTANYL CITRATE 100 MCG: 50 INJECTION, SOLUTION INTRAMUSCULAR; INTRAVENOUS at 06:55

## 2024-10-18 RX ADMIN — SODIUM CHLORIDE 2000 MG: 900 INJECTION INTRAVENOUS at 15:13

## 2024-10-18 RX ADMIN — HEPARIN SODIUM 5000 UNITS: 5000 INJECTION INTRAVENOUS; SUBCUTANEOUS at 15:13

## 2024-10-18 RX ADMIN — CEFAZOLIN 2 G: 1 INJECTION, POWDER, FOR SOLUTION INTRAMUSCULAR; INTRAVENOUS at 11:16

## 2024-10-18 RX ADMIN — ROCURONIUM BROMIDE 50 MG: 10 INJECTION INTRAVENOUS at 07:34

## 2024-10-18 RX ADMIN — Medication 10 ML: at 13:35

## 2024-10-18 RX ADMIN — SODIUM CHLORIDE 100 ML/HR: 9 INJECTION, SOLUTION INTRAVENOUS at 13:35

## 2024-10-18 RX ADMIN — PHENYLEPHRINE HYDROCHLORIDE 100 MCG: 10 INJECTION INTRAVENOUS at 07:34

## 2024-10-18 RX ADMIN — BUPIVACAINE HYDROCHLORIDE 25 ML: 2.5 INJECTION, SOLUTION EPIDURAL; INFILTRATION; INTRACAUDAL; PERINEURAL at 07:01

## 2024-10-18 RX ADMIN — SUGAMMADEX 200 MG: 100 INJECTION, SOLUTION INTRAVENOUS at 11:52

## 2024-10-18 RX ADMIN — HEPARIN SODIUM 5000 UNITS: 5000 INJECTION INTRAVENOUS; SUBCUTANEOUS at 20:40

## 2024-10-18 RX ADMIN — Medication 5 MG: at 20:40

## 2024-10-18 RX ADMIN — EPHEDRINE SULFATE 10 MG: 50 INJECTION INTRAVENOUS at 11:28

## 2024-10-18 RX ADMIN — PROPOFOL 150 MG: 10 INJECTION, EMULSION INTRAVENOUS at 07:34

## 2024-10-18 RX ADMIN — FAMOTIDINE 20 MG: 10 INJECTION, SOLUTION INTRAVENOUS at 05:18

## 2024-10-18 RX ADMIN — BUPIVACAINE HYDROCHLORIDE 25 ML: 2.5 INJECTION, SOLUTION EPIDURAL; INFILTRATION; INTRACAUDAL at 06:55

## 2024-10-18 RX ADMIN — DULOXETINE HYDROCHLORIDE 20 MG: 20 CAPSULE, DELAYED RELEASE ORAL at 20:40

## 2024-10-18 RX ADMIN — METOPROLOL SUCCINATE 25 MG: 25 TABLET, EXTENDED RELEASE ORAL at 20:40

## 2024-10-18 RX ADMIN — ROCURONIUM BROMIDE 10 MG: 10 INJECTION INTRAVENOUS at 10:47

## 2024-10-18 RX ADMIN — SODIUM CHLORIDE, POTASSIUM CHLORIDE, SODIUM LACTATE AND CALCIUM CHLORIDE: 600; 310; 30; 20 INJECTION, SOLUTION INTRAVENOUS at 09:33

## 2024-10-18 RX ADMIN — PHENYLEPHRINE HYDROCHLORIDE 100 MCG: 10 INJECTION INTRAVENOUS at 10:43

## 2024-10-18 RX ADMIN — LIDOCAINE HYDROCHLORIDE 30 MG: 10 INJECTION, SOLUTION EPIDURAL; INFILTRATION; INTRACAUDAL; PERINEURAL at 07:34

## 2024-10-18 RX ADMIN — EPHEDRINE SULFATE 10 MG: 50 INJECTION INTRAVENOUS at 11:37

## 2024-10-18 RX ADMIN — Medication 1000 MG: at 12:30

## 2024-10-18 RX ADMIN — SODIUM CHLORIDE, POTASSIUM CHLORIDE, SODIUM LACTATE AND CALCIUM CHLORIDE 9 ML/HR: 600; 310; 30; 20 INJECTION, SOLUTION INTRAVENOUS at 00:51

## 2024-10-18 RX ADMIN — DEXAMETHASONE SODIUM PHOSPHATE 2 MG: 10 INJECTION, SOLUTION INTRAMUSCULAR; INTRAVENOUS at 06:55

## 2024-10-18 RX ADMIN — ROCURONIUM BROMIDE 30 MG: 10 INJECTION INTRAVENOUS at 08:33

## 2024-10-18 RX ADMIN — GABAPENTIN 600 MG: 300 CAPSULE ORAL at 20:39

## 2024-10-18 RX ADMIN — PROPOFOL 25 MCG/KG/MIN: 10 INJECTION, EMULSION INTRAVENOUS at 10:47

## 2024-10-18 RX ADMIN — DEXAMETHASONE SODIUM PHOSPHATE 4 MG: 4 INJECTION INTRA-ARTICULAR; INTRALESIONAL; INTRAMUSCULAR; INTRAVENOUS; SOFT TISSUE at 07:45

## 2024-10-18 RX ADMIN — SODIUM CHLORIDE 2 G: 900 INJECTION INTRAVENOUS at 07:40

## 2024-10-18 RX ADMIN — SODIUM CHLORIDE 100 ML/HR: 9 INJECTION, SOLUTION INTRAVENOUS at 23:34

## 2024-10-18 RX ADMIN — EPHEDRINE SULFATE 10 MG: 50 INJECTION INTRAVENOUS at 07:56

## 2024-10-18 RX ADMIN — SODIUM CHLORIDE 9 ML/HR: 9 INJECTION, SOLUTION INTRAVENOUS at 06:35

## 2024-10-18 RX ADMIN — SODIUM CHLORIDE 2000 MG: 900 INJECTION INTRAVENOUS at 23:34

## 2024-10-18 RX ADMIN — OXYCODONE HYDROCHLORIDE 5 MG: 5 TABLET ORAL at 20:46

## 2024-10-18 RX ADMIN — PHENYLEPHRINE HYDROCHLORIDE 15 MCG/MIN: 10 INJECTION INTRAVENOUS at 07:58

## 2024-10-18 RX ADMIN — PROPOFOL 25 MG/HR: 10 INJECTION, EMULSION INTRAVENOUS at 08:25

## 2024-10-18 NOTE — PLAN OF CARE
Problem: Adult Inpatient Plan of Care  Goal: Plan of Care Review  Outcome: Progressing  Goal: Patient-Specific Goal (Individualized)  Outcome: Progressing  Goal: Absence of Hospital-Acquired Illness or Injury  Outcome: Progressing  Intervention: Identify and Manage Fall Risk  Recent Flowsheet Documentation  Taken 10/18/2024 0625 by Robinson Rosa RN  Safety Promotion/Fall Prevention: (ETIENNE for scheduled procedure) patient off unit  Taken 10/18/2024 0607 by Robinson Rosa RN  Safety Promotion/Fall Prevention:   activity supervised   assistive device/personal items within reach   clutter free environment maintained   fall prevention program maintained   gait belt   mobility aid in reach   nonskid shoes/slippers when out of bed   room organization consistent   safety round/check completed  Taken 10/18/2024 0418 by Robinson Rosa RN  Safety Promotion/Fall Prevention:   activity supervised   assistive device/personal items within reach   clutter free environment maintained   fall prevention program maintained   gait belt   mobility aid in reach   nonskid shoes/slippers when out of bed   room organization consistent   safety round/check completed  Taken 10/18/2024 0204 by Robinson Rosa RN  Safety Promotion/Fall Prevention:   activity supervised   assistive device/personal items within reach   clutter free environment maintained   fall prevention program maintained   gait belt   mobility aid in reach   nonskid shoes/slippers when out of bed   room organization consistent   safety round/check completed  Taken 10/17/2024 2354 by Robinson Rosa RN  Safety Promotion/Fall Prevention:   activity supervised   assistive device/personal items within reach   clutter free environment maintained   fall prevention program maintained   gait belt   mobility aid in reach   nonskid shoes/slippers when out of bed   room organization consistent   safety round/check completed  Taken 10/17/2024 2210 by Robinson Rosa RN  Safety  Promotion/Fall Prevention:   activity supervised   assistive device/personal items within reach   clutter free environment maintained   fall prevention program maintained   gait belt   mobility aid in reach   nonskid shoes/slippers when out of bed   room organization consistent   safety round/check completed  Taken 10/17/2024 1939 by Robinson Rosa RN  Safety Promotion/Fall Prevention:   activity supervised   assistive device/personal items within reach   clutter free environment maintained   fall prevention program maintained   gait belt   mobility aid in reach   nonskid shoes/slippers when out of bed   room organization consistent   safety round/check completed  Intervention: Prevent Skin Injury  Recent Flowsheet Documentation  Taken 10/18/2024 0607 by Robinson Rosa RN  Body Position: position changed independently  Skin Protection: incontinence pads utilized  Taken 10/18/2024 0418 by Robinson Rosa RN  Body Position: position changed independently  Skin Protection: incontinence pads utilized  Taken 10/18/2024 0204 by Robinson Rosa RN  Body Position: position changed independently  Skin Protection: incontinence pads utilized  Taken 10/17/2024 2354 by Robinson Rosa RN  Body Position: position changed independently  Skin Protection: incontinence pads utilized  Taken 10/17/2024 2210 by Robinson Rosa RN  Body Position: position changed independently  Taken 10/17/2024 1939 by Robinson Rosa RN  Body Position: position changed independently  Skin Protection:   incontinence pads utilized   transparent dressing maintained  Intervention: Prevent and Manage VTE (Venous Thromboembolism) Risk  Recent Flowsheet Documentation  Taken 10/18/2024 0607 by Robinson Rosa RN  VTE Prevention/Management: (sq heparin) other (see comments)  Taken 10/18/2024 0418 by Robinson Rosa RN  VTE Prevention/Management: (sq heparin) other (see comments)  Taken 10/18/2024 0204 by Robinson Rosa RN  VTE Prevention/Management: (heparin)  other (see comments)  Taken 10/17/2024 2354 by Robinson Rosa RN  VTE Prevention/Management: (heprin) other (see comments)  Taken 10/17/2024 2210 by Robinson Rosa RN  VTE Prevention/Management: (heparin) other (see comments)  Intervention: Prevent Infection  Recent Flowsheet Documentation  Taken 10/18/2024 0607 by Robinson Rosa RN  Infection Prevention:   single patient room provided   environmental surveillance performed  Taken 10/18/2024 0418 by Robinson Rosa RN  Infection Prevention:   rest/sleep promoted   single patient room provided  Taken 10/18/2024 0204 by Robinson Rosa RN  Infection Prevention:   rest/sleep promoted   single patient room provided  Taken 10/17/2024 2354 by Robinson Rosa RN  Infection Prevention:   environmental surveillance performed   rest/sleep promoted   single patient room provided  Taken 10/17/2024 2210 by Robinson Rosa RN  Infection Prevention:   rest/sleep promoted   single patient room provided  Taken 10/17/2024 1939 by Robinson Rosa RN  Infection Prevention:   environmental surveillance performed   rest/sleep promoted   single patient room provided  Goal: Optimal Comfort and Wellbeing  Outcome: Progressing  Intervention: Monitor Pain and Promote Comfort  Recent Flowsheet Documentation  Taken 10/18/2024 0607 by Robinson Rosa RN  Pain Management Interventions: no interventions per patient request  Taken 10/18/2024 0418 by Robinson Rosa RN  Pain Management Interventions: quiet environment facilitated  Taken 10/17/2024 2354 by Robinson Rosa RN  Pain Management Interventions: quiet environment facilitated  Taken 10/17/2024 2210 by Robinson Rosa RN  Pain Management Interventions: pain medication given  Taken 10/17/2024 1939 by Robinson Rosa RN  Pain Management Interventions:   no interventions per patient request   quiet environment facilitated  Intervention: Provide Person-Centered Care  Recent Flowsheet Documentation  Taken 10/18/2024 0607 by Robinson Rosa  RN  Trust Relationship/Rapport:   care explained   questions answered   questions encouraged   reassurance provided  Taken 10/17/2024 2210 by Robinson Rosa RN  Trust Relationship/Rapport:   care explained   questions answered   questions encouraged  Taken 10/17/2024 1939 by Robinson Rosa RN  Trust Relationship/Rapport:   care explained   questions answered   questions encouraged  Goal: Readiness for Transition of Care  Outcome: Progressing     Problem: Pain Chronic (Persistent) (Comorbidity Management)  Goal: Acceptable Pain Control and Functional Ability  Outcome: Progressing  Intervention: Manage Persistent Pain  Recent Flowsheet Documentation  Taken 10/17/2024 1939 by Robinson Rosa RN  Medication Review/Management: medications reviewed  Intervention: Develop Pain Management Plan  Recent Flowsheet Documentation  Taken 10/18/2024 0607 by Robinson Rosa RN  Pain Management Interventions: no interventions per patient request  Taken 10/18/2024 0418 by Robinson Rosa RN  Pain Management Interventions: quiet environment facilitated  Taken 10/17/2024 2354 by Robinson Rosa RN  Pain Management Interventions: quiet environment facilitated  Taken 10/17/2024 2210 by Robinson Rosa RN  Pain Management Interventions: pain medication given  Taken 10/17/2024 1939 by Robinson Rosa RN  Pain Management Interventions:   no interventions per patient request   quiet environment facilitated  Intervention: Optimize Psychosocial Wellbeing  Recent Flowsheet Documentation  Taken 10/18/2024 0607 by Robinson Rosa RN  Family/Support System Care:   self-care encouraged   support provided  Taken 10/18/2024 0418 by Robinson Rosa RN  Family/Support System Care:   self-care encouraged   support provided  Taken 10/17/2024 2210 by Robinson Rosa RN  Diversional Activities:   reading   television  Family/Support System Care: self-care encouraged  Taken 10/17/2024 1939 by Robinson Rosa RN  Diversional Activities:   television    reading  Family/Support System Care:   self-care encouraged   support provided     Problem: Skin Injury Risk Increased  Goal: Skin Health and Integrity  Outcome: Progressing  Intervention: Optimize Skin Protection  Recent Flowsheet Documentation  Taken 10/18/2024 0607 by Robinson Rosa RN  Activity Management: activity encouraged  Pressure Reduction Techniques: frequent weight shift encouraged  Head of Bed (HOB) Positioning: HOB elevated  Pressure Reduction Devices:   pressure-redistributing mattress utilized   positioning supports utilized   heel offloading device utilized  Skin Protection: incontinence pads utilized  Taken 10/18/2024 0418 by Robinson Rosa RN  Activity Management: activity encouraged  Pressure Reduction Techniques: frequent weight shift encouraged  Head of Bed (HOB) Positioning: HOB elevated  Pressure Reduction Devices:   pressure-redistributing mattress utilized   positioning supports utilized   heel offloading device utilized  Skin Protection: incontinence pads utilized  Taken 10/18/2024 0204 by Robinson Rosa RN  Activity Management: activity encouraged  Pressure Reduction Techniques: frequent weight shift encouraged  Head of Bed (HOB) Positioning: HOB elevated  Pressure Reduction Devices:   pressure-redistributing mattress utilized   positioning supports utilized   heel offloading device utilized  Skin Protection: incontinence pads utilized  Taken 10/17/2024 2354 by Robinson Rosa RN  Activity Management: activity encouraged  Pressure Reduction Techniques:   frequent weight shift encouraged   heels elevated off bed  Head of Bed (HOB) Positioning: HOB elevated  Pressure Reduction Devices:   pressure-redistributing mattress utilized   positioning supports utilized  Skin Protection: incontinence pads utilized  Taken 10/17/2024 2210 by Robinson Rosa RN  Activity Management: activity encouraged  Head of Bed (HOB) Positioning: HOB elevated  Taken 10/17/2024 2052 by Robinson Rosa RN  Activity  Management: ambulated in room  Taken 10/17/2024 1939 by Robinson Rosa RN  Activity Management: activity encouraged  Pressure Reduction Techniques:   frequent weight shift encouraged   heels elevated off bed  Head of Bed (HOB) Positioning: HOB elevated  Pressure Reduction Devices:   pressure-redistributing mattress utilized   positioning supports utilized  Skin Protection:   incontinence pads utilized   transparent dressing maintained     Problem: Fall Injury Risk  Goal: Absence of Fall and Fall-Related Injury  Outcome: Progressing  Intervention: Identify and Manage Contributors  Recent Flowsheet Documentation  Taken 10/18/2024 0607 by Robinson Rosa RN  Self-Care Promotion: independence encouraged  Taken 10/18/2024 0418 by Robinson Rosa RN  Self-Care Promotion: independence encouraged  Taken 10/18/2024 0204 by Robinson Rosa RN  Self-Care Promotion: independence encouraged  Taken 10/17/2024 2354 by Robinson Rosa RN  Self-Care Promotion: independence encouraged  Taken 10/17/2024 2210 by Robinson Rosa RN  Self-Care Promotion: independence encouraged  Taken 10/17/2024 1939 by Robinson Rosa RN  Medication Review/Management: medications reviewed  Self-Care Promotion: independence encouraged  Intervention: Promote Injury-Free Environment  Recent Flowsheet Documentation  Taken 10/18/2024 0625 by Robinson Rosa RN  Safety Promotion/Fall Prevention: (ETIENNE for scheduled procedure) patient off unit  Taken 10/18/2024 0607 by Robinson Rosa RN  Safety Promotion/Fall Prevention:   activity supervised   assistive device/personal items within reach   clutter free environment maintained   fall prevention program maintained   gait belt   mobility aid in reach   nonskid shoes/slippers when out of bed   room organization consistent   safety round/check completed  Taken 10/18/2024 0418 by Robinson Rosa RN  Safety Promotion/Fall Prevention:   activity supervised   assistive device/personal items within reach   clutter free  environment maintained   fall prevention program maintained   gait belt   mobility aid in reach   nonskid shoes/slippers when out of bed   room organization consistent   safety round/check completed  Taken 10/18/2024 0204 by Robinson Rosa RN  Safety Promotion/Fall Prevention:   activity supervised   assistive device/personal items within reach   clutter free environment maintained   fall prevention program maintained   gait belt   mobility aid in reach   nonskid shoes/slippers when out of bed   room organization consistent   safety round/check completed  Taken 10/17/2024 2354 by Robinson Rosa RN  Safety Promotion/Fall Prevention:   activity supervised   assistive device/personal items within reach   clutter free environment maintained   fall prevention program maintained   gait belt   mobility aid in reach   nonskid shoes/slippers when out of bed   room organization consistent   safety round/check completed  Taken 10/17/2024 2210 by Robinson Rosa RN  Safety Promotion/Fall Prevention:   activity supervised   assistive device/personal items within reach   clutter free environment maintained   fall prevention program maintained   gait belt   mobility aid in reach   nonskid shoes/slippers when out of bed   room organization consistent   safety round/check completed  Taken 10/17/2024 1939 by Robinson Rosa RN  Safety Promotion/Fall Prevention:   activity supervised   assistive device/personal items within reach   clutter free environment maintained   fall prevention program maintained   gait belt   mobility aid in reach   nonskid shoes/slippers when out of bed   room organization consistent   safety round/check completed     Problem: Bleeding (Orthopaedic Fracture)  Goal: Absence of Bleeding  Outcome: Progressing     Problem: Embolism (Orthopaedic Fracture)  Goal: Absence of Embolism Signs and Symptoms  Outcome: Progressing  Intervention: Prevent or Manage Embolism Risk  Recent Flowsheet Documentation  Taken  10/18/2024 0607 by Robinson Rosa RN  VTE Prevention/Management: (sq heparin) other (see comments)  Taken 10/18/2024 0418 by Robinson Rosa RN  VTE Prevention/Management: (sq heparin) other (see comments)  Taken 10/18/2024 0204 by Robinson Rosa RN  VTE Prevention/Management: (heparin) other (see comments)  Taken 10/17/2024 2354 by Robinson Rosa RN  VTE Prevention/Management: (heprin) other (see comments)  Taken 10/17/2024 2210 by Robinson Rosa RN  VTE Prevention/Management: (heparin) other (see comments)     Problem: Fracture Stabilization and Management (Orthopaedic Fracture)  Goal: Fracture Stability  Outcome: Progressing     Problem: Functional Ability Impaired (Orthopaedic Fracture)  Goal: Optimal Functional Ability  Outcome: Progressing  Intervention: Optimize Functional Ability  Recent Flowsheet Documentation  Taken 10/18/2024 0607 by Robinson Rosa RN  Activity Management: activity encouraged  Positioning/Transfer Devices:   pillows   in use  Self-Care Promotion: independence encouraged  Taken 10/18/2024 0418 by Robinson Rosa RN  Activity Management: activity encouraged  Positioning/Transfer Devices:   pillows   in use  Self-Care Promotion: independence encouraged  Taken 10/18/2024 0204 by Robinson Rosa RN  Activity Management: activity encouraged  Positioning/Transfer Devices:   pillows   in use  Self-Care Promotion: independence encouraged  Taken 10/17/2024 2354 by Robinson Rosa RN  Activity Management: activity encouraged  Positioning/Transfer Devices:   pillows   in use  Self-Care Promotion: independence encouraged  Taken 10/17/2024 2210 by Robinson Rosa RN  Activity Management: activity encouraged  Positioning/Transfer Devices:   pillows   in use  Self-Care Promotion: independence encouraged  Taken 10/17/2024 2052 by Robinson Rosa RN  Activity Management: ambulated in room  Taken 10/17/2024 1939 by Robinson Rosa RN  Activity Management: activity encouraged  Positioning/Transfer  Devices:   pillows   in use  Self-Care Promotion: independence encouraged  Range of Motion: active ROM (range of motion) encouraged     Problem: Infection (Orthopaedic Fracture)  Goal: Absence of Infection Signs and Symptoms  Outcome: Progressing     Problem: Neurovascular Compromise (Orthopaedic Fracture)  Goal: Effective Tissue Perfusion  Outcome: Progressing     Problem: Pain (Orthopaedic Fracture)  Goal: Acceptable Pain Control  Outcome: Progressing  Intervention: Manage Acute Orthopaedic-Related Pain  Recent Flowsheet Documentation  Taken 10/18/2024 0607 by Robinson Rosa RN  Pain Management Interventions: no interventions per patient request  Taken 10/18/2024 0418 by Robinsno Rosa RN  Pain Management Interventions: quiet environment facilitated  Taken 10/17/2024 2354 by Robinson Rosa RN  Pain Management Interventions: quiet environment facilitated  Taken 10/17/2024 2210 by Robinson Rosa RN  Pain Management Interventions: pain medication given  Taken 10/17/2024 1939 by Robinson Rosa RN  Pain Management Interventions:   no interventions per patient request   quiet environment facilitated     Problem: Respiratory Compromise (Orthopaedic Fracture)  Goal: Effective Oxygenation and Ventilation  Outcome: Progressing  Intervention: Promote Airway Secretion Clearance  Recent Flowsheet Documentation  Taken 10/18/2024 0607 by Robinson Rosa RN  Activity Management: activity encouraged  Taken 10/18/2024 0418 by Robinson Rosa RN  Activity Management: activity encouraged  Taken 10/18/2024 0204 by Robinson Rosa RN  Activity Management: activity encouraged  Taken 10/17/2024 2354 by Robinson Rosa RN  Activity Management: activity encouraged  Taken 10/17/2024 2210 by Robinson Rosa RN  Activity Management: activity encouraged  Taken 10/17/2024 2052 by Robinson Rosa RN  Activity Management: ambulated in room  Taken 10/17/2024 1939 by Robinson Rosa RN  Activity Management: activity encouraged  Cough And Deep  Breathing: done independently per patient  Intervention: Optimize Oxygenation and Ventilation  Recent Flowsheet Documentation  Taken 10/18/2024 0607 by Robinson Rosa RN  Head of Bed ([facility]) Positioning: HOB elevated  Taken 10/18/2024 0418 by Robinson Rosa RN  Head of Bed ([facility]) Positioning: HOB elevated  Taken 10/18/2024 0204 by Robinson Rosa RN  Head of Bed ([facility]) Positioning: HOB elevated  Taken 10/17/2024 2354 by Robinson Rosa RN  Head of Bed ([facility]) Positioning: HOB elevated  Taken 10/17/2024 2210 by Robinson Rosa RN  Head of Bed ([facility]) Positioning: HOB elevated  Taken 10/17/2024 1939 by Robinson Rosa RN  Head of Bed ([facility]) Positioning: HOB elevated   Goal Outcome Evaluation:          Pt A&OX4. VSS. RA. Affected extremity elevated. Pain controlled well with PRN pain medication.  NWB to LLE. Pivoting to BSC with 1 assist and gait belt. Pt has been NPO since midnight. Patient currently ETIENNE for scheduled procedure.

## 2024-10-18 NOTE — PROGRESS NOTES
[facility] Medicine Services  PROGRESS NOTE    Patient Name: Alysia Sierra  : 1955  MRN: [id number]    Date of Admission: 10/5/2024  Primary Care Physician: Cassy Foster MD    Subjective   Subjective     CC:  Left foot pain    HPI:  Seen in PACU, sleepy      Objective   Objective     Vital Signs:   Temp:  [97.3 °F (36.3 °C)-98.7 °F (37.1 °C)] 98.4 °F (36.9 °C)  Heart Rate:  [64-89] 73  Resp:  [12-18] 12  BP: ()/(55-83) 130/74  Flow (L/min) (Oxygen Therapy):  [2] 2     Physical Exam:  Constitutional: No acute distress, awake, alert  HENT: NCAT, mucous membranes moist  Respiratory: Respiratory effort normal   Gastrointestinal: using lian hugger  Musculoskeletal: Foot wrapped  Psychiatric: Appropriate affect, cooperative  Neurologic: Oriented x 3, speech clear  Skin: No rashes      Results Reviewed:  LAB RESULTS:                                Brief Urine Lab Results  (Last result in the past 365 days)        Color   Clarity   Blood   Leuk Est   Nitrite   Protein   CREAT   Urine HCG        24 1604 Yellow   Clear   Negative   Negative   Negative   Negative                   Microbiology Results Abnormal       None            FL C Arm During Surgery    Result Date: 10/18/2024  This procedure was auto-finalized with no dictation required.    Popliteal sciatic cath    Result Date: 10/18/2024  Yani Sanchez SRNA     10/18/2024  8:21 AM Popliteal sciatic cath Patient reassessed immediately prior to procedure Patient location during procedure: pre-op Start time: 10/18/2024 7:01 AM Reason for block: at surgeon's request and post-op pain management Performed by CRNA/CAA: Lauri Oliver, CRNASRNA: Yani Sanchez SRNA Preanesthetic Checklist Completed: patient identified, IV checked, site marked, risks and benefits discussed, surgical consent, monitors and equipment checked, pre-op evaluation and timeout performed Prep: Sterile barriers:cap, gloves, mask and washed/disinfected hands  Prep: ChloraPrep Patient monitoring: blood pressure monitoring, continuous pulse oximetry and EKG Procedure Sedation: yes Performed under: local infiltration Guidance:ultrasound guided ULTRASOUND INTERPRETATION.  Using ultrasound guidance a 20 G gauge needle was placed in close proximity to the nerve, at which point, under ultrasound guidance anesthetic was injected in the area of the nerve and spread of the anesthesia was seen on ultrasound in close proximity thereto.  There were no abnormalities seen on ultrasound; a digital image was taken; and the patient tolerated the procedure with no complications. Images:still images obtained, printed/placed on chart Laterality:left Block Type:popliteal Injection Technique:catheter Needle Type:echogenic and Tuohy Needle Gauge:18 G Resistance on Injection: none Catheter Size:20 G Cath Depth at skin: 6 cm Medications Used: bupivacaine PF (MARCAINE) 0.25 % injection - Injection  25 mL - 10/18/2024 7:01:00 AM Post Assessment Injection Assessment: negative aspiration for heme, no paresthesia on injection and incremental injection Patient Tolerance:comfortable throughout block Complications:no Additional Notes CATHETER                             A high-frequency linear transducer, with sterile cover, was placed in the popliteal fossa to identify the popliteal artery and vein, Tibial nerve (TN) and Common Peroneal nerve (CP). The transducer was then moved in a cephalad fashion to observe the TN and CP nerve bifurcation to form the Sciatic Nerve. The insertion site was prepped and draped in sterile fashion. Skin and cutaneous tissue was infiltrated with 2-5 ml of 1% Lidocaine. Using ultrasound-guidance, an 18-gauge Contiplex Ultra 360 Touhy needle was advanced in plane from lateral to medial. Preservative-free normal saline was utilized for hydro-dissection of tissue, advancement of Touhy needle, and to confirm final needle placement posterior to the nerves. Local anesthetic  "injection spread, in incremental 3-5 ml injections, to surround both nerve structures. Aspiration every 5 ml to prevent intravascular injection. Injection was completed with negative aspiration of blood and negative intravascular injection. Injection pressures were normal with minimal resistance. A 20-gauge Contiplex Echo catheter was placed through the needle and advance out the tip of the Touhy 1-3 cm. The Touhy needle was then removed, and final catheter position verified (Below/above or Anterior/Posterior) the nerve structures. The catheter was secured in the usual fashion with skin glue, benzoin, steri-strips, CHG tegaderm and Label noting \"Nerve Block Catheter\". Jerk tape applied at yellow connector and catheter connection. Performed by: Mason Sharma CRNA     Adductor canal    Result Date: 10/18/2024  Yani Sanchez SRNA     10/18/2024  8:20 AM Adductor canal Patient reassessed immediately prior to procedure Patient location during procedure: pre-op Start time: 10/18/2024 6:55 AM Reason for block: at surgeon's request and post-op pain management Performed by SARAH/CAA: Lauri Oliver, SARAHSRNA: Yani Sanchez SRNA Preanesthetic Checklist Completed: patient identified, IV checked, site marked, risks and benefits discussed, surgical consent, monitors and equipment checked, pre-op evaluation and timeout performed Prep: Pt Position: supine Sterile barriers:cap, gloves, mask, sterile barriers and washed/disinfected hands Prep: ChloraPrep Patient monitoring: blood pressure monitoring, continuous pulse oximetry and EKG Procedure Performed under: local infiltration Guidance:ultrasound guided ULTRASOUND INTERPRETATION.  Using ultrasound guidance a 20 G gauge needle was placed in close proximity to the nerve, at which point, under ultrasound guidance anesthetic was injected in the area of the nerve and spread of the anesthesia was seen on ultrasound in close proximity thereto.  There were no abnormalities seen on " "ultrasound; a digital image was taken; and the patient tolerated the procedure with no complications. Images:still images obtained, printed/placed on chart Laterality:left Block Type:adductor canal block Injection Technique:single-shot Needle Type:echogenic and short-bevel Needle Gauge:20 G Resistance on Injection: none Catheter size: 20g. Medications Used: bupivacaine PF (MARCAINE) 0.25 % injection - Injection  25 mL - 10/18/2024 6:55:00 AM dexamethasone sodium phosphate injection - Injection  2 mg - 10/18/2024 6:55:00 AM fentaNYL citrate (PF) (SUBLIMAZE) injection - Intravenous  100 mcg - 10/18/2024 6:55:00 AM Post Assessment Injection Assessment: negative aspiration for heme, incremental injection and no paresthesia on injection Patient Tolerance:comfortable throughout block Complications:no Additional Notes SINGLE shot A high-frequency linear transducer, with sterile cover, was placed on the anterior mid-thigh (between the anterior superior iliac spine and patella). The transducer was then moved medially to identify the Sartorius muscle (Nola), Vastus Medialis muscle (VMM), Superficial Femoral Artery (SFA) and Vein. The transducer was then moved cephalad or caudad to position the SFA in the middle of the Nola. The insertion site was prepped and draped in sterile fashion. Skin and cutaneous tissue was infiltrated with 2-5 ml of 1% Lidocaine. Using ultrasound-guidance, a 20-gauge B-Beach 4\" Ultraplex 360 non-stimulating echogenic needle was advanced in plane from lateral to medial. Preservative-free normal saline was utilized for hydro-dissection of tissue, advancement of needle, and to confirm needle placement below the fascial plane of the Nola where the Nerve to the VMM is located. Local anesthetic (LA) 5 ml deposited here. The needle continues its path lateral to the SFA at the level of the Saphenous Nerve. The remainder of the LA was deposited at the 10-11 o'clock position of the SFA. This injection created a " space between the Nola and the SFA. Aspiration every 5 ml to prevent intravascular injection. Injection was completed with negative aspiration of blood and negative intravascular injection. Injection pressures were normal with minimal resistance. Performed by: Mason Sharma CRNA     Results for orders placed during the hospital encounter of 01/02/24    Adult Transthoracic Echo Complete W/ Cont if Necessary Per Protocol    Interpretation Summary    Left ventricular systolic function is normal. Calculated left ventricular EF = 57.9% Left ventricular ejection fraction appears to be 56 - 60%.    Left ventricular diastolic function was normal.    Estimated right ventricular systolic pressure from tricuspid regurgitation is normal (<35 mmHg).    No significant change from previous study      Current medications:  Scheduled Meds:aspirin, 81 mg, Oral, Daily  ceFAZolin, 2,000 mg, Intravenous, Q8H  DULoxetine, 20 mg, Oral, BID  folic acid, 1 mg, Oral, Daily  gabapentin, 300 mg, Oral, Daily  gabapentin, 600 mg, Oral, Nightly  heparin (porcine), 5,000 Units, Subcutaneous, Q8H  levothyroxine, 25 mcg, Oral, Q AM  magnesium oxide, 400 mg, Oral, Daily  melatonin, 5 mg, Oral, Nightly  metoprolol succinate XL, 25 mg, Oral, Nightly  pantoprazole, 40 mg, Oral, Every Other Day  rosuvastatin, 5 mg, Oral, Daily  sodium chloride, 10 mL, Intravenous, Q12H  sodium chloride, 10 mL, Intravenous, Q12H  thiamine, 200 mg, Oral, Daily  vitamin B-12, 1,000 mcg, Oral, Daily      Continuous Infusions:lactated ringers, 9 mL/hr, Last Rate: 9 mL/hr (10/18/24 0730)  ropivacaine,   sodium chloride, 100 mL/hr      PRN Meds:.  senna-docusate sodium **AND** polyethylene glycol **AND** bisacodyl **AND** bisacodyl    bisacodyl    Calcium Replacement - Follow Nurse / BPA Driven Protocol    docosanol    HYDROmorphone **AND** naloxone    influenza vaccine    Magnesium Standard Dose Replacement - Follow Nurse / BPA Driven Protocol    melatonin    ondansetron ODT  **OR** ondansetron    oxyCODONE    Phosphorus Replacement - Follow Nurse / BPA Driven Protocol    Potassium Replacement - Follow Nurse / BPA Driven Protocol    [COMPLETED] Insert Peripheral IV **AND** sodium chloride    sodium chloride    sodium chloride    sodium chloride    Assessment & Plan   Assessment & Plan     Active Hospital Problems    Diagnosis  POA    **Closed fracture of left ankle [S82.102A]  Unknown    Closed displaced fracture of fourth metatarsal bone of left foot [S92.342A]  Yes    Lisfranc dislocation, left, initial encounter [T90.367A]  Yes      Resolved Hospital Problems   No resolved problems to display.        Brief Hospital Course to date:  Alysia Sierra is a 69 y.o. female with history of CKD III, chronic anemia, uterine cancer, hypothyroidism and alcohol abuse who was admitted to [facility] on 10/5/24 for L ankle fracture after stepping awkwardly off her bed.           Left foot fracture with involvement of 1st, 2nd, 3rd and 4th metatarsal bases  Acute nondisplaced L medial malleolus fracture  Acute nondisplaced L posterior medial talus fracture   1st-4th proximal metatarsal fracture Lisfranc fracture  - LLE splinted. Fracture blister on dorsal aspect of L foot noted - per ortho allow blisters to decompress on their own  - PRN pain management  - Continue PT and OT   -S/p repair 10/18 then likely rehab     Alcohol withdrawal, resolved  History of alcohol abuse   - Patient denies daily alcohol use, however family reports daily heavy alcohol use  - Developed confusion / hallucinations on afternoon of 10/8 and started on CIWA protocol  - Withdrawal symptoms resolved. CIWA protocol discontinued.  - Continue PO thiamine, folic acid  - Continue QHS Melatonin     CKD III  -Check a.m. labs     Chronic anemia  Iron deficiency  - Iron studies show ACD with iron deficiency  - Status post IV iron  - B12, folate within normal limits     Hypothyroidism   - Continue Levothyroxine     GERD    - Continue PPI     Neuropathy   - Continue Gabapentin      Expected Discharge Location and Transportation: snf  Expected Discharge   Expected Discharge Date: 10/20/2024; Expected Discharge Time:      VTE Prophylaxis:  Pharmacologic & mechanical VTE prophylaxis orders are present.         AM-PAC 6 Clicks Score (PT): 18 (10/17/24 5863)    CODE STATUS:   Code Status and Medical Interventions: CPR (Attempt to Resuscitate); Full Support   Ordered at: 10/06/24 0246     Code Status (Patient has no pulse and is not breathing):    CPR (Attempt to Resuscitate)     Medical Interventions (Patient has pulse or is breathing):    Full Support       Shaylee Main,   10/18/24

## 2024-10-18 NOTE — ANESTHESIA PROCEDURE NOTES
Popliteal sciatic cath      Patient reassessed immediately prior to procedure    Patient location during procedure: pre-op  Start time: 10/18/2024 7:01 AM  Reason for block: at surgeon's request and post-op pain management  Performed by  CRNA/CAA: Lauri Oliver, CRNASRNA: Yani Sanchez SRNA  Preanesthetic Checklist  Completed: patient identified, IV checked, site marked, risks and benefits discussed, surgical consent, monitors and equipment checked, pre-op evaluation and timeout performed  Prep:  Sterile barriers:cap, gloves, mask and washed/disinfected hands  Prep: ChloraPrep  Patient monitoring: blood pressure monitoring, continuous pulse oximetry and EKG  Procedure    Sedation: yes  Performed under: local infiltration  Guidance:ultrasound guided    ULTRASOUND INTERPRETATION.  Using ultrasound guidance a 20 G gauge needle was placed in close proximity to the nerve, at which point, under ultrasound guidance anesthetic was injected in the area of the nerve and spread of the anesthesia was seen on ultrasound in close proximity thereto.  There were no abnormalities seen on ultrasound; a digital image was taken; and the patient tolerated the procedure with no complications. Images:still images obtained, printed/placed on chart    Laterality:left  Block Type:popliteal  Injection Technique:catheter  Needle Type:echogenic and Tuohy  Needle Gauge:18 G  Resistance on Injection: none  Catheter Size:20 G  Cath Depth at skin: 6 cm    Medications Used: bupivacaine PF (MARCAINE) 0.25 % injection - Injection   25 mL - 10/18/2024 7:01:00 AM      Post Assessment  Injection Assessment: negative aspiration for heme, no paresthesia on injection and incremental injection  Patient Tolerance:comfortable throughout block  Complications:no  Additional Notes  CATHETER                               A high-frequency linear transducer, with sterile cover, was placed in the popliteal fossa to identify the popliteal artery and vein, Tibial nerve  "(TN) and Common Peroneal nerve (CP). The transducer was then moved in a cephalad fashion to observe the TN and CP nerve bifurcation to form the Sciatic Nerve. The insertion site was prepped and draped in sterile fashion. Skin and cutaneous tissue was infiltrated with 2-5 ml of 1% Lidocaine. Using ultrasound-guidance, an 18-gauge Contiplex Ultra 360 Touhy needle was advanced in plane from lateral to medial. Preservative-free normal saline was utilized for hydro-dissection of tissue, advancement of Touhy needle, and to confirm final needle placement posterior to the nerves. Local anesthetic injection spread, in incremental 3-5 ml injections, to surround both nerve structures. Aspiration every 5 ml to prevent intravascular injection. Injection was completed with negative aspiration of blood and negative intravascular injection. Injection pressures were normal with minimal resistance. A 20-gauge Contiplex Echo catheter was placed through the needle and advance out the tip of the Touhy 1-3 cm. The Touhy needle was then removed, and final catheter position verified (Below/above or Anterior/Posterior) the nerve structures. The catheter was secured in the usual fashion with skin glue, benzoin, steri-strips, CHG tegaderm and Label noting \"Nerve Block Catheter\". Jerk tape applied at yellow connector and catheter connection.    Performed by: Mason Sharma CRNA            "

## 2024-10-18 NOTE — ANESTHESIA PROCEDURE NOTES
Airway  Urgency: elective    Date/Time: 10/18/2024 7:36 AM  Airway not difficult    General Information and Staff    Patient location during procedure: OR  CRNA/CAA: Lauri Oliver CRNA  SRNA: Yani Sanchez SRNA  Indications and Patient Condition  Indications for airway management: airway protection    Preoxygenated: yes  MILS not maintained throughout  Mask difficulty assessment: 2 - vent by mask + OA or adjuvant +/- NMBA    Final Airway Details  Final airway type: endotracheal airway      Successful airway: ETT  Cuffed: yes   Successful intubation technique: direct laryngoscopy  Endotracheal tube insertion site: oral  Blade: Noni  Blade size: 3  ETT size (mm): 6.5  Cormack-Lehane Classification: grade I - full view of glottis  Placement verified by: chest auscultation and capnometry   Cuff volume (mL): 6  Measured from: lips  ETT/EBT  to lips (cm): 20  Number of attempts at approach: 1  Assessment: lips, teeth, and gum same as pre-op and atraumatic intubation    Additional Comments  Negative epigastric sounds, Breath sound equal bilaterally with symmetric chest rise and fall    Small red leonor on the middle of upper lip/philtrum prior to induction

## 2024-10-18 NOTE — PROGRESS NOTES
"      Orthopaedic Surgery Progress Note  Chief complaint  Left foot and ankle pain  Subjective   Interval History:   Resting in bed, Pain well-controlled, no new complaints, n.p.o. for surgery today    ROS: Denies fever, chills, nausea or vomiting    Objective     Vital Signs:  Temp (24hrs), Av.6 °F (36.4 °C), Min:96.9 °F (36.1 °C), Max:98.3 °F (36.8 °C)    /74 (BP Location: Right arm, Patient Position: Lying)   Pulse 64   Temp 97.5 °F (36.4 °C) (Temporal)   Resp 18   Ht 162.6 cm (64\")   Wt 61.2 kg (135 lb)   LMP  (LMP Unknown)   SpO2 97%   BMI 23.17 kg/m²   Body mass index is 23.17 kg/m².    Physical Exam:  left LE: Well-padded 3-way short leg splint in place, compartments above splint soft/compressible   + Wiggling toes, silt in toes   CR brisk in all toes, Foot WWP    A1C on 10/5/24 = 5.2    Assessment and Plan:  69-year-old female with left foot Lisfranc fracture dislocation as well as ipsilateral ankle fracture    Spoke to my partner Dr. Juanpablo Lee, I will be taking over management of this patient from an orthopedic standpoint.    -NPO for OR today, left ankle and foot ORIF  -Nonweightbearing left lower extremity  -PT/OT  -Keep splint clean dry and intact  -Pain control  -Aggressive elevation LLE  -DVT prophylaxis, chemical and mechanical, per primary  -Rest of mgmt per primary team      Sergio Godoy MD  10/18/24  07:41 EDT       "

## 2024-10-18 NOTE — DISCHARGE INSTRUCTIONS
Sergio Godoy MD  Orthopedic Foot & Ankle Surgeon  [facility]    Diagnosis: Closed fracture of left ankle  Procedure Performed: Procedure(s) (LRB):  Left midfoot and ankle ORIF (Left)    What to Expect After Surgery  Diet after surgery:  Resume your diet gradually.  Begin with clear liquids and gradually progress as you feel ready.  Surgical Site Care:  Please remain in your post-operative dressing/splint until your first post op appointment with Dr. Godoy.  Please make sure to keep your post-operative dressing clean and dry.  Please use a shower bag (e.g., [email or web address]) when showering or bathing to keep things dry. Wet padding can cause skin breakdown.  Do not stick anything down your cast or splint.  If you sustain a scratch, you are at higher risk for infection as casts and splints harbor more bacteria.  Follow Up Appointment: Please call the [facility] at [phone] or Dr. Godoy's schedulers within two (2) days of your discharge to /confirm/verify your follow-up appointment date/time with Dr. Godoy.  Typically, Dr. Godoy will want to see you 14-21 days after surgery for a post-operative follow-up appointment - before 14 days, the sutures may have to stay in and you will need to return in the 14-21 day window again.  Please arrive ~15 minutes prior to your appointment time to take care of any paperwork.      Activity Precautions:  Elevate the leg above your heart as much as possible for the first two weeks after surgery.  If the leg is higher than your heart, gravity helps decrease swelling, decrease pain, and improve blood flow.  If the leg is below your heart, swelling increases, pain can worsen, and your wound is at greater risk of infection.  Keep all weight off of your surgical leg until cleared by your physician.  Use ice packs intermittently (20 minutes on, 20 minutes off) to reduce pain and swelling, however, use extreme caution with icing  if your block is still in effect.  Make sure to have a barrier between your skin and the ice pack to avoid frost bite.   If you are in a post-operative splint, you can wiggle your toes to help push swelling to your lymph ducts, but do not try to move your ankle.      Pain Management  Nerve Blocks:  to help control pain after surgery, you may have received a nerve block where the anesthesiologist injected numbing medication around the nerves that send pain signals from your foot or ankle to your brain.  This helps you feel little to no pain.   The time a nerve block lasts varies from person to person.  Often, they will last between 12 and 24 hours but could last days.  You may not be able to move your foot and/or ankle during this time.  As the block medication wears off, you may feel a tingling sensation as the nerves start to wake up.  Keep your foot and ankle safe while it is numb.  Avoid excessive heating,  scratching, etc. until the block has completely worn off.  If icing the ankle or foot, watch the toes closely to ensure that they do not become discolored (i.e., white, red or blue)  Even if you still feel no pain in your leg before you go to bed, take a dose of your narcotic medication before you go to sleep.  You do not want to wake up with the block having worn off and being behind on pain control - it is quite difficult to 'catch up' again.  Pain Medication:    Acetaminophen (Tylenol) 500 mg tablets are typically your baseline pain medication.  Take this tablet up to once every 4 hours. Do not exceed 3,000 mg of acetaminophen daily.  You have been provided Oxycodone immediate release tablets as your initial narcotic pain reliever. Take doses of this medication if you are having severe breakthrough pain uncontrolled by the Tylenol alone. Typically, patients are taking it every 4 hours for the first day or two after surgery.  Actively wean down your use of this medication after the third day after  surgery.  Note that it takes about 30-45 minutes for oral pain medication to take effect.  DO NOT drink alcoholic beverages, use recreational drugs, or use prescription sedative medications when taking pain medication.  DO NOT operate heavy machinery/drive while taking opioids.  To avoid the risk of nausea, please make sure that you are taking your medication with food.  You may experience light headedness while taking your pain medication.  Make sure you drink plenty of water while taking narcotic pain medication to stay well hydrated and help avoid constipation.    You have been provided a Docusate prescription to help with constipation that can be a side effect of taking narcotics.  Take that medication as needed.  You have been provided a Zofran prescription to help with nausea that you can take as needed.  Itching is a common side effect to pain medication usage.  If you have an associated rash with the itching, please contact your provider.       When to Call Your Physician  Common or Normal Post-Operative Reactions:  Low grade fever (approximately 100.5 degrees) for up to one week.  Small amount of blood or fluid leaking from the surgical site or dressing  Bruising along the surgical extremity  Swelling around the surgical site and surrounding area  The reactions listed above are NORMAL, but you want to call your surgeon if any of these reactions persist.  Symptoms to Report:  Please report any of the following signs to your surgeon:  Signs of infection:  Redness or pain around your incision (if you cannot see your incision, red streaking up your extremity should be reported).  Thick, dark yellow or foul-smelling drainage at the incision site or from your dressing.  Temperature measured over 101.5 degrees for more than 24 hours despite Tylenol.  Signs of Decreased Circulation to the Ankle and Foot:  Coldness of ankle and foot  Foot or leg turning pale  Tingling/numbness (after the block has fully  resolved)  Sustained increases in pain uncontrolled by medication  Toenail bed turns blue in color  Blood Clots:  Although rare, please be aware and utilize the recommendations below to avoid getting a blood clot.  Prevention of deep vein thrombus DVT (blood clots):  You have been given a prescription for daily Aspirin to help prevent blood clot formation.  Get up 4-5 times during the daytime and walk/crutch/walker across the room to keep the blood circulating in your legs. (Maintain any weightbearing restrictions, of course)  Wiggle your toes frequently if you are in a splint or case  Notify your physician if you are a nicotine user, on hormone replacement therapy medications, are taking birth control, or have a history of blood clots as these can increase your risk of developing a blood clot.  Notify your provider if you develop pain or tenderness in your calf muscle    If you have any additional questions, please contact Dr. Godoy's staff the [facility] at [phone]    IF YOU HAVE AN EMERGENCY, i.e., SHORTNESS OF BREATH, CHEST PAIN, OR SYMPTOMS SEVERE IN NATURE, PLEASE CALL 911 OR VISIT YOUR LOCAL EMERGENCY ROOM

## 2024-10-18 NOTE — OP NOTE
ORTHOPAEDIC SURGERY OPERATIVE REPORT    Location of Surgery: [facility]    Patient Name:  Alysia Sierra  YOB: 1955    Date of Surgery:  10/18/2024     Pre-op Diagnosis:   Comminuted fractures of the proximal first, second, third and fourth metatarsals with extension to the Lisfranc joint, Lisfranc fracture/dislocation    Post-op Diagnosis:      Same    Procedure/CPT® Codes:  1. Open tx TMT dislocation of TMT 1, 73897  2. Open tx TMT dislocation of TMT 2, 70736  3. Open tx TMT dislocation of TMT 3, 26443  4. Open tx TMT dislocation of TMT 4, 91939  5. ORIF of 1st metatarsal fx, each 13355  6. ORIF of 2nd metatarsal fx, each 69224  7. ORIF of 3rd metatarsal fx, each 16035  8. ORIF of 4th metatarsal fx, each 53483  9. ORIF medial mal fx, 51358  10. Application of short leg splint 73603    Staff:  Surgeon(s):  Sergio Godoy MD       Anesthesia: General with Block    Estimated Blood Loss: 75 mL    Specimen:          None    Complications:   None    CLINICAL HISTORY:  Alysia Sierra is a 69 y.o. female with the above condition. Discussed operative and non-operative treatment options and risks including but not limited to pain, infection, bleeding, damage to surrounding structures, and need for additional procedures.  After all questions were fully answered, Alysia Sierra understood the risks and elected to proceed, and informed consent was obtained. The patient was marked with indelible marking pen after verifying correct side, site, and procedure.      DESCRIPTION OF PROCEDURE:   The patient was identified in the pre-operative area where correct procedure, site, and patient were verified. The patient was brought to the operating theater in stable condition and was transferred to the operative table where they remained in the supine position for the entirety of the case. Preoperative timeout was taken to ensure the correct identity, operative procedure, and location. General anesthesia  was then administered. The patient received appropriate weight based IV antibiotics within 30 minutes of skin incision.  All bony prominences well-padded. The left leg was then prepped and draped in the standard sterile fashion.     I started with my attention on her medial malleolar ankle fracture.  The nondisplaced nature of the fracture was confirmed on fluoroscopy.  Next percutaneous fixation of the fracture was achieved with two 4-0 cannulated screws applied in a bicortical nature to compress and stabilize the medial malleolar fracture.  Positioning and screw length were checked on fluoroscopy and found to be adequate.     After Esmarch exsanguination, the tourniquet was then inflated.  Next I turned my attention to the foot.  A longitudinal incision was marked out and made medially centered over the first TMT joint.  Dissection was continued using dissecting scissors and the neurovascular structures were protected.  Blunt dissection was continued down to the first TMT joint which was clearly disrupted.  Sharp dissection was used to elevate the periosteum.  Next a longitudinal incision was marked out and made between the second and third rays.  Blunt dissection was continued down to the TMT joints at this location.  Care was taken to protect all neurovascular structures.  The proximal second and third metatarsal fractures were identified. Intervals between the medial and middle cuneiform as well as between the middle and lateral cuneiform appeared to be stable. I then debrided the disrupted joints of interposed soft tissue.  The joints were then irrigated.  Next the base of the second metatarsal was held reduced keying into the medial cuneiform with pointed reduction clamps and a K wire was placed to hold this interval reduced.  Next after countersinking was performed a 4-0 cannulated screw was placed retrograde from the base of the second metatarsal into the medial cuneiform.   At this point I turned my  attention to the medial incision.  A K wire was placed across the first TMT joint holding an anatomic reduction.  Next an anatomic first TMT plate was chosen.  The plate was then secured to the medial cuneiform with 2 locking screws and 3 screws were placed through the plate securing it to the first metatarsal and holding the proximal first metatarsal fracture dislocation in anatomic alignment.  Next, temporary fixation was used to hold the proximal second and third metatarsals reduced.  Next a anatomic second and third ray plate was chosen to span the second and third proximal metatarsal fractures.  2 locking screws were placed in the middle cuneiform as well as the lateral cuneiform locking the plate in place.  4 screws were then placed distally, 2 in the proximal third metatarsal and 2 in the proximal second metatarsal securing the plate in place and holding an anatomic reduction of the second and third proximal metatarsal fracture dislocations. Next I turned my attention to the fourth ray.  After confirming anatomic reduction with fluoroscopy a K wire was placed retrograde through the proximal fourth metatarsal into the cuboid, holding the fourth proximal metatarsal fracture dislocation in anatomic alignment.  The tourniquet was taken down before 120 minutes had elapsed.     Next, a retractor was used to visualize the midfoot joints through our surgical wounds confirming anatomic reduction of the unstable joints.  Fluoroscopy was then used to confirm proper alignment of all hardware as well as proper length of screws.  Hardware placement and screw length were all found to be adequate.  Final images were taken.     All wounds were then copiously irrigated with Irrisept followed by sterile saline solution. Hemostasis was achieved with electrocautery.  The deep layers were closed with 2-0 monocryl.  Subcutaneous layers were closed with 3-0 Monocryl.  The skin was closed with 3-0 nylon.  A sterile dressing was  applied followed by well-padded 3-way splint. The ankle was splinted in neutral.      The patient tolerated the procedure well no with acute complications identified.  All sponge & needle counts were correct x2 prior to procedure conclusion.  Patient was awoken from anesthesia and transferred back to the PACU without complication.     Counts:    Sponge: Correct    Needle: Correct    Sharp: Correct    Instrument: Correct     POSTOPERATIVE PLAN:   -Transfer back to floor  -NWB operative extremity  -Advance diet as tolerated, per primary  -Keep splint/operative dressing clean and dry  -DVT prophylaxis, mechanical and ASA, home on ASA  -Postoperative antibiotics  -Pain control  -PT/OT  -Elevate operative extremity  -Rest of mgmt per primary team  -DC planning, IPR    Implants:    Implant Name Type Inv. Item Serial No.  Lot No. LRB No. Used Action   ANSHUL BENEDICT/SCRW/SYS/PLS LNG/THRD TI 4X52MM - [id number] Implant SCRW MARICHUY BENEDICT/SCRW/SYS/PLS LNG/THRD TI 4X52MM  DEPUY SYNTHES  Left 1 Implanted   SCRW MARICHUY BENEDICT/SCRW/SYS/PLS LNG/THRD TI 4X56MM - [id number] Implant SCRW MARICHUY BENEDICT/SCRW/SYS/PLS LNG/THRD TI 4X56MM  DEPUY SYNTHES  Left 1 Implanted   ANSHUL BENEDICT/SCRW/SYS/PLS LNG/THRD TI 4X32MM - [id number] Implant SCRW MARICHUY BENEDICT/SCRW/SYS/PLS LNG/THRD TI 4X32MM  DEPUY SYNTHES  Left 1 Implanted   0 mm step plate      Left 1 Implanted   small (l) 2nd and 3rd lisfranc joint fusion plate      Left 1 Implanted   16 mm 3.5 va locking screw      Left 2 Implanted   18 3.5 va locking screw      Left 3 Implanted   20mm 3.5 va locking screw      Left 3 Implanted   18mm 3.5 cortex srew      Left 2 Implanted   22 mm 3.5 va locking screw      Left 3 Implanted   22 mm 3.5 va locking screw      Left 1 Explanted   18 mm 4.0 cortex srew      Left 1 Implanted       IKE Moyer  was responsible for performing the following activities: Retraction, Suction, Irrigation, Suturing, Closing, and Placing Dressing and their skilled  assistance was necessary for the success of this case.     Sergio Godoy MD     Date: 10/18/2024  Time: 15:35 EDT  Electronically signed by Sergio Godoy MD, 10/18/24, 3:35 PM EDT.

## 2024-10-18 NOTE — ANESTHESIA POSTPROCEDURE EVALUATION
Patient: Alysia Sierra    Procedure Summary       Date: 10/18/24 [facility] / [facility]    Anesthesia Start: 0730 Anesthesia Stop: 1225    Procedure: Left midfoot and ankle ORIF (Left: Ankle) Diagnosis:       Lisfranc dislocation, left, initial encounter      Closed fracture of left ankle, initial encounter      (Lisfranc dislocation, left, initial encounter [S93.325A])      (Closed fracture of left ankle, initial encounter [S82.892A])    Surgeons: Sergio Godoy MD Provider: Eduardo Ryan MD    Anesthesia Type: general ASA Status: 3            Anesthesia Type: general    Vitals  No vitals data found for the desired time range.          Post Anesthesia Care and Evaluation    Patient location during evaluation: PACU  Patient participation: complete - patient participated  Level of consciousness: awake and alert  Pain score: 0  Pain management: adequate    Airway patency: patent  Anesthetic complications: No anesthetic complications  PONV Status: none  Cardiovascular status: hemodynamically stable and acceptable  Respiratory status: nonlabored ventilation, acceptable and nasal cannula  Hydration status: acceptable

## 2024-10-18 NOTE — CASE MANAGEMENT/SOCIAL WORK
Continued Stay Note  [facility]     Patient Name: Alysia Sierra  MRN: [id number]  Today's Date: 10/18/2024    Admit Date: 10/5/2024    Plan: [facility]   Discharge Plan       Row Name 10/18/24 0802       Plan    Plan [facility]    Plan Comments Continued plan for [facility] will review records and submit to insurance once out of surgery and therapy has been initied.    Final Discharge Disposition Code 03 - skilled nursing facility (SNF)                   Discharge Codes    No documentation.                 Expected Discharge Date and Time       Expected Discharge Date Expected Discharge Time    Oct 20, 2024               Melvi Bear RN

## 2024-10-18 NOTE — ANESTHESIA PREPROCEDURE EVALUATION
Anesthesia Evaluation     Patient summary reviewed and Nursing notes reviewed                Airway   Mallampati: II  TM distance: >3 FB  Neck ROM: full  No difficulty expected  Dental - normal exam     Pulmonary - normal exam   (+) ,shortness of breath  Cardiovascular - normal exam    (+) hypertension, NIEVES, hyperlipidemia      Neuro/Psych  (+) dizziness/light headedness, syncope, numbness, psychiatric history Anxiety and Depression  GI/Hepatic/Renal/Endo    (+) GERD, liver disease, renal disease-, thyroid problem hypothyroidism    Musculoskeletal     Abdominal  - normal exam    Bowel sounds: normal.   Substance History - negative use     OB/GYN negative ob/gyn ROS         Other   arthritis,   history of cancer (UTERINE)                Anesthesia Plan    ASA 3     general     (POPLITEAL CATHETER/ADDUCTOR SS FOR POSTOP PAIN)  intravenous induction     Anesthetic plan, risks, benefits, and alternatives have been provided, discussed and informed consent has been obtained with: patient.    Plan discussed with CRNA.    CODE STATUS:    Code Status (Patient has no pulse and is not breathing): CPR (Attempt to Resuscitate)  Medical Interventions (Patient has pulse or is breathing): Full Support

## 2024-10-18 NOTE — PLAN OF CARE
Goal Outcome Evaluation:  Plan of Care Reviewed With: patient        Progress: improving     Pt aox4, vss, 2L nc while sleeping, surgery this shift to LLE-splint in place and cdi, no c/o pain, infu block in place-cdi, scds, CIWA-0, IS encouraged, plans for rehab at [facility]..     Problem: Adult Inpatient Plan of Care  Goal: Absence of Hospital-Acquired Illness or Injury  Intervention: Identify and Manage Fall Risk  Recent Flowsheet Documentation  Taken 10/18/2024 1830 by Margaret Mendes, RN  Safety Promotion/Fall Prevention:   activity supervised   assistive device/personal items within reach   clutter free environment maintained   elopement precautions   fall prevention program maintained   gait belt   lighting adjusted   mobility aid in reach   muscle strengthening facilitated   nonskid shoes/slippers when out of bed   safety round/check completed   room organization consistent   toileting scheduled  Taken 10/18/2024 1620 by Margaret Mendes, RN  Safety Promotion/Fall Prevention:   activity supervised   assistive device/personal items within reach   elopement precautions   clutter free environment maintained   gait belt   fall prevention program maintained   lighting adjusted   muscle strengthening facilitated   mobility aid in reach   nonskid shoes/slippers when out of bed   room organization consistent   safety round/check completed   toileting scheduled  Taken 10/18/2024 1513 by Margaret Mendes, RN  Safety Promotion/Fall Prevention:   activity supervised   assistive device/personal items within reach   clutter free environment maintained   elopement precautions   fall prevention program maintained   gait belt   lighting adjusted   muscle strengthening facilitated   mobility aid in reach   nonskid shoes/slippers when out of bed   room organization consistent   toileting scheduled   safety round/check completed  Taken 10/18/2024 1305 by Margaret Mendes, RN  Safety Promotion/Fall Prevention:   activity  supervised   assistive device/personal items within reach   clutter free environment maintained   elopement precautions   fall prevention program maintained   gait belt   lighting adjusted   mobility aid in reach   muscle strengthening facilitated   nonskid shoes/slippers when out of bed   room organization consistent   safety round/check completed   toileting scheduled  Taken 10/18/2024 0952 by Margaret Mendes RN  Safety Promotion/Fall Prevention: (OR) patient off unit  Taken 10/18/2024 0800 by Margaret Mendes RN  Safety Promotion/Fall Prevention: (IN OR) patient off unit     Problem: Adult Inpatient Plan of Care  Goal: Absence of Hospital-Acquired Illness or Injury  Intervention: Prevent Skin Injury  Recent Flowsheet Documentation  Taken 10/18/2024 1830 by Margaret Mendes RN  Body Position:   turned   right  Skin Protection:   transparent dressing maintained   skin sealant/moisture barrier applied   silicone foam dressing in place  Taken 10/18/2024 1620 by Margaret Mendes RN  Body Position: position changed independently  Skin Protection:   transparent dressing maintained   skin sealant/moisture barrier applied   silicone foam dressing in place  Taken 10/18/2024 1513 by Margaret Mendes RN  Body Position: weight shifting  Skin Protection:   transparent dressing maintained   skin sealant/moisture barrier applied   silicone foam dressing in place  Taken 10/18/2024 1305 by Margaret Mendes RN  Body Position: weight shifting  Skin Protection:   incontinence pads utilized   transparent dressing maintained   skin sealant/moisture barrier applied   silicone foam dressing in place   pulse oximeter probe site changed     Problem: Adult Inpatient Plan of Care  Goal: Absence of Hospital-Acquired Illness or Injury  Intervention: Prevent and Manage VTE (Venous Thromboembolism) Risk  Recent Flowsheet Documentation  Taken 10/18/2024 1830 by Margaret Mendes RN  VTE Prevention/Management:   SCDs (sequential compression  devices) on   right  Taken 10/18/2024 1620 by Margaret Mendes RN  VTE Prevention/Management:   SCDs (sequential compression devices) on   right  Taken 10/18/2024 1513 by Margaret Mendes RN  VTE Prevention/Management:   SCDs (sequential compression devices) on   right  Taken 10/18/2024 1305 by Margaret Mendes RN  VTE Prevention/Management:   SCDs (sequential compression devices) on   right     Problem: Adult Inpatient Plan of Care  Goal: Absence of Hospital-Acquired Illness or Injury  Intervention: Prevent Infection  Recent Flowsheet Documentation  Taken 10/18/2024 1830 by Margaret Mendes, RN  Infection Prevention:   hand hygiene promoted   rest/sleep promoted  Taken 10/18/2024 1620 by Margaret Mendes RN  Infection Prevention:   hand hygiene promoted   rest/sleep promoted  Taken 10/18/2024 1513 by Margaret Mendes, RN  Infection Prevention:   hand hygiene promoted   rest/sleep promoted  Taken 10/18/2024 1305 by Margaret Mendes RN  Infection Prevention:   hand hygiene promoted   rest/sleep promoted

## 2024-10-18 NOTE — ANESTHESIA PROCEDURE NOTES
Adductor canal      Patient reassessed immediately prior to procedure    Patient location during procedure: pre-op  Start time: 10/18/2024 6:55 AM  Reason for block: at surgeon's request and post-op pain management  Performed by  CRNA/CAA: Lauri Oliver, CRNASRNA: Yani Sanchez SRNA  Preanesthetic Checklist  Completed: patient identified, IV checked, site marked, risks and benefits discussed, surgical consent, monitors and equipment checked, pre-op evaluation and timeout performed  Prep:  Pt Position: supine  Sterile barriers:cap, gloves, mask, sterile barriers and washed/disinfected hands  Prep: ChloraPrep  Patient monitoring: blood pressure monitoring, continuous pulse oximetry and EKG  Procedure  Performed under: local infiltration  Guidance:ultrasound guided    ULTRASOUND INTERPRETATION.  Using ultrasound guidance a 20 G gauge needle was placed in close proximity to the nerve, at which point, under ultrasound guidance anesthetic was injected in the area of the nerve and spread of the anesthesia was seen on ultrasound in close proximity thereto.  There were no abnormalities seen on ultrasound; a digital image was taken; and the patient tolerated the procedure with no complications. Images:still images obtained, printed/placed on chart    Laterality:left  Block Type:adductor canal block  Injection Technique:single-shot  Needle Type:echogenic and short-bevel  Needle Gauge:20 G  Resistance on Injection: none  Catheter size: 20g.    Medications Used: bupivacaine PF (MARCAINE) 0.25 % injection - Injection   25 mL - 10/18/2024 6:55:00 AM  dexamethasone sodium phosphate injection - Injection   2 mg - 10/18/2024 6:55:00 AM  fentaNYL citrate (PF) (SUBLIMAZE) injection - Intravenous   100 mcg - 10/18/2024 6:55:00 AM      Post Assessment  Injection Assessment: negative aspiration for heme, incremental injection and no paresthesia on injection  Patient Tolerance:comfortable throughout block  Complications:no  Additional  "Notes  SINGLE shot   A high-frequency linear transducer, with sterile cover, was placed on the anterior mid-thigh (between the anterior superior iliac spine and patella). The transducer was then moved medially to identify the Sartorius muscle (Nola), Vastus Medialis muscle (VMM), Superficial Femoral Artery (SFA) and Vein. The transducer was then moved cephalad or caudad to position the SFA in the middle of the Nola. The insertion site was prepped and draped in sterile fashion. Skin and cutaneous tissue was infiltrated with 2-5 ml of 1% Lidocaine. Using ultrasound-guidance, a 20-gauge B-Beach 4\" Ultraplex 360 non-stimulating echogenic needle was advanced in plane from lateral to medial. Preservative-free normal saline was utilized for hydro-dissection of tissue, advancement of needle, and to confirm needle placement below the fascial plane of the Nola where the Nerve to the VMM is located. Local anesthetic (LA) 5 ml deposited here. The needle continues its path lateral to the SFA at the level of the Saphenous Nerve. The remainder of the LA was deposited at the 10-11 o'clock position of the SFA. This injection created a space between the Nola and the SFA. Aspiration every 5 ml to prevent intravascular injection. Injection was completed with negative aspiration of blood and negative intravascular injection. Injection pressures were normal with minimal resistance.   Performed by: Mason Sharma, CRNA          "

## 2024-10-19 LAB
ALBUMIN SERPL-MCNC: 3.7 G/DL (ref 3.5–5.2)
ANION GAP SERPL CALCULATED.3IONS-SCNC: 10 MMOL/L (ref 5–15)
BUN SERPL-MCNC: 10 MG/DL (ref 8–23)
BUN/CREAT SERPL: 8.3 (ref 7–25)
CALCIUM SPEC-SCNC: 8.7 MG/DL (ref 8.6–10.5)
CHLORIDE SERPL-SCNC: 107 MMOL/L (ref 98–107)
CO2 SERPL-SCNC: 24 MMOL/L (ref 22–29)
CREAT SERPL-MCNC: 1.21 MG/DL (ref 0.57–1)
DEPRECATED RDW RBC AUTO: 56.7 FL (ref 37–54)
EGFRCR SERPLBLD CKD-EPI 2021: 48.9 ML/MIN/1.73
ERYTHROCYTE [DISTWIDTH] IN BLOOD BY AUTOMATED COUNT: 15 % (ref 12.3–15.4)
GLUCOSE SERPL-MCNC: 111 MG/DL (ref 65–99)
HCT VFR BLD AUTO: 24.4 % (ref 34–46.6)
HGB BLD-MCNC: 7.5 G/DL (ref 12–15.9)
MCH RBC QN AUTO: 32.1 PG (ref 26.6–33)
MCHC RBC AUTO-ENTMCNC: 30.7 G/DL (ref 31.5–35.7)
MCV RBC AUTO: 104.3 FL (ref 79–97)
PHOSPHATE SERPL-MCNC: 2.8 MG/DL (ref 2.5–4.5)
PLATELET # BLD AUTO: 256 10*3/MM3 (ref 140–450)
PMV BLD AUTO: 10.9 FL (ref 6–12)
POTASSIUM SERPL-SCNC: 4.5 MMOL/L (ref 3.5–5.2)
RBC # BLD AUTO: 2.34 10*6/MM3 (ref 3.77–5.28)
SODIUM SERPL-SCNC: 141 MMOL/L (ref 136–145)
WBC NRBC COR # BLD AUTO: 11.81 10*3/MM3 (ref 3.4–10.8)

## 2024-10-19 PROCEDURE — 97530 THERAPEUTIC ACTIVITIES: CPT

## 2024-10-19 PROCEDURE — 99232 SBSQ HOSP IP/OBS MODERATE 35: CPT | Performed by: INTERNAL MEDICINE

## 2024-10-19 PROCEDURE — 25010000002 HEPARIN (PORCINE) PER 1000 UNITS: Performed by: ORTHOPAEDIC SURGERY

## 2024-10-19 PROCEDURE — 99024 POSTOP FOLLOW-UP VISIT: CPT | Performed by: ORTHOPAEDIC SURGERY

## 2024-10-19 PROCEDURE — 97168 OT RE-EVAL EST PLAN CARE: CPT | Performed by: OCCUPATIONAL THERAPIST

## 2024-10-19 PROCEDURE — 85027 COMPLETE CBC AUTOMATED: CPT | Performed by: ORTHOPAEDIC SURGERY

## 2024-10-19 PROCEDURE — 97164 PT RE-EVAL EST PLAN CARE: CPT

## 2024-10-19 PROCEDURE — 80069 RENAL FUNCTION PANEL: CPT | Performed by: INTERNAL MEDICINE

## 2024-10-19 PROCEDURE — 97535 SELF CARE MNGMENT TRAINING: CPT | Performed by: OCCUPATIONAL THERAPIST

## 2024-10-19 RX ADMIN — Medication 5 MG: at 22:07

## 2024-10-19 RX ADMIN — HEPARIN SODIUM 5000 UNITS: 5000 INJECTION INTRAVENOUS; SUBCUTANEOUS at 20:43

## 2024-10-19 RX ADMIN — LEVOTHYROXINE SODIUM 25 MCG: 25 TABLET ORAL at 05:48

## 2024-10-19 RX ADMIN — HEPARIN SODIUM 5000 UNITS: 5000 INJECTION INTRAVENOUS; SUBCUTANEOUS at 05:48

## 2024-10-19 RX ADMIN — FOLIC ACID 1 MG: 1 TABLET ORAL at 08:03

## 2024-10-19 RX ADMIN — METOPROLOL SUCCINATE 25 MG: 25 TABLET, EXTENDED RELEASE ORAL at 20:41

## 2024-10-19 RX ADMIN — ROSUVASTATIN 5 MG: 10 TABLET, FILM COATED ORAL at 08:03

## 2024-10-19 RX ADMIN — MAGNESIUM OXIDE TAB 400 MG (241.3 MG ELEMENTAL MG) 400 MG: 400 (241.3 MG) TAB at 08:03

## 2024-10-19 RX ADMIN — DULOXETINE HYDROCHLORIDE 20 MG: 20 CAPSULE, DELAYED RELEASE ORAL at 08:03

## 2024-10-19 RX ADMIN — ASPIRIN 81 MG: 81 TABLET, COATED ORAL at 08:03

## 2024-10-19 RX ADMIN — Medication 10 ML: at 20:44

## 2024-10-19 RX ADMIN — PANTOPRAZOLE SODIUM 40 MG: 40 TABLET, DELAYED RELEASE ORAL at 08:03

## 2024-10-19 RX ADMIN — GABAPENTIN 600 MG: 300 CAPSULE ORAL at 20:42

## 2024-10-19 RX ADMIN — THIAMINE HCL TAB 100 MG 200 MG: 100 TAB at 08:03

## 2024-10-19 RX ADMIN — DULOXETINE HYDROCHLORIDE 20 MG: 20 CAPSULE, DELAYED RELEASE ORAL at 20:43

## 2024-10-19 RX ADMIN — GABAPENTIN 300 MG: 300 CAPSULE ORAL at 08:03

## 2024-10-19 RX ADMIN — CYANOCOBALAMIN TAB 1000 MCG 1000 MCG: 1000 TAB at 08:03

## 2024-10-19 RX ADMIN — HEPARIN SODIUM 5000 UNITS: 5000 INJECTION INTRAVENOUS; SUBCUTANEOUS at 14:15

## 2024-10-19 NOTE — PROGRESS NOTES
[facility] Medicine Services  PROGRESS NOTE    Patient Name: Alysia Sierra  : 1955  MRN: [id number]    Date of Admission: 10/5/2024  Primary Care Physician: Cassy Foster MD    Subjective   Subjective     CC:  Fall, ankle fracture    HPI:  Sitting in the chair, pain overall tolerable.  Daughter at bedside      Objective   Objective     Vital Signs:   Temp:  [97.7 °F (36.5 °C)-98 °F (36.7 °C)] 97.9 °F (36.6 °C)  Heart Rate:  [79-98] 86  Resp:  [16-18] 18  BP: ()/(59-79) 109/67  Flow (L/min) (Oxygen Therapy):  [2] 2     Physical Exam:  Constitutional: No acute distress, awake, alert  HENT: NCAT, mucous membranes moist  Respiratory: Respiratory effort normal   Cardiovascular: RRR, no murmurs, rubs, or gallops  Musculoskeletal: Left foot in cast  Psychiatric: Appropriate affect, cooperative  Neurologic: Oriented x 3, speech clear  Skin: No rashes      Results Reviewed:  LAB RESULTS:      Lab 10/18/24  1437   WBC 7.67   HEMOGLOBIN 8.9*   HEMATOCRIT 29.0*   PLATELETS 256   NEUTROS ABS 6.94   IMMATURE GRANS (ABS) 0.06*   LYMPHS ABS 0.43*   MONOS ABS 0.23   EOS ABS 0.00   .5*         Lab 10/18/24  1437   SODIUM 142   POTASSIUM 3.7   CHLORIDE 106   CO2 22.0   ANION GAP 14.0   BUN 9   CREATININE 1.38*   EGFR 41.8*   GLUCOSE 178*   CALCIUM 8.6         Lab 10/18/24  1437   TOTAL PROTEIN 6.0   ALBUMIN 3.4*   GLOBULIN 2.6   ALT (SGPT) 7   AST (SGOT) 19   BILIRUBIN 0.2   ALK PHOS 72                     Brief Urine Lab Results  (Last result in the past 365 days)        Color   Clarity   Blood   Leuk Est   Nitrite   Protein   CREAT   Urine HCG        24 1604 Yellow   Clear   Negative   Negative   Negative   Negative                   Microbiology Results Abnormal       None            FL C Arm During Surgery    Result Date: 10/18/2024  This procedure was auto-finalized with no dictation required.    Popliteal sciatic cath    Result Date: 10/18/2024  Yani Sanchez SRNA      10/18/2024  8:21 AM Popliteal sciatic cath Patient reassessed immediately prior to procedure Patient location during procedure: pre-op Start time: 10/18/2024 7:01 AM Reason for block: at surgeon's request and post-op pain management Performed by CRNA/CAA: Lauri Oliver, SARAHSRNA: Yani Sanchez SRNA Preanesthetic Checklist Completed: patient identified, IV checked, site marked, risks and benefits discussed, surgical consent, monitors and equipment checked, pre-op evaluation and timeout performed Prep: Sterile barriers:cap, gloves, mask and washed/disinfected hands Prep: ChloraPrep Patient monitoring: blood pressure monitoring, continuous pulse oximetry and EKG Procedure Sedation: yes Performed under: local infiltration Guidance:ultrasound guided ULTRASOUND INTERPRETATION.  Using ultrasound guidance a 20 G gauge needle was placed in close proximity to the nerve, at which point, under ultrasound guidance anesthetic was injected in the area of the nerve and spread of the anesthesia was seen on ultrasound in close proximity thereto.  There were no abnormalities seen on ultrasound; a digital image was taken; and the patient tolerated the procedure with no complications. Images:still images obtained, printed/placed on chart Laterality:left Block Type:popliteal Injection Technique:catheter Needle Type:echogenic and Tuohy Needle Gauge:18 G Resistance on Injection: none Catheter Size:20 G Cath Depth at skin: 6 cm Medications Used: bupivacaine PF (MARCAINE) 0.25 % injection - Injection  25 mL - 10/18/2024 7:01:00 AM Post Assessment Injection Assessment: negative aspiration for heme, no paresthesia on injection and incremental injection Patient Tolerance:comfortable throughout block Complications:no Additional Notes CATHETER                             A high-frequency linear transducer, with sterile cover, was placed in the popliteal fossa to identify the popliteal artery and vein, Tibial nerve (TN) and Common Peroneal nerve  "(CP). The transducer was then moved in a cephalad fashion to observe the TN and CP nerve bifurcation to form the Sciatic Nerve. The insertion site was prepped and draped in sterile fashion. Skin and cutaneous tissue was infiltrated with 2-5 ml of 1% Lidocaine. Using ultrasound-guidance, an 18-gauge Contiplex Ultra 360 Touhy needle was advanced in plane from lateral to medial. Preservative-free normal saline was utilized for hydro-dissection of tissue, advancement of Touhy needle, and to confirm final needle placement posterior to the nerves. Local anesthetic injection spread, in incremental 3-5 ml injections, to surround both nerve structures. Aspiration every 5 ml to prevent intravascular injection. Injection was completed with negative aspiration of blood and negative intravascular injection. Injection pressures were normal with minimal resistance. A 20-gauge Contiplex Echo catheter was placed through the needle and advance out the tip of the Touhy 1-3 cm. The Touhy needle was then removed, and final catheter position verified (Below/above or Anterior/Posterior) the nerve structures. The catheter was secured in the usual fashion with skin glue, benzoin, steri-strips, CHG tegaderm and Label noting \"Nerve Block Catheter\". Jerk tape applied at yellow connector and catheter connection. Performed by: Mason Sharma CRNA     Adductor canal    Result Date: 10/18/2024  Yani Sanchez SRNA     10/18/2024  8:20 AM Adductor canal Patient reassessed immediately prior to procedure Patient location during procedure: pre-op Start time: 10/18/2024 6:55 AM Reason for block: at surgeon's request and post-op pain management Performed by SARAH/CAA: Lauri Oliver, SARAHSRNA: Yani Sanchez SRNA Preanesthetic Checklist Completed: patient identified, IV checked, site marked, risks and benefits discussed, surgical consent, monitors and equipment checked, pre-op evaluation and timeout performed Prep: Pt Position: supine Sterile barriers:cap, " gloves, mask, sterile barriers and washed/disinfected hands Prep: ChloraPrep Patient monitoring: blood pressure monitoring, continuous pulse oximetry and EKG Procedure Performed under: local infiltration Guidance:ultrasound guided ULTRASOUND INTERPRETATION.  Using ultrasound guidance a 20 G gauge needle was placed in close proximity to the nerve, at which point, under ultrasound guidance anesthetic was injected in the area of the nerve and spread of the anesthesia was seen on ultrasound in close proximity thereto.  There were no abnormalities seen on ultrasound; a digital image was taken; and the patient tolerated the procedure with no complications. Images:still images obtained, printed/placed on chart Laterality:left Block Type:adductor canal block Injection Technique:single-shot Needle Type:echogenic and short-bevel Needle Gauge:20 G Resistance on Injection: none Catheter size: 20g. Medications Used: bupivacaine PF (MARCAINE) 0.25 % injection - Injection  25 mL - 10/18/2024 6:55:00 AM dexamethasone sodium phosphate injection - Injection  2 mg - 10/18/2024 6:55:00 AM fentaNYL citrate (PF) (SUBLIMAZE) injection - Intravenous  100 mcg - 10/18/2024 6:55:00 AM Post Assessment Injection Assessment: negative aspiration for heme, incremental injection and no paresthesia on injection Patient Tolerance:comfortable throughout block Complications:no Additional Notes SINGLE shot A high-frequency linear transducer, with sterile cover, was placed on the anterior mid-thigh (between the anterior superior iliac spine and patella). The transducer was then moved medially to identify the Sartorius muscle (Nola), Vastus Medialis muscle (VMM), Superficial Femoral Artery (SFA) and Vein. The transducer was then moved cephalad or caudad to position the SFA in the middle of the oNla. The insertion site was prepped and draped in sterile fashion. Skin and cutaneous tissue was infiltrated with 2-5 ml of 1% Lidocaine. Using ultrasound-guidance,  "a 20-gauge B-Beach 4\" Ultraplex 360 non-stimulating echogenic needle was advanced in plane from lateral to medial. Preservative-free normal saline was utilized for hydro-dissection of tissue, advancement of needle, and to confirm needle placement below the fascial plane of the Nola where the Nerve to the VMM is located. Local anesthetic (LA) 5 ml deposited here. The needle continues its path lateral to the SFA at the level of the Saphenous Nerve. The remainder of the LA was deposited at the 10-11 o'clock position of the SFA. This injection created a space between the Nola and the SFA. Aspiration every 5 ml to prevent intravascular injection. Injection was completed with negative aspiration of blood and negative intravascular injection. Injection pressures were normal with minimal resistance. Performed by: Mason Sharma CRNA     Results for orders placed during the hospital encounter of 01/02/24    Adult Transthoracic Echo Complete W/ Cont if Necessary Per Protocol    Interpretation Summary    Left ventricular systolic function is normal. Calculated left ventricular EF = 57.9% Left ventricular ejection fraction appears to be 56 - 60%.    Left ventricular diastolic function was normal.    Estimated right ventricular systolic pressure from tricuspid regurgitation is normal (<35 mmHg).    No significant change from previous study      Current medications:  Scheduled Meds:aspirin, 81 mg, Oral, Daily  DULoxetine, 20 mg, Oral, BID  folic acid, 1 mg, Oral, Daily  gabapentin, 300 mg, Oral, Daily  gabapentin, 600 mg, Oral, Nightly  heparin (porcine), 5,000 Units, Subcutaneous, Q8H  levothyroxine, 25 mcg, Oral, Q AM  magnesium oxide, 400 mg, Oral, Daily  melatonin, 5 mg, Oral, Nightly  metoprolol succinate XL, 25 mg, Oral, Nightly  pantoprazole, 40 mg, Oral, Every Other Day  rosuvastatin, 5 mg, Oral, Daily  sodium chloride, 10 mL, Intravenous, Q12H  sodium chloride, 10 mL, Intravenous, Q12H  thiamine, 200 mg, Oral, " Daily  vitamin B-12, 1,000 mcg, Oral, Daily      Continuous Infusions:ropivacaine,       PRN Meds:.  senna-docusate sodium **AND** polyethylene glycol **AND** bisacodyl **AND** bisacodyl    bisacodyl    Calcium Replacement - Follow Nurse / BPA Driven Protocol    docosanol    influenza vaccine    Magnesium Standard Dose Replacement - Follow Nurse / BPA Driven Protocol    melatonin    [DISCONTINUED] HYDROmorphone **AND** naloxone    ondansetron ODT **OR** ondansetron    oxyCODONE    Phosphorus Replacement - Follow Nurse / BPA Driven Protocol    Potassium Replacement - Follow Nurse / BPA Driven Protocol    [COMPLETED] Insert Peripheral IV **AND** sodium chloride    sodium chloride    sodium chloride    Assessment & Plan   Assessment & Plan     Active Hospital Problems    Diagnosis  POA    **Closed fracture of left ankle [S82.392A]  Unknown    Closed displaced fracture of fourth metatarsal bone of left foot [S92.342A]  Yes    Lisfranc dislocation, left, initial encounter [O14.946A]  Yes      Resolved Hospital Problems   No resolved problems to display.        Brief Hospital Course to date:  Alysia Sierra is a 69 y.o. female with history of CKD III, chronic anemia, uterine cancer, hypothyroidism and alcohol abuse who was admitted to [facility] on 10/5/24 for L ankle fracture after stepping awkwardly off her bed.           Left foot fracture with involvement of 1st, 2nd, 3rd and 4th metatarsal bases  Acute nondisplaced L medial malleolus fracture  Acute nondisplaced L posterior medial talus fracture   1st-4th proximal metatarsal fracture Lisfranc fracture  -S/p repair 10/18 then likely rehab  -ASA for DVT prophylaxis     Alcohol withdrawal, resolved  History of alcohol abuse   - Patient denies daily alcohol use, however family reports daily heavy alcohol use  - Developed confusion / hallucinations on afternoon of 10/8 and started on CIWA protocol  - Withdrawal symptoms resolved. CIWA protocol  discontinued.  - Continue PO thiamine, folic acid  - Continue QHS Melatonin     CKD III  -Check a.m. labs     Chronic anemia  Iron deficiency  - Iron studies show ACD with iron deficiency  - Status post IV iron  - B12, folate within normal limits     Hypothyroidism   - Continue Levothyroxine     GERD   - Continue PPI     Neuropathy   - Continue Gabapentin      Expected Discharge Location and Transportation:   Expected Discharge   Expected Discharge Date: 10/22/2024; Expected Discharge Time:      VTE Prophylaxis:  Pharmacologic & mechanical VTE prophylaxis orders are present.         AM-PAC 6 Clicks Score (PT): 18 (10/19/24 0803)    CODE STATUS:   Code Status and Medical Interventions: CPR (Attempt to Resuscitate); Full Support   Ordered at: 10/06/24 0246     Code Status (Patient has no pulse and is not breathing):    CPR (Attempt to Resuscitate)     Medical Interventions (Patient has pulse or is breathing):    Full Support       Shaylee Main, DO  10/19/24

## 2024-10-19 NOTE — PLAN OF CARE
Goal Outcome Evaluation:  Plan of Care Reviewed With: patient        Progress: improving  Outcome Evaluation: Pt alert, confused, poor attention to task, impulsive and reports tolerable pain despite block being off. She completed LB dressing with CGA, BSC transfer with min assist x2. Pt requires cues/assist maintain NWB LLE. Recommend IPR.    Anticipated Discharge Disposition (OT): inpatient rehabilitation facility

## 2024-10-19 NOTE — PLAN OF CARE
Problem: Adult Inpatient Plan of Care  Goal: Plan of Care Review  Outcome: Progressing  Flowsheets  Taken 10/19/2024 1842 by Beatrice Barnhart RN  Progress: no change  Taken 10/19/2024 1522 by Thea Gunter PT  Plan of Care Reviewed With: patient  Goal: Patient-Specific Goal (Individualized)  Outcome: Progressing  Goal: Absence of Hospital-Acquired Illness or Injury  Outcome: Progressing  Intervention: Identify and Manage Fall Risk  Recent Flowsheet Documentation  Taken 10/19/2024 1800 by Beatrice Barnhart RN  Safety Promotion/Fall Prevention:   activity supervised   assistive device/personal items within reach   clutter free environment maintained   room organization consistent   safety round/check completed   toileting scheduled  Taken 10/19/2024 1600 by Beatrice Barnhart RN  Safety Promotion/Fall Prevention:   activity supervised   assistive device/personal items within reach   clutter free environment maintained   room organization consistent   safety round/check completed   toileting scheduled  Taken 10/19/2024 1400 by Beatrice Barnhart RN  Safety Promotion/Fall Prevention:   activity supervised   assistive device/personal items within reach   clutter free environment maintained   safety round/check completed   room organization consistent   toileting scheduled  Taken 10/19/2024 1200 by Beatrice Barnhart RN  Safety Promotion/Fall Prevention:   activity supervised   assistive device/personal items within reach   clutter free environment maintained   room organization consistent   safety round/check completed   toileting scheduled  Taken 10/19/2024 1000 by Beatrice Barnhart RN  Safety Promotion/Fall Prevention:   activity supervised   assistive device/personal items within reach   clutter free environment maintained   room organization consistent   safety round/check completed   toileting scheduled  Taken 10/19/2024 0803 by Beatrice Barnhart RN  Safety Promotion/Fall Prevention:   activity supervised   assistive  device/personal items within reach   clutter free environment maintained   room organization consistent   safety round/check completed   toileting scheduled  Intervention: Prevent Skin Injury  Recent Flowsheet Documentation  Taken 10/19/2024 1800 by Beatrice Barnhart RN  Body Position: legs elevated  Skin Protection:   incontinence pads utilized   transparent dressing maintained  Taken 10/19/2024 1600 by Beatrice Barnhart RN  Body Position: legs elevated  Skin Protection:   incontinence pads utilized   transparent dressing maintained  Taken 10/19/2024 1400 by Beatrice Barnhart RN  Body Position: legs elevated  Skin Protection:   incontinence pads utilized   transparent dressing maintained  Taken 10/19/2024 1200 by Beatrice Barnhart RN  Body Position: legs elevated  Taken 10/19/2024 1000 by Beatrice Barnhart RN  Body Position: legs elevated  Skin Protection:   incontinence pads utilized   transparent dressing maintained  Taken 10/19/2024 0803 by Beatrice Barnhart RN  Body Position: (up to chair) other (see comments)  Skin Protection:   incontinence pads utilized   transparent dressing maintained  Intervention: Prevent and Manage VTE (Venous Thromboembolism) Risk  Recent Flowsheet Documentation  Taken 10/19/2024 1800 by Beatrice Barnhart RN  VTE Prevention/Management:   right   SCDs (sequential compression devices) on  Taken 10/19/2024 1600 by Beatrice Barnhart RN  VTE Prevention/Management:   right   SCDs (sequential compression devices) on  Taken 10/19/2024 1400 by Beatrice Barnhart RN  VTE Prevention/Management:   right   SCDs (sequential compression devices) on  Taken 10/19/2024 1200 by Beatrice Barnhart RN  VTE Prevention/Management:   right   SCDs (sequential compression devices) on  Taken 10/19/2024 1000 by Beatrice Barnhart RN  VTE Prevention/Management:   right   SCDs (sequential compression devices) on  Taken 10/19/2024 0803 by Beatrice Barnhart RN  VTE Prevention/Management:   right   SCDs (sequential compression devices)  on  Intervention: Prevent Infection  Recent Flowsheet Documentation  Taken 10/19/2024 1800 by Beatrice Barnhart RN  Infection Prevention:   environmental surveillance performed   rest/sleep promoted  Taken 10/19/2024 1600 by Beatrice Barnhart RN  Infection Prevention:   environmental surveillance performed   rest/sleep promoted  Taken 10/19/2024 1400 by Beatrice Barnhart RN  Infection Prevention:   environmental surveillance performed   rest/sleep promoted  Taken 10/19/2024 1200 by Beatrice Barnhart RN  Infection Prevention:   environmental surveillance performed   rest/sleep promoted  Taken 10/19/2024 1000 by Beatrice Barnhart RN  Infection Prevention:   environmental surveillance performed   rest/sleep promoted  Taken 10/19/2024 0803 by Beatrice Barnhart RN  Infection Prevention:   environmental surveillance performed   rest/sleep promoted  Goal: Optimal Comfort and Wellbeing  Outcome: Progressing  Intervention: Provide Person-Centered Care  Recent Flowsheet Documentation  Taken 10/19/2024 1600 by Beatrice Barnhart RN  Trust Relationship/Rapport: care explained  Taken 10/19/2024 0803 by Beatrice Barnhart RN  Trust Relationship/Rapport: care explained  Goal: Readiness for Transition of Care  Outcome: Progressing     Problem: Pain Chronic (Persistent) (Comorbidity Management)  Goal: Acceptable Pain Control and Functional Ability  Outcome: Progressing  Intervention: Manage Persistent Pain  Recent Flowsheet Documentation  Taken 10/19/2024 1800 by Beatrice Barnhart RN  Medication Review/Management: medications reviewed  Taken 10/19/2024 1600 by Beatrice Barnhart RN  Medication Review/Management: medications reviewed  Taken 10/19/2024 1400 by Beatrice Barnhart RN  Medication Review/Management: medications reviewed  Taken 10/19/2024 1200 by Beatrice Barnhart RN  Medication Review/Management: medications reviewed  Taken 10/19/2024 1000 by Beatrice Barnhart RN  Medication Review/Management: medications reviewed  Taken 10/19/2024 0803 by Obey  Beatrice RODRIGUEZ RN  Medication Review/Management: medications reviewed     Problem: Skin Injury Risk Increased  Goal: Skin Health and Integrity  Outcome: Progressing  Intervention: Optimize Skin Protection  Recent Flowsheet Documentation  Taken 10/19/2024 1800 by Beatrice Barnhart RN  Activity Management: activity encouraged  Pressure Reduction Techniques:   frequent weight shift encouraged   weight shift assistance provided  Pressure Reduction Devices: chair cushion utilized  Skin Protection:   incontinence pads utilized   transparent dressing maintained  Taken 10/19/2024 1600 by Beatrice Barnhart RN  Activity Management: up in chair  Pressure Reduction Techniques:   frequent weight shift encouraged   weight shift assistance provided  Pressure Reduction Devices: chair cushion utilized  Skin Protection:   incontinence pads utilized   transparent dressing maintained  Taken 10/19/2024 1400 by Beatrice Barnhart RN  Activity Management: up in chair  Pressure Reduction Techniques:   frequent weight shift encouraged   weight shift assistance provided  Pressure Reduction Devices: chair cushion utilized  Skin Protection:   incontinence pads utilized   transparent dressing maintained  Taken 10/19/2024 1200 by Beatrice Barnhart RN  Activity Management: up in chair  Taken 10/19/2024 1000 by Beatrice Barnhart RN  Activity Management: up in chair  Pressure Reduction Techniques:   frequent weight shift encouraged   weight shift assistance provided  Pressure Reduction Devices:   pressure-redistributing mattress utilized   positioning supports utilized  Skin Protection:   incontinence pads utilized   transparent dressing maintained  Taken 10/19/2024 0803 by Beatrice Barnhart RN  Activity Management: up to bedside commode  Pressure Reduction Techniques:   frequent weight shift encouraged   weight shift assistance provided  Pressure Reduction Devices:   pressure-redistributing mattress utilized   positioning supports utilized  Skin Protection:    incontinence pads utilized   transparent dressing maintained     Problem: Fall Injury Risk  Goal: Absence of Fall and Fall-Related Injury  Outcome: Progressing  Intervention: Identify and Manage Contributors  Recent Flowsheet Documentation  Taken 10/19/2024 1800 by Beatrice Barnhart RN  Medication Review/Management: medications reviewed  Taken 10/19/2024 1600 by Beatrice Barnhart RN  Medication Review/Management: medications reviewed  Taken 10/19/2024 1400 by Beatrice Barnhart RN  Medication Review/Management: medications reviewed  Taken 10/19/2024 1200 by Beatrice Barnhart RN  Medication Review/Management: medications reviewed  Taken 10/19/2024 1000 by Beatrice Barnhart RN  Medication Review/Management: medications reviewed  Taken 10/19/2024 0803 by Beatrice Barnhart RN  Medication Review/Management: medications reviewed  Intervention: Promote Injury-Free Environment  Recent Flowsheet Documentation  Taken 10/19/2024 1800 by Beatirce Barnhart RN  Safety Promotion/Fall Prevention:   activity supervised   assistive device/personal items within reach   clutter free environment maintained   room organization consistent   safety round/check completed   toileting scheduled  Taken 10/19/2024 1600 by Beatrice Banrhart RN  Safety Promotion/Fall Prevention:   activity supervised   assistive device/personal items within reach   clutter free environment maintained   room organization consistent   safety round/check completed   toileting scheduled  Taken 10/19/2024 1400 by Beatrice Barnhart RN  Safety Promotion/Fall Prevention:   activity supervised   assistive device/personal items within reach   clutter free environment maintained   safety round/check completed   room organization consistent   toileting scheduled  Taken 10/19/2024 1200 by Beatrice Barnhart RN  Safety Promotion/Fall Prevention:   activity supervised   assistive device/personal items within reach   clutter free environment maintained   room organization consistent   safety  round/check completed   toileting scheduled  Taken 10/19/2024 1000 by Beatrice Barnhart RN  Safety Promotion/Fall Prevention:   activity supervised   assistive device/personal items within reach   clutter free environment maintained   room organization consistent   safety round/check completed   toileting scheduled  Taken 10/19/2024 0803 by Beatrice Barnhart RN  Safety Promotion/Fall Prevention:   activity supervised   assistive device/personal items within reach   clutter free environment maintained   room organization consistent   safety round/check completed   toileting scheduled     Problem: Bleeding (Orthopaedic Fracture)  Goal: Absence of Bleeding  Outcome: Progressing     Problem: Embolism (Orthopaedic Fracture)  Goal: Absence of Embolism Signs and Symptoms  Outcome: Progressing  Intervention: Prevent or Manage Embolism Risk  Recent Flowsheet Documentation  Taken 10/19/2024 1800 by Beatrice Barnhart RN  VTE Prevention/Management:   right   SCDs (sequential compression devices) on  Taken 10/19/2024 1600 by Beatrice Barnhart RN  VTE Prevention/Management:   right   SCDs (sequential compression devices) on  Taken 10/19/2024 1400 by Beatrice Barnhart RN  VTE Prevention/Management:   right   SCDs (sequential compression devices) on  Taken 10/19/2024 1200 by Beatrice Barnhart RN  VTE Prevention/Management:   right   SCDs (sequential compression devices) on  Taken 10/19/2024 1000 by Beatrice Barnhart RN  VTE Prevention/Management:   right   SCDs (sequential compression devices) on  Taken 10/19/2024 0803 by Beatrice Barnhart RN  VTE Prevention/Management:   right   SCDs (sequential compression devices) on     Problem: Fracture Stabilization and Management (Orthopaedic Fracture)  Goal: Fracture Stability  Outcome: Progressing     Problem: Functional Ability Impaired (Orthopaedic Fracture)  Goal: Optimal Functional Ability  Outcome: Progressing  Intervention: Optimize Functional Ability  Recent Flowsheet Documentation  Taken  10/19/2024 1800 by Beatrice Barnhart RN  Activity Management: activity encouraged  Positioning/Transfer Devices:   pillows   in use  Taken 10/19/2024 1600 by Beatrice Barnhart RN  Activity Management: up in chair  Positioning/Transfer Devices:   pillows   in use  Taken 10/19/2024 1400 by Beatrice Barnhart RN  Activity Management: up in chair  Positioning/Transfer Devices:   pillows   in use  Taken 10/19/2024 1200 by Beatrice Barnhart RN  Activity Management: up in chair  Positioning/Transfer Devices:   pillows   in use  Taken 10/19/2024 1000 by Beatrice Barnhart RN  Activity Management: up in chair  Positioning/Transfer Devices:   pillows   in use  Taken 10/19/2024 0803 by Beatrice Barnhart RN  Activity Management: up to bedside commode  Positioning/Transfer Devices:   pillows   in use     Problem: Infection (Orthopaedic Fracture)  Goal: Absence of Infection Signs and Symptoms  Outcome: Progressing     Problem: Neurovascular Compromise (Orthopaedic Fracture)  Goal: Effective Tissue Perfusion  Outcome: Progressing     Problem: Pain (Orthopaedic Fracture)  Goal: Acceptable Pain Control  Outcome: Progressing     Problem: Respiratory Compromise (Orthopaedic Fracture)  Goal: Effective Oxygenation and Ventilation  Outcome: Progressing  Intervention: Promote Airway Secretion Clearance  Recent Flowsheet Documentation  Taken 10/19/2024 1800 by Beatrice Barnhart RN  Activity Management: activity encouraged  Taken 10/19/2024 1600 by Beatrice Barnhart RN  Activity Management: up in chair  Taken 10/19/2024 1400 by Beatrice Barnhart RN  Activity Management: up in chair  Taken 10/19/2024 1200 by Beatrice Barnhart RN  Activity Management: up in chair  Taken 10/19/2024 1000 by Beatrice Barnhart RN  Activity Management: up in chair  Taken 10/19/2024 0803 by Beatrice Barnhart RN  Activity Management: up to bedside commode  Cough And Deep Breathing: done independently per patient   Goal Outcome Evaluation:           Progress: no change          Pt alert and  oriented to self and place only, disoriented to time and situation throughout the shift. Very forgetful, especially regarding nerve block education and timing of the surgery. Dressing CDI. Minimal complaints of pain. Dressing CDI. Up to BSC with x1 assist with walker/gait belt. Infublock pulled by patient causing wiring to fracture. Anesthesia came and pulled infublock. Plan for possible d/c to [facility]

## 2024-10-19 NOTE — PLAN OF CARE
Goal Outcome Evaluation:  Plan of Care Reviewed With: patient        Progress: improving  Outcome Evaluation: PT re-evaluation completed. Pt presenting with generalized weakness, impaired balance and sequencing, LLE NWB precautions, and limited activity tolerance warranting need for continued skilled therapy to address deficits and promote return to baseline. Recommend IRF following d/c for best functional outcome.    Anticipated Discharge Disposition (PT): inpatient rehabilitation facility

## 2024-10-19 NOTE — PROGRESS NOTES
[facility]    Acute pain service Inpatient Progress Note    Patient Name: Alysia Sierra  :  1955  MRN:  [id number]        Acute Pain  Service Inpatient Progress Note:    Analgesia:Good  LOC: alert and awake  Resp Status: room air  Cardiac: VS stable  Side Effects:None  Catheter Site:clean, dressing intact and dry  Cath type: peripheral nerve cath(InfuSystem)  Infusion rate: Ext/Pop: Basal: 1ml/hr, PIB: 5ml q 2 h, PCA: 5 ml q 30 min (1mL,8ml, 8ml InfuSystem Pump)  Catheter Plan:Catheter removed and tip intact  Comments: Pt resting comfortably on arrival.  PNB catheter pulled on earlier; on exam, tubing now noted to be fractured in its midportion with reinforcement wire intact; catheter removed in entirety with tip intact. At this time patient elects to use PRN pain meds for any discomfort; VAS 1.

## 2024-10-19 NOTE — THERAPY RE-EVALUATION
Patient Name: Alysia Sierra  : 1955    MRN: [id number]                              Today's Date: 10/19/2024       Admit Date: 10/5/2024    Visit Dx:     ICD-10-CM ICD-9-CM   1. Displaced fracture of distal end of left tibia  S82.302A 824.8   2. Closed nondisplaced fracture of second metatarsal bone of left foot, initial encounter  S92.325A 825.25   3. Closed nondisplaced fracture of first metatarsal bone of left foot, initial encounter  S92.315A 825.25   4. Closed nondisplaced fracture of third metatarsal bone of left foot, initial encounter  S92.335A 825.25   5. Closed nondisplaced fracture of fourth metatarsal bone of left foot, initial encounter  S92.345A 825.25   6. Lisfranc dislocation, left, initial encounter  S93.325A 838.03   7. Closed fracture of left ankle, initial encounter  S82.892A 824.8     Patient Active Problem List   Diagnosis    Hypertension    Leg weakness, bilateral    Syncope    CKD (chronic kidney disease) stage 3, GFR 30-59 ml/min    Neuropathy    Hyperlipidemia LDL goal <100    Endometrial adenocarcinoma    Mild episode of recurrent major depressive disorder    Dizziness    Hip fracture    Anemia    Hypomagnesemia    Hypothyroidism    Post-menopausal    Other osteoporosis without current pathological fracture    Closed fracture of second lumbar vertebra    Alcoholic ketoacidosis    Leukocytosis    GERD with esophagitis    Sepsis    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    Lisfranc dislocation, left, initial encounter    Closed fracture of left ankle    Closed displaced fracture of fourth metatarsal bone of left foot     Past Medical History:   Diagnosis Date    Allergic lisinopril  2017    Anemia     Arrhythmia     Arthritis     Cancer     uterine    Cataract mild 2020    stil lpresent    Chicken pox     Chronic fatigue     CKD (chronic kidney disease), stage III     sees nephro    Clotting disorder     Dental root implant present     lower left  x1 - possible dental  implant    Depression mild 2019    Difficulty walking 2019    Disease of thyroid gland     Dizzy     NIEVES (dyspnea on exertion)     2017    Fracture of hip     Generalized anxiety disorder     GERD (gastroesophageal reflux disease) 2018    History of brachytherapy 2023    vaginal brachytherapy    Hyperlipidemia     Hypertension     Iron deficiency anemia     Liver disease     fatty    Liver problem     Measles     Menopause     Mumps     Neuromuscular disorder Peripheral Neuropathy    Orthostatic hypotension     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    Renal insufficiency 2018    Scoliosis     Unintentional weight loss     Uses contact lenses     bilat    Uterine cancer     Uterine cancer 2022    UTI (urinary tract infection)     Visual impairment Nearsighted    Wears glasses      Past Surgical History:   Procedure Laterality Date     SECTION      DILATION AND CURETTAGE, DIAGNOSTIC / THERAPEUTIC      HIP OPEN REDUCTION Left 2023    Procedure: FEMORAL NECK OPEN REDUCTION INTERNAL FIXATION LEFT;  Surgeon: Juanpablo Lee MD;  Location: [facility];  Service: Orthopedics;  Laterality: Left;    HIP SURGERY      OOPHORECTOMY      ORAL LESION EXCISION/BIOPSY  2021    TOTAL LAPAROSCOPIC HYSTERECTOMY SALPINGO OOPHORECTOMY N/A 10/28/2022    Procedure: TOTAL LAPAROSCOPIC HYSTERECTOMY BILATERAL SALPINGO-OOPHORECTOMY, INJECTION FOR SENTINEL LYMPH NODE MAPPING, BILATERAL SENTINEL LYMPH NODE DISSECTION WITH DAVINCI ROBOT;  Surgeon: Jasmine Rich MD;  Location: [facility];  Service: Robotics - DaVinci;  Laterality: N/A;    TUBAL ABDOMINAL LIGATION        General Information       Row Name 10/19/24 1517          Physical Therapy Time and Intention    Document Type re-evaluation  -ND     Mode of Treatment physical therapy  -ND       Row Name 10/19/24 1517          General Information    Patient Profile Reviewed yes  -ND     Prior Level of Function independent:;all  household mobility;community mobility;gait;transfer;bed mobility  Cane, RWx, and rollator as needed. History of falls.  -ND     Existing Precautions/Restrictions fall;left;non-weight bearing;other (see comments)  s/p L ankle ORIF with LLE NWB. Elevate LLE.  -ND     Barriers to Rehab previous functional deficit  -ND       Row Name 10/19/24 1517          Living Environment    People in Home alone  -ND       Row Name 10/19/24 1517          Home Main Entrance    Number of Stairs, Main Entrance one  -ND     Stair Railings, Main Entrance none  -ND       Row Name 10/19/24 1517          Stairs Within Home, Primary    Number of Stairs, Within Home, Primary none  -ND       Row Name 10/19/24 1517          Cognition    Orientation Status (Cognition) oriented x 4  -ND       Row Name 10/19/24 1517          Safety Issues/Impairments Affecting Functional Mobility    Safety Issues Affecting Function (Mobility) awareness of need for assistance;impulsivity;insight into deficits/self-awareness;judgment;safety precaution awareness;safety precautions follow-through/compliance;sequencing abilities  -ND     Impairments Affecting Function (Mobility) balance;endurance/activity tolerance;pain;range of motion (ROM);strength  -ND               User Key  (r) = Recorded By, (t) = Taken By, (c) = Cosigned By      Initials Name Provider Type    ND Thea Gunter, PT Physical Therapist                   Mobility       Row Name 10/19/24 1518          Bed Mobility    Comment, (Bed Mobility) Pt found and left sitting UIC.  -ND       Row Name 10/19/24 1518          Transfers    Comment, (Transfers) Pt often initiating transfers prior to being cued by therapist.  -ND       Row Name 10/19/24 1518          Bed-Chair Transfer    Bed-Chair [place] (Transfers) minimum assist (75% patient effort);2 person assist;verbal cues;nonverbal cues (demo/gesture)  -ND     Assistive Device (Bed-Chair Transfers) walker, front-wheeled  -ND     Comment, (Bed-Chair  Transfer) SPT from chair <> BSC.  -ND       Row Name 10/19/24 1518          Sit-Stand Transfer    Sit-Stand [place] (Transfers) minimum assist (75% patient effort);verbal cues;nonverbal cues (demo/gesture)  -ND     Assistive Device (Sit-Stand Transfers) walker, front-wheeled  -ND     Comment, (Sit-Stand Transfer) x1 from chair, x1 from BSC. Pt maintains static standing with LLE elevated for dependent karen-hygiene.  -ND       Row Name 10/19/24 1518          Mobility    Extremity Weight-bearing Status left lower extremity  -ND     Left Lower Extremity (Weight-bearing Status) non weight-bearing (NWB)  -ND               User Key  (r) = Recorded By, (t) = Taken By, (c) = Cosigned By      Initials Name Provider Type    Thea Allen PT Physical Therapist                   Obj/Interventions       Row Name 10/19/24 1520          Range of Motion Comprehensive    General Range of Motion lower extremity range of motion deficits identified  -ND     Comment, General Range of Motion RLE WFL, L ankle AROM limited by cast.  -ND       Row Name 10/19/24 1520          Strength Comprehensive (MMT)    General Manual Muscle Testing (MMT) Assessment lower extremity strength deficits identified  -ND     Comment, General Manual Muscle Testing (MMT) Assessment BLE MMT 4+/5 except L ankle DF not assessed due to cast.  -ND       Row Name 10/19/24 1520          Balance    Balance Assessment sitting static balance;sitting dynamic balance;standing static balance;standing dynamic balance  -ND     Static Sitting Balance standby assist  -ND     Dynamic Sitting Balance contact guard  -ND     Position, Sitting Balance unsupported;sitting in chair  -ND     Static Standing Balance minimal assist;verbal cues;non-verbal cues (demo/gesture)  -ND     Dynamic Standing Balance minimal assist;2-person assist;verbal cues;non-verbal cues (demo/gesture)  -ND     Position/Device Used, Standing Balance supported;walker, front-wheeled  -ND     Balance  Interventions sitting;standing;sit to stand;supported;static;dynamic;occupation based/functional task  -ND     Comment, Balance Pt able to maintain static standing with LLE extended in front of body for dependent karen-hygiene with min-A x2.  -ND       Row Name 10/19/24 1520          Sensory Assessment (Somatosensory)    Sensory Assessment (Somatosensory) LE sensation intact  -ND               User Key  (r) = Recorded By, (t) = Taken By, (c) = Cosigned By      Initials Name Provider Type    ND Thea Gunter, PT Physical Therapist                   Goals/Plan       Row Name 10/19/24 1524          Bed Mobility Goal 1 (PT)    Activity/Assistive Device (Bed Mobility Goal 1, PT) sit to supine;supine to sit  -ND     [place] Level/Cues Needed (Bed Mobility Goal 1, PT) independent  -ND     Time Frame (Bed Mobility Goal 1, PT) short term goal (STG);5 days  -ND     Progress/Outcomes (Bed Mobility Goal 1, PT) continuing progress toward goal  -ND       Row Name 10/19/24 1524          Transfer Goal 1 (PT)    Activity/Assistive Device (Transfer Goal 1, PT) sit-to-stand/stand-to-sit;bed-to-chair/chair-to-bed;walker, rolling  -ND     [place] Level/Cues Needed (Transfer Goal 1, PT) contact guard required  -ND     Time Frame (Transfer Goal 1, PT) long term goal (LTG);10 days  -ND     Progress/Outcome (Transfer Goal 1, PT) continuing progress toward goal  -ND       Row Name 10/19/24 1524          Gait Training Goal 1 (PT)    Activity/Assistive Device (Gait Training Goal 1, PT) gait (walking locomotion);assistive device use;walker, rolling  -ND     [place] Level (Gait Training Goal 1, PT) contact guard required  -ND     Distance (Gait Training Goal 1, PT) 50  -ND     Time Frame (Gait Training Goal 1, PT) long term goal (LTG);10 days  -ND     Progress/Outcome (Gait Training Goal 1, PT) continuing progress toward goal  -ND       Row Name 10/19/24 1524          Therapy Assessment/Plan (PT)    Planned Therapy  Interventions (PT) balance training;bed mobility training;gait training;home exercise program;patient/family education;transfer training;postural re-education;ROM (range of motion);strengthening  -ND               User Key  (r) = Recorded By, (t) = Taken By, (c) = Cosigned By      Initials Name Provider Type    Thea Allen, PT Physical Therapist                   Clinical Impression       Row Name 10/19/24 1522          Pain    Pretreatment Pain Rating 3/10  -ND     Posttreatment Pain Rating 3/10  -ND     Pain Location ankle  -ND     Pain Side/Orientation left  -ND     Pain Management Interventions exercise or physical activity utilized;positioning techniques utilized  -ND     Response to Pain Interventions activity participation with tolerable pain  -ND       Row Name 10/19/24 1522          Plan of Care Review    Plan of Care Reviewed With patient  -ND     Outcome Evaluation PT re-evaluation completed. Pt presenting with generalized weakness, impaired balance and sequencing, LLE NWB precautions, and limited activity tolerance warranting need for continued skilled therapy to address deficits and promote return to baseline. Recommend IRF following d/c for best functional outcome.  -ND       Row Name 10/19/24 1522          Therapy Assessment/Plan (PT)    Therapy Frequency (PT) daily  -ND     Predicted Duration of Therapy Intervention (PT) 10 days  -ND       Row Name 10/19/24 1522          Vital Signs    Pre Systolic BP Rehab 109  -ND     Pre Treatment Diastolic BP 67  -ND     Post Systolic BP Rehab 106  -ND     Post Treatment Diastolic BP 62  -ND     O2 Delivery Pre Treatment room air  -ND     O2 Delivery Intra Treatment room air  -ND     O2 Delivery Post Treatment room air  -ND     Pre Patient Position Sitting  -ND     Intra Patient Position Standing  -ND     Post Patient Position Sitting  -ND       Row Name 10/19/24 1522          Positioning and Restraints    Pre-Treatment Position sitting in  chair/recliner  -ND     Post Treatment Position chair  -ND     In Chair notified nsg;reclined;legs elevated;call light within reach;exit alarm on;encouraged to call for assist;waffle cushion;LLE elevated  -ND               User Key  (r) = Recorded By, (t) = Taken By, (c) = Cosigned By      Initials Name Provider Type    Thea Allen, MARIBETH Physical Therapist                   Outcome Measures       Row Name 10/19/24 1525 10/19/24 0803       How much help from another person do you currently need...    Turning from your back to your side while in flat bed without using bedrails? 4  -ND 4  -GS    Moving from lying on back to sitting on the side of a flat bed without bedrails? 3  -ND 3  -GS    Moving to and from a bed to a chair (including a wheelchair)? 3  -ND 3  -GS    Standing up from a chair using your arms (e.g., wheelchair, bedside chair)? 3  -ND 3  -GS    Climbing 3-5 steps with a railing? 2  -ND 2  -GS    To walk in hospital room? 3  -ND 3  -GS    AM-PAC 6 Clicks Score (PT) 18  -ND 18  -GS    Highest Level of Mobility Goal 6 --> Walk 10 steps or more  -ND 6 --> Walk 10 steps or more  -GS      Row Name 10/19/24 1525          Functional Assessment    Outcome Measure Options AM-PAC 6 Clicks Basic Mobility (PT)  -ND               User Key  (r) = Recorded By, (t) = Taken By, (c) = Cosigned By      Initials Name Provider Type     Beatrice Barnhart RN Registered Nurse    Thea Allen, MARIBETH Physical Therapist                                 Physical Therapy Education       Title: PT OT SLP Therapies (Done)       Topic: Physical Therapy (Done)       Point: Mobility training (Done)       Learning Progress Summary            Patient Acceptance, E, VU by ND at 10/19/2024 1525    Acceptance, E, VU by ND at 10/17/2024 1547    Acceptance, E, VU,NR by  at 10/16/2024 1602    Acceptance, E, VU by ND at 10/15/2024 1044    Acceptance, E, VU by CK at 10/12/2024 1004    Acceptance, E, VU by CK at 10/11/2024 1154     Eager, E, VU by SC at 10/10/2024 1108    Comment: reviewed safety with mobility    Acceptance, E,D, VU,NR by AB at 10/9/2024 1006                      Point: Home exercise program (Done)       Learning Progress Summary            Patient Acceptance, E, VU by ND at 10/17/2024 1547    Acceptance, E, VU,NR by  at 10/16/2024 1602    Acceptance, E, VU by  at 10/12/2024 1004    Eager, E, VU by SC at 10/10/2024 1108    Comment: reviewed safety with mobility                      Point: Body mechanics (Done)       Learning Progress Summary            Patient Acceptance, E, VU by ND at 10/19/2024 1525    Acceptance, E, VU by ND at 10/17/2024 1547    Acceptance, E, VU,NR by  at 10/16/2024 1602    Acceptance, E, VU by ND at 10/15/2024 1044    Acceptance, E, VU by  at 10/12/2024 1004    Acceptance, E, VU by  at 10/11/2024 1154    Eager, E, VU by SC at 10/10/2024 1108    Comment: reviewed safety with mobility    Acceptance, E,D, VU,NR by AB at 10/9/2024 1006                      Point: Precautions (Done)       Learning Progress Summary            Patient Acceptance, E, VU by ND at 10/19/2024 1525    Acceptance, E, VU by ND at 10/17/2024 1547    Acceptance, E, VU,NR by  at 10/16/2024 1602    Acceptance, E, VU by ND at 10/15/2024 1044    Acceptance, E, VU by  at 10/12/2024 1004    Acceptance, E, VU by  at 10/11/2024 1154    Eager, E, VU by SC at 10/10/2024 1108    Comment: reviewed safety with mobility    Acceptance, E,D, VU,NR by AB at 10/9/2024 1006                                      User Key       Initials Effective Dates Name Provider Type Discipline    SC 02/03/23 -  Grace Bradley, PT Physical Therapist PT    AB 09/22/22 -  Margaret Bhatt, PT Physical Therapist PT    CK 02/06/24 -  Karen Mcarthur, PT Physical Therapist PT     09/21/23 -  Rose Bush, PT Physical Therapist PT    ND 11/16/23 -  Thea Gunter, PT Physical Therapist PT                  PT Recommendation and Plan  Planned Therapy  Interventions (PT): balance training, bed mobility training, gait training, home exercise program, patient/family education, transfer training, postural re-education, ROM (range of motion), strengthening  Progress: improving  Outcome Evaluation: PT re-evaluation completed. Pt presenting with generalized weakness, impaired balance and sequencing, LLE NWB precautions, and limited activity tolerance warranting need for continued skilled therapy to address deficits and promote return to baseline. Recommend IRF following d/c for best functional outcome.     Time Calculation:         PT Charges       Row Name 10/19/24 1526             Time Calculation    Start Time 1317  -ND      PT Received On 10/19/24  -ND      PT Goal Re-Cert Due Date 10/29/24  -ND         Timed Charges    05548 - PT Therapeutic Activity Minutes 14  -ND         Untimed Charges    PT Eval/Re-eval Minutes 20  -ND         Total Minutes    Timed Charges Total Minutes 14  -ND      Untimed Charges Total Minutes 20  -ND       Total Minutes 34  -ND                User Key  (r) = Recorded By, (t) = Taken By, (c) = Cosigned By      Initials Name Provider Type    ND Thea Gunter, PT Physical Therapist                  Therapy Charges for Today       Code Description Service Date Service Provider Modifiers Qty    18339587980  PT THERAPEUTIC ACT EA 15 MIN 10/19/2024 Thea Gunter, PT GP 1    [id number] HC PT RE-EVAL ESTABLISHED PLAN 2 10/19/2024 Thea Gunter, PT GP 1            PT G-Codes  Outcome Measure Options: AM-PAC 6 Clicks Basic Mobility (PT)  AM-PAC 6 Clicks Score (PT): 18  AM-PAC 6 Clicks Score (OT): 19  PT Discharge Summary  Anticipated Discharge Disposition (PT): inpatient rehabilitation facility    Thea Gunter PT  10/19/2024

## 2024-10-19 NOTE — THERAPY RE-EVALUATION
Patient Name: Alysia Sierra  : 1955    MRN: [id number]                              Today's Date: 10/19/2024       Admit Date: 10/5/2024    Visit Dx:     ICD-10-CM ICD-9-CM   1. Displaced fracture of distal end of left tibia  S82.302A 824.8   2. Closed nondisplaced fracture of second metatarsal bone of left foot, initial encounter  S92.325A 825.25   3. Closed nondisplaced fracture of first metatarsal bone of left foot, initial encounter  S92.315A 825.25   4. Closed nondisplaced fracture of third metatarsal bone of left foot, initial encounter  S92.335A 825.25   5. Closed nondisplaced fracture of fourth metatarsal bone of left foot, initial encounter  S92.345A 825.25   6. Lisfranc dislocation, left, initial encounter  S93.325A 838.03   7. Closed fracture of left ankle, initial encounter  S82.892A 824.8     Patient Active Problem List   Diagnosis    Hypertension    Leg weakness, bilateral    Syncope    CKD (chronic kidney disease) stage 3, GFR 30-59 ml/min    Neuropathy    Hyperlipidemia LDL goal <100    Endometrial adenocarcinoma    Mild episode of recurrent major depressive disorder    Dizziness    Hip fracture    Anemia    Hypomagnesemia    Hypothyroidism    Post-menopausal    Other osteoporosis without current pathological fracture    Closed fracture of second lumbar vertebra    Alcoholic ketoacidosis    Leukocytosis    GERD with esophagitis    Sepsis    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    Lisfranc dislocation, left, initial encounter    Closed fracture of left ankle    Closed displaced fracture of fourth metatarsal bone of left foot     Past Medical History:   Diagnosis Date    Allergic lisinopril  2017    Anemia     Arrhythmia     Arthritis     Cancer     uterine    Cataract mild 2020    stil lpresent    Chicken pox     Chronic fatigue     CKD (chronic kidney disease), stage III     sees nephro    Clotting disorder     Dental root implant present     lower left  x1 - possible dental  implant    Depression mild 2019    Difficulty walking 2019    Disease of thyroid gland     Dizzy     NIEVES (dyspnea on exertion)     2017    Fracture of hip     Generalized anxiety disorder     GERD (gastroesophageal reflux disease) 2018    History of brachytherapy 2023    vaginal brachytherapy    Hyperlipidemia     Hypertension     Iron deficiency anemia     Liver disease     fatty    Liver problem     Measles     Menopause     Mumps     Neuromuscular disorder Peripheral Neuropathy    Orthostatic hypotension     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    Renal insufficiency 2018    Scoliosis     Unintentional weight loss     Uses contact lenses     bilat    Uterine cancer     Uterine cancer 2022    UTI (urinary tract infection)     Visual impairment Nearsighted    Wears glasses      Past Surgical History:   Procedure Laterality Date     SECTION      DILATION AND CURETTAGE, DIAGNOSTIC / THERAPEUTIC      HIP OPEN REDUCTION Left 2023    Procedure: FEMORAL NECK OPEN REDUCTION INTERNAL FIXATION LEFT;  Surgeon: Juanpablo Lee MD;  Location: [facility];  Service: Orthopedics;  Laterality: Left;    HIP SURGERY      OOPHORECTOMY      ORAL LESION EXCISION/BIOPSY  2021    TOTAL LAPAROSCOPIC HYSTERECTOMY SALPINGO OOPHORECTOMY N/A 10/28/2022    Procedure: TOTAL LAPAROSCOPIC HYSTERECTOMY BILATERAL SALPINGO-OOPHORECTOMY, INJECTION FOR SENTINEL LYMPH NODE MAPPING, BILATERAL SENTINEL LYMPH NODE DISSECTION WITH DAVINCI ROBOT;  Surgeon: Jasmine Rich MD;  Location: [facility] OR;  Service: Robotics - DaVinci;  Laterality: N/A;    TUBAL ABDOMINAL LIGATION        General Information       Row Name 10/19/24 1085          OT Time and Intention    Document Type re-evaluation  -AR     Mode of Treatment individual therapy;co-treatment  -AR       Row Name 10/19/24 1439          General Information    Patient Profile Reviewed yes  -AR     Existing Precautions/Restrictions  fall;left;non-weight bearing;other (see comments)  left popliteal nerve catheter  -AR       Row Name 10/19/24 1552          Living Environment    People in Home alone  -AR       Row Name 10/19/24 1552          Home Main Entrance    Number of Stairs, Main Entrance one  -AR     Stair Railings, Main Entrance none  -AR       Row Name 10/19/24 1552          Stairs Within Home, Primary    Number of Stairs, Within Home, Primary none  -AR       Row Name 10/19/24 1552          Cognition    Orientation Status (Cognition) oriented x 4  -AR       Row Name 10/19/24 1552          Safety Issues/Impairments Affecting Functional Mobility    Safety Issues Affecting Function (Mobility) at risk behavior observed;awareness of need for assistance;impulsivity;insight into deficits/self-awareness;judgment;positioning of assistive device;problem-solving;safety precaution awareness;safety precautions follow-through/compliance;sequencing abilities  -AR     Impairments Affecting Function (Mobility) balance;endurance/activity tolerance;pain;range of motion (ROM);strength;cognition  -AR               User Key  (r) = Recorded By, (t) = Taken By, (c) = Cosigned By      Initials Name Provider Type    AR Bernie Henriquez, OT Occupational Therapist                     Mobility/ADL's       Row Name 10/19/24 1551          Transfers    Transfers sit-stand transfer;stand-sit transfer;toilet transfer  -AR     Comment, (Transfers) Pt impulsive and not attending to cues for safety. She needs cues/assist maintain NWB LLE.  -AR       Row Name 10/19/24 155          Sit-Stand Transfer    Sit-Stand [place] (Transfers) minimum assist (75% patient effort);verbal cues;nonverbal cues (demo/gesture);2 person assist  -AR     Assistive Device (Sit-Stand Transfers) walker, front-wheeled  -AR       Row Name 10/19/24 1552          Stand-Sit Transfer    Stand-Sit [place] (Transfers) minimum assist (75% patient effort);2 person assist;verbal cues;nonverbal  cues (demo/gesture)  -AR     Assistive Device (Stand-Sit Transfers) walker, front-wheeled  -AR       Row Name 10/19/24 5965          Toilet Transfer    Type (Toilet Transfer) stand-sit;sit-stand  -AR     [place] Level (Toilet Transfer) minimum assist (75% patient effort);verbal cues;nonverbal cues (demo/gesture);2 person assist  -AR     Assistive Device (Toilet Transfer) commode, bedside without drop arms;walker, front-wheeled  -AR       Row Name 10/19/24 4675          Activities of Daily Living    BADL Assessment/Intervention lower body dressing;grooming;toileting  -AR       Row Name 10/19/24 7255          Mobility    Extremity Weight-bearing Status left lower extremity  -AR     Left Lower Extremity (Weight-bearing Status) non weight-bearing (NWB)   -AR       Row Name 10/19/24 1555          Lower Body Dressing Assessment/Training    [place] Level (Lower Body Dressing) don;shoes/slippers;socks;verbal cues;contact guard assist  -AR     Position (Lower Body Dressing) long sitting  -AR       Row Name 10/19/24 9225          Toileting Assessment/Training    [place] Level (Toileting) toileting skills;adjust/manage clothing;maximum assist (25% patient effort);perform perineal hygiene  -AR     Assistive Devices (Toileting) commode, bedside without drop arms  -AR     Position (Toileting) unsupported sitting;supported standing  -AR       Row Name 10/19/24 9208          Grooming Assessment/Training    [place] Level (Grooming) wash face, hands;minimum assist (75% patient effort)  -AR     Position (Grooming) supported sitting  -AR               User Key  (r) = Recorded By, (t) = Taken By, (c) = Cosigned By      Initials Name Provider Type    Bernie Jean Baptiste OT Occupational Therapist                   Obj/Interventions       Row Name 10/19/24 5514          Sensory Assessment (Somatosensory)    Sensory Assessment (Somatosensory) UE sensation intact  -AR       Row Name 10/19/24 7481          Vision  Assessment/Intervention    Visual Impairment/Limitations WNL  -AR       Row Name 10/19/24 1558          Range of Motion Comprehensive    General Range of Motion no range of motion deficits identified  -AR       Row Name 10/19/24 1558          Strength Comprehensive (MMT)    General Manual Muscle Testing (MMT) Assessment upper extremity strength deficits identified  -AR     Comment, General Manual Muscle Testing (MMT) Assessment 4+/5 BUE  -AR       Row Name 10/19/24 1558          Balance    Balance Assessment sitting static balance;sitting dynamic balance;standing dynamic balance;standing static balance  -AR     Static Sitting Balance contact guard  -AR     Dynamic Sitting Balance contact guard  -AR     Position, Sitting Balance unsupported;sitting in chair  -AR     Static Standing Balance minimal assist;verbal cues  -AR     Dynamic Standing Balance minimal assist;2-person assist;verbal cues  -AR     Position/Device Used, Standing Balance supported;walker, front-wheeled  -AR               User Key  (r) = Recorded By, (t) = Taken By, (c) = Cosigned By      Initials Name Provider Type    Bernie Jean Baptiste, OT Occupational Therapist                   Goals/Plan       Row Name 10/19/24 1601          Transfer Goal 1 (OT)    Activity/Assistive Device (Transfer Goal 1, OT) sit-to-stand/stand-to-sit;commode, bedside without drop arms;walker, rolling  -AR     [place] Level/Cues Needed (Transfer Goal 1, OT) verbal cues required;contact guard required  -AR     Time Frame (Transfer Goal 1, OT) short term goal (STG);5 days  -AR     Progress/Outcome (Transfer Goal 1, OT) goal ongoing  -AR       Row Name 10/19/24 1601          Dressing Goal 1 (OT)    Activity/Device (Dressing Goal 1, OT) lower body dressing;reacher  -AR     [place]/Cues Needed (Dressing Goal 1, OT) supervision required;verbal cues required  -AR     Time Frame (Dressing Goal 1, OT) long term goal (LTG);10 days  -AR     Progress/Outcome (Dressing Goal  1, OT) goal ongoing;goal revised this date  -AR       Row Name 10/19/24 1601          Toileting Goal 1 (OT)    Activity/Device (Toileting Goal 1, OT) toileting skills, all;commode, bedside with drop arms  -AR     [place] Level/Cues Needed (Toileting Goal 1, OT) verbal cues required;contact guard required  -AR     Time Frame (Toileting Goal 1, OT) long term goal (LTG);10 days  -AR     Progress/Outcome (Toileting Goal 1, OT) goal ongoing  -AR       Row Name 10/19/24 1601          Strength Goal 1 (OT)    Strength Goal 1 (OT) Pt will complete BUE theraband HEP with supervision to support ADL function  -AR     Time Frame (Strength Goal 1, OT) short term goal (STG);5 days  -AR     Progress/Outcome (Strength Goal 1, OT) goal ongoing  -AR               User Key  (r) = Recorded By, (t) = Taken By, (c) = Cosigned By      Initials Name Provider Type    AR Bernie Henriquez, OT Occupational Therapist                   Clinical Impression       Row Name 10/19/24 5406          Pain Assessment    Pretreatment Pain Rating 3/10  -AR     Posttreatment Pain Rating 3/10  -AR     Pain Location ankle  -AR     Pain Side/Orientation left  -AR     Pain Management Interventions exercise or physical activity utilized;positioning techniques utilized  -AR     Response to Pain Interventions activity participation with tolerable pain  -AR       Row Name 10/19/24 6906          Plan of Care Review    Plan of Care Reviewed With patient  -AR     Progress improving  -AR     Outcome Evaluation Pt alert, confused, poor attention to task, impulsive and reports tolerable pain despite block being off. She completed LB dressing with CGA, BSC transfer with min assist x2. Pt requires cues/assist maintain NWB LLE. Recommend IPR.  -AR       Row Name 10/19/24 4781          Therapy Assessment/Plan (OT)    Rehab Potential (OT) good  -AR     Criteria for Skilled Therapeutic Interventions Met (OT) yes  -AR     Therapy Frequency (OT) daily  -AR       Row Name  10/19/24 1559          Therapy Plan Review/Discharge Plan (OT)    Anticipated Discharge Disposition (OT) inpatient rehabilitation facility  -AR       Row Name 10/19/24 1559          Vital Signs    Post Systolic BP Rehab 106  -AR     Post Treatment Diastolic BP 62  -AR     Pre Patient Position Sitting  -AR     Intra Patient Position Standing  -AR     Post Patient Position Sitting  -AR       Row Name 10/19/24 1559          Positioning and Restraints    In Chair notified nsg;reclined;call light within reach;encouraged to call for assist;exit alarm on;compression device;waffle cushion;legs elevated;LLE elevated  -AR               User Key  (r) = Recorded By, (t) = Taken By, (c) = Cosigned By      Initials Name Provider Type    Bernie Jean Baptiste, CALVIN Occupational Therapist                   Outcome Measures       Row Name 10/19/24 1602          How much help from another is currently needed...    Putting on and taking off regular lower body clothing? 3  -AR     Bathing (including washing, rinsing, and drying) 2  -AR     Toileting (which includes using toilet bed pan or urinal) 2  -AR     Putting on and taking off regular upper body clothing 3  -AR     Taking care of personal grooming (such as brushing teeth) 3  -AR     Eating meals 3  -AR     AM-PAC 6 Clicks Score (OT) 16  -AR       Row Name 10/19/24 1525 10/19/24 0803       How much help from another person do you currently need...    Turning from your back to your side while in flat bed without using bedrails? 4  -ND 4  -GS    Moving from lying on back to sitting on the side of a flat bed without bedrails? 3  -ND 3  -GS    Moving to and from a bed to a chair (including a wheelchair)? 3  -ND 3  -GS    Standing up from a chair using your arms (e.g., wheelchair, bedside chair)? 3  -ND 3  -GS    Climbing 3-5 steps with a railing? 2  -ND 2  -GS    To walk in hospital room? 3  -ND 3  -GS    AM-PAC 6 Clicks Score (PT) 18  -ND 18  -GS    Highest Level of Mobility Goal 6  --> Walk 10 steps or more  -ND 6 --> Walk 10 steps or more  -      Row Name 10/19/24 1602 10/19/24 1525       Functional Assessment    Outcome Measure Options AM-PAC 6 Clicks Daily Activity (OT)  -AR AM-PAC 6 Clicks Basic Mobility (PT)  -ND              User Key  (r) = Recorded By, (t) = Taken By, (c) = Cosigned By      Initials Name Provider Type    Bernie Jean Baptiste, OT Occupational Therapist    Beatrice Wild RN Registered Nurse    Thea Allen, PT Physical Therapist                    Occupational Therapy Education       Title: PT OT SLP Therapies (In Progress)       Topic: Occupational Therapy (In Progress)       Point: ADL training (In Progress)       Description:   Instruct learner(s) on proper safety adaptation and remediation techniques during self care or transfers.   Instruct in proper use of assistive devices.                  Learning Progress Summary            Patient Eager, E,TB,D, NR by AR at 10/19/2024 1602    Acceptance, E, VU by  at 10/17/2024 1601    Acceptance, E,D, NR by J at 10/16/2024 1358    Acceptance, E, VU by AN at 10/14/2024 0955    Eager, E,TB,D, VU,NR by AR at 10/10/2024 1647    Eager, E,TB,D,H, VU,NR by AR at 10/9/2024 1145                      Point: Home exercise program (In Progress)       Description:   Instruct learner(s) on appropriate technique for monitoring, assisting and/or progressing therapeutic exercises/activities.                  Learning Progress Summary            Patient Eager, E,TB,D, NR by AR at 10/19/2024 1602    Acceptance, E, VU by  at 10/17/2024 1601    Eager, E,TB,D, VU,NR by AR at 10/10/2024 1647    Eager, E,TB,D,H, VU,NR by AR at 10/9/2024 1145                      Point: Precautions (In Progress)       Description:   Instruct learner(s) on prescribed precautions during self-care and functional transfers.                  Learning Progress Summary            Patient Eager, E,TB,D, NR by AR at 10/19/2024 1602    Acceptance, E, VU by   at 10/17/2024 1601    Acceptance, E,D, NR by JY at 10/16/2024 1358    Acceptance, E, VU by AN at 10/14/2024 0955    Eager, E,TB,D, VU,NR by AR at 10/10/2024 1647    Eager, E,TB,D,H, VU,NR by AR at 10/9/2024 1145                      Point: Body mechanics (In Progress)       Description:   Instruct learner(s) on proper positioning and spine alignment during self-care, functional mobility activities and/or exercises.                  Learning Progress Summary            Patient Eager, E,TB,D, NR by AR at 10/19/2024 1602    Acceptance, E, VU by  at 10/17/2024 1601    Acceptance, E,D, NR by J at 10/16/2024 1358    Acceptance, E, VU by AN at 10/14/2024 0955    Eager, E,TB,D, VU,NR by AR at 10/10/2024 1647    Eager, E,TB,D,H, VU,NR by AR at 10/9/2024 1145                                      User Key       Initials Effective Dates Name Provider Type Discipline    AR 07/11/23 -  Bernie Henriquez OT Occupational Therapist OT    JFAIZAN 06/16/21 -  Anjelica Parra OT Occupational Therapist OT     09/21/21 -  Keisha Oates OT Occupational Therapist OT     08/08/24 -  Fozia Calhoun OT Student OT Student OT                  OT Recommendation and Plan  Planned Therapy Interventions (OT): activity tolerance training, adaptive equipment training, BADL retraining, edema control/reduction, functional balance retraining, IADL retraining, occupation/activity based interventions, patient/caregiver education/training, ROM/therapeutic exercise, strengthening exercise, transfer/mobility retraining  Therapy Frequency (OT): daily  Plan of Care Review  Plan of Care Reviewed With: patient  Progress: improving  Outcome Evaluation: Pt alert, confused, poor attention to task, impulsive and reports tolerable pain despite block being off. She completed LB dressing with CGA, BSC transfer with min assist x2. Pt requires cues/assist maintain NWB LLE. Recommend IPR.     Time Calculation:   Evaluation Complexity (OT)  Review  Occupational Profile/Medical/Therapy History Complexity: brief/low complexity  Assessment, Occupational Performance/Identification of Deficit Complexity: 1-3 performance deficits  Clinical Decision Making Complexity (OT): problem focused assessment/low complexity  Overall Complexity of Evaluation (OT): low complexity     Time Calculation- OT       Row Name 10/19/24 1603             Time Calculation- OT    OT Start Time 1311  -AR      OT Received On 10/19/24  -AR      OT Goal Re-Cert Due Date 10/29/24  -AR         Timed Charges    08306 - OT Self Care/Mgmt Minutes 11  -AR         Untimed Charges    OT Eval/Re-eval Minutes 30  -AR         Total Minutes    Timed Charges Total Minutes 11  -AR      Untimed Charges Total Minutes 30  -AR       Total Minutes 41  -AR                User Key  (r) = Recorded By, (t) = Taken By, (c) = Cosigned By      Initials Name Provider Type    AR Bernie Henriquez OT Occupational Therapist                  Therapy Charges for Today       Code Description Service Date Service Provider Modifiers Qty    73812868419 HC OT SELF CARE/MGMT/TRAIN EA 15 MIN 10/19/2024 Bernie Henriquez OT GO 1    13960146114 HC OT RE-EVAL 2 10/19/2024 Bernie Henriquez OT GO 1                 Bernie Henriquez OT  10/19/2024

## 2024-10-19 NOTE — PROGRESS NOTES
"          Orthopaedic Surgery Progress Note    LOS: 12 days   Patient Care Team:  Cassy Foster MD as PCP - General (Internal Medicine)  Russ Carrington MD as Consulting Physician (Nephrology)  Gray Bahena MD as Consulting Physician (Cardiology)  Caden Diezt MD as Consulting Physician (Neurology)  Charity Cornejo, CESAR as Ambulatory  (Oncology) (Population Health)  Juanpablo Lee MD as Consulting Physician (Orthopedic Surgery)  Basilia Mata MD as Consulting Physician (Radiation Oncology)  Sarah Stern MD as Consulting Physician (Gynecology)  Jared Wheatley MD as Consulting Physician (Nephrology)  Jose G Florez MD as Consulting Physician (Dermatology)  1 Day Post-Op   Procedure(s):  Left midfoot and ankle ORIF  Subjective     Interval History:   Awake and alert.  Denies left lower extremity pain.  States was up using the scooter with therapy yesterday.  No complaints.    Objective     Vital Signs:  Temp (24hrs), Av.2 °F (36.8 °C), Min:97.7 °F (36.5 °C), Max:98.7 °F (37.1 °C)    /68 (BP Location: Right arm, Patient Position: Lying)   Pulse 98   Temp 97.7 °F (36.5 °C) (Oral)   Resp 18   Ht 162.6 cm (64\")   Wt 61.2 kg (135 lb)   LMP  (LMP Unknown)   SpO2 97%   BMI 23.17 kg/m²   Body mass index is 23.17 kg/m².    Labs:  Lab Results (last 24 hours)       Procedure Component Value Units Date/Time    Comprehensive Metabolic Panel [[id number]]  (Abnormal) Collected: 10/18/24 1437    Specimen: Blood Updated: 10/18/24 1548     Glucose 178 mg/dL      BUN 9 mg/dL      Creatinine 1.38 mg/dL      Sodium 142 mmol/L      Potassium 3.7 mmol/L      Chloride 106 mmol/L      CO2 22.0 mmol/L      Calcium 8.6 mg/dL      Total Protein 6.0 g/dL      Albumin 3.4 g/dL      ALT (SGPT) 7 U/L      AST (SGOT) 19 U/L      Alkaline Phosphatase 72 U/L      Total Bilirubin 0.2 mg/dL      Globulin 2.6 gm/dL      Comment: Calculated Result        A/G Ratio 1.3 g/dL      BUN/Creatinine Ratio 6.5     " Anion Gap 14.0 mmol/L      eGFR 41.8 mL/min/1.73     Narrative:      GFR Normal >60  Chronic Kidney Disease <60  Kidney Failure <15      CBC & Differential [[id number]]  (Abnormal) Collected: 10/18/24 1437    Specimen: Blood Updated: 10/18/24 1532    Narrative:      The following orders were created for panel order CBC & Differential.  Procedure                               Abnormality         Status                     ---------                               -----------         ------                     CBC Auto Differential[[id number]]        Abnormal            Final result                 Please view results for these tests on the individual orders.    CBC Auto Differential [[id number]]  (Abnormal) Collected: 10/18/24 1437    Specimen: Blood Updated: 10/18/24 1532     WBC 7.67 10*3/mm3      RBC 2.83 10*6/mm3      Hemoglobin 8.9 g/dL      Hematocrit 29.0 %      .5 fL      MCH 31.4 pg      MCHC 30.7 g/dL      RDW 14.6 %      RDW-SD 54.5 fl      MPV 10.6 fL      Platelets 256 10*3/mm3      Neutrophil % 90.5 %      Lymphocyte % 5.6 %      Monocyte % 3.0 %      Eosinophil % 0.0 %      Basophil % 0.1 %      Immature Grans % 0.8 %      Neutrophils, Absolute 6.94 10*3/mm3      Lymphocytes, Absolute 0.43 10*3/mm3      Monocytes, Absolute 0.23 10*3/mm3      Eosinophils, Absolute 0.00 10*3/mm3      Basophils, Absolute 0.01 10*3/mm3      Immature Grans, Absolute 0.06 10*3/mm3      nRBC 0.0 /100 WBC             Physical Exam:  left LE: splint CDI, compartments soft/compressible above splint   Nerve catheter in place   + Wiggling toes   SILT in toes   Palpable DP beneath splint, CR brisk in all toes    Assessment/Plan:  1 Day Post-Op status post:  Procedure(s):  Left midfoot and ankle ORIF    -NWB operative extremity  -Advance diet as tolerated, per primary  -Keep splint/operative dressing clean and dry  -DVT prophylaxis, mechanical and ASA, home on ASA  -Complete, postoperative antibiotics  -Pain  control  -PT/OT  -Elevate operative extremity  -Rest of mgmt per primary team  -DC planning, IPR    Sergio Godoy MD  10/19/24  07:25 EDT

## 2024-10-20 PROCEDURE — 25010000002 HEPARIN (PORCINE) PER 1000 UNITS: Performed by: ORTHOPAEDIC SURGERY

## 2024-10-20 PROCEDURE — 97110 THERAPEUTIC EXERCISES: CPT

## 2024-10-20 PROCEDURE — 97530 THERAPEUTIC ACTIVITIES: CPT

## 2024-10-20 PROCEDURE — 99232 SBSQ HOSP IP/OBS MODERATE 35: CPT | Performed by: NURSE PRACTITIONER

## 2024-10-20 RX ADMIN — OXYCODONE HYDROCHLORIDE 5 MG: 5 TABLET ORAL at 12:05

## 2024-10-20 RX ADMIN — ROSUVASTATIN 5 MG: 10 TABLET, FILM COATED ORAL at 08:11

## 2024-10-20 RX ADMIN — MAGNESIUM OXIDE TAB 400 MG (241.3 MG ELEMENTAL MG) 400 MG: 400 (241.3 MG) TAB at 14:16

## 2024-10-20 RX ADMIN — OXYCODONE HYDROCHLORIDE 5 MG: 5 TABLET ORAL at 04:07

## 2024-10-20 RX ADMIN — GABAPENTIN 300 MG: 300 CAPSULE ORAL at 08:12

## 2024-10-20 RX ADMIN — Medication 5 MG: at 21:05

## 2024-10-20 RX ADMIN — OXYCODONE HYDROCHLORIDE 5 MG: 5 TABLET ORAL at 17:15

## 2024-10-20 RX ADMIN — GABAPENTIN 600 MG: 300 CAPSULE ORAL at 21:05

## 2024-10-20 RX ADMIN — FOLIC ACID 1 MG: 1 TABLET ORAL at 08:12

## 2024-10-20 RX ADMIN — HEPARIN SODIUM 5000 UNITS: 5000 INJECTION INTRAVENOUS; SUBCUTANEOUS at 04:45

## 2024-10-20 RX ADMIN — Medication 10 ML: at 21:06

## 2024-10-20 RX ADMIN — DULOXETINE HYDROCHLORIDE 20 MG: 20 CAPSULE, DELAYED RELEASE ORAL at 21:05

## 2024-10-20 RX ADMIN — DULOXETINE HYDROCHLORIDE 20 MG: 20 CAPSULE, DELAYED RELEASE ORAL at 08:11

## 2024-10-20 RX ADMIN — HEPARIN SODIUM 5000 UNITS: 5000 INJECTION INTRAVENOUS; SUBCUTANEOUS at 21:05

## 2024-10-20 RX ADMIN — ASPIRIN 81 MG: 81 TABLET, COATED ORAL at 08:12

## 2024-10-20 RX ADMIN — OXYCODONE HYDROCHLORIDE 5 MG: 5 TABLET ORAL at 21:05

## 2024-10-20 RX ADMIN — THIAMINE HCL TAB 100 MG 200 MG: 100 TAB at 08:12

## 2024-10-20 RX ADMIN — HEPARIN SODIUM 5000 UNITS: 5000 INJECTION INTRAVENOUS; SUBCUTANEOUS at 14:16

## 2024-10-20 RX ADMIN — OXYCODONE HYDROCHLORIDE 5 MG: 5 TABLET ORAL at 08:12

## 2024-10-20 RX ADMIN — LEVOTHYROXINE SODIUM 25 MCG: 25 TABLET ORAL at 04:45

## 2024-10-20 RX ADMIN — CYANOCOBALAMIN TAB 1000 MCG 1000 MCG: 1000 TAB at 08:12

## 2024-10-20 NOTE — PLAN OF CARE
Patient has had multiple loose BMS tonight, stands pivot to BSC with one assist. . NWB (on operative extremity) status maintained. Elastic bandage CDI. Voids spontaneously without difficulty. VSS on RA. Denies pain / discomfort at this time. Call light in reach.

## 2024-10-20 NOTE — PROGRESS NOTES
[facility] Medicine Services  PROGRESS NOTE    Patient Name: Alysia Sierra  : 1955  MRN: [id number]    Date of Admission: 10/5/2024  Primary Care Physician: Cassy Foster MD    Subjective   Subjective     CC:  Fall, ankle fracture    HPI:  Feels well. States she was confused a few days ago due to pain medications making her loopy. Has been walking with knee scooter. Pain controlled. Feels she is getting more depressed being in the hospital for so long.       Objective   Objective     Vital Signs:   Temp:  [97.2 °F (36.2 °C)-99.1 °F (37.3 °C)] 98 °F (36.7 °C)  Heart Rate:  [75-78] 78  Resp:  [16-18] 16  BP: ()/(59-78) 122/78     Physical Exam:  Constitutional: No acute distress, awake, alert  HENT: NCAT, mucous membranes moist  Respiratory: Respiratory effort normal on RA  Cardiovascular: RRR, no murmurs, rubs, or gallops  Musculoskeletal: Left foot in cast  Psychiatric: Appropriate affect, cooperative  Neurologic: Oriented x 3, speech clear, SOL  Skin: No rashes      Results Reviewed:  LAB RESULTS:      Lab 10/19/24  1855 10/18/24  1437   WBC 11.81* 7.67   HEMOGLOBIN 7.5* 8.9*   HEMATOCRIT 24.4* 29.0*   PLATELETS 256 256   NEUTROS ABS  --  6.94   IMMATURE GRANS (ABS)  --  0.06*   LYMPHS ABS  --  0.43*   MONOS ABS  --  0.23   EOS ABS  --  0.00   .3* 102.5*         Lab 10/19/24  1855 10/18/24  1437   SODIUM 141 142   POTASSIUM 4.5 3.7   CHLORIDE 107 106   CO2 24.0 22.0   ANION GAP 10.0 14.0   BUN 10 9   CREATININE 1.21* 1.38*   EGFR 48.9* 41.8*   GLUCOSE 111* 178*   CALCIUM 8.7 8.6   PHOSPHORUS 2.8  --          Lab 10/19/24  1855 10/18/24  1437   TOTAL PROTEIN  --  6.0   ALBUMIN 3.7 3.4*   GLOBULIN  --  2.6   ALT (SGPT)  --  7   AST (SGOT)  --  19   BILIRUBIN  --  0.2   ALK PHOS  --  72                     Brief Urine Lab Results  (Last result in the past 365 days)        Color   Clarity   Blood   Leuk Est   Nitrite   Protein   CREAT   Urine HCG        24 1604  Yellow   Clear   Negative   Negative   Negative   Negative                   Microbiology Results Abnormal       None            No radiology results from the last 24 hrs    Results for orders placed during the hospital encounter of 01/02/24    Adult Transthoracic Echo Complete W/ Cont if Necessary Per Protocol    Interpretation Summary    Left ventricular systolic function is normal. Calculated left ventricular EF = 57.9% Left ventricular ejection fraction appears to be 56 - 60%.    Left ventricular diastolic function was normal.    Estimated right ventricular systolic pressure from tricuspid regurgitation is normal (<35 mmHg).    No significant change from previous study      Current medications:  Scheduled Meds:aspirin, 81 mg, Oral, Daily  DULoxetine, 20 mg, Oral, BID  folic acid, 1 mg, Oral, Daily  gabapentin, 300 mg, Oral, Daily  gabapentin, 600 mg, Oral, Nightly  heparin (porcine), 5,000 Units, Subcutaneous, Q8H  levothyroxine, 25 mcg, Oral, Q AM  magnesium oxide, 400 mg, Oral, Daily  melatonin, 5 mg, Oral, Nightly  metoprolol succinate XL, 25 mg, Oral, Nightly  pantoprazole, 40 mg, Oral, Every Other Day  rosuvastatin, 5 mg, Oral, Daily  sodium chloride, 10 mL, Intravenous, Q12H  sodium chloride, 10 mL, Intravenous, Q12H  thiamine, 200 mg, Oral, Daily  vitamin B-12, 1,000 mcg, Oral, Daily      Continuous Infusions:ropivacaine,       PRN Meds:.  senna-docusate sodium **AND** polyethylene glycol **AND** bisacodyl **AND** bisacodyl    bisacodyl    Calcium Replacement - Follow Nurse / BPA Driven Protocol    docosanol    influenza vaccine    Magnesium Standard Dose Replacement - Follow Nurse / BPA Driven Protocol    melatonin    [DISCONTINUED] HYDROmorphone **AND** naloxone    ondansetron ODT **OR** ondansetron    oxyCODONE    Phosphorus Replacement - Follow Nurse / BPA Driven Protocol    Potassium Replacement - Follow Nurse / BPA Driven Protocol    [COMPLETED] Insert Peripheral IV **AND** sodium chloride    sodium  chloride    sodium chloride    Assessment & Plan   Assessment & Plan     Active Hospital Problems    Diagnosis  POA    **Closed fracture of left ankle [S82.740E]  Unknown    Closed displaced fracture of fourth metatarsal bone of left foot [S92.342A]  Yes    Lisfranc dislocation, left, initial encounter [R90.018A]  Yes      Resolved Hospital Problems   No resolved problems to display.        Brief Hospital Course to date:  Alysia Sierra is a 69 y.o. female with history of CKD III, chronic anemia, uterine cancer, hypothyroidism and alcohol abuse who was admitted to [facility] on 10/5/24 for L ankle fracture after stepping awkwardly off her bed.           Left foot fracture with involvement of 1st, 2nd, 3rd and 4th metatarsal bases  Acute nondisplaced L medial malleolus fracture  Acute nondisplaced L posterior medial talus fracture   1st-4th proximal metatarsal fracture Lisfranc fracture  -S/p repair 10/18 then likely rehab  -ASA for DVT prophylaxis     Alcohol withdrawal, resolved  History of alcohol abuse   - Patient denies daily alcohol use, however family reports daily heavy alcohol use  - Developed confusion / hallucinations on afternoon of 10/8 and started on CIWA protocol  - Withdrawal symptoms resolved. CIWA protocol discontinued.  - Continue PO thiamine, folic acid  - Continue QHS Melatonin     CKD III  -stable     Chronic anemia  Iron deficiency  - Iron studies show ACD with iron deficiency  - Status post IV iron  - B12, folate within normal limits     Hypothyroidism   - Continue Levothyroxine     GERD   - Continue PPI     Neuropathy   - Continue Gabapentin      Expected Discharge Location and Transportation: SNF  Expected Discharge   Expected Discharge Date: 10/22/2024; Expected Discharge Time:      VTE Prophylaxis:  Pharmacologic & mechanical VTE prophylaxis orders are present.         AM-PAC 6 Clicks Score (PT): 17 (10/20/24 0812)    CODE STATUS:   Code Status and Medical Interventions:  CPR (Attempt to Resuscitate); Full Support   Ordered at: 10/06/24 0246     Code Status (Patient has no pulse and is not breathing):    CPR (Attempt to Resuscitate)     Medical Interventions (Patient has pulse or is breathing):    Full Support       Malini Dalton, APRN  10/20/24

## 2024-10-20 NOTE — THERAPY TREATMENT NOTE
Patient Name: Alysia Sierra  : 1955    MRN: [id number]                              Today's Date: 10/20/2024       Admit Date: 10/5/2024    Visit Dx:     ICD-10-CM ICD-9-CM   1. Displaced fracture of distal end of left tibia  S82.302A 824.8   2. Closed nondisplaced fracture of second metatarsal bone of left foot, initial encounter  S92.325A 825.25   3. Closed nondisplaced fracture of first metatarsal bone of left foot, initial encounter  S92.315A 825.25   4. Closed nondisplaced fracture of third metatarsal bone of left foot, initial encounter  S92.335A 825.25   5. Closed nondisplaced fracture of fourth metatarsal bone of left foot, initial encounter  S92.345A 825.25   6. Lisfranc dislocation, left, initial encounter  S93.325A 838.03   7. Closed fracture of left ankle, initial encounter  S82.892A 824.8     Patient Active Problem List   Diagnosis    Hypertension    Leg weakness, bilateral    Syncope    CKD (chronic kidney disease) stage 3, GFR 30-59 ml/min    Neuropathy    Hyperlipidemia LDL goal <100    Endometrial adenocarcinoma    Mild episode of recurrent major depressive disorder    Dizziness    Hip fracture    Anemia    Hypomagnesemia    Hypothyroidism    Post-menopausal    Other osteoporosis without current pathological fracture    Closed fracture of second lumbar vertebra    Alcoholic ketoacidosis    Leukocytosis    GERD with esophagitis    Sepsis    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    Lisfranc dislocation, left, initial encounter    Closed fracture of left ankle    Closed displaced fracture of fourth metatarsal bone of left foot     Past Medical History:   Diagnosis Date    Allergic lisinopril  2017    Anemia     Arrhythmia     Arthritis     Cancer     uterine    Cataract mild 2020    stil lpresent    Chicken pox     Chronic fatigue     CKD (chronic kidney disease), stage III     sees nephro    Clotting disorder     Dental root implant present     lower left  x1 - possible dental  implant    Depression mild 2019    Difficulty walking 2019    Disease of thyroid gland     Dizzy     NIEVES (dyspnea on exertion)     2017    Fracture of hip     Generalized anxiety disorder     GERD (gastroesophageal reflux disease) 2018    History of brachytherapy 2023    vaginal brachytherapy    Hyperlipidemia     Hypertension     Iron deficiency anemia     Liver disease     fatty    Liver problem     Measles     Menopause     Mumps     Neuromuscular disorder Peripheral Neuropathy    Orthostatic hypotension     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    Renal insufficiency 2018    Scoliosis     Unintentional weight loss     Uses contact lenses     bilat    Uterine cancer     Uterine cancer 2022    UTI (urinary tract infection)     Visual impairment Nearsighted    Wears glasses      Past Surgical History:   Procedure Laterality Date     SECTION      DILATION AND CURETTAGE, DIAGNOSTIC / THERAPEUTIC      HIP OPEN REDUCTION Left 2023    Procedure: FEMORAL NECK OPEN REDUCTION INTERNAL FIXATION LEFT;  Surgeon: Juanpablo Lee MD;  Location: [facility];  Service: Orthopedics;  Laterality: Left;    HIP SURGERY      OOPHORECTOMY      ORAL LESION EXCISION/BIOPSY  2021    TOTAL LAPAROSCOPIC HYSTERECTOMY SALPINGO OOPHORECTOMY N/A 10/28/2022    Procedure: TOTAL LAPAROSCOPIC HYSTERECTOMY BILATERAL SALPINGO-OOPHORECTOMY, INJECTION FOR SENTINEL LYMPH NODE MAPPING, BILATERAL SENTINEL LYMPH NODE DISSECTION WITH DAVINCI ROBOT;  Surgeon: Jasmine Rich MD;  Location: [facility] OR;  Service: Robotics - DaVinci;  Laterality: N/A;    TUBAL ABDOMINAL LIGATION        General Information       Row Name 10/20/24 1314          Physical Therapy Time and Intention    Document Type therapy note (daily note)  -ND     Mode of Treatment physical therapy  -ND       Row Name 10/20/24 1314          General Information    Patient Profile Reviewed yes  -ND     Existing  Precautions/Restrictions fall;left;non-weight bearing;other (see comments)  LLE NWB.  -ND     Barriers to Rehab previous functional deficit  -ND       Row Name 10/20/24 1314          Cognition    Orientation Status (Cognition) oriented x 3  -ND       Row Name 10/20/24 1314          Safety Issues/Impairments Affecting Functional Mobility    Safety Issues Affecting Function (Mobility) awareness of need for assistance;insight into deficits/self-awareness;judgment;safety precaution awareness;safety precautions follow-through/compliance;sequencing abilities  -ND     Impairments Affecting Function (Mobility) balance;endurance/activity tolerance;pain;range of motion (ROM);strength  -ND               User Key  (r) = Recorded By, (t) = Taken By, (c) = Cosigned By      Initials Name Provider Type    ND Thea Gunter PT Physical Therapist                   Mobility       Row Name 10/20/24 1315          Bed Mobility    Bed Mobility supine-sit  -ND     Supine-Sit [place] (Bed Mobility) standby assist  -ND     Assistive Device (Bed Mobility) bed rails;head of bed elevated  -ND     Comment, (Bed Mobility) Performs without assist, denies symptoms with position change.  -ND       Row Name 10/20/24 1315          Sit-Stand Transfer    Sit-Stand [place] (Transfers) minimum assist (75% patient effort);1 person assist;verbal cues;nonverbal cues (demo/gesture)  -ND     Assistive Device (Sit-Stand Transfers) walker, knee scooter  -ND     Comment, (Sit-Stand Transfer) x1 from EOB, cues for safety awareness and sequencing.  -ND       Row Name 10/20/24 1315          Gait/Stairs (Locomotion)    [place] Level (Gait) contact guard;verbal cues;nonverbal cues (demo/gesture)  -ND     Assistive Device (Gait) walker, knee scooter  -ND     Distance in Feet (Gait) 55  -ND     Deviations/Abnormal Patterns (Gait) gait speed decreased;stride length decreased  -ND     Bilateral Gait Deviations forward flexed posture  -ND     Comment,  (Gait/Stairs) Pt mobilizes via knee scooter into bathroom and then out into hallway with CGA and cues for sequencing and knee scooter management. Pt requires cues to lock/unlock knee scooter with transfers. Overall distance limited by fatigue and L hip/back pain.  -ND       Row Name 10/20/24 1315          Mobility    Extremity Weight-bearing Status left lower extremity  -ND     Left Lower Extremity (Weight-bearing Status) non weight-bearing (NWB)  -ND               User Key  (r) = Recorded By, (t) = Taken By, (c) = Cosigned By      Initials Name Provider Type    ND Thea Gunter PT Physical Therapist                   Obj/Interventions       Row Name 10/20/24 1317          Motor Skills    Therapeutic Exercise hip;knee;ankle  -ND       Row Name 10/20/24 1317          Hip (Therapeutic Exercise)    Hip (Therapeutic Exercise) isometric exercises  -ND     Hip Isometrics (Therapeutic Exercise) bilateral;gluteal sets;10 repetitions  -ND     Hip Strengthening (Therapeutic Exercise) bilateral;marching while seated;aBduction;aDduction;10 repetitions  -ND       Row Name 10/20/24 1317          Knee (Therapeutic Exercise)    Knee (Therapeutic Exercise) strengthening exercise  -ND     Knee Strengthening (Therapeutic Exercise) bilateral;LAQ (long arc quad);SLR (straight leg raise);heel slides;10 repetitions  -ND       Row Name 10/20/24 1317          Ankle (Therapeutic Exercise)    Ankle (Therapeutic Exercise) AROM (active range of motion)  -ND     Ankle AROM (Therapeutic Exercise) right;dorsiflexion;plantarflexion;10 repetitions  -ND       Row Name 10/20/24 1317          Balance    Balance Assessment sitting static balance;sitting dynamic balance;standing static balance;standing dynamic balance  -ND     Static Sitting Balance standby assist  -ND     Dynamic Sitting Balance contact guard  -ND     Position, Sitting Balance unsupported;sitting edge of bed  -ND     Static Standing Balance contact guard;verbal cues;non-verbal  cues (demo/gesture)  -ND     Dynamic Standing Balance contact guard;verbal cues;non-verbal cues (demo/gesture)  -ND     Position/Device Used, Standing Balance supported;other (see comments)  knee scooter.  -ND     Balance Interventions sitting;standing;sit to stand;supported;static;dynamic;dynamic reaching;occupation based/functional task  -ND     Comment, Balance Pt stands at sink with knee scooter in locked position to perform personal hygiene tasks in standing while maintaining NWB precautions. Focus of task was on performing dynamic reaching and performance of functional tasks while maintaining balance and appropriate mechanics. Tasks lasted ~10 minutes.  -ND               User Key  (r) = Recorded By, (t) = Taken By, (c) = Cosigned By      Initials Name Provider Type    Thea Allen PT Physical Therapist                   Goals/Plan    No documentation.                  Clinical Impression       Row Name 10/20/24 1319          Pain    Pretreatment Pain Rating 5/10  -ND     Posttreatment Pain Rating 6/10  -ND     Pain Location ankle  -ND     Pain Side/Orientation left  -ND     Pain Management Interventions exercise or physical activity utilized;positioning techniques utilized;cold applied;nursing notified  -ND     Response to Pain Interventions activity participation with tolerable pain  -ND       Row Name 10/20/24 1319          Plan of Care Review    Plan of Care Reviewed With patient  -ND     Progress improving  -ND     Outcome Evaluation Pt mobilizes via knee scooter and performs dynamic reaching tasks while maintaining appropriate posture and balance. Pt would benefit from continued skilled therapy to address strength, balance, and activity tolerance deficits and promote return to baseline. Recommend IRF following d/c.  -ND       Row Name 10/20/24 1319          Vital Signs    Pre Systolic BP Rehab 122  -ND     Pre Treatment Diastolic BP 78  -ND     Post Systolic BP Rehab 105  -ND     Post Treatment  Diastolic BP 68  -ND     O2 Delivery Pre Treatment room air  -ND     O2 Delivery Intra Treatment room air  -ND     O2 Delivery Post Treatment room air  -ND     Pre Patient Position Supine  -ND     Intra Patient Position Standing  -ND     Post Patient Position Sitting  -ND       Row Name 10/20/24 1319          Positioning and Restraints    Pre-Treatment Position in bed  -ND     Post Treatment Position chair  -ND     In Chair notified nsg;reclined;legs elevated;call light within reach;encouraged to call for assist;exit alarm on;waffle cushion;LLE elevated  -ND               User Key  (r) = Recorded By, (t) = Taken By, (c) = Cosigned By      Initials Name Provider Type    Thea Allen PT Physical Therapist                   Outcome Measures       Row Name 10/20/24 1328 10/20/24 0812       How much help from another person do you currently need...    Turning from your back to your side while in flat bed without using bedrails? 3  -ND 3  -SJ    Moving from lying on back to sitting on the side of a flat bed without bedrails? 3  -ND 3  -SJ    Moving to and from a bed to a chair (including a wheelchair)? 3  -ND 3  -SJ    Standing up from a chair using your arms (e.g., wheelchair, bedside chair)? 3  -ND 3  -SJ    Climbing 3-5 steps with a railing? 2  -ND 2  -SJ    To walk in hospital room? 3  -ND 3  -SJ    AM-PAC 6 Clicks Score (PT) 17  -ND 17  -SJ    Highest Level of Mobility Goal 5 --> Static standing  -ND 5 --> Static standing  -SJ      Row Name 10/20/24 1328          Functional Assessment    Outcome Measure Options AM-PAC 6 Clicks Basic Mobility (PT)  -ND               User Key  (r) = Recorded By, (t) = Taken By, (c) = Cosigned By      Initials Name Provider Type    Melvi Senior RN Registered Nurse    Thea Allen PT Physical Therapist                                 Physical Therapy Education       Title: PT OT SLP Therapies (In Progress)       Topic: Physical Therapy (Done)       Point:  Mobility training (Done)       Learning Progress Summary            Patient Acceptance, E, VU by ND at 10/20/2024 1328    Acceptance, E, VU by ND at 10/19/2024 1525    Acceptance, E, VU by ND at 10/17/2024 1547    Acceptance, E, VU,NR by  at 10/16/2024 1602    Acceptance, E, VU by ND at 10/15/2024 1044    Acceptance, E, VU by CK at 10/12/2024 1004    Acceptance, E, VU by CK at 10/11/2024 1154    Eager, E, VU by SC at 10/10/2024 1108    Comment: reviewed safety with mobility    Acceptance, E,D, VU,NR by AB at 10/9/2024 1006                      Point: Home exercise program (Done)       Learning Progress Summary            Patient Acceptance, E, VU by ND at 10/20/2024 1328    Acceptance, E, VU by ND at 10/17/2024 1547    Acceptance, E, VU,NR by  at 10/16/2024 1602    Acceptance, E, VU by CK at 10/12/2024 1004    Eager, E, VU by SC at 10/10/2024 1108    Comment: reviewed safety with mobility                      Point: Body mechanics (Done)       Learning Progress Summary            Patient Acceptance, E, VU by ND at 10/20/2024 1328    Acceptance, E, VU by ND at 10/19/2024 1525    Acceptance, E, VU by ND at 10/17/2024 1547    Acceptance, E, VU,NR by  at 10/16/2024 1602    Acceptance, E, VU by ND at 10/15/2024 1044    Acceptance, E, VU by CK at 10/12/2024 1004    Acceptance, E, VU by  at 10/11/2024 1154    Eager, E, VU by SC at 10/10/2024 1108    Comment: reviewed safety with mobility    Acceptance, E,D, VU,NR by AB at 10/9/2024 1006                      Point: Precautions (Done)       Learning Progress Summary            Patient Acceptance, E, VU by ND at 10/20/2024 1328    Acceptance, E, VU by ND at 10/19/2024 1525    Acceptance, E, VU by ND at 10/17/2024 1547    Acceptance, E, VU,NR by  at 10/16/2024 1602    Acceptance, E, VU by ND at 10/15/2024 1044    Acceptance, E, VU by CK at 10/12/2024 1004    Acceptance, E, VU by CK at 10/11/2024 1154    Eager, E, VU by SC at 10/10/2024 1108    Comment: reviewed  safety with mobility    Acceptance, E,D, VU,NR by AB at 10/9/2024 1006                                      User Key       Initials Effective Dates Name Provider Type Discipline    SC 02/03/23 -  Grace Bradley, PT Physical Therapist PT    AB 09/22/22 -  Margaret Bhatt, PT Physical Therapist PT    CK 02/06/24 -  Karen Mcarthur, PT Physical Therapist PT     09/21/23 -  Rose Bush, PT Physical Therapist PT    ND 11/16/23 -  Thea Gunter, PT Physical Therapist PT                  PT Recommendation and Plan  Planned Therapy Interventions (PT): balance training, bed mobility training, gait training, home exercise program, patient/family education, transfer training, postural re-education, ROM (range of motion), strengthening  Progress: improving  Outcome Evaluation: Pt mobilizes via knee scooter and performs dynamic reaching tasks while maintaining appropriate posture and balance. Pt would benefit from continued skilled therapy to address strength, balance, and activity tolerance deficits and promote return to baseline. Recommend IRF following d/c.     Time Calculation:         PT Charges       Row Name 10/20/24 1328             Time Calculation    Start Time 1022  -ND      PT Received On 10/20/24  -ND         Timed Charges    37267 - PT Therapeutic Exercise Minutes 15  -ND      22442 - PT Therapeutic Activity Minutes 18  -ND         Total Minutes    Timed Charges Total Minutes 33  -ND       Total Minutes 33  -ND                User Key  (r) = Recorded By, (t) = Taken By, (c) = Cosigned By      Initials Name Provider Type    ND Thea Gunter, PT Physical Therapist                  Therapy Charges for Today       Code Description Service Date Service Provider Modifiers Qty    73611423184 HC PT THERAPEUTIC ACT EA 15 MIN 10/19/2024 Thea Gunter, PT GP 1    92188223381 HC PT RE-EVAL ESTABLISHED PLAN 2 10/19/2024 Thea Gunter, PT GP 1    17488289037 HC PT THER PROC EA 15 MIN 10/20/2024  Thea Gunter, PT GP 1    [id number]  PT THERAPEUTIC ACT EA 15 MIN 10/20/2024 hTea Gunter, PT GP 1            PT G-Codes  Outcome Measure Options: AM-PAC 6 Clicks Basic Mobility (PT)  AM-PAC 6 Clicks Score (PT): 17  AM-PAC 6 Clicks Score (OT): 16  PT Discharge Summary  Anticipated Discharge Disposition (PT): inpatient rehabilitation facility    Thea Gunter, MARIBETH  10/20/2024

## 2024-10-20 NOTE — PLAN OF CARE
Goal Outcome Evaluation:  Plan of Care Reviewed With: patient        Progress: improving  Outcome Evaluation: Pt mobilizes via knee scooter and performs dynamic reaching tasks while maintaining appropriate posture and balance. Pt would benefit from continued skilled therapy to address strength, balance, and activity tolerance deficits and promote return to baseline. Recommend IRF following d/c.    Anticipated Discharge Disposition (PT): inpatient rehabilitation facility

## 2024-10-21 VITALS
RESPIRATION RATE: 18 BRPM | OXYGEN SATURATION: 98 % | HEIGHT: 64 IN | TEMPERATURE: 97.8 F | HEART RATE: 78 BPM | SYSTOLIC BLOOD PRESSURE: 120 MMHG | WEIGHT: 135 LBS | DIASTOLIC BLOOD PRESSURE: 78 MMHG | BODY MASS INDEX: 23.05 KG/M2

## 2024-10-21 LAB
DEPRECATED RDW RBC AUTO: 57.8 FL (ref 37–54)
ERYTHROCYTE [DISTWIDTH] IN BLOOD BY AUTOMATED COUNT: 14.8 % (ref 12.3–15.4)
HCT VFR BLD AUTO: 29.4 % (ref 34–46.6)
HGB BLD-MCNC: 8.9 G/DL (ref 12–15.9)
MCH RBC QN AUTO: 31.8 PG (ref 26.6–33)
MCHC RBC AUTO-ENTMCNC: 30.3 G/DL (ref 31.5–35.7)
MCV RBC AUTO: 105 FL (ref 79–97)
PLATELET # BLD AUTO: 309 10*3/MM3 (ref 140–450)
PMV BLD AUTO: 10.4 FL (ref 6–12)
RBC # BLD AUTO: 2.8 10*6/MM3 (ref 3.77–5.28)
WBC NRBC COR # BLD AUTO: 8.32 10*3/MM3 (ref 3.4–10.8)

## 2024-10-21 PROCEDURE — 25010000002 HEPARIN (PORCINE) PER 1000 UNITS: Performed by: ORTHOPAEDIC SURGERY

## 2024-10-21 PROCEDURE — 85027 COMPLETE CBC AUTOMATED: CPT | Performed by: PHYSICIAN ASSISTANT

## 2024-10-21 PROCEDURE — 99239 HOSP IP/OBS DSCHRG MGMT >30: CPT | Performed by: PHYSICIAN ASSISTANT

## 2024-10-21 PROCEDURE — 97530 THERAPEUTIC ACTIVITIES: CPT

## 2024-10-21 RX ORDER — ACETAMINOPHEN 500 MG
500 TABLET ORAL EVERY 6 HOURS PRN
Qty: 30 TABLET | Refills: 0 | Status: SHIPPED | OUTPATIENT
Start: 2024-10-21

## 2024-10-21 RX ORDER — ONDANSETRON 4 MG/1
4 TABLET, FILM COATED ORAL EVERY 8 HOURS PRN
Qty: 15 TABLET | Refills: 0 | Status: SHIPPED | OUTPATIENT
Start: 2024-10-21

## 2024-10-21 RX ORDER — ASPIRIN 81 MG/1
81 TABLET ORAL DAILY
Qty: 30 TABLET | Refills: 0 | Status: SHIPPED | OUTPATIENT
Start: 2024-10-21 | End: 2024-10-21 | Stop reason: HOSPADM

## 2024-10-21 RX ORDER — OXYCODONE HYDROCHLORIDE 5 MG/1
5 TABLET ORAL EVERY 4 HOURS PRN
Qty: 40 TABLET | Refills: 0 | Status: CANCELLED | OUTPATIENT
Start: 2024-10-21

## 2024-10-21 RX ORDER — DOCUSATE SODIUM 100 MG/1
100 CAPSULE, LIQUID FILLED ORAL 2 TIMES DAILY
Qty: 30 CAPSULE | Refills: 0 | Status: SHIPPED | OUTPATIENT
Start: 2024-10-21 | End: 2024-11-05

## 2024-10-21 RX ORDER — OXYCODONE HYDROCHLORIDE 5 MG/1
5 TABLET ORAL EVERY 4 HOURS PRN
Start: 2024-10-21

## 2024-10-21 RX ORDER — SODIUM CHLORIDE 9 MG/ML
250 INJECTION, SOLUTION INTRAVENOUS CONTINUOUS
Status: DISCONTINUED | OUTPATIENT
Start: 2024-10-21 | End: 2024-10-21

## 2024-10-21 RX ADMIN — LEVOTHYROXINE SODIUM 25 MCG: 25 TABLET ORAL at 05:35

## 2024-10-21 RX ADMIN — OXYCODONE HYDROCHLORIDE 5 MG: 5 TABLET ORAL at 10:44

## 2024-10-21 RX ADMIN — GABAPENTIN 300 MG: 300 CAPSULE ORAL at 09:24

## 2024-10-21 RX ADMIN — DULOXETINE HYDROCHLORIDE 20 MG: 20 CAPSULE, DELAYED RELEASE ORAL at 09:24

## 2024-10-21 RX ADMIN — ASPIRIN 81 MG: 81 TABLET, COATED ORAL at 09:24

## 2024-10-21 RX ADMIN — ROSUVASTATIN 5 MG: 10 TABLET, FILM COATED ORAL at 09:24

## 2024-10-21 RX ADMIN — OXYCODONE HYDROCHLORIDE 5 MG: 5 TABLET ORAL at 01:20

## 2024-10-21 RX ADMIN — OXYCODONE HYDROCHLORIDE 5 MG: 5 TABLET ORAL at 05:35

## 2024-10-21 RX ADMIN — CYANOCOBALAMIN TAB 1000 MCG 1000 MCG: 1000 TAB at 09:24

## 2024-10-21 RX ADMIN — PANTOPRAZOLE SODIUM 40 MG: 40 TABLET, DELAYED RELEASE ORAL at 09:24

## 2024-10-21 RX ADMIN — THIAMINE HCL TAB 100 MG 200 MG: 100 TAB at 09:24

## 2024-10-21 RX ADMIN — FOLIC ACID 1 MG: 1 TABLET ORAL at 09:24

## 2024-10-21 RX ADMIN — HEPARIN SODIUM 5000 UNITS: 5000 INJECTION INTRAVENOUS; SUBCUTANEOUS at 05:35

## 2024-10-21 NOTE — CASE MANAGEMENT/SOCIAL WORK
Case Management Discharge Note      Final Note: ANticipate to Subacute rehab, pending medical readiness.   Insurance has approved.    Nursing to call report to [phone], fax [phone].   IMM has been given to patient today at 10:15.   If leaving today, will need to be in the 1700/maternity entrance on or before 2:30 for the [facility] wheelchair van shuttle.         Selected Continued Care - Admitted Since 10/5/2024       Destination Coordination complete.      Service Provider Services Address Phone Fax Patient Preferred    [facility] [address] [address] [phone] --              Durable Medical Equipment    No services have been selected for the patient.                Dialysis/Infusion    No services have been selected for the patient.                Home Medical Care    No services have been selected for the patient.                Therapy    No services have been selected for the patient.                Community Resources    No services have been selected for the patient.                Community & DME    No services have been selected for the patient.                         Final Discharge Disposition Code: 03 - skilled nursing facility (SNF)

## 2024-10-21 NOTE — PLAN OF CARE
Goal Outcome Evaluation:  Plan of Care Reviewed With: patient  Plan of Care Reviewed With: patient        Progress: improving  Outcome Evaluation: Patient gave good effort with wheelchair transfers and mobility today. She is still requirig Roxana for balance for standing transfers. IPPT remains indicated to address current deficits. Continue to recommend D/C to IPR given her fall risk.    Anticipated Discharge Disposition (PT): inpatient rehabilitation facility

## 2024-10-21 NOTE — PLAN OF CARE
Problem: Adult Inpatient Plan of Care  Goal: Plan of Care Review  Outcome: Progressing  Flowsheets (Taken 10/21/2024 0417)  Progress: improving  Outcome Evaluation: VSS on room air. PRN med given for c/o LLE pain. up with 1 assist. Pt resting in bed at this time.  Plan of Care Reviewed With: patient  Goal: Patient-Specific Goal (Individualized)  Outcome: Progressing  Goal: Absence of Hospital-Acquired Illness or Injury  Outcome: Progressing  Intervention: Identify and Manage Fall Risk  Recent Flowsheet Documentation  Taken 10/21/2024 0400 by Alaina Harris RN  Safety Promotion/Fall Prevention: safety round/check completed  Taken 10/21/2024 0200 by Alaina Harris RN  Safety Promotion/Fall Prevention: safety round/check completed  Taken 10/21/2024 0000 by Alaina Harris RN  Safety Promotion/Fall Prevention: safety round/check completed  Taken 10/20/2024 2200 by Alaina Harris RN  Safety Promotion/Fall Prevention: safety round/check completed  Taken 10/20/2024 2000 by Alaina Harris RN  Safety Promotion/Fall Prevention:   assistive device/personal items within reach   activity supervised   clutter free environment maintained   fall prevention program maintained   nonskid shoes/slippers when out of bed   safety round/check completed  Intervention: Prevent Skin Injury  Recent Flowsheet Documentation  Taken 10/21/2024 0400 by Alaina Harris RN  Body Position:   left   side-lying  Taken 10/21/2024 0200 by Alaina Harris RN  Body Position: side-lying  Taken 10/21/2024 0000 by Alaina Harris RN  Body Position: side-lying  Taken 10/20/2024 2200 by Alaina Harris RN  Body Position:   right   side-lying  Taken 10/20/2024 2000 by Alaina Harris RN  Body Position: legs elevated  Goal: Optimal Comfort and Wellbeing  Outcome: Progressing  Intervention: Provide Person-Centered Care  Recent Flowsheet Documentation  Taken 10/20/2024 2000 by Alaina Harris RN  Trust Relationship/Rapport:   care  explained   choices provided   questions answered   questions encouraged  Goal: Readiness for Transition of Care  Outcome: Progressing     Problem: Pain Chronic (Persistent) (Comorbidity Management)  Goal: Acceptable Pain Control and Functional Ability  Outcome: Progressing     Problem: Skin Injury Risk Increased  Goal: Skin Health and Integrity  Outcome: Progressing  Intervention: Optimize Skin Protection  Recent Flowsheet Documentation  Taken 10/21/2024 0400 by Alaina Harris RN  Activity Management: activity encouraged  Head of Bed (HOB) Positioning: HOB elevated  Taken 10/21/2024 0200 by Alaina Harris RN  Activity Management: activity encouraged  Head of Bed (HOB) Positioning: HOB elevated  Taken 10/21/2024 0000 by Alaina Harris RN  Activity Management: activity encouraged  Head of Bed (HOB) Positioning: HOB elevated  Taken 10/20/2024 2200 by Alaina Harris RN  Activity Management: activity encouraged  Head of Bed (HOB) Positioning: HOB elevated  Taken 10/20/2024 2000 by Alaina Harris RN  Activity Management: up in chair     Problem: Fall Injury Risk  Goal: Absence of Fall and Fall-Related Injury  Outcome: Progressing  Intervention: Promote Injury-Free Environment  Recent Flowsheet Documentation  Taken 10/21/2024 0400 by Alaina Harris RN  Safety Promotion/Fall Prevention: safety round/check completed  Taken 10/21/2024 0200 by Alaina Harris RN  Safety Promotion/Fall Prevention: safety round/check completed  Taken 10/21/2024 0000 by Alaina Harris RN  Safety Promotion/Fall Prevention: safety round/check completed  Taken 10/20/2024 2200 by Alaina Hraris RN  Safety Promotion/Fall Prevention: safety round/check completed  Taken 10/20/2024 2000 by Alaina Harris RN  Safety Promotion/Fall Prevention:   assistive device/personal items within reach   activity supervised   clutter free environment maintained   fall prevention program maintained   nonskid shoes/slippers when out of bed    safety round/check completed     Problem: Bleeding (Orthopaedic Fracture)  Goal: Absence of Bleeding  Outcome: Progressing  Intervention: Monitor and Manage Fracture Bleeding  Recent Flowsheet Documentation  Taken 10/20/2024 2000 by Alaina Harris RN  Fracture Immobilization:   immobilization device maintained   supported during position changes   supported with pillows     Problem: Embolism (Orthopaedic Fracture)  Goal: Absence of Embolism Signs and Symptoms  Outcome: Progressing     Problem: Fracture Stabilization and Management (Orthopaedic Fracture)  Goal: Fracture Stability  Outcome: Progressing  Intervention: Promote Fracture Stability and Healing  Recent Flowsheet Documentation  Taken 10/20/2024 2000 by Alaina Harris RN  Fracture Immobilization:   immobilization device maintained   supported during position changes   supported with pillows     Problem: Functional Ability Impaired (Orthopaedic Fracture)  Goal: Optimal Functional Ability  Outcome: Progressing  Intervention: Optimize Functional Ability  Recent Flowsheet Documentation  Taken 10/21/2024 0400 by Alaina Harris RN  Activity Management: activity encouraged  Positioning/Transfer Devices:   pillows   in use  Taken 10/21/2024 0200 by Alaina Harris RN  Activity Management: activity encouraged  Positioning/Transfer Devices:   pillows   in use  Taken 10/21/2024 0000 by Alaina Harris RN  Activity Management: activity encouraged  Positioning/Transfer Devices:   pillows   in use  Taken 10/20/2024 2200 by Alaina Harris RN  Activity Management: activity encouraged  Positioning/Transfer Devices:   pillows   in use  Taken 10/20/2024 2000 by Alaina Harris RN  Activity Management: up in chair     Problem: Infection (Orthopaedic Fracture)  Goal: Absence of Infection Signs and Symptoms  Outcome: Progressing     Problem: Neurovascular Compromise (Orthopaedic Fracture)  Goal: Effective Tissue Perfusion  Outcome: Progressing     Problem: Pain  (Orthopaedic Fracture)  Goal: Acceptable Pain Control  Outcome: Progressing     Problem: Respiratory Compromise (Orthopaedic Fracture)  Goal: Effective Oxygenation and Ventilation  Outcome: Progressing  Intervention: Promote Airway Secretion Clearance  Recent Flowsheet Documentation  Taken 10/21/2024 0400 by Alaina Harris RN  Activity Management: activity encouraged  Taken 10/21/2024 0200 by Alaina Harris RN  Activity Management: activity encouraged  Taken 10/21/2024 0000 by Alaina Harris RN  Activity Management: activity encouraged  Taken 10/20/2024 2200 by Alaina Harris RN  Activity Management: activity encouraged  Taken 10/20/2024 2000 by Alaina Harris RN  Activity Management: up in chair  Intervention: Optimize Oxygenation and Ventilation  Recent Flowsheet Documentation  Taken 10/21/2024 0400 by Alaina Harris RN  Head of Bed (HOB) Positioning: HOB elevated  Taken 10/21/2024 0200 by Alaina Harris RN  Head of Bed (HOB) Positioning: HOB elevated  Taken 10/21/2024 0000 by Alaina Harris RN  Head of Bed (HOB) Positioning: HOB elevated  Taken 10/20/2024 2200 by Alaina Harris RN  Head of Bed (HOB) Positioning: HOB elevated   Goal Outcome Evaluation:  Plan of Care Reviewed With: patient        Progress: improving  Outcome Evaluation: VSS on room air. PRN med given for c/o LLE pain. up with 1 assist. Pt resting in bed at this time.

## 2024-10-21 NOTE — DISCHARGE SUMMARY
[facility] Medicine Services  DISCHARGE SUMMARY    Patient Name: Alysia Sierra  : 1955  MRN: [id number]    Date of Admission: 10/5/2024  8:18 PM  Date of Discharge:  10/21/2024  Primary Care Physician: Cassy Foster MD    Hospital Course     Active Hospital Problems    Diagnosis  POA    **Closed fracture of left ankle [A12.272A]  Yes    Closed displaced fracture of fourth metatarsal bone of left foot [S92.342A]  Yes    Lisfranc dislocation, left, initial encounter [T50.926A]  Yes      Resolved Hospital Problems   No resolved problems to display.      Hospital Course:  Alysia Sierra is a 69 y.o. female with history of CKD III, chronic anemia, uterine cancer, hypothyroidism and alcohol abuse who was admitted to [facility] on 10/5/24 for L ankle fracture after stepping awkwardly off her bed.     1st-4th proximal metatarsal fracture Lisfranc fracture  Acute nondisplaced L medial malleolus fracture  Acute nondisplaced L posterior medial talus fracture   - She is s/p open dislocation of L TMT 1-5 with subsequent ORIF of TMT 1-5 as well as ORIF of medial malleolus fracture and application of short leg splint by Dr. Godoy on 10/18/24   - NWB on LLE  - Keep splint clean and dry. Keep LLE elevated as much as possible   - PRN pain control. Nerve catheter removed on 10/19  - ASA 81mg daily for DVT PPX  - Follow up with Dr. Godoy on 2024 @ 11:00am     Alcohol withdrawal, resolved  History of alcohol abuse   - Patient denied daily alcohol use, however family reports daily heavy alcohol use  - Developed confusion / hallucinations on afternoon of 10/8 and started on CIWA protocol   - Withdrawal symptoms resolved. CIWA protocol discontinued.  - Continue PO thiamine, folic acid  - Continue QHS Melatonin    HTN  - On Metoprolol XL 25mg daily and Hydralazine 25mg TID  - BP has been low - hold home antihypertensives at [facility]      CKD III  - Renal function stable     Chronic  anemia  Iron deficiency  - Iron studies show ACD with iron deficiency  - Status post IV iron preoperatively  - B12, folate within normal limits  - Hgb a bit lower postoperatively but overall, remains stable      Hypothyroidism   - Continue Levothyroxine     GERD   - Continue PPI     Neuropathy   - Continue Gabapentin      Day of Discharge     HPI:   Resting in bed. Pain has been manageable if she stays on top of PO pain meds. Denies chest pain or dyspnea. Appetite is good with no nausea or vomiting. To [facility] today     Review of Systems  Gen- No fevers, chills  CV- No chest pain, palpitations  Resp- No cough, dyspnea  GI- No N/V/D, abd pain    Vital Signs:   Temp:  [97.6 °F (36.4 °C)-98.2 °F (36.8 °C)] 97.8 °F (36.6 °C)  Resp:  [16-18] 18  BP: ()/(54-78) 120/78    Physical Exam:  Constitutional: No acute distress, awake, alert and conversant  HENT: NCAT, mucous membranes moist  Respiratory: Clear to auscultation bilaterally, respiratory effort normal   Cardiovascular: RRR  Gastrointestinal: Positive bowel sounds, soft, nontender, nondistended  Musculoskeletal: No bilateral ankle edema. LLE in cast - exposed toes are warm with 2+ capillary refill. Able to wiggle toes on command   Psychiatric: Appropriate affect, cooperative with exam  Neurologic: Oriented x 3, moves all extremities spontaneously without focal deficits, speech clear  Skin: No rashes to exposed surfaces     Pertinent  and/or Most Recent Results     LAB RESULTS:      Lab 10/21/24  1031 10/19/24  1855 10/18/24  1437   WBC 8.32 11.81* 7.67   HEMOGLOBIN 8.9* 7.5* 8.9*   HEMATOCRIT 29.4* 24.4* 29.0*   PLATELETS 309 256 256   NEUTROS ABS  --   --  6.94   IMMATURE GRANS (ABS)  --   --  0.06*   LYMPHS ABS  --   --  0.43*   MONOS ABS  --   --  0.23   EOS ABS  --   --  0.00   .0* 104.3* 102.5*         Lab 10/19/24  1855 10/18/24  1437   SODIUM 141 142   POTASSIUM 4.5 3.7   CHLORIDE 107 106   CO2 24.0 22.0   ANION GAP 10.0 14.0   BUN 10 9   CREATININE  1.21* 1.38*   EGFR 48.9* 41.8*   GLUCOSE 111* 178*   CALCIUM 8.7 8.6   PHOSPHORUS 2.8  --          Lab 10/19/24  1855 10/18/24  1437   TOTAL PROTEIN  --  6.0   ALBUMIN 3.7 3.4*   GLOBULIN  --  2.6   ALT (SGPT)  --  7   AST (SGOT)  --  19   BILIRUBIN  --  0.2   ALK PHOS  --  72     Brief Urine Lab Results  (Last result in the past 365 days)        Color   Clarity   Blood   Leuk Est   Nitrite   Protein   CREAT   Urine HCG        06/22/24 1604 Yellow   Clear   Negative   Negative   Negative   Negative                 Microbiology Results (last 10 days)       ** No results found for the last 240 hours. **          FL C Arm During Surgery    Result Date: 10/18/2024  This procedure was auto-finalized with no dictation required.    Popliteal sciatic cath    Result Date: 10/18/2024  Yani Sanchez SRNA     10/18/2024  8:21 AM Popliteal sciatic cath Patient reassessed immediately prior to procedure Patient location during procedure: pre-op Start time: 10/18/2024 7:01 AM Reason for block: at surgeon's request and post-op pain management Performed by CRNA/CAA: Lauri Oliver, CRNASRNA: Yani Sanchez SRNA Preanesthetic Checklist Completed: patient identified, IV checked, site marked, risks and benefits discussed, surgical consent, monitors and equipment checked, pre-op evaluation and timeout performed Prep: Sterile barriers:cap, gloves, mask and washed/disinfected hands Prep: ChloraPrep Patient monitoring: blood pressure monitoring, continuous pulse oximetry and EKG Procedure Sedation: yes Performed under: local infiltration Guidance:ultrasound guided ULTRASOUND INTERPRETATION.  Using ultrasound guidance a 20 G gauge needle was placed in close proximity to the nerve, at which point, under ultrasound guidance anesthetic was injected in the area of the nerve and spread of the anesthesia was seen on ultrasound in close proximity thereto.  There were no abnormalities seen on ultrasound; a digital image was taken; and the patient  tolerated the procedure with no complications. Images:still images obtained, printed/placed on chart Laterality:left Block Type:popliteal Injection Technique:catheter Needle Type:echogenic and Tuohy Needle Gauge:18 G Resistance on Injection: none Catheter Size:20 G Cath Depth at skin: 6 cm Medications Used: bupivacaine PF (MARCAINE) 0.25 % injection - Injection  25 mL - 10/18/2024 7:01:00 AM Post Assessment Injection Assessment: negative aspiration for heme, no paresthesia on injection and incremental injection Patient Tolerance:comfortable throughout block Complications:no Additional Notes CATHETER                             A high-frequency linear transducer, with sterile cover, was placed in the popliteal fossa to identify the popliteal artery and vein, Tibial nerve (TN) and Common Peroneal nerve (CP). The transducer was then moved in a cephalad fashion to observe the TN and CP nerve bifurcation to form the Sciatic Nerve. The insertion site was prepped and draped in sterile fashion. Skin and cutaneous tissue was infiltrated with 2-5 ml of 1% Lidocaine. Using ultrasound-guidance, an 18-gauge Contiplex Ultra 360 Touhy needle was advanced in plane from lateral to medial. Preservative-free normal saline was utilized for hydro-dissection of tissue, advancement of Touhy needle, and to confirm final needle placement posterior to the nerves. Local anesthetic injection spread, in incremental 3-5 ml injections, to surround both nerve structures. Aspiration every 5 ml to prevent intravascular injection. Injection was completed with negative aspiration of blood and negative intravascular injection. Injection pressures were normal with minimal resistance. A 20-gauge Contiplex Echo catheter was placed through the needle and advance out the tip of the Touhy 1-3 cm. The Touhy needle was then removed, and final catheter position verified (Below/above or Anterior/Posterior) the nerve structures. The catheter was secured in the  "usual fashion with skin glue, benzoin, steri-strips, CHG tegaderm and Label noting \"Nerve Block Catheter\". Jerk tape applied at yellow connector and catheter connection. Performed by: Mason Sharma CRNA     Adductor canal    Result Date: 10/18/2024  Yani Sanchez SRNA     10/18/2024  8:20 AM Adductor canal Patient reassessed immediately prior to procedure Patient location during procedure: pre-op Start time: 10/18/2024 6:55 AM Reason for block: at surgeon's request and post-op pain management Performed by SARAH/CAA: Lauri Oliver, SARAHSRNA: Yani Sanchez SRNA Preanesthetic Checklist Completed: patient identified, IV checked, site marked, risks and benefits discussed, surgical consent, monitors and equipment checked, pre-op evaluation and timeout performed Prep: Pt Position: supine Sterile barriers:cap, gloves, mask, sterile barriers and washed/disinfected hands Prep: ChloraPrep Patient monitoring: blood pressure monitoring, continuous pulse oximetry and EKG Procedure Performed under: local infiltration Guidance:ultrasound guided ULTRASOUND INTERPRETATION.  Using ultrasound guidance a 20 G gauge needle was placed in close proximity to the nerve, at which point, under ultrasound guidance anesthetic was injected in the area of the nerve and spread of the anesthesia was seen on ultrasound in close proximity thereto.  There were no abnormalities seen on ultrasound; a digital image was taken; and the patient tolerated the procedure with no complications. Images:still images obtained, printed/placed on chart Laterality:left Block Type:adductor canal block Injection Technique:single-shot Needle Type:echogenic and short-bevel Needle Gauge:20 G Resistance on Injection: none Catheter size: 20g. Medications Used: bupivacaine PF (MARCAINE) 0.25 % injection - Injection  25 mL - 10/18/2024 6:55:00 AM dexamethasone sodium phosphate injection - Injection  2 mg - 10/18/2024 6:55:00 AM fentaNYL citrate (PF) (SUBLIMAZE) injection - " "Intravenous  100 mcg - 10/18/2024 6:55:00 AM Post Assessment Injection Assessment: negative aspiration for heme, incremental injection and no paresthesia on injection Patient Tolerance:comfortable throughout block Complications:no Additional Notes SINGLE shot A high-frequency linear transducer, with sterile cover, was placed on the anterior mid-thigh (between the anterior superior iliac spine and patella). The transducer was then moved medially to identify the Sartorius muscle (Nola), Vastus Medialis muscle (VMM), Superficial Femoral Artery (SFA) and Vein. The transducer was then moved cephalad or caudad to position the SFA in the middle of the Nola. The insertion site was prepped and draped in sterile fashion. Skin and cutaneous tissue was infiltrated with 2-5 ml of 1% Lidocaine. Using ultrasound-guidance, a 20-gauge B-Beach 4\" Ultraplex 360 non-stimulating echogenic needle was advanced in plane from lateral to medial. Preservative-free normal saline was utilized for hydro-dissection of tissue, advancement of needle, and to confirm needle placement below the fascial plane of the Nola where the Nerve to the VMM is located. Local anesthetic (LA) 5 ml deposited here. The needle continues its path lateral to the SFA at the level of the Saphenous Nerve. The remainder of the LA was deposited at the 10-11 o'clock position of the SFA. This injection created a space between the Nola and the SFA. Aspiration every 5 ml to prevent intravascular injection. Injection was completed with negative aspiration of blood and negative intravascular injection. Injection pressures were normal with minimal resistance. Performed by: Mason Sharma CRNA    XR Ankle 3+ View Left    Result Date: 10/10/2024  XR ANKLE 3+ VW LEFT Date of Exam: 10/10/2024 2:14 PM EDT Indication: fracture follow-up; new pain Comparison: 10/6/2024. Findings: The exam was obtained through a cast.. 3 views. Subtle nondisplaced medial malleolar fracture appears similar. " Fracture of the medial talus is similar. Known fractures of the first through fourth proximal metatarsals are not well seen on this exam. Ankle  joint is maintained.     Impression: Stable ankle fractures. Electronically Signed: Aracely Argueta MD  10/10/2024 2:54 PM EDT  Workstation ID: [id number]     Results for orders placed during the hospital encounter of 01/02/24    Adult Transthoracic Echo Complete W/ Cont if Necessary Per Protocol    Interpretation Summary    Left ventricular systolic function is normal. Calculated left ventricular EF = 57.9% Left ventricular ejection fraction appears to be 56 - 60%.    Left ventricular diastolic function was normal.    Estimated right ventricular systolic pressure from tricuspid regurgitation is normal (<35 mmHg).    No significant change from previous study    Discharge Details        Discharge Medications        New Medications        Instructions Start Date   acetaminophen 500 MG tablet  Commonly known as: TYLENOL   500 mg, Oral, Every 6 Hours PRN      docusate sodium 100 MG capsule  Commonly known as: Colace   100 mg, Oral, 2 Times Daily      melatonin 5 MG tablet tablet   5 mg, Oral, Nightly      ondansetron 4 MG tablet  Commonly known as: Zofran   4 mg, Oral, Every 8 Hours PRN      oxyCODONE 5 MG immediate release tablet  Commonly known as: ROXICODONE   5 mg, Oral, Every 4 Hours PRN             Changes to Medications        Instructions Start Date   esomeprazole 20 MG capsule  Commonly known as: nexIUM  What changed: when to take this   20 mg, Oral, Every Other Day, OTC             Continue These Medications        Instructions Start Date   Alpha-Lipoic Acid 600 MG capsule   Oral, Daily      aspirin 81 MG EC tablet   81 mg, Oral, Daily      cholecalciferol 25 MCG (1000 UT) tablet  Commonly known as: VITAMIN D3   1,000 Units, Oral, Daily      DULoxetine 20 MG capsule  Commonly known as: CYMBALTA   20 mg, Oral, 2 Times Daily      folic acid 1 MG tablet  Commonly known as:  FOLVITE   1 mg, Oral, Daily      gabapentin 300 MG capsule  Commonly known as: Neurontin   Take 1 capsule in the morning and 2 capsules at night.      levothyroxine 25 MCG tablet  Commonly known as: SYNTHROID, LEVOTHROID   TAKE ONE TABLET BY MOUTH EVERY MORNING ON AN EMPTY STOMACH      magnesium oxide 400 (241.3 Mg) MG tablet tablet  Commonly known as: MAGOX   400 mg, Oral, Daily, OTC      ondansetron ODT 4 MG disintegrating tablet  Commonly known as: ZOFRAN-ODT   4 mg, Translingual, Every 8 Hours PRN      rosuvastatin 5 MG tablet  Commonly known as: Crestor   5 mg, Oral, Daily      thiamine 100 MG tablet  Commonly known as: VITAMIN B1   100 mg, Oral, Daily      vitamin B-12 1000 MCG tablet  Commonly known as: CYANOCOBALAMIN   1,000 mcg, Oral, Daily             Stop These Medications      hydrALAZINE 25 MG tablet  Commonly known as: APRESOLINE     Lidocaine 4 %     lidocaine 5 %  Commonly known as: LIDODERM     metoprolol succinate XL 25 MG 24 hr tablet  Commonly known as: TOPROL-XL            Allergies   Allergen Reactions    Lisinopril Angioedema    Metal Rash     Possible nickel -   Gold is only metal that doesn't have issues      Tylenol [Acetaminophen] Other (See Comments)     ckd    Atorvastatin Myalgia     Discharge Disposition:Skilled Nursing Facility (DC - External)    Diet:  Diet Instructions       Diet: Regular/House Diet; Regular (IDDSI 7); Thin (IDDSI 0)      Discharge Diet: Regular/House Diet    Texture: Regular (IDDSI 7)    Fluid Consistency: Thin (IDDSI 0)      Regular diet          Activity:  Activity Instructions       Other Activity Instructions      NWB on LLE   Additional Activity Instructions:    Non weight bearing to left lower extremity    Keep dressing clean and dry            CODE STATUS:    Code Status and Medical Interventions: CPR (Attempt to Resuscitate); Full Support   Ordered at: 10/06/24 0246     Code Status (Patient has no pulse and is not breathing):    CPR (Attempt to Resuscitate)      Medical Interventions (Patient has pulse or is breathing):    Full Support     Future Appointments   Date Time Provider Department Center   10/30/2024 11:00 AM Austin Tatum LCSW [facility] None   11/1/2024 11:00 AM Sergio Godoy MD [facility]   11/11/2024  9:30 AM Gray Bahena MD [facility]   12/12/2024 11:30 AM Liana So APRN [facility]   1/6/2025 12:40 PM Juanpablo Lee MD [facility]   2/13/2025 11:30 AM Caden Dietz MD [facility]     Additional Instructions for the Follow-ups that You Need to Schedule       Discharge Follow-up with Specified Provider: Walt in 2 weeks   As directed      To: Walt in 2 weeks              Lawanda Arvizu PA-C  10/21/24    Time Spent on Discharge:  I spent 40 minutes on this discharge activity which included: face-to-face encounter with the patient, reviewing the data in the system, coordination of the care with the nursing staff as well as consultants, documentation, and entering orders.

## 2024-10-21 NOTE — THERAPY TREATMENT NOTE
Patient Name: Alysia Sierra  : 1955    MRN: [id number]                              Today's Date: 10/21/2024       Admit Date: 10/5/2024    Visit Dx:     ICD-10-CM ICD-9-CM   1. Displaced fracture of distal end of left tibia  S82.302A 824.8   2. Closed nondisplaced fracture of second metatarsal bone of left foot, initial encounter  S92.325A 825.25   3. Closed nondisplaced fracture of first metatarsal bone of left foot, initial encounter  S92.315A 825.25   4. Closed nondisplaced fracture of third metatarsal bone of left foot, initial encounter  S92.335A 825.25   5. Closed nondisplaced fracture of fourth metatarsal bone of left foot, initial encounter  S92.345A 825.25   6. Lisfranc dislocation, left, initial encounter  S93.325A 838.03   7. Closed fracture of left ankle, initial encounter  S82.892A 824.8     Patient Active Problem List   Diagnosis    Hypertension    Leg weakness, bilateral    Syncope    CKD (chronic kidney disease) stage 3, GFR 30-59 ml/min    Neuropathy    Hyperlipidemia LDL goal <100    Endometrial adenocarcinoma    Mild episode of recurrent major depressive disorder    Dizziness    Hip fracture    Anemia    Hypomagnesemia    Hypothyroidism    Post-menopausal    Other osteoporosis without current pathological fracture    Closed fracture of second lumbar vertebra    Alcoholic ketoacidosis    Leukocytosis    GERD with esophagitis    Sepsis    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    Lisfranc dislocation, left, initial encounter    Closed fracture of left ankle    Closed displaced fracture of fourth metatarsal bone of left foot     Past Medical History:   Diagnosis Date    Allergic lisinopril  2017    Anemia     Arrhythmia     Arthritis     Cancer     uterine    Cataract mild 2020    stil lpresent    Chicken pox     Chronic fatigue     CKD (chronic kidney disease), stage III     sees nephro    Clotting disorder     Dental root implant present     lower left  x1 - possible dental  implant    Depression mild 2019    Difficulty walking 2019    Disease of thyroid gland     Dizzy     NIEVES (dyspnea on exertion)     2017    Fracture of hip     Generalized anxiety disorder     GERD (gastroesophageal reflux disease) 2018    History of brachytherapy 2023    vaginal brachytherapy    Hyperlipidemia     Hypertension     Iron deficiency anemia     Liver disease     fatty    Liver problem     Measles     Menopause     Mumps     Neuromuscular disorder Peripheral Neuropathy    Orthostatic hypotension     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    Renal insufficiency 2018    Scoliosis     Unintentional weight loss     Uses contact lenses     bilat    Uterine cancer     Uterine cancer 2022    UTI (urinary tract infection)     Visual impairment Nearsighted    Wears glasses      Past Surgical History:   Procedure Laterality Date     SECTION      DILATION AND CURETTAGE, DIAGNOSTIC / THERAPEUTIC      HIP OPEN REDUCTION Left 2023    Procedure: FEMORAL NECK OPEN REDUCTION INTERNAL FIXATION LEFT;  Surgeon: Juanpablo Lee MD;  Location: [facility];  Service: Orthopedics;  Laterality: Left;    HIP SURGERY      OOPHORECTOMY      ORAL LESION EXCISION/BIOPSY  2021    TOTAL LAPAROSCOPIC HYSTERECTOMY SALPINGO OOPHORECTOMY N/A 10/28/2022    Procedure: TOTAL LAPAROSCOPIC HYSTERECTOMY BILATERAL SALPINGO-OOPHORECTOMY, INJECTION FOR SENTINEL LYMPH NODE MAPPING, BILATERAL SENTINEL LYMPH NODE DISSECTION WITH DAVINCI ROBOT;  Surgeon: Jasmine Rich MD;  Location: [facility] OR;  Service: Robotics - DaVinci;  Laterality: N/A;    TUBAL ABDOMINAL LIGATION        General Information       Row Name 10/21/24 1115          Physical Therapy Time and Intention    Document Type therapy note (daily note)  -CK     Mode of Treatment physical therapy;individual therapy  -CK       Row Name 10/21/24 1115          General Information    Patient Profile Reviewed yes  -CK     Existing  Precautions/Restrictions fall;left;non-weight bearing;other (see comments)  LLE NWB in cast  -CK     Barriers to Rehab previous functional deficit  -CK       Row Name 10/21/24 1115          Cognition    Orientation Status (Cognition) oriented x 3  -CK       Row Name 10/21/24 1115          Safety Issues/Impairments Affecting Functional Mobility    Safety Issues Affecting Function (Mobility) awareness of need for assistance;insight into deficits/self-awareness;safety precaution awareness;safety precautions follow-through/compliance  -CK     Impairments Affecting Function (Mobility) balance;endurance/activity tolerance;pain;range of motion (ROM);strength  -CK               User Key  (r) = Recorded By, (t) = Taken By, (c) = Cosigned By      Initials Name Provider Type    CK Karen Mcarthur, MARIBETH Physical Therapist                   Mobility       Row Name 10/21/24 1115          Bed Mobility    Bed Mobility supine-sit  -CK     Supine-Sit [place] (Bed Mobility) standby assist  -CK     Assistive Device (Bed Mobility) head of bed elevated  -CK     Comment, (Bed Mobility) denies dizziness at EOB  -CK       Row Name 10/21/24 1115          Bed-Chair Transfer    Bed-Chair [place] (Transfers) minimum assist (75% patient effort);2 person assist;verbal cues;nonverbal cues (demo/gesture)  -CK     Assistive Device (Bed-Chair Transfers) walker, front-wheeled;other (see comments)  armrest of wheelchair  -CK     Comment, (Bed-Chair Transfer) squat pivot bed > wheelchair using armrest of wheelchair, and then SPT wheelchair > chair using FWW. Required Roxana to chair due to unsteadiness with walker  -CK       Row Name 10/21/24 1115          Sit-Stand Transfer    Sit-Stand [place] (Transfers) minimum assist (75% patient effort);1 person assist;verbal cues;nonverbal cues (demo/gesture)  -CK     Assistive Device (Sit-Stand Transfers) walker, front-wheeled  -CK     Comment, (Sit-Stand Transfer) Roxana for balance when  transitioning hands to walker  -CK       Row Name 10/21/24 1115          Gait/Stairs (Locomotion)    Comment, (Gait/Stairs) deferred knee scooter due to ongoing hypotension, patient mobilized in wheelchair using BUEs to propel 325' with Roxana due to veering R at times. She requires increased time to complete this distance.  -CK       Row Name 10/21/24 1115          Mobility    Extremity Weight-bearing Status left lower extremity  -CK     Left Lower Extremity (Weight-bearing Status) non weight-bearing (NWB)   -CK               User Key  (r) = Recorded By, (t) = Taken By, (c) = Cosigned By      Initials Name Provider Type    CK Karen Mcarthur, MARIBETH Physical Therapist                   Obj/Interventions       Row Name 10/21/24 1118          Balance    Balance Assessment sitting static balance;standing static balance;standing dynamic balance  -CK     Static Sitting Balance independent  -CK     Position, Sitting Balance unsupported;sitting edge of bed  -CK     Static Standing Balance contact guard  -CK     Dynamic Standing Balance minimal assist  -CK     Position/Device Used, Standing Balance supported;walker, front-wheeled  -CK               User Key  (r) = Recorded By, (t) = Taken By, (c) = Cosigned By      Initials Name Provider Type    CK Karen Mcarthur, MARIBETH Physical Therapist                   Goals/Plan    No documentation.                  Clinical Impression       Row Name 10/21/24 1119          Pain    Pain Location extremity  -CK     Pain Side/Orientation left;lower  -CK     Pain Management Interventions exercise or physical activity utilized  -CK     Response to Pain Interventions activity participation with tolerable pain  -CK     Additional Documentation Pain Scale: FACES Pre/Post-Treatment (Group)  -CK       Row Name 10/21/24 1119          Pain Scale: FACES Pre/Post-Treatment    Pain: FACES Scale, Pretreatment 2-->hurts little bit  -CK     Posttreatment Pain Rating 2-->hurts little bit  -CK       Row  Name 10/21/24 1119          Plan of Care Review    Plan of Care Reviewed With patient  -CK     Progress improving  -CK     Outcome Evaluation Patient gave good effort with wheelchair transfers and mobility today. She is still requirig Roxana for balance for standing transfers. IPPT remains indicated to address current deficits. Continue to recommend D/C to IPR given her fall risk.  -CK       Row Name 10/21/24 1119          Vital Signs    Pre Systolic BP Rehab 90  -CK     Pre Treatment Diastolic BP 56  -CK     Post Systolic BP Rehab 120  -CK     Post Treatment Diastolic BP 78  -CK     O2 Delivery Pre Treatment room air  -CK     O2 Delivery Intra Treatment room air  -CK     O2 Delivery Post Treatment room air  -CK     Pre Patient Position Supine  -CK     Post Patient Position Sitting  -CK       Row Name 10/21/24 1119          Positioning and Restraints    Pre-Treatment Position in bed  -CK     Post Treatment Position chair  -CK     In Chair reclined;call light within reach;encouraged to call for assist;exit alarm on;waffle cushion;LLE elevated;notified nsg  -CK               User Key  (r) = Recorded By, (t) = Taken By, (c) = Cosigned By      Initials Name Provider Type    CK Karen Mcarthur, PT Physical Therapist                   Outcome Measures       Row Name 10/21/24 1148 10/21/24 0930       How much help from another person do you currently need...    Turning from your back to your side while in flat bed without using bedrails? 4  -CK 4  -SJ    Moving from lying on back to sitting on the side of a flat bed without bedrails? 4  -CK 3  -SJ    Moving to and from a bed to a chair (including a wheelchair)? 3  -CK 3  -SJ    Standing up from a chair using your arms (e.g., wheelchair, bedside chair)? 3  -CK 3  -SJ    Climbing 3-5 steps with a railing? 2  -CK 2  -SJ    To walk in hospital room? 2  -CK 2  -SJ    AM-PAC 6 Clicks Score (PT) 18  -CK 17  -SJ    Highest Level of Mobility Goal 6 --> Walk 10 steps or more  -CK 5  --> Static standing  -      Row Name 10/21/24 1148          Functional Assessment    Outcome Measure Options AM-PAC 6 Clicks Basic Mobility (PT)  -CK               User Key  (r) = Recorded By, (t) = Taken By, (c) = Cosigned By      Initials Name Provider Type    Melvi Senior RN Registered Nurse    Karen Ritter, PT Physical Therapist                                 Physical Therapy Education       Title: PT OT SLP Therapies (In Progress)       Topic: Physical Therapy (Done)       Point: Mobility training (Done)       Learning Progress Summary            Patient Acceptance, E, VU by CK at 10/21/2024 1148    Acceptance, E, VU by ND at 10/20/2024 1328    Acceptance, E, VU by ND at 10/19/2024 1525    Acceptance, E, VU by ND at 10/17/2024 1547    Acceptance, E, VU,NR by  at 10/16/2024 1602    Acceptance, E, VU by ND at 10/15/2024 1044    Acceptance, E, VU by CK at 10/12/2024 1004    Acceptance, E, VU by  at 10/11/2024 1154    Eager, E, VU by SC at 10/10/2024 1108    Comment: reviewed safety with mobility    Acceptance, E,D, VU,NR by AB at 10/9/2024 1006                      Point: Home exercise program (Done)       Learning Progress Summary            Patient Acceptance, E, VU by ND at 10/20/2024 1328    Acceptance, E, VU by ND at 10/17/2024 1547    Acceptance, E, VU,NR by  at 10/16/2024 1602    Acceptance, E, VU by  at 10/12/2024 1004    Eager, E, VU by SC at 10/10/2024 1108    Comment: reviewed safety with mobility                      Point: Body mechanics (Done)       Learning Progress Summary            Patient Acceptance, E, VU by CK at 10/21/2024 1148    Acceptance, E, VU by ND at 10/20/2024 1328    Acceptance, E, VU by ND at 10/19/2024 1525    Acceptance, E, VU by ND at 10/17/2024 1547    Acceptance, E, VU,NR by  at 10/16/2024 1602    Acceptance, E, VU by ND at 10/15/2024 1044    Acceptance, E, VU by  at 10/12/2024 1004    Acceptance, E, VU by CK at 10/11/2024 1154    Eager, E, VU by  SC at 10/10/2024 1108    Comment: reviewed safety with mobility    Acceptance, E,D, VU,NR by AB at 10/9/2024 1006                      Point: Precautions (Done)       Learning Progress Summary            Patient Acceptance, E, VU by  at 10/21/2024 1148    Acceptance, E, VU by ND at 10/20/2024 1328    Acceptance, E, VU by ND at 10/19/2024 1525    Acceptance, E, VU by ND at 10/17/2024 1547    Acceptance, E, VU,NR by  at 10/16/2024 1602    Acceptance, E, VU by ND at 10/15/2024 1044    Acceptance, E, VU by CK at 10/12/2024 1004    Acceptance, E, VU by  at 10/11/2024 1154    Eager, E, VU by SC at 10/10/2024 1108    Comment: reviewed safety with mobility    Acceptance, E,D, VU,NR by AB at 10/9/2024 1006                                      User Key       Initials Effective Dates Name Provider Type Discipline    SC 02/03/23 -  Grace Bradley, PT Physical Therapist PT    AB 09/22/22 -  Margaret Bhatt, PT Physical Therapist PT     02/06/24 -  Karen Mcarthur, PT Physical Therapist PT     09/21/23 -  Rose Bush, PT Physical Therapist PT    ND 11/16/23 -  Thea Gunter, PT Physical Therapist PT                  PT Recommendation and Plan     Plan of Care Reviewed With: patient  Progress: improving  Outcome Evaluation: Patient gave good effort with wheelchair transfers and mobility today. She is still requirig Roxana for balance for standing transfers. IPPT remains indicated to address current deficits. Continue to recommend D/C to IPR given her fall risk.     Time Calculation:         PT Charges       Row Name 10/21/24 1148             Time Calculation    Start Time 0957  -CK      PT Received On 10/21/24  -CK         Timed Charges    77055 - PT Therapeutic Activity Minutes 38  -CK         Total Minutes    Timed Charges Total Minutes 38  -CK       Total Minutes 38  -CK                User Key  (r) = Recorded By, (t) = Taken By, (c) = Cosigned By      Initials Name Provider Type    CK Karen Mcarthur,  PT Physical Therapist                  Therapy Charges for Today       Code Description Service Date Service Provider Modifiers Qty    26492312808 HC PT THERAPEUTIC ACT EA 15 MIN 10/21/2024 Karen Mcarthur, PT GP 3            PT G-Codes  Outcome Measure Options: AM-PAC 6 Clicks Basic Mobility (PT)  AM-PAC 6 Clicks Score (PT): 18  AM-PAC 6 Clicks Score (OT): 16  PT Discharge Summary  Anticipated Discharge Disposition (PT): inpatient rehabilitation facility    Karen Mcarthur, MARIBETH  10/21/2024

## 2024-10-24 ENCOUNTER — EXTERNAL PBMM DATA (OUTPATIENT)
Dept: PHARMACY | Facility: OTHER | Age: 69
End: 2024-10-24
Payer: MEDICARE

## 2024-10-28 NOTE — ADDENDUM NOTE
Addendum  created 10/28/24 0653 by Lauri Oliver CRNA    Clinical Note Signed, Intraprocedure Blocks edited, SmartForm saved

## 2024-10-30 ENCOUNTER — TRANSCRIBE ORDERS (OUTPATIENT)
Dept: HOME HEALTH SERVICES | Facility: HOME HEALTHCARE | Age: 69
End: 2024-10-30
Payer: MEDICARE

## 2024-10-30 DIAGNOSIS — S82.65XA CLOSED NONDISPLACED FRACTURE OF LATERAL MALLEOLUS OF LEFT FIBULA, INITIAL ENCOUNTER: Primary | ICD-10-CM

## 2024-10-30 NOTE — PAYOR COMM NOTE
"Ref# [id number]   Discharge Summary    DHEERAJ Pritchett, RN  Utilization Review  Phone [phone]  Fax [phone]    [facility]  [address] [place] [address]         Jayden Sierra ALLIE (69 y.o. Female)       Date of Birth   1955    Social Security Number       Address   [address] [place] [id number]    Home Phone   [phone]    MRN   [id number]       [employer]    Marital Status                               Admission Date   10/5/24    Admission Type   Emergency    Admitting Provider   Mary Barrios MD    Attending Provider       Department, Room/Bed   [facility] 3G, S353/1       Discharge Date   10/21/2024    Discharge Disposition   Skilled Nursing Facility (DC - External)    Discharge Destination                                 Attending Provider: (none)   Allergies: Lisinopril, Metal, Tylenol [Acetaminophen], Atorvastatin    Isolation: None   Infection: None   Code Status: Prior    Ht: 162.6 cm (64\")   Wt: 61.2 kg (135 lb)    Admission Cmt: None   Principal Problem: Closed fracture of left ankle [S82.892A]                   Active Insurance as of 10/5/2024       Primary Coverage       Payor Plan Insurance Group Employer/Plan Group    ANTHEM MEDICARE REPLACEMENT ANTHEM MEDICARE ADVANTAGE [id number]       Payor Plan Address Payor Plan Phone Number Payor Plan Fax Number Effective Dates    [address] [phone]  1/1/2024 - None Entered    [facility] [id number]         Subscriber Name Subscriber Birth Date Member ID       JAYDEN SIERRA ALLIE 1955 [id number]               Secondary Coverage       Payor Plan Insurance Group Employer/Plan Group    [place]        Payor Plan Address Payor Plan Phone Number Payor Plan Fax Number Effective Dates    [address] [phone]  6/1/2021 - None Entered    [facility] [id number]         Subscriber Name Subscriber Birth Date Member ID    "    JAYDEN SIERRA 1955 [id number]                     Emergency Contacts        (Rel.) Home Phone Work Phone Mobile Phone    ALYSHA HOLLY (Sister) [phone] -- [phone]                 Discharge Summary        Lawanda Arvizu PA-C at 10/21/24 1207       Attestation signed by Mary Barrios MD at 10/22/24 2398    I have reviewed this documentation and agree.                      [facility] Medicine Services  DISCHARGE SUMMARY    Patient Name: Jayden Sierra  : 1955  MRN: [id number]    Date of Admission: 10/5/2024  8:18 PM  Date of Discharge:  10/21/2024  Primary Care Physician: Cassy Foster MD    Hospital Course     Active Hospital Problems    Diagnosis  POA    **Closed fracture of left ankle [S82.892A]  Yes    Closed displaced fracture of fourth metatarsal bone of left foot [S92.342A]  Yes    Lisfranc dislocation, left, initial encounter [S93.325A]  Yes      Resolved Hospital Problems   No resolved problems to display.      Hospital Course:  Jayden Sierra is a 69 y.o. female with history of CKD III, chronic anemia, uterine cancer, hypothyroidism and alcohol abuse who was admitted to [facility] on 10/5/24 for L ankle fracture after stepping awkwardly off her bed.     1st-4th proximal metatarsal fracture Lisfranc fracture  Acute nondisplaced L medial malleolus fracture  Acute nondisplaced L posterior medial talus fracture   - She is s/p open dislocation of L TMT 1-5 with subsequent ORIF of TMT 1-5 as well as ORIF of medial malleolus fracture and application of short leg splint by Dr. Godoy on 10/18/24   - NWB on LLE  - Keep splint clean and dry. Keep LLE elevated as much as possible   - PRN pain control. Nerve catheter removed on 10/19  - ASA 81mg daily for DVT PPX  - Follow up with Dr. Godoy on 2024 @ 11:00am     Alcohol withdrawal, resolved  History of alcohol abuse   - Patient denied daily alcohol use,  however family reports daily heavy alcohol use  - Developed confusion / hallucinations on afternoon of 10/8 and started on CIWA protocol   - Withdrawal symptoms resolved. CIWA protocol discontinued.  - Continue PO thiamine, folic acid  - Continue QHS Melatonin    HTN  - On Metoprolol XL 25mg daily and Hydralazine 25mg TID  - BP has been low - hold home antihypertensives at NE      CKD III  - Renal function stable     Chronic anemia  Iron deficiency  - Iron studies show ACD with iron deficiency  - Status post IV iron preoperatively  - B12, folate within normal limits  - Hgb a bit lower postoperatively but overall, remains stable      Hypothyroidism   - Continue Levothyroxine     GERD   - Continue PPI     Neuropathy   - Continue Gabapentin      Day of Discharge     HPI:   Resting in bed. Pain has been manageable if she stays on top of PO pain meds. Denies chest pain or dyspnea. Appetite is good with no nausea or vomiting. To [facility] today     Review of Systems  Gen- No fevers, chills  CV- No chest pain, palpitations  Resp- No cough, dyspnea  GI- No N/V/D, abd pain    Vital Signs:   Temp:  [97.6 °F (36.4 °C)-98.2 °F (36.8 °C)] 97.8 °F (36.6 °C)  Resp:  [16-18] 18  BP: ()/(54-78) 120/78    Physical Exam:  Constitutional: No acute distress, awake, alert and conversant  HENT: NCAT, mucous membranes moist  Respiratory: Clear to auscultation bilaterally, respiratory effort normal   Cardiovascular: RRR  Gastrointestinal: Positive bowel sounds, soft, nontender, nondistended  Musculoskeletal: No bilateral ankle edema. LLE in cast - exposed toes are warm with 2+ capillary refill. Able to wiggle toes on command   Psychiatric: Appropriate affect, cooperative with exam  Neurologic: Oriented x 3, moves all extremities spontaneously without focal deficits, speech clear  Skin: No rashes to exposed surfaces     Pertinent  and/or Most Recent Results     LAB RESULTS:      Lab 10/21/24  1031 10/19/24  1855 10/18/24  1437   WBC 8.32  11.81* 7.67   HEMOGLOBIN 8.9* 7.5* 8.9*   HEMATOCRIT 29.4* 24.4* 29.0*   PLATELETS 309 256 256   NEUTROS ABS  --   --  6.94   IMMATURE GRANS (ABS)  --   --  0.06*   LYMPHS ABS  --   --  0.43*   MONOS ABS  --   --  0.23   EOS ABS  --   --  0.00   .0* 104.3* 102.5*         Lab 10/19/24  1855 10/18/24  1437   SODIUM 141 142   POTASSIUM 4.5 3.7   CHLORIDE 107 106   CO2 24.0 22.0   ANION GAP 10.0 14.0   BUN 10 9   CREATININE 1.21* 1.38*   EGFR 48.9* 41.8*   GLUCOSE 111* 178*   CALCIUM 8.7 8.6   PHOSPHORUS 2.8  --          Lab 10/19/24  1855 10/18/24  1437   TOTAL PROTEIN  --  6.0   ALBUMIN 3.7 3.4*   GLOBULIN  --  2.6   ALT (SGPT)  --  7   AST (SGOT)  --  19   BILIRUBIN  --  0.2   ALK PHOS  --  72     Brief Urine Lab Results  (Last result in the past 365 days)        Color   Clarity   Blood   Leuk Est   Nitrite   Protein   CREAT   Urine HCG        06/22/24 1604 Yellow   Clear   Negative   Negative   Negative   Negative                 Microbiology Results (last 10 days)       ** No results found for the last 240 hours. **          FL C Arm During Surgery    Result Date: 10/18/2024  This procedure was auto-finalized with no dictation required.    Popliteal sciatic cath    Result Date: 10/18/2024  Yani Sanchez SRNA     10/18/2024  8:21 AM Popliteal sciatic cath Patient reassessed immediately prior to procedure Patient location during procedure: pre-op Start time: 10/18/2024 7:01 AM Reason for block: at surgeon's request and post-op pain management Performed by CRNA/CAA: Lauri Oliver, CRNASRNA: Yani Sanchez SRNA Preanesthetic Checklist Completed: patient identified, IV checked, site marked, risks and benefits discussed, surgical consent, monitors and equipment checked, pre-op evaluation and timeout performed Prep: Sterile barriers:cap, gloves, mask and washed/disinfected hands Prep: ChloraPrep Patient monitoring: blood pressure monitoring, continuous pulse oximetry and EKG Procedure Sedation: yes Performed under:  local infiltration Guidance:ultrasound guided ULTRASOUND INTERPRETATION.  Using ultrasound guidance a 20 G gauge needle was placed in close proximity to the nerve, at which point, under ultrasound guidance anesthetic was injected in the area of the nerve and spread of the anesthesia was seen on ultrasound in close proximity thereto.  There were no abnormalities seen on ultrasound; a digital image was taken; and the patient tolerated the procedure with no complications. Images:still images obtained, printed/placed on chart Laterality:left Block Type:popliteal Injection Technique:catheter Needle Type:echogenic and Tuohy Needle Gauge:18 G Resistance on Injection: none Catheter Size:20 G Cath Depth at skin: 6 cm Medications Used: bupivacaine PF (MARCAINE) 0.25 % injection - Injection  25 mL - 10/18/2024 7:01:00 AM Post Assessment Injection Assessment: negative aspiration for heme, no paresthesia on injection and incremental injection Patient Tolerance:comfortable throughout block Complications:no Additional Notes CATHETER                             A high-frequency linear transducer, with sterile cover, was placed in the popliteal fossa to identify the popliteal artery and vein, Tibial nerve (TN) and Common Peroneal nerve (CP). The transducer was then moved in a cephalad fashion to observe the TN and CP nerve bifurcation to form the Sciatic Nerve. The insertion site was prepped and draped in sterile fashion. Skin and cutaneous tissue was infiltrated with 2-5 ml of 1% Lidocaine. Using ultrasound-guidance, an 18-gauge Contiplex Ultra 360 Touhy needle was advanced in plane from lateral to medial. Preservative-free normal saline was utilized for hydro-dissection of tissue, advancement of Touhy needle, and to confirm final needle placement posterior to the nerves. Local anesthetic injection spread, in incremental 3-5 ml injections, to surround both nerve structures. Aspiration every 5 ml to prevent intravascular injection.  "Injection was completed with negative aspiration of blood and negative intravascular injection. Injection pressures were normal with minimal resistance. A 20-gauge Contiplex Echo catheter was placed through the needle and advance out the tip of the Touhy 1-3 cm. The Touhy needle was then removed, and final catheter position verified (Below/above or Anterior/Posterior) the nerve structures. The catheter was secured in the usual fashion with skin glue, benzoin, steri-strips, CHG tegaderm and Label noting \"Nerve Block Catheter\". Jerk tape applied at yellow connector and catheter connection. Performed by: Mason Sharma CRNA     Adductor canal    Result Date: 10/18/2024  Yani Sanchez SRNA     10/18/2024  8:20 AM Adductor canal Patient reassessed immediately prior to procedure Patient location during procedure: pre-op Start time: 10/18/2024 6:55 AM Reason for block: at surgeon's request and post-op pain management Performed by SARAH/CAA: Lauri Oliver, CRNASRNA: Yani Sanchez SRNA Preanesthetic Checklist Completed: patient identified, IV checked, site marked, risks and benefits discussed, surgical consent, monitors and equipment checked, pre-op evaluation and timeout performed Prep: Pt Position: supine Sterile barriers:cap, gloves, mask, sterile barriers and washed/disinfected hands Prep: ChloraPrep Patient monitoring: blood pressure monitoring, continuous pulse oximetry and EKG Procedure Performed under: local infiltration Guidance:ultrasound guided ULTRASOUND INTERPRETATION.  Using ultrasound guidance a 20 G gauge needle was placed in close proximity to the nerve, at which point, under ultrasound guidance anesthetic was injected in the area of the nerve and spread of the anesthesia was seen on ultrasound in close proximity thereto.  There were no abnormalities seen on ultrasound; a digital image was taken; and the patient tolerated the procedure with no complications. Images:still images obtained, printed/placed on " "chart Laterality:left Block Type:adductor canal block Injection Technique:single-shot Needle Type:echogenic and short-bevel Needle Gauge:20 G Resistance on Injection: none Catheter size: 20g. Medications Used: bupivacaine PF (MARCAINE) 0.25 % injection - Injection  25 mL - 10/18/2024 6:55:00 AM dexamethasone sodium phosphate injection - Injection  2 mg - 10/18/2024 6:55:00 AM fentaNYL citrate (PF) (SUBLIMAZE) injection - Intravenous  100 mcg - 10/18/2024 6:55:00 AM Post Assessment Injection Assessment: negative aspiration for heme, incremental injection and no paresthesia on injection Patient Tolerance:comfortable throughout block Complications:no Additional Notes SINGLE shot A high-frequency linear transducer, with sterile cover, was placed on the anterior mid-thigh (between the anterior superior iliac spine and patella). The transducer was then moved medially to identify the Sartorius muscle (Nola), Vastus Medialis muscle (VMM), Superficial Femoral Artery (SFA) and Vein. The transducer was then moved cephalad or caudad to position the SFA in the middle of the Nola. The insertion site was prepped and draped in sterile fashion. Skin and cutaneous tissue was infiltrated with 2-5 ml of 1% Lidocaine. Using ultrasound-guidance, a 20-gauge B-Beach 4\" Ultraplex 360 non-stimulating echogenic needle was advanced in plane from lateral to medial. Preservative-free normal saline was utilized for hydro-dissection of tissue, advancement of needle, and to confirm needle placement below the fascial plane of the Nola where the Nerve to the VMM is located. Local anesthetic (LA) 5 ml deposited here. The needle continues its path lateral to the SFA at the level of the Saphenous Nerve. The remainder of the LA was deposited at the 10-11 o'clock position of the SFA. This injection created a space between the Nola and the SFA. Aspiration every 5 ml to prevent intravascular injection. Injection was completed with negative aspiration of blood " and negative intravascular injection. Injection pressures were normal with minimal resistance. Performed by: Mason Sharma CRNA    XR Ankle 3+ View Left    Result Date: 10/10/2024  XR ANKLE 3+ VW LEFT Date of Exam: 10/10/2024 2:14 PM EDT Indication: fracture follow-up; new pain Comparison: 10/6/2024. Findings: The exam was obtained through a cast.. 3 views. Subtle nondisplaced medial malleolar fracture appears similar. Fracture of the medial talus is similar. Known fractures of the first through fourth proximal metatarsals are not well seen on this exam. Ankle  joint is maintained.     Impression: Stable ankle fractures. Electronically Signed: Aracely Argueta MD  10/10/2024 2:54 PM EDT  Workstation ID: [id number]     Results for orders placed during the hospital encounter of 01/02/24    Adult Transthoracic Echo Complete W/ Cont if Necessary Per Protocol    Interpretation Summary    Left ventricular systolic function is normal. Calculated left ventricular EF = 57.9% Left ventricular ejection fraction appears to be 56 - 60%.    Left ventricular diastolic function was normal.    Estimated right ventricular systolic pressure from tricuspid regurgitation is normal (<35 mmHg).    No significant change from previous study    Discharge Details        Discharge Medications        New Medications        Instructions Start Date   acetaminophen 500 MG tablet  Commonly known as: TYLENOL   500 mg, Oral, Every 6 Hours PRN      docusate sodium 100 MG capsule  Commonly known as: Colace   100 mg, Oral, 2 Times Daily      melatonin 5 MG tablet tablet   5 mg, Oral, Nightly      ondansetron 4 MG tablet  Commonly known as: Zofran   4 mg, Oral, Every 8 Hours PRN      oxyCODONE 5 MG immediate release tablet  Commonly known as: ROXICODONE   5 mg, Oral, Every 4 Hours PRN             Changes to Medications        Instructions Start Date   esomeprazole 20 MG capsule  Commonly known as: nexIUM  What changed: when to take this   20 mg, Oral,  Every Other Day, OTC             Continue These Medications        Instructions Start Date   Alpha-Lipoic Acid 600 MG capsule   Oral, Daily      aspirin 81 MG EC tablet   81 mg, Oral, Daily      cholecalciferol 25 MCG (1000 UT) tablet  Commonly known as: VITAMIN D3   1,000 Units, Oral, Daily      DULoxetine 20 MG capsule  Commonly known as: CYMBALTA   20 mg, Oral, 2 Times Daily      folic acid 1 MG tablet  Commonly known as: FOLVITE   1 mg, Oral, Daily      gabapentin 300 MG capsule  Commonly known as: Neurontin   Take 1 capsule in the morning and 2 capsules at night.      levothyroxine 25 MCG tablet  Commonly known as: SYNTHROID, LEVOTHROID   TAKE ONE TABLET BY MOUTH EVERY MORNING ON AN EMPTY STOMACH      magnesium oxide 400 (241.3 Mg) MG tablet tablet  Commonly known as: MAGOX   400 mg, Oral, Daily, OTC      ondansetron ODT 4 MG disintegrating tablet  Commonly known as: ZOFRAN-ODT   4 mg, Translingual, Every 8 Hours PRN      rosuvastatin 5 MG tablet  Commonly known as: Crestor   5 mg, Oral, Daily      thiamine 100 MG tablet  Commonly known as: VITAMIN B1   100 mg, Oral, Daily      vitamin B-12 1000 MCG tablet  Commonly known as: CYANOCOBALAMIN   1,000 mcg, Oral, Daily             Stop These Medications      hydrALAZINE 25 MG tablet  Commonly known as: APRESOLINE     Lidocaine 4 %     lidocaine 5 %  Commonly known as: LIDODERM     metoprolol succinate XL 25 MG 24 hr tablet  Commonly known as: TOPROL-XL            Allergies   Allergen Reactions    Lisinopril Angioedema    Metal Rash     Possible nickel -   Gold is only metal that doesn't have issues      Tylenol [Acetaminophen] Other (See Comments)     ckd    Atorvastatin Myalgia     Discharge Disposition:Skilled Nursing Facility (DC - External)    Diet:  Diet Instructions       Diet: Regular/House Diet; Regular (IDDSI 7); Thin (IDDSI 0)      Discharge Diet: Regular/House Diet    Texture: Regular (IDDSI 7)    Fluid Consistency: Thin (IDDSI 0)      Regular diet           Activity:  Activity Instructions       Other Activity Instructions      NWB on LLE   Additional Activity Instructions:    Non weight bearing to left lower extremity    Keep dressing clean and dry            CODE STATUS:    Code Status and Medical Interventions: CPR (Attempt to Resuscitate); Full Support   Ordered at: 10/06/24 0246     Code Status (Patient has no pulse and is not breathing):    CPR (Attempt to Resuscitate)     Medical Interventions (Patient has pulse or is breathing):    Full Support     Future Appointments   Date Time Provider Department Center   10/30/2024 11:00 AM Austin Tatum LCSW [facility] BMT None   11/1/2024 11:00 AM Sergio Godoy MD [facility]   11/11/2024  9:30 AM Gray Bahena MD [facility]   12/12/2024 11:30 AM Liana So APRN [facility]   1/6/2025 12:40 PM Juanpablo Lee MD [facility]   2/13/2025 11:30 AM Caden Dietz MD [facility]     Additional Instructions for the Follow-ups that You Need to Schedule       Discharge Follow-up with Specified Provider: Walt in 2 weeks   As directed      To: Walt in 2 weeks              Lawanda Arvizu PA-C  10/21/24    Time Spent on Discharge:  I spent 40 minutes on this discharge activity which included: face-to-face encounter with the patient, reviewing the data in the system, coordination of the care with the nursing staff as well as consultants, documentation, and entering orders.                  Electronically signed by Mary Cantu MD at 10/22/24 1757       Discharge Order (From admission, onward)       Start     Ordered    10/21/24 1216  Discharge patient  Once        Expected Discharge Date: 10/21/24   Expected Discharge Time: Afternoon   Discharge Disposition: Skilled Nursing Facility (DC - External)   Physician of Record for Attribution - Please select from Treatment Team: MARY CANTU [[id number]]   Review needed by CMO to determine Physician of  Record: No      Question Answer Comment   Physician of Record for Attribution - Please select from Treatment Team SALMA CANTU    Review needed by CMO to determine Physician of Record No        10/21/24 8624

## 2024-11-04 ENCOUNTER — OFFICE VISIT (OUTPATIENT)
Dept: ORTHOPEDIC SURGERY | Facility: CLINIC | Age: 69
End: 2024-11-04
Payer: MEDICARE

## 2024-11-04 VITALS — TEMPERATURE: 97.3 F

## 2024-11-04 DIAGNOSIS — S72.042D CLOSED FRACTURE OF BASE OF FEMORAL NECK WITH ROUTINE HEALING, LEFT: Primary | ICD-10-CM

## 2024-11-04 DIAGNOSIS — S93.325A LISFRANC DISLOCATION, LEFT, INITIAL ENCOUNTER: ICD-10-CM

## 2024-11-04 DIAGNOSIS — S82.892A CLOSED FRACTURE OF LEFT ANKLE, INITIAL ENCOUNTER: ICD-10-CM

## 2024-11-04 PROCEDURE — 1160F RVW MEDS BY RX/DR IN RCRD: CPT | Performed by: ORTHOPAEDIC SURGERY

## 2024-11-04 PROCEDURE — 1159F MED LIST DOCD IN RCRD: CPT | Performed by: ORTHOPAEDIC SURGERY

## 2024-11-04 PROCEDURE — 99024 POSTOP FOLLOW-UP VISIT: CPT | Performed by: ORTHOPAEDIC SURGERY

## 2024-11-04 NOTE — PATIENT INSTRUCTIONS
"Knee Scooters:      [employer] [facility] [employer]  -[address], [address], [place] [address]  -[address]  -Phone: [phone]  ~$75.00/month rental    [employer].com - type in \"knee scooter\"  -Can purchase online for $100-150      Podiatrists:  Yair Anguiano DPM   Orthopaedic Surgery and Sports Medicine  [email or web address]  [facility]  [address]  [address] Floor, [address], [address]  [place] [id number]  Phone: [phone]    ALEX Celis DPM, FACFAS  [facility]  [email or web address]  [address]rd [place] [address]  Phone: [phone]    Bola Mcgrath DPM  [facility]  [email or web address]  [facility]  [address] [place] [address]  Phone: [phone]    Saturnino Daniel DPM  [facility] ([facility])  [email or web address]  [address] [place] [address]  Phone: [phone]    Aj Mandujano DPM  [email or web address]  [address]  [place] [address]  Phone: [phone]                          "

## 2024-11-04 NOTE — PROGRESS NOTES
[facility] Orthopaedic Surgery Office Follow Up     Office Follow Up Visit     Date: 11/04/2024   Patient Name: Alysia Sierra  MRN: [id number]  YOB: 1955  Chief Complaint:   Chief Complaint   Patient presents with    Post-op     2 week follow up--2 week S/P Open Reduction Internal Fixation Midfoot And Ankle Left (DOS 10/18/24)     History of Present Illness:   Alysia Sierra is a 69 y.o. female who is here today for follow up for 2-week follow-up status post left foot and ankle ORIF.  Has been nonweightbearing in short leg splint since last visit.  Pain and swelling continues to improve.  Taking daily aspirin.  Has been getting around in a wheelchair.  No new complaints.    Subjective   I reviewed the patient's chief complaint, history of present illness, review of systems, past medical history, surgical history, family history, social history, medications and allergy list   Objective    Vital Signs:   Vitals:    11/04/24 1459   Temp: 97.3 °F (36.3 °C)     There is no height or weight on file to calculate BMI.    Ortho Exam:  left LE Foot and Ankle Exam:   Splint removed for exam.  Leg compartments soft and compressible.  Foot and ankle incisions clean dry and intact, sutures in place, no drainage or erythema.  Able to actively plantarflex and dorsiflex ankle and all toes.  Appropriate swelling for this stage of healing about the foot.  Toes warm and well-perfused.  Brisk cap refill in all toes.  Pin sticking out of lateral left foot.  No erythema or redness surrounding pin site.    Results Review:  XR Ankle 3+ View Left  Left Ankle X-Ray 11/04/24   Indication: Pain  Views: 3 simulated weight bearing , comparison to previous  Findings: xrays reviewed by me today in the office and show, hardware in   place with proper alignment, no signs of hardware failure, ankle mortise   well-maintained   XR Foot 3+ View Left  Left Foot X-Ray 11/04/24   Indication: Pain  Views: 3 simulated  weight bearing , comparison to previous  Findings: xrays reviewed by me today in the office and show, hardware in   place with proper alignment, no signs of hardware failure, anatomic   alignment of bones of the foot       Assessment / Plan    Assessment/Plan:   Diagnoses and all orders for this visit:    1. Closed fracture of base of femoral neck with routine healing, left (Primary)  -     XR Foot 3+ View Left  -     XR Ankle 3+ View Left    2. Closed fracture of left ankle, initial encounter    3. Lisfranc dislocation, left, initial encounter      Patient is now 2 weeks postop, doing well. Happy with result, no complaints. Placed in nonweightbearing short leg fiberglass cast in clinic today.  I elected to leave sutures in place.  Continue to elevate during recovery.  Patient is to be nonweightbearing with use of assistive devices. Continue ASA for DVT ppx. Plan to see patient back in clinic in 2 weeks for reevaluation as well as likely suture and pin removal.  Patient counseled to contact the clinic if anything should arise in the meantime.     Follow Up:   Return in about 2 weeks (around 11/18/2024).      Sergio Godoy MD  [facility] [place] Orthopedic Surgeon

## 2024-11-06 ENCOUNTER — TELEPHONE (OUTPATIENT)
Dept: BEHAVIORAL HEALTH | Facility: CLINIC | Age: 69
End: 2024-11-06
Payer: MEDICARE

## 2024-11-06 DIAGNOSIS — E78.2 MIXED HYPERLIPIDEMIA: ICD-10-CM

## 2024-11-06 RX ORDER — ROSUVASTATIN CALCIUM 5 MG/1
5 TABLET, COATED ORAL DAILY
Qty: 90 TABLET | Refills: 1 | OUTPATIENT
Start: 2024-11-06

## 2024-11-06 NOTE — TELEPHONE ENCOUNTER
Patient called in the office stating she has been in the hospital from 10/4 to 11/1 and she was wanting to know if Austin can call her and just touch base with her as she was unable to make her last appt. I advised patient austin is in clinic with patients but he would attempt to call her if he gets a chance.

## 2024-11-08 ENCOUNTER — LAB (OUTPATIENT)
Dept: LAB | Facility: HOSPITAL | Age: 69
End: 2024-11-08
Payer: MEDICARE

## 2024-11-08 ENCOUNTER — OFFICE VISIT (OUTPATIENT)
Dept: INTERNAL MEDICINE | Facility: CLINIC | Age: 69
End: 2024-11-08
Payer: MEDICARE

## 2024-11-08 VITALS
DIASTOLIC BLOOD PRESSURE: 70 MMHG | OXYGEN SATURATION: 99 % | SYSTOLIC BLOOD PRESSURE: 112 MMHG | RESPIRATION RATE: 18 BRPM | BODY MASS INDEX: 23.05 KG/M2 | WEIGHT: 135 LBS | HEIGHT: 64 IN | HEART RATE: 78 BPM

## 2024-11-08 DIAGNOSIS — S72.042D CLOSED FRACTURE OF BASE OF FEMORAL NECK WITH ROUTINE HEALING, LEFT: ICD-10-CM

## 2024-11-08 DIAGNOSIS — Z76.89 ENCOUNTER FOR SUPPORT AND COORDINATION OF TRANSITION OF CARE: Primary | ICD-10-CM

## 2024-11-08 DIAGNOSIS — D50.9 IRON DEFICIENCY ANEMIA, UNSPECIFIED IRON DEFICIENCY ANEMIA TYPE: ICD-10-CM

## 2024-11-08 DIAGNOSIS — S82.892D CLOSED FRACTURE OF LEFT ANKLE WITH ROUTINE HEALING, SUBSEQUENT ENCOUNTER: ICD-10-CM

## 2024-11-08 LAB
DEPRECATED RDW RBC AUTO: 43.2 FL (ref 37–54)
ERYTHROCYTE [DISTWIDTH] IN BLOOD BY AUTOMATED COUNT: 12.6 % (ref 12.3–15.4)
HCT VFR BLD AUTO: 35 % (ref 34–46.6)
HGB BLD-MCNC: 11.5 G/DL (ref 12–15.9)
MCH RBC QN AUTO: 31.2 PG (ref 26.6–33)
MCHC RBC AUTO-ENTMCNC: 32.9 G/DL (ref 31.5–35.7)
MCV RBC AUTO: 94.9 FL (ref 79–97)
PLATELET # BLD AUTO: 271 10*3/MM3 (ref 140–450)
PMV BLD AUTO: 11.5 FL (ref 6–12)
RBC # BLD AUTO: 3.69 10*6/MM3 (ref 3.77–5.28)
WBC NRBC COR # BLD AUTO: 5.94 10*3/MM3 (ref 3.4–10.8)

## 2024-11-08 PROCEDURE — 85027 COMPLETE CBC AUTOMATED: CPT

## 2024-11-08 PROCEDURE — 82728 ASSAY OF FERRITIN: CPT

## 2024-11-08 PROCEDURE — 84466 ASSAY OF TRANSFERRIN: CPT

## 2024-11-08 PROCEDURE — 83540 ASSAY OF IRON: CPT

## 2024-11-08 NOTE — PATIENT INSTRUCTIONS
Diagnosis Discussed   Continue to monitor   Plenty of fluids  Continue current medications- monitor BP - continue Hydralazine and metoprolol at nighttime   Follow up with Cardiology   Follow up with Nephrology   Follow up with Orthopedics   Follow up with Oncology   If symptoms worsen or persist please seek further evaluation

## 2024-11-08 NOTE — PROGRESS NOTES
Transitional Care Follow Up Visit  Subjective     Alysia Sierra is a 69 y.o. female who presents for a transitional care management visit.    Within 48 business hours after discharge our office contacted her via telephone to coordinate her care and needs.      I reviewed and discussed the details of that call along with the discharge summary, hospital problems, inpatient lab results, inpatient diagnostic studies, and consultation reports with Alysia.     Current outpatient and discharge medications have been reconciled for the patient.  Reviewed by: Cassy Foster MD             Date of TCM Phone Call   Hospital [facility]   Date of Admission 10/5/2024   Date of Discharge 10/21/2024   Discharge Disposition Home-[facility] [place]     Risk for Readmission (LACE) No data recorded    History of Present Illness   Course During Hospital Stay:   Alysia Sierra is a 69 y.o. female with history of CKD III, chronic anemia, uterine cancer, hypothyroidism and alcohol abuse who was admitted to [facility] on 10/5/24 for L ankle fracture after stepping awkwardly off her bed.     1st-4th proximal metatarsal fracture Lisfranc fracture  Acute nondisplaced L medial malleolus fracture  Acute nondisplaced L posterior medial talus fracture   - She is s/p open dislocation of L TMT 1-5 with subsequent ORIF of TMT 1-5 as well as ORIF of medial malleolus fracture and application of short leg splint by Dr. Godoy on 10/18/24   Followed up with Dr. Godoy   11/4/2024- stable. Healing as expected   Placed in nonweightbearing short leg fiberglass cast in clinic.  Elected to leave sutures in place.  Continue to elevate during recovery.  Patient is to be nonweightbearing with use of assistive devices. Continue ASA for DVT ppx. Plan to see patient back in clinic in 2 weeks for reevaluation as well as likely suture and pin removal.  Patient counseled to contact the clinic if anything should arise in the meantime.  "    Chronic anemia  Iron deficiency  - Iron studies show ACD with iron deficiency  - Status post IV iron preoperatively  Will recheck iron and CBC today     Alcohol withdrawal- CIWA protocal- resolved in hospital   Has avoided alcohol use     Patient stable currently no concerns   Follow up with Dr. Godoy 11/18/2024   PT in hospital doing stretch bands and weights  Wheelchair for comfort - still non weightbearing     BP medications were held in Hospital  Has taken BP medication- recently due to elevated BP- taking medications at nighttime   Metoprolol succinate XL 25mg daily   Hydralazine 25mg twice a day- hold if BP is low in AM     Sister is currently helping her with recovery from surgery     The following portions of the patient's history were reviewed and updated as appropriate: allergies, current medications, past family history, past medical history, past social history, past surgical history, and problem list.    Review of Systems   Constitutional:  Negative for chills and fever.   Respiratory:  Negative for cough, chest tightness, shortness of breath and wheezing.    Cardiovascular:  Negative for chest pain, palpitations and leg swelling.   Gastrointestinal:  Negative for abdominal pain, nausea and vomiting.   Musculoskeletal:  Positive for gait problem and myalgias.   Neurological:  Negative for light-headedness and headaches.       Objective   /70   Pulse 78   Resp 18   Ht 162.6 cm (64\")   Wt 61.2 kg (135 lb)   SpO2 99%   BMI 23.17 kg/m²   Physical Exam  Vitals and nursing note reviewed.   Constitutional:       General: She is not in acute distress.     Appearance: Normal appearance.   HENT:      Head: Normocephalic and atraumatic.   Cardiovascular:      Rate and Rhythm: Normal rate and regular rhythm.      Heart sounds: Normal heart sounds.   Pulmonary:      Effort: No respiratory distress.      Breath sounds: Normal breath sounds.   Musculoskeletal:      Comments: Wheelchair- left leg " elevated with casting.   Non weightbearing   Moving all toes   Some decreased sensation of toes    Skin:     General: Skin is warm and dry.   Neurological:      General: No focal deficit present.      Mental Status: She is alert and oriented to person, place, and time.         Assessment & Plan   Diagnoses and all orders for this visit:  Diagnosis Discussed   Continue to monitor   Plenty of fluids  Continue current medications- monitor BP - continue Hydralazine and metoprolol at nighttime   Follow up with Cardiology   Follow up with Nephrology   Follow up with Orthopedics   Follow up with Oncology   If symptoms worsen or persist please seek further evaluation     1. Encounter for support and coordination of transition of care (Primary)    2. Closed fracture of base of femoral neck with routine healing, left    3. Closed fracture of left ankle with routine healing, subsequent encounter    4. Iron deficiency anemia, unspecified iron deficiency anemia type  -     CBC (No Diff); Future  -     Iron Profile; Future  -     Ferritin; Future

## 2024-11-09 LAB
FERRITIN SERPL-MCNC: 620 NG/ML (ref 13–150)
IRON 24H UR-MRATE: 150 MCG/DL (ref 37–145)
IRON SATN MFR SERPL: 49 % (ref 20–50)
TIBC SERPL-MCNC: 307 MCG/DL (ref 298–536)
TRANSFERRIN SERPL-MCNC: 206 MG/DL (ref 200–360)

## 2024-11-11 PROBLEM — I25.118 CORONARY ARTERY DISEASE OF NATIVE ARTERY OF NATIVE HEART WITH STABLE ANGINA PECTORIS: Status: ACTIVE | Noted: 2024-11-11

## 2024-11-15 DIAGNOSIS — G89.29 CHRONIC LEFT-SIDED LOW BACK PAIN WITHOUT SCIATICA: ICD-10-CM

## 2024-11-15 DIAGNOSIS — M54.50 CHRONIC LEFT-SIDED LOW BACK PAIN WITHOUT SCIATICA: ICD-10-CM

## 2024-11-15 RX ORDER — LIDOCAINE 50 MG/G
1 PATCH TOPICAL SEE ADMIN INSTRUCTIONS
Qty: 14 PATCH | OUTPATIENT
Start: 2024-11-15

## 2024-11-18 ENCOUNTER — TELEPHONE (OUTPATIENT)
Dept: BEHAVIORAL HEALTH | Facility: CLINIC | Age: 69
End: 2024-11-18
Payer: MEDICARE

## 2024-11-18 ENCOUNTER — OFFICE VISIT (OUTPATIENT)
Dept: ORTHOPEDIC SURGERY | Facility: CLINIC | Age: 69
End: 2024-11-18
Payer: MEDICARE

## 2024-11-18 DIAGNOSIS — S93.325A LISFRANC DISLOCATION, LEFT, INITIAL ENCOUNTER: ICD-10-CM

## 2024-11-18 DIAGNOSIS — S92.342A CLOSED DISPLACED FRACTURE OF FOURTH METATARSAL BONE OF LEFT FOOT, INITIAL ENCOUNTER: Primary | ICD-10-CM

## 2024-11-18 PROCEDURE — 1160F RVW MEDS BY RX/DR IN RCRD: CPT | Performed by: ORTHOPAEDIC SURGERY

## 2024-11-18 PROCEDURE — 1159F MED LIST DOCD IN RCRD: CPT | Performed by: ORTHOPAEDIC SURGERY

## 2024-11-18 PROCEDURE — 99024 POSTOP FOLLOW-UP VISIT: CPT | Performed by: ORTHOPAEDIC SURGERY

## 2024-11-18 PROCEDURE — 29405 APPL SHORT LEG CAST: CPT | Performed by: ORTHOPAEDIC SURGERY

## 2024-11-18 NOTE — TELEPHONE ENCOUNTER
Patient called in regards of no show letter that was sent in the mail about her appt on 10/30. Pt stated that she was in the hospital and was not able to call and cancel her appt due to her being heavily medicated. She is wanting to know if this can be corrected since she was admitted in the hospital.    Please contact pt at [phone] if there any questions or concerns

## 2024-11-18 NOTE — PROGRESS NOTES
[facility] [place] Orthopaedic Surgery Office Follow Up     Office Follow Up Visit     Date: 11/18/2024   Patient Name: Alysia Sierra  MRN: [id number]  YOB: 1955  Chief Complaint:   Chief Complaint   Patient presents with    Post-op     2 week recheck- 1 month S/P Open Reduction Internal Fixation Midfoot And Ankle Left (DOS 10/18/24)     History of Present Illness:   Alsyia Sierra is a 69 y.o. female who is here today for follow up for 2-week follow-up.  She is now 4 weeks out from open reduction internal fixation midfoot and ankle on the left.  Has been nonweightbearing in short leg Vale cast since last visit.  Pain well-controlled.  No new complaints.  Has continued to get around in a wheelchair.    Subjective   I reviewed the patient's chief complaint, history of present illness, review of systems, past medical history, surgical history, family history, social history, medications and allergy list   Objective    Vital Signs: There were no vitals filed for this visit.  There is no height or weight on file to calculate BMI.    Ortho Exam:  left LE Foot and Ankle Exam:   Cast removed for exam.  Leg compartments soft and compressible.  Foot and ankle incisions clean dry and intact, sutures in place, no drainage or erythema.  Able to actively plantarflex and dorsiflex ankle and all toes.  Appropriate swelling for this stage of healing about the foot.  Toes warm and well-perfused.  Brisk cap refill in all toes.  Pin sticking out of lateral left foot.  No erythema or redness surrounding pin site.    Results Review:  XR Ankle 3+ View Left  Left Ankle X-Ray 11/04/24   Indication: Pain  Views: 3 simulated weight bearing , comparison to previous  Findings: xrays reviewed by me today in the office and show, hardware in   place with proper alignment, no signs of hardware failure, ankle mortise   well-maintained   XR Foot 3+ View Left  Left Foot X-Ray 11/04/24   Indication: Pain  Views: 3  simulated weight bearing , comparison to previous  Findings: xrays reviewed by me today in the office and show, hardware in   place with proper alignment, no signs of hardware failure, anatomic   alignment of bones of the foot       Assessment / Plan    Assessment/Plan:   Diagnoses and all orders for this visit:    1. Closed displaced fracture of fourth metatarsal bone of left foot, initial encounter (Primary)    2. Lisfranc dislocation, left, initial encounter      Patient is now 4 weeks postop and continues to recover as expected.  No new complaints.  Patient placed back in a nonweightbearing short leg fiberglass cast in clinic today.  Sutures were removed and Steri-Strips were placed.  Continue to elevate during recovery.  Continue to be nonweightbearing with use of assistive vices.  Continue aspirin for DVT prophylaxis.  Plan to see patient back in 4 weeks for reevaluation and repeat x-rays.  Will likely plan to transition to tall cam walking boot at that time.  Patient counseled to contact clinic sooner should any concerns arise.  I am happy with her progress.  Was a pleasure seeing her today.    Follow Up:   Return in about 4 weeks (around 12/16/2024).      Sergio Godoy MD  [facility] [place] Orthopedic Surgeon

## 2024-11-19 ENCOUNTER — TELEPHONE (OUTPATIENT)
Dept: ORTHOPEDIC SURGERY | Facility: CLINIC | Age: 69
End: 2024-11-19
Payer: MEDICARE

## 2024-11-19 NOTE — TELEPHONE ENCOUNTER
Caller: JAYDEN    Relationship: SELF    Best call back number: [phone]    What is the best time to reach you: ANY    Who are you requesting to speak with (clinical staff, provider,  specific staff member): CLINICAL    What was the call regarding: HAD CAST PLACED 11/18/24 WITH DR SCHAEFER- NOW IS RUBBING SORE SPOT ON BACK OF LEG WHEN SHE SHE BENDS HER KNEE- NOW IT IS BRUISING- IS RED- WHAT DOES SHE NEED TO DO SINCE CAST HAS TO STAY ON FOR A MONTH- PLEASE CALL

## 2024-11-19 NOTE — TELEPHONE ENCOUNTER
I called patient, it seems the cast is bothering her behind the knee maybe it is a bit too high in the back? I am bringing her in this afternoon to look at it and hopefully adjust it without removing it.  Lindsey Langston RT (R), ROT

## 2024-11-20 ENCOUNTER — EXTERNAL PBMM DATA (OUTPATIENT)
Dept: PHARMACY | Facility: OTHER | Age: 69
End: 2024-11-20
Payer: MEDICARE

## 2024-11-20 DIAGNOSIS — M54.50 CHRONIC LEFT-SIDED LOW BACK PAIN WITHOUT SCIATICA: ICD-10-CM

## 2024-11-20 DIAGNOSIS — G89.29 CHRONIC LEFT-SIDED LOW BACK PAIN WITHOUT SCIATICA: ICD-10-CM

## 2024-11-20 RX ORDER — LIDOCAINE 50 MG/G
1 PATCH TOPICAL SEE ADMIN INSTRUCTIONS
Qty: 14 PATCH | OUTPATIENT
Start: 2024-11-20

## 2024-11-25 ENCOUNTER — OFFICE VISIT (OUTPATIENT)
Dept: BEHAVIORAL HEALTH | Facility: CLINIC | Age: 69
End: 2024-11-25
Payer: MEDICARE

## 2024-11-25 DIAGNOSIS — F32.1 CURRENT MODERATE EPISODE OF MAJOR DEPRESSIVE DISORDER, UNSPECIFIED WHETHER RECURRENT: Primary | ICD-10-CM

## 2024-11-25 NOTE — PROGRESS NOTES
[facility] [place]                 Follow Up Adult      Follow Up Adult Note     Date:2024   Patient Name: Alysia Sierra  : 1955   MRN: [id number]   Time IN: 10:47 AM    Time OUT:  11:49 AM    Referring Provider: Cassy Foster MD    Chief Complaint:      ICD-10-CM ICD-9-CM   1. Current moderate episode of major depressive disorder, unspecified whether recurrent  F32.1 296.22        History of Present Illness:   Alysia Sierra is a 69 y.o. female who is being seen today for follow up counseling for MDD.    Subjective               Patient's Support Network Includes: extended family    Functional Status: Moderate impairment       Current Outpatient Medications:     acetaminophen (TYLENOL) 500 MG tablet, Take 1 tablet by mouth Every 6 (Six) Hours As Needed for Mild Pain., Disp: 30 tablet, Rfl: 0    Alpha-Lipoic Acid 600 MG capsule, Take  by mouth Daily., Disp: , Rfl:     aspirin 81 MG EC tablet, Take 1 tablet by mouth Daily., Disp: 90 tablet, Rfl: 3    Cholecalciferol 25 MCG (1000 UT) tablet, Take 1 tablet by mouth Daily., Disp: , Rfl:     DULoxetine (CYMBALTA) 20 MG capsule, Take 1 capsule by mouth 2 (Two) Times a Day., Disp: 180 capsule, Rfl: 1    esomeprazole (nexIUM) 20 MG capsule, Take 1 capsule by mouth Every Other Day. OTC, Disp: , Rfl:     folic acid (FOLVITE) 1 MG tablet, Take 1 tablet by mouth Daily., Disp: 30 tablet, Rfl: 0    gabapentin (Neurontin) 300 MG capsule, Take 1 capsule in the morning and 2 capsules at night., Disp: 90 capsule, Rfl: 5    levothyroxine (SYNTHROID, LEVOTHROID) 25 MCG tablet, TAKE ONE TABLET BY MOUTH EVERY MORNING ON AN EMPTY STOMACH, Disp: 90 tablet, Rfl: 0    magnesium oxide (MAGOX) 400 (241.3 Mg) MG tablet tablet, Take 1 tablet by mouth Daily. OTC, Disp: , Rfl:     melatonin 5 MG tablet tablet, Take 1 tablet by mouth Every Night., Disp: , Rfl:     ondansetron (Zofran) 4 MG tablet, Take 1 tablet by mouth Every 8 (Eight)  Hours As Needed for Nausea or Vomiting for up to 15 doses., Disp: 15 tablet, Rfl: 0    ondansetron ODT (ZOFRAN-ODT) 4 MG disintegrating tablet, Place 1 tablet on the tongue Every 8 (Eight) Hours As Needed for Nausea or Vomiting., Disp: 30 tablet, Rfl: 1    oxyCODONE (ROXICODONE) 5 MG immediate release tablet, Take 1 tablet by mouth Every 4 (Four) Hours As Needed for Moderate Pain or Severe Pain., Disp: , Rfl:     rosuvastatin (Crestor) 5 MG tablet, Take 1 tablet by mouth Daily., Disp: 90 tablet, Rfl: 1    thiamine (VITAMIN B1) 100 MG tablet, Take 1 tablet by mouth Daily., Disp: 30 tablet, Rfl: 0    vitamin B-12 (CYANOCOBALAMIN) 1000 MCG tablet, Take 1 tablet by mouth Daily., Disp: , Rfl:     Allergies   Allergen Reactions    Lisinopril Angioedema    Metal Rash     Possible nickel -   Gold is only metal that doesn't have issues      Tylenol [Acetaminophen] Other (See Comments)     ckd    Atorvastatin Myalgia       Objective     Physical Exam:  Vital Signs: There were no vitals filed for this visit.  There is no height or weight on file to calculate BMI.     Mental Status Exam:   Hygiene:   good  Cooperation:  Cooperative  Eye Contact:  Good  Psychomotor Behavior:  Appropriate  Affect:  Full range  Mood: normal  Speech:  Normal  Thought Process:  Goal directed  Thought Content:  Normal  Suicidal:  None  Homicidal:  None  Hallucinations:  None  Delusion:  None  Memory:  Intact  Orientation:  Person, Place, Time, and Situation  Reliability:  good  Insight:  Good  Judgement:  Good  Impulse Control:  Good  Physical/Medical Issues:   See problem list      Assessment / Plan      Diagnosis:  Diagnoses and all orders for this visit:    1. Current moderate episode of major depressive disorder, unspecified whether recurrent (Primary)    Patient presented for follow-up with clinician.  Patient reported breaking their foot while getting out of bed and being subsequently hospitalized for nearly 1 month.  Patient and clinician  identified and addressed patient cognitive distortions related to situational depression caused by the breaking of their foot.  Patient was responsive to intervention.  Clinician utilized active listening and reflective response to convey empathy and support.    Progress toward goal: Not at goal    Prognosis: Good with ongoing treatment    PLAN:  Patient clinician will continue to utilize a cognitive behavioral intervention which identifies and addresses patient cognitive distortions related to depression.  Follow-ups will occur monthly and will last from 45 to 60 minutes in duration.    Safety: No acute safety concerns  Risk Assessment: Risk of self-harm acutely is low. Risk of self-harm chronically is also low, but could be further elevated in the event of treatment noncompliance and/or AODA.    Treatment Plan/Goals: Continue supportive psychotherapy efforts and medications as indicated. Treatment and medication options discussed during today's visit. Patient ackowledged and verbally consented to continue with current treatment plan and was educated on the importance of compliance with treatment and follow-up appointments. Patient seems reasonably able to adhere to treatment plan.      Assisted Patient in processing above session content; acknowledged and normalized patient’s thoughts, feelings, and concerns.  Rationalized patient thought process regarding depression.      Allowed Patient to freely discuss issues  without interruption or judgement with unconditional positive regard, active listening skills, and empathy. Therapist provided a safe, confidential environment to facilitate the development of a positive therapeutic relationship and encouraged open, honest communication. Assisted Patient in identifying risk factors which would indicate the need for higher level of care including thoughts to harm self or others and/or self-harming behavior and encouraged Patient to contact this office, call 911, or  present to the nearest emergency room should any of these events occur. Discussed crisis intervention services and means to access. Patient adamantly and convincingly denies current suicidal or homicidal ideation or perceptual disturbance. Assisted Patient in processing session content; acknowledged and normalized Patient’s thoughts, feelings, and concerns by utilizing a person-centered approach in efforts to build appropriate rapport and a positive therapeutic relationship with open and honest communication. .     Part of this note may be an electronic transcription/translation of spoken language to printed text using the Dragon Dictation System.       Follow Up:   Return in about 3 weeks (around 12/16/2024) for Next scheduled follow up.    Austin Tatum LCSW

## 2024-11-27 ENCOUNTER — TELEPHONE (OUTPATIENT)
Dept: INTERNAL MEDICINE | Facility: CLINIC | Age: 69
End: 2024-11-27
Payer: MEDICARE

## 2024-11-27 ENCOUNTER — POP HEALTH PHARMACY (OUTPATIENT)
Dept: PHARMACY | Facility: OTHER | Age: 69
End: 2024-11-27
Payer: MEDICARE

## 2024-11-27 NOTE — PROGRESS NOTES
[facility] Outreach      Alysia Sierra was called today to discuss medication adherence with ROSUVASTATIN CALCIUM ([facility] COA REDUCTASE INHIBITORS) , as she was identified as having care opportunities.    Program Details    [facility]  Status: Enrolled  Effective Dates: 11/22/2024 - present  Responsible Staff: Jaquelin Roach LPN          Opportunities Identified    Adherence- Cholesterol       Adherence and Medication Management    Adherence Questions   Patient Reported X Missed Doses in the Last 7 Days : 0  Reasons for Non-Adherence : Other - see comments  List other reason(s) for non-adherence or missed doses: Patient was in the hospital and was not taking the med from her supply at home.  She does still have med on hand.  Reminded patient that she is due for a refill.  Patient voiced understanding.  Interested in a 90 day supply? : Already receiving  Does this require clinical escalation to a pharmacist?: Y (Please see note above.)         General Medication Management    Type of medication management: targeted medication review  Review Completed on: 11/27/24  Referred by: pharmacist  Provider: licensed practical nurse  Visit type: initial  Method of contact: by telephone           Medication Therapy Problems     Medication Therapy Recommendations  No medication therapy recommendations to display      Summary    Medication Management Summary    Topics discussed: adherence and missed doses discussed, reminder to refill or  medication discussed  Time spent: 61 - 75 min         Jaquelin Roach LPN, 11/27/24, 10:44 AM EST.

## 2024-11-27 NOTE — TELEPHONE ENCOUNTER
Returned call to patient and explained that Dr Foster is out of the office today and offered for patient to schedule a visit to see PCP on Wednesday or to call back Monday and ask if the previous visit from 11/08/24 would be adequate for a referral.    Patient scheduled appointment but stated it is very difficult to come to appointments. Patient also stated that the pain is not going away from the hip knee and foot and has two more weeks in cast.     Patient appreciative of the call and had no further questions

## 2024-11-27 NOTE — TELEPHONE ENCOUNTER
Caller: Alysia Sierra    Relationship: Self    Best call back number: [phone]     What orders are you requesting (i.e. lab or imaging): HOME HEALTH AID    In what timeframe would the patient need to come in: AS SOON AS POSSIBLE    Where will you receive your lab/imaging services: IN HOME    Additional notes: PATIENT BROKE HER FOOT IN OCTOBER AND SHE WAS IN REHAB FOR A MONTH AND NOW SHE NEEDS SOME ASSISTANCE AT HOME.HER INSURANCE COMPANY STATED THAT SHE HAS COVERAGE FOR THIS ASSISTANCE, SHE WOULD JUST NEED AN ORDER FROM HER PCP.

## 2024-12-02 NOTE — TELEPHONE ENCOUNTER
PATIENT STATES THAT SHE NEEDS HOME HEALTH.   PATIENT STATES THAT SHE IS UNABLE TO COME IN AND PATIENT IS STATING THAT SHE IS UNABLE TO GET ANYTHING IN THE HOME DONE.   PATIENT STATES THAT SHE IS ANGEL IF SHE CAN GET TO THE BATHROOM.    PATIENT STATES THAT SHE WILL HAVE A CAST FOR 2 MORE WEEKS.   PATIENT FEELS LIKE SHE IS GETTING WORSE.     PATIENT FEELS LIKE HER RIGHT LEG IS GETTING WEAKER AND SHE IS USING IT TO MUCH.      CALL BACK: [phone]

## 2024-12-02 NOTE — TELEPHONE ENCOUNTER
PATIENT CALLED BACK AND STATES THAT SHE IS ALSO NOW IN PAIN WITH HER CAST ON. PATIENT IS REQUESTING HOME HEALTH ASAP.    I DID LET THE PATIENT KNOW THAT THE PROVIDER HAS 72 HOURS TO RESPOND AND SHE IS NOT IN THE OFFICE TOMORROW BUT ANOTHER NOTE WOULD BE SENT BACK.    PATIENT IS REQUESTING A PHONE CALL.    CALL BACK: [phone]

## 2024-12-04 ENCOUNTER — HOME CARE VISIT (OUTPATIENT)
Dept: HOME HEALTH SERVICES | Facility: HOME HEALTHCARE | Age: 69
End: 2024-12-04
Payer: COMMERCIAL

## 2024-12-04 ENCOUNTER — TELEPHONE (OUTPATIENT)
Dept: INTERNAL MEDICINE | Facility: CLINIC | Age: 69
End: 2024-12-04

## 2024-12-04 PROCEDURE — G0299 HHS/HOSPICE OF RN EA 15 MIN: HCPCS

## 2024-12-04 NOTE — TELEPHONE ENCOUNTER
Caller: LAVERNE WITH [facility]    Best call back number: [phone]     What was the call regarding:    LAVERNE WITH [facility] STATED THAT SHE GOT A REFERRAL FOR PATIENT TO GET SKILLED NURSING, PHYSICAL THERAPY AND OCCUPATIONAL THERAPY AND WENT TO VISIT PATIENT TODAY 12/04/2024 TO START A PLAN OF CARE AND PATIENT STATED THAT SHE DID NOT WANT A PLAN OF CARE BUT WANTED A HOME HEALTH AID TO HELP AROUND THE HOUSE SINCE SHE IS UNABLE TO WITH A CAST AT THIS TIME. LAVERNE STATED THAT PATIENT WAS INTOXICATED AT THE VISIT AND ADMITTED TO LAVERNE THAT SHE WAS AND THAT SHE HAD NOT ATE ANYTHING FOR THE PAST 3 DAYS. LAVERNE STATED THAT SHE MADE PATIENT FOOD AND WAS ABLE TO GET PATIENT TO EAT. PATIENT ADMITTED TO LAVERNE THAT SHE WAS A ALCOHOLIC AND WILL NOT REACH OUT TO FAMILY FOR HELP SINCE THEY DO NOT AGREE WITH HER CHOICE TO BE A ALCOHOLIC. PATIENT INFORMED LAVERNE THAT SHE IS UNABLE TO GET UP AND MAKE IT TO THE [facility] SHE IS WEARING DEPENDS AND CHANGING THEM OUT. LAVERNE STATED THAT SHE GAVE PATIENT THE NUMBER'S TO VISITING [facility] AND [facility] FOR PATIENT TO CONTACT TO GET IN HOME HELP. LAVERNE STATED THAT SHE IS VERY CONCERNED ABOUT PATIENT AND HER WELL BEING SINCE PATIENT LIVES ALONE AND IS REACHING OUT TO [facility] ON THE PATIENT TO GET PATIENT THE HELP THAT SHE NEED'S.

## 2024-12-04 NOTE — CASE COMMUNICATION
This nurse arrived at the patient's home and she was sitting in her wc with only a shirt and depends on. Left leg in a cast.  Patient's appearance was disheveled. Her apartment was cluttered and dirty. Patient was acting strange. This nurse explained what the skilled services were that we offered SN, PT, OT. Patient stated she couldn't do therapy at this time because of the cast on her leg and she wanted an aid to help her with meals, h ousekeeping, laundry, and showering. This nurse explained that we don't offer those services and gave her the names and numbers of [facility] and [facility]. Patient was talking a lot about other things that didn't pertain to her care.  Then she mentioned that she has not eaten in 3 days because she doesn't feel like fixing herself something to eat and it was too hard. This nurse read that the patient was placed on CIWA precautio ns in the hospital due to alcohol withdrawl. This nurse asked the patient if she had been drinking. The patient said yes. This nurse asked the patient if she felt intoxicated and she said yes. Patient stated she didn't want to ask her sister for help because she gets mad at her for drinking. This nurse helped the patient pick out a frozen dinner from the freezer and warmed it up in the microwave. The patient transfered herself to the [facility] and I gave the plate of food and she had water on the coffee table. Patient was not admitted to home care. This nurse left the home with the patient on the couch eating. This nurse made a report to APS because I felt the patient was not safe at home. Dr. Foster and clinical manager notified.

## 2024-12-05 ENCOUNTER — POP HEALTH PHARMACY (OUTPATIENT)
Dept: PHARMACY | Facility: OTHER | Age: 69
End: 2024-12-05
Payer: MEDICARE

## 2024-12-09 DIAGNOSIS — E78.2 MIXED HYPERLIPIDEMIA: ICD-10-CM

## 2024-12-09 RX ORDER — ROSUVASTATIN CALCIUM 5 MG/1
5 TABLET, COATED ORAL DAILY
Qty: 90 TABLET | Refills: 1 | Status: SHIPPED | OUTPATIENT
Start: 2024-12-09

## 2024-12-16 ENCOUNTER — OFFICE VISIT (OUTPATIENT)
Dept: ORTHOPEDIC SURGERY | Facility: CLINIC | Age: 69
End: 2024-12-16
Payer: MEDICARE

## 2024-12-16 ENCOUNTER — TELEPHONE (OUTPATIENT)
Dept: ORTHOPEDIC SURGERY | Facility: CLINIC | Age: 69
End: 2024-12-16

## 2024-12-16 DIAGNOSIS — Z09 SURGERY FOLLOW-UP: Primary | ICD-10-CM

## 2024-12-16 PROCEDURE — 99024 POSTOP FOLLOW-UP VISIT: CPT | Performed by: ORTHOPAEDIC SURGERY

## 2024-12-16 PROCEDURE — 1159F MED LIST DOCD IN RCRD: CPT | Performed by: ORTHOPAEDIC SURGERY

## 2024-12-16 PROCEDURE — 1160F RVW MEDS BY RX/DR IN RCRD: CPT | Performed by: ORTHOPAEDIC SURGERY

## 2024-12-16 NOTE — PROGRESS NOTES
[facility] [place] [facility] Office Follow Up     Office Follow Up Visit     Date: 12/16/2024   Patient Name: Alysia Sierra  MRN: [id number]  YOB: 1955  Chief Complaint:   Chief Complaint   Patient presents with    Follow-up     1 month follow up - 2 months S/P Open Reduction Internal Fixation Midfoot And Ankle Left (DOS: 10/18/24)     History of Present Illness:   Alysia Sierra is a 69 y.o. female who is here today for follow-up status post left ankle and foot ORIF on October 18.  Patient is now about 2 months out from her surgery.  States pain and swelling much improved.  No new complaints.    Subjective   I reviewed the patient's chief complaint, history of present illness, review of systems, past medical history, surgical history, family history, social history, medications and allergy list   Objective    Vital Signs: There were no vitals filed for this visit.  There is no height or weight on file to calculate BMI.    Ortho Exam:  left LE Foot and Ankle Exam:   Cast removed for exam.  Leg compartments soft and compressible.  Foot and ankle incisions clean dry and intact, healing well, no drainage or erythema.  Able to actively plantarflex and dorsiflex ankle and all toes.  Appropriate swelling for this stage of healing about the foot.  Toes warm and well-perfused.  Brisk cap refill in all toes.  Stands in clinic on operative extremity with no pain.    Results Review:  XR Ankle 3+ View Left  Left Ankle X-Ray 12/16/24   Indication: Pain  Views: 3 weight bearing , comparison to previous  Findings: xrays reviewed by me today in the office and show hardware in   place in proper alignment about the foot and ankle, no signs of hardware   failure, signs of healing at fracture sites, ankle mortise congruent and   well-maintained with no signs of syndesmotic widening on weightbearing   views   XR Foot 3+ View Left  Left Foot X-Ray 12/16/24   Indication: Pain  Views: 3 weight  bearing , comparison to previous  Findings: xrays reviewed by me today in the office and show hardware in   place in proper alignment about the foot and ankle, no signs of hardware   failure, signs of healing at fracture sites, ankle mortise congruent and   well-maintained with no signs of syndesmotic widening on weightbearing   views       Assessment / Plan    Assessment/Plan:   Diagnoses and all orders for this visit:    1. Surgery follow-up (Primary)  -     XR Foot 3+ View Left  -     XR Ankle 3+ View Left  -     Cancel: Ambulatory Referral to Physical Therapy for Evaluation & Treatment  -     Ambulatory Referral to Physical Therapy for Evaluation & Treatment      Patient is now about 2 months postop and continues to recover well.  Patient placed in tall cam walking boot in the clinic today.  Patient can now put weight on operative extremity in tall cam walking boot using assistive devices as needed, recommend walker.  Provided patient with a referral to physical therapy.  Weightbearing exercises in boot.  Can come out of boot for nonweightbearing exercises.  Will plan to see patient back in 4 weeks for repeat x-rays and reevaluation.  Will have further discussions about potential future hardware removal at future visits.  I am happy with her progress.  Was a pleasure seeing her today.    Follow Up:   Return in about 4 weeks (around 1/13/2025) for F/u Recheck with images.      Sergio Godoy MD  [facility] [place]

## 2024-12-16 NOTE — TELEPHONE ENCOUNTER
They are wanting more specific instructions on PT referral. Please see message below.  Lindsey Langston RT (R), ROT

## 2024-12-16 NOTE — TELEPHONE ENCOUNTER
SILVANO     Received a call from Jackie at [facility], in regards to the order that was received for physical therapy. Per Jackie, they are requesting an updated order that is specific in the treatment plan ( i.e body part, duration and frequency, etc.), for insurance purposes. Updated order / referral can be faxed to: [phone] (PAC info purposes). Please advise. Thank you kindly!

## 2024-12-17 ENCOUNTER — TELEPHONE (OUTPATIENT)
Dept: ORTHOPEDIC SURGERY | Facility: CLINIC | Age: 69
End: 2024-12-17
Payer: MEDICARE

## 2024-12-17 NOTE — TELEPHONE ENCOUNTER
I called patient and told her this was ok to just take them off if they were falling off already. She is 2 months post op at this point. She will call with any further questions or concerns she may have.  Lindsey Langston RT (R), ROT

## 2024-12-17 NOTE — TELEPHONE ENCOUNTER
Caller: Alysia Sierra    Relationship: Self    Best call back number: [phone]    What is the best time to reach you: ANY     Who are you requesting to speak with (clinical staff, provider,  specific staff member): CLINICAL     Do you know the name of the person who called: YES     What was the call regarding: PATIENT WANTING TO KNOW WHAT TO DO IF THE STERI STRIPS BEGIN TO FALL OFF

## 2024-12-26 ENCOUNTER — TELEPHONE (OUTPATIENT)
Dept: INTERNAL MEDICINE | Facility: CLINIC | Age: 69
End: 2024-12-26
Payer: MEDICARE

## 2024-12-26 ENCOUNTER — TELEPHONE (OUTPATIENT)
Dept: ORTHOPEDIC SURGERY | Facility: CLINIC | Age: 69
End: 2024-12-26
Payer: MEDICARE

## 2024-12-26 DIAGNOSIS — E03.9 HYPOTHYROIDISM, UNSPECIFIED TYPE: ICD-10-CM

## 2024-12-26 DIAGNOSIS — S93.325A LISFRANC DISLOCATION, LEFT, INITIAL ENCOUNTER: ICD-10-CM

## 2024-12-26 DIAGNOSIS — I10 PRIMARY HYPERTENSION: Primary | ICD-10-CM

## 2024-12-26 DIAGNOSIS — S92.342A CLOSED DISPLACED FRACTURE OF FOURTH METATARSAL BONE OF LEFT FOOT, INITIAL ENCOUNTER: Primary | ICD-10-CM

## 2024-12-26 DIAGNOSIS — Z09 SURGERY FOLLOW-UP: ICD-10-CM

## 2024-12-26 RX ORDER — TRAMADOL HYDROCHLORIDE 50 MG/1
50 TABLET ORAL
Refills: 0 | Status: CANCELLED | OUTPATIENT
Start: 2024-12-26

## 2024-12-26 RX ORDER — TRAMADOL HYDROCHLORIDE 50 MG/1
50 TABLET ORAL EVERY 6 HOURS PRN
Qty: 28 TABLET | Refills: 0 | Status: SHIPPED | OUTPATIENT
Start: 2024-12-26 | End: 2025-01-02

## 2024-12-26 RX ORDER — LEVOTHYROXINE SODIUM 25 UG/1
25 TABLET ORAL DAILY
Qty: 90 TABLET | Refills: 0 | Status: SHIPPED | OUTPATIENT
Start: 2024-12-26

## 2024-12-26 NOTE — TELEPHONE ENCOUNTER
Caller: JAYDEN   Relationship to Patient: SELF  Phone Number: [phone] (home)     Reason for Call: PATIENT STATES THAT AFTER THE FIRST FEW DAYS IN THE BOOT THE HEEL REALLY JUST STARTED HURTING HER SO MUCH TO WHERE SHE CAN'T WALK ON IT, IT IS BRINGING HER TO TEARS.

## 2024-12-26 NOTE — TELEPHONE ENCOUNTER
Caller: Alysia Sierra    Relationship: Self    Best call back number: [phone]     Requested Prescriptions:   Requested Prescriptions     Pending Prescriptions Disp Refills    levothyroxine (SYNTHROID, LEVOTHROID) 25 MCG tablet 90 tablet 0        Pharmacy where request should be sent: [facility] [id number] [address] [phone] [facility] [phone] FX     Last office visit with prescribing clinician: 11/8/2024   Last telemedicine visit with prescribing clinician: Visit date not found   Next office visit with prescribing clinician: 4/14/2025     Additional details provided by patient: PATIENT IS OUT    Does the patient have less than a 3 day supply:  [x] Yes  [] No    Would you like a call back once the refill request has been completed: [] Yes [x] No    If the office needs to give you a call back, can they leave a voicemail: [] Yes [x] No    Jessi Johansen Rep   12/26/24 09:51 EST

## 2024-12-26 NOTE — TELEPHONE ENCOUNTER
Pt had ORIF Midfoot And Ankle Left (DOS: 10/18/24).  She has been in the boot weightbearing for 10 days.  She states that she cannot stand the pain in her heel it is so bad.  Will you prescribe tramadol for her pain?  Please advise.  Thank you.

## 2024-12-26 NOTE — TELEPHONE ENCOUNTER
Called patient regarding script metoprolol succinate XL (TOPROL-XL) 25 MG 24 hr tablet   Patient still has 6 pills left and is taking one each night.   Looking in chart patient had script 4/12/24 that was discontinued while in hospital because patient states blood pressure was very low.   However even though discharge orders states to Not resume at discharge and to stop taking medication, patient resumed taking it shortly after discharge.   Patient states that since leaving the hospital blood pressure has been running high.   Patient is monitoring blood pressure and provided the last 4 readings.     140/94   132/86  121/87  140/89    Patient is requesting a refill of the medication

## 2024-12-26 NOTE — TELEPHONE ENCOUNTER
Caller: Alysia Sierra    Relationship: Self    Best call back number: [phone]     What medication are you requesting: METOPROLOL 25 MG    Have you had these symptoms before:    [x] Yes  [] No    Have you been treated for these symptoms before:   [x] Yes  [] No    If a prescription is needed, what is your preferred pharmacy and phone number: [facility] [id number] [address] [phone] [facility] [phone]      Additional notes:PATIENT STATES THAT SHE IS ON THIS MEDICATION

## 2024-12-27 RX ORDER — METOPROLOL SUCCINATE 25 MG/1
25 TABLET, EXTENDED RELEASE ORAL DAILY
Qty: 90 TABLET | Refills: 1 | Status: SHIPPED | OUTPATIENT
Start: 2024-12-27

## 2024-12-27 NOTE — TELEPHONE ENCOUNTER
Phone call to pt and made an appointment for 1/6/24.  She demonstrated understanding and showed appreciation.

## 2024-12-27 NOTE — TELEPHONE ENCOUNTER
Caller: Alysia Sierra    Relationship: Self    Best call back number: [phone]     What is the best time to reach you: ANYTIME    Who are you requesting to speak with (clinical staff, provider,  specific staff member): CLINICAL      What was the call regarding: PATIENT IS CALLING TO CHECK ON THE STATUS OF THIS MEDICATION REQUEST. PLEASE CALL BACK ONCE MEDICATION HAS BEEN SENT.

## 2025-01-07 ENCOUNTER — TELEPHONE (OUTPATIENT)
Dept: ORTHOPEDIC SURGERY | Facility: CLINIC | Age: 70
End: 2025-01-07
Payer: MEDICARE

## 2025-01-13 ENCOUNTER — OFFICE VISIT (OUTPATIENT)
Dept: ORTHOPEDIC SURGERY | Facility: CLINIC | Age: 70
End: 2025-01-13
Payer: MEDICARE

## 2025-01-13 DIAGNOSIS — S92.342A CLOSED DISPLACED FRACTURE OF FOURTH METATARSAL BONE OF LEFT FOOT, INITIAL ENCOUNTER: ICD-10-CM

## 2025-01-13 DIAGNOSIS — Z09 SURGERY FOLLOW-UP: Primary | ICD-10-CM

## 2025-01-13 PROCEDURE — 99024 POSTOP FOLLOW-UP VISIT: CPT | Performed by: ORTHOPAEDIC SURGERY

## 2025-01-13 PROCEDURE — 1160F RVW MEDS BY RX/DR IN RCRD: CPT | Performed by: ORTHOPAEDIC SURGERY

## 2025-01-13 PROCEDURE — 1159F MED LIST DOCD IN RCRD: CPT | Performed by: ORTHOPAEDIC SURGERY

## 2025-01-13 NOTE — PATIENT INSTRUCTIONS
"Even Up        Postop Shoe           Available on [employer], type in \"even up for walking boot\"   "

## 2025-01-13 NOTE — PROGRESS NOTES
[facility] [place] [facility] Follow Up     Office Follow Up Visit     Date: 01/13/2025   Patient Name: Alysia Sierra  MRN: [id number]  YOB: 1955  Chief Complaint:   Chief Complaint   Patient presents with    Post-op     1 month recheck- 3 months S/P Open Reduction Internal Fixation Midfoot And Ankle Left (DOS: 10/18/24)     History of Present Illness:   Alysia Sierra is a 69 y.o. female who is here today for postop follow-up status post left ankle and foot ORIF on October 18.  Patient is now just under 3 months out from her surgery.  Pain and swelling continue to improve.  Did have some heel pain when she started ambulating in her boot but states that has improved.  No new complaints.    Subjective   I reviewed the patient's chief complaint, history of present illness, review of systems, past medical history, surgical history, family history, social history, medications and allergy list   Objective    Vital Signs: There were no vitals filed for this visit.  There is no height or weight on file to calculate BMI.    Ortho Exam:  left LE Foot and Ankle Exam:   Boot removed for exam.  Leg compartments soft and compressible.  Foot and ankle incisions healing well.  There was small suture abscess lateral incision of which I expressed a drop of purulence on exam today, no physical exam signs concerning for deep infection.  Able to actively plantarflex and dorsiflex ankle and all toes.  Appropriate swelling for this stage of healing about the foot.  Toes warm and well-perfused.  Brisk cap refill in all toes.  Ambulating in clinic with walker taking several steps with a nonantalgic gait.    Results Review:  XR Ankle 3+ View Left  Left Ankle X-Ray 01/13/25   Indication: Pain  Views: 3 weight bearing , comparison to previous  Findings: xrays reviewed by me today in the office and show hardware in   place in proper alignment about the foot and ankle with no signs of   hardware  failure, signs of continued healing at fracture sites at both the   ankle and the sites of fractures in the foot, ankle mortise continues to   be congruent and well-maintained with no signs of syndesmotic widening on   weightbearing views     XR Foot 3+ View Left  Left Foot X-Ray 01/13/25   Indication: Pain  Views: 3 weight bearing , comparison to previous  Findings: xrays reviewed by me today in the office and show hardware in   place in proper alignment about the foot and ankle with no signs of   hardware failure, signs of continued healing at fracture sites at both the   ankle and the sites of fractures in the foot, ankle mortise continues to   be congruent and well-maintained with no signs of syndesmotic widening on   weightbearing views       Assessment / Plan    Assessment/Plan:   Diagnoses and all orders for this visit:    1. Surgery follow-up (Primary)  -     XR Ankle 3+ View Left  -     XR Foot 3+ View Left  -     Ambulatory Referral to Physical Therapy for Evaluation & Treatment    2. Closed displaced fracture of fourth metatarsal bone of left foot, initial encounter      Patient is now about 3 months out from her surgery and continues to recover well.  Can continue weightbearing in tall cam walking boot.  Provided patient with a even up in the clinic today.  Also provided patient with postop shoe.  Can wean into postop shoe and out of cam walking boot as her symptoms allow.  Patient with small suture abscess expressed on exam today.  No signs or concerns of deeper infection.  Recommend patient continue to monitor.  Contact clinic with any concerns or changes in exam.  Will plan to see patient back in 4 weeks for repeat x-rays and reevaluation.  Will plan to discuss possible hardware removal at next appointment.  Happy with her progress.  It was a pleasure seeing her today.    Follow Up:   Return in about 4 weeks (around 2/10/2025) for F/u Recheck with images.      Sergio Godoy MD  [facility]

## 2025-01-27 ENCOUNTER — OFFICE VISIT (OUTPATIENT)
Dept: ORTHOPEDIC SURGERY | Facility: CLINIC | Age: 70
End: 2025-01-27
Payer: MEDICARE

## 2025-01-27 VITALS
SYSTOLIC BLOOD PRESSURE: 120 MMHG | WEIGHT: 134.92 LBS | HEIGHT: 64 IN | BODY MASS INDEX: 23.03 KG/M2 | DIASTOLIC BLOOD PRESSURE: 72 MMHG

## 2025-01-27 DIAGNOSIS — Z09 SURGERY FOLLOW-UP: Primary | ICD-10-CM

## 2025-01-27 DIAGNOSIS — S72.042D CLOSED FRACTURE OF BASE OF FEMORAL NECK WITH ROUTINE HEALING, LEFT: ICD-10-CM

## 2025-01-27 NOTE — PROGRESS NOTES
[facility] Note    Subjective     Chief Complaint   Patient presents with    Follow-up     6.5 month recheck- S/P Closed reduction and internal fixation with femoral neck (5/3/23)        HPI    It has been 6  month(s) since Ms. Sierra's last visit. She returns to clinic today for postoperative follow-up of left hip fracture fixation. The issue has been ongoing for 20 month(s). She rates her pain a 1/10 on the pain scale. Previous/current treatments: cane/walker and physical therapy. Current symptoms:  none .  Overall, she is doing better.  She did have a Lisfranc fracture and ankle fracture, and is under the care of Dr. Godoy for those injuries.  She is walking with a postop shoe and cane currently.  No complaints in her left hip.      I have reviewed the following portions of the patient's history and agree with: History of Present Illness and Review of Systems    Patient Active Problem List   Diagnosis    Hypertension    Leg weakness, bilateral    Syncope    CKD (chronic kidney disease) stage 3, GFR 30-59 ml/min    Neuropathy    Hyperlipidemia LDL goal <100    Endometrial adenocarcinoma    Mild episode of recurrent major depressive disorder    Dizziness    Hip fracture    Anemia    Hypomagnesemia    Hypothyroidism    Post-menopausal    Other osteoporosis without current pathological fracture    Closed fracture of second lumbar vertebra    Alcoholic ketoacidosis    Leukocytosis    GERD with esophagitis    Sepsis    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    Lisfranc dislocation, left, initial encounter    Closed fracture of left ankle    Closed displaced fracture of fourth metatarsal bone of left foot    Coronary artery disease of native artery of native heart with stable angina pectoris    Surgery follow-up     Past Medical History:   Diagnosis Date    Allergic lisinopril  2017    Anemia     Ankle sprain     Arrhythmia     Arthritis     Cancer     uterine    Cataract mild 2020     stil lpresent    Cervical disc disorder     Chicken pox     Chronic fatigue     CKD (chronic kidney disease), stage III     sees nephro    Clotting disorder     Coronary artery disease of native artery of native heart with stable angina pectoris 2024    Dental root implant present     lower left  x1 - possible dental implant    Depression mild 2019    Difficulty walking 2019    Disease of thyroid gland     Dislocation of finger     Dizzy     NIEVES (dyspnea on exertion)     2017    Fracture of ankle     Fracture of hip     Fracture, foot     Generalized anxiety disorder     GERD (gastroesophageal reflux disease) 2018    History of brachytherapy 2023    vaginal brachytherapy    Hyperlipidemia     Hypertension     Iron deficiency anemia     Liver disease     fatty    Liver problem     Low back strain     Measles     Menopause     Mumps     Neck strain     Neuroma of foot     Neuromuscular disorder Peripheral Neuropathy    Orthostatic hypotension     Periarthritis of shoulder     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    Renal insufficiency 2018    Scoliosis     Stress fracture     Tennis elbow     Unintentional weight loss     Uses contact lenses     bilat    Uterine cancer     Uterine cancer 2022    UTI (urinary tract infection)     Visual impairment Nearsighted    Wears glasses       Past Surgical History:   Procedure Laterality Date    ANKLE OPEN REDUCTION INTERNAL FIXATION       SECTION      DILATION AND CURETTAGE, DIAGNOSTIC / THERAPEUTIC      HIP OPEN REDUCTION Left 2023    Procedure: FEMORAL NECK OPEN REDUCTION INTERNAL FIXATION LEFT;  Surgeon: Juanpablo Lee MD;  Location: [facility];  Service: Orthopedics;  Laterality: Left;    HIP SURGERY      JOINT REPLACEMENT  hip     OOPHORECTOMY      ORAL LESION EXCISION/BIOPSY  2021    ORIF FOOT FRACTURE Left 10/18/2024    Procedure: OPEN REDUCTION INTERNAL FIXATION MIDFOOT AND ANKLE  LEFT;  Surgeon: Sergio Godoy MD;  Location:  [facility];  Service: Orthopedics;  Laterality: Left;    TOTAL LAPAROSCOPIC HYSTERECTOMY SALPINGO OOPHORECTOMY N/A 10/28/2022    Procedure: TOTAL LAPAROSCOPIC HYSTERECTOMY BILATERAL SALPINGO-OOPHORECTOMY, INJECTION FOR SENTINEL LYMPH NODE MAPPING, BILATERAL SENTINEL LYMPH NODE DISSECTION WITH DAVINCI ROBOT;  Surgeon: Jasmine Rich MD;  Location:  [facility];  Service: Robotics - DaVinci;  Laterality: N/A;    TUBAL ABDOMINAL LIGATION  1983      Family History   Problem Relation Age of Onset    Spondylolisthesis Mother     COPD Mother     Stroke Mother     Hypertension Mother     Lung cancer Father     Hypertension Father     Heart attack Father     Coronary artery disease Father     Heart disease Father     Cancer Father     Lung disease Father     Hyperlipidemia Sister     Rheum arthritis Sister     Malig Hypertension Sister     Osteoarthritis Sister     Other Sister         alcoholic    Hypertension Sister     Scoliosis Sister     Hypertension Brother     Depression Sister     Early death Sister     Breast cancer Neg Hx     Ovarian cancer Neg Hx     Uterine cancer Neg Hx     Colon cancer Neg Hx     Melanoma Neg Hx     Prostate cancer Neg Hx      Social History     Socioeconomic History    Marital status:     Number of children: 1    Highest education level: Associate degree: academic program   Tobacco Use    Smoking status: Never     Passive exposure: Never    Smokeless tobacco: Never    Tobacco comments:     Never   Vaping Use    Vaping status: Never Used   Substance and Sexual Activity    Alcohol use: Yes     Alcohol/week: 6.0 standard drinks of alcohol     Types: 6 Glasses of wine per week     Comment: 6-8 glasses a week    Drug use: No    Sexual activity: Not Currently     Partners: Male     Birth control/protection: Post-menopausal, Tubal ligation, Hysterectomy      Current Outpatient Medications on File Prior to Visit   Medication Sig Dispense Refill     acetaminophen (TYLENOL) 500 MG tablet Take 1 tablet by mouth Every 6 (Six) Hours As Needed for Mild Pain. 30 tablet 0    Alpha-Lipoic Acid 600 MG capsule Take  by mouth Daily.      aspirin 81 MG EC tablet Take 1 tablet by mouth Daily. 90 tablet 3    Cholecalciferol 25 MCG (1000 UT) tablet Take 1 tablet by mouth Daily.      DULoxetine (CYMBALTA) 20 MG capsule Take 1 capsule by mouth 2 (Two) Times a Day. 180 capsule 1    esomeprazole (nexIUM) 20 MG capsule Take 1 capsule by mouth Every Other Day. OTC      folic acid (FOLVITE) 1 MG tablet Take 1 tablet by mouth Daily. 30 tablet 0    gabapentin (Neurontin) 300 MG capsule Take 1 capsule in the morning and 2 capsules at night. 90 capsule 5    levothyroxine (SYNTHROID, LEVOTHROID) 25 MCG tablet Take 1 tablet by mouth Daily. 90 tablet 0    magnesium oxide (MAGOX) 400 (241.3 Mg) MG tablet tablet Take 1 tablet by mouth Daily. OTC      metoprolol succinate XL (Toprol XL) 25 MG 24 hr tablet Take 1 tablet by mouth Daily. 90 tablet 1    ondansetron (Zofran) 4 MG tablet Take 1 tablet by mouth Every 8 (Eight) Hours As Needed for Nausea or Vomiting for up to 15 doses. 15 tablet 0    ondansetron ODT (ZOFRAN-ODT) 4 MG disintegrating tablet Place 1 tablet on the tongue Every 8 (Eight) Hours As Needed for Nausea or Vomiting. 30 tablet 1    oxyCODONE (ROXICODONE) 5 MG immediate release tablet Take 1 tablet by mouth Every 4 (Four) Hours As Needed for Moderate Pain or Severe Pain.      rosuvastatin (CRESTOR) 5 MG tablet TAKE 1 TABLET BY MOUTH DAILY 90 tablet 1    thiamine (VITAMIN B1) 100 MG tablet Take 1 tablet by mouth Daily. 30 tablet 0    vitamin B-12 (CYANOCOBALAMIN) 1000 MCG tablet Take 1 tablet by mouth Daily.       No current facility-administered medications on file prior to visit.      Allergies   Allergen Reactions    Lisinopril Angioedema    Metal Rash     Possible nickel -   Gold is only metal that doesn't have issues      Tylenol [Acetaminophen] Other (See Comments)     ckd     Atorvastatin Myalgia        Review of Systems   Constitutional:  Negative for activity change, appetite change, chills, diaphoresis, fatigue, fever and unexpected weight change.   HENT:  Negative for congestion, dental problem, drooling, ear discharge, ear pain, facial swelling, hearing loss, mouth sores, nosebleeds, postnasal drip, rhinorrhea, sinus pressure, sneezing, sore throat, tinnitus, trouble swallowing and voice change.    Eyes:  Negative for photophobia, pain, discharge, redness, itching and visual disturbance.   Respiratory:  Negative for apnea, cough, choking, chest tightness, shortness of breath, wheezing and stridor.    Cardiovascular:  Negative for chest pain, palpitations and leg swelling.   Gastrointestinal:  Negative for abdominal distention, abdominal pain, anal bleeding, blood in stool, constipation, diarrhea, nausea, rectal pain and vomiting.   Endocrine: Negative for cold intolerance, heat intolerance, polydipsia, polyphagia and polyuria.   Genitourinary:  Negative for decreased urine volume, difficulty urinating, dysuria, enuresis, flank pain, frequency, genital sores, hematuria and urgency.   Musculoskeletal:  Positive for arthralgias. Negative for back pain, gait problem, joint swelling, myalgias, neck pain and neck stiffness.   Skin:  Negative for color change, pallor, rash and wound.   Allergic/Immunologic: Negative for environmental allergies, food allergies and immunocompromised state.   Neurological:  Negative for dizziness, tremors, seizures, syncope, facial asymmetry, speech difficulty, weakness, light-headedness, numbness and headaches.   Hematological:  Negative for adenopathy. Does not bruise/bleed easily.   Psychiatric/Behavioral:  Negative for agitation, behavioral problems, confusion, decreased concentration, dysphoric mood, hallucinations, self-injury, sleep disturbance and suicidal ideas. The patient is not nervous/anxious and is not hyperactive.         Objective   "    Physical Exam  /72   Ht 162.6 cm (64.02\")   Wt 61.2 kg (134 lb 14.7 oz)   LMP  (LMP Unknown)   BMI 23.15 kg/m²     Body mass index is 23.15 kg/m².  BMI is within normal parameters. No other follow-up for BMI required.      General:   Mental Status:  Alert   Appearance: Cooperative, in no acute distress   Build and Nutrition: Well-nourished well-developed female   Orientation: Alert and oriented to person, place and time   Posture: Normal   Gait: With a cane and stiff shoe on the left    Integument:   Left hip: Incision is well-healed with no signs of infection    Lower Extremity:   Left Hip:    Tenderness:  None    Swelling:  None    Crepitus:  None    Range of motion:  External Rotation: 40°       Internal Rotation: 40°       Flexion:  100°       Extension:  0°    Deformities:  None  Functional testing: Negative Stinchfield    No leg length discrepancy      Imaging/Studies  Imaging Results (Last 24 Hours)       Procedure Component Value Units Date/Time    XR Hip With or Without Pelvis 2 - 3 View Left [[id number]] Resulted: 01/27/25 1358     Updated: 01/27/25 1358    Narrative:      Left Hip Radiographs  Indication: Status post femoral neck system fixation, left hip femoral   neck fracture  Views: low AP pelvis and lateral of the left hip    Comparison: 7/31/2023    Findings:   Good alignment, good signs of healing, no signs of hardware loosening or   failure.  No obvious avascular changes.                Assessment and Plan     Diagnoses and all orders for this visit:    1. Surgery follow-up (Primary)  -     XR Hip With or Without Pelvis 2 - 3 View Left    2. Closed fracture of base of femoral neck with routine healing, left        1. Surgery follow-up    2. Closed fracture of base of femoral neck with routine healing, left          I reviewed my findings with the patient.  Her left hip femoral neck fracture appears to be healed, and I see no evidence of AVN currently.  I would like to see her back in " May/June timeframe for Nagae a 2-year checkup from the time of her surgery, and after that we will probably follow-up just as needed.  I will see her back sooner for any problems.    Return in about 5 months (around 6/27/2025).      Juanpablo Lee MD  01/27/25  14:16 EST    Dictated Utilizing Dragon Dictation

## 2025-02-03 ENCOUNTER — OFFICE VISIT (OUTPATIENT)
Dept: BEHAVIORAL HEALTH | Facility: CLINIC | Age: 70
End: 2025-02-03
Payer: MEDICARE

## 2025-02-03 ENCOUNTER — OFFICE VISIT (OUTPATIENT)
Dept: GYNECOLOGIC ONCOLOGY | Facility: CLINIC | Age: 70
End: 2025-02-03
Payer: MEDICARE

## 2025-02-03 VITALS
SYSTOLIC BLOOD PRESSURE: 119 MMHG | OXYGEN SATURATION: 97 % | HEIGHT: 64 IN | HEART RATE: 92 BPM | RESPIRATION RATE: 18 BRPM | TEMPERATURE: 97.5 F | WEIGHT: 138.2 LBS | BODY MASS INDEX: 23.6 KG/M2 | DIASTOLIC BLOOD PRESSURE: 65 MMHG

## 2025-02-03 DIAGNOSIS — Z12.31 ENCOUNTER FOR SCREENING MAMMOGRAM FOR MALIGNANT NEOPLASM OF BREAST: ICD-10-CM

## 2025-02-03 DIAGNOSIS — Z78.0 POST-MENOPAUSAL: ICD-10-CM

## 2025-02-03 DIAGNOSIS — F32.1 CURRENT MODERATE EPISODE OF MAJOR DEPRESSIVE DISORDER, UNSPECIFIED WHETHER RECURRENT: Primary | ICD-10-CM

## 2025-02-03 DIAGNOSIS — Z85.42 HISTORY OF ENDOMETRIAL CANCER: Primary | ICD-10-CM

## 2025-02-03 PROCEDURE — 1160F RVW MEDS BY RX/DR IN RCRD: CPT | Performed by: NURSE PRACTITIONER

## 2025-02-03 PROCEDURE — 1159F MED LIST DOCD IN RCRD: CPT | Performed by: NURSE PRACTITIONER

## 2025-02-03 PROCEDURE — 1125F AMNT PAIN NOTED PAIN PRSNT: CPT | Performed by: NURSE PRACTITIONER

## 2025-02-03 PROCEDURE — 3074F SYST BP LT 130 MM HG: CPT | Performed by: NURSE PRACTITIONER

## 2025-02-03 PROCEDURE — 3078F DIAST BP <80 MM HG: CPT | Performed by: NURSE PRACTITIONER

## 2025-02-03 PROCEDURE — 99213 OFFICE O/P EST LOW 20 MIN: CPT | Performed by: NURSE PRACTITIONER

## 2025-02-03 RX ORDER — HYDRALAZINE HYDROCHLORIDE 25 MG/1
25 TABLET, FILM COATED ORAL 3 TIMES DAILY
COMMUNITY

## 2025-02-03 RX ORDER — TRAMADOL HYDROCHLORIDE 50 MG/1
50 TABLET ORAL EVERY 6 HOURS PRN
COMMUNITY

## 2025-02-03 NOTE — PLAN OF CARE
Treatment plan updated February 3, 2025.  Patient reports progress since the initiation of treatment as well.  Patient reports current symptomology as 5 out of 10 with 10 being the worst.  Patient and clinician will continue to utilize a cognitive behavioral intervention to identify and address patient cognitive distortions related to depression at session.  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  Treatment plan will be updated in 90 days.

## 2025-02-03 NOTE — PROGRESS NOTES
[facility]                 Follow Up Adult      Follow Up Adult Note     Date:2025   Patient Name: Alysia Sierra  : 1955   MRN: [id number]   Time IN: 11:02 am    Time OUT: 11:53 am     Referring Provider: Cassy Foster MD    Chief Complaint:      ICD-10-CM ICD-9-CM   1. Current moderate episode of major depressive disorder, unspecified whether recurrent  F32.1 296.22        History of Present Illness:   Alysia Sierra is a 70 y.o. female who is being seen today for follow up counseling for depression.    Subjective               Patient's Support Network Includes: extended family    Functional Status: Mild impairment       Current Outpatient Medications:     acetaminophen (TYLENOL) 500 MG tablet, Take 1 tablet by mouth Every 6 (Six) Hours As Needed for Mild Pain., Disp: 30 tablet, Rfl: 0    Alpha-Lipoic Acid 600 MG capsule, Take  by mouth Daily., Disp: , Rfl:     aspirin 81 MG EC tablet, Take 1 tablet by mouth Daily., Disp: 90 tablet, Rfl: 3    Cholecalciferol 25 MCG (1000 UT) tablet, Take 1 tablet by mouth Daily., Disp: , Rfl:     DULoxetine (CYMBALTA) 20 MG capsule, Take 1 capsule by mouth 2 (Two) Times a Day., Disp: 180 capsule, Rfl: 1    esomeprazole (nexIUM) 20 MG capsule, Take 1 capsule by mouth Every Other Day. OTC, Disp: , Rfl:     folic acid (FOLVITE) 1 MG tablet, Take 1 tablet by mouth Daily., Disp: 30 tablet, Rfl: 0    gabapentin (Neurontin) 300 MG capsule, Take 1 capsule in the morning and 2 capsules at night., Disp: 90 capsule, Rfl: 5    levothyroxine (SYNTHROID, LEVOTHROID) 25 MCG tablet, Take 1 tablet by mouth Daily., Disp: 90 tablet, Rfl: 0    magnesium oxide (MAGOX) 400 (241.3 Mg) MG tablet tablet, Take 1 tablet by mouth Daily. OTC, Disp: , Rfl:     metoprolol succinate XL (Toprol XL) 25 MG 24 hr tablet, Take 1 tablet by mouth Daily., Disp: 90 tablet, Rfl: 1    ondansetron (Zofran) 4 MG tablet, Take 1 tablet by mouth Every 8  (Eight) Hours As Needed for Nausea or Vomiting for up to 15 doses., Disp: 15 tablet, Rfl: 0    ondansetron ODT (ZOFRAN-ODT) 4 MG disintegrating tablet, Place 1 tablet on the tongue Every 8 (Eight) Hours As Needed for Nausea or Vomiting., Disp: 30 tablet, Rfl: 1    oxyCODONE (ROXICODONE) 5 MG immediate release tablet, Take 1 tablet by mouth Every 4 (Four) Hours As Needed for Moderate Pain or Severe Pain., Disp: , Rfl:     rosuvastatin (CRESTOR) 5 MG tablet, TAKE 1 TABLET BY MOUTH DAILY, Disp: 90 tablet, Rfl: 1    thiamine (VITAMIN B1) 100 MG tablet, Take 1 tablet by mouth Daily., Disp: 30 tablet, Rfl: 0    vitamin B-12 (CYANOCOBALAMIN) 1000 MCG tablet, Take 1 tablet by mouth Daily., Disp: , Rfl:     Allergies   Allergen Reactions    Lisinopril Angioedema    Metal Rash     Possible nickel -   Gold is only metal that doesn't have issues      Tylenol [Acetaminophen] Other (See Comments)     ckd    Atorvastatin Myalgia       Objective     Physical Exam:  Vital Signs: There were no vitals filed for this visit.  There is no height or weight on file to calculate BMI.     Mental Status Exam:   Hygiene:   good  Cooperation:  Cooperative  Eye Contact:  Good  Psychomotor Behavior:  Appropriate  Affect:  Full range  Mood: normal  Speech:  Normal  Thought Process:  Goal directed  Thought Content:  Normal  Suicidal:  None  Homicidal:  None  Hallucinations:  None  Delusion:  None  Memory:  Intact  Orientation:  Person, Place, Time, and Situation  Reliability:  good  Insight:  Good  Judgement:  Good  Impulse Control:  Good  Physical/Medical Issues:   See problem list      Assessment / Plan      Diagnosis:  Diagnoses and all orders for this visit:    1. Current moderate episode of major depressive disorder, unspecified whether recurrent (Primary)    Treatment plan updated February 3, 2025.  Patient reports progress since the initiation of treatment as well.  Patient reports current symptomology as 5 out of 10 with 10 being the worst.   Patient and clinician will continue to utilize a cognitive behavioral intervention to identify and address patient cognitive distortions related to depression at session.  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  Treatment plan will be updated in 90 days.    Progress toward goal: Not at goal    Prognosis: Good with ongoing treatment    PLAN:    Patient and clinician will continue to utilize a cognitive behavioral intervention to identify and address patient cognitive distortions related to depression at session.  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     Safety: No acute safety concerns  Risk Assessment: Risk of self-harm acutely is low. Risk of self-harm chronically is also low, but could be further elevated in the event of treatment noncompliance and/or AODA.    Treatment Plan/Goals: Continue supportive psychotherapy efforts and medications as indicated. Treatment and medication options discussed during today's visit. Patient ackowledged and verbally consented to continue with current treatment plan and was educated on the importance of compliance with treatment and follow-up appointments. Patient seems reasonably able to adhere to treatment plan.      Assisted Patient in processing above session content; acknowledged and normalized patient’s thoughts, feelings, and concerns.  Rationalized patient thought process regarding depression.      Allowed Patient to freely discuss issues  without interruption or judgement with unconditional positive regard, active listening skills, and empathy. Therapist provided a safe, confidential environment to facilitate the development of a positive therapeutic relationship and encouraged open, honest  communication. Assisted Patient in identifying risk factors which would indicate the need for higher level of care including thoughts to harm self or others and/or self-harming behavior and encouraged Patient to contact this office, call 911, or present to the nearest emergency room should any of these events occur. Discussed crisis intervention services and means to access. Patient adamantly and convincingly denies current suicidal or homicidal ideation or perceptual disturbance. Assisted Patient in processing session content; acknowledged and normalized Patient’s thoughts, feelings, and concerns by utilizing a person-centered approach in efforts to build appropriate rapport and a positive therapeutic relationship with open and honest communication. .     Part of this note may be an electronic transcription/translation of spoken language to printed text using the Dragon Dictation System.       Follow Up:   Return in about 2 weeks (around 2/17/2025) for Next scheduled follow up.    Austin Tatum LCSW

## 2025-02-04 ENCOUNTER — TELEPHONE (OUTPATIENT)
Dept: CARDIOLOGY | Facility: CLINIC | Age: 70
End: 2025-02-04
Payer: MEDICARE

## 2025-02-04 NOTE — TELEPHONE ENCOUNTER
Caller: Alysia Sierra    Relationship to patient: Self    Best call back number: [phone]    Chief complaint: PT MISSED HER APPOINTMENT ON 11.11.24 DUE TO A BROKEN FOOT. SHE IS READY TO RESCHEDULE. PER HUB WORKFLOW, FURTHER DIRECTION IS REQUIRED. PLEASE REACH OUT TO SCHEDULE PT.     Type of visit: FOLLOW UP    Requested date: ASAP     If rescheduling, when is the original appointment: 11.11.25     Additional notes: NO NEW OR WORSENING MEDICAL CONCERNS

## 2025-02-13 ENCOUNTER — OFFICE VISIT (OUTPATIENT)
Dept: NEUROLOGY | Facility: CLINIC | Age: 70
End: 2025-02-13
Payer: MEDICARE

## 2025-02-13 VITALS
BODY MASS INDEX: 23.71 KG/M2 | HEIGHT: 64 IN | HEART RATE: 102 BPM | OXYGEN SATURATION: 97 % | DIASTOLIC BLOOD PRESSURE: 72 MMHG | SYSTOLIC BLOOD PRESSURE: 110 MMHG

## 2025-02-13 DIAGNOSIS — G62.9 NEUROPATHY: Primary | ICD-10-CM

## 2025-02-13 DIAGNOSIS — M54.12 CERVICAL RADICULOPATHY AT C6: ICD-10-CM

## 2025-02-13 DIAGNOSIS — F33.0 MILD EPISODE OF RECURRENT MAJOR DEPRESSIVE DISORDER: ICD-10-CM

## 2025-02-13 PROCEDURE — 1160F RVW MEDS BY RX/DR IN RCRD: CPT | Performed by: PSYCHIATRY & NEUROLOGY

## 2025-02-13 PROCEDURE — 3078F DIAST BP <80 MM HG: CPT | Performed by: PSYCHIATRY & NEUROLOGY

## 2025-02-13 PROCEDURE — 99214 OFFICE O/P EST MOD 30 MIN: CPT | Performed by: PSYCHIATRY & NEUROLOGY

## 2025-02-13 PROCEDURE — 3074F SYST BP LT 130 MM HG: CPT | Performed by: PSYCHIATRY & NEUROLOGY

## 2025-02-13 PROCEDURE — 1159F MED LIST DOCD IN RCRD: CPT | Performed by: PSYCHIATRY & NEUROLOGY

## 2025-02-13 RX ORDER — DULOXETIN HYDROCHLORIDE 20 MG/1
20 CAPSULE, DELAYED RELEASE ORAL 2 TIMES DAILY
Qty: 180 CAPSULE | Refills: 1 | Status: SHIPPED | OUTPATIENT
Start: 2025-02-13

## 2025-02-13 RX ORDER — GABAPENTIN 300 MG/1
CAPSULE ORAL
Qty: 90 CAPSULE | Refills: 5 | Status: SHIPPED | OUTPATIENT
Start: 2025-02-13

## 2025-02-13 NOTE — PROGRESS NOTES
Subjective:    CC: Alysia Sierra is in clinic today for follow up for neuropathy, fibromyalgia and cervical radiculopathy.    HPI:  Initial visit: 8/31/2020: Patient is a 65-year-old female with past medical history of hypertension, supraventricular tachycardia referred to the clinic for evaluation of bilateral leg and arm weakness.  She reports her symptoms started about 2 years ago and it has progressively become worse.  She reports that in last 6 months, she has had great difficulty getting up from chair if there is no side arms.  She reports that if she is on the floor, it is almost impossible for her to get up.  In addition she has noticed difficulty going up and down stairs.  She also reports tingling, numbness and extreme coldness in both her feet and sometimes it leads to pain.  This also started about 6 months ago and it has progressively become worse.    10/8/2020: She is in clinic for regular follow-up.  Since her last visit, she reports that overall she feels worse.  She feels that she does not have any energy to do anything throughout the day.  She is involved in physical therapy but it is extremely difficult for her to go through the exercises.  She reports that overall she feels weaker than her last visit.  Since her last visit, she has now completed MRI of cervical spine, MRI lumbar spine blood work-up including myasthenia gravis antibody panel, vitamin D, copper and B12.  MRI of cervical and lumbosacral spine shows mild to moderate multilevel spondylitic changes without any evidence of myelopathy.  Vitamin B12, copper and vitamin levels for within normal limits as well.  Upon further questioning, she reports that she sleeps usually at 4 or 5 PM until 1 or 2 AM and then she is awake after that.  She has done this for quite some time now.  In addition, she also reports to heavy alcohol use from age 34 until about 2 years ago.  She still drinks alcohol but it is much less as compared to  before.    12/11/2020: She is in clinic for regular follow-up.  Since her last visit, she underwent MRI brain without contrast which I reviewed personally.  It showed white matter changes suspicious for multiple sclerosis so following that, lumbar puncture was recommended.  Lumbar puncture was negative for MS profile.  She reports that since her last visit, she has now completed physical therapy and she continues to have good days and bad days.  She reports that she continues to struggle with poor sleep quality, does not have good appetite.  She is also reporting almost continuous tingling, numbness and coldness in both her feet.    3/1/2021: She is in clinic for regular follow-up.  Since her last visit in December, she reports that there has not been much change in bilateral upper and lower extremity strength is concerned.  She continues to have episodes of difficulty with walking, subjective feeling of generalized body weakness.  She is planning to go back to the gym and start exercising and will try to pay more attention to nutrition to help with this.  Really she reports that there has been no worsening since her last visit.  She did try gabapentin 300 mg twice daily and reports that the morning dose caused her to have side effects that she is currently taking only the nighttime dose which seems to be helping significantly with sleep.    9/1/2021: She is in clinic for regular follow-up.  Since her last visit in March, she reports that she is trying her best to exercise as much as possible at home to maintain strength in both upper and lower extremities.  She continues to use cane while walking.  She takes gabapentin 300 mg at bedtime which helps her with pain and also help her sleep better.  She continues to have some good days and bad days but overall there has been no worsening.    3/8/2022: She is in clinic for regular follow-up.  Since her last visit in September 2021, she reports that overall both upper and  lower extremity weakness remained stable without any worsening.  She continues to do exercises.  She has noticed that days that she is well hydrated, she tends to do better.  Lately in last 4 to 6 weeks, she is reporting right shoulder pain radiating down to right forearm and right hand.  She does have moderate spondylitic changes at C5-C6 and C6-C7 levels that were noted on MRI that was performed in August 2020.  She is currently taking gabapentin 300 mg at bedtime.    9/7/2022: She is in clinic for regular follow-up.  Since her last visit in March 2022, she reports that overall she has done really well.  She had an episode of passing out lasting for few seconds couple of months ago.  Which she thinks could be related to dehydration leading to hypotension.  She is trying to keep herself well-hydrated.  She continues to use [place] to help with walking and to prevent from falling.  She currently takes gabapentin 100 mg in the morning and 300 mg at night which does help with symptoms of cervical radiculopathy.  She was recently started on Cymbalta 20 mg twice daily which seems to be helping with symptoms of fibromyalgia as well.    4/18/2023: She is in clinic for regular follow-up.  Since her last visit in September 2022, she was diagnosed to have endometrial uterine cancer and has completed chemotherapy and radiation therapy.  She is currently in remission now however reports worsening in neuropathy symptoms.  She reports that she is currently taking gabapentin 100 mg in the morning and 300 mg at night which does help some with neuropathy symptoms.  She denies any side effects with gabapentin use.  She continues to take Cymbalta 20 mg twice a day which helps with symptoms of neuropathy and fibromyalgia.    8/10/2023: She is in clinic for regular follow-up.  Since her last visit in April 2023, she has now completed chemotherapy and radiation treatment and she tells me that she is in remission and doing well.   Unfortunately she fell and broke her left hip in May 2023 requiring surgery but she has recovered well from surgery as well.  She is currently taking gabapentin 300 mg twice daily but does report that nighttime symptoms are still worse.  She is not able to sleep well at night.    2/20/2024: She is in clinic for regular follow-up.  Since her last visit in August 2023, she reports that she is now in complete remission of endometrial cancer.  She is overall doing well and reports reduction in neuropathy symptoms since her last visit.  She however reports episodes of tingling numbness involving the right lateral thigh.  She is taking high-dose of gabapentin-600 mg at bedtime and that seems to have helped nighttime symptoms.  She also reporting episodes of vertigo especially when she rolls out of the bed.  It is not very severe and tolerable for the most part.  She continues to use cane and remains cautious while walking to prevent from falling.    8/12/2024: She is in clinic for regular follow-up.  Since her last visit 6 months ago, she reports that overall symptoms of neuropathy and fibromyalgia remain stable without any worsening.  She continues to take gabapentin 300 mg in the morning, 600 mg at night and Cymbalta 20 mg twice daily.  She denies any side effects with this combination.  Endometrial cancer remains under remission.    2/13/2025: She is in clinic for regular follow-up.  Since her last visit 6 months ago, she reports that the symptoms of cervical radiculopathy and fibromyalgia remains stable with use of gabapentin and Cymbalta.  She takes gabapentin 300 mg in the morning and 600 mg at night.  She takes Cymbalta 20 mg daily.  She denies any side effects with this combination.    The following portions of the patient's history were reviewed and updated as of 02/13/2025: allergies, social history, and problem list.       Current Outpatient Medications:     Alpha-Lipoic Acid 600 MG capsule, Take  by mouth  Daily., Disp: , Rfl:     aspirin 81 MG EC tablet, Take 1 tablet by mouth Daily., Disp: 90 tablet, Rfl: 3    Cholecalciferol 25 MCG (1000 UT) tablet, Take 1 tablet by mouth Daily., Disp: , Rfl:     DULoxetine (CYMBALTA) 20 MG capsule, Take 1 capsule by mouth 2 (Two) Times a Day., Disp: 180 capsule, Rfl: 1    esomeprazole (nexIUM) 20 MG capsule, Take 1 capsule by mouth Every Other Day. OTC, Disp: , Rfl:     folic acid (FOLVITE) 1 MG tablet, Take 1 tablet by mouth Daily., Disp: 30 tablet, Rfl: 0    gabapentin (Neurontin) 300 MG capsule, Take 1 capsule in the morning and 2 capsules at night., Disp: 90 capsule, Rfl: 5    hydrALAZINE (APRESOLINE) 25 MG tablet, Take 1 tablet by mouth 3 (Three) Times a Day., Disp: , Rfl:     levothyroxine (SYNTHROID, LEVOTHROID) 25 MCG tablet, Take 1 tablet by mouth Daily., Disp: 90 tablet, Rfl: 0    magnesium oxide (MAGOX) 400 (241.3 Mg) MG tablet tablet, Take 1 tablet by mouth Daily. OTC, Disp: , Rfl:     metoprolol succinate XL (Toprol XL) 25 MG 24 hr tablet, Take 1 tablet by mouth Daily., Disp: 90 tablet, Rfl: 1    ondansetron (Zofran) 4 MG tablet, Take 1 tablet by mouth Every 8 (Eight) Hours As Needed for Nausea or Vomiting for up to 15 doses., Disp: 15 tablet, Rfl: 0    ondansetron ODT (ZOFRAN-ODT) 4 MG disintegrating tablet, Place 1 tablet on the tongue Every 8 (Eight) Hours As Needed for Nausea or Vomiting., Disp: 30 tablet, Rfl: 1    oxyCODONE (ROXICODONE) 5 MG immediate release tablet, Take 1 tablet by mouth Every 4 (Four) Hours As Needed for Moderate Pain or Severe Pain., Disp: , Rfl:     rosuvastatin (CRESTOR) 5 MG tablet, TAKE 1 TABLET BY MOUTH DAILY, Disp: 90 tablet, Rfl: 1    thiamine (VITAMIN B1) 100 MG tablet, Take 1 tablet by mouth Daily., Disp: 30 tablet, Rfl: 0    traMADol (ULTRAM) 50 MG tablet, Take 1 tablet by mouth Every 6 (Six) Hours As Needed for Moderate Pain., Disp: , Rfl:     vitamin B-12 (CYANOCOBALAMIN) 1000 MCG tablet, Take 1 tablet by mouth Daily., Disp: ,  Rfl:     acetaminophen (TYLENOL) 500 MG tablet, Take 1 tablet by mouth Every 6 (Six) Hours As Needed for Mild Pain. (Patient not taking: Reported on 2025), Disp: 30 tablet, Rfl: 0   Past Medical History:   Diagnosis Date    Allergic lisinopril  2017    Anemia     Ankle sprain     Arrhythmia     Arthritis     Cancer     uterine    Cataract mild 2020    stil lpresent    Cervical disc disorder     Chicken pox     Chronic fatigue     CKD (chronic kidney disease), stage III     sees nephro    Clotting disorder     Coronary artery disease of native artery of native heart with stable angina pectoris 2024    Dental root implant present     lower left  x1 - possible dental implant    Depression mild 2019    Difficulty walking 2019    Disease of thyroid gland     Dislocation of finger     Dizzy     NIEVES (dyspnea on exertion)     2017    Fracture of ankle     Fracture of hip     Fracture, foot     Generalized anxiety disorder     GERD (gastroesophageal reflux disease) 2018    History of brachytherapy 2023    vaginal brachytherapy    Hyperlipidemia     Hypertension     Iron deficiency anemia     Liver disease     fatty    Liver problem     Low back strain     Measles     Menopause     Mumps     Neck strain     Neuroma of foot     Neuromuscular disorder Peripheral Neuropathy    Orthostatic hypotension     Periarthritis of shoulder     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    Renal insufficiency 2018    Scoliosis     Stress fracture     Tennis elbow     Unintentional weight loss     Uses contact lenses     bilat    Uterine cancer     Uterine cancer 2022    UTI (urinary tract infection)     Visual impairment Nearsighted    Wears glasses       Past Surgical History:   Procedure Laterality Date    ANKLE OPEN REDUCTION INTERNAL FIXATION       SECTION  1981    DILATION AND CURETTAGE, DIAGNOSTIC / THERAPEUTIC      HIP OPEN REDUCTION Left 2023    Procedure:  "FEMORAL NECK OPEN REDUCTION INTERNAL FIXATION LEFT;  Surgeon: Juanpablo Lee MD;  Location:  [facility];  Service: Orthopedics;  Laterality: Left;    HIP SURGERY  2023    JOINT REPLACEMENT  hip 2023    OOPHORECTOMY      ORAL LESION EXCISION/BIOPSY  08/2021    ORIF FOOT FRACTURE Left 10/18/2024    Procedure: OPEN REDUCTION INTERNAL FIXATION MIDFOOT AND ANKLE LEFT;  Surgeon: Sergio Godoy MD;  Location:  [facility];  Service: Orthopedics;  Laterality: Left;    TOTAL LAPAROSCOPIC HYSTERECTOMY SALPINGO OOPHORECTOMY N/A 10/28/2022    Procedure: TOTAL LAPAROSCOPIC HYSTERECTOMY BILATERAL SALPINGO-OOPHORECTOMY, INJECTION FOR SENTINEL LYMPH NODE MAPPING, BILATERAL SENTINEL LYMPH NODE DISSECTION WITH DAVINCI ROBOT;  Surgeon: Jasmine Rich MD;  Location:  [facility];  Service: Robotics - DaVinci;  Laterality: N/A;    TUBAL ABDOMINAL LIGATION  1983      Family History   Problem Relation Age of Onset    Spondylolisthesis Mother     COPD Mother     Stroke Mother     Hypertension Mother     Lung cancer Father     Hypertension Father     Heart attack Father     Coronary artery disease Father     Heart disease Father     Cancer Father     Lung disease Father     Hyperlipidemia Sister     Rheum arthritis Sister     Malig Hypertension Sister     Osteoarthritis Sister     Other Sister         alcoholic    Hypertension Sister     Scoliosis Sister     Hypertension Brother     Depression Sister     Early death Sister     Breast cancer Neg Hx     Ovarian cancer Neg Hx     Uterine cancer Neg Hx     Colon cancer Neg Hx     Melanoma Neg Hx     Prostate cancer Neg Hx         Review of Systems  Objective:    /72   Pulse 102   Ht 162.6 cm (64.02\")   LMP  (LMP Unknown)   SpO2 97%   BMI 23.71 kg/m²     Neurology Exam:  General apperance: NAD.     Mental status: Alert, awake and oriented to time place and person.    Language and Speech: No aphasia or dysarthria.    CN II to XII: Intact.    Opthalmoscopic Exam: No " papilledema.    Motor:  Right UE muscle strength 5/5. Normal tone.     Left UE muscle strength 5/5. Normal tone.      Right LE muscle strength 5/5. Normal tone.     Left LE muscle strength 5/5. Normal tone.      Sensory: Normal light touch, vibration and pinprick sensation bilaterally.    DTRs: 1+ bilaterally.    Babinski: Negative bilaterally.    Co-ordination: Normal finger-to-nose, heel to shin B/L.    Rhomberg: Negative.    Gait: Normal.    Cardiovascular: Regular rate and rhythm without murmur, gallop or rub.    Assessment and Plan:  1. Cervical radiculopathy at C6  2. Neuropathy  Both cervical radiculopathy symptoms and neuropathy symptoms under good control with use of gabapentin 300 mg in the morning and 600 mg at night as well as Cymbalta 20 mg twice daily.  Continue both Cymbalta and gabapentin as it is and I will see her back in clinic in 6 months for follow-up.    I spent 30 minutes in patient care: Reviewing records prior to the visit, entering orders and documentation and spent more than diggs 50% of this time face-to-face in management, instructions and education regarding above mentioned diagnosis and also on counseling and discussing about taking medication regularly, possible side effects with medication use, importance of good sleep hygiene, good hydration and regular exercise.    Return in about 6 months (around 8/13/2025).       Note to patient: The 21st Century Cures Act makes medical notes like these available to patients in the interest of transparency. However, be advised this is a medical document. It is intended as peer to peer communication. It is written in medical language and may contain abbreviations or verbiage that are unfamiliar. It may appear blunt or direct. Medical documents are intended to carry relevant information, facts as evident, and the clinical opinion of the physician.

## 2025-02-26 ENCOUNTER — OFFICE VISIT (OUTPATIENT)
Dept: ORTHOPEDIC SURGERY | Facility: CLINIC | Age: 70
End: 2025-02-26
Payer: MEDICARE

## 2025-02-26 VITALS — WEIGHT: 138 LBS | HEIGHT: 64 IN | BODY MASS INDEX: 23.56 KG/M2

## 2025-02-26 DIAGNOSIS — Z09 SURGERY FOLLOW-UP: Primary | ICD-10-CM

## 2025-02-26 NOTE — PROGRESS NOTES
"                          [facility] [place] [facility] [place]     Office Follow Up Visit     Date: 02/26/2025   Patient Name: Alysia Sierra  MRN: [id number]  YOB: 1955  Chief Complaint:   Chief Complaint   Patient presents with    Follow-up     6 week follow up -4 months S/P Open Reduction Internal Fixation Midfoot And Ankle Left (DOS: 10/18/24     History of Present Illness:   Alysia Sierra is a 70 y.o. female who is here today for 4 and half month follow-up status post left ankle foot ORIF on October 18, 2024.  Ambulates with cane at baseline.  Comes in ambulating in a postop shoe primarily due to swelling making it difficult for her to fit into her normal shoes.  Denies any pain.  Reports that strength and range of motion continues to improve.  Is very happy with her progress.  Continues to do physical therapy.  No new complaints.    Subjective   I reviewed the patient's chief complaint, history of present illness, review of systems, past medical history, surgical history, family history, social history, medications and allergy list   Objective    Vital Signs:   Vitals:    02/26/25 1521   Weight: 62.6 kg (138 lb)   Height: 162.6 cm (64.02\")     Body mass index is 23.67 kg/m².    Ortho Exam:  left LE Foot and Ankle Exam:   Boot removed for exam.  Leg compartments soft and compressible.  Foot and ankle incisions well-healed.  Able to actively plantarflex and dorsiflex ankle and all toes.  Appropriate swelling for this stage of healing about the foot.  Toes warm and well-perfused.  Brisk cap refill in all toes.  Ambulating in clinic with cane normal shoe with nonantalgic gait.    Results Review:  XR Ankle 3+ View Left  Left Ankle X-Ray 02/26/25   Indication: Pain  Views: 3 weight bearing , comparison to previous  Findings: xrays reviewed by me today in the office and show, hardware in   place with proper alignment about the foot and ankle with no signs of   hardware failure, continued signs " of healing at fracture sites at both the   ankle and foot, ankle mortise continues to be congruent and well   maintained with no signs of syndesmotic widening on weightbearing views.  XR Foot 3+ View Left  Left Foot X-Ray 02/26/25   Indication: Pain  Views: 3 weight bearing , comparison to previous  Findings: xrays reviewed by me today in the office and show, hardware in   place with proper alignment about the foot and ankle with no signs of   hardware failure, continued signs of healing at fracture sites at both the   ankle and foot, ankle mortise continues to be congruent and well   maintained with no signs of syndesmotic widening on weightbearing views.       Assessment / Plan    Assessment/Plan:   Diagnoses and all orders for this visit:    1. Surgery follow-up (Primary)  -     XR Foot 3+ View Left  -     XR Ankle 3+ View Left  -     Ambulatory Referral to Physical Therapy for Evaluation & Treatment      Patient is about 4 and half months out from her surgery continues to make a richard recovery.  Patient can continue weightbearing in postop shoe transitioning into normal shoe as her swelling continues to improve.  Continue physical therapy, provided with new prescription today.  We did discuss hardware removal in depth today.  We decided that for now plan is to leave hardware as long as it is not causing any issues.  Will plan to see patient back in 2 months for repeat x-rays and reevaluation.  Will revisit discussion about possible hardware removal at that visit.  Did  patient that hardware could break in the interim however I do not think this will cause any major complication.  Patient in understanding and agreement with plan.  Happy with her progress.  Is a pleasure seeing her today.    Follow Up:   Return in about 2 months (around 4/26/2025) for F/u Recheck with images.      Sergio Godoy MD  [facility] [place] [facility]

## 2025-03-12 ENCOUNTER — OFFICE VISIT (OUTPATIENT)
Dept: BEHAVIORAL HEALTH | Facility: CLINIC | Age: 70
End: 2025-03-12
Payer: MEDICARE

## 2025-03-12 DIAGNOSIS — F32.1 CURRENT MODERATE EPISODE OF MAJOR DEPRESSIVE DISORDER, UNSPECIFIED WHETHER RECURRENT: Primary | ICD-10-CM

## 2025-03-12 NOTE — PROGRESS NOTES
[facility] [place]                 Follow Up Adult      Follow Up Adult Note     Date:2025   Patient Name: Alysia Sierra  : 1955   MRN: [id number]   Time IN: 11:00 am    Time OUT: 11:48 am     Referring Provider: Cassy Foster MD    Chief Complaint:      ICD-10-CM ICD-9-CM   1. Current moderate episode of major depressive disorder, unspecified whether recurrent  F32.1 296.22        History of Present Illness:   Alysia Sierra is a 70 y.o. female who is being seen today for follow up counseling for depression.    Subjective               Patient's Support Network Includes: extended family    Functional Status: Mild impairment       Current Outpatient Medications:     acetaminophen (TYLENOL) 500 MG tablet, Take 1 tablet by mouth Every 6 (Six) Hours As Needed for Mild Pain., Disp: 30 tablet, Rfl: 0    Alpha-Lipoic Acid 600 MG capsule, Take  by mouth Daily., Disp: , Rfl:     aspirin 81 MG EC tablet, Take 1 tablet by mouth Daily., Disp: 90 tablet, Rfl: 3    Cholecalciferol 25 MCG (1000 UT) tablet, Take 1 tablet by mouth Daily., Disp: , Rfl:     DULoxetine (CYMBALTA) 20 MG capsule, Take 1 capsule by mouth 2 (Two) Times a Day., Disp: 180 capsule, Rfl: 1    esomeprazole (nexIUM) 20 MG capsule, Take 1 capsule by mouth Every Other Day. OTC, Disp: , Rfl:     folic acid (FOLVITE) 1 MG tablet, Take 1 tablet by mouth Daily., Disp: 30 tablet, Rfl: 0    gabapentin (Neurontin) 300 MG capsule, Take 1 capsule in the morning and 2 capsules at night., Disp: 90 capsule, Rfl: 5    hydrALAZINE (APRESOLINE) 25 MG tablet, Take 1 tablet by mouth 3 (Three) Times a Day., Disp: , Rfl:     levothyroxine (SYNTHROID, LEVOTHROID) 25 MCG tablet, Take 1 tablet by mouth Daily., Disp: 90 tablet, Rfl: 0    magnesium oxide (MAGOX) 400 (241.3 Mg) MG tablet tablet, Take 1 tablet by mouth Daily. OTC, Disp: , Rfl:     metoprolol succinate XL (Toprol XL) 25 MG 24 hr tablet, Take 1 tablet by mouth  Daily., Disp: 90 tablet, Rfl: 1    ondansetron (Zofran) 4 MG tablet, Take 1 tablet by mouth Every 8 (Eight) Hours As Needed for Nausea or Vomiting for up to 15 doses., Disp: 15 tablet, Rfl: 0    ondansetron ODT (ZOFRAN-ODT) 4 MG disintegrating tablet, Place 1 tablet on the tongue Every 8 (Eight) Hours As Needed for Nausea or Vomiting., Disp: 30 tablet, Rfl: 1    oxyCODONE (ROXICODONE) 5 MG immediate release tablet, Take 1 tablet by mouth Every 4 (Four) Hours As Needed for Moderate Pain or Severe Pain., Disp: , Rfl:     rosuvastatin (CRESTOR) 5 MG tablet, TAKE 1 TABLET BY MOUTH DAILY, Disp: 90 tablet, Rfl: 1    thiamine (VITAMIN B1) 100 MG tablet, Take 1 tablet by mouth Daily., Disp: 30 tablet, Rfl: 0    traMADol (ULTRAM) 50 MG tablet, Take 1 tablet by mouth Every 6 (Six) Hours As Needed for Moderate Pain., Disp: , Rfl:     vitamin B-12 (CYANOCOBALAMIN) 1000 MCG tablet, Take 1 tablet by mouth Daily., Disp: , Rfl:     Allergies   Allergen Reactions    Lisinopril Angioedema    Metal Rash     Possible nickel -   Gold is only metal that doesn't have issues      Tylenol [Acetaminophen] Other (See Comments)     ckd    Atorvastatin Myalgia       Objective     Physical Exam:  Vital Signs: There were no vitals filed for this visit.  There is no height or weight on file to calculate BMI.     Mental Status Exam:   Hygiene:   good  Cooperation:  Cooperative  Eye Contact:  Good  Psychomotor Behavior:  Appropriate  Affect:  Full range  Mood: normal  Speech:  Normal  Thought Process:  Goal directed  Thought Content:  Normal  Suicidal:  None  Homicidal:  None  Hallucinations:  None  Delusion:  None  Memory:  Intact  Orientation:  Person, Place, Time, and Situation  Reliability:  good  Insight:  Good  Judgement:  Good  Impulse Control:  Good  Physical/Medical Issues:   See problem list      Assessment / Plan      Diagnosis:  Diagnoses and all orders for this visit:    1. Current moderate episode of major depressive disorder,  unspecified whether recurrent (Primary)    Patient presented for follow-up with clinician.  Patient reported struggling with symptoms of depression.  Patient reported periods of hypersomnia, insomnia, anhedonia, lack of motivation, lack of concentration and a general feeling of depression.  Patient and clinician collaborated to formulate a daily schedule which promotes activity, nutrition, hydration, social interaction and exercise.  Patient was responsive to intervention.  Clinician utilized active listening and reflective responsive empathy and support.    Progress toward goal: Not at goal    Prognosis: Good with ongoing treatment    PLAN:  Patient and clinician will continue to utilize a cognitive behavioral intervention which identifies and addresses patient cognitive distortions related to depression.  Follow-ups will occur every 21 days and will last for 45 to 60 minutes in duration.    Safety: No acute safety concerns  Risk Assessment: Risk of self-harm acutely is low. Risk of self-harm chronically is also low, but could be further elevated in the event of treatment noncompliance and/or AODA.    Treatment Plan/Goals: Continue supportive psychotherapy efforts and medications as indicated. Treatment and medication options discussed during today's visit. Patient ackowledged and verbally consented to continue with current treatment plan and was educated on the importance of compliance with treatment and follow-up appointments. Patient seems reasonably able to adhere to treatment plan.      Assisted Patient in processing above session content; acknowledged and normalized patient’s thoughts, feelings, and concerns.  Rationalized patient thought process regarding depression.      Allowed Patient to freely discuss issues  without interruption or judgement with unconditional positive regard, active listening skills, and empathy. Therapist provided a safe, confidential environment to facilitate the development of a  positive therapeutic relationship and encouraged open, honest communication. Assisted Patient in identifying risk factors which would indicate the need for higher level of care including thoughts to harm self or others and/or self-harming behavior and encouraged Patient to contact this office, call 911, or present to the nearest emergency room should any of these events occur. Discussed crisis intervention services and means to access. Patient adamantly and convincingly denies current suicidal or homicidal ideation or perceptual disturbance. Assisted Patient in processing session content; acknowledged and normalized Patient’s thoughts, feelings, and concerns by utilizing a person-centered approach in efforts to build appropriate rapport and a positive therapeutic relationship with open and honest communication. .     Part of this note may be an electronic transcription/translation of spoken language to printed text using the Dragon Dictation System.       Follow Up:   Return in about 2 weeks (around 3/26/2025) for Next scheduled follow up.    Austin Tatum LCSW

## 2025-03-14 ENCOUNTER — OFFICE VISIT (OUTPATIENT)
Dept: INTERNAL MEDICINE | Facility: CLINIC | Age: 70
End: 2025-03-14
Payer: MEDICARE

## 2025-03-14 ENCOUNTER — LAB (OUTPATIENT)
Dept: LAB | Facility: HOSPITAL | Age: 70
End: 2025-03-14
Payer: MEDICARE

## 2025-03-14 VITALS
BODY MASS INDEX: 23.87 KG/M2 | OXYGEN SATURATION: 98 % | SYSTOLIC BLOOD PRESSURE: 118 MMHG | HEART RATE: 80 BPM | HEIGHT: 64 IN | DIASTOLIC BLOOD PRESSURE: 72 MMHG | TEMPERATURE: 98 F | WEIGHT: 139.8 LBS

## 2025-03-14 DIAGNOSIS — M54.50 CHRONIC LEFT-SIDED LOW BACK PAIN WITHOUT SCIATICA: ICD-10-CM

## 2025-03-14 DIAGNOSIS — S30.1XXA CONTUSION OF ABDOMINAL WALL, INITIAL ENCOUNTER: ICD-10-CM

## 2025-03-14 DIAGNOSIS — G89.29 CHRONIC LEFT-SIDED LOW BACK PAIN WITHOUT SCIATICA: ICD-10-CM

## 2025-03-14 DIAGNOSIS — S30.1XXA CONTUSION OF ABDOMINAL WALL, INITIAL ENCOUNTER: Primary | ICD-10-CM

## 2025-03-14 DIAGNOSIS — R23.3 BRUISING, SPONTANEOUS: ICD-10-CM

## 2025-03-14 LAB
ALBUMIN SERPL-MCNC: 4.8 G/DL (ref 3.5–5.2)
ALBUMIN/GLOB SERPL: 1.6 G/DL
ALP SERPL-CCNC: 106 U/L (ref 39–117)
ALT SERPL W P-5'-P-CCNC: 18 U/L (ref 1–33)
ANION GAP SERPL CALCULATED.3IONS-SCNC: 14 MMOL/L (ref 5–15)
AST SERPL-CCNC: 36 U/L (ref 1–32)
BASOPHILS # BLD AUTO: 0.02 10*3/MM3 (ref 0–0.2)
BASOPHILS NFR BLD AUTO: 0.4 % (ref 0–1.5)
BILIRUB SERPL-MCNC: 0.2 MG/DL (ref 0–1.2)
BUN SERPL-MCNC: 18 MG/DL (ref 8–23)
BUN/CREAT SERPL: 14 (ref 7–25)
CALCIUM SPEC-SCNC: 10.1 MG/DL (ref 8.6–10.5)
CHLORIDE SERPL-SCNC: 99 MMOL/L (ref 98–107)
CO2 SERPL-SCNC: 24 MMOL/L (ref 22–29)
CREAT SERPL-MCNC: 1.29 MG/DL (ref 0.57–1)
DEPRECATED RDW RBC AUTO: 51.9 FL (ref 37–54)
EGFRCR SERPLBLD CKD-EPI 2021: 45 ML/MIN/1.73
EOSINOPHIL # BLD AUTO: 0.18 10*3/MM3 (ref 0–0.4)
EOSINOPHIL NFR BLD AUTO: 3.7 % (ref 0.3–6.2)
ERYTHROCYTE [DISTWIDTH] IN BLOOD BY AUTOMATED COUNT: 14 % (ref 12.3–15.4)
GLOBULIN UR ELPH-MCNC: 3 GM/DL
GLUCOSE SERPL-MCNC: 87 MG/DL (ref 65–99)
HCT VFR BLD AUTO: 37.1 % (ref 34–46.6)
HGB BLD-MCNC: 12 G/DL (ref 12–15.9)
IMM GRANULOCYTES # BLD AUTO: 0.02 10*3/MM3 (ref 0–0.05)
IMM GRANULOCYTES NFR BLD AUTO: 0.4 % (ref 0–0.5)
INR PPP: 0.89 (ref 0.89–1.12)
LYMPHOCYTES # BLD AUTO: 1.26 10*3/MM3 (ref 0.7–3.1)
LYMPHOCYTES NFR BLD AUTO: 25.8 % (ref 19.6–45.3)
MCH RBC QN AUTO: 32.6 PG (ref 26.6–33)
MCHC RBC AUTO-ENTMCNC: 32.3 G/DL (ref 31.5–35.7)
MCV RBC AUTO: 100.8 FL (ref 79–97)
MONOCYTES # BLD AUTO: 0.71 10*3/MM3 (ref 0.1–0.9)
MONOCYTES NFR BLD AUTO: 14.5 % (ref 5–12)
NEUTROPHILS NFR BLD AUTO: 2.69 10*3/MM3 (ref 1.7–7)
NEUTROPHILS NFR BLD AUTO: 55.2 % (ref 42.7–76)
NRBC BLD AUTO-RTO: 0 /100 WBC (ref 0–0.2)
PLATELET # BLD AUTO: 199 10*3/MM3 (ref 140–450)
PMV BLD AUTO: 11.1 FL (ref 6–12)
POTASSIUM SERPL-SCNC: 4.9 MMOL/L (ref 3.5–5.2)
PROT SERPL-MCNC: 7.8 G/DL (ref 6–8.5)
PROTHROMBIN TIME: 12.2 SECONDS (ref 12.2–14.5)
RBC # BLD AUTO: 3.68 10*6/MM3 (ref 3.77–5.28)
SODIUM SERPL-SCNC: 137 MMOL/L (ref 136–145)
WBC NRBC COR # BLD AUTO: 4.88 10*3/MM3 (ref 3.4–10.8)

## 2025-03-14 PROCEDURE — 36415 COLL VENOUS BLD VENIPUNCTURE: CPT

## 2025-03-14 PROCEDURE — 80053 COMPREHEN METABOLIC PANEL: CPT

## 2025-03-14 PROCEDURE — 85610 PROTHROMBIN TIME: CPT

## 2025-03-14 PROCEDURE — 85025 COMPLETE CBC W/AUTO DIFF WBC: CPT

## 2025-03-14 NOTE — PROGRESS NOTES
Office Note     Name: Alysia Sierra    : 1955     MRN: [id number]     Chief Complaint  Bleeding/Bruising (Patient stated she woke up the other day and was taking a shower and noticed a big bruise on her stomach. Patient stated it doesn't hurt and nothing happened that she can recall but she wanted to get checked out. Patient is having a lot of back pain. )    Subjective     History of Present Illness:  Alysia Sierra is a 70 y.o. female who presents today for bruising on abdomen- lower abdomen   Denies pain, injury, falls  Has been working with PT - balance and foot     Has been having back pain  Using cane for stability   Denies changes in meds     Is on low dose aspirin   No other blood thinning medications     Has been using lidocaine patches     Denies fevers, chills, chest pain, sob, blood in stool or urine, nausea or vomiting   Lower back pain- on left side   Denies urinary symptoms   Drinking water   Will use brace, patch, pain medication   Unable to do anything when back pain hits   Comes and goes   Some days are better than others   Worse with more exertion       Review of Systems:   Review of Systems   Constitutional:  Negative for chills and fever.   Respiratory:  Negative for cough and shortness of breath.    Cardiovascular:  Negative for chest pain, palpitations and leg swelling.   Gastrointestinal:  Negative for abdominal pain, blood in stool, constipation, nausea and vomiting.   Musculoskeletal:  Positive for arthralgias, back pain and myalgias.   Skin:  Positive for color change and bruise.   Neurological:  Negative for light-headedness and headache.       Past Medical History:   Past Medical History:   Diagnosis Date    Allergic lisinopril  2017    Anemia     Ankle sprain     Arrhythmia     Arthritis     Cancer     uterine    Cataract mild 2020    stil lpresent    Cervical disc disorder     Chicken pox     Chronic fatigue     CKD (chronic kidney disease), stage III     sees nephro     Clotting disorder     Coronary artery disease of native artery of native heart with stable angina pectoris 2024    Dental root implant present     lower left  x1 - possible dental implant    Depression mild 2019    Difficulty walking 2019    Disease of thyroid gland     Dislocation of finger     Dizzy     NIEVES (dyspnea on exertion)     2017    Fracture of ankle     Fracture of hip     Fracture, foot     Generalized anxiety disorder     GERD (gastroesophageal reflux disease) 2018    History of brachytherapy 2023    vaginal brachytherapy    Hyperlipidemia     Hypertension     Iron deficiency anemia     Liver disease     fatty    Liver problem     Low back strain     Measles     Menopause     Mumps     Neck strain     Neuroma of foot     Neuromuscular disorder Peripheral Neuropathy    Orthostatic hypotension     Periarthritis of shoulder     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    Renal insufficiency 2018    Scoliosis     Stress fracture     Tennis elbow     Unintentional weight loss     Uses contact lenses     bilat    Uterine cancer     Uterine cancer 2022    UTI (urinary tract infection)     Visual impairment Nearsighted    Wears glasses        Past Surgical History:   Past Surgical History:   Procedure Laterality Date    ABDOMINAL SURGERY      Hysterectomy    ANKLE OPEN REDUCTION INTERNAL FIXATION       SECTION  1981    DILATION AND CURETTAGE, DIAGNOSTIC / THERAPEUTIC      FRACTURE SURGERY  hip     back     HIP OPEN REDUCTION Left 2023    Procedure: FEMORAL NECK OPEN REDUCTION INTERNAL FIXATION LEFT;  Surgeon: Juanpablo Lee MD;  [facility];  Service: Orthopedics;  Laterality: Left;    HIP SURGERY      JOINT REPLACEMENT  hip     OOPHORECTOMY      ORAL LESION EXCISION/BIOPSY  2021    ORIF FOOT FRACTURE Left 10/18/2024    Procedure: OPEN REDUCTION INTERNAL FIXATION MIDFOOT AND ANKLE LEFT;  Surgeon: Sergio Godoy,  MD;  Location:  [facility];  Service: Orthopedics;  Laterality: Left;    TOTAL LAPAROSCOPIC HYSTERECTOMY SALPINGO OOPHORECTOMY N/A 10/28/2022    Procedure: TOTAL LAPAROSCOPIC HYSTERECTOMY BILATERAL SALPINGO-OOPHORECTOMY, INJECTION FOR SENTINEL LYMPH NODE MAPPING, BILATERAL SENTINEL LYMPH NODE DISSECTION WITH DAVINCI ROBOT;  Surgeon: Jasmine Rich MD;  Location:  [facility];  Service: Robotics - DaVinci;  Laterality: N/A;    TUBAL ABDOMINAL LIGATION  1983       Immunizations:   Immunization History   Administered Date(s) Administered    ABRYSVO (RSV, 60+ or pregnant women 32-36 wks) 12/15/2023    COVID-19 (PFIZER) 12YRS+ (COMIRNATY) 12/02/2023    COVID-19 (PFIZER) BIVALENT 12+YRS 10/18/2022    COVID-19 (PFIZER) Purple Cap Monovalent 03/16/2021, 04/14/2021, 11/13/2021    Fl[place]ock Quad =>18yrs 10/10/2019, 10/09/2020    Fluzone High-Dose 65+YRS 10/04/2023    Fluzone High-Dose 65+yrs 10/09/2021, 11/08/2022    Hepatitis B Adult/Adolescent IM 07/24/2014    Influenza, Unspecified 10/13/2020    Pneumococcal Polysaccharide (PPSV23) 12/29/2021    Shingrix 09/17/2019, 01/06/2020, 01/17/2020, 01/25/2020    Tdap 07/24/2014        Medications:     Current Outpatient Medications:     acetaminophen (TYLENOL) 500 MG tablet, Take 1 tablet by mouth Every 6 (Six) Hours As Needed for Mild Pain., Disp: 30 tablet, Rfl: 0    Alpha-Lipoic Acid 600 MG capsule, Take  by mouth Daily., Disp: , Rfl:     aspirin 81 MG EC tablet, Take 1 tablet by mouth Daily., Disp: 90 tablet, Rfl: 3    Cholecalciferol 25 MCG (1000 UT) tablet, Take 1 tablet by mouth Daily., Disp: , Rfl:     DULoxetine (CYMBALTA) 20 MG capsule, Take 1 capsule by mouth 2 (Two) Times a Day., Disp: 180 capsule, Rfl: 1    esomeprazole (nexIUM) 20 MG capsule, Take 1 capsule by mouth Every Other Day. OTC, Disp: , Rfl:     folic acid (FOLVITE) 1 MG tablet, Take 1 tablet by mouth Daily., Disp: 30 tablet, Rfl: 0    gabapentin (Neurontin) 300 MG capsule, Take 1 capsule in the morning and 2  capsules at night., Disp: 90 capsule, Rfl: 5    hydrALAZINE (APRESOLINE) 25 MG tablet, Take 1 tablet by mouth 3 (Three) Times a Day., Disp: , Rfl:     levothyroxine (SYNTHROID, LEVOTHROID) 25 MCG tablet, Take 1 tablet by mouth Daily., Disp: 90 tablet, Rfl: 0    magnesium oxide (MAGOX) 400 (241.3 Mg) MG tablet tablet, Take 1 tablet by mouth Daily. OTC, Disp: , Rfl:     metoprolol succinate XL (Toprol XL) 25 MG 24 hr tablet, Take 1 tablet by mouth Daily., Disp: 90 tablet, Rfl: 1    ondansetron (Zofran) 4 MG tablet, Take 1 tablet by mouth Every 8 (Eight) Hours As Needed for Nausea or Vomiting for up to 15 doses., Disp: 15 tablet, Rfl: 0    ondansetron ODT (ZOFRAN-ODT) 4 MG disintegrating tablet, Place 1 tablet on the tongue Every 8 (Eight) Hours As Needed for Nausea or Vomiting., Disp: 30 tablet, Rfl: 1    oxyCODONE (ROXICODONE) 5 MG immediate release tablet, Take 1 tablet by mouth Every 4 (Four) Hours As Needed for Moderate Pain or Severe Pain., Disp: , Rfl:     rosuvastatin (CRESTOR) 5 MG tablet, TAKE 1 TABLET BY MOUTH DAILY, Disp: 90 tablet, Rfl: 1    thiamine (VITAMIN B1) 100 MG tablet, Take 1 tablet by mouth Daily., Disp: 30 tablet, Rfl: 0    traMADol (ULTRAM) 50 MG tablet, Take 1 tablet by mouth Every 6 (Six) Hours As Needed for Moderate Pain., Disp: , Rfl:     vitamin B-12 (CYANOCOBALAMIN) 1000 MCG tablet, Take 1 tablet by mouth Daily., Disp: , Rfl:     Allergies:   Allergies   Allergen Reactions    Lisinopril Angioedema    Metal Rash     Possible nickel -   Gold is only metal that doesn't have issues      Tylenol [Acetaminophen] Other (See Comments)     ckd    Atorvastatin Myalgia       Family History:   Family History   Problem Relation Age of Onset    Spondylolisthesis Mother     COPD Mother     Stroke Mother     Hypertension Mother     Lung cancer Father     Hypertension Father     Heart attack Father     Coronary artery disease Father     Heart disease Father     Cancer Father     Lung disease Father      "Hyperlipidemia Sister     Rheum arthritis Sister     Malig Hypertension Sister     Osteoarthritis Sister     Hypertension Sister     Scoliosis Sister     Alcohol abuse Sister          2020    Depression Sister     Migraines Sister     Hypertension Brother     Depression Sister     Early death Sister     Breast cancer Neg Hx     Ovarian cancer Neg Hx     Uterine cancer Neg Hx     Colon cancer Neg Hx     Melanoma Neg Hx     Prostate cancer Neg Hx        Social History:   Social History     Socioeconomic History    Marital status:     Number of children: 1    Highest education level: Associate degree: academic program   Tobacco Use    Smoking status: Never     Passive exposure: Never    Smokeless tobacco: Never    Tobacco comments:     Never   Vaping Use    Vaping status: Never Used   Substance and Sexual Activity    Alcohol use: Yes     Alcohol/week: 6.0 standard drinks of alcohol     Types: 6 Glasses of wine per week     Comment: 6-8 glasses a week    Drug use: No    Sexual activity: Not Currently     Partners: Male     Birth control/protection: Post-menopausal, Tubal ligation, Hysterectomy         Objective     Vital Signs  /72 (BP Location: Left arm, Patient Position: Sitting, Cuff Size: Adult)   Pulse 80   Temp 98 °F (36.7 °C)   Ht 162.6 cm (64\")   Wt 63.4 kg (139 lb 12.8 oz)   SpO2 98%   BMI 24.00 kg/m²   Estimated body mass index is 24 kg/m² as calculated from the following:    Height as of this encounter: 162.6 cm (64\").    Weight as of this encounter: 63.4 kg (139 lb 12.8 oz).    BMI is within normal parameters. No other follow-up for BMI required.      Physical Exam  Vitals and nursing note reviewed.   Constitutional:       General: She is not in acute distress.     Appearance: Normal appearance.   HENT:      Head: Normocephalic and atraumatic.   Cardiovascular:      Rate and Rhythm: Normal rate and regular rhythm.      Heart sounds: Normal heart sounds.   Pulmonary:      Effort: " Pulmonary effort is normal. No respiratory distress.      Breath sounds: Normal breath sounds.   Abdominal:      General: Bowel sounds are normal. There is no distension.      Palpations: Abdomen is soft.      Tenderness: There is no abdominal tenderness.   Musculoskeletal:         General: Normal range of motion.   Skin:     General: Skin is warm and dry.      Findings: Bruising present.      Comments: Large area of bruising on lower abdomen. Non painful. No injury. No other areas of bruising seen on body.    Neurological:      Mental Status: She is alert.          Procedures     Assessment and Plan   Diagnosis Discussed   Continue to monitor   Plenty of fluids  Labs today- will notify of results when available   CT abdomen/pelvis ordered for further evaluation   Follow up with Orthopedics as directed- chronic left sided back pain- established orthopedic will defer imaging to ortho group. Possible pain management.   If symptoms worsen or persist please seek further evaluation     1. Contusion of abdominal wall, initial encounter  - CBC & Differential; Future  - Protime-INR; Future  - CT Abdomen Pelvis Without Contrast; Future  - Comprehensive metabolic panel; Future    2. Bruising, spontaneous  - CBC & Differential; Future  - Protime-INR; Future  - CT Abdomen Pelvis Without Contrast; Future  - Comprehensive metabolic panel; Future    3. Chronic left-sided low back pain without sciatica  Orthopedics for PT- or Pain management if significantly worsening          Follow Up  Return if symptoms worsen or fail to improve, for Next scheduled follow up.    Cassy Foster MD  [facility]  [address] [place] [id number] [phone]

## 2025-03-14 NOTE — PATIENT INSTRUCTIONS
Diagnosis Discussed   Continue to monitor   Plenty of fluids  Labs today- will notify of results when available   CT abdomen/pelvis ordered for further evaluation   Follow up with Orthopedics as directed- chronic left sided back pain- established orthopedic will defer imaging to ortho group. Possible pain management.   If symptoms worsen or persist please seek further evaluation

## 2025-03-19 ENCOUNTER — TELEPHONE (OUTPATIENT)
Dept: INTERNAL MEDICINE | Facility: CLINIC | Age: 70
End: 2025-03-19

## 2025-03-19 DIAGNOSIS — R29.898 LEG WEAKNESS, BILATERAL: ICD-10-CM

## 2025-03-19 DIAGNOSIS — M54.50 CHRONIC LEFT-SIDED LOW BACK PAIN WITHOUT SCIATICA: Primary | ICD-10-CM

## 2025-03-19 DIAGNOSIS — G89.29 CHRONIC LEFT-SIDED LOW BACK PAIN WITHOUT SCIATICA: Primary | ICD-10-CM

## 2025-04-03 ENCOUNTER — OFFICE VISIT (OUTPATIENT)
Dept: CARDIOLOGY | Facility: CLINIC | Age: 70
End: 2025-04-03
Payer: MEDICARE

## 2025-04-03 VITALS
HEART RATE: 83 BPM | SYSTOLIC BLOOD PRESSURE: 100 MMHG | WEIGHT: 141.6 LBS | HEIGHT: 64 IN | BODY MASS INDEX: 24.17 KG/M2 | OXYGEN SATURATION: 98 % | DIASTOLIC BLOOD PRESSURE: 68 MMHG

## 2025-04-03 DIAGNOSIS — E78.5 HYPERLIPIDEMIA LDL GOAL <100: Chronic | ICD-10-CM

## 2025-04-03 DIAGNOSIS — I25.118 CORONARY ARTERY DISEASE OF NATIVE ARTERY OF NATIVE HEART WITH STABLE ANGINA PECTORIS: Primary | Chronic | ICD-10-CM

## 2025-04-03 DIAGNOSIS — I10 PRIMARY HYPERTENSION: Chronic | ICD-10-CM

## 2025-04-03 NOTE — PROGRESS NOTES
OFFICE VISIT  NOTE  [facility]      Name: Alysia Sierra    Date: 4/3/2025  MRN:  [id number]  :  1955      REFERRING/PRIMARY PROVIDER:  Cassy Foster MD    Chief Complaint   Patient presents with    Paroxysmal SVT (supraventricular tachycardia)       HPI: Alysia Sierra is a 70 y.o. female who presents for mild CAD, shortness of breath, paroxysmal SVT. Patient history of iron deficiency anemia, unexplained weight loss, chronic kidney disease stage III, hypertension, hyperlipidemia, generalized anxiety disorder. Stress test and echo benign 23 at [facility].  Echo showed grade I diastolic dysfunction, ef 50-55%.  Diagnosis stage III adenocarcinoma of the uterus 10/20/2022 status post hysterectomy chemotherapy and radiation.  Episode of chest pain went to ER ruled out for ACS it was after accidentally handling bug spray she thinks may have poisoned her.  Coronary CTA 2024, cardiac calcium score 94 in the mid LAD  associated with a mild to moderate stenosis, 50%, CT FFR was 0.95 therefore low risk for ischemia.  Normal circumflex and RCA.  Foot fracture 10/2024, multiple surgeries and also dealing with back pain but no chest pain shortness of breath currently    Past Medical History:   Diagnosis Date    Allergic lisinopril  2017    Anemia     Ankle sprain     Arrhythmia     Arthritis     Cancer     uterine    Cataract mild     stil lpresent    Cervical disc disorder     Chicken pox     Chronic fatigue     CKD (chronic kidney disease), stage III     sees nephro    Clotting disorder     Coronary artery disease of native artery of native heart with stable angina pectoris 2024    Dental root implant present     lower left  x1 - possible dental implant    Depression mild 2019    Difficulty walking 2019    Disease of thyroid gland     Dislocation of finger     Dizzy     NIEVES (dyspnea on exertion)     2017    Fracture of ankle     Fracture of hip      Fracture, foot     Generalized anxiety disorder     GERD (gastroesophageal reflux disease) 2018    History of brachytherapy 2023    vaginal brachytherapy    Hyperlipidemia     Hypertension     Iron deficiency anemia     Liver disease     fatty    Liver problem     Low back strain     Measles     Menopause     Mumps     Neck strain     Neuroma of foot     Neuromuscular disorder Peripheral Neuropathy    Orthostatic hypotension     Periarthritis of shoulder     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    Renal insufficiency     Scoliosis     Stress fracture     Tennis elbow     Unintentional weight loss     Uses contact lenses     bilat    Uterine cancer     Uterine cancer 2022    UTI (urinary tract infection)     Visual impairment Nearsighted    Wears glasses        Past Surgical History:   Procedure Laterality Date    ABDOMINAL SURGERY      Hysterectomy    ANKLE OPEN REDUCTION INTERNAL FIXATION       SECTION      DILATION AND CURETTAGE, DIAGNOSTIC / THERAPEUTIC      FRACTURE SURGERY  hip     back     HIP OPEN REDUCTION Left 2023    Procedure: FEMORAL NECK OPEN REDUCTION INTERNAL FIXATION LEFT;  Surgeon: Juanpablo Lee MD;  Location:  [facility];  Service: Orthopedics;  Laterality: Left;    HIP SURGERY      JOINT REPLACEMENT  hip     OOPHORECTOMY      ORAL LESION EXCISION/BIOPSY  2021    ORIF FOOT FRACTURE Left 10/18/2024    Procedure: OPEN REDUCTION INTERNAL FIXATION MIDFOOT AND ANKLE LEFT;  Surgeon: Sergio Godoy MD;  Location:  [facility];  Service: Orthopedics;  Laterality: Left;    TOTAL LAPAROSCOPIC HYSTERECTOMY SALPINGO OOPHORECTOMY N/A 10/28/2022    Procedure: TOTAL LAPAROSCOPIC HYSTERECTOMY BILATERAL SALPINGO-OOPHORECTOMY, INJECTION FOR SENTINEL LYMPH NODE MAPPING, BILATERAL SENTINEL LYMPH NODE DISSECTION WITH DAVINCI ROBOT;  Surgeon: Jasmine Rich MD;  Location:  [facility];  Service: Robotics - DaVinci;  Laterality: N/A;     TUBAL ABDOMINAL LIGATION         Social History     Socioeconomic History    Marital status:     Number of children: 1    Highest education level: Associate degree: academic program   Tobacco Use    Smoking status: Never     Passive exposure: Never    Smokeless tobacco: Never    Tobacco comments:     Never   Vaping Use    Vaping status: Never Used   Substance and Sexual Activity    Alcohol use: Yes     Alcohol/week: 6.0 standard drinks of alcohol     Types: 6 Glasses of wine per week     Comment: 6-8 glasses a week    Drug use: No    Sexual activity: Not Currently     Partners: Male     Birth control/protection: Post-menopausal, Tubal ligation, Hysterectomy       Family History   Problem Relation Age of Onset    Spondylolisthesis Mother     COPD Mother     Stroke Mother     Hypertension Mother     Lung cancer Father     Hypertension Father     Heart attack Father     Coronary artery disease Father     Heart disease Father     Cancer Father     Lung disease Father     Hyperlipidemia Sister     Rheum arthritis Sister     Malig Hypertension Sister     Osteoarthritis Sister     Hypertension Sister     Scoliosis Sister     Alcohol abuse Sister          2020    Depression Sister     Migraines Sister     Hypertension Brother     Depression Sister     Early death Sister     Breast cancer Neg Hx     Ovarian cancer Neg Hx     Uterine cancer Neg Hx     Colon cancer Neg Hx     Melanoma Neg Hx     Prostate cancer Neg Hx         ROS:   Constitutional no fever,  no weight loss   Skin no rash, no subcutaneous nodules   Otolaryngeal no difficulty swallowing   Cardiovascular See HPI   Pulmonary no cough, no sputum production   Gastrointestinal no constipation, no diarrhea   Genitourinary no dysuria, no hematuria   Hematologic no easy bruisability, no abnormal bleeding   Musculoskeletal no muscle pain   Neurologic no dizziness, no falls         Allergies   Allergen Reactions    Lisinopril Angioedema    Metal  Rash     Possible nickel -   Gold is only metal that doesn't have issues      Tylenol [Acetaminophen] Other (See Comments)     ckd    Atorvastatin Myalgia         Current Outpatient Medications:     Alpha-Lipoic Acid 600 MG capsule, Take  by mouth Daily., Disp: , Rfl:     aspirin 81 MG EC tablet, Take 1 tablet by mouth Daily., Disp: 90 tablet, Rfl: 3    Cholecalciferol 25 MCG (1000 UT) tablet, Take 1 tablet by mouth Daily., Disp: , Rfl:     DULoxetine (CYMBALTA) 20 MG capsule, Take 1 capsule by mouth 2 (Two) Times a Day., Disp: 180 capsule, Rfl: 1    esomeprazole (nexIUM) 20 MG capsule, Take 1 capsule by mouth Every Other Day. OTC, Disp: , Rfl:     folic acid (FOLVITE) 1 MG tablet, Take 1 tablet by mouth Daily., Disp: 30 tablet, Rfl: 0    gabapentin (Neurontin) 300 MG capsule, Take 1 capsule in the morning and 2 capsules at night., Disp: 90 capsule, Rfl: 5    hydrALAZINE (APRESOLINE) 25 MG tablet, Take 1 tablet by mouth 2 (Two) Times a Day., Disp: , Rfl:     levothyroxine (SYNTHROID, LEVOTHROID) 25 MCG tablet, Take 1 tablet by mouth Daily., Disp: 90 tablet, Rfl: 0    magnesium oxide (MAGOX) 400 (241.3 Mg) MG tablet tablet, Take 1 tablet by mouth Daily. OTC, Disp: , Rfl:     metoprolol succinate XL (Toprol XL) 25 MG 24 hr tablet, Take 1 tablet by mouth Daily., Disp: 90 tablet, Rfl: 1    ondansetron (Zofran) 4 MG tablet, Take 1 tablet by mouth Every 8 (Eight) Hours As Needed for Nausea or Vomiting for up to 15 doses., Disp: 15 tablet, Rfl: 0    ondansetron ODT (ZOFRAN-ODT) 4 MG disintegrating tablet, Place 1 tablet on the tongue Every 8 (Eight) Hours As Needed for Nausea or Vomiting., Disp: 30 tablet, Rfl: 1    oxyCODONE (ROXICODONE) 5 MG immediate release tablet, Take 1 tablet by mouth Every 4 (Four) Hours As Needed for Moderate Pain or Severe Pain., Disp: , Rfl:     rosuvastatin (CRESTOR) 5 MG tablet, TAKE 1 TABLET BY MOUTH DAILY, Disp: 90 tablet, Rfl: 1    thiamine (VITAMIN B1) 100 MG tablet, Take 1 tablet by mouth  "Daily., Disp: 30 tablet, Rfl: 0    traMADol (ULTRAM) 50 MG tablet, Take 1 tablet by mouth Every 6 (Six) Hours As Needed for Moderate Pain., Disp: , Rfl:     vitamin B-12 (CYANOCOBALAMIN) 1000 MCG tablet, Take 1 tablet by mouth Daily., Disp: , Rfl:     Vitals:    04/03/25 1433   BP: 100/68   BP Location: Right arm   Patient Position: Sitting   Cuff Size: Adult   Pulse: 83   SpO2: 98%   Weight: 64.2 kg (141 lb 9.6 oz)   Height: 162.6 cm (64\")         Body mass index is 24.31 kg/m².    PHYSICAL EXAM:    General Appearance:   · well developed  · well nourished  Neck:  · thyroid not enlarged  · supple  Respiratory:  · no respiratory distress  · normal breath sounds  · no rales  Cardiovascular:  · no jugular venous distention  · regular rhythm  · apical impulse normal  · S1 normal, S2 normal  · no S3, no S4   · no murmur  · no rub, no thrill  · carotid pulses normal; no bruit  · pedal pulses normal  · lower extremity edema: none    Skin:   warm, dry      RESULTS:   Procedures    Results for orders placed during the hospital encounter of 01/02/24    Adult Transthoracic Echo Complete W/ Cont if Necessary Per Protocol    Interpretation Summary    Left ventricular systolic function is normal. Calculated left ventricular EF = 57.9% Left ventricular ejection fraction appears to be 56 - 60%.    Left ventricular diastolic function was normal.    Estimated right ventricular systolic pressure from tricuspid regurgitation is normal (<35 mmHg).    No significant change from previous study        Labs:  Lab Results   Component Value Date    CHOL 206 (H) 04/12/2024    TRIG 286 (H) 04/12/2024    HDL 87 (H) 04/12/2024    LDL 74 04/12/2024    AST 36 (H) 03/14/2025    ALT 18 03/14/2025     Lab Results   Component Value Date    HGBA1C 5.20 10/05/2024     No components found for: \"CREATINININE\"  eGFR Non  Am   Date Value Ref Range Status   07/13/2021 32 (L) >60 mL/min/1.73m*2 Final     Comment:     eGFR = estimated GFR; eGFR units = " mL/min/1.73 sq meters Chronic Kidney Disease is considered if eGFR <60 mL/min/1.73 sq meters Kidney failure is considered if eGFR is <15 mL/min/1.73 sq meters. eGFR assumes steady state plasma creatinine concentration; not applicable if renal function is rapidly changing or patient is on dialysis.         ASSESSMENT:  Problem List Items Addressed This Visit       Primary hypertension (Chronic)    Hyperlipidemia LDL goal <100 (Chronic)    Coronary artery disease of native artery of native heart with stable angina pectoris - Primary (Chronic)       PLAN:    1.  Orthostatic hypotension with syncope:  Stable symptoms, continue adequate hydration and exercise  Previously gave handout on orthostatic hypotension and counter maneuvers, in the past  Compression stockings recommended  Increase hydration    2.  Mild CAD:  CCTA 8/2024: Mid LAD mild to moderate stenosis, with normal CT FFR 0.95  Medical therapy with rosuvastatin and aspirin    3.  Hypertension:  Currently doing very well, goal blood pressure less than 130/80  Home blood pressure readings all consistent with goal  Low blood pressure last 2 days around 100 systolic consider stopping hydralazine       4.  Paroxysmal SVT:  Continue Toprol 25 mg daily  Stable, asymptomatic currently    Continue decreasing caffeine and increase water    5.  CKD stage IIIa:  Complicates all aspects of care  She sees Dr. Wheatley  She reports recent nephrology visit, with improving renal function and potassium  Consider SGLT2 inhibitor    Return to clinic in 12 months, or sooner as needed.    Thank you for the opportunity to share in the care of your patient; please do not hesitate to call me with any questions.     Gray Bahena MD, [facility]  Office: [phone] [address] [facility]  [address] [place] [address]

## 2025-04-04 ENCOUNTER — HOSPITAL ENCOUNTER (OUTPATIENT)
Dept: CT IMAGING | Facility: HOSPITAL | Age: 70
Discharge: HOME | End: 2025-04-04
Payer: MEDICARE

## 2025-04-04 DIAGNOSIS — R23.3 BRUISING, SPONTANEOUS: ICD-10-CM

## 2025-04-04 DIAGNOSIS — S30.1XXA CONTUSION OF ABDOMINAL WALL, INITIAL ENCOUNTER: ICD-10-CM

## 2025-04-04 PROCEDURE — 74176 CT ABD & PELVIS W/O CONTRAST: CPT

## 2025-04-08 ENCOUNTER — OFFICE VISIT (OUTPATIENT)
Dept: BEHAVIORAL HEALTH | Facility: CLINIC | Age: 70
End: 2025-04-08
Payer: MEDICARE

## 2025-04-08 DIAGNOSIS — F32.1 CURRENT MODERATE EPISODE OF MAJOR DEPRESSIVE DISORDER, UNSPECIFIED WHETHER RECURRENT: Primary | ICD-10-CM

## 2025-04-08 NOTE — PROGRESS NOTES
[facility]                 Follow Up Adult      Follow Up Adult Note     Date:2025   Patient Name: Alysia Sierra  : 1955   MRN: [id number]   Time IN: 11:00 am    Time OUT: 11:55 am     Referring Provider: Cassy Foster MD    Chief Complaint:      ICD-10-CM ICD-9-CM   1. Current moderate episode of major depressive disorder, unspecified whether recurrent  F32.1 296.22        History of Present Illness:   Alysia Sierra is a 70 y.o. female who is being seen today for follow up counseling for depression.    Subjective               Patient's Support Network Includes: extended family    Functional Status: Moderate impairment       Current Outpatient Medications:     Alpha-Lipoic Acid 600 MG capsule, Take  by mouth Daily., Disp: , Rfl:     aspirin 81 MG EC tablet, Take 1 tablet by mouth Daily., Disp: 90 tablet, Rfl: 3    Cholecalciferol 25 MCG (1000 UT) tablet, Take 1 tablet by mouth Daily., Disp: , Rfl:     DULoxetine (CYMBALTA) 20 MG capsule, Take 1 capsule by mouth 2 (Two) Times a Day., Disp: 180 capsule, Rfl: 1    esomeprazole (nexIUM) 20 MG capsule, Take 1 capsule by mouth Every Other Day. OTC, Disp: , Rfl:     folic acid (FOLVITE) 1 MG tablet, Take 1 tablet by mouth Daily., Disp: 30 tablet, Rfl: 0    gabapentin (Neurontin) 300 MG capsule, Take 1 capsule in the morning and 2 capsules at night., Disp: 90 capsule, Rfl: 5    hydrALAZINE (APRESOLINE) 25 MG tablet, Take 1 tablet by mouth 2 (Two) Times a Day., Disp: , Rfl:     levothyroxine (SYNTHROID, LEVOTHROID) 25 MCG tablet, Take 1 tablet by mouth Daily., Disp: 90 tablet, Rfl: 0    magnesium oxide (MAGOX) 400 (241.3 Mg) MG tablet tablet, Take 1 tablet by mouth Daily. OTC, Disp: , Rfl:     metoprolol succinate XL (Toprol XL) 25 MG 24 hr tablet, Take 1 tablet by mouth Daily., Disp: 90 tablet, Rfl: 1    ondansetron (Zofran) 4 MG tablet, Take 1 tablet by mouth Every 8 (Eight) Hours As Needed for Nausea  or Vomiting for up to 15 doses., Disp: 15 tablet, Rfl: 0    ondansetron ODT (ZOFRAN-ODT) 4 MG disintegrating tablet, Place 1 tablet on the tongue Every 8 (Eight) Hours As Needed for Nausea or Vomiting., Disp: 30 tablet, Rfl: 1    oxyCODONE (ROXICODONE) 5 MG immediate release tablet, Take 1 tablet by mouth Every 4 (Four) Hours As Needed for Moderate Pain or Severe Pain., Disp: , Rfl:     rosuvastatin (CRESTOR) 5 MG tablet, TAKE 1 TABLET BY MOUTH DAILY, Disp: 90 tablet, Rfl: 1    thiamine (VITAMIN B1) 100 MG tablet, Take 1 tablet by mouth Daily., Disp: 30 tablet, Rfl: 0    traMADol (ULTRAM) 50 MG tablet, Take 1 tablet by mouth Every 6 (Six) Hours As Needed for Moderate Pain., Disp: , Rfl:     vitamin B-12 (CYANOCOBALAMIN) 1000 MCG tablet, Take 1 tablet by mouth Daily., Disp: , Rfl:     Allergies   Allergen Reactions    Lisinopril Angioedema    Metal Rash     Possible nickel -   Gold is only metal that doesn't have issues      Tylenol [Acetaminophen] Other (See Comments)     ckd    Atorvastatin Myalgia       Objective     Physical Exam:  Vital Signs: There were no vitals filed for this visit.  There is no height or weight on file to calculate BMI.     Mental Status Exam:   Hygiene:   good  Cooperation:  Cooperative  Eye Contact:  Good  Psychomotor Behavior:  Appropriate  Affect:  Full range  Mood: normal  Speech:  Normal  Thought Process:  Goal directed  Thought Content:  Normal  Suicidal:  None  Homicidal:  None  Hallucinations:  None  Delusion:  None  Memory:  Intact  Orientation:  Person, Place, Time, and Situation  Reliability:  good  Insight:  Good  Judgement:  Good  Impulse Control:  Good  Physical/Medical Issues:   See problem list      Assessment / Plan      Diagnosis:  Diagnoses and all orders for this visit:    1. Current moderate episode of major depressive disorder, unspecified whether recurrent (Primary)    Patient presented for follow-up with clinician.  Patient and clinician correlated patient's mental  and physical health condition and its severity with alcohol consumption.  Patient expressed strong desire to stop drinking.  Clinician utilized motivational interviewing to assist patient in recognizing other ways alcohol is harmful and stopping could be beneficial for both their mental and physical health.  Patient was responsive to intervention.  Clinician utilized active listening and reflective response, empathy and support.    Progress toward goal: Not at goal    Prognosis: Good with ongoing treatment    PLAN:  Patient and clinician will continue to utilize a cognitive behavioral intervention which identifies and addresses patient cognitive distortions related to depression.  Follow-ups will occur every 21 days and will last from 45 to 60 minutes in duration.    Safety: No acute safety concerns  Risk Assessment: Risk of self-harm acutely is low. Risk of self-harm chronically is also low, but could be further elevated in the event of treatment noncompliance and/or AODA.    Treatment Plan/Goals: Continue supportive psychotherapy efforts and medications as indicated. Treatment and medication options discussed during today's visit. Patient ackowledged and verbally consented to continue with current treatment plan and was educated on the importance of compliance with treatment and follow-up appointments. Patient seems reasonably able to adhere to treatment plan.      Assisted Patient in processing above session content; acknowledged and normalized patient’s thoughts, feelings, and concerns.  Rationalized patient thought process regarding depression.      Allowed Patient to freely discuss issues  without interruption or judgement with unconditional positive regard, active listening skills, and empathy. Therapist provided a safe, confidential environment to facilitate the development of a positive therapeutic relationship and encouraged open, honest communication. Assisted Patient in identifying risk factors which  would indicate the need for higher level of care including thoughts to harm self or others and/or self-harming behavior and encouraged Patient to contact this office, call 911, or present to the nearest emergency room should any of these events occur. Discussed crisis intervention services and means to access. Patient adamantly and convincingly denies current suicidal or homicidal ideation or perceptual disturbance. Assisted Patient in processing session content; acknowledged and normalized Patient’s thoughts, feelings, and concerns by utilizing a person-centered approach in efforts to build appropriate rapport and a positive therapeutic relationship with open and honest communication. .     Part of this note may be an electronic transcription/translation of spoken language to printed text using the Dragon Dictation System.       Follow Up:   Return in about 3 weeks (around 4/29/2025) for Next scheduled follow up.    Austin Tatum LCSW

## 2025-04-14 ENCOUNTER — OFFICE VISIT (OUTPATIENT)
Dept: INTERNAL MEDICINE | Facility: CLINIC | Age: 70
End: 2025-04-14
Payer: MEDICARE

## 2025-04-14 VITALS
RESPIRATION RATE: 16 BRPM | WEIGHT: 140 LBS | SYSTOLIC BLOOD PRESSURE: 120 MMHG | OXYGEN SATURATION: 96 % | HEIGHT: 64 IN | BODY MASS INDEX: 23.9 KG/M2 | HEART RATE: 97 BPM | DIASTOLIC BLOOD PRESSURE: 78 MMHG

## 2025-04-14 DIAGNOSIS — G89.4 CHRONIC PAIN SYNDROME: ICD-10-CM

## 2025-04-14 DIAGNOSIS — G89.29 CHRONIC MIDLINE LOW BACK PAIN, UNSPECIFIED WHETHER SCIATICA PRESENT: ICD-10-CM

## 2025-04-14 DIAGNOSIS — E55.9 VITAMIN D DEFICIENCY: ICD-10-CM

## 2025-04-14 DIAGNOSIS — E78.5 HYPERLIPIDEMIA LDL GOAL <100: Chronic | ICD-10-CM

## 2025-04-14 DIAGNOSIS — E83.42 HYPOMAGNESEMIA: ICD-10-CM

## 2025-04-14 DIAGNOSIS — E78.2 MIXED HYPERLIPIDEMIA: ICD-10-CM

## 2025-04-14 DIAGNOSIS — E03.9 HYPOTHYROIDISM, UNSPECIFIED TYPE: ICD-10-CM

## 2025-04-14 DIAGNOSIS — I10 PRIMARY HYPERTENSION: Chronic | ICD-10-CM

## 2025-04-14 DIAGNOSIS — R73.01 ELEVATED FASTING GLUCOSE: ICD-10-CM

## 2025-04-14 DIAGNOSIS — Z12.11 SCREENING FOR COLON CANCER: ICD-10-CM

## 2025-04-14 DIAGNOSIS — M54.50 CHRONIC MIDLINE LOW BACK PAIN, UNSPECIFIED WHETHER SCIATICA PRESENT: ICD-10-CM

## 2025-04-14 DIAGNOSIS — Z00.00 MEDICARE ANNUAL WELLNESS VISIT, SUBSEQUENT: Primary | ICD-10-CM

## 2025-04-14 DIAGNOSIS — D64.9 ANEMIA, UNSPECIFIED TYPE: ICD-10-CM

## 2025-04-14 RX ORDER — LEVOTHYROXINE SODIUM 25 UG/1
25 TABLET ORAL DAILY
Qty: 90 TABLET | Refills: 1 | Status: SHIPPED | OUTPATIENT
Start: 2025-04-14

## 2025-04-14 RX ORDER — ROSUVASTATIN CALCIUM 5 MG/1
5 TABLET, COATED ORAL DAILY
Qty: 90 TABLET | Refills: 1 | Status: SHIPPED | OUTPATIENT
Start: 2025-04-14

## 2025-04-14 RX ORDER — METOPROLOL SUCCINATE 25 MG/1
25 TABLET, EXTENDED RELEASE ORAL DAILY
Qty: 90 TABLET | Refills: 1 | Status: SHIPPED | OUTPATIENT
Start: 2025-04-14

## 2025-04-14 NOTE — ASSESSMENT & PLAN NOTE
Orders:    metoprolol succinate XL (Toprol XL) 25 MG 24 hr tablet; Take 1 tablet by mouth Daily.    CBC (No Diff); Future    Comprehensive Metabolic Panel; Future

## 2025-04-14 NOTE — PATIENT INSTRUCTIONS
Diagnosis Discussed   Continue to monitor   Plenty of fluids, monitor diet and exercise   Labs ordered will notify of results   Immunizations up to date - PNA vaccine at outside pharmacy   Follow up with Pain Management   Follow up with Dermatology   Follow up with Neurology   Follow up with Cardiology   Take medications as instructed- refills today   Follow up as directed   If symptoms worsen or persist please seek further evaluation

## 2025-04-14 NOTE — ASSESSMENT & PLAN NOTE
Orders:    levothyroxine (SYNTHROID, LEVOTHROID) 25 MCG tablet; Take 1 tablet by mouth Daily.    TSH; Future    T4, Free; Future    T3, Free; Future

## 2025-04-14 NOTE — PROGRESS NOTES
Subjective   The ABCs of the Annual Wellness Visit  Medicare Wellness Visit      Alysia Sierra is a 70 y.o. patient who presents for a Medicare Wellness Visit.  Is not currently fasting   Will return for fasting labs   Has started back on monitoring macros     Referral to pain management for ongoing pain- has tried PT. No real improvement     PMH-  HPL   HTN   Hypothyroidism   CKD stage 3   Hypomagnesemia   Post menopausal   Hx of anemia   Endometrial adenocarcinoma   Depression   Osteoporosis   Hx of hip fracture   Leg weakness- L>R   Hx of lumbar fracture   Neuropathy   Dizziness - Vertigo   Syncope      Meds-  Alpha Lipoic Acid 600mg daily   Aspirin 81mg daily   Vitamin D 1000units daily   Docusate as needed   Duloxetine 20mg twice a day   Nexium 20mg 2-3 times weekly   Folic acid 1mg daily   Gabapentin 300mg- 300mg in AM, 600mg in PM   Hydralazine 25mg twice a day- hold if BP is low in AM   Metoprolol XL 25mg nightly   Levothyroxine 25mcg daily   Lidocaine patches as needed   Mag oxide 400mg daily   Nitroglycerin as needed   Rosuvastatin 5mg daily   Thiamine 100mg daily   Tramadol as needed  Vitamin B12 daily   Super beets         Alcohol - 6-8 per week   Smoking none   Drug use -none   Job retired- RN - [employer]   Stress - 5/10    Sun Exposure - will try to protect skin- Dermatology annually      Dental/Eye exams - up to date. Vision is stable   Diet/Exercise- Diet- Healthy- moderate. Sweet tooth.   Exercise- limited- will try to get more active      OBGYCLARE Gunter   PAP - follow up with Jani  Mammo- due end of year- scheduled   DEXA - next due 2025- osteoporosis - scheduled   BC/Hormone replacement - none   Vitamin D - supplement       Colonoscopy/Cologuard - cologuard- 2022- negative -   Will place order today for cologuard      Immunizations  Tdap- will schedule - outside pharmacy   Flu- completed  Shingles- completed   COVID- completed + boosters  RSV- completed   PNA- will completed next year     The  following portions of the patient's history were reviewed and   updated as appropriate: allergies, current medications, past family history, past medical history, past social history, past surgical history, and problem list.    Compared to one year ago, the patient's physical   health is worse.  Compared to one year ago, the patient's mental   health is worse.    Recent Hospitalizations:  This patient has had a [facility] admission record on file within the last 365 days.  Current Medical Providers:  Patient Care Team:  Cassy Foster MD as PCP - General (Internal Medicine)  Russ Carrington MD as Consulting Physician (Nephrology)  Gray Bahena MD as Consulting Physician (Cardiology)  Caden Dietz MD as Consulting Physician (Neurology)  Charity Cornejo RN as Ambulatory  (Oncology) ([facility])  Juanpablo Lee MD as Consulting Physician (Orthopedic Surgery)  Basilia Mata MD as Consulting Physician (Radiation Oncology)  Sarah Stern MD as Consulting Physician (Gynecology)  Jared Wheatley MD as Consulting Physician (Nephrology)  Jose G Florez MD as Consulting Physician (Dermatology)  Sergio Godoy MD as Surgeon (Orthopedic Surgery)  Shannan Mathis PA-C as Physician Assistant (Cardiology)    Outpatient Medications Prior to Visit   Medication Sig Dispense Refill    Alpha-Lipoic Acid 600 MG capsule Take  by mouth Daily.      aspirin 81 MG EC tablet Take 1 tablet by mouth Daily. 90 tablet 3    Cholecalciferol 25 MCG (1000 UT) tablet Take 1 tablet by mouth Daily.      DULoxetine (CYMBALTA) 20 MG capsule Take 1 capsule by mouth 2 (Two) Times a Day. 180 capsule 1    esomeprazole (nexIUM) 20 MG capsule Take 1 capsule by mouth Every Other Day. OTC      folic acid (FOLVITE) 1 MG tablet Take 1 tablet by mouth Daily. 30 tablet 0    gabapentin (Neurontin) 300 MG capsule Take 1 capsule in the morning and 2 capsules at night. 90 capsule 5    hydrALAZINE (APRESOLINE) 25 MG  tablet Take 1 tablet by mouth 2 (Two) Times a Day.      magnesium oxide (MAGOX) 400 (241.3 Mg) MG tablet tablet Take 1 tablet by mouth Daily. OTC      ondansetron ODT (ZOFRAN-ODT) 4 MG disintegrating tablet Place 1 tablet on the tongue Every 8 (Eight) Hours As Needed for Nausea or Vomiting. 30 tablet 1    oxyCODONE (ROXICODONE) 5 MG immediate release tablet Take 1 tablet by mouth Every 4 (Four) Hours As Needed for Moderate Pain or Severe Pain.      thiamine (VITAMIN B1) 100 MG tablet Take 1 tablet by mouth Daily. 30 tablet 0    traMADol (ULTRAM) 50 MG tablet Take 1 tablet by mouth Every 6 (Six) Hours As Needed for Moderate Pain.      vitamin B-12 (CYANOCOBALAMIN) 1000 MCG tablet Take 1 tablet by mouth Daily.      levothyroxine (SYNTHROID, LEVOTHROID) 25 MCG tablet Take 1 tablet by mouth Daily. 90 tablet 0    metoprolol succinate XL (Toprol XL) 25 MG 24 hr tablet Take 1 tablet by mouth Daily. 90 tablet 1    ondansetron (Zofran) 4 MG tablet Take 1 tablet by mouth Every 8 (Eight) Hours As Needed for Nausea or Vomiting for up to 15 doses. 15 tablet 0    rosuvastatin (CRESTOR) 5 MG tablet TAKE 1 TABLET BY MOUTH DAILY 90 tablet 1     No facility-administered medications prior to visit.     Opioid medication/s are on active medication list.  and I have evaluated her active treatment plan and pain score trends (see table).  Vitals:    04/14/25 1252   PainSc: 6      I have reviewed the chart for potential of high risk medication and harmful drug interactions in the elderly.        Aspirin is on active medication list. Aspirin use is indicated based on review of current medical condition/s. Pros and cons of this therapy have been discussed today. Benefits of this medication outweigh potential harm.  Patient has been encouraged to continue taking this medication.  .      Patient Active Problem List   Diagnosis    Primary hypertension    Leg weakness, bilateral    Syncope    CKD (chronic kidney disease) stage 3, GFR 30-59  "ml/min    Neuropathy    Hyperlipidemia LDL goal <100    Endometrial adenocarcinoma    Mild episode of recurrent major depressive disorder    Dizziness    Hip fracture    Anemia    Hypomagnesemia    Hypothyroidism    Post-menopausal    Other osteoporosis without current pathological fracture    Closed fracture of second lumbar vertebra    Alcoholic ketoacidosis    Leukocytosis    GERD with esophagitis    Sepsis    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    Lisfranc dislocation, left, initial encounter    Closed fracture of left ankle    Closed displaced fracture of fourth metatarsal bone of left foot    Coronary artery disease of native artery of native heart with stable angina pectoris    Surgery follow-up     Advance Care Planning Advance Directive is on file.  ACP discussion was held with the patient during this visit. Patient has an advance directive in EMR which is still valid.             Objective   Vitals:    04/14/25 1252   BP: 120/78   Pulse: 97   Resp: 16   SpO2: 96%   Weight: 63.5 kg (140 lb)   Height: 162.6 cm (64\")   PainSc: 6        Estimated body mass index is 24.03 kg/m² as calculated from the following:    Height as of this encounter: 162.6 cm (64\").    Weight as of this encounter: 63.5 kg (140 lb).    BMI is within normal parameters. No other follow-up for BMI required.           Does the patient have evidence of cognitive impairment? No  Lab Results   Component Value Date    TRIG 153 (H) 04/15/2025    HDL 97 (H) 04/15/2025    LDL 67 04/15/2025    VLDL 25 04/15/2025    HGBA1C 5.40 04/15/2025                                                                                                Health  Risk Assessment    Smoking Status:  Social History     Tobacco Use   Smoking Status Never    Passive exposure: Never   Smokeless Tobacco Never   Tobacco Comments    Never     Alcohol Consumption:  Social History     Substance and Sexual Activity   Alcohol Use Yes    Alcohol/week: 6.0 standard drinks of " alcohol    Types: 6 Glasses of wine per week    Comment: 6-8 glasses a week       Fall Risk Screen  FLAKITA Fall Risk Assessment was completed, and patient is at MODERATE risk for falls. Assessment completed on:2025    Depression Screening   Little interest or pleasure in doing things? More than half the days   Feeling down, depressed, or hopeless? More than half the days   PHQ-2 Total Score 4   Trouble falling or staying asleep, or sleeping too much? Several days   Feeling tired or having little energy? Nearly every day   Poor appetite or overeating? More than half the days   Feeling bad about yourself - or that you are a failure or have let yourself or your family down? Not at all   Trouble concentrating on things, such as reading the newspaper or watching television? Not at all   Moving or speaking so slowly that other people could have noticed? Or the opposite - being so fidgety or restless that you have been moving around a lot more than usual? Not at all     Thoughts that you would be better off dead, or of hurting yourself in some way? Not at all   PHQ-9 Total Score 10   If you checked off any problems, how difficult have these problems made it for you to do your work, take care of things at home, or get along with other people? Somewhat difficult        Health Habits and Functional and Cognitive Screenin/14/2025     1:00 PM   Functional & Cognitive Status   Do you have difficulty preparing food and eating? No   Do you have difficulty bathing yourself, getting dressed or grooming yourself? No   Do you have difficulty using the toilet? No   Do you have difficulty moving around from place to place? Yes   Do you have trouble with steps or getting out of a bed or a chair? Yes   Current Diet Unhealthy Diet   Dental Exam Up to date   Eye Exam Up to date   Exercise (times per week) 2 times per week   Current Exercises Include Walking   Do you need help using the phone?  No   Are you deaf or do you have  serious difficulty hearing?  No   Do you need help to go to places out of walking distance? No   Do you need help shopping? No   Do you need help preparing meals?  No   Do you need help with housework?  Yes   Do you need help with laundry? No   Do you need help taking your medications? No   Do you need help managing money? No   Do you ever drive or ride in a car without wearing a seat belt? No   Have you felt unusual stress, anger or loneliness in the last month? No   Who do you live with? Alone   If you need help, do you have trouble finding someone available to you? Yes   Have you been bothered in the last four weeks by sexual problems? No   Do you have difficulty concentrating, remembering or making decisions? No           Age-appropriate Screening Schedule:  Refer to the list below for future screening recommendations based on patient's age, sex and/or medical conditions. Orders for these recommended tests are listed in the plan section. The patient has been provided with a written plan.    Health Maintenance List  Health Maintenance   Topic Date Due    MAMMOGRAM  12/30/2024    COLORECTAL CANCER SCREENING  08/10/2025    Pneumococcal Vaccine 50+ (2 of 2 - PCV) 10/11/2025 (Originally 12/29/2022)    COVID-19 Vaccine (6 - 2024-25 season) 10/14/2025 (Originally 9/1/2024)    TDAP/TD VACCINES (2 - Td or Tdap) 11/08/2025 (Originally 7/24/2024)    INFLUENZA VACCINE  07/01/2025    DXA SCAN  09/18/2025    ANNUAL WELLNESS VISIT  04/14/2026    LIPID PANEL  04/15/2026    HEPATITIS C SCREENING  Completed    ZOSTER VACCINE  Completed                                                                                                                                                CMS Preventative Services Quick Reference  Risk Factors Identified During Encounter  Alcohol Misuse: Patient encouraged to limit alcohol use to no more than 1 standard alcoholic beverage per day. (12 ounce beer, 6 ounce wine, one shot liquor)  Chronic Pain:  "Chronic Pain Educational material Discussed and Printed for Patient.   Physical Therapy Referral Ordered- not helping. Placed referral to pain management for further evaluation     The above risks/problems have been discussed with the patient.  Pertinent information has been shared with the patient in the After Visit Summary.  An After Visit Summary and PPPS were made available to the patient.    Follow Up:   Next Medicare Wellness visit to be scheduled in 1 year.         Additional E&M Note during same encounter follows:  Patient has additional, significant, and separately identifiable condition(s)/problem(s) that require work above and beyond the Medicare Wellness Visit     Chief Complaint  Medicare Wellness-subsequent (Pt states PT isn't helping w/ the back pain)    Subjective   HPI  Alysia is also being seen today for an annual adult preventative physical exam.     Review of Systems   Constitutional:  Negative for chills and fever.   HENT:  Negative for trouble swallowing and voice change.    Eyes:  Negative for visual disturbance.   Respiratory:  Negative for cough, chest tightness, shortness of breath and wheezing.    Cardiovascular:  Negative for chest pain, palpitations and leg swelling.   Gastrointestinal:  Negative for abdominal pain, blood in stool, constipation, diarrhea, nausea and vomiting.   Genitourinary:  Negative for dysuria.   Musculoskeletal:  Positive for arthralgias, back pain, gait problem and myalgias.   Neurological:  Positive for tremors and weakness. Negative for light-headedness.   Psychiatric/Behavioral:  Negative for dysphoric mood.       Objective   Vital Signs:  /78   Pulse 97   Resp 16   Ht 162.6 cm (64\")   Wt 63.5 kg (140 lb)   SpO2 96%   BMI 24.03 kg/m²   Physical Exam  Vitals and nursing note reviewed.   Constitutional:       General: She is not in acute distress.     Appearance: Normal appearance.   HENT:      Head: Normocephalic and atraumatic.      Right Ear: " Tympanic membrane normal.      Left Ear: Tympanic membrane normal.      Nose: Nose normal.      Mouth/Throat:      Mouth: Mucous membranes are moist.   Eyes:      Extraocular Movements: Extraocular movements intact.      Conjunctiva/sclera: Conjunctivae normal.      Pupils: Pupils are equal, round, and reactive to light.   Cardiovascular:      Rate and Rhythm: Normal rate and regular rhythm.      Heart sounds: Normal heart sounds.   Pulmonary:      Effort: Pulmonary effort is normal. No respiratory distress.      Breath sounds: Normal breath sounds.   Abdominal:      General: Bowel sounds are normal.      Palpations: Abdomen is soft.   Musculoskeletal:         General: Tenderness present. Normal range of motion.      Cervical back: Normal range of motion.      Comments: Moving all extremities   Low back pain  Gait instability/weakness    Skin:     General: Skin is warm and dry.   Neurological:      General: No focal deficit present.      Mental Status: She is alert and oriented to person, place, and time.      Motor: Weakness present.   Psychiatric:         Mood and Affect: Mood normal.         Behavior: Behavior normal.         Thought Content: Thought content normal.         Judgment: Judgment normal.         The following data was reviewed by: Cassy Foster MD on 04/14/2025:           Assessment and Plan Additional age appropriate preventative wellness advice topics were discussed during today's preventative wellness exam(some topics already addressed during AWV portion of the note above):    Physical Activity: Advised cardiovascular activity 150 minutes per week as tolerated. (example brisk walk for 30 minutes, 5 days a week).     Nutrition: Discussed nutrition plan with patient. Information shared in after visit summary. Goal is for a well balanced diet to enhance overall health.     Healthy Weight: Discussed current and goal BMI with patient. Steps to attain this goal discussed. Information shared in after  visit summary.     Alcohol Misuse Discussion: Information shared in after visit summary.     Injury Prevention Discussion:  Information shared in after visit summary     Medicare annual wellness visit, subsequent  Diagnosis Discussed   Continue to monitor   Plenty of fluids, monitor diet and exercise   Labs ordered will notify of results   Immunizations up to date - PNA vaccine at outside pharmacy   Follow up with Pain Management   Follow up with Dermatology   Follow up with Neurology   Follow up with Cardiology   Take medications as instructed- refills today   Follow up as directed   If symptoms worsen or persist please seek further evaluation         Primary hypertension      Orders:    metoprolol succinate XL (Toprol XL) 25 MG 24 hr tablet; Take 1 tablet by mouth Daily.    CBC (No Diff); Future    Comprehensive Metabolic Panel; Future    Hyperlipidemia LDL goal <100       Orders:    CBC (No Diff); Future    Comprehensive Metabolic Panel; Future    Lipid Panel; Future    Hypothyroidism, unspecified type    Orders:    levothyroxine (SYNTHROID, LEVOTHROID) 25 MCG tablet; Take 1 tablet by mouth Daily.    TSH; Future    T4, Free; Future    T3, Free; Future    Hypomagnesemia    Orders:    Magnesium; Future    Anemia, unspecified type    Orders:    CBC (No Diff); Future    Mixed hyperlipidemia   Lipid abnormalities are stable    Plan:  Continue same medication/s without change.      Discussed medication dosage, use, side effects, and goals of treatment in detail.    Counseled patient on lifestyle modifications to help control hyperlipidemia.     Patient Treatment Goals:   LDL goal is less than 70    Followup in 6 months.    Orders:    rosuvastatin (CRESTOR) 5 MG tablet; Take 1 tablet by mouth Daily.    Screening for colon cancer    Orders:    Cologuard - Stool, Per Rectum; Future    Chronic midline low back pain, unspecified whether sciatica present    Orders:    Ambulatory Referral to Pain Management    Chronic pain  syndrome    Orders:    Ambulatory Referral to Pain Management    Vitamin D deficiency    Orders:    Vitamin D,25-Hydroxy; Future    Elevated fasting glucose    Orders:    Hemoglobin A1c; Future            Follow Up   Return in about 1 year (around 4/14/2026), or if symptoms worsen or fail to improve, for Medicare Wellness, fasting labs.  Patient was given instructions and counseling regarding her condition or for health maintenance advice. Please see specific information pulled into the AVS if appropriate.

## 2025-04-14 NOTE — ASSESSMENT & PLAN NOTE
Orders:    CBC (No Diff); Future    Comprehensive Metabolic Panel; Future    Lipid Panel; Future

## 2025-04-15 ENCOUNTER — LAB (OUTPATIENT)
Dept: LAB | Facility: HOSPITAL | Age: 70
End: 2025-04-15
Payer: MEDICARE

## 2025-04-15 DIAGNOSIS — E78.5 HYPERLIPIDEMIA LDL GOAL <100: Chronic | ICD-10-CM

## 2025-04-15 DIAGNOSIS — E55.9 VITAMIN D DEFICIENCY: ICD-10-CM

## 2025-04-15 DIAGNOSIS — I10 PRIMARY HYPERTENSION: Chronic | ICD-10-CM

## 2025-04-15 DIAGNOSIS — E03.9 HYPOTHYROIDISM, UNSPECIFIED TYPE: ICD-10-CM

## 2025-04-15 DIAGNOSIS — E83.42 HYPOMAGNESEMIA: ICD-10-CM

## 2025-04-15 DIAGNOSIS — D64.9 ANEMIA, UNSPECIFIED TYPE: ICD-10-CM

## 2025-04-15 DIAGNOSIS — R73.01 ELEVATED FASTING GLUCOSE: ICD-10-CM

## 2025-04-15 LAB
25(OH)D3 SERPL-MCNC: 104 NG/ML (ref 30–100)
ALBUMIN SERPL-MCNC: 4.7 G/DL (ref 3.5–5.2)
ALBUMIN/GLOB SERPL: 1.6 G/DL
ALP SERPL-CCNC: 91 U/L (ref 39–117)
ALT SERPL W P-5'-P-CCNC: 20 U/L (ref 1–33)
ANION GAP SERPL CALCULATED.3IONS-SCNC: 15 MMOL/L (ref 5–15)
AST SERPL-CCNC: 33 U/L (ref 1–32)
BILIRUB SERPL-MCNC: 0.5 MG/DL (ref 0–1.2)
BUN SERPL-MCNC: 10 MG/DL (ref 8–23)
BUN/CREAT SERPL: 6.8 (ref 7–25)
CALCIUM SPEC-SCNC: 9.5 MG/DL (ref 8.6–10.5)
CHLORIDE SERPL-SCNC: 99 MMOL/L (ref 98–107)
CHOLEST SERPL-MCNC: 189 MG/DL (ref 0–200)
CO2 SERPL-SCNC: 23 MMOL/L (ref 22–29)
CREAT SERPL-MCNC: 1.48 MG/DL (ref 0.57–1)
DEPRECATED RDW RBC AUTO: 45.2 FL (ref 37–54)
EGFRCR SERPLBLD CKD-EPI 2021: 38.2 ML/MIN/1.73
ERYTHROCYTE [DISTWIDTH] IN BLOOD BY AUTOMATED COUNT: 12.7 % (ref 12.3–15.4)
GLOBULIN UR ELPH-MCNC: 2.9 GM/DL
GLUCOSE SERPL-MCNC: 84 MG/DL (ref 65–99)
HBA1C MFR BLD: 5.4 % (ref 4.8–5.6)
HCT VFR BLD AUTO: 36.4 % (ref 34–46.6)
HDLC SERPL-MCNC: 97 MG/DL (ref 40–60)
HGB BLD-MCNC: 12.1 G/DL (ref 12–15.9)
LDLC SERPL CALC-MCNC: 67 MG/DL (ref 0–100)
LDLC/HDLC SERPL: 0.63 {RATIO}
MAGNESIUM SERPL-MCNC: 1.5 MG/DL (ref 1.6–2.4)
MCH RBC QN AUTO: 32.4 PG (ref 26.6–33)
MCHC RBC AUTO-ENTMCNC: 33.2 G/DL (ref 31.5–35.7)
MCV RBC AUTO: 97.3 FL (ref 79–97)
PLATELET # BLD AUTO: 219 10*3/MM3 (ref 140–450)
PMV BLD AUTO: 10.5 FL (ref 6–12)
POTASSIUM SERPL-SCNC: 4.6 MMOL/L (ref 3.5–5.2)
PROT SERPL-MCNC: 7.6 G/DL (ref 6–8.5)
RBC # BLD AUTO: 3.74 10*6/MM3 (ref 3.77–5.28)
SODIUM SERPL-SCNC: 137 MMOL/L (ref 136–145)
T3FREE SERPL-MCNC: 2.93 PG/ML (ref 2–4.4)
T4 FREE SERPL-MCNC: 1.3 NG/DL (ref 0.92–1.68)
TRIGL SERPL-MCNC: 153 MG/DL (ref 0–150)
TSH SERPL DL<=0.05 MIU/L-ACNC: 3.46 UIU/ML (ref 0.27–4.2)
VLDLC SERPL-MCNC: 25 MG/DL (ref 5–40)
WBC NRBC COR # BLD AUTO: 4.33 10*3/MM3 (ref 3.4–10.8)

## 2025-04-15 PROCEDURE — 83036 HEMOGLOBIN GLYCOSYLATED A1C: CPT

## 2025-04-15 PROCEDURE — 80053 COMPREHEN METABOLIC PANEL: CPT

## 2025-04-15 PROCEDURE — 83735 ASSAY OF MAGNESIUM: CPT

## 2025-04-15 PROCEDURE — 85027 COMPLETE CBC AUTOMATED: CPT

## 2025-04-15 PROCEDURE — 84481 FREE ASSAY (FT-3): CPT

## 2025-04-15 PROCEDURE — 84439 ASSAY OF FREE THYROXINE: CPT

## 2025-04-15 PROCEDURE — 80061 LIPID PANEL: CPT

## 2025-04-15 PROCEDURE — 82306 VITAMIN D 25 HYDROXY: CPT

## 2025-04-15 PROCEDURE — 84443 ASSAY THYROID STIM HORMONE: CPT

## 2025-04-23 ENCOUNTER — TELEPHONE (OUTPATIENT)
Dept: PAIN MEDICINE | Facility: CLINIC | Age: 70
End: 2025-04-23

## 2025-04-23 ENCOUNTER — OFFICE VISIT (OUTPATIENT)
Dept: PAIN MEDICINE | Facility: CLINIC | Age: 70
End: 2025-04-23
Payer: MEDICARE

## 2025-04-23 VITALS — WEIGHT: 138 LBS | HEIGHT: 64 IN | BODY MASS INDEX: 23.56 KG/M2

## 2025-04-23 DIAGNOSIS — M79.7 FIBROMYALGIA: ICD-10-CM

## 2025-04-23 DIAGNOSIS — M47.817 LUMBOSACRAL SPONDYLOSIS WITHOUT MYELOPATHY: Primary | ICD-10-CM

## 2025-04-23 DIAGNOSIS — M41.26 OTHER IDIOPATHIC SCOLIOSIS, LUMBAR REGION: ICD-10-CM

## 2025-04-23 DIAGNOSIS — G89.4 CHRONIC PAIN SYNDROME: ICD-10-CM

## 2025-04-23 NOTE — TELEPHONE ENCOUNTER
HUB AGENT ATTEMPTED CLINICAL WARM TRANSFER - NO ANSWER     Caller: Alysia Sierra / PATIENT     Best call back number: [phone]     PLEASE CALL PATIENT TO DISCUSS INCORRECT MEDICATIONS SHOWING ON AFTER-VISIT SUMMARY PATIENT RECEIVED TODAY 4-23-25     PATIENT REPORTED SHE DOES NOT TAKE THESE MEDICATIONS LISTED ON AFTER-VISIT SUMMARY:     - LYRICA [DOSAGE NOT LISTED] (TAKE ONE CAPSULE 2 TIMES DAILY)   - NORTRIPTYLINE 25 MG (1-2 CAPSULES AT BEDTIME AS NEEDED)   - BACLOFEN 10 MG (ONE TABLET 2-3 TIMES PER DAY AS NEEDED FOR MUSCLE SPASMS)   - TYLENOL 500 MG (ONE TABLET 4 TIMES PER DAY AS NEEDED)   - COMPOUNDED CREAM (FROM [facility] PH: [phone])   - MELOXICAM 15 MG (ONE PILL DAILY)     PATIENT REPORTS SHE IS TAKING THESE MEDICATIONS NOT LISTED:   - HYDRALAZINE 25 MG (TAKING ONE TABLET TWICE PER DAY)     PATIENT ALSO REPORTS THERE ARE SOME MEDICATIONS SHE DOES TAKE, WHICH ARE LISTED, BUT WITH INCORRECT DOSAGES &or DIRECTIONS     PERHAPS PATIENT RECEIVED THE 2nd PAGE OF ANOTHER PATIENT's AFTER-VISIT SUMMARY? (NO PAGE NUMBERS LISTED) SINCE THESE MEDS ARE NOT SHOWING IN EPIC MED LIST?    THANKS

## 2025-04-23 NOTE — PROGRESS NOTES
Referring Physician: Cassy Foster MD  [address] [place] [address]    Primary Physician: Cassy Foster MD    CHIEF COMPLAINT or REASON FOR VISIT: Back Pain (New patient)      Initial history of present illness on 04/23/2025:  Ms. Alysia Sierra is 70 y.o. female who presents as a new patient referral for evaluation and treatment of chronic low back pain.  Patient has had axial back pain for many years without any specific inciting event or trauma.  She notes recent severe progression.  She does use a cane for ambulation but this is for balance issues.  She denies any radicular symptoms.  She does have bilateral neuropathy in her feet.  She denies any history of spinal surgery intervention.  She has done physical therapy without benefit.  She cannot take NSAIDs due to her kidneys.  Patient denies any new onset bowel or bladder dysfunction, lower extremity weakness, saddle anesthesia or unexplained weight loss.       Interval history:    Interventions:      Objective Pain Scoring:   BRIEF PAIN INVENTORY:  Total score:   Pain Score    04/23/25 1017   PainSc: 4    PainLoc: Back      PHQ-2: 2  PHQ-9: 4  Opioid Risk Tool:         Review of Systems:   ROS negative except as otherwise noted     Past Medical History:   Past Medical History:   Diagnosis Date    Allergic lisinopril  2017    Anemia     Ankle sprain     Arrhythmia     Arthritis     Cancer     uterine    Cataract mild 2020    stil lpresent    Cervical disc disorder     Chicken pox     Chronic fatigue     CKD (chronic kidney disease), stage III     sees nephro    Clotting disorder     Coronary artery disease of native artery of native heart with stable angina pectoris 11/11/2024    Dental root implant present     lower left  x1 - possible dental implant    Depression mild 2019    Difficulty walking 2019    Disease of thyroid gland     Dislocation of finger     Dizzy     NIEVES (dyspnea on exertion)     2017    Fracture of ankle 2024    Fracture of hip 2023     Fracture, foot     Generalized anxiety disorder     GERD (gastroesophageal reflux disease) 2018    History of brachytherapy 2023    vaginal brachytherapy    Hyperlipidemia     Hypertension     Iron deficiency anemia     Liver disease     fatty    Liver problem     Low back strain     Measles     Menopause     Mumps     Neck strain     Neuroma of foot     Neuromuscular disorder Peripheral Neuropathy    Orthostatic hypotension     Periarthritis of shoulder     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    Renal insufficiency     Scoliosis     Stress fracture     Tennis elbow     Unintentional weight loss     Uses contact lenses     bilat    Uterine cancer     Uterine cancer 2022    UTI (urinary tract infection)     Visual impairment Nearsighted    Wears glasses          Past Surgical History:   Past Surgical History:   Procedure Laterality Date    ABDOMINAL SURGERY      Hysterectomy    ANKLE OPEN REDUCTION INTERNAL FIXATION       SECTION      DILATION AND CURETTAGE, DIAGNOSTIC / THERAPEUTIC      FRACTURE SURGERY  hip     back     HIP OPEN REDUCTION Left 2023    Procedure: FEMORAL NECK OPEN REDUCTION INTERNAL FIXATION LEFT;  Surgeon: Juanpablo Lee MD;  Location:  [facility];  Service: Orthopedics;  Laterality: Left;    HIP SURGERY      JOINT REPLACEMENT  hip     OOPHORECTOMY      ORAL LESION EXCISION/BIOPSY  2021    ORIF FOOT FRACTURE Left 10/18/2024    Procedure: OPEN REDUCTION INTERNAL FIXATION MIDFOOT AND ANKLE LEFT;  Surgeon: Sergio Godoy MD;  Location:  [facility];  Service: Orthopedics;  Laterality: Left;    TOTAL LAPAROSCOPIC HYSTERECTOMY SALPINGO OOPHORECTOMY N/A 10/28/2022    Procedure: TOTAL LAPAROSCOPIC HYSTERECTOMY BILATERAL SALPINGO-OOPHORECTOMY, INJECTION FOR SENTINEL LYMPH NODE MAPPING, BILATERAL SENTINEL LYMPH NODE DISSECTION WITH DAVINCI ROBOT;  Surgeon: Jasmine Rich MD;  Location:  [facility];  Service: Robotics  Yvette Marsh;  Laterality: N/A;    TUBAL ABDOMINAL LIGATION           Family History   Family History   Problem Relation Age of Onset    Spondylolisthesis Mother     COPD Mother     Stroke Mother     Hypertension Mother     Lung cancer Father     Hypertension Father     Heart attack Father     Coronary artery disease Father     Heart disease Father     Cancer Father     Lung disease Father     Hyperlipidemia Sister     Rheum arthritis Sister     Malig Hypertension Sister     Osteoarthritis Sister     Hypertension Sister     Scoliosis Sister     Alcohol abuse Sister              Depression Sister     Migraines Sister     Hypertension Brother     Depression Sister     Early death Sister     Breast cancer Neg Hx     Ovarian cancer Neg Hx     Uterine cancer Neg Hx     Colon cancer Neg Hx     Melanoma Neg Hx     Prostate cancer Neg Hx          Social History   Social History     Socioeconomic History    Marital status:     Number of children: 1    Highest education level: Associate degree: academic program   Tobacco Use    Smoking status: Never     Passive exposure: Never    Smokeless tobacco: Never    Tobacco comments:     Never   Vaping Use    Vaping status: Never Used   Substance and Sexual Activity    Alcohol use: Yes     Alcohol/week: 6.0 standard drinks of alcohol     Types: 6 Glasses of wine per week     Comment: 6-8 glasses a week    Drug use: No    Sexual activity: Not Currently     Partners: Male     Birth control/protection: Post-menopausal, Tubal ligation, Hysterectomy        Medications:     Current Outpatient Medications:     Alpha-Lipoic Acid 600 MG capsule, Take  by mouth Daily., Disp: , Rfl:     aspirin 81 MG EC tablet, Take 1 tablet by mouth Daily., Disp: 90 tablet, Rfl: 3    Cholecalciferol 25 MCG (1000 UT) tablet, Take 1 tablet by mouth Daily., Disp: , Rfl:     DULoxetine (CYMBALTA) 20 MG capsule, Take 1 capsule by mouth 2 (Two) Times a Day., Disp: 180 capsule, Rfl: 1     "esomeprazole (nexIUM) 20 MG capsule, Take 1 capsule by mouth Every Other Day. OTC, Disp: , Rfl:     folic acid (FOLVITE) 1 MG tablet, Take 1 tablet by mouth Daily., Disp: 30 tablet, Rfl: 0    gabapentin (Neurontin) 300 MG capsule, Take 1 capsule in the morning and 2 capsules at night., Disp: 90 capsule, Rfl: 5    hydrALAZINE (APRESOLINE) 25 MG tablet, Take 1 tablet by mouth 2 (Two) Times a Day., Disp: , Rfl:     levothyroxine (SYNTHROID, LEVOTHROID) 25 MCG tablet, Take 1 tablet by mouth Daily., Disp: 90 tablet, Rfl: 1    magnesium oxide (MAGOX) 400 (241.3 Mg) MG tablet tablet, Take 1 tablet by mouth Daily. OTC, Disp: , Rfl:     metoprolol succinate XL (Toprol XL) 25 MG 24 hr tablet, Take 1 tablet by mouth Daily., Disp: 90 tablet, Rfl: 1    ondansetron ODT (ZOFRAN-ODT) 4 MG disintegrating tablet, Place 1 tablet on the tongue Every 8 (Eight) Hours As Needed for Nausea or Vomiting., Disp: 30 tablet, Rfl: 1    oxyCODONE (ROXICODONE) 5 MG immediate release tablet, Take 1 tablet by mouth Every 4 (Four) Hours As Needed for Moderate Pain or Severe Pain., Disp: , Rfl:     rosuvastatin (CRESTOR) 5 MG tablet, Take 1 tablet by mouth Daily., Disp: 90 tablet, Rfl: 1    thiamine (VITAMIN B1) 100 MG tablet, Take 1 tablet by mouth Daily., Disp: 30 tablet, Rfl: 0    traMADol (ULTRAM) 50 MG tablet, Take 1 tablet by mouth Every 6 (Six) Hours As Needed for Moderate Pain., Disp: , Rfl:     vitamin B-12 (CYANOCOBALAMIN) 1000 MCG tablet, Take 1 tablet by mouth Daily., Disp: , Rfl:         Physical Exam:     Vitals:    04/23/25 1017   Weight: 62.6 kg (138 lb)   Height: 162.6 cm (64\")   PainSc: 4    PainLoc: Back        General: Alert and oriented, No acute distress.   HEENT: Normocephalic, atraumatic.   Cardiovascular: No gross edema  Respiratory: Respirations are non-labored      Lumbar Spine:   No masses or atrophy  Range of motion - Flexion normal. Extension normal.    Facet Loading: Positive bilaterally  Facet Palpation -tender " bilaterally  Jayde finger/Gaenslen's/Silvio's/ОЛЕГ/Thigh thrust -   Straight leg raise/slump test: Negative bilaterally  Multifidus toe-touch test:    Motor Exam:      Strength: Rate on 1-5 scale Right Left    L1/2- hip flexion 5/5  5/5    L3- knee extension 5/5  5/5    L4- ankle dorsiflexion 5/5  5/5    L5- great toe extension 5/5  5/5    S1- ankle plantarflexion 5/5  5/5    Sensory Exam: Full and equal sensation to light touch throughout.      Neurologic: Cranial Nerves II-XII are grossly intact.     Psychiatric: Cooperative.   Gait: Antalgic flexed forward, unsteady  Assistive Devices: Straight cane    Imaging Studies:   Results for orders placed during the hospital encounter of 09/18/20    MRI Lumbar Spine Without Contrast    Narrative  EXAMINATION: MRI CERVICAL SPINE WO CONTRAST, MRI LUMBAR SPINE WO  CONTRAST - 09/18/2020    INDICATION: Bilateral arm weakness and chronic neck pain. R29.898-Other  symptoms and signs involving the musculoskeletal system.    TECHNIQUE: Multiplanar MRI of the cervical spine without intervenous  contrast, multiplanar MRI of the lumbar spine without intravenous  contrast.    COMPARISON: None.    FINDINGS:    CERVICAL SPINE: Sagittal datasets reveal flattening and mild reversal of  the cervical lordotic curvature without focal subluxation or listhesis.  Vertebral body heights are preserved without compression deformity or  fracture. Intervertebral disc height space loss and degeneration of the  mid and lower cervical segments detailed further below on a  level-by-level basis. Cervicomedullary junction widely patent. Axial  datasets evaluation without paraspinal hematoma or paraspinal soft  tissue abnormalities of acuity.    Level-by-level details for spinal canal or neuroforaminal patency as  below:    C2-C3: Mild disc osteophyte complex with mild left lateralizing features  of predominance along with mild to moderate right and mild left  uncovertebral spurring and hypertrophy  producing mild intrathecal sac  effacement with mild right and mild to moderate left neuroforaminal  stenoses.  C3-C4: Mild disc osteophyte complex along with mild to moderate right  and moderate to severe left asymmetric involvement of uncovertebral  spurring and hypertrophy producing mild intrathecal sac effacement with  moderate left neuroforaminal stenosis.  C4-C5: Mild to moderate disc osteophyte complex asymmetric and  involvement with mild to moderate uncovertebral spurring and hypertrophy  bilaterally producing mild anterior thecal sac effacement with mild  bilateral neuroforaminal stenoses.  C5-C6: Large disc osteophyte complex with slight left lateralizing  features of predominance along with moderate uncovertebral spurring and  hypertrophy producing in total mild to moderate left paracentral spinal  canal stenosis with mild to moderate right and moderate to severe left  neuroforaminal stenoses.  C6-C7: Large disc osteophyte complex with right lateralizing features of  predominance and irregularity along with mild to moderate uncovertebral  spurring and hypertrophy producing mild to moderate spinal canal  stenosis and lateral recess involvement with moderate right and mild to  moderate left neuroforaminal stenoses.  C7-T1: Mild disc ossified complex without spinal canal or neuroforaminal  stenosis.    LUMBAR SPINE: Mild to moderately levoscoliotic curvature lumbar spine  apex angulation L2-L3 without significant rotatory component. Sagittal  datasets without focal subluxation or listhesis in the  anterior/posterior direction and vertebral body heights preserved  without compression deformity or fracture. No aggressive bone mineral  signal findings with scattered benign spinal hemangiomas throughout.  Distal spinal cord, conus and cauda equina are grossly unremarkable for  caliber and contour evaluation. Axial datasets evaluation without  paraspinal hematoma.    Level by level details for spinal canal or  neuroforaminal patency as  below:    L1-L2: Mild ligamentum flavum thickening without spinal canal stenosis  and only mild right neuroforaminal stenosis.  L2-L3: Mild circumferential disc bulge with mild ligamentum flavum  thickening producing mild right spinal canal stenosis with mild right  neuroforaminal stenosis.  L3-L4: Mild to moderate circumferential disc bulge along with mild to  moderate ligamentum flavum thickening and facet hypertrophy producing  mild spinal canal stenosis with no neuroforaminal stenoses.  L4-L5: Mild to moderate circumferential disc bulge with slight left  lateral features of predominance along with mild to moderate ligamentum  flavum thickening and facet hypertrophy producing mild left paracentral  spinal canal stenosis with mild left neuroforaminal stenosis.  L5-S1: Circumferential disc bulge along with mild ligamentum flavum  thickening and facet hypertrophy producing mild left paracentral spinal  canal stenosis with mild bilateral neuroforaminal stenoses.    Impression  1. Cervical spine evaluation demonstrates multilevel spondylitic changes  greatest at the C5-C6 and C6-C7 levels. At the C5-C6 level, there is  mild to moderate left paracentral spinal canal stenosis with mild to  moderate right and moderate to severe left neuroforaminal stenoses. At  the C6-C7 level, large disc osteophyte complex with right lateralizing  features producing mild to moderate spinal canal stenosis and right  lateral recess involvement with moderate right and mild to moderate left  neuroforaminal stenoses.    2. Mild to moderate levoscoliotic curvature lumbar spine with multilevel  spondylitic changes overall mild to moderate involvement without severe  or dominant focus of severe stenosis greatest findings at the L4-L5  level with there is mild left paracentral spinal canal stenosis with  mild left neuroforaminal stenosis and left subarticular recess  narrowing.    DICTATED:   09/18/2020  EDITED/ls :    09/18/2020    This report was finalized on 9/21/2020 12:42 PM by Dr. Michael Nolasco.        Independent review of radiographic imaging: Available for my interpretation is lumbar MRI dated September 18, 2020 demonstrating facet osteophyte within the right L2/L3 neuroforamen; epidural lipomatosis; facet arthropathy, scoliosis.    Recent CT abdomen pelvis does demonstrate presence of disc osteophyte in L1/L2 and the right neuro foramen and facet osteophyte in the right L2/L3 neuroforamen.    Impression & Plan:       04/23/2025: Alysia Sierra is a 70 y.o. female with past medical history significant for HTN, CKD, HLD, hip fracture, GERD, uterine cancer status post brachytherapy, left Lisfranc fracture who presents to the pain clinic for evaluation and treatment of chronic axial low back pain.  MRI interpretation above.  Evaluation consistent with lumbosacral spondylosis.  We discussed lumbar medial branch blocks and appropriate radiofrequency ablation.  I discussed the natural course of degenerative disc disease and spondylosis as well as most common causative factors including obesity, cigarette smoking, age, activity and genetics.  I had a discussion with the patient regarding the risks of the procedure including bleeding, infection, damage to surrounding structures.  We discussed the potential adverse effects of corticosteroid injection including flushing of the face, lipodystrophy, skin discoloration, elevated blood glucose, increased blood pressure.  Risks of frequent steroid administration include weight gain, hormonal changes, mood changes, osteoporosis.    1. Lumbosacral spondylosis without myelopathy    2. Other idiopathic scoliosis, lumbar region    3. Chronic pain syndrome    4. Fibromyalgia        PLAN:  1. Medications:      2. Physical Therapy: Continue HEP.  No benefit with PT in the past    3. Psychological: defer    4. Complementary and alternative (CAM) Therapies:     5. Labs/Diagnostic studies: None  indicated     6. Imaging: MRI, CT abdomen pelvis reviewed.  I called and personally discussed the case with radiology, Dr. Ganga Cabrera MD.    7. Interventions: Schedule bilateral L3/L4 and L4/L5 medial branch blocks.  If the first blocks provide diagnostic relief will schedule a second set of medial branch blocks.  If second set of medial branch blocks provides diagnostic relief will schedule rhizotomy.    8. Referrals: None indicated     9. Records: PCP notes reviewed, orthopedics notes reviewed, neurology notes reviewed    10. Lifestyle goals:    Follow-up 3 months      [facility]  Sachin Prakash MD    Medical decision makin or more chronic illnesses with severe progression  Review of prior external notes (PCP, Ortho, neurology,)  I personally interpreted lumbar MRI  I personally discussed MRI interpretation with radiologist on the phone      Quality Metrics:                Please note that portions of this note were completed with a voice recognition program.      Any copied data in any portion of my note from previous notes included in the HPI, PE, MDM and/or assessment and plan has been reviewed by myself and accurate as of this date.      The 21st Century Cures Act makes medical notes like this available to patients in the interest of transparency. This is a medical document intended as peer to peer communication. It is written in medical language and may contain abbreviations or verbiage that are unfamiliar. It may appear blunt or direct. Medical documents are intended to carry relevant information, facts as evident, and the clinical opinion of the practitioner.

## 2025-04-23 NOTE — TELEPHONE ENCOUNTER
S/w the patient--she was confused by the discharge sheet, thinking the medications listed on the sheet were all supposed to be for her. I explained that the discharge sheets lists common medications and referrals that Dr Prakash often gives to patients, and that the only items on the list that will pertain to her will be marked on her discharge sheet.    She also informed me that she will be switching to [employer] from [employer] on May 1. I requested that she send us a copy of the card via [employer], which she said she would do within the next few days.

## 2025-04-28 ENCOUNTER — OFFICE VISIT (OUTPATIENT)
Dept: ORTHOPEDIC SURGERY | Facility: CLINIC | Age: 70
End: 2025-04-28
Payer: MEDICARE

## 2025-04-28 VITALS
DIASTOLIC BLOOD PRESSURE: 70 MMHG | SYSTOLIC BLOOD PRESSURE: 122 MMHG | WEIGHT: 138 LBS | HEIGHT: 64 IN | BODY MASS INDEX: 23.56 KG/M2

## 2025-04-28 DIAGNOSIS — Z09 SURGERY FOLLOW-UP: Primary | ICD-10-CM

## 2025-04-28 PROCEDURE — 1159F MED LIST DOCD IN RCRD: CPT | Performed by: ORTHOPAEDIC SURGERY

## 2025-04-28 PROCEDURE — 1160F RVW MEDS BY RX/DR IN RCRD: CPT | Performed by: ORTHOPAEDIC SURGERY

## 2025-04-28 PROCEDURE — 3074F SYST BP LT 130 MM HG: CPT | Performed by: ORTHOPAEDIC SURGERY

## 2025-04-28 PROCEDURE — 99213 OFFICE O/P EST LOW 20 MIN: CPT | Performed by: ORTHOPAEDIC SURGERY

## 2025-04-28 PROCEDURE — 3078F DIAST BP <80 MM HG: CPT | Performed by: ORTHOPAEDIC SURGERY

## 2025-04-28 NOTE — PROGRESS NOTES
"                          [facility] Office Follow Up     Office Follow Up Visit     Date: 04/28/2025   Patient Name: Alysia Sierra  MRN: [id number]  YOB: 1955  Chief Complaint:   Chief Complaint   Patient presents with    Follow-up     2 month follow up--6 month S/P Open Reduction Internal Fixation Midfoot And Ankle Left (DOS: 10/18/24     History of Present Illness:   Alysia Sierra is a 70 y.o. female who is here today for follow-up status post left ankle and foot ORIF on October 18, 2024.  Again ambulates with cane at baseline.  Here today ambulating in normal shoes.  Denies any foot or ankle pain.  States feels like she is doing very well with recovery from her injury with no new complaints.  Seeing pain management for her back pain which she states is currently at her biggest medical issue.    Subjective   I reviewed the patient's chief complaint, history of present illness, review of systems, past medical history, surgical history, family history, social history, medications and allergy list   Objective    Vital Signs:   Vitals:    04/28/25 1103   BP: 122/70   Weight: 62.6 kg (138 lb)   Height: 162.6 cm (64.02\")     Body mass index is 23.68 kg/m².    Ortho Exam:  left LE Foot and Ankle Exam:   Shoe removed for exam.  Leg compartments soft and compressible.  Foot and ankle incisions well-healed.  Able to actively plantarflex and dorsiflex ankle and all toes. Minimal swelling  Toes warm and well-perfused.  Brisk cap refill in all toes.  Ambulating in clinic with cane normal shoe with normal gait.    Results Review:  XR Ankle 3+ View Left  Left Ankle X-Ray 04/28/25   Indication: Pain  Views: 3 weight bearing , comparison to previous  Findings: xrays reviewed by me today in the office and show   redemonstration hardware left ankle and foot, hardware in proper alignment   with no signs of hardware failure, alignment similar to previous, some   visible hammering lesser digits, ankle " mortise congruent well-maintained   with no signs of syndesmotic widening on weightbearing x-ray  XR Foot 3+ View Left  Left Foot X-Ray 04/28/25   Indication: Pain  Views: 3 weight bearing , comparison to previous  Findings: xrays reviewed by me today in the office and show   redemonstration hardware left ankle and foot, hardware in proper alignment   with no signs of hardware failure, alignment similar to previous, some   visible hammering lesser digits,       Assessment / Plan    Assessment/Plan:   Diagnoses and all orders for this visit:    1. Surgery follow-up (Primary)  -     XR Foot 3+ View Left  -     XR Ankle 3+ View Left      Patient however 6 months out from her surgery and seems to has made a full recovery.  Can continue activity as tolerated.  We once again discussed that being that she is doing so well and the hardware does not seem to be causing any issues plan will be to keep hardware in for now with future plan of removing hardware only if it seems to cause any issues.  Will plan to see patient back in 4 months for repeat x-rays and reevaluation.  Patient is very happy with the recovery she is made.  Was a pleasure seeing her today.    Follow Up:   Return in about 4 months (around 8/28/2025) for F/u Recheck with images.      Sergio Godoy MD  [facility] [place] Orthopedic Surgeon

## 2025-04-29 ENCOUNTER — OFFICE VISIT (OUTPATIENT)
Dept: BEHAVIORAL HEALTH | Facility: CLINIC | Age: 70
End: 2025-04-29
Payer: MEDICARE

## 2025-04-29 DIAGNOSIS — F32.1 CURRENT MODERATE EPISODE OF MAJOR DEPRESSIVE DISORDER, UNSPECIFIED WHETHER RECURRENT: Primary | ICD-10-CM

## 2025-04-29 NOTE — PROGRESS NOTES
[facility] [place]                 Follow Up Adult      Follow Up Adult Note     Date:2025   Patient Name: Alysia Sierra  : 1955   MRN: [id number]   Time IN: 11:02 am    Time OUT: 11:50 am     Referring Provider: Cassy Foster MD    Chief Complaint:      ICD-10-CM ICD-9-CM   1. Current moderate episode of major depressive disorder, unspecified whether recurrent  F32.1 296.22        History of Present Illness:   Alysia Sierra is a 70 y.o. female who is being seen today for follow up counseling for depression.    Subjective               Patient's Support Network Includes: extended family    Functional Status: Moderate impairment       Current Outpatient Medications:     Alpha-Lipoic Acid 600 MG capsule, Take  by mouth Daily., Disp: , Rfl:     aspirin 81 MG EC tablet, Take 1 tablet by mouth Daily., Disp: 90 tablet, Rfl: 3    Cholecalciferol 25 MCG (1000 UT) tablet, Take 1 tablet by mouth Daily., Disp: , Rfl:     DULoxetine (CYMBALTA) 20 MG capsule, Take 1 capsule by mouth 2 (Two) Times a Day., Disp: 180 capsule, Rfl: 1    esomeprazole (nexIUM) 20 MG capsule, Take 1 capsule by mouth Every Other Day. OTC, Disp: , Rfl:     folic acid (FOLVITE) 1 MG tablet, Take 1 tablet by mouth Daily., Disp: 30 tablet, Rfl: 0    gabapentin (Neurontin) 300 MG capsule, Take 1 capsule in the morning and 2 capsules at night., Disp: 90 capsule, Rfl: 5    hydrALAZINE (APRESOLINE) 25 MG tablet, Take 1 tablet by mouth 2 (Two) Times a Day., Disp: , Rfl:     levothyroxine (SYNTHROID, LEVOTHROID) 25 MCG tablet, Take 1 tablet by mouth Daily., Disp: 90 tablet, Rfl: 1    magnesium oxide (MAGOX) 400 (241.3 Mg) MG tablet tablet, Take 1 tablet by mouth Daily. OTC, Disp: , Rfl:     metoprolol succinate XL (Toprol XL) 25 MG 24 hr tablet, Take 1 tablet by mouth Daily., Disp: 90 tablet, Rfl: 1    ondansetron ODT (ZOFRAN-ODT) 4 MG disintegrating tablet, Place 1 tablet on the tongue Every 8  (Eight) Hours As Needed for Nausea or Vomiting., Disp: 30 tablet, Rfl: 1    oxyCODONE (ROXICODONE) 5 MG immediate release tablet, Take 1 tablet by mouth Every 4 (Four) Hours As Needed for Moderate Pain or Severe Pain., Disp: , Rfl:     rosuvastatin (CRESTOR) 5 MG tablet, Take 1 tablet by mouth Daily., Disp: 90 tablet, Rfl: 1    thiamine (VITAMIN B1) 100 MG tablet, Take 1 tablet by mouth Daily., Disp: 30 tablet, Rfl: 0    traMADol (ULTRAM) 50 MG tablet, Take 1 tablet by mouth Every 6 (Six) Hours As Needed for Moderate Pain., Disp: , Rfl:     vitamin B-12 (CYANOCOBALAMIN) 1000 MCG tablet, Take 1 tablet by mouth Daily., Disp: , Rfl:     Allergies   Allergen Reactions    Lisinopril Angioedema    Metal Rash     Possible nickel -   Gold is only metal that doesn't have issues      Tylenol [Acetaminophen] Other (See Comments)     ckd    Atorvastatin Myalgia       Objective     Physical Exam:  Vital Signs: There were no vitals filed for this visit.  There is no height or weight on file to calculate BMI.     Mental Status Exam:   Hygiene:   good  Cooperation:  Cooperative  Eye Contact:  Good  Psychomotor Behavior:  Appropriate  Affect:  Full range  Mood: normal  Speech:  Normal  Thought Process:  Goal directed  Thought Content:  Normal  Suicidal:  None  Homicidal:  None  Hallucinations:  None  Delusion:  None  Memory:  Intact  Orientation:  Person, Place, Time, and Situation  Reliability:  good  Insight:  Good  Judgement:  Good  Impulse Control:  Good  Physical/Medical Issues:   See problem list      Assessment / Plan      Diagnosis:  Diagnoses and all orders for this visit:    1. Current moderate episode of major depressive disorder, unspecified whether recurrent (Primary)    Patient presented for follow-up with clinician.  Patient reported setback in functioning due to worsening depression symptoms.  Patient reported severe anhedonia.  Patient reported going through a daily ritual despite severe anhedonia.  Patient reported  rumination over her incidents that have caused lingering resentment with her ex-.  Patient and clinician identified and addressed corresponding cognitive distortions.  Patient was responsive to intervention.  Clinician utilized active listening and reflective response, empathy and support.    Progress toward goal: Not at goal    Prognosis: Good with ongoing treatment    PLAN:  Patient and clinician will continue to watch which identifies and addresses patient cognitive distortions related to depression.  Follow-ups will occur every 21 days and will last for 45 to 60 minutes in duration.    Safety: No acute safety concerns  Risk Assessment: Risk of self-harm acutely is low. Risk of self-harm chronically is also low, but could be further elevated in the event of treatment noncompliance and/or AODA.    Treatment Plan/Goals: Continue supportive psychotherapy efforts and medications as indicated. Treatment and medication options discussed during today's visit. Patient ackowledged and verbally consented to continue with current treatment plan and was educated on the importance of compliance with treatment and follow-up appointments. Patient seems reasonably able to adhere to treatment plan.      Assisted Patient in processing above session content; acknowledged and normalized patient’s thoughts, feelings, and concerns.  Rationalized patient thought process regarding depression.      Allowed Patient to freely discuss issues  without interruption or judgement with unconditional positive regard, active listening skills, and empathy. Therapist provided a safe, confidential environment to facilitate the development of a positive therapeutic relationship and encouraged open, honest communication. Assisted Patient in identifying risk factors which would indicate the need for higher level of care including thoughts to harm self or others and/or self-harming behavior and encouraged Patient to contact this office, call 911,  or present to the nearest emergency room should any of these events occur. Discussed crisis intervention services and means to access. Patient adamantly and convincingly denies current suicidal or homicidal ideation or perceptual disturbance. Assisted Patient in processing session content; acknowledged and normalized Patient’s thoughts, feelings, and concerns by utilizing a person-centered approach in efforts to build appropriate rapport and a positive therapeutic relationship with open and honest communication. .     Part of this note may be an electronic transcription/translation of spoken language to printed text using the Dragon Dictation System.       Follow Up:   Return in about 3 weeks (around 5/20/2025) for Next scheduled follow up.    Austin Tatum LCSW

## 2025-05-01 DIAGNOSIS — R11.0 NAUSEA: ICD-10-CM

## 2025-05-01 RX ORDER — ONDANSETRON 4 MG/1
TABLET, ORALLY DISINTEGRATING ORAL
Qty: 30 TABLET | Refills: 1 | Status: SHIPPED | OUTPATIENT
Start: 2025-05-01

## 2025-05-08 ENCOUNTER — DOCUMENTATION (OUTPATIENT)
Dept: PAIN MEDICINE | Facility: CLINIC | Age: 70
End: 2025-05-08
Payer: MEDICARE

## 2025-05-08 ENCOUNTER — OUTSIDE FACILITY SERVICE (OUTPATIENT)
Dept: PAIN MEDICINE | Facility: CLINIC | Age: 70
End: 2025-05-08
Payer: MEDICARE

## 2025-05-08 NOTE — PROGRESS NOTES
[facility]  [address] [place] [address]    PROCEDURE: Fluoroscopically-guided bilateral L2,3,4 Lumbar Medial Branch Nerve Blocks targeting the bilateral L3/4 and L4/5 facet joints  PRE-OP DIAGNOSIS: Lumbar spondylosis  POST-OP DIAGNOSIS: Lumbar spondylosis    ANTIPLATELET/ANTICOAGULANT STOP DATE: Discussed with the patient held according to LORI guidelines    CONSENT: Risks, benefits and options were explained to the patient, all questions were answered and written informed consent was obtained.  ANESTHESIA:  Local only  PROCEDURE NOTE:  A pre-procedural time out was performed to confirm the correct patient, procedure, side, and site. A sterile field was prepped in standard fashion using Chlorhexidine and draped with sterile towels. The selected medial branch nerves were identified using an ipsilateral oblique fluoroscopic view. A 25 gauge 3.5 inch spinal needle was advanced using intermittent fluoroscopy toward the junction of the transverse process and superior articulating process targeting the above listed medial branch nerves. Needle placement was confirmed with biplanar fluoroscopic imaging. Following negative aspiration, 1 mL of bupivacaine 0.5% was injected at each location. The needles were re-styletted and withdrawn. The patient's skin was cleaned with alcohol and the injection sites covered with bandages.   EBL: None  COMPLICATIONS: None      The patient was monitored until meeting established discharge criteria.  Vital signs remained stable throughout the procedure and in the recovery area. There were no immediate complications and the patient tolerated the procedure well. Sensory and motor exam was unchanged from baseline. The patient received written discharge instructions prior to discharge.  FOLLOW UP: As scheduled  ADDITIONAL NOTES: Clinic staff will call to schedule #2 medial branch block    [facility]    Sachin  MD Dwain    Codes:  [phone]  [phone]

## 2025-05-09 ENCOUNTER — TELEPHONE (OUTPATIENT)
Dept: PAIN MEDICINE | Facility: CLINIC | Age: 70
End: 2025-05-09
Payer: MEDICARE

## 2025-05-09 DIAGNOSIS — M47.817 LUMBOSACRAL SPONDYLOSIS WITHOUT MYELOPATHY: Primary | ICD-10-CM

## 2025-05-09 NOTE — TELEPHONE ENCOUNTER
Spoke with patient about pain relief from bilateral L3/L4 and L4/L5 medial branch blocks yesterday. Pt reports 90% relief and was schedules for 2Nd set

## 2025-05-12 ENCOUNTER — OFFICE VISIT (OUTPATIENT)
Dept: ORTHOPEDIC SURGERY | Facility: CLINIC | Age: 70
End: 2025-05-12
Payer: MEDICARE

## 2025-05-12 VITALS
HEIGHT: 64 IN | DIASTOLIC BLOOD PRESSURE: 82 MMHG | SYSTOLIC BLOOD PRESSURE: 138 MMHG | WEIGHT: 138 LBS | BODY MASS INDEX: 23.56 KG/M2

## 2025-05-12 DIAGNOSIS — S72.042D CLOSED FRACTURE OF BASE OF FEMORAL NECK WITH ROUTINE HEALING, LEFT: Primary | ICD-10-CM

## 2025-05-12 NOTE — PROGRESS NOTES
[facility]    Subjective     Chief Complaint   Patient presents with    Follow-up     4 month follow up -- 2 years S/P Closed reduction and internal fixation with femoral neck (5/3/23)        HPI    It has been 4  month(s) since Ms. Sierra's last visit. She returns to clinic today for postoperative follow-up of left hip fracture. The issue has been ongoing for 2 year(s). She rates her pain a 0/10 on the pain scale. Previous/current treatments: physical therapy. Current symptoms: Overall, she is doing better.  No groin pain.  Fully ambulatory without external aids.    I have reviewed the following portions of the patient's history and agree with: History of Present Illness and Review of Systems    Patient Active Problem List   Diagnosis    Primary hypertension    Leg weakness, bilateral    Syncope    CKD (chronic kidney disease) stage 3, GFR 30-59 ml/min    Neuropathy    Hyperlipidemia LDL goal <100    Endometrial adenocarcinoma    Mild episode of recurrent major depressive disorder    Dizziness    Hip fracture    Anemia    Hypomagnesemia    Hypothyroidism    Post-menopausal    Other osteoporosis without current pathological fracture    Closed fracture of second lumbar vertebra    Alcoholic ketoacidosis    Leukocytosis    GERD with esophagitis    Sepsis    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    Lisfranc dislocation, left, initial encounter    Closed fracture of left ankle    Closed displaced fracture of fourth metatarsal bone of left foot    Coronary artery disease of native artery of native heart with stable angina pectoris    Surgery follow-up     Past Medical History:   Diagnosis Date    Allergic lisinopril  2017    Anemia     Ankle sprain     Arrhythmia     Arthritis     Cancer     uterine    Cataract mild 2020    stil lpresent    Cervical disc disorder     Chicken pox     Chronic fatigue     CKD (chronic kidney disease), stage III     sees nephro    Clotting disorder      Coronary artery disease of native artery of native heart with stable angina pectoris 2024    Dental root implant present     lower left  x1 - possible dental implant    Depression mild 2019    Difficulty walking 2019    Disease of thyroid gland     Dislocation of finger     Dizzy     NIEVES (dyspnea on exertion)     2017    Fracture of ankle     Fracture of hip     Fracture, foot     Generalized anxiety disorder     GERD (gastroesophageal reflux disease) 2018    History of brachytherapy 2023    vaginal brachytherapy    Hyperlipidemia     Hypertension     Iron deficiency anemia     Liver disease     fatty    Liver problem     Low back strain     Measles     Menopause     Mumps     Neck strain     Neuroma of foot     Neuromuscular disorder Peripheral Neuropathy    Orthostatic hypotension     Periarthritis of shoulder     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    Renal insufficiency 2018    Scoliosis     Stress fracture     Tennis elbow     Unintentional weight loss     Uses contact lenses     bilat    Uterine cancer     Uterine cancer 2022    UTI (urinary tract infection)     Visual impairment Nearsighted    Wears glasses       Past Surgical History:   Procedure Laterality Date    ABDOMINAL SURGERY      Hysterectomy    ANKLE OPEN REDUCTION INTERNAL FIXATION       SECTION  1981    DILATION AND CURETTAGE, DIAGNOSTIC / THERAPEUTIC      FRACTURE SURGERY  hip     back     HIP OPEN REDUCTION Left 2023    Procedure: FEMORAL NECK OPEN REDUCTION INTERNAL FIXATION LEFT;  Surgeon: Juanpablo Lee MD;  Location:  [facility];  Service: Orthopedics;  Laterality: Left;    HIP SURGERY      JOINT REPLACEMENT  hip     OOPHORECTOMY      ORAL LESION EXCISION/BIOPSY  2021    ORIF FOOT FRACTURE Left 10/18/2024    Procedure: OPEN REDUCTION INTERNAL FIXATION MIDFOOT AND ANKLE LEFT;  Surgeon: Sergio Godoy MD;  Location:  [facility];  Service:  Orthopedics;  Laterality: Left;    TOTAL LAPAROSCOPIC HYSTERECTOMY SALPINGO OOPHORECTOMY N/A 10/28/2022    Procedure: TOTAL LAPAROSCOPIC HYSTERECTOMY BILATERAL SALPINGO-OOPHORECTOMY, INJECTION FOR SENTINEL LYMPH NODE MAPPING, BILATERAL SENTINEL LYMPH NODE DISSECTION WITH DAVINCI ROBOT;  Surgeon: Jasmine Rich MD;  Location: [facility];  Service: Robotics - DaVinci;  Laterality: N/A;    TUBAL ABDOMINAL LIGATION        Family History   Problem Relation Age of Onset    Spondylolisthesis Mother     COPD Mother     Stroke Mother     Hypertension Mother     Lung cancer Father     Hypertension Father     Heart attack Father     Coronary artery disease Father     Heart disease Father     Cancer Father     Lung disease Father     Hyperlipidemia Sister     Rheum arthritis Sister     Malig Hypertension Sister     Osteoarthritis Sister     Hypertension Sister     Scoliosis Sister     Alcohol abuse Sister          2020    Depression Sister     Migraines Sister     Hypertension Brother     Depression Sister     Early death Sister     Breast cancer Neg Hx     Ovarian cancer Neg Hx     Uterine cancer Neg Hx     Colon cancer Neg Hx     Melanoma Neg Hx     Prostate cancer Neg Hx      Social History     Socioeconomic History    Marital status:     Number of children: 1    Highest education level: Associate degree: academic program   Tobacco Use    Smoking status: Never     Passive exposure: Never    Smokeless tobacco: Never    Tobacco comments:     Never   Vaping Use    Vaping status: Never Used   Substance and Sexual Activity    Alcohol use: Yes     Alcohol/week: 6.0 standard drinks of alcohol     Types: 6 Glasses of wine per week     Comment: 6-8 glasses a week    Drug use: No    Sexual activity: Not Currently     Partners: Male     Birth control/protection: Post-menopausal, Tubal ligation, Hysterectomy      Current Outpatient Medications on File Prior to Visit   Medication Sig Dispense Refill    Alpha-Lipoic  Acid 600 MG capsule Take  by mouth Daily.      aspirin 81 MG EC tablet Take 1 tablet by mouth Daily. 90 tablet 3    Cholecalciferol 25 MCG (1000 UT) tablet Take 1 tablet by mouth Daily.      DULoxetine (CYMBALTA) 20 MG capsule Take 1 capsule by mouth 2 (Two) Times a Day. 180 capsule 1    esomeprazole (nexIUM) 20 MG capsule Take 1 capsule by mouth Every Other Day. OTC      folic acid (FOLVITE) 1 MG tablet Take 1 tablet by mouth Daily. 30 tablet 0    gabapentin (Neurontin) 300 MG capsule Take 1 capsule in the morning and 2 capsules at night. 90 capsule 5    hydrALAZINE (APRESOLINE) 25 MG tablet Take 1 tablet by mouth 2 (Two) Times a Day.      levothyroxine (SYNTHROID, LEVOTHROID) 25 MCG tablet Take 1 tablet by mouth Daily. 90 tablet 1    magnesium oxide (MAGOX) 400 (241.3 Mg) MG tablet tablet Take 1 tablet by mouth Daily. OTC      metoprolol succinate XL (Toprol XL) 25 MG 24 hr tablet Take 1 tablet by mouth Daily. 90 tablet 1    ondansetron ODT (ZOFRAN-ODT) 4 MG disintegrating tablet PLACE 1 TABLET BY MOUTH EVERY 8 HOURS AS NEEDED FOR NAUSEA AND/OR VOMITING 30 tablet 1    oxyCODONE (ROXICODONE) 5 MG immediate release tablet Take 1 tablet by mouth Every 4 (Four) Hours As Needed for Moderate Pain or Severe Pain.      rosuvastatin (CRESTOR) 5 MG tablet Take 1 tablet by mouth Daily. 90 tablet 1    thiamine (VITAMIN B1) 100 MG tablet Take 1 tablet by mouth Daily. 30 tablet 0    traMADol (ULTRAM) 50 MG tablet Take 1 tablet by mouth Every 6 (Six) Hours As Needed for Moderate Pain.      vitamin B-12 (CYANOCOBALAMIN) 1000 MCG tablet Take 1 tablet by mouth Daily.       No current facility-administered medications on file prior to visit.      Allergies   Allergen Reactions    Lisinopril Angioedema    Metal Rash     Possible nickel -   Gold is only metal that doesn't have issues      Tylenol [Acetaminophen] Other (See Comments)     ckd    Atorvastatin Myalgia        Review of Systems   Constitutional:  Negative for activity  "change, appetite change, chills, diaphoresis, fatigue, fever and unexpected weight change.   HENT:  Negative for congestion, dental problem, drooling, ear discharge, ear pain, facial swelling, hearing loss, mouth sores, nosebleeds, postnasal drip, rhinorrhea, sinus pressure, sneezing, sore throat, tinnitus, trouble swallowing and voice change.    Eyes:  Negative for photophobia, pain, discharge, redness, itching and visual disturbance.   Respiratory:  Negative for apnea, cough, choking, chest tightness, shortness of breath, wheezing and stridor.    Cardiovascular:  Negative for chest pain, palpitations and leg swelling.   Gastrointestinal:  Negative for abdominal distention, abdominal pain, anal bleeding, blood in stool, constipation, diarrhea, nausea, rectal pain and vomiting.   Endocrine: Negative for cold intolerance, heat intolerance, polydipsia, polyphagia and polyuria.   Genitourinary:  Negative for decreased urine volume, difficulty urinating, dysuria, enuresis, flank pain, frequency, genital sores, hematuria and urgency.   Musculoskeletal:  Positive for arthralgias. Negative for back pain, gait problem, joint swelling, myalgias, neck pain and neck stiffness.   Skin:  Negative for color change, pallor, rash and wound.   Allergic/Immunologic: Negative for environmental allergies, food allergies and immunocompromised state.   Neurological:  Negative for dizziness, tremors, seizures, syncope, facial asymmetry, speech difficulty, weakness, light-headedness, numbness and headaches.   Hematological:  Negative for adenopathy. Does not bruise/bleed easily.   Psychiatric/Behavioral:  Negative for agitation, behavioral problems, confusion, decreased concentration, dysphoric mood, hallucinations, self-injury, sleep disturbance and suicidal ideas. The patient is not nervous/anxious and is not hyperactive.         Objective      Physical Exam  /82   Ht 162.6 cm (64\")   Wt 62.6 kg (138 lb)   LMP  (LMP Unknown)   " BMI 23.69 kg/m²     Body mass index is 23.69 kg/m².  BMI is within normal parameters. No other follow-up for BMI required.      General:   Mental Status:  Alert   Appearance: Cooperative, in no acute distress   Build and Nutrition: Well-nourished well-developed female   Orientation: Alert and oriented to person, place and time   Posture: Normal   Gait: Normal/nonantalgic    Integument:   Left hip: Wound is well-healed with no signs of infection    Lower Extremity:   Left Hip:    Tenderness:  None    Swelling:  None    Crepitus:  None    Range of motion:  External Rotation: 40°       Internal Rotation: 40°, no pain       Flexion:  100°       Extension:  0°    Deformities:  None  Functional testing: Negative Stinchfield      Imaging/Studies  Imaging Results (Last 24 Hours)       Procedure Component Value Units Date/Time    XR Hip With or Without Pelvis 2 - 3 View Left [[id number]] Resulted: 05/12/25 1249     Updated: 05/12/25 1249    Narrative:      Left Hip Radiographs  Indication: left hip pain  Views: low AP pelvis and lateral of the left hip    Comparison: 1/27/2025    Findings:   Femoral neck fracture is healed, with no signs of hardware loosening or   failure, and no avascular changes.                Assessment and Plan     Diagnoses and all orders for this visit:    1. Closed fracture of base of femoral neck with routine healing, left (Primary)  -     XR Hip With or Without Pelvis 2 - 3 View Left        1. Closed fracture of base of femoral neck with routine healing, left          I reviewed my findings with the patient.  Her left femoral neck fracture is healed and I see no evidence of AVN now 2 years out from surgery.  I will see her back if she has any problems in the future.    Return if symptoms worsen or fail to improve.      Juanpablo Lee MD  05/12/25  12:49 EDT    Dictated Utilizing [employer]

## 2025-05-23 ENCOUNTER — TELEPHONE (OUTPATIENT)
Dept: PAIN MEDICINE | Facility: CLINIC | Age: 70
End: 2025-05-23
Payer: MEDICARE

## 2025-05-23 NOTE — TELEPHONE ENCOUNTER
Caller: Alysia Sierra    Relationship to patient: Self    Best call back number: [phone]      Chief complaint: BACK      Type of visit: OUTSIDE PROCEDURE//#2 bilateral L3/L4 and L4/L5 medial branch blocks     Requested date: PREFER LATE MORNING BECAUSE PATIENT IS COMING FROM ABOUT 40 MINUTES AWAY     If rescheduling, when is the original appointment: 05/22/2025      Additional notes:PATIENT WAS SCHEDULED FOR AN OUTSIDE PROCEDURE WITH DR LUCIANO.SHE HAD TO CANCEL DUE TO FOOD POISONING. SHE WANTS TO RESCHEDULE            
Spoke with patient and got procedure reschedule for 5/29  
Wound Check/Suture Removal

## 2025-05-29 ENCOUNTER — DOCUMENTATION (OUTPATIENT)
Dept: PAIN MEDICINE | Facility: CLINIC | Age: 70
End: 2025-05-29

## 2025-05-29 ENCOUNTER — OUTSIDE FACILITY SERVICE (OUTPATIENT)
Dept: PAIN MEDICINE | Facility: CLINIC | Age: 70
End: 2025-05-29
Payer: MEDICARE

## 2025-05-29 NOTE — PROGRESS NOTES
[facility]  [address] [place] [address]    PROCEDURE: #2 Fluoroscopically-guided bilateral L2,3,4 Lumbar Medial Branch Nerve Blocks targeting the bilateral L3/4 and L4/5 facet joints  PRE-OP DIAGNOSIS: Lumbar spondylosis  POST-OP DIAGNOSIS: Lumbar spondylosis    ANTIPLATELET/ANTICOAGULANT STOP DATE: Discussed with the patient and managed according to LORI guidelines    CONSENT: Risks, benefits and options were explained to the patient, all questions were answered and written informed consent was obtained.    ANESTHESIA:  Local only    PROCEDURE NOTE:  A pre-procedural time out was performed to confirm the correct patient, procedure, side, and site. A sterile field was prepped in standard fashion using Chlorhexidine and draped with sterile towels. The selected medial branch nerves were identified using an ipsilateral oblique fluoroscopic view. A 25 gauge 3.5 inch spinal needle was advanced using intermittent fluoroscopy toward the junction of the transverse process and superior articulating process targeting the above listed medial branch nerves. Needle placement was confirmed with biplanar fluoroscopic imaging. Following negative aspiration, 1 mL of bupivacaine 0.5% was injected at each location. The needles were re-styletted and withdrawn. The patient's skin was cleaned with alcohol and the injection sites covered with bandages.   EBL: None  COMPLICATIONS: None      The patient was monitored until meeting established discharge criteria.  Vital signs remained stable throughout the procedure and in the recovery area. There were no immediate complications and the patient tolerated the procedure well. Sensory and motor exam was unchanged from baseline. The patient received written discharge instructions prior to discharge.  FOLLOW UP: As scheduled  ADDITIONAL NOTES: Clinic staff will call to schedule  [facility]    Sachin  MD Dwain    Codes:  [phone]  [phone]

## 2025-06-03 ENCOUNTER — TELEPHONE (OUTPATIENT)
Dept: PAIN MEDICINE | Facility: CLINIC | Age: 70
End: 2025-06-03
Payer: MEDICARE

## 2025-06-03 DIAGNOSIS — M47.817 LUMBOSACRAL SPONDYLOSIS WITHOUT MYELOPATHY: Primary | ICD-10-CM

## 2025-06-03 NOTE — TELEPHONE ENCOUNTER
Caller: JAYDEN    Relationship: SELF    Best call back number: [phone]    What is the best time to reach you: ANY    Who are you requesting to speak with (clinical staff, provider,  specific staff member): CLINICAL    What was the call regarding: WOULD LIKE TO KNOW WHEN SHE WILL BE SCHEDULED FOR NEXT PROCEDURE- PLEASE CALL

## 2025-06-03 NOTE — TELEPHONE ENCOUNTER
FOLLOW-UP CALL AFTER PROCEDURE    I spoke with the patient regarding how she is feeling after her procedure with Dr. Prakash. Patient reports she is doing well.      Alysia Sierra  underwent a  #2 bilateral L3/L4 and L4/L5 medial branch blocks   on 5/29?2025.      Patient reported 100% pain relief that lasted for multiple hours.      Today, the patient reports pain has returned to baseline after 24 hours      Patient denies side effects or complications  Patient does not have any questions or concerns at this time    Disposition:      I provided information regarding date of next procedure, and that the surgery center will call the day before with his /her arrival time. Patient verbalized understanding.      Location of procedure:[address] [place] [address] ([facility]) . Patient instructed to check in at the registration desk. Patient verbalized understanding      I advised that patient must have a , and that the  must remain in the premises until after the procedure is completed and the patient is ready to be discharged. Patient verbalized understanding.      Reminded patient of instructions to stop blood thinners when indicated    Reminded NPO status: Nothing by mouth (eat or drink) for at least 8 hours prior to the procedure. Patient can take medications with a sip of water. Patient verbalized understanding

## 2025-06-12 ENCOUNTER — OUTSIDE FACILITY SERVICE (OUTPATIENT)
Dept: PAIN MEDICINE | Facility: CLINIC | Age: 70
End: 2025-06-12
Payer: MEDICARE

## 2025-06-12 ENCOUNTER — DOCUMENTATION (OUTPATIENT)
Dept: PAIN MEDICINE | Facility: CLINIC | Age: 70
End: 2025-06-12

## 2025-06-12 PROCEDURE — 99152 MOD SED SAME PHYS/QHP 5/>YRS: CPT | Performed by: STUDENT IN AN ORGANIZED HEALTH CARE EDUCATION/TRAINING PROGRAM

## 2025-06-12 PROCEDURE — 64635 DESTROY LUMB/SAC FACET JNT: CPT | Performed by: STUDENT IN AN ORGANIZED HEALTH CARE EDUCATION/TRAINING PROGRAM

## 2025-06-12 PROCEDURE — 64636 DESTROY L/S FACET JNT ADDL: CPT | Performed by: STUDENT IN AN ORGANIZED HEALTH CARE EDUCATION/TRAINING PROGRAM

## 2025-06-12 NOTE — PROGRESS NOTES
[facility]  [address] [place] [address]       PROCEDURE: Fluoroscopically-guided bilateral L2,3,4 Lumbar Medial Branch Nerve Radiofrequency Ablation (RFA) targeting the bilateral L3/4 and L4/5 facet joints     PRE-OP DIAGNOSIS: Lumbar spondylosis  POST-OP DIAGNOSIS: Same    BLOOD THINNERS (ANTIPLATELETS/ANTICOAGULANTS): Were discussed with the patient and LORI Guidelines were followed.     CONSENT: Risks, benefits and options were explained to the patient, all questions were answered and written informed consent was obtained.     ANESTHESIA: Moderate sedation was required to maintain comfort, safety, and cooperation during the procedure.  The duration of sedation service was over 10 minutes.  Patient received 2mg IV Versed and 50mcg IV fentanyl.  Independent observation and monitoring was performed by Natalya Main.  The patient's level of consciousness and physiologic status was continually monitored with pulse oximetry, EKG from 1104 to 1125.  There were no complications or adverse events during sedation.  After the sedation patient was taken to the recovery area.      PROCEDURE NOTE: A pre-procedural time out was performed to confirm the correct patient, procedure, side, and site. A sterile field was prepped in standard fashion using Chlorhexidine and draped with sterile towels. The selected medial branch nerves were identified using an ipsilateral oblique fluoroscopic view. The overlying skin and subcutaneous tissue was anesthetized with 1% lidocaine. A 18 gauge curved 10 cm RFA needle with 10 mm active tip was advanced using intermittent fluoroscopy toward the junction of the transverse process and superior articulating process at the target medial branch nerves. Testing was performed at each level with motor 2 Hz stimulation to ensure absence of nerve  root stimulation. Then 2 ml of 0.5% bupivacaine was injected to anesthetize each medial branch nerve.  Continuous lesions were then performed at 80?C for 90 seconds at each location. One lesion(s) was performed at each medial branch nerve location. The needles were withdrawn. The patient’s skin was cleaned with alcohol and the injection sites covered with bandages.     EBL: None     COMPLICATIONS: None       The patient was monitored in the recovery area until appropriate discharge criteria were met. Vital signs remained stable throughout the procedure and in the recovery area. There were no immediate complications and the patient tolerated the procedure well. Sensory and motor exam was unchanged from baseline. The patient received written discharge instructions prior to discharge.     FOLLOW UP: As scheduled     ADDITIONAL NOTES:       [facility]  Sachin Prakash MD       Codes:  [id number]  [id number]  [id number]

## 2025-06-16 ENCOUNTER — TELEPHONE (OUTPATIENT)
Dept: PAIN MEDICINE | Facility: CLINIC | Age: 70
End: 2025-06-16
Payer: MEDICARE

## 2025-06-16 DIAGNOSIS — G89.18 OTHER ACUTE POSTPROCEDURAL PAIN: Primary | ICD-10-CM

## 2025-06-16 RX ORDER — METHYLPREDNISOLONE 4 MG/1
TABLET ORAL
Qty: 21 TABLET | Refills: 0 | Status: SHIPPED | OUTPATIENT
Start: 2025-06-16

## 2025-06-16 NOTE — TELEPHONE ENCOUNTER
"Patient called because she states she says she is currently having severe pain after her RFTC with Dr Prakash 4 days ago, pain level 8-9. She states she is having difficulty walking to the mailbox, doing dishes or laundry, etc. She has tried using ice, wearing her brace, and taking aspirin. She states she cannot \"go on like this.\"    I advised her that it is normal to have significant pain in the days after a rhizotomy. Do you have any recommendations for the patient?          "

## 2025-06-16 NOTE — TELEPHONE ENCOUNTER
Called patient to discuss postprocedural pain.  She was having some soreness around the incision site the day after her procedure but this subsided with ice.  Over the weekend she was having significant lower back pain.  Denies any pain radiating to the lower extremities.  Denies any systemic or local signs of infection.  Denies any changes within her bowel or bladder.  She has been taking aspirin without any significant relief.  Will send oral Medrol Dosepak to help alleviate symptoms.

## 2025-07-24 ENCOUNTER — OFFICE VISIT (OUTPATIENT)
Dept: BEHAVIORAL HEALTH | Facility: CLINIC | Age: 70
End: 2025-07-24
Payer: MEDICARE

## 2025-07-24 DIAGNOSIS — F34.1 PERSISTENT DEPRESSIVE DISORDER: Primary | ICD-10-CM

## 2025-07-24 NOTE — PROGRESS NOTES
[facility]                 Follow Up Adult      Follow Up Adult Note     Date:2025   Patient Name: Alysia Sierra  : 1955   MRN: [id number]   Time IN: 1:00 pm    Time OUT: 1:50 pm     Referring Provider: Cassy Foster MD    Chief Complaint:      ICD-10-CM ICD-9-CM   1. Persistent depressive disorder  F34.1 300.4        History of Present Illness:   Alysia Sierra is a 70 y.o. female who is being seen today for follow up counseling for depression.    Subjective               Patient's Support Network Includes: extended family    Functional Status: Moderate impairment       Current Outpatient Medications:     Alpha-Lipoic Acid 600 MG capsule, Take  by mouth Daily., Disp: , Rfl:     aspirin 81 MG EC tablet, Take 1 tablet by mouth Daily., Disp: 90 tablet, Rfl: 3    Cholecalciferol 25 MCG (1000 UT) tablet, Take 1 tablet by mouth Daily., Disp: , Rfl:     DULoxetine (CYMBALTA) 20 MG capsule, Take 1 capsule by mouth 2 (Two) Times a Day., Disp: 180 capsule, Rfl: 1    esomeprazole (nexIUM) 20 MG capsule, Take 1 capsule by mouth Every Other Day. OTC, Disp: , Rfl:     folic acid (FOLVITE) 1 MG tablet, Take 1 tablet by mouth Daily., Disp: 30 tablet, Rfl: 0    gabapentin (Neurontin) 300 MG capsule, Take 1 capsule in the morning and 2 capsules at night., Disp: 90 capsule, Rfl: 5    hydrALAZINE (APRESOLINE) 25 MG tablet, Take 1 tablet by mouth 2 (Two) Times a Day., Disp: , Rfl:     levothyroxine (SYNTHROID, LEVOTHROID) 25 MCG tablet, Take 1 tablet by mouth Daily., Disp: 90 tablet, Rfl: 1    magnesium oxide (MAGOX) 400 (241.3 Mg) MG tablet tablet, Take 1 tablet by mouth Daily. OTC, Disp: , Rfl:     methylPREDNISolone (MEDROL) 4 MG dose pack, Take as directed on package instructions., Disp: 21 tablet, Rfl: 0    metoprolol succinate XL (Toprol XL) 25 MG 24 hr tablet, Take 1 tablet by mouth Daily., Disp: 90 tablet, Rfl: 1    ondansetron ODT (ZOFRAN-ODT) 4 MG  disintegrating tablet, PLACE 1 TABLET BY MOUTH EVERY 8 HOURS AS NEEDED FOR NAUSEA AND/OR VOMITING, Disp: 30 tablet, Rfl: 1    oxyCODONE (ROXICODONE) 5 MG immediate release tablet, Take 1 tablet by mouth Every 4 (Four) Hours As Needed for Moderate Pain or Severe Pain., Disp: , Rfl:     rosuvastatin (CRESTOR) 5 MG tablet, Take 1 tablet by mouth Daily., Disp: 90 tablet, Rfl: 1    thiamine (VITAMIN B1) 100 MG tablet, Take 1 tablet by mouth Daily., Disp: 30 tablet, Rfl: 0    traMADol (ULTRAM) 50 MG tablet, Take 1 tablet by mouth Every 6 (Six) Hours As Needed for Moderate Pain., Disp: , Rfl:     vitamin B-12 (CYANOCOBALAMIN) 1000 MCG tablet, Take 1 tablet by mouth Daily., Disp: , Rfl:     Allergies   Allergen Reactions    Lisinopril Angioedema    Metal Rash     Possible nickel -   Gold is only metal that doesn't have issues      Tylenol [Acetaminophen] Other (See Comments)     ckd    Atorvastatin Myalgia       Objective     Physical Exam:  Vital Signs: There were no vitals filed for this visit.  There is no height or weight on file to calculate BMI.     Mental Status Exam:   Hygiene:   good  Cooperation:  Cooperative  Eye Contact:  Good  Psychomotor Behavior:  Appropriate  Affect:  Full range  Mood: normal  Speech:  Normal  Thought Process:  Goal directed  Thought Content:  Normal  Suicidal:  None  Homicidal:  None  Hallucinations:  None  Delusion:  None  Memory:  Intact  Orientation:  Person, Place, Time, and Situation  Reliability:  good  Insight:  Good  Judgement:  Good  Impulse Control:  Good  Physical/Medical Issues:  See problem list     Assessment / Plan      Diagnosis:  Diagnoses and all orders for this visit:    1. Persistent depressive disorder (Primary)    Patient presented for follow-up with clinician.  Patient and clinician identified and addressed patient cognitive distortions related to depression.  Patient and clinician reviewed coping mechanisms designed to alleviate symptoms of depression.  Clinician  utilized active listening and reflective response to convey empathy and support.    Progress toward goal: Not at goal    Prognosis: Good with ongoing treatment    PLAN:  Patient and clinician will continue to utilize cognitive behavioral intervention which identifies and addresses patient cognitive distortions related to depression.  Follow-ups will occur every 21 days and will last for 45 to 60 minutes in duration.    Safety: No acute safety concerns  Risk Assessment: Risk of self-harm acutely is low. Risk of self-harm chronically is also low, but could be further elevated in the event of treatment noncompliance and/or AODA.    Treatment Plan/Goals: Continue supportive psychotherapy efforts and medications as indicated. Treatment and medication options discussed during today's visit. Patient ackowledged and verbally consented to continue with current treatment plan and was educated on the importance of compliance with treatment and follow-up appointments. Patient seems reasonably able to adhere to treatment plan.      Assisted Patient in processing above session content; acknowledged and normalized patient’s thoughts, feelings, and concerns.  Rationalized patient thought process regarding depression.      Allowed Patient to freely discuss issues  without interruption or judgement with unconditional positive regard, active listening skills, and empathy. Therapist provided a safe, confidential environment to facilitate the development of a positive therapeutic relationship and encouraged open, honest communication. Assisted Patient in identifying risk factors which would indicate the need for higher level of care including thoughts to harm self or others and/or self-harming behavior and encouraged Patient to contact this office, call 911, or present to the nearest emergency room should any of these events occur. Discussed crisis intervention services and means to access. Patient adamantly and convincingly denies  current suicidal or homicidal ideation or perceptual disturbance. Assisted Patient in processing session content; acknowledged and normalized Patient’s thoughts, feelings, and concerns by utilizing a person-centered approach in efforts to build appropriate rapport and a positive therapeutic relationship with open and honest communication. .     Part of this note may be an electronic transcription/translation of spoken language to printed text using the Dragon Dictation System.       Follow Up:   Return in about 3 weeks (around 8/14/2025) for Next scheduled follow up.    Austin Tatum LCSW

## 2025-07-30 ENCOUNTER — OFFICE VISIT (OUTPATIENT)
Dept: PAIN MEDICINE | Facility: CLINIC | Age: 70
End: 2025-07-30
Payer: MEDICARE

## 2025-07-30 VITALS — BODY MASS INDEX: 23.73 KG/M2 | WEIGHT: 139 LBS | HEIGHT: 64 IN

## 2025-07-30 DIAGNOSIS — M48.062 SPINAL STENOSIS OF LUMBAR REGION WITH NEUROGENIC CLAUDICATION: Primary | ICD-10-CM

## 2025-07-30 DIAGNOSIS — M41.26 OTHER IDIOPATHIC SCOLIOSIS, LUMBAR REGION: ICD-10-CM

## 2025-07-30 DIAGNOSIS — M79.7 FIBROMYALGIA: ICD-10-CM

## 2025-07-30 DIAGNOSIS — G89.4 CHRONIC PAIN SYNDROME: ICD-10-CM

## 2025-07-30 DIAGNOSIS — M47.817 LUMBOSACRAL SPONDYLOSIS WITHOUT MYELOPATHY: ICD-10-CM

## 2025-07-30 NOTE — PROGRESS NOTES
Referring Physician: No referring provider defined for this encounter.    Primary Physician: Cassy Foster MD    CHIEF COMPLAINT or REASON FOR VISIT: Back Pain and Follow-up      Initial history of present illness on 04/23/2025:  Ms. Alysia Sierra is 70 y.o. female who presents as a new patient referral for evaluation and treatment of chronic low back pain.  Patient has had axial back pain for many years without any specific inciting event or trauma.  She notes recent severe progression.  She does use a cane for ambulation but this is for balance issues.  She denies any radicular symptoms.  She does have bilateral neuropathy in her feet.  She denies any history of spinal surgery intervention.  She has done physical therapy without benefit.  She cannot take NSAIDs due to her kidneys.  Patient denies any new onset bowel or bladder dysfunction, lower extremity weakness, saddle anesthesia or unexplained weight loss.       Interval history:  Patient returns to clinic today after undergoing lumbar medial branch blocks and subsequent ablation.  Unfortunately, she did have increased pain following her ablation.  A Medrol Dosepak was prescribed which did provide resolution of acute pain.  She has noticed pain relief from her ablation.  Despite this pain relief she continues to have axial low back pain.  She is comfortable at rest.  Pain is exacerbated with prolonged standing or ambulation.  She has been taking tramadol about once a week for increased pain.  She is interested in additional strategies to help alleviate her pain.  She does feel as if her legs get a little weaker whenever standing or walking for prolonged distances.    Interventions:  5/8/2025: Bilateral L3/4 and L4/5 MBB with 90% relief  5/29/2025: Bilateral L3/4 and L4/5 MBB with 100% relief  6/12/2025: Bilateral L3/4 and L4/5 RFA with 85% relief    Objective Pain Scoring:   BRIEF PAIN INVENTORY:  Total score:   Pain Score    07/30/25 1008   PainSc: 3     PainLoc: Back      PHQ-2: 1  PHQ-9:    Opioid Risk Tool:         Review of Systems:   ROS negative except as otherwise noted     Past Medical History:   Past Medical History:   Diagnosis Date    Allergic lisinopril  2017    Anemia     Ankle sprain     Arrhythmia     Arthritis     Cancer     uterine    Cataract mild 2020    stil lpresent    Cervical disc disorder     Chicken pox     Chronic fatigue     CKD (chronic kidney disease), stage III     sees nephro    Clotting disorder     Coronary artery disease of native artery of native heart with stable angina pectoris 2024    Dental root implant present     lower left  x1 - possible dental implant    Depression mild 2019    Difficulty walking 2019    Disease of thyroid gland     Dislocation of finger     Dizzy     NIEVES (dyspnea on exertion)     2017    Fracture of ankle     Fracture of hip     Fracture, foot     Generalized anxiety disorder     GERD (gastroesophageal reflux disease) 2018    History of brachytherapy 2023    vaginal brachytherapy    Hyperlipidemia     Hypertension     Iron deficiency anemia     Liver disease     fatty    Liver problem     Low back strain     Measles     Menopause     Mumps     Neck strain     Neuroma of foot     Neuromuscular disorder Peripheral Neuropathy    Orthostatic hypotension     Periarthritis of shoulder     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    Renal insufficiency 2018    Scoliosis     Stress fracture     Tennis elbow     Unintentional weight loss     Uses contact lenses     bilat    Uterine cancer     Uterine cancer 2022    UTI (urinary tract infection)     Visual impairment Nearsighted    Wears glasses          Past Surgical History:   Past Surgical History:   Procedure Laterality Date    ABDOMINAL SURGERY      Hysterectomy    ANKLE OPEN REDUCTION INTERNAL FIXATION       SECTION      DILATION AND CURETTAGE, DIAGNOSTIC / THERAPEUTIC      FRACTURE  SURGERY  hip     back     HIP OPEN REDUCTION Left 2023    Procedure: FEMORAL NECK OPEN REDUCTION INTERNAL FIXATION LEFT;  Surgeon: Juanpablo Lee MD;  Location:  [facility];  Service: Orthopedics;  Laterality: Left;    HIP SURGERY      JOINT REPLACEMENT  hip     OOPHORECTOMY      ORAL LESION EXCISION/BIOPSY  2021    ORIF FOOT FRACTURE Left 10/18/2024    Procedure: OPEN REDUCTION INTERNAL FIXATION MIDFOOT AND ANKLE LEFT;  Surgeon: Sergio Godoy MD;  Location:  [facility];  Service: Orthopedics;  Laterality: Left;    TOTAL LAPAROSCOPIC HYSTERECTOMY SALPINGO OOPHORECTOMY N/A 10/28/2022    Procedure: TOTAL LAPAROSCOPIC HYSTERECTOMY BILATERAL SALPINGO-OOPHORECTOMY, INJECTION FOR SENTINEL LYMPH NODE MAPPING, BILATERAL SENTINEL LYMPH NODE DISSECTION WITH DAVINCI ROBOT;  Surgeon: Jasmine Rich MD;  Location:  [facility];  Service: Robotics - DaVinci;  Laterality: N/A;    TUBAL ABDOMINAL LIGATION           Family History   Family History   Problem Relation Age of Onset    Spondylolisthesis Mother     COPD Mother     Stroke Mother     Hypertension Mother     Lung cancer Father     Hypertension Father     Heart attack Father     Coronary artery disease Father     Heart disease Father     Cancer Father     Lung disease Father     Hyperlipidemia Sister     Rheum arthritis Sister     Malig Hypertension Sister     Osteoarthritis Sister     Hypertension Sister     Scoliosis Sister     Alcohol abuse Sister              Depression Sister     Migraines Sister     Hypertension Brother     Depression Sister     Early death Sister     Breast cancer Neg Hx     Ovarian cancer Neg Hx     Uterine cancer Neg Hx     Colon cancer Neg Hx     Melanoma Neg Hx     Prostate cancer Neg Hx          Social History   Social History     Socioeconomic History    Marital status:     Number of children: 1    Highest education level: Associate degree: academic program   Tobacco Use    Smoking status: Never      Passive exposure: Never    Smokeless tobacco: Never    Tobacco comments:     Never   Vaping Use    Vaping status: Never Used   Substance and Sexual Activity    Alcohol use: Yes     Alcohol/week: 6.0 standard drinks of alcohol     Types: 6 Glasses of wine per week     Comment: 6-8 glasses a week    Drug use: No    Sexual activity: Not Currently     Partners: Male     Birth control/protection: Post-menopausal, Tubal ligation, Hysterectomy        Medications:     Current Outpatient Medications:     Alpha-Lipoic Acid 600 MG capsule, Take  by mouth Daily., Disp: , Rfl:     Cholecalciferol 25 MCG (1000 UT) tablet, Take 1 tablet by mouth Daily., Disp: , Rfl:     DULoxetine (CYMBALTA) 20 MG capsule, Take 1 capsule by mouth 2 (Two) Times a Day., Disp: 180 capsule, Rfl: 1    esomeprazole (nexIUM) 20 MG capsule, Take 1 capsule by mouth Every Other Day. OTC, Disp: , Rfl:     folic acid (FOLVITE) 1 MG tablet, Take 1 tablet by mouth Daily., Disp: 30 tablet, Rfl: 0    gabapentin (Neurontin) 300 MG capsule, Take 1 capsule in the morning and 2 capsules at night., Disp: 90 capsule, Rfl: 5    hydrALAZINE (APRESOLINE) 25 MG tablet, Take 1 tablet by mouth 2 (Two) Times a Day., Disp: , Rfl:     levothyroxine (SYNTHROID, LEVOTHROID) 25 MCG tablet, Take 1 tablet by mouth Daily., Disp: 90 tablet, Rfl: 1    magnesium oxide (MAGOX) 400 (241.3 Mg) MG tablet tablet, Take 1 tablet by mouth Daily. OTC, Disp: , Rfl:     metoprolol succinate XL (Toprol XL) 25 MG 24 hr tablet, Take 1 tablet by mouth Daily., Disp: 90 tablet, Rfl: 1    ondansetron ODT (ZOFRAN-ODT) 4 MG disintegrating tablet, PLACE 1 TABLET BY MOUTH EVERY 8 HOURS AS NEEDED FOR NAUSEA AND/OR VOMITING, Disp: 30 tablet, Rfl: 1    oxyCODONE (ROXICODONE) 5 MG immediate release tablet, Take 1 tablet by mouth Every 4 (Four) Hours As Needed for Moderate Pain or Severe Pain., Disp: , Rfl:     rosuvastatin (CRESTOR) 5 MG tablet, Take 1 tablet by mouth Daily., Disp: 90 tablet, Rfl: 1    thiamine  "(VITAMIN B1) 100 MG tablet, Take 1 tablet by mouth Daily., Disp: 30 tablet, Rfl: 0    traMADol (ULTRAM) 50 MG tablet, Take 1 tablet by mouth Every 6 (Six) Hours As Needed for Moderate Pain., Disp: , Rfl:     vitamin B-12 (CYANOCOBALAMIN) 1000 MCG tablet, Take 1 tablet by mouth Daily., Disp: , Rfl:     aspirin 81 MG EC tablet, Take 1 tablet by mouth Daily. (Patient not taking: Reported on 7/30/2025), Disp: 90 tablet, Rfl: 3    methylPREDNISolone (MEDROL) 4 MG dose pack, Take as directed on package instructions. (Patient not taking: Reported on 7/30/2025), Disp: 21 tablet, Rfl: 0        Physical Exam:     Vitals:    07/30/25 1008   Weight: 63 kg (139 lb)   Height: 162.6 cm (64\")   PainSc: 3    PainLoc: Back        General: Alert and oriented, No acute distress.   HEENT: Normocephalic, atraumatic.   Cardiovascular: No gross edema  Respiratory: Respirations are non-labored      Lumbar Spine:   No masses or atrophy  Range of motion - Flexion normal. Extension normal.    Facet Loading: Positive bilaterally  Facet Palpation -tender bilaterally  Jayde finger/Gaenslen's/Silvio's/ОЛЕГ/Thigh thrust -   Straight leg raise/slump test: Negative bilaterally  Multifidus toe-touch test:    Motor Exam:      Strength: Rate on 1-5 scale Right Left    L1/2- hip flexion 5/5  5/5    L3- knee extension 5/5  5/5    L4- ankle dorsiflexion 5/5  5/5    L5- great toe extension 5/5  5/5    S1- ankle plantarflexion 5/5  5/5    Sensory Exam: Full and equal sensation to light touch throughout.      Neurologic: Cranial Nerves II-XII are grossly intact.     Psychiatric: Cooperative.   Gait: Antalgic flexed forward, unsteady  Assistive Devices: Straight cane    Imaging Studies:   Results for orders placed during the hospital encounter of 09/18/20    MRI Lumbar Spine Without Contrast    Narrative  EXAMINATION: MRI CERVICAL SPINE WO CONTRAST, MRI LUMBAR SPINE WO  CONTRAST - 09/18/2020    INDICATION: Bilateral arm weakness and chronic neck pain. " R29.898-Other  symptoms and signs involving the musculoskeletal system.    TECHNIQUE: Multiplanar MRI of the cervical spine without intervenous  contrast, multiplanar MRI of the lumbar spine without intravenous  contrast.    COMPARISON: None.    FINDINGS:    CERVICAL SPINE: Sagittal datasets reveal flattening and mild reversal of  the cervical lordotic curvature without focal subluxation or listhesis.  Vertebral body heights are preserved without compression deformity or  fracture. Intervertebral disc height space loss and degeneration of the  mid and lower cervical segments detailed further below on a  level-by-level basis. Cervicomedullary junction widely patent. Axial  datasets evaluation without paraspinal hematoma or paraspinal soft  tissue abnormalities of acuity.    Level-by-level details for spinal canal or neuroforaminal patency as  below:    C2-C3: Mild disc osteophyte complex with mild left lateralizing features  of predominance along with mild to moderate right and mild left  uncovertebral spurring and hypertrophy producing mild intrathecal sac  effacement with mild right and mild to moderate left neuroforaminal  stenoses.  C3-C4: Mild disc osteophyte complex along with mild to moderate right  and moderate to severe left asymmetric involvement of uncovertebral  spurring and hypertrophy producing mild intrathecal sac effacement with  moderate left neuroforaminal stenosis.  C4-C5: Mild to moderate disc osteophyte complex asymmetric and  involvement with mild to moderate uncovertebral spurring and hypertrophy  bilaterally producing mild anterior thecal sac effacement with mild  bilateral neuroforaminal stenoses.  C5-C6: Large disc osteophyte complex with slight left lateralizing  features of predominance along with moderate uncovertebral spurring and  hypertrophy producing in total mild to moderate left paracentral spinal  canal stenosis with mild to moderate right and moderate to severe  left  neuroforaminal stenoses.  C6-C7: Large disc osteophyte complex with right lateralizing features of  predominance and irregularity along with mild to moderate uncovertebral  spurring and hypertrophy producing mild to moderate spinal canal  stenosis and lateral recess involvement with moderate right and mild to  moderate left neuroforaminal stenoses.  C7-T1: Mild disc ossified complex without spinal canal or neuroforaminal  stenosis.    LUMBAR SPINE: Mild to moderately levoscoliotic curvature lumbar spine  apex angulation L2-L3 without significant rotatory component. Sagittal  datasets without focal subluxation or listhesis in the  anterior/posterior direction and vertebral body heights preserved  without compression deformity or fracture. No aggressive bone mineral  signal findings with scattered benign spinal hemangiomas throughout.  Distal spinal cord, conus and cauda equina are grossly unremarkable for  caliber and contour evaluation. Axial datasets evaluation without  paraspinal hematoma.    Level by level details for spinal canal or neuroforaminal patency as  below:    L1-L2: Mild ligamentum flavum thickening without spinal canal stenosis  and only mild right neuroforaminal stenosis.  L2-L3: Mild circumferential disc bulge with mild ligamentum flavum  thickening producing mild right spinal canal stenosis with mild right  neuroforaminal stenosis.  L3-L4: Mild to moderate circumferential disc bulge along with mild to  moderate ligamentum flavum thickening and facet hypertrophy producing  mild spinal canal stenosis with no neuroforaminal stenoses.  L4-L5: Mild to moderate circumferential disc bulge with slight left  lateral features of predominance along with mild to moderate ligamentum  flavum thickening and facet hypertrophy producing mild left paracentral  spinal canal stenosis with mild left neuroforaminal stenosis.  L5-S1: Circumferential disc bulge along with mild ligamentum flavum  thickening and facet  hypertrophy producing mild left paracentral spinal  canal stenosis with mild bilateral neuroforaminal stenoses.    Impression  1. Cervical spine evaluation demonstrates multilevel spondylitic changes  greatest at the C5-C6 and C6-C7 levels. At the C5-C6 level, there is  mild to moderate left paracentral spinal canal stenosis with mild to  moderate right and moderate to severe left neuroforaminal stenoses. At  the C6-C7 level, large disc osteophyte complex with right lateralizing  features producing mild to moderate spinal canal stenosis and right  lateral recess involvement with moderate right and mild to moderate left  neuroforaminal stenoses.    2. Mild to moderate levoscoliotic curvature lumbar spine with multilevel  spondylitic changes overall mild to moderate involvement without severe  or dominant focus of severe stenosis greatest findings at the L4-L5  level with there is mild left paracentral spinal canal stenosis with  mild left neuroforaminal stenosis and left subarticular recess  narrowing.    DICTATED:   09/18/2020  EDITED/ls :   09/18/2020    This report was finalized on 9/21/2020 12:42 PM by Dr. Michael Nolasco.        Independent review of radiographic imaging: Available for my interpretation is lumbar MRI dated September 18, 2020 demonstrating facet osteophyte within the right L2/L3 neuroforamen; epidural lipomatosis; facet arthropathy, scoliosis.    Recent CT abdomen pelvis does demonstrate presence of disc osteophyte in L1/L2 and the right neuro foramen and facet osteophyte in the right L2/L3 neuroforamen.    Impression & Plan:       04/23/2025: Alysia Sierra is a 70 y.o. female with past medical history significant for HTN, CKD, HLD, hip fracture, GERD, uterine cancer status post brachytherapy, left Lisfranc fracture who presents to the pain clinic for evaluation and treatment of chronic axial low back pain.  MRI interpretation above.  Evaluation consistent with lumbosacral spondylosis.  We  discussed lumbar medial branch blocks and appropriate radiofrequency ablation.  I discussed the natural course of degenerative disc disease and spondylosis as well as most common causative factors including obesity, cigarette smoking, age, activity and genetics.  I had a discussion with the patient regarding the risks of the procedure including bleeding, infection, damage to surrounding structures.  We discussed the potential adverse effects of corticosteroid injection including flushing of the face, lipodystrophy, skin discoloration, elevated blood glucose, increased blood pressure.  Risks of frequent steroid administration include weight gain, hormonal changes, mood changes, osteoporosis.  7/30/2025: Good relief from lumbar RFA; continued low back pain.  Will obtain updated lumbar MRI for suspicion of spinal stenosis with neurogenic claudication.  If no significant changes may consider SCS trial.  Discussed in detail today    1. Spinal stenosis of lumbar region with neurogenic claudication    2. Lumbosacral spondylosis without myelopathy    3. Other idiopathic scoliosis, lumbar region    4. Chronic pain syndrome    5. Fibromyalgia          PLAN:  1. Medications:      2. Physical Therapy: Continue HEP.  No benefit with PT in the past    3. Psychological: May consider psych clearance if pursuing SCS trial  4. Complementary and alternative (CAM) Therapies:     5. Labs/Diagnostic studies: None indicated     6. Imaging: Obtain updated lumbar MRI without contrast    7. Interventions: Will defer until after updated imaging is complete.  If no significant changes with updated lumbar MRI may consider SCS trial    8. Referrals:     9. Records:    10. Lifestyle goals:    Follow-up 1 month      [facility]  Jenna Schumacher PA-C        Quality Metrics:                Please note that portions of this note were completed with a voice recognition program.      Any copied data in any portion  of my note from previous notes included in the HPI, PE, MDM and/or assessment and plan has been reviewed by myself and accurate as of this date.      The 21st Century Cures Act makes medical notes like this available to patients in the interest of transparency. This is a medical document intended as peer to peer communication. It is written in medical language and may contain abbreviations or verbiage that are unfamiliar. It may appear blunt or direct. Medical documents are intended to carry relevant information, facts as evident, and the clinical opinion of the practitioner.

## 2025-07-31 ENCOUNTER — TELEPHONE (OUTPATIENT)
Dept: PAIN MEDICINE | Facility: CLINIC | Age: 70
End: 2025-07-31
Payer: MEDICARE

## 2025-07-31 RX ORDER — TRAMADOL HYDROCHLORIDE 50 MG/1
50 TABLET ORAL EVERY 6 HOURS PRN
OUTPATIENT
Start: 2025-07-31

## 2025-07-31 NOTE — TELEPHONE ENCOUNTER
HUB to relay message, please give message below.  Due to Tramadol being controlled, will need appt for Dr. Foster to prescribe

## 2025-07-31 NOTE — TELEPHONE ENCOUNTER
Name: Alysia Sierra      Relationship: Self      Best Callback Number: [phone]      HUB PROVIDED THE RELAY MESSAGE FROM THE OFFICE    HUB to relay message, please give message below.  Due to Tramadol being controlled, will need appt for Dr. Foster to prescribe      PATIENT: VOICED UNDERSTANDING AND HAS NO FURTHER QUESTIONS AT THIS TIME    ADDITIONAL INFORMATION: WILL CHECK WITH HER PAIN MANAGEMENT / BACK DOCTOR FIRST.  WILL CALL BACK TO SCHEDULE APPOINTMENT IF SHE NEEDS TO

## 2025-07-31 NOTE — TELEPHONE ENCOUNTER
Caller: Alysia Sierra    Relationship: Self    Best call back number: [phone]     Requested Prescriptions:   Requested Prescriptions     Pending Prescriptions Disp Refills    traMADol (ULTRAM) 50 MG tablet       Sig: Take 1 tablet by mouth Every 6 (Six) Hours As Needed for Moderate Pain.      PATIENT HAS BACK ISSUES AND HAS BEEN SEEING A PROVIDER. SHE HAS A LOT OF PAIN AT TIMES AND HAD A OLDER PRESCRIPTION OF TRAMADOL WHEN SHE BROKE HER FOOT. SHE STATED SHE MAY TAKE IT ONE A WEEK WHEN THE PAIN IS JUST TOO MUCH. REQUESTING TO SEE IF PCP WOULD CALL IN A PRESCRIPTION PLEASE. CALL PATIENT    Pharmacy where request should be sent: [facility] [id number] [address] [phone] [facility] [phone] FX     Last office visit with prescribing clinician: 4/14/2025   Last telemedicine visit with prescribing clinician: Visit date not found   Next office visit with prescribing clinician: 10/15/2025         Does the patient have less than a 3 day supply:  [x] Yes  [] No    Would you like a call back once the refill request has been completed: [x] Yes [] No    If the office needs to give you a call back, can they leave a voicemail: [x] Yes [] No    Jessi Kerr Rep   07/31/25 11:39 EDT

## 2025-07-31 NOTE — TELEPHONE ENCOUNTER
Caller: JAYDEN MAGANA    Phone Number: [phone]    Reason for Call:   PATIENT CALLED IN TO INFORM SHE HAS METAL IN HER HIP AND FOOT AND HAS SOME CONCERN IF THAT'S OKAY FOR HER TO GET AN MRI.     PATIENT HAS AN APPOINTMENT WITH HER PRIMARY CARE TWO MONTHS AWAY AND PATIENT DOESN'T WANT TO WAIT THAT LONG DEALING WITH A PAIN LEVEL 9 OF HER BACK PAIN AT LEAST ONCE A WEEK.

## 2025-08-06 ENCOUNTER — OFFICE VISIT (OUTPATIENT)
Dept: GYNECOLOGIC ONCOLOGY | Facility: CLINIC | Age: 70
End: 2025-08-06
Payer: MEDICARE

## 2025-08-06 VITALS
TEMPERATURE: 98 F | DIASTOLIC BLOOD PRESSURE: 81 MMHG | OXYGEN SATURATION: 100 % | SYSTOLIC BLOOD PRESSURE: 166 MMHG | BODY MASS INDEX: 23.47 KG/M2 | HEART RATE: 104 BPM | RESPIRATION RATE: 18 BRPM | WEIGHT: 137.5 LBS | HEIGHT: 64 IN

## 2025-08-06 DIAGNOSIS — Z85.42 HISTORY OF ENDOMETRIAL CANCER: Primary | ICD-10-CM

## 2025-08-06 DIAGNOSIS — Z01.419 ENCOUNTER FOR GYNECOLOGICAL EXAMINATION: ICD-10-CM

## 2025-08-06 RX ORDER — DEXAMETHASONE 4 MG/1
4 TABLET ORAL 2 TIMES DAILY WITH MEALS
COMMUNITY
Start: 2025-08-05

## 2025-08-07 LAB — REF LAB TEST METHOD: NORMAL

## 2025-08-13 ENCOUNTER — OFFICE VISIT (OUTPATIENT)
Dept: BEHAVIORAL HEALTH | Facility: CLINIC | Age: 70
End: 2025-08-13
Payer: MEDICARE

## 2025-08-13 DIAGNOSIS — F34.1 PERSISTENT DEPRESSIVE DISORDER: Primary | ICD-10-CM

## 2025-08-14 ENCOUNTER — OFFICE VISIT (OUTPATIENT)
Dept: NEUROLOGY | Facility: CLINIC | Age: 70
End: 2025-08-14
Payer: MEDICARE

## 2025-08-14 VITALS
SYSTOLIC BLOOD PRESSURE: 124 MMHG | OXYGEN SATURATION: 98 % | HEART RATE: 84 BPM | HEIGHT: 64 IN | WEIGHT: 137 LBS | DIASTOLIC BLOOD PRESSURE: 80 MMHG | BODY MASS INDEX: 23.39 KG/M2

## 2025-08-14 DIAGNOSIS — M79.7 FIBROMYALGIA: ICD-10-CM

## 2025-08-14 DIAGNOSIS — M54.12 CERVICAL RADICULOPATHY AT C6: ICD-10-CM

## 2025-08-14 DIAGNOSIS — F33.0 MILD EPISODE OF RECURRENT MAJOR DEPRESSIVE DISORDER: ICD-10-CM

## 2025-08-14 DIAGNOSIS — G62.9 NEUROPATHY: Primary | ICD-10-CM

## 2025-08-14 PROCEDURE — 1160F RVW MEDS BY RX/DR IN RCRD: CPT | Performed by: PSYCHIATRY & NEUROLOGY

## 2025-08-14 PROCEDURE — 1159F MED LIST DOCD IN RCRD: CPT | Performed by: PSYCHIATRY & NEUROLOGY

## 2025-08-14 PROCEDURE — 3079F DIAST BP 80-89 MM HG: CPT | Performed by: PSYCHIATRY & NEUROLOGY

## 2025-08-14 PROCEDURE — 99214 OFFICE O/P EST MOD 30 MIN: CPT | Performed by: PSYCHIATRY & NEUROLOGY

## 2025-08-14 PROCEDURE — 3074F SYST BP LT 130 MM HG: CPT | Performed by: PSYCHIATRY & NEUROLOGY

## 2025-08-14 RX ORDER — GABAPENTIN 300 MG/1
CAPSULE ORAL
Qty: 90 CAPSULE | Refills: 5 | Status: SHIPPED | OUTPATIENT
Start: 2025-08-14

## 2025-08-14 RX ORDER — DULOXETIN HYDROCHLORIDE 20 MG/1
20 CAPSULE, DELAYED RELEASE ORAL 2 TIMES DAILY
Qty: 180 CAPSULE | Refills: 1 | Status: SHIPPED | OUTPATIENT
Start: 2025-08-14

## 2025-08-19 ENCOUNTER — HOSPITAL ENCOUNTER (OUTPATIENT)
Dept: MRI IMAGING | Facility: HOSPITAL | Age: 70
Discharge: HOME | End: 2025-08-19
Payer: MEDICARE

## 2025-08-19 PROCEDURE — 72148 MRI LUMBAR SPINE W/O DYE: CPT

## 2025-08-27 ENCOUNTER — OFFICE VISIT (OUTPATIENT)
Dept: BEHAVIORAL HEALTH | Facility: CLINIC | Age: 70
End: 2025-08-27
Payer: MEDICARE

## 2025-08-27 ENCOUNTER — OFFICE VISIT (OUTPATIENT)
Dept: PAIN MEDICINE | Facility: CLINIC | Age: 70
End: 2025-08-27
Payer: MEDICARE

## 2025-08-27 VITALS — HEIGHT: 64 IN | WEIGHT: 140.6 LBS | BODY MASS INDEX: 24.01 KG/M2

## 2025-08-27 DIAGNOSIS — M47.817 LUMBOSACRAL SPONDYLOSIS WITHOUT MYELOPATHY: ICD-10-CM

## 2025-08-27 DIAGNOSIS — M62.85 DYSFUNCTION OF THE MULTIFIDUS MUSCLE OF LUMBAR REGION: ICD-10-CM

## 2025-08-27 DIAGNOSIS — G89.4 CHRONIC PAIN SYNDROME: ICD-10-CM

## 2025-08-27 DIAGNOSIS — F34.1 PERSISTENT DEPRESSIVE DISORDER: Primary | ICD-10-CM

## 2025-08-27 DIAGNOSIS — M48.062 SPINAL STENOSIS OF LUMBAR REGION WITH NEUROGENIC CLAUDICATION: Primary | ICD-10-CM

## 2025-08-27 DIAGNOSIS — M79.7 FIBROMYALGIA: ICD-10-CM

## 2025-08-27 DIAGNOSIS — M41.26 OTHER IDIOPATHIC SCOLIOSIS, LUMBAR REGION: ICD-10-CM

## 2025-08-27 RX ORDER — EPINEPHRINE 0.3 MG/.3ML
INJECTION SUBCUTANEOUS
COMMUNITY
Start: 2025-08-06

## 2025-08-29 ENCOUNTER — OFFICE VISIT (OUTPATIENT)
Dept: ORTHOPEDIC SURGERY | Facility: CLINIC | Age: 70
End: 2025-08-29
Payer: MEDICARE

## 2025-08-29 VITALS
HEIGHT: 64 IN | SYSTOLIC BLOOD PRESSURE: 120 MMHG | WEIGHT: 140 LBS | BODY MASS INDEX: 23.9 KG/M2 | DIASTOLIC BLOOD PRESSURE: 74 MMHG

## 2025-08-29 DIAGNOSIS — Z09 SURGERY FOLLOW-UP: Primary | ICD-10-CM

## (undated) DEVICE — SYRINGE, LUER LOCK, 5ML: Brand: MEDLINE

## (undated) DEVICE — ANTIBACTERIAL UNDYED BRAIDED (POLYGLACTIN 910), SYNTHETIC ABSORBABLE SURGICAL SUTURE: Brand: COATED VICRYL

## (undated) DEVICE — ANTIBACTERIAL UNDYED BRAIDED (POLYGLACTIN 910), SYNTHETIC ABSORBABLE SUTURE: Brand: COATED VICRYL

## (undated) DEVICE — OCCL COLPO PNEUMO  STRL

## (undated) DEVICE — GLV SURG PREMIERPRO MIC LTX PF SZ8.5 BRN

## (undated) DEVICE — GLV SURG SENSICARE PI LF PF 6.5

## (undated) DEVICE — BLANKT WARM UPPR/BDY ARM/OUT 57X196CM

## (undated) DEVICE — BLADELESS OBTURATOR: Brand: WECK VISTA

## (undated) DEVICE — ASMBL REAMR ADJ W/CANN/DRL/BIT/10.2X251MM 12.5MM

## (undated) DEVICE — SUT MNCRYL PLS ANTIB UD 4/0 PS2 18IN

## (undated) DEVICE — ST TBG CONN PNEUMOCLEAR EVAC SMOKE HEAT/HUMID

## (undated) DEVICE — MANIP UTER RUMI TP 6.7MMX8CM BLU

## (undated) DEVICE — GLV SURG SENSICARE W/ALOE PF LF 6.5 STRL

## (undated) DEVICE — TRENDELENBURG WINGPAD POSITIONING KIT DELUXE - WITHOUT BODY STRAP: Brand: SOULE MEDICAL

## (undated) DEVICE — GW FOR TROCH NAIL 3.2X400MM

## (undated) DEVICE — GLV SURG SENSICARE PI LF PF 7.5

## (undated) DEVICE — 3M™ MEDIPORE™ H SOFT CLOTH SURGICAL TAPE, 2863, 3 IN X 10 YD, 12/CASE: Brand: 3M™ MEDIPORE™

## (undated) DEVICE — PATIENT RETURN ELECTRODE, SINGLE-USE, CONTACT QUALITY MONITORING, ADULT, WITH 9FT CORD, FOR PATIENTS WEIGING OVER 33LBS. (15KG): Brand: MEGADYNE

## (undated) DEVICE — PK MAJ GYN DAVINCI 10

## (undated) DEVICE — MANIP UTER RUMI 2 KOH EFFICIENT 3CM BL

## (undated) DEVICE — GOWN,REINF,POLY,ECL,PP SLV,3XL,XLONG: Brand: MEDLINE

## (undated) DEVICE — SUT MNCRYL PLS ANTIB UD 3/0 PS2 27IN

## (undated) DEVICE — ENDOPATH PNEUMONEEDLE INSUFFLATION NEEDLES WITH LUER LOCK CONNECTORS 120MM: Brand: ENDOPATH

## (undated) DEVICE — TP PLSTC CLR TRNSPORE 1IN SURG

## (undated) DEVICE — SYS CLS SKIN PREMIERPRO EXOFINFUSION 22CM

## (undated) DEVICE — TP SILK DURAPORE 3IN

## (undated) DEVICE — ARM DRAPE

## (undated) DEVICE — COLUMN DRAPE

## (undated) DEVICE — DRP ADAPT ALLY UTER POSTN SYS 1P/U

## (undated) DEVICE — UNDERGLV SURG BIOGEL INDICAT PF 61/2 GRN

## (undated) DEVICE — GLV SURG SENSICARE PI LF PF 6.0

## (undated) DEVICE — PK MAJ FX HIP 10

## (undated) DEVICE — TIP COVER ACCESSORY

## (undated) DEVICE — CANNULA SEAL

## (undated) DEVICE — SYNCHROSEAL: Brand: DA VINCI ENERGY

## (undated) DEVICE — ADHS SKIN PREMIERPRO EXOFIN TOPICAL HI/VISC .5ML

## (undated) DEVICE — SHEET, DRAPE, SPLIT, STERILE: Brand: MEDLINE

## (undated) DEVICE — Device

## (undated) DEVICE — GLV SURG SENSICARE PI LF PF 7.0

## (undated) DEVICE — ANCHOR TISSUE RETRIEVAL SYSTEM, BAG SIZE 125 ML, PORT SIZE 8 MM: Brand: ANCHOR TISSUE RETRIEVAL SYSTEM

## (undated) DEVICE — APPL CHLORAPREP TINTED 26ML TEAL